# Patient Record
Sex: MALE | Race: BLACK OR AFRICAN AMERICAN | Employment: UNEMPLOYED | URBAN - METROPOLITAN AREA
[De-identification: names, ages, dates, MRNs, and addresses within clinical notes are randomized per-mention and may not be internally consistent; named-entity substitution may affect disease eponyms.]

---

## 2017-05-09 PROBLEM — F10.10 ALCOHOL ABUSE: Status: ACTIVE | Noted: 2017-05-09

## 2017-05-13 PROBLEM — B95.2 UTI (URINARY TRACT INFECTION) DUE TO ENTEROCOCCUS: Status: ACTIVE | Noted: 2017-05-13

## 2017-05-13 PROBLEM — N39.0 UTI (URINARY TRACT INFECTION) DUE TO ENTEROCOCCUS: Status: ACTIVE | Noted: 2017-05-13

## 2017-07-31 PROBLEM — M86.179 OSTEOMYELITIS OF ANKLE OR FOOT, ACUTE (HCC): Status: ACTIVE | Noted: 2017-07-31

## 2018-12-21 ENCOUNTER — APPOINTMENT (OUTPATIENT)
Dept: GENERAL RADIOLOGY | Age: 33
DRG: 720 | End: 2018-12-21
Payer: COMMERCIAL

## 2018-12-21 ENCOUNTER — HOSPITAL ENCOUNTER (INPATIENT)
Age: 33
LOS: 4 days | Discharge: HOME | DRG: 720 | End: 2018-12-25
Attending: EMERGENCY MEDICINE | Admitting: INTERNAL MEDICINE
Payer: COMMERCIAL

## 2018-12-21 DIAGNOSIS — F10.920 ACUTE ALCOHOLIC INTOXICATION WITHOUT COMPLICATION (HCC): ICD-10-CM

## 2018-12-21 DIAGNOSIS — R45.851 SUICIDAL IDEATION: ICD-10-CM

## 2018-12-21 DIAGNOSIS — M86.9 OSTEOMYELITIS OF RIGHT FOOT, UNSPECIFIED TYPE (HCC): Primary | ICD-10-CM

## 2018-12-21 LAB
ACETAMINOPHEN LEVEL: <5 UG/ML (ref 15–30)
ALBUMIN SERPL-MCNC: 4.8 GM/DL (ref 3.4–5)
ALCOHOL SCREEN SERUM: 0.26 %WT/VOL
ALP BLD-CCNC: 92 IU/L (ref 40–129)
ALT SERPL-CCNC: 41 U/L (ref 10–40)
AMPHETAMINES: NEGATIVE
ANION GAP SERPL CALCULATED.3IONS-SCNC: 24 MMOL/L (ref 4–16)
AST SERPL-CCNC: 26 IU/L (ref 15–37)
BACTERIA: NEGATIVE /HPF
BARBITURATE SCREEN URINE: NEGATIVE
BASOPHILS ABSOLUTE: 0.1 K/CU MM
BASOPHILS RELATIVE PERCENT: 0.7 % (ref 0–1)
BENZODIAZEPINE SCREEN, URINE: NEGATIVE
BILIRUB SERPL-MCNC: 0.3 MG/DL (ref 0–1)
BILIRUBIN URINE: NEGATIVE MG/DL
BLOOD, URINE: ABNORMAL
BUN BLDV-MCNC: 8 MG/DL (ref 6–23)
CALCIUM SERPL-MCNC: 9.9 MG/DL (ref 8.3–10.6)
CANNABINOID SCREEN URINE: NEGATIVE
CHLORIDE BLD-SCNC: 97 MMOL/L (ref 99–110)
CLARITY: CLEAR
CO2: 17 MMOL/L (ref 21–32)
COCAINE METABOLITE: ABNORMAL
COLOR: ABNORMAL
CREAT SERPL-MCNC: 0.7 MG/DL (ref 0.9–1.3)
DIFFERENTIAL TYPE: ABNORMAL
EOSINOPHILS ABSOLUTE: 0.1 K/CU MM
EOSINOPHILS RELATIVE PERCENT: 1.1 % (ref 0–3)
GFR AFRICAN AMERICAN: >60 ML/MIN/1.73M2
GFR NON-AFRICAN AMERICAN: >60 ML/MIN/1.73M2
GLUCOSE BLD-MCNC: 141 MG/DL (ref 70–99)
GLUCOSE BLD-MCNC: 202 MG/DL (ref 70–99)
GLUCOSE, URINE: >500 MG/DL
HCT VFR BLD CALC: 40.8 % (ref 42–52)
HEMOGLOBIN: 14 GM/DL (ref 13.5–18)
HYALINE CASTS: 0 /LPF
IMMATURE NEUTROPHIL %: 0.3 % (ref 0–0.43)
KETONES, URINE: ABNORMAL MG/DL
LACTATE: 5.4 MMOL/L (ref 0.4–2)
LACTIC ACID, SEPSIS: 4.1 MMOL/L (ref 0.5–1.9)
LEUKOCYTE ESTERASE, URINE: NEGATIVE
LYMPHOCYTES ABSOLUTE: 3 K/CU MM
LYMPHOCYTES RELATIVE PERCENT: 31.6 % (ref 24–44)
MCH RBC QN AUTO: 31.5 PG (ref 27–31)
MCHC RBC AUTO-ENTMCNC: 34.3 % (ref 32–36)
MCV RBC AUTO: 91.7 FL (ref 78–100)
MONOCYTES ABSOLUTE: 0.8 K/CU MM
MONOCYTES RELATIVE PERCENT: 8.9 % (ref 0–4)
MUCUS: ABNORMAL HPF
NITRITE URINE, QUANTITATIVE: NEGATIVE
NUCLEATED RBC %: 0 %
OPIATES, URINE: NEGATIVE
OXYCODONE: NEGATIVE
PDW BLD-RTO: 12.8 % (ref 11.7–14.9)
PH, URINE: 5 (ref 5–8)
PHENCYCLIDINE, URINE: NEGATIVE
PLATELET # BLD: 313 K/CU MM (ref 140–440)
PMV BLD AUTO: 9.2 FL (ref 7.5–11.1)
POTASSIUM SERPL-SCNC: 3.9 MMOL/L (ref 3.5–5.1)
PROTEIN UA: 30 MG/DL
RBC # BLD: 4.45 M/CU MM (ref 4.6–6.2)
RBC URINE: ABNORMAL /HPF (ref 0–3)
SALICYLATE LEVEL: 0.2 MG/DL (ref 15–30)
SEGMENTED NEUTROPHILS ABSOLUTE COUNT: 5.4 K/CU MM
SEGMENTED NEUTROPHILS RELATIVE PERCENT: 57.4 % (ref 36–66)
SODIUM BLD-SCNC: 138 MMOL/L (ref 135–145)
SPECIFIC GRAVITY UA: 1.02 (ref 1–1.03)
SQUAMOUS EPITHELIAL: <1 /HPF
TOTAL IMMATURE NEUTOROPHIL: 0.03 K/CU MM
TOTAL NUCLEATED RBC: 0 K/CU MM
TOTAL PROTEIN: 8.6 GM/DL (ref 6.4–8.2)
TRICHOMONAS: ABNORMAL /HPF
UROBILINOGEN, URINE: NORMAL MG/DL (ref 0.2–1)
WBC # BLD: 9.4 K/CU MM (ref 4–10.5)
WBC UA: <1 /HPF (ref 0–2)

## 2018-12-21 PROCEDURE — 96365 THER/PROPH/DIAG IV INF INIT: CPT

## 2018-12-21 PROCEDURE — 80307 DRUG TEST PRSMV CHEM ANLYZR: CPT

## 2018-12-21 PROCEDURE — 99285 EMERGENCY DEPT VISIT HI MDM: CPT

## 2018-12-21 PROCEDURE — 96366 THER/PROPH/DIAG IV INF ADDON: CPT

## 2018-12-21 PROCEDURE — 6360000002 HC RX W HCPCS: Performed by: INTERNAL MEDICINE

## 2018-12-21 PROCEDURE — 6360000002 HC RX W HCPCS: Performed by: PHYSICIAN ASSISTANT

## 2018-12-21 PROCEDURE — 6370000000 HC RX 637 (ALT 250 FOR IP): Performed by: INTERNAL MEDICINE

## 2018-12-21 PROCEDURE — G0480 DRUG TEST DEF 1-7 CLASSES: HCPCS

## 2018-12-21 PROCEDURE — 1200000000 HC SEMI PRIVATE

## 2018-12-21 PROCEDURE — 85025 COMPLETE CBC W/AUTO DIFF WBC: CPT

## 2018-12-21 PROCEDURE — 2580000003 HC RX 258: Performed by: INTERNAL MEDICINE

## 2018-12-21 PROCEDURE — 96375 TX/PRO/DX INJ NEW DRUG ADDON: CPT

## 2018-12-21 PROCEDURE — 80053 COMPREHEN METABOLIC PANEL: CPT

## 2018-12-21 PROCEDURE — 87040 BLOOD CULTURE FOR BACTERIA: CPT

## 2018-12-21 PROCEDURE — 82962 GLUCOSE BLOOD TEST: CPT

## 2018-12-21 PROCEDURE — 81001 URINALYSIS AUTO W/SCOPE: CPT

## 2018-12-21 PROCEDURE — 36415 COLL VENOUS BLD VENIPUNCTURE: CPT

## 2018-12-21 PROCEDURE — 83605 ASSAY OF LACTIC ACID: CPT

## 2018-12-21 PROCEDURE — 73630 X-RAY EXAM OF FOOT: CPT

## 2018-12-21 PROCEDURE — 2580000003 HC RX 258: Performed by: PHYSICIAN ASSISTANT

## 2018-12-21 RX ORDER — ATORVASTATIN CALCIUM 20 MG/1
20 TABLET, FILM COATED ORAL NIGHTLY
Status: DISCONTINUED | OUTPATIENT
Start: 2018-12-21 | End: 2018-12-25 | Stop reason: HOSPADM

## 2018-12-21 RX ORDER — SODIUM CHLORIDE 9 MG/ML
INJECTION, SOLUTION INTRAVENOUS CONTINUOUS
Status: DISCONTINUED | OUTPATIENT
Start: 2018-12-21 | End: 2018-12-25 | Stop reason: HOSPADM

## 2018-12-21 RX ORDER — 0.9 % SODIUM CHLORIDE 0.9 %
1000 INTRAVENOUS SOLUTION INTRAVENOUS ONCE
Status: COMPLETED | OUTPATIENT
Start: 2018-12-21 | End: 2018-12-21

## 2018-12-21 RX ORDER — DEXTROSE MONOHYDRATE 25 G/50ML
12.5 INJECTION, SOLUTION INTRAVENOUS PRN
Status: DISCONTINUED | OUTPATIENT
Start: 2018-12-21 | End: 2018-12-25 | Stop reason: HOSPADM

## 2018-12-21 RX ORDER — OXYCODONE AND ACETAMINOPHEN 7.5; 325 MG/1; MG/1
1 TABLET ORAL 3 TIMES DAILY PRN
Status: DISCONTINUED | OUTPATIENT
Start: 2018-12-21 | End: 2018-12-25 | Stop reason: HOSPADM

## 2018-12-21 RX ORDER — AMLODIPINE BESYLATE 10 MG/1
10 TABLET ORAL DAILY
Status: DISCONTINUED | OUTPATIENT
Start: 2018-12-22 | End: 2018-12-25 | Stop reason: HOSPADM

## 2018-12-21 RX ORDER — LORAZEPAM 2 MG/ML
2 INJECTION INTRAMUSCULAR ONCE
Status: DISCONTINUED | OUTPATIENT
Start: 2018-12-21 | End: 2018-12-25 | Stop reason: HOSPADM

## 2018-12-21 RX ORDER — ONDANSETRON 2 MG/ML
4 INJECTION INTRAMUSCULAR; INTRAVENOUS EVERY 6 HOURS PRN
Status: DISCONTINUED | OUTPATIENT
Start: 2018-12-21 | End: 2018-12-25 | Stop reason: HOSPADM

## 2018-12-21 RX ORDER — 0.9 % SODIUM CHLORIDE 0.9 %
1000 INTRAVENOUS SOLUTION INTRAVENOUS ONCE
Status: COMPLETED | OUTPATIENT
Start: 2018-12-21 | End: 2018-12-22

## 2018-12-21 RX ORDER — DIPHENHYDRAMINE HYDROCHLORIDE 50 MG/ML
25 INJECTION INTRAMUSCULAR; INTRAVENOUS EVERY 6 HOURS PRN
Status: DISCONTINUED | OUTPATIENT
Start: 2018-12-21 | End: 2018-12-21 | Stop reason: HOSPADM

## 2018-12-21 RX ORDER — IPRATROPIUM BROMIDE AND ALBUTEROL SULFATE 2.5; .5 MG/3ML; MG/3ML
1 SOLUTION RESPIRATORY (INHALATION) EVERY 4 HOURS PRN
Status: DISCONTINUED | OUTPATIENT
Start: 2018-12-21 | End: 2018-12-25 | Stop reason: HOSPADM

## 2018-12-21 RX ORDER — DEXTROSE MONOHYDRATE 50 MG/ML
100 INJECTION, SOLUTION INTRAVENOUS PRN
Status: DISCONTINUED | OUTPATIENT
Start: 2018-12-21 | End: 2018-12-25 | Stop reason: HOSPADM

## 2018-12-21 RX ORDER — PANTOPRAZOLE SODIUM 40 MG/1
40 TABLET, DELAYED RELEASE ORAL
Status: DISCONTINUED | OUTPATIENT
Start: 2018-12-22 | End: 2018-12-25 | Stop reason: HOSPADM

## 2018-12-21 RX ORDER — MORPHINE SULFATE 4 MG/ML
4 INJECTION, SOLUTION INTRAMUSCULAR; INTRAVENOUS EVERY 4 HOURS PRN
Status: DISCONTINUED | OUTPATIENT
Start: 2018-12-21 | End: 2018-12-25 | Stop reason: HOSPADM

## 2018-12-21 RX ORDER — NICOTINE 21 MG/24HR
1 PATCH, TRANSDERMAL 24 HOURS TRANSDERMAL DAILY
Status: DISCONTINUED | OUTPATIENT
Start: 2018-12-21 | End: 2018-12-25 | Stop reason: HOSPADM

## 2018-12-21 RX ORDER — HALOPERIDOL 5 MG/ML
5 INJECTION INTRAMUSCULAR EVERY 6 HOURS PRN
Status: DISCONTINUED | OUTPATIENT
Start: 2018-12-21 | End: 2018-12-21

## 2018-12-21 RX ORDER — ALBUTEROL SULFATE 90 UG/1
2 AEROSOL, METERED RESPIRATORY (INHALATION) EVERY 6 HOURS PRN
Status: DISCONTINUED | OUTPATIENT
Start: 2018-12-21 | End: 2018-12-25 | Stop reason: HOSPADM

## 2018-12-21 RX ORDER — NICOTINE POLACRILEX 4 MG
15 LOZENGE BUCCAL PRN
Status: DISCONTINUED | OUTPATIENT
Start: 2018-12-21 | End: 2018-12-25 | Stop reason: HOSPADM

## 2018-12-21 RX ADMIN — SODIUM CHLORIDE: 9 INJECTION, SOLUTION INTRAVENOUS at 23:45

## 2018-12-21 RX ADMIN — TAZOBACTAM SODIUM AND PIPERACILLIN SODIUM 3.38 G: 375; 3 INJECTION, SOLUTION INTRAVENOUS at 23:45

## 2018-12-21 RX ADMIN — SODIUM CHLORIDE 1000 ML: 9 INJECTION, SOLUTION INTRAVENOUS at 22:53

## 2018-12-21 RX ADMIN — VANCOMYCIN HYDROCHLORIDE 2000 MG: 1 INJECTION, POWDER, LYOPHILIZED, FOR SOLUTION INTRAVENOUS at 18:27

## 2018-12-21 RX ADMIN — TAZOBACTAM SODIUM AND PIPERACILLIN SODIUM 4.5 G: 500; 4 INJECTION, SOLUTION INTRAVENOUS at 18:12

## 2018-12-21 RX ADMIN — SODIUM CHLORIDE 1000 ML: 9 INJECTION, SOLUTION INTRAVENOUS at 18:12

## 2018-12-21 ASSESSMENT — PAIN DESCRIPTION - LOCATION: LOCATION: FOOT

## 2018-12-21 ASSESSMENT — PAIN SCALES - GENERAL: PAINLEVEL_OUTOF10: 0

## 2018-12-21 ASSESSMENT — PAIN DESCRIPTION - ORIENTATION: ORIENTATION: RIGHT

## 2018-12-21 ASSESSMENT — PAIN SCALES - WONG BAKER: WONGBAKER_NUMERICALRESPONSE: 6

## 2018-12-21 ASSESSMENT — PAIN DESCRIPTION - PAIN TYPE: TYPE: ACUTE PAIN

## 2018-12-21 NOTE — ED NOTES
Pt in room yelling at security, called security white mother F??, that they were going to kill him. Gordon notified, pt moved to room 18. All equipment moved from room. During this time pt is talking on the phone yelling at that person and telling them that they \"better hide his dope\".       Cass Galvan RN  12/21/18 3555

## 2018-12-21 NOTE — ED NOTES
Pt now changed in green gown and belongings checked and put in belongings bag. Security at bedside.       Ashley Delvalle RN  12/21/18 5775

## 2018-12-21 NOTE — ED PROVIDER NOTES
surgery (Bilateral); and Toe amputation (Right, 07/25/2017). Home medications:   Prior to Admission medications    Medication Sig Start Date End Date Taking? Authorizing Provider   oxyCODONE-acetaminophen (PERCOCET) 5-325 MG per tablet Take 2 tablets by mouth every 4 hours as needed for Pain  . Historical Provider, MD   glipiZIDE (GLUCOTROL) 10 MG tablet Take 10 mg by mouth 2 times daily (before meals)    Historical Provider, MD   metFORMIN (GLUCOPHAGE) 1000 MG tablet Take 1,000 mg by mouth 2 times daily (with meals)    Historical Provider, MD   prednisoLONE acetate (PRED MILD) 0.12 % ophthalmic suspension Place 1 drop into the right eye 5 times daily    Historical Provider, MD   brimonidine (ALPHAGAN P) 0.15 % ophthalmic solution Place 1 drop into the right eye 3 times daily    Historical Provider, MD   amLODIPine (NORVASC) 10 MG tablet Take 10 mg by mouth daily 11/25/16   Historical Provider, MD   atorvastatin (LIPITOR) 20 MG tablet Take 20 mg by mouth daily 11/25/16   Historical Provider, MD   COMBIVENT RESPIMAT  MCG/ACT AERS inhaler Inhale 1 puff into the lungs 4 times daily 11/10/16   Historical Provider, MD   lisinopril-hydrochlorothiazide (PRINZIDE;ZESTORETIC) 20-12.5 MG per tablet Take 2 tablets by mouth 2 times daily  11/25/16   Historical Provider, MD   RA P COL-RITE 8.6-50 MG per tablet Take 2 tablets by mouth daily as needed  11/28/16   Historical Provider, MD   albuterol sulfate  (90 BASE) MCG/ACT inhaler Inhale 2 puffs into the lungs every 6 hours as needed for Wheezing    Historical Provider, MD   omeprazole (PRILOSEC) 20 MG capsule Take 20 mg by mouth daily    Historical Provider, MD   cloNIDine (CATAPRES) 0.1 MG tablet Take 0.1 mg by mouth 2 times daily     Historical Provider, MD       Social history:  reports that he has been smoking Cigarettes. He has a 2.50 pack-year smoking history. He has never used smokeless tobacco. He reports that he drinks alcohol.  He reports that he

## 2018-12-21 NOTE — ED NOTES
XR FOOT RIGHT (MIN 3 VIEWS)   Status: Final result   Order Providers     Authorizing Billing   GABRIELA Rowley MD          Signed by     Signed Date/Time  Phone Pager   Ishaanelizabeth Morillo 12/21/2018 16:41 [phone]    [facility]     Read Date Phone Pager   Florin Morillo Dec 21, 2018 [phone]    Radiation Dose Estimates     No radiation information found for this patient   Narrative   EXAMINATION:   3 XRAY VIEWS OF THE RIGHT FOOT       12/21/2018 4:20 pm       COMPARISON:   07/18/2017.       HISTORY:   ORDERING SYSTEM PROVIDED HISTORY: swelling of foot and obvious infection of R   5th toe, h/o DM, concern for osteomyelitis   TECHNOLOGIST PROVIDED HISTORY:   Reason for exam:->swelling of foot and obvious infection of R 5th toe, h/o   DM, concern for osteomyelitis   Ordering Physician Provided Reason for Exam: swelling of foot and obvious   infection of R 5th toe, h/o DM, concern for osteomyelitis   Acuity: Unknown   Type of Exam: Initial   Additional signs and symptoms: best images possible, patient was intoxicated   and uncooperative   Relevant Medical/Surgical History: diabetes mellitus       FINDINGS:   There is hallux valgus deformity of the 1st digit.  There is soft tissue   swelling with bony destruction involving the phalanges of the 5th digit. There is presumably chronic bony irregularity involving is the 2nd digit,   which could be related to a prior amputation.  No acute fracture is seen. There is extensive soft tissue swelling of the mid to forefoot.  Degenerative   changes of the midfoot are noted.  Scattered vascular calcifications.           Impression   1. Marked bony irregularity involving the 5th digit suggestive of   osteomyelitis. 2. Extensive soft tissue swelling of the mid to forefoot. 3. Chronic bony irregularity is seen involving the 2nd digit possibly related   to a prior amputation.    4. Hallux valgus deformity of the 1st digit.              Solis Hopkins

## 2018-12-21 NOTE — ED NOTES
Pt being verbally aggressive. \"I hit hard. My niggas come out here and tear this shit up. Is it a felony if I knock one of ya'll out? \"      Luisito Fine RN  12/21/18 [facility], RN  12/21/18 [address] [place] [address], RN  12/21/18 5697

## 2018-12-21 NOTE — ED NOTES
Pt refusing to allow staff. Pt states, \"I aint make no damn statements about killing myself. Just take me to retirement. Cut my foot off. You ain't doing anything. \"      Mago Hebert RN  12/21/18 7869

## 2018-12-22 LAB
ANION GAP SERPL CALCULATED.3IONS-SCNC: 14 MMOL/L (ref 4–16)
BASOPHILS ABSOLUTE: 0.1 K/CU MM
BASOPHILS RELATIVE PERCENT: 0.9 % (ref 0–1)
BUN BLDV-MCNC: 11 MG/DL (ref 6–23)
CALCIUM SERPL-MCNC: 9.1 MG/DL (ref 8.3–10.6)
CHLORIDE BLD-SCNC: 97 MMOL/L (ref 99–110)
CO2: 25 MMOL/L (ref 21–32)
CREAT SERPL-MCNC: 0.9 MG/DL (ref 0.9–1.3)
DIFFERENTIAL TYPE: ABNORMAL
EOSINOPHILS ABSOLUTE: 0.1 K/CU MM
EOSINOPHILS RELATIVE PERCENT: 1.6 % (ref 0–3)
ERYTHROCYTE SEDIMENTATION RATE: 24 MM/HR (ref 0–15)
GFR AFRICAN AMERICAN: >60 ML/MIN/1.73M2
GFR NON-AFRICAN AMERICAN: >60 ML/MIN/1.73M2
GLUCOSE BLD-MCNC: 169 MG/DL (ref 70–99)
GLUCOSE BLD-MCNC: 174 MG/DL (ref 70–99)
GLUCOSE BLD-MCNC: 198 MG/DL (ref 70–99)
GLUCOSE BLD-MCNC: 203 MG/DL (ref 70–99)
GLUCOSE BLD-MCNC: 223 MG/DL (ref 70–99)
HCT VFR BLD CALC: 37.3 % (ref 42–52)
HEMOGLOBIN: 12.8 GM/DL (ref 13.5–18)
HIGH SENSITIVE C-REACTIVE PROTEIN: 11.8 MG/L
IMMATURE NEUTROPHIL %: 0.2 % (ref 0–0.43)
LACTATE: 2.7 MMOL/L (ref 0.4–2)
LACTIC ACID, SEPSIS: 0.9 MMOL/L (ref 0.5–1.9)
LACTIC ACID, SEPSIS: 1.1 MMOL/L (ref 0.5–1.9)
LYMPHOCYTES ABSOLUTE: 1.7 K/CU MM
LYMPHOCYTES RELATIVE PERCENT: 30.4 % (ref 24–44)
MAGNESIUM: 1.8 MG/DL (ref 1.8–2.4)
MCH RBC QN AUTO: 31.4 PG (ref 27–31)
MCHC RBC AUTO-ENTMCNC: 34.3 % (ref 32–36)
MCV RBC AUTO: 91.6 FL (ref 78–100)
MONOCYTES ABSOLUTE: 0.6 K/CU MM
MONOCYTES RELATIVE PERCENT: 11.7 % (ref 0–4)
NUCLEATED RBC %: 0 %
PDW BLD-RTO: 12.7 % (ref 11.7–14.9)
PLATELET # BLD: 225 K/CU MM (ref 140–440)
PMV BLD AUTO: 9 FL (ref 7.5–11.1)
POTASSIUM SERPL-SCNC: 4.1 MMOL/L (ref 3.5–5.1)
RBC # BLD: 4.07 M/CU MM (ref 4.6–6.2)
SEGMENTED NEUTROPHILS ABSOLUTE COUNT: 3 K/CU MM
SEGMENTED NEUTROPHILS RELATIVE PERCENT: 55.2 % (ref 36–66)
SODIUM BLD-SCNC: 136 MMOL/L (ref 135–145)
TOTAL IMMATURE NEUTOROPHIL: 0.01 K/CU MM
TOTAL NUCLEATED RBC: 0 K/CU MM
VANCOMYCIN TROUGH: 8.6 UG/ML (ref 10–20)
WBC # BLD: 5.5 K/CU MM (ref 4–10.5)

## 2018-12-22 PROCEDURE — 83735 ASSAY OF MAGNESIUM: CPT

## 2018-12-22 PROCEDURE — 6370000000 HC RX 637 (ALT 250 FOR IP): Performed by: INTERNAL MEDICINE

## 2018-12-22 PROCEDURE — 1200000000 HC SEMI PRIVATE

## 2018-12-22 PROCEDURE — 36415 COLL VENOUS BLD VENIPUNCTURE: CPT

## 2018-12-22 PROCEDURE — 6360000002 HC RX W HCPCS: Performed by: INTERNAL MEDICINE

## 2018-12-22 PROCEDURE — 2580000003 HC RX 258: Performed by: INTERNAL MEDICINE

## 2018-12-22 PROCEDURE — 86141 C-REACTIVE PROTEIN HS: CPT

## 2018-12-22 PROCEDURE — 85025 COMPLETE CBC W/AUTO DIFF WBC: CPT

## 2018-12-22 PROCEDURE — 85652 RBC SED RATE AUTOMATED: CPT

## 2018-12-22 PROCEDURE — 80202 ASSAY OF VANCOMYCIN: CPT

## 2018-12-22 PROCEDURE — 82962 GLUCOSE BLOOD TEST: CPT

## 2018-12-22 PROCEDURE — 94640 AIRWAY INHALATION TREATMENT: CPT

## 2018-12-22 PROCEDURE — 83605 ASSAY OF LACTIC ACID: CPT

## 2018-12-22 PROCEDURE — 80048 BASIC METABOLIC PNL TOTAL CA: CPT

## 2018-12-22 RX ORDER — ALLOPURINOL 300 MG/1
300 TABLET ORAL DAILY
COMMUNITY
End: 2019-01-09

## 2018-12-22 RX ADMIN — SODIUM CHLORIDE: 9 INJECTION, SOLUTION INTRAVENOUS at 21:25

## 2018-12-22 RX ADMIN — TAZOBACTAM SODIUM AND PIPERACILLIN SODIUM 3.38 G: 375; 3 INJECTION, SOLUTION INTRAVENOUS at 20:57

## 2018-12-22 RX ADMIN — ATORVASTATIN CALCIUM 20 MG: 20 TABLET, FILM COATED ORAL at 20:57

## 2018-12-22 RX ADMIN — TAZOBACTAM SODIUM AND PIPERACILLIN SODIUM 3.38 G: 375; 3 INJECTION, SOLUTION INTRAVENOUS at 06:22

## 2018-12-22 RX ADMIN — INSULIN LISPRO 1 UNITS: 100 INJECTION, SOLUTION INTRAVENOUS; SUBCUTANEOUS at 21:24

## 2018-12-22 RX ADMIN — INSULIN LISPRO 1 UNITS: 100 INJECTION, SOLUTION INTRAVENOUS; SUBCUTANEOUS at 18:25

## 2018-12-22 RX ADMIN — IPRATROPIUM BROMIDE AND ALBUTEROL SULFATE 1 AMPULE: .5; 3 SOLUTION RESPIRATORY (INHALATION) at 14:21

## 2018-12-22 RX ADMIN — TAZOBACTAM SODIUM AND PIPERACILLIN SODIUM 3.38 G: 375; 3 INJECTION, SOLUTION INTRAVENOUS at 14:47

## 2018-12-22 RX ADMIN — IPRATROPIUM BROMIDE AND ALBUTEROL SULFATE 1 AMPULE: .5; 3 SOLUTION RESPIRATORY (INHALATION) at 21:02

## 2018-12-22 RX ADMIN — INSULIN LISPRO 1 UNITS: 100 INJECTION, SOLUTION INTRAVENOUS; SUBCUTANEOUS at 14:48

## 2018-12-22 RX ADMIN — VANCOMYCIN HYDROCHLORIDE 1500 MG: 5 INJECTION, POWDER, LYOPHILIZED, FOR SOLUTION INTRAVENOUS at 18:25

## 2018-12-22 RX ADMIN — IPRATROPIUM BROMIDE AND ALBUTEROL SULFATE 1 AMPULE: .5; 3 SOLUTION RESPIRATORY (INHALATION) at 07:38

## 2018-12-22 RX ADMIN — AMLODIPINE BESYLATE 10 MG: 10 TABLET ORAL at 09:55

## 2018-12-22 RX ADMIN — VANCOMYCIN HYDROCHLORIDE 1500 MG: 5 INJECTION, POWDER, LYOPHILIZED, FOR SOLUTION INTRAVENOUS at 09:55

## 2018-12-22 RX ADMIN — ALBUTEROL SULFATE 2 PUFF: 90 AEROSOL, METERED RESPIRATORY (INHALATION) at 00:00

## 2018-12-22 RX ADMIN — VANCOMYCIN HYDROCHLORIDE 1500 MG: 5 INJECTION, POWDER, LYOPHILIZED, FOR SOLUTION INTRAVENOUS at 02:30

## 2018-12-22 RX ADMIN — INSULIN LISPRO 1 UNITS: 100 INJECTION, SOLUTION INTRAVENOUS; SUBCUTANEOUS at 09:53

## 2018-12-22 ASSESSMENT — PAIN SCALES - GENERAL
PAINLEVEL_OUTOF10: 0

## 2018-12-22 NOTE — H&P
HISTORY AND PHYSICAL  (Hospitalist, Internal Medicine)  IDENTIFYING INFORMATION   PATIENT:  Casandra Trammell  MRN:  [id number]  ADMIT DATE: 12/21/2018  TIME OF EVALUATION: 12/21/2018 7:04 PM    CHIEF COMPLAINT   Right foot swelling    HISTORY OF PRESENT ILLNESS   Casandra Trammell is a 28 y.o. male with a past medical history of HTN, DMII on oral med who presents with 1 week hx of progressive right foot but more localized to the right 5th toe swelling associated with sharp pain 5/10. He visited Dr Mary Grace Guzmán of podiatry - partner of Dr LANGLEY [facility] and Luis Alberto Enciso who performed a XR and immediately sent him to the ED for admission. Suspect right 5th toe osteo. In the ED, he was found to be intoxicated and had some questionable suicidal ideations.      PAST MEDICAL, SURGICAL, FAMILY, and SOCIAL HISTORY     Past Medical History:   Diagnosis Date    Asthma     Blind left eye     Diabetes mellitus ([address]     GERD (gastroesophageal reflux disease)     Hypertension     Retinal detachment 2012    left     Past Surgical History:   Procedure Laterality Date    CORNEAL TRANSPLANT Bilateral     EYE SURGERY      left eye removed    EYE SURGERY Right     FRACTURE SURGERY      foot left    MANDIBLE SURGERY Right     MANDIBLE SURGERY Bilateral     TOE AMPUTATION Right 07/25/2017     Family History   Problem Relation Age of Onset    Diabetes Mother     Asthma Mother     High Blood Pressure Mother     Stroke Mother     Heart Disease Mother     Diabetes Father     Asthma Father     High Blood Pressure Father      Family Hx of HTN  Family Hx as reviewed above, otherwise non-contributory  Social History     Social History    Marital status: Single     Spouse name: N/A    Number of children: N/A    Years of education: N/A     Social History Main Topics    Smoking status: Current Every Day Smoker     Packs/day: 0.50     Years: 5.00     Types: Cigarettes    Smokeless tobacco: Never Used    Alcohol use Yes      Comment: occasionally

## 2018-12-22 NOTE — ED NOTES
Pt belongings kept in ER security per [facility] floor nurse [address], RN  12/21/18 2017

## 2018-12-22 NOTE — PROGRESS NOTES
Psych consult placed for evaluation of patient. Spoke with nursing supervisor regarding consult. Telepsych is not here on weekend so Ellie Dubois said to consult on call psych. Consult was sent to Dr. Pepe Hardin and Dr. Andrew Fernandez was notified. Updated patient and he stated understanding. Shahana HARDIN in charge today and also aware of events. If no psych is available when patient is ready for discharge Ellie Dubois stated the access center will need to be called to transfer the patient to inpatient psych for evaluation and then discharged from there. Patient is pink slipped at this time.

## 2018-12-23 LAB
ANION GAP SERPL CALCULATED.3IONS-SCNC: 11 MMOL/L (ref 4–16)
BASOPHILS ABSOLUTE: 0 K/CU MM
BASOPHILS RELATIVE PERCENT: 0.6 % (ref 0–1)
BUN BLDV-MCNC: 12 MG/DL (ref 6–23)
CALCIUM SERPL-MCNC: 8.7 MG/DL (ref 8.3–10.6)
CHLORIDE BLD-SCNC: 100 MMOL/L (ref 99–110)
CO2: 25 MMOL/L (ref 21–32)
CREAT SERPL-MCNC: 0.8 MG/DL (ref 0.9–1.3)
DIFFERENTIAL TYPE: ABNORMAL
EOSINOPHILS ABSOLUTE: 0.1 K/CU MM
EOSINOPHILS RELATIVE PERCENT: 2.8 % (ref 0–3)
GFR AFRICAN AMERICAN: >60 ML/MIN/1.73M2
GFR NON-AFRICAN AMERICAN: >60 ML/MIN/1.73M2
GLUCOSE BLD-MCNC: 189 MG/DL (ref 70–99)
GLUCOSE BLD-MCNC: 200 MG/DL (ref 70–99)
GLUCOSE BLD-MCNC: 208 MG/DL (ref 70–99)
GLUCOSE BLD-MCNC: 230 MG/DL (ref 70–99)
GLUCOSE BLD-MCNC: 236 MG/DL (ref 70–99)
HCT VFR BLD CALC: 38.9 % (ref 42–52)
HEMOGLOBIN: 13.3 GM/DL (ref 13.5–18)
IMMATURE NEUTROPHIL %: 0.3 % (ref 0–0.43)
LYMPHOCYTES ABSOLUTE: 1.3 K/CU MM
LYMPHOCYTES RELATIVE PERCENT: 40.4 % (ref 24–44)
MAGNESIUM: 1.9 MG/DL (ref 1.8–2.4)
MCH RBC QN AUTO: 31.4 PG (ref 27–31)
MCHC RBC AUTO-ENTMCNC: 34.2 % (ref 32–36)
MCV RBC AUTO: 92 FL (ref 78–100)
MONOCYTES ABSOLUTE: 0.4 K/CU MM
MONOCYTES RELATIVE PERCENT: 11 % (ref 0–4)
NUCLEATED RBC %: 0 %
PDW BLD-RTO: 12.1 % (ref 11.7–14.9)
PLATELET # BLD: 199 K/CU MM (ref 140–440)
PMV BLD AUTO: 9.1 FL (ref 7.5–11.1)
POTASSIUM SERPL-SCNC: 4.2 MMOL/L (ref 3.5–5.1)
RBC # BLD: 4.23 M/CU MM (ref 4.6–6.2)
SEGMENTED NEUTROPHILS ABSOLUTE COUNT: 1.4 K/CU MM
SEGMENTED NEUTROPHILS RELATIVE PERCENT: 44.9 % (ref 36–66)
SODIUM BLD-SCNC: 136 MMOL/L (ref 135–145)
TOTAL IMMATURE NEUTOROPHIL: 0.01 K/CU MM
TOTAL NUCLEATED RBC: 0 K/CU MM
WBC # BLD: 3.2 K/CU MM (ref 4–10.5)

## 2018-12-23 PROCEDURE — 6370000000 HC RX 637 (ALT 250 FOR IP): Performed by: INTERNAL MEDICINE

## 2018-12-23 PROCEDURE — 83735 ASSAY OF MAGNESIUM: CPT

## 2018-12-23 PROCEDURE — 36415 COLL VENOUS BLD VENIPUNCTURE: CPT

## 2018-12-23 PROCEDURE — 1200000000 HC SEMI PRIVATE

## 2018-12-23 PROCEDURE — 87073 CULTURE BACTERIA ANAEROBIC: CPT

## 2018-12-23 PROCEDURE — 82962 GLUCOSE BLOOD TEST: CPT

## 2018-12-23 PROCEDURE — 87186 SC STD MICRODIL/AGAR DIL: CPT

## 2018-12-23 PROCEDURE — 6360000002 HC RX W HCPCS: Performed by: INTERNAL MEDICINE

## 2018-12-23 PROCEDURE — 2580000003 HC RX 258: Performed by: INTERNAL MEDICINE

## 2018-12-23 PROCEDURE — 94640 AIRWAY INHALATION TREATMENT: CPT

## 2018-12-23 PROCEDURE — 87147 CULTURE TYPE IMMUNOLOGIC: CPT

## 2018-12-23 PROCEDURE — 87077 CULTURE AEROBIC IDENTIFY: CPT

## 2018-12-23 PROCEDURE — 87071 CULTURE AEROBIC QUANT OTHER: CPT

## 2018-12-23 PROCEDURE — 94761 N-INVAS EAR/PLS OXIMETRY MLT: CPT

## 2018-12-23 PROCEDURE — 6370000000 HC RX 637 (ALT 250 FOR IP): Performed by: SURGERY

## 2018-12-23 PROCEDURE — 80048 BASIC METABOLIC PNL TOTAL CA: CPT

## 2018-12-23 PROCEDURE — 85025 COMPLETE CBC W/AUTO DIFF WBC: CPT

## 2018-12-23 RX ORDER — BACITRACIN, NEOMYCIN, POLYMYXIN B 400; 3.5; 5 [USP'U]/G; MG/G; [USP'U]/G
OINTMENT TOPICAL DAILY
Status: DISCONTINUED | OUTPATIENT
Start: 2018-12-23 | End: 2018-12-25 | Stop reason: HOSPADM

## 2018-12-23 RX ADMIN — INSULIN LISPRO 1 UNITS: 100 INJECTION, SOLUTION INTRAVENOUS; SUBCUTANEOUS at 22:21

## 2018-12-23 RX ADMIN — VANCOMYCIN HYDROCHLORIDE 1500 MG: 5 INJECTION, POWDER, LYOPHILIZED, FOR SOLUTION INTRAVENOUS at 03:58

## 2018-12-23 RX ADMIN — BACITRACIN, NEOMYCIN, POLYMYXIN B: 400; 3.5; 5 OINTMENT TOPICAL at 13:10

## 2018-12-23 RX ADMIN — INSULIN LISPRO 1 UNITS: 100 INJECTION, SOLUTION INTRAVENOUS; SUBCUTANEOUS at 12:50

## 2018-12-23 RX ADMIN — ENOXAPARIN SODIUM 40 MG: 40 INJECTION SUBCUTANEOUS at 09:01

## 2018-12-23 RX ADMIN — INSULIN LISPRO 2 UNITS: 100 INJECTION, SOLUTION INTRAVENOUS; SUBCUTANEOUS at 17:30

## 2018-12-23 RX ADMIN — IPRATROPIUM BROMIDE AND ALBUTEROL SULFATE 1 AMPULE: .5; 3 SOLUTION RESPIRATORY (INHALATION) at 08:09

## 2018-12-23 RX ADMIN — VANCOMYCIN HYDROCHLORIDE 1750 MG: 500 INJECTION, POWDER, LYOPHILIZED, FOR SOLUTION INTRAVENOUS at 12:50

## 2018-12-23 RX ADMIN — TAZOBACTAM SODIUM AND PIPERACILLIN SODIUM 3.38 G: 375; 3 INJECTION, SOLUTION INTRAVENOUS at 15:31

## 2018-12-23 RX ADMIN — IPRATROPIUM BROMIDE AND ALBUTEROL SULFATE 1 AMPULE: .5; 3 SOLUTION RESPIRATORY (INHALATION) at 21:41

## 2018-12-23 RX ADMIN — IPRATROPIUM BROMIDE AND ALBUTEROL SULFATE 1 AMPULE: .5; 3 SOLUTION RESPIRATORY (INHALATION) at 15:16

## 2018-12-23 RX ADMIN — VANCOMYCIN HYDROCHLORIDE 1750 MG: 500 INJECTION, POWDER, LYOPHILIZED, FOR SOLUTION INTRAVENOUS at 23:32

## 2018-12-23 RX ADMIN — ATORVASTATIN CALCIUM 20 MG: 20 TABLET, FILM COATED ORAL at 22:19

## 2018-12-23 RX ADMIN — PANTOPRAZOLE SODIUM 40 MG: 40 TABLET, DELAYED RELEASE ORAL at 07:56

## 2018-12-23 RX ADMIN — TAZOBACTAM SODIUM AND PIPERACILLIN SODIUM 3.38 G: 375; 3 INJECTION, SOLUTION INTRAVENOUS at 02:49

## 2018-12-23 RX ADMIN — TAZOBACTAM SODIUM AND PIPERACILLIN SODIUM 3.38 G: 375; 3 INJECTION, SOLUTION INTRAVENOUS at 22:19

## 2018-12-23 RX ADMIN — TAZOBACTAM SODIUM AND PIPERACILLIN SODIUM 3.38 G: 375; 3 INJECTION, SOLUTION INTRAVENOUS at 09:00

## 2018-12-23 RX ADMIN — AMLODIPINE BESYLATE 10 MG: 10 TABLET ORAL at 09:01

## 2018-12-23 RX ADMIN — SODIUM CHLORIDE: 9 INJECTION, SOLUTION INTRAVENOUS at 17:30

## 2018-12-23 ASSESSMENT — PAIN SCALES - GENERAL
PAINLEVEL_OUTOF10: 0

## 2018-12-23 NOTE — PROGRESS NOTES
1 ampule at 12/23/18 0809    prochlorperazine (COMPAZINE) injection 10 mg  10 mg Intravenous Q6H PRN Krystle Liz MD        ondansetron [facility]) injection 4 mg  4 mg Intravenous Q6H PRN Krystle Liz MD        morphine (PF) injection 4 mg  4 mg Intravenous Q4H PRN Krystle Liz MD        hydrALAZINE (APRESOLINE) 10 mg in sodium chloride 0.9 % 50 mL ivpb  10 mg Intravenous Q4H PRN Krystle Liz MD        labetalol (NORMODYNE;TRANDATE) 10 mg in sodium chloride 0.9 % 50 mL IVPB  10 mg Intravenous Q4H PRN Krystle Liz MD        insulin lispro (HUMALOG) injection vial 0-6 Units  0-6 Units Subcutaneous TID WC Krystle Liz MD   1 Units at 12/23/18 1250    insulin lispro (HUMALOG) injection vial 0-3 Units  0-3 Units Subcutaneous Nightly Krystle Liz MD   1 Units at 12/22/18 2124    glucose (GLUTOSE) 40 % oral gel 15 g  15 g Oral PRN Krystle Liz MD        dextrose 50 % solution 12.5 g  12.5 g Intravenous PRN Krystle Liz MD        glucagon (rDNA) injection 1 mg  1 mg Intramuscular PRN Krystle Liz MD        dextrose 5 % solution  100 mL/hr Intravenous PRN Krystle Liz MD        enoxaparin (LOVENOX) injection 40 mg  40 mg Subcutaneous Daily Krystle Liz MD   40 mg at 12/23/18 0901         Allergies  Allergies   Allergen Reactions    Ciprofloxacin Shortness Of Breath    Shellfish-Derived Products Anaphylaxis       REVIEW OF SYSTEMS     Within above limitations. 14 point review of systems reviewed. Pertinent positive or negative as per HPI or otherwise negative per 14 point systems review. Reviewed 12/23/2018 at 1:42 PM    PHYSICAL EXAM       Blood pressure 134/79, pulse 94, temperature 98.4 °F (36.9 °C), temperature source Oral, resp. rate 17, height 6' 3\" (1.905 m), weight (!) 301 lb (136.5 kg), SpO2 98 %. General - AAO x 3  Psych - Appropriate affect/speech. No agitation  Eyes - Eye lids intact. No scleral icterus  ENT - Lips wnl. External ear clear/dry/intact.  No thyromegaly on inspection  Neuro - No gross peripheral or central neuro deficits on inspection  Heart - Sinus. RRR. S1 and S2 present. No added HS/murmurs appreciated. No elevated JVD appreciated  Lung - Adequate air entry b/l, No crackles/wheezes appreciated  GI -  Soft. No guarding/rigidity. No hepatosplenomegaly/ascites. BS+   - No CVA/suprapubic tenderness or palpable bladder distension  Skin - Intact. No rash/petechiae/ecchymosis. Warm extremities  MSK - Joints with normal ROM. No joint swellings      Lines/Drains/Airways/Wounds:  [unfilled]    LABS AND IMAGING   CBC  [unfilled]    Last 3 Hemoglobin  Lab Results   Component Value Date    HGB 13.3 12/23/2018    HGB 12.8 12/22/2018    HGB 14.0 12/21/2018     Last 3 WBC/ANC  Lab Results   Component Value Date    WBC 3.2 12/23/2018    WBC 5.5 12/22/2018    WBC 9.4 12/21/2018     No components found for: GRNLOCTYABS  Last 3 Platelets  No results found for: PLATELET  Chemistry  [unfilled]  [unfilled]  No results found for: LDH  Coagulation Studies  No results found for: PTT, INR  Liver Function Studies  Lab Results   Component Value Date    ALT 41 12/21/2018    AST 26 12/21/2018    ALKPHOS 92 12/21/2018       Recent Imaging        Relevant labs and imaging reviewed    ASSESSMENT AND PLAN     Sepsis  Continue vancomycin and Zosyn  Follow-up of blood cultures Results pending    Osteomyelitis of the right fifth toe  Continue vancomycin and Zosyn  Follow-up with blood cultures  General surgery consult  Patient to be discharged on by mouth antibiotics and have an elective amputation of his right fifth toe.     DM type II  Continue blood sugar management with insulin    HTN  Blood pressure control adequate  Continue current regimen    Alcoholism  No evidence of withdrawal at this point  Continue to monitor for now    Suicidal ideation  Patient to have a psych eval  Maintain one-to-one monitoring    DVT prophylaxis  [facility], Internal

## 2018-12-24 LAB
ALCOHOL SCREEN SERUM: <0.01 %WT/VOL
ANION GAP SERPL CALCULATED.3IONS-SCNC: 12 MMOL/L (ref 4–16)
BASOPHILS ABSOLUTE: 0 K/CU MM
BASOPHILS RELATIVE PERCENT: 1 % (ref 0–1)
BUN BLDV-MCNC: 9 MG/DL (ref 6–23)
CALCIUM SERPL-MCNC: 8.9 MG/DL (ref 8.3–10.6)
CHLORIDE BLD-SCNC: 103 MMOL/L (ref 99–110)
CO2: 25 MMOL/L (ref 21–32)
CREAT SERPL-MCNC: 0.8 MG/DL (ref 0.9–1.3)
DIFFERENTIAL TYPE: ABNORMAL
EOSINOPHILS ABSOLUTE: 0.1 K/CU MM
EOSINOPHILS RELATIVE PERCENT: 2.5 % (ref 0–3)
GFR AFRICAN AMERICAN: >60 ML/MIN/1.73M2
GFR NON-AFRICAN AMERICAN: >60 ML/MIN/1.73M2
GLUCOSE BLD-MCNC: 184 MG/DL (ref 70–99)
GLUCOSE BLD-MCNC: 191 MG/DL (ref 70–99)
GLUCOSE BLD-MCNC: 207 MG/DL (ref 70–99)
GLUCOSE BLD-MCNC: 209 MG/DL (ref 70–99)
GLUCOSE BLD-MCNC: 224 MG/DL (ref 70–99)
GLUCOSE BLD-MCNC: 230 MG/DL (ref 70–99)
HCT VFR BLD CALC: 38.8 % (ref 42–52)
HEMOGLOBIN: 13.1 GM/DL (ref 13.5–18)
IMMATURE NEUTROPHIL %: 0.3 % (ref 0–0.43)
LYMPHOCYTES ABSOLUTE: 1.2 K/CU MM
LYMPHOCYTES RELATIVE PERCENT: 39.5 % (ref 24–44)
MAGNESIUM: 2 MG/DL (ref 1.8–2.4)
MCH RBC QN AUTO: 31.2 PG (ref 27–31)
MCHC RBC AUTO-ENTMCNC: 33.8 % (ref 32–36)
MCV RBC AUTO: 92.4 FL (ref 78–100)
MONOCYTES ABSOLUTE: 0.3 K/CU MM
MONOCYTES RELATIVE PERCENT: 10.5 % (ref 0–4)
NUCLEATED RBC %: 0 %
PDW BLD-RTO: 11.9 % (ref 11.7–14.9)
PLATELET # BLD: 218 K/CU MM (ref 140–440)
PMV BLD AUTO: 9 FL (ref 7.5–11.1)
POTASSIUM SERPL-SCNC: 4.2 MMOL/L (ref 3.5–5.1)
RBC # BLD: 4.2 M/CU MM (ref 4.6–6.2)
SEGMENTED NEUTROPHILS ABSOLUTE COUNT: 1.5 K/CU MM
SEGMENTED NEUTROPHILS RELATIVE PERCENT: 46.2 % (ref 36–66)
SODIUM BLD-SCNC: 140 MMOL/L (ref 135–145)
TOTAL IMMATURE NEUTOROPHIL: 0.01 K/CU MM
TOTAL NUCLEATED RBC: 0 K/CU MM
VANCOMYCIN TROUGH: 9.5 UG/ML (ref 10–20)
WBC # BLD: 3.1 K/CU MM (ref 4–10.5)

## 2018-12-24 PROCEDURE — 2580000003 HC RX 258: Performed by: INTERNAL MEDICINE

## 2018-12-24 PROCEDURE — 6370000000 HC RX 637 (ALT 250 FOR IP): Performed by: INTERNAL MEDICINE

## 2018-12-24 PROCEDURE — 1200000000 HC SEMI PRIVATE

## 2018-12-24 PROCEDURE — 94640 AIRWAY INHALATION TREATMENT: CPT

## 2018-12-24 PROCEDURE — 82962 GLUCOSE BLOOD TEST: CPT

## 2018-12-24 PROCEDURE — 80202 ASSAY OF VANCOMYCIN: CPT

## 2018-12-24 PROCEDURE — 6360000002 HC RX W HCPCS: Performed by: INTERNAL MEDICINE

## 2018-12-24 PROCEDURE — 83735 ASSAY OF MAGNESIUM: CPT

## 2018-12-24 PROCEDURE — 36415 COLL VENOUS BLD VENIPUNCTURE: CPT

## 2018-12-24 PROCEDURE — 80048 BASIC METABOLIC PNL TOTAL CA: CPT

## 2018-12-24 PROCEDURE — 6370000000 HC RX 637 (ALT 250 FOR IP): Performed by: HOSPITALIST

## 2018-12-24 PROCEDURE — G0480 DRUG TEST DEF 1-7 CLASSES: HCPCS

## 2018-12-24 PROCEDURE — 94761 N-INVAS EAR/PLS OXIMETRY MLT: CPT

## 2018-12-24 PROCEDURE — 85025 COMPLETE CBC W/AUTO DIFF WBC: CPT

## 2018-12-24 RX ORDER — SULFAMETHOXAZOLE AND TRIMETHOPRIM 800; 160 MG/1; MG/1
2 TABLET ORAL EVERY 12 HOURS
Status: DISCONTINUED | OUTPATIENT
Start: 2018-12-24 | End: 2018-12-25 | Stop reason: HOSPADM

## 2018-12-24 RX ORDER — SULFAMETHOXAZOLE AND TRIMETHOPRIM 800; 160 MG/1; MG/1
1 TABLET ORAL 2 TIMES DAILY
Qty: 14 TABLET | Refills: 0 | Status: SHIPPED | OUTPATIENT
Start: 2018-12-24 | End: 2019-01-03

## 2018-12-24 RX ORDER — ALLOPURINOL 300 MG/1
300 TABLET ORAL NIGHTLY
Status: DISCONTINUED | OUTPATIENT
Start: 2018-12-24 | End: 2018-12-25 | Stop reason: HOSPADM

## 2018-12-24 RX ORDER — BRIMONIDINE TARTRATE 2 MG/ML
1 SOLUTION/ DROPS OPHTHALMIC 3 TIMES DAILY
Status: DISCONTINUED | OUTPATIENT
Start: 2018-12-24 | End: 2018-12-25 | Stop reason: HOSPADM

## 2018-12-24 RX ORDER — BRIMONIDINE TARTRATE 2 MG/ML
1 SOLUTION/ DROPS OPHTHALMIC 3 TIMES DAILY
Status: DISCONTINUED | OUTPATIENT
Start: 2018-12-24 | End: 2018-12-24

## 2018-12-24 RX ADMIN — SODIUM CHLORIDE: 9 INJECTION, SOLUTION INTRAVENOUS at 17:55

## 2018-12-24 RX ADMIN — PANTOPRAZOLE SODIUM 40 MG: 40 TABLET, DELAYED RELEASE ORAL at 05:42

## 2018-12-24 RX ADMIN — INSULIN LISPRO 1 UNITS: 100 INJECTION, SOLUTION INTRAVENOUS; SUBCUTANEOUS at 23:18

## 2018-12-24 RX ADMIN — INSULIN LISPRO 1 UNITS: 100 INJECTION, SOLUTION INTRAVENOUS; SUBCUTANEOUS at 17:49

## 2018-12-24 RX ADMIN — IPRATROPIUM BROMIDE AND ALBUTEROL SULFATE 1 AMPULE: .5; 3 SOLUTION RESPIRATORY (INHALATION) at 10:07

## 2018-12-24 RX ADMIN — BACITRACIN, NEOMYCIN, POLYMYXIN B: 400; 3.5; 5 OINTMENT TOPICAL at 09:35

## 2018-12-24 RX ADMIN — ALLOPURINOL 300 MG: 300 TABLET ORAL at 23:17

## 2018-12-24 RX ADMIN — BRIMONIDINE TARTRATE 1 DROP: 2 SOLUTION/ DROPS OPHTHALMIC at 23:18

## 2018-12-24 RX ADMIN — PREDNISOLONE ACETATE 1 DROP: 1.2 SUSPENSION/ DROPS OPHTHALMIC at 23:17

## 2018-12-24 RX ADMIN — TAZOBACTAM SODIUM AND PIPERACILLIN SODIUM 3.38 G: 375; 3 INJECTION, SOLUTION INTRAVENOUS at 09:26

## 2018-12-24 RX ADMIN — BRIMONIDINE TARTRATE 1 DROP: 2 SOLUTION/ DROPS OPHTHALMIC at 14:31

## 2018-12-24 RX ADMIN — IPRATROPIUM BROMIDE AND ALBUTEROL SULFATE 1 AMPULE: .5; 3 SOLUTION RESPIRATORY (INHALATION) at 19:19

## 2018-12-24 RX ADMIN — SULFAMETHOXAZOLE AND TRIMETHOPRIM 2 TABLET: 800; 160 TABLET ORAL at 17:50

## 2018-12-24 RX ADMIN — VANCOMYCIN HYDROCHLORIDE 1750 MG: 500 INJECTION, POWDER, LYOPHILIZED, FOR SOLUTION INTRAVENOUS at 06:51

## 2018-12-24 RX ADMIN — INSULIN LISPRO 2 UNITS: 100 INJECTION, SOLUTION INTRAVENOUS; SUBCUTANEOUS at 09:29

## 2018-12-24 RX ADMIN — BRIMONIDINE TARTRATE 1 DROP: 2 SOLUTION/ DROPS OPHTHALMIC at 05:42

## 2018-12-24 RX ADMIN — INSULIN LISPRO 2 UNITS: 100 INJECTION, SOLUTION INTRAVENOUS; SUBCUTANEOUS at 14:30

## 2018-12-24 RX ADMIN — AMLODIPINE BESYLATE 10 MG: 10 TABLET ORAL at 09:26

## 2018-12-24 RX ADMIN — ENOXAPARIN SODIUM 40 MG: 40 INJECTION SUBCUTANEOUS at 09:26

## 2018-12-24 RX ADMIN — SODIUM CHLORIDE: 9 INJECTION, SOLUTION INTRAVENOUS at 05:04

## 2018-12-24 RX ADMIN — TAZOBACTAM SODIUM AND PIPERACILLIN SODIUM 3.38 G: 375; 3 INJECTION, SOLUTION INTRAVENOUS at 05:01

## 2018-12-24 RX ADMIN — ATORVASTATIN CALCIUM 20 MG: 20 TABLET, FILM COATED ORAL at 23:17

## 2018-12-24 ASSESSMENT — PAIN SCALES - WONG BAKER
WONGBAKER_NUMERICALRESPONSE: 6

## 2018-12-24 ASSESSMENT — SLEEP AND FATIGUE QUESTIONNAIRES
DO YOU USE A SLEEP AID: NO
DO YOU HAVE DIFFICULTY SLEEPING: NO

## 2018-12-24 ASSESSMENT — LIFESTYLE VARIABLES: HISTORY_ALCOHOL_USE: NO

## 2018-12-24 NOTE — PROGRESS NOTES
Provisional Diagnosis:       Substance Induced Mood Disorder     Psychosocial and Contextual Factors:      Alcohol use     C-SSRS Summary:      Patient: X        Family:           Agency:             Present Suicidal Behavior:      Verbal: denies                 Attempt: denies     Past Suicidal Behavior:      Verbal: denies     Attempt: denies     Self-Injurious/Self-Mutilation: denies     Trauma Identified: denies      Protective Factors:      Family support     Risk Factors:      Alcohol use     Clinical Summary:      Patient is a 28year old male who presented to ED intoxicated. While intoxicated, patient made homicidal and suicidal threats. Patient was admitted into hospital. Patient is under an [address] [place]. Patient sober during assessment. Patient denies suicidal intent, actions or plan. Patient denies homicidal thoughts or plans. Patient denies a past history of thoughts. Patient denies past history of suicide attempts. Patient denies past history of violence. Patient states he uses marijuana occasionally. Patient states he drinks alcohol \"once or twice a month\". Patient states he consumed too much alcohol the other day and has regrets about past actions. Patient denies a mental health history. Patient denies currently seeing an outpatient mental health provider. Patient was alert,orientated and cooperative during assessment. Homicidal thoughts and/or plans denied. No delusions noted. Hallucinations denied. Legal issues denied. Violence denied. Level of Care Disposition:      Consulted with Dr. Brad Vazquez. Patient to be likely discharged from medical floor today. Call made to [address] and [facility]. Left voicemail requesting follow up counseling appointment for patient. Mental health services to call patient with appointment details.

## 2018-12-24 NOTE — DISCHARGE SUMMARY
community. Patient to be discharged to an inpatient psychiatry unit for further evaluation. Patient was feeling and looking better on the day of discharge. Patient was discharged in a stable condition with following medications and instructions. Exam:   Wt Readings from Last 3 Encounters:   12/21/18 (!) 301 lb (136.5 kg)   08/16/17 (!) 301 lb (136.5 kg)   07/31/17 296 lb 12.8 oz (134.6 kg)       Blood pressure 138/76, pulse 76, temperature 98 °F (36.7 °C), temperature source Oral, resp. rate 16, height 6' 3\" (1.905 m), weight (!) 301 lb (136.5 kg), SpO2 97 %. General - AAO x 3  Psych - Appropriate affect/speech. No agitation  Eyes - Eye lids intact. No scleral icterus  ENT - Oral mucosa pink, dentition intact. External ear clear/dry/intact. No thyromegaly  Lymphatics - No cervical/inguinal lympadenopathy   Neuro - No gross peripheral or central neuro deficits with intact CN 2-12 exam  Heart - Sinus. RRR. S1 and S2 present. No added HS/murmurs appreciated. No elevated JVD appreciated. Lung - Adequate air entry b/l, No crackles/wheezes appreciated  GI - Soft, non-tender. No guarding/rigidity. No hepatosplenomegaly/ascities. BS+   - No CVA/suprapubic tenderness or palpable bladder distension  Skin - Intact. No rash/petechiae/ecchymosis. Warm extremities  MSK - Joints with normal ROM.  No joint swellings      Significant Diagnostic Studies:   CBC:   Recent Labs      12/22/18   0146  12/23/18   0457  12/24/18   0637   WBC  5.5  3.2*  3.1*   HGB  12.8*  13.3*  13.1*   PLT  225  199  218     WBC   Date/Time Value Ref Range Status   12/24/2018 06:37 AM 3.1 (L) 4.0 - 10.5 K/CU MM Final   12/23/2018 04:57 AM 3.2 (L) 4.0 - 10.5 K/CU MM Final   12/22/2018 01:46 AM 5.5 4.0 - 10.5 K/CU MM Final     Hemoglobin   Date/Time Value Ref Range Status   12/24/2018 06:37 AM 13.1 (L) 13.5 - 18.0 GM/DL Final   12/23/2018 04:57 AM 13.3 (L) 13.5 - 18.0 GM/DL Final   12/22/2018 01:46 AM 12.8 (L) 13.5 - 18.0 GM/DL Final Platelets   Date/Time Value Ref Range Status   12/24/2018 06:37  140 - 440 K/CU MM Final   12/23/2018 04:57  140 - 440 K/CU MM Final   12/22/2018 01:46  140 - 440 K/CU MM Final    CMP:  Recent Labs      12/21/18   1730  12/22/18   0146  12/23/18   0457  12/24/18   0637   NA  138  136  136  140   K  3.9  4.1  4.2  4.2   CL  97*  97*  100  103   CO2  17*  25  25  25   BUN  8  11  12  9   CREATININE  0.7*  0.9  0.8*  0.8*   CALCIUM  9.9  9.1  8.7  8.9   PROT  8.6*   --    --    --    LABALBU  4.8   --    --    --    BILITOT  0.3   --    --    --    ALKPHOS  92   --    --    --    AST  26   --    --    --    ALT  41*   --    --    --      Troponin: No results for input(s): TROPONINI in the last 72 hours. BNP: No results for input(s): BNP in the last 72 hours. Lipids: No results for input(s): CHOL, HDL in the last 72 hours. Invalid input(s): LDLCALCU  ABGs:No results for input(s): PH, LBR1OQX, PO2ART, BE, WFT7NJT, CO2CT, O2SAT, LABCARB in the last 72 hours. Invalid input(s): METHGBART    Glucose:   Recent Labs      12/23/18   1708  12/23/18   2058  12/24/18   0813   POCGLU  208*  236*  224*     Magnesium:   Recent Labs      12/22/18   0146  12/23/18   0457  12/24/18   0637   MG  1.8  1.9  2.0     Phosphorus:No results for input(s): PHOS in the last 72 hours. INR: No results for input(s): INR in the last 72 hours.       Patient Instructions:   [facility] Medication Instructions [id number]    Printed on:12/24/18 1200   Medication Information                      albuterol sulfate  (90 BASE) MCG/ACT inhaler  Inhale 2 puffs into the lungs every 6 hours as needed for Wheezing             allopurinol (ZYLOPRIM) 300 MG tablet  Take 300 mg by mouth daily             amLODIPine (NORVASC) 10 MG tablet  Take 10 mg by mouth daily             atorvastatin (LIPITOR) 20 MG tablet  Take 20 mg by mouth daily             brimonidine (ALPHAGAN P) 0.15 % ophthalmic solution  Place 1 drop into the right eye 3 times daily             cloNIDine (CATAPRES) 0.1 MG tablet  Take 0.1 mg by mouth daily              COMBIVENT RESPIMAT  MCG/ACT AERS inhaler  Inhale 1 puff into the lungs 4 times daily             glipiZIDE (GLUCOTROL) 10 MG tablet  Take 10 mg by mouth 2 times daily (before meals)             lisinopril-hydrochlorothiazide (PRINZIDE;ZESTORETIC) 20-12.5 MG per tablet  Take 1 tablet by mouth 2 times daily              metFORMIN (GLUCOPHAGE) 1000 MG tablet  Take 1,000 mg by mouth 2 times daily (with meals)             omeprazole (PRILOSEC) 20 MG capsule  Take 40 mg by mouth daily              oxyCODONE-acetaminophen (PERCOCET) 5-325 MG per tablet  Take 2 tablets by mouth every 4 hours as needed for Pain  . prednisoLONE acetate (PRED MILD) 0.12 % ophthalmic suspension  Place 1 drop into the right eye 5 times daily             RA P COL-RITE 8.6-50 MG per tablet  Take 2 tablets by mouth daily as needed              SITagliptin (JANUVIA) 25 MG tablet  Take 15 mg by mouth daily                  Code Status: Full Code     Consults:   PHARMACY TO DOSE VANCOMYCIN  IP CONSULT TO HOSPITALIST  IP CONSULT TO PHARMACY  IP CONSULT TO GENERAL SURGERY  IP CONSULT TO CASE MANAGEMENT  IP CONSULT TO PSYCHIATRY    Diet: regular diet    Activity: activity as tolerated   Work:    Discharged Condition: good    Prognosis: Good    Disposition: Inpatient psychiatric facility      Follow-up with   1. PCP within   5-7    Days         Discharge Physician Signed:   Hospitalist, Internal medicine  12/24/2018 at 12:00 PM    The patient was seen and examined on day of discharge and this discharge summary is in conjunction with any daily progress note from day of discharge.   Time spent on discharge in the examination, evaluation, counseling and review of medications and discharge plan: 30 minutes    Please forward this discharge summary to patient's PCP

## 2018-12-25 VITALS
OXYGEN SATURATION: 96 % | WEIGHT: 301 LBS | DIASTOLIC BLOOD PRESSURE: 80 MMHG | BODY MASS INDEX: 37.42 KG/M2 | HEIGHT: 75 IN | RESPIRATION RATE: 18 BRPM | HEART RATE: 89 BPM | TEMPERATURE: 98.1 F | SYSTOLIC BLOOD PRESSURE: 125 MMHG

## 2018-12-25 LAB
ANION GAP SERPL CALCULATED.3IONS-SCNC: 12 MMOL/L (ref 4–16)
BASOPHILS ABSOLUTE: 0 K/CU MM
BASOPHILS RELATIVE PERCENT: 0.7 % (ref 0–1)
BUN BLDV-MCNC: 9 MG/DL (ref 6–23)
CALCIUM SERPL-MCNC: 8.7 MG/DL (ref 8.3–10.6)
CHLORIDE BLD-SCNC: 105 MMOL/L (ref 99–110)
CO2: 24 MMOL/L (ref 21–32)
CREAT SERPL-MCNC: 0.8 MG/DL (ref 0.9–1.3)
DIFFERENTIAL TYPE: ABNORMAL
EOSINOPHILS ABSOLUTE: 0.1 K/CU MM
EOSINOPHILS RELATIVE PERCENT: 2.1 % (ref 0–3)
GFR AFRICAN AMERICAN: >60 ML/MIN/1.73M2
GFR NON-AFRICAN AMERICAN: >60 ML/MIN/1.73M2
GLUCOSE BLD-MCNC: 176 MG/DL (ref 70–99)
GLUCOSE BLD-MCNC: 181 MG/DL (ref 70–99)
GLUCOSE BLD-MCNC: 227 MG/DL (ref 70–99)
HCT VFR BLD CALC: 39.1 % (ref 42–52)
HEMOGLOBIN: 13.2 GM/DL (ref 13.5–18)
IMMATURE NEUTROPHIL %: 0.5 % (ref 0–0.43)
LYMPHOCYTES ABSOLUTE: 1.4 K/CU MM
LYMPHOCYTES RELATIVE PERCENT: 33.8 % (ref 24–44)
MAGNESIUM: 1.8 MG/DL (ref 1.8–2.4)
MCH RBC QN AUTO: 31.1 PG (ref 27–31)
MCHC RBC AUTO-ENTMCNC: 33.8 % (ref 32–36)
MCV RBC AUTO: 92.2 FL (ref 78–100)
MONOCYTES ABSOLUTE: 0.5 K/CU MM
MONOCYTES RELATIVE PERCENT: 10.6 % (ref 0–4)
NUCLEATED RBC %: 0 %
PDW BLD-RTO: 11.9 % (ref 11.7–14.9)
PLATELET # BLD: 228 K/CU MM (ref 140–440)
PMV BLD AUTO: 8.9 FL (ref 7.5–11.1)
POTASSIUM SERPL-SCNC: 4.1 MMOL/L (ref 3.5–5.1)
RBC # BLD: 4.24 M/CU MM (ref 4.6–6.2)
SEGMENTED NEUTROPHILS ABSOLUTE COUNT: 2.2 K/CU MM
SEGMENTED NEUTROPHILS RELATIVE PERCENT: 52.3 % (ref 36–66)
SODIUM BLD-SCNC: 141 MMOL/L (ref 135–145)
TOTAL IMMATURE NEUTOROPHIL: 0.02 K/CU MM
TOTAL NUCLEATED RBC: 0 K/CU MM
WBC # BLD: 4.3 K/CU MM (ref 4–10.5)

## 2018-12-25 PROCEDURE — 94761 N-INVAS EAR/PLS OXIMETRY MLT: CPT

## 2018-12-25 PROCEDURE — 6370000000 HC RX 637 (ALT 250 FOR IP): Performed by: INTERNAL MEDICINE

## 2018-12-25 PROCEDURE — 83735 ASSAY OF MAGNESIUM: CPT

## 2018-12-25 PROCEDURE — 6370000000 HC RX 637 (ALT 250 FOR IP): Performed by: HOSPITALIST

## 2018-12-25 PROCEDURE — 94640 AIRWAY INHALATION TREATMENT: CPT

## 2018-12-25 PROCEDURE — 82962 GLUCOSE BLOOD TEST: CPT

## 2018-12-25 PROCEDURE — 2580000003 HC RX 258: Performed by: INTERNAL MEDICINE

## 2018-12-25 PROCEDURE — 36415 COLL VENOUS BLD VENIPUNCTURE: CPT

## 2018-12-25 PROCEDURE — 85025 COMPLETE CBC W/AUTO DIFF WBC: CPT

## 2018-12-25 PROCEDURE — 80048 BASIC METABOLIC PNL TOTAL CA: CPT

## 2018-12-25 RX ADMIN — BACITRACIN, NEOMYCIN, POLYMYXIN B: 400; 3.5; 5 OINTMENT TOPICAL at 09:40

## 2018-12-25 RX ADMIN — BRIMONIDINE TARTRATE 1 DROP: 2 SOLUTION/ DROPS OPHTHALMIC at 09:39

## 2018-12-25 RX ADMIN — AMLODIPINE BESYLATE 10 MG: 10 TABLET ORAL at 09:37

## 2018-12-25 RX ADMIN — SULFAMETHOXAZOLE AND TRIMETHOPRIM 2 TABLET: 800; 160 TABLET ORAL at 04:31

## 2018-12-25 RX ADMIN — INSULIN LISPRO 1 UNITS: 100 INJECTION, SOLUTION INTRAVENOUS; SUBCUTANEOUS at 09:38

## 2018-12-25 RX ADMIN — PREDNISOLONE ACETATE 1 DROP: 1.2 SUSPENSION/ DROPS OPHTHALMIC at 06:27

## 2018-12-25 RX ADMIN — PANTOPRAZOLE SODIUM 40 MG: 40 TABLET, DELAYED RELEASE ORAL at 06:27

## 2018-12-25 RX ADMIN — SODIUM CHLORIDE: 9 INJECTION, SOLUTION INTRAVENOUS at 03:49

## 2018-12-25 RX ADMIN — IPRATROPIUM BROMIDE AND ALBUTEROL SULFATE 1 AMPULE: .5; 3 SOLUTION RESPIRATORY (INHALATION) at 06:48

## 2018-12-25 NOTE — DISCHARGE SUMMARY
admission:  [facility] 81 TO HOSPITALIST  IP CONSULT TO PHARMACY  IP CONSULT TO GENERAL SURGERY  IP CONSULT TO CASE MANAGEMENT  IP CONSULT TO PSYCHIATRY    Discharge Instruction:   Follow up appointments:        Follow up with Stefano James MD   Specialty: [facility]  [address] [address]   [phone]           Schedule an appointment with Ana Galo MD as soon as possible for a visit   Specialty: General Surgery  [address] [place] [address]. [address] [place] [address] Road   [phone]    [id number] Follow up with Ana Galo MD   Monday Jan 7, 2019   Specialty: General Surgery  [address] [place] [address]. [place] [id number] Follow up with Ana Galo MD   Tuesday Jan 8, 2019   Specialty: General Surgery   For wound re-check         Disposition: Discharged to:   [x]Home, [][facility], []SNF, []Acute Rehab, []Hospice   Condition on discharge: Stable    Discharge Medications:      Ciara Manual   Home Medication Instructions SAS:[id number]    Printed on:12/25/18 1851   Medication Information                      albuterol sulfate  (90 BASE) MCG/ACT inhaler  Inhale 2 puffs into the lungs every 6 hours as needed for Wheezing             allopurinol (ZYLOPRIM) 300 MG tablet  Take 300 mg by mouth daily             amLODIPine (NORVASC) 10 MG tablet  Take 10 mg by mouth daily             atorvastatin (LIPITOR) 20 MG tablet  Take 20 mg by mouth daily             brimonidine (ALPHAGAN P) 0.15 % ophthalmic solution  Place 1 drop into the right eye 3 times daily             cloNIDine (CATAPRES) 0.1 MG tablet  Take 0.1 mg by mouth daily              COMBIVENT RESPIMAT  MCG/ACT AERS inhaler  Inhale 1 puff into the lungs 4 times daily             glipiZIDE (GLUCOTROL) 10 MG tablet  Take 10 mg by mouth 2 times daily (before meals)             Latanoprostene Bunod (VYZULTA) 0.024 % SOLN  Apply 1 drop to eye nightly             lisinopril-hydrochlorothiazide (PRINZIDE;ZESTORETIC) 20-12.5 MG per tablet  Take 1 tablet by mouth 2 times daily              metFORMIN (GLUCOPHAGE) 1000 MG tablet  Take 1,000 mg by mouth 2 times daily (with meals)             omeprazole (PRILOSEC) 20 MG capsule  Take 40 mg by mouth daily              oxyCODONE-acetaminophen (PERCOCET) 5-325 MG per tablet  Take 2 tablets by mouth every 4 hours as needed for Pain  . prednisoLONE acetate (PRED MILD) 0.12 % ophthalmic suspension  Place 1 drop into the right eye 2 times daily              RA P COL-RITE 8.6-50 MG per tablet  Take 2 tablets by mouth daily as needed              SITagliptin (JANUVIA) 25 MG tablet  Take 15 mg by mouth daily             sulfamethoxazole-trimethoprim (BACTRIM DS;SEPTRA DS) 800-160 MG per tablet  Take 1 tablet by mouth 2 times daily for 10 days                 Objective Findings at Discharge:   /80   Pulse 89   Temp 98.1 °F (36.7 °C) (Oral)   Resp 18   Ht 6' 3\" (1.905 m)   Wt (!) 301 lb (136.5 kg)   SpO2 96%   BMI 37.62 kg/m²            PHYSICAL EXAM   GEN Awake male, cooperative, no apparent distress. RESP Clear to auscultation, no wheezes, rales or rhonchi. Symmetric chest movement . CARDIO/VASC S1/S2 auscultated. Regular rate. No peripheral edema. GI Abdomen is soft without significant tenderness, Bowel sounds are normoactive. Rectal exam deferred. MSK/SKIN Right foot covered with dressing  NEURO normal speech, no lateralizing weakness. PSYCH Awake, alert, oriented x 4. Affect appropriate.     BMP/CBC  Recent Labs      12/23/18   0457  12/24/18   0637  12/25/18   0549   NA  136  140  141   K  4.2  4.2  4.1   CL  100  103  105   CO2  25  25  24   BUN  12  9  9   CREATININE  0.8*  0.8*  0.8*   WBC  3.2*  3.1*  4.3   HCT  38.9*  38.8*  39.1*   PLT  199  218  228       IMAGING:  Xr Foot Right (min 3 Views)    Result Date: 12/21/2018  EXAMINATION: 3 XRAY VIEWS OF THE RIGHT FOOT

## 2018-12-25 NOTE — PROGRESS NOTES
Cross cover    Evaluated patient    Patient AAOx3. Wishes to get better. Wants to live . Not suicidal   Appears compliant with medical care  He is able to tell me that he wants to go home for xmas to see his kids and interval surgery with Dr Destiney Jacques  He said that he was drunk and under the influenced when he was Pink slipped  Because he is alert and is not at risk for self harm or suicide at current, I will discontinue suicide precaution. Await to  finalize discharge planning in the morning.        [address] [place] [address], Internal Medicine  12/25/2018 at 1:05 AM

## 2018-12-26 LAB
CULTURE: NORMAL
CULTURE: NORMAL
Lab: NORMAL
Lab: NORMAL
REPORT STATUS: NORMAL
REPORT STATUS: NORMAL
SPECIMEN: NORMAL
SPECIMEN: NORMAL

## 2018-12-27 LAB
CULTURE: NORMAL
Lab: NORMAL
ORGANISM: NORMAL
ORGANISM: NORMAL
REPORT STATUS: NORMAL
SPECIMEN: NORMAL

## 2019-01-10 PROBLEM — M86.171 ACUTE OSTEOMYELITIS OF TOE, RIGHT (HCC): Status: ACTIVE | Noted: 2017-07-31

## 2019-06-20 ENCOUNTER — HOSPITAL ENCOUNTER (EMERGENCY)
Age: 34
Discharge: HOME | End: 2019-06-20
Attending: EMERGENCY MEDICINE
Payer: COMMERCIAL

## 2019-06-20 VITALS
RESPIRATION RATE: 16 BRPM | HEART RATE: 102 BPM | WEIGHT: 305 LBS | TEMPERATURE: 98.9 F | OXYGEN SATURATION: 98 % | DIASTOLIC BLOOD PRESSURE: 83 MMHG | BODY MASS INDEX: 37.92 KG/M2 | HEIGHT: 75 IN | SYSTOLIC BLOOD PRESSURE: 123 MMHG

## 2019-06-20 DIAGNOSIS — E13.621 DIABETIC ULCER OF TOE OF LEFT FOOT ASSOCIATED WITH DIABETES MELLITUS OF OTHER TYPE, UNSPECIFIED ULCER STAGE (HCC): ICD-10-CM

## 2019-06-20 DIAGNOSIS — Z00.8 MEDICAL CLEARANCE FOR INCARCERATION: Primary | ICD-10-CM

## 2019-06-20 DIAGNOSIS — F19.10 POLYSUBSTANCE ABUSE (HCC): ICD-10-CM

## 2019-06-20 DIAGNOSIS — L97.529 DIABETIC ULCER OF TOE OF LEFT FOOT ASSOCIATED WITH DIABETES MELLITUS OF OTHER TYPE, UNSPECIFIED ULCER STAGE (HCC): ICD-10-CM

## 2019-06-20 PROCEDURE — 99282 EMERGENCY DEPT VISIT SF MDM: CPT

## 2019-06-20 PROCEDURE — 6370000000 HC RX 637 (ALT 250 FOR IP): Performed by: EMERGENCY MEDICINE

## 2019-06-20 RX ORDER — ACETAMINOPHEN 500 MG
1000 TABLET ORAL ONCE
Status: COMPLETED | OUTPATIENT
Start: 2019-06-20 | End: 2019-06-20

## 2019-06-20 RX ADMIN — ACETAMINOPHEN 1000 MG: 500 TABLET ORAL at 01:43

## 2019-06-20 ASSESSMENT — PAIN SCALES - GENERAL
PAINLEVEL_OUTOF10: 7
PAINLEVEL_OUTOF10: 7

## 2019-06-20 NOTE — ED TRIAGE NOTES
Patient arrived to ED via PD for medical clearance. PD stated that patient needed cleared because of cocaine usage tonight. Patient also would like would on foot to be assessed.

## 2019-07-26 ENCOUNTER — APPOINTMENT (OUTPATIENT)
Dept: GENERAL RADIOLOGY | Age: 34
DRG: 951 | End: 2019-07-26
Payer: COMMERCIAL

## 2019-07-26 ENCOUNTER — HOSPITAL ENCOUNTER (INPATIENT)
Age: 34
LOS: 5 days | Discharge: HOME HEALTH | DRG: 951 | End: 2019-07-31
Attending: EMERGENCY MEDICINE | Admitting: HOSPITALIST
Payer: COMMERCIAL

## 2019-07-26 ENCOUNTER — APPOINTMENT (OUTPATIENT)
Dept: MRI IMAGING | Age: 34
DRG: 951 | End: 2019-07-26
Payer: COMMERCIAL

## 2019-07-26 DIAGNOSIS — S90.421A BLISTER OF GREAT TOE OF RIGHT FOOT, INITIAL ENCOUNTER: ICD-10-CM

## 2019-07-26 DIAGNOSIS — L08.9 DIABETIC FOOT INFECTION (HCC): ICD-10-CM

## 2019-07-26 DIAGNOSIS — L03.115 CELLULITIS OF FOOT, RIGHT: Primary | ICD-10-CM

## 2019-07-26 DIAGNOSIS — R52 PAIN: ICD-10-CM

## 2019-07-26 DIAGNOSIS — L03.031 CELLULITIS OF RIGHT TOE: ICD-10-CM

## 2019-07-26 DIAGNOSIS — E11.628 DIABETIC FOOT INFECTION (HCC): ICD-10-CM

## 2019-07-26 LAB
ALBUMIN SERPL-MCNC: 4.1 GM/DL (ref 3.4–5)
ALP BLD-CCNC: 101 IU/L (ref 40–129)
ALT SERPL-CCNC: 20 U/L (ref 10–40)
ANION GAP SERPL CALCULATED.3IONS-SCNC: 16 MMOL/L (ref 4–16)
AST SERPL-CCNC: 12 IU/L (ref 15–37)
BASOPHILS ABSOLUTE: 0 K/CU MM
BASOPHILS RELATIVE PERCENT: 0.4 % (ref 0–1)
BILIRUB SERPL-MCNC: 0.5 MG/DL (ref 0–1)
BUN BLDV-MCNC: 13 MG/DL (ref 6–23)
CALCIUM SERPL-MCNC: 9.3 MG/DL (ref 8.3–10.6)
CHLORIDE BLD-SCNC: 97 MMOL/L (ref 99–110)
CO2: 19 MMOL/L (ref 21–32)
CREAT SERPL-MCNC: 1 MG/DL (ref 0.9–1.3)
DIFFERENTIAL TYPE: ABNORMAL
EOSINOPHILS ABSOLUTE: 0.1 K/CU MM
EOSINOPHILS RELATIVE PERCENT: 0.6 % (ref 0–3)
ERYTHROCYTE SEDIMENTATION RATE: 59 MM/HR (ref 0–15)
ESTIMATED AVERAGE GLUCOSE: 214 MG/DL
GFR AFRICAN AMERICAN: >60 ML/MIN/1.73M2
GFR NON-AFRICAN AMERICAN: >60 ML/MIN/1.73M2
GLUCOSE BLD-MCNC: 258 MG/DL (ref 70–99)
GLUCOSE BLD-MCNC: 275 MG/DL (ref 70–99)
GLUCOSE BLD-MCNC: 381 MG/DL (ref 70–99)
HBA1C MFR BLD: 9.1 % (ref 4.2–6.3)
HCT VFR BLD CALC: 37.4 % (ref 42–52)
HEMOGLOBIN: 12.9 GM/DL (ref 13.5–18)
IMMATURE NEUTROPHIL %: 0.5 % (ref 0–0.43)
LYMPHOCYTES ABSOLUTE: 1 K/CU MM
LYMPHOCYTES RELATIVE PERCENT: 12.1 % (ref 24–44)
MCH RBC QN AUTO: 31.3 PG (ref 27–31)
MCHC RBC AUTO-ENTMCNC: 34.5 % (ref 32–36)
MCV RBC AUTO: 90.8 FL (ref 78–100)
MONOCYTES ABSOLUTE: 1.2 K/CU MM
MONOCYTES RELATIVE PERCENT: 14 % (ref 0–4)
NUCLEATED RBC %: 0 %
PDW BLD-RTO: 12.1 % (ref 11.7–14.9)
PLATELET # BLD: 204 K/CU MM (ref 140–440)
PMV BLD AUTO: 9.4 FL (ref 7.5–11.1)
POTASSIUM SERPL-SCNC: 4.2 MMOL/L (ref 3.5–5.1)
RBC # BLD: 4.12 M/CU MM (ref 4.6–6.2)
SEGMENTED NEUTROPHILS ABSOLUTE COUNT: 6.1 K/CU MM
SEGMENTED NEUTROPHILS RELATIVE PERCENT: 72.4 % (ref 36–66)
SODIUM BLD-SCNC: 132 MMOL/L (ref 135–145)
TOTAL IMMATURE NEUTOROPHIL: 0.04 K/CU MM
TOTAL NUCLEATED RBC: 0 K/CU MM
TOTAL PROTEIN: 7.4 GM/DL (ref 6.4–8.2)
WBC # BLD: 8.4 K/CU MM (ref 4–10.5)

## 2019-07-26 PROCEDURE — 6360000002 HC RX W HCPCS: Performed by: PHYSICIAN ASSISTANT

## 2019-07-26 PROCEDURE — 90715 TDAP VACCINE 7 YRS/> IM: CPT | Performed by: PHYSICIAN ASSISTANT

## 2019-07-26 PROCEDURE — 85025 COMPLETE CBC W/AUTO DIFF WBC: CPT

## 2019-07-26 PROCEDURE — 73620 X-RAY EXAM OF FOOT: CPT

## 2019-07-26 PROCEDURE — 6360000004 HC RX CONTRAST MEDICATION: Performed by: HOSPITALIST

## 2019-07-26 PROCEDURE — 80053 COMPREHEN METABOLIC PANEL: CPT

## 2019-07-26 PROCEDURE — 73720 MRI LWR EXTREMITY W/O&W/DYE: CPT

## 2019-07-26 PROCEDURE — 6370000000 HC RX 637 (ALT 250 FOR IP): Performed by: HOSPITALIST

## 2019-07-26 PROCEDURE — 83036 HEMOGLOBIN GLYCOSYLATED A1C: CPT

## 2019-07-26 PROCEDURE — 82962 GLUCOSE BLOOD TEST: CPT

## 2019-07-26 PROCEDURE — 2580000003 HC RX 258: Performed by: HOSPITALIST

## 2019-07-26 PROCEDURE — 96368 THER/DIAG CONCURRENT INF: CPT

## 2019-07-26 PROCEDURE — 96366 THER/PROPH/DIAG IV INF ADDON: CPT

## 2019-07-26 PROCEDURE — 94761 N-INVAS EAR/PLS OXIMETRY MLT: CPT

## 2019-07-26 PROCEDURE — 85652 RBC SED RATE AUTOMATED: CPT

## 2019-07-26 PROCEDURE — 90471 IMMUNIZATION ADMIN: CPT | Performed by: PHYSICIAN ASSISTANT

## 2019-07-26 PROCEDURE — 1200000000 HC SEMI PRIVATE

## 2019-07-26 PROCEDURE — 96365 THER/PROPH/DIAG IV INF INIT: CPT

## 2019-07-26 PROCEDURE — 99285 EMERGENCY DEPT VISIT HI MDM: CPT

## 2019-07-26 PROCEDURE — 2580000003 HC RX 258: Performed by: PHYSICIAN ASSISTANT

## 2019-07-26 PROCEDURE — A9577 INJ MULTIHANCE: HCPCS | Performed by: HOSPITALIST

## 2019-07-26 PROCEDURE — 6360000002 HC RX W HCPCS: Performed by: HOSPITALIST

## 2019-07-26 RX ORDER — BRIMONIDINE TARTRATE 2 MG/ML
1 SOLUTION/ DROPS OPHTHALMIC 2 TIMES DAILY
COMMUNITY

## 2019-07-26 RX ORDER — BRIMONIDINE TARTRATE 2 MG/ML
1 SOLUTION/ DROPS OPHTHALMIC 2 TIMES DAILY
Status: DISCONTINUED | OUTPATIENT
Start: 2019-07-26 | End: 2019-07-31 | Stop reason: HOSPADM

## 2019-07-26 RX ORDER — IPRATROPIUM BROMIDE AND ALBUTEROL SULFATE 2.5; .5 MG/3ML; MG/3ML
1 SOLUTION RESPIRATORY (INHALATION) EVERY 4 HOURS PRN
Status: DISCONTINUED | OUTPATIENT
Start: 2019-07-26 | End: 2019-07-31 | Stop reason: HOSPADM

## 2019-07-26 RX ORDER — SODIUM CHLORIDE 0.9 % (FLUSH) 0.9 %
10 SYRINGE (ML) INJECTION PRN
Status: DISCONTINUED | OUTPATIENT
Start: 2019-07-26 | End: 2019-07-31 | Stop reason: HOSPADM

## 2019-07-26 RX ORDER — PANTOPRAZOLE SODIUM 40 MG/1
40 TABLET, DELAYED RELEASE ORAL
Status: DISCONTINUED | OUTPATIENT
Start: 2019-07-27 | End: 2019-07-31 | Stop reason: HOSPADM

## 2019-07-26 RX ORDER — ONDANSETRON 2 MG/ML
4 INJECTION INTRAMUSCULAR; INTRAVENOUS EVERY 6 HOURS PRN
Status: DISCONTINUED | OUTPATIENT
Start: 2019-07-26 | End: 2019-07-31 | Stop reason: HOSPADM

## 2019-07-26 RX ORDER — DEXTROSE MONOHYDRATE 50 MG/ML
100 INJECTION, SOLUTION INTRAVENOUS PRN
Status: DISCONTINUED | OUTPATIENT
Start: 2019-07-26 | End: 2019-07-31 | Stop reason: HOSPADM

## 2019-07-26 RX ORDER — AMLODIPINE BESYLATE 10 MG/1
10 TABLET ORAL DAILY
Status: DISCONTINUED | OUTPATIENT
Start: 2019-07-26 | End: 2019-07-30

## 2019-07-26 RX ORDER — ALLOPURINOL 300 MG/1
300 TABLET ORAL DAILY
Status: DISCONTINUED | OUTPATIENT
Start: 2019-07-26 | End: 2019-07-31 | Stop reason: HOSPADM

## 2019-07-26 RX ORDER — VANCOMYCIN HYDROCHLORIDE 1 G/200ML
1000 INJECTION, SOLUTION INTRAVENOUS ONCE
Status: COMPLETED | OUTPATIENT
Start: 2019-07-26 | End: 2019-07-26

## 2019-07-26 RX ORDER — PREDNISOLONE ACETATE 10 MG/ML
1 SUSPENSION/ DROPS OPHTHALMIC 2 TIMES DAILY
COMMUNITY

## 2019-07-26 RX ORDER — DORZOLAMIDE HCL 20 MG/ML
1 SOLUTION/ DROPS OPHTHALMIC 2 TIMES DAILY
Status: DISCONTINUED | OUTPATIENT
Start: 2019-07-26 | End: 2019-07-31 | Stop reason: HOSPADM

## 2019-07-26 RX ORDER — EZETIMIBE 10 MG/1
10 TABLET ORAL DAILY
Status: ON HOLD | COMMUNITY
End: 2021-03-24 | Stop reason: HOSPADM

## 2019-07-26 RX ORDER — OXYCODONE AND ACETAMINOPHEN 7.5; 325 MG/1; MG/1
1 TABLET ORAL EVERY 6 HOURS PRN
Status: DISCONTINUED | OUTPATIENT
Start: 2019-07-26 | End: 2019-07-31 | Stop reason: HOSPADM

## 2019-07-26 RX ORDER — TIMOLOL MALEATE 5 MG/ML
1 SOLUTION/ DROPS OPHTHALMIC 2 TIMES DAILY
Status: DISCONTINUED | OUTPATIENT
Start: 2019-07-26 | End: 2019-07-31 | Stop reason: HOSPADM

## 2019-07-26 RX ORDER — INSULIN GLARGINE 100 [IU]/ML
10 INJECTION, SOLUTION SUBCUTANEOUS NIGHTLY
Status: DISCONTINUED | OUTPATIENT
Start: 2019-07-26 | End: 2019-07-27

## 2019-07-26 RX ORDER — DEXTROSE MONOHYDRATE 25 G/50ML
12.5 INJECTION, SOLUTION INTRAVENOUS PRN
Status: DISCONTINUED | OUTPATIENT
Start: 2019-07-26 | End: 2019-07-31 | Stop reason: HOSPADM

## 2019-07-26 RX ORDER — ATORVASTATIN CALCIUM 10 MG/1
10 TABLET, FILM COATED ORAL DAILY
Status: DISCONTINUED | OUTPATIENT
Start: 2019-07-26 | End: 2019-07-31 | Stop reason: HOSPADM

## 2019-07-26 RX ORDER — ATROPINE SULFATE 10 MG/ML
1 SOLUTION/ DROPS OPHTHALMIC 3 TIMES DAILY
Status: ON HOLD | COMMUNITY
End: 2019-07-31 | Stop reason: HOSPADM

## 2019-07-26 RX ORDER — CALCIUM CARBONATE 200(500)MG
500 TABLET,CHEWABLE ORAL 3 TIMES DAILY PRN
Status: DISCONTINUED | OUTPATIENT
Start: 2019-07-26 | End: 2019-07-31 | Stop reason: HOSPADM

## 2019-07-26 RX ORDER — LISINOPRIL AND HYDROCHLOROTHIAZIDE 20; 12.5 MG/1; MG/1
1 TABLET ORAL DAILY
Status: DISCONTINUED | OUTPATIENT
Start: 2019-07-26 | End: 2019-07-31 | Stop reason: HOSPADM

## 2019-07-26 RX ORDER — ATROPINE SULFATE 10 MG/ML
1 SOLUTION/ DROPS OPHTHALMIC 3 TIMES DAILY
Status: DISCONTINUED | OUTPATIENT
Start: 2019-07-26 | End: 2019-07-30

## 2019-07-26 RX ORDER — PREDNISOLONE ACETATE 10 MG/ML
1 SUSPENSION/ DROPS OPHTHALMIC 2 TIMES DAILY
Status: DISCONTINUED | OUTPATIENT
Start: 2019-07-26 | End: 2019-07-31 | Stop reason: HOSPADM

## 2019-07-26 RX ORDER — SODIUM CHLORIDE 0.9 % (FLUSH) 0.9 %
10 SYRINGE (ML) INJECTION EVERY 12 HOURS SCHEDULED
Status: DISCONTINUED | OUTPATIENT
Start: 2019-07-26 | End: 2019-07-31 | Stop reason: HOSPADM

## 2019-07-26 RX ORDER — NICOTINE POLACRILEX 4 MG
15 LOZENGE BUCCAL PRN
Status: DISCONTINUED | OUTPATIENT
Start: 2019-07-26 | End: 2019-07-31 | Stop reason: HOSPADM

## 2019-07-26 RX ADMIN — ENOXAPARIN SODIUM 40 MG: 40 INJECTION SUBCUTANEOUS at 15:21

## 2019-07-26 RX ADMIN — INSULIN LISPRO 3 UNITS: 100 INJECTION, SOLUTION INTRAVENOUS; SUBCUTANEOUS at 18:16

## 2019-07-26 RX ADMIN — TETANUS TOXOID, REDUCED DIPHTHERIA TOXOID AND ACELLULAR PERTUSSIS VACCINE, ADSORBED 0.5 ML: 5; 2.5; 8; 8; 2.5 SUSPENSION INTRAMUSCULAR at 10:42

## 2019-07-26 RX ADMIN — INSULIN GLARGINE 10 UNITS: 100 INJECTION, SOLUTION SUBCUTANEOUS at 21:24

## 2019-07-26 RX ADMIN — VANCOMYCIN HYDROCHLORIDE 1500 MG: 5 INJECTION, POWDER, LYOPHILIZED, FOR SOLUTION INTRAVENOUS at 18:40

## 2019-07-26 RX ADMIN — ATROPINE SULFATE 1 DROP: 10 SOLUTION OPHTHALMIC at 17:03

## 2019-07-26 RX ADMIN — INSULIN LISPRO 2 UNITS: 100 INJECTION, SOLUTION INTRAVENOUS; SUBCUTANEOUS at 21:23

## 2019-07-26 RX ADMIN — LISINOPRIL AND HYDROCHLOROTHIAZIDE 1 TABLET: 12.5; 2 TABLET ORAL at 15:20

## 2019-07-26 RX ADMIN — OXYCODONE HYDROCHLORIDE AND ACETAMINOPHEN 1 TABLET: 7.5; 325 TABLET ORAL at 21:22

## 2019-07-26 RX ADMIN — VANCOMYCIN HYDROCHLORIDE 1000 MG: 1 INJECTION, SOLUTION INTRAVENOUS at 11:25

## 2019-07-26 RX ADMIN — GADOBENATE DIMEGLUMINE 20 ML: 529 INJECTION, SOLUTION INTRAVENOUS at 16:16

## 2019-07-26 RX ADMIN — SODIUM CHLORIDE, PRESERVATIVE FREE 10 ML: 5 INJECTION INTRAVENOUS at 21:29

## 2019-07-26 RX ADMIN — AMLODIPINE BESYLATE 10 MG: 10 TABLET ORAL at 15:20

## 2019-07-26 RX ADMIN — TIMOLOL MALEATE 1 DROP: 5 SOLUTION OPHTHALMIC at 21:25

## 2019-07-26 RX ADMIN — ALLOPURINOL 300 MG: 300 TABLET ORAL at 15:20

## 2019-07-26 RX ADMIN — PREDNISOLONE ACETATE 1 DROP: 10 SUSPENSION/ DROPS OPHTHALMIC at 17:07

## 2019-07-26 RX ADMIN — PIPERACILLIN AND TAZOBACTAM 3.38 G: 3; .375 INJECTION, POWDER, FOR SOLUTION INTRAVENOUS at 15:20

## 2019-07-26 RX ADMIN — OXYCODONE HYDROCHLORIDE AND ACETAMINOPHEN 1 TABLET: 7.5; 325 TABLET ORAL at 15:21

## 2019-07-26 RX ADMIN — PIPERACILLIN AND TAZOBACTAM 3.38 G: 3; .375 INJECTION, POWDER, FOR SOLUTION INTRAVENOUS at 21:22

## 2019-07-26 RX ADMIN — BRIMONIDINE TARTRATE 1 DROP: 2 SOLUTION/ DROPS OPHTHALMIC at 17:08

## 2019-07-26 RX ADMIN — PIPERACILLIN SODIUM,TAZOBACTAM SODIUM 3.38 G: 3; .375 INJECTION, POWDER, FOR SOLUTION INTRAVENOUS at 10:42

## 2019-07-26 RX ADMIN — ATORVASTATIN CALCIUM 10 MG: 10 TABLET, FILM COATED ORAL at 21:22

## 2019-07-26 ASSESSMENT — PAIN SCALES - GENERAL
PAINLEVEL_OUTOF10: 4
PAINLEVEL_OUTOF10: 6
PAINLEVEL_OUTOF10: 4
PAINLEVEL_OUTOF10: 7
PAINLEVEL_OUTOF10: 8

## 2019-07-26 NOTE — ED TRIAGE NOTES
Swelling, redness and blisters to the R foot. Pt reports being a diabetic. States this has happened before which resulted in a toe amputation. Onset of pain was several days ago.

## 2019-07-26 NOTE — PROGRESS NOTES
[address] [facility]  consulted by Dr. Daniel Tobar for monitoring and adjustment. Indication for treatment: Diabetic foot infection  Goal trough: 10-15 mcg/mL     Pertinent Laboratory Values:   Temp Readings from Last 3 Encounters:   07/26/19 98.6 °F (37 °C) (Oral)   06/20/19 98.9 °F (37.2 °C) (Oral)   12/25/18 98.1 °F (36.7 °C) (Oral)     Recent Labs     07/26/19  0957   WBC 8.4     Recent Labs     07/26/19  0957   BUN 13   CREATININE 1.0     Estimated Creatinine Clearance: 156 mL/min (based on SCr of 1 mg/dL). No intake or output data in the 24 hours ending 07/26/19 1348    Pertinent Cultures:  Date    Source    Results  N/A           Vancomycin level:   TROUGH:  No results for input(s): VANCOTROUGH in the last 72 hours. RANDOM:  No results for input(s): VANCORANDOM in the last 72 hours. Assessment:  · WBC and temperature: WNL  · SCr, BUN, and urine output: 1.0  · Day(s) of therapy: 1   · Vancomycin level: To be collected     Plan:  · Dosing comments: Will start Mr. Raya Mendes on vancomycin 1500mg q8h  · Trough to be collected 07/27 @ 1800  · He received vancomycin 1000mg x 1 in the ED   · He received a similar regimen with similar renal function in December 2018, giving a trough of 9.5   · Pharmacy will continue to monitor patient and adjust therapy as indicated    Thank you for the consult.   Wilmer Street PharmD, [facility]  7/26/2019 1:54 PM

## 2019-07-26 NOTE — ED PROVIDER NOTES
MM    MPV 9.4 7.5 - 11.1 FL    Differential Type AUTOMATED DIFFERENTIAL     Segs Relative 72.4 (H) 36 - 66 %    Lymphocytes % 12.1 (L) 24 - 44 %    Monocytes % 14.0 (H) 0 - 4 %    Eosinophils % 0.6 0 - 3 %    Basophils % 0.4 0 - 1 %    Segs Absolute 6.1 K/CU MM    Lymphocytes # 1.0 K/CU MM    Monocytes # 1.2 K/CU MM    Eosinophils # 0.1 K/CU MM    Basophils # 0.0 K/CU MM    Nucleated RBC % 0.0 %    Total Nucleated RBC 0.0 K/CU MM    Total Immature Neutrophil 0.04 K/CU MM    Immature Neutrophil % 0.5 (H) 0 - 0.43 %   CMP   Result Value Ref Range    Sodium 132 (L) 135 - 145 MMOL/L    Potassium 4.2 3.5 - 5.1 MMOL/L    Chloride 97 (L) 99 - 110 mMol/L    CO2 19 (L) 21 - 32 MMOL/L    BUN 13 6 - 23 MG/DL    CREATININE 1.0 0.9 - 1.3 MG/DL    Glucose 275 (H) 70 - 99 MG/DL    Calcium 9.3 8.3 - 10.6 MG/DL    Alb 4.1 3.4 - 5.0 GM/DL    Total Protein 7.4 6.4 - 8.2 GM/DL    Total Bilirubin 0.5 0.0 - 1.0 MG/DL    ALT 20 10 - 40 U/L    AST 12 (L) 15 - 37 IU/L    Alkaline Phosphatase 101 40 - 129 IU/L    GFR Non-African American >60 >60 mL/min/1.73m2    GFR African American >60 >60 mL/min/1.73m2    Anion Gap 16 4 - 16   Sedimentation Rate   Result Value Ref Range    Sed Rate 59 (H) 0 - 15 MM/HR       RADIOLOGY      XR FOOT RIGHT (2 VIEWS) (Final result)   Result time 07/26/19 09:43:37   Final result by Elgin Schwartz MD (07/26/19 09:43:37)                Impression:    1. Soft tissue swelling involving the dorsum of the foot and 1st toe without  acute osseous abnormality.  This likely represents a cellulitis. Narrative:    EXAMINATION:  TWO XRAY VIEWS OF THE RIGHT FOOT    7/26/2019 9:26 am    COMPARISON:  12/21/2018.     HISTORY:  ORDERING SYSTEM PROVIDED HISTORY: great toe redness, diabetic  TECHNOLOGIST PROVIDED HISTORY:  Reason for exam:->great toe redness, diabetic  Reason for Exam: great toe redness, diabetic  Acuity: Unknown  Type of Exam: Unknown    FINDINGS:  Bone mineralization is within normal limits.  There has

## 2019-07-26 NOTE — H&P
(PRINZIDE;ZESTORETIC) 20-12.5 MG per tablet Take 1 tablet by mouth daily    Yes Historical Provider, MD   oxyCODONE-acetaminophen (PERCOCET) 7.5-325 MG per tablet Take 1 tablet by mouth every 8 hours as needed for Pain.     Yes Historical Provider, MD   glipiZIDE (GLUCOTROL) 10 MG tablet Take 10 mg by mouth 2 times daily (before meals)   Yes Historical Provider, MD   metFORMIN (GLUCOPHAGE) 1000 MG tablet Take 1,000 mg by mouth 2 times daily (with meals)   Yes Historical Provider, MD   amLODIPine (NORVASC) 10 MG tablet Take 10 mg by mouth daily 11/25/16  Yes Historical Provider, MD   omeprazole (PRILOSEC) 20 MG capsule Take 40 mg by mouth daily    Yes Historical Provider, MD   cloNIDine (CATAPRES) 0.2 MG tablet Take 0.2 mg by mouth daily    Yes Historical Provider, MD   albuterol sulfate  (90 BASE) MCG/ACT inhaler Inhale 2 puffs into the lungs every 6 hours as needed for Wheezing    Historical Provider, MD      Infusions:   PRN Meds:        Electronically signed by Trixie Arias MD on 7/26/2019 at 11:28 AM

## 2019-07-27 LAB
ANION GAP SERPL CALCULATED.3IONS-SCNC: 16 MMOL/L (ref 4–16)
APTT: 26.6 SECONDS (ref 21.2–33)
BASOPHILS ABSOLUTE: 0 K/CU MM
BASOPHILS RELATIVE PERCENT: 0.5 % (ref 0–1)
BUN BLDV-MCNC: 18 MG/DL (ref 6–23)
CALCIUM SERPL-MCNC: 8.7 MG/DL (ref 8.3–10.6)
CHLORIDE BLD-SCNC: 95 MMOL/L (ref 99–110)
CO2: 21 MMOL/L (ref 21–32)
CREAT SERPL-MCNC: 0.9 MG/DL (ref 0.9–1.3)
DIFFERENTIAL TYPE: ABNORMAL
DOSE AMOUNT: ABNORMAL
DOSE TIME: ABNORMAL
EOSINOPHILS ABSOLUTE: 0.1 K/CU MM
EOSINOPHILS RELATIVE PERCENT: 2.2 % (ref 0–3)
GFR AFRICAN AMERICAN: >60 ML/MIN/1.73M2
GFR NON-AFRICAN AMERICAN: >60 ML/MIN/1.73M2
GLUCOSE BLD-MCNC: 274 MG/DL (ref 70–99)
GLUCOSE BLD-MCNC: 297 MG/DL (ref 70–99)
GLUCOSE BLD-MCNC: 324 MG/DL (ref 70–99)
GLUCOSE BLD-MCNC: 338 MG/DL (ref 70–99)
GLUCOSE BLD-MCNC: 348 MG/DL (ref 70–99)
HCT VFR BLD CALC: 35.2 % (ref 42–52)
HEMOGLOBIN: 11.9 GM/DL (ref 13.5–18)
IMMATURE NEUTROPHIL %: 0.3 % (ref 0–0.43)
INR BLD: 0.97 INDEX
LYMPHOCYTES ABSOLUTE: 1.1 K/CU MM
LYMPHOCYTES RELATIVE PERCENT: 18.8 % (ref 24–44)
MCH RBC QN AUTO: 31.3 PG (ref 27–31)
MCHC RBC AUTO-ENTMCNC: 33.8 % (ref 32–36)
MCV RBC AUTO: 92.6 FL (ref 78–100)
MONOCYTES ABSOLUTE: 0.7 K/CU MM
MONOCYTES RELATIVE PERCENT: 11.3 % (ref 0–4)
NUCLEATED RBC %: 0 %
PDW BLD-RTO: 12.1 % (ref 11.7–14.9)
PLATELET # BLD: 188 K/CU MM (ref 140–440)
PMV BLD AUTO: 9.7 FL (ref 7.5–11.1)
POTASSIUM SERPL-SCNC: 4.1 MMOL/L (ref 3.5–5.1)
PROTHROMBIN TIME: 11.3 SECONDS (ref 9.12–12.5)
RBC # BLD: 3.8 M/CU MM (ref 4.6–6.2)
SEGMENTED NEUTROPHILS ABSOLUTE COUNT: 4 K/CU MM
SEGMENTED NEUTROPHILS RELATIVE PERCENT: 66.9 % (ref 36–66)
SODIUM BLD-SCNC: 132 MMOL/L (ref 135–145)
TOTAL IMMATURE NEUTOROPHIL: 0.02 K/CU MM
TOTAL NUCLEATED RBC: 0 K/CU MM
VANCOMYCIN TROUGH: 8.1 UG/ML (ref 10–20)
WBC # BLD: 6 K/CU MM (ref 4–10.5)

## 2019-07-27 PROCEDURE — 0SBM0ZZ EXCISION OF RIGHT METATARSAL-PHALANGEAL JOINT, OPEN APPROACH: ICD-10-PCS | Performed by: SURGERY

## 2019-07-27 PROCEDURE — 6370000000 HC RX 637 (ALT 250 FOR IP): Performed by: HOSPITALIST

## 2019-07-27 PROCEDURE — 6360000002 HC RX W HCPCS: Performed by: HOSPITALIST

## 2019-07-27 PROCEDURE — 87077 CULTURE AEROBIC IDENTIFY: CPT

## 2019-07-27 PROCEDURE — 1200000000 HC SEMI PRIVATE

## 2019-07-27 PROCEDURE — 36415 COLL VENOUS BLD VENIPUNCTURE: CPT

## 2019-07-27 PROCEDURE — 80202 ASSAY OF VANCOMYCIN: CPT

## 2019-07-27 PROCEDURE — 82962 GLUCOSE BLOOD TEST: CPT

## 2019-07-27 PROCEDURE — 87147 CULTURE TYPE IMMUNOLOGIC: CPT

## 2019-07-27 PROCEDURE — 94640 AIRWAY INHALATION TREATMENT: CPT

## 2019-07-27 PROCEDURE — 87073 CULTURE BACTERIA ANAEROBIC: CPT

## 2019-07-27 PROCEDURE — 87186 SC STD MICRODIL/AGAR DIL: CPT

## 2019-07-27 PROCEDURE — 85610 PROTHROMBIN TIME: CPT

## 2019-07-27 PROCEDURE — 2580000003 HC RX 258: Performed by: HOSPITALIST

## 2019-07-27 PROCEDURE — 94761 N-INVAS EAR/PLS OXIMETRY MLT: CPT

## 2019-07-27 PROCEDURE — 87071 CULTURE AEROBIC QUANT OTHER: CPT

## 2019-07-27 PROCEDURE — 85025 COMPLETE CBC W/AUTO DIFF WBC: CPT

## 2019-07-27 PROCEDURE — 80048 BASIC METABOLIC PNL TOTAL CA: CPT

## 2019-07-27 PROCEDURE — 85730 THROMBOPLASTIN TIME PARTIAL: CPT

## 2019-07-27 RX ORDER — INSULIN GLARGINE 100 [IU]/ML
20 INJECTION, SOLUTION SUBCUTANEOUS NIGHTLY
Status: DISCONTINUED | OUTPATIENT
Start: 2019-07-28 | End: 2019-07-28

## 2019-07-27 RX ADMIN — VANCOMYCIN HYDROCHLORIDE 1500 MG: 5 INJECTION, POWDER, LYOPHILIZED, FOR SOLUTION INTRAVENOUS at 02:12

## 2019-07-27 RX ADMIN — PREDNISOLONE ACETATE 1 DROP: 10 SUSPENSION/ DROPS OPHTHALMIC at 08:59

## 2019-07-27 RX ADMIN — OXYCODONE HYDROCHLORIDE AND ACETAMINOPHEN 1 TABLET: 7.5; 325 TABLET ORAL at 11:01

## 2019-07-27 RX ADMIN — PREDNISOLONE ACETATE 1 DROP: 10 SUSPENSION/ DROPS OPHTHALMIC at 16:42

## 2019-07-27 RX ADMIN — INSULIN LISPRO 2 UNITS: 100 INJECTION, SOLUTION INTRAVENOUS; SUBCUTANEOUS at 21:17

## 2019-07-27 RX ADMIN — VANCOMYCIN HYDROCHLORIDE 1500 MG: 5 INJECTION, POWDER, LYOPHILIZED, FOR SOLUTION INTRAVENOUS at 21:23

## 2019-07-27 RX ADMIN — ALLOPURINOL 300 MG: 300 TABLET ORAL at 08:59

## 2019-07-27 RX ADMIN — ENOXAPARIN SODIUM 40 MG: 40 INJECTION SUBCUTANEOUS at 08:59

## 2019-07-27 RX ADMIN — INSULIN LISPRO 3 UNITS: 100 INJECTION, SOLUTION INTRAVENOUS; SUBCUTANEOUS at 09:02

## 2019-07-27 RX ADMIN — IPRATROPIUM BROMIDE AND ALBUTEROL SULFATE 1 AMPULE: .5; 3 SOLUTION RESPIRATORY (INHALATION) at 10:02

## 2019-07-27 RX ADMIN — BRIMONIDINE TARTRATE 1 DROP: 2 SOLUTION/ DROPS OPHTHALMIC at 16:42

## 2019-07-27 RX ADMIN — PIPERACILLIN AND TAZOBACTAM 3.38 G: 3; .375 INJECTION, POWDER, FOR SOLUTION INTRAVENOUS at 16:42

## 2019-07-27 RX ADMIN — OXYCODONE HYDROCHLORIDE AND ACETAMINOPHEN 1 TABLET: 7.5; 325 TABLET ORAL at 19:41

## 2019-07-27 RX ADMIN — AMLODIPINE BESYLATE 10 MG: 10 TABLET ORAL at 08:59

## 2019-07-27 RX ADMIN — PIPERACILLIN AND TAZOBACTAM 3.38 G: 3; .375 INJECTION, POWDER, FOR SOLUTION INTRAVENOUS at 23:38

## 2019-07-27 RX ADMIN — SODIUM CHLORIDE, PRESERVATIVE FREE 10 ML: 5 INJECTION INTRAVENOUS at 08:59

## 2019-07-27 RX ADMIN — OXYCODONE HYDROCHLORIDE AND ACETAMINOPHEN 1 TABLET: 7.5; 325 TABLET ORAL at 04:34

## 2019-07-27 RX ADMIN — PIPERACILLIN AND TAZOBACTAM 3.38 G: 3; .375 INJECTION, POWDER, FOR SOLUTION INTRAVENOUS at 06:17

## 2019-07-27 RX ADMIN — INSULIN GLARGINE 10 UNITS: 100 INJECTION, SOLUTION SUBCUTANEOUS at 21:17

## 2019-07-27 RX ADMIN — INSULIN LISPRO 3 UNITS: 100 INJECTION, SOLUTION INTRAVENOUS; SUBCUTANEOUS at 13:55

## 2019-07-27 RX ADMIN — BRIMONIDINE TARTRATE 1 DROP: 2 SOLUTION/ DROPS OPHTHALMIC at 08:59

## 2019-07-27 RX ADMIN — LISINOPRIL AND HYDROCHLOROTHIAZIDE 1 TABLET: 12.5; 2 TABLET ORAL at 08:59

## 2019-07-27 RX ADMIN — VANCOMYCIN HYDROCHLORIDE 1500 MG: 5 INJECTION, POWDER, LYOPHILIZED, FOR SOLUTION INTRAVENOUS at 13:07

## 2019-07-27 RX ADMIN — ATORVASTATIN CALCIUM 10 MG: 10 TABLET, FILM COATED ORAL at 21:14

## 2019-07-27 RX ADMIN — INSULIN LISPRO 4 UNITS: 100 INJECTION, SOLUTION INTRAVENOUS; SUBCUTANEOUS at 18:32

## 2019-07-27 ASSESSMENT — PAIN SCALES - GENERAL
PAINLEVEL_OUTOF10: 4
PAINLEVEL_OUTOF10: 7
PAINLEVEL_OUTOF10: 0
PAINLEVEL_OUTOF10: 7
PAINLEVEL_OUTOF10: 9

## 2019-07-27 NOTE — OP NOTE
[address] [place] [address], [address] [place] [address]                                OPERATIVE REPORT    PATIENT NAME: Andrés Kirkland                       :        1985  MED REC NO:   [id number]                          ROOM:       8692  ACCOUNT NO:   [de-identified]                           ADMIT DATE: 2019  PROVIDER:     Andrea Montoya MD    DATE OF PROCEDURE:  2019    PREOPERATIVE DIAGNOSES:  1. Right great toe cellulitis with diabetic ulceration to the fat  layer. 2.  Right great toe abscess. POSTOPERATIVE DIAGNOSES:  1. Right great toe cellulitis with diabetic ulceration to the fat  layer. 2.  Right great toe abscess. PROCEDURE PERFORMED:  Excisional debridement of right great toe,  necrotic skin and subcutaneous tissue. SURGEON:  Andrea Montoya MD    ANESTHESIA:  None required. COMPLICATIONS:  None. DRAINS:  None. ESTIMATED BLOOD LOSS:  Negligible. INTRAOPERATIVE FINDINGS:  As above. Cultures were obtained, aerobic and  anaerobic. DESCRIPTION OF PROCEDURE:  The patient, while at the bedside, I then  sterilely prepped the right great toe and then performed sharp  excisional debridement with scissors and forceps to the right great toe  in two separate areas. Due to the patient's neuropathy, he did not have  any pain at all during the excisional debridement. I was also able to  drain out more purulent fluid within the subcutaneous tissues. I was  able to probe to the first metatarsal head and also into the first  metatarsal joint space during the excisional debridement. Cultures were  obtained. I then packed three different separate sinus tracts and  subcutaneous tissues that I drained and debrided with iodoform gauze. A  dry sterile dressing along with the Kerlix roll was then applied. The  patient tolerated the bedside procedure well.         Samuel Solo MD    D: 2019 12:11:15       T:

## 2019-07-28 LAB
CREAT SERPL-MCNC: 0.8 MG/DL (ref 0.9–1.3)
GFR AFRICAN AMERICAN: >60 ML/MIN/1.73M2
GFR NON-AFRICAN AMERICAN: >60 ML/MIN/1.73M2
GLUCOSE BLD-MCNC: 253 MG/DL (ref 70–99)
GLUCOSE BLD-MCNC: 326 MG/DL (ref 70–99)
GLUCOSE BLD-MCNC: 342 MG/DL (ref 70–99)
GLUCOSE BLD-MCNC: 381 MG/DL (ref 70–99)

## 2019-07-28 PROCEDURE — 82962 GLUCOSE BLOOD TEST: CPT

## 2019-07-28 PROCEDURE — 6370000000 HC RX 637 (ALT 250 FOR IP): Performed by: HOSPITALIST

## 2019-07-28 PROCEDURE — 1200000000 HC SEMI PRIVATE

## 2019-07-28 PROCEDURE — 6360000002 HC RX W HCPCS: Performed by: HOSPITALIST

## 2019-07-28 PROCEDURE — 6370000000 HC RX 637 (ALT 250 FOR IP): Performed by: INTERNAL MEDICINE

## 2019-07-28 PROCEDURE — 82565 ASSAY OF CREATININE: CPT

## 2019-07-28 PROCEDURE — 2580000003 HC RX 258: Performed by: HOSPITALIST

## 2019-07-28 PROCEDURE — 36415 COLL VENOUS BLD VENIPUNCTURE: CPT

## 2019-07-28 PROCEDURE — 94761 N-INVAS EAR/PLS OXIMETRY MLT: CPT

## 2019-07-28 RX ORDER — INSULIN GLARGINE 100 [IU]/ML
25 INJECTION, SOLUTION SUBCUTANEOUS NIGHTLY
Status: DISCONTINUED | OUTPATIENT
Start: 2019-07-28 | End: 2019-07-29

## 2019-07-28 RX ADMIN — BRIMONIDINE TARTRATE 1 DROP: 2 SOLUTION/ DROPS OPHTHALMIC at 09:31

## 2019-07-28 RX ADMIN — PIPERACILLIN AND TAZOBACTAM 3.38 G: 3; .375 INJECTION, POWDER, FOR SOLUTION INTRAVENOUS at 09:32

## 2019-07-28 RX ADMIN — TIMOLOL MALEATE 1 DROP: 5 SOLUTION OPHTHALMIC at 20:57

## 2019-07-28 RX ADMIN — PREDNISOLONE ACETATE 1 DROP: 10 SUSPENSION/ DROPS OPHTHALMIC at 16:02

## 2019-07-28 RX ADMIN — AMLODIPINE BESYLATE 10 MG: 10 TABLET ORAL at 09:30

## 2019-07-28 RX ADMIN — OXYCODONE HYDROCHLORIDE AND ACETAMINOPHEN 1 TABLET: 7.5; 325 TABLET ORAL at 15:46

## 2019-07-28 RX ADMIN — LISINOPRIL AND HYDROCHLOROTHIAZIDE 1 TABLET: 12.5; 2 TABLET ORAL at 09:30

## 2019-07-28 RX ADMIN — BRIMONIDINE TARTRATE 1 DROP: 2 SOLUTION/ DROPS OPHTHALMIC at 16:02

## 2019-07-28 RX ADMIN — PREDNISOLONE ACETATE 1 DROP: 10 SUSPENSION/ DROPS OPHTHALMIC at 09:31

## 2019-07-28 RX ADMIN — ATORVASTATIN CALCIUM 10 MG: 10 TABLET, FILM COATED ORAL at 20:57

## 2019-07-28 RX ADMIN — INSULIN LISPRO 8 UNITS: 100 INJECTION, SOLUTION INTRAVENOUS; SUBCUTANEOUS at 18:26

## 2019-07-28 RX ADMIN — INSULIN GLARGINE 25 UNITS: 100 INJECTION, SOLUTION SUBCUTANEOUS at 20:58

## 2019-07-28 RX ADMIN — OXYCODONE HYDROCHLORIDE AND ACETAMINOPHEN 1 TABLET: 7.5; 325 TABLET ORAL at 08:36

## 2019-07-28 RX ADMIN — INSULIN LISPRO 10 UNITS: 100 INJECTION, SOLUTION INTRAVENOUS; SUBCUTANEOUS at 13:51

## 2019-07-28 RX ADMIN — INSULIN LISPRO 10 UNITS: 100 INJECTION, SOLUTION INTRAVENOUS; SUBCUTANEOUS at 18:28

## 2019-07-28 RX ADMIN — INSULIN LISPRO 10 UNITS: 100 INJECTION, SOLUTION INTRAVENOUS; SUBCUTANEOUS at 13:53

## 2019-07-28 RX ADMIN — INSULIN LISPRO 3 UNITS: 100 INJECTION, SOLUTION INTRAVENOUS; SUBCUTANEOUS at 09:32

## 2019-07-28 RX ADMIN — VANCOMYCIN HYDROCHLORIDE 1750 MG: 5 INJECTION, POWDER, LYOPHILIZED, FOR SOLUTION INTRAVENOUS at 13:54

## 2019-07-28 RX ADMIN — TIMOLOL MALEATE 1 DROP: 5 SOLUTION OPHTHALMIC at 09:31

## 2019-07-28 RX ADMIN — PIPERACILLIN AND TAZOBACTAM 3.38 G: 3; .375 INJECTION, POWDER, FOR SOLUTION INTRAVENOUS at 16:02

## 2019-07-28 RX ADMIN — VANCOMYCIN HYDROCHLORIDE 1750 MG: 5 INJECTION, POWDER, LYOPHILIZED, FOR SOLUTION INTRAVENOUS at 23:13

## 2019-07-28 RX ADMIN — PIPERACILLIN AND TAZOBACTAM 3.38 G: 3; .375 INJECTION, POWDER, FOR SOLUTION INTRAVENOUS at 21:01

## 2019-07-28 RX ADMIN — ENOXAPARIN SODIUM 40 MG: 40 INJECTION SUBCUTANEOUS at 09:30

## 2019-07-28 RX ADMIN — SODIUM CHLORIDE, PRESERVATIVE FREE 10 ML: 5 INJECTION INTRAVENOUS at 09:30

## 2019-07-28 RX ADMIN — ALLOPURINOL 300 MG: 300 TABLET ORAL at 09:30

## 2019-07-28 RX ADMIN — INSULIN LISPRO 4 UNITS: 100 INJECTION, SOLUTION INTRAVENOUS; SUBCUTANEOUS at 20:57

## 2019-07-28 RX ADMIN — VANCOMYCIN HYDROCHLORIDE 1750 MG: 5 INJECTION, POWDER, LYOPHILIZED, FOR SOLUTION INTRAVENOUS at 05:24

## 2019-07-28 ASSESSMENT — PAIN DESCRIPTION - DESCRIPTORS
DESCRIPTORS: ACHING;SORE;STABBING;SHOOTING
DESCRIPTORS: ACHING;SHARP;SHOOTING

## 2019-07-28 ASSESSMENT — PAIN DESCRIPTION - FREQUENCY
FREQUENCY: CONTINUOUS
FREQUENCY: INTERMITTENT

## 2019-07-28 ASSESSMENT — PAIN DESCRIPTION - PAIN TYPE
TYPE: CHRONIC PAIN
TYPE: CHRONIC PAIN

## 2019-07-28 ASSESSMENT — PAIN SCALES - GENERAL
PAINLEVEL_OUTOF10: 7
PAINLEVEL_OUTOF10: 8
PAINLEVEL_OUTOF10: 4

## 2019-07-28 ASSESSMENT — PAIN DESCRIPTION - LOCATION
LOCATION: FOOT;TOE (COMMENT WHICH ONE);OTHER (COMMENT)
LOCATION: FOOT;TOE (COMMENT WHICH ONE)

## 2019-07-28 ASSESSMENT — PAIN DESCRIPTION - ORIENTATION: ORIENTATION: RIGHT

## 2019-07-28 NOTE — PLAN OF CARE
Problem: Falls - Risk of:  Goal: Will remain free from falls  Description  Will remain free from falls  7/27/2019 2308 by Darya Doan RN  Outcome: Ongoing  7/27/2019 0922 by Maynor Null LPN  Outcome: Ongoing  Goal: Absence of physical injury  Description  Absence of physical injury  7/27/2019 2308 by Darya Doan RN  Outcome: Ongoing  7/27/2019 0922 by Maynor Null LPN  Outcome: Ongoing

## 2019-07-28 NOTE — PROGRESS NOTES
Hospitalist Progress Note      Name:  Gabi Guadalupe /Age/Sex: 1985  (28 y.o. male)   MRN & CSN:  [id number] & [id number] Admission Date/Time: 2019  9:10 AM   Location:  [facility] PCP: hSelby Basurto MD         Hospital Day: 3    Assessment and Plan:   Gabi Guadalupe is a 28 y.o.  male  who presents with Diabetic foot infection ([address]    > Right foot cellulitis / Diabetic foot infection, stepped on nail.   - h/o right 5th toe OM s/p amputation  - No sepsis  - admit, vancomycin and Zosyn  - MRI foot with no OM, but abscess  - surgery consulted. I&D done,   - wound culture positive for MRSA  - NWB right foot, consult PT/OT     >DM type II  - Hold home meds, cover with Lantus, SSI  - increase lantus, consulted endo     >HTN  -Continue home regimen     >H/o Alcohol dependence  - denies any heavy drinking currently, state he drinks occ     >H/o Cocaine positive UDS  - denies any use currently    Diet DIET CARB CONTROL;   DVT Prophylaxis ? Lovenox   Code Status Full Code   Disposition  pending clinical improvement     History of Present Illness:     Pt S&E. Improving toe pain and redness, no chest pain, no dyspnea, no abd pain, no N/V.     10-14 point ROS reviewed negative, unless as noted above    Objective:     No intake or output data in the 24 hours ending 19 1149   Vitals:   Vitals:    19 1100   BP: 132/73   Pulse: 92   Resp: 18   Temp: 98.3 °F (36.8 °C)   SpO2: 98%     Physical Exam:    GEN Awake male, cooperative, no apparent distress. RESP Clear to auscultation, no wheezes, rales or rhonchi. Symmetric chest movement . CARDIO/VASC S1/S2 auscultated. Regular rate. No peripheral edema. GI Abdomen is soft without significant tenderness, Bowel sounds are normoactive. MSK/SKIN Right foot covered with dressing  NEURO normal speech, no lateralizing weakness. PSYCH Awake, alert, oriented x 4. Affect appropriate.       Medications:   Medications:    vancomycin  1,750 mg Intravenous

## 2019-07-29 LAB
CREAT SERPL-MCNC: 0.9 MG/DL (ref 0.9–1.3)
CULTURE: ABNORMAL
DOSE AMOUNT: NORMAL
DOSE TIME: NORMAL
GFR AFRICAN AMERICAN: >60 ML/MIN/1.73M2
GFR NON-AFRICAN AMERICAN: >60 ML/MIN/1.73M2
GLUCOSE BLD-MCNC: 288 MG/DL (ref 70–99)
GLUCOSE BLD-MCNC: 291 MG/DL (ref 70–99)
GLUCOSE BLD-MCNC: 348 MG/DL (ref 70–99)
GLUCOSE BLD-MCNC: 370 MG/DL (ref 70–99)
GLUCOSE BLD-MCNC: 470 MG/DL (ref 70–99)
Lab: ABNORMAL
SPECIMEN: ABNORMAL
VANCOMYCIN TROUGH: 10.7 UG/ML (ref 10–20)

## 2019-07-29 PROCEDURE — 97535 SELF CARE MNGMENT TRAINING: CPT

## 2019-07-29 PROCEDURE — 6360000002 HC RX W HCPCS: Performed by: HOSPITALIST

## 2019-07-29 PROCEDURE — 6370000000 HC RX 637 (ALT 250 FOR IP): Performed by: INTERNAL MEDICINE

## 2019-07-29 PROCEDURE — 36415 COLL VENOUS BLD VENIPUNCTURE: CPT

## 2019-07-29 PROCEDURE — 82962 GLUCOSE BLOOD TEST: CPT

## 2019-07-29 PROCEDURE — 94640 AIRWAY INHALATION TREATMENT: CPT

## 2019-07-29 PROCEDURE — 82565 ASSAY OF CREATININE: CPT

## 2019-07-29 PROCEDURE — 97116 GAIT TRAINING THERAPY: CPT

## 2019-07-29 PROCEDURE — 2580000003 HC RX 258: Performed by: HOSPITALIST

## 2019-07-29 PROCEDURE — 6370000000 HC RX 637 (ALT 250 FOR IP): Performed by: HOSPITALIST

## 2019-07-29 PROCEDURE — 97165 OT EVAL LOW COMPLEX 30 MIN: CPT

## 2019-07-29 PROCEDURE — 97162 PT EVAL MOD COMPLEX 30 MIN: CPT

## 2019-07-29 PROCEDURE — 1200000000 HC SEMI PRIVATE

## 2019-07-29 PROCEDURE — 80202 ASSAY OF VANCOMYCIN: CPT

## 2019-07-29 RX ORDER — INSULIN GLARGINE 100 [IU]/ML
35 INJECTION, SOLUTION SUBCUTANEOUS NIGHTLY
Status: DISCONTINUED | OUTPATIENT
Start: 2019-07-29 | End: 2019-07-30

## 2019-07-29 RX ADMIN — ALLOPURINOL 300 MG: 300 TABLET ORAL at 08:58

## 2019-07-29 RX ADMIN — IPRATROPIUM BROMIDE AND ALBUTEROL SULFATE 1 AMPULE: .5; 3 SOLUTION RESPIRATORY (INHALATION) at 08:30

## 2019-07-29 RX ADMIN — BRIMONIDINE TARTRATE 1 DROP: 2 SOLUTION/ DROPS OPHTHALMIC at 15:32

## 2019-07-29 RX ADMIN — SODIUM CHLORIDE, PRESERVATIVE FREE 10 ML: 5 INJECTION INTRAVENOUS at 21:02

## 2019-07-29 RX ADMIN — PREDNISOLONE ACETATE 1 DROP: 10 SUSPENSION/ DROPS OPHTHALMIC at 15:32

## 2019-07-29 RX ADMIN — ENOXAPARIN SODIUM 40 MG: 40 INJECTION SUBCUTANEOUS at 09:00

## 2019-07-29 RX ADMIN — INSULIN LISPRO 20 UNITS: 100 INJECTION, SOLUTION INTRAVENOUS; SUBCUTANEOUS at 18:23

## 2019-07-29 RX ADMIN — DORZOLAMIDE HYDROCHLORIDE 1 DROP: 20 SOLUTION/ DROPS OPHTHALMIC at 21:02

## 2019-07-29 RX ADMIN — CALCIUM CARBONATE 500 MG: 500 TABLET, CHEWABLE ORAL at 15:32

## 2019-07-29 RX ADMIN — INSULIN LISPRO 6 UNITS: 100 INJECTION, SOLUTION INTRAVENOUS; SUBCUTANEOUS at 09:05

## 2019-07-29 RX ADMIN — OXYCODONE HYDROCHLORIDE AND ACETAMINOPHEN 1 TABLET: 7.5; 325 TABLET ORAL at 21:41

## 2019-07-29 RX ADMIN — PIPERACILLIN AND TAZOBACTAM 3.38 G: 3; .375 INJECTION, POWDER, FOR SOLUTION INTRAVENOUS at 22:47

## 2019-07-29 RX ADMIN — INSULIN LISPRO 10 UNITS: 100 INJECTION, SOLUTION INTRAVENOUS; SUBCUTANEOUS at 13:24

## 2019-07-29 RX ADMIN — OXYCODONE HYDROCHLORIDE AND ACETAMINOPHEN 1 TABLET: 7.5; 325 TABLET ORAL at 15:32

## 2019-07-29 RX ADMIN — INSULIN LISPRO 20 UNITS: 100 INJECTION, SOLUTION INTRAVENOUS; SUBCUTANEOUS at 14:55

## 2019-07-29 RX ADMIN — AMLODIPINE BESYLATE 10 MG: 10 TABLET ORAL at 08:58

## 2019-07-29 RX ADMIN — INSULIN GLARGINE 35 UNITS: 100 INJECTION, SOLUTION SUBCUTANEOUS at 21:01

## 2019-07-29 RX ADMIN — DORZOLAMIDE HYDROCHLORIDE 1 DROP: 20 SOLUTION/ DROPS OPHTHALMIC at 09:03

## 2019-07-29 RX ADMIN — INSULIN LISPRO 8 UNITS: 100 INJECTION, SOLUTION INTRAVENOUS; SUBCUTANEOUS at 21:01

## 2019-07-29 RX ADMIN — PIPERACILLIN AND TAZOBACTAM 3.38 G: 3; .375 INJECTION, POWDER, FOR SOLUTION INTRAVENOUS at 06:25

## 2019-07-29 RX ADMIN — PREDNISOLONE ACETATE 1 DROP: 10 SUSPENSION/ DROPS OPHTHALMIC at 09:02

## 2019-07-29 RX ADMIN — OXYCODONE HYDROCHLORIDE AND ACETAMINOPHEN 1 TABLET: 7.5; 325 TABLET ORAL at 01:43

## 2019-07-29 RX ADMIN — LISINOPRIL AND HYDROCHLOROTHIAZIDE 1 TABLET: 12.5; 2 TABLET ORAL at 08:58

## 2019-07-29 RX ADMIN — VANCOMYCIN 2000 MG: 1 INJECTION, SOLUTION INTRAVENOUS at 16:06

## 2019-07-29 RX ADMIN — INSULIN LISPRO 6 UNITS: 100 INJECTION, SOLUTION INTRAVENOUS; SUBCUTANEOUS at 18:21

## 2019-07-29 RX ADMIN — BRIMONIDINE TARTRATE 1 DROP: 2 SOLUTION/ DROPS OPHTHALMIC at 09:00

## 2019-07-29 RX ADMIN — PIPERACILLIN AND TAZOBACTAM 3.38 G: 3; .375 INJECTION, POWDER, FOR SOLUTION INTRAVENOUS at 14:55

## 2019-07-29 RX ADMIN — VANCOMYCIN HYDROCHLORIDE 1750 MG: 5 INJECTION, POWDER, LYOPHILIZED, FOR SOLUTION INTRAVENOUS at 07:48

## 2019-07-29 RX ADMIN — INSULIN HUMAN 10 UNITS: 100 INJECTION, SUSPENSION SUBCUTANEOUS at 09:07

## 2019-07-29 RX ADMIN — INSULIN LISPRO 20 UNITS: 100 INJECTION, SOLUTION INTRAVENOUS; SUBCUTANEOUS at 09:07

## 2019-07-29 RX ADMIN — ATORVASTATIN CALCIUM 10 MG: 10 TABLET, FILM COATED ORAL at 21:01

## 2019-07-29 RX ADMIN — OXYCODONE HYDROCHLORIDE AND ACETAMINOPHEN 1 TABLET: 7.5; 325 TABLET ORAL at 09:25

## 2019-07-29 RX ADMIN — INSULIN LISPRO 6 UNITS: 100 INJECTION, SOLUTION INTRAVENOUS; SUBCUTANEOUS at 01:48

## 2019-07-29 ASSESSMENT — PAIN SCALES - GENERAL
PAINLEVEL_OUTOF10: 8
PAINLEVEL_OUTOF10: 7

## 2019-07-29 ASSESSMENT — PAIN DESCRIPTION - ORIENTATION: ORIENTATION: RIGHT

## 2019-07-29 ASSESSMENT — PAIN DESCRIPTION - LOCATION: LOCATION: FOOT;TOE (COMMENT WHICH ONE)

## 2019-07-29 ASSESSMENT — PAIN DESCRIPTION - PAIN TYPE: TYPE: ACUTE PAIN

## 2019-07-29 ASSESSMENT — PAIN DESCRIPTION - DESCRIPTORS: DESCRIPTORS: ACHING;SORE

## 2019-07-29 NOTE — PROGRESS NOTES
Hospitalist Progress Note      Name:  Ave Concepcion /Age/Sex: 1985  (28 y.o. male)   MRN & CSN:  [id number] & [id number] Admission Date/Time: 2019  9:10 AM   Location:  [address] [place] [address] PCP: Nate Arrington MD         Hospital Day: 4    Assessment and Plan:   Ave Concepcion is a 28 y.o.  male  who presents with Diabetic foot infection ([address]    > Right foot cellulitis / Diabetic foot infection, stepped on nail.   - h/o right 5th toe OM s/p amputation  - No sepsis  - admit, vancomycin and Zosyn  - MRI foot with no OM, but abscess  - surgery consulted. I&D done,   - wound culture positive for MRSA, sensitivity pending  - NWB right foot, consult PT/OT     >DM type II  - Hold home meds, cover with Lantus, SSI  - increase lantus, consulted endo     >HTN  -Continue home regimen     >H/o Alcohol dependence  - denies any heavy drinking currently, state he drinks occ     >H/o Cocaine positive UDS  - denies any use currently    Diet DIET CARB CONTROL;   DVT Prophylaxis ? Lovenox   Code Status Full Code   Disposition  pending clinical improvement     History of Present Illness:     Pt S&E. Improving redness and edema and pain, no chest pain, no dyspnea, d/w pt dx and plan of care    10-14 point ROS reviewed negative, unless as noted above    Objective: Intake/Output Summary (Last 24 hours) at 2019 0910  Last data filed at 2019 1546  Gross per 24 hour   Intake --   Output 1200 ml   Net -1200 ml      Vitals:   Vitals:    19 0432   BP: (!) 156/102   Pulse: 89   Resp: 14   Temp: 97.8 °F (36.6 °C)   SpO2: 95%     Physical Exam:    GEN Awake male, cooperative, no apparent distress. RESP Clear to auscultation, no wheezes, rales or rhonchi. Symmetric chest movement . CARDIO/VASC S1/S2 auscultated. Regular rate. No peripheral edema. GI Abdomen is soft without significant tenderness, Bowel sounds are normoactive.    MSK/SKIN Right foot covered with dressing  NEURO normal speech, no lateralizing weakness. PSYCH Awake, alert, oriented x 4. Affect appropriate.       Medications:   Medications:    insulin glargine  35 Units Subcutaneous Nightly    insulin lispro  20 Units Subcutaneous TID WC    insulin lispro  0-12 Units Subcutaneous 2 times per day    vancomycin  1,750 mg Intravenous Q8H    insulin lispro  0-12 Units Subcutaneous TID WC    sodium chloride flush  10 mL Intravenous 2 times per day    enoxaparin  40 mg Subcutaneous Daily    piperacillin-tazobactam  3.375 g Intravenous Q8H    allopurinol  300 mg Oral Daily    amLODIPine  10 mg Oral Daily    atorvastatin  10 mg Oral Daily    atropine  1 drop Right Eye TID    brimonidine  1 drop Right Eye BID    lisinopril-hydrochlorothiazide  1 tablet Oral Daily    pantoprazole  40 mg Oral QAM AC    prednisoLONE acetate  1 drop Right Eye BID    timolol  1 drop Right Eye BID    Or    dorzolamide  1 drop Right Eye BID      Infusions:    dextrose       PRN Meds:     sodium chloride flush 10 mL PRN   magnesium hydroxide 30 mL Daily PRN   ondansetron 4 mg Q6H PRN   glucose 15 g PRN   dextrose 12.5 g PRN   glucagon (rDNA) 1 mg PRN   dextrose 100 mL/hr PRN   ipratropium-albuterol 1 ampule Q4H PRN   oxyCODONE-acetaminophen 1 tablet Q6H PRN   calcium carbonate 500 mg TID PRN         Electronically signed by Dharmesh Sheehan MD on 7/29/2019 at 9:10 AM

## 2019-07-29 NOTE — PROGRESS NOTES
[address] [facility]  consulted by Dr. Jo Doss for monitoring and adjustment. Indication for treatment: Diabetic foot infection, MRSA  Goal trough: 15 mcg/mL     Pertinent Laboratory Values:   Temp Readings from Last 3 Encounters:   07/29/19 98.6 °F (37 °C) (Oral)   06/20/19 98.9 °F (37.2 °C) (Oral)   12/25/18 98.1 °F (36.7 °C) (Oral)     Recent Labs     07/27/19  0531   WBC 6.0     Recent Labs     07/27/19  0531 07/28/19  0619 07/29/19  0644   BUN 18  --   --    CREATININE 0.9 0.8* 0.9     Estimated Creatinine Clearance: 173 mL/min (based on SCr of 0.9 mg/dL). Intake/Output Summary (Last 24 hours) at 7/29/2019 1254  Last data filed at 7/28/2019 1546  Gross per 24 hour   Intake --   Output 1200 ml   Net -1200 ml       Pertinent Cultures:  Date    Source    Results  7/27   Wound    MRSA           Vancomycin level:   TROUGH:    Recent Labs     07/27/19 2002 07/29/19  0644   VANCOTROUGH 8.1* 10.7     RANDOM:  No results for input(s): VANCORANDOM in the last 72 hours. Assessment:  · WBC and temperature: WNL  · SCr, BUN, and urine output: WNL  · Day(s) of therapy: 4   · Vancomycin level:  7/27 - 8.1                                       7/29 - 10.7 (subtherapeutic)     Plan:  · Mr. Artis Gamez is receiving vancomycin 1750mg q8h IVPB with a sub-therapeutic trough  · Dose increased to vancomycin 2gm q8h IVPB (SCR and output remain normal, will watch SCR trend very closely due to high dose of vancomycin)  · Repeat trough tomorrow 7/30 @07:30   · If therapeutic trough not achieved on current dosing, would recommend changing to alternative agent (sensitivities are resulted in Epic to aid in selection   · Pharmacy will continue to monitor patient and adjust therapy as indicated    Thank you for the consult. Aly Odom  7/29/2019 12:54 PM

## 2019-07-29 NOTE — PROGRESS NOTES
Sensation Status: WFL        LUE AROM (degrees)  LUE AROM : WNL  RUE AROM (degrees)  RUE AROM : WNL  LUE Strength  Gross LUE Strength: WNL  L Hand General: 5/5  RUE Strength  Gross RUE Strength: WNL  R Hand General: 5/5           Self Care Training:   Cues were given for safety, sequence, UE/LE placement, visual cues, and balance.     Activities performed today included LE dressing and toileting, LE bathing          Plan   Plan  Times per week: eval and discharge      AM-PAC Score        AM-[facility] Inpatient Daily Activity Raw Score: 24 (07/29/19 1131)  AM-PAC Inpatient ADL T-Scale Score : 57.54 (07/29/19 1131)  ADL Inpatient CMS 0-100% Score: 0 (07/29/19 1131)  ADL Inpatient CMS G-Code Modifier : [address] (07/29/19 1131)    Goals  Short term goals  Time Frame for Short term goals: eval and discharge  Patient Goals   Patient goals : go home       Time In: 0953  Time Out: 1006  Total Treatment Minutes: 8 minutes  Total Treatment Time: 13 minutes    Wilson Smoker OTR/L [id number]  11:31 AM,7/29/2019

## 2019-07-30 LAB
CREAT SERPL-MCNC: 0.8 MG/DL (ref 0.9–1.3)
DOSE AMOUNT: NORMAL
DOSE TIME: NORMAL
GFR AFRICAN AMERICAN: >60 ML/MIN/1.73M2
GFR NON-AFRICAN AMERICAN: >60 ML/MIN/1.73M2
GLUCOSE BLD-MCNC: 236 MG/DL (ref 70–99)
GLUCOSE BLD-MCNC: 259 MG/DL (ref 70–99)
GLUCOSE BLD-MCNC: 298 MG/DL (ref 70–99)
GLUCOSE BLD-MCNC: 369 MG/DL (ref 70–99)
GLUCOSE BLD-MCNC: 435 MG/DL (ref 70–99)
GLUCOSE BLD-MCNC: 455 MG/DL (ref 70–99)
GLUCOSE BLD-MCNC: 516 MG/DL (ref 70–99)
VANCOMYCIN TROUGH: 15.8 UG/ML (ref 10–20)

## 2019-07-30 PROCEDURE — 80202 ASSAY OF VANCOMYCIN: CPT

## 2019-07-30 PROCEDURE — 6360000002 HC RX W HCPCS: Performed by: HOSPITALIST

## 2019-07-30 PROCEDURE — 6370000000 HC RX 637 (ALT 250 FOR IP): Performed by: INTERNAL MEDICINE

## 2019-07-30 PROCEDURE — 2580000003 HC RX 258: Performed by: HOSPITALIST

## 2019-07-30 PROCEDURE — 94761 N-INVAS EAR/PLS OXIMETRY MLT: CPT

## 2019-07-30 PROCEDURE — 82962 GLUCOSE BLOOD TEST: CPT

## 2019-07-30 PROCEDURE — 1200000000 HC SEMI PRIVATE

## 2019-07-30 PROCEDURE — 82565 ASSAY OF CREATININE: CPT

## 2019-07-30 PROCEDURE — 6370000000 HC RX 637 (ALT 250 FOR IP): Performed by: HOSPITALIST

## 2019-07-30 PROCEDURE — 36415 COLL VENOUS BLD VENIPUNCTURE: CPT

## 2019-07-30 RX ORDER — INSULIN GLARGINE 100 [IU]/ML
45 INJECTION, SOLUTION SUBCUTANEOUS NIGHTLY
Status: DISCONTINUED | OUTPATIENT
Start: 2019-07-31 | End: 2019-07-31

## 2019-07-30 RX ORDER — INSULIN GLARGINE 100 [IU]/ML
10 INJECTION, SOLUTION SUBCUTANEOUS ONCE
Status: COMPLETED | OUTPATIENT
Start: 2019-07-30 | End: 2019-07-30

## 2019-07-30 RX ADMIN — INSULIN GLARGINE 10 UNITS: 100 INJECTION, SOLUTION SUBCUTANEOUS at 22:19

## 2019-07-30 RX ADMIN — INSULIN LISPRO 12 UNITS: 100 INJECTION, SOLUTION INTRAVENOUS; SUBCUTANEOUS at 09:32

## 2019-07-30 RX ADMIN — BRIMONIDINE TARTRATE 1 DROP: 2 SOLUTION/ DROPS OPHTHALMIC at 09:36

## 2019-07-30 RX ADMIN — INSULIN LISPRO 6 UNITS: 100 INJECTION, SOLUTION INTRAVENOUS; SUBCUTANEOUS at 21:37

## 2019-07-30 RX ADMIN — INSULIN GLARGINE 35 UNITS: 100 INJECTION, SOLUTION SUBCUTANEOUS at 21:37

## 2019-07-30 RX ADMIN — VANCOMYCIN 2000 MG: 1 INJECTION, SOLUTION INTRAVENOUS at 23:50

## 2019-07-30 RX ADMIN — ENOXAPARIN SODIUM 40 MG: 40 INJECTION SUBCUTANEOUS at 08:43

## 2019-07-30 RX ADMIN — VANCOMYCIN 2000 MG: 1 INJECTION, SOLUTION INTRAVENOUS at 01:02

## 2019-07-30 RX ADMIN — PIPERACILLIN AND TAZOBACTAM 3.38 G: 3; .375 INJECTION, POWDER, FOR SOLUTION INTRAVENOUS at 06:35

## 2019-07-30 RX ADMIN — PREDNISOLONE ACETATE 1 DROP: 10 SUSPENSION/ DROPS OPHTHALMIC at 09:36

## 2019-07-30 RX ADMIN — DORZOLAMIDE HYDROCHLORIDE 1 DROP: 20 SOLUTION/ DROPS OPHTHALMIC at 21:35

## 2019-07-30 RX ADMIN — INSULIN HUMAN 20 UNITS: 100 INJECTION, SUSPENSION SUBCUTANEOUS at 09:32

## 2019-07-30 RX ADMIN — TIMOLOL MALEATE 1 DROP: 5 SOLUTION OPHTHALMIC at 09:35

## 2019-07-30 RX ADMIN — INSULIN LISPRO 4 UNITS: 100 INJECTION, SOLUTION INTRAVENOUS; SUBCUTANEOUS at 13:04

## 2019-07-30 RX ADMIN — PREDNISOLONE ACETATE 1 DROP: 10 SUSPENSION/ DROPS OPHTHALMIC at 15:47

## 2019-07-30 RX ADMIN — INSULIN LISPRO 6 UNITS: 100 INJECTION, SOLUTION INTRAVENOUS; SUBCUTANEOUS at 18:06

## 2019-07-30 RX ADMIN — INSULIN LISPRO 25 UNITS: 100 INJECTION, SOLUTION INTRAVENOUS; SUBCUTANEOUS at 18:06

## 2019-07-30 RX ADMIN — INSULIN LISPRO 25 UNITS: 100 INJECTION, SOLUTION INTRAVENOUS; SUBCUTANEOUS at 09:32

## 2019-07-30 RX ADMIN — OXYCODONE HYDROCHLORIDE AND ACETAMINOPHEN 1 TABLET: 7.5; 325 TABLET ORAL at 18:54

## 2019-07-30 RX ADMIN — LINAGLIPTIN 5 MG: 5 TABLET, FILM COATED ORAL at 09:34

## 2019-07-30 RX ADMIN — INSULIN LISPRO 25 UNITS: 100 INJECTION, SOLUTION INTRAVENOUS; SUBCUTANEOUS at 13:06

## 2019-07-30 RX ADMIN — ALLOPURINOL 300 MG: 300 TABLET ORAL at 08:43

## 2019-07-30 RX ADMIN — OXYCODONE HYDROCHLORIDE AND ACETAMINOPHEN 1 TABLET: 7.5; 325 TABLET ORAL at 06:38

## 2019-07-30 RX ADMIN — PANTOPRAZOLE SODIUM 40 MG: 40 TABLET, DELAYED RELEASE ORAL at 06:34

## 2019-07-30 RX ADMIN — ATORVASTATIN CALCIUM 10 MG: 10 TABLET, FILM COATED ORAL at 21:35

## 2019-07-30 RX ADMIN — METFORMIN HYDROCHLORIDE 500 MG: 500 TABLET ORAL at 09:34

## 2019-07-30 RX ADMIN — INSULIN LISPRO 12 UNITS: 100 INJECTION, SOLUTION INTRAVENOUS; SUBCUTANEOUS at 03:47

## 2019-07-30 RX ADMIN — OXYCODONE HYDROCHLORIDE AND ACETAMINOPHEN 1 TABLET: 7.5; 325 TABLET ORAL at 13:03

## 2019-07-30 RX ADMIN — VANCOMYCIN 2000 MG: 1 INJECTION, SOLUTION INTRAVENOUS at 15:43

## 2019-07-30 RX ADMIN — SODIUM CHLORIDE, PRESERVATIVE FREE 10 ML: 5 INJECTION INTRAVENOUS at 21:39

## 2019-07-30 RX ADMIN — SODIUM CHLORIDE, PRESERVATIVE FREE 10 ML: 5 INJECTION INTRAVENOUS at 08:48

## 2019-07-30 RX ADMIN — INSULIN LISPRO 12 UNITS: 100 INJECTION, SOLUTION INTRAVENOUS; SUBCUTANEOUS at 02:30

## 2019-07-30 RX ADMIN — METFORMIN HYDROCHLORIDE 500 MG: 500 TABLET ORAL at 17:08

## 2019-07-30 RX ADMIN — VANCOMYCIN 2000 MG: 1 INJECTION, SOLUTION INTRAVENOUS at 08:48

## 2019-07-30 RX ADMIN — BRIMONIDINE TARTRATE 1 DROP: 2 SOLUTION/ DROPS OPHTHALMIC at 15:44

## 2019-07-30 ASSESSMENT — PAIN DESCRIPTION - PROGRESSION
CLINICAL_PROGRESSION: GRADUALLY WORSENING

## 2019-07-30 ASSESSMENT — PAIN DESCRIPTION - ORIENTATION
ORIENTATION: RIGHT

## 2019-07-30 ASSESSMENT — PAIN DESCRIPTION - PAIN TYPE
TYPE: CHRONIC PAIN;ACUTE PAIN
TYPE: ACUTE PAIN;CHRONIC PAIN
TYPE: CHRONIC PAIN
TYPE: ACUTE PAIN

## 2019-07-30 ASSESSMENT — PAIN DESCRIPTION - DESCRIPTORS
DESCRIPTORS: ACHING;SORE

## 2019-07-30 ASSESSMENT — PAIN DESCRIPTION - LOCATION
LOCATION: TOE (COMMENT WHICH ONE)
LOCATION: TOE (COMMENT WHICH ONE);SHOULDER
LOCATION: TOE (COMMENT WHICH ONE);SHOULDER
LOCATION: SHOULDER

## 2019-07-30 ASSESSMENT — PAIN DESCRIPTION - ONSET
ONSET: ON-GOING

## 2019-07-30 ASSESSMENT — PAIN DESCRIPTION - FREQUENCY
FREQUENCY: CONTINUOUS

## 2019-07-30 ASSESSMENT — PAIN SCALES - GENERAL
PAINLEVEL_OUTOF10: 7
PAINLEVEL_OUTOF10: 4
PAINLEVEL_OUTOF10: 5
PAINLEVEL_OUTOF10: 8
PAINLEVEL_OUTOF10: 7
PAINLEVEL_OUTOF10: 7

## 2019-07-30 NOTE — PROGRESS NOTES
weakness. PSYCH Awake, alert, oriented x 4. Affect appropriate.     Medications:   Medications:    insulin lispro  25 Units Subcutaneous TID WC    metFORMIN  500 mg Oral BID WC    linagliptin  5 mg Oral Daily    insulin glargine  35 Units Subcutaneous Nightly    insulin lispro  0-12 Units Subcutaneous 2 times per day    vancomycin  2,000 mg Intravenous Q8H    insulin lispro  0-12 Units Subcutaneous TID WC    sodium chloride flush  10 mL Intravenous 2 times per day    enoxaparin  40 mg Subcutaneous Daily    allopurinol  300 mg Oral Daily    atorvastatin  10 mg Oral Daily    brimonidine  1 drop Right Eye BID    lisinopril-hydrochlorothiazide  1 tablet Oral Daily    pantoprazole  40 mg Oral QAM AC    prednisoLONE acetate  1 drop Right Eye BID    timolol  1 drop Right Eye BID    Or    dorzolamide  1 drop Right Eye BID      Infusions:    dextrose       PRN Meds:     sodium chloride flush 10 mL PRN   magnesium hydroxide 30 mL Daily PRN   ondansetron 4 mg Q6H PRN   glucose 15 g PRN   dextrose 12.5 g PRN   glucagon (rDNA) 1 mg PRN   dextrose 100 mL/hr PRN   ipratropium-albuterol 1 ampule Q4H PRN   oxyCODONE-acetaminophen 1 tablet Q6H PRN   calcium carbonate 500 mg TID PRN         Electronically signed by Loni Early MD on 7/30/2019 at 11:05 AM

## 2019-07-31 VITALS
HEART RATE: 83 BPM | DIASTOLIC BLOOD PRESSURE: 100 MMHG | WEIGHT: 303 LBS | BODY MASS INDEX: 37.67 KG/M2 | SYSTOLIC BLOOD PRESSURE: 159 MMHG | HEIGHT: 75 IN | TEMPERATURE: 98.4 F | RESPIRATION RATE: 18 BRPM | OXYGEN SATURATION: 99 %

## 2019-07-31 LAB
CREAT SERPL-MCNC: 0.8 MG/DL (ref 0.9–1.3)
GFR AFRICAN AMERICAN: >60 ML/MIN/1.73M2
GFR NON-AFRICAN AMERICAN: >60 ML/MIN/1.73M2
GLUCOSE BLD-MCNC: 179 MG/DL (ref 70–99)
GLUCOSE BLD-MCNC: 288 MG/DL (ref 70–99)
GLUCOSE BLD-MCNC: 290 MG/DL (ref 70–99)
GLUCOSE BLD-MCNC: 348 MG/DL (ref 70–99)

## 2019-07-31 PROCEDURE — 36415 COLL VENOUS BLD VENIPUNCTURE: CPT

## 2019-07-31 PROCEDURE — 6360000002 HC RX W HCPCS: Performed by: HOSPITALIST

## 2019-07-31 PROCEDURE — 82565 ASSAY OF CREATININE: CPT

## 2019-07-31 PROCEDURE — 82962 GLUCOSE BLOOD TEST: CPT

## 2019-07-31 PROCEDURE — 2580000003 HC RX 258: Performed by: HOSPITALIST

## 2019-07-31 PROCEDURE — 6370000000 HC RX 637 (ALT 250 FOR IP): Performed by: INTERNAL MEDICINE

## 2019-07-31 PROCEDURE — 6370000000 HC RX 637 (ALT 250 FOR IP): Performed by: HOSPITALIST

## 2019-07-31 RX ORDER — CLINDAMYCIN HYDROCHLORIDE 150 MG/1
300 CAPSULE ORAL EVERY 8 HOURS SCHEDULED
Status: DISCONTINUED | OUTPATIENT
Start: 2019-07-31 | End: 2019-07-31 | Stop reason: HOSPADM

## 2019-07-31 RX ORDER — CLINDAMYCIN HYDROCHLORIDE 300 MG/1
300 CAPSULE ORAL EVERY 8 HOURS SCHEDULED
Qty: 30 CAPSULE | Refills: 0 | Status: SHIPPED | OUTPATIENT
Start: 2019-07-31 | End: 2019-08-10

## 2019-07-31 RX ORDER — OXYCODONE HYDROCHLORIDE AND ACETAMINOPHEN 5; 325 MG/1; MG/1
1 TABLET ORAL EVERY 8 HOURS PRN
Qty: 9 TABLET | Refills: 0 | Status: SHIPPED | OUTPATIENT
Start: 2019-07-31 | End: 2019-08-03

## 2019-07-31 RX ORDER — INSULIN GLARGINE 100 [IU]/ML
50 INJECTION, SOLUTION SUBCUTANEOUS NIGHTLY
Status: DISCONTINUED | OUTPATIENT
Start: 2019-07-31 | End: 2019-07-31 | Stop reason: HOSPADM

## 2019-07-31 RX ADMIN — INSULIN LISPRO 25 UNITS: 100 INJECTION, SOLUTION INTRAVENOUS; SUBCUTANEOUS at 12:45

## 2019-07-31 RX ADMIN — PANTOPRAZOLE SODIUM 40 MG: 40 TABLET, DELAYED RELEASE ORAL at 05:26

## 2019-07-31 RX ADMIN — LINAGLIPTIN 5 MG: 5 TABLET, FILM COATED ORAL at 08:57

## 2019-07-31 RX ADMIN — SODIUM CHLORIDE, PRESERVATIVE FREE 10 ML: 5 INJECTION INTRAVENOUS at 09:14

## 2019-07-31 RX ADMIN — ALLOPURINOL 300 MG: 300 TABLET ORAL at 08:57

## 2019-07-31 RX ADMIN — BRIMONIDINE TARTRATE 1 DROP: 2 SOLUTION/ DROPS OPHTHALMIC at 16:15

## 2019-07-31 RX ADMIN — PREDNISOLONE ACETATE 1 DROP: 10 SUSPENSION/ DROPS OPHTHALMIC at 08:54

## 2019-07-31 RX ADMIN — CLINDAMYCIN HYDROCHLORIDE 300 MG: 150 CAPSULE ORAL at 14:40

## 2019-07-31 RX ADMIN — PREDNISOLONE ACETATE 1 DROP: 10 SUSPENSION/ DROPS OPHTHALMIC at 16:17

## 2019-07-31 RX ADMIN — OXYCODONE HYDROCHLORIDE AND ACETAMINOPHEN 1 TABLET: 7.5; 325 TABLET ORAL at 08:57

## 2019-07-31 RX ADMIN — ENOXAPARIN SODIUM 40 MG: 40 INJECTION SUBCUTANEOUS at 09:14

## 2019-07-31 RX ADMIN — VANCOMYCIN 2000 MG: 1 INJECTION, SOLUTION INTRAVENOUS at 09:13

## 2019-07-31 RX ADMIN — INSULIN LISPRO 9 UNITS: 100 INJECTION, SOLUTION INTRAVENOUS; SUBCUTANEOUS at 08:51

## 2019-07-31 RX ADMIN — OXYCODONE HYDROCHLORIDE AND ACETAMINOPHEN 1 TABLET: 7.5; 325 TABLET ORAL at 02:14

## 2019-07-31 RX ADMIN — BRIMONIDINE TARTRATE 1 DROP: 2 SOLUTION/ DROPS OPHTHALMIC at 08:48

## 2019-07-31 RX ADMIN — METFORMIN HYDROCHLORIDE 1000 MG: 500 TABLET ORAL at 08:57

## 2019-07-31 RX ADMIN — INSULIN LISPRO 9 UNITS: 100 INJECTION, SOLUTION INTRAVENOUS; SUBCUTANEOUS at 12:44

## 2019-07-31 RX ADMIN — METFORMIN HYDROCHLORIDE 1000 MG: 500 TABLET ORAL at 16:14

## 2019-07-31 RX ADMIN — INSULIN LISPRO 3 UNITS: 100 INJECTION, SOLUTION INTRAVENOUS; SUBCUTANEOUS at 16:32

## 2019-07-31 RX ADMIN — LISINOPRIL AND HYDROCHLOROTHIAZIDE 1 TABLET: 12.5; 2 TABLET ORAL at 08:57

## 2019-07-31 RX ADMIN — INSULIN HUMAN 30 UNITS: 100 INJECTION, SUSPENSION SUBCUTANEOUS at 09:14

## 2019-07-31 RX ADMIN — INSULIN LISPRO 25 UNITS: 100 INJECTION, SOLUTION INTRAVENOUS; SUBCUTANEOUS at 08:54

## 2019-07-31 RX ADMIN — OXYCODONE HYDROCHLORIDE AND ACETAMINOPHEN 1 TABLET: 7.5; 325 TABLET ORAL at 16:14

## 2019-07-31 RX ADMIN — INSULIN LISPRO 12 UNITS: 100 INJECTION, SOLUTION INTRAVENOUS; SUBCUTANEOUS at 02:11

## 2019-07-31 ASSESSMENT — PAIN SCALES - GENERAL
PAINLEVEL_OUTOF10: 7
PAINLEVEL_OUTOF10: 5
PAINLEVEL_OUTOF10: 7
PAINLEVEL_OUTOF10: 8

## 2019-07-31 ASSESSMENT — PAIN DESCRIPTION - PROGRESSION: CLINICAL_PROGRESSION: NOT CHANGED

## 2019-07-31 ASSESSMENT — PAIN DESCRIPTION - LOCATION
LOCATION: TOE (COMMENT WHICH ONE)
LOCATION: TOE (COMMENT WHICH ONE);SHOULDER

## 2019-07-31 ASSESSMENT — PAIN DESCRIPTION - PAIN TYPE
TYPE: CHRONIC PAIN;ACUTE PAIN
TYPE: ACUTE PAIN

## 2019-07-31 ASSESSMENT — PAIN DESCRIPTION - ONSET: ONSET: ON-GOING

## 2019-07-31 ASSESSMENT — PAIN DESCRIPTION - DESCRIPTORS
DESCRIPTORS: ACHING
DESCRIPTORS: ACHING

## 2019-07-31 ASSESSMENT — PAIN DESCRIPTION - ORIENTATION
ORIENTATION: RIGHT
ORIENTATION: RIGHT

## 2019-07-31 ASSESSMENT — PAIN DESCRIPTION - FREQUENCY: FREQUENCY: CONTINUOUS

## 2019-07-31 NOTE — PROGRESS NOTES
Progress Note( Dr. Anthony Carpio)  7/30/2019  Subjective:   Admit Date: 7/26/2019  PCP: Cirilo Reynaga MD    Admitted For : Right foot and big toe cellulitis    Consulted For: Control of blood glucose    Interval History: High blood sugar levels    Denies any chest pains,   Denies SOB . Denies nausea or vomiting. No new bowel or bladder symptoms. Intake/Output Summary (Last 24 hours) at 7/30/2019 2153  Last data filed at 7/30/2019 1808  Gross per 24 hour   Intake 3150 ml   Output 2300 ml   Net 850 ml       DATA    CBC:   No results for input(s): WBC, HGB, PLT in the last 72 hours. CMP:  Recent Labs     07/28/19  0619 07/29/19  0644 07/30/19  0911   CREATININE 0.8* 0.9 0.8*     Lipids:   Lab Results   Component Value Date    CHOL 142 04/16/2011    HDL 41 04/16/2011    TRIG 268 04/16/2011     Glucose:  Recent Labs     07/30/19  0858 07/30/19  1302 07/30/19  1805   POCGLU 435* 236* 298*     [id number]:  Lab Results   Component Value Date    LABA1C 9.1 07/26/2019     High Sensitivity TSH:   Lab Results   Component Value Date    TSHHS 2.685 04/16/2011     Free T3: No results found for: FT3  Free T4:No results found for: T4FREE    Xr Foot Right (2 Views)    Result Date: 7/26/2019  EXAMINATION: TWO XRAY VIEWS OF THE RIGHT FOOT 7/26/2019 9:26 am COMPARISON: 12/21/2018. 1. Soft tissue swelling involving the dorsum of the foot and 1st toe without acute osseous abnormality. This likely represents a cellulitis. Mri Foot Right W Wo Contrast    Result Date: 7/26/2019  EXAMINATION: MRI OF THE RIGHT FOOT WITHOUT AND WITH CONTRAST, 7/26/2019 3:59 pm   1. No osteomyelitis or other acute osseous abnormality. 2. 3.5 x 2.8 x 1.4 cm cutaneous and subcutaneous fluid collection in the soft tissues dorsal and medial to the 1st proximal phalanx compatible with an abscess and adjacent cellulitis.        Scheduled Medicines   Medications:    insulin lispro  25 Units Subcutaneous TID WC    linagliptin  5 mg Oral Daily    Current Medicines   3. On meal/ Correction bolus Humalog/ Basal Lantus Insulin regime /add oral hypoglycemic   4. monitor Blood glucose frequently   5. Modified  the dose of Insulin/ other medicines as needed   6. Will follow     .      Chemo Garcia MD

## 2019-07-31 NOTE — PROGRESS NOTES
Per Kathy Sheehan at Dr. Arnulfo Kilpatrick office they want patients to make their own f/u appointments. Called [facility] to schedule f/u appointment but Dorcas Reese states patient transferred all this records and is no longer a patient of theirs. Spoke with patient per patient he was suppose to start seeing a new doctor yesterday but was here. Educated and encouraged patient to make f/u appointments with PCP and with Dr. Nicole Randall, patient voices understanding.

## 2019-07-31 NOTE — PROGRESS NOTES
regime /add oral hypoglycemic   4. monitor Blood glucose frequently   5. Modified  the dose of Insulin/ other medicines as needed   6. Will follow     .      Keturah Razo MD

## 2019-07-31 NOTE — DISCHARGE SUMMARY
Discharge Summary    Name:  Mina Dakin /Age/Sex: 1985  (28 y.o. male)   MRN & CSN:  [id number] & [id number] Admission Date/Time: 2019  9:10 AM   Attending:  Tim Hackett MD Discharging Physician: Massimo Asif MD       Hospital Course:   Mina Dakin is a 28 y.o.  male  who presents with Diabetic foot infection ([address]    > Right foot cellulitis / Diabetic foot infection, stepped on nail.   - h/o right 5th toe OM s/p amputation  - No sepsis  - admit, vancomycin and Zosyn  - MRI foot with no OM, but abscess  - surgery consulted. I&D done,   - wound culture positive for MRSA,  - NWB right foot, consult PT/OT  - sx and clinically improved, and was discharged home in a stable condition on po clindamycin per wound culture sensitivity     >DM type II  - Hold home meds, cover with Lantus, SSI  - increase lantus, consulted endo  - medications adjusted.      >HTN  -Continue home regimen     >H/o Alcohol dependence  - denies any heavy drinking currently, state he drinks occ     >H/o Cocaine positive UDS  - denies any use currently    The patient expressed appropriate understanding of and agreement with the discharge recommendations, medications, and plan. Consults this admission:  IP CONSULT TO HOSPITALIST  IP CONSULT TO GENERAL SURGERY  IP CONSULT TO ENDOCRINOLOGY  IP CONSULT TO GENERAL SURGERY  IP CONSULT TO DIABETES EDUCATOR  IP CONSULT TO HOME CARE NEEDS    Discharge Instruction:   Follow up appointments:    See AVS    Disposition: Discharged to:   ?Home, ? [place] [address]  Condition on discharge: Stable    Discharge Medications:      Steffany Combs   Home Medication Instructions HSB:[id number]    Printed on:19 1642   Medication Information                      albuterol sulfate  (90 BASE) MCG/ACT inhaler  Inhale 2 puffs into the lungs every 6 hours as needed for Wheezing             albuterol-ipratropium (COMBIVENT RESPIMAT)  MCG/ACT AERS inhaler  Inhale 1 puff into the lungs every 6 hours             allopurinol (ZYLOPRIM) 300 MG tablet  Take 300 mg by mouth daily              amLODIPine (NORVASC) 10 MG tablet  Take 10 mg by mouth daily             atorvastatin (LIPITOR) 10 MG tablet  Take 10 mg by mouth daily              brimonidine (ALPHAGAN) 0.2 % ophthalmic solution  Place 1 drop into the right eye 2 times daily             clindamycin (CLEOCIN) 300 MG capsule  Take 1 capsule by mouth every 8 hours for 10 days             dorzolamide-timolol 22.3-6.8 MG/ML SOLN  Place 1 drop into the right eye 2 times daily             Ertugliflozin L-PyroglutamicAc (STEGLATRO) 15 MG TABS  Take 1 tablet by mouth daily             ezetimibe (ZETIA) 10 MG tablet  Take 10 mg by mouth daily             glipiZIDE (GLUCOTROL) 10 MG tablet  Take 10 mg by mouth 2 times daily (before meals)             linagliptin (TRADJENTA) 5 MG tablet  Take 1 tablet by mouth daily             lisinopril-hydrochlorothiazide (PRINZIDE;ZESTORETIC) 20-12.5 MG per tablet  Take 1 tablet by mouth daily              metFORMIN (GLUCOPHAGE) 1000 MG tablet  Take 1,000 mg by mouth 2 times daily (with meals)             omeprazole (PRILOSEC) 20 MG capsule  Take 40 mg by mouth daily              oxyCODONE-acetaminophen (ENDOCET) 5-325 MG per tablet  Take 1 tablet by mouth every 8 hours as needed for Pain for up to 3 days. Intended supply: 3 days. Take lowest dose possible to manage pain             prednisoLONE acetate (PRED FORTE) 1 % ophthalmic suspension  Place 1 drop into the right eye 2 times daily                 Objective Findings at Discharge:   BP (!) 159/100   Pulse 83   Temp 98.4 °F (36.9 °C) (Oral)   Resp 18   Ht 6' 3\" (1.905 m)   Wt (!) 303 lb (137.4 kg)   SpO2 99%   BMI 37.87 kg/m²            PHYSICAL EXAM  GEN    Awake male, cooperative, no apparent distress. RESP  Clear to auscultation, no wheezes, rales or rhonchi. Symmetric chest movement . CARDIO/VASC           S1/S2 auscultated. Regular rate.    GI        Abdomen Initial encounter. FINDINGS: LISFRANC JOINT:  Lisfranc ligament is visualized and is normal.  The alignment of the tarsal-metatarsal joint is anatomic. BONE MARROW: Status post partial 2nd digit and complete 5th digit amputation. No fracture or dislocation. Mild midfoot degenerative changes. No significant abnormal bone marrow edema in the forefoot. No suspicious marrow space-occupying lesion. GREATER AND LESSER MTP JOINTS: Hallux valgus with moderate 1st MTP degenerative changes. No joint effusion. The 2nd through 4th MTP joints are unremarkable. SOFT TISSUES: 3.5 x 2.8 x 1.4 cm cutaneous and subcutaneous fluid collection dorsal and medial to the 1st proximal phalanx. Adjacent subcutaneous edema extending along the dorsum of the mid and forefoot. No other drainable fluid collection. No deep soft tissue ulceration. No abnormal soft tissue mass. Moderate fatty atrophy and edema of the forefoot musculature. TENDONS: The visualized tendons are intact without tenosynovitis. 1. No osteomyelitis or other acute osseous abnormality. 2. 3.5 x 2.8 x 1.4 cm cutaneous and subcutaneous fluid collection in the soft tissues dorsal and medial to the 1st proximal phalanx compatible with an abscess and adjacent cellulitis.        Discharge Time of 36 minutes    Electronically signed by Clyde Benavidez MD on 7/31/2019 at 4:42 PM

## 2019-08-26 ENCOUNTER — HOSPITAL ENCOUNTER (OUTPATIENT)
Dept: GENERAL RADIOLOGY | Age: 34
Discharge: HOME | End: 2019-08-26
Payer: COMMERCIAL

## 2019-08-26 ENCOUNTER — HOSPITAL ENCOUNTER (OUTPATIENT)
Age: 34
Discharge: HOME | End: 2019-08-26
Payer: COMMERCIAL

## 2019-08-26 DIAGNOSIS — M25.511 RIGHT SHOULDER PAIN, UNSPECIFIED CHRONICITY: ICD-10-CM

## 2019-08-26 DIAGNOSIS — M25.512 LEFT SHOULDER PAIN, UNSPECIFIED CHRONICITY: ICD-10-CM

## 2019-08-26 PROCEDURE — 73030 X-RAY EXAM OF SHOULDER: CPT

## 2020-03-02 ENCOUNTER — APPOINTMENT (OUTPATIENT)
Dept: GENERAL RADIOLOGY | Age: 35
End: 2020-03-02
Payer: COMMERCIAL

## 2020-03-02 ENCOUNTER — HOSPITAL ENCOUNTER (EMERGENCY)
Age: 35
Discharge: ANOTHER HOSPITAL | End: 2020-03-03
Attending: EMERGENCY MEDICINE
Payer: COMMERCIAL

## 2020-03-02 LAB
ALBUMIN SERPL-MCNC: 3.8 GM/DL (ref 3.4–5)
ALP BLD-CCNC: 122 IU/L (ref 40–129)
ALT SERPL-CCNC: 5 U/L (ref 10–40)
ANION GAP SERPL CALCULATED.3IONS-SCNC: 19 MMOL/L (ref 4–16)
AST SERPL-CCNC: 5 IU/L (ref 15–37)
BASOPHILS ABSOLUTE: 0 K/CU MM
BASOPHILS RELATIVE PERCENT: 0.4 % (ref 0–1)
BILIRUB SERPL-MCNC: 0.2 MG/DL (ref 0–1)
BUN BLDV-MCNC: 6 MG/DL (ref 6–23)
CALCIUM SERPL-MCNC: 8.3 MG/DL (ref 8.3–10.6)
CHLORIDE BLD-SCNC: 94 MMOL/L (ref 99–110)
CO2: 17 MMOL/L (ref 21–32)
CREAT SERPL-MCNC: 0.8 MG/DL (ref 0.9–1.3)
DIFFERENTIAL TYPE: ABNORMAL
EOSINOPHILS ABSOLUTE: 0.1 K/CU MM
EOSINOPHILS RELATIVE PERCENT: 1.8 % (ref 0–3)
GFR AFRICAN AMERICAN: >60 ML/MIN/1.73M2
GFR NON-AFRICAN AMERICAN: >60 ML/MIN/1.73M2
GLUCOSE BLD-MCNC: 415 MG/DL (ref 70–99)
HCT VFR BLD CALC: 36.1 % (ref 42–52)
HEMOGLOBIN: 12.5 GM/DL (ref 13.5–18)
IMMATURE NEUTROPHIL %: 0.4 % (ref 0–0.43)
LYMPHOCYTES ABSOLUTE: 1.5 K/CU MM
LYMPHOCYTES RELATIVE PERCENT: 30.4 % (ref 24–44)
MCH RBC QN AUTO: 31.7 PG (ref 27–31)
MCHC RBC AUTO-ENTMCNC: 34.6 % (ref 32–36)
MCV RBC AUTO: 91.6 FL (ref 78–100)
MONOCYTES ABSOLUTE: 0.3 K/CU MM
MONOCYTES RELATIVE PERCENT: 5.1 % (ref 0–4)
NUCLEATED RBC %: 0 %
PDW BLD-RTO: 11.3 % (ref 11.7–14.9)
PLATELET # BLD: 177 K/CU MM (ref 140–440)
PMV BLD AUTO: 9.7 FL (ref 7.5–11.1)
POTASSIUM SERPL-SCNC: 3.9 MMOL/L (ref 3.5–5.1)
RBC # BLD: 3.94 M/CU MM (ref 4.6–6.2)
SEGMENTED NEUTROPHILS ABSOLUTE COUNT: 3 K/CU MM
SEGMENTED NEUTROPHILS RELATIVE PERCENT: 61.9 % (ref 36–66)
SODIUM BLD-SCNC: 130 MMOL/L (ref 135–145)
TOTAL IMMATURE NEUTOROPHIL: 0.02 K/CU MM
TOTAL NUCLEATED RBC: 0 K/CU MM
TOTAL PROTEIN: 6.7 GM/DL (ref 6.4–8.2)
WBC # BLD: 4.9 K/CU MM (ref 4–10.5)

## 2020-03-02 PROCEDURE — 87040 BLOOD CULTURE FOR BACTERIA: CPT

## 2020-03-02 PROCEDURE — 80053 COMPREHEN METABOLIC PANEL: CPT

## 2020-03-02 PROCEDURE — 73130 X-RAY EXAM OF HAND: CPT

## 2020-03-02 PROCEDURE — 85025 COMPLETE CBC W/AUTO DIFF WBC: CPT

## 2020-03-02 PROCEDURE — 99284 EMERGENCY DEPT VISIT MOD MDM: CPT

## 2020-03-02 PROCEDURE — 82010 KETONE BODYS QUAN: CPT

## 2020-03-02 PROCEDURE — 83605 ASSAY OF LACTIC ACID: CPT

## 2020-03-02 PROCEDURE — 82805 BLOOD GASES W/O2 SATURATION: CPT

## 2020-03-02 RX ORDER — MORPHINE SULFATE 4 MG/ML
4 INJECTION, SOLUTION INTRAMUSCULAR; INTRAVENOUS ONCE
Status: COMPLETED | OUTPATIENT
Start: 2020-03-02 | End: 2020-03-03

## 2020-03-02 RX ORDER — ONDANSETRON 2 MG/ML
4 INJECTION INTRAMUSCULAR; INTRAVENOUS EVERY 30 MIN PRN
Status: DISCONTINUED | OUTPATIENT
Start: 2020-03-02 | End: 2020-03-03 | Stop reason: HOSPADM

## 2020-03-02 RX ORDER — 0.9 % SODIUM CHLORIDE 0.9 %
1000 INTRAVENOUS SOLUTION INTRAVENOUS ONCE
Status: COMPLETED | OUTPATIENT
Start: 2020-03-02 | End: 2020-03-03

## 2020-03-02 ASSESSMENT — PAIN SCALES - GENERAL: PAINLEVEL_OUTOF10: 8

## 2020-03-02 ASSESSMENT — PAIN DESCRIPTION - LOCATION: LOCATION: HAND

## 2020-03-02 ASSESSMENT — PAIN DESCRIPTION - ORIENTATION: ORIENTATION: LEFT

## 2020-03-02 ASSESSMENT — PAIN DESCRIPTION - DESCRIPTORS: DESCRIPTORS: CONSTANT

## 2020-03-03 VITALS
DIASTOLIC BLOOD PRESSURE: 91 MMHG | SYSTOLIC BLOOD PRESSURE: 154 MMHG | OXYGEN SATURATION: 99 % | TEMPERATURE: 98.1 F | HEART RATE: 109 BPM | RESPIRATION RATE: 16 BRPM

## 2020-03-03 LAB
BASE EXCESS: ABNORMAL (ref 0–3.3)
BETA-HYDROXYBUTYRATE: >20.8 MG/DL (ref 0–3)
COMMENT: ABNORMAL
HCO3 VENOUS: 20.3 MMOL/L (ref 19–25)
LACTATE: 1 MMOL/L (ref 0.4–2)
O2 SAT, VEN: 92.4 % (ref 50–70)
PCO2, VEN: 32 MMHG (ref 38–52)
PH VENOUS: 7.41 (ref 7.32–7.42)
PO2, VEN: 82 MMHG (ref 28–48)

## 2020-03-03 PROCEDURE — 2580000003 HC RX 258: Performed by: PHYSICIAN ASSISTANT

## 2020-03-03 PROCEDURE — 96374 THER/PROPH/DIAG INJ IV PUSH: CPT

## 2020-03-03 PROCEDURE — 2580000003 HC RX 258: Performed by: EMERGENCY MEDICINE

## 2020-03-03 PROCEDURE — 6360000002 HC RX W HCPCS: Performed by: PHYSICIAN ASSISTANT

## 2020-03-03 PROCEDURE — 6360000002 HC RX W HCPCS: Performed by: EMERGENCY MEDICINE

## 2020-03-03 PROCEDURE — 96375 TX/PRO/DX INJ NEW DRUG ADDON: CPT

## 2020-03-03 PROCEDURE — 87040 BLOOD CULTURE FOR BACTERIA: CPT

## 2020-03-03 RX ADMIN — MORPHINE SULFATE 4 MG: 4 INJECTION, SOLUTION INTRAMUSCULAR; INTRAVENOUS at 00:12

## 2020-03-03 RX ADMIN — SODIUM CHLORIDE 1000 ML: 9 INJECTION, SOLUTION INTRAVENOUS at 00:11

## 2020-03-03 RX ADMIN — SODIUM CHLORIDE 3 G: 900 INJECTION INTRAVENOUS at 00:51

## 2020-03-03 RX ADMIN — ONDANSETRON 4 MG: 2 INJECTION INTRAMUSCULAR; INTRAVENOUS at 00:12

## 2020-03-03 ASSESSMENT — PAIN SCALES - GENERAL: PAINLEVEL_OUTOF10: 10
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
## 2020-03-08 LAB
CULTURE: NORMAL
CULTURE: NORMAL
Lab: NORMAL
Lab: NORMAL
SPECIMEN: NORMAL
SPECIMEN: NORMAL

## 2020-03-14 ENCOUNTER — APPOINTMENT (OUTPATIENT)
Dept: GENERAL RADIOLOGY | Age: 35
End: 2020-03-14
Payer: COMMERCIAL

## 2020-03-14 ENCOUNTER — HOSPITAL ENCOUNTER (EMERGENCY)
Age: 35
Discharge: HOME | End: 2020-03-15
Attending: EMERGENCY MEDICINE
Payer: COMMERCIAL

## 2020-03-14 LAB
ALBUMIN SERPL-MCNC: 4.2 GM/DL (ref 3.4–5)
ALP BLD-CCNC: 145 IU/L (ref 40–128)
ALT SERPL-CCNC: 31 U/L (ref 10–40)
ANION GAP SERPL CALCULATED.3IONS-SCNC: 13 MMOL/L (ref 4–16)
AST SERPL-CCNC: 19 IU/L (ref 15–37)
BASOPHILS ABSOLUTE: 0.1 K/CU MM
BASOPHILS RELATIVE PERCENT: 0.9 % (ref 0–1)
BILIRUB SERPL-MCNC: 0.2 MG/DL (ref 0–1)
BUN BLDV-MCNC: 14 MG/DL (ref 6–23)
CALCIUM SERPL-MCNC: 8.8 MG/DL (ref 8.3–10.6)
CHLORIDE BLD-SCNC: 100 MMOL/L (ref 99–110)
CO2: 22 MMOL/L (ref 21–32)
CREAT SERPL-MCNC: 0.9 MG/DL (ref 0.9–1.3)
DIFFERENTIAL TYPE: ABNORMAL
EOSINOPHILS ABSOLUTE: 0.1 K/CU MM
EOSINOPHILS RELATIVE PERCENT: 1.6 % (ref 0–3)
GFR AFRICAN AMERICAN: >60 ML/MIN/1.73M2
GFR NON-AFRICAN AMERICAN: >60 ML/MIN/1.73M2
GLUCOSE BLD-MCNC: 306 MG/DL (ref 70–99)
HCT VFR BLD CALC: 36.2 % (ref 42–52)
HEMOGLOBIN: 12.8 GM/DL (ref 13.5–18)
IMMATURE NEUTROPHIL %: 0.2 % (ref 0–0.43)
LACTATE: 1 MMOL/L (ref 0.4–2)
LYMPHOCYTES ABSOLUTE: 1.7 K/CU MM
LYMPHOCYTES RELATIVE PERCENT: 30.9 % (ref 24–44)
MCH RBC QN AUTO: 31.6 PG (ref 27–31)
MCHC RBC AUTO-ENTMCNC: 35.4 % (ref 32–36)
MCV RBC AUTO: 89.4 FL (ref 78–100)
MONOCYTES ABSOLUTE: 0.6 K/CU MM
MONOCYTES RELATIVE PERCENT: 9.9 % (ref 0–4)
NUCLEATED RBC %: 0 %
PDW BLD-RTO: 12.1 % (ref 11.7–14.9)
PLATELET # BLD: 206 K/CU MM (ref 140–440)
PMV BLD AUTO: 9.5 FL (ref 7.5–11.1)
POTASSIUM SERPL-SCNC: 3.7 MMOL/L (ref 3.5–5.1)
RBC # BLD: 4.05 M/CU MM (ref 4.6–6.2)
SEGMENTED NEUTROPHILS ABSOLUTE COUNT: 3.1 K/CU MM
SEGMENTED NEUTROPHILS RELATIVE PERCENT: 56.5 % (ref 36–66)
SODIUM BLD-SCNC: 135 MMOL/L (ref 135–145)
TOTAL IMMATURE NEUTOROPHIL: 0.01 K/CU MM
TOTAL NUCLEATED RBC: 0 K/CU MM
TOTAL PROTEIN: 7 GM/DL (ref 6.4–8.2)
WBC # BLD: 5.5 K/CU MM (ref 4–10.5)

## 2020-03-14 PROCEDURE — 80053 COMPREHEN METABOLIC PANEL: CPT

## 2020-03-14 PROCEDURE — 85025 COMPLETE CBC W/AUTO DIFF WBC: CPT

## 2020-03-14 PROCEDURE — 96365 THER/PROPH/DIAG IV INF INIT: CPT

## 2020-03-14 PROCEDURE — 6370000000 HC RX 637 (ALT 250 FOR IP): Performed by: EMERGENCY MEDICINE

## 2020-03-14 PROCEDURE — 87077 CULTURE AEROBIC IDENTIFY: CPT

## 2020-03-14 PROCEDURE — 6360000002 HC RX W HCPCS: Performed by: PHYSICIAN ASSISTANT

## 2020-03-14 PROCEDURE — 83605 ASSAY OF LACTIC ACID: CPT

## 2020-03-14 PROCEDURE — 99283 EMERGENCY DEPT VISIT LOW MDM: CPT

## 2020-03-14 PROCEDURE — 73130 X-RAY EXAM OF HAND: CPT

## 2020-03-14 PROCEDURE — 87073 CULTURE BACTERIA ANAEROBIC: CPT

## 2020-03-14 PROCEDURE — 87186 SC STD MICRODIL/AGAR DIL: CPT

## 2020-03-14 PROCEDURE — 2580000003 HC RX 258: Performed by: PHYSICIAN ASSISTANT

## 2020-03-14 PROCEDURE — 87071 CULTURE AEROBIC QUANT OTHER: CPT

## 2020-03-14 PROCEDURE — 96375 TX/PRO/DX INJ NEW DRUG ADDON: CPT

## 2020-03-14 RX ORDER — 0.9 % SODIUM CHLORIDE 0.9 %
1000 INTRAVENOUS SOLUTION INTRAVENOUS ONCE
Status: COMPLETED | OUTPATIENT
Start: 2020-03-14 | End: 2020-03-15

## 2020-03-14 RX ORDER — OXYCODONE HYDROCHLORIDE AND ACETAMINOPHEN 5; 325 MG/1; MG/1
2 TABLET ORAL ONCE
Status: COMPLETED | OUTPATIENT
Start: 2020-03-14 | End: 2020-03-14

## 2020-03-14 RX ORDER — VANCOMYCIN HYDROCHLORIDE 1 G/200ML
1000 INJECTION, SOLUTION INTRAVENOUS ONCE
Status: COMPLETED | OUTPATIENT
Start: 2020-03-14 | End: 2020-03-15

## 2020-03-14 RX ORDER — MORPHINE SULFATE 4 MG/ML
4 INJECTION, SOLUTION INTRAMUSCULAR; INTRAVENOUS ONCE
Status: COMPLETED | OUTPATIENT
Start: 2020-03-14 | End: 2020-03-14

## 2020-03-14 RX ADMIN — OXYCODONE HYDROCHLORIDE AND ACETAMINOPHEN 2 TABLET: 5; 325 TABLET ORAL at 23:59

## 2020-03-14 RX ADMIN — SODIUM CHLORIDE 1000 ML: 9 INJECTION, SOLUTION INTRAVENOUS at 23:13

## 2020-03-14 RX ADMIN — VANCOMYCIN HYDROCHLORIDE 1000 MG: 1 INJECTION, SOLUTION INTRAVENOUS at 23:12

## 2020-03-14 RX ADMIN — MORPHINE SULFATE 4 MG: 4 INJECTION, SOLUTION INTRAMUSCULAR; INTRAVENOUS at 23:13

## 2020-03-14 ASSESSMENT — PAIN SCALES - GENERAL
PAINLEVEL_OUTOF10: 10
PAINLEVEL_OUTOF10: 7
PAINLEVEL_OUTOF10: 10

## 2020-03-14 ASSESSMENT — PAIN DESCRIPTION - LOCATION: LOCATION: HAND

## 2020-03-14 ASSESSMENT — PAIN DESCRIPTION - ORIENTATION: ORIENTATION: LEFT

## 2020-03-14 ASSESSMENT — PAIN DESCRIPTION - PAIN TYPE: TYPE: ACUTE PAIN

## 2020-03-15 VITALS
BODY MASS INDEX: 37.3 KG/M2 | HEART RATE: 88 BPM | OXYGEN SATURATION: 98 % | RESPIRATION RATE: 16 BRPM | TEMPERATURE: 98.3 F | SYSTOLIC BLOOD PRESSURE: 155 MMHG | HEIGHT: 75 IN | DIASTOLIC BLOOD PRESSURE: 96 MMHG | WEIGHT: 300 LBS

## 2020-03-15 PROCEDURE — 96366 THER/PROPH/DIAG IV INF ADDON: CPT

## 2020-03-15 RX ORDER — SULFAMETHOXAZOLE AND TRIMETHOPRIM 800; 160 MG/1; MG/1
1 TABLET ORAL 2 TIMES DAILY
Qty: 20 TABLET | Refills: 0 | Status: SHIPPED | OUTPATIENT
Start: 2020-03-15 | End: 2020-03-25

## 2020-03-15 ASSESSMENT — PAIN DESCRIPTION - ORIENTATION: ORIENTATION: LEFT

## 2020-03-15 ASSESSMENT — PAIN DESCRIPTION - LOCATION: LOCATION: HAND

## 2020-03-15 ASSESSMENT — PAIN DESCRIPTION - PAIN TYPE: TYPE: ACUTE PAIN

## 2020-03-15 ASSESSMENT — PAIN SCALES - GENERAL: PAINLEVEL_OUTOF10: 4
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
## 2020-03-16 ENCOUNTER — HOSPITAL ENCOUNTER (EMERGENCY)
Age: 35
Discharge: ANOTHER HOSPITAL | End: 2020-03-17
Attending: EMERGENCY MEDICINE
Payer: COMMERCIAL

## 2020-03-16 PROCEDURE — 99284 EMERGENCY DEPT VISIT MOD MDM: CPT

## 2020-03-16 ASSESSMENT — PAIN DESCRIPTION - PAIN TYPE: TYPE: ACUTE PAIN

## 2020-03-16 ASSESSMENT — PAIN DESCRIPTION - LOCATION: LOCATION: HAND

## 2020-03-16 ASSESSMENT — PAIN SCALES - GENERAL: PAINLEVEL_OUTOF10: 10

## 2020-03-16 ASSESSMENT — PAIN DESCRIPTION - ORIENTATION: ORIENTATION: LEFT

## 2020-03-17 VITALS
RESPIRATION RATE: 15 BRPM | OXYGEN SATURATION: 97 % | HEART RATE: 98 BPM | TEMPERATURE: 98.4 F | BODY MASS INDEX: 37.3 KG/M2 | WEIGHT: 300 LBS | HEIGHT: 75 IN | SYSTOLIC BLOOD PRESSURE: 143 MMHG | DIASTOLIC BLOOD PRESSURE: 96 MMHG

## 2020-03-17 LAB
ALBUMIN SERPL-MCNC: 4.4 GM/DL (ref 3.4–5)
ALP BLD-CCNC: 137 IU/L (ref 40–128)
ALT SERPL-CCNC: 27 U/L (ref 10–40)
ANION GAP SERPL CALCULATED.3IONS-SCNC: 15 MMOL/L (ref 4–16)
AST SERPL-CCNC: 17 IU/L (ref 15–37)
BASOPHILS ABSOLUTE: 0 K/CU MM
BASOPHILS RELATIVE PERCENT: 0.7 % (ref 0–1)
BILIRUB SERPL-MCNC: 0.2 MG/DL (ref 0–1)
BUN BLDV-MCNC: 13 MG/DL (ref 6–23)
CALCIUM SERPL-MCNC: 9.6 MG/DL (ref 8.3–10.6)
CHLORIDE BLD-SCNC: 101 MMOL/L (ref 99–110)
CO2: 19 MMOL/L (ref 21–32)
CREAT SERPL-MCNC: 0.7 MG/DL (ref 0.9–1.3)
CULTURE: ABNORMAL
DIFFERENTIAL TYPE: ABNORMAL
EOSINOPHILS ABSOLUTE: 0.1 K/CU MM
EOSINOPHILS RELATIVE PERCENT: 1.3 % (ref 0–3)
GFR AFRICAN AMERICAN: >60 ML/MIN/1.73M2
GFR NON-AFRICAN AMERICAN: >60 ML/MIN/1.73M2
GLUCOSE BLD-MCNC: 329 MG/DL (ref 70–99)
HCT VFR BLD CALC: 37.4 % (ref 42–52)
HEMOGLOBIN: 13.1 GM/DL (ref 13.5–18)
IMMATURE NEUTROPHIL %: 0.3 % (ref 0–0.43)
LYMPHOCYTES ABSOLUTE: 1.1 K/CU MM
LYMPHOCYTES RELATIVE PERCENT: 18.1 % (ref 24–44)
Lab: ABNORMAL
MCH RBC QN AUTO: 31.1 PG (ref 27–31)
MCHC RBC AUTO-ENTMCNC: 35 % (ref 32–36)
MCV RBC AUTO: 88.8 FL (ref 78–100)
MONOCYTES ABSOLUTE: 0.5 K/CU MM
MONOCYTES RELATIVE PERCENT: 9 % (ref 0–4)
NUCLEATED RBC %: 0 %
PDW BLD-RTO: 12.2 % (ref 11.7–14.9)
PLATELET # BLD: 232 K/CU MM (ref 140–440)
PMV BLD AUTO: 9.6 FL (ref 7.5–11.1)
POTASSIUM SERPL-SCNC: 3.6 MMOL/L (ref 3.5–5.1)
RBC # BLD: 4.21 M/CU MM (ref 4.6–6.2)
SEGMENTED NEUTROPHILS ABSOLUTE COUNT: 4.2 K/CU MM
SEGMENTED NEUTROPHILS RELATIVE PERCENT: 70.6 % (ref 36–66)
SODIUM BLD-SCNC: 135 MMOL/L (ref 135–145)
SPECIMEN: ABNORMAL
TOTAL IMMATURE NEUTOROPHIL: 0.02 K/CU MM
TOTAL NUCLEATED RBC: 0 K/CU MM
TOTAL PROTEIN: 7.7 GM/DL (ref 6.4–8.2)
WBC # BLD: 6 K/CU MM (ref 4–10.5)

## 2020-03-17 PROCEDURE — 85025 COMPLETE CBC W/AUTO DIFF WBC: CPT

## 2020-03-17 PROCEDURE — 96367 TX/PROPH/DG ADDL SEQ IV INF: CPT

## 2020-03-17 PROCEDURE — 2580000003 HC RX 258: Performed by: EMERGENCY MEDICINE

## 2020-03-17 PROCEDURE — 6360000002 HC RX W HCPCS: Performed by: EMERGENCY MEDICINE

## 2020-03-17 PROCEDURE — 6370000000 HC RX 637 (ALT 250 FOR IP): Performed by: EMERGENCY MEDICINE

## 2020-03-17 PROCEDURE — 80053 COMPREHEN METABOLIC PANEL: CPT

## 2020-03-17 PROCEDURE — 96376 TX/PRO/DX INJ SAME DRUG ADON: CPT

## 2020-03-17 PROCEDURE — 96365 THER/PROPH/DIAG IV INF INIT: CPT

## 2020-03-17 PROCEDURE — 96366 THER/PROPH/DIAG IV INF ADDON: CPT

## 2020-03-17 PROCEDURE — 96375 TX/PRO/DX INJ NEW DRUG ADDON: CPT

## 2020-03-17 RX ORDER — MORPHINE SULFATE 4 MG/ML
4 INJECTION, SOLUTION INTRAMUSCULAR; INTRAVENOUS EVERY 30 MIN PRN
Status: DISCONTINUED | OUTPATIENT
Start: 2020-03-17 | End: 2020-03-17 | Stop reason: HOSPADM

## 2020-03-17 RX ORDER — HYDROCODONE BITARTRATE AND ACETAMINOPHEN 5; 325 MG/1; MG/1
1 TABLET ORAL ONCE
Status: COMPLETED | OUTPATIENT
Start: 2020-03-17 | End: 2020-03-17

## 2020-03-17 RX ORDER — MORPHINE SULFATE 4 MG/ML
4 INJECTION, SOLUTION INTRAMUSCULAR; INTRAVENOUS ONCE
Status: COMPLETED | OUTPATIENT
Start: 2020-03-17 | End: 2020-03-17

## 2020-03-17 RX ADMIN — VANCOMYCIN HYDROCHLORIDE 2000 MG: 1 INJECTION, POWDER, LYOPHILIZED, FOR SOLUTION INTRAVENOUS at 00:32

## 2020-03-17 RX ADMIN — MORPHINE SULFATE 4 MG: 4 INJECTION, SOLUTION INTRAMUSCULAR; INTRAVENOUS at 01:05

## 2020-03-17 RX ADMIN — HYDROCODONE BITARTRATE AND ACETAMINOPHEN 1 TABLET: 5; 325 TABLET ORAL at 00:34

## 2020-03-17 RX ADMIN — MORPHINE SULFATE 4 MG: 4 INJECTION, SOLUTION INTRAMUSCULAR; INTRAVENOUS at 06:18

## 2020-03-17 RX ADMIN — CEFTRIAXONE SODIUM 1 G: 1 INJECTION, POWDER, FOR SOLUTION INTRAMUSCULAR; INTRAVENOUS at 00:11

## 2020-03-17 ASSESSMENT — PAIN DESCRIPTION - ORIENTATION: ORIENTATION: LEFT

## 2020-03-17 ASSESSMENT — PAIN DESCRIPTION - LOCATION: LOCATION: HAND

## 2020-03-17 ASSESSMENT — PAIN SCALES - GENERAL
PAINLEVEL_OUTOF10: 7
PAINLEVEL_OUTOF10: 10
PAINLEVEL_OUTOF10: 4
PAINLEVEL_OUTOF10: 10

## 2020-03-17 NOTE — ED PROVIDER NOTES
Medical: Not on file     Non-medical: Not on file   Tobacco Use    Smoking status: Current Every Day Smoker     Packs/day: 0.50     Years: 5.00     Pack years: 2.50     Types: Cigarettes    Smokeless tobacco: Never Used   Substance and Sexual Activity    Alcohol use: Yes     Comment: occasionally    Drug use: Yes     Types: Marijuana    Sexual activity: Not on file   Lifestyle    Physical activity     Days per week: Not on file     Minutes per session: Not on file    Stress: Not on file   Relationships    Social connections     Talks on phone: Not on file     Gets together: Not on file     Attends Latter-day service: Not on file     Active member of club or organization: Not on file     Attends meetings of clubs or organizations: Not on file     Relationship status: Not on file    Intimate partner violence     Fear of current or ex partner: Not on file     Emotionally abused: Not on file     Physically abused: Not on file     Forced sexual activity: Not on file   Other Topics Concern    Not on file   Social History Narrative    Not on file     Current Facility-Administered Medications   Medication Dose Route Frequency Provider Last Rate Last Dose    [START ON 3/17/2020] vancomycin (VANCOCIN) 2,000 mg in dextrose 5 % 500 mL IVPB  2,000 mg Intravenous Once Belinda Barraza MD        cefTRIAXone (ROCEPHIN) 1 g IVPB in 50 mL D5W minibag  1 g Intravenous Once Belinda Barraza MD         Current Outpatient Medications   Medication Sig Dispense Refill    sulfamethoxazole-trimethoprim (BACTRIM DS) 800-160 MG per tablet Take 1 tablet by mouth 2 times daily for 10 days 20 tablet 0    brimonidine (ALPHAGAN) 0.2 % ophthalmic solution Place 1 drop into the right eye 2 times daily      dorzolamide-timolol 22.3-6.8 MG/ML SOLN Place 1 drop into the right eye 2 times daily      Ertugliflozin L-PyroglutamicAc (STEGLATRO) 15 MG TABS Take 1 tablet by mouth daily      ezetimibe (ZETIA) 10 MG tablet Take 10 mg by mouth daily     

## 2020-03-17 NOTE — ED NOTES
Report given to Chasity Avila RN @ [facility] will be in room 8210O.       Kris Weinstein RN  03/17/20 8002

## 2020-04-22 ENCOUNTER — APPOINTMENT (OUTPATIENT)
Dept: GENERAL RADIOLOGY | Age: 35
End: 2020-04-22
Payer: COMMERCIAL

## 2020-04-22 ENCOUNTER — HOSPITAL ENCOUNTER (EMERGENCY)
Age: 35
Discharge: HOME | End: 2020-04-23
Attending: EMERGENCY MEDICINE
Payer: COMMERCIAL

## 2020-04-22 VITALS
SYSTOLIC BLOOD PRESSURE: 137 MMHG | OXYGEN SATURATION: 98 % | TEMPERATURE: 97.8 F | HEART RATE: 87 BPM | RESPIRATION RATE: 22 BRPM | BODY MASS INDEX: 37.55 KG/M2 | WEIGHT: 302 LBS | DIASTOLIC BLOOD PRESSURE: 87 MMHG | HEIGHT: 75 IN

## 2020-04-22 LAB
ALBUMIN SERPL-MCNC: 4.1 GM/DL (ref 3.4–5)
ALP BLD-CCNC: 80 IU/L (ref 40–129)
ALT SERPL-CCNC: 53 U/L (ref 10–40)
AMPHETAMINES: NEGATIVE
ANION GAP SERPL CALCULATED.3IONS-SCNC: 16 MMOL/L (ref 4–16)
AST SERPL-CCNC: 30 IU/L (ref 15–37)
BACTERIA: NEGATIVE /HPF
BARBITURATE SCREEN URINE: NEGATIVE
BASOPHILS ABSOLUTE: 0 K/CU MM
BASOPHILS RELATIVE PERCENT: 0.8 % (ref 0–1)
BENZODIAZEPINE SCREEN, URINE: NEGATIVE
BILIRUB SERPL-MCNC: 0.2 MG/DL (ref 0–1)
BILIRUBIN URINE: NEGATIVE MG/DL
BLOOD, URINE: NEGATIVE
BUN BLDV-MCNC: 9 MG/DL (ref 6–23)
CALCIUM SERPL-MCNC: 8.9 MG/DL (ref 8.3–10.6)
CANNABINOID SCREEN URINE: NEGATIVE
CHLORIDE BLD-SCNC: 102 MMOL/L (ref 99–110)
CLARITY: CLEAR
CO2: 20 MMOL/L (ref 21–32)
COCAINE METABOLITE: ABNORMAL
COLOR: COLORLESS
CREAT SERPL-MCNC: 0.8 MG/DL (ref 0.9–1.3)
D DIMER: <200 NG/ML(DDU)
DIFFERENTIAL TYPE: ABNORMAL
EOSINOPHILS ABSOLUTE: 0.1 K/CU MM
EOSINOPHILS RELATIVE PERCENT: 3.2 % (ref 0–3)
GFR AFRICAN AMERICAN: >60 ML/MIN/1.73M2
GFR NON-AFRICAN AMERICAN: >60 ML/MIN/1.73M2
GLUCOSE BLD-MCNC: 222 MG/DL (ref 70–99)
GLUCOSE, URINE: >500 MG/DL
HCT VFR BLD CALC: 37 % (ref 42–52)
HEMOGLOBIN: 12.7 GM/DL (ref 13.5–18)
IMMATURE NEUTROPHIL %: 0.3 % (ref 0–0.43)
KETONES, URINE: NEGATIVE MG/DL
LEUKOCYTE ESTERASE, URINE: NEGATIVE
LYMPHOCYTES ABSOLUTE: 1.5 K/CU MM
LYMPHOCYTES RELATIVE PERCENT: 39.9 % (ref 24–44)
MCH RBC QN AUTO: 31.1 PG (ref 27–31)
MCHC RBC AUTO-ENTMCNC: 34.3 % (ref 32–36)
MCV RBC AUTO: 90.5 FL (ref 78–100)
MONOCYTES ABSOLUTE: 0.3 K/CU MM
MONOCYTES RELATIVE PERCENT: 7.7 % (ref 0–4)
NITRITE URINE, QUANTITATIVE: NEGATIVE
NUCLEATED RBC %: 0 %
OPIATES, URINE: NEGATIVE
OXYCODONE: NEGATIVE
PDW BLD-RTO: 12.8 % (ref 11.7–14.9)
PH, URINE: 5 (ref 5–8)
PHENCYCLIDINE, URINE: NEGATIVE
PLATELET # BLD: 201 K/CU MM (ref 140–440)
PMV BLD AUTO: 9.1 FL (ref 7.5–11.1)
POTASSIUM SERPL-SCNC: 3.8 MMOL/L (ref 3.5–5.1)
PROTEIN UA: NEGATIVE MG/DL
RBC # BLD: 4.09 M/CU MM (ref 4.6–6.2)
RBC URINE: <1 /HPF (ref 0–3)
SEGMENTED NEUTROPHILS ABSOLUTE COUNT: 1.8 K/CU MM
SEGMENTED NEUTROPHILS RELATIVE PERCENT: 48.1 % (ref 36–66)
SODIUM BLD-SCNC: 138 MMOL/L (ref 135–145)
SPECIFIC GRAVITY UA: 1.03 (ref 1–1.03)
SQUAMOUS EPITHELIAL: <1 /HPF
TOTAL IMMATURE NEUTOROPHIL: 0.01 K/CU MM
TOTAL NUCLEATED RBC: 0 K/CU MM
TOTAL PROTEIN: 6.8 GM/DL (ref 6.4–8.2)
TRICHOMONAS: ABNORMAL /HPF
TROPONIN T: <0.01 NG/ML
UROBILINOGEN, URINE: NORMAL MG/DL (ref 0.2–1)
WBC # BLD: 3.8 K/CU MM (ref 4–10.5)
WBC UA: ABNORMAL /HPF (ref 0–2)

## 2020-04-22 PROCEDURE — 80053 COMPREHEN METABOLIC PANEL: CPT

## 2020-04-22 PROCEDURE — 85025 COMPLETE CBC W/AUTO DIFF WBC: CPT

## 2020-04-22 PROCEDURE — 81001 URINALYSIS AUTO W/SCOPE: CPT

## 2020-04-22 PROCEDURE — 71045 X-RAY EXAM CHEST 1 VIEW: CPT

## 2020-04-22 PROCEDURE — 85379 FIBRIN DEGRADATION QUANT: CPT

## 2020-04-22 PROCEDURE — 84484 ASSAY OF TROPONIN QUANT: CPT

## 2020-04-22 PROCEDURE — 99285 EMERGENCY DEPT VISIT HI MDM: CPT

## 2020-04-22 PROCEDURE — 80307 DRUG TEST PRSMV CHEM ANLYZR: CPT

## 2020-04-22 PROCEDURE — 93005 ELECTROCARDIOGRAM TRACING: CPT | Performed by: PHYSICIAN ASSISTANT

## 2020-04-22 ASSESSMENT — ENCOUNTER SYMPTOMS
SINUS PRESSURE: 0
DIARRHEA: 0
HEARTBURN: 1
EYE PAIN: 0
STRIDOR: 0
BACK PAIN: 0
CHOKING: 0
CONSTIPATION: 0
EYE ITCHING: 0
WHEEZING: 0
APNEA: 0
RESPIRATORY NEGATIVE: 1
VOICE CHANGE: 0
RHINORRHEA: 0
SHORTNESS OF BREATH: 0
EYE DISCHARGE: 0
TROUBLE SWALLOWING: 0
FACIAL SWELLING: 0
ABDOMINAL PAIN: 0
NAUSEA: 0
PHOTOPHOBIA: 0
SINUS PAIN: 0
VOMITING: 0
RECTAL PAIN: 0
BLOOD IN STOOL: 0
COUGH: 0
ORTHOPNEA: 0
EYE REDNESS: 0
CHEST TIGHTNESS: 0
EYES NEGATIVE: 1

## 2020-04-22 ASSESSMENT — PAIN SCALES - GENERAL: PAINLEVEL_OUTOF10: 6

## 2020-04-23 LAB
EKG ATRIAL RATE: 87 BPM
EKG DIAGNOSIS: NORMAL
EKG P AXIS: 41 DEGREES
EKG P-R INTERVAL: 174 MS
EKG Q-T INTERVAL: 372 MS
EKG QRS DURATION: 86 MS
EKG QTC CALCULATION (BAZETT): 447 MS
EKG R AXIS: 3 DEGREES
EKG T AXIS: 25 DEGREES
EKG VENTRICULAR RATE: 87 BPM
TROPONIN T: <0.01 NG/ML

## 2020-04-23 PROCEDURE — 93010 ELECTROCARDIOGRAM REPORT: CPT | Performed by: INTERNAL MEDICINE

## 2020-04-23 RX ORDER — CYCLOBENZAPRINE HCL 10 MG
10 TABLET ORAL NIGHTLY PRN
Qty: 30 TABLET | Refills: 0 | Status: SHIPPED | OUTPATIENT
Start: 2020-04-23 | End: 2020-05-03

## 2020-04-23 RX ORDER — ACETAMINOPHEN 325 MG/1
650 TABLET ORAL EVERY 6 HOURS PRN
Qty: 20 TABLET | Refills: 0 | Status: SHIPPED | OUTPATIENT
Start: 2020-04-23

## 2020-04-23 RX ORDER — NAPROXEN 500 MG/1
500 TABLET ORAL 2 TIMES DAILY WITH MEALS
Qty: 180 TABLET | Refills: 1 | Status: ON HOLD | OUTPATIENT
Start: 2020-04-23 | End: 2021-03-24 | Stop reason: HOSPADM

## 2020-04-23 NOTE — ED PROVIDER NOTES
ALLERGIES     Ciprofloxacin and Shellfish-derived products    FAMILY HISTORY       Family History   Problem Relation Age of Onset    Diabetes Mother     Asthma Mother     High Blood Pressure Mother     Stroke Mother     Heart Disease Mother     Diabetes Father     Asthma Father     High Blood Pressure Father           SOCIAL HISTORY       Social History     Socioeconomic History    Marital status: Single     Spouse name: None    Number of children: None    Years of education: None    Highest education level: None   Occupational History    None   Social Needs    Financial resource strain: None    Food insecurity     Worry: None     Inability: None    Transportation needs     Medical: None     Non-medical: None   Tobacco Use    Smoking status: Current Every Day Smoker     Packs/day: 0.50     Years: 5.00     Pack years: 2.50     Types: Cigarettes    Smokeless tobacco: Never Used   Substance and Sexual Activity    Alcohol use: Yes     Comment: occasionally    Drug use: Yes     Types: Marijuana    Sexual activity: None   Lifestyle    Physical activity     Days per week: None     Minutes per session: None    Stress: None   Relationships    Social connections     Talks on phone: None     Gets together: None     Attends Yazidism service: None     Active member of club or organization: None     Attends meetings of clubs or organizations: None     Relationship status: None    Intimate partner violence     Fear of current or ex partner: None     Emotionally abused: None     Physically abused: None     Forced sexual activity: None   Other Topics Concern    None   Social History Narrative    None       SCREENINGS    Kala Coma Scale  Eye Opening: Spontaneous  Best Verbal Response: Oriented  Best Motor Response: Obeys commands  Kala Coma Scale Score: 15          PHYSICAL EXAM    (up to 7 for level 4, 8 or more for level 5)     ED Triage Vitals [04/22/20 2133]   BP Temp Temp src Pulse Resp 98%     Weight: (!) 302 lb (137 kg)      Height: 6' 3\" (1.905 m)              MDM  Number of Diagnoses or Management Options  Diagnosis management comments: 26-year-old male presents emergency department for chest pain that started approximately 2 hours prior to arrival.  Patient reports the pain is improving. Patient denies drug use but tested positive for cocaine. Chest x-ray, EKG, troponins x2 and other lab work are unremarkable. Patient is PERC negative. Patient's heart score is 1. Will discharge patient to home with muscle relaxers, Tylenol, naproxen. Return precautions given. Patient is follow-up primary care in the next 2 to 3 days. Amount and/or Complexity of Data Reviewed  Clinical lab tests: ordered and reviewed  Tests in the radiology section of CPT®: ordered and reviewed  Tests in the medicine section of CPT®: ordered and reviewed    Risk of Complications, Morbidity, and/or Mortality  Presenting problems: moderate  Diagnostic procedures: moderate  Management options: moderate    Critical Care  Total time providing critical care: < 30 minutes    Patient Progress  Patient progress: improved        REASSESSMENT          CRITICAL CARE TIME     Total critical care time provided today was 0 minutes. This excludes seperately billable procedures and family discussion time. Critical care time provided for obtaining history, conducting a physical exam, performing and monitoring interventions, ordering, collecting and interpreting tests, and establishing medical decision-making. There was a potential for life/limb threatening pathology requiring close evaluation and intervention with concern for patient decompensation. CONSULTS:  None    PROCEDURES:  None performed unless otherwise noted below     Procedures        FINAL IMPRESSION      1. Atypical chest pain    2.  Nondependent cocaine abuse ([facility] [address]          DISPOSITION/PLAN   DISPOSITION        PATIENT REFERRED TO:  No follow-up provider

## 2020-12-12 ENCOUNTER — APPOINTMENT (OUTPATIENT)
Dept: GENERAL RADIOLOGY | Age: 35
End: 2020-12-12
Payer: COMMERCIAL

## 2020-12-12 ENCOUNTER — HOSPITAL ENCOUNTER (EMERGENCY)
Age: 35
Discharge: HOME | End: 2020-12-12
Attending: EMERGENCY MEDICINE
Payer: COMMERCIAL

## 2020-12-12 VITALS
TEMPERATURE: 98 F | OXYGEN SATURATION: 100 % | SYSTOLIC BLOOD PRESSURE: 136 MMHG | DIASTOLIC BLOOD PRESSURE: 75 MMHG | HEART RATE: 89 BPM | RESPIRATION RATE: 18 BRPM

## 2020-12-12 LAB
ANION GAP SERPL CALCULATED.3IONS-SCNC: 16 MMOL/L (ref 4–16)
BASOPHILS ABSOLUTE: 0 K/CU MM
BASOPHILS RELATIVE PERCENT: 0.6 % (ref 0–1)
BUN BLDV-MCNC: 10 MG/DL (ref 6–23)
CALCIUM SERPL-MCNC: 9.3 MG/DL (ref 8.3–10.6)
CHLORIDE BLD-SCNC: 91 MMOL/L (ref 99–110)
CO2: 20 MMOL/L (ref 21–32)
CREAT SERPL-MCNC: 0.7 MG/DL (ref 0.9–1.3)
DIFFERENTIAL TYPE: ABNORMAL
EOSINOPHILS ABSOLUTE: 0 K/CU MM
EOSINOPHILS RELATIVE PERCENT: 0.6 % (ref 0–3)
GFR AFRICAN AMERICAN: >60 ML/MIN/1.73M2
GFR NON-AFRICAN AMERICAN: >60 ML/MIN/1.73M2
GLUCOSE BLD-MCNC: 325 MG/DL (ref 70–99)
HCT VFR BLD CALC: 38.2 % (ref 42–52)
HEMOGLOBIN: 14 GM/DL (ref 13.5–18)
IMMATURE NEUTROPHIL %: 0.2 % (ref 0–0.43)
LYMPHOCYTES ABSOLUTE: 0.7 K/CU MM
LYMPHOCYTES RELATIVE PERCENT: 13.9 % (ref 24–44)
MCH RBC QN AUTO: 32 PG (ref 27–31)
MCHC RBC AUTO-ENTMCNC: 36.6 % (ref 32–36)
MCV RBC AUTO: 87.4 FL (ref 78–100)
MONOCYTES ABSOLUTE: 0.3 K/CU MM
MONOCYTES RELATIVE PERCENT: 6.7 % (ref 0–4)
NUCLEATED RBC %: 0 %
PDW BLD-RTO: 11.4 % (ref 11.7–14.9)
PLATELET # BLD: 200 K/CU MM (ref 140–440)
PMV BLD AUTO: 9.5 FL (ref 7.5–11.1)
POTASSIUM SERPL-SCNC: 3.7 MMOL/L (ref 3.5–5.1)
RBC # BLD: 4.37 M/CU MM (ref 4.6–6.2)
SEGMENTED NEUTROPHILS ABSOLUTE COUNT: 3.9 K/CU MM
SEGMENTED NEUTROPHILS RELATIVE PERCENT: 78 % (ref 36–66)
SODIUM BLD-SCNC: 127 MMOL/L (ref 135–145)
TOTAL IMMATURE NEUTOROPHIL: 0.01 K/CU MM
TOTAL NUCLEATED RBC: 0 K/CU MM
WBC # BLD: 5 K/CU MM (ref 4–10.5)

## 2020-12-12 PROCEDURE — 73560 X-RAY EXAM OF KNEE 1 OR 2: CPT

## 2020-12-12 PROCEDURE — 6370000000 HC RX 637 (ALT 250 FOR IP): Performed by: EMERGENCY MEDICINE

## 2020-12-12 PROCEDURE — 80048 BASIC METABOLIC PNL TOTAL CA: CPT

## 2020-12-12 PROCEDURE — 94761 N-INVAS EAR/PLS OXIMETRY MLT: CPT

## 2020-12-12 PROCEDURE — 85025 COMPLETE CBC W/AUTO DIFF WBC: CPT

## 2020-12-12 PROCEDURE — 99285 EMERGENCY DEPT VISIT HI MDM: CPT

## 2020-12-12 RX ORDER — OXYCODONE HYDROCHLORIDE AND ACETAMINOPHEN 5; 325 MG/1; MG/1
1 TABLET ORAL ONCE
Status: COMPLETED | OUTPATIENT
Start: 2020-12-12 | End: 2020-12-12

## 2020-12-12 RX ORDER — HYDROCODONE BITARTRATE AND ACETAMINOPHEN 5; 325 MG/1; MG/1
1 TABLET ORAL ONCE
Status: COMPLETED | OUTPATIENT
Start: 2020-12-12 | End: 2020-12-12

## 2020-12-12 RX ORDER — NAPROXEN 250 MG/1
500 TABLET ORAL ONCE
Status: COMPLETED | OUTPATIENT
Start: 2020-12-12 | End: 2020-12-12

## 2020-12-12 RX ORDER — NAPROXEN 500 MG/1
500 TABLET ORAL 2 TIMES DAILY PRN
Qty: 14 TABLET | Refills: 0 | Status: ON HOLD | OUTPATIENT
Start: 2020-12-12 | End: 2021-03-24 | Stop reason: HOSPADM

## 2020-12-12 RX ADMIN — NAPROXEN 500 MG: 250 TABLET ORAL at 13:35

## 2020-12-12 RX ADMIN — HYDROCODONE BITARTRATE AND ACETAMINOPHEN 1 TABLET: 5; 325 TABLET ORAL at 10:14

## 2020-12-12 RX ADMIN — OXYCODONE HYDROCHLORIDE AND ACETAMINOPHEN 1 TABLET: 5; 325 TABLET ORAL at 13:35

## 2020-12-12 ASSESSMENT — PAIN SCALES - GENERAL
PAINLEVEL_OUTOF10: 8
PAINLEVEL_OUTOF10: 10
PAINLEVEL_OUTOF10: 10

## 2020-12-12 ASSESSMENT — PAIN DESCRIPTION - LOCATION: LOCATION: KNEE

## 2020-12-12 ASSESSMENT — PAIN DESCRIPTION - ORIENTATION: ORIENTATION: LEFT

## 2020-12-12 NOTE — ED PROVIDER NOTES
foot left    MANDIBLE SURGERY Right     MANDIBLE SURGERY Bilateral     TOE AMPUTATION Right 07/25/2017       FAMILY HISTORY  Family History   Problem Relation Age of Onset    Diabetes Mother     Asthma Mother     High Blood Pressure Mother     Stroke Mother     Heart Disease Mother     Diabetes Father     Asthma Father     High Blood Pressure Father        SOCIAL HISTORY  Social History     Socioeconomic History    Marital status: Single     Spouse name: None    Number of children: None    Years of education: None    Highest education level: None   Occupational History    None   Social Needs    Financial resource strain: None    Food insecurity     Worry: None     Inability: None    Transportation needs     Medical: None     Non-medical: None   Tobacco Use    Smoking status: Current Every Day Smoker     Packs/day: 0.50     Years: 5.00     Pack years: 2.50     Types: Cigarettes    Smokeless tobacco: Never Used   Substance and Sexual Activity    Alcohol use: Yes     Comment: occasionally    Drug use: Yes     Types: Marijuana    Sexual activity: None   Lifestyle    Physical activity     Days per week: None     Minutes per session: None    Stress: None   Relationships    Social connections     Talks on phone: None     Gets together: None     Attends Cheondoism service: None     Active member of club or organization: None     Attends meetings of clubs or organizations: None     Relationship status: None    Intimate partner violence     Fear of current or ex partner: None     Emotionally abused: None     Physically abused: None     Forced sexual activity: None   Other Topics Concern    None   Social History Narrative    None         **Past medical, family and social histories, and nursing notes reviewed and verified by me**      PHYSICAL EXAM  VITAL SIGNS:   ED Triage Vitals [12/12/20 0934]   Enc Vitals Group      /75      Pulse 88      Resp 18      Temp 98 °F (36.7 °C)      Temp src SpO2 99 %      Weight       Height       Head Circumference       Peak Flow       Pain Score       Pain Loc       Pain Edu? Excl. in [address]? Vitals during ED course were reviewed and are as charted. Constitutional: Minimal distress, Non-toxic appearance  Eyes: Conjunctiva normal, No discharge  HENT: Normocephalic, Atraumatic, oropharynx moist  Neck: Supple, no stridor, no grossly visible or palpable masses  Cardiovascular: Regular rate and rhythm, No murmurs, No rubs, No gallops  Pulmonary/Chest: Normal breath sounds, No respiratory distress or accessory muscle use, No wheezing, crackles or rhonchi. Abdomen: Soft, nondistended and nonrigid, No tenderness or peritoneal signs, No masses  Extremities: Left knee has some tenderness to palpation and swelling just inferior to the patella location of the prepatellar bursa, otherwise the left knee is stable to varus and valgus stress. Normal anterior and posterior drawer test.  Negative Sandra's test.  No significant edema or warmth or redness to suggest septic arthritis. The patella appears normally aligned. Elsewhere there are no gross deformities, no edema, no tenderness.   He has 2+ symmetrical distal pulses  Neurologic: Normal motor function, Normal sensory function, No focal deficits  Skin: Warm, Dry, No erythema, No rash, No cyanosis, No mottling  Lymphatic: No lymphadenopathy in the following location(s): Inguinal  Psychiatric: Alert and oriented x3, Affect normal        RADIOLOGY/PROCEDURES/LABS/MEDICATIONS ADMINISTERED:    I have reviewed and interpreted all of the currently available lab results from this visit (if applicable):  Results for orders placed or performed during the hospital encounter of 12/12/20   CBC Auto Differential   Result Value Ref Range    WBC 5.0 4.0 - 10.5 K/CU MM    RBC 4.37 (L) 4.6 - 6.2 M/CU MM    Hemoglobin 14.0 13.5 - 18.0 GM/DL    Hematocrit 38.2 (L) 42 - 52 %    MCV 87.4 78 - 100 FL    MCH 32.0 (H) 27 - 31 PG    MCHC 36.6 (H) 32.0 - 36.0 %    RDW 11.4 (L) 11.7 - 14.9 %    Platelets 672 046 - 172 K/CU MM    MPV 9.5 7.5 - 11.1 FL    Differential Type AUTOMATED DIFFERENTIAL     Segs Relative 78.0 (H) 36 - 66 %    Lymphocytes % 13.9 (L) 24 - 44 %    Monocytes % 6.7 (H) 0 - 4 %    Eosinophils % 0.6 0 - 3 %    Basophils % 0.6 0 - 1 %    Segs Absolute 3.9 K/CU MM    Lymphocytes Absolute 0.7 K/CU MM    Monocytes Absolute 0.3 K/CU MM    Eosinophils Absolute 0.0 K/CU MM    Basophils Absolute 0.0 K/CU MM    Nucleated RBC % 0.0 %    Total Nucleated RBC 0.0 K/CU MM    Total Immature Neutrophil 0.01 K/CU MM    Immature Neutrophil % 0.2 0 - 0.43 %   Basic Metabolic Panel w/ Reflex to MG   Result Value Ref Range    Sodium 127 (L) 135 - 145 MMOL/L    Potassium 3.7 3.5 - 5.1 MMOL/L    Chloride 91 (L) 99 - 110 mMol/L    CO2 20 (L) 21 - 32 MMOL/L    Anion Gap 16 4 - 16    BUN 10 6 - 23 MG/DL    CREATININE 0.7 (L) 0.9 - 1.3 MG/DL    Glucose 325 (H) 70 - 99 MG/DL    Calcium 9.3 8.3 - 10.6 MG/DL    GFR Non-African American >60 >60 mL/min/1.73m2    GFR African American >60 >60 mL/min/1.73m2          ABNORMAL LABS:  Labs Reviewed   CBC WITH AUTO DIFFERENTIAL - Abnormal; Notable for the following components:       Result Value    RBC 4.37 (*)     Hematocrit 38.2 (*)     MCH 32.0 (*)     MCHC 36.6 (*)     RDW 11.4 (*)     Segs Relative 78.0 (*)     Lymphocytes % 13.9 (*)     Monocytes % 6.7 (*)     All other components within normal limits   BASIC METABOLIC PANEL W/ REFLEX TO MG FOR LOW K - Abnormal; Notable for the following components:    Sodium 127 (*)     Chloride 91 (*)     CO2 20 (*)     CREATININE 0.7 (*)     Glucose 325 (*)     All other components within normal limits         IMAGING STUDIES ORDERED:  XR KNEE LEFT (1-2 VIEWS)    I have personally viewed the imaging studies. The radiologist interpretation is:   XR KNEE LEFT (1-2 VIEWS)   Final Result   No acute abnormality.                MEDICATIONS ADMINISTERED:  Medications HYDROcodone-acetaminophen (NORCO) 5-325 MG per tablet 1 tablet (1 tablet Oral Given 12/12/20 1014)   oxyCODONE-acetaminophen (PERCOCET) 5-325 MG per tablet 1 tablet (1 tablet Oral Given 12/12/20 1335)   naproxen (NAPROSYN) tablet 500 mg (500 mg Oral Given 12/12/20 1335)         COURSE & MEDICAL DECISION MAKING  Last vitals: /75   Pulse 89   Temp 98 °F (36.7 °C)   Resp 18   SpO2 8%     59-year-old male with left knee pain. I suspect prepatellar bursitis. The patient's knee is stable and without evidence for infection. No palpable effusions in the knee joint itself and no warmth to touch or swelling around the knee joint. Additionally, there is no evidence to suggest a posterior dislocation with vascular injury. The patient is neurovascularly intact with palpable distal pulses and normal motor and sensory function. Additional workup and treatment in the ED as documented above. Patient reassured and will be discharged home. I have explained to the patient in appropriate terminology our work-up in the ED and their diagnosis. I have also given anticipatory guidance and expectant management of their condition as an outpatient as per my custom. The patient was given clear discharge and follow-up instructions including return to the ER immediately for worsening concerns. The patient has been advised to follow-up with their primary care physician and/or referred physician in the next two to three days or sooner if worsening and to return to the ER immediately as above with any concerns. I provided the patient counseling with regard to my customary list of strict return precautions as well as return precautions specific to the cause for today's emergency department visit. The patient will return under these provided conditions, but should also return for new concerns or further worsening. Pt and/or family understand and agree with plan. Clinical Impression:  1.  Acute pain of left knee Disposition referral (if applicable):  No follow-up provider specified. Disposition medications (if applicable):  Discharge Medication List as of 12/12/2020  1:28 PM      START taking these medications    Details   !! naproxen (NAPROSYN) 500 MG tablet Take 1 tablet by mouth 2 times daily as needed for Pain, Disp-14 tablet, R-0Print       !! - Potential duplicate medications found. Please discuss with provider. ED Provider Disposition Time  DISPOSITION            Electronically signed by: Amadou Agrawal M.D., 12/13/2020 4:55 PM      This dictation was created with voice recognition software. While attempts have been made to review the dictation as it is transcribed, on occasion the spoken word can be misinterpreted by the technology leading to omissions or inappropriate words, phrases or sentences.         Jacobo Landis MD  12/13/20 2185

## 2021-03-20 ENCOUNTER — APPOINTMENT (OUTPATIENT)
Dept: GENERAL RADIOLOGY | Age: 36
End: 2021-03-20
Payer: COMMERCIAL

## 2021-03-20 ENCOUNTER — HOSPITAL ENCOUNTER (OUTPATIENT)
Age: 36
Setting detail: OBSERVATION
Discharge: HOME | End: 2021-03-24
Attending: EMERGENCY MEDICINE | Admitting: HOSPITALIST
Payer: COMMERCIAL

## 2021-03-20 DIAGNOSIS — J06.9 VIRAL UPPER RESPIRATORY TRACT INFECTION: Primary | ICD-10-CM

## 2021-03-20 DIAGNOSIS — J45.21 MILD INTERMITTENT ASTHMA WITH ACUTE EXACERBATION: ICD-10-CM

## 2021-03-20 DIAGNOSIS — R00.0 TACHYCARDIA: ICD-10-CM

## 2021-03-20 PROBLEM — R06.02 SHORTNESS OF BREATH: Status: ACTIVE | Noted: 2021-03-20

## 2021-03-20 PROBLEM — J45.901 ASTHMA EXACERBATION: Status: ACTIVE | Noted: 2021-03-20

## 2021-03-20 LAB
ALBUMIN SERPL-MCNC: 4.4 GM/DL (ref 3.4–5)
ALP BLD-CCNC: 92 IU/L (ref 40–129)
ALT SERPL-CCNC: 43 U/L (ref 10–40)
AMPHETAMINES: NEGATIVE
ANION GAP SERPL CALCULATED.3IONS-SCNC: 16 MMOL/L (ref 4–16)
AST SERPL-CCNC: 47 IU/L (ref 15–37)
BARBITURATE SCREEN URINE: NEGATIVE
BASOPHILS ABSOLUTE: 0 K/CU MM
BASOPHILS RELATIVE PERCENT: 0.6 % (ref 0–1)
BENZODIAZEPINE SCREEN, URINE: NEGATIVE
BILIRUB SERPL-MCNC: 0.7 MG/DL (ref 0–1)
BUN BLDV-MCNC: 9 MG/DL (ref 6–23)
CALCIUM SERPL-MCNC: 9.3 MG/DL (ref 8.3–10.6)
CANNABINOID SCREEN URINE: NEGATIVE
CHLORIDE BLD-SCNC: 95 MMOL/L (ref 99–110)
CO2: 22 MMOL/L (ref 21–32)
COCAINE METABOLITE: ABNORMAL
CREAT SERPL-MCNC: 0.7 MG/DL (ref 0.9–1.3)
D DIMER: <200 NG/ML(DDU)
DIFFERENTIAL TYPE: ABNORMAL
EOSINOPHILS ABSOLUTE: 0.1 K/CU MM
EOSINOPHILS RELATIVE PERCENT: 1.2 % (ref 0–3)
GFR AFRICAN AMERICAN: >60 ML/MIN/1.73M2
GFR NON-AFRICAN AMERICAN: >60 ML/MIN/1.73M2
GLUCOSE BLD-MCNC: 245 MG/DL (ref 70–99)
GLUCOSE BLD-MCNC: 275 MG/DL (ref 70–99)
GLUCOSE BLD-MCNC: 422 MG/DL (ref 70–99)
HCT VFR BLD CALC: 40.5 % (ref 42–52)
HEMOGLOBIN: 14.3 GM/DL (ref 13.5–18)
IMMATURE NEUTROPHIL %: 0.1 % (ref 0–0.43)
LACTIC ACID, SEPSIS: 1.1 MMOL/L (ref 0.5–1.9)
LACTIC ACID, SEPSIS: 1.8 MMOL/L (ref 0.5–1.9)
LYMPHOCYTES ABSOLUTE: 1.1 K/CU MM
LYMPHOCYTES RELATIVE PERCENT: 15.8 % (ref 24–44)
MCH RBC QN AUTO: 32.4 PG (ref 27–31)
MCHC RBC AUTO-ENTMCNC: 35.3 % (ref 32–36)
MCV RBC AUTO: 91.6 FL (ref 78–100)
MONOCYTES ABSOLUTE: 0.5 K/CU MM
MONOCYTES RELATIVE PERCENT: 7.7 % (ref 0–4)
NUCLEATED RBC %: 0 %
OPIATES, URINE: NEGATIVE
OXYCODONE: NEGATIVE
PDW BLD-RTO: 12.2 % (ref 11.7–14.9)
PHENCYCLIDINE, URINE: NEGATIVE
PLATELET # BLD: 278 K/CU MM (ref 140–440)
PMV BLD AUTO: 8.8 FL (ref 7.5–11.1)
POTASSIUM SERPL-SCNC: 4.3 MMOL/L (ref 3.5–5.1)
RAPID INFLUENZA  B AGN: NEGATIVE
RAPID INFLUENZA A AGN: NEGATIVE
RBC # BLD: 4.42 M/CU MM (ref 4.6–6.2)
SARS-COV-2, NAAT: NOT DETECTED
SEGMENTED NEUTROPHILS ABSOLUTE COUNT: 5.1 K/CU MM
SEGMENTED NEUTROPHILS RELATIVE PERCENT: 74.6 % (ref 36–66)
SODIUM BLD-SCNC: 133 MMOL/L (ref 135–145)
TOTAL IMMATURE NEUTOROPHIL: 0.01 K/CU MM
TOTAL NUCLEATED RBC: 0 K/CU MM
TOTAL PROTEIN: 7.2 GM/DL (ref 6.4–8.2)
WBC # BLD: 6.9 K/CU MM (ref 4–10.5)

## 2021-03-20 PROCEDURE — G0378 HOSPITAL OBSERVATION PER HR: HCPCS

## 2021-03-20 PROCEDURE — 96372 THER/PROPH/DIAG INJ SC/IM: CPT

## 2021-03-20 PROCEDURE — 6360000002 HC RX W HCPCS: Performed by: HOSPITALIST

## 2021-03-20 PROCEDURE — 6370000000 HC RX 637 (ALT 250 FOR IP): Performed by: HOSPITALIST

## 2021-03-20 PROCEDURE — 6370000000 HC RX 637 (ALT 250 FOR IP): Performed by: NURSE PRACTITIONER

## 2021-03-20 PROCEDURE — 87635 SARS-COV-2 COVID-19 AMP PRB: CPT

## 2021-03-20 PROCEDURE — 71045 X-RAY EXAM CHEST 1 VIEW: CPT

## 2021-03-20 PROCEDURE — 96360 HYDRATION IV INFUSION INIT: CPT

## 2021-03-20 PROCEDURE — 99285 EMERGENCY DEPT VISIT HI MDM: CPT

## 2021-03-20 PROCEDURE — 96374 THER/PROPH/DIAG INJ IV PUSH: CPT

## 2021-03-20 PROCEDURE — 85025 COMPLETE CBC W/AUTO DIFF WBC: CPT

## 2021-03-20 PROCEDURE — 96361 HYDRATE IV INFUSION ADD-ON: CPT

## 2021-03-20 PROCEDURE — 2580000003 HC RX 258: Performed by: NURSE PRACTITIONER

## 2021-03-20 PROCEDURE — 85379 FIBRIN DEGRADATION QUANT: CPT

## 2021-03-20 PROCEDURE — 94640 AIRWAY INHALATION TREATMENT: CPT

## 2021-03-20 PROCEDURE — 36415 COLL VENOUS BLD VENIPUNCTURE: CPT

## 2021-03-20 PROCEDURE — 83605 ASSAY OF LACTIC ACID: CPT

## 2021-03-20 PROCEDURE — 94761 N-INVAS EAR/PLS OXIMETRY MLT: CPT

## 2021-03-20 PROCEDURE — 93005 ELECTROCARDIOGRAM TRACING: CPT | Performed by: NURSE PRACTITIONER

## 2021-03-20 PROCEDURE — 83036 HEMOGLOBIN GLYCOSYLATED A1C: CPT

## 2021-03-20 PROCEDURE — 96375 TX/PRO/DX INJ NEW DRUG ADDON: CPT

## 2021-03-20 PROCEDURE — 82962 GLUCOSE BLOOD TEST: CPT

## 2021-03-20 PROCEDURE — 87430 STREP A AG IA: CPT

## 2021-03-20 PROCEDURE — 87040 BLOOD CULTURE FOR BACTERIA: CPT

## 2021-03-20 PROCEDURE — 87081 CULTURE SCREEN ONLY: CPT

## 2021-03-20 PROCEDURE — 87804 INFLUENZA ASSAY W/OPTIC: CPT

## 2021-03-20 PROCEDURE — 80053 COMPREHEN METABOLIC PANEL: CPT

## 2021-03-20 PROCEDURE — 2580000003 HC RX 258: Performed by: HOSPITALIST

## 2021-03-20 PROCEDURE — 80307 DRUG TEST PRSMV CHEM ANLYZR: CPT

## 2021-03-20 RX ORDER — POLYETHYLENE GLYCOL 3350 17 G/17G
17 POWDER, FOR SOLUTION ORAL DAILY PRN
Status: DISCONTINUED | OUTPATIENT
Start: 2021-03-20 | End: 2021-03-24 | Stop reason: HOSPADM

## 2021-03-20 RX ORDER — LISINOPRIL AND HYDROCHLOROTHIAZIDE 12.5; 1 MG/1; MG/1
1 TABLET ORAL DAILY
Status: DISCONTINUED | OUTPATIENT
Start: 2021-03-20 | End: 2021-03-24 | Stop reason: HOSPADM

## 2021-03-20 RX ORDER — ALBUTEROL SULFATE 90 UG/1
2 AEROSOL, METERED RESPIRATORY (INHALATION) ONCE
Status: COMPLETED | OUTPATIENT
Start: 2021-03-20 | End: 2021-03-20

## 2021-03-20 RX ORDER — PREDNISONE 20 MG/1
40 TABLET ORAL ONCE
Status: COMPLETED | OUTPATIENT
Start: 2021-03-20 | End: 2021-03-20

## 2021-03-20 RX ORDER — DEXTROSE MONOHYDRATE 50 MG/ML
100 INJECTION, SOLUTION INTRAVENOUS PRN
Status: DISCONTINUED | OUTPATIENT
Start: 2021-03-20 | End: 2021-03-24 | Stop reason: HOSPADM

## 2021-03-20 RX ORDER — DEXTROSE MONOHYDRATE 25 G/50ML
12.5 INJECTION, SOLUTION INTRAVENOUS PRN
Status: DISCONTINUED | OUTPATIENT
Start: 2021-03-20 | End: 2021-03-24 | Stop reason: HOSPADM

## 2021-03-20 RX ORDER — AMLODIPINE BESYLATE 10 MG/1
10 TABLET ORAL DAILY
Status: DISCONTINUED | OUTPATIENT
Start: 2021-03-20 | End: 2021-03-24 | Stop reason: HOSPADM

## 2021-03-20 RX ORDER — ACETAMINOPHEN 650 MG/1
650 SUPPOSITORY RECTAL EVERY 6 HOURS PRN
Status: DISCONTINUED | OUTPATIENT
Start: 2021-03-20 | End: 2021-03-24 | Stop reason: HOSPADM

## 2021-03-20 RX ORDER — ONDANSETRON 2 MG/ML
4 INJECTION INTRAMUSCULAR; INTRAVENOUS EVERY 6 HOURS PRN
Status: DISCONTINUED | OUTPATIENT
Start: 2021-03-20 | End: 2021-03-24 | Stop reason: HOSPADM

## 2021-03-20 RX ORDER — ACETAMINOPHEN 325 MG/1
650 TABLET ORAL EVERY 6 HOURS PRN
Status: DISCONTINUED | OUTPATIENT
Start: 2021-03-20 | End: 2021-03-24 | Stop reason: HOSPADM

## 2021-03-20 RX ORDER — IBUPROFEN 600 MG/1
600 TABLET ORAL ONCE
Status: COMPLETED | OUTPATIENT
Start: 2021-03-20 | End: 2021-03-20

## 2021-03-20 RX ORDER — METHYLPREDNISOLONE SODIUM SUCCINATE 40 MG/ML
40 INJECTION, POWDER, LYOPHILIZED, FOR SOLUTION INTRAMUSCULAR; INTRAVENOUS EVERY 12 HOURS
Status: DISCONTINUED | OUTPATIENT
Start: 2021-03-20 | End: 2021-03-21

## 2021-03-20 RX ORDER — SODIUM CHLORIDE 0.9 % (FLUSH) 0.9 %
10 SYRINGE (ML) INJECTION EVERY 12 HOURS SCHEDULED
Status: DISCONTINUED | OUTPATIENT
Start: 2021-03-20 | End: 2021-03-24 | Stop reason: HOSPADM

## 2021-03-20 RX ORDER — ALBUTEROL SULFATE 90 UG/1
2 AEROSOL, METERED RESPIRATORY (INHALATION) EVERY 6 HOURS PRN
Status: DISCONTINUED | OUTPATIENT
Start: 2021-03-20 | End: 2021-03-24 | Stop reason: HOSPADM

## 2021-03-20 RX ORDER — PANTOPRAZOLE SODIUM 20 MG/1
40 TABLET, DELAYED RELEASE ORAL DAILY
Status: DISCONTINUED | OUTPATIENT
Start: 2021-03-20 | End: 2021-03-24 | Stop reason: HOSPADM

## 2021-03-20 RX ORDER — NICOTINE POLACRILEX 4 MG
15 LOZENGE BUCCAL PRN
Status: DISCONTINUED | OUTPATIENT
Start: 2021-03-20 | End: 2021-03-24 | Stop reason: HOSPADM

## 2021-03-20 RX ORDER — 0.9 % SODIUM CHLORIDE 0.9 %
500 INTRAVENOUS SOLUTION INTRAVENOUS ONCE
Status: COMPLETED | OUTPATIENT
Start: 2021-03-20 | End: 2021-03-20

## 2021-03-20 RX ORDER — ATORVASTATIN CALCIUM 10 MG/1
10 TABLET, FILM COATED ORAL DAILY
Status: DISCONTINUED | OUTPATIENT
Start: 2021-03-20 | End: 2021-03-24 | Stop reason: HOSPADM

## 2021-03-20 RX ORDER — SODIUM CHLORIDE 9 MG/ML
INJECTION, SOLUTION INTRAVENOUS CONTINUOUS
Status: DISPENSED | OUTPATIENT
Start: 2021-03-20 | End: 2021-03-21

## 2021-03-20 RX ORDER — IPRATROPIUM BROMIDE AND ALBUTEROL SULFATE 2.5; .5 MG/3ML; MG/3ML
1 SOLUTION RESPIRATORY (INHALATION)
Status: DISCONTINUED | OUTPATIENT
Start: 2021-03-20 | End: 2021-03-20

## 2021-03-20 RX ORDER — 0.9 % SODIUM CHLORIDE 0.9 %
1000 INTRAVENOUS SOLUTION INTRAVENOUS ONCE
Status: COMPLETED | OUTPATIENT
Start: 2021-03-20 | End: 2021-03-20

## 2021-03-20 RX ORDER — HYDROCODONE BITARTRATE AND ACETAMINOPHEN 5; 325 MG/1; MG/1
1 TABLET ORAL EVERY 6 HOURS PRN
Status: DISCONTINUED | OUTPATIENT
Start: 2021-03-20 | End: 2021-03-21

## 2021-03-20 RX ORDER — PROMETHAZINE HYDROCHLORIDE 12.5 MG/1
12.5 TABLET ORAL EVERY 6 HOURS PRN
Status: DISCONTINUED | OUTPATIENT
Start: 2021-03-20 | End: 2021-03-24 | Stop reason: HOSPADM

## 2021-03-20 RX ORDER — ACETAMINOPHEN 325 MG/1
650 TABLET ORAL ONCE
Status: COMPLETED | OUTPATIENT
Start: 2021-03-20 | End: 2021-03-20

## 2021-03-20 RX ORDER — SODIUM CHLORIDE 0.9 % (FLUSH) 0.9 %
10 SYRINGE (ML) INJECTION PRN
Status: DISCONTINUED | OUTPATIENT
Start: 2021-03-20 | End: 2021-03-24 | Stop reason: HOSPADM

## 2021-03-20 RX ORDER — AMLODIPINE BESYLATE 5 MG/1
10 TABLET ORAL ONCE
Status: COMPLETED | OUTPATIENT
Start: 2021-03-20 | End: 2021-03-20

## 2021-03-20 RX ORDER — OMEPRAZOLE 20 MG/1
40 CAPSULE, DELAYED RELEASE ORAL DAILY
Status: DISCONTINUED | OUTPATIENT
Start: 2021-03-20 | End: 2021-03-20 | Stop reason: CLARIF

## 2021-03-20 RX ORDER — ALBUTEROL SULFATE 90 UG/1
2 AEROSOL, METERED RESPIRATORY (INHALATION) EVERY 6 HOURS
Status: DISCONTINUED | OUTPATIENT
Start: 2021-03-20 | End: 2021-03-22

## 2021-03-20 RX ADMIN — ONDANSETRON 4 MG: 2 INJECTION INTRAMUSCULAR; INTRAVENOUS at 21:25

## 2021-03-20 RX ADMIN — BENZOCAINE AND MENTHOL 1 LOZENGE: 15; 3.6 LOZENGE ORAL at 18:09

## 2021-03-20 RX ADMIN — BENZOCAINE AND MENTHOL 1 LOZENGE: 15; 3.6 LOZENGE ORAL at 15:51

## 2021-03-20 RX ADMIN — SODIUM CHLORIDE: 9 INJECTION, SOLUTION INTRAVENOUS at 18:04

## 2021-03-20 RX ADMIN — BENZOCAINE AND MENTHOL 1 LOZENGE: 15; 3.6 LOZENGE ORAL at 20:27

## 2021-03-20 RX ADMIN — SODIUM CHLORIDE, PRESERVATIVE FREE 10 ML: 5 INJECTION INTRAVENOUS at 20:28

## 2021-03-20 RX ADMIN — METHYLPREDNISOLONE SODIUM SUCCINATE 40 MG: 40 INJECTION, POWDER, FOR SOLUTION INTRAMUSCULAR; INTRAVENOUS at 18:04

## 2021-03-20 RX ADMIN — AMLODIPINE BESYLATE 10 MG: 5 TABLET ORAL at 11:58

## 2021-03-20 RX ADMIN — SODIUM CHLORIDE 1000 ML: 9 INJECTION, SOLUTION INTRAVENOUS at 10:25

## 2021-03-20 RX ADMIN — ALBUTEROL SULFATE 2 PUFF: 90 AEROSOL, METERED RESPIRATORY (INHALATION) at 19:57

## 2021-03-20 RX ADMIN — Medication 2 PUFF: at 19:57

## 2021-03-20 RX ADMIN — HYDROCODONE BITARTRATE AND ACETAMINOPHEN 1 TABLET: 5; 325 TABLET ORAL at 14:32

## 2021-03-20 RX ADMIN — ACETAMINOPHEN 650 MG: 325 TABLET ORAL at 10:25

## 2021-03-20 RX ADMIN — ALBUTEROL SULFATE 2 PUFF: 90 AEROSOL, METERED RESPIRATORY (INHALATION) at 11:00

## 2021-03-20 RX ADMIN — SODIUM CHLORIDE 500 ML: 9 INJECTION, SOLUTION INTRAVENOUS at 14:34

## 2021-03-20 RX ADMIN — PREDNISONE 40 MG: 20 TABLET ORAL at 14:32

## 2021-03-20 RX ADMIN — ENOXAPARIN SODIUM 40 MG: 40 INJECTION SUBCUTANEOUS at 20:27

## 2021-03-20 RX ADMIN — SODIUM CHLORIDE, PRESERVATIVE FREE 10 ML: 5 INJECTION INTRAVENOUS at 20:34

## 2021-03-20 RX ADMIN — HYDROCODONE BITARTRATE AND ACETAMINOPHEN 1 TABLET: 5; 325 TABLET ORAL at 20:27

## 2021-03-20 RX ADMIN — INSULIN LISPRO 6 UNITS: 100 INJECTION, SOLUTION INTRAVENOUS; SUBCUTANEOUS at 18:04

## 2021-03-20 RX ADMIN — Medication 2 PUFF: at 11:01

## 2021-03-20 RX ADMIN — IBUPROFEN 600 MG: 600 TABLET, FILM COATED ORAL at 10:25

## 2021-03-20 RX ADMIN — LISINOPRIL AND HYDROCHLOROTHIAZIDE 1 TABLET: 12.5; 1 TABLET ORAL at 11:59

## 2021-03-20 ASSESSMENT — PAIN SCALES - GENERAL
PAINLEVEL_OUTOF10: 9
PAINLEVEL_OUTOF10: 8
PAINLEVEL_OUTOF10: 9
PAINLEVEL_OUTOF10: 9

## 2021-03-20 ASSESSMENT — PAIN DESCRIPTION - DESCRIPTORS
DESCRIPTORS: PATIENT UNABLE TO DESCRIBE
DESCRIPTORS: BURNING;CONSTANT;SORE

## 2021-03-20 ASSESSMENT — PAIN DESCRIPTION - ONSET
ONSET: GRADUAL
ONSET: ON-GOING

## 2021-03-20 ASSESSMENT — PAIN DESCRIPTION - FREQUENCY
FREQUENCY: CONTINUOUS

## 2021-03-20 ASSESSMENT — PAIN DESCRIPTION - PAIN TYPE
TYPE: ACUTE PAIN

## 2021-03-20 ASSESSMENT — PAIN DESCRIPTION - PROGRESSION
CLINICAL_PROGRESSION: GRADUALLY WORSENING
CLINICAL_PROGRESSION: NOT CHANGED

## 2021-03-20 ASSESSMENT — PAIN DESCRIPTION - LOCATION: LOCATION: THROAT

## 2021-03-20 ASSESSMENT — PAIN - FUNCTIONAL ASSESSMENT: PAIN_FUNCTIONAL_ASSESSMENT: ACTIVITIES ARE NOT PREVENTED

## 2021-03-20 NOTE — ED NOTES
Report called to [facility] LASHAWN. Abdias Lemos.  LASHAWN Villa  03/20/21 8682

## 2021-03-20 NOTE — ED PROVIDER NOTES
I independently examined and evaluated Poonam Bond. In brief, 69-year-old male with history of asthma presents with shortness of breath. He does have audible wheezing upon presentation. States he is run out of his home inhalers. He is currently homeless. Denies any exacerbating relieving factors. Unremarkable chest pain. .    Focused exam revealed speaking full sentences but some conversational dyspnea. Does have wheezing bilaterally. Chest is nontender. Exam otherwise unremarkable. .    ED course: He was tachycardic in the emergency department tachypneic. Outside high blood pressure. Active saturations were in the high 90s. Did obtain labs and chest x-ray. Labs were nonacute. D-dimer is nonelevated. Chest x-ray nonacute. He was treated with steroids and breathing treatments in the emergency department. We did ambulate him and he continued to be very tachycardic. Given his abnormal vitals, he will be admitted for further evaluation and management of his asthma exacerbation. He is agreeable plan of care. All diagnostic, treatment, and disposition decisions were made by myself in conjunction with the advanced practice provider. For all further details of the patient's emergency department visit, please see the advanced practice provider's documentation. Comment: Please note this report has been produced using speech recognition software and may contain errors related to that system including errors in grammar, punctuation, and spelling, as well as words and phrases that may be inappropriate. If there are any questions or concerns please feel free to contact the dictating provider for clarification. EKG is interpreted by me. EKG shows sinus rhythm 114 bpm, axis is nondeviated, normal questing segmentations or depressions, T waves are unremarkable, , QRS ration of 70, QTc 4-63. Final impression, sinus tachycardia.     Sanjeev Luong  3/20/2021  10:49 AM        Arielle La

## 2021-03-20 NOTE — ED NOTES
Devaughn Lopez 2nd blood culture set from patient's right median basilic vein.       Daina Adames  03/20/21 1121

## 2021-03-20 NOTE — ED NOTES
Bed: ED-18  Expected date:   Expected time:   Means of arrival:   Comments:  38 male  Sore throat     Anabelal Kerns RN  03/20/21 6018

## 2021-03-20 NOTE — ED PROVIDER NOTES
M[place]      PCP: Samantha Hahn MD    [address] [place] [address]    Chief Complaint   Patient presents with    Shortness of Breath    Cough    Pharyngitis       Of note, this patient was also evaluated by the attending physician, Dr. Justino Swain. HPI    Gabe Villela is a 38 y.o. male with history of diabetes and asthma who presents with complaints of cough with sputum production, S of breath, sore throat, mild headache, and fatigue. The patient states his symptoms have been present for the past couple of days. He states last night his symptoms worsened. He is complaining of pain in his throat he rates as severe, aching and sharp, and constant. Swallowing exacerbates his symptoms, nothing has alleviated his symptoms. He states he has had a cough with sputum production. He does have a history of asthma and states this morning he feels wheezy. He denies any fever, chest pain, nausea, vomiting, diarrhea, or other complaints. REVIEW OF SYSTEMS    Constitutional:  Denies fever, chills, weight loss.  Fatigue  HENT:  Sore throat, see HPI   Cardiovascular:  Denies chest pain, palpitations   Respiratory: Cough and SOB, See HPI  GI:  Denies abdominal pain, nausea, vomiting, or diarrhea  :  Denies any urinary symptoms  Musculoskeletal:  Denies back pain  Skin:  Denies rash  Neurologic:  Denies headache, focal weakness or sensory changes   Endocrine:  Denies polyuria or polydypsia   Lymphatic:  Denies swollen glands     All other review of systems are negative  See HPI and nursing notes for additional information     PAST MEDICAL AND SURGICAL HISTORY    Past Medical History:   Diagnosis Date    Asthma     Blind left eye     Diabetes mellitus ([facility] [address]     GERD (gastroesophageal reflux disease)     Hypertension     Retinal detachment 2012    left     Past Surgical History:   Procedure Laterality Date    CORNEAL TRANSPLANT Bilateral     EYE SURGERY      left eye removed    EYE SURGERY Right     FRACTURE SURGERY      foot left    MANDIBLE SURGERY Right     MANDIBLE SURGERY Bilateral     TOE AMPUTATION Right 07/25/2017       CURRENT MEDICATIONS        ALLERGIES    Allergies   Allergen Reactions    Ciprofloxacin Shortness Of Breath    Shellfish-Derived Products Anaphylaxis       SOCIAL AND FAMILY HISTORY    Social History     Socioeconomic History    Marital status: Single     Spouse name: None    Number of children: None    Years of education: None    Highest education level: None   Occupational History    None   Social Needs    Financial resource strain: None    Food insecurity     Worry: None     Inability: None    Transportation needs     Medical: None     Non-medical: None   Tobacco Use    Smoking status: Current Every Day Smoker     Packs/day: 0.50     Years: 5.00     Pack years: 2.50     Types: Cigarettes    Smokeless tobacco: Never Used   Substance and Sexual Activity    Alcohol use: Yes     Comment: occasionally    Drug use: Yes     Types: Marijuana    Sexual activity: None   Lifestyle    Physical activity     Days per week: None     Minutes per session: None    Stress: None   Relationships    Social connections     Talks on phone: None     Gets together: None     Attends Uatsdin service: None     Active member of club or organization: None     Attends meetings of clubs or organizations: None     Relationship status: None    Intimate partner violence     Fear of current or ex partner: None     Emotionally abused: None     Physically abused: None     Forced sexual activity: None   Other Topics Concern    None   Social History Narrative    None     Family History   Problem Relation Age of Onset    Diabetes Mother     Asthma Mother     High Blood Pressure Mother     Stroke Mother     Heart Disease Mother     Diabetes Father     Asthma Father     High Blood Pressure Father          PHYSICAL EXAM    VITAL SIGNS: BP (!) 160/93   Pulse 106   Temp 97.5 °F (36.4 °C) (Oral) Resp 20   Ht 6' 3\" (1.905 m)   Wt 300 lb (136.1 kg)   SpO2 97%   BMI 37.50 kg/m²    Constitutional:  Well developed, Well nourished. No distress  HENT:  Normocephalic, Atraumatic, PERRL. EOMI. Sclera clear. Conjunctiva normal, No discharge. Posterior pharynx and tonsils erythematous and swollen. No exudate. Uvula rises and falls midline. Cervical adenopathy. Neck/Lymphatics: supple, no JVD, no swollen nodes  Cardiovascular:   RRR, no murmurs/rubs/gallops. Respiratory:  Nonlabored breathing. Normal breath sounds, expiratory wheezing present throughout posterior fields  Abdomen: Bowel sounds normal, Soft, No tenderness, no masses. Musculoskeletal:      Distal cap refill and pulses intact bilateral upper and lower extremities  Integument:  Warm, Dry, intact  Neurologic: Alert & oriented , No focal deficits noted. Cranial nerves II through XII grossly intact. Normal gross motor coordination & motor strength bilateral upper and lower extremities  Sensation intact.   Psychiatric:  Affect normal, Mood normal.       Labs:  Results for orders placed or performed during the hospital encounter of 03/20/21   Strep Screen Group A  - Throat    Specimen: Throat   Result Value Ref Range    Specimen THROAT     Special Requests NONE     Strep A Direct Screen NEGATIVE    Rapid influenza A/B antigens    Specimen: Nasopharyngeal   Result Value Ref Range    Rapid Influenza A Ag NEGATIVE NEGATIVE    Rapid Influenza B Ag NEGATIVE NEGATIVE   COVID-19, Rapid    Specimen: Nasopharyngeal   Result Value Ref Range    SARS-CoV-2, NAAT NOT DETECTED    CBC Auto Differential   Result Value Ref Range    WBC 6.9 4.0 - 10.5 K/CU MM    RBC 4.42 (L) 4.6 - 6.2 M/CU MM    Hemoglobin 14.3 13.5 - 18.0 GM/DL    Hematocrit 40.5 (L) 42 - 52 %    MCV 91.6 78 - 100 FL    MCH 32.4 (H) 27 - 31 PG    MCHC 35.3 32.0 - 36.0 %    RDW 12.2 11.7 - 14.9 %    Platelets 548 892 - 830 K/CU MM    MPV 8.8 7.5 - 11.1 FL    Differential Type AUTOMATED DIFFERENTIAL     Segs Relative 74.6 (H) 36 - 66 %    Lymphocytes % 15.8 (L) 24 - 44 %    Monocytes % 7.7 (H) 0 - 4 %    Eosinophils % 1.2 0 - 3 %    Basophils % 0.6 0 - 1 %    Segs Absolute 5.1 K/CU MM    Lymphocytes Absolute 1.1 K/CU MM    Monocytes Absolute 0.5 K/CU MM    Eosinophils Absolute 0.1 K/CU MM    Basophils Absolute 0.0 K/CU MM    Nucleated RBC % 0.0 %    Total Nucleated RBC 0.0 K/CU MM    Total Immature Neutrophil 0.01 K/CU MM    Immature Neutrophil % 0.1 0 - 0.43 %   CMP   Result Value Ref Range    Sodium 133 (L) 135 - 145 MMOL/L    Potassium 4.3 3.5 - 5.1 MMOL/L    Chloride 95 (L) 99 - 110 mMol/L    CO2 22 21 - 32 MMOL/L    BUN 9 6 - 23 MG/DL    CREATININE 0.7 (L) 0.9 - 1.3 MG/DL    Glucose 245 (H) 70 - 99 MG/DL    Calcium 9.3 8.3 - 10.6 MG/DL    Albumin 4.4 3.4 - 5.0 GM/DL    Total Protein 7.2 6.4 - 8.2 GM/DL    Total Bilirubin 0.7 0.0 - 1.0 MG/DL    ALT 43 (H) 10 - 40 U/L    AST 47 (H) 15 - 37 IU/L    Alkaline Phosphatase 92 40 - 129 IU/L    GFR Non-African American >60 >60 mL/min/1.73m2    GFR African American >60 >60 mL/min/1.73m2    Anion Gap 16 4 - 16   Lactate, Sepsis   Result Value Ref Range    Lactic Acid, Sepsis 1.8 0.5 - 1.9 mMOL/L   Lactate, Sepsis   Result Value Ref Range    Lactic Acid, Sepsis 1.1 0.5 - 1.9 mMOL/L   D-dimer, Quantitative   Result Value Ref Range    D-Dimer, Quant <200 <230 NG/mL(DDU)   POCT Glucose   Result Value Ref Range    POC Glucose 275 (H) 70 - 99 MG/DL   EKG 12 Lead   Result Value Ref Range    Ventricular Rate 114 BPM    Atrial Rate 114 BPM    P-R Interval 176 ms    QRS Duration 78 ms    Q-T Interval 336 ms    QTc Calculation (Bazett) 463 ms    P Axis 55 degrees    R Axis -12 degrees    T Axis 25 degrees    Diagnosis       Sinus tachycardia  Possible Left atrial enlargement  Borderline ECG  When compared with ECG of 22-APR-2020 21:25,  No significant change was found             EKG    Sinus tachycardia 114 bpm.  OR interval 176, QRS duration 78, QTc

## 2021-03-20 NOTE — ED NOTES
This tech ambulated the patient at [id number]. The patient did not experience any symptoms such as chest pain, shortness of breath, or dizziness. The patient's SoO2 stayed at 96-97% consistently and his pulse was 120-128 during ambulation. The patient was placed back in bed, fully put back on the monitor, given his call light, and left in a position of comfort and safety.      [facility]  03/20/21 2689

## 2021-03-20 NOTE — H&P
History and Physical      Name:  Nik COMER/Age/Sex: 1985  (38 y.o. male)   MRN & CSN:  [id number] & [id number] Admission Date/Time: 3/20/2021  9:57 AM   Location:  [facility] PCP: Roxie Roa MD       Admitting Physicians : Dr. Natalya Hall is a 38 y.o.  male  who presents with Shortness of Breath, Cough, and Pharyngitis    Assessment and Plan:   Asthma exacerbation  SARS CoV-2,NAAT I negative. Negative strep test and influenza A & B  -Observe overnight  -Steroid IV and DuoNeb's   -Oxygen PRN      DMII without chronic complications  Last 9.1 (2019).   -Hold oral medication while inpatient  -SSI  -Check A1c    HTN, uncontrolled  -Continue Prinzide and Norvasc  -Monitor BP    HLD  -Continue Lipitor    Cocaine abuse, hx  Last use 3 days ago.    -UDS pending    Alcohol use disorder   -States use occasionally  -Not hx of withdrawal    Tobacco abuse  -Educated about cessation    Obesity class II with BMI of 37.5  -Educated about lifestyle changes        DVT-PPX:Lovenox  Diet: Carb. controlled          Medications:   Medications:    lisinopril-hydroCHLOROthiazide  1 tablet Oral Daily    sodium chloride  500 mL Intravenous Once      Infusions:   PRN Meds: HYDROcodone 5 mg - acetaminophen, 1 tablet, Q6H PRN        Current Facility-Administered Medications:     lisinopril-hydroCHLOROthiazide (PRINZIDE;ZESTORETIC) 10-12.5 MG per tablet 1 tablet, 1 tablet, Oral, Daily, Petra Grissom APRN - CNP, 1 tablet at 21 1159    HYDROcodone-acetaminophen (NORCO) 5-325 MG per tablet 1 tablet, 1 tablet, Oral, Q6H PRN, Petra Grissom APRN - CNP, 1 tablet at 21 1432    0.9 % sodium chloride bolus, 500 mL, Intravenous, Once, KAYLI Alas - CNP, Last Rate: 250 mL/hr at 21 1434, 500 mL at 21 1434    Current Outpatient Medications:     naproxen (NAPROSYN) 500 MG tablet, Take 1 tablet by mouth 2 times daily as needed for Pain, Disp: 14 tablet, Rfl: 0    acetaminophen (AMINOFEN) 325 MG tablet, Take 2 tablets by mouth every 6 hours as needed for Pain, Disp: 20 tablet, Rfl: 0    naproxen (NAPROSYN) 500 MG tablet, Take 1 tablet by mouth 2 times daily (with meals), Disp: 180 tablet, Rfl: 1    brimonidine (ALPHAGAN) 0.2 % ophthalmic solution, Place 1 drop into the right eye 2 times daily, Disp: , Rfl:     dorzolamide-timolol 22.3-6.8 MG/ML SOLN, Place 1 drop into the right eye 2 times daily, Disp: , Rfl:     Ertugliflozin L-PyroglutamicAc (STEGLATRO) 15 MG TABS, Take 1 tablet by mouth daily, Disp: , Rfl:     ezetimibe (ZETIA) 10 MG tablet, Take 10 mg by mouth daily, Disp: , Rfl:     albuterol-ipratropium (COMBIVENT RESPIMAT)  MCG/ACT AERS inhaler, Inhale 1 puff into the lungs every 6 hours, Disp: , Rfl:     prednisoLONE acetate (PRED FORTE) 1 % ophthalmic suspension, Place 1 drop into the right eye 2 times daily, Disp: , Rfl:     allopurinol (ZYLOPRIM) 300 MG tablet, Take 300 mg by mouth daily , Disp: , Rfl:     atorvastatin (LIPITOR) 10 MG tablet, Take 10 mg by mouth daily , Disp: , Rfl:     lisinopril-hydrochlorothiazide (PRINZIDE;ZESTORETIC) 20-12.5 MG per tablet, Take 1 tablet by mouth daily , Disp: , Rfl:     glipiZIDE (GLUCOTROL) 10 MG tablet, Take 10 mg by mouth 2 times daily (before meals), Disp: , Rfl:     metFORMIN (GLUCOPHAGE) 1000 MG tablet, Take 1,000 mg by mouth 2 times daily (with meals), Disp: , Rfl:     amLODIPine (NORVASC) 10 MG tablet, Take 10 mg by mouth daily, Disp: , Rfl:     albuterol sulfate  (90 BASE) MCG/ACT inhaler, Inhale 2 puffs into the lungs every 6 hours as needed for Wheezing, Disp: , Rfl:     omeprazole (PRILOSEC) 20 MG capsule, Take 40 mg by mouth daily , Disp: , Rfl:     History of present illness     Chief Complaint: Shortness of Breath, Cough, and Pharyngitis      Janit Mukesh is a 38 y.o.  male  who presents with worsening shortness of breath associated with wheezing, sore throat, mild headaches, fatigue, and productive cough that has been going on for the past couple days. Patient has a history of asthma and has used his breathing treatment without any improvement. Denies fever, chills, chest pain, leg swelling, history of DVT/PE, or lightheaded. Other medical history tobacco abuse, obesity, HTN, HLD, DMII, and cocaine abuse. Review of Systems   Ten point ROS reviewed and negative, unless as noted below. GENERAL:  Denies fever, chills, night sweats, or changes in weight. EYES:  Denies recent visual changes. ENT:  Denies ear pain, hearing loss or tinnitus  RESP: + cough, dyspnea, and wheezing. CV:  Denies any chest pain with exertion or at rest, palpitations, syncope, or edema. GI:  Denies any dysphagia, nausea, vomiting, abdominal pain, heartburn, changes in bowel habit, melena or rectal bleeding  MUSCULOSKELETAL:  Denies any joint swelling, joint pain, or loss of range of motion. NEURO:  Denies any headaches, tremors, dizziness, vertigo, memory loss, confusion, weakness, numbness or tingling. PSYCH:  Denies any sleeping problems, history of abuse, marital discord. HEME/LYMPHATIC/IMMUNO:  Denies , bruising, bleeding abnormalities   ENDO:  Denies any heat or cold intolerance, panemiaolyuria or polydipsia. Objective:   No intake or output data in the 24 hours ending 03/20/21 1441   Vitals:   Vitals:    03/20/21 1437   BP: (!) 159/93   Pulse: 111   Resp: 19   Temp:    SpO2: 97%     Physical Exam:   Gen:  awake, alert, cooperative, no apparent distress  EYES:Lids and lashes normal, pupils equal, round ,extra ocular muscles intact, sclera clear, conjunctiva normal  ENT:  Normocephalic, oral pharynx with moist mucus membranes  NECK:  Supple, symmetrical, trachea midline, no adenopathy,  LUNGS:  Diminished bilaterally with  wheezing noted.   CARDIOVASCULAR:  regular rate and rhythm, normal S1 and S2,no murmur noted, peripheral pulses 2+, no pitting edema  ABDOMEN: Normal BS, Non tender, non distended, no HSM noted. MUSCULOSKELETAL:  ROM of all extremities grossly wnl  NEUROLOGIC: AOx 3,  Cranial nerves II-XII are grossly intact. Motor is 5 out of 5 bilaterally. Sensory is intact, no lateralizing findings. SKIN:  no bruising or bleeding, normal skin color, turgor, no redness, warmth, or swelling. Tattoos on bilateral arms      Past Medical History:      Past Medical History:   Diagnosis Date    Asthma     Blind left eye     Diabetes mellitus ([address]     GERD (gastroesophageal reflux disease)     Hypertension     Retinal detachment 2012    left     PSHX:  has a past surgical history that includes Corneal transplant (Bilateral); Mandible surgery (Right); eye surgery; Eye surgery (Right); fracture surgery; Mandible surgery (Bilateral); and Toe amputation (Right, 07/25/2017). Allergies: Allergies   Allergen Reactions    Ciprofloxacin Shortness Of Breath    Shellfish-Derived Products Anaphylaxis       FAM HX: family history includes Asthma in his father and mother; Diabetes in his father and mother; Heart Disease in his mother; High Blood Pressure in his father and mother; Stroke in his mother. Family history reviewed and is essentially negative unless as stated above.      Soc HX:   Social History     Socioeconomic History    Marital status: Single     Spouse name: None    Number of children: None    Years of education: None    Highest education level: None   Occupational History    None   Social Needs    Financial resource strain: None    Food insecurity     Worry: None     Inability: None    Transportation needs     Medical: None     Non-medical: None   Tobacco Use    Smoking status: Current Every Day Smoker     Packs/day: 0.50     Years: 5.00     Pack years: 2.50     Types: Cigarettes    Smokeless tobacco: Never Used   Substance and Sexual Activity    Alcohol use: Yes     Comment: occasionally    Drug use: Yes     Types: Marijuana    Sexual activity: None   Lifestyle    Physical activity Days per week: None     Minutes per session: None    Stress: None   Relationships    Social connections     Talks on phone: None     Gets together: None     Attends [employer] service: None     Active member of club or organization: None     Attends meetings of clubs or organizations: None     Relationship status: None    Intimate partner violence     Fear of current or ex partner: None     Emotionally abused: None     Physically abused: None     Forced sexual activity: None   Other Topics Concern    None   Social History Narrative    None       Electronically signed by KAYLI Hawley CNP on 3/20/2021 at 2:41 PM

## 2021-03-21 LAB
ALBUMIN SERPL-MCNC: 4.7 GM/DL (ref 3.4–5)
ALP BLD-CCNC: 98 IU/L (ref 40–128)
ALT SERPL-CCNC: 43 U/L (ref 10–40)
ANION GAP SERPL CALCULATED.3IONS-SCNC: 22 MMOL/L (ref 4–16)
AST SERPL-CCNC: 31 IU/L (ref 15–37)
BASOPHILS ABSOLUTE: 0 K/CU MM
BASOPHILS RELATIVE PERCENT: 0.1 % (ref 0–1)
BILIRUB SERPL-MCNC: 0.6 MG/DL (ref 0–1)
BUN BLDV-MCNC: 13 MG/DL (ref 6–23)
CALCIUM SERPL-MCNC: 9.4 MG/DL (ref 8.3–10.6)
CHLORIDE BLD-SCNC: 95 MMOL/L (ref 99–110)
CO2: 15 MMOL/L (ref 21–32)
CREAT SERPL-MCNC: 0.9 MG/DL (ref 0.9–1.3)
DIFFERENTIAL TYPE: ABNORMAL
EOSINOPHILS ABSOLUTE: 0 K/CU MM
EOSINOPHILS RELATIVE PERCENT: 0 % (ref 0–3)
ESTIMATED AVERAGE GLUCOSE: 220 MG/DL
GFR AFRICAN AMERICAN: >60 ML/MIN/1.73M2
GFR NON-AFRICAN AMERICAN: >60 ML/MIN/1.73M2
GLUCOSE BLD-MCNC: 291 MG/DL (ref 70–99)
GLUCOSE BLD-MCNC: 337 MG/DL (ref 70–99)
GLUCOSE BLD-MCNC: 359 MG/DL (ref 70–99)
GLUCOSE BLD-MCNC: 386 MG/DL (ref 70–99)
GLUCOSE BLD-MCNC: 396 MG/DL (ref 70–99)
HBA1C MFR BLD: 9.3 % (ref 4.2–6.3)
HCT VFR BLD CALC: 40 % (ref 42–52)
HEMOGLOBIN: 14.1 GM/DL (ref 13.5–18)
IMMATURE NEUTROPHIL %: 0.4 % (ref 0–0.43)
LYMPHOCYTES ABSOLUTE: 0.5 K/CU MM
LYMPHOCYTES RELATIVE PERCENT: 5.1 % (ref 24–44)
MCH RBC QN AUTO: 32.5 PG (ref 27–31)
MCHC RBC AUTO-ENTMCNC: 35.3 % (ref 32–36)
MCV RBC AUTO: 92.2 FL (ref 78–100)
MONOCYTES ABSOLUTE: 0.5 K/CU MM
MONOCYTES RELATIVE PERCENT: 4.6 % (ref 0–4)
PDW BLD-RTO: 12.2 % (ref 11.7–14.9)
PLATELET # BLD: 290 K/CU MM (ref 140–440)
PMV BLD AUTO: 9 FL (ref 7.5–11.1)
POTASSIUM SERPL-SCNC: 4.8 MMOL/L (ref 3.5–5.1)
RBC # BLD: 4.34 M/CU MM (ref 4.6–6.2)
SEGMENTED NEUTROPHILS ABSOLUTE COUNT: 9.3 K/CU MM
SEGMENTED NEUTROPHILS RELATIVE PERCENT: 89.8 % (ref 36–66)
SODIUM BLD-SCNC: 132 MMOL/L (ref 135–145)
TOTAL IMMATURE NEUTOROPHIL: 0.04 K/CU MM
TOTAL PROTEIN: 7.8 GM/DL (ref 6.4–8.2)
WBC # BLD: 10.3 K/CU MM (ref 4–10.5)

## 2021-03-21 PROCEDURE — 2580000003 HC RX 258: Performed by: INTERNAL MEDICINE

## 2021-03-21 PROCEDURE — 6370000000 HC RX 637 (ALT 250 FOR IP): Performed by: NURSE PRACTITIONER

## 2021-03-21 PROCEDURE — 85025 COMPLETE CBC W/AUTO DIFF WBC: CPT

## 2021-03-21 PROCEDURE — 6370000000 HC RX 637 (ALT 250 FOR IP): Performed by: HOSPITALIST

## 2021-03-21 PROCEDURE — 96376 TX/PRO/DX INJ SAME DRUG ADON: CPT

## 2021-03-21 PROCEDURE — G0378 HOSPITAL OBSERVATION PER HR: HCPCS

## 2021-03-21 PROCEDURE — 96372 THER/PROPH/DIAG INJ SC/IM: CPT

## 2021-03-21 PROCEDURE — 6370000000 HC RX 637 (ALT 250 FOR IP): Performed by: INTERNAL MEDICINE

## 2021-03-21 PROCEDURE — 2580000003 HC RX 258: Performed by: HOSPITALIST

## 2021-03-21 PROCEDURE — 6360000002 HC RX W HCPCS: Performed by: HOSPITALIST

## 2021-03-21 PROCEDURE — 36415 COLL VENOUS BLD VENIPUNCTURE: CPT

## 2021-03-21 PROCEDURE — 80053 COMPREHEN METABOLIC PANEL: CPT

## 2021-03-21 PROCEDURE — 94640 AIRWAY INHALATION TREATMENT: CPT

## 2021-03-21 PROCEDURE — 94761 N-INVAS EAR/PLS OXIMETRY MLT: CPT

## 2021-03-21 PROCEDURE — 82962 GLUCOSE BLOOD TEST: CPT

## 2021-03-21 RX ORDER — AZITHROMYCIN 250 MG/1
500 TABLET, FILM COATED ORAL DAILY
Status: COMPLETED | OUTPATIENT
Start: 2021-03-21 | End: 2021-03-23

## 2021-03-21 RX ORDER — INSULIN GLARGINE 100 [IU]/ML
30 INJECTION, SOLUTION SUBCUTANEOUS NIGHTLY
Status: DISCONTINUED | OUTPATIENT
Start: 2021-03-21 | End: 2021-03-22

## 2021-03-21 RX ORDER — SODIUM CHLORIDE 9 MG/ML
INJECTION, SOLUTION INTRAVENOUS CONTINUOUS
Status: DISPENSED | OUTPATIENT
Start: 2021-03-21 | End: 2021-03-22

## 2021-03-21 RX ORDER — INSULIN GLARGINE 100 [IU]/ML
0.25 INJECTION, SOLUTION SUBCUTANEOUS NIGHTLY
Status: DISCONTINUED | OUTPATIENT
Start: 2021-03-21 | End: 2021-03-21

## 2021-03-21 RX ORDER — BRIMONIDINE TARTRATE 2 MG/ML
1 SOLUTION/ DROPS OPHTHALMIC 2 TIMES DAILY
Status: DISCONTINUED | OUTPATIENT
Start: 2021-03-21 | End: 2021-03-24 | Stop reason: HOSPADM

## 2021-03-21 RX ORDER — PREDNISONE 20 MG/1
40 TABLET ORAL DAILY
Status: DISCONTINUED | OUTPATIENT
Start: 2021-03-22 | End: 2021-03-24 | Stop reason: HOSPADM

## 2021-03-21 RX ORDER — DORZOLAMIDE HYDROCHLORIDE AND TIMOLOL MALEATE 20; 5 MG/ML; MG/ML
1 SOLUTION/ DROPS OPHTHALMIC 2 TIMES DAILY
Status: DISCONTINUED | OUTPATIENT
Start: 2021-03-21 | End: 2021-03-24 | Stop reason: HOSPADM

## 2021-03-21 RX ORDER — IBUPROFEN 400 MG/1
600 TABLET ORAL EVERY 6 HOURS PRN
Status: DISCONTINUED | OUTPATIENT
Start: 2021-03-21 | End: 2021-03-24 | Stop reason: HOSPADM

## 2021-03-21 RX ORDER — PREDNISOLONE ACETATE 10 MG/ML
1 SUSPENSION/ DROPS OPHTHALMIC 2 TIMES DAILY
Status: DISCONTINUED | OUTPATIENT
Start: 2021-03-21 | End: 2021-03-24 | Stop reason: HOSPADM

## 2021-03-21 RX ADMIN — Medication 2 PUFF: at 03:50

## 2021-03-21 RX ADMIN — ALBUTEROL SULFATE 2 PUFF: 90 AEROSOL, METERED RESPIRATORY (INHALATION) at 03:51

## 2021-03-21 RX ADMIN — PREDNISOLONE ACETATE 1 DROP: 10 SUSPENSION/ DROPS OPHTHALMIC at 20:54

## 2021-03-21 RX ADMIN — SODIUM CHLORIDE: 9 INJECTION, SOLUTION INTRAVENOUS at 20:17

## 2021-03-21 RX ADMIN — Medication 2 PUFF: at 15:26

## 2021-03-21 RX ADMIN — DORZOLAMIDE HYDROCHLORIDE AND TIMOLOL MALEATE 1 DROP: 20; 5 SOLUTION/ DROPS OPHTHALMIC at 12:13

## 2021-03-21 RX ADMIN — PREDNISOLONE ACETATE 1 DROP: 10 SUSPENSION/ DROPS OPHTHALMIC at 12:14

## 2021-03-21 RX ADMIN — BENZOCAINE AND MENTHOL 1 LOZENGE: 15; 3.6 LOZENGE ORAL at 12:10

## 2021-03-21 RX ADMIN — Medication 2 PUFF: at 07:49

## 2021-03-21 RX ADMIN — INSULIN LISPRO 10 UNITS: 100 INJECTION, SOLUTION INTRAVENOUS; SUBCUTANEOUS at 10:33

## 2021-03-21 RX ADMIN — Medication 2 PUFF: at 20:59

## 2021-03-21 RX ADMIN — PANTOPRAZOLE SODIUM 40 MG: 20 TABLET, DELAYED RELEASE ORAL at 08:23

## 2021-03-21 RX ADMIN — METHYLPREDNISOLONE SODIUM SUCCINATE 40 MG: 40 INJECTION, POWDER, FOR SOLUTION INTRAMUSCULAR; INTRAVENOUS at 03:55

## 2021-03-21 RX ADMIN — HYDROCODONE BITARTRATE AND ACETAMINOPHEN 1 TABLET: 5; 325 TABLET ORAL at 05:20

## 2021-03-21 RX ADMIN — AZITHROMYCIN MONOHYDRATE 500 MG: 250 TABLET ORAL at 12:10

## 2021-03-21 RX ADMIN — SODIUM CHLORIDE, PRESERVATIVE FREE 10 ML: 5 INJECTION INTRAVENOUS at 08:24

## 2021-03-21 RX ADMIN — INSULIN LISPRO 10 UNITS: 100 INJECTION, SOLUTION INTRAVENOUS; SUBCUTANEOUS at 13:50

## 2021-03-21 RX ADMIN — ALBUTEROL SULFATE 2 PUFF: 90 AEROSOL, METERED RESPIRATORY (INHALATION) at 07:49

## 2021-03-21 RX ADMIN — IBUPROFEN 600 MG: 400 TABLET, FILM COATED ORAL at 20:56

## 2021-03-21 RX ADMIN — DORZOLAMIDE HYDROCHLORIDE AND TIMOLOL MALEATE 1 DROP: 20; 5 SOLUTION/ DROPS OPHTHALMIC at 20:57

## 2021-03-21 RX ADMIN — IBUPROFEN 600 MG: 400 TABLET, FILM COATED ORAL at 12:10

## 2021-03-21 RX ADMIN — LISINOPRIL AND HYDROCHLOROTHIAZIDE 1 TABLET: 12.5; 1 TABLET ORAL at 08:30

## 2021-03-21 RX ADMIN — AMLODIPINE BESYLATE 10 MG: 10 TABLET ORAL at 08:23

## 2021-03-21 RX ADMIN — ENOXAPARIN SODIUM 40 MG: 40 INJECTION SUBCUTANEOUS at 08:24

## 2021-03-21 RX ADMIN — ALBUTEROL SULFATE 2 PUFF: 90 AEROSOL, METERED RESPIRATORY (INHALATION) at 21:00

## 2021-03-21 RX ADMIN — ONDANSETRON 4 MG: 2 INJECTION INTRAMUSCULAR; INTRAVENOUS at 13:56

## 2021-03-21 RX ADMIN — METFORMIN HYDROCHLORIDE 850 MG: 850 TABLET ORAL at 17:44

## 2021-03-21 RX ADMIN — ATORVASTATIN CALCIUM 10 MG: 10 TABLET, FILM COATED ORAL at 08:23

## 2021-03-21 RX ADMIN — ALBUTEROL SULFATE 2 PUFF: 90 AEROSOL, METERED RESPIRATORY (INHALATION) at 15:26

## 2021-03-21 RX ADMIN — INSULIN GLARGINE 30 UNITS: 100 INJECTION, SOLUTION SUBCUTANEOUS at 20:58

## 2021-03-21 RX ADMIN — BENZOCAINE AND MENTHOL 1 LOZENGE: 15; 3.6 LOZENGE ORAL at 05:21

## 2021-03-21 RX ADMIN — SODIUM CHLORIDE: 9 INJECTION, SOLUTION INTRAVENOUS at 10:29

## 2021-03-21 ASSESSMENT — PAIN SCALES - GENERAL
PAINLEVEL_OUTOF10: 8
PAINLEVEL_OUTOF10: 6
PAINLEVEL_OUTOF10: 8
PAINLEVEL_OUTOF10: 0
PAINLEVEL_OUTOF10: 4

## 2021-03-21 ASSESSMENT — PAIN DESCRIPTION - PROGRESSION
CLINICAL_PROGRESSION: GRADUALLY WORSENING
CLINICAL_PROGRESSION: NOT CHANGED

## 2021-03-21 ASSESSMENT — PAIN DESCRIPTION - PAIN TYPE
TYPE: ACUTE PAIN
TYPE: ACUTE PAIN

## 2021-03-21 ASSESSMENT — PAIN DESCRIPTION - ORIENTATION
ORIENTATION: MID
ORIENTATION: MID

## 2021-03-21 ASSESSMENT — PAIN DESCRIPTION - LOCATION
LOCATION: THROAT
LOCATION: THROAT

## 2021-03-21 ASSESSMENT — PAIN - FUNCTIONAL ASSESSMENT
PAIN_FUNCTIONAL_ASSESSMENT: ACTIVITIES ARE NOT PREVENTED
PAIN_FUNCTIONAL_ASSESSMENT: ACTIVITIES ARE NOT PREVENTED

## 2021-03-21 ASSESSMENT — PAIN DESCRIPTION - DESCRIPTORS: DESCRIPTORS: BURNING

## 2021-03-21 ASSESSMENT — PAIN DESCRIPTION - FREQUENCY: FREQUENCY: CONTINUOUS

## 2021-03-21 ASSESSMENT — PAIN DESCRIPTION - ONSET: ONSET: ON-GOING

## 2021-03-21 NOTE — FLOWSHEET NOTE
Consult placed for Dr. Checo Cramer as he is patient's family doctor. It was not in the computer correctly. Per Dr. Brando Gates, may contact him tomorrow.      Asher Hobbs RN 4:22 PM

## 2021-03-21 NOTE — FLOWSHEET NOTE
Dr. Cobb Else performing AM rounds. Aware patient drinking large amounts of water and is filling urinal almost full every time he voids. She states IVF are necessary at this time due to AM labs. Patient's BID eye drops re-ordered at this time as patient is not going home today.      Cathy Stokes RN 10:13 AM

## 2021-03-21 NOTE — FLOWSHEET NOTE
Dr. Vidya Fuentes notified of patient BS running 275-400s on average and inquired whether she would like to start his home oral diabetic medications. Awaiting response. Cherrie Sanchez RN 2:07 PM     Dr. Vidya Fuentes states she will place orders.      Cherrie Sanchez RN 2:11 PM

## 2021-03-21 NOTE — PROGRESS NOTES
Hospitalist Progress Note      Name:  Nik Yoder /Age/Sex: 1985  (38 y.o. male)   MRN & CSN:  [id number] & [id number] Admission Date/Time: 3/20/2021  9:57 AM   Location:  [address] [place] [address] PCP: Roxie Roa MD         Hospital Day: 2    Assessment and Plan:   Nik Yoder is a 38 y.o.  male  who presents with shortness of breath, wheezing ,cough and sorethroat     · Asthma exacerbation  Not requiring O2  Afebrile  No leucocytosis  SARS CoV-2,NAAT  negative. Negative strep test and influenza A & B  Steroids, bronchodilators  Azithromycin empirically    · Diabetes mellitus 2, poor control  A1c 9.3  Sugars likely exacerbated by steroids  On Metformin and glipizide, likely needs insulin  Start on Lantus, sliding scale, consult endocrine  Hydrate    · High anion gap metabolic acidosis, possibly developing DKA  Hydrate, sliding scale, Lantus, monitor  Increase hydration    · Pseudohyponatremia, likely due to uncontrolled blood glucose, corrected within normal limits     Chronic medical condition, medication resumed unless contraindicated  Essential hypertension  Hyperlipidemia  Polysubstance abuse (cocaine, alcohol, nicotine), encourage cessation  Obesity class II with BMI of 37.5, encourage weight loss and lifestyle changes       Diet DIET CARB CONTROL;   DVT Prophylaxis [] Lovenox, []  Heparin, [] SCDs, []No VTE prophylaxis, patient ambulating   GI Prophylaxis [] PPI, [] H2 Blocker, [] No GI prophylaxis, patient is receiving diet/Tube Feeds   Code Status Full Code   Disposition Patient requires continued admission due to    MDM [] Low, [] Moderate,[]  High  Patient's risk as above due to      History of Present Illness:     Pt S&E. Patient notes does not feel good, has a sore throat, no nausea no vomiting, has been peeing frequently  10-14 point ROS reviewed negative, unless as noted above    Objective:        Intake/Output Summary (Last 24 hours) at 3/21/2021 5636  Last data filed at 3/21/2021 1434  Gross per 24 hour   Intake 10 ml   Output 7100 ml   Net -7090 ml      Vitals:   Vitals:    03/21/21 0818   BP: 139/84   Pulse: 105   Resp: 16   Temp: 98.7 °F (37.1 °C)   SpO2: 95%     Physical Exam:    GEN Awake male, sitting upright in bed in no apparent distress. Appears given age. EYES Pupils are equally round. No scleral erythema, discharge, or conjunctivitis. HENT Mucous membranes are moist.   NECK No apparent thyromegaly or masses. RESP Clear to auscultation, no wheezes, rales or rhonchi. Symmetric chest movement while on room air. CARDIO/VASC S1/S2 auscultated. Regular rate without appreciable murmurs, rubs, or gallops. Peripheral pulses equal bilaterally and palpable. No peripheral edema. GI Abdomen is soft without significant tenderness, masses, or guarding. Bowel sounds are normoactive. Rectal exam deferred.  Arellano catheter is not present. HEME/LYMPH No petechiae or ecchymoses. MSK No gross joint deformities. Spontaneous movement of all extremities  SKIN Normal coloration, warm, dry. NEURO Cranial nerves appear grossly intact, normal speech, no lateralizing weakness. PSYCH Awake, alert, oriented x 4. Affect appropriate.     Medications:   Medications:    azithromycin  500 mg Oral Daily    brimonidine  1 drop Right Eye BID    dorzolamide-timolol  1 drop Right Eye BID    prednisoLONE acetate  1 drop Right Eye BID    [START ON 3/22/2021] predniSONE  40 mg Oral Daily    insulin lispro  15 Units Subcutaneous TID     insulin lispro  0-12 Units Subcutaneous TID     insulin lispro  0-12 Units Subcutaneous 2 times per day    insulin glargine  30 Units Subcutaneous Nightly    metFORMIN  850 mg Oral BID WC    lisinopril-hydroCHLOROthiazide  1 tablet Oral Daily    amLODIPine  10 mg Oral Daily    atorvastatin  10 mg Oral Daily    sodium chloride flush  10 mL Intravenous 2 times per day    enoxaparin  40 mg Subcutaneous Daily    pantoprazole  40 mg Oral Daily    albuterol sulfate HFA  2 puff Inhalation Q6H    And    ipratropium  2 puff Inhalation Q6H      Infusions:    sodium chloride 100 mL/hr at 03/21/21 1029    dextrose       PRN Meds: ibuprofen, 600 mg, Q6H PRN  albuterol sulfate HFA, 2 puff, Q6H PRN  sodium chloride flush, 10 mL, PRN  promethazine, 12.5 mg, Q6H PRN    Or  ondansetron, 4 mg, Q6H PRN  polyethylene glycol, 17 g, Daily PRN  acetaminophen, 650 mg, Q6H PRN    Or  acetaminophen, 650 mg, Q6H PRN  glucose, 15 g, PRN  dextrose, 12.5 g, PRN  glucagon (rDNA), 1 mg, PRN  dextrose, 100 mL/hr, PRN  benzocaine-menthol, 1 lozenge, Q2H PRN        Data    Recent Labs     03/20/21  1024 03/21/21  0621   WBC 6.9 10.3   HGB 14.3 14.1   HCT 40.5* 40.0*    290      Recent Labs     03/20/21  1024 03/21/21  0621   * 132*   K 4.3 4.8   CL 95* 95*   CO2 22 15*   BUN 9 13   CREATININE 0.7* 0.9     Recent Labs     03/20/21  1024 03/21/21  0621   AST 47* 31   ALT 43* 43*   BILITOT 0.7 0.6   ALKPHOS 92 98     No results for input(s): INR in the last 72 hours. No results for input(s): CKTOTAL, CKMB, CKMBINDEX, TROPONINI in the last 72 hours.         Electronically signed by Annalisa Verdugo MD on 3/21/2021 at 2:55 PM

## 2021-03-21 NOTE — CONSULTS
Endocrinology   Consult Note      Dear Doctor  Corinna Otto    Thank You for the Consult     Pt. Was Admitted for : Dose of breath exacerbation of asthma    Reason for Consult: Better control of blood glucose      History Obtained From:  Patient/ EMR       HISTORY OF PRESENT ILLNESS:                The patient is a 38 y.o. male with significant past medical history of asthma, legally blind due to retinal detachment, diabetes mellitus, GERD, hypertension right toe amputation comes in complaining of shortness of breath cough and wheezing with exacerbation of asthma. Been running blood glucose higher. I was  consulted for better control of blood glucose. ROS:   Pt's ROS done in detail. Abnormal ROS are noted in Medical and Surgical History Section below:      Other Medical History:        Diagnosis Date    Asthma     Blind left eye     Diabetes mellitus ([address]     GERD (gastroesophageal reflux disease)     Hypertension     Retinal detachment 2012    left     Surgical History:        Procedure Laterality Date    CORNEAL TRANSPLANT Bilateral     EYE SURGERY      left eye removed    EYE SURGERY Right     FRACTURE SURGERY      foot left    MANDIBLE SURGERY Right     MANDIBLE SURGERY Bilateral     TOE AMPUTATION Right 07/25/2017       Allergies:  Ciprofloxacin and Shellfish-derived products    Family History:       Problem Relation Age of Onset    Diabetes Mother     Asthma Mother     High Blood Pressure Mother     Stroke Mother     Heart Disease Mother     Diabetes Father     Asthma Father     High Blood Pressure Father      REVIEW OF SYSTEMS:  Review of System Done as noted above     PHYSICAL EXAM:      Vitals:    /84   Pulse 105   Temp 98.7 °F (37.1 °C) (Oral)   Resp 16   Ht 6' 3\" (1.905 m)   Wt 300 lb (136.1 kg)   SpO2 95%   BMI 37.50 kg/m²     CONSTITUTIONAL:  awake, alert, cooperative, appears stated age   EYES:  vision intact Fundoscopic Exam not performed legal bleed blind  ENT:Normal  NECK:  Supple, No JVD. Thyroid Exam:Normal   LUNGS:  Has Vesicular Breath Sounds, some wheezing and some rales bilaterally  CARDIOVASCULAR:  Normal apical impulse, regular rate and rhythm, normal S1 and S2, no S3 or S4, and has no  murmur   ABDOMEN:  No scars, normal bowel sounds, soft, non-distended, non-tender, no masses palpated, no hepatolienomegaly  Musculoskeletal: Normal  Extremities: Normal, peripheral pulses normal, , has no edema right toe amputation NEUROLOGIC:  Awake, alert, oriented to name, place and time. Cranial nerves II-XII are grossly intact. Motor is  intact. Sensory possible neuropathy. ,  and gait is normal.    DATA:    CBC:   Recent Labs     03/20/21  1024 03/21/21  0621   WBC 6.9 10.3   HGB 14.3 14.1    290    CMP:  Recent Labs     03/20/21  1024 03/21/21  0621   * 132*   K 4.3 4.8   CL 95* 95*   CO2 22 15*   BUN 9 13   CREATININE 0.7* 0.9   CALCIUM 9.3 9.4   PROT 7.2 7.8   LABALBU 4.4 4.7   BILITOT 0.7 0.6   ALKPHOS 92 98   AST 47* 31   ALT 43* 43*     Lipids:   Lab Results   Component Value Date    CHOL 142 04/16/2011    HDL 41 04/16/2011    TRIG 268 04/16/2011     Glucose:   Recent Labs     03/20/21  2018 03/21/21  1032 03/21/21  1215   POCGLU 422* 396* 359*     Hemoglobin A1C:   Lab Results   Component Value Date    LABA1C 9.3 03/20/2021     Free T4: No results found for: T4FREE  Free T3: No results found for: FT3  TSH High Sensitivity:   Lab Results   Component Value Date    [facility] 2.685 04/16/2011       Xr Chest Portable    Result Date: 3/20/2021  EXAMINATION: ONE XRAY VIEW OF THE CHEST 3/20/2021 10:31 am COMPARISON: 04/22/2020 HISTORY: ORDERING SYSTEM PROVIDED HISTORY: Chest pain TECHNOLOGIST PROVIDED HISTORY: Reason for exam:->Chest pain FINDINGS: No focal lung consolidation. No pleural effusion or pneumothorax identified. The cardiomediastinal silhouette is normal in size for technique. No acute cardiopulmonary abnormality identified. Scheduled Medicines   Medications:    azithromycin  500 mg Oral Daily    brimonidine  1 drop Right Eye BID    dorzolamide-timolol  1 drop Right Eye BID    prednisoLONE acetate  1 drop Right Eye BID    [START ON 3/22/2021] predniSONE  40 mg Oral Daily    insulin lispro  15 Units Subcutaneous TID     insulin lispro  0-12 Units Subcutaneous TID     insulin lispro  0-12 Units Subcutaneous 2 times per day    insulin glargine  30 Units Subcutaneous Nightly    metFORMIN  850 mg Oral BID     lisinopril-hydroCHLOROthiazide  1 tablet Oral Daily    amLODIPine  10 mg Oral Daily    atorvastatin  10 mg Oral Daily    sodium chloride flush  10 mL Intravenous 2 times per day    enoxaparin  40 mg Subcutaneous Daily    pantoprazole  40 mg Oral Daily    albuterol sulfate HFA  2 puff Inhalation Q6H    And    ipratropium  2 puff Inhalation Q6H      Infusions:    sodium chloride 100 mL/hr at 03/21/21 1029    dextrose           IMPRESSION    Patient Active Problem List   Diagnosis    Sprain of left ankle    Closed nondisplaced fracture of fifth metatarsal bone of left foot    Alcohol abuse    UTI (urinary tract infection) due to Enterococcus    Acute osteomyelitis of toe, right (HCC)    Osteomyelitis (HCC)    Diabetic foot infection [facility]    Shortness of breath    Asthma exacerbation         RECOMMENDATIONS:      1. Reviewed POC blood glucose . Labs and X ray results   2. Reviewed Home and Current Medicines   3. Will Start On meal/ Correction bolus Humalog/ Lantus Insulin regime / OHGD   4. Monitor Blood glucose frequently   5. Modify  the dose of Insulin/ OHGD  as needed        Will follow with you  Again thank you for sharing pt's care with me.      Truly yours,       Codi Cat MD

## 2021-03-22 LAB
ALBUMIN SERPL-MCNC: 3.6 GM/DL (ref 3.4–5)
ALP BLD-CCNC: 85 IU/L (ref 40–129)
ALT SERPL-CCNC: 29 U/L (ref 10–40)
ANION GAP SERPL CALCULATED.3IONS-SCNC: 13 MMOL/L (ref 4–16)
AST SERPL-CCNC: 20 IU/L (ref 15–37)
BASOPHILS ABSOLUTE: 0 K/CU MM
BASOPHILS RELATIVE PERCENT: 0.3 % (ref 0–1)
BILIRUB SERPL-MCNC: 0.4 MG/DL (ref 0–1)
BUN BLDV-MCNC: 18 MG/DL (ref 6–23)
CALCIUM SERPL-MCNC: 8.5 MG/DL (ref 8.3–10.6)
CHLORIDE BLD-SCNC: 103 MMOL/L (ref 99–110)
CO2: 19 MMOL/L (ref 21–32)
CREAT SERPL-MCNC: 0.8 MG/DL (ref 0.9–1.3)
CULTURE: NORMAL
DIFFERENTIAL TYPE: ABNORMAL
EOSINOPHILS ABSOLUTE: 0 K/CU MM
EOSINOPHILS RELATIVE PERCENT: 0.1 % (ref 0–3)
GFR AFRICAN AMERICAN: >60 ML/MIN/1.73M2
GFR NON-AFRICAN AMERICAN: >60 ML/MIN/1.73M2
GLUCOSE BLD-MCNC: 208 MG/DL (ref 70–99)
GLUCOSE BLD-MCNC: 214 MG/DL (ref 70–99)
GLUCOSE BLD-MCNC: 268 MG/DL (ref 70–99)
GLUCOSE BLD-MCNC: 339 MG/DL (ref 70–99)
GLUCOSE BLD-MCNC: 360 MG/DL (ref 70–99)
GLUCOSE BLD-MCNC: 390 MG/DL (ref 70–99)
HCT VFR BLD CALC: 37.6 % (ref 42–52)
HEMOGLOBIN: 12.8 GM/DL (ref 13.5–18)
IMMATURE NEUTROPHIL %: 0.4 % (ref 0–0.43)
LYMPHOCYTES ABSOLUTE: 1.4 K/CU MM
LYMPHOCYTES RELATIVE PERCENT: 20.1 % (ref 24–44)
Lab: NORMAL
MCH RBC QN AUTO: 31.6 PG (ref 27–31)
MCHC RBC AUTO-ENTMCNC: 34 % (ref 32–36)
MCV RBC AUTO: 92.8 FL (ref 78–100)
MONOCYTES ABSOLUTE: 0.7 K/CU MM
MONOCYTES RELATIVE PERCENT: 9.5 % (ref 0–4)
NUCLEATED RBC %: 0 %
PDW BLD-RTO: 12.1 % (ref 11.7–14.9)
PLATELET # BLD: 228 K/CU MM (ref 140–440)
PMV BLD AUTO: 8.9 FL (ref 7.5–11.1)
POTASSIUM SERPL-SCNC: 4.1 MMOL/L (ref 3.5–5.1)
RBC # BLD: 4.05 M/CU MM (ref 4.6–6.2)
SEGMENTED NEUTROPHILS ABSOLUTE COUNT: 4.7 K/CU MM
SEGMENTED NEUTROPHILS RELATIVE PERCENT: 69.6 % (ref 36–66)
SODIUM BLD-SCNC: 135 MMOL/L (ref 135–145)
SPECIMEN: NORMAL
STREP A DIRECT SCREEN: NEGATIVE
TOTAL IMMATURE NEUTOROPHIL: 0.03 K/CU MM
TOTAL NUCLEATED RBC: 0 K/CU MM
TOTAL PROTEIN: 6.3 GM/DL (ref 6.4–8.2)
WBC # BLD: 6.8 K/CU MM (ref 4–10.5)

## 2021-03-22 PROCEDURE — 6370000000 HC RX 637 (ALT 250 FOR IP): Performed by: HOSPITALIST

## 2021-03-22 PROCEDURE — 82962 GLUCOSE BLOOD TEST: CPT

## 2021-03-22 PROCEDURE — 94640 AIRWAY INHALATION TREATMENT: CPT

## 2021-03-22 PROCEDURE — 6370000000 HC RX 637 (ALT 250 FOR IP): Performed by: NURSE PRACTITIONER

## 2021-03-22 PROCEDURE — 2580000003 HC RX 258: Performed by: INTERNAL MEDICINE

## 2021-03-22 PROCEDURE — 85025 COMPLETE CBC W/AUTO DIFF WBC: CPT

## 2021-03-22 PROCEDURE — 94761 N-INVAS EAR/PLS OXIMETRY MLT: CPT

## 2021-03-22 PROCEDURE — 93010 ELECTROCARDIOGRAM REPORT: CPT | Performed by: INTERNAL MEDICINE

## 2021-03-22 PROCEDURE — 2580000003 HC RX 258: Performed by: HOSPITALIST

## 2021-03-22 PROCEDURE — 6370000000 HC RX 637 (ALT 250 FOR IP): Performed by: INTERNAL MEDICINE

## 2021-03-22 PROCEDURE — 6360000002 HC RX W HCPCS: Performed by: HOSPITALIST

## 2021-03-22 PROCEDURE — G0378 HOSPITAL OBSERVATION PER HR: HCPCS

## 2021-03-22 PROCEDURE — 36415 COLL VENOUS BLD VENIPUNCTURE: CPT

## 2021-03-22 PROCEDURE — 96376 TX/PRO/DX INJ SAME DRUG ADON: CPT

## 2021-03-22 PROCEDURE — 6370000000 HC RX 637 (ALT 250 FOR IP): Performed by: FAMILY MEDICINE

## 2021-03-22 PROCEDURE — 80053 COMPREHEN METABOLIC PANEL: CPT

## 2021-03-22 RX ORDER — INSULIN GLARGINE 100 [IU]/ML
40 INJECTION, SOLUTION SUBCUTANEOUS NIGHTLY
Status: DISCONTINUED | OUTPATIENT
Start: 2021-03-22 | End: 2021-03-23

## 2021-03-22 RX ORDER — ALBUTEROL SULFATE 90 UG/1
2 AEROSOL, METERED RESPIRATORY (INHALATION) EVERY 6 HOURS PRN
Status: DISCONTINUED | OUTPATIENT
Start: 2021-03-22 | End: 2021-03-24 | Stop reason: HOSPADM

## 2021-03-22 RX ORDER — IPRATROPIUM BROMIDE AND ALBUTEROL SULFATE 2.5; .5 MG/3ML; MG/3ML
1 SOLUTION RESPIRATORY (INHALATION) EVERY 4 HOURS
Status: DISCONTINUED | OUTPATIENT
Start: 2021-03-22 | End: 2021-03-24 | Stop reason: HOSPADM

## 2021-03-22 RX ADMIN — AZITHROMYCIN MONOHYDRATE 500 MG: 250 TABLET ORAL at 09:16

## 2021-03-22 RX ADMIN — ATORVASTATIN CALCIUM 10 MG: 10 TABLET, FILM COATED ORAL at 09:17

## 2021-03-22 RX ADMIN — AMLODIPINE BESYLATE 10 MG: 10 TABLET ORAL at 09:16

## 2021-03-22 RX ADMIN — ALBUTEROL SULFATE 2 PUFF: 90 AEROSOL, METERED RESPIRATORY (INHALATION) at 07:39

## 2021-03-22 RX ADMIN — IPRATROPIUM BROMIDE AND ALBUTEROL SULFATE 1 AMPULE: .5; 3 SOLUTION RESPIRATORY (INHALATION) at 17:11

## 2021-03-22 RX ADMIN — IPRATROPIUM BROMIDE AND ALBUTEROL SULFATE 1 AMPULE: .5; 3 SOLUTION RESPIRATORY (INHALATION) at 14:08

## 2021-03-22 RX ADMIN — DORZOLAMIDE HYDROCHLORIDE AND TIMOLOL MALEATE 1 DROP: 20; 5 SOLUTION/ DROPS OPHTHALMIC at 09:17

## 2021-03-22 RX ADMIN — Medication 2 PUFF: at 02:25

## 2021-03-22 RX ADMIN — BRIMONIDINE TARTRATE 1 DROP: 2 SOLUTION OPHTHALMIC at 09:17

## 2021-03-22 RX ADMIN — METFORMIN HYDROCHLORIDE 850 MG: 850 TABLET ORAL at 18:22

## 2021-03-22 RX ADMIN — BENZOCAINE AND MENTHOL 1 LOZENGE: 15; 3.6 LOZENGE ORAL at 09:20

## 2021-03-22 RX ADMIN — DORZOLAMIDE HYDROCHLORIDE AND TIMOLOL MALEATE 1 DROP: 20; 5 SOLUTION/ DROPS OPHTHALMIC at 20:22

## 2021-03-22 RX ADMIN — SODIUM CHLORIDE: 9 INJECTION, SOLUTION INTRAVENOUS at 03:42

## 2021-03-22 RX ADMIN — Medication 2 PUFF: at 07:39

## 2021-03-22 RX ADMIN — PREDNISOLONE ACETATE 1 DROP: 10 SUSPENSION/ DROPS OPHTHALMIC at 20:22

## 2021-03-22 RX ADMIN — PREDNISOLONE ACETATE 1 DROP: 10 SUSPENSION/ DROPS OPHTHALMIC at 09:17

## 2021-03-22 RX ADMIN — PREDNISONE 40 MG: 20 TABLET ORAL at 09:16

## 2021-03-22 RX ADMIN — SODIUM CHLORIDE, PRESERVATIVE FREE 10 ML: 5 INJECTION INTRAVENOUS at 20:22

## 2021-03-22 RX ADMIN — LISINOPRIL AND HYDROCHLOROTHIAZIDE 1 TABLET: 12.5; 1 TABLET ORAL at 09:17

## 2021-03-22 RX ADMIN — PANTOPRAZOLE SODIUM 40 MG: 20 TABLET, DELAYED RELEASE ORAL at 09:16

## 2021-03-22 RX ADMIN — INSULIN GLARGINE 40 UNITS: 100 INJECTION, SOLUTION SUBCUTANEOUS at 20:21

## 2021-03-22 RX ADMIN — METFORMIN HYDROCHLORIDE 850 MG: 850 TABLET ORAL at 09:16

## 2021-03-22 RX ADMIN — ONDANSETRON 4 MG: 2 INJECTION INTRAMUSCULAR; INTRAVENOUS at 20:22

## 2021-03-22 RX ADMIN — ALBUTEROL SULFATE 2 PUFF: 90 AEROSOL, METERED RESPIRATORY (INHALATION) at 02:25

## 2021-03-22 ASSESSMENT — PAIN SCALES - GENERAL
PAINLEVEL_OUTOF10: 8
PAINLEVEL_OUTOF10: 0
PAINLEVEL_OUTOF10: 0

## 2021-03-22 ASSESSMENT — PAIN - FUNCTIONAL ASSESSMENT: PAIN_FUNCTIONAL_ASSESSMENT: ACTIVITIES ARE NOT PREVENTED

## 2021-03-22 ASSESSMENT — PAIN DESCRIPTION - FREQUENCY: FREQUENCY: CONTINUOUS

## 2021-03-22 ASSESSMENT — PAIN DESCRIPTION - PROGRESSION: CLINICAL_PROGRESSION: NOT CHANGED

## 2021-03-22 ASSESSMENT — PAIN DESCRIPTION - LOCATION: LOCATION: THROAT

## 2021-03-22 NOTE — CARE COORDINATION
Reviewed chart and spoke with pt about discharge needs/plans. Pt is homeless and is being housed at [place] [address]. PTA he was independent with ADL's and using [facility] for medical transportation. Pt is wanting HC at discharge. Pt has a PCP and insurance that covers medications. Message placed for Dr Frances Corbin asking about [facility], [facility]. orders, if he agrees will set up [facility], [facility]. for pt prior to discharge. Cm will continue to follow.

## 2021-03-22 NOTE — PROGRESS NOTES
Attending Progress Note      PCP: Pascual Arango MD      Patient: Jamal Flynn   Gender: male  : 1985   Age: 38 y.o. MRN: [id number]  Room  :  [address] [place] [address]      Date of Admission: 3/20/2021    Chief Complaint   Patient presents with    Shortness of Breath    Cough    Pharyngitis           Subjective:tight chest         Medications:  Reviewed  Infusion Medications    dextrose       Scheduled Medications    insulin glargine  40 Units Subcutaneous Nightly    insulin lispro  20 Units Subcutaneous TID WC    ipratropium-albuterol  1 ampule Inhalation Q4H    azithromycin  500 mg Oral Daily    brimonidine  1 drop Right Eye BID    dorzolamide-timolol  1 drop Right Eye BID    prednisoLONE acetate  1 drop Right Eye BID    predniSONE  40 mg Oral Daily    insulin lispro  0-12 Units Subcutaneous TID WC    insulin lispro  0-12 Units Subcutaneous 2 times per day    metFORMIN  850 mg Oral BID WC    lisinopril-hydroCHLOROthiazide  1 tablet Oral Daily    amLODIPine  10 mg Oral Daily    atorvastatin  10 mg Oral Daily    sodium chloride flush  10 mL Intravenous 2 times per day    enoxaparin  40 mg Subcutaneous Daily    pantoprazole  40 mg Oral Daily     PRN Meds: albuterol sulfate HFA **AND** ipratropium, ibuprofen, albuterol sulfate HFA, sodium chloride flush, promethazine **OR** ondansetron, polyethylene glycol, acetaminophen **OR** acetaminophen, glucose, dextrose, glucagon (rDNA), dextrose, benzocaine-menthol      Intake/Output Summary (Last 24 hours) at 3/22/2021 1649  Last data filed at 3/22/2021 0659  Gross per 24 hour   Intake 5056 ml   Output 3800 ml   Net 1256 ml       Exam:  /79   Pulse 94   Temp 98.1 °F (36.7 °C) (Oral)   Resp 16   Ht 6' 3\" (1.905 m)   Wt 300 lb (136.1 kg)   SpO2 97%   BMI 37.50 kg/m²   General appearance: No distress,   Respiratory: tight chest  , no Rales , + wheezing, or rhonchi,  Cardiovascular: RRR, with normal S1/S2 .   Abdomen : Soft, non-tender, non-distended  , normal bowel sounds. Legs : No edema bilaterally. No DVT signs ,    Neurologic:  Alert and oriented ,        Labs:   Recent Labs     03/20/21  1024 03/21/21  0621 03/22/21  0753   WBC 6.9 10.3 6.8   HGB 14.3 14.1 12.8*   HCT 40.5* 40.0* 37.6*    290 228     Recent Labs     03/20/21  1024 03/21/21  0621 03/22/21  0753   * 132* 135   K 4.3 4.8 4.1   CL 95* 95* 103   CO2 22 15* 19*   BUN 9 13 18   CREATININE 0.7* 0.9 0.8*   CALCIUM 9.3 9.4 8.5     Recent Labs     03/20/21  1024 03/21/21  0621 03/22/21  0753   AST 47* 31 20   ALT 43* 43* 29   BILITOT 0.7 0.6 0.4   ALKPHOS 92 98 85     No results for input(s): INR in the last 72 hours. No results for input(s): Yessy Paiz in the last 72 hours. Assessment/Plan:  Active Hospital Problems    Diagnosis Date Noted    Shortness of breath [R06.02] 03/20/2021    Asthma exacerbation [J45.901] 03/20/2021       Plan:  Tele : no event - no fever - on room air   Cont resp therapy   Glucose control   DVT Prophylaxis   Diet: DIET CARB CONTROL;  Code Status: Full Code        [unfilled]    Treatment progress and plan was d/w pt/family .     Leandro Guerin MD

## 2021-03-23 LAB
GLUCOSE BLD-MCNC: 170 MG/DL (ref 70–99)
GLUCOSE BLD-MCNC: 249 MG/DL (ref 70–99)
GLUCOSE BLD-MCNC: 261 MG/DL (ref 70–99)
GLUCOSE BLD-MCNC: 261 MG/DL (ref 70–99)
GLUCOSE BLD-MCNC: 289 MG/DL (ref 70–99)

## 2021-03-23 PROCEDURE — 6370000000 HC RX 637 (ALT 250 FOR IP): Performed by: HOSPITALIST

## 2021-03-23 PROCEDURE — 82962 GLUCOSE BLOOD TEST: CPT

## 2021-03-23 PROCEDURE — 6370000000 HC RX 637 (ALT 250 FOR IP): Performed by: INTERNAL MEDICINE

## 2021-03-23 PROCEDURE — 94640 AIRWAY INHALATION TREATMENT: CPT

## 2021-03-23 PROCEDURE — G0378 HOSPITAL OBSERVATION PER HR: HCPCS

## 2021-03-23 PROCEDURE — 6370000000 HC RX 637 (ALT 250 FOR IP): Performed by: NURSE PRACTITIONER

## 2021-03-23 PROCEDURE — 2580000003 HC RX 258: Performed by: HOSPITALIST

## 2021-03-23 PROCEDURE — 6370000000 HC RX 637 (ALT 250 FOR IP): Performed by: FAMILY MEDICINE

## 2021-03-23 PROCEDURE — 94761 N-INVAS EAR/PLS OXIMETRY MLT: CPT

## 2021-03-23 RX ORDER — INSULIN GLARGINE 100 [IU]/ML
50 INJECTION, SOLUTION SUBCUTANEOUS NIGHTLY
Status: DISCONTINUED | OUTPATIENT
Start: 2021-03-23 | End: 2021-03-24 | Stop reason: HOSPADM

## 2021-03-23 RX ORDER — ZOLPIDEM TARTRATE 5 MG/1
10 TABLET ORAL NIGHTLY PRN
Status: DISCONTINUED | OUTPATIENT
Start: 2021-03-23 | End: 2021-03-24 | Stop reason: HOSPADM

## 2021-03-23 RX ORDER — ALOGLIPTIN 12.5 MG/1
25 TABLET, FILM COATED ORAL DAILY
Status: DISCONTINUED | OUTPATIENT
Start: 2021-03-23 | End: 2021-03-24 | Stop reason: HOSPADM

## 2021-03-23 RX ADMIN — IPRATROPIUM BROMIDE AND ALBUTEROL SULFATE 1 AMPULE: .5; 3 SOLUTION RESPIRATORY (INHALATION) at 12:36

## 2021-03-23 RX ADMIN — PANTOPRAZOLE SODIUM 40 MG: 20 TABLET, DELAYED RELEASE ORAL at 08:44

## 2021-03-23 RX ADMIN — ALOGLIPTIN 25 MG: 12.5 TABLET, FILM COATED ORAL at 08:43

## 2021-03-23 RX ADMIN — IPRATROPIUM BROMIDE AND ALBUTEROL SULFATE 1 AMPULE: .5; 3 SOLUTION RESPIRATORY (INHALATION) at 16:28

## 2021-03-23 RX ADMIN — METFORMIN HYDROCHLORIDE 1000 MG: 850 TABLET ORAL at 08:43

## 2021-03-23 RX ADMIN — AMLODIPINE BESYLATE 10 MG: 10 TABLET ORAL at 08:44

## 2021-03-23 RX ADMIN — ATORVASTATIN CALCIUM 10 MG: 10 TABLET, FILM COATED ORAL at 08:44

## 2021-03-23 RX ADMIN — BRIMONIDINE TARTRATE 1 DROP: 2 SOLUTION OPHTHALMIC at 21:17

## 2021-03-23 RX ADMIN — LISINOPRIL AND HYDROCHLOROTHIAZIDE 1 TABLET: 12.5; 1 TABLET ORAL at 08:43

## 2021-03-23 RX ADMIN — METFORMIN HYDROCHLORIDE 1000 MG: 850 TABLET ORAL at 18:14

## 2021-03-23 RX ADMIN — SODIUM CHLORIDE, PRESERVATIVE FREE 10 ML: 5 INJECTION INTRAVENOUS at 21:17

## 2021-03-23 RX ADMIN — PREDNISOLONE ACETATE 1 DROP: 10 SUSPENSION/ DROPS OPHTHALMIC at 21:17

## 2021-03-23 RX ADMIN — PREDNISOLONE ACETATE 1 DROP: 10 SUSPENSION/ DROPS OPHTHALMIC at 08:44

## 2021-03-23 RX ADMIN — DORZOLAMIDE HYDROCHLORIDE AND TIMOLOL MALEATE 1 DROP: 20; 5 SOLUTION/ DROPS OPHTHALMIC at 08:45

## 2021-03-23 RX ADMIN — IPRATROPIUM BROMIDE AND ALBUTEROL SULFATE 1 AMPULE: .5; 3 SOLUTION RESPIRATORY (INHALATION) at 23:41

## 2021-03-23 RX ADMIN — IPRATROPIUM BROMIDE AND ALBUTEROL SULFATE 1 AMPULE: .5; 3 SOLUTION RESPIRATORY (INHALATION) at 19:48

## 2021-03-23 RX ADMIN — SODIUM CHLORIDE, PRESERVATIVE FREE 10 ML: 5 INJECTION INTRAVENOUS at 08:45

## 2021-03-23 RX ADMIN — BENZOCAINE AND MENTHOL 1 LOZENGE: 15; 3.6 LOZENGE ORAL at 08:43

## 2021-03-23 RX ADMIN — IPRATROPIUM BROMIDE AND ALBUTEROL SULFATE 1 AMPULE: .5; 3 SOLUTION RESPIRATORY (INHALATION) at 00:42

## 2021-03-23 RX ADMIN — ZOLPIDEM TARTRATE 10 MG: 5 TABLET ORAL at 22:26

## 2021-03-23 RX ADMIN — DORZOLAMIDE HYDROCHLORIDE AND TIMOLOL MALEATE 1 DROP: 20; 5 SOLUTION/ DROPS OPHTHALMIC at 21:17

## 2021-03-23 RX ADMIN — AZITHROMYCIN MONOHYDRATE 500 MG: 250 TABLET ORAL at 08:43

## 2021-03-23 RX ADMIN — INSULIN GLARGINE 50 UNITS: 100 INJECTION, SOLUTION SUBCUTANEOUS at 21:16

## 2021-03-23 RX ADMIN — BRIMONIDINE TARTRATE 1 DROP: 2 SOLUTION OPHTHALMIC at 08:45

## 2021-03-23 RX ADMIN — IPRATROPIUM BROMIDE AND ALBUTEROL SULFATE 1 AMPULE: .5; 3 SOLUTION RESPIRATORY (INHALATION) at 08:55

## 2021-03-23 RX ADMIN — PREDNISONE 40 MG: 20 TABLET ORAL at 08:43

## 2021-03-23 RX ADMIN — IPRATROPIUM BROMIDE AND ALBUTEROL SULFATE 1 AMPULE: .5; 3 SOLUTION RESPIRATORY (INHALATION) at 04:15

## 2021-03-23 ASSESSMENT — PAIN SCALES - GENERAL
PAINLEVEL_OUTOF10: 0
PAINLEVEL_OUTOF10: 0

## 2021-03-23 NOTE — PROGRESS NOTES
Progress Note( Dr. Marin Hearn)    Subjective:   Admit Date: 3/20/2021  PCP: Jeimy Pace MD    Admitted For : Shortness of breath and exacerbation of asthma    Consulted For: Better control of blood glucose    Interval History: Somewhat better glucose although better    Denies any chest pains,   Still SOB . Denies nausea or vomiting. No new bowel or bladder symptoms. Intake/Output Summary (Last 24 hours) at 3/23/2021 0726  Last data filed at 3/23/2021 0426  Gross per 24 hour   Intake 50 ml   Output 600 ml   Net -550 ml       DATA    CBC:   Recent Labs     03/20/21  1024 03/21/21  0621 03/22/21  0753   WBC 6.9 10.3 6.8   HGB 14.3 14.1 12.8*    290 228    CMP:  Recent Labs     03/20/21  1024 03/21/21  0621 03/22/21  0753   * 132* 135   K 4.3 4.8 4.1   CL 95* 95* 103   CO2 22 15* 19*   BUN 9 13 18   CREATININE 0.7* 0.9 0.8*   CALCIUM 9.3 9.4 8.5   PROT 7.2 7.8 6.3*   LABALBU 4.4 4.7 3.6   BILITOT 0.7 0.6 0.4   ALKPHOS 92 98 85   AST 47* 31 20   ALT 43* 43* 29     Lipids:   Lab Results   Component Value Date    CHOL 142 04/16/2011    HDL 41 04/16/2011    TRIG 268 04/16/2011     Glucose:  Recent Labs     03/22/21  1804 03/22/21 2020 03/23/21  0157   POCGLU 390* 339* 261*     DefdlilvpwU5D:  Lab Results   Component Value Date    LABA1C 9.3 03/20/2021     High Sensitivity TSH:   Lab Results   Component Value Date    TSHHS 2.685 04/16/2011     Free T3: No results found for: FT3  Free T4:No results found for: T4FREE    Xr Chest Portable    Result Date: 3/20/2021  EXAMINATION: ONE XRAY VIEW OF THE CHEST 3/20/2021 10:31 am COMPARISON: 04/22/2020 HISTORY: ORDERING SYSTEM PROVIDED HISTORY: Chest pain TECHNOLOGIST PROVIDED HISTORY: Reason for exam:->Chest pain FINDINGS: No focal lung consolidation. No pleural effusion or pneumothorax identified. The cardiomediastinal silhouette is normal in size for technique. No acute cardiopulmonary abnormality identified.        Scheduled Medicines   Medications:  insulin glargine  40 Units Subcutaneous Nightly    insulin lispro  20 Units Subcutaneous TID     ipratropium-albuterol  1 ampule Inhalation Q4H    azithromycin  500 mg Oral Daily    brimonidine  1 drop Right Eye BID    dorzolamide-timolol  1 drop Right Eye BID    prednisoLONE acetate  1 drop Right Eye BID    predniSONE  40 mg Oral Daily    insulin lispro  0-12 Units Subcutaneous TID     insulin lispro  0-12 Units Subcutaneous 2 times per day    metFORMIN  850 mg Oral BID WC    lisinopril-hydroCHLOROthiazide  1 tablet Oral Daily    amLODIPine  10 mg Oral Daily    atorvastatin  10 mg Oral Daily    sodium chloride flush  10 mL Intravenous 2 times per day    enoxaparin  40 mg Subcutaneous Daily    pantoprazole  40 mg Oral Daily      Infusions:    dextrose           Objective:   Vitals: /71   Pulse 89   Temp 97.6 °F (36.4 °C) (Oral)   Resp 18   Ht 6' 3\" (1.905 m)   Wt (!) 309 lb 11.2 oz (140.5 kg)   SpO2 97%   BMI 38.71 kg/m²   General appearance: alert and cooperative with exam  Neck: no JVD or bruit  Thyroid : Normal lobes   Lungs: Has Vesicular Breath sounds   Heart:  regular rate and rhythm  Abdomen: soft, non-tender; bowel sounds normal; no masses,  no organomegaly  Musculoskeletal: Normal  Extremities: extremities normal, , no edema  Neurologic:  Awake, alert, oriented to name, place and time. Cranial nerves II-XII are grossly intact. Motor is  intact. Sensory is intact. ,  and gait is normal.    Assessment:     Patient Active Problem List:     Sprain of left ankle     Closed nondisplaced fracture of fifth metatarsal bone of left foot     Alcohol abuse     UTI (urinary tract infection) due to Enterococcus     Acute osteomyelitis of toe, right (HCC)     Osteomyelitis (HCC)     Diabetic foot infection (HCC)     Shortness of breath     Asthma exacerbation      Plan:     1. Reviewed POC blood glucose . Labs and X ray results   2. Reviewed Current Medicines    3.  On meal/ Correction bolus Humalog/ Basal Lantus Insulin regime /   4. Monitor Blood glucose frequently   5. Modified  the dose of Insulin/ other medicines as needed   6. Will follow     .      Krysta White MD

## 2021-03-23 NOTE — PROGRESS NOTES
Progress Note( Dr. Myranda Light)  3/23/2021  Subjective:   Admit Date: 3/20/2021  PCP: Nathan Menendez MD    Admitted For : Shortness of breath    Consulted For: Better control of blood glucose    Interval History: Somewhat better    Denies any chest pains,   Denies SOB . Denies nausea or vomiting. No new bowel or bladder symptoms. Intake/Output Summary (Last 24 hours) at 3/23/2021 0725  Last data filed at 3/23/2021 0426  Gross per 24 hour   Intake 50 ml   Output 600 ml   Net -550 ml       DATA    CBC:   Recent Labs     03/20/21  1024 03/21/21  0621 03/22/21  0753   WBC 6.9 10.3 6.8   HGB 14.3 14.1 12.8*    290 228    CMP:  Recent Labs     03/20/21  1024 03/21/21  0621 03/22/21  0753   * 132* 135   K 4.3 4.8 4.1   CL 95* 95* 103   CO2 22 15* 19*   BUN 9 13 18   CREATININE 0.7* 0.9 0.8*   CALCIUM 9.3 9.4 8.5   PROT 7.2 7.8 6.3*   LABALBU 4.4 4.7 3.6   BILITOT 0.7 0.6 0.4   ALKPHOS 92 98 85   AST 47* 31 20   ALT 43* 43* 29     Lipids:   Lab Results   Component Value Date    CHOL 142 04/16/2011    HDL 41 04/16/2011    TRIG 268 04/16/2011     Glucose:  Recent Labs     03/22/21  1804 03/22/21 2020 03/23/21  0157   POCGLU 390* 339* 261*     HqcusrpyimL0F:  Lab Results   Component Value Date    LABA1C 9.3 03/20/2021     High Sensitivity TSH:   Lab Results   Component Value Date    TSHHS 2.685 04/16/2011     Free T3: No results found for: FT3  Free T4:No results found for: T4FREE    Xr Chest Portable    Result Date: 3/20/2021  EXAMINATION: ONE XRAY VIEW OF THE CHEST 3/20/2021 10:31 am COMPARISON: 04/22/2020 HISTORY: ORDERING SYSTEM PROVIDED HISTORY: Chest pain TECHNOLOGIST PROVIDED HISTORY: Reason for exam:->Chest pain FINDINGS: No focal lung consolidation. No pleural effusion or pneumothorax identified. The cardiomediastinal silhouette is normal in size for technique. No acute cardiopulmonary abnormality identified.        Scheduled Medicines   Medications:    insulin glargine  40 Units

## 2021-03-24 VITALS
SYSTOLIC BLOOD PRESSURE: 131 MMHG | OXYGEN SATURATION: 96 % | TEMPERATURE: 98.3 F | HEIGHT: 75 IN | BODY MASS INDEX: 38.51 KG/M2 | DIASTOLIC BLOOD PRESSURE: 75 MMHG | RESPIRATION RATE: 18 BRPM | HEART RATE: 83 BPM | WEIGHT: 309.7 LBS

## 2021-03-24 LAB
GLUCOSE BLD-MCNC: 161 MG/DL (ref 70–99)
GLUCOSE BLD-MCNC: 229 MG/DL (ref 70–99)
GLUCOSE BLD-MCNC: 230 MG/DL (ref 70–99)

## 2021-03-24 PROCEDURE — G0378 HOSPITAL OBSERVATION PER HR: HCPCS

## 2021-03-24 PROCEDURE — 94640 AIRWAY INHALATION TREATMENT: CPT

## 2021-03-24 PROCEDURE — 94761 N-INVAS EAR/PLS OXIMETRY MLT: CPT

## 2021-03-24 PROCEDURE — 6370000000 HC RX 637 (ALT 250 FOR IP): Performed by: HOSPITALIST

## 2021-03-24 PROCEDURE — 6370000000 HC RX 637 (ALT 250 FOR IP): Performed by: INTERNAL MEDICINE

## 2021-03-24 PROCEDURE — 6370000000 HC RX 637 (ALT 250 FOR IP): Performed by: FAMILY MEDICINE

## 2021-03-24 PROCEDURE — 2580000003 HC RX 258: Performed by: HOSPITALIST

## 2021-03-24 PROCEDURE — 96372 THER/PROPH/DIAG INJ SC/IM: CPT

## 2021-03-24 PROCEDURE — 6360000002 HC RX W HCPCS: Performed by: HOSPITALIST

## 2021-03-24 PROCEDURE — 82962 GLUCOSE BLOOD TEST: CPT

## 2021-03-24 RX ORDER — METHYLPREDNISOLONE 4 MG/1
TABLET ORAL
Qty: 1 KIT | Refills: 0 | Status: SHIPPED | OUTPATIENT
Start: 2021-03-24 | End: 2021-03-30

## 2021-03-24 RX ORDER — ALOGLIPTIN 12.5 MG/1
12.5 TABLET, FILM COATED ORAL
Qty: 30 TABLET | Refills: 3 | Status: SHIPPED | OUTPATIENT
Start: 2021-03-24 | End: 2022-07-21

## 2021-03-24 RX ORDER — ALBUTEROL SULFATE 90 UG/1
2 AEROSOL, METERED RESPIRATORY (INHALATION) EVERY 6 HOURS PRN
Qty: 1 INHALER | Refills: 5 | Status: SHIPPED | OUTPATIENT
Start: 2021-03-24

## 2021-03-24 RX ADMIN — IPRATROPIUM BROMIDE AND ALBUTEROL SULFATE 1 AMPULE: .5; 3 SOLUTION RESPIRATORY (INHALATION) at 11:44

## 2021-03-24 RX ADMIN — LISINOPRIL AND HYDROCHLOROTHIAZIDE 1 TABLET: 12.5; 1 TABLET ORAL at 11:06

## 2021-03-24 RX ADMIN — IPRATROPIUM BROMIDE AND ALBUTEROL SULFATE 1 AMPULE: .5; 3 SOLUTION RESPIRATORY (INHALATION) at 08:37

## 2021-03-24 RX ADMIN — PREDNISOLONE ACETATE 1 DROP: 10 SUSPENSION/ DROPS OPHTHALMIC at 11:04

## 2021-03-24 RX ADMIN — PANTOPRAZOLE SODIUM 40 MG: 20 TABLET, DELAYED RELEASE ORAL at 11:08

## 2021-03-24 RX ADMIN — BRIMONIDINE TARTRATE 1 DROP: 2 SOLUTION OPHTHALMIC at 11:05

## 2021-03-24 RX ADMIN — ALOGLIPTIN 25 MG: 12.5 TABLET, FILM COATED ORAL at 11:08

## 2021-03-24 RX ADMIN — METFORMIN HYDROCHLORIDE 850 MG: 850 TABLET ORAL at 11:08

## 2021-03-24 RX ADMIN — SODIUM CHLORIDE, PRESERVATIVE FREE 10 ML: 5 INJECTION INTRAVENOUS at 11:05

## 2021-03-24 RX ADMIN — IPRATROPIUM BROMIDE AND ALBUTEROL SULFATE 1 AMPULE: .5; 3 SOLUTION RESPIRATORY (INHALATION) at 03:56

## 2021-03-24 RX ADMIN — PREDNISONE 40 MG: 20 TABLET ORAL at 11:07

## 2021-03-24 RX ADMIN — ENOXAPARIN SODIUM 40 MG: 40 INJECTION SUBCUTANEOUS at 11:06

## 2021-03-24 RX ADMIN — DORZOLAMIDE HYDROCHLORIDE AND TIMOLOL MALEATE 1 DROP: 20; 5 SOLUTION/ DROPS OPHTHALMIC at 11:05

## 2021-03-24 RX ADMIN — ATORVASTATIN CALCIUM 10 MG: 10 TABLET, FILM COATED ORAL at 11:06

## 2021-03-24 RX ADMIN — AMLODIPINE BESYLATE 10 MG: 10 TABLET ORAL at 11:06

## 2021-03-24 ASSESSMENT — PAIN DESCRIPTION - PROGRESSION: CLINICAL_PROGRESSION: NOT CHANGED

## 2021-03-24 ASSESSMENT — PAIN SCALES - GENERAL: PAINLEVEL_OUTOF10: 0

## 2021-03-24 NOTE — PROGRESS NOTES
Attending Progress Note      PCP: Celsa Jack MD      Patient: Gabe Villela   Gender: male  : 1985   Age: 38 y.o. MRN: [id number]  Room  :  [address] [place] [address]      Date of Admission: 3/20/2021    Chief Complaint   Patient presents with    Shortness of Breath    Cough    Pharyngitis           Subjective:tight chest         Medications:  Reviewed  Infusion Medications    dextrose       Scheduled Medications    insulin glargine  50 Units Subcutaneous Nightly    insulin lispro  0-18 Units Subcutaneous 2 times per day    insulin lispro  0-18 Units Subcutaneous TID WC    metFORMIN  1,000 mg Oral BID WC    alogliptin  25 mg Oral Daily    insulin lispro  20 Units Subcutaneous TID WC    ipratropium-albuterol  1 ampule Inhalation Q4H    brimonidine  1 drop Right Eye BID    dorzolamide-timolol  1 drop Right Eye BID    prednisoLONE acetate  1 drop Right Eye BID    predniSONE  40 mg Oral Daily    lisinopril-hydroCHLOROthiazide  1 tablet Oral Daily    amLODIPine  10 mg Oral Daily    atorvastatin  10 mg Oral Daily    sodium chloride flush  10 mL Intravenous 2 times per day    enoxaparin  40 mg Subcutaneous Daily    pantoprazole  40 mg Oral Daily     PRN Meds: zolpidem, albuterol sulfate HFA **AND** ipratropium, ibuprofen, albuterol sulfate HFA, sodium chloride flush, promethazine **OR** ondansetron, polyethylene glycol, acetaminophen **OR** acetaminophen, glucose, dextrose, glucagon (rDNA), dextrose, benzocaine-menthol      Intake/Output Summary (Last 24 hours) at 3/24/2021 0021  Last data filed at 3/23/2021 0426  Gross per 24 hour   Intake 50 ml   Output 600 ml   Net -550 ml       Exam:  /80   Pulse 103   Temp 98.6 °F (37 °C) (Oral)   Resp 18   Ht 6' 3\" (1.905 m)   Wt (!) 309 lb 11.2 oz (140.5 kg)   SpO2 97%   BMI 38.71 kg/m²   General appearance: No distress,   Respiratory: tight chest  , no Rales , + wheezing, or rhonchi,  Cardiovascular: RRR, with normal S1/S2 .   Abdomen : Soft, non-tender, non-distended  , normal bowel sounds. Legs : No edema bilaterally. No DVT signs ,    Neurologic:  Alert and oriented ,        Labs:   Recent Labs     03/21/21  0621 03/22/21  0753   WBC 10.3 6.8   HGB 14.1 12.8*   HCT 40.0* 37.6*    228     Recent Labs     03/21/21  0621 03/22/21  0753   * 135   K 4.8 4.1   CL 95* 103   CO2 15* 19*   BUN 13 18   CREATININE 0.9 0.8*   CALCIUM 9.4 8.5     Recent Labs     03/21/21  0621 03/22/21  0753   AST 31 20   ALT 43* 29   BILITOT 0.6 0.4   ALKPHOS 98 85     No results for input(s): INR in the last 72 hours. No results for input(s): Francies Gathers in the last 72 hours. Assessment/Plan:  Active Hospital Problems    Diagnosis Date Noted    Shortness of breath [R06.02] 03/20/2021    Asthma exacerbation [J45.901] 03/20/2021       Plan:  hopefully home tomorrow   CSM  Tele : no event - no fever - on room air   Cont resp therapy   Glucose control   DVT Prophylaxis   Diet: DIET CARB CONTROL;  Code Status: Full Code        [unfilled]    Treatment progress and plan was d/w pt/family .     Westly Moritz, MD

## 2021-03-24 NOTE — DISCHARGE INSTR - COC
Continuity of Care Form    Patient Name: Yaneth Ni   :  1985  MRN:  [id number]    Admit date:  3/20/2021  Discharge date:  ***    Code Status Order: Full Code   Advance Directives:   [facility] Documentation     Date/Time Healthcare Directive Type of Healthcare Directive Copy in [facility] [address] Agent's Name Healthcare Agent's Phone Number    [phone]  No, patient does not have an advance directive for healthcare treatment -- -- -- -- --          Admitting Physician:  Allyn Amezcua MD  PCP: Martínez Muniz MD    Discharging Nurse: [facility] Unit/Room#: 6983/3076-I  Discharging Unit Phone Number: ***    Emergency Contact:   Extended Emergency Contact Information  Primary Emergency Contact: Tiana Tadeo  Address: [address] Phone: [phone]  Mobile Phone: [phone]  Relation: Parent    Past Surgical History:  Past Surgical History:   Procedure Laterality Date    CORNEAL TRANSPLANT Bilateral     EYE SURGERY      left eye removed    EYE SURGERY Right     FRACTURE SURGERY      foot left    MANDIBLE SURGERY Right     MANDIBLE SURGERY Bilateral     TOE AMPUTATION Right 2017       Immunization History:   Immunization History   Administered Date(s) Administered    Influenza Virus Vaccine 10/19/2016    Tdap (Boostrix, Adacel) 2019       Active Problems:  Patient Active Problem List   Diagnosis Code    Sprain of left ankle S93.402A    Closed nondisplaced fracture of fifth metatarsal bone of left foot S92.355A    Alcohol abuse F10.10    UTI (urinary tract infection) due to Enterococcus N39.0, B95.2    Acute osteomyelitis of toe, right ([address] M86.171    Osteomyelitis ([address] M86.9    Diabetic foot infection ([facility] [address] E11.628, L08.9    Shortness of breath R06.02    Asthma exacerbation J45.901       Isolation/Infection:   Isolation          Contact        Patient Infection Status     Infection Onset Added Last Indicated Last Indicated By Review Planned Expiration Resolved Resolved By    None active    Resolved    COVID-19 Rule Out 03/20/21 03/20/21 03/20/21 COVID-19, Rapid (Ordered)   03/20/21 Rule-Out Test Resulted    MRSA  07/24/17 03/14/20 Culture, Wound   03/22/21 Glenn Yepez LPN    7/88/0946 wound; 7/27/19 wound; 12/23/18 wound; 7/18/17 wound          Nurse Assessment:  Last Vital Signs: /75   Pulse 83   Temp 98.3 °F (36.8 °C) (Oral)   Resp 18   Ht 6' 3\" (1.905 m)   Wt (!) 309 lb 11.2 oz (140.5 kg)   SpO2 96%   BMI 38.71 kg/m²     Last documented pain score (0-10 scale): Pain Level: 0  Last Weight:   Wt Readings from Last 1 Encounters:   03/23/21 (!) 309 lb 11.2 oz (140.5 kg)     Mental Status:  {IP PT MENTAL STATUS:20030}    IV Access:  { MINNA IV ACCESS:725482807}    Nursing Mobility/ADLs:  Walking   {CHP DME TVMS:647571825}  Transfer  {CHP DME PYNV:224789217}  Bathing  {CHP DME XYRB:007007290}  Dressing  {CHP DME BNFP:519819233}  Toileting  {CHP DME LIES:802857007}  Feeding  {P DME FAAS:323902608}  Med Admin  {P DME FQAO:843276986}  Med Delivery   { MINNA MED Delivery:071217057}    Wound Care Documentation and Therapy:  Wound 05/12/17 Abrasion(s) Back Right; Lower (Active)   Number of days: 1412       Wound 12/23/18 Foot very small hole- tip of cotton tip goes in  (Active)   Number of days: 821       Wound  Toe (Comment  which one) Anterior;Right (Active)   Number of days:         Elimination:  Continence:   · Bowel: {YES / LN:24614}  · Bladder: {YES / CU:45256}  Urinary Catheter: {Urinary Catheter:007375550}   Colostomy/Ileostomy/Ileal Conduit: {YES / XK:61677}       Date of Last BM: ***  No intake or output data in the 24 hours ending 03/24/21 1206  No intake/output data recorded.     Safety Concerns:     [address] MINNA Safety Concerns:251508280}    Impairments/Disabilities:      Evi8 Jeimy BUITRAGO Impairments/Disabilities:757115687}    Nutrition Therapy:  Current Nutrition Therapy:   [address] MINNA Diet List:970327855}    Routes of Feeding: {CHP DME Other Feedings:541032677}  Liquids: {Slp liquid thickness:94177}  Daily Fluid Restriction: {CHP DME Yes amt example:502611460}  Last Modified Barium Swallow with Video (Video Swallowing Test): {Done Not Done BZVL:160542103}    Treatments at the Time of Hospital Discharge:   Respiratory Treatments: ***  Oxygen Therapy:  {Therapy; copd oxygen:08380}  Ventilator:    {[facility] Vent HYBS:323853417}    Rehab Therapies: {THERAPEUTIC INTERVENTION:7853222177}  Weight Bearing Status/Restrictions: { CC Weight Bearin}  Other Medical Equipment (for information only, NOT a DME order):  {EQUIPMENT:878333855}  Other Treatments: ***    Patient's personal belongings (please select all that are sent with patient):  {[facility] DME Belongings:541023613}    RN SIGNATURE:  {Esignature:248704248}    CASE MANAGEMENT/SOCIAL WORK SECTION    Inpatient Status Date: ***    Readmission Risk Assessment Score:  Readmission Risk              Risk of Unplanned Readmission:        0           Discharging to Facility/ Agency   · Name:   · Address:  · Phone:  · Fax:    Dialysis Facility (if applicable)   · Name:  · Address:  · Dialysis Schedule:  · Phone:  · Fax:    / signature: {Esignature:780331996}    PHYSICIAN SECTION    Prognosis: {Prognosis:4809683291}    Condition at Discharge: [address] Patient Condition:380640001}    Rehab Potential (if transferring to Rehab): {Prognosis:7248700536}    Recommended Labs or Other Treatments After Discharge: ***    Physician Certification: I certify the above information and transfer of Elyse Solis  is necessary for the continuing treatment of the diagnosis listed and that he requires {Admit to Appropriate Level of Care:59513} for {GREATER/LESS:362650256} 30 days.      Update Admission H&P: {CHP DME Changes in [facility]:817744619}    PHYSICIAN SIGNATURE:  {Esignature:588212269}

## 2021-03-24 NOTE — DISCHARGE INSTR - DIET

## 2021-03-24 NOTE — PLAN OF CARE
Problem: Falls - Risk of:  Goal: Will remain free from falls  Description: Will remain free from falls  3/21/2021 0808 by Gilda Salazar RN  Outcome: Ongoing  3/20/2021 2303 by Janes Haskins RN  Outcome: Ongoing  Goal: Absence of physical injury  Description: Absence of physical injury  3/21/2021 0808 by Gilda Salazar RN  Outcome: Ongoing  3/20/2021 2303 by Janes Haskins RN  Outcome: Ongoing     Problem: Pain:  Description: Pain management should include both nonpharmacologic and pharmacologic interventions.   Goal: Pain level will decrease  Description: Pain level will decrease  3/21/2021 6019 by Gilda Salazar RN  Outcome: Ongoing  3/20/2021 2303 by Janes Haskins RN  Outcome: Ongoing  Goal: Control of acute pain  Description: Control of acute pain  3/21/2021 0808 by Gilda Salazar RN  Outcome: Ongoing  3/20/2021 2303 by Janes Haskins RN  Outcome: Ongoing  Goal: Control of chronic pain  Description: Control of chronic pain  3/21/2021 0808 by Gilda Salazar RN  Outcome: Ongoing  3/20/2021 2303 by Janes Haskins RN  Outcome: Ongoing  Goal: Patient's pain/discomfort is manageable  Description: Patient's pain/discomfort is manageable  Outcome: Ongoing     Problem: Infection:  Goal: Will remain free from infection  Description: Will remain free from infection  Outcome: Ongoing     Problem: Safety:  Goal: Free from accidental physical injury  Description: Free from accidental physical injury  Outcome: Ongoing  Goal: Free from intentional harm  Description: Free from intentional harm  Outcome: Ongoing     Problem: Daily Care:  Goal: Daily care needs are met  Description: Daily care needs are met  Outcome: Ongoing     Problem: Skin Integrity:  Goal: Skin integrity will stabilize  Description: Skin integrity will stabilize  Outcome: Ongoing     Problem: Discharge Planning:  Goal: Patients continuum of care needs are met  Description: Patients continuum of care needs are met  Outcome: Ongoing
Problem: Falls - Risk of:  Goal: Will remain free from falls  Description: Will remain free from falls  Outcome: Completed  Goal: Absence of physical injury  Description: Absence of physical injury  Outcome: Completed     Problem: Pain:  Goal: Pain level will decrease  Description: Pain level will decrease  Outcome: Completed  Goal: Control of acute pain  Description: Control of acute pain  Outcome: Completed  Goal: Control of chronic pain  Description: Control of chronic pain  Outcome: Completed  Goal: Patient's pain/discomfort is manageable  Description: Patient's pain/discomfort is manageable  Outcome: Completed     Problem: Infection:  Goal: Will remain free from infection  Description: Will remain free from infection  Outcome: Completed     Problem: Safety:  Goal: Free from accidental physical injury  Description: Free from accidental physical injury  Outcome: Completed  Goal: Free from intentional harm  Description: Free from intentional harm  Outcome: Completed     Problem: Daily Care:  Goal: Daily care needs are met  Description: Daily care needs are met  Outcome: Completed     Problem: Skin Integrity:  Goal: Skin integrity will stabilize  Description: Skin integrity will stabilize  Outcome: Completed     Problem: Discharge Planning:  Goal: Patients continuum of care needs are met  Description: Patients continuum of care needs are met  Outcome: Completed
met  Description: Daily care needs are met  3/23/2021 1021 by Nilsa Trejo RN  Outcome: Ongoing  3/23/2021 0254 by Ruma Adler RN  Outcome: Ongoing     Problem: Skin Integrity:  Goal: Skin integrity will stabilize  Description: Skin integrity will stabilize  3/23/2021 1021 by Nilsa Trejo RN  Outcome: Ongoing  3/23/2021 0254 by Ruma Adler RN  Outcome: Ongoing     Problem: Discharge Planning:  Goal: Patients continuum of care needs are met  Description: Patients continuum of care needs are met  3/23/2021 1021 by Nilsa Trejo RN  Outcome: Ongoing  3/23/2021 0254 by Ruma Adler RN  Outcome: Ongoing

## 2021-03-24 NOTE — DISCHARGE SUMMARY
Patient: Darcy Clark MD      Gender: male  : 1985   Age: 38 y.o. MRN: [id number]    Admitting Physician: Abhilash Talbot MD  Discharge Physician: Yojana Manjarrez MD     Code Status: Full Code     Admit Date: 3/20/2021   Discharge Date: 21      Disposition:  Home       Condition at Discharge:  stable . Follow-up appointments:  f/u one week with PCP , and with consultants as recommended . Outpatient to do list: f/u     Pt`s preferred phone number :  [facility]  Extended Emergency Contact Information  Primary Emergency Contact: Tiana Tadeo  Address: [address] Phone: [phone]  Mobile Phone: [phone]  Relation: Parent      Discharge Diagnoses: Active Hospital Problems    Diagnosis    Shortness of breath [R06.02]    Asthma exacerbation [J45.901]       Chief Complaint: Shortness of Breath, Cough, and Pharyngitis        Yoselyn Calvillo is a 38 y.o.  male  who presents with worsening shortness of breath associated with wheezing, sore throat, mild headaches, fatigue, and productive cough that has been going on for the past couple days. Patient has a history of asthma and has used his breathing treatment without any improvement. Denies fever, chills, chest pain, leg swelling, history of DVT/PE, or lightheaded. Other medical history tobacco abuse, obesity, HTN, HLD, DMII, and cocaine abuse. Hospital Course:   Asthma exacerbation  SARS CoV-2,NAAT I negative. Negative strep test and influenza A & B  -Observe overnight  -Steroid IV and DuoNeb's   -Oxygen PRN        DMII without chronic complications  Last 9.1 (2019).   -Hold oral medication while inpatient  -SSI  -Check A1c     HTN, uncontrolled  -Continue Prinzide and Norvasc  -Monitor BP     HLD  -Continue Lipitor     Cocaine abuse, hx  Last use 3 days ago.    -UDS pending     Alcohol use disorder   -States use occasionally  -Not hx of withdrawal     Tobacco abuse  -Educated about cessation     Obesity class II with BMI of 37.5  -Educated about lifestyle changes    Consults. IP CONSULT TO HOSPITALIST  IP CONSULT TO ENDOCRINOLOGY  IP CONSULT TO FAMILY MEDICINE  IP CONSULT TO HOME CARE NEEDS        Discharge Medications:   Current Discharge Medication List      START taking these medications    Details   methylPREDNISolone (MEDROL DOSEPACK) 4 MG tablet Take by mouth.   Qty: 1 kit, Refills: 0      alogliptin (NESINA) 12.5 MG TABS tablet Take 1 tablet by mouth Daily with supper  Qty: 30 tablet, Refills: 3           Current Discharge Medication List      CONTINUE these medications which have CHANGED    Details   albuterol sulfate  (90 Base) MCG/ACT inhaler Inhale 2 puffs into the lungs every 6 hours as needed for Wheezing  Qty: 1 Inhaler, Refills: 5      albuterol-ipratropium (COMBIVENT RESPIMAT)  MCG/ACT AERS inhaler Inhale 1 puff into the lungs every 6 hours  Qty: 1 Inhaler, Refills: 5           Current Discharge Medication List      CONTINUE these medications which have NOT CHANGED    Details   acetaminophen (AMINOFEN) 325 MG tablet Take 2 tablets by mouth every 6 hours as needed for Pain  Qty: 20 tablet, Refills: 0      brimonidine (ALPHAGAN) 0.2 % ophthalmic solution Place 1 drop into the right eye 2 times daily      dorzolamide-timolol 22.3-6.8 MG/ML SOLN Place 1 drop into the right eye 2 times daily      Ertugliflozin L-PyroglutamicAc (STEGLATRO) 15 MG TABS Take 1 tablet by mouth daily      prednisoLONE acetate (PRED FORTE) 1 % ophthalmic suspension Place 1 drop into the right eye 2 times daily      allopurinol (ZYLOPRIM) 300 MG tablet Take 300 mg by mouth daily       atorvastatin (LIPITOR) 10 MG tablet Take 10 mg by mouth daily       lisinopril-hydrochlorothiazide (PRINZIDE;ZESTORETIC) 20-12.5 MG per tablet Take 1 tablet by mouth daily       glipiZIDE (GLUCOTROL) 10 MG tablet Take 10 mg by mouth 2 times daily (before meals)      metFORMIN (GLUCOPHAGE) 1000 MG tablet Take 1,000 mg by mouth 2 times daily (with meals)      amLODIPine (NORVASC) 10 MG tablet Take 10 mg by mouth daily      omeprazole (PRILOSEC) 20 MG capsule Take 40 mg by mouth daily            Current Discharge Medication List      STOP taking these medications       naproxen (NAPROSYN) 500 MG tablet Comments:   Reason for Stopping:         naproxen (NAPROSYN) 500 MG tablet Comments:   Reason for Stopping:         ezetimibe (ZETIA) 10 MG tablet Comments:   Reason for Stopping:               Discharge ROS:  A complete review of systems was asked and negative . Discharge Exam:  Estimated body mass index is 38.71 kg/m² as calculated from the following:    Height as of this encounter: 6' 3\" (1.905 m). Weight as of this encounter: 309 lb 11.2 oz (140.5 kg). /79   Pulse 81   Temp 98.4 °F (36.9 °C) (Oral)   Resp 18   Ht 6' 3\" (1.905 m)   Wt (!) 309 lb 11.2 oz (140.5 kg)   SpO2 95%   BMI 38.71 kg/m²   General appearance:  NAD  Heart[de-identified] Normal s1/s2, RRR, no murmurs, gallops, or rubs. No leg edema  Lungs:  Clear to auscultation, bilaterally without Rales/Wheezes/Rhonchi. Abdomen: Soft, non-tender, non-distended, bowel sounds present  Musculoskeletal:   no cyanosis, no edema  Neurologic:  Cranial nerves: II-XII intact, grossly non-focal.  Psychiatric:  A & O x3      Labs:  For convenience and continuity at follow-up the following most recent labs are provided:    Lab Results   Component Value Date    WBC 6.8 03/22/2021    HGB 12.8 03/22/2021    HCT 37.6 03/22/2021    MCV 92.8 03/22/2021     03/22/2021     03/22/2021    K 4.1 03/22/2021     03/22/2021    CO2 19 03/22/2021    BUN 18 03/22/2021    CREATININE 0.8 03/22/2021    CALCIUM 8.5 03/22/2021    PHOS 2.6 05/09/2017    BNP <2 12/06/2010    ALKPHOS 85 03/22/2021    ALT 29 03/22/2021    AST 20 03/22/2021    BILITOT 0.4 03/22/2021    BILIDIR 0.3 05/12/2017    LABALBU 3.6 03/22/2021    TRIG 268 04/16/2011     Lab Results   Component Value Date    INR 0.97 07/27/2019       Results for orders placed or performed during the hospital encounter of 03/20/21   Strep Screen Group A  - Throat    Specimen: Throat   Result Value Ref Range    Specimen THROAT     Special Requests NONE     Strep A Direct Screen NEGATIVE     Culture       Final Report Normal oral demetria, No Beta Strep isolated   Rapid influenza A/B antigens    Specimen: Nasopharyngeal   Result Value Ref Range    Rapid Influenza A Ag NEGATIVE NEGATIVE    Rapid Influenza B Ag NEGATIVE NEGATIVE   COVID-19, Rapid    Specimen: Nasopharyngeal   Result Value Ref Range    SARS-CoV-2, NAAT NOT DETECTED    Culture, Blood 2    Specimen: Blood gases   Result Value Ref Range    Specimen BLOOD     Special Requests NONE     Culture NO GROWTH AT 4 DAYS    CBC Auto Differential   Result Value Ref Range    WBC 6.9 4.0 - 10.5 K/CU MM    RBC 4.42 (L) 4.6 - 6.2 M/CU MM    Hemoglobin 14.3 13.5 - 18.0 GM/DL    Hematocrit 40.5 (L) 42 - 52 %    MCV 91.6 78 - 100 FL    MCH 32.4 (H) 27 - 31 PG    MCHC 35.3 32.0 - 36.0 %    RDW 12.2 11.7 - 14.9 %    Platelets 699 934 - 511 K/CU MM    MPV 8.8 7.5 - 11.1 FL    Differential Type AUTOMATED DIFFERENTIAL     Segs Relative 74.6 (H) 36 - 66 %    Lymphocytes % 15.8 (L) 24 - 44 %    Monocytes % 7.7 (H) 0 - 4 %    Eosinophils % 1.2 0 - 3 %    Basophils % 0.6 0 - 1 %    Segs Absolute 5.1 K/CU MM    Lymphocytes Absolute 1.1 K/CU MM    Monocytes Absolute 0.5 K/CU MM    Eosinophils Absolute 0.1 K/CU MM    Basophils Absolute 0.0 K/CU MM    Nucleated RBC % 0.0 %    Total Nucleated RBC 0.0 K/CU MM    Total Immature Neutrophil 0.01 K/CU MM    Immature Neutrophil % 0.1 0 - 0.43 %   CMP   Result Value Ref Range    Sodium 133 (L) 135 - 145 MMOL/L    Potassium 4.3 3.5 - 5.1 MMOL/L    Chloride 95 (L) 99 - 110 mMol/L    CO2 22 21 - 32 MMOL/L    BUN 9 6 - 23 MG/DL    CREATININE 0.7 (L) 0.9 - 1.3 MG/DL    Glucose 245 (H) 70 - 99 MG/DL Calcium 9.3 8.3 - 10.6 MG/DL    Albumin 4.4 3.4 - 5.0 GM/DL    Total Protein 7.2 6.4 - 8.2 GM/DL    Total Bilirubin 0.7 0.0 - 1.0 MG/DL    ALT 43 (H) 10 - 40 U/L    AST 47 (H) 15 - 37 IU/L    Alkaline Phosphatase 92 40 - 129 IU/L    GFR Non-African American >60 >60 mL/min/1.73m2    GFR African American >60 >60 mL/min/1.73m2    Anion Gap 16 4 - 16   Lactate, Sepsis   Result Value Ref Range    Lactic Acid, Sepsis 1.8 0.5 - 1.9 mMOL/L   Lactate, Sepsis   Result Value Ref Range    Lactic Acid, Sepsis 1.1 0.5 - 1.9 mMOL/L   D-dimer, Quantitative   Result Value Ref Range    D-Dimer, Quant <200 <230 NG/mL(DDU)   Drug screen multi urine   Result Value Ref Range    Cannabinoid Scrn, Ur NEGATIVE NEGATIVE    Amphetamines NEGATIVE NEGATIVE    Cocaine Metabolite UNCONFIRMED POSITIVE (A) NEGATIVE    Benzodiazepine Screen, Urine NEGATIVE NEGATIVE    Barbiturate Screen, Ur NEGATIVE NEGATIVE    Opiates, Urine NEGATIVE NEGATIVE    Phencyclidine, Urine NEGATIVE NEGATIVE    Oxycodone NEGATIVE NEGATIVE   Hemoglobin A1c   Result Value Ref Range    Hemoglobin A1C 9.3 (H) 4.2 - 6.3 %    eAG 220 mg/dL   Comprehensive Metabolic Panel w/ Reflex to MG   Result Value Ref Range    Sodium 132 (L) 135 - 145 MMOL/L    Potassium 4.8 3.5 - 5.1 MMOL/L    Chloride 95 (L) 99 - 110 mMol/L    CO2 15 (L) 21 - 32 MMOL/L    BUN 13 6 - 23 MG/DL    CREATININE 0.9 0.9 - 1.3 MG/DL    Glucose 291 (H) 70 - 99 MG/DL    Calcium 9.4 8.3 - 10.6 MG/DL    Albumin 4.7 3.4 - 5.0 GM/DL    Total Protein 7.8 6.4 - 8.2 GM/DL    Total Bilirubin 0.6 0.0 - 1.0 MG/DL    ALT 43 (H) 10 - 40 U/L    AST 31 15 - 37 IU/L    Alkaline Phosphatase 98 40 - 128 IU/L    GFR Non-African American >60 >60 mL/min/1.73m2    GFR African American >60 >60 mL/min/1.73m2    Anion Gap 22 (H) 4 - 16   CBC auto differential   Result Value Ref Range    WBC 10.3 4.0 - 10.5 K/CU MM    RBC 4.34 (L) 4.6 - 6.2 M/CU MM    Hemoglobin 14.1 13.5 - 18.0 GM/DL    Hematocrit 40.0 (L) 42 - 52 %    MCV 92.2 78 - 100 FL    MCH 32.5 (H) 27 - 31 PG    MCHC 35.3 32.0 - 36.0 %    RDW 12.2 11.7 - 14.9 %    Platelets 128 088 - 050 K/CU MM    MPV 9.0 7.5 - 11.1 FL    Immature Neutrophil % 0.4 0 - 0.43 %    Segs Relative 89.8 (H) 36 - 66 %    Eosinophils % 0.0 0 - 3 %    Basophils % 0.1 0 - 1 %    Lymphocytes % 5.1 (L) 24 - 44 %    Monocytes % 4.6 (H) 0 - 4 %    Total Immature Neutrophil 0.04 K/CU MM    Segs Absolute 9.3 K/CU MM    Eosinophils Absolute 0.0 K/CU MM    Basophils Absolute 0.0 K/CU MM    Lymphocytes Absolute 0.5 K/CU MM    Monocytes Absolute 0.5 K/CU MM    Differential Type AUTOMATED DIFFERENTIAL    CBC auto differential   Result Value Ref Range    WBC 6.8 4.0 - 10.5 K/CU MM    RBC 4.05 (L) 4.6 - 6.2 M/CU MM    Hemoglobin 12.8 (L) 13.5 - 18.0 GM/DL    Hematocrit 37.6 (L) 42 - 52 %    MCV 92.8 78 - 100 FL    MCH 31.6 (H) 27 - 31 PG    MCHC 34.0 32.0 - 36.0 %    RDW 12.1 11.7 - 14.9 %    Platelets 106 734 - 717 K/CU MM    MPV 8.9 7.5 - 11.1 FL    Differential Type AUTOMATED DIFFERENTIAL     Segs Relative 69.6 (H) 36 - 66 %    Lymphocytes % 20.1 (L) 24 - 44 %    Monocytes % 9.5 (H) 0 - 4 %    Eosinophils % 0.1 0 - 3 %    Basophils % 0.3 0 - 1 %    Segs Absolute 4.7 K/CU MM    Lymphocytes Absolute 1.4 K/CU MM    Monocytes Absolute 0.7 K/CU MM    Eosinophils Absolute 0.0 K/CU MM    Basophils Absolute 0.0 K/CU MM    Nucleated RBC % 0.0 %    Total Nucleated RBC 0.0 K/CU MM    Total Immature Neutrophil 0.03 K/CU MM    Immature Neutrophil % 0.4 0 - 0.43 %   Comprehensive Metabolic Panel w/ Reflex to MG   Result Value Ref Range    Sodium 135 135 - 145 MMOL/L    Potassium 4.1 3.5 - 5.1 MMOL/L    Chloride 103 99 - 110 mMol/L    CO2 19 (L) 21 - 32 MMOL/L    BUN 18 6 - 23 MG/DL    CREATININE 0.8 (L) 0.9 - 1.3 MG/DL    Glucose 208 (H) 70 - 99 MG/DL    Calcium 8.5 8.3 - 10.6 MG/DL    Albumin 3.6 3.4 - 5.0 GM/DL    Total Protein 6.3 (L) 6.4 - 8.2 GM/DL    Total Bilirubin 0.4 0.0 - 1.0 MG/DL    ALT 29 10 - 40 U/L    AST 20 15 - 37 IU/L Alkaline Phosphatase 85 40 - 129 IU/L    GFR Non-African American >60 >60 mL/min/1.73m2    GFR African American >60 >60 mL/min/1.73m2    Anion Gap 13 4 - 16   POCT Glucose   Result Value Ref Range    POC Glucose 275 (H) 70 - 99 MG/DL   POCT Glucose   Result Value Ref Range    POC Glucose 422 (H) 70 - 99 MG/DL   POCT Glucose   Result Value Ref Range    POC Glucose 396 (H) 70 - 99 MG/DL   POCT Glucose   Result Value Ref Range    POC Glucose 359 (H) 70 - 99 MG/DL   POCT Glucose   Result Value Ref Range    POC Glucose 386 (H) 70 - 99 MG/DL   POCT Glucose   Result Value Ref Range    POC Glucose 337 (H) 70 - 99 MG/DL   POCT Glucose   Result Value Ref Range    POC Glucose 268 (H) 70 - 99 MG/DL   POCT Glucose   Result Value Ref Range    POC Glucose 214 (H) 70 - 99 MG/DL   POCT Glucose   Result Value Ref Range    POC Glucose 360 (H) 70 - 99 MG/DL   POCT Glucose   Result Value Ref Range    POC Glucose 390 (H) 70 - 99 MG/DL   POCT Glucose   Result Value Ref Range    POC Glucose 339 (H) 70 - 99 MG/DL   POCT Glucose   Result Value Ref Range    POC Glucose 261 (H) 70 - 99 MG/DL   POCT Glucose   Result Value Ref Range    POC Glucose 170 (H) 70 - 99 MG/DL   POCT Glucose   Result Value Ref Range    POC Glucose 289 (H) 70 - 99 MG/DL   POCT Glucose   Result Value Ref Range    POC Glucose 261 (H) 70 - 99 MG/DL   POCT Glucose   Result Value Ref Range    POC Glucose 249 (H) 70 - 99 MG/DL   POCT Glucose   Result Value Ref Range    POC Glucose 230 (H) 70 - 99 MG/DL   POCT Glucose   Result Value Ref Range    POC Glucose 161 (H) 70 - 99 MG/DL   EKG 12 Lead   Result Value Ref Range    Ventricular Rate 114 BPM    Atrial Rate 114 BPM    P-R Interval 176 ms    QRS Duration 78 ms    Q-T Interval 336 ms    QTc Calculation (Bazett) 463 ms    P Axis 55 degrees    R Axis -12 degrees    T Axis 25 degrees    Diagnosis       Sinus tachycardia  Possible Left atrial enlargement  Borderline ECG  When compared with ECG of 22-APR-2020 21:25,  No significant change was found  Confirmed by Cuca Danielson [id number]) on 3/22/2021 6:04:16 PM           Chart review shows recent radiographs:  Xr Chest Portable    Result Date: 3/20/2021  EXAMINATION: ONE XRAY VIEW OF THE CHEST 3/20/2021 10:31 am COMPARISON: 04/22/2020 HISTORY: ORDERING SYSTEM PROVIDED HISTORY: Chest pain TECHNOLOGIST PROVIDED HISTORY: Reason for exam:->Chest pain FINDINGS: No focal lung consolidation. No pleural effusion or pneumothorax identified. The cardiomediastinal silhouette is normal in size for technique. No acute cardiopulmonary abnormality identified. EKG     Rhythm: normal sinus       Immunization History   Administered Date(s) Administered    Influenza Virus Vaccine 10/19/2016    Tdap (Boostrix, Adacel) 07/26/2019         The patient was seen and examined on day of discharge and this discharge summary is in conjunction with any daily progress note from day of discharge. Time Spent on discharge is   >35  min  in the examination, evaluation, counseling and review of medications and discharge plan.             SignedSita Joyner MD   3/24/2021

## 2021-03-24 NOTE — CARE COORDINATION
Pt discharged home with [facility]. vm left/faxed info to intake at [facility] [id number] with pt and he is agreeable to home with [facility]. He has a ride coming to get him. No other needs identified at this time.

## 2021-03-25 LAB
CULTURE: NORMAL
EKG ATRIAL RATE: 114 BPM
EKG DIAGNOSIS: NORMAL
EKG P AXIS: 55 DEGREES
EKG P-R INTERVAL: 176 MS
EKG Q-T INTERVAL: 336 MS
EKG QRS DURATION: 78 MS
EKG QTC CALCULATION (BAZETT): 463 MS
EKG R AXIS: -12 DEGREES
EKG T AXIS: 25 DEGREES
EKG VENTRICULAR RATE: 114 BPM
Lab: NORMAL
SPECIMEN: NORMAL

## 2021-04-29 ENCOUNTER — HOSPITAL ENCOUNTER (OUTPATIENT)
Dept: GENERAL RADIOLOGY | Age: 36
Discharge: HOME | End: 2021-04-29
Payer: COMMERCIAL

## 2021-04-29 ENCOUNTER — HOSPITAL ENCOUNTER (OUTPATIENT)
Age: 36
Discharge: HOME | End: 2021-04-29
Payer: COMMERCIAL

## 2021-04-29 DIAGNOSIS — M79.671 RIGHT FOOT PAIN: ICD-10-CM

## 2021-04-29 LAB
ALBUMIN SERPL-MCNC: 4.5 GM/DL (ref 3.4–5)
ALP BLD-CCNC: 151 IU/L (ref 40–129)
ALT SERPL-CCNC: 39 U/L (ref 10–40)
ANION GAP SERPL CALCULATED.3IONS-SCNC: 15 MMOL/L (ref 4–16)
AST SERPL-CCNC: 33 IU/L (ref 15–37)
BILIRUB SERPL-MCNC: 0.5 MG/DL (ref 0–1)
BUN BLDV-MCNC: 12 MG/DL (ref 6–23)
CALCIUM SERPL-MCNC: 9.5 MG/DL (ref 8.3–10.6)
CHLORIDE BLD-SCNC: 90 MMOL/L (ref 99–110)
CO2: 23 MMOL/L (ref 21–32)
CREAT SERPL-MCNC: 0.9 MG/DL (ref 0.9–1.3)
GFR AFRICAN AMERICAN: >60 ML/MIN/1.73M2
GFR NON-AFRICAN AMERICAN: >60 ML/MIN/1.73M2
GLUCOSE BLD-MCNC: 387 MG/DL (ref 70–99)
POTASSIUM SERPL-SCNC: 4 MMOL/L (ref 3.5–5.1)
SODIUM BLD-SCNC: 128 MMOL/L (ref 135–145)
TOTAL PROTEIN: 7.3 GM/DL (ref 6.4–8.2)
URIC ACID: 8.4 MG/DL (ref 3.5–7.2)

## 2021-04-29 PROCEDURE — 84550 ASSAY OF BLOOD/URIC ACID: CPT

## 2021-04-29 PROCEDURE — 80053 COMPREHEN METABOLIC PANEL: CPT

## 2021-04-29 PROCEDURE — 73630 X-RAY EXAM OF FOOT: CPT

## 2021-04-29 PROCEDURE — 36415 COLL VENOUS BLD VENIPUNCTURE: CPT

## 2021-08-01 ENCOUNTER — HOSPITAL ENCOUNTER (EMERGENCY)
Age: 36
Discharge: HOME | End: 2021-08-01
Payer: COMMERCIAL

## 2021-08-01 ENCOUNTER — APPOINTMENT (OUTPATIENT)
Dept: GENERAL RADIOLOGY | Age: 36
End: 2021-08-01
Payer: COMMERCIAL

## 2021-08-01 VITALS
RESPIRATION RATE: 23 BRPM | BODY MASS INDEX: 37.3 KG/M2 | OXYGEN SATURATION: 99 % | SYSTOLIC BLOOD PRESSURE: 146 MMHG | HEIGHT: 75 IN | TEMPERATURE: 97.8 F | WEIGHT: 300 LBS | HEART RATE: 90 BPM | DIASTOLIC BLOOD PRESSURE: 84 MMHG

## 2021-08-01 DIAGNOSIS — R10.9 LEFT FLANK PAIN: Primary | ICD-10-CM

## 2021-08-01 DIAGNOSIS — E87.1 HYPONATREMIA: ICD-10-CM

## 2021-08-01 DIAGNOSIS — R10.13 DYSPEPSIA: ICD-10-CM

## 2021-08-01 LAB
ALBUMIN SERPL-MCNC: 4.3 GM/DL (ref 3.4–5)
ALP BLD-CCNC: 110 IU/L (ref 40–129)
ALT SERPL-CCNC: 49 U/L (ref 10–40)
AMPHETAMINES: NEGATIVE
ANION GAP SERPL CALCULATED.3IONS-SCNC: 14 MMOL/L (ref 4–16)
AST SERPL-CCNC: 44 IU/L (ref 15–37)
BACTERIA: NEGATIVE /HPF
BARBITURATE SCREEN URINE: NEGATIVE
BASOPHILS ABSOLUTE: 0 K/CU MM
BASOPHILS RELATIVE PERCENT: 0.9 % (ref 0–1)
BENZODIAZEPINE SCREEN, URINE: NEGATIVE
BILIRUB SERPL-MCNC: 0.3 MG/DL (ref 0–1)
BILIRUBIN URINE: NEGATIVE MG/DL
BLOOD, URINE: NEGATIVE
BUN BLDV-MCNC: 8 MG/DL (ref 6–23)
CALCIUM SERPL-MCNC: 8.4 MG/DL (ref 8.3–10.6)
CANNABINOID SCREEN URINE: NEGATIVE
CHLORIDE BLD-SCNC: 92 MMOL/L (ref 99–110)
CLARITY: CLEAR
CO2: 19 MMOL/L (ref 21–32)
COCAINE METABOLITE: NEGATIVE
COLOR: COLORLESS
CREAT SERPL-MCNC: 0.7 MG/DL (ref 0.9–1.3)
DIFFERENTIAL TYPE: ABNORMAL
EKG ATRIAL RATE: 93 BPM
EKG DIAGNOSIS: NORMAL
EKG P AXIS: 37 DEGREES
EKG P-R INTERVAL: 170 MS
EKG Q-T INTERVAL: 354 MS
EKG QRS DURATION: 84 MS
EKG QTC CALCULATION (BAZETT): 440 MS
EKG R AXIS: 15 DEGREES
EKG T AXIS: -2 DEGREES
EKG VENTRICULAR RATE: 93 BPM
EOSINOPHILS ABSOLUTE: 0.1 K/CU MM
EOSINOPHILS RELATIVE PERCENT: 1.8 % (ref 0–3)
GFR AFRICAN AMERICAN: >60 ML/MIN/1.73M2
GFR NON-AFRICAN AMERICAN: >60 ML/MIN/1.73M2
GLUCOSE BLD-MCNC: 337 MG/DL (ref 70–99)
GLUCOSE, URINE: >500 MG/DL
HCT VFR BLD CALC: 34.5 % (ref 42–52)
HEMOGLOBIN: 12.7 GM/DL (ref 13.5–18)
IMMATURE NEUTROPHIL %: 0.3 % (ref 0–0.43)
KETONES, URINE: ABNORMAL MG/DL
LEUKOCYTE ESTERASE, URINE: NEGATIVE
LIPASE: 26 IU/L (ref 13–60)
LYMPHOCYTES ABSOLUTE: 1 K/CU MM
LYMPHOCYTES RELATIVE PERCENT: 30 % (ref 24–44)
MCH RBC QN AUTO: 32.6 PG (ref 27–31)
MCHC RBC AUTO-ENTMCNC: 36.8 % (ref 32–36)
MCV RBC AUTO: 88.7 FL (ref 78–100)
MONOCYTES ABSOLUTE: 0.2 K/CU MM
MONOCYTES RELATIVE PERCENT: 6.4 % (ref 0–4)
NITRITE URINE, QUANTITATIVE: NEGATIVE
NUCLEATED RBC %: 0 %
OPIATES, URINE: NEGATIVE
OXYCODONE: NEGATIVE
PDW BLD-RTO: 11.8 % (ref 11.7–14.9)
PH, URINE: 5 (ref 5–8)
PHENCYCLIDINE, URINE: NEGATIVE
PLATELET # BLD: 186 K/CU MM (ref 140–440)
PMV BLD AUTO: 9.3 FL (ref 7.5–11.1)
POTASSIUM SERPL-SCNC: 3.7 MMOL/L (ref 3.5–5.1)
PROTEIN UA: NEGATIVE MG/DL
RBC # BLD: 3.89 M/CU MM (ref 4.6–6.2)
RBC URINE: 1 /HPF (ref 0–3)
SEGMENTED NEUTROPHILS ABSOLUTE COUNT: 2 K/CU MM
SEGMENTED NEUTROPHILS RELATIVE PERCENT: 60.6 % (ref 36–66)
SODIUM BLD-SCNC: 125 MMOL/L (ref 135–145)
SPECIFIC GRAVITY UA: 1.03 (ref 1–1.03)
SQUAMOUS EPITHELIAL: <1 /HPF
TOTAL IMMATURE NEUTOROPHIL: 0.01 K/CU MM
TOTAL NUCLEATED RBC: 0 K/CU MM
TOTAL PROTEIN: 6.4 GM/DL (ref 6.4–8.2)
TRICHOMONAS: ABNORMAL /HPF
TROPONIN T: <0.01 NG/ML
UROBILINOGEN, URINE: NEGATIVE MG/DL (ref 0.2–1)
WBC # BLD: 3.3 K/CU MM (ref 4–10.5)
WBC UA: <1 /HPF (ref 0–2)

## 2021-08-01 PROCEDURE — 83690 ASSAY OF LIPASE: CPT

## 2021-08-01 PROCEDURE — 93010 ELECTROCARDIOGRAM REPORT: CPT | Performed by: INTERNAL MEDICINE

## 2021-08-01 PROCEDURE — 80053 COMPREHEN METABOLIC PANEL: CPT

## 2021-08-01 PROCEDURE — 81001 URINALYSIS AUTO W/SCOPE: CPT

## 2021-08-01 PROCEDURE — 99285 EMERGENCY DEPT VISIT HI MDM: CPT

## 2021-08-01 PROCEDURE — 85025 COMPLETE CBC W/AUTO DIFF WBC: CPT

## 2021-08-01 PROCEDURE — 93005 ELECTROCARDIOGRAM TRACING: CPT | Performed by: PHYSICIAN ASSISTANT

## 2021-08-01 PROCEDURE — 6370000000 HC RX 637 (ALT 250 FOR IP): Performed by: PHYSICIAN ASSISTANT

## 2021-08-01 PROCEDURE — 71045 X-RAY EXAM CHEST 1 VIEW: CPT

## 2021-08-01 PROCEDURE — 84484 ASSAY OF TROPONIN QUANT: CPT

## 2021-08-01 PROCEDURE — 80307 DRUG TEST PRSMV CHEM ANLYZR: CPT

## 2021-08-01 RX ORDER — SUCRALFATE 1 G/1
1 TABLET ORAL 4 TIMES DAILY
Qty: 120 TABLET | Refills: 0 | Status: SHIPPED | OUTPATIENT
Start: 2021-08-01

## 2021-08-01 RX ORDER — LIDOCAINE HYDROCHLORIDE 20 MG/ML
15 SOLUTION OROPHARYNGEAL ONCE
Status: COMPLETED | OUTPATIENT
Start: 2021-08-01 | End: 2021-08-01

## 2021-08-01 RX ORDER — MAGNESIUM HYDROXIDE/ALUMINUM HYDROXICE/SIMETHICONE 120; 1200; 1200 MG/30ML; MG/30ML; MG/30ML
30 SUSPENSION ORAL ONCE
Status: COMPLETED | OUTPATIENT
Start: 2021-08-01 | End: 2021-08-01

## 2021-08-01 RX ORDER — PANTOPRAZOLE SODIUM 40 MG/1
40 TABLET, DELAYED RELEASE ORAL ONCE
Status: COMPLETED | OUTPATIENT
Start: 2021-08-01 | End: 2021-08-01

## 2021-08-01 RX ORDER — GLIPIZIDE 10 MG/1
TABLET ORAL
Qty: 60 TABLET | Refills: 0 | Status: ON HOLD | OUTPATIENT
Start: 2021-08-01 | End: 2022-07-27 | Stop reason: HOSPADM

## 2021-08-01 RX ADMIN — PANTOPRAZOLE SODIUM 40 MG: 40 TABLET, DELAYED RELEASE ORAL at 16:03

## 2021-08-01 RX ADMIN — LIDOCAINE HYDROCHLORIDE 15 ML: 20 SOLUTION ORAL; TOPICAL at 16:03

## 2021-08-01 RX ADMIN — ALUMINUM HYDROXIDE, MAGNESIUM HYDROXIDE, AND SIMETHICONE 30 ML: 200; 200; 20 SUSPENSION ORAL at 16:03

## 2021-08-01 ASSESSMENT — PAIN SCALES - GENERAL: PAINLEVEL_OUTOF10: 7

## 2021-08-01 ASSESSMENT — PAIN DESCRIPTION - DESCRIPTORS: DESCRIPTORS: ACHING

## 2021-08-01 ASSESSMENT — PAIN DESCRIPTION - LOCATION: LOCATION: CHEST

## 2021-08-01 ASSESSMENT — PAIN DESCRIPTION - PAIN TYPE: TYPE: ACUTE PAIN

## 2021-08-01 NOTE — ED PROVIDER NOTES
Patient Identification  Abdifatah Reaves is a 38 y.o. male    Chief Complaint  Chest Pain      HPI  (History provided by patient)  This is a 38 y.o. male who was brought in by self for chief complaint of left flank and upper abdominal pain. Onset was 3 weeks ago. Initially intermittent, constant for the last 3 days. Describes pain is in the left ribs, left upper abdomen, left flank. Worse with eating. Notes early satiety. Has had a few episodes of vomiting. Denies anterior chest pain. No shortness of breath. No exertional or pleuritic changes to pain. No fevers. States he ran out of some of his diabetic medication recently, has refills for some but not for his glipizide. REVIEW OF SYSTEMS    Constitutional:  Denies fever, chills  HENT:  Denies sore throat or ear pain   Eyes: Denies vision changes, eye pain  Cardiovascular:  Denies syncope  Respiratory:  Denies cough, SOB  GI:  + abdominal pain, nausea, vomiting  :  Denies dysuria, discharge  Musculoskeletal:  Denies back pain, joint pain  Skin:  Denies rash, pruritis  Neurologic:  Denies headache, focal weakness, or sensory changes     See HPI and nursing notes for additional information     I have reviewed the following nursing documentation:  Allergies: Allergies   Allergen Reactions    Ciprofloxacin Shortness Of Breath    Shellfish-Derived Products Anaphylaxis       Past medical history:  has a past medical history of Asthma, Blind left eye, Diabetes mellitus ([facility] [address] GERD (gastroesophageal reflux disease), Hypertension, and Retinal detachment (2012). Past surgical history:  has a past surgical history that includes Corneal transplant (Bilateral); Mandible surgery (Right); eye surgery; Eye surgery (Right); fracture surgery; Mandible surgery (Bilateral); and Toe amputation (Right, 07/25/2017). Home medications:   Prior to Admission medications    Medication Sig Start Date End Date Taking?  Authorizing Provider   glipiZIDE (GLUCOTROL) 10 MG tablet Take 2 tablets PO in the morning with food, 1 tablet PO at night with food.  8/1/21  Yes Reddy Devries PA-C   sucralfate (CARAFATE) 1 GM tablet Take 1 tablet by mouth 4 times daily 8/1/21  Yes Reddy Devries PA-C   albuterol sulfate  (90 Base) MCG/ACT inhaler Inhale 2 puffs into the lungs every 6 hours as needed for Wheezing 3/24/21   Mariposa Chisholm MD   albuterol-ipratropium (COMBIVENT RESPIMAT)  MCG/ACT AERS inhaler Inhale 1 puff into the lungs every 6 hours 3/24/21   Mariposa Chisholm MD   alogliptin (NESINA) 12.5 MG TABS tablet Take 1 tablet by mouth Daily with supper 3/24/21 4/23/21  Mariposa Chisholm MD   acetaminophen (AMINOFEN) 325 MG tablet Take 2 tablets by mouth every 6 hours as needed for Pain 4/23/20   Lupe Regulus, DO   brimonidine (ALPHAGAN) 0.2 % ophthalmic solution Place 1 drop into the right eye 2 times daily    Historical Provider, MD   dorzolamide-timolol 22.3-6.8 MG/ML SOLN Place 1 drop into the right eye 2 times daily    Historical Provider, MD   Ertugliflozin L-PyroglutamicAc (STEGLATRO) 15 MG TABS Take 1 tablet by mouth daily    Historical Provider, MD   prednisoLONE acetate (PRED FORTE) 1 % ophthalmic suspension Place 1 drop into the right eye 2 times daily    Historical Provider, MD   allopurinol (ZYLOPRIM) 300 MG tablet Take 300 mg by mouth daily     Historical Provider, MD   atorvastatin (LIPITOR) 10 MG tablet Take 10 mg by mouth daily     Historical Provider, MD   lisinopril-hydrochlorothiazide (PRINZIDE;ZESTORETIC) 20-12.5 MG per tablet Take 1 tablet by mouth daily     Historical Provider, MD   metFORMIN (GLUCOPHAGE) 1000 MG tablet Take 1,000 mg by mouth 2 times daily (with meals)    Historical Provider, MD   amLODIPine (NORVASC) 10 MG tablet Take 10 mg by mouth daily 11/25/16   Historical Provider, MD   omeprazole (PRILOSEC) 20 MG capsule Take 40 mg by mouth daily     Historical Provider, MD       Social history:  reports that he has been smoking cigarettes. He has a 2.50 pack-year smoking history. He has never used smokeless tobacco. He reports current alcohol use. He reports previous drug use. Drug: Marijuana. Family history:    Family History   Problem Relation Age of Onset    Diabetes Mother     Asthma Mother     High Blood Pressure Mother     Stroke Mother     Heart Disease Mother     Diabetes Father     Asthma Father     High Blood Pressure Father          Exam  BP (!) 146/84   Pulse 90   Temp 97.8 °F (36.6 °C) (Oral)   Resp 23   Ht 6' 3\" (1.905 m)   Wt 300 lb (136.1 kg)   SpO2 99%   BMI 37.50 kg/m²   Nursing note and vitals reviewed. Constitutional: Well developed, well nourished. No acute distress. HENT:      Head: Normocephalic and atraumatic. Ears: External ears normal.      Nose: Nose normal.     Mouth: Membrane mucosa moist and pink. No posterior oropharynx erythema or tonsillar edema  Eyes: Right eye has large cataract noted, appears blind. Left eye has been surgically removed. Neck: Supple. Trachea midline. Cardiovascular: RRR, no murmurs, rubs, or gallops, radial pulses 2+ bilaterally. Pulmonary/Chest: Effort normal. No respiratory distress. CTAB. No stridor. No wheezes. No rales. Abdominal: Soft. LUQ and left flank pain noted. No distension. No guarding, rebound tenderness, or evidence of ascites. : No CVA tenderness. Musculoskeletal: Moves all extremities. No gross deformity. Neurological: Alert and oriented to person, place, and time. Normal muscle tone. Skin: Warm and dry. No rash. Psychiatric: Normal mood and affect. Behavior is normal.      EKG   Please see Dr. Casa Gibbons' note for EKG read. Radiographs (if obtained):  [] The following radiograph was interpreted by myself in the absence of a radiologist:   [x] Radiologist's Report Reviewed:  XR CHEST PORTABLE   Final Result   1. No evident displaced left rib fractures.   If this remains of clinical   concern, consider obtaining a dedicated left rib series or CT. 2. Minimal bibasilar airspace opacities likely due to atelectasis or   scarring, although contusion could be considered in the setting of trauma.                 Labs  Results for orders placed or performed during the hospital encounter of 08/01/21   CBC Auto Differential   Result Value Ref Range    WBC 3.3 (L) 4.0 - 10.5 K/CU MM    RBC 3.89 (L) 4.6 - 6.2 M/CU MM    Hemoglobin 12.7 (L) 13.5 - 18.0 GM/DL    Hematocrit 34.5 (L) 42 - 52 %    MCV 88.7 78 - 100 FL    MCH 32.6 (H) 27 - 31 PG    MCHC 36.8 (H) 32.0 - 36.0 %    RDW 11.8 11.7 - 14.9 %    Platelets 784 147 - 380 K/CU MM    MPV 9.3 7.5 - 11.1 FL    Differential Type AUTOMATED DIFFERENTIAL     Segs Relative 60.6 36 - 66 %    Lymphocytes % 30.0 24 - 44 %    Monocytes % 6.4 (H) 0 - 4 %    Eosinophils % 1.8 0 - 3 %    Basophils % 0.9 0 - 1 %    Segs Absolute 2.0 K/CU MM    Lymphocytes Absolute 1.0 K/CU MM    Monocytes Absolute 0.2 K/CU MM    Eosinophils Absolute 0.1 K/CU MM    Basophils Absolute 0.0 K/CU MM    Nucleated RBC % 0.0 %    Total Nucleated RBC 0.0 K/CU MM    Total Immature Neutrophil 0.01 K/CU MM    Immature Neutrophil % 0.3 0 - 0.43 %   Comprehensive Metabolic Panel   Result Value Ref Range    Sodium 125 (L) 135 - 145 MMOL/L    Potassium 3.7 3.5 - 5.1 MMOL/L    Chloride 92 (L) 99 - 110 mMol/L    CO2 19 (L) 21 - 32 MMOL/L    BUN 8 6 - 23 MG/DL    CREATININE 0.7 (L) 0.9 - 1.3 MG/DL    Glucose 337 (H) 70 - 99 MG/DL    Calcium 8.4 8.3 - 10.6 MG/DL    Albumin 4.3 3.4 - 5.0 GM/DL    Total Protein 6.4 6.4 - 8.2 GM/DL    Total Bilirubin 0.3 0.0 - 1.0 MG/DL    ALT 49 (H) 10 - 40 U/L    AST 44 (H) 15 - 37 IU/L    Alkaline Phosphatase 110 40 - 129 IU/L    GFR Non-African American >60 >60 mL/min/1.73m2    GFR African American >60 >60 mL/min/1.73m2    Anion Gap 14 4 - 16   Troponin   Result Value Ref Range    Troponin T <0.010 <0.01 NG/ML   Urine Drug Screen   Result Value Ref Range    Cannabinoid Scrn, Ur NEGATIVE NEGATIVE Amphetamines NEGATIVE NEGATIVE    Cocaine Metabolite NEGATIVE NEGATIVE    Benzodiazepine Screen, Urine NEGATIVE NEGATIVE    Barbiturate Screen, Ur NEGATIVE NEGATIVE    Opiates, Urine NEGATIVE NEGATIVE    Phencyclidine, Urine NEGATIVE NEGATIVE    Oxycodone NEGATIVE NEGATIVE   Lipase   Result Value Ref Range    Lipase 26 13 - 60 IU/L   Urinalysis   Result Value Ref Range    Color, UA COLORLESS (A) YELLOW    Clarity, UA CLEAR CLEAR    Glucose, Urine >500 (A) NEGATIVE MG/DL    Bilirubin Urine NEGATIVE NEGATIVE MG/DL    Ketones, Urine MODERATE (A) NEGATIVE MG/DL    Specific Gravity, UA 1.026 1.001 - 1.035    Blood, Urine NEGATIVE NEGATIVE    pH, Urine 5.0 5.0 - 8.0    Protein, UA NEGATIVE NEGATIVE MG/DL    Urobilinogen, Urine NEGATIVE 0.2 - 1.0 MG/DL    Nitrite Urine, Quantitative NEGATIVE NEGATIVE    Leukocyte Esterase, Urine NEGATIVE NEGATIVE    RBC, UA 1 0 - 3 /HPF    WBC, UA <1 0 - 2 /HPF    Bacteria, UA NEGATIVE NEGATIVE /HPF    Squam Epithel, UA <1 /HPF    Trichomonas, UA NONE SEEN NONE SEEN /HPF   EKG 12 Lead   Result Value Ref Range    Ventricular Rate 93 BPM    Atrial Rate 93 BPM    P-R Interval 170 ms    QRS Duration 84 ms    Q-T Interval 354 ms    QTc Calculation (Bazett) 440 ms    P Axis 37 degrees    R Axis 15 degrees    T Axis -2 degrees    Diagnosis       Normal sinus rhythm  Normal ECG  When compared with ECG of 20-MAR-2021 10:31,  No significant change was found  Confirmed by [facility] MD, Jovany Chun ([id number]) on 8/1/2021 4:25:19 PM           MDM  Patient presents for left upper quadrant, left rib, left flank pain. Is worse with eating. His vital signs here show hypertension but otherwise unremarkable. He was given a GI cocktail and pain is significantly improved. EKG is negative. Chest x-ray shows minimal bibasilar airspace opacities that are likely atelectasis or scarring. He has not been coughing and denies shortness of breath, doubt pneumonia.   Laboratory work-up shows leukopenia of unclear significance, mild anemia, hyperglycemia with pseudohyponatremia, sodium corrects to 130. Given the patient is feeling better and this appears to be a GI problem, we will discharge with Carafate, on omeprazole at home but states intermittently compliant, also refilled his Actos, stressed importance of compliance with his diabetic medication, follow-up with PCP in 2 to 3 days for recheck. I estimate there is LOW risk for PULMONARY EMBOLISM, ACUTE CORONARY SYNDROME, CARDIAC TAMPONADE, PNEUMOTHORAX, PNEUMONIA, PERICARDITIS, OR THORACIC AORTIC DISSECTION, thus I consider the discharge disposition reasonable. Sayra Thomason and I have discussed the diagnosis and risks, and we agree with discharging home to follow-up with their primary doctor. We also discussed returning to the Emergency Department immediately if new or worsening symptoms occur. We have discussed the symptoms which are most concerning (e.g., bloody sputum, fever, worsening pain or worsening shortness of breath, vomiting, sweating) that necessitate immediate return. I estimate there is LOW risk for ACUTE APPENDICITIS, BOWEL OBSTRUCTION, CHOLECYSTITIS, DIVERTICULITIS, INCARCERATED HERNIA, PANCREATITIS, MESENTERIC ISCHEMIA, ABDOMINAL AORTIC ANEURYSM/DISSECTION, PERFORATED BOWEL, and PERFORATED ULCER, thus I consider the discharge disposition reasonable. Sayra Thomason and I have discussed the diagnosis and risks, and we agree with discharging home with PCP follow-up. We also discussed returning to the Emergency Department immediately if new or worsening symptoms occur. We have discussed the symptoms which are most concerning (e.g., bloody stool, fever, changing or worsening pain, intractable vomiting, chest pain) that necessitate immediate return. I have independently evaluated this patient. Final Impression  1. Left flank pain    2. Dyspepsia    3.  Hyponatremia        Blood pressure (!) 146/84, pulse 90, temperature 97.8 °F (36.6 °C), temperature source Oral, resp. rate 23, height 6' 3\" (1.905 m), weight 300 lb (136.1 kg), SpO2 99 %. Disposition:  Discharge to home in stable condition. Patient was given scripts for the following medications. I counseled patient how to take these medications. New Prescriptions    SUCRALFATE (CARAFATE) 1 GM TABLET    Take 1 tablet by mouth 4 times daily       This chart was generated using the [employer] dictation system. I created this record but it may contain dictation errors given the limitations of this technology.        Jazmín Joyner PA-C  08/01/21 [id number]

## 2022-04-28 ENCOUNTER — APPOINTMENT (OUTPATIENT)
Dept: ULTRASOUND IMAGING | Age: 37
DRG: 710 | End: 2022-04-28
Payer: COMMERCIAL

## 2022-04-28 ENCOUNTER — ANESTHESIA EVENT (OUTPATIENT)
Dept: OPERATING ROOM | Age: 37
DRG: 710 | End: 2022-04-28
Payer: COMMERCIAL

## 2022-04-28 ENCOUNTER — ANESTHESIA (OUTPATIENT)
Dept: OPERATING ROOM | Age: 37
DRG: 710 | End: 2022-04-28
Payer: COMMERCIAL

## 2022-04-28 ENCOUNTER — APPOINTMENT (OUTPATIENT)
Dept: CT IMAGING | Age: 37
DRG: 710 | End: 2022-04-28
Payer: COMMERCIAL

## 2022-04-28 ENCOUNTER — HOSPITAL ENCOUNTER (INPATIENT)
Age: 37
LOS: 1 days | Discharge: HOME | DRG: 710 | End: 2022-04-29
Attending: FAMILY MEDICINE | Admitting: FAMILY MEDICINE
Payer: COMMERCIAL

## 2022-04-28 VITALS
OXYGEN SATURATION: 100 % | RESPIRATION RATE: 40 BRPM | TEMPERATURE: 96.5 F | SYSTOLIC BLOOD PRESSURE: 131 MMHG | DIASTOLIC BLOOD PRESSURE: 80 MMHG

## 2022-04-28 DIAGNOSIS — K35.20 ACUTE APPENDICITIS WITH GENERALIZED PERITONITIS, WITHOUT GANGRENE OR ABSCESS, UNSPECIFIED WHETHER PERFORATION PRESENT: ICD-10-CM

## 2022-04-28 DIAGNOSIS — K35.890 OTHER ACUTE APPENDICITIS: Primary | ICD-10-CM

## 2022-04-28 PROBLEM — K37 APPENDICITIS: Status: ACTIVE | Noted: 2022-04-28

## 2022-04-28 LAB
ALBUMIN SERPL-MCNC: 4.6 GM/DL (ref 3.4–5)
ALP BLD-CCNC: 101 IU/L (ref 40–128)
ALT SERPL-CCNC: 30 U/L (ref 10–40)
ANION GAP SERPL CALCULATED.3IONS-SCNC: 18 MMOL/L (ref 4–16)
AST SERPL-CCNC: 27 IU/L (ref 15–37)
BACTERIA: NEGATIVE /HPF
BASOPHILS ABSOLUTE: 0 K/CU MM
BASOPHILS RELATIVE PERCENT: 0.9 % (ref 0–1)
BILIRUB SERPL-MCNC: 0.4 MG/DL (ref 0–1)
BILIRUBIN URINE: NEGATIVE MG/DL
BLOOD, URINE: ABNORMAL
BUN BLDV-MCNC: 11 MG/DL (ref 6–23)
CALCIUM SERPL-MCNC: 9 MG/DL (ref 8.3–10.6)
CHLORIDE BLD-SCNC: 94 MMOL/L (ref 99–110)
CLARITY: CLEAR
CO2: 20 MMOL/L (ref 21–32)
COLOR: YELLOW
CREAT SERPL-MCNC: 0.8 MG/DL (ref 0.9–1.3)
DIFFERENTIAL TYPE: ABNORMAL
EKG ATRIAL RATE: 99 BPM
EKG DIAGNOSIS: NORMAL
EKG P AXIS: 57 DEGREES
EKG P-R INTERVAL: 176 MS
EKG Q-T INTERVAL: 368 MS
EKG QRS DURATION: 78 MS
EKG QTC CALCULATION (BAZETT): 472 MS
EKG R AXIS: 8 DEGREES
EKG T AXIS: 44 DEGREES
EKG VENTRICULAR RATE: 99 BPM
EOSINOPHILS ABSOLUTE: 0.1 K/CU MM
EOSINOPHILS RELATIVE PERCENT: 1.1 % (ref 0–3)
GFR AFRICAN AMERICAN: >60 ML/MIN/1.73M2
GFR NON-AFRICAN AMERICAN: >60 ML/MIN/1.73M2
GLUCOSE BLD-MCNC: 211 MG/DL (ref 70–99)
GLUCOSE BLD-MCNC: 223 MG/DL (ref 70–99)
GLUCOSE BLD-MCNC: 306 MG/DL (ref 70–99)
GLUCOSE, URINE: >1000 MG/DL
HCT VFR BLD CALC: 39.4 % (ref 42–52)
HEMOGLOBIN: 13.9 GM/DL (ref 13.5–18)
IMMATURE NEUTROPHIL %: 0.2 % (ref 0–0.43)
KETONES, URINE: 40 MG/DL
LACTATE: 1.1 MMOL/L (ref 0.4–2)
LEUKOCYTE ESTERASE, URINE: NEGATIVE
LIPASE: 19 IU/L (ref 13–60)
LYMPHOCYTES ABSOLUTE: 1.1 K/CU MM
LYMPHOCYTES RELATIVE PERCENT: 23.7 % (ref 24–44)
MCH RBC QN AUTO: 31.4 PG (ref 27–31)
MCHC RBC AUTO-ENTMCNC: 35.3 % (ref 32–36)
MCV RBC AUTO: 88.9 FL (ref 78–100)
MONOCYTES ABSOLUTE: 0.3 K/CU MM
MONOCYTES RELATIVE PERCENT: 7.2 % (ref 0–4)
NITRITE URINE, QUANTITATIVE: NEGATIVE
NUCLEATED RBC %: 0 %
PDW BLD-RTO: 11.7 % (ref 11.7–14.9)
PH, URINE: 5.5 (ref 5–8)
PLATELET # BLD: 250 K/CU MM (ref 140–440)
PMV BLD AUTO: 9.2 FL (ref 7.5–11.1)
POTASSIUM SERPL-SCNC: 4.6 MMOL/L (ref 3.5–5.1)
PROTEIN UA: 30 MG/DL
RBC # BLD: 4.43 M/CU MM (ref 4.6–6.2)
RBC URINE: <1 /HPF (ref 0–3)
SEGMENTED NEUTROPHILS ABSOLUTE COUNT: 3.1 K/CU MM
SEGMENTED NEUTROPHILS RELATIVE PERCENT: 66.9 % (ref 36–66)
SODIUM BLD-SCNC: 132 MMOL/L (ref 135–145)
SPECIFIC GRAVITY UA: 1.01 (ref 1–1.03)
SQUAMOUS EPITHELIAL: <1 /HPF
TOTAL IMMATURE NEUTOROPHIL: 0.01 K/CU MM
TOTAL NUCLEATED RBC: 0 K/CU MM
TOTAL PROTEIN: 7.4 GM/DL (ref 6.4–8.2)
TROPONIN T: <0.01 NG/ML
UROBILINOGEN, URINE: 0.2 MG/DL (ref 0.2–1)
WBC # BLD: 4.6 K/CU MM (ref 4–10.5)
WBC UA: 1 /HPF (ref 0–2)

## 2022-04-28 PROCEDURE — 2580000003 HC RX 258: Performed by: PHYSICIAN ASSISTANT

## 2022-04-28 PROCEDURE — 6370000000 HC RX 637 (ALT 250 FOR IP): Performed by: FAMILY MEDICINE

## 2022-04-28 PROCEDURE — 85025 COMPLETE CBC W/AUTO DIFF WBC: CPT

## 2022-04-28 PROCEDURE — 0DTJ4ZZ RESECTION OF APPENDIX, PERCUTANEOUS ENDOSCOPIC APPROACH: ICD-10-PCS | Performed by: SURGERY

## 2022-04-28 PROCEDURE — 6370000000 HC RX 637 (ALT 250 FOR IP): Performed by: ANESTHESIOLOGY

## 2022-04-28 PROCEDURE — 2709999900 HC NON-CHARGEABLE SUPPLY: Performed by: SURGERY

## 2022-04-28 PROCEDURE — 7100000000 HC PACU RECOVERY - FIRST 15 MIN: Performed by: SURGERY

## 2022-04-28 PROCEDURE — 81001 URINALYSIS AUTO W/SCOPE: CPT

## 2022-04-28 PROCEDURE — 96374 THER/PROPH/DIAG INJ IV PUSH: CPT

## 2022-04-28 PROCEDURE — 6360000002 HC RX W HCPCS: Performed by: PHYSICIAN ASSISTANT

## 2022-04-28 PROCEDURE — 80053 COMPREHEN METABOLIC PANEL: CPT

## 2022-04-28 PROCEDURE — 94761 N-INVAS EAR/PLS OXIMETRY MLT: CPT

## 2022-04-28 PROCEDURE — 93005 ELECTROCARDIOGRAM TRACING: CPT | Performed by: PHYSICIAN ASSISTANT

## 2022-04-28 PROCEDURE — 3700000001 HC ADD 15 MINUTES (ANESTHESIA): Performed by: SURGERY

## 2022-04-28 PROCEDURE — 44970 LAPAROSCOPY APPENDECTOMY: CPT | Performed by: SURGERY

## 2022-04-28 PROCEDURE — 7100000001 HC PACU RECOVERY - ADDTL 15 MIN: Performed by: SURGERY

## 2022-04-28 PROCEDURE — 3600000004 HC SURGERY LEVEL 4 BASE: Performed by: SURGERY

## 2022-04-28 PROCEDURE — 83605 ASSAY OF LACTIC ACID: CPT

## 2022-04-28 PROCEDURE — 3600000014 HC SURGERY LEVEL 4 ADDTL 15MIN: Performed by: SURGERY

## 2022-04-28 PROCEDURE — 99255 IP/OBS CONSLTJ NEW/EST HI 80: CPT | Performed by: SURGERY

## 2022-04-28 PROCEDURE — 6360000002 HC RX W HCPCS

## 2022-04-28 PROCEDURE — 1200000000 HC SEMI PRIVATE

## 2022-04-28 PROCEDURE — 3700000000 HC ANESTHESIA ATTENDED CARE: Performed by: SURGERY

## 2022-04-28 PROCEDURE — 99285 EMERGENCY DEPT VISIT HI MDM: CPT

## 2022-04-28 PROCEDURE — 2720000010 HC SURG SUPPLY STERILE: Performed by: SURGERY

## 2022-04-28 PROCEDURE — 96376 TX/PRO/DX INJ SAME DRUG ADON: CPT

## 2022-04-28 PROCEDURE — 88304 TISSUE EXAM BY PATHOLOGIST: CPT | Performed by: PATHOLOGY

## 2022-04-28 PROCEDURE — 93010 ELECTROCARDIOGRAM REPORT: CPT | Performed by: INTERNAL MEDICINE

## 2022-04-28 PROCEDURE — 82962 GLUCOSE BLOOD TEST: CPT

## 2022-04-28 PROCEDURE — 84484 ASSAY OF TROPONIN QUANT: CPT

## 2022-04-28 PROCEDURE — 2500000003 HC RX 250 WO HCPCS

## 2022-04-28 PROCEDURE — 83690 ASSAY OF LIPASE: CPT

## 2022-04-28 PROCEDURE — 2500000003 HC RX 250 WO HCPCS: Performed by: SURGERY

## 2022-04-28 PROCEDURE — 6370000000 HC RX 637 (ALT 250 FOR IP): Performed by: PHYSICIAN ASSISTANT

## 2022-04-28 PROCEDURE — 74176 CT ABD & PELVIS W/O CONTRAST: CPT

## 2022-04-28 PROCEDURE — 94640 AIRWAY INHALATION TREATMENT: CPT

## 2022-04-28 PROCEDURE — 2700000000 HC OXYGEN THERAPY PER DAY

## 2022-04-28 PROCEDURE — 96375 TX/PRO/DX INJ NEW DRUG ADDON: CPT

## 2022-04-28 RX ORDER — BUPIVACAINE HYDROCHLORIDE 5 MG/ML
INJECTION, SOLUTION EPIDURAL; INTRACAUDAL
Status: COMPLETED | OUTPATIENT
Start: 2022-04-28 | End: 2022-04-28

## 2022-04-28 RX ORDER — ONDANSETRON 4 MG/1
4 TABLET, ORALLY DISINTEGRATING ORAL EVERY 8 HOURS PRN
Status: DISCONTINUED | OUTPATIENT
Start: 2022-04-28 | End: 2022-04-29 | Stop reason: HOSPADM

## 2022-04-28 RX ORDER — LIDOCAINE HYDROCHLORIDE 20 MG/ML
INJECTION, SOLUTION INTRAVENOUS PRN
Status: DISCONTINUED | OUTPATIENT
Start: 2022-04-28 | End: 2022-04-28 | Stop reason: SDUPTHER

## 2022-04-28 RX ORDER — HYDRALAZINE HYDROCHLORIDE 20 MG/ML
10 INJECTION INTRAMUSCULAR; INTRAVENOUS
Status: DISCONTINUED | OUTPATIENT
Start: 2022-04-28 | End: 2022-04-28 | Stop reason: HOSPADM

## 2022-04-28 RX ORDER — HYDROCODONE BITARTRATE AND ACETAMINOPHEN 5; 325 MG/1; MG/1
1 TABLET ORAL EVERY 6 HOURS PRN
Status: DISCONTINUED | OUTPATIENT
Start: 2022-04-28 | End: 2022-04-29 | Stop reason: HOSPADM

## 2022-04-28 RX ORDER — OXYCODONE HYDROCHLORIDE 5 MG/1
5 TABLET ORAL
Status: DISCONTINUED | OUTPATIENT
Start: 2022-04-28 | End: 2022-04-28 | Stop reason: HOSPADM

## 2022-04-28 RX ORDER — DIPHENHYDRAMINE HYDROCHLORIDE 50 MG/ML
12.5 INJECTION INTRAMUSCULAR; INTRAVENOUS
Status: DISCONTINUED | OUTPATIENT
Start: 2022-04-28 | End: 2022-04-28 | Stop reason: HOSPADM

## 2022-04-28 RX ORDER — KETOROLAC TROMETHAMINE 30 MG/ML
15 INJECTION, SOLUTION INTRAMUSCULAR; INTRAVENOUS ONCE
Status: DISCONTINUED | OUTPATIENT
Start: 2022-04-28 | End: 2022-04-28

## 2022-04-28 RX ORDER — ONDANSETRON 2 MG/ML
INJECTION INTRAMUSCULAR; INTRAVENOUS PRN
Status: DISCONTINUED | OUTPATIENT
Start: 2022-04-28 | End: 2022-04-28 | Stop reason: SDUPTHER

## 2022-04-28 RX ORDER — PROPOFOL 10 MG/ML
INJECTION, EMULSION INTRAVENOUS PRN
Status: DISCONTINUED | OUTPATIENT
Start: 2022-04-28 | End: 2022-04-28 | Stop reason: SDUPTHER

## 2022-04-28 RX ORDER — SODIUM CHLORIDE 9 MG/ML
INJECTION, SOLUTION INTRAVENOUS CONTINUOUS
Status: DISCONTINUED | OUTPATIENT
Start: 2022-04-28 | End: 2022-04-29 | Stop reason: HOSPADM

## 2022-04-28 RX ORDER — MORPHINE SULFATE 2 MG/ML
2 INJECTION, SOLUTION INTRAMUSCULAR; INTRAVENOUS EVERY 4 HOURS PRN
Status: DISCONTINUED | OUTPATIENT
Start: 2022-04-28 | End: 2022-04-29 | Stop reason: HOSPADM

## 2022-04-28 RX ORDER — SODIUM CHLORIDE 0.9 % (FLUSH) 0.9 %
5-40 SYRINGE (ML) INJECTION EVERY 12 HOURS SCHEDULED
Status: DISCONTINUED | OUTPATIENT
Start: 2022-04-28 | End: 2022-04-29 | Stop reason: HOSPADM

## 2022-04-28 RX ORDER — ROCURONIUM BROMIDE 10 MG/ML
INJECTION, SOLUTION INTRAVENOUS PRN
Status: DISCONTINUED | OUTPATIENT
Start: 2022-04-28 | End: 2022-04-28 | Stop reason: SDUPTHER

## 2022-04-28 RX ORDER — KETOROLAC TROMETHAMINE 30 MG/ML
15 INJECTION, SOLUTION INTRAMUSCULAR; INTRAVENOUS ONCE
Status: COMPLETED | OUTPATIENT
Start: 2022-04-28 | End: 2022-04-28

## 2022-04-28 RX ORDER — MIDAZOLAM HYDROCHLORIDE 2 MG/2ML
2 INJECTION, SOLUTION INTRAMUSCULAR; INTRAVENOUS
Status: DISCONTINUED | OUTPATIENT
Start: 2022-04-28 | End: 2022-04-28 | Stop reason: HOSPADM

## 2022-04-28 RX ORDER — ONDANSETRON 2 MG/ML
4 INJECTION INTRAMUSCULAR; INTRAVENOUS EVERY 6 HOURS PRN
Status: DISCONTINUED | OUTPATIENT
Start: 2022-04-28 | End: 2022-04-29 | Stop reason: HOSPADM

## 2022-04-28 RX ORDER — SUCCINYLCHOLINE/SOD CL,ISO/PF 100 MG/5ML
SYRINGE (ML) INTRAVENOUS PRN
Status: DISCONTINUED | OUTPATIENT
Start: 2022-04-28 | End: 2022-04-28 | Stop reason: SDUPTHER

## 2022-04-28 RX ORDER — MIDAZOLAM HYDROCHLORIDE 1 MG/ML
INJECTION INTRAMUSCULAR; INTRAVENOUS PRN
Status: DISCONTINUED | OUTPATIENT
Start: 2022-04-28 | End: 2022-04-28 | Stop reason: SDUPTHER

## 2022-04-28 RX ORDER — FENTANYL CITRATE 50 UG/ML
INJECTION, SOLUTION INTRAMUSCULAR; INTRAVENOUS PRN
Status: DISCONTINUED | OUTPATIENT
Start: 2022-04-28 | End: 2022-04-28 | Stop reason: SDUPTHER

## 2022-04-28 RX ORDER — SODIUM CHLORIDE 9 MG/ML
INJECTION, SOLUTION INTRAVENOUS PRN
Status: DISCONTINUED | OUTPATIENT
Start: 2022-04-28 | End: 2022-04-29 | Stop reason: HOSPADM

## 2022-04-28 RX ORDER — MEPERIDINE HYDROCHLORIDE 25 MG/ML
12.5 INJECTION INTRAMUSCULAR; INTRAVENOUS; SUBCUTANEOUS EVERY 5 MIN PRN
Status: DISCONTINUED | OUTPATIENT
Start: 2022-04-28 | End: 2022-04-28 | Stop reason: HOSPADM

## 2022-04-28 RX ORDER — ONDANSETRON 2 MG/ML
4 INJECTION INTRAMUSCULAR; INTRAVENOUS EVERY 30 MIN PRN
Status: DISCONTINUED | OUTPATIENT
Start: 2022-04-28 | End: 2022-04-28

## 2022-04-28 RX ORDER — IPRATROPIUM BROMIDE AND ALBUTEROL SULFATE 2.5; .5 MG/3ML; MG/3ML
1 SOLUTION RESPIRATORY (INHALATION) EVERY 4 HOURS PRN
Status: DISCONTINUED | OUTPATIENT
Start: 2022-04-28 | End: 2022-04-29 | Stop reason: HOSPADM

## 2022-04-28 RX ORDER — 0.9 % SODIUM CHLORIDE 0.9 %
1000 INTRAVENOUS SOLUTION INTRAVENOUS ONCE
Status: COMPLETED | OUTPATIENT
Start: 2022-04-28 | End: 2022-04-28

## 2022-04-28 RX ORDER — LABETALOL HYDROCHLORIDE 5 MG/ML
10 INJECTION, SOLUTION INTRAVENOUS
Status: DISCONTINUED | OUTPATIENT
Start: 2022-04-28 | End: 2022-04-28 | Stop reason: HOSPADM

## 2022-04-28 RX ORDER — SODIUM CHLORIDE 0.9 % (FLUSH) 0.9 %
5-40 SYRINGE (ML) INJECTION PRN
Status: DISCONTINUED | OUTPATIENT
Start: 2022-04-28 | End: 2022-04-29 | Stop reason: HOSPADM

## 2022-04-28 RX ORDER — METOCLOPRAMIDE HYDROCHLORIDE 5 MG/ML
10 INJECTION INTRAMUSCULAR; INTRAVENOUS
Status: DISCONTINUED | OUTPATIENT
Start: 2022-04-28 | End: 2022-04-28 | Stop reason: HOSPADM

## 2022-04-28 RX ORDER — CEFAZOLIN SODIUM 2 G/50ML
SOLUTION INTRAVENOUS PRN
Status: DISCONTINUED | OUTPATIENT
Start: 2022-04-28 | End: 2022-04-28 | Stop reason: SDUPTHER

## 2022-04-28 RX ORDER — ZOLPIDEM TARTRATE 5 MG/1
10 TABLET ORAL NIGHTLY PRN
Status: DISCONTINUED | OUTPATIENT
Start: 2022-04-28 | End: 2022-04-29 | Stop reason: HOSPADM

## 2022-04-28 RX ORDER — FENTANYL CITRATE 50 UG/ML
50 INJECTION, SOLUTION INTRAMUSCULAR; INTRAVENOUS EVERY 5 MIN PRN
Status: DISCONTINUED | OUTPATIENT
Start: 2022-04-28 | End: 2022-04-28 | Stop reason: HOSPADM

## 2022-04-28 RX ORDER — MORPHINE SULFATE 4 MG/ML
4 INJECTION, SOLUTION INTRAMUSCULAR; INTRAVENOUS EVERY 30 MIN PRN
Status: DISCONTINUED | OUTPATIENT
Start: 2022-04-28 | End: 2022-04-28

## 2022-04-28 RX ORDER — IPRATROPIUM BROMIDE AND ALBUTEROL SULFATE 2.5; .5 MG/3ML; MG/3ML
1 SOLUTION RESPIRATORY (INHALATION)
Status: COMPLETED | OUTPATIENT
Start: 2022-04-28 | End: 2022-04-28

## 2022-04-28 RX ADMIN — HYDROMORPHONE HYDROCHLORIDE 1 MG: 1 INJECTION, SOLUTION INTRAMUSCULAR; INTRAVENOUS; SUBCUTANEOUS at 05:35

## 2022-04-28 RX ADMIN — SODIUM CHLORIDE 1000 ML: 9 INJECTION, SOLUTION INTRAVENOUS at 01:45

## 2022-04-28 RX ADMIN — HYDROCODONE BITARTRATE AND ACETAMINOPHEN 1 TABLET: 5; 325 TABLET ORAL at 13:27

## 2022-04-28 RX ADMIN — PROPOFOL 180 MG: 10 INJECTION, EMULSION INTRAVENOUS at 09:12

## 2022-04-28 RX ADMIN — FENTANYL CITRATE 50 MCG: 50 INJECTION, SOLUTION INTRAMUSCULAR; INTRAVENOUS at 09:13

## 2022-04-28 RX ADMIN — ONDANSETRON 4 MG: 2 INJECTION INTRAMUSCULAR; INTRAVENOUS at 09:54

## 2022-04-28 RX ADMIN — PROPOFOL 60 MG: 10 INJECTION, EMULSION INTRAVENOUS at 09:43

## 2022-04-28 RX ADMIN — MORPHINE SULFATE 2 MG: 2 INJECTION, SOLUTION INTRAMUSCULAR; INTRAVENOUS at 15:36

## 2022-04-28 RX ADMIN — ONDANSETRON 4 MG: 2 INJECTION INTRAMUSCULAR; INTRAVENOUS at 01:44

## 2022-04-28 RX ADMIN — LIDOCAINE HYDROCHLORIDE 100 MG: 20 INJECTION, SOLUTION INTRAVENOUS at 09:12

## 2022-04-28 RX ADMIN — IPRATROPIUM BROMIDE AND ALBUTEROL SULFATE 1 AMPULE: .5; 2.5 SOLUTION RESPIRATORY (INHALATION) at 15:28

## 2022-04-28 RX ADMIN — IPRATROPIUM BROMIDE AND ALBUTEROL SULFATE 1 AMPULE: .5; 2.5 SOLUTION RESPIRATORY (INHALATION) at 21:50

## 2022-04-28 RX ADMIN — HYDROMORPHONE HYDROCHLORIDE 0.5 MG: 1 INJECTION, SOLUTION INTRAMUSCULAR; INTRAVENOUS; SUBCUTANEOUS at 09:48

## 2022-04-28 RX ADMIN — SODIUM CHLORIDE: 9 INJECTION, SOLUTION INTRAVENOUS at 19:51

## 2022-04-28 RX ADMIN — FENTANYL CITRATE 50 MCG: 50 INJECTION, SOLUTION INTRAMUSCULAR; INTRAVENOUS at 09:09

## 2022-04-28 RX ADMIN — SUGAMMADEX 200 MG: 100 INJECTION, SOLUTION INTRAVENOUS at 09:44

## 2022-04-28 RX ADMIN — ZOLPIDEM TARTRATE 10 MG: 5 TABLET ORAL at 20:50

## 2022-04-28 RX ADMIN — HYDROMORPHONE HYDROCHLORIDE 0.5 MG: 1 INJECTION, SOLUTION INTRAMUSCULAR; INTRAVENOUS; SUBCUTANEOUS at 02:16

## 2022-04-28 RX ADMIN — SODIUM CHLORIDE: 9 INJECTION, SOLUTION INTRAVENOUS at 06:35

## 2022-04-28 RX ADMIN — SODIUM CHLORIDE: 9 INJECTION, SOLUTION INTRAVENOUS at 10:56

## 2022-04-28 RX ADMIN — MORPHINE SULFATE 4 MG: 4 INJECTION, SOLUTION INTRAMUSCULAR; INTRAVENOUS at 01:45

## 2022-04-28 RX ADMIN — FENTANYL CITRATE 50 MCG: 50 INJECTION, SOLUTION INTRAMUSCULAR; INTRAVENOUS at 09:54

## 2022-04-28 RX ADMIN — FENTANYL CITRATE 50 MCG: 50 INJECTION, SOLUTION INTRAMUSCULAR; INTRAVENOUS at 09:53

## 2022-04-28 RX ADMIN — ROCURONIUM BROMIDE 50 MG: 50 INJECTION, SOLUTION INTRAVENOUS at 09:12

## 2022-04-28 RX ADMIN — KETOROLAC TROMETHAMINE 15 MG: 30 INJECTION, SOLUTION INTRAMUSCULAR at 02:16

## 2022-04-28 RX ADMIN — INSULIN HUMAN 4 UNITS: 100 INJECTION, SOLUTION PARENTERAL at 08:52

## 2022-04-28 RX ADMIN — HYDROMORPHONE HYDROCHLORIDE 1 MG: 1 INJECTION, SOLUTION INTRAMUSCULAR; INTRAVENOUS; SUBCUTANEOUS at 06:33

## 2022-04-28 RX ADMIN — Medication 100 MG: at 09:12

## 2022-04-28 RX ADMIN — IPRATROPIUM BROMIDE AND ALBUTEROL SULFATE 1 AMPULE: .5; 3 SOLUTION RESPIRATORY (INHALATION) at 08:42

## 2022-04-28 RX ADMIN — MIDAZOLAM 2 MG: 1 INJECTION INTRAMUSCULAR; INTRAVENOUS at 09:01

## 2022-04-28 RX ADMIN — MORPHINE SULFATE 2 MG: 2 INJECTION, SOLUTION INTRAMUSCULAR; INTRAVENOUS at 19:51

## 2022-04-28 RX ADMIN — HYDROMORPHONE HYDROCHLORIDE 1 MG: 1 INJECTION, SOLUTION INTRAMUSCULAR; INTRAVENOUS; SUBCUTANEOUS at 03:23

## 2022-04-28 RX ADMIN — CEFAZOLIN SODIUM 3000 MG: 2 SOLUTION INTRAVENOUS at 09:14

## 2022-04-28 RX ADMIN — PROPOFOL 20 MG: 10 INJECTION, EMULSION INTRAVENOUS at 09:13

## 2022-04-28 ASSESSMENT — PULMONARY FUNCTION TESTS
PIF_VALUE: 14
PIF_VALUE: 3
PIF_VALUE: 20
PIF_VALUE: 25
PIF_VALUE: 30
PIF_VALUE: 16
PIF_VALUE: 17
PIF_VALUE: 20
PIF_VALUE: 27
PIF_VALUE: 0
PIF_VALUE: 21
PIF_VALUE: 30
PIF_VALUE: 8
PIF_VALUE: 19
PIF_VALUE: 0
PIF_VALUE: 2
PIF_VALUE: 20
PIF_VALUE: 11
PIF_VALUE: 20
PIF_VALUE: 12
PIF_VALUE: 7
PIF_VALUE: 16
PIF_VALUE: 26
PIF_VALUE: 27
PIF_VALUE: 25
PIF_VALUE: 25
PIF_VALUE: 19
PIF_VALUE: 10
PIF_VALUE: 17
PIF_VALUE: 1
PIF_VALUE: 20
PIF_VALUE: 18
PIF_VALUE: 0
PIF_VALUE: 20
PIF_VALUE: 1
PIF_VALUE: 20
PIF_VALUE: 20
PIF_VALUE: 26
PIF_VALUE: 20
PIF_VALUE: 19
PIF_VALUE: 29
PIF_VALUE: 23
PIF_VALUE: 20
PIF_VALUE: 20
PIF_VALUE: 3
PIF_VALUE: 20
PIF_VALUE: 17
PIF_VALUE: 20
PIF_VALUE: 10
PIF_VALUE: 16
PIF_VALUE: 0
PIF_VALUE: 2
PIF_VALUE: 12
PIF_VALUE: 18
PIF_VALUE: 20
PIF_VALUE: 25
PIF_VALUE: 11
PIF_VALUE: 29
PIF_VALUE: 0
PIF_VALUE: 9
PIF_VALUE: 15
PIF_VALUE: 0
PIF_VALUE: 26
PIF_VALUE: 20
PIF_VALUE: 25

## 2022-04-28 ASSESSMENT — PAIN SCALES - GENERAL
PAINLEVEL_OUTOF10: 10
PAINLEVEL_OUTOF10: 8
PAINLEVEL_OUTOF10: 10
PAINLEVEL_OUTOF10: 3

## 2022-04-28 ASSESSMENT — ENCOUNTER SYMPTOMS
CONSTIPATION: 0
STRIDOR: 0
APNEA: 0
EYE REDNESS: 0
ANAL BLEEDING: 0
EYE ITCHING: 0
CHOKING: 0
ABDOMINAL PAIN: 1
BACK PAIN: 0
PHOTOPHOBIA: 0
COLOR CHANGE: 0
RECTAL PAIN: 0
SORE THROAT: 0

## 2022-04-28 ASSESSMENT — PAIN - FUNCTIONAL ASSESSMENT
PAIN_FUNCTIONAL_ASSESSMENT: 0-10
PAIN_FUNCTIONAL_ASSESSMENT: ACTIVITIES ARE NOT PREVENTED

## 2022-04-28 ASSESSMENT — PAIN DESCRIPTION - ONSET: ONSET: SUDDEN

## 2022-04-28 ASSESSMENT — PAIN DESCRIPTION - DESCRIPTORS
DESCRIPTORS: ACHING
DESCRIPTORS: ACHING
DESCRIPTORS: PATIENT UNABLE TO DESCRIBE;OTHER (COMMENT)
DESCRIPTORS: ACHING

## 2022-04-28 ASSESSMENT — PAIN DESCRIPTION - ORIENTATION
ORIENTATION: MID
ORIENTATION: MID;RIGHT

## 2022-04-28 ASSESSMENT — PAIN DESCRIPTION - LOCATION
LOCATION: ABDOMEN

## 2022-04-28 ASSESSMENT — LIFESTYLE VARIABLES: SMOKING_STATUS: 1

## 2022-04-28 ASSESSMENT — PAIN DESCRIPTION - PAIN TYPE: TYPE: ACUTE PAIN

## 2022-04-28 ASSESSMENT — PAIN DESCRIPTION - FREQUENCY: FREQUENCY: CONTINUOUS

## 2022-04-28 NOTE — H&P
HISTORY AND PHYSICAL    2022     Patient Information:    Patient: Loida Caban     Gender: male  : 1985   Age: 39 y.o. MRN: [id number]  Room : [facility]-A      Pt`s preferred phone number :  [facility]  Extended Emergency Contact Information  Primary Emergency Contact: Tiana Tadeo  Address: [address] Phone: [phone]  Mobile Phone: [phone]  Relation: Parent        PCP:  Moraima Alicea MD (Tel: [phone] )    Chief complaint:    Chief Complaint   Patient presents with    Abdominal Pain     x couple hours      Lab Results   Component Value Date    LABA1C 9.3 (H) 2021       No results found for: LVEF, LVEFMODE    Body mass index is 37.97 kg/m². History of Present Illness:  Loida Caban is a 39 y.o. male with   has a past medical history of Asthma, Blind left eye, Diabetes mellitus ([address] GERD (gastroesophageal reflux disease), Hypertension, and Retinal detachment. who presented to ER with abdominal pain     In ER lab data revealed   And  XR . And   CT revealed   History obtained from patient . REVIEW OF SYSTEMS:   Constitutional: Negative for fever,chills . ENT: Negative for rhinorrhea,  sore throat. Respiratory: Negative for cough, shortness of breath,wheezing  Cardiovascular: Negative for chest pain, palpitations   Gastrointestinal: Negative for Abdominal pain, nausea, vomiting, diarrhea  Genitourinary: Negative for polyuria, dysuria   Hematologic/Lymphatic: Negative for bleeding tendency, easy bruising  Musculoskeletal: Negative for myalgias and arthralgias  Neurologic: Negative for confusion,dysarthria. Skin: Negative for itching,rash  Psychiatric: Negative for depression,anxiety, agitation. Endocrine: Negative for polydipsia,polyuria,heat /cold intolerance.     Past Medical History:   has a past medical history of Asthma, Blind left eye, Diabetes mellitus ([facility] [address] GERD (gastroesophageal reflux disease), Hypertension, and Retinal detachment. Past Surgical History:   has a past surgical history that includes Corneal transplant (Bilateral); Mandible surgery (Right); eye surgery; Eye surgery (Right); fracture surgery; Mandible surgery (Bilateral); and Toe amputation (Right, 07/25/2017). Medications:  No current facility-administered medications on file prior to encounter. Current Outpatient Medications on File Prior to Encounter   Medication Sig Dispense Refill    glipiZIDE (GLUCOTROL) 10 MG tablet Take 2 tablets PO in the morning with food, 1 tablet PO at night with food.  60 tablet 0    sucralfate (CARAFATE) 1 GM tablet Take 1 tablet by mouth 4 times daily 120 tablet 0    albuterol sulfate  (90 Base) MCG/ACT inhaler Inhale 2 puffs into the lungs every 6 hours as needed for Wheezing 1 Inhaler 5    albuterol-ipratropium (COMBIVENT RESPIMAT)  MCG/ACT AERS inhaler Inhale 1 puff into the lungs every 6 hours 1 Inhaler 5    alogliptin (NESINA) 12.5 MG TABS tablet Take 1 tablet by mouth Daily with supper 30 tablet 3    acetaminophen (AMINOFEN) 325 MG tablet Take 2 tablets by mouth every 6 hours as needed for Pain 20 tablet 0    brimonidine (ALPHAGAN) 0.2 % ophthalmic solution Place 1 drop into the right eye 2 times daily      dorzolamide-timolol 22.3-6.8 MG/ML SOLN Place 1 drop into the right eye 2 times daily      Ertugliflozin L-PyroglutamicAc (STEGLATRO) 15 MG TABS Take 1 tablet by mouth daily      prednisoLONE acetate (PRED FORTE) 1 % ophthalmic suspension Place 1 drop into the right eye 2 times daily      allopurinol (ZYLOPRIM) 300 MG tablet Take 300 mg by mouth daily       atorvastatin (LIPITOR) 10 MG tablet Take 10 mg by mouth daily       lisinopril-hydrochlorothiazide (PRINZIDE;ZESTORETIC) 20-12.5 MG per tablet Take 1 tablet by mouth daily       metFORMIN (GLUCOPHAGE) 1000 MG tablet Take 1,000 mg by mouth 2 times daily (with meals)      amLODIPine (NORVASC) 10 MG tablet Take 10 mg by mouth daily      omeprazole (PRILOSEC) 20 MG capsule Take 40 mg by mouth daily          Allergies: Allergies   Allergen Reactions    Ciprofloxacin Shortness Of Breath    Shellfish-Derived Products Anaphylaxis        Social History:   reports that he has been smoking cigarettes. He has a 2.50 pack-year smoking history. He has never used smokeless tobacco. He reports current alcohol use. He reports previous drug use. Drug: Marijuana Brisa Grissom). Family History:  family history includes Asthma in his father and mother; Diabetes in his father and mother; Heart Disease in his mother; High Blood Pressure in his father and mother; Stroke in his mother. ,     Immunization History   Administered Date(s) Administered    Influenza Virus Vaccine 10/19/2016    Tdap (Boostrix, Adacel) 07/26/2019       Physical Exam:  /79   Pulse 95   Temp 97.9 °F (36.6 °C) (Oral)   Resp 20   Ht 6' 3\" (1.905 m)   Wt (!) 303 lb 12.8 oz (137.8 kg)   SpO2 95%   BMI 37.97 kg/m²     General appearance:  no distress . Well nourished  Eyes: Sclera clear, pupils equal  Cardiovascular: Regular rhythm, normal S1, S2. No edema in lower extremities  Respiratory: Clear to auscultation bilaterally, no wheeze, good inspiratory effort  Gastrointestinal: Abdomen soft, non-tender, not distended, normal bowel sounds  Musculoskeletal: No cyanosis in digits, neck supple  Neurology: Cranial nerves grossly intact. Alert and oriented . No speech or motor deficits  Psychiatry: Appropriate affect.  Not agitated  Skin: Warm, dry, normal turgor, no rash    Labs:  CBC:   Lab Results   Component Value Date    WBC 4.6 04/28/2022    RBC 4.43 04/28/2022    HGB 13.9 04/28/2022    HCT 39.4 04/28/2022    MCV 88.9 04/28/2022    MCH 31.4 04/28/2022    MCHC 35.3 04/28/2022    RDW 11.7 04/28/2022     04/28/2022    MPV 9.2 04/28/2022     BMP:    Lab Results   Component Value Date     04/28/2022    K 4.6 04/28/2022    CL 94 04/28/2022    CO2 20 04/28/2022    BUN 11 04/28/2022    CREATININE 0.8 04/28/2022    CALCIUM 9.0 04/28/2022    GFRAA >60 04/28/2022    LABGLOM >60 04/28/2022    GLUCOSE 306 04/28/2022       Chest Xray:   EKG:    I visualized CXR images and EKG strips      Patient Active Problem List   Diagnosis Code    Sprain of left ankle S93.402A    Closed nondisplaced fracture of fifth metatarsal bone of left foot S92.355A    Alcohol abuse F10.10    UTI (urinary tract infection) due to Enterococcus N39.0, B95.2    Acute osteomyelitis of toe, right (HCC) M86.171    Osteomyelitis (HCC) M86.9    Diabetic foot infection (HCC) E11.628, L08.9    Shortness of breath R06.02    Asthma exacerbation J45. [address] [facility] Problems    Diagnosis     Appendicitis [K37]      Priority: Medium             Assessment/Plan:   Tele   IVF  surg consult   Home meds , reviewed and resumed as appropriate   Symptoms releif/Pain control  DVT proph           Abbey Trujillo MD    4/28/2022 6:31 PM

## 2022-04-28 NOTE — ED PROVIDER NOTES
Triage Chief Complaint:   Abdominal Pain (x couple hours)    [place]:  Today in the ED I had the pleasure of caring for Coni Swenson who is a 39 y.o. male that presents to the emergency department. Complaining abdominal pain. Generalized. 10/10. Stabbing in nature. Nonradiating. Ongoing times several hours. Patient states he felt perfectly fine. Ate dinner. Went to bed. Woke up with this pain. And came here immediately he endorses nausea but no vomiting. No testicular pain no urinary symptoms. Last time urinating was just prior to arrival.  Last time defecating was earlier this morning. No issues there. No associated chest pain or shortness of breath.   No fever chills nausea vomit diarrhea no history of abdominal surgery    ROS:  REVIEW OF SYSTEMS    At least 10 systems reviewed      All other review of systems are negative  See HPI and nursing notes for additional information       Past Medical History:   Diagnosis Date    Asthma     Blind left eye     Diabetes mellitus ([facility] [address]     GERD (gastroesophageal reflux disease)     Hypertension     Retinal detachment 2012    left     Past Surgical History:   Procedure Laterality Date    CORNEAL TRANSPLANT Bilateral     EYE SURGERY      left eye removed    EYE SURGERY Right     FRACTURE SURGERY      foot left    MANDIBLE SURGERY Right     MANDIBLE SURGERY Bilateral     TOE AMPUTATION Right 07/25/2017     Family History   Problem Relation Age of Onset    Diabetes Mother     Asthma Mother     High Blood Pressure Mother     Stroke Mother     Heart Disease Mother     Diabetes Father     Asthma Father     High Blood Pressure Father      Social History     Socioeconomic History    Marital status: Single     Spouse name: Not on file    Number of children: Not on file    Years of education: Not on file    Highest education level: Not on file   Occupational History    Not on file   Tobacco Use    Smoking status: Current Every Day Smoker Packs/day: 0.50     Years: 5.00     Pack years: 2.50     Types: Cigarettes    Smokeless tobacco: Never Used   Vaping Use    Vaping Use: Never used   Substance and Sexual Activity    Alcohol use: Yes     Comment: occasionally    Drug use: Not Currently     Types: Marijuana Oanh Germain)    Sexual activity: Not on file   Other Topics Concern    Not on file   Social History Narrative    Not on file     Social Determinants of Health     Financial Resource Strain:     Difficulty of Paying Living Expenses: Not on file   Food Insecurity:     Worried About Running Out of Food in the Last Year: Not on file    Sang of Food in the Last Year: Not on file   Transportation Needs:     Lack of Transportation (Medical): Not on file    Lack of Transportation (Non-Medical):  Not on file   Physical Activity:     Days of Exercise per Week: Not on file    Minutes of Exercise per Session: Not on file   Stress:     Feeling of Stress : Not on file   Social Connections:     Frequency of Communication with Friends and Family: Not on file    Frequency of Social Gatherings with Friends and Family: Not on file    Attends Islam Services: Not on file    Active Member of [address] [place] [address] or Organizations: Not on file    Attends Club or Organization Meetings: Not on file    Marital Status: Not on file   Intimate Partner Violence:     Fear of Current or Ex-Partner: Not on file    Emotionally Abused: Not on file    Physically Abused: Not on file    Sexually Abused: Not on file   Housing Stability:     Unable to Pay for Housing in the Last Year: Not on file    Number of [place] in the Last Year: Not on file    Unstable Housing in the Last Year: Not on file     Current Facility-Administered Medications   Medication Dose Route Frequency Provider Last Rate Last Admin    ondansetron (ZOFRAN) injection 4 mg  4 mg IntraVENous Q30 Min PRN Skylar Kearns PA-C   4 mg at 04/28/22 0144    morphine sulfate (PF) injection 4 mg  4 mg IntraVENous Q30 Min PRN Benny Dach, PA-C   4 mg at 04/28/22 0145    HYDROmorphone (DILAUDID) injection 0.5 mg  0.5 mg IntraVENous Q30 Min PRN Benny Dach, PA-C   0.5 mg at 04/28/22 0216    HYDROmorphone (DILAUDID) injection 1 mg  1 mg IntraVENous Q30 Min PRN Benny Dach, PA-C   1 mg at 04/28/22 1411    sodium chloride flush 0.9 % injection 5-40 mL  5-40 mL IntraVENous 2 times per day Benny Dach, PA-C        sodium chloride flush 0.9 % injection 5-40 mL  5-40 mL IntraVENous PRN Benny Dach, PA-C        0.9 % sodium chloride infusion   IntraVENous PRN Benny Dach, PA-C        ondansetron (ZOFRAN-ODT) disintegrating tablet 4 mg  4 mg Oral Q8H PRN Benny Dach, PA-C        Or    ondansetron [facility]) injection 4 mg  4 mg IntraVENous Q6H PRN Benny Dach, PA-C        HYDROmorphone (DILAUDID) injection 1 mg  1 mg IntraVENous Q1H PRN Benny Dach, PA-C        0.9 % sodium chloride infusion   IntraVENous Continuous Benny Dach, PA-C         Current Outpatient Medications   Medication Sig Dispense Refill    glipiZIDE (GLUCOTROL) 10 MG tablet Take 2 tablets PO in the morning with food, 1 tablet PO at night with food.  60 tablet 0    sucralfate (CARAFATE) 1 GM tablet Take 1 tablet by mouth 4 times daily 120 tablet 0    albuterol sulfate  (90 Base) MCG/ACT inhaler Inhale 2 puffs into the lungs every 6 hours as needed for Wheezing 1 Inhaler 5    albuterol-ipratropium (COMBIVENT RESPIMAT)  MCG/ACT AERS inhaler Inhale 1 puff into the lungs every 6 hours 1 Inhaler 5    alogliptin (NESINA) 12.5 MG TABS tablet Take 1 tablet by mouth Daily with supper 30 tablet 3    acetaminophen (AMINOFEN) 325 MG tablet Take 2 tablets by mouth every 6 hours as needed for Pain 20 tablet 0    brimonidine (ALPHAGAN) 0.2 % ophthalmic solution Place 1 drop into the right eye 2 times daily      dorzolamide-timolol 22.3-6.8 MG/ML SOLN Place 1 drop into the right eye 2 times daily      Ertugliflozin L-PyroglutamicAc (STEGLATRO) 15 MG TABS Take 1 tablet by mouth daily      prednisoLONE acetate (PRED FORTE) 1 % ophthalmic suspension Place 1 drop into the right eye 2 times daily      allopurinol (ZYLOPRIM) 300 MG tablet Take 300 mg by mouth daily       atorvastatin (LIPITOR) 10 MG tablet Take 10 mg by mouth daily       lisinopril-hydrochlorothiazide (PRINZIDE;ZESTORETIC) 20-12.5 MG per tablet Take 1 tablet by mouth daily       metFORMIN (GLUCOPHAGE) 1000 MG tablet Take 1,000 mg by mouth 2 times daily (with meals)      amLODIPine (NORVASC) 10 MG tablet Take 10 mg by mouth daily      omeprazole (PRILOSEC) 20 MG capsule Take 40 mg by mouth daily        Allergies   Allergen Reactions    Ciprofloxacin Shortness Of Breath    Shellfish-Derived Products Anaphylaxis       Nursing Notes Reviewed    Physical Exam:  ED Triage Vitals [04/28/22 0127]   Enc Vitals Group      BP (!) 153/105      Pulse 107      Resp 24      Temp 97.5 °F (36.4 °C)      Temp Source Oral      SpO2 98 %      Weight       Height 6' 3\" (1.905 m)      Head Circumference       Peak Flow       Pain Score       Pain Loc       Pain Edu? Excl. in [address]? General :Patient is awake alert oriented person place and time no acute distress nontoxic appearing  HEENT: Pupils are equally round and reactive to light extraocular motors are intact conjunctivae clear sclerae white there is no injection no icterus. Nose without any rhinorrhea or epistaxis. Oral mucosa is moist no exudate buccal mucosa shows no ulcerations. Uvula is midline    Neck: Neck is supple full range of motion trachea midline thyroid nonpalpable  Cardiac: Heart regular rate rhythm no murmurs rubs clicks or gallops  Lungs: Lungs are clear to auscultation there is no wheezing rhonchi or rales. There is no use of accessory muscles no nasal flaring identified. Chest wall:  There is no tenderness to palpation over the chest wall or over ribs  Abdomen: Abdomen is soft nontender nondistended. There is no firm or pulsatile masses no rebound rigidity or guarding negative Hall's negative McBurney, no peritoneal signs  Suprapubic:  there is no tenderness to palpation over the external bladder   Musculoskeletal: 5 out of 5 strength in all 4 extremities full flexion extension abduction and adduction supination pronation of all extremities and all digits. No obvious muscle atrophy is noted. No focal muscle deficits are appreciated  Dermatology: Skin is warm and dry there is no obvious abscesses lacerations or lesions noted  Psych: Mentation is grossly normal cognition is grossly normal. Affect is appropriate  Neuro: Motor intact sensory intact cranial nerves II through XII are intact level of consciousness is normal cerebellar function is normal reflexes are grossly normal. No evidence of incontinence or loss of bowel or bladder no saddle anesthesia noted Lymphatic: There is no submandibular or cervical adenopathy appreciated.         I have reviewed and interpreted all of the currently available lab results from this visit (if applicable):  Results for orders placed or performed during the hospital encounter of 04/28/22   CBC with Auto Differential   Result Value Ref Range    WBC 4.6 4.0 - 10.5 K/CU MM    RBC 4.43 (L) 4.6 - 6.2 M/CU MM    Hemoglobin 13.9 13.5 - 18.0 GM/DL    Hematocrit 39.4 (L) 42 - 52 %    MCV 88.9 78 - 100 FL    MCH 31.4 (H) 27 - 31 PG    MCHC 35.3 32.0 - 36.0 %    RDW 11.7 11.7 - 14.9 %    Platelets 615 740 - 971 K/CU MM    MPV 9.2 7.5 - 11.1 FL    Differential Type AUTOMATED DIFFERENTIAL     Segs Relative 66.9 (H) 36 - 66 %    Lymphocytes % 23.7 (L) 24 - 44 %    Monocytes % 7.2 (H) 0 - 4 %    Eosinophils % 1.1 0 - 3 %    Basophils % 0.9 0 - 1 %    Segs Absolute 3.1 K/CU MM    Lymphocytes Absolute 1.1 K/CU MM    Monocytes Absolute 0.3 K/CU MM    Eosinophils Absolute 0.1 K/CU MM    Basophils Absolute 0.0 K/CU MM    Nucleated RBC % 0.0 % Total Nucleated RBC 0.0 K/CU MM    Total Immature Neutrophil 0.01 K/CU MM    Immature Neutrophil % 0.2 0 - 0.43 %   Comprehensive Metabolic Panel   Result Value Ref Range    Sodium 132 (L) 135 - 145 MMOL/L    Potassium 4.6 3.5 - 5.1 MMOL/L    Chloride 94 (L) 99 - 110 mMol/L    CO2 20 (L) 21 - 32 MMOL/L    BUN 11 6 - 23 MG/DL    CREATININE 0.8 (L) 0.9 - 1.3 MG/DL    Glucose 306 (H) 70 - 99 MG/DL    Calcium 9.0 8.3 - 10.6 MG/DL    Albumin 4.6 3.4 - 5.0 GM/DL    Total Protein 7.4 6.4 - 8.2 GM/DL    Total Bilirubin 0.4 0.0 - 1.0 MG/DL    ALT 30 10 - 40 U/L    AST 27 15 - 37 IU/L    Alkaline Phosphatase 101 40 - 128 IU/L    GFR Non-African American >60 >60 mL/min/1.73m2    GFR African American >60 >60 mL/min/1.73m2    Anion Gap 18 (H) 4 - 16   Urinalysis   Result Value Ref Range    Color, UA YELLOW YELLOW    Clarity, UA CLEAR CLEAR    Glucose, Urine >1,000 (A) NEGATIVE MG/DL    Bilirubin Urine NEGATIVE NEGATIVE MG/DL    Ketones, Urine 40 (A) NEGATIVE MG/DL    Specific Gravity, UA 1.010 1.001 - 1.035    Blood, Urine TRACE (A) NEGATIVE    pH, Urine 5.5 5.0 - 8.0    Protein, UA 30 (A) NEGATIVE MG/DL    Urobilinogen, Urine 0.2 0.2 - 1.0 MG/DL    Nitrite Urine, Quantitative NEGATIVE NEGATIVE    Leukocyte Esterase, Urine NEGATIVE NEGATIVE    RBC, UA <1 0 - 3 /HPF    WBC, UA 1 0 - 2 /HPF    Bacteria, UA NEGATIVE NEGATIVE /HPF    Squam Epithel, UA <1 /HPF   Lipase   Result Value Ref Range    Lipase 19 13 - 60 IU/L   Troponin   Result Value Ref Range    Troponin T <0.010 <0.01 NG/ML   Lactic Acid   Result Value Ref Range    Lactate 1.1 0.4 - 2.0 mMOL/L   EKG 12 Lead   Result Value Ref Range    Ventricular Rate 99 BPM    Atrial Rate 99 BPM    P-R Interval 176 ms    QRS Duration 78 ms    Q-T Interval 368 ms    QTc Calculation (Bazett) 472 ms    P Axis 57 degrees    R Axis 8 degrees    T Axis 44 degrees    Diagnosis       Normal sinus rhythm  Normal ECG  When compared with ECG of 01-AUG-2021 14:23,  T wave inversion no longer evident in Inferior leads        Radiographs (if obtained):  [] The following radiograph was interpreted by myself in the absence of a radiologist:   [] Radiologist's Report Reviewed:  CT ABDOMEN PELVIS WO CONTRAST Additional Contrast? None   Final Result   Multiple appendicoliths are present. The distal portion of the appendix is   mildly distended with fluid and some questionable wall thickening. No   significant fat stranding around the appendix at this time. Features are   concerning for a very early appendicitis. There is no bowel obstruction, abscess, or pneumoperitoneum. No ureteral   calculi or hydronephrosis. Diffuse fatty metamorphosis of the liver is noted. Critical results were called by Dr. Ellis Hernandez MD to [facility]   on 4/28/2022 at 03:43. EKG (if obtained):   Please See Note of attending physician for EKG interpretation. Chart review shows recent radiograph(s):  No results found. MDM:     Interventions given this visit:   Orders Placed This Encounter   Medications    ondansetron (ZOFRAN) injection 4 mg    morphine sulfate (PF) injection 4 mg    0.9 % sodium chloride bolus    HYDROmorphone (DILAUDID) injection 0.5 mg    ketorolac (TORADOL) injection 15 mg    HYDROmorphone (DILAUDID) injection 1 mg    DISCONTD: ketorolac (TORADOL) injection 15 mg    sodium chloride flush 0.9 % injection 5-40 mL    sodium chloride flush 0.9 % injection 5-40 mL    0.9 % sodium chloride infusion    OR Linked Order Group     ondansetron (ZOFRAN-ODT) disintegrating tablet 4 mg     ondansetron (ZOFRAN) injection 4 mg    HYDROmorphone (DILAUDID) injection 1 mg    0.9 % sodium chloride infusion   Abdominal pain x1 day. Very uncomfortable on examination benign abdominal exam no signs of peritonitis noted. Vital signs stable here in the emergency department. Given patient's anaphylactic reaction to shellfish. Iodine/contrast did CT scan was not ordered.   Have low suspicion of mesenteric ischemia, peritonitis. Labs are unremarkable including negative lactic acid. CT scan abdomen does reveal multiple appendicoliths. Consistent with early appendicitis. I did speak to on-call general surgeon Dr. Javed Miller he did advise admission to the hospital.  For surgical evaluation this morning in several hours. Patient remains comfortable stable here in the emergency department    On-call physician Was consulted regarding patient. After thorough discussion regarding patient's history, physical exam.  laboratory values, radiographic evidence (if applicable  theymyself as well as my attending physician agreed Given the patient's presenting concerns, medical history and clinical findings, the patient will be admitted at this time to undergo further evaluation and disposition. . During patient's entire stay in the ED patient remained stable and comfortable. Analgesia is well-controlled. Patient will be admitted for all information regarding ongoing management and care of patient please see note of Admitting physician. All EKG interpretations are performed by Attending physician           I independently managed patient today in the ED.    BP (!) 153/105   Pulse 107   Temp 97.5 °F (36.4 °C) (Oral)   Resp 24   Ht 6' 3\" (1.905 m)   SpO2 98%   BMI 37.50 kg/m²       Clinical Impression:  1. Acute appendicitis with generalized peritonitis, without gangrene or abscess, unspecified whether perforation present        Disposition referral (if applicable):  No follow-up provider specified. Disposition medications (if applicable):  New Prescriptions    No medications on file         Comment: Please note this report has been produced using speech recognition software and may contain errors related to that system including errors in grammar, punctuation, and spelling, as well as words and phrases that may be inappropriate.  If there are any questions or concerns please feel free to contact the dictating provider for clarification.       Yao Schwartz, [id number] [place], [place]  04/28/22 3294

## 2022-04-28 NOTE — ED NOTES
Report called to [facility] RN at this time, all questions answered.       Summer Murrell RN  04/28/22 0793

## 2022-04-28 NOTE — CONSULTS
Department of GeneralSurgery   Surgical Service Dr Alondra Saul   Consult Note    Date of Consult: 4/28/22        Reason for Consult:  Acute appendicitis  Requesting Physician:  Dr [facility]:  RLQ abd pain     History Obtained From:  patient    HISTORY OF PRESENT ILLNESS:                The patient is a 39 y.o. male who presents with RLQ abd pain x 1 day. Pain is described as shooting. Pain level is 10/10. Pt woke up with the pain. He ate at a dinner before going to bed. Denies fever chills or dysuria. CT of the abd was done in ED which showed early appendicitis.     Past Medical History:    Past Medical History:   Diagnosis Date    Asthma     Blind left eye     Diabetes mellitus ([address]     GERD (gastroesophageal reflux disease)     Hypertension     Retinal detachment 2012    left       Past Surgical History:    Past Surgical History:   Procedure Laterality Date    CORNEAL TRANSPLANT Bilateral     EYE SURGERY      left eye removed    EYE SURGERY Right     FRACTURE SURGERY      foot left    MANDIBLE SURGERY Right     MANDIBLE SURGERY Bilateral     TOE AMPUTATION Right 07/25/2017       Current Medications:   Current Facility-Administered Medications   Medication Dose Route Frequency Provider Last Rate Last Admin    sodium chloride flush 0.9 % injection 5-40 mL  5-40 mL IntraVENous 2 times per day Ned Maxon, PA-C        sodium chloride flush 0.9 % injection 5-40 mL  5-40 mL IntraVENous PRN Nde Maxon, PA-C        0.9 % sodium chloride infusion   IntraVENous PRN Ned Maxon, PA-C        ondansetron (ZOFRAN-ODT) disintegrating tablet 4 mg  4 mg Oral Q8H PRN Ned Maxon, PA-C        Or    ondansetron [facility]) injection 4 mg  4 mg IntraVENous Q6H PRN Ned Maxon, PA-C        HYDROmorphone (DILAUDID) injection 1 mg  1 mg IntraVENous Q1H PRN Ned Maxon, PA-C   1 mg at 04/28/22 4805    0.9 % sodium chloride infusion   IntraVENous Continuous [facility] [address] [place] [address] 100 mL/hr at 04/28/22 0635 New Bag at 04/28/22 0635    ceFAZolin (ANCEF) 3,000 mg in dextrose 5 % 100 mL IVPB  3,000 mg IntraVENous Once Baudilio Carvajal MD        meperidine (DEMEROL) injection 12.5 mg  12.5 mg IntraVENous Q5 Min PRN Beth Solum, DO        HYDROmorphone (DILAUDID) injection 0.5 mg  0.5 mg IntraVENous Q5 Min PRN Beth Solum, DO        fentaNYL (SUBLIMAZE) injection 50 mcg  50 mcg IntraVENous Q5 Min PRN Beth Solum, DO        oxyCODONE (ROXICODONE) immediate release tablet 5 mg  5 mg Oral Once PRN Beth Solum, DO        prochlorperazine (COMPAZINE) 5 mg in sodium chloride (PF) 10 mL injection  5 mg IntraVENous Once PRN Beth Solum, DO        metoclopramide (REGLAN) injection 10 mg  10 mg IntraVENous Once PRN Beth Solum, DO        midazolam PF (VERSED) injection 2 mg  2 mg IntraVENous Once PRN Beth Solum, DO        diphenhydrAMINE (BENADRYL) injection 12.5 mg  12.5 mg IntraVENous Once PRN Beth Solum, DO        labetalol (NORMODYNE;TRANDATE) injection 10 mg  10 mg IntraVENous Q15 Min PRN Beth Solum, DO        Or    hydrALAZINE (APRESOLINE) injection 10 mg  10 mg IntraVENous Q15 Min PRN Beth Solum, DO           Allergies:  Ciprofloxacin and Shellfish-derived products    Social History:   Social History     Socioeconomic History    Marital status: Single     Spouse name: None    Number of children: None    Years of education: None    Highest education level: None   Occupational History    None   Tobacco Use    Smoking status: Current Every Day Smoker     Packs/day: 0.50     Years: 5.00     Pack years: 2.50     Types: Cigarettes    Smokeless tobacco: Never Used   Vaping Use    Vaping Use: Never used   Substance and Sexual Activity    Alcohol use: Yes     Comment: occasionally    Drug use: Not Currently     Types: Marijuana [place])    Sexual activity: None   Other Topics Concern    None   Social History Narrative    None     Social Determinants of Health     Financial Resource Strain:     Difficulty of Paying Living Expenses: Not on file   Food Insecurity:     Worried About Running Out of Food in the Last Year: Not on file    Sang of Food in the Last Year: Not on file   Transportation Needs:     Lack of Transportation (Medical): Not on file    Lack of Transportation (Non-Medical): Not on file   Physical Activity:     Days of Exercise per Week: Not on file    Minutes of Exercise per Session: Not on file   Stress:     Feeling of Stress : Not on file   Social Connections:     Frequency of Communication with Friends and Family: Not on file    Frequency of Social Gatherings with Friends and Family: Not on file    Attends Adventism Services: Not on file    Active Member of Clubs or Organizations: Not on file    Attends Club or Organization Meetings: Not on file    Marital Status: Not on file   Intimate Partner Violence:     Fear of Current or Ex-Partner: Not on file    Emotionally Abused: Not on file    Physically Abused: Not on file    Sexually Abused: Not on file   Housing Stability:     Unable to Pay for Housing in the Last Year: Not on file    Number of [place] in the Last Year: Not on file    Unstable Housing in the Last Year: Not on file       Family History:   Family History   Problem Relation Age of Onset    Diabetes Mother     Asthma Mother     High Blood Pressure Mother     Stroke Mother     Heart Disease Mother     Diabetes Father     Asthma Father     High Blood Pressure Father        REVIEW OFSYSTEMS:    Review of Systems   Constitutional: Negative for chills and fever. HENT: Negative for ear pain, mouth sores, sore throat and tinnitus. Eyes: Negative for photophobia, redness and itching. Respiratory: Negative for apnea, choking and stridor. Cardiovascular: Negative for chest pain and palpitations. Gastrointestinal: Positive for abdominal pain.  Negative for anal bleeding, constipation and rectal pain. Endocrine: Negative for polydipsia. Genitourinary: Negative for enuresis, flank pain and hematuria. Musculoskeletal: Negative for back pain, joint swelling and myalgias. Skin: Negative for color change and pallor. Allergic/Immunologic: Negative for environmental allergies. Neurological: Negative for syncope and speech difficulty. Psychiatric/Behavioral: Negative for confusion and hallucinations. PHYSICAL EXAM:  Vitals:    04/28/22 0127 04/28/22 0629 04/28/22 0822 04/28/22 0844   BP: (!) 153/105 (!) 144/83 (!) 145/84    Pulse: 107 100 107    Resp: 24 20 23 15   Temp: 97.5 °F (36.4 °C) 98 °F (36.7 °C) 96.9 °F (36.1 °C)    TempSrc: Oral Oral Temporal    SpO2: 98% 98% 97% 100%   Weight:  (!) 303 lb 12.8 oz (137.8 kg)     Height: 6' 3\" (1.905 m)          Physical Exam  Constitutional:       Appearance: He is well-developed. HENT:      Head: Normocephalic. Eyes:      Pupils: Pupils are equal, round, and reactive to light. Cardiovascular:      Rate and Rhythm: Normal rate. Pulmonary:      Effort: Pulmonary effort is normal.   Abdominal:      General: There is no distension. Palpations: Abdomen is soft. There is no mass. Tenderness: There is abdominal tenderness. There is no guarding or rebound. Musculoskeletal:         General: Normal range of motion. Cervical back: Normal range of motion and neck supple. Skin:     General: Skin is warm. Neurological:      Mental Status: He is alert and oriented to person, place, and time.            DATA:    CBC:   Lab Results   Component Value Date    WBC 4.6 04/28/2022    RBC 4.43 04/28/2022    HGB 13.9 04/28/2022    HCT 39.4 04/28/2022    MCV 88.9 04/28/2022    MCH 31.4 04/28/2022    MCHC 35.3 04/28/2022    RDW 11.7 04/28/2022     04/28/2022    MPV 9.2 04/28/2022     CMP:    Lab Results   Component Value Date     04/28/2022    K 4.6 04/28/2022    CL 94 04/28/2022    CO2 20 04/28/2022    BUN 11 04/28/2022    CREATININE 0.8 04/28/2022    GFRAA >60 04/28/2022    LABGLOM >60 04/28/2022    GLUCOSE 306 04/28/2022    PROT 7.4 04/28/2022    PROT 6.8 04/16/2011    LABALBU 4.6 04/28/2022    CALCIUM 9.0 04/28/2022    BILITOT 0.4 04/28/2022    ALKPHOS 101 04/28/2022    AST 27 04/28/2022    ALT 30 04/28/2022       IMPRESSION:        Patient Active Problem List:     Sprain of left ankle     Closed nondisplaced fracture of fifth metatarsal bone of left foot     Alcohol abuse     UTI (urinary tract infection) due to Enterococcus     Acute osteomyelitis of toe, right (HCC)     Osteomyelitis (HCC)     Diabetic foot infection (HCC)     Shortness of breath     Asthma exacerbation     Appendicitis      PLAN:    Will proceed with lap appendectomy this am.         Henrique Garcia MD

## 2022-04-28 NOTE — ANESTHESIA POSTPROCEDURE EVALUATION
Department of Anesthesiology  Postprocedure Note    Patient: Andrea Espinal  MRN: [id number]  YOB: 1985  Date of evaluation: 4/28/2022  Time:  11:45 AM     Procedure Summary     Date: 04/28/22 Room / Location: [place] [id number] / [facility]    Anesthesia Start: 0901 Anesthesia Stop: 1013    Procedure: APPENDECTOMY LAPAROSCOPIC (N/A Abdomen) Diagnosis: (ACUTE APPENDICITIS)    Surgeons: Baudilio Carvaajl MD Responsible Provider: Beth Flynn DO    Anesthesia Type: general ASA Status: 3 - Emergent          Anesthesia Type: general    Nadja Phase I: Nadja Score: 10    Nadja Phase II:      Last vitals: Reviewed and per EMR flowsheets.        Anesthesia Post Evaluation    Patient location during evaluation: PACU  Patient participation: complete - patient participated  Level of consciousness: sleepy but conscious  Airway patency: patent  Nausea & Vomiting: no nausea  Complications: no  Cardiovascular status: hemodynamically stable  Respiratory status: acceptable  Hydration status: euvolemic

## 2022-04-28 NOTE — ED PROVIDER NOTES
12 lead EKG per my interpretation:  Normal Sinus Rhythm 99  Axis is   Normal  QTc is  472  There is no specific T wave changes appreciated. There is no specific ST wave changes appreciated.     Prior EKG to compare with was not available         Denis Villela DO  04/28/22 6353

## 2022-04-28 NOTE — PLAN OF CARE
Full consult note to follow. Patient with fecaliths in the appendix and early appendicitis. Will proceed with laparoscopic appendectomy.      Ivette Weston PA-C

## 2022-04-28 NOTE — ANESTHESIA PRE PROCEDURE
Department of Anesthesiology  Preprocedure Note       Name:  Priyanka Suggs   Age:  39 y.o.  :  1985                                          MRN:  [id number]         Date:  2022      Surgeon: Keli Torres):  Carole Aaron MD    Procedure: Procedure(s):  APPENDECTOMY LAPAROSCOPIC    Medications prior to admission:   Prior to Admission medications    Medication Sig Start Date End Date Taking? Authorizing Provider   glipiZIDE (GLUCOTROL) 10 MG tablet Take 2 tablets PO in the morning with food, 1 tablet PO at night with food.  21   Gillian Devries PA-C   sucralfate (CARAFATE) 1 GM tablet Take 1 tablet by mouth 4 times daily 21   Gillian Devries PA-C   albuterol sulfate  (90 Base) MCG/ACT inhaler Inhale 2 puffs into the lungs every 6 hours as needed for Wheezing 3/24/21   Geri Almonte MD   albuterol-ipratropium (COMBIVENT RESPIMAT)  MCG/ACT AERS inhaler Inhale 1 puff into the lungs every 6 hours 3/24/21   Andrea Alvarado MD   alogliptin (NESINA) 12.5 MG TABS tablet Take 1 tablet by mouth Daily with supper 3/24/21 4/23/21  Andrea Alvarado MD   acetaminophen (AMINOFEN) 325 MG tablet Take 2 tablets by mouth every 6 hours as needed for Pain 20   Tommy Tafoya DO   brimonidine (ALPHAGAN) 0.2 % ophthalmic solution Place 1 drop into the right eye 2 times daily    Historical Provider, MD   dorzolamide-timolol 22.3-6.8 MG/ML SOLN Place 1 drop into the right eye 2 times daily    Historical Provider, MD   Ertugliflozin L-PyroglutamicAc (STEGLATRO) 15 MG TABS Take 1 tablet by mouth daily    Historical Provider, MD   prednisoLONE acetate (PRED FORTE) 1 % ophthalmic suspension Place 1 drop into the right eye 2 times daily    Historical Provider, MD   allopurinol (ZYLOPRIM) 300 MG tablet Take 300 mg by mouth daily     Historical Provider, MD   atorvastatin (LIPITOR) 10 MG tablet Take 10 mg by mouth daily     Historical Provider, MD   lisinopril-hydrochlorothiazide (PRINZIDE;ZESTORETIC) 20-12.5 MG per tablet Take 1 tablet by mouth daily     Historical Provider, MD   metFORMIN (GLUCOPHAGE) 1000 MG tablet Take 1,000 mg by mouth 2 times daily (with meals)    Historical Provider, MD   amLODIPine (NORVASC) 10 MG tablet Take 10 mg by mouth daily 11/25/16   Historical Provider, MD   omeprazole (PRILOSEC) 20 MG capsule Take 40 mg by mouth daily     Historical Provider, MD       Current medications:    Current Facility-Administered Medications   Medication Dose Route Frequency Provider Last Rate Last Admin    sodium chloride flush 0.9 % injection 5-40 mL  5-40 mL IntraVENous 2 times per day [place] Fallsop[place]ia Adhikari PA-C        sodium chloride flush 0.9 % injection 5-40 mL  5-40 mL IntraVENous PRN Dashawn Adhikari, PA-C        0.9 % sodium chloride infusion   IntraVENous PRN Dashawn Adhikari, PA-C        ondansetron (ZOFRAN-ODT) disintegrating tablet 4 mg  4 mg Oral Q8H PRN Dashawn Adhikari PA-C        Or    ondansetron [facility]) injection 4 mg  4 mg IntraVENous Q6H PRN [place] Zeynep, PA-C        HYDROmorphone (DILAUDID) injection 1 mg  1 mg IntraVENous Q1H PRN Dashawn Adhikari, PA-C   1 mg at 04/28/22 0901    0.9 % sodium chloride infusion   IntraVENous Continuous [place] Zeynep, PA-C 100 mL/hr at 04/28/22 1274 New Bag at 04/28/22 0635    ceFAZolin (ANCEF) 3,000 mg in dextrose 5 % 100 mL IVPB  3,000 mg IntraVENous Once Judith Chavez MD        meperidine (DEMEROL) injection 12.5 mg  12.5 mg IntraVENous Q5 Min PRN [place], DO        HYDROmorphone (DILAUDID) injection 0.5 mg  0.5 mg IntraVENous Q5 Min PRN [place], DO        fentaNYL (SUBLIMAZE) injection 50 mcg  50 mcg IntraVENous Q5 Min PRN [place], DO        oxyCODONE (ROXICODONE) immediate release tablet 5 mg  5 mg Oral Once PRN [place], DO        prochlorperazine (COMPAZINE) 5 mg in sodium chloride (PF) 10 mL injection  5 mg IntraVENous Once PRN [place], DO        metoclopramide (REGLAN) injection 10 mg  10 mg IntraVENous Once PRN Jason Cloud, DO        midazolam PF (VERSED) injection 2 mg  2 mg IntraVENous Once PRN Jason Cloud, DO        diphenhydrAMINE (BENADRYL) injection 12.5 mg  12.5 mg IntraVENous Once PRN Jason Cloud, DO        labetalol (NORMODYNE;TRANDATE) injection 10 mg  10 mg IntraVENous Q15 Min PRN Jason Cloud, DO        Or    hydrALAZINE (APRESOLINE) injection 10 mg  10 mg IntraVENous Q15 Min PRN Jason Cloud, DO        ipratropium-albuterol (DUONEB) nebulizer solution 1 ampule  1 ampule Inhalation Once PRN Jason Cloud, DO        insulin regular (HUMULIN R;NOVOLIN R) injection 4 Units  4 Units IntraVENous Once Jason Cloud, DO           Allergies: Allergies   Allergen Reactions    Ciprofloxacin Shortness Of Breath    Shellfish-Derived Products Anaphylaxis       Problem List:    Patient Active Problem List   Diagnosis Code    Sprain of left ankle S93.402A    Closed nondisplaced fracture of fifth metatarsal bone of left foot S92.355A    Alcohol abuse F10.10    UTI (urinary tract infection) due to Enterococcus N39.0, B95.2    Acute osteomyelitis of toe, right (HCC) M86.171    Osteomyelitis (HCC) M86.9    Diabetic foot infection ([facility]) E11.628, L08.9    Shortness of breath R06.02    Asthma exacerbation J45. 0    Appendicitis K37       Past Medical History:        Diagnosis Date    Asthma     Blind left eye     Diabetes mellitus ([facility] [address]     GERD (gastroesophageal reflux disease)     Hypertension     Retinal detachment 2012    left       Past Surgical History:        Procedure Laterality Date    CORNEAL TRANSPLANT Bilateral     EYE SURGERY      left eye removed    EYE SURGERY Right     FRACTURE SURGERY      foot left    MANDIBLE SURGERY Right     MANDIBLE SURGERY Bilateral     TOE AMPUTATION Right 07/25/2017       Social History:    Social History     Tobacco Use    Smoking status: Current Every Day Smoker     Packs/day: 0.50     Years: 5.00     Pack years: 2.50     Types: Cigarettes    Smokeless tobacco: Never Used   Substance Use Topics    Alcohol use: Yes     Comment: occasionally                                Ready to quit: Not Answered  Counseling given: Not Answered      Vital Signs (Current):   Vitals:    04/28/22 0127 04/28/22 0629 04/28/22 0822   BP: (!) 153/105 (!) 144/83 (!) 145/84   Pulse: 107 100 107   Resp: 24 20 23   Temp: 97.5 °F (36.4 °C) 98 °F (36.7 °C) 96.9 °F (36.1 °C)   TempSrc: Oral Oral Temporal   SpO2: 98% 98% 97%   Weight:  (!) 303 lb 12.8 oz (137.8 kg)    Height: 6' 3\" (1.905 m)                                                BP Readings from Last 3 Encounters:   04/28/22 (!) 145/84   08/01/21 (!) 146/84   03/24/21 131/75       NPO Status: Time of last liquid consumption: 2300                        Time of last solid consumption: 1200                        Date of last liquid consumption: 04/27/22                        Date of last solid food consumption: 04/27/22    BMI:   Wt Readings from Last 3 Encounters:   04/28/22 (!) 303 lb 12.8 oz (137.8 kg)   08/01/21 300 lb (136.1 kg)   03/23/21 (!) 309 lb 11.2 oz (140.5 kg)     Body mass index is 37.97 kg/m².     CBC:   Lab Results   Component Value Date    WBC 4.6 04/28/2022    RBC 4.43 04/28/2022    HGB 13.9 04/28/2022    HCT 39.4 04/28/2022    MCV 88.9 04/28/2022    RDW 11.7 04/28/2022     04/28/2022       CMP:   Lab Results   Component Value Date     04/28/2022    K 4.6 04/28/2022    CL 94 04/28/2022    CO2 20 04/28/2022    BUN 11 04/28/2022    CREATININE 0.8 04/28/2022    GFRAA >60 04/28/2022    LABGLOM >60 04/28/2022    GLUCOSE 306 04/28/2022    PROT 7.4 04/28/2022    PROT 6.8 04/16/2011    CALCIUM 9.0 04/28/2022    BILITOT 0.4 04/28/2022    ALKPHOS 101 04/28/2022    AST 27 04/28/2022    ALT 30 04/28/2022       POC Tests:   Recent Labs     04/28/22  0824   POCGLU 223*       Coags:   Lab Results Component Value Date    PROTIME 11.3 07/27/2019    INR 0.97 07/27/2019    APTT 26.6 07/27/2019       HCG (If Applicable): No results found for: PREGTESTUR, PREGSERUM, HCG, HCGQUANT     ABGs:   Lab Results   Component Value Date    PO2ART 168 05/09/2017    CGJ2LYD 29.0 05/09/2017    ILN7YYU 23.7 05/09/2017        Type & Screen (If Applicable):  No results found for: LABABO, LABRH    Drug/Infectious Status (If Applicable):  No results found for: HIV, HEPCAB    COVID-19 Screening (If Applicable):   Lab Results   Component Value Date    COVID19 NOT DETECTED 03/20/2021           Anesthesia Evaluation  Patient summary reviewed and Nursing notes reviewed no history of anesthetic complications:   Airway: Mallampati: II  TM distance: >3 FB   Neck ROM: full  Mouth opening: > = 3 FB Dental:    (+) poor dentition      Pulmonary:   (+) asthma: current smoker                           Cardiovascular:  Exercise tolerance: good (>4 METS),   (+) hypertension:,          Beta Blocker:  Not on Beta Blocker         Neuro/Psych:   (+) psychiatric history (etoh abuse):             ROS comment: Left eye blindness GI/Hepatic/Renal:   (+) GERD:, morbid obesity          Endo/Other:    (+) DiabetesType II DM, poorly controlled, , .                 Abdominal:             Vascular: Other Findings:             Anesthesia Plan      general     ASA 3 - emergent       Induction: intravenous. MIPS: Postoperative opioids intended and Prophylactic antiemetics administered. Anesthetic plan and risks discussed with patient. Plan discussed with CRNA.                   Lauryn Arriaga DO   4/28/2022

## 2022-04-28 NOTE — PROGRESS NOTES
1009 Arrived to PACU, monitors placed and alarms on. Received report from [facility]. Patient drowsy from anesthesia but arouses easily, then falls back asleep. 1020 Blood sugar checked 211. Dr. Umu Barrios made aware. 1045 Patient turned and repositioned. Patient more wakeful, no complaints voiced. 1100 Report called to [facility]. Transported to room with all belongings.

## 2022-04-29 VITALS
RESPIRATION RATE: 18 BRPM | SYSTOLIC BLOOD PRESSURE: 142 MMHG | DIASTOLIC BLOOD PRESSURE: 90 MMHG | HEIGHT: 75 IN | OXYGEN SATURATION: 96 % | WEIGHT: 312.1 LBS | BODY MASS INDEX: 38.81 KG/M2 | HEART RATE: 104 BPM | TEMPERATURE: 98.5 F

## 2022-04-29 PROCEDURE — APPNB30 APP NON BILLABLE TIME 0-30 MINS: Performed by: PHYSICIAN ASSISTANT

## 2022-04-29 PROCEDURE — 6370000000 HC RX 637 (ALT 250 FOR IP): Performed by: FAMILY MEDICINE

## 2022-04-29 PROCEDURE — 94640 AIRWAY INHALATION TREATMENT: CPT

## 2022-04-29 PROCEDURE — 94761 N-INVAS EAR/PLS OXIMETRY MLT: CPT

## 2022-04-29 PROCEDURE — 6360000002 HC RX W HCPCS: Performed by: PHYSICIAN ASSISTANT

## 2022-04-29 PROCEDURE — 2580000003 HC RX 258: Performed by: PHYSICIAN ASSISTANT

## 2022-04-29 PROCEDURE — 99024 POSTOP FOLLOW-UP VISIT: CPT | Performed by: PHYSICIAN ASSISTANT

## 2022-04-29 RX ORDER — OXYCODONE HYDROCHLORIDE AND ACETAMINOPHEN 5; 325 MG/1; MG/1
1 TABLET ORAL EVERY 6 HOURS PRN
Qty: 12 TABLET | Refills: 0 | Status: SHIPPED | OUTPATIENT
Start: 2022-04-29 | End: 2022-05-02

## 2022-04-29 RX ORDER — ONDANSETRON 4 MG/1
4 TABLET, FILM COATED ORAL 3 TIMES DAILY PRN
Qty: 15 TABLET | Refills: 2 | Status: SHIPPED | OUTPATIENT
Start: 2022-04-29 | End: 2022-05-04

## 2022-04-29 RX ADMIN — MORPHINE SULFATE 2 MG: 2 INJECTION, SOLUTION INTRAMUSCULAR; INTRAVENOUS at 09:20

## 2022-04-29 RX ADMIN — SODIUM CHLORIDE, PRESERVATIVE FREE 10 ML: 5 INJECTION INTRAVENOUS at 09:20

## 2022-04-29 RX ADMIN — IPRATROPIUM BROMIDE AND ALBUTEROL SULFATE 1 AMPULE: .5; 2.5 SOLUTION RESPIRATORY (INHALATION) at 03:51

## 2022-04-29 RX ADMIN — MORPHINE SULFATE 2 MG: 2 INJECTION, SOLUTION INTRAMUSCULAR; INTRAVENOUS at 03:15

## 2022-04-29 RX ADMIN — IPRATROPIUM BROMIDE AND ALBUTEROL SULFATE 1 AMPULE: .5; 2.5 SOLUTION RESPIRATORY (INHALATION) at 11:38

## 2022-04-29 ASSESSMENT — PAIN DESCRIPTION - LOCATION
LOCATION: ABDOMEN
LOCATION: ABDOMEN

## 2022-04-29 ASSESSMENT — ENCOUNTER SYMPTOMS
COLOR CHANGE: 0
ABDOMINAL PAIN: 1
APNEA: 0
SORE THROAT: 0
STRIDOR: 0
ANAL BLEEDING: 0
EYE REDNESS: 0
EYE ITCHING: 0
CONSTIPATION: 0
RECTAL PAIN: 0
BACK PAIN: 0
PHOTOPHOBIA: 0
CHOKING: 0

## 2022-04-29 ASSESSMENT — PAIN DESCRIPTION - DESCRIPTORS
DESCRIPTORS: DISCOMFORT
DESCRIPTORS: ACHING

## 2022-04-29 ASSESSMENT — PAIN DESCRIPTION - ONSET: ONSET: ON-GOING

## 2022-04-29 ASSESSMENT — PAIN SCALES - GENERAL
PAINLEVEL_OUTOF10: 9
PAINLEVEL_OUTOF10: 10

## 2022-04-29 ASSESSMENT — PAIN DESCRIPTION - ORIENTATION: ORIENTATION: RIGHT

## 2022-04-29 ASSESSMENT — PAIN DESCRIPTION - PAIN TYPE: TYPE: SURGICAL PAIN

## 2022-04-29 ASSESSMENT — PAIN DESCRIPTION - FREQUENCY: FREQUENCY: CONTINUOUS

## 2022-04-29 NOTE — CARE COORDINATION
Chart Reviewed. Pt has a PCP and insurance to help with Rx's. Pt is independent of ADL's. No needs identified at this time. CM availible if needed. Please contact CM if the needs should arise.

## 2022-04-29 NOTE — DISCHARGE SUMMARY
Patient: Mariane Mohs, MD      Gender: male  : 1985   Age: 39 y.o. MRN: [id number]    Admitting Physician: Shirlyn Cabot, MD  Discharge Physician: Shirlyn Cabot, MD     Code Status: Full Code     Admit Date: 2022   Discharge Date: 22      Disposition:  Home       Condition at Discharge:  stable . Follow-up appointments:  f/u one week with PCP , and with consultants as recommended . Outpatient to do list: f/u     Pt`s preferred phone number :[phone]  [facility]  Extended Emergency Contact Information  Primary Emergency Contact: Tiana Tadeo  Address: [address] Phone: [phone]  Mobile Phone: [phone]  Relation: Parent      Discharge Diagnoses: Active Hospital Problems    Diagnosis     Appendicitis [K37]      Priority: Medium                 Hospital Course:         Consults. IP CONSULT TO GENERAL SURGERY  IP CONSULT TO PRIMARY CARE PROVIDER        Discharge Medications:   Current Discharge Medication List        START taking these medications    Details   oxyCODONE-acetaminophen (PERCOCET) 5-325 MG per tablet Take 1 tablet by mouth every 6 hours as needed for Pain for up to 3 days. Intended supply: 3 days. Take lowest dose possible to manage pain  Qty: 12 tablet, Refills: 0    Comments: Reduce doses taken as pain becomes manageable  Associated Diagnoses: Other acute appendicitis      ondansetron (ZOFRAN) 4 MG tablet Take 1 tablet by mouth 3 times daily as needed for Nausea or Vomiting  Qty: 15 tablet, Refills: 2           Current Discharge Medication List        Current Discharge Medication List        CONTINUE these medications which have NOT CHANGED    Details   glipiZIDE (GLUCOTROL) 10 MG tablet Take 2 tablets PO in the morning with food, 1 tablet PO at night with food.   Qty: 60 tablet, Refills: 0      sucralfate (CARAFATE) 1 GM tablet Take 1 tablet by mouth 4 times daily  Qty: 120 tablet, Refills: 0      albuterol sulfate  (90 Base) MCG/ACT inhaler Inhale 2 puffs into the lungs every 6 hours as needed for Wheezing  Qty: 1 Inhaler, Refills: 5      albuterol-ipratropium (COMBIVENT RESPIMAT)  MCG/ACT AERS inhaler Inhale 1 puff into the lungs every 6 hours  Qty: 1 Inhaler, Refills: 5      alogliptin (NESINA) 12.5 MG TABS tablet Take 1 tablet by mouth Daily with supper  Qty: 30 tablet, Refills: 3      acetaminophen (AMINOFEN) 325 MG tablet Take 2 tablets by mouth every 6 hours as needed for Pain  Qty: 20 tablet, Refills: 0      brimonidine (ALPHAGAN) 0.2 % ophthalmic solution Place 1 drop into the right eye 2 times daily      dorzolamide-timolol 22.3-6.8 MG/ML SOLN Place 1 drop into the right eye 2 times daily      Ertugliflozin L-PyroglutamicAc (STEGLATRO) 15 MG TABS Take 1 tablet by mouth daily      prednisoLONE acetate (PRED FORTE) 1 % ophthalmic suspension Place 1 drop into the right eye 2 times daily      allopurinol (ZYLOPRIM) 300 MG tablet Take 300 mg by mouth daily       atorvastatin (LIPITOR) 10 MG tablet Take 10 mg by mouth daily       lisinopril-hydrochlorothiazide (PRINZIDE;ZESTORETIC) 20-12.5 MG per tablet Take 1 tablet by mouth daily       metFORMIN (GLUCOPHAGE) 1000 MG tablet Take 1,000 mg by mouth 2 times daily (with meals)      amLODIPine (NORVASC) 10 MG tablet Take 10 mg by mouth daily      omeprazole (PRILOSEC) 20 MG capsule Take 40 mg by mouth daily            Current Discharge Medication List          Discharge ROS:  A complete review of systems was asked and negative except for      Discharge Exam:  Estimated body mass index is 39.01 kg/m² as calculated from the following:    Height as of this encounter: 6' 3\" (1.905 m). Weight as of this encounter: 312 lb 1.6 oz (141.6 kg).     BP (!) 142/90   Pulse 104   Temp 98.5 °F (36.9 °C) (Oral)   Resp 18   Ht 6' 3\" (1.905 m)   Wt (!) 312 lb 1.6 oz (141.6 kg)   SpO2 96%   BMI 39.01 kg/m²   General appearance: NAD  Heart[de-identified] Normal s1/s2, RRR, no murmurs, gallops, or rubs. No leg edema  Lungs:  Clear to auscultation, bilaterally without Rales/Wheezes/Rhonchi. Abdomen: Soft, non-tender, non-distended, bowel sounds present  Musculoskeletal:   no cyanosis, no edema  Neurologic:  Cranial nerves: II-XII intact, grossly non-focal.  Psychiatric:  A & O x3      Labs:  For convenience and continuity at follow-up the following most recent labs are provided:    Lab Results   Component Value Date    WBC 4.6 04/28/2022    HGB 13.9 04/28/2022    HCT 39.4 04/28/2022    MCV 88.9 04/28/2022     04/28/2022     04/28/2022    K 4.6 04/28/2022    CL 94 04/28/2022    CO2 20 04/28/2022    BUN 11 04/28/2022    CREATININE 0.8 04/28/2022    CALCIUM 9.0 04/28/2022    PHOS 2.6 05/09/2017    BNP <2 12/06/2010    ALKPHOS 101 04/28/2022    ALT 30 04/28/2022    AST 27 04/28/2022    BILITOT 0.4 04/28/2022    BILIDIR 0.3 05/12/2017    LABALBU 4.6 04/28/2022    TRIG 268 04/16/2011     Lab Results   Component Value Date    INR 0.97 07/27/2019       Results for orders placed or performed during the hospital encounter of 04/28/22   CBC with Auto Differential   Result Value Ref Range    WBC 4.6 4.0 - 10.5 K/CU MM    RBC 4.43 (L) 4.6 - 6.2 M/CU MM    Hemoglobin 13.9 13.5 - 18.0 GM/DL    Hematocrit 39.4 (L) 42 - 52 %    MCV 88.9 78 - 100 FL    MCH 31.4 (H) 27 - 31 PG    MCHC 35.3 32.0 - 36.0 %    RDW 11.7 11.7 - 14.9 %    Platelets 960 061 - 261 K/CU MM    MPV 9.2 7.5 - 11.1 FL    Differential Type AUTOMATED DIFFERENTIAL     Segs Relative 66.9 (H) 36 - 66 %    Lymphocytes % 23.7 (L) 24 - 44 %    Monocytes % 7.2 (H) 0 - 4 %    Eosinophils % 1.1 0 - 3 %    Basophils % 0.9 0 - 1 %    Segs Absolute 3.1 K/CU MM    Lymphocytes Absolute 1.1 K/CU MM    Monocytes Absolute 0.3 K/CU MM    Eosinophils Absolute 0.1 K/CU MM    Basophils Absolute 0.0 K/CU MM    Nucleated RBC % 0.0 %    Total Nucleated RBC 0.0 K/CU MM    Total Immature Neutrophil 0.01 K/CU MM Immature Neutrophil % 0.2 0 - 0.43 %   Comprehensive Metabolic Panel   Result Value Ref Range    Sodium 132 (L) 135 - 145 MMOL/L    Potassium 4.6 3.5 - 5.1 MMOL/L    Chloride 94 (L) 99 - 110 mMol/L    CO2 20 (L) 21 - 32 MMOL/L    BUN 11 6 - 23 MG/DL    CREATININE 0.8 (L) 0.9 - 1.3 MG/DL    Glucose 306 (H) 70 - 99 MG/DL    Calcium 9.0 8.3 - 10.6 MG/DL    Albumin 4.6 3.4 - 5.0 GM/DL    Total Protein 7.4 6.4 - 8.2 GM/DL    Total Bilirubin 0.4 0.0 - 1.0 MG/DL    ALT 30 10 - 40 U/L    AST 27 15 - 37 IU/L    Alkaline Phosphatase 101 40 - 128 IU/L    GFR Non-African American >60 >60 mL/min/1.73m2    GFR African American >60 >60 mL/min/1.73m2    Anion Gap 18 (H) 4 - 16   Urinalysis   Result Value Ref Range    Color, UA YELLOW YELLOW    Clarity, UA CLEAR CLEAR    Glucose, Urine >1,000 (A) NEGATIVE MG/DL    Bilirubin Urine NEGATIVE NEGATIVE MG/DL    Ketones, Urine 40 (A) NEGATIVE MG/DL    Specific Gravity, UA 1.010 1.001 - 1.035    Blood, Urine TRACE (A) NEGATIVE    pH, Urine 5.5 5.0 - 8.0    Protein, UA 30 (A) NEGATIVE MG/DL    Urobilinogen, Urine 0.2 0.2 - 1.0 MG/DL    Nitrite Urine, Quantitative NEGATIVE NEGATIVE    Leukocyte Esterase, Urine NEGATIVE NEGATIVE    RBC, UA <1 0 - 3 /HPF    WBC, UA 1 0 - 2 /HPF    Bacteria, UA NEGATIVE NEGATIVE /HPF    Squam Epithel, UA <1 /HPF   Lipase   Result Value Ref Range    Lipase 19 13 - 60 IU/L   Troponin   Result Value Ref Range    Troponin T <0.010 <0.01 NG/ML   Lactic Acid   Result Value Ref Range    Lactate 1.1 0.4 - 2.0 mMOL/L   POCT Glucose   Result Value Ref Range    POC Glucose 223 (H) 70 - 99 MG/DL   POCT Glucose   Result Value Ref Range    POC Glucose 211 (H) 70 - 99 MG/DL   EKG 12 Lead   Result Value Ref Range    Ventricular Rate 99 BPM    Atrial Rate 99 BPM    P-R Interval 176 ms    QRS Duration 78 ms    Q-T Interval 368 ms    QTc Calculation (Bazett) 472 ms    P Axis 57 degrees    R Axis 8 degrees    T Axis 44 degrees    Diagnosis       Normal sinus rhythm  Normal ECG  When compared with ECG of 01-AUG-2021 14:23,  T wave inversion no longer evident in Inferior leads  Confirmed by TRAVIS Monreal ([id number]) on 4/28/2022 7:20:15 PM           Chart review shows recent radiographs:  CT ABDOMEN PELVIS WO CONTRAST Additional Contrast? None    Result Date: 4/28/2022  EXAMINATION: CT OF THE ABDOMEN AND PELVIS WITHOUT CONTRAST 4/28/2022 2:24 am TECHNIQUE: CT of the abdomen and pelvis was performed without the administration of intravenous contrast. Multiplanar reformatted images are provided for review. Dose modulation, iterative reconstruction, and/or weight based adjustment of the mA/kV was utilized to reduce the radiation dose to as low as reasonably achievable. COMPARISON: None. HISTORY: ORDERING SYSTEM PROVIDED HISTORY: pain TECHNOLOGIST PROVIDED HISTORY: Reason for exam:->pain Additional Contrast?->None Decision Support Exception - unselect if not a suspected or confirmed emergency medical condition->Emergency Medical Condition (MA) Reason for Exam: Abdominal Pain FINDINGS: Lower Chest: The lung bases are clear. Organs: Lack of IV contrast does reduce evaluation of the organs and vasculature. There is diffuse fatty metamorphosis of the liver. Small areas of fatty sparing along the gallbladder fossa. The biliary system is not dilated. No mineralized stones within the gallbladder. Spleen is not enlarged. No pancreatic calcification, ductal dilatation, or focal fluid. The adrenal glands are unremarkable. No renal calculi, hydronephrosis, or hydroureter. GI/Bowel: The stomach, small bowel, and colon are not dilated. Multiple appendicoliths are present. Fluid distends the distal portion of the appendix with only a minimal amount remaining gas in the lumen. Slight thickening of the appendiceal wall but without significant fat stranding or fluid. No wall thickening or fat stranding at the terminal ileum. The ascending colon is collapsed without a true wall thickening.   There is no sign of colonic diverticulitis. There is no ascites or pneumoperitoneum. Pelvis: Urinary bladder is well distended without wall thickening. The prostate is not enlarged. There is no free fluid in the pelvis. Peritoneum/Retroperitoneum: No abdominal aortic aneurysm or retroperitoneal hematoma. There are small periaortic and pericaval lymph nodes. Bones/Soft Tissues: No fluid collection at the abdominal wall. No acute fracture or destruction at the bones. Multiple appendicoliths are present. The distal portion of the appendix is mildly distended with fluid and some questionable wall thickening. No significant fat stranding around the appendix at this time. Features are concerning for a very early appendicitis. There is no bowel obstruction, abscess, or pneumoperitoneum. No ureteral calculi or hydronephrosis. Diffuse fatty metamorphosis of the liver is noted. Critical results were called by Dr. Luisito Gay MD to [facility] on 4/28/2022 at 03:43. EKG     Rhythm: normal sinus       Immunization History   Administered Date(s) Administered    Influenza Virus Vaccine 10/19/2016    Tdap (Boostrix, Adacel) 07/26/2019         The patient was seen and examined on day of discharge and this discharge summary is in conjunction with any daily progress note from day of discharge. Time Spent on discharge is   >35  min  in the examination, evaluation, counseling and review of medications and discharge plan.             Prudencio Adkins MD   4/29/2022

## 2022-04-29 NOTE — PROGRESS NOTES
GENERAL SURGERY PROGRESS NOTE    Timothy Cross is a 39 y.o. male with 1 Day Post-Op lap appy . Subjective:    Pain: 8/10  BM: +ve   Diet: ADULT DIET; Regular  Activity: Tolerating    Pt with c/o significant abd pain today - Would like dilaudid and not morphine. Was sleeping peacefully when I walked into the room. Vitals stable    Review of Systems   Constitutional: Negative for chills and fever. HENT: Negative for ear pain, mouth sores, sore throat and tinnitus. Eyes: Negative for photophobia, redness and itching. Respiratory: Negative for apnea, choking and stridor. Cardiovascular: Negative for chest pain and palpitations. Gastrointestinal: Positive for abdominal pain. Negative for anal bleeding, constipation and rectal pain. Endocrine: Negative for polydipsia. Genitourinary: Negative for enuresis, flank pain and hematuria. Musculoskeletal: Negative for back pain, joint swelling and myalgias. Skin: Negative for color change and pallor. Allergic/Immunologic: Negative for environmental allergies. Neurological: Negative for syncope and speech difficulty. Psychiatric/Behavioral: Negative for confusion and hallucinations. Objective:    Vitals: VITALS:  BP (!) 142/90   Pulse 104   Temp 98.5 °F (36.9 °C) (Oral)   Resp 18   Ht 6' 3\" (1.905 m)   Wt (!) 312 lb 1.6 oz (141.6 kg)   SpO2 96%   BMI 39.01 kg/m²   TEMPERATURE:  Current - Temp: 98.5 °F (36.9 °C);  Max - Temp  Av °F (36.7 °C)  Min: 97.2 °F (36.2 °C)  Max: 98.7 °F (37.1 °C)    I/O:  0701 -  0700  In: 1050 [I.V.:1050]  Out: 2805 [Urine:2800]    Labs/Imaging Results:   Lab Results   Component Value Date    WBC 4.6 2022    HGB 13.9 2022    HCT 39.4 (L) 2022    MCV 88.9 2022     2022     Lab Results   Component Value Date     (L) 2022    K 4.6 2022    CL 94 (L) 2022    CO2 20 (L) 2022    BUN 11 2022    CREATININE 0.8 (L) 2022 GLUCOSE 306 (H) 04/28/2022    CALCIUM 9.0 04/28/2022    PROT 7.4 04/28/2022    LABALBU 4.6 04/28/2022    BILITOT 0.4 04/28/2022    ALKPHOS 101 04/28/2022    AST 27 04/28/2022    ALT 30 04/28/2022    LABGLOM >60 04/28/2022    GFRAA >60 04/28/2022       Scheduled Meds:   sodium chloride flush, 5-40 mL, IntraVENous, 2 times per day    ceFAZolin, 3,000 mg, IntraVENous, Once    Physical Exam:  Physical Exam  Constitutional:       Appearance: He is well-developed. HENT:      Head: Normocephalic. Eyes:      Pupils: Pupils are equal, round, and reactive to light. Cardiovascular:      Rate and Rhythm: Normal rate. Pulmonary:      Effort: Pulmonary effort is normal.   Abdominal:      General: There is no distension. Palpations: Abdomen is soft. There is no mass. Tenderness: There is abdominal tenderness. There is no guarding or rebound. Comments: Lap incisions well approximated with glue intact. Musculoskeletal:         General: Normal range of motion. Cervical back: Normal range of motion and neck supple. Skin:     General: Skin is warm. Neurological:      Mental Status: He is alert and oriented to person, place, and time. Assessment and Plan:    Patient Active Problem List:     Sprain of left ankle     Closed nondisplaced fracture of fifth metatarsal bone of left foot     Alcohol abuse     UTI (urinary tract infection) due to Enterococcus     Acute osteomyelitis of toe, right (HCC)     Osteomyelitis (HCC)     Diabetic foot infection (HCC)     Shortness of breath     Asthma exacerbation     Appendicitis      Diet: As tolerated  Wound care: Ok to shower  Activity: As tolerated. Med changes: None   Labs/Imaging: Reviewed  Disposition: Ok to d/c from surgical standpoint with f/u in our office in 2 weeks.        Rajesh Newman PA-C

## 2022-04-29 NOTE — OP NOTE
draped in a sterile fashion. Using 5 mm optical trocar, the left lower quadrant of the abdomen was entered without incidence. Additional 5 mm cannulas then placed in the suprapubic area and 12 mm in the infraumbilicus. A careful evaluation of the entire abdomen was carried out. The patient was placed in Trendelenburg and left lateral decubitus position. The small intestines were retracted in the cephalad and left lateral direction away from the pelvis and right lower quadrant. The patient was found have an enlarged and inflamed appendix. There was no evidence of perforation. The mesoappendix was carefully dissected and divided using ligasure device. The appendix was divided at its base using an endo-MARLEN stapler. The specimen was inserted in an Endobag and retrieved from the abdomen. There was no evidence of bleeding, leakage, or complication after division of the appendix. Irrigation was also performed and irrigate suctioned from the abdomen as well. The umbilical port site was closed using Polysorb sutures in a figure of eight fashion at the level of the fascia. The trocar site skin wounds were closed using interrupted vicryl. Instrument, sponge, and needle counts were correct at the conclusion of the case.          Joao Lujan MD

## 2022-04-29 NOTE — PLAN OF CARE
Problem: Discharge Planning  Goal: Discharge to home or other facility with appropriate resources  Outcome: Completed     Problem: Pain  Goal: Verbalizes/displays adequate comfort level or baseline comfort level  Outcome: Completed     Problem: Chronic Conditions and Co-morbidities  Goal: Patient's chronic conditions and co-morbidity symptoms are monitored and maintained or improved  Outcome: Completed     Problem: Safety - Adult  Goal: Free from fall injury  Outcome: Completed

## 2022-04-29 NOTE — PROGRESS NOTES
Outpatient Pharmacy Progress Note for Meds-to-Beds    Total number of Prescriptions Filled: 2  The following medications were dispensed to the patient during the discharge process:   Ondansetron 4mg   Oxycodone-acetaminophen 5-325mg    Additional Documentation:   Patient picked-up the medication(s) in the OP Pharmacy      Thank you for letting us serve your patients.   [facility]    [address], [address] [facility]    Phone: [phone]    Fax: [phone]

## 2022-05-02 NOTE — PROGRESS NOTES
Physician Progress Note      Corinne Ayala  CSN #:                  [id number]  :                       1985  ADMIT DATE:       2022 1:32 AM  DISCH DATE:        2022 3:04 PM  RESPONDING  PROVIDER #:        Ed Stone MD          QUERY TEXT:    Internal Medicine,    Pt admitted with appendicitis and noted to have SIRS and hypotension. If   possible, please document in the progress notes and discharge summary if you   are evaluating and /or treating any of the following: The medical record reflects the following:  Risk Factors: appendicitis  Clinical Indicators: SIRS+, RR 12-33, HR , temp 89.1-98, BP   83//115  Treatment: labs, imaging, Surgical consult, appendectomy, IV atb    Thank you,  Kaitlin Segovia RN [facility]  [phone]  Options provided:  -- Sepsis, present on admission  -- Sepsis, present on admission, now resolved  -- Sepsis, not present on admission  -- Sepsis was ruled out  -- Other - I will add my own diagnosis  -- Disagree - Not applicable / Not valid  -- Disagree - Clinically unable to determine / Unknown  -- Refer to Clinical Documentation Reviewer    PROVIDER RESPONSE TEXT:    This patient has sepsis which was present on admission.     Query created by: Sara Brandt on 2022 10:43 AM      Electronically signed by:  Ed Stone MD 2022 10:31 AM

## 2022-07-02 ENCOUNTER — HOSPITAL ENCOUNTER (INPATIENT)
Age: 37
LOS: 5 days | Discharge: HOME | DRG: 380 | End: 2022-07-07
Attending: INTERNAL MEDICINE | Admitting: INTERNAL MEDICINE
Payer: COMMERCIAL

## 2022-07-02 ENCOUNTER — APPOINTMENT (OUTPATIENT)
Dept: GENERAL RADIOLOGY | Age: 37
DRG: 380 | End: 2022-07-02
Payer: COMMERCIAL

## 2022-07-02 DIAGNOSIS — E11.621 DIABETIC ULCER OF RIGHT FOOT DUE TO TYPE 2 DIABETES MELLITUS (HCC): ICD-10-CM

## 2022-07-02 DIAGNOSIS — E87.20 LACTIC ACIDOSIS: ICD-10-CM

## 2022-07-02 DIAGNOSIS — E11.621 DIABETIC ULCER OF TOE OF RIGHT FOOT ASSOCIATED WITH TYPE 2 DIABETES MELLITUS, UNSPECIFIED ULCER STAGE (HCC): Primary | ICD-10-CM

## 2022-07-02 DIAGNOSIS — L97.519 DIABETIC ULCER OF RIGHT FOOT DUE TO TYPE 2 DIABETES MELLITUS (HCC): ICD-10-CM

## 2022-07-02 DIAGNOSIS — L97.519 DIABETIC ULCER OF TOE OF RIGHT FOOT ASSOCIATED WITH TYPE 2 DIABETES MELLITUS, UNSPECIFIED ULCER STAGE (HCC): Primary | ICD-10-CM

## 2022-07-02 DIAGNOSIS — E87.6 HYPOKALEMIA: ICD-10-CM

## 2022-07-02 LAB
ALBUMIN SERPL-MCNC: 4.1 GM/DL (ref 3.4–5)
ALP BLD-CCNC: 87 IU/L (ref 40–129)
ALT SERPL-CCNC: 47 U/L (ref 10–40)
ANION GAP SERPL CALCULATED.3IONS-SCNC: 17 MMOL/L (ref 4–16)
AST SERPL-CCNC: 47 IU/L (ref 15–37)
BASOPHILS ABSOLUTE: 0 K/CU MM
BASOPHILS RELATIVE PERCENT: 0.4 % (ref 0–1)
BILIRUB SERPL-MCNC: 0.2 MG/DL (ref 0–1)
BUN BLDV-MCNC: 6 MG/DL (ref 6–23)
CALCIUM SERPL-MCNC: 9 MG/DL (ref 8.3–10.6)
CHLORIDE BLD-SCNC: 96 MMOL/L (ref 99–110)
CO2: 21 MMOL/L (ref 21–32)
CREAT SERPL-MCNC: 0.7 MG/DL (ref 0.9–1.3)
DIFFERENTIAL TYPE: ABNORMAL
EOSINOPHILS ABSOLUTE: 0.1 K/CU MM
EOSINOPHILS RELATIVE PERCENT: 1.3 % (ref 0–3)
ERYTHROCYTE SEDIMENTATION RATE: 15 MM/HR (ref 0–15)
ESTIMATED AVERAGE GLUCOSE: 229 MG/DL
GFR AFRICAN AMERICAN: >60 ML/MIN/1.73M2
GFR NON-AFRICAN AMERICAN: >60 ML/MIN/1.73M2
GLUCOSE BLD-MCNC: 214 MG/DL (ref 70–99)
GLUCOSE BLD-MCNC: 220 MG/DL (ref 70–99)
GLUCOSE BLD-MCNC: 256 MG/DL (ref 70–99)
GLUCOSE BLD-MCNC: 271 MG/DL (ref 70–99)
GLUCOSE BLD-MCNC: 283 MG/DL (ref 70–99)
HBA1C MFR BLD: 9.6 % (ref 4.2–6.3)
HCT VFR BLD CALC: 35.6 % (ref 42–52)
HEMOGLOBIN: 12.7 GM/DL (ref 13.5–18)
HIGH SENSITIVE C-REACTIVE PROTEIN: 3.1 MG/L
IMMATURE NEUTROPHIL %: 0.7 % (ref 0–0.43)
LACTATE: 3.1 MMOL/L (ref 0.4–2)
LACTIC ACID, SEPSIS: 1.5 MMOL/L (ref 0.5–1.9)
LACTIC ACID, SEPSIS: 1.7 MMOL/L (ref 0.5–1.9)
LACTIC ACID, SEPSIS: 2.2 MMOL/L (ref 0.5–1.9)
LYMPHOCYTES ABSOLUTE: 1.7 K/CU MM
LYMPHOCYTES RELATIVE PERCENT: 37.1 % (ref 24–44)
MAGNESIUM: 1.7 MG/DL (ref 1.8–2.4)
MCH RBC QN AUTO: 31.9 PG (ref 27–31)
MCHC RBC AUTO-ENTMCNC: 35.7 % (ref 32–36)
MCV RBC AUTO: 89.4 FL (ref 78–100)
MONOCYTES ABSOLUTE: 0.3 K/CU MM
MONOCYTES RELATIVE PERCENT: 5.9 % (ref 0–4)
NUCLEATED RBC %: 0 %
PDW BLD-RTO: 11.6 % (ref 11.7–14.9)
PLATELET # BLD: 298 K/CU MM (ref 140–440)
PMV BLD AUTO: 8.8 FL (ref 7.5–11.1)
POTASSIUM SERPL-SCNC: 3.3 MMOL/L (ref 3.5–5.1)
RBC # BLD: 3.98 M/CU MM (ref 4.6–6.2)
SEGMENTED NEUTROPHILS ABSOLUTE COUNT: 2.5 K/CU MM
SEGMENTED NEUTROPHILS RELATIVE PERCENT: 54.6 % (ref 36–66)
SODIUM BLD-SCNC: 134 MMOL/L (ref 135–145)
TOTAL IMMATURE NEUTOROPHIL: 0.03 K/CU MM
TOTAL NUCLEATED RBC: 0 K/CU MM
TOTAL PROTEIN: 7.2 GM/DL (ref 6.4–8.2)
WBC # BLD: 4.6 K/CU MM (ref 4–10.5)

## 2022-07-02 PROCEDURE — 94664 DEMO&/EVAL PT USE INHALER: CPT

## 2022-07-02 PROCEDURE — 36415 COLL VENOUS BLD VENIPUNCTURE: CPT

## 2022-07-02 PROCEDURE — 6370000000 HC RX 637 (ALT 250 FOR IP): Performed by: INTERNAL MEDICINE

## 2022-07-02 PROCEDURE — 94761 N-INVAS EAR/PLS OXIMETRY MLT: CPT

## 2022-07-02 PROCEDURE — 85025 COMPLETE CBC W/AUTO DIFF WBC: CPT

## 2022-07-02 PROCEDURE — 87186 SC STD MICRODIL/AGAR DIL: CPT

## 2022-07-02 PROCEDURE — 94640 AIRWAY INHALATION TREATMENT: CPT

## 2022-07-02 PROCEDURE — 6370000000 HC RX 637 (ALT 250 FOR IP): Performed by: HOSPITALIST

## 2022-07-02 PROCEDURE — 87075 CULTR BACTERIA EXCEPT BLOOD: CPT

## 2022-07-02 PROCEDURE — 96368 THER/DIAG CONCURRENT INF: CPT

## 2022-07-02 PROCEDURE — 85652 RBC SED RATE AUTOMATED: CPT

## 2022-07-02 PROCEDURE — 87070 CULTURE OTHR SPECIMN AEROBIC: CPT

## 2022-07-02 PROCEDURE — 73630 X-RAY EXAM OF FOOT: CPT

## 2022-07-02 PROCEDURE — 2060000000 HC ICU INTERMEDIATE R&B

## 2022-07-02 PROCEDURE — 82962 GLUCOSE BLOOD TEST: CPT

## 2022-07-02 PROCEDURE — 6360000002 HC RX W HCPCS: Performed by: INTERNAL MEDICINE

## 2022-07-02 PROCEDURE — 2580000003 HC RX 258: Performed by: PHYSICIAN ASSISTANT

## 2022-07-02 PROCEDURE — 83036 HEMOGLOBIN GLYCOSYLATED A1C: CPT

## 2022-07-02 PROCEDURE — 83605 ASSAY OF LACTIC ACID: CPT

## 2022-07-02 PROCEDURE — 6370000000 HC RX 637 (ALT 250 FOR IP): Performed by: NURSE PRACTITIONER

## 2022-07-02 PROCEDURE — 86141 C-REACTIVE PROTEIN HS: CPT

## 2022-07-02 PROCEDURE — 6360000002 HC RX W HCPCS: Performed by: PHYSICIAN ASSISTANT

## 2022-07-02 PROCEDURE — 6370000000 HC RX 637 (ALT 250 FOR IP): Performed by: PHYSICIAN ASSISTANT

## 2022-07-02 PROCEDURE — 96365 THER/PROPH/DIAG IV INF INIT: CPT

## 2022-07-02 PROCEDURE — 80053 COMPREHEN METABOLIC PANEL: CPT

## 2022-07-02 PROCEDURE — 83735 ASSAY OF MAGNESIUM: CPT

## 2022-07-02 PROCEDURE — 2580000003 HC RX 258: Performed by: INTERNAL MEDICINE

## 2022-07-02 PROCEDURE — 96375 TX/PRO/DX INJ NEW DRUG ADDON: CPT

## 2022-07-02 PROCEDURE — 87077 CULTURE AEROBIC IDENTIFY: CPT

## 2022-07-02 PROCEDURE — 99285 EMERGENCY DEPT VISIT HI MDM: CPT

## 2022-07-02 PROCEDURE — 87040 BLOOD CULTURE FOR BACTERIA: CPT

## 2022-07-02 RX ORDER — ONDANSETRON 2 MG/ML
4 INJECTION INTRAMUSCULAR; INTRAVENOUS EVERY 6 HOURS PRN
Status: DISCONTINUED | OUTPATIENT
Start: 2022-07-02 | End: 2022-07-07 | Stop reason: HOSPADM

## 2022-07-02 RX ORDER — SODIUM CHLORIDE 9 MG/ML
INJECTION, SOLUTION INTRAVENOUS PRN
Status: DISCONTINUED | OUTPATIENT
Start: 2022-07-02 | End: 2022-07-07 | Stop reason: HOSPADM

## 2022-07-02 RX ORDER — INSULIN LISPRO 100 [IU]/ML
0-6 INJECTION, SOLUTION INTRAVENOUS; SUBCUTANEOUS NIGHTLY
Status: DISCONTINUED | OUTPATIENT
Start: 2022-07-02 | End: 2022-07-07 | Stop reason: HOSPADM

## 2022-07-02 RX ORDER — TIMOLOL MALEATE 5 MG/ML
1 SOLUTION/ DROPS OPHTHALMIC 2 TIMES DAILY
Status: DISCONTINUED | OUTPATIENT
Start: 2022-07-02 | End: 2022-07-07 | Stop reason: HOSPADM

## 2022-07-02 RX ORDER — POTASSIUM CHLORIDE 750 MG/1
40 TABLET, FILM COATED, EXTENDED RELEASE ORAL ONCE
Status: COMPLETED | OUTPATIENT
Start: 2022-07-02 | End: 2022-07-02

## 2022-07-02 RX ORDER — DEXTROSE MONOHYDRATE 50 MG/ML
100 INJECTION, SOLUTION INTRAVENOUS PRN
Status: DISCONTINUED | OUTPATIENT
Start: 2022-07-02 | End: 2022-07-07 | Stop reason: HOSPADM

## 2022-07-02 RX ORDER — PANTOPRAZOLE SODIUM 40 MG/1
40 TABLET, DELAYED RELEASE ORAL
Status: DISCONTINUED | OUTPATIENT
Start: 2022-07-02 | End: 2022-07-07 | Stop reason: HOSPADM

## 2022-07-02 RX ORDER — ONDANSETRON 4 MG/1
4 TABLET, ORALLY DISINTEGRATING ORAL EVERY 8 HOURS PRN
Status: DISCONTINUED | OUTPATIENT
Start: 2022-07-02 | End: 2022-07-07 | Stop reason: HOSPADM

## 2022-07-02 RX ORDER — LISINOPRIL AND HYDROCHLOROTHIAZIDE 20; 12.5 MG/1; MG/1
1 TABLET ORAL DAILY
Status: DISCONTINUED | OUTPATIENT
Start: 2022-07-02 | End: 2022-07-07 | Stop reason: HOSPADM

## 2022-07-02 RX ORDER — INSULIN LISPRO 100 [IU]/ML
0-12 INJECTION, SOLUTION INTRAVENOUS; SUBCUTANEOUS
Status: DISCONTINUED | OUTPATIENT
Start: 2022-07-02 | End: 2022-07-07 | Stop reason: HOSPADM

## 2022-07-02 RX ORDER — ENOXAPARIN SODIUM 100 MG/ML
30 INJECTION SUBCUTANEOUS 2 TIMES DAILY
Status: DISCONTINUED | OUTPATIENT
Start: 2022-07-02 | End: 2022-07-07 | Stop reason: HOSPADM

## 2022-07-02 RX ORDER — MORPHINE SULFATE 4 MG/ML
4 INJECTION, SOLUTION INTRAMUSCULAR; INTRAVENOUS EVERY 4 HOURS PRN
Status: DISCONTINUED | OUTPATIENT
Start: 2022-07-02 | End: 2022-07-07 | Stop reason: HOSPADM

## 2022-07-02 RX ORDER — LANOLIN ALCOHOL/MO/W.PET/CERES
9 CREAM (GRAM) TOPICAL NIGHTLY PRN
Status: DISCONTINUED | OUTPATIENT
Start: 2022-07-03 | End: 2022-07-07 | Stop reason: HOSPADM

## 2022-07-02 RX ORDER — SODIUM CHLORIDE 0.9 % (FLUSH) 0.9 %
10 SYRINGE (ML) INJECTION PRN
Status: DISCONTINUED | OUTPATIENT
Start: 2022-07-02 | End: 2022-07-07 | Stop reason: HOSPADM

## 2022-07-02 RX ORDER — SODIUM CHLORIDE 0.9 % (FLUSH) 0.9 %
10 SYRINGE (ML) INJECTION EVERY 12 HOURS SCHEDULED
Status: DISCONTINUED | OUTPATIENT
Start: 2022-07-02 | End: 2022-07-07 | Stop reason: HOSPADM

## 2022-07-02 RX ORDER — ACETAMINOPHEN 325 MG/1
650 TABLET ORAL EVERY 6 HOURS PRN
Status: DISCONTINUED | OUTPATIENT
Start: 2022-07-02 | End: 2022-07-07 | Stop reason: HOSPADM

## 2022-07-02 RX ORDER — INSULIN LISPRO 100 [IU]/ML
0-6 INJECTION, SOLUTION INTRAVENOUS; SUBCUTANEOUS EVERY 4 HOURS
Status: DISCONTINUED | OUTPATIENT
Start: 2022-07-02 | End: 2022-07-02

## 2022-07-02 RX ORDER — GLIPIZIDE 5 MG/1
5 TABLET ORAL
Status: DISCONTINUED | OUTPATIENT
Start: 2022-07-02 | End: 2022-07-06

## 2022-07-02 RX ORDER — 0.9 % SODIUM CHLORIDE 0.9 %
1000 INTRAVENOUS SOLUTION INTRAVENOUS ONCE
Status: COMPLETED | OUTPATIENT
Start: 2022-07-02 | End: 2022-07-02

## 2022-07-02 RX ORDER — DORZOLAMIDE HCL 20 MG/ML
1 SOLUTION/ DROPS OPHTHALMIC 2 TIMES DAILY
Status: DISCONTINUED | OUTPATIENT
Start: 2022-07-02 | End: 2022-07-07 | Stop reason: HOSPADM

## 2022-07-02 RX ORDER — ALBUTEROL SULFATE 90 UG/1
2 AEROSOL, METERED RESPIRATORY (INHALATION) EVERY 6 HOURS PRN
Status: DISCONTINUED | OUTPATIENT
Start: 2022-07-02 | End: 2022-07-04

## 2022-07-02 RX ORDER — MORPHINE SULFATE 4 MG/ML
4 INJECTION, SOLUTION INTRAMUSCULAR; INTRAVENOUS EVERY 30 MIN PRN
Status: DISCONTINUED | OUTPATIENT
Start: 2022-07-02 | End: 2022-07-02

## 2022-07-02 RX ORDER — BRIMONIDINE TARTRATE 2 MG/ML
1 SOLUTION/ DROPS OPHTHALMIC 2 TIMES DAILY
Status: DISCONTINUED | OUTPATIENT
Start: 2022-07-02 | End: 2022-07-07 | Stop reason: HOSPADM

## 2022-07-02 RX ORDER — INSULIN GLARGINE 100 [IU]/ML
10 INJECTION, SOLUTION SUBCUTANEOUS 2 TIMES DAILY
Status: DISCONTINUED | OUTPATIENT
Start: 2022-07-02 | End: 2022-07-03

## 2022-07-02 RX ORDER — ACETAMINOPHEN 650 MG/1
650 SUPPOSITORY RECTAL EVERY 6 HOURS PRN
Status: DISCONTINUED | OUTPATIENT
Start: 2022-07-02 | End: 2022-07-07 | Stop reason: HOSPADM

## 2022-07-02 RX ORDER — ATORVASTATIN CALCIUM 10 MG/1
10 TABLET, FILM COATED ORAL DAILY
Status: DISCONTINUED | OUTPATIENT
Start: 2022-07-02 | End: 2022-07-07 | Stop reason: HOSPADM

## 2022-07-02 RX ORDER — ALLOPURINOL 100 MG/1
300 TABLET ORAL DAILY
Status: DISCONTINUED | OUTPATIENT
Start: 2022-07-02 | End: 2022-07-07 | Stop reason: HOSPADM

## 2022-07-02 RX ORDER — ALBUTEROL SULFATE 90 UG/1
1 AEROSOL, METERED RESPIRATORY (INHALATION) EVERY 6 HOURS SCHEDULED
Status: DISCONTINUED | OUTPATIENT
Start: 2022-07-02 | End: 2022-07-04

## 2022-07-02 RX ORDER — PREDNISOLONE ACETATE 10 MG/ML
1 SUSPENSION/ DROPS OPHTHALMIC 2 TIMES DAILY
Status: DISCONTINUED | OUTPATIENT
Start: 2022-07-02 | End: 2022-07-07 | Stop reason: HOSPADM

## 2022-07-02 RX ORDER — AMLODIPINE BESYLATE 10 MG/1
10 TABLET ORAL DAILY
Status: DISCONTINUED | OUTPATIENT
Start: 2022-07-02 | End: 2022-07-07 | Stop reason: HOSPADM

## 2022-07-02 RX ORDER — SUCRALFATE 1 G/1
1 TABLET ORAL 4 TIMES DAILY
Status: DISCONTINUED | OUTPATIENT
Start: 2022-07-02 | End: 2022-07-07 | Stop reason: HOSPADM

## 2022-07-02 RX ADMIN — ALBUTEROL SULFATE 1 PUFF: 90 AEROSOL, METERED RESPIRATORY (INHALATION) at 07:51

## 2022-07-02 RX ADMIN — SUCRALFATE 1 G: 1 TABLET ORAL at 23:06

## 2022-07-02 RX ADMIN — MORPHINE SULFATE 4 MG: 4 INJECTION INTRAVENOUS at 06:34

## 2022-07-02 RX ADMIN — DORZOLAMIDE HYDROCHLORIDE 1 DROP: 20 SOLUTION/ DROPS OPHTHALMIC at 20:18

## 2022-07-02 RX ADMIN — INSULIN GLARGINE 10 UNITS: 100 INJECTION, SOLUTION SUBCUTANEOUS at 10:34

## 2022-07-02 RX ADMIN — CEFEPIME 2000 MG: 2 INJECTION, POWDER, FOR SOLUTION INTRAVENOUS at 15:18

## 2022-07-02 RX ADMIN — POTASSIUM CHLORIDE 40 MEQ: 750 TABLET, FILM COATED, EXTENDED RELEASE ORAL at 02:53

## 2022-07-02 RX ADMIN — INSULIN LISPRO 3 UNITS: 100 INJECTION, SOLUTION INTRAVENOUS; SUBCUTANEOUS at 21:53

## 2022-07-02 RX ADMIN — SUCRALFATE 1 G: 1 TABLET ORAL at 11:55

## 2022-07-02 RX ADMIN — SUCRALFATE 1 G: 1 TABLET ORAL at 10:25

## 2022-07-02 RX ADMIN — PREDNISOLONE ACETATE 1 DROP: 10 SUSPENSION/ DROPS OPHTHALMIC at 17:45

## 2022-07-02 RX ADMIN — INSULIN GLARGINE 10 UNITS: 100 INJECTION, SOLUTION SUBCUTANEOUS at 21:54

## 2022-07-02 RX ADMIN — VANCOMYCIN HYDROCHLORIDE 1000 MG: 1 INJECTION, POWDER, LYOPHILIZED, FOR SOLUTION INTRAVENOUS at 01:45

## 2022-07-02 RX ADMIN — METFORMIN HYDROCHLORIDE 1000 MG: 500 TABLET ORAL at 10:25

## 2022-07-02 RX ADMIN — ALBUTEROL SULFATE 1 PUFF: 90 AEROSOL, METERED RESPIRATORY (INHALATION) at 16:11

## 2022-07-02 RX ADMIN — Medication 9 MG: at 23:23

## 2022-07-02 RX ADMIN — ALLOPURINOL 300 MG: 100 TABLET ORAL at 10:25

## 2022-07-02 RX ADMIN — VANCOMYCIN HYDROCHLORIDE 1000 MG: 1 INJECTION, POWDER, LYOPHILIZED, FOR SOLUTION INTRAVENOUS at 06:27

## 2022-07-02 RX ADMIN — Medication 1 PUFF: at 11:46

## 2022-07-02 RX ADMIN — Medication 1 PUFF: at 16:13

## 2022-07-02 RX ADMIN — ALBUTEROL SULFATE 2 PUFF: 90 AEROSOL, METERED RESPIRATORY (INHALATION) at 20:24

## 2022-07-02 RX ADMIN — AMLODIPINE BESYLATE 10 MG: 10 TABLET ORAL at 10:26

## 2022-07-02 RX ADMIN — TIMOLOL MALEATE 1 DROP: 5 SOLUTION OPHTHALMIC at 20:18

## 2022-07-02 RX ADMIN — INSULIN LISPRO 4 UNITS: 100 INJECTION, SOLUTION INTRAVENOUS; SUBCUTANEOUS at 17:50

## 2022-07-02 RX ADMIN — SUCRALFATE 1 G: 1 TABLET ORAL at 17:36

## 2022-07-02 RX ADMIN — Medication 1 PUFF: at 07:53

## 2022-07-02 RX ADMIN — METFORMIN HYDROCHLORIDE 1000 MG: 500 TABLET ORAL at 17:36

## 2022-07-02 RX ADMIN — ATORVASTATIN CALCIUM 10 MG: 10 TABLET, FILM COATED ORAL at 10:26

## 2022-07-02 RX ADMIN — MORPHINE SULFATE 4 MG: 4 INJECTION INTRAVENOUS at 17:38

## 2022-07-02 RX ADMIN — SODIUM CHLORIDE, PRESERVATIVE FREE 10 ML: 5 INJECTION INTRAVENOUS at 10:31

## 2022-07-02 RX ADMIN — MORPHINE SULFATE 4 MG: 4 INJECTION INTRAVENOUS at 21:46

## 2022-07-02 RX ADMIN — SODIUM CHLORIDE 1000 ML: 9 INJECTION, SOLUTION INTRAVENOUS at 04:06

## 2022-07-02 RX ADMIN — BRIMONIDINE TARTRATE 1 DROP: 2 SOLUTION OPHTHALMIC at 17:45

## 2022-07-02 RX ADMIN — MORPHINE SULFATE 4 MG: 4 INJECTION INTRAVENOUS at 11:55

## 2022-07-02 RX ADMIN — SODIUM CHLORIDE, PRESERVATIVE FREE 10 ML: 5 INJECTION INTRAVENOUS at 20:44

## 2022-07-02 RX ADMIN — CEFEPIME 2000 MG: 2 INJECTION, POWDER, FOR SOLUTION INTRAVENOUS at 22:01

## 2022-07-02 RX ADMIN — PREDNISOLONE ACETATE 1 DROP: 10 SUSPENSION/ DROPS OPHTHALMIC at 10:32

## 2022-07-02 RX ADMIN — VANCOMYCIN HYDROCHLORIDE 1250 MG: 1.25 INJECTION, POWDER, LYOPHILIZED, FOR SOLUTION INTRAVENOUS at 20:44

## 2022-07-02 RX ADMIN — ENOXAPARIN SODIUM 30 MG: 100 INJECTION SUBCUTANEOUS at 10:26

## 2022-07-02 RX ADMIN — LISINOPRIL AND HYDROCHLOROTHIAZIDE 1 TABLET: 12.5; 2 TABLET ORAL at 10:26

## 2022-07-02 RX ADMIN — ALBUTEROL SULFATE 1 PUFF: 90 AEROSOL, METERED RESPIRATORY (INHALATION) at 11:44

## 2022-07-02 RX ADMIN — INSULIN LISPRO 4 UNITS: 100 INJECTION, SOLUTION INTRAVENOUS; SUBCUTANEOUS at 10:35

## 2022-07-02 RX ADMIN — ENOXAPARIN SODIUM 30 MG: 100 INJECTION SUBCUTANEOUS at 20:19

## 2022-07-02 RX ADMIN — MORPHINE SULFATE 4 MG: 4 INJECTION, SOLUTION INTRAMUSCULAR; INTRAVENOUS at 01:42

## 2022-07-02 ASSESSMENT — PAIN SCALES - GENERAL
PAINLEVEL_OUTOF10: 8
PAINLEVEL_OUTOF10: 8
PAINLEVEL_OUTOF10: 7
PAINLEVEL_OUTOF10: 8

## 2022-07-02 ASSESSMENT — PAIN DESCRIPTION - LOCATION
LOCATION: FOOT

## 2022-07-02 ASSESSMENT — PAIN DESCRIPTION - ONSET: ONSET: ON-GOING

## 2022-07-02 ASSESSMENT — PAIN DESCRIPTION - ORIENTATION
ORIENTATION: RIGHT
ORIENTATION: RIGHT

## 2022-07-02 ASSESSMENT — PAIN DESCRIPTION - DESCRIPTORS
DESCRIPTORS: ACHING
DESCRIPTORS: ACHING

## 2022-07-02 ASSESSMENT — PAIN DESCRIPTION - FREQUENCY: FREQUENCY: CONTINUOUS

## 2022-07-02 ASSESSMENT — PAIN - FUNCTIONAL ASSESSMENT
PAIN_FUNCTIONAL_ASSESSMENT: PREVENTS OR INTERFERES SOME ACTIVE ACTIVITIES AND ADLS
PAIN_FUNCTIONAL_ASSESSMENT: 0-10

## 2022-07-02 ASSESSMENT — PAIN DESCRIPTION - PAIN TYPE: TYPE: ACUTE PAIN

## 2022-07-02 NOTE — H&P
History and Physical      Name:  Candida Vazquez /Age/Sex: 1985  (39 y.o. male)   MRN & CSN:  [id number] & [id number] Encounter Date/Time: 2022 4:14 AM EDT   Location:  ED16/ED-16 PCP: Zi Larry MD       Hospital Day: 1    Assessment and Plan:   Candida Vazquez is a 39 y.o. male who presents with     R foot ulcer in the 2nd toe     - vancomycin and cefepime given by ED, will continue  - Consider CT scan of right lower extremity if pain/swelling worsens  - follow-up blood cultures  - wound care consult  - wound Cx  - podiatry consult    Asthma hx  -no active wheezing  -cont pulse ox    DMII without chronic complications  -Hold oral medication while inpatient  -SSI  -Check A1c    HTN, uncontrolled  -Continue Prinzide and Norvasc  -Monitor BP    HLD  -Continue Lipitor    Cocaine abuse, hx  -UDS pending    Alcohol use disorder   -States use occasionally  -Not hx of withdrawal    Tobacco abuse  -Educated about cessation    Obesity class II with BMI of 37.5  -Educated about lifestyle changes          D/w ED provider  Code status Full  DVT PPx Lovenox       History of Present Illness:     Candida Vazquez is a 39 y.o. male p/w right foot ulcer has been present for several weeks, however over the last few days he has had a blister, and now has burst, states that skin tag is and that left. Patient states that he has some pain there, denies any associated fevers, dizziness, and has no active drainage. States that he follows with podiatry in [facility], and is agreeable to admission now. Review of Systems: Need 10 Elements       Pt otherwise has no complaints of CP, SOB, dizziness, N/V/C/D, abdominal pain, dysuria, joint pains, cough or fevers.        Objective:   No intake or output data in the 24 hours ending 22 0414   Vitals:   Vitals:    22 0110 22 0113 22 0153   BP:  135/87    Pulse:  (!) 105 97   Resp:  18    Temp:  98 °F (36.7 °C)    TempSrc:  Oral    SpO2:  97%    Weight: 300 lb (136.1 kg)     Height: 6' 3\" (1.905 m)         Medications Prior to Admission     Prior to Admission medications    Medication Sig Start Date End Date Taking? Authorizing Provider   glipiZIDE (GLUCOTROL) 10 MG tablet Take 2 tablets PO in the morning with food, 1 tablet PO at night with food.  8/1/21   Jessica Devries PA-C   sucralfate (CARAFATE) 1 GM tablet Take 1 tablet by mouth 4 times daily 8/1/21   Jessica Devries PA-C   albuterol sulfate  (90 Base) MCG/ACT inhaler Inhale 2 puffs into the lungs every 6 hours as needed for Wheezing 3/24/21   RADHAJOSÉ MIGUEL STRANGE MD   albuterol-ipratropium (COMBIVENT RESPIMAT)  MCG/ACT AERS inhaler Inhale 1 puff into the lungs every 6 hours 3/24/21   RADHAJOSÉ MIGUEL STRANGE MD   alogliptin (NESINA) 12.5 MG TABS tablet Take 1 tablet by mouth Daily with supper 3/24/21 4/23/21  RADHAJOSÉ MIGUEL STRANGE MD   acetaminophen (AMINOFEN) 325 MG tablet Take 2 tablets by mouth every 6 hours as needed for Pain 4/23/20   Valora Shorten, DO   brimonidine (ALPHAGAN) 0.2 % ophthalmic solution Place 1 drop into the right eye 2 times daily    Historical Provider, MD   dorzolamide-timolol 22.3-6.8 MG/ML SOLN Place 1 drop into the right eye 2 times daily    Historical Provider, MD   Ertugliflozin L-PyroglutamicAc (STEGLATRO) 15 MG TABS Take 1 tablet by mouth daily    Historical Provider, MD   prednisoLONE acetate (PRED FORTE) 1 % ophthalmic suspension Place 1 drop into the right eye 2 times daily    Historical Provider, MD   allopurinol (ZYLOPRIM) 300 MG tablet Take 300 mg by mouth daily     Historical Provider, MD   atorvastatin (LIPITOR) 10 MG tablet Take 10 mg by mouth daily     Historical Provider, MD   lisinopril-hydrochlorothiazide (PRINZIDE;ZESTORETIC) 20-12.5 MG per tablet Take 1 tablet by mouth daily     Historical Provider, MD   metFORMIN (GLUCOPHAGE) 1000 MG tablet Take 1,000 mg by mouth 2 times daily (with meals)    Historical Provider, MD   amLODIPine (NORVASC) 10 MG tablet Take 10 mg by mouth daily 11/25/16   Historical Provider, MD   omeprazole (PRILOSEC) 20 MG capsule Take 40 mg by mouth daily     Historical Provider, MD       Physical Exam: Need 8 Elements   General: NAD   Eyes: EOMI  ENT: neck supple  Cardiovascular: Regular rate. NO edema  Respiratory: Symmetrical expansion,   Gastrointestinal: Soft, non tender  Genitourinary: no suprapubic tenderness  Skin: warm, dry  Neuro: Alert. Oriented  Psych: Mood appropriate. Past Medical History:   PMHx   Past Medical History:   Diagnosis Date    Asthma     Blind left eye     Diabetes mellitus ([address]     GERD (gastroesophageal reflux disease)     Hypertension     Retinal detachment 2012    left     PSHX:  has a past surgical history that includes Corneal transplant (Bilateral); Mandible surgery (Right); eye surgery; Eye surgery (Right); fracture surgery; Mandible surgery (Bilateral); Toe amputation (Right, 07/25/2017); and laparoscopic appendectomy (N/A, 4/28/2022). Allergies: Allergies   Allergen Reactions    Ciprofloxacin Shortness Of Breath    Shellfish-Derived Products Anaphylaxis     Fam HX:  family history includes Asthma in his father and mother; Diabetes in his father and mother; Heart Disease in his mother; High Blood Pressure in his father and mother; Stroke in his mother.   Soc HX:   Social History     Socioeconomic History    Marital status: Single     Spouse name: None    Number of children: None    Years of education: None    Highest education level: None   Occupational History    None   Tobacco Use    Smoking status: Current Every Day Smoker     Packs/day: 0.50     Years: 5.00     Pack years: 2.50     Types: Cigarettes    Smokeless tobacco: Never Used   Vaping Use    Vaping Use: Never used   Substance and Sexual Activity    Alcohol use: Yes     Comment: occasionally    Drug use: Not Currently     Types: Marijuana Lucianne Bars)    Sexual activity: None   Other Topics Concern    None   Social History Narrative    None     Social Determinants of Health     Financial Resource Strain:     Difficulty of Paying Living Expenses: Not on file   Food Insecurity:     Worried About Running Out of Food in the Last Year: Not on file    Sang of Food in the Last Year: Not on file   Transportation Needs:     Lack of Transportation (Medical): Not on file    Lack of Transportation (Non-Medical):  Not on file   Physical Activity:     Days of Exercise per Week: Not on file    Minutes of Exercise per Session: Not on file   Stress:     Feeling of Stress : Not on file   Social Connections:     Frequency of Communication with Friends and Family: Not on file    Frequency of Social Gatherings with Friends and Family: Not on file    Attends Denominational Services: Not on file    Active Member of [address] [place] [address] [employer] or Organizations: Not on file    Attends Club or Organization Meetings: Not on file    Marital Status: Not on file   Intimate Partner Violence:     Fear of Current or Ex-Partner: Not on file    Emotionally Abused: Not on file    Physically Abused: Not on file    Sexually Abused: Not on file   Housing Stability:     Unable to Pay for Housing in the Last Year: Not on file    Number of Places Lived in the Last Year: Not on file    Unstable Housing in the Last Year: Not on file       Medications:   Medications:    cefepime  2,000 mg IntraVENous Once    sodium chloride  1,000 mL IntraVENous Once      Infusions:   PRN Meds: morphine, 4 mg, Q30 Min PRN        Labs      CBC:   Recent Labs     07/02/22 0131   WBC 4.6   HGB 12.7*        BMP:    Recent Labs     07/02/22 0131   *   K 3.3*   CL 96*   CO2 21   BUN 6   CREATININE 0.7*   GLUCOSE 283*     Hepatic:   Recent Labs     07/02/22 0131   AST 47*   ALT 47*   BILITOT 0.2   ALKPHOS 87     Lipids:   Lab Results   Component Value Date/Time    CHOL 142 04/16/2011 01:19 PM    HDL 41 04/16/2011 01:19 PM    TRIG 268 04/16/2011 01:19 PM     Hemoglobin A1C:   Lab Results Component Value Date/Time    LABA1C 9.3 03/20/2021 05:18 PM     TSH: No results found for: TSH  Troponin:   Lab Results   Component Value Date/Time    TROPONINT <0.010 04/28/2022 03:26 AM    TROPONINT <0.010 08/01/2021 02:37 PM    TROPONINT <0.010 04/22/2020 11:29 PM     Lactic Acid: No results for input(s): LACTA in the last 72 hours. BNP: No results for input(s): PROBNP in the last 72 hours. UA:  Lab Results   Component Value Date/Time    NITRU NEGATIVE 04/28/2022 03:35 AM    COLORU YELLOW 04/28/2022 03:35 AM    WBCUA 1 04/28/2022 03:35 AM    RBCUA <1 04/28/2022 03:35 AM    MUCUS RARE 12/21/2018 06:12 PM    TRICHOMONAS NONE SEEN 08/01/2021 03:00 PM    BACTERIA NEGATIVE 04/28/2022 03:35 AM    CLARITYU CLEAR 04/28/2022 03:35 AM    SPECGRAV 1.010 04/28/2022 03:35 AM    LEUKOCYTESUR NEGATIVE 04/28/2022 03:35 AM    UROBILINOGEN 0.2 04/28/2022 03:35 AM    BILIRUBINUR NEGATIVE 04/28/2022 03:35 AM    BLOODU TRACE 04/28/2022 03:35 AM    KETUA 40 04/28/2022 03:35 AM     Urine Cultures: No results found for: [place]  Blood Cultures: No results found for: BC  No results found for: BLOODCULT2  Organism:   Lab Results   Component Value Date/Time    ORG MRSA 12/23/2018 01:00 PM    ORG BSGA 12/23/2018 01:00 PM       Imaging/Diagnostics Last 24 Hours   XR FOOT RIGHT (MIN 3 VIEWS)    Result Date: 7/2/2022  No radiographic evidence of osteomyelitis. Prior 2nd and 5th toe amputations. Question of irregularity of the soft tissues at the distal aspect of the 2nd toe.            Electronically signed by Peggy Desai MD on 7/2/2022 at 4:14 AM

## 2022-07-02 NOTE — ED PROVIDER NOTES
Socioeconomic History    Marital status: Single     Spouse name: None    Number of children: None    Years of education: None    Highest education level: None   Occupational History    None   Tobacco Use    Smoking status: Current Every Day Smoker     Packs/day: 0.50     Years: 5.00     Pack years: 2.50     Types: Cigarettes    Smokeless tobacco: Never Used   Vaping Use    Vaping Use: Never used   Substance and Sexual Activity    Alcohol use: Yes     Comment: occasionally    Drug use: Not Currently     Types: Marijuana Maurita Handsome)    Sexual activity: None   Other Topics Concern    None   Social History Narrative    None     Social Determinants of Health     Financial Resource Strain:     Difficulty of Paying Living Expenses: Not on file   Food Insecurity:     Worried About Running Out of Food in the Last Year: Not on file    Sang of Food in the Last Year: Not on file   Transportation Needs:     Lack of Transportation (Medical): Not on file    Lack of Transportation (Non-Medical):  Not on file   Physical Activity:     Days of Exercise per Week: Not on file    Minutes of Exercise per Session: Not on file   Stress:     Feeling of Stress : Not on file   Social Connections:     Frequency of Communication with Friends and Family: Not on file    Frequency of Social Gatherings with Friends and Family: Not on file    Attends Druze Services: Not on file    Active Member of [address] [place] [address] or Organizations: Not on file    Attends Club or Organization Meetings: Not on file    Marital Status: Not on file   Intimate Partner Violence:     Fear of Current or Ex-Partner: Not on file    Emotionally Abused: Not on file    Physically Abused: Not on file    Sexually Abused: Not on file   Housing Stability:     Unable to Pay for Housing in the Last Year: Not on file    Number of [place] in the Last Year: Not on file    Unstable Housing in the Last Year: Not on file       Medications/Allergies Previous Medications    ACETAMINOPHEN (AMINOFEN) 325 MG TABLET    Take 2 tablets by mouth every 6 hours as needed for Pain    ALBUTEROL SULFATE  (90 BASE) MCG/ACT INHALER    Inhale 2 puffs into the lungs every 6 hours as needed for Wheezing    ALBUTEROL-IPRATROPIUM (COMBIVENT RESPIMAT)  MCG/ACT AERS INHALER    Inhale 1 puff into the lungs every 6 hours    ALLOPURINOL (ZYLOPRIM) 300 MG TABLET    Take 300 mg by mouth daily     ALOGLIPTIN (NESINA) 12.5 MG TABS TABLET    Take 1 tablet by mouth Daily with supper    AMLODIPINE (NORVASC) 10 MG TABLET    Take 10 mg by mouth daily    ATORVASTATIN (LIPITOR) 10 MG TABLET    Take 10 mg by mouth daily     BRIMONIDINE (ALPHAGAN) 0.2 % OPHTHALMIC SOLUTION    Place 1 drop into the right eye 2 times daily    DORZOLAMIDE-TIMOLOL 22.3-6.8 MG/ML SOLN    Place 1 drop into the right eye 2 times daily    ERTUGLIFLOZIN L-PYROGLUTAMICAC (STEGLATRO) 15 MG TABS    Take 1 tablet by mouth daily    GLIPIZIDE (GLUCOTROL) 10 MG TABLET    Take 2 tablets PO in the morning with food, 1 tablet PO at night with food.     LISINOPRIL-HYDROCHLOROTHIAZIDE (PRINZIDE;ZESTORETIC) 20-12.5 MG PER TABLET    Take 1 tablet by mouth daily     METFORMIN (GLUCOPHAGE) 1000 MG TABLET    Take 1,000 mg by mouth 2 times daily (with meals)    OMEPRAZOLE (PRILOSEC) 20 MG CAPSULE    Take 40 mg by mouth daily     PREDNISOLONE ACETATE (PRED FORTE) 1 % OPHTHALMIC SUSPENSION    Place 1 drop into the right eye 2 times daily    SUCRALFATE (CARAFATE) 1 GM TABLET    Take 1 tablet by mouth 4 times daily     Allergies   Allergen Reactions    Ciprofloxacin Shortness Of Breath    Shellfish-Derived Products Anaphylaxis        Physical Exam       ED Triage Vitals   BP Temp Temp Source Heart Rate Resp SpO2 Height Weight   07/02/22 0113 07/02/22 0113 07/02/22 0113 07/02/22 0113 07/02/22 0113 07/02/22 0113 07/02/22 0110 07/02/22 0110   135/87 98 °F (36.7 °C) Oral (!) 105 18 97 % 6' 3\" (1.905 m) 300 lb (136.1 kg)     GENERAL APPEARANCE:  Well-developed, well-nourished, no acute distress. HEAD:  NC/AT.  ENT:  Ears, nose, mouth normal.     NECK:  Supple. CARDIO:  RRR. LUNGS:   CTAB. Respirations unlabored. EXTREMITIES:  No acute deformities. Prior amputation of right 5th toe and partial of the 2nd toe with partially sloughed off callused skin overlying an ulcer. DP intact. SKIN:  Warm and dry. NEUROLOGICAL:  Alert and oriented. PSYCHIATRIC:  Normal mood.      Diagnostics     Labs:  Results for orders placed or performed during the hospital encounter of 07/02/22   CBC with Auto Differential   Result Value Ref Range    WBC 4.6 4.0 - 10.5 K/CU MM    RBC 3.98 (L) 4.6 - 6.2 M/CU MM    Hemoglobin 12.7 (L) 13.5 - 18.0 GM/DL    Hematocrit 35.6 (L) 42 - 52 %    MCV 89.4 78 - 100 FL    MCH 31.9 (H) 27 - 31 PG    MCHC 35.7 32.0 - 36.0 %    RDW 11.6 (L) 11.7 - 14.9 %    Platelets 618 412 - 132 K/CU MM    MPV 8.8 7.5 - 11.1 FL    Differential Type AUTOMATED DIFFERENTIAL     Segs Relative 54.6 36 - 66 %    Lymphocytes % 37.1 24 - 44 %    Monocytes % 5.9 (H) 0 - 4 %    Eosinophils % 1.3 0 - 3 %    Basophils % 0.4 0 - 1 %    Segs Absolute 2.5 K/CU MM    Lymphocytes Absolute 1.7 K/CU MM    Monocytes Absolute 0.3 K/CU MM    Eosinophils Absolute 0.1 K/CU MM    Basophils Absolute 0.0 K/CU MM    Nucleated RBC % 0.0 %    Total Nucleated RBC 0.0 K/CU MM    Total Immature Neutrophil 0.03 K/CU MM    Immature Neutrophil % 0.7 (H) 0 - 0.43 %   Comprehensive Metabolic Panel w/ Reflex to MG   Result Value Ref Range    Sodium 134 (L) 135 - 145 MMOL/L    Potassium 3.3 (L) 3.5 - 5.1 MMOL/L    Chloride 96 (L) 99 - 110 mMol/L    CO2 21 21 - 32 MMOL/L    BUN 6 6 - 23 MG/DL    CREATININE 0.7 (L) 0.9 - 1.3 MG/DL    Glucose 283 (H) 70 - 99 MG/DL    Calcium 9.0 8.3 - 10.6 MG/DL    Albumin 4.1 3.4 - 5.0 GM/DL    Total Protein 7.2 6.4 - 8.2 GM/DL    Total Bilirubin 0.2 0.0 - 1.0 MG/DL    ALT 47 (H) 10 - 40 U/L    AST 47 (H) 15 - 37 IU/L    Alkaline Phosphatase 87 40 - 129 IU/L    GFR Non-African American >60 >60 mL/min/1.73m2    GFR African American >60 >60 mL/min/1.73m2    Anion Gap 17 (H) 4 - 16   Lactic Acid   Result Value Ref Range    Lactate 3.1 (HH) 0.4 - 2.0 mMOL/L       Radiographs:  XR FOOT RIGHT (MIN 3 VIEWS)    Result Date: 7/2/2022  No radiographic evidence of osteomyelitis. Prior 2nd and 5th toe amputations. Question of irregularity of the soft tissues at the distal aspect of the 2nd toe. ED Course and MDM   -  Patient seen and evaluated in the emergency department. -  Triage and nursing notes reviewed and incorporated. -  Old chart records reviewed and incorporated. -  Supervising physician was Dr. Rosette Araya. Patient was seen independently. -  Work-up included:  see above  -  ED medications:  Vancomycin, Cefepime, Kdur, morphine, NS  -  Results discussed with patient. No evidence of osteomyelitis on XR.  + lactic acidosis. Slight hypokalemia. I spoke with Dr. Sam Ayala, hospitalist, who will see and admit. In light of current events, I did utilize appropriate PPE (including N95 and surgical face mask, safety glasses, and gloves, as recommended by the health facility/national standard best practice, during my bedside interactions with the patient. Final Impression      1. Diabetic ulcer of toe of right foot associated with type 2 diabetes mellitus, unspecified ulcer stage ([address]    2. Hypokalemia    3.  Lactic acidosis          DISPOSITION Admitted 07/02/2022 04:45:24 AM      Evelyne Almonte PA-C  [address] [place] [address]  07/02/22 1698

## 2022-07-02 NOTE — PROGRESS NOTES
[address] [facility]  consulted by Dr. Claudia Benavidez for monitoring and adjustment. Indication for treatment: Right foot wound  Goal trough: [x] 10-15 mcg/mL or [] 15-20 mcg/ml  AUC/SHELLEY: [x] <500 or [] 400-600    Pertinent Laboratory Values:   Temp Readings from Last 3 Encounters:   07/02/22 98 °F (36.7 °C) (Oral)   04/29/22 98.5 °F (36.9 °C) (Oral)   04/28/22 96.5 °F (35.8 °C)     Recent Labs     07/02/22  0131   WBC 4.6   LACTATE 3.1*     Recent Labs     07/02/22  0131   BUN 6   CREATININE 0.7*     Estimated Creatinine Clearance: 215 mL/min (A) (based on SCr of 0.7 mg/dL (L)). No intake or output data in the 24 hours ending 07/02/22 0504    Pertinent Cultures:  Date    Source    Results  07/02   Blood    Collected  07/02   Wound    In process    Assessment:  SCr = 0.7, BUN = 6, and no I/O data  Day(s) of therapy: 1 of 7  Vancomycin concentration:   07/04 - To be collected    Plan:  Vancomycin 2,000 mg total initial dose; Follow with Vancomycin 1,250 mg IV Q 12 Hours  Predicted AUC: 458; Predicted Trough: 15  Pharmacy will continue to monitor patient and adjust therapy as indicated    Eamon 3 07/04/2022 @ 6 AM    Thank you for the consult.   Cedric Farr [facility]  7/2/2022 5:04 AM

## 2022-07-02 NOTE — ED NOTES
ABCs intact; SCHMIDT x 4. Pt brought in by [facility] via wheelchair. Pt reports wound on R foot. Pt is a diabetic. EMS BGL enroute was 254 mg/dL. Pt reports aching pain in the area of the wound that he rates an 8 out of 10. Pt denies any other symptoms at this time.     Cy Corley, NRP       Renetta Chahal [id number]

## 2022-07-02 NOTE — PROGRESS NOTES
[facility] HOSPITALIST PROGRESS NOTE      PCP: Rita Kasper MD    Date of Admission: 7/2/2022    Subjective: No foot pain    Brief Hospital summary patient is a 79-year-old male with history of asthma, diabetes mellitus, hypertension, hyperlipidemia, cocaine and alcohol dependence who presented to the ER with right foot ulcer  Vancomycin and cefepime started, wound care consulted    Vitals signs:  Afebrile, heart rate 80s to 90s range, blood pressure 162/105, on room air    Medications: Allopurinol, amlodipine, Lipitor, cefepime, Lovenox, glipizide, sliding scale insulin, vancomycin    Antibiotics: Vancomycin and cefepime    Fluid status: Not documented    Labs:   Sodium 134, potassium 3.3, chloride 96, bicarb 21, creatinine 0.7  LFTs normal  Hemoglobin A1c 9.6, WBC 4.6, hemoglobin 12.7, platelets 540    Imaging:   X-ray right foot showed prior amputation of second and fifth digit, 6 no radiographic evidence of osteomyelitis    Assessment/Plan:     Diabetic foot ulcer  History of diabetes mellitus type 2  Asthma  Essential hypertension  History of substance dependence    Admitted with right foot ulcer, vancomycin and cefepime started in the ER, x-ray does not show any osteomyelitis  Check ESR and CRP, continue broad-spectrum antibiotics  Consider MRI foot if ESR elevated  Oral hypoglycemics held, hemoglobin A1c 9.6, continue sliding scale insulin  Glucose 283, add Lantus  Continue amlodipine  Continue Lipitor  As needed hydralazine for high blood pressure      DVT prophlaxis:   Lovenox      Physical Exam Performed:       BP (!) 167/111   Pulse 86   Temp 97.8 °F (36.6 °C) (Oral)   Resp 19   Ht 6' 3\" (1.905 m)   Wt 298 lb 8.1 oz (135.4 kg)   SpO2 99%   BMI 37.31 kg/m²     Physical Exam  Constitutional:       General: He is not in acute distress. Appearance: Normal appearance. HENT:      Head: Normocephalic and atraumatic.       Right Ear: External ear normal.      Left Ear: External ear normal.   Eyes:      Extraocular Movements: Extraocular movements intact. Pupils: Pupils are equal, round, and reactive to light. Cardiovascular:      Rate and Rhythm: Normal rate and regular rhythm. Heart sounds: No murmur heard. Pulmonary:      Effort: Pulmonary effort is normal. No respiratory distress. Breath sounds: Normal breath sounds. No wheezing. Abdominal:      General: Bowel sounds are normal. There is no distension. Palpations: Abdomen is soft. Tenderness: There is no abdominal tenderness. Musculoskeletal:         General: No swelling. Cervical back: Normal range of motion. Comments: Right foot second digit ulcer, minimal redness   Skin:     General: Skin is warm. Neurological:      General: No focal deficit present. Mental Status: He is alert and oriented to person, place, and time. Cranial Nerves: No cranial nerve deficit. Psychiatric:         Mood and Affect: Mood normal.         Labs:   Recent Labs     07/02/22 0131   WBC 4.6   HGB 12.7*   HCT 35.6*        Recent Labs     07/02/22 0131   *   K 3.3*   CL 96*   CO2 21   BUN 6   CREATININE 0.7*   CALCIUM 9.0     Recent Labs     07/02/22 0131   AST 47*   ALT 47*   BILITOT 0.2   ALKPHOS 87     No results for input(s): INR in the last 72 hours. No results for input(s): Sima Emblem in the last 72 hours. Urinalysis:      Lab Results   Component Value Date/Time    NITRU NEGATIVE 04/28/2022 03:35 AM    WBCUA 1 04/28/2022 03:35 AM    BACTERIA NEGATIVE 04/28/2022 03:35 AM    RBCUA <1 04/28/2022 03:35 AM    BLOODU TRACE 04/28/2022 03:35 AM    SPECGRAV 1.010 04/28/2022 03:35 AM       Radiology:  XR FOOT RIGHT (MIN 3 VIEWS)   Preliminary Result   No radiographic evidence of osteomyelitis. Prior 2nd and 5th toe amputations. Question of irregularity of the soft tissues at the distal aspect of the 2nd   toe.                  Sg Patel MD  7/2/2022 7:25 AM

## 2022-07-02 NOTE — ED NOTES
Verbally verified compatibility with [facility] to run Vanc and Cefepime at the same time.  [facility] states medications compatible        Daina Moctezuma RN  07/02/22 [id number]

## 2022-07-02 NOTE — PROGRESS NOTES
Arrived to unit from er per er stretcher. Alert and orient times 4. DM ulcer noted to rt foot second toe. [place] to room, call light, staff, meals, meds, lights, tv, phone, and urinal and no questions at this time.

## 2022-07-03 LAB
ANION GAP SERPL CALCULATED.3IONS-SCNC: 9 MMOL/L (ref 4–16)
BASOPHILS ABSOLUTE: 0 K/CU MM
BASOPHILS RELATIVE PERCENT: 0.5 % (ref 0–1)
BUN BLDV-MCNC: 10 MG/DL (ref 6–23)
CALCIUM SERPL-MCNC: 9 MG/DL (ref 8.3–10.6)
CHLORIDE BLD-SCNC: 99 MMOL/L (ref 99–110)
CO2: 28 MMOL/L (ref 21–32)
CREAT SERPL-MCNC: 0.8 MG/DL (ref 0.9–1.3)
DIFFERENTIAL TYPE: ABNORMAL
EOSINOPHILS ABSOLUTE: 0.1 K/CU MM
EOSINOPHILS RELATIVE PERCENT: 2.3 % (ref 0–3)
GFR AFRICAN AMERICAN: >60 ML/MIN/1.73M2
GFR NON-AFRICAN AMERICAN: >60 ML/MIN/1.73M2
GLUCOSE BLD-MCNC: 190 MG/DL (ref 70–99)
GLUCOSE BLD-MCNC: 214 MG/DL (ref 70–99)
GLUCOSE BLD-MCNC: 224 MG/DL (ref 70–99)
GLUCOSE BLD-MCNC: 238 MG/DL (ref 70–99)
GLUCOSE BLD-MCNC: 266 MG/DL (ref 70–99)
HCT VFR BLD CALC: 35.7 % (ref 42–52)
HEMOGLOBIN: 12.4 GM/DL (ref 13.5–18)
IMMATURE NEUTROPHIL %: 0.5 % (ref 0–0.43)
LYMPHOCYTES ABSOLUTE: 1.4 K/CU MM
LYMPHOCYTES RELATIVE PERCENT: 34.7 % (ref 24–44)
MCH RBC QN AUTO: 31.9 PG (ref 27–31)
MCHC RBC AUTO-ENTMCNC: 34.7 % (ref 32–36)
MCV RBC AUTO: 91.8 FL (ref 78–100)
MONOCYTES ABSOLUTE: 0.3 K/CU MM
MONOCYTES RELATIVE PERCENT: 7.1 % (ref 0–4)
NUCLEATED RBC %: 0 %
PDW BLD-RTO: 11.8 % (ref 11.7–14.9)
PLATELET # BLD: 273 K/CU MM (ref 140–440)
PMV BLD AUTO: 8.8 FL (ref 7.5–11.1)
POTASSIUM SERPL-SCNC: 4.1 MMOL/L (ref 3.5–5.1)
RBC # BLD: 3.89 M/CU MM (ref 4.6–6.2)
SEGMENTED NEUTROPHILS ABSOLUTE COUNT: 2.2 K/CU MM
SEGMENTED NEUTROPHILS RELATIVE PERCENT: 54.9 % (ref 36–66)
SODIUM BLD-SCNC: 136 MMOL/L (ref 135–145)
TOTAL IMMATURE NEUTOROPHIL: 0.02 K/CU MM
TOTAL NUCLEATED RBC: 0 K/CU MM
WBC # BLD: 4 K/CU MM (ref 4–10.5)

## 2022-07-03 PROCEDURE — 94761 N-INVAS EAR/PLS OXIMETRY MLT: CPT

## 2022-07-03 PROCEDURE — 6360000002 HC RX W HCPCS: Performed by: INTERNAL MEDICINE

## 2022-07-03 PROCEDURE — 6370000000 HC RX 637 (ALT 250 FOR IP): Performed by: INTERNAL MEDICINE

## 2022-07-03 PROCEDURE — 85025 COMPLETE CBC W/AUTO DIFF WBC: CPT

## 2022-07-03 PROCEDURE — 6370000000 HC RX 637 (ALT 250 FOR IP): Performed by: HOSPITALIST

## 2022-07-03 PROCEDURE — 36415 COLL VENOUS BLD VENIPUNCTURE: CPT

## 2022-07-03 PROCEDURE — 82962 GLUCOSE BLOOD TEST: CPT

## 2022-07-03 PROCEDURE — 94640 AIRWAY INHALATION TREATMENT: CPT

## 2022-07-03 PROCEDURE — 80048 BASIC METABOLIC PNL TOTAL CA: CPT

## 2022-07-03 PROCEDURE — 2580000003 HC RX 258: Performed by: INTERNAL MEDICINE

## 2022-07-03 PROCEDURE — 2060000000 HC ICU INTERMEDIATE R&B

## 2022-07-03 PROCEDURE — 6370000000 HC RX 637 (ALT 250 FOR IP): Performed by: NURSE PRACTITIONER

## 2022-07-03 RX ORDER — INSULIN GLARGINE 100 [IU]/ML
17 INJECTION, SOLUTION SUBCUTANEOUS 2 TIMES DAILY
Status: DISCONTINUED | OUTPATIENT
Start: 2022-07-03 | End: 2022-07-04

## 2022-07-03 RX ORDER — INSULIN GLARGINE 100 [IU]/ML
13 INJECTION, SOLUTION SUBCUTANEOUS 2 TIMES DAILY
Status: DISCONTINUED | OUTPATIENT
Start: 2022-07-03 | End: 2022-07-03

## 2022-07-03 RX ADMIN — ALLOPURINOL 300 MG: 100 TABLET ORAL at 08:32

## 2022-07-03 RX ADMIN — SODIUM CHLORIDE, PRESERVATIVE FREE 10 ML: 5 INJECTION INTRAVENOUS at 11:19

## 2022-07-03 RX ADMIN — METFORMIN HYDROCHLORIDE 1000 MG: 500 TABLET ORAL at 18:16

## 2022-07-03 RX ADMIN — PREDNISOLONE ACETATE 1 DROP: 10 SUSPENSION/ DROPS OPHTHALMIC at 11:18

## 2022-07-03 RX ADMIN — ALBUTEROL SULFATE 1 PUFF: 90 AEROSOL, METERED RESPIRATORY (INHALATION) at 19:43

## 2022-07-03 RX ADMIN — INSULIN GLARGINE 13 UNITS: 100 INJECTION, SOLUTION SUBCUTANEOUS at 08:52

## 2022-07-03 RX ADMIN — INSULIN LISPRO 4 UNITS: 100 INJECTION, SOLUTION INTRAVENOUS; SUBCUTANEOUS at 08:47

## 2022-07-03 RX ADMIN — MORPHINE SULFATE 4 MG: 4 INJECTION INTRAVENOUS at 11:09

## 2022-07-03 RX ADMIN — CEFEPIME 2000 MG: 2 INJECTION, POWDER, FOR SOLUTION INTRAVENOUS at 06:07

## 2022-07-03 RX ADMIN — INSULIN LISPRO 4 UNITS: 100 INJECTION, SOLUTION INTRAVENOUS; SUBCUTANEOUS at 14:02

## 2022-07-03 RX ADMIN — ALBUTEROL SULFATE 1 PUFF: 90 AEROSOL, METERED RESPIRATORY (INHALATION) at 15:08

## 2022-07-03 RX ADMIN — Medication 1 PUFF: at 15:08

## 2022-07-03 RX ADMIN — TIMOLOL MALEATE 1 DROP: 5 SOLUTION OPHTHALMIC at 19:48

## 2022-07-03 RX ADMIN — VANCOMYCIN HYDROCHLORIDE 1250 MG: 1.25 INJECTION, POWDER, LYOPHILIZED, FOR SOLUTION INTRAVENOUS at 08:39

## 2022-07-03 RX ADMIN — INSULIN LISPRO 2 UNITS: 100 INJECTION, SOLUTION INTRAVENOUS; SUBCUTANEOUS at 18:21

## 2022-07-03 RX ADMIN — METFORMIN HYDROCHLORIDE 1000 MG: 500 TABLET ORAL at 08:32

## 2022-07-03 RX ADMIN — Medication 9 MG: at 21:07

## 2022-07-03 RX ADMIN — CEFEPIME 2000 MG: 2 INJECTION, POWDER, FOR SOLUTION INTRAVENOUS at 14:10

## 2022-07-03 RX ADMIN — MORPHINE SULFATE 4 MG: 4 INJECTION INTRAVENOUS at 19:46

## 2022-07-03 RX ADMIN — SUCRALFATE 1 G: 1 TABLET ORAL at 11:09

## 2022-07-03 RX ADMIN — DORZOLAMIDE HYDROCHLORIDE 1 DROP: 20 SOLUTION/ DROPS OPHTHALMIC at 19:48

## 2022-07-03 RX ADMIN — AMLODIPINE BESYLATE 10 MG: 10 TABLET ORAL at 08:33

## 2022-07-03 RX ADMIN — CEFEPIME 2000 MG: 2 INJECTION, POWDER, FOR SOLUTION INTRAVENOUS at 21:07

## 2022-07-03 RX ADMIN — Medication 1 PUFF: at 07:55

## 2022-07-03 RX ADMIN — INSULIN GLARGINE 17 UNITS: 100 INJECTION, SOLUTION SUBCUTANEOUS at 21:16

## 2022-07-03 RX ADMIN — PANTOPRAZOLE SODIUM 40 MG: 40 TABLET, DELAYED RELEASE ORAL at 06:22

## 2022-07-03 RX ADMIN — ALBUTEROL SULFATE 1 PUFF: 90 AEROSOL, METERED RESPIRATORY (INHALATION) at 07:55

## 2022-07-03 RX ADMIN — ATORVASTATIN CALCIUM 10 MG: 10 TABLET, FILM COATED ORAL at 08:33

## 2022-07-03 RX ADMIN — SUCRALFATE 1 G: 1 TABLET ORAL at 23:38

## 2022-07-03 RX ADMIN — SUCRALFATE 1 G: 1 TABLET ORAL at 18:16

## 2022-07-03 RX ADMIN — GLIPIZIDE 5 MG: 5 TABLET ORAL at 06:23

## 2022-07-03 RX ADMIN — PREDNISOLONE ACETATE 1 DROP: 10 SUSPENSION/ DROPS OPHTHALMIC at 18:17

## 2022-07-03 RX ADMIN — VANCOMYCIN HYDROCHLORIDE 1250 MG: 1.25 INJECTION, POWDER, LYOPHILIZED, FOR SOLUTION INTRAVENOUS at 19:47

## 2022-07-03 RX ADMIN — LISINOPRIL AND HYDROCHLOROTHIAZIDE 1 TABLET: 12.5; 2 TABLET ORAL at 08:33

## 2022-07-03 RX ADMIN — INSULIN LISPRO 3 UNITS: 100 INJECTION, SOLUTION INTRAVENOUS; SUBCUTANEOUS at 21:16

## 2022-07-03 RX ADMIN — BRIMONIDINE TARTRATE 1 DROP: 2 SOLUTION OPHTHALMIC at 18:17

## 2022-07-03 RX ADMIN — ENOXAPARIN SODIUM 30 MG: 100 INJECTION SUBCUTANEOUS at 19:46

## 2022-07-03 RX ADMIN — MORPHINE SULFATE 4 MG: 4 INJECTION INTRAVENOUS at 06:21

## 2022-07-03 RX ADMIN — Medication 1 PUFF: at 19:44

## 2022-07-03 RX ADMIN — SUCRALFATE 1 G: 1 TABLET ORAL at 06:07

## 2022-07-03 RX ADMIN — MORPHINE SULFATE 4 MG: 4 INJECTION INTRAVENOUS at 15:12

## 2022-07-03 RX ADMIN — MORPHINE SULFATE 4 MG: 4 INJECTION INTRAVENOUS at 23:38

## 2022-07-03 ASSESSMENT — PAIN SCALES - GENERAL
PAINLEVEL_OUTOF10: 6
PAINLEVEL_OUTOF10: 7
PAINLEVEL_OUTOF10: 8
PAINLEVEL_OUTOF10: 8

## 2022-07-03 ASSESSMENT — PAIN DESCRIPTION - DESCRIPTORS: DESCRIPTORS: ACHING

## 2022-07-03 ASSESSMENT — PAIN DESCRIPTION - LOCATION
LOCATION: TOE (COMMENT WHICH ONE)
LOCATION: FOOT

## 2022-07-03 ASSESSMENT — PAIN DESCRIPTION - ORIENTATION: ORIENTATION: RIGHT

## 2022-07-03 NOTE — PROGRESS NOTES
Hospitalist Progress Note      Name:  Kelton Benton /Age/Sex: 1985  (39 y.o. male)   MRN & CSN:  [id number] & [id number] Admission Date/Time: 2022  1:16 AM   Location:  -A PCP: Kayleigh Riley MD         Hospital Day: 2    Assessment and Plan:   Kelton Benton is a 39 y.o.  male  who presents with Diabetic ulcer of right foot due to type 2 diabetes mellitus ([facility] [address]    R foot ulcer in the 2nd toe     -Continue vancomycin and cefepime   - follow-up blood cultures NGTD  - wound care consulted  - wound Cx pending NGTD  - podiatry consult pending recommendations  -Elevated ESR, MRI of the foot has been ordered.      DMII poorly controlled. -Cont Gli[izide and Metformin.   -Cont SSI  -Increase Lantus to 17 units daily as not well controlled. -A1C noted to be 9.6  -Diet adjusted to carb control. Asthma hx  -no active wheezing  -cont pulse ox     HTN, uncontrolled-Continue Prinzide and Norvas  HLD-Continue Lipitor  Cocaine abuse, hx  Alcohol use disorder   Tobacco abuse  Obesity class II         Diet ADULT DIET; Regular   DVT Prophylaxis [x] Lovenox, []  Heparin, [] SCDs, [] Ambulation   GI Prophylaxis [] PPI,  [] H2 Blocker,  [] Carafate,  [] Diet/Tube Feeds   Code Status Full Code   Disposition Patient requires continued admission due to Toe infection   MDM [] Low, [x] Moderate,[]  High  Patient's risk as above due to multiple conditions     History of Present Illness:     Chief Complaint:     The pt seen in his room this AM. Laying in bed. No acute distress the pt states that he has pain in the toe and has good pedal pulse. He denies any nausea vomiting fever or chills. Ten point ROS reviewed negative, unless as noted above    Objective:        Intake/Output Summary (Last 24 hours) at 7/3/2022 1317  Last data filed at 7/3/2022 1226  Gross per 24 hour   Intake 500 ml   Output 5075 ml   Net -4575 ml      Vitals:   Vitals:    22 1226   BP:    Pulse: 79   Resp: 21   Temp:    SpO2: Physical Exam:   General: NAD   Eyes: EOMI  ENT: neck supple  Cardiovascular: Regular rate. NO edema  Respiratory: Symmetrical expansion,   Gastrointestinal: Soft, non tender  Genitourinary: no suprapubic tenderness  Skin: warm, dry  Neuro: Alert. Oriented  Psych: Mood appropriate.      Medications:   Medications:    insulin glargine  13 Units SubCUTAneous BID    cefepime  2,000 mg IntraVENous Once    lisinopril-hydroCHLOROthiazide  1 tablet Oral Daily    glipiZIDE  5 mg Oral QAM AC    brimonidine  1 drop Right Eye BID    atorvastatin  10 mg Oral Daily    amLODIPine  10 mg Oral Daily    allopurinol  300 mg Oral Daily    metFORMIN  1,000 mg Oral BID WC    pantoprazole  40 mg Oral QAM AC    prednisoLONE acetate  1 drop Right Eye BID    sucralfate  1 g Oral 4x Daily    sodium chloride flush  10 mL IntraVENous 2 times per day    enoxaparin  30 mg SubCUTAneous BID    cefepime  2,000 mg IntraVENous q8h    vancomycin  1,250 mg IntraVENous Q12H    albuterol sulfate HFA  1 puff Inhalation 4 times per day    And    ipratropium  1 puff Inhalation 4 times per day    dorzolamide  1 drop Right Eye BID    And    timolol  1 drop Right Eye BID    Vancomycin 25 mg/mL Ophthalmic     ++Patient Own Medication++  REFRIGERATE  1 drop Right Eye 4x Daily    insulin lispro  0-12 Units SubCUTAneous TID WC    insulin lispro  0-6 Units SubCUTAneous Nightly      Infusions:    sodium chloride      dextrose       PRN Meds: albuterol sulfate HFA, 2 puff, Q6H PRN  sodium chloride flush, 10 mL, PRN  sodium chloride, , PRN  ondansetron, 4 mg, Q8H PRN   Or  ondansetron, 4 mg, Q6H PRN  bisacodyl, 5 mg, Daily PRN  acetaminophen, 650 mg, Q6H PRN   Or  acetaminophen, 650 mg, Q6H PRN  glucose, 4 tablet, PRN  dextrose bolus, 125 mL, PRN   Or  dextrose bolus, 250 mL, PRN  glucagon (rDNA), 1 mg, PRN  dextrose, 100 mL/hr, PRN  morphine, 4 mg, Q4H PRN  melatonin, 9 mg, Nightly PRN          Patient is still admitted because Infection of the toe. The anticipated discharge is in greater than 24 hours.      Electronically signed by Antonio Figueroa MD on 7/3/2022 at 1:17 PM

## 2022-07-03 NOTE — PROGRESS NOTES
[facility]  consulted by Dr. Clovis Tovar for monitoring and adjustment. Indication for treatment: Right foot wound  Goal trough: [x] 10-15 mcg/mL or [] 15-20 mcg/ml  AUC/SHELLEY: [x] <500 or [] 400-600    Pertinent Laboratory Values:   Temp Readings from Last 3 Encounters:   07/03/22 97.9 °F (36.6 °C) (Oral)   04/29/22 98.5 °F (36.9 °C) (Oral)   04/28/22 96.5 °F (35.8 °C)     Recent Labs     07/02/22  0131 07/03/22  0429   WBC 4.6 4.0   LACTATE 3.1*  --      Recent Labs     07/02/22 0131 07/03/22 0429   BUN 6 10   CREATININE 0.7* 0.8*     Estimated Creatinine Clearance: 195 mL/min (A) (based on SCr of 0.8 mg/dL (L)). Intake/Output Summary (Last 24 hours) at 7/3/2022 1315  Last data filed at 7/3/2022 1226  Gross per 24 hour   Intake 500 ml   Output 5075 ml   Net -4575 ml       Pertinent Cultures:  Date    Source    Results  07/02   Blood    Collected  07/02   Wound    In process    Assessment:  · SCr remains close to baseline, UOP good  · Day(s) of therapy: 2 of 7  · Vancomycin concentration:   · 07/04 - To be collected    Plan:  · Renal trends remain close to baseline. · Continue vancomycin 1,250 mg IV Q 12 Hours  · Predicted AUC: 462; Predicted Trough: 15.2  · Check the vanco level tomorrow am.  · Pharmacy will continue to monitor patient and adjust therapy as indicated    VANCOMYCIN CONCENTRATION SCHEDULED FOR 07/04/2022 @ 6 AM    Thank you for the consult. RICKEY Lawson [facility]  7/3/2022 1:15 PM

## 2022-07-04 LAB
ANION GAP SERPL CALCULATED.3IONS-SCNC: 11 MMOL/L (ref 4–16)
BASOPHILS ABSOLUTE: 0 K/CU MM
BASOPHILS RELATIVE PERCENT: 0.4 % (ref 0–1)
BUN BLDV-MCNC: 10 MG/DL (ref 6–23)
CALCIUM SERPL-MCNC: 9 MG/DL (ref 8.3–10.6)
CHLORIDE BLD-SCNC: 100 MMOL/L (ref 99–110)
CO2: 26 MMOL/L (ref 21–32)
CREAT SERPL-MCNC: 0.9 MG/DL (ref 0.9–1.3)
DIFFERENTIAL TYPE: ABNORMAL
DOSE AMOUNT: NORMAL
DOSE TIME: NORMAL
EOSINOPHILS ABSOLUTE: 0.1 K/CU MM
EOSINOPHILS RELATIVE PERCENT: 2.1 % (ref 0–3)
GFR AFRICAN AMERICAN: >60 ML/MIN/1.73M2
GFR NON-AFRICAN AMERICAN: >60 ML/MIN/1.73M2
GLUCOSE BLD-MCNC: 158 MG/DL (ref 70–99)
GLUCOSE BLD-MCNC: 201 MG/DL (ref 70–99)
GLUCOSE BLD-MCNC: 210 MG/DL (ref 70–99)
GLUCOSE BLD-MCNC: 216 MG/DL (ref 70–99)
GLUCOSE BLD-MCNC: 226 MG/DL (ref 70–99)
HCT VFR BLD CALC: 35.6 % (ref 42–52)
HEMOGLOBIN: 12.4 GM/DL (ref 13.5–18)
IMMATURE NEUTROPHIL %: 0.6 % (ref 0–0.43)
LYMPHOCYTES ABSOLUTE: 1.7 K/CU MM
LYMPHOCYTES RELATIVE PERCENT: 35.8 % (ref 24–44)
MCH RBC QN AUTO: 31.4 PG (ref 27–31)
MCHC RBC AUTO-ENTMCNC: 34.8 % (ref 32–36)
MCV RBC AUTO: 90.1 FL (ref 78–100)
MONOCYTES ABSOLUTE: 0.4 K/CU MM
MONOCYTES RELATIVE PERCENT: 7.5 % (ref 0–4)
NUCLEATED RBC %: 0 %
PDW BLD-RTO: 11.7 % (ref 11.7–14.9)
PLATELET # BLD: 284 K/CU MM (ref 140–440)
PMV BLD AUTO: 8.8 FL (ref 7.5–11.1)
POTASSIUM SERPL-SCNC: 4.2 MMOL/L (ref 3.5–5.1)
RBC # BLD: 3.95 M/CU MM (ref 4.6–6.2)
SEGMENTED NEUTROPHILS ABSOLUTE COUNT: 2.6 K/CU MM
SEGMENTED NEUTROPHILS RELATIVE PERCENT: 53.6 % (ref 36–66)
SODIUM BLD-SCNC: 137 MMOL/L (ref 135–145)
TOTAL IMMATURE NEUTOROPHIL: 0.03 K/CU MM
TOTAL NUCLEATED RBC: 0 K/CU MM
VANCOMYCIN RANDOM: 8 UG/ML
WBC # BLD: 4.8 K/CU MM (ref 4–10.5)

## 2022-07-04 PROCEDURE — 94640 AIRWAY INHALATION TREATMENT: CPT

## 2022-07-04 PROCEDURE — 6370000000 HC RX 637 (ALT 250 FOR IP): Performed by: NURSE PRACTITIONER

## 2022-07-04 PROCEDURE — 82962 GLUCOSE BLOOD TEST: CPT

## 2022-07-04 PROCEDURE — 6370000000 HC RX 637 (ALT 250 FOR IP): Performed by: HOSPITALIST

## 2022-07-04 PROCEDURE — 6360000002 HC RX W HCPCS: Performed by: INTERNAL MEDICINE

## 2022-07-04 PROCEDURE — 2060000000 HC ICU INTERMEDIATE R&B

## 2022-07-04 PROCEDURE — 94761 N-INVAS EAR/PLS OXIMETRY MLT: CPT

## 2022-07-04 PROCEDURE — 2580000003 HC RX 258: Performed by: INTERNAL MEDICINE

## 2022-07-04 PROCEDURE — 6370000000 HC RX 637 (ALT 250 FOR IP): Performed by: INTERNAL MEDICINE

## 2022-07-04 PROCEDURE — 36415 COLL VENOUS BLD VENIPUNCTURE: CPT

## 2022-07-04 PROCEDURE — 80048 BASIC METABOLIC PNL TOTAL CA: CPT

## 2022-07-04 PROCEDURE — 80202 ASSAY OF VANCOMYCIN: CPT

## 2022-07-04 PROCEDURE — 85025 COMPLETE CBC W/AUTO DIFF WBC: CPT

## 2022-07-04 RX ORDER — ALBUTEROL SULFATE 90 UG/1
1 AEROSOL, METERED RESPIRATORY (INHALATION) 2 TIMES DAILY
Status: DISCONTINUED | OUTPATIENT
Start: 2022-07-04 | End: 2022-07-07 | Stop reason: HOSPADM

## 2022-07-04 RX ORDER — INSULIN GLARGINE 100 [IU]/ML
22 INJECTION, SOLUTION SUBCUTANEOUS 2 TIMES DAILY
Status: DISCONTINUED | OUTPATIENT
Start: 2022-07-04 | End: 2022-07-07 | Stop reason: HOSPADM

## 2022-07-04 RX ORDER — ALBUTEROL SULFATE 90 UG/1
2 AEROSOL, METERED RESPIRATORY (INHALATION) EVERY 4 HOURS PRN
Status: DISCONTINUED | OUTPATIENT
Start: 2022-07-04 | End: 2022-07-07 | Stop reason: HOSPADM

## 2022-07-04 RX ADMIN — Medication 1 PUFF: at 12:32

## 2022-07-04 RX ADMIN — ENOXAPARIN SODIUM 30 MG: 100 INJECTION SUBCUTANEOUS at 08:46

## 2022-07-04 RX ADMIN — Medication 1 PUFF: at 08:30

## 2022-07-04 RX ADMIN — ATORVASTATIN CALCIUM 10 MG: 10 TABLET, FILM COATED ORAL at 08:44

## 2022-07-04 RX ADMIN — INSULIN LISPRO 2 UNITS: 100 INJECTION, SOLUTION INTRAVENOUS; SUBCUTANEOUS at 17:21

## 2022-07-04 RX ADMIN — ALLOPURINOL 300 MG: 100 TABLET ORAL at 08:44

## 2022-07-04 RX ADMIN — BRIMONIDINE TARTRATE 1 DROP: 2 SOLUTION OPHTHALMIC at 09:24

## 2022-07-04 RX ADMIN — SODIUM CHLORIDE, PRESERVATIVE FREE 10 ML: 5 INJECTION INTRAVENOUS at 20:16

## 2022-07-04 RX ADMIN — MORPHINE SULFATE 4 MG: 4 INJECTION INTRAVENOUS at 21:14

## 2022-07-04 RX ADMIN — SUCRALFATE 1 G: 1 TABLET ORAL at 23:19

## 2022-07-04 RX ADMIN — METFORMIN HYDROCHLORIDE 1000 MG: 500 TABLET ORAL at 17:18

## 2022-07-04 RX ADMIN — MORPHINE SULFATE 4 MG: 4 INJECTION INTRAVENOUS at 12:51

## 2022-07-04 RX ADMIN — ALBUTEROL SULFATE 1 PUFF: 90 AEROSOL, METERED RESPIRATORY (INHALATION) at 08:30

## 2022-07-04 RX ADMIN — Medication 1 PUFF: at 19:34

## 2022-07-04 RX ADMIN — BRIMONIDINE TARTRATE 1 DROP: 2 SOLUTION OPHTHALMIC at 17:45

## 2022-07-04 RX ADMIN — PANTOPRAZOLE SODIUM 40 MG: 40 TABLET, DELAYED RELEASE ORAL at 09:07

## 2022-07-04 RX ADMIN — VANCOMYCIN HYDROCHLORIDE 1250 MG: 1.25 INJECTION, POWDER, LYOPHILIZED, FOR SOLUTION INTRAVENOUS at 20:00

## 2022-07-04 RX ADMIN — LISINOPRIL AND HYDROCHLOROTHIAZIDE 1 TABLET: 12.5; 2 TABLET ORAL at 08:45

## 2022-07-04 RX ADMIN — CEFEPIME 2000 MG: 2 INJECTION, POWDER, FOR SOLUTION INTRAVENOUS at 04:45

## 2022-07-04 RX ADMIN — TIMOLOL MALEATE 1 DROP: 5 SOLUTION OPHTHALMIC at 09:12

## 2022-07-04 RX ADMIN — CEFEPIME 2000 MG: 2 INJECTION, POWDER, FOR SOLUTION INTRAVENOUS at 12:14

## 2022-07-04 RX ADMIN — METFORMIN HYDROCHLORIDE 1000 MG: 500 TABLET ORAL at 08:44

## 2022-07-04 RX ADMIN — PREDNISOLONE ACETATE 1 DROP: 10 SUSPENSION/ DROPS OPHTHALMIC at 17:45

## 2022-07-04 RX ADMIN — SUCRALFATE 1 G: 1 TABLET ORAL at 17:18

## 2022-07-04 RX ADMIN — INSULIN GLARGINE 17 UNITS: 100 INJECTION, SOLUTION SUBCUTANEOUS at 09:06

## 2022-07-04 RX ADMIN — MORPHINE SULFATE 4 MG: 4 INJECTION INTRAVENOUS at 17:18

## 2022-07-04 RX ADMIN — SODIUM CHLORIDE, PRESERVATIVE FREE 10 ML: 5 INJECTION INTRAVENOUS at 09:11

## 2022-07-04 RX ADMIN — ENOXAPARIN SODIUM 30 MG: 100 INJECTION SUBCUTANEOUS at 20:16

## 2022-07-04 RX ADMIN — DORZOLAMIDE HYDROCHLORIDE 1 DROP: 20 SOLUTION/ DROPS OPHTHALMIC at 20:19

## 2022-07-04 RX ADMIN — SUCRALFATE 1 G: 1 TABLET ORAL at 12:14

## 2022-07-04 RX ADMIN — AMLODIPINE BESYLATE 10 MG: 10 TABLET ORAL at 08:44

## 2022-07-04 RX ADMIN — PREDNISOLONE ACETATE 1 DROP: 10 SUSPENSION/ DROPS OPHTHALMIC at 09:04

## 2022-07-04 RX ADMIN — VANCOMYCIN HYDROCHLORIDE 1250 MG: 1.25 INJECTION, POWDER, LYOPHILIZED, FOR SOLUTION INTRAVENOUS at 08:52

## 2022-07-04 RX ADMIN — DORZOLAMIDE HYDROCHLORIDE 1 DROP: 20 SOLUTION/ DROPS OPHTHALMIC at 09:12

## 2022-07-04 RX ADMIN — ALBUTEROL SULFATE 1 PUFF: 90 AEROSOL, METERED RESPIRATORY (INHALATION) at 12:32

## 2022-07-04 RX ADMIN — Medication 9 MG: at 21:12

## 2022-07-04 RX ADMIN — CEFEPIME 2000 MG: 2 INJECTION, POWDER, FOR SOLUTION INTRAVENOUS at 21:25

## 2022-07-04 RX ADMIN — INSULIN LISPRO 4 UNITS: 100 INJECTION, SOLUTION INTRAVENOUS; SUBCUTANEOUS at 12:11

## 2022-07-04 RX ADMIN — PANTOPRAZOLE SODIUM 40 MG: 40 TABLET, DELAYED RELEASE ORAL at 09:04

## 2022-07-04 RX ADMIN — GLIPIZIDE 5 MG: 5 TABLET ORAL at 09:04

## 2022-07-04 RX ADMIN — ALBUTEROL SULFATE 1 PUFF: 90 AEROSOL, METERED RESPIRATORY (INHALATION) at 19:34

## 2022-07-04 RX ADMIN — TIMOLOL MALEATE 1 DROP: 5 SOLUTION OPHTHALMIC at 20:16

## 2022-07-04 RX ADMIN — MORPHINE SULFATE 4 MG: 4 INJECTION INTRAVENOUS at 08:43

## 2022-07-04 RX ADMIN — INSULIN LISPRO 2 UNITS: 100 INJECTION, SOLUTION INTRAVENOUS; SUBCUTANEOUS at 21:13

## 2022-07-04 RX ADMIN — INSULIN GLARGINE 22 UNITS: 100 INJECTION, SOLUTION SUBCUTANEOUS at 21:13

## 2022-07-04 RX ADMIN — INSULIN LISPRO 4 UNITS: 100 INJECTION, SOLUTION INTRAVENOUS; SUBCUTANEOUS at 09:06

## 2022-07-04 ASSESSMENT — PAIN SCALES - GENERAL
PAINLEVEL_OUTOF10: 8
PAINLEVEL_OUTOF10: 8
PAINLEVEL_OUTOF10: 9
PAINLEVEL_OUTOF10: 8
PAINLEVEL_OUTOF10: 8
PAINLEVEL_OUTOF10: 3

## 2022-07-04 ASSESSMENT — PAIN DESCRIPTION - PAIN TYPE: TYPE: ACUTE PAIN

## 2022-07-04 ASSESSMENT — PAIN DESCRIPTION - ORIENTATION
ORIENTATION: RIGHT
ORIENTATION: LEFT

## 2022-07-04 ASSESSMENT — PAIN DESCRIPTION - LOCATION
LOCATION: FOOT
LOCATION: TOE (COMMENT WHICH ONE)
LOCATION: FOOT
LOCATION: TOE (COMMENT WHICH ONE)
LOCATION: FOOT

## 2022-07-04 ASSESSMENT — PAIN DESCRIPTION - DESCRIPTORS
DESCRIPTORS: ACHING
DESCRIPTORS: ACHING;SHARP;SHOOTING

## 2022-07-04 ASSESSMENT — PAIN - FUNCTIONAL ASSESSMENT: PAIN_FUNCTIONAL_ASSESSMENT: ACTIVITIES ARE NOT PREVENTED

## 2022-07-04 ASSESSMENT — PAIN DESCRIPTION - FREQUENCY: FREQUENCY: CONTINUOUS

## 2022-07-04 ASSESSMENT — PAIN DESCRIPTION - ONSET: ONSET: ON-GOING

## 2022-07-04 NOTE — RT PROTOCOL NOTE
RT Inhaler-Nebulizer Bronchodilator Protocol Note    There is a bronchodilator order in the chart from a provider indicating to follow the RT Bronchodilator Protocol and there is an Initiate RT Inhaler-Nebulizer Bronchodilator Protocol order as well (see protocol at bottom of note). CXR Findings:  No results found. The findings from the last RT Protocol Assessment were as follows:   History Pulmonary Disease: Chronic pulmonary disease (asthma)  Respiratory Pattern: Dyspnea on exertion or RR 21-25 bpm  Breath Sounds: Clear breath sounds  Cough: Strong, productive  Indication for Bronchodilator Therapy: On home bronchodilators (albuterol PRN only per patient)  Bronchodilator Assessment Score: 5    Aerosolized bronchodilator medication orders have been revised according to the RT Inhaler-Nebulizer Bronchodilator Protocol below. Respiratory Therapist to perform RT Therapy Protocol Assessment initially then follow the protocol. Repeat RT Therapy Protocol Assessment PRN for score 0-3 or on second treatment, BID, and PRN for scores above 3. No Indications - adjust the frequency to every 6 hours PRN wheezing or bronchospasm, if no treatments needed after 48 hours then discontinue using Per Protocol order mode. If indication present, adjust the RT bronchodilator orders based on the Bronchodilator Assessment Score as indicated below. Use Inhaler orders unless patient has one or more of the following: on home nebulizer, not able to hold breath for 10 seconds, is not alert and oriented, cannot activate and use MDI correctly, or respiratory rate 25 breaths per minute or more, then use the equivalent nebulizer order(s) with same Frequency and PRN reasons based on the score. If a patient is on this medication at home then do not decrease Frequency below that used at home.     0-3 - enter or revise RT bronchodilator order(s) to equivalent RT Bronchodilator order with Frequency of every 4 hours PRN for wheezing or increased work of breathing using Per Protocol order mode. 4-6 - enter or revise RT Bronchodilator order(s) to two equivalent RT bronchodilator orders with one order with BID Frequency and one order with Frequency of every 4 hours PRN wheezing or increased work of breathing using Per Protocol order mode. 7-10 - enter or revise RT Bronchodilator order(s) to two equivalent RT bronchodilator orders with one order with TID Frequency and one order with Frequency of every 4 hours PRN wheezing or increased work of breathing using Per Protocol order mode. 11-13 - enter or revise RT Bronchodilator order(s) to one equivalent RT bronchodilator order with QID Frequency and an Albuterol order with Frequency of every 4 hours PRN wheezing or increased work of breathing using Per Protocol order mode. Greater than 13 - enter or revise RT Bronchodilator order(s) to one equivalent RT bronchodilator order with every 4 hours Frequency and an Albuterol order with Frequency of every 2 hours PRN wheezing or increased work of breathing using Per Protocol order mode. RT to enter RT Home Evaluation for COPD & MDI Assessment order using Per Protocol order mode.     Electronically signed by Devaughn Servin RCP on 7/4/2022 at 4:41 PM

## 2022-07-04 NOTE — FLOWSHEET NOTE
Called Supervisor Roxi Britton to help contact /Podiatry. She is going to look into it and call me back.

## 2022-07-04 NOTE — FLOWSHEET NOTE
Attempted to contact Dr. Mya Avila- Called given [phone] number and I get a recording that Dr. Mya Avila is covering. When I push 2 to reach him it sends me back to the office recording. Perfect serve sent to Dr. Johanna Mott to notify.

## 2022-07-04 NOTE — PROGRESS NOTES
[address] [facility]  consulted by Dr. Janet Macedo for monitoring and adjustment. Indication for treatment: Right foot wound  Goal trough: [x] 10-15 mcg/mL or [] 15-20 mcg/ml  AUC/SHELLEY: [x] <500 or [] 400-600    Pertinent Laboratory Values:   Temp Readings from Last 3 Encounters:   07/04/22 98 °F (36.7 °C) (Oral)   04/29/22 98.5 °F (36.9 °C) (Oral)   04/28/22 96.5 °F (35.8 °C)     Recent Labs     07/02/22  0131 07/03/22 0429 07/04/22 0222   WBC 4.6 4.0 4.8   LACTATE 3.1*  --   --      Recent Labs     07/02/22 0131 07/03/22 0429 07/04/22 0222   BUN 6 10 10   CREATININE 0.7* 0.8* 0.9     Estimated Creatinine Clearance: 174 mL/min (based on SCr of 0.9 mg/dL).     Intake/Output Summary (Last 24 hours) at 7/4/2022 1424  Last data filed at 7/4/2022 0615  Gross per 24 hour   Intake --   Output 1800 ml   Net -1800 ml       Pertinent Cultures:  Date    Source    Results  07/02   Blood    NGTD  07/02   Wound    MSSA, sensitivity pending    Assessment:  · Renal trends:  · Scr 0.9, slight increase in trends  · Day(s) of therapy: 3 of 7  · Vancomycin concentration:   · 07/04: 8, collected 6.5h post-dose, AUC <500 (1250 q12h)    Plan:  · Continue vancomycin 1250 mg IVPB q12h  · Predicted AUC <500 (350), trough: 12  · No need to repeat level with short course (7 days) and stable renal trends  · Continue to follow renal trends  · Pharmacy will continue to monitor patient and adjust therapy as indicated    Thank you for the consult,  Norbert Smiley [facility], PharmD  7/4/2022 2:24 PM

## 2022-07-04 NOTE — FLOWSHEET NOTE
Mri screening done-PT has Metal in his eye placed last week with Corneal surgery. Perfect serve sent to Ezra Smart, MRI called.

## 2022-07-04 NOTE — PROGRESS NOTES
Hospitalist Progress Note      Name:  Mary Aguilar /Age/Sex: 1985  (39 y.o. male)   MRN & CSN:  [id number] & [id number] Admission Date/Time: 2022  1:16 AM   Location:  -A PCP: Lesli Villareal MD         Hospital Day: 3    Assessment and Plan:   Mary Aguilar is a 39 y.o.  male  who presents with Diabetic ulcer of right foot due to type 2 diabetes mellitus ([facility] [address]    R foot ulcer in the 2nd toe     -Continue vancomycin and cefepime   -Wound culture showed heavy growth with staph aureus, sensitivities are pending at this time  - follow-up blood cultures NGTD  - wound care consulted  - wound Cx pending NGTD  - podiatry consult pending recommendations. Staff tried to reach out to podiatry today but they are not taking calls today due to holiday and they are available tomorrow. Notified staff to reach out to podiatry tomorrow as well to make sure that they are aware of the consult.   -Elevated ESR, MRI of the foot was ordered but unable to obtain due to recent surgery in the eye and a corneal transplant last week with metal clips. .  Bone scan has been ordered to rule out osteomyelitis. .   -Wound care has been consulted as well.     DMII poorly controlled. -Cont Gli[izide and Metformin.   -Cont SSI  -Increased Lantus to 17 units daily as was not well controlled. Blood glucose improved slightly will increase Lantus to 22 units. Continue to titrate  -A1C noted to be 9.6  -Diet adjusted to carb control. Asthma hx  -no active wheezing  -cont pulse ox     HTN, uncontrolled-Continue Prinzide and Norvas  HLD-Continue Lipitor  Cocaine abuse, hx  Alcohol use disorder   Tobacco abuse  Obesity class II         Diet ADULT DIET;  Regular; 4 carb choices (60 gm/meal)   DVT Prophylaxis [x] Lovenox, []  Heparin, [] SCDs, [] Ambulation   GI Prophylaxis [] PPI,  [] H2 Blocker,  [] Carafate,  [] Diet/Tube Feeds   Code Status Full Code   Disposition Patient requires continued admission due to Toe infection MDM [] Low, [x] Moderate,[]  High  Patient's risk as above due to multiple conditions     History of Present Illness:     Chief Complaint:     The pt seen in his room this AM. Laying in bed. No acute distress the pt states that he has pain in the toe and has good pedal pulse. He denies any nausea vomiting fever or chills. Ten point ROS reviewed negative, unless as noted above    Objective: Intake/Output Summary (Last 24 hours) at 7/4/2022 1154  Last data filed at 7/4/2022 0615  Gross per 24 hour   Intake --   Output 3075 ml   Net -3075 ml      Vitals:   Vitals:    07/04/22 1131   BP: (!) 142/94   Pulse: 87   Resp: 19   Temp: 98 °F (36.7 °C)   SpO2: 97%     Physical Exam:   General: NAD   Eyes: EOMI  ENT: neck supple  Cardiovascular: Regular rate. NO edema  Respiratory: Symmetrical expansion,   Gastrointestinal: Soft, non tender  Genitourinary: no suprapubic tenderness  Skin: warm, dry, right second toe amputated with overlying necrotic skin over the wound of second toe. Neuro: Alert. Oriented  Psych: Mood appropriate.      Medications:   Medications:    insulin glargine  17 Units SubCUTAneous BID    cefepime  2,000 mg IntraVENous Once    lisinopril-hydroCHLOROthiazide  1 tablet Oral Daily    glipiZIDE  5 mg Oral QAM AC    brimonidine  1 drop Right Eye BID    atorvastatin  10 mg Oral Daily    amLODIPine  10 mg Oral Daily    allopurinol  300 mg Oral Daily    metFORMIN  1,000 mg Oral BID WC    pantoprazole  40 mg Oral QAM AC    prednisoLONE acetate  1 drop Right Eye BID    sucralfate  1 g Oral 4x Daily    sodium chloride flush  10 mL IntraVENous 2 times per day    enoxaparin  30 mg SubCUTAneous BID    cefepime  2,000 mg IntraVENous q8h    vancomycin  1,250 mg IntraVENous Q12H    albuterol sulfate HFA  1 puff Inhalation 4 times per day    And    ipratropium  1 puff Inhalation 4 times per day    dorzolamide  1 drop Right Eye BID    And    timolol  1 drop Right Eye BID    Vancomycin 25 mg/mL Ophthalmic     ++Patient Own Medication++  REFRIGERATE  1 drop Right Eye 4x Daily    insulin lispro  0-12 Units SubCUTAneous TID WC    insulin lispro  0-6 Units SubCUTAneous Nightly      Infusions:    sodium chloride      dextrose       PRN Meds: albuterol sulfate HFA, 2 puff, Q6H PRN  sodium chloride flush, 10 mL, PRN  sodium chloride, , PRN  ondansetron, 4 mg, Q8H PRN   Or  ondansetron, 4 mg, Q6H PRN  bisacodyl, 5 mg, Daily PRN  acetaminophen, 650 mg, Q6H PRN   Or  acetaminophen, 650 mg, Q6H PRN  glucose, 4 tablet, PRN  dextrose bolus, 125 mL, PRN   Or  dextrose bolus, 250 mL, PRN  glucagon (rDNA), 1 mg, PRN  dextrose, 100 mL/hr, PRN  morphine, 4 mg, Q4H PRN  melatonin, 9 mg, Nightly PRN          Patient is still admitted because Infection of the toe. The anticipated discharge is in greater than 24 hours.      Electronically signed by Linus Rudd MD on 7/4/2022 at 11:54 AM

## 2022-07-05 ENCOUNTER — APPOINTMENT (OUTPATIENT)
Dept: NUCLEAR MEDICINE | Age: 37
DRG: 380 | End: 2022-07-05
Payer: COMMERCIAL

## 2022-07-05 LAB
ANION GAP SERPL CALCULATED.3IONS-SCNC: 9 MMOL/L (ref 4–16)
BASOPHILS ABSOLUTE: 0 K/CU MM
BASOPHILS RELATIVE PERCENT: 0.4 % (ref 0–1)
BUN BLDV-MCNC: 12 MG/DL (ref 6–23)
CALCIUM SERPL-MCNC: 9.6 MG/DL (ref 8.3–10.6)
CHLORIDE BLD-SCNC: 99 MMOL/L (ref 99–110)
CO2: 27 MMOL/L (ref 21–32)
CREAT SERPL-MCNC: 0.7 MG/DL (ref 0.9–1.3)
DIFFERENTIAL TYPE: ABNORMAL
EOSINOPHILS ABSOLUTE: 0.1 K/CU MM
EOSINOPHILS RELATIVE PERCENT: 1.6 % (ref 0–3)
GFR AFRICAN AMERICAN: >60 ML/MIN/1.73M2
GFR NON-AFRICAN AMERICAN: >60 ML/MIN/1.73M2
GLUCOSE BLD-MCNC: 161 MG/DL (ref 70–99)
GLUCOSE BLD-MCNC: 179 MG/DL (ref 70–99)
GLUCOSE BLD-MCNC: 179 MG/DL (ref 70–99)
GLUCOSE BLD-MCNC: 181 MG/DL (ref 70–99)
GLUCOSE BLD-MCNC: 214 MG/DL (ref 70–99)
HCT VFR BLD CALC: 36.9 % (ref 42–52)
HEMOGLOBIN: 12.8 GM/DL (ref 13.5–18)
IMMATURE NEUTROPHIL %: 0.6 % (ref 0–0.43)
LYMPHOCYTES ABSOLUTE: 1.7 K/CU MM
LYMPHOCYTES RELATIVE PERCENT: 34.1 % (ref 24–44)
MCH RBC QN AUTO: 30.8 PG (ref 27–31)
MCHC RBC AUTO-ENTMCNC: 34.7 % (ref 32–36)
MCV RBC AUTO: 88.9 FL (ref 78–100)
MONOCYTES ABSOLUTE: 0.3 K/CU MM
MONOCYTES RELATIVE PERCENT: 6.9 % (ref 0–4)
NUCLEATED RBC %: 0 %
PDW BLD-RTO: 11.8 % (ref 11.7–14.9)
PLATELET # BLD: 277 K/CU MM (ref 140–440)
PMV BLD AUTO: 8.6 FL (ref 7.5–11.1)
POTASSIUM SERPL-SCNC: 3.9 MMOL/L (ref 3.5–5.1)
RBC # BLD: 4.15 M/CU MM (ref 4.6–6.2)
SEGMENTED NEUTROPHILS ABSOLUTE COUNT: 2.8 K/CU MM
SEGMENTED NEUTROPHILS RELATIVE PERCENT: 56.4 % (ref 36–66)
SODIUM BLD-SCNC: 135 MMOL/L (ref 135–145)
TOTAL IMMATURE NEUTOROPHIL: 0.03 K/CU MM
TOTAL NUCLEATED RBC: 0 K/CU MM
WBC # BLD: 5 K/CU MM (ref 4–10.5)

## 2022-07-05 PROCEDURE — 85025 COMPLETE CBC W/AUTO DIFF WBC: CPT

## 2022-07-05 PROCEDURE — 94640 AIRWAY INHALATION TREATMENT: CPT

## 2022-07-05 PROCEDURE — 2060000000 HC ICU INTERMEDIATE R&B

## 2022-07-05 PROCEDURE — 6370000000 HC RX 637 (ALT 250 FOR IP): Performed by: INTERNAL MEDICINE

## 2022-07-05 PROCEDURE — 6360000002 HC RX W HCPCS: Performed by: INTERNAL MEDICINE

## 2022-07-05 PROCEDURE — 2580000003 HC RX 258: Performed by: INTERNAL MEDICINE

## 2022-07-05 PROCEDURE — 78315 BONE IMAGING 3 PHASE: CPT

## 2022-07-05 PROCEDURE — 3430000000 HC RX DIAGNOSTIC RADIOPHARMACEUTICAL: Performed by: INTERNAL MEDICINE

## 2022-07-05 PROCEDURE — 94761 N-INVAS EAR/PLS OXIMETRY MLT: CPT

## 2022-07-05 PROCEDURE — 36415 COLL VENOUS BLD VENIPUNCTURE: CPT

## 2022-07-05 PROCEDURE — 94664 DEMO&/EVAL PT USE INHALER: CPT

## 2022-07-05 PROCEDURE — A9503 TC99M MEDRONATE: HCPCS | Performed by: INTERNAL MEDICINE

## 2022-07-05 PROCEDURE — 6370000000 HC RX 637 (ALT 250 FOR IP): Performed by: NURSE PRACTITIONER

## 2022-07-05 PROCEDURE — 82962 GLUCOSE BLOOD TEST: CPT

## 2022-07-05 PROCEDURE — 80048 BASIC METABOLIC PNL TOTAL CA: CPT

## 2022-07-05 PROCEDURE — 6370000000 HC RX 637 (ALT 250 FOR IP): Performed by: HOSPITALIST

## 2022-07-05 RX ORDER — TC 99M MEDRONATE 20 MG/10ML
25 INJECTION, POWDER, LYOPHILIZED, FOR SOLUTION INTRAVENOUS
Status: COMPLETED | OUTPATIENT
Start: 2022-07-05 | End: 2022-07-05

## 2022-07-05 RX ADMIN — ALBUTEROL SULFATE 1 PUFF: 90 AEROSOL, METERED RESPIRATORY (INHALATION) at 20:28

## 2022-07-05 RX ADMIN — CEFEPIME 2000 MG: 2 INJECTION, POWDER, FOR SOLUTION INTRAVENOUS at 21:48

## 2022-07-05 RX ADMIN — DORZOLAMIDE HYDROCHLORIDE 1 DROP: 20 SOLUTION/ DROPS OPHTHALMIC at 21:53

## 2022-07-05 RX ADMIN — CEFEPIME 2000 MG: 2 INJECTION, POWDER, FOR SOLUTION INTRAVENOUS at 05:21

## 2022-07-05 RX ADMIN — INSULIN LISPRO 2 UNITS: 100 INJECTION, SOLUTION INTRAVENOUS; SUBCUTANEOUS at 09:53

## 2022-07-05 RX ADMIN — Medication 1 PUFF: at 20:29

## 2022-07-05 RX ADMIN — ALBUTEROL SULFATE 1 PUFF: 90 AEROSOL, METERED RESPIRATORY (INHALATION) at 07:40

## 2022-07-05 RX ADMIN — ENOXAPARIN SODIUM 30 MG: 100 INJECTION SUBCUTANEOUS at 17:22

## 2022-07-05 RX ADMIN — PREDNISOLONE ACETATE 1 DROP: 10 SUSPENSION/ DROPS OPHTHALMIC at 09:25

## 2022-07-05 RX ADMIN — BRIMONIDINE TARTRATE 1 DROP: 2 SOLUTION OPHTHALMIC at 17:15

## 2022-07-05 RX ADMIN — SODIUM CHLORIDE, PRESERVATIVE FREE 10 ML: 5 INJECTION INTRAVENOUS at 12:21

## 2022-07-05 RX ADMIN — MORPHINE SULFATE 4 MG: 4 INJECTION INTRAVENOUS at 05:23

## 2022-07-05 RX ADMIN — METFORMIN HYDROCHLORIDE 1000 MG: 500 TABLET ORAL at 09:22

## 2022-07-05 RX ADMIN — TIMOLOL MALEATE 1 DROP: 5 SOLUTION OPHTHALMIC at 21:53

## 2022-07-05 RX ADMIN — INSULIN LISPRO 2 UNITS: 100 INJECTION, SOLUTION INTRAVENOUS; SUBCUTANEOUS at 12:24

## 2022-07-05 RX ADMIN — MORPHINE SULFATE 4 MG: 4 INJECTION INTRAVENOUS at 13:05

## 2022-07-05 RX ADMIN — Medication 9 MG: at 21:42

## 2022-07-05 RX ADMIN — DORZOLAMIDE HYDROCHLORIDE 1 DROP: 20 SOLUTION/ DROPS OPHTHALMIC at 09:26

## 2022-07-05 RX ADMIN — BRIMONIDINE TARTRATE 1 DROP: 2 SOLUTION OPHTHALMIC at 09:26

## 2022-07-05 RX ADMIN — MORPHINE SULFATE 4 MG: 4 INJECTION INTRAVENOUS at 21:43

## 2022-07-05 RX ADMIN — INSULIN GLARGINE 22 UNITS: 100 INJECTION, SOLUTION SUBCUTANEOUS at 21:41

## 2022-07-05 RX ADMIN — TIMOLOL MALEATE 1 DROP: 5 SOLUTION OPHTHALMIC at 09:26

## 2022-07-05 RX ADMIN — SUCRALFATE 1 G: 1 TABLET ORAL at 13:03

## 2022-07-05 RX ADMIN — PANTOPRAZOLE SODIUM 40 MG: 40 TABLET, DELAYED RELEASE ORAL at 06:05

## 2022-07-05 RX ADMIN — SUCRALFATE 1 G: 1 TABLET ORAL at 17:29

## 2022-07-05 RX ADMIN — VANCOMYCIN HYDROCHLORIDE 1250 MG: 1.25 INJECTION, POWDER, LYOPHILIZED, FOR SOLUTION INTRAVENOUS at 20:30

## 2022-07-05 RX ADMIN — SODIUM CHLORIDE, PRESERVATIVE FREE 10 ML: 5 INJECTION INTRAVENOUS at 21:54

## 2022-07-05 RX ADMIN — MORPHINE SULFATE 4 MG: 4 INJECTION INTRAVENOUS at 09:23

## 2022-07-05 RX ADMIN — MORPHINE SULFATE 4 MG: 4 INJECTION INTRAVENOUS at 17:29

## 2022-07-05 RX ADMIN — PREDNISOLONE ACETATE 1 DROP: 10 SUSPENSION/ DROPS OPHTHALMIC at 17:19

## 2022-07-05 RX ADMIN — AMLODIPINE BESYLATE 10 MG: 10 TABLET ORAL at 09:39

## 2022-07-05 RX ADMIN — SUCRALFATE 1 G: 1 TABLET ORAL at 06:04

## 2022-07-05 RX ADMIN — CEFEPIME 2000 MG: 2 INJECTION, POWDER, FOR SOLUTION INTRAVENOUS at 15:10

## 2022-07-05 RX ADMIN — GLIPIZIDE 5 MG: 5 TABLET ORAL at 06:05

## 2022-07-05 RX ADMIN — LISINOPRIL AND HYDROCHLOROTHIAZIDE 1 TABLET: 12.5; 2 TABLET ORAL at 09:47

## 2022-07-05 RX ADMIN — ALLOPURINOL 300 MG: 100 TABLET ORAL at 09:22

## 2022-07-05 RX ADMIN — Medication 1 PUFF: at 07:41

## 2022-07-05 RX ADMIN — METFORMIN HYDROCHLORIDE 1000 MG: 500 TABLET ORAL at 17:28

## 2022-07-05 RX ADMIN — TC 99M MEDRONATE 25 MILLICURIE: 20 INJECTION, POWDER, LYOPHILIZED, FOR SOLUTION INTRAVENOUS at 13:23

## 2022-07-05 RX ADMIN — INSULIN GLARGINE 22 UNITS: 100 INJECTION, SOLUTION SUBCUTANEOUS at 09:50

## 2022-07-05 RX ADMIN — INSULIN LISPRO 2 UNITS: 100 INJECTION, SOLUTION INTRAVENOUS; SUBCUTANEOUS at 17:16

## 2022-07-05 RX ADMIN — VANCOMYCIN HYDROCHLORIDE 1250 MG: 1.25 INJECTION, POWDER, LYOPHILIZED, FOR SOLUTION INTRAVENOUS at 09:37

## 2022-07-05 RX ADMIN — ATORVASTATIN CALCIUM 10 MG: 10 TABLET, FILM COATED ORAL at 09:23

## 2022-07-05 ASSESSMENT — PAIN SCALES - GENERAL
PAINLEVEL_OUTOF10: 9
PAINLEVEL_OUTOF10: 5
PAINLEVEL_OUTOF10: 9
PAINLEVEL_OUTOF10: 9
PAINLEVEL_OUTOF10: 7
PAINLEVEL_OUTOF10: 8
PAINLEVEL_OUTOF10: 8
PAINLEVEL_OUTOF10: 4
PAINLEVEL_OUTOF10: 7
PAINLEVEL_OUTOF10: 7
PAINLEVEL_OUTOF10: 3

## 2022-07-05 ASSESSMENT — PAIN DESCRIPTION - DESCRIPTORS
DESCRIPTORS: ACHING

## 2022-07-05 ASSESSMENT — PAIN DESCRIPTION - LOCATION
LOCATION: FOOT
LOCATION: TOE (COMMENT WHICH ONE)
LOCATION: FOOT

## 2022-07-05 ASSESSMENT — PAIN DESCRIPTION - ORIENTATION
ORIENTATION: RIGHT
ORIENTATION: RIGHT

## 2022-07-05 NOTE — PROGRESS NOTES
Follow-up call made to Dr Phuc Painting office regarding podiatry consult. Message left on machine making Dr Sarah Gutierrez aware of consult.

## 2022-07-05 NOTE — PROGRESS NOTES
Hospitalist Progress Note      Name:  Mary Aguilar /Age/Sex: 1985  (39 y.o. male)   MRN & CSN:  [id number] & [id number] Admission Date/Time: 2022  1:16 AM   Location:  -A PCP: Lesli Villareal MD         Hospital Day: 4      Assessment and Plan:     Mary Aguilar is a 39 y.o.  male  who presents with Diabetic ulcer of right foot due to type 2 diabetes mellitus ([address]     #. R foot ulcer in the 2nd toe     X-ray without any evidence of osteomyelitis. Patient cannot have MRI due to recent surgery in the eye and has a corneal transplant last week with metal clips  Bone scan pending  Elevated ESR. Blood culture from 2022 negative to date. Wound culture positive for st staph aureus:   podiatry consult pending recommendations. Discussed with bedside nurse today. 2 reach out to podiatry for consultation. #. DMII poorly controlled. -Cont Gli[izide and Metformin.   -Cont SSI  Continue Lantus  -A1C noted to be 9.6  -Diet adjusted to carb control. #. Asthma hx  -no active wheezing  -cont pulse ox     #. HTN, uncontrolled-Continue Prinzide and Norvas  #. HLD-Continue Lipitor  #. Cocaine abuse, hx  #. Alcohol use disorder   #. Tobacco abuse  #. Obesity class II        DVT prophylaxis: Lovenox  CODE STATUS: Total support        Subjective. :     Patient was seen and examined today. No acute events overnight. Patient remains afebrile with stable vital signs. Podiatry consultation pending. Bone scan pending. Objective:   Vitals:   Vitals:    22 0800   BP: (!) 144/96   Pulse: 77   Resp:    Temp: 98 °F (36.7 °C)   SpO2: 97%         Physical Exam:   GEN: Awake, alert, in no apparent distress awake male, sitting upright in bed in no apparent distress. Appears given age. HEENT: Atraumatic, normocephalic, PERRLA, EOMI  NECK: Supple, no apparent thyromegaly or masses.   RESP: Clear lungs to auscultation  CARDIO/VASC: Regular rate and rhythm, normal S1, S2, no murmurs  GI: Abdomen is soft, nontender, no significant organomegaly noted, bowel sounds present  MSK: No gross joint deformities.   Skin: Right foot ulcer involving the second toe  Neuro: AOx3, cranial nerves grossly intact, no focal motor or sensory deficits   PSYCH: Affect appropriate    Medications:   Medications:    insulin glargine  22 Units SubCUTAneous BID    albuterol sulfate HFA  1 puff Inhalation BID    And    ipratropium  1 puff Inhalation BID    cefepime  2,000 mg IntraVENous Once    lisinopril-hydroCHLOROthiazide  1 tablet Oral Daily    glipiZIDE  5 mg Oral QAM AC    brimonidine  1 drop Right Eye BID    atorvastatin  10 mg Oral Daily    amLODIPine  10 mg Oral Daily    allopurinol  300 mg Oral Daily    metFORMIN  1,000 mg Oral BID WC    pantoprazole  40 mg Oral QAM AC    prednisoLONE acetate  1 drop Right Eye BID    sucralfate  1 g Oral 4x Daily    sodium chloride flush  10 mL IntraVENous 2 times per day    enoxaparin  30 mg SubCUTAneous BID    cefepime  2,000 mg IntraVENous q8h    vancomycin  1,250 mg IntraVENous Q12H    dorzolamide  1 drop Right Eye BID    And    timolol  1 drop Right Eye BID    Vancomycin 25 mg/mL Ophthalmic     ++Patient Own Medication++  REFRIGERATE  1 drop Right Eye 4x Daily    insulin lispro  0-12 Units SubCUTAneous TID WC    insulin lispro  0-6 Units SubCUTAneous Nightly      Infusions:    sodium chloride      dextrose       PRN Meds: albuterol sulfate HFA, 2 puff, Q4H PRN  sodium chloride flush, 10 mL, PRN  sodium chloride, , PRN  ondansetron, 4 mg, Q8H PRN   Or  ondansetron, 4 mg, Q6H PRN  bisacodyl, 5 mg, Daily PRN  acetaminophen, 650 mg, Q6H PRN   Or  acetaminophen, 650 mg, Q6H PRN  glucose, 4 tablet, PRN  dextrose bolus, 125 mL, PRN   Or  dextrose bolus, 250 mL, PRN  glucagon (rDNA), 1 mg, PRN  dextrose, 100 mL/hr, PRN  morphine, 4 mg, Q4H PRN  melatonin, 9 mg, Nightly PRN          Electronically signed by Jah Story MD on 7/5/2022 at 10:35 AM

## 2022-07-05 NOTE — PROGRESS NOTES
[facility]  consulted by Dr. Kimberly Arellano for monitoring and adjustment. Indication for treatment: SSTI (R foot wound)  Goal trough: [x] 10-15 mcg/mL or [] 15-20 mcg/ml  AUC/SHELLEY: [x] <500 or [] 400-600    Pertinent Laboratory Values:   Temp Readings from Last 3 Encounters:   07/05/22 98 °F (36.7 °C)   04/29/22 98.5 °F (36.9 °C) (Oral)   04/28/22 96.5 °F (35.8 °C)     Recent Labs     07/03/22  0429 07/04/22 0222 07/05/22  0355   WBC 4.0 4.8 5.0     Recent Labs     07/03/22 0429 07/04/22 0222 07/05/22  0355   BUN 10 10 12   CREATININE 0.8* 0.9 0.7*     Estimated Creatinine Clearance: 223 mL/min (A) (based on SCr of 0.7 mg/dL (L)).     Intake/Output Summary (Last 24 hours) at 7/5/2022 1219  Last data filed at 7/5/2022 0500  Gross per 24 hour   Intake 440 ml   Output 2200 ml   Net -1760 ml       Pertinent Cultures:  Date    Source    Results  07/02   Blood    NGTD  07/02   Wound    MSSA    Assessment:  · Renal trends:  · Scr stable in range 0.7-0.9  · Day(s) of therapy: 4 of 7  · Vancomycin concentration:   · 07/04: 8, collected 6.5h post-dose, AUC <500 (1250 q12h), therapeutic    Plan:  · Continue vancomycin 1250 mg IVPB q12h with a therapeutic level and AUC  · Predicted AUC <500 (350), trough: 12  · No need to repeat level with short course (7 days)  · Continue to follow renal trends until therapy completed  · Pharmacy will continue to monitor patient and adjust therapy as indicated    Thank you for the consult,  Jana Tang [facility], PharmD  7/5/2022 12:19 PM

## 2022-07-06 LAB
ANION GAP SERPL CALCULATED.3IONS-SCNC: 11 MMOL/L (ref 4–16)
BASOPHILS ABSOLUTE: 0 K/CU MM
BASOPHILS RELATIVE PERCENT: 0.6 % (ref 0–1)
BUN BLDV-MCNC: 15 MG/DL (ref 6–23)
CALCIUM SERPL-MCNC: 9.5 MG/DL (ref 8.3–10.6)
CHLORIDE BLD-SCNC: 99 MMOL/L (ref 99–110)
CO2: 25 MMOL/L (ref 21–32)
CREAT SERPL-MCNC: 0.7 MG/DL (ref 0.9–1.3)
DIFFERENTIAL TYPE: ABNORMAL
EOSINOPHILS ABSOLUTE: 0.1 K/CU MM
EOSINOPHILS RELATIVE PERCENT: 2 % (ref 0–3)
GFR AFRICAN AMERICAN: >60 ML/MIN/1.73M2
GFR NON-AFRICAN AMERICAN: >60 ML/MIN/1.73M2
GLUCOSE BLD-MCNC: 161 MG/DL (ref 70–99)
GLUCOSE BLD-MCNC: 195 MG/DL (ref 70–99)
GLUCOSE BLD-MCNC: 224 MG/DL (ref 70–99)
GLUCOSE BLD-MCNC: 291 MG/DL (ref 70–99)
GLUCOSE BLD-MCNC: 336 MG/DL (ref 70–99)
HCT VFR BLD CALC: 37.6 % (ref 42–52)
HEMOGLOBIN: 13 GM/DL (ref 13.5–18)
IMMATURE NEUTROPHIL %: 0.6 % (ref 0–0.43)
LYMPHOCYTES ABSOLUTE: 1.5 K/CU MM
LYMPHOCYTES RELATIVE PERCENT: 29.7 % (ref 24–44)
MCH RBC QN AUTO: 31.6 PG (ref 27–31)
MCHC RBC AUTO-ENTMCNC: 34.6 % (ref 32–36)
MCV RBC AUTO: 91.3 FL (ref 78–100)
MONOCYTES ABSOLUTE: 0.4 K/CU MM
MONOCYTES RELATIVE PERCENT: 8 % (ref 0–4)
NUCLEATED RBC %: 0 %
PDW BLD-RTO: 11.8 % (ref 11.7–14.9)
PLATELET # BLD: 276 K/CU MM (ref 140–440)
PMV BLD AUTO: 8.9 FL (ref 7.5–11.1)
POTASSIUM SERPL-SCNC: 4.2 MMOL/L (ref 3.5–5.1)
RBC # BLD: 4.12 M/CU MM (ref 4.6–6.2)
SEGMENTED NEUTROPHILS ABSOLUTE COUNT: 2.9 K/CU MM
SEGMENTED NEUTROPHILS RELATIVE PERCENT: 59.1 % (ref 36–66)
SODIUM BLD-SCNC: 135 MMOL/L (ref 135–145)
TOTAL IMMATURE NEUTOROPHIL: 0.03 K/CU MM
TOTAL NUCLEATED RBC: 0 K/CU MM
WBC # BLD: 5 K/CU MM (ref 4–10.5)

## 2022-07-06 PROCEDURE — 94640 AIRWAY INHALATION TREATMENT: CPT

## 2022-07-06 PROCEDURE — 94761 N-INVAS EAR/PLS OXIMETRY MLT: CPT

## 2022-07-06 PROCEDURE — 85025 COMPLETE CBC W/AUTO DIFF WBC: CPT

## 2022-07-06 PROCEDURE — 36415 COLL VENOUS BLD VENIPUNCTURE: CPT

## 2022-07-06 PROCEDURE — 82962 GLUCOSE BLOOD TEST: CPT

## 2022-07-06 PROCEDURE — 6360000002 HC RX W HCPCS: Performed by: INTERNAL MEDICINE

## 2022-07-06 PROCEDURE — 2060000000 HC ICU INTERMEDIATE R&B

## 2022-07-06 PROCEDURE — 80048 BASIC METABOLIC PNL TOTAL CA: CPT

## 2022-07-06 PROCEDURE — 6370000000 HC RX 637 (ALT 250 FOR IP): Performed by: INTERNAL MEDICINE

## 2022-07-06 PROCEDURE — 2580000003 HC RX 258: Performed by: INTERNAL MEDICINE

## 2022-07-06 PROCEDURE — 6370000000 HC RX 637 (ALT 250 FOR IP): Performed by: HOSPITALIST

## 2022-07-06 PROCEDURE — 6370000000 HC RX 637 (ALT 250 FOR IP): Performed by: NURSE PRACTITIONER

## 2022-07-06 RX ADMIN — INSULIN LISPRO 2 UNITS: 100 INJECTION, SOLUTION INTRAVENOUS; SUBCUTANEOUS at 12:50

## 2022-07-06 RX ADMIN — SUCRALFATE 1 G: 1 TABLET ORAL at 06:27

## 2022-07-06 RX ADMIN — VANCOMYCIN HYDROCHLORIDE 1250 MG: 1.25 INJECTION, POWDER, LYOPHILIZED, FOR SOLUTION INTRAVENOUS at 08:18

## 2022-07-06 RX ADMIN — METFORMIN HYDROCHLORIDE 1000 MG: 500 TABLET ORAL at 08:18

## 2022-07-06 RX ADMIN — TIMOLOL MALEATE 1 DROP: 5 SOLUTION OPHTHALMIC at 21:11

## 2022-07-06 RX ADMIN — BRIMONIDINE TARTRATE 1 DROP: 2 SOLUTION OPHTHALMIC at 18:13

## 2022-07-06 RX ADMIN — DORZOLAMIDE HYDROCHLORIDE 1 DROP: 20 SOLUTION/ DROPS OPHTHALMIC at 21:11

## 2022-07-06 RX ADMIN — Medication 1 PUFF: at 20:05

## 2022-07-06 RX ADMIN — PANTOPRAZOLE SODIUM 40 MG: 40 TABLET, DELAYED RELEASE ORAL at 08:18

## 2022-07-06 RX ADMIN — SODIUM CHLORIDE, PRESERVATIVE FREE 10 ML: 5 INJECTION INTRAVENOUS at 10:53

## 2022-07-06 RX ADMIN — BRIMONIDINE TARTRATE 1 DROP: 2 SOLUTION OPHTHALMIC at 10:54

## 2022-07-06 RX ADMIN — INSULIN GLARGINE 22 UNITS: 100 INJECTION, SOLUTION SUBCUTANEOUS at 10:40

## 2022-07-06 RX ADMIN — ENOXAPARIN SODIUM 30 MG: 100 INJECTION SUBCUTANEOUS at 21:12

## 2022-07-06 RX ADMIN — AMLODIPINE BESYLATE 10 MG: 10 TABLET ORAL at 10:40

## 2022-07-06 RX ADMIN — Medication 1 PUFF: at 08:06

## 2022-07-06 RX ADMIN — MORPHINE SULFATE 4 MG: 4 INJECTION INTRAVENOUS at 06:35

## 2022-07-06 RX ADMIN — DORZOLAMIDE HYDROCHLORIDE 1 DROP: 20 SOLUTION/ DROPS OPHTHALMIC at 10:54

## 2022-07-06 RX ADMIN — ALBUTEROL SULFATE 1 PUFF: 90 AEROSOL, METERED RESPIRATORY (INHALATION) at 08:06

## 2022-07-06 RX ADMIN — MORPHINE SULFATE 4 MG: 4 INJECTION INTRAVENOUS at 22:33

## 2022-07-06 RX ADMIN — INSULIN LISPRO 4 UNITS: 100 INJECTION, SOLUTION INTRAVENOUS; SUBCUTANEOUS at 21:19

## 2022-07-06 RX ADMIN — CEFEPIME 2000 MG: 2 INJECTION, POWDER, FOR SOLUTION INTRAVENOUS at 15:04

## 2022-07-06 RX ADMIN — ATORVASTATIN CALCIUM 10 MG: 10 TABLET, FILM COATED ORAL at 10:40

## 2022-07-06 RX ADMIN — ALLOPURINOL 300 MG: 100 TABLET ORAL at 10:40

## 2022-07-06 RX ADMIN — MORPHINE SULFATE 4 MG: 4 INJECTION INTRAVENOUS at 18:26

## 2022-07-06 RX ADMIN — LISINOPRIL AND HYDROCHLOROTHIAZIDE 1 TABLET: 12.5; 2 TABLET ORAL at 10:40

## 2022-07-06 RX ADMIN — SODIUM CHLORIDE, PRESERVATIVE FREE 10 ML: 5 INJECTION INTRAVENOUS at 21:20

## 2022-07-06 RX ADMIN — SUCRALFATE 1 G: 1 TABLET ORAL at 12:51

## 2022-07-06 RX ADMIN — CEFEPIME 2000 MG: 2 INJECTION, POWDER, FOR SOLUTION INTRAVENOUS at 06:27

## 2022-07-06 RX ADMIN — PREDNISOLONE ACETATE 1 DROP: 10 SUSPENSION/ DROPS OPHTHALMIC at 10:54

## 2022-07-06 RX ADMIN — GLIPIZIDE 5 MG: 5 TABLET ORAL at 08:18

## 2022-07-06 RX ADMIN — SUCRALFATE 1 G: 1 TABLET ORAL at 18:27

## 2022-07-06 RX ADMIN — INSULIN LISPRO 6 UNITS: 100 INJECTION, SOLUTION INTRAVENOUS; SUBCUTANEOUS at 18:14

## 2022-07-06 RX ADMIN — Medication 9 MG: at 21:20

## 2022-07-06 RX ADMIN — CEFEPIME 2000 MG: 2 INJECTION, POWDER, FOR SOLUTION INTRAVENOUS at 21:21

## 2022-07-06 RX ADMIN — INSULIN GLARGINE 22 UNITS: 100 INJECTION, SOLUTION SUBCUTANEOUS at 21:19

## 2022-07-06 RX ADMIN — TIMOLOL MALEATE 1 DROP: 5 SOLUTION OPHTHALMIC at 10:54

## 2022-07-06 RX ADMIN — SUCRALFATE 1 G: 1 TABLET ORAL at 22:33

## 2022-07-06 RX ADMIN — ALBUTEROL SULFATE 1 PUFF: 90 AEROSOL, METERED RESPIRATORY (INHALATION) at 20:04

## 2022-07-06 RX ADMIN — SUCRALFATE 1 G: 1 TABLET ORAL at 00:56

## 2022-07-06 RX ADMIN — PREDNISOLONE ACETATE 1 DROP: 10 SUSPENSION/ DROPS OPHTHALMIC at 18:13

## 2022-07-06 ASSESSMENT — PAIN SCALES - GENERAL
PAINLEVEL_OUTOF10: 8
PAINLEVEL_OUTOF10: 8
PAINLEVEL_OUTOF10: 5
PAINLEVEL_OUTOF10: 8
PAINLEVEL_OUTOF10: 8
PAINLEVEL_OUTOF10: 0
PAINLEVEL_OUTOF10: 8
PAINLEVEL_OUTOF10: 7

## 2022-07-06 ASSESSMENT — PAIN DESCRIPTION - LOCATION
LOCATION: FOOT

## 2022-07-06 ASSESSMENT — PAIN DESCRIPTION - ORIENTATION
ORIENTATION: RIGHT

## 2022-07-06 ASSESSMENT — PAIN - FUNCTIONAL ASSESSMENT: PAIN_FUNCTIONAL_ASSESSMENT: ACTIVITIES ARE NOT PREVENTED

## 2022-07-06 ASSESSMENT — PAIN DESCRIPTION - DESCRIPTORS
DESCRIPTORS: ACHING
DESCRIPTORS: DISCOMFORT

## 2022-07-06 NOTE — PROGRESS NOTES
Hospitalist Progress Note      Name:  Makeda Henriquez /Age/Sex: 1985  (39 y.o. male)   MRN & CSN:  [id number] & [id number] Admission Date/Time: 2022  1:16 AM   Location:  -A PCP: Minda Castillo MD         Hospital Day: 5    Assessment and Plan:   Makeda Henriquez is a 39 y.o.  male  who presents with Diabetic ulcer of right foot due to type 2 diabetes mellitus ([facility]    Right foot ulcer in the 2nd toe     X-ray without any evidence of osteomyelitis. Patient cannot have MRI due to recent surgery in the eye and has a corneal transplant last week with metal clips  Bone scan -suspicious for osteomyelitis  Blood culture from 2022 negative to date. Wound culture positive for st staph aureus:   Initially consulted podiatry  Surgery consulted     Type 2 diabetes poorly controlled. Continue Lantus  Continue sliding scale  Hypoglycemia protocol     Asthma hx  no active wheezing  cont pulse ox     HTN, uncontrolled-Continue Prinzide and Norvas  HLD-Continue Lipitor  Cocaine abuse, hx  Alcohol use disorder   Tobacco abuse  Obesity class II       Diet ADULT DIET; Regular; 4 carb choices (60 gm/meal)   DVT Prophylaxis [x] Lovenox, []  Heparin, [] SCDs, [] Warfarin  [] NOAC     GI Prophylaxis [x] PPI,  [] H2 Blocker,  [] Carafate,  [] Diet/Tube Feeds   Code Status Full Code   MDM [] Low, [] Moderate,[x]  High     History of Present Illness:     Chief Complaint: Diabetic ulcer of right foot due to type 2 diabetes mellitus ([facility]    Seen and examined today. Complaining of pain on the right foot. No fever or chills. Ten point ROS reviewed negative, unless as noted above    Objective:        Intake/Output Summary (Last 24 hours) at 2022 1301  Last data filed at 2022 1053  Gross per 24 hour   Intake 410 ml   Output 2300 ml   Net -1890 ml      Vitals:   Vitals:    22 0810   BP:    Pulse: 79   Resp: 12   Temp:    SpO2:      Physical Exam:   GEN Awake male, sitting upright in bed in no apparent distress. Appears given age. EYES Pupils are equally round. No scleral erythema, discharge, or conjunctivitis. HENT Mucous membranes are moist. Oral pharynx without exudates, no evidence of thrush. NECK Supple, no apparent thyromegaly or masses. RESP Clear to auscultation, no wheezes, rales or rhonchi. Symmetric chest movement while on room air. CARDIO/VASC S1/S2 auscultated. Regular rate without appreciable murmurs, rubs, or gallops. No JVD or carotid bruits. Peripheral pulses equal bilaterally and palpable. No peripheral edema. GI Abdomen is soft without significant tenderness, masses, or guarding. Bowel sounds are normoactive. Rectal exam deferred. MSK right second toe with ulceration  SKIN Normal coloration, warm, dry. NEURO Cranial nerves appear grossly intact, normal speech, no lateralizing weakness. PSYCH Awake, alert, oriented x 4. Affect appropriate.     Medications:   Medications:    insulin glargine  22 Units SubCUTAneous BID    albuterol sulfate HFA  1 puff Inhalation BID    And    ipratropium  1 puff Inhalation BID    cefepime  2,000 mg IntraVENous Once    lisinopril-hydroCHLOROthiazide  1 tablet Oral Daily    glipiZIDE  5 mg Oral QAM AC    brimonidine  1 drop Right Eye BID    atorvastatin  10 mg Oral Daily    amLODIPine  10 mg Oral Daily    allopurinol  300 mg Oral Daily    metFORMIN  1,000 mg Oral BID WC    pantoprazole  40 mg Oral QAM AC    prednisoLONE acetate  1 drop Right Eye BID    sucralfate  1 g Oral 4x Daily    sodium chloride flush  10 mL IntraVENous 2 times per day    enoxaparin  30 mg SubCUTAneous BID    cefepime  2,000 mg IntraVENous q8h    vancomycin  1,250 mg IntraVENous Q12H    dorzolamide  1 drop Right Eye BID    And    timolol  1 drop Right Eye BID    Vancomycin 25 mg/mL Ophthalmic     ++Patient Own Medication++  REFRIGERATE  1 drop Right Eye 4x Daily    insulin lispro  0-12 Units SubCUTAneous TID WC    insulin lispro  0-6 Units SubCUTAneous Nightly      Infusions:    sodium chloride      dextrose       PRN Meds: albuterol sulfate HFA, 2 puff, Q4H PRN  sodium chloride flush, 10 mL, PRN  sodium chloride, , PRN  ondansetron, 4 mg, Q8H PRN   Or  ondansetron, 4 mg, Q6H PRN  bisacodyl, 5 mg, Daily PRN  acetaminophen, 650 mg, Q6H PRN   Or  acetaminophen, 650 mg, Q6H PRN  glucose, 4 tablet, PRN  dextrose bolus, 125 mL, PRN   Or  dextrose bolus, 250 mL, PRN  glucagon (rDNA), 1 mg, PRN  dextrose, 100 mL/hr, PRN  morphine, 4 mg, Q4H PRN  melatonin, 9 mg, Nightly PRN        Recent Labs     07/04/22 0222 07/05/22 0355 07/06/22 0419   WBC 4.8 5.0 5.0   HGB 12.4* 12.8* 13.0*   HCT 35.6* 36.9* 37.6*    277 276      Recent Labs     07/04/22 0222 07/05/22 0355 07/06/22  0419    135 135   K 4.2 3.9 4.2    99 99   CO2 26 27 25   BUN 10 12 15   CREATININE 0.9 0.7* 0.7*       Imaging reviewed      Electronically signed by Nilda Marquez MD on 7/6/2022 at 1:01 PM

## 2022-07-06 NOTE — CONSULTS
[facility]  Consultation Note    Vesta Murphy M.D.      Reason for Consult: Infected right toe      Patient's Name/Date of Birth: Ruby Benson / 1985 (1 y.o.)    Date: July 6, 2022     HPI:  79-year-old male with a history of obesity severe diabetes and treated for previous diabetic foot problems was admitted on 7/2/2022 with an infected second right great toe. The patient was admitted to the hospital and placed on antibiotics. Wound cultures are growing Staph aureus. The patient was admitted to the hospital on Friday today being Wednesday he is received no care to the right foot. The patient is very frustrated initially it appears and attempts were were made to call his podiatrist but no response was received. Despite this the patient continued to receive no wound care to the right foot wound care was recommended but the patient claims that he was never seen. They did call my partner Dr. Davide Bell yesterday who informed them that this is a patient of mine and I was contacted today to see the patient I saw him immediately after being consulted. The patient claims he has received no wound care to the right foot since his admission last Friday.     Past Medical History:   Diagnosis Date    Asthma     Blind left eye     Diabetes mellitus ([address]     GERD (gastroesophageal reflux disease)     Hypertension     Retinal detachment 2012    left       Past Surgical History:   Procedure Laterality Date    CORNEAL TRANSPLANT Bilateral     EYE SURGERY      left eye removed    EYE SURGERY Right     FRACTURE SURGERY      foot left    LAPAROSCOPIC APPENDECTOMY N/A 4/28/2022    APPENDECTOMY LAPAROSCOPIC performed by Indiana Toney MD at [address] [place] [address] Right     MANDIBLE SURGERY Bilateral     TOE AMPUTATION Right 07/25/2017       Allergies   Allergen Reactions    Ciprofloxacin Shortness Of Breath    Shellfish-Derived Products Anaphylaxis       Family History   Problem Relation Age of Onset    Diabetes Mother     Asthma Mother     High Blood Pressure Mother     Stroke Mother     Heart Disease Mother     Diabetes Father     Asthma Father     High Blood Pressure Father        Social History     Socioeconomic History    Marital status: Single     Spouse name: Not on file    Number of children: Not on file    Years of education: Not on file    Highest education level: Not on file   Occupational History    Not on file   Tobacco Use    Smoking status: Current Every Day Smoker     Packs/day: 0.50     Years: 5.00     Pack years: 2.50     Types: Cigarettes    Smokeless tobacco: Never Used   Vaping Use    Vaping Use: Never used   Substance and Sexual Activity    Alcohol use: Yes     Comment: occasionally    Drug use: Not Currently     Types: Marijuana Maurita Handsome)    Sexual activity: Not on file   Other Topics Concern    Not on file   Social History Narrative    Not on file     Social Determinants of Health     Financial Resource Strain:     Difficulty of Paying Living Expenses: Not on file   Food Insecurity:     Worried About [address] in the Last Year: Not on file    Sang of Food in the Last Year: Not on file   Transportation Needs:     Lack of Transportation (Medical): Not on file    Lack of Transportation (Non-Medical):  Not on file   Physical Activity:     Days of Exercise per Week: Not on file    Minutes of Exercise per Session: Not on file   Stress:     Feeling of Stress : Not on file   Social Connections:     Frequency of Communication with Friends and Family: Not on file    Frequency of Social Gatherings with Friends and Family: Not on file    Attends Restorationism Services: Not on file    Active Member of Clubs or Organizations: Not on file    Attends Club or Organization Meetings: Not on file    Marital Status: Not on file   Intimate Partner Violence:     Fear of Current or Ex-Partner: Not on file    Emotionally Abused: Not on file   Alesia Physically Abused: Not on file    Sexually Abused: Not on file   Housing Stability:     Unable to Pay for Housing in the Last Year: Not on file    Number of Places Lived in the Last Year: Not on file    Unstable Housing in the Last Year: Not on file       ROS: Non-contributory    Physical Exam:  Vitals:    07/06/22 1200   BP: (!) 141/95   Pulse: 81   Resp: 14   Temp: 98 °F (36.7 °C)   SpO2: 99%   HEENT examination the patient recently had eye surgery at [facility] he has severe visual deficits due to his diabetic retinopathy. Chest: Breath sounds were clear and equal with no rales, wheezes, or rhonchi. Respiratory effort was normal with no retractions or use of accessory muscles. Cardiovascular: Heart sounds were normal with a regular rate and rhythm. There were no murmurs or gallops. Abdomen: Bowel sounds were normal.  The abdomen was soft and non distended. There was no tenderness, guarding, rebound, or rigidity. There was no masses, hepatosplenomegaly, or hernias. Examination of the right foot reveals a foul-smelling necrotic wound with dead skin and tissue over the tip of the right second toe. I had previously remove the distal phalanx. He also has overgrown nails callus and a hallux deformity of the great toe. There were no ulcerations or wounds of the left foot. He does have adequate arterial inflow with palpable pulses. At the bedside I debrided the skin and subcutaneous tissue and unhealthy callus and also trim the nail of the great toes. There is no palpable bone within the wound.       Labs    CBC:   Lab Results   Component Value Date/Time    WBC 5.0 07/06/2022 04:19 AM    RBC 4.12 07/06/2022 04:19 AM    HGB 13.0 07/06/2022 04:19 AM    HCT 37.6 07/06/2022 04:19 AM    MCV 91.3 07/06/2022 04:19 AM    MCH 31.6 07/06/2022 04:19 AM    MCHC 34.6 07/06/2022 04:19 AM    RDW 11.8 07/06/2022 04:19 AM     07/06/2022 04:19 AM    MPV 8.9 07/06/2022 04:19 AM

## 2022-07-07 VITALS
BODY MASS INDEX: 39.17 KG/M2 | HEIGHT: 75 IN | RESPIRATION RATE: 19 BRPM | SYSTOLIC BLOOD PRESSURE: 135 MMHG | HEART RATE: 93 BPM | DIASTOLIC BLOOD PRESSURE: 107 MMHG | WEIGHT: 315 LBS | TEMPERATURE: 98.7 F | OXYGEN SATURATION: 99 %

## 2022-07-07 LAB
ANION GAP SERPL CALCULATED.3IONS-SCNC: 11 MMOL/L (ref 4–16)
BASOPHILS ABSOLUTE: 0 K/CU MM
BASOPHILS RELATIVE PERCENT: 0.5 % (ref 0–1)
BUN BLDV-MCNC: 15 MG/DL (ref 6–23)
CALCIUM SERPL-MCNC: 9 MG/DL (ref 8.3–10.6)
CHLORIDE BLD-SCNC: 100 MMOL/L (ref 99–110)
CO2: 25 MMOL/L (ref 21–32)
CREAT SERPL-MCNC: 0.7 MG/DL (ref 0.9–1.3)
CULTURE: ABNORMAL
CULTURE: ABNORMAL
CULTURE: NORMAL
CULTURE: NORMAL
DIFFERENTIAL TYPE: ABNORMAL
EOSINOPHILS ABSOLUTE: 0.1 K/CU MM
EOSINOPHILS RELATIVE PERCENT: 1.7 % (ref 0–3)
GFR AFRICAN AMERICAN: >60 ML/MIN/1.73M2
GFR NON-AFRICAN AMERICAN: >60 ML/MIN/1.73M2
GLUCOSE BLD-MCNC: 269 MG/DL (ref 70–99)
GLUCOSE BLD-MCNC: 271 MG/DL (ref 70–99)
HCT VFR BLD CALC: 37.2 % (ref 42–52)
HEMOGLOBIN: 12.8 GM/DL (ref 13.5–18)
IMMATURE NEUTROPHIL %: 0.5 % (ref 0–0.43)
LYMPHOCYTES ABSOLUTE: 1.5 K/CU MM
LYMPHOCYTES RELATIVE PERCENT: 36.7 % (ref 24–44)
Lab: ABNORMAL
Lab: NORMAL
Lab: NORMAL
MCH RBC QN AUTO: 31.4 PG (ref 27–31)
MCHC RBC AUTO-ENTMCNC: 34.4 % (ref 32–36)
MCV RBC AUTO: 91.2 FL (ref 78–100)
MONOCYTES ABSOLUTE: 0.4 K/CU MM
MONOCYTES RELATIVE PERCENT: 10.5 % (ref 0–4)
NUCLEATED RBC %: 0 %
PDW BLD-RTO: 11.8 % (ref 11.7–14.9)
PLATELET # BLD: 251 K/CU MM (ref 140–440)
PMV BLD AUTO: 8.9 FL (ref 7.5–11.1)
POTASSIUM SERPL-SCNC: 4 MMOL/L (ref 3.5–5.1)
RBC # BLD: 4.08 M/CU MM (ref 4.6–6.2)
SEGMENTED NEUTROPHILS ABSOLUTE COUNT: 2 K/CU MM
SEGMENTED NEUTROPHILS RELATIVE PERCENT: 50.1 % (ref 36–66)
SODIUM BLD-SCNC: 136 MMOL/L (ref 135–145)
SPECIMEN: ABNORMAL
SPECIMEN: NORMAL
SPECIMEN: NORMAL
TOTAL IMMATURE NEUTOROPHIL: 0.02 K/CU MM
TOTAL NUCLEATED RBC: 0 K/CU MM
WBC # BLD: 4 K/CU MM (ref 4–10.5)

## 2022-07-07 PROCEDURE — 6360000002 HC RX W HCPCS: Performed by: INTERNAL MEDICINE

## 2022-07-07 PROCEDURE — 2580000003 HC RX 258: Performed by: INTERNAL MEDICINE

## 2022-07-07 PROCEDURE — 85025 COMPLETE CBC W/AUTO DIFF WBC: CPT

## 2022-07-07 PROCEDURE — 80048 BASIC METABOLIC PNL TOTAL CA: CPT

## 2022-07-07 PROCEDURE — 6370000000 HC RX 637 (ALT 250 FOR IP): Performed by: INTERNAL MEDICINE

## 2022-07-07 PROCEDURE — 82962 GLUCOSE BLOOD TEST: CPT

## 2022-07-07 PROCEDURE — 6370000000 HC RX 637 (ALT 250 FOR IP): Performed by: SURGERY

## 2022-07-07 PROCEDURE — 6370000000 HC RX 637 (ALT 250 FOR IP): Performed by: HOSPITALIST

## 2022-07-07 PROCEDURE — 36415 COLL VENOUS BLD VENIPUNCTURE: CPT

## 2022-07-07 RX ORDER — HYDROCODONE BITARTRATE AND ACETAMINOPHEN 5; 325 MG/1; MG/1
1 TABLET ORAL EVERY 4 HOURS PRN
Qty: 18 TABLET | Refills: 0 | Status: SHIPPED | OUTPATIENT
Start: 2022-07-07 | End: 2022-07-10

## 2022-07-07 RX ORDER — CEPHALEXIN 500 MG/1
500 CAPSULE ORAL 2 TIMES DAILY
Qty: 14 CAPSULE | Refills: 0 | Status: SHIPPED | OUTPATIENT
Start: 2022-07-07 | End: 2022-07-14

## 2022-07-07 RX ADMIN — AMLODIPINE BESYLATE 10 MG: 10 TABLET ORAL at 09:11

## 2022-07-07 RX ADMIN — MORPHINE SULFATE 4 MG: 4 INJECTION INTRAVENOUS at 05:25

## 2022-07-07 RX ADMIN — SUCRALFATE 1 G: 1 TABLET ORAL at 05:20

## 2022-07-07 RX ADMIN — INSULIN GLARGINE 22 UNITS: 100 INJECTION, SOLUTION SUBCUTANEOUS at 08:56

## 2022-07-07 RX ADMIN — BRIMONIDINE TARTRATE 1 DROP: 2 SOLUTION OPHTHALMIC at 08:52

## 2022-07-07 RX ADMIN — COLLAGENASE SANTYL: 250 OINTMENT TOPICAL at 08:53

## 2022-07-07 RX ADMIN — PREDNISOLONE ACETATE 1 DROP: 10 SUSPENSION/ DROPS OPHTHALMIC at 08:51

## 2022-07-07 RX ADMIN — ALLOPURINOL 300 MG: 100 TABLET ORAL at 08:50

## 2022-07-07 RX ADMIN — LISINOPRIL AND HYDROCHLOROTHIAZIDE 1 TABLET: 12.5; 2 TABLET ORAL at 08:50

## 2022-07-07 RX ADMIN — PANTOPRAZOLE SODIUM 40 MG: 40 TABLET, DELAYED RELEASE ORAL at 05:20

## 2022-07-07 RX ADMIN — INSULIN LISPRO 6 UNITS: 100 INJECTION, SOLUTION INTRAVENOUS; SUBCUTANEOUS at 08:55

## 2022-07-07 RX ADMIN — ATORVASTATIN CALCIUM 10 MG: 10 TABLET, FILM COATED ORAL at 08:50

## 2022-07-07 RX ADMIN — TIMOLOL MALEATE 1 DROP: 5 SOLUTION OPHTHALMIC at 08:52

## 2022-07-07 RX ADMIN — SODIUM CHLORIDE, PRESERVATIVE FREE 10 ML: 5 INJECTION INTRAVENOUS at 08:54

## 2022-07-07 RX ADMIN — DORZOLAMIDE HYDROCHLORIDE 1 DROP: 20 SOLUTION/ DROPS OPHTHALMIC at 08:52

## 2022-07-07 RX ADMIN — CEFEPIME 2000 MG: 2 INJECTION, POWDER, FOR SOLUTION INTRAVENOUS at 05:28

## 2022-07-07 RX ADMIN — MORPHINE SULFATE 4 MG: 4 INJECTION INTRAVENOUS at 10:38

## 2022-07-07 ASSESSMENT — PAIN SCALES - GENERAL
PAINLEVEL_OUTOF10: 9
PAINLEVEL_OUTOF10: 8

## 2022-07-07 ASSESSMENT — PAIN DESCRIPTION - LOCATION
LOCATION: FOOT;TOE (COMMENT WHICH ONE)
LOCATION: TOE (COMMENT WHICH ONE)

## 2022-07-07 ASSESSMENT — PAIN DESCRIPTION - ORIENTATION
ORIENTATION: RIGHT
ORIENTATION: RIGHT

## 2022-07-07 ASSESSMENT — PAIN DESCRIPTION - DESCRIPTORS
DESCRIPTORS: SHARP
DESCRIPTORS: SHARP;ACHING;THROBBING

## 2022-07-07 ASSESSMENT — PAIN - FUNCTIONAL ASSESSMENT: PAIN_FUNCTIONAL_ASSESSMENT: ACTIVITIES ARE NOT PREVENTED

## 2022-07-07 NOTE — DISCHARGE SUMMARY
Discharge Summary    Name:  Letitia Coleman /Age/Sex: 1985  (39 y.o. male)   MRN & CSN:  [id number] & [id number] Admission Date/Time: 2022  1:16 AM   Attending:  Bertha Brush MD Discharging Physician: Bertha Brush MD     Hospital Course:   Letitia Coleman is a 39 y.o.  male  who presents with Diabetic ulcer of right foot due to type 2 diabetes mellitus [facility])     He was admitted to the hospital on 2022 due to right foot ulcer he was managed as follows. Right foot ulcer in the 2nd toe     · X-ray without any evidence of osteomyelitis. · Patient cannot have MRI due to recent surgery in the eye and has a corneal transplant last week with metal clips  · Bone scan -suspicious for osteomyelitis  · Blood culture from 2022 negative to date. · Wound culture positive for st staph aureus:   · Initially consulted podiatry  · Surgery consulted -status post debridement. Advised oral antibiotic.     Type 2 diabetes poorly controlled. · Continue Lantus on discharge. · Continue sliding scale  · Hypoglycemia protocol     Asthma -not in exacerbation     HTN, uncontrolled-Continue Prinzide and Norvas  HLD-Continue Lipitor  Cocaine abuse, hx  Alcohol use disorder   Tobacco abuse  Obesity class II    The patient expressed appropriate understanding of and agreement with the discharge recommendations, medications, and plan.      Consults this admission:  IP CONSULT TO HOSPITALIST  IP CONSULT TO PHARMACY  IP CONSULT TO PODIATRY  IP CONSULT TO GENERAL SURGERY    Discharge Instruction:   Follow up appointments: Surgery  Primary care physician:  within 2 weeks    Diet:  diabetic diet   Activity: activity as tolerated  Disposition: Discharged to:   [x]Home, []C, []SNF, []Acute Rehab, []Hospice   Condition on discharge: Stable    Discharge Medications:        Medication List      START taking these medications    cephALEXin 500 MG capsule  Commonly known as: KEFLEX  Take 1 capsule by mouth 2 times daily for 7 days     collagenase 250 UNIT/GM ointment  Apply topically daily. Start taking on: July 8, 2022     HYDROcodone-acetaminophen 5-325 MG per tablet  Commonly known as: Norco  Take 1 tablet by mouth every 4 hours as needed for Pain for up to 3 days. Intended supply: 3 days. Take lowest dose possible to manage pain        CONTINUE taking these medications    acetaminophen 325 MG tablet  Commonly known as: Aminofen  Take 2 tablets by mouth every 6 hours as needed for Pain     albuterol sulfate  (90 Base) MCG/ACT inhaler  Commonly known as: PROVENTIL;VENTOLIN;PROAIR  Inhale 2 puffs into the lungs every 6 hours as needed for Wheezing     albuterol-ipratropium  MCG/ACT Aers inhaler  Commonly known as: Combivent Respimat  Inhale 1 puff into the lungs every 6 hours     allopurinol 300 MG tablet  Commonly known as: ZYLOPRIM     alogliptin 12.5 MG Tabs tablet  Commonly known as: NESINA  Take 1 tablet by mouth Daily with supper     amLODIPine 10 MG tablet  Commonly known as: NORVASC     atorvastatin 10 MG tablet  Commonly known as: LIPITOR     brimonidine 0.2 % ophthalmic solution  Commonly known as: ALPHAGAN     dorzolamide-timolol 22.3-6.8 MG/ML Soln     glipiZIDE 10 MG tablet  Commonly known as: GLUCOTROL  Take 2 tablets PO in the morning with food, 1 tablet PO at night with food.      lisinopril-hydroCHLOROthiazide 20-12.5 MG per tablet  Commonly known as: PRINZIDE;ZESTORETIC     metFORMIN 1000 MG tablet  Commonly known as: GLUCOPHAGE     omeprazole 20 MG delayed release capsule  Commonly known as: PRILOSEC     prednisoLONE acetate 1 % ophthalmic suspension  Commonly known as: PRED FORTE     Steglatro 15 MG Tabs  Generic drug: Ertugliflozin L-PyroglutamicAc     sucralfate 1 GM tablet  Commonly known as: Carafate  Take 1 tablet by mouth 4 times daily           Where to Get Your Medications      These medications were sent to [facility] [address] [facility]    Phone: [phone]   · cephALEXin 500 MG capsule  · collagenase 250 UNIT/GM ointment     You can get these medications from any pharmacy    Bring a paper prescription for each of these medications  · HYDROcodone-acetaminophen 5-325 MG per tablet         Objective Findings at Discharge:   /85   Pulse 83   Temp 97.6 °F (36.4 °C) (Oral)   Resp 19   Ht 6' 3\" (1.905 m)   Wt (!) 322 lb 5 oz (146.2 kg)   SpO2 99%   BMI 40.29 kg/m²            GEN    Awake male, sitting upright in bed in no apparent distress. Appears given age. EYES   Pupils are equally round. No scleral erythema, discharge, or conjunctivitis. HENT  Mucous membranes are moist. Oral pharynx without exudates, no evidence of thrush. NECK  Supple, no apparent thyromegaly or masses. RESP  Clear to auscultation, no wheezes, rales or rhonchi. Symmetric chest movement while on room air. CARDIO/VASC           S1/S2 auscultated. Regular rate without appreciable murmurs, rubs, or gallops. No JVD or carotid bruits. Peripheral pulses equal bilaterally and palpable. No peripheral edema. GI        Abdomen is soft without significant tenderness, masses, or guarding. Bowel sounds are normoactive. Rectal exam deferred. MSK    right second toe with ulceration  SKIN    Normal coloration, warm, dry. NEURO           Cranial nerves appear grossly intact, normal speech, no lateralizing weakness. PSYCH            Awake, alert, oriented x 4. Affect appropriate. PSYCH Awake, alert, oriented x 4. Affect appropriate.     BMP/CBC  Recent Labs     07/05/22  0355 07/06/22  0419 07/07/22  0530    135 136   K 3.9 4.2 4.0   CL 99 99 100   CO2 27 25 25   BUN 12 15 15   CREATININE 0.7* 0.7* 0.7*   WBC 5.0 5.0 4.0   HCT 36.9* 37.6* 37.2*    276 251       IMAGING:  XR FOOT RIGHT (MIN 3 VIEWS)    Result Date: 7/3/2022  EXAMINATION: THREE XRAY VIEWS OF THE RIGHT FOOT 7/2/2022 1:39 am COMPARISON: 04/29/2021 HISTORY: ORDERING SYSTEM PROVIDED HISTORY: ulcer 2nd toe/sloughing tissue, diabetic TECHNOLOGIST PROVIDED HISTORY: Reason for exam:->ulcer 2nd toe/sloughing tissue, diabetic Reason for Exam: ulcer 2nd toe/sloughing tissue, diabetic FINDINGS: The patient has undergone prior 5th toe amputation. There has been a prior 2nd toe amputation. Hallux valgus deformity is noted. Midfoot alignment is within normal limits. Small vessel vascular calcifications are present. No gross bony destruction is appreciated. Dorsal spurring is noted at the midfoot. There is question of irregularity at the soft tissues of the distal aspect of the 2nd toe. Evaluation is limited due to the overlap with the 1st toe. No radiographic evidence of osteomyelitis. Prior 2nd and 5th toe amputations. Question of irregularity of the soft tissues at the distal aspect of the 2nd toe. NM BONE SCAN 3 PHASE    Result Date: 7/5/2022  EXAMINATION: THREE PHASE BONE SCAN 7/5/2022 1:17 pm TECHNIQUE: The patient was injected intravenously with 25.1 mCi of 99 mTc MDP. Initial blood flow and pool images of the feet were acquired. After 3 hours, delayed bone images were acquired. COMPARISON: Right foot radiograph dated 07/02/2022 HISTORY: ORDERING SYSTEM PROVIDED HISTORY: Right foot to rule out osteomyelitis. TECHNOLOGIST PROVIDED HISTORY: Reason for exam:->Right foot to rule out osteomyelitis. Reason for Exam: Diabetic ulcer of right foot. FINDINGS: In the flow phase, moderate asymmetric activity is seen at the right foot, greatest distally. In the blood pool phase, moderate asymmetric activity is seen along a distal median right toe. Activity is also seen to a lesser degree at the right midfoot. In the delayed phase, moderate focal activity is demonstrated at a distal median right toe.   Due to poor bone to soft tissue uptake, as well as known altered anatomy on radiograph, it is uncertain whether this reflects the distal 1st, 2nd, or 3rd digit. Moderate activity is demonstrated at the right midfoot and activity is seen at the left midfoot, typically due to arthropathy. Activity at the ankles, likely reflecting the same. Three-phase activity is seen at a distal right toe near the midline of the right foot, suspicious for osteomyelitis in the absence of recent surgery or trauma to the site. Due to poor osseous uptake, soft tissue activity, and altered patient anatomy as evidenced by recent radiograph, it is uncertain which distal toe this represents. MR could be performed for further evaluation as warranted. Activity at bilateral mid feet and at the ankles, likely due to arthropathy.      Discharge Time of 40 minutes    Electronically signed by Benny Chris MD on 7/7/2022 at 11:43 AM

## 2022-07-07 NOTE — PROGRESS NOTES
Patient educated, discharge instructions given, medications delivered to bedside, and taken down by wheelchair.

## 2022-07-07 NOTE — PROGRESS NOTES
Outpatient Pharmacy Progress Note for Meds-to-Beds    Total number of Prescriptions Filled: 2  The following medications were dispensed to the patient during the discharge process:  Keflex  Norco 5/325 mg tablet        Additional Documentation:  Medication(s) were delivered to the patient's room prior to discharge (Keflex)  Caridad Aponte was given to nurse Sienna Simmons)      Thank you for letting us serve your patients.   [address] [facility]    [address] [facility]    Phone: [phone]    Fax: [phone]

## 2022-07-07 NOTE — PLAN OF CARE
Rec'd PT referral with req to \"Assess for right shoe or open toe boot for right toe wound. \"  Reviewed chart:  Wound at right toe, has been debrided and nails trimmed, indep mob per nursing flowsheets, with discharge order this morning. Called to RN without answer. Feel most efficient plan is to have nursing distribute surgical shoe to allow patient to ambulate without placing toes on floor. They are very open construction and are readily available in nursing supply rooms or via the supply chain. Given independent mobility, impending discharge from hospital, and equipment request separate from therapy training service, acute care PT service will be triage held for the morning.       Vineet Rizo [id number]  9:47 AM 7/7/2022

## 2022-07-07 NOTE — PROGRESS NOTES
Came to evaluate patient today and his foot and wound were exposed to air with no dressing. The patient claims that the wound was never addressed after I left yesterday. I explicitly went over with the nurse at the bedside the need to wash the foot with soap and water apply collagenase ointment and a dressing which was not done. I brought his nurse into the wound and reiterated the orders which we reviewed which were on the electronic health record. The patient will be discharged home today  I explicitly explained to the patient the need to wash the foot and apply collagenase ointment on a daily basis. He has oral antibiotics  He will see me back in the office in 1 week. Above care issues have been reported directly to administration.

## 2022-07-21 ENCOUNTER — APPOINTMENT (OUTPATIENT)
Dept: ULTRASOUND IMAGING | Age: 37
DRG: 314 | End: 2022-07-21
Payer: COMMERCIAL

## 2022-07-21 ENCOUNTER — APPOINTMENT (OUTPATIENT)
Dept: GENERAL RADIOLOGY | Age: 37
DRG: 314 | End: 2022-07-21
Payer: COMMERCIAL

## 2022-07-21 ENCOUNTER — HOSPITAL ENCOUNTER (INPATIENT)
Age: 37
LOS: 6 days | Discharge: HOME HEALTH | DRG: 314 | End: 2022-07-27
Attending: EMERGENCY MEDICINE | Admitting: STUDENT IN AN ORGANIZED HEALTH CARE EDUCATION/TRAINING PROGRAM
Payer: COMMERCIAL

## 2022-07-21 DIAGNOSIS — M86.9 OSTEOMYELITIS OF SECOND TOE OF RIGHT FOOT (HCC): ICD-10-CM

## 2022-07-21 DIAGNOSIS — L03.115 CELLULITIS OF RIGHT LOWER EXTREMITY: ICD-10-CM

## 2022-07-21 DIAGNOSIS — M86.9 DIABETIC FOOT ULCER WITH OSTEOMYELITIS (HCC): ICD-10-CM

## 2022-07-21 DIAGNOSIS — S90.414A INFECTED ABRASION OF TOE OF RIGHT FOOT, INITIAL ENCOUNTER: ICD-10-CM

## 2022-07-21 DIAGNOSIS — L97.509 DIABETIC FOOT ULCER WITH OSTEOMYELITIS (HCC): ICD-10-CM

## 2022-07-21 DIAGNOSIS — M86.171 ACUTE OSTEOMYELITIS OF TOE, RIGHT (HCC): ICD-10-CM

## 2022-07-21 DIAGNOSIS — S91.109A OPEN WOUND OF TOE, INITIAL ENCOUNTER: Primary | ICD-10-CM

## 2022-07-21 DIAGNOSIS — R79.89 ELEVATED LACTIC ACID LEVEL: ICD-10-CM

## 2022-07-21 DIAGNOSIS — E11.621 DIABETIC FOOT ULCER WITH OSTEOMYELITIS (HCC): ICD-10-CM

## 2022-07-21 DIAGNOSIS — R73.9 HYPERGLYCEMIA: ICD-10-CM

## 2022-07-21 DIAGNOSIS — E11.69 DIABETIC FOOT ULCER WITH OSTEOMYELITIS (HCC): ICD-10-CM

## 2022-07-21 DIAGNOSIS — L08.9 INFECTED ABRASION OF TOE OF RIGHT FOOT, INITIAL ENCOUNTER: ICD-10-CM

## 2022-07-21 PROBLEM — L03.90 CELLULITIS: Status: ACTIVE | Noted: 2022-07-21

## 2022-07-21 LAB
ALBUMIN SERPL-MCNC: 4 GM/DL (ref 3.4–5)
ALP BLD-CCNC: 96 IU/L (ref 40–129)
ALT SERPL-CCNC: 22 U/L (ref 10–40)
ANION GAP SERPL CALCULATED.3IONS-SCNC: 18 MMOL/L (ref 4–16)
AST SERPL-CCNC: 26 IU/L (ref 15–37)
BACTERIA: NEGATIVE /HPF
BASOPHILS ABSOLUTE: 0 K/CU MM
BASOPHILS RELATIVE PERCENT: 0.5 % (ref 0–1)
BILIRUB SERPL-MCNC: 0.3 MG/DL (ref 0–1)
BILIRUBIN URINE: NEGATIVE MG/DL
BLOOD, URINE: NEGATIVE
BUN BLDV-MCNC: 7 MG/DL (ref 6–23)
CALCIUM SERPL-MCNC: 9.2 MG/DL (ref 8.3–10.6)
CHLORIDE BLD-SCNC: 93 MMOL/L (ref 99–110)
CLARITY: CLEAR
CO2: 20 MMOL/L (ref 21–32)
COLOR: YELLOW
CREAT SERPL-MCNC: 0.6 MG/DL (ref 0.9–1.3)
DIFFERENTIAL TYPE: ABNORMAL
EOSINOPHILS ABSOLUTE: 0.2 K/CU MM
EOSINOPHILS RELATIVE PERCENT: 2.3 % (ref 0–3)
ESTIMATED AVERAGE GLUCOSE: 237 MG/DL
GFR AFRICAN AMERICAN: >60 ML/MIN/1.73M2
GFR NON-AFRICAN AMERICAN: >60 ML/MIN/1.73M2
GLUCOSE BLD-MCNC: 217 MG/DL (ref 70–99)
GLUCOSE BLD-MCNC: 234 MG/DL (ref 70–99)
GLUCOSE BLD-MCNC: 368 MG/DL (ref 70–99)
GLUCOSE, URINE: >1000 MG/DL
HBA1C MFR BLD: 9.9 % (ref 4.2–6.3)
HCT VFR BLD CALC: 34.8 % (ref 42–52)
HEMOGLOBIN: 12.2 GM/DL (ref 13.5–18)
IMMATURE NEUTROPHIL %: 0.3 % (ref 0–0.43)
KETONES, URINE: 15 MG/DL
LACTATE: 3.1 MMOL/L (ref 0.4–2)
LEUKOCYTE ESTERASE, URINE: NEGATIVE
LYMPHOCYTES ABSOLUTE: 2.1 K/CU MM
LYMPHOCYTES RELATIVE PERCENT: 31 % (ref 24–44)
MCH RBC QN AUTO: 31.2 PG (ref 27–31)
MCHC RBC AUTO-ENTMCNC: 35.1 % (ref 32–36)
MCV RBC AUTO: 89 FL (ref 78–100)
MONOCYTES ABSOLUTE: 0.5 K/CU MM
MONOCYTES RELATIVE PERCENT: 8.2 % (ref 0–4)
NITRITE URINE, QUANTITATIVE: NEGATIVE
NUCLEATED RBC %: 0 %
PDW BLD-RTO: 11.8 % (ref 11.7–14.9)
PH, URINE: 5.5 (ref 5–8)
PLATELET # BLD: 249 K/CU MM (ref 140–440)
PMV BLD AUTO: 8.9 FL (ref 7.5–11.1)
POTASSIUM SERPL-SCNC: 3.6 MMOL/L (ref 3.5–5.1)
PROTEIN UA: NEGATIVE MG/DL
RBC # BLD: 3.91 M/CU MM (ref 4.6–6.2)
RBC URINE: ABNORMAL /HPF (ref 0–3)
SEGMENTED NEUTROPHILS ABSOLUTE COUNT: 3.8 K/CU MM
SEGMENTED NEUTROPHILS RELATIVE PERCENT: 57.7 % (ref 36–66)
SODIUM BLD-SCNC: 131 MMOL/L (ref 135–145)
SPECIFIC GRAVITY UA: 1.02 (ref 1–1.03)
SQUAMOUS EPITHELIAL: 1 /HPF
TOTAL IMMATURE NEUTOROPHIL: 0.02 K/CU MM
TOTAL NUCLEATED RBC: 0 K/CU MM
TOTAL PROTEIN: 7.6 GM/DL (ref 6.4–8.2)
TRICHOMONAS: ABNORMAL /HPF
UROBILINOGEN, URINE: 0.2 MG/DL (ref 0.2–1)
WBC # BLD: 6.6 K/CU MM (ref 4–10.5)
WBC UA: <1 /HPF (ref 0–2)

## 2022-07-21 PROCEDURE — 94761 N-INVAS EAR/PLS OXIMETRY MLT: CPT

## 2022-07-21 PROCEDURE — 87081 CULTURE SCREEN ONLY: CPT

## 2022-07-21 PROCEDURE — 1200000000 HC SEMI PRIVATE

## 2022-07-21 PROCEDURE — 6370000000 HC RX 637 (ALT 250 FOR IP): Performed by: NURSE PRACTITIONER

## 2022-07-21 PROCEDURE — 2580000003 HC RX 258: Performed by: EMERGENCY MEDICINE

## 2022-07-21 PROCEDURE — 96361 HYDRATE IV INFUSION ADD-ON: CPT

## 2022-07-21 PROCEDURE — 6360000002 HC RX W HCPCS: Performed by: EMERGENCY MEDICINE

## 2022-07-21 PROCEDURE — 2580000003 HC RX 258: Performed by: STUDENT IN AN ORGANIZED HEALTH CARE EDUCATION/TRAINING PROGRAM

## 2022-07-21 PROCEDURE — 6360000002 HC RX W HCPCS: Performed by: STUDENT IN AN ORGANIZED HEALTH CARE EDUCATION/TRAINING PROGRAM

## 2022-07-21 PROCEDURE — 73630 X-RAY EXAM OF FOOT: CPT

## 2022-07-21 PROCEDURE — 87040 BLOOD CULTURE FOR BACTERIA: CPT

## 2022-07-21 PROCEDURE — 96374 THER/PROPH/DIAG INJ IV PUSH: CPT

## 2022-07-21 PROCEDURE — 83036 HEMOGLOBIN GLYCOSYLATED A1C: CPT

## 2022-07-21 PROCEDURE — 81001 URINALYSIS AUTO W/SCOPE: CPT

## 2022-07-21 PROCEDURE — 93971 EXTREMITY STUDY: CPT

## 2022-07-21 PROCEDURE — 2580000003 HC RX 258: Performed by: NURSE PRACTITIONER

## 2022-07-21 PROCEDURE — 96376 TX/PRO/DX INJ SAME DRUG ADON: CPT

## 2022-07-21 PROCEDURE — 6360000002 HC RX W HCPCS: Performed by: NURSE PRACTITIONER

## 2022-07-21 PROCEDURE — 94640 AIRWAY INHALATION TREATMENT: CPT

## 2022-07-21 PROCEDURE — 83605 ASSAY OF LACTIC ACID: CPT

## 2022-07-21 PROCEDURE — 80053 COMPREHEN METABOLIC PANEL: CPT

## 2022-07-21 PROCEDURE — 94664 DEMO&/EVAL PT USE INHALER: CPT

## 2022-07-21 PROCEDURE — 82962 GLUCOSE BLOOD TEST: CPT

## 2022-07-21 PROCEDURE — 85025 COMPLETE CBC W/AUTO DIFF WBC: CPT

## 2022-07-21 PROCEDURE — 96375 TX/PRO/DX INJ NEW DRUG ADDON: CPT

## 2022-07-21 PROCEDURE — 99285 EMERGENCY DEPT VISIT HI MDM: CPT

## 2022-07-21 RX ORDER — MORPHINE SULFATE 4 MG/ML
4 INJECTION, SOLUTION INTRAMUSCULAR; INTRAVENOUS ONCE
Status: COMPLETED | OUTPATIENT
Start: 2022-07-21 | End: 2022-07-21

## 2022-07-21 RX ORDER — DORZOLAMIDE HCL 20 MG/ML
1 SOLUTION/ DROPS OPHTHALMIC 2 TIMES DAILY
Status: DISCONTINUED | OUTPATIENT
Start: 2022-07-21 | End: 2022-07-27 | Stop reason: HOSPADM

## 2022-07-21 RX ORDER — LISINOPRIL AND HYDROCHLOROTHIAZIDE 20; 12.5 MG/1; MG/1
1 TABLET ORAL DAILY
Status: DISCONTINUED | OUTPATIENT
Start: 2022-07-21 | End: 2022-07-27 | Stop reason: HOSPADM

## 2022-07-21 RX ORDER — INSULIN LISPRO 100 [IU]/ML
0-4 INJECTION, SOLUTION INTRAVENOUS; SUBCUTANEOUS NIGHTLY
Status: DISCONTINUED | OUTPATIENT
Start: 2022-07-21 | End: 2022-07-23

## 2022-07-21 RX ORDER — BRIMONIDINE TARTRATE 2 MG/ML
1 SOLUTION/ DROPS OPHTHALMIC 2 TIMES DAILY
Status: DISCONTINUED | OUTPATIENT
Start: 2022-07-21 | End: 2022-07-27 | Stop reason: HOSPADM

## 2022-07-21 RX ORDER — ACETAMINOPHEN 325 MG/1
650 TABLET ORAL EVERY 6 HOURS PRN
Status: DISCONTINUED | OUTPATIENT
Start: 2022-07-21 | End: 2022-07-21 | Stop reason: SDUPTHER

## 2022-07-21 RX ORDER — POLYMYXIN B SULFATE AND TRIMETHOPRIM 1; 10000 MG/ML; [USP'U]/ML
1 SOLUTION OPHTHALMIC 4 TIMES DAILY
COMMUNITY
Start: 2022-06-24

## 2022-07-21 RX ORDER — TIZANIDINE 4 MG/1
4 TABLET ORAL NIGHTLY PRN
COMMUNITY
Start: 2022-06-29

## 2022-07-21 RX ORDER — CLONIDINE HYDROCHLORIDE 0.2 MG/1
0.2 TABLET ORAL DAILY
Status: DISCONTINUED | OUTPATIENT
Start: 2022-07-21 | End: 2022-07-27 | Stop reason: HOSPADM

## 2022-07-21 RX ORDER — ONDANSETRON 4 MG/1
4 TABLET, ORALLY DISINTEGRATING ORAL EVERY 8 HOURS PRN
Status: DISCONTINUED | OUTPATIENT
Start: 2022-07-21 | End: 2022-07-27 | Stop reason: HOSPADM

## 2022-07-21 RX ORDER — POLYETHYLENE GLYCOL 3350 17 G/17G
17 POWDER, FOR SOLUTION ORAL DAILY PRN
Status: DISCONTINUED | OUTPATIENT
Start: 2022-07-21 | End: 2022-07-27 | Stop reason: HOSPADM

## 2022-07-21 RX ORDER — ACETAMINOPHEN 650 MG/1
650 SUPPOSITORY RECTAL EVERY 6 HOURS PRN
Status: DISCONTINUED | OUTPATIENT
Start: 2022-07-21 | End: 2022-07-27 | Stop reason: HOSPADM

## 2022-07-21 RX ORDER — ALLOPURINOL 300 MG/1
300 TABLET ORAL DAILY
Status: DISCONTINUED | OUTPATIENT
Start: 2022-07-21 | End: 2022-07-27 | Stop reason: HOSPADM

## 2022-07-21 RX ORDER — MOXIFLOXACIN 5 MG/ML
1 SOLUTION/ DROPS OPHTHALMIC 4 TIMES DAILY
COMMUNITY
Start: 2022-06-27

## 2022-07-21 RX ORDER — SUCRALFATE 1 G/1
1 TABLET ORAL 4 TIMES DAILY
Status: DISCONTINUED | OUTPATIENT
Start: 2022-07-21 | End: 2022-07-27 | Stop reason: HOSPADM

## 2022-07-21 RX ORDER — HYDROCODONE BITARTRATE AND ACETAMINOPHEN 5; 325 MG/1; MG/1
1 TABLET ORAL DAILY PRN
Status: ON HOLD | COMMUNITY
Start: 2022-07-14 | End: 2022-07-27 | Stop reason: HOSPADM

## 2022-07-21 RX ORDER — POLYETHYLENE GLYCOL 3350 17 G
2 POWDER IN PACKET (EA) ORAL
Status: DISCONTINUED | OUTPATIENT
Start: 2022-07-21 | End: 2022-07-27 | Stop reason: HOSPADM

## 2022-07-21 RX ORDER — SODIUM CHLORIDE 0.9 % (FLUSH) 0.9 %
5-40 SYRINGE (ML) INJECTION PRN
Status: DISCONTINUED | OUTPATIENT
Start: 2022-07-21 | End: 2022-07-27 | Stop reason: HOSPADM

## 2022-07-21 RX ORDER — 0.9 % SODIUM CHLORIDE 0.9 %
1000 INTRAVENOUS SOLUTION INTRAVENOUS ONCE
Status: COMPLETED | OUTPATIENT
Start: 2022-07-21 | End: 2022-07-21

## 2022-07-21 RX ORDER — ONDANSETRON 2 MG/ML
4 INJECTION INTRAMUSCULAR; INTRAVENOUS ONCE
Status: COMPLETED | OUTPATIENT
Start: 2022-07-21 | End: 2022-07-21

## 2022-07-21 RX ORDER — SODIUM CHLORIDE 9 MG/ML
INJECTION, SOLUTION INTRAVENOUS CONTINUOUS
Status: DISCONTINUED | OUTPATIENT
Start: 2022-07-21 | End: 2022-07-23

## 2022-07-21 RX ORDER — ALBUTEROL SULFATE 90 UG/1
2 AEROSOL, METERED RESPIRATORY (INHALATION) EVERY 6 HOURS PRN
Status: DISCONTINUED | OUTPATIENT
Start: 2022-07-21 | End: 2022-07-27 | Stop reason: HOSPADM

## 2022-07-21 RX ORDER — ATORVASTATIN CALCIUM 10 MG/1
10 TABLET, FILM COATED ORAL DAILY
Status: DISCONTINUED | OUTPATIENT
Start: 2022-07-21 | End: 2022-07-27 | Stop reason: HOSPADM

## 2022-07-21 RX ORDER — EZETIMIBE 10 MG/1
10 TABLET ORAL NIGHTLY
Status: DISCONTINUED | OUTPATIENT
Start: 2022-07-21 | End: 2022-07-27 | Stop reason: HOSPADM

## 2022-07-21 RX ORDER — CHOLECALCIFEROL (VITAMIN D3) 125 MCG
5 CAPSULE ORAL NIGHTLY
Status: DISCONTINUED | OUTPATIENT
Start: 2022-07-21 | End: 2022-07-27 | Stop reason: HOSPADM

## 2022-07-21 RX ORDER — SODIUM CHLORIDE 0.9 % (FLUSH) 0.9 %
5-40 SYRINGE (ML) INJECTION EVERY 12 HOURS SCHEDULED
Status: DISCONTINUED | OUTPATIENT
Start: 2022-07-21 | End: 2022-07-27 | Stop reason: HOSPADM

## 2022-07-21 RX ORDER — ONDANSETRON 2 MG/ML
4 INJECTION INTRAMUSCULAR; INTRAVENOUS EVERY 6 HOURS PRN
Status: DISCONTINUED | OUTPATIENT
Start: 2022-07-21 | End: 2022-07-27 | Stop reason: HOSPADM

## 2022-07-21 RX ORDER — IPRATROPIUM BROMIDE AND ALBUTEROL SULFATE 2.5; .5 MG/3ML; MG/3ML
1 SOLUTION RESPIRATORY (INHALATION) EVERY 6 HOURS
Status: DISCONTINUED | OUTPATIENT
Start: 2022-07-21 | End: 2022-07-21

## 2022-07-21 RX ORDER — SODIUM CHLORIDE 9 MG/ML
INJECTION, SOLUTION INTRAVENOUS PRN
Status: DISCONTINUED | OUTPATIENT
Start: 2022-07-21 | End: 2022-07-27 | Stop reason: HOSPADM

## 2022-07-21 RX ORDER — PREGABALIN 100 MG/1
100 CAPSULE ORAL 2 TIMES DAILY
Status: DISCONTINUED | OUTPATIENT
Start: 2022-07-21 | End: 2022-07-27 | Stop reason: HOSPADM

## 2022-07-21 RX ORDER — CLONIDINE HYDROCHLORIDE 0.2 MG/1
0.2 TABLET ORAL DAILY
COMMUNITY

## 2022-07-21 RX ORDER — IPRATROPIUM BROMIDE AND ALBUTEROL SULFATE 2.5; .5 MG/3ML; MG/3ML
1 SOLUTION RESPIRATORY (INHALATION) EVERY 4 HOURS PRN
Status: DISCONTINUED | OUTPATIENT
Start: 2022-07-21 | End: 2022-07-27 | Stop reason: HOSPADM

## 2022-07-21 RX ORDER — ENOXAPARIN SODIUM 100 MG/ML
30 INJECTION SUBCUTANEOUS 2 TIMES DAILY
Status: DISCONTINUED | OUTPATIENT
Start: 2022-07-21 | End: 2022-07-27 | Stop reason: HOSPADM

## 2022-07-21 RX ORDER — AMLODIPINE BESYLATE 10 MG/1
10 TABLET ORAL DAILY
Status: DISCONTINUED | OUTPATIENT
Start: 2022-07-21 | End: 2022-07-27 | Stop reason: HOSPADM

## 2022-07-21 RX ORDER — ACETAMINOPHEN 325 MG/1
650 TABLET ORAL EVERY 6 HOURS PRN
Status: DISCONTINUED | OUTPATIENT
Start: 2022-07-21 | End: 2022-07-27 | Stop reason: HOSPADM

## 2022-07-21 RX ORDER — HYDROCODONE BITARTRATE AND ACETAMINOPHEN 5; 325 MG/1; MG/1
1 TABLET ORAL EVERY 4 HOURS PRN
Status: DISCONTINUED | OUTPATIENT
Start: 2022-07-21 | End: 2022-07-22

## 2022-07-21 RX ORDER — ALBUTEROL SULFATE 90 UG/1
2 AEROSOL, METERED RESPIRATORY (INHALATION)
Status: DISCONTINUED | OUTPATIENT
Start: 2022-07-21 | End: 2022-07-27 | Stop reason: HOSPADM

## 2022-07-21 RX ORDER — INSULIN GLARGINE 100 [IU]/ML
30 INJECTION, SOLUTION SUBCUTANEOUS NIGHTLY
Status: DISCONTINUED | OUTPATIENT
Start: 2022-07-21 | End: 2022-07-22

## 2022-07-21 RX ORDER — PANTOPRAZOLE SODIUM 40 MG/1
40 TABLET, DELAYED RELEASE ORAL
Status: DISCONTINUED | OUTPATIENT
Start: 2022-07-22 | End: 2022-07-27 | Stop reason: HOSPADM

## 2022-07-21 RX ORDER — DEXTROSE MONOHYDRATE 100 MG/ML
INJECTION, SOLUTION INTRAVENOUS CONTINUOUS PRN
Status: DISCONTINUED | OUTPATIENT
Start: 2022-07-21 | End: 2022-07-27 | Stop reason: HOSPADM

## 2022-07-21 RX ORDER — TIMOLOL MALEATE 5 MG/ML
1 SOLUTION/ DROPS OPHTHALMIC 2 TIMES DAILY
Status: DISCONTINUED | OUTPATIENT
Start: 2022-07-21 | End: 2022-07-27 | Stop reason: HOSPADM

## 2022-07-21 RX ORDER — MOXIFLOXACIN 5 MG/ML
1 SOLUTION/ DROPS OPHTHALMIC 4 TIMES DAILY
Status: DISCONTINUED | OUTPATIENT
Start: 2022-07-21 | End: 2022-07-27 | Stop reason: HOSPADM

## 2022-07-21 RX ORDER — PREDNISOLONE ACETATE 10 MG/ML
1 SUSPENSION/ DROPS OPHTHALMIC 2 TIMES DAILY
Status: DISCONTINUED | OUTPATIENT
Start: 2022-07-21 | End: 2022-07-27 | Stop reason: HOSPADM

## 2022-07-21 RX ORDER — INSULIN LISPRO 100 [IU]/ML
0-4 INJECTION, SOLUTION INTRAVENOUS; SUBCUTANEOUS
Status: DISCONTINUED | OUTPATIENT
Start: 2022-07-21 | End: 2022-07-23

## 2022-07-21 RX ORDER — NICOTINE 21 MG/24HR
1 PATCH, TRANSDERMAL 24 HOURS TRANSDERMAL DAILY
Status: DISCONTINUED | OUTPATIENT
Start: 2022-07-21 | End: 2022-07-27 | Stop reason: HOSPADM

## 2022-07-21 RX ADMIN — HYDROMORPHONE HYDROCHLORIDE 0.5 MG: 1 INJECTION, SOLUTION INTRAMUSCULAR; INTRAVENOUS; SUBCUTANEOUS at 23:12

## 2022-07-21 RX ADMIN — INSULIN LISPRO 4 UNITS: 100 INJECTION, SOLUTION INTRAVENOUS; SUBCUTANEOUS at 21:15

## 2022-07-21 RX ADMIN — DORZOLAMIDE HYDROCHLORIDE 1 DROP: 20 SOLUTION/ DROPS OPHTHALMIC at 21:10

## 2022-07-21 RX ADMIN — TIMOLOL MALEATE 1 DROP: 5 SOLUTION OPHTHALMIC at 21:10

## 2022-07-21 RX ADMIN — SODIUM CHLORIDE 1000 ML: 9 INJECTION, SOLUTION INTRAVENOUS at 09:00

## 2022-07-21 RX ADMIN — PREDNISOLONE ACETATE 1 DROP: 10 SUSPENSION/ DROPS OPHTHALMIC at 17:38

## 2022-07-21 RX ADMIN — CLONIDINE HYDROCHLORIDE 0.2 MG: 0.2 TABLET ORAL at 17:01

## 2022-07-21 RX ADMIN — LISINOPRIL AND HYDROCHLOROTHIAZIDE 1 TABLET: 12.5; 2 TABLET ORAL at 17:02

## 2022-07-21 RX ADMIN — SODIUM CHLORIDE: 9 INJECTION, SOLUTION INTRAVENOUS at 23:06

## 2022-07-21 RX ADMIN — ONDANSETRON 4 MG: 2 INJECTION INTRAMUSCULAR; INTRAVENOUS at 08:25

## 2022-07-21 RX ADMIN — HYDROCODONE BITARTRATE AND ACETAMINOPHEN 1 TABLET: 5; 325 TABLET ORAL at 21:07

## 2022-07-21 RX ADMIN — BRIMONIDINE TARTRATE 1 DROP: 2 SOLUTION/ DROPS OPHTHALMIC at 17:37

## 2022-07-21 RX ADMIN — AMLODIPINE BESYLATE 10 MG: 10 TABLET ORAL at 17:02

## 2022-07-21 RX ADMIN — MOXIFLOXACIN OPHTHALMIC SOLUTION 1 DROP: 5 SOLUTION/ DROPS OPHTHALMIC at 17:37

## 2022-07-21 RX ADMIN — SODIUM CHLORIDE, PRESERVATIVE FREE 10 ML: 5 INJECTION INTRAVENOUS at 21:22

## 2022-07-21 RX ADMIN — INSULIN LISPRO 1 UNITS: 100 INJECTION, SOLUTION INTRAVENOUS; SUBCUTANEOUS at 17:38

## 2022-07-21 RX ADMIN — ENOXAPARIN SODIUM 30 MG: 100 INJECTION SUBCUTANEOUS at 21:06

## 2022-07-21 RX ADMIN — MOXIFLOXACIN OPHTHALMIC SOLUTION 1 DROP: 5 SOLUTION/ DROPS OPHTHALMIC at 21:09

## 2022-07-21 RX ADMIN — Medication 2 PUFF: at 17:35

## 2022-07-21 RX ADMIN — CEFEPIME 2000 MG: 2 INJECTION, POWDER, FOR SOLUTION INTRAVENOUS at 16:47

## 2022-07-21 RX ADMIN — INSULIN GLARGINE 30 UNITS: 100 INJECTION, SOLUTION SUBCUTANEOUS at 21:16

## 2022-07-21 RX ADMIN — VANCOMYCIN HYDROCHLORIDE 2000 MG: 1 INJECTION, POWDER, LYOPHILIZED, FOR SOLUTION INTRAVENOUS at 14:29

## 2022-07-21 RX ADMIN — HYDROCODONE BITARTRATE AND ACETAMINOPHEN 1 TABLET: 5; 325 TABLET ORAL at 17:01

## 2022-07-21 RX ADMIN — ALLOPURINOL 300 MG: 300 TABLET ORAL at 17:02

## 2022-07-21 RX ADMIN — ATORVASTATIN CALCIUM 10 MG: 10 TABLET, FILM COATED ORAL at 17:02

## 2022-07-21 RX ADMIN — ALBUTEROL SULFATE 2 PUFF: 90 AEROSOL, METERED RESPIRATORY (INHALATION) at 20:34

## 2022-07-21 RX ADMIN — ALBUTEROL SULFATE 2 PUFF: 90 AEROSOL, METERED RESPIRATORY (INHALATION) at 17:34

## 2022-07-21 RX ADMIN — Medication 2 PUFF: at 20:35

## 2022-07-21 RX ADMIN — SUCRALFATE 1 G: 1 TABLET ORAL at 21:07

## 2022-07-21 RX ADMIN — EZETIMIBE 10 MG: 10 TABLET ORAL at 21:07

## 2022-07-21 RX ADMIN — MORPHINE SULFATE 4 MG: 4 INJECTION, SOLUTION INTRAMUSCULAR; INTRAVENOUS at 13:11

## 2022-07-21 RX ADMIN — PREGABALIN 100 MG: 100 CAPSULE ORAL at 21:07

## 2022-07-21 RX ADMIN — SUCRALFATE 1 G: 1 TABLET ORAL at 17:01

## 2022-07-21 RX ADMIN — MORPHINE SULFATE 4 MG: 4 INJECTION, SOLUTION INTRAMUSCULAR; INTRAVENOUS at 08:25

## 2022-07-21 RX ADMIN — Medication 5 MG: at 21:07

## 2022-07-21 ASSESSMENT — ENCOUNTER SYMPTOMS
VOMITING: 0
COLOR CHANGE: 1
CONSTIPATION: 0
ROS SKIN COMMENTS: RLE
SHORTNESS OF BREATH: 0
NAUSEA: 0
COUGH: 0
EYES NEGATIVE: 1
SORE THROAT: 0
ABDOMINAL PAIN: 0

## 2022-07-21 ASSESSMENT — PAIN DESCRIPTION - DESCRIPTORS
DESCRIPTORS: ACHING
DESCRIPTORS: ACHING

## 2022-07-21 ASSESSMENT — PAIN DESCRIPTION - FREQUENCY
FREQUENCY: CONTINUOUS
FREQUENCY: CONTINUOUS

## 2022-07-21 ASSESSMENT — PAIN SCALES - GENERAL
PAINLEVEL_OUTOF10: 10
PAINLEVEL_OUTOF10: 5
PAINLEVEL_OUTOF10: 8
PAINLEVEL_OUTOF10: 9
PAINLEVEL_OUTOF10: 9
PAINLEVEL_OUTOF10: 3

## 2022-07-21 ASSESSMENT — PAIN DESCRIPTION - LOCATION
LOCATION: FOOT;LEG
LOCATION: TOE (COMMENT WHICH ONE)
LOCATION: TOE (COMMENT WHICH ONE)

## 2022-07-21 ASSESSMENT — PAIN DESCRIPTION - ONSET
ONSET: ON-GOING
ONSET: ON-GOING

## 2022-07-21 ASSESSMENT — PAIN DESCRIPTION - PAIN TYPE
TYPE: ACUTE PAIN
TYPE: ACUTE PAIN

## 2022-07-21 ASSESSMENT — PAIN - FUNCTIONAL ASSESSMENT
PAIN_FUNCTIONAL_ASSESSMENT: 0-10
PAIN_FUNCTIONAL_ASSESSMENT: ACTIVITIES ARE NOT PREVENTED
PAIN_FUNCTIONAL_ASSESSMENT: ACTIVITIES ARE NOT PREVENTED

## 2022-07-21 ASSESSMENT — PAIN DESCRIPTION - ORIENTATION
ORIENTATION: RIGHT
ORIENTATION: RIGHT

## 2022-07-21 NOTE — ACP (ADVANCE CARE PLANNING)
Patient does not have any ACP documents/Medical Power of . LSW notes hospital will follow [place]'s Next of Kin hierarchy in the following descending order for priority:    Guardian  Spouse  [de-identified] of adult Children  Parents  [de-identified] of adult Siblings  Nearest Relative not described above    Per [place]'s Next of Kin hierarchy:  Patients' parent will be Adelaida Smith.

## 2022-07-21 NOTE — ED PROVIDER NOTES
EMERGENCY DEPARTMENT ENCOUNTER      CHIEF COMPLAINT:   Toe infection  Right leg pain and swelling    HPI: Belén Engle is a 39 y.o. male, with a history of diabetes and an infected right second toe, who presents to the Emergency Department complaining of worsening infection in his right second toe with increased drainage, swelling, redness and warmth and is extending up the leg. He was admitted to the hospital earlier this month with a toe infection. He is being followed by Dr. George Willis (general surgery) who is debrided the infected soft tissues. The patient has completed a course of oral antibiotics as an outpatient. He states the toe was doing better until yesterday when it started getting swollen and draining. The pain is described as achy and throbbing. The pain and swelling are extending up his foot and into his ankle and right lower leg. Symptoms are constant. There are no exacerbating or relieving factors. The patient denies a history of DVT or PE, but is concerned that he could have a DVT in the right leg due to the pain. The patient denies fevers, chills, chest pain, shortness of breath, abdominal pain, numbness, tingling, weakness, or any other complaints. REVIEW OF SYSTEMS:  CONSTITUTIONAL:  Denies fever, chills, weight loss or weakness  EYES:  Denies photophobia or discharge  ENT:  Denies sore throat or ear pain  CARDIOVASCULAR:  Denies chest pain, palpitations or swelling  RESPIRATORY:  Denies cough or shortness of breath  GI: Denies abdominal pain, nausea, vomiting, or diarrhea  MUSCULOSKELETAL: See HPI  SKIN: See HPI  NEUROLOGIC:  Denies headache, focal weakness or sensory changes  All systems negative except as marked. \"Remaining review of systems reviewed and negative. I have reviewed the nursing triage documentation and agree unless otherwise noted below. \"    PAST MEDICAL HISTORY:   Past Medical History:   Diagnosis Date    Asthma     Blind left eye     Diabetes mellitus ([address]     GERD 0636]   Enc Vitals Group      BP (!) 140/92      Heart Rate (!) 102      Resp 18      Temp 98.6 °F (37 °C)      Temp Source Oral      SpO2 100 %      Weight (!) 320 lb (145.2 kg)      Height 6' 3\" (1.905 m)      Head Circumference       Peak Flow       Pain Score       Pain Loc       Pain Edu? Excl. in [address]? Constitutional:  Non-toxic appearance  HENT: Normocephalic, Atraumatic  Eyes: PERRL, conjunctiva normal   Neck: Normal range of motion, No tenderness, Supple, No stridor, No lymphadenopathy  Cardiovascular:  Normal heart rate, Normal rhythm  Pulmonary/Chest:  Normal breath sounds, No respiratory distress, No wheezing  Abdomen: Bowel sounds normal, Soft, No tenderness, No masses, No pulsatile masses  Extremities: There is soft tissue swelling of the right second toe with an open wound draining purulent material, there is dried drainage all over the foot, the dressing appears soiled, there is soft tissue swelling, erythema and warmth extending up the foot and into the right lower leg, there is tenderness to palpation of the right foot, ankle and lower leg, The pedal pulses are palpable, Capillary refill is brisk, there is normal motor function, there is decreased sensory function secondary to diabetic neuropathy the foot is warm, and well perfused, there is no cyanosis  Skin:  Warm, Dry      EKG:    None    Radiology / Procedures:  VL DUP LOWER EXTREMITY VENOUS RIGHT (Final result)  Result time 07/21/22 09:13:57  Final result by Jenni Moseley DO (07/21/22 09:13:57)                Impression:    No sonographic/Doppler evidence of DVT in the right lower extremity. Right   anterior tibial artery appears patent. .             Narrative:    EXAMINATION:   DUPLEX VENOUS ULTRASOUND OF THE RIGHT LOWER EXTREMITY     7/21/2022 8:26 am     TECHNIQUE:   Duplex ultrasound using B-mode/gray scaled imaging and Doppler spectral   analysis and color flow was obtained of the deep venous structures of the   right extremity. COMPARISON:   None. HISTORY:   ORDERING SYSTEM PROVIDED HISTORY: Right calf pain and lower leg swelling   TECHNOLOGIST PROVIDED HISTORY:   Reason for exam:->Right calf pain and lower leg swelling   Reason for Exam: rt leg swelling     FINDINGS:   The visualized veins of the right lower extremity are patent and free of   echogenic thrombus. The veins demonstrate good compressibility with normal   color flow study and spectral analysis. XR FOOT RIGHT (MIN 3 VIEWS) (Final result)  Result time 07/21/22 09:10:37  Final result by Ming Riley MD (07/21/22 09:10:37)                Impression:    Stable postsurgical changes. No acute osseous abnormality or radiographic   evidence of osteomyelitis. Narrative:    EXAMINATION:   THREE XRAY VIEWS OF THE RIGHT FOOT     7/21/2022 7:23 am     COMPARISON:   July 2, 2022     HISTORY:   ORDERING SYSTEM PROVIDED HISTORY: Right 2nd toe infection   TECHNOLOGIST PROVIDED HISTORY:   Right Foot   Reason for exam:->Right 2nd toe infection   Reason for Exam: Right 2nd toe infection     FINDINGS:   Moderate hallux valgus is noted with 1st metatarsal head bunion. There has been prior amputation of the 5th toe, and partial amputation of the   2nd toe. Amputation margins appear maintained and stable. No acute fracture or dislocation. No evidence of erosion or acute   osteolysis. Joint alignment is maintained. There are mild degenerative   changes involving the midfoot. Mild 1st MTP joint degenerative changes. ED COURSE & MEDICAL DECISION MAKING:  Pertinent Labs & Imaging studies reviewed. (See chart for details)  On exam, the patient is afebrile and nontoxic appearing. He is mildly tachycardic on arrival but this resolves. He is otherwise hemodynamically stable and neurologically intact.  Extremity exam shows an infected diabetic foot wound of the right second toe with cellulitis extending up into the leg.  Labs are obtained and are significant for hyperglycemia with a glucose of 234, mild hyponatremia, slightly decreased bicarb of 20 and a mildly elevated anion gap of 18. Lactic acid is 3.1. I suspect that he has a mild lactic acidosis rather than DKA. There is no elevated white count. Blood cultures are pending. Right foot x-rays show stable postsurgical changes. There is no acute osseous abnormality or radiographic evidence of osteomyelitis. Right lower extremity ultrasound is negative for DVT. Wound cultures taken earlier this month grew out staph aureus that is pansensitive. The patient was treated with IV vancomycin, Zofran, morphine and IV normal saline. I suspect that the patient has an infected open wound of his right second toe with cellulitis extending up the right lower extremity. He has an elevated lactic acid level, but does not meet criteria for Sirs or sepsis. I have a low suspicion for DVT, acute fracture, dislocation, compartment syndrome, necrotizing fasciitis, or sepsis y. I recommended admission to the hospital and the patient was agreeable. I discussed the case with the surgeon on-call for Dr. Tomasa Chahal (Dr. Herbert Coto) who will see the patient in consult. I spoke with the hospitalist who will admit the patient for further treatment and care. The patient is currently in stable condition awaiting admission. Is this patient to be included in the SEP-1 Core Measure due to severe sepsis or septic shock? No   Exclusion criteria - the patient is NOT to be included for SEP-1 Core Measure due to:  2+ SIRS criteria are not met      Clinical Impression:  1. Open wound of toe, initial encounter    2. Cellulitis of right lower extremity    3. Elevated lactic acid level    4.  Hyperglycemia            Comment: Please note this report has been produced using speech recognition software and may contain errors related to that system including errors in grammar, punctuation, and spelling, as well as words and phrases that may be inappropriate. If there are any questions or concerns please feel free to contact the dictating provider for clarification.         Didi Lechuga MD  07/22/22 4517

## 2022-07-21 NOTE — H&P
V2.0  History and Physical      Name:  Ellis Weston /Age/Sex: 1985  (39 y.o. male)   MRN & CSN:  [id number] & [id number] Encounter Date/Time: 2022 1:59 PM EDT   Location:  ED15/ED-15 PCP: Flori La MD       Hospital Day: 1    Assessment and Plan:   Ellis Weston is a 39 y.o. male with a pmh as noted below presents with Cellulitis RLE     Cellulitis of Right lower extremity   Open wound of right foot  , subsequent encounter                      Admit to inpatient             X-ray 3 view right negative for bruising or osteom myelitis. Venous Duplex Negative   Xray negative for Osteomylitis   Start vancomycin, and zosyn  Patient follows the wound clinic with Sukhi Curry, ED provider discussed case with Dr. Sita Galicia, covering for Dr Buffy Herrera and he is aware of admission and will follow up with patient   Consult wound care for chronic right lower extremity wound infection  Consider CT scan if pain/swelling worsens. Patient cannot have MRI due to recent surgery  , Has corneal transplant 3 weeks ago with metal clips. Bone scan 3 weeks ago on previous admission suspicious for osteomyelitis. Blood culture on prior admission negative, wound culture positive for staph aureus. Status post prior surgical debridement              Blood cultures x2, wound culture, MRSA swab nares, add on procalcitonin and              Patient afebrile, no evidence of leukocytosis. Trend labs     Insulin dependent type 2 diabetes with hyperglycemia   -Continue  home  long-acting glargine plus sliding scale with hypoglycemia protocol   -Hold home oral antihyperglycemics, check hemoglobin A1c    Hyponatremia   Received 1 L IVF in ED.   Repeat BMP in AM   Corrected  due to     Asthma -not in exacerbation     HTN, uncontrolled-Continue Prinzide and Norvas  HLD-Continue Lipitor  Cocaine abuse, hx  Alcohol use disorder   Tobacco abuse  Obesity class II  Chronic Conditions: continue home medication as ordered  Tobacco dependence: Nicotine patch and lozenges ordered patient was counseled on tobacco cessation. All testing  and results reviewed with patient . All questions answered. Patient and family voiced understanding    This patient was seen and examined in conjunction with Dr. Ant Camacho was agreeable with the plan and management as dictated above. Disposition:   Expected Disposition: Home  Estimated D/C: 3 days    Diet No diet orders on file   GI Prophylaxis  [x] PPI,  [] H2 Blocker,  [] Carafate,  [] Diet/Tube Feeds   DVT Prophylaxis [x] Lovenox, []  Heparin, [] SCDs, [] Ambulation,  [] NOAC   Code Status Prior   Surrogate Decision Maker/ POA          History from:     patient, electronic medical record,     History of Present Illness:     Chief Complaint: Cellulitis  Mj Sow is a 39 y.o. male with pmh of insulin-dependent type 2 diabetes and prior diagnosis cellulitis who presents to the ED with complaints of worsening redness in his right lower extremity. Patient was noticed to have the dressing of the right lower extremity upon presentation./Per reports. Patient reports that he has been following up with Dr. Yris Taveras as an outpatient. Denies any recent fevers or chills. States he has been taking care of his medicine as prescribed but records from Dr. Radha Guardado office show there has been a question of whether the wound has been covered as prescribed. Patient denies chest pain, shortness of breath, nausea vomiting diarrhea. Reports onset of swelling in the right leg and redness last evening. Denies any pain in his calf or history of blood clots. Additional review of symptoms as noted below     Review of Systems: Need 10 Elements   Review of Systems   Constitutional: Negative. Negative for fever and unexpected weight change. HENT:  Negative for congestion and sore throat. Eyes: Negative. Respiratory:  Negative for cough and shortness of breath.     Cardiovascular:  Negative for chest pain and leg swelling. Gastrointestinal:  Negative for abdominal pain, constipation, nausea and vomiting. Endocrine: Negative. Genitourinary:  Negative for dysuria and hematuria. Musculoskeletal:  Negative for neck pain. Skin:  Positive for color change and wound. RLE   Neurological:  Negative for dizziness and light-headedness. All other systems reviewed and are negative. Objective:   No intake or output data in the 24 hours ending 07/21/22 1359   Vitals:   Vitals:    07/21/22 0700 07/21/22 0730 07/21/22 0800 07/21/22 0830   BP: 116/82 122/77 128/87 (!) 145/107   Pulse:    92   Resp:       Temp:       TempSrc:       SpO2: 96% 98% 99% 96%   Weight:       Height:           Medications Prior to Admission     Prior to Admission medications    Medication Sig Start Date End Date Taking? Authorizing Provider   cloNIDine (CATAPRES) 0.2 MG tablet Take 0.2 mg by mouth in the morning. Yes Historical Provider, MD   trimethoprim-polymyxin b (POLYTRIM) 28048-6.1 UNIT/ML-% ophthalmic solution Apply 1 drop to eye 4 times daily 6/24/22  Yes Historical Provider, MD   UNABLE TO FIND Place 1 drop into the right eye 4 times daily 07/21/22 Patient on Vancomycin Preserved Eye soln 25 mg/ml   Yes Historical Provider, MD   moxifloxacin (VIGAMOX) 0.5 % ophthalmic solution Place 1 drop into the right eye 4 times daily 6/27/22   Historical Provider, MD   HYDROcodone-acetaminophen (NORCO) 5-325 MG per tablet Take 1 tablet by mouth daily as needed. 7/14/22   Historical Provider, MD   tiZANidine (ZANAFLEX) 4 MG tablet Take 4 mg by mouth nightly as needed 6/29/22   Historical Provider, MD   TRULICITY 1.5 CM/7.4CB SOPN inject 0.5 milliliters ( 1 AND 1/2 milligrams ) subcutaneously ev. ..  (REFER TO PRESCRIPTION NOTES).  5/16/22   Historical Provider, MD   ezetimibe (ZETIA) 10 MG tablet take 1 tablet by mouth once daily 6/29/22   Historical Provider, MD   LANTUS SOLOSTAR 100 UNIT/ML injection pen inject subcutaneously 30 units at bedtime 6/29/22   Historical Provider, MD CHOU PEN NEEDLES 31G X 5 MM MISC use 1 PEN NEEDLE to inject MEDICATION subcutaneously two to three times a day 6/29/22   Historical Provider, MD   melatonin 5 MG TABS tablet take 1 tablet by mouth every evening 6/29/22   Historical Provider, MD   pregabalin (LYRICA) 100 MG capsule take 1 capsule by mouth twice a day 6/29/22   Historical Provider, MD   glipiZIDE (GLUCOTROL) 10 MG tablet Take 2 tablets PO in the morning with food, 1 tablet PO at night with food.  8/1/21   Davina Devries PA-C   sucralfate (CARAFATE) 1 GM tablet Take 1 tablet by mouth 4 times daily 8/1/21   Davina Devries PA-C   albuterol sulfate  (90 Base) MCG/ACT inhaler Inhale 2 puffs into the lungs every 6 hours as needed for Wheezing 3/24/21   Geri Almonte MD   albuterol-ipratropium (COMBIVENT RESPIMAT)  MCG/ACT AERS inhaler Inhale 1 puff into the lungs every 6 hours 3/24/21   Anton Sr MD   acetaminophen (AMINOFEN) 325 MG tablet Take 2 tablets by mouth every 6 hours as needed for Pain 4/23/20   Yordy Lazo DO   brimonidine (ALPHAGAN) 0.2 % ophthalmic solution Place 1 drop into the right eye 2 times daily    Historical Provider, MD   dorzolamide-timolol 22.3-6.8 MG/ML SOLN Place 1 drop into the right eye 2 times daily    Historical Provider, MD   prednisoLONE acetate (PRED FORTE) 1 % ophthalmic suspension Place 1 drop into the right eye 2 times daily    Historical Provider, MD   allopurinol (ZYLOPRIM) 300 MG tablet Take 300 mg by mouth daily     Historical Provider, MD   atorvastatin (LIPITOR) 10 MG tablet Take 10 mg by mouth daily     Historical Provider, MD   lisinopril-hydrochlorothiazide (PRINZIDE;ZESTORETIC) 20-12.5 MG per tablet Take 1 tablet by mouth daily     Historical Provider, MD   metFORMIN (GLUCOPHAGE) 1000 MG tablet Take 1,000 mg by mouth 2 times daily (with meals)    Historical Provider, MD   amLODIPine (NORVASC) 10 MG tablet Take 10 mg by mouth daily 11/25/16   Historical Provider, MD   omeprazole (PRILOSEC) 40 MG delayed release capsule Take 40 mg by mouth daily     Historical Provider, MD       Physical Exam: Need 8 Elements   Physical Exam  Vitals and nursing note reviewed. Constitutional:       General: He is not in acute distress. Appearance: Normal appearance. HENT:      Head: Normocephalic. Nose: Nose normal.      Mouth/Throat:      Pharynx: Oropharynx is clear. Eyes:      Pupils: Pupils are equal, round, and reactive to light. Cardiovascular:      Rate and Rhythm: Normal rate and regular rhythm. Pulses:           Dorsalis pedis pulses are 1+ on the right side and 2+ on the left side. Pulmonary:      Effort: Pulmonary effort is normal. No respiratory distress. Breath sounds: No wheezing or rhonchi. Abdominal:      General: Abdomen is flat. Bowel sounds are normal. There is no distension. Musculoskeletal:         General: Normal range of motion. Cervical back: Normal range of motion. Feet:      Right foot:      Skin integrity: Callus present. Comments: Prior amputation of right 5th toe and partial of the 2nd toe with partially sloughed off callused skin overlying an ulcer. DP intact. Redness noted without warmth to mid shin of right leg . Skin:     General: Skin is warm and dry. Capillary Refill: Capillary refill takes less than 2 seconds. Neurological:      General: No focal deficit present. Mental Status: He is alert and oriented to person, place, and time.    Psychiatric:         Mood and Affect: Mood normal.          Past Medical History:   PMHx   Past Medical History:   Diagnosis Date    Asthma     Blind left eye     Diabetes mellitus ([address]     GERD (gastroesophageal reflux disease)     Hypertension     Retinal detachment 2012    left     PSHX:  has a past surgical history that includes Corneal transplant (Bilateral); Mandible surgery (Right); eye surgery; results for input(s): PROBNP in the last 72 hours. UA:  Lab Results   Component Value Date/Time    NITRU NEGATIVE 04/28/2022 03:35 AM    COLORU YELLOW 04/28/2022 03:35 AM    WBCUA 1 04/28/2022 03:35 AM    RBCUA <1 04/28/2022 03:35 AM    MUCUS RARE 12/21/2018 06:12 PM    TRICHOMONAS NONE SEEN 08/01/2021 03:00 PM    BACTERIA NEGATIVE 04/28/2022 03:35 AM    CLARITYU CLEAR 04/28/2022 03:35 AM    SPECGRAV 1.010 04/28/2022 03:35 AM    LEUKOCYTESUR NEGATIVE 04/28/2022 03:35 AM    UROBILINOGEN 0.2 04/28/2022 03:35 AM    BILIRUBINUR NEGATIVE 04/28/2022 03:35 AM    BLOODU TRACE 04/28/2022 03:35 AM    KETUA 40 04/28/2022 03:35 AM     Urine Cultures: No results found for: Yesenia Pitcairn  Blood Cultures: No results found for: BC  No results found for: BLOODCULT2  Organism:   Lab Results   Component Value Date/Time    ORG MRSA 12/23/2018 01:00 PM    ORG BSGA 12/23/2018 01:00 PM       Imaging/Diagnostics Last 24 Hours   XR FOOT RIGHT (MIN 3 VIEWS)    Result Date: 7/21/2022  EXAMINATION: THREE XRAY VIEWS OF THE RIGHT FOOT 7/21/2022 7:23 am COMPARISON: July 2, 2022 HISTORY: ORDERING SYSTEM PROVIDED HISTORY: Right 2nd toe infection TECHNOLOGIST PROVIDED HISTORY: Right Foot Reason for exam:->Right 2nd toe infection Reason for Exam: Right 2nd toe infection FINDINGS: Moderate hallux valgus is noted with 1st metatarsal head bunion. There has been prior amputation of the 5th toe, and partial amputation of the 2nd toe. Amputation margins appear maintained and stable. No acute fracture or dislocation. No evidence of erosion or acute osteolysis. Joint alignment is maintained. There are mild degenerative changes involving the midfoot. Mild 1st MTP joint degenerative changes. Stable postsurgical changes. No acute osseous abnormality or radiographic evidence of osteomyelitis.      VL DUP LOWER EXTREMITY VENOUS RIGHT    Result Date: 7/21/2022  EXAMINATION: DUPLEX VENOUS ULTRASOUND OF THE RIGHT LOWER EXTREMITY 7/21/2022 8:26 am TECHNIQUE: Duplex ultrasound using B-mode/gray scaled imaging and Doppler spectral analysis and color flow was obtained of the deep venous structures of the right extremity. COMPARISON: None. HISTORY: ORDERING SYSTEM PROVIDED HISTORY: Right calf pain and lower leg swelling TECHNOLOGIST PROVIDED HISTORY: Reason for exam:->Right calf pain and lower leg swelling Reason for Exam: rt leg swelling FINDINGS: The visualized veins of the right lower extremity are patent and free of echogenic thrombus. The veins demonstrate good compressibility with normal color flow study and spectral analysis. No sonographic/Doppler evidence of DVT in the right lower extremity. Right anterior tibial artery appears patent. .             Electronically signed by KAYLI Huerta CNP on 7/21/2022 at 1:59 PM          This dictation was created with voice recognition software. While attempts have been made to review the dictation as it is transcribed, on occasion the spoken word can be misinterpreted by the technology leading to omissions or inappropriate words, phrases or sentences.      Electronically signed by KAYLI Huerta CNP on 7/21/2022 at 1:59 PM

## 2022-07-21 NOTE — PROGRESS NOTES
[facility]  consulted by Dr. Christine Bowen, APRN-CNP for monitoring and adjustment. Indication for treatment: Skin and soft tissue infection  Goal trough: [x] 10-15 mcg/mL or [] 15-20 mcg/ml  AUC/SHELLEY: [x] <500 or [] 400-600    Pertinent Laboratory Values:   Temp Readings from Last 3 Encounters:   07/21/22 97.7 °F (36.5 °C) (Oral)   07/13/22 97.8 °F (36.6 °C)   07/07/22 98.7 °F (37.1 °C) (Oral)     Recent Labs     07/21/22  0750   WBC 6.6   LACTATE 3.1*     Recent Labs     07/21/22  0750   BUN 7   CREATININE 0.6*     Estimated Creatinine Clearance: 259 mL/min (A) (based on SCr of 0.6 mg/dL (L)). No intake or output data in the 24 hours ending 07/21/22 1513    Pertinent Cultures:  Date    Source    Results    Vancomycin level:   TROUGH:  No results for input(s): VANCOTROUGH in the last 72 hours. RANDOM:  No results for input(s): VANCORANDOM in the last 72 hours. Assessment:  SCr, BUN, and urine output: ser creat- at baseline, good  Day(s) of therapy: 1  Vancomycin concentration: pending    Plan:  36 yom, 145 kg [obese]  Note- pt- received 1x Vancomycin 2000mg dose about 1 hr ago; Then continue Vancomycin as 2500 mg ivpb q12 hr  Pharmacy will continue to monitor patient and adjust therapy as indicated    Eamon 3 7/22 @ 6am    Thank you for the consult.   Alban Seip, [address] [place] [address]  7/21/2022 3:13 PM

## 2022-07-21 NOTE — ED NOTES
Hand off nurse report to [facility] for room [address] [facility], [place], RN  07/21/22 0365

## 2022-07-21 NOTE — ED NOTES
[address] [facility] paged Dr Thanh Manley covering Dr Kev Gonzales  07/21/22 1227    1231 Dr Thanh Manley returned call      Handy Fry  07/21/22 1236

## 2022-07-21 NOTE — ED NOTES
[phone] paged hospitalist     Sharon Lomax  07/21/22 Dre with [facility] group returned call      Sharon Lomax  07/21/22 3498

## 2022-07-21 NOTE — ED NOTES
Medication History  [facility]    Patient Name: Belén Engle 1985     Medication history has been completed by: Sebastian Pabon CPhT    Source(s) of information: patient and insurance claims     Primary Care Physician: Anton Sr MD     Pharmacy: [facility]    Allergies as of 07/21/2022 - Fully Reviewed 07/21/2022   Allergen Reaction Noted    Ciprofloxacin Shortness Of Breath 02/28/2017    Glucosamine Anaphylaxis 03/24/2012    Shellfish-derived products Anaphylaxis 03/24/2012        Prior to Admission medications    Medication Sig Start Date End Date Taking? Authorizing Provider   cloNIDine (CATAPRES) 0.2 MG tablet Take 0.2 mg by mouth in the morning. Yes Historical Provider, MD   trimethoprim-polymyxin b (POLYTRIM) 36747-0.1 UNIT/ML-% ophthalmic solution Apply 1 drop to eye 4 times daily 6/24/22  Yes Historical Provider, MD   UNABLE TO FIND Place 1 drop into the right eye 4 times daily 07/21/22 Patient on Vancomycin Preserved Eye soln 25 mg/ml   Yes Historical Provider, MD   moxifloxacin (VIGAMOX) 0.5 % ophthalmic solution Place 1 drop into the right eye 4 times daily 6/27/22   Historical Provider, MD   HYDROcodone-acetaminophen (NORCO) 5-325 MG per tablet Take 1 tablet by mouth daily as needed. 7/14/22   Historical Provider, MD   tiZANidine (ZANAFLEX) 4 MG tablet Take 4 mg by mouth nightly as needed 6/29/22   Historical Provider, MD   TRULICITY 1.5 QJ/7.0AQ SOPN inject 0.5 milliliters ( 1 AND 1/2 milligrams ) subcutaneously ev. ..  (REFER TO PRESCRIPTION NOTES).  5/16/22   Historical Provider, MD   ezetimibe (ZETIA) 10 MG tablet take 1 tablet by mouth once daily 6/29/22   Historical Provider, MD   LANTUS SOLOSTAR 100 UNIT/ML injection pen inject subcutaneously 30 units at bedtime 6/29/22   Historical Provider, MD BRADSHAWTE PEN NEEDLES 31G X 5 MM MISC use 1 PEN NEEDLE to inject MEDICATION subcutaneously two to three times a day 6/29/22   Historical Provider, MD   melatonin 5 MG TABS tablet take 1 tablet by mouth every evening 6/29/22   Historical Provider, MD   pregabalin (LYRICA) 100 MG capsule take 1 capsule by mouth twice a day 6/29/22   Historical Provider, MD   glipiZIDE (GLUCOTROL) 10 MG tablet Take 2 tablets PO in the morning with food, 1 tablet PO at night with food. 8/1/21   Blaine Devries PA-C   sucralfate (CARAFATE) 1 GM tablet Take 1 tablet by mouth 4 times daily 8/1/21   Blaine Devries PA-C   albuterol sulfate  (90 Base) MCG/ACT inhaler Inhale 2 puffs into the lungs every 6 hours as needed for Wheezing 3/24/21   Geri Almonte MD   albuterol-ipratropium (COMBIVENT RESPIMAT)  MCG/ACT AERS inhaler Inhale 1 puff into the lungs every 6 hours 3/24/21   Arabella Morel MD   acetaminophen (AMINOFEN) 325 MG tablet Take 2 tablets by mouth every 6 hours as needed for Pain 4/23/20   Mauri Arroyo DO   brimonidine (ALPHAGAN) 0.2 % ophthalmic solution Place 1 drop into the right eye 2 times daily    Historical Provider, MD   dorzolamide-timolol 22.3-6.8 MG/ML SOLN Place 1 drop into the right eye 2 times daily    Historical Provider, MD   prednisoLONE acetate (PRED FORTE) 1 % ophthalmic suspension Place 1 drop into the right eye 2 times daily    Historical Provider, MD   allopurinol (ZYLOPRIM) 300 MG tablet Take 300 mg by mouth daily     Historical Provider, MD   atorvastatin (LIPITOR) 10 MG tablet Take 10 mg by mouth daily     Historical Provider, MD   lisinopril-hydrochlorothiazide (PRINZIDE;ZESTORETIC) 20-12.5 MG per tablet Take 1 tablet by mouth daily     Historical Provider, MD   metFORMIN (GLUCOPHAGE) 1000 MG tablet Take 1,000 mg by mouth 2 times daily (with meals)    Historical Provider, MD   amLODIPine (NORVASC) 10 MG tablet Take 10 mg by mouth daily 11/25/16   Historical Provider, MD   omeprazole (PRILOSEC) 40 MG delayed release capsule Take 40 mg by mouth daily     Historical Provider, MD     Medications added or changed (ex.  new medication, dosage change, interval change, formulation change):  Clonidine (added)  Norco (added)  Tizanidine (added)  Moxifloxacin eye soln (added)  Vancomycin preserved eye soln (added)  Trimethoprim-polymyxin b eye soln (added)    Medications removed from list (include reason, ex. noncompliance, medication cost, therapy complete etc.):   Alogliptin no longer taking  Steglatro no longer taking    Comments:  Medication list reviewed with patient and insurance claims verified. Patient states he has taken no medications today, Reports he has taken first two doses of his eye drops.     To my knowledge the above medication history is accurate as of 7/21/2022 1:31 PM.   Patricia Martinez CPhT   7/21/2022 1:31 PM

## 2022-07-21 NOTE — PROGRESS NOTES
[address] [place] [address]    Brittnee Castano is a 39 y.o. male started on vancomycin IV. Pharmacy consulted by ED provider Ashley Kenney MD. to order a dose of vancomycin in the emergency department. Other antimicrobials:    Ht Readings from Last 1 Encounters:   07/21/22 6' 3\" (1.905 m)     Wt Readings from Last 3 Encounters:   07/21/22 (!) 320 lb (145.2 kg)   07/13/22 (!) 322 lb (146.1 kg)   07/03/22 (!) 322 lb 5 oz (146.2 kg)        Pertinent Laboratory Values:   Temp Readings from Last 3 Encounters:   07/21/22 98.6 °F (37 °C) (Oral)   07/13/22 97.8 °F (36.6 °C)   07/07/22 98.7 °F (37.1 °C) (Oral)     Recent Labs     07/21/22  0750   WBC 6.6   LACTATE 3.1*     Recent Labs     07/21/22  0750   BUN 7   CREATININE 0.6*     Estimated Creatinine Clearance: 259 mL/min (A) (based on SCr of 0.6 mg/dL (L)). No intake or output data in the 24 hours ending 07/21/22 1132    Assessment/Plan:  Pharmacy will order vancomycin 1x dose- Vancomycin 2000 mg ivpb  Please note, pharmacy will order a one-time dose of vancomycin for the Emergency Department. The consult will need to be re-ordered if vancomycin is to continue upon admission. Thank you for the consult.   Partha Torres, [facility]  7/21/2022 11:32 AM

## 2022-07-21 NOTE — ED TRIAGE NOTES
Pt presents to the ED today via [facility] with c/o right foot wound and leg swelling. Patient has history of diabetes and has had previous toe amputations.

## 2022-07-22 LAB
ALBUMIN SERPL-MCNC: 3.7 GM/DL (ref 3.4–5)
ALP BLD-CCNC: 98 IU/L (ref 40–129)
ALT SERPL-CCNC: 19 U/L (ref 10–40)
ANION GAP SERPL CALCULATED.3IONS-SCNC: 10 MMOL/L (ref 4–16)
AST SERPL-CCNC: 19 IU/L (ref 15–37)
BASOPHILS ABSOLUTE: 0 K/CU MM
BASOPHILS RELATIVE PERCENT: 0.7 % (ref 0–1)
BILIRUB SERPL-MCNC: 0.3 MG/DL (ref 0–1)
BUN BLDV-MCNC: 14 MG/DL (ref 6–23)
CALCIUM SERPL-MCNC: 8.7 MG/DL (ref 8.3–10.6)
CHLORIDE BLD-SCNC: 98 MMOL/L (ref 99–110)
CO2: 26 MMOL/L (ref 21–32)
CREAT SERPL-MCNC: 0.7 MG/DL (ref 0.9–1.3)
DIFFERENTIAL TYPE: ABNORMAL
DOSE AMOUNT: NORMAL
DOSE TIME: NORMAL
EOSINOPHILS ABSOLUTE: 0.1 K/CU MM
EOSINOPHILS RELATIVE PERCENT: 2.5 % (ref 0–3)
ERYTHROCYTE SEDIMENTATION RATE: 27 MM/HR (ref 0–15)
GFR AFRICAN AMERICAN: >60 ML/MIN/1.73M2
GFR NON-AFRICAN AMERICAN: >60 ML/MIN/1.73M2
GLUCOSE BLD-MCNC: 291 MG/DL (ref 70–99)
GLUCOSE BLD-MCNC: 311 MG/DL (ref 70–99)
GLUCOSE BLD-MCNC: 341 MG/DL (ref 70–99)
GLUCOSE BLD-MCNC: 349 MG/DL (ref 70–99)
GLUCOSE BLD-MCNC: 355 MG/DL (ref 70–99)
GLUCOSE BLD-MCNC: 394 MG/DL (ref 70–99)
HCT VFR BLD CALC: 33.8 % (ref 42–52)
HEMOGLOBIN: 11.7 GM/DL (ref 13.5–18)
HIGH SENSITIVE C-REACTIVE PROTEIN: 44.3 MG/L
IMMATURE NEUTROPHIL %: 0.5 % (ref 0–0.43)
LACTATE: 1.1 MMOL/L (ref 0.4–2)
LYMPHOCYTES ABSOLUTE: 0.9 K/CU MM
LYMPHOCYTES RELATIVE PERCENT: 21.5 % (ref 24–44)
MCH RBC QN AUTO: 31 PG (ref 27–31)
MCHC RBC AUTO-ENTMCNC: 34.6 % (ref 32–36)
MCV RBC AUTO: 89.4 FL (ref 78–100)
MONOCYTES ABSOLUTE: 0.4 K/CU MM
MONOCYTES RELATIVE PERCENT: 9 % (ref 0–4)
NUCLEATED RBC %: 0 %
PDW BLD-RTO: 11.7 % (ref 11.7–14.9)
PLATELET # BLD: 213 K/CU MM (ref 140–440)
PMV BLD AUTO: 9 FL (ref 7.5–11.1)
POTASSIUM SERPL-SCNC: 3.9 MMOL/L (ref 3.5–5.1)
PROCALCITONIN: 0.07
RBC # BLD: 3.78 M/CU MM (ref 4.6–6.2)
SEGMENTED NEUTROPHILS ABSOLUTE COUNT: 2.8 K/CU MM
SEGMENTED NEUTROPHILS RELATIVE PERCENT: 65.8 % (ref 36–66)
SODIUM BLD-SCNC: 134 MMOL/L (ref 135–145)
TOTAL IMMATURE NEUTOROPHIL: 0.02 K/CU MM
TOTAL NUCLEATED RBC: 0 K/CU MM
TOTAL PROTEIN: 6.4 GM/DL (ref 6.4–8.2)
VANCOMYCIN RANDOM: 5 UG/ML
WBC # BLD: 4.3 K/CU MM (ref 4–10.5)

## 2022-07-22 PROCEDURE — 80053 COMPREHEN METABOLIC PANEL: CPT

## 2022-07-22 PROCEDURE — 82962 GLUCOSE BLOOD TEST: CPT

## 2022-07-22 PROCEDURE — 99211 OFF/OP EST MAY X REQ PHY/QHP: CPT

## 2022-07-22 PROCEDURE — 0QBQ0ZZ EXCISION OF RIGHT TOE PHALANX, OPEN APPROACH: ICD-10-PCS | Performed by: SURGERY

## 2022-07-22 PROCEDURE — 6360000002 HC RX W HCPCS: Performed by: NURSE PRACTITIONER

## 2022-07-22 PROCEDURE — 94640 AIRWAY INHALATION TREATMENT: CPT

## 2022-07-22 PROCEDURE — 1200000000 HC SEMI PRIVATE

## 2022-07-22 PROCEDURE — 83605 ASSAY OF LACTIC ACID: CPT

## 2022-07-22 PROCEDURE — 6370000000 HC RX 637 (ALT 250 FOR IP): Performed by: INTERNAL MEDICINE

## 2022-07-22 PROCEDURE — 85652 RBC SED RATE AUTOMATED: CPT

## 2022-07-22 PROCEDURE — 86141 C-REACTIVE PROTEIN HS: CPT

## 2022-07-22 PROCEDURE — 36415 COLL VENOUS BLD VENIPUNCTURE: CPT

## 2022-07-22 PROCEDURE — 6370000000 HC RX 637 (ALT 250 FOR IP): Performed by: NURSE PRACTITIONER

## 2022-07-22 PROCEDURE — 6360000002 HC RX W HCPCS: Performed by: INTERNAL MEDICINE

## 2022-07-22 PROCEDURE — 6370000000 HC RX 637 (ALT 250 FOR IP): Performed by: STUDENT IN AN ORGANIZED HEALTH CARE EDUCATION/TRAINING PROGRAM

## 2022-07-22 PROCEDURE — 2580000003 HC RX 258: Performed by: INTERNAL MEDICINE

## 2022-07-22 PROCEDURE — 94761 N-INVAS EAR/PLS OXIMETRY MLT: CPT

## 2022-07-22 PROCEDURE — 84145 PROCALCITONIN (PCT): CPT

## 2022-07-22 PROCEDURE — 2580000003 HC RX 258: Performed by: NURSE PRACTITIONER

## 2022-07-22 PROCEDURE — 80202 ASSAY OF VANCOMYCIN: CPT

## 2022-07-22 PROCEDURE — 85025 COMPLETE CBC W/AUTO DIFF WBC: CPT

## 2022-07-22 RX ORDER — INSULIN LISPRO 100 [IU]/ML
5 INJECTION, SOLUTION INTRAVENOUS; SUBCUTANEOUS
Status: DISCONTINUED | OUTPATIENT
Start: 2022-07-22 | End: 2022-07-22

## 2022-07-22 RX ORDER — HYDROCODONE BITARTRATE AND ACETAMINOPHEN 7.5; 325 MG/1; MG/1
1 TABLET ORAL EVERY 6 HOURS PRN
Status: DISCONTINUED | OUTPATIENT
Start: 2022-07-22 | End: 2022-07-27 | Stop reason: HOSPADM

## 2022-07-22 RX ORDER — INSULIN GLARGINE 100 [IU]/ML
32 INJECTION, SOLUTION SUBCUTANEOUS NIGHTLY
Status: DISCONTINUED | OUTPATIENT
Start: 2022-07-22 | End: 2022-07-23

## 2022-07-22 RX ORDER — INSULIN LISPRO 100 [IU]/ML
3 INJECTION, SOLUTION INTRAVENOUS; SUBCUTANEOUS
Status: DISCONTINUED | OUTPATIENT
Start: 2022-07-22 | End: 2022-07-22

## 2022-07-22 RX ORDER — INSULIN LISPRO 100 [IU]/ML
10 INJECTION, SOLUTION INTRAVENOUS; SUBCUTANEOUS
Status: DISCONTINUED | OUTPATIENT
Start: 2022-07-23 | End: 2022-07-23

## 2022-07-22 RX ADMIN — INSULIN GLARGINE 32 UNITS: 100 INJECTION, SOLUTION SUBCUTANEOUS at 21:02

## 2022-07-22 RX ADMIN — MOXIFLOXACIN OPHTHALMIC SOLUTION 1 DROP: 5 SOLUTION/ DROPS OPHTHALMIC at 17:35

## 2022-07-22 RX ADMIN — HYDROCODONE BITARTRATE AND ACETAMINOPHEN 1 TABLET: 7.5; 325 TABLET ORAL at 12:20

## 2022-07-22 RX ADMIN — HYDROMORPHONE HYDROCHLORIDE 0.5 MG: 1 INJECTION, SOLUTION INTRAMUSCULAR; INTRAVENOUS; SUBCUTANEOUS at 17:58

## 2022-07-22 RX ADMIN — INSULIN LISPRO 4 UNITS: 100 INJECTION, SOLUTION INTRAVENOUS; SUBCUTANEOUS at 03:39

## 2022-07-22 RX ADMIN — PANTOPRAZOLE SODIUM 40 MG: 40 TABLET, DELAYED RELEASE ORAL at 06:28

## 2022-07-22 RX ADMIN — INSULIN LISPRO 5 UNITS: 100 INJECTION, SOLUTION INTRAVENOUS; SUBCUTANEOUS at 12:14

## 2022-07-22 RX ADMIN — ALBUTEROL SULFATE 2 PUFF: 90 AEROSOL, METERED RESPIRATORY (INHALATION) at 08:05

## 2022-07-22 RX ADMIN — INSULIN LISPRO 3 UNITS: 100 INJECTION, SOLUTION INTRAVENOUS; SUBCUTANEOUS at 17:13

## 2022-07-22 RX ADMIN — INSULIN LISPRO 2 UNITS: 100 INJECTION, SOLUTION INTRAVENOUS; SUBCUTANEOUS at 12:14

## 2022-07-22 RX ADMIN — PREGABALIN 100 MG: 100 CAPSULE ORAL at 08:13

## 2022-07-22 RX ADMIN — SUCRALFATE 1 G: 1 TABLET ORAL at 08:13

## 2022-07-22 RX ADMIN — SUCRALFATE 1 G: 1 TABLET ORAL at 21:06

## 2022-07-22 RX ADMIN — SUCRALFATE 1 G: 1 TABLET ORAL at 16:43

## 2022-07-22 RX ADMIN — MOXIFLOXACIN OPHTHALMIC SOLUTION 1 DROP: 5 SOLUTION/ DROPS OPHTHALMIC at 14:24

## 2022-07-22 RX ADMIN — AMLODIPINE BESYLATE 10 MG: 10 TABLET ORAL at 08:13

## 2022-07-22 RX ADMIN — Medication 5 MG: at 21:06

## 2022-07-22 RX ADMIN — INSULIN LISPRO 5 UNITS: 100 INJECTION, SOLUTION INTRAVENOUS; SUBCUTANEOUS at 08:26

## 2022-07-22 RX ADMIN — MOXIFLOXACIN OPHTHALMIC SOLUTION 1 DROP: 5 SOLUTION/ DROPS OPHTHALMIC at 23:25

## 2022-07-22 RX ADMIN — ALBUTEROL SULFATE 2 PUFF: 90 AEROSOL, METERED RESPIRATORY (INHALATION) at 14:30

## 2022-07-22 RX ADMIN — BRIMONIDINE TARTRATE 1 DROP: 2 SOLUTION/ DROPS OPHTHALMIC at 21:06

## 2022-07-22 RX ADMIN — INSULIN HUMAN 6 UNITS: 100 INJECTION, SOLUTION PARENTERAL at 23:28

## 2022-07-22 RX ADMIN — VANCOMYCIN HYDROCHLORIDE 2500 MG: 1 INJECTION, POWDER, LYOPHILIZED, FOR SOLUTION INTRAVENOUS at 04:32

## 2022-07-22 RX ADMIN — INSULIN LISPRO 4 UNITS: 100 INJECTION, SOLUTION INTRAVENOUS; SUBCUTANEOUS at 20:58

## 2022-07-22 RX ADMIN — TIMOLOL MALEATE 1 DROP: 5 SOLUTION OPHTHALMIC at 21:06

## 2022-07-22 RX ADMIN — LISINOPRIL AND HYDROCHLOROTHIAZIDE 1 TABLET: 12.5; 2 TABLET ORAL at 08:10

## 2022-07-22 RX ADMIN — SUCRALFATE 1 G: 1 TABLET ORAL at 12:20

## 2022-07-22 RX ADMIN — HYDROMORPHONE HYDROCHLORIDE 0.5 MG: 1 INJECTION, SOLUTION INTRAMUSCULAR; INTRAVENOUS; SUBCUTANEOUS at 10:05

## 2022-07-22 RX ADMIN — CEFEPIME HYDROCHLORIDE 2000 MG: 2 INJECTION, POWDER, FOR SOLUTION INTRAVENOUS at 08:24

## 2022-07-22 RX ADMIN — MOXIFLOXACIN OPHTHALMIC SOLUTION 1 DROP: 5 SOLUTION/ DROPS OPHTHALMIC at 10:09

## 2022-07-22 RX ADMIN — HYDROMORPHONE HYDROCHLORIDE 0.5 MG: 1 INJECTION, SOLUTION INTRAMUSCULAR; INTRAVENOUS; SUBCUTANEOUS at 23:23

## 2022-07-22 RX ADMIN — HYDROCODONE BITARTRATE AND ACETAMINOPHEN 1 TABLET: 5; 325 TABLET ORAL at 06:28

## 2022-07-22 RX ADMIN — CEFEPIME HYDROCHLORIDE 2000 MG: 2 INJECTION, POWDER, FOR SOLUTION INTRAVENOUS at 16:49

## 2022-07-22 RX ADMIN — PREGABALIN 100 MG: 100 CAPSULE ORAL at 21:04

## 2022-07-22 RX ADMIN — PREDNISOLONE ACETATE 1 DROP: 10 SUSPENSION/ DROPS OPHTHALMIC at 16:53

## 2022-07-22 RX ADMIN — BRIMONIDINE TARTRATE 1 DROP: 2 SOLUTION/ DROPS OPHTHALMIC at 10:07

## 2022-07-22 RX ADMIN — HYDROCODONE BITARTRATE AND ACETAMINOPHEN 1 TABLET: 7.5; 325 TABLET ORAL at 21:05

## 2022-07-22 RX ADMIN — ALLOPURINOL 300 MG: 300 TABLET ORAL at 08:12

## 2022-07-22 RX ADMIN — INSULIN LISPRO 3 UNITS: 100 INJECTION, SOLUTION INTRAVENOUS; SUBCUTANEOUS at 08:26

## 2022-07-22 RX ADMIN — TIMOLOL MALEATE 1 DROP: 5 SOLUTION OPHTHALMIC at 10:09

## 2022-07-22 RX ADMIN — CLONIDINE HYDROCHLORIDE 0.2 MG: 0.2 TABLET ORAL at 08:12

## 2022-07-22 RX ADMIN — Medication 2 PUFF: at 14:30

## 2022-07-22 RX ADMIN — HYDROCODONE BITARTRATE AND ACETAMINOPHEN 1 TABLET: 5; 325 TABLET ORAL at 02:05

## 2022-07-22 RX ADMIN — ATORVASTATIN CALCIUM 10 MG: 10 TABLET, FILM COATED ORAL at 08:13

## 2022-07-22 RX ADMIN — CEFEPIME HYDROCHLORIDE 2000 MG: 2 INJECTION, POWDER, FOR SOLUTION INTRAVENOUS at 00:27

## 2022-07-22 RX ADMIN — HYDROMORPHONE HYDROCHLORIDE 0.5 MG: 1 INJECTION, SOLUTION INTRAMUSCULAR; INTRAVENOUS; SUBCUTANEOUS at 13:49

## 2022-07-22 RX ADMIN — DORZOLAMIDE HYDROCHLORIDE 1 DROP: 20 SOLUTION/ DROPS OPHTHALMIC at 21:06

## 2022-07-22 RX ADMIN — VANCOMYCIN HYDROCHLORIDE 2000 MG: 1 INJECTION, POWDER, LYOPHILIZED, FOR SOLUTION INTRAVENOUS at 16:50

## 2022-07-22 RX ADMIN — SODIUM CHLORIDE 25 ML: 9 INJECTION, SOLUTION INTRAVENOUS at 08:16

## 2022-07-22 RX ADMIN — SODIUM CHLORIDE, PRESERVATIVE FREE 10 ML: 5 INJECTION INTRAVENOUS at 10:09

## 2022-07-22 RX ADMIN — Medication 2 PUFF: at 08:05

## 2022-07-22 RX ADMIN — SODIUM CHLORIDE, PRESERVATIVE FREE 10 ML: 5 INJECTION INTRAVENOUS at 21:14

## 2022-07-22 RX ADMIN — PREDNISOLONE ACETATE 1 DROP: 10 SUSPENSION/ DROPS OPHTHALMIC at 10:09

## 2022-07-22 RX ADMIN — DORZOLAMIDE HYDROCHLORIDE 1 DROP: 20 SOLUTION/ DROPS OPHTHALMIC at 10:09

## 2022-07-22 RX ADMIN — INSULIN LISPRO 5 UNITS: 100 INJECTION, SOLUTION INTRAVENOUS; SUBCUTANEOUS at 17:12

## 2022-07-22 RX ADMIN — EZETIMIBE 10 MG: 10 TABLET ORAL at 21:06

## 2022-07-22 RX ADMIN — BRIMONIDINE TARTRATE 1 DROP: 2 SOLUTION/ DROPS OPHTHALMIC at 16:53

## 2022-07-22 ASSESSMENT — PAIN SCALES - GENERAL
PAINLEVEL_OUTOF10: 7
PAINLEVEL_OUTOF10: 0
PAINLEVEL_OUTOF10: 8
PAINLEVEL_OUTOF10: 9
PAINLEVEL_OUTOF10: 7
PAINLEVEL_OUTOF10: 9
PAINLEVEL_OUTOF10: 8
PAINLEVEL_OUTOF10: 8
PAINLEVEL_OUTOF10: 6
PAINLEVEL_OUTOF10: 6
PAINLEVEL_OUTOF10: 9
PAINLEVEL_OUTOF10: 4
PAINLEVEL_OUTOF10: 7

## 2022-07-22 ASSESSMENT — PAIN DESCRIPTION - FREQUENCY
FREQUENCY: CONTINUOUS
FREQUENCY: CONTINUOUS

## 2022-07-22 ASSESSMENT — PAIN DESCRIPTION - LOCATION
LOCATION: LEG
LOCATION: LEG;FOOT
LOCATION: FOOT;LEG
LOCATION: TOE (COMMENT WHICH ONE)
LOCATION: LEG
LOCATION: TOE (COMMENT WHICH ONE)
LOCATION: LEG

## 2022-07-22 ASSESSMENT — PAIN DESCRIPTION - DESCRIPTORS
DESCRIPTORS: ACHING
DESCRIPTORS: ACHING
DESCRIPTORS: BURNING
DESCRIPTORS: THROBBING
DESCRIPTORS: THROBBING;SHOOTING

## 2022-07-22 ASSESSMENT — PAIN DESCRIPTION - PAIN TYPE
TYPE: ACUTE PAIN
TYPE: ACUTE PAIN

## 2022-07-22 ASSESSMENT — PAIN DESCRIPTION - ORIENTATION
ORIENTATION: RIGHT

## 2022-07-22 ASSESSMENT — PAIN DESCRIPTION - ONSET
ONSET: ON-GOING
ONSET: ON-GOING

## 2022-07-22 NOTE — CONSULTS
[address] [address] Continence Nurse  Consult Note       García Barnhart  AGE: 39 y.o. GENDER: male  : 1985  TODAY'S DATE:  2022    Subjective:     Reason for Evaluation and Assessment: wound care evalDelio Barnhart is a 39 y.o. male referred by:   [x] Physician  [] Nursing  [] Other:     Wound Identification:  Wound Type: diabetic and pressure  Contributing Factors: diabetes and chronic pressure        PAST MEDICAL HISTORY        Diagnosis Date    Asthma     Blind left eye     Diabetes mellitus ([address]     GERD (gastroesophageal reflux disease)     Hypertension     Retinal detachment 2012    left       PAST SURGICAL HISTORY    Past Surgical History:   Procedure Laterality Date    CORNEAL TRANSPLANT Bilateral     EYE SURGERY      left eye removed    EYE SURGERY Right     FRACTURE SURGERY      foot left    LAPAROSCOPIC APPENDECTOMY N/A 2022    APPENDECTOMY LAPAROSCOPIC performed by Isac Umanzor MD at [address] [place] [address] Right     MANDIBLE SURGERY Bilateral     TOE AMPUTATION Right 2017       FAMILY HISTORY    Family History   Problem Relation Age of Onset    Diabetes Mother     Asthma Mother     High Blood Pressure Mother     Stroke Mother     Heart Disease Mother     Diabetes Father     Asthma Father     High Blood Pressure Father        SOCIAL HISTORY    Social History     Tobacco Use    Smoking status: Every Day     Packs/day: 0.50     Years: 5.00     Pack years: 2.50     Types: Cigarettes    Smokeless tobacco: Never   Vaping Use    Vaping Use: Never used   Substance Use Topics    Alcohol use: Yes     Comment: occasionally    Drug use: Not Currently     Types: Marijuana Shaila Mustard)       ALLERGIES    Allergies   Allergen Reactions    Ciprofloxacin Shortness Of Breath    Glucosamine Anaphylaxis    Shellfish-Derived Products Anaphylaxis       MEDICATIONS    No current facility-administered medications on file prior to encounter.      Current Outpatient Medications on File Prior to Encounter   Medication Sig Dispense Refill    cloNIDine (CATAPRES) 0.2 MG tablet Take 0.2 mg by mouth in the morning. trimethoprim-polymyxin b (POLYTRIM) 12379-7.1 UNIT/ML-% ophthalmic solution Apply 1 drop to eye 4 times daily      UNABLE TO FIND Place 1 drop into the right eye 4 times daily 07/21/22 Patient on Vancomycin Preserved Eye soln 25 mg/ml      moxifloxacin (VIGAMOX) 0.5 % ophthalmic solution Place 1 drop into the right eye 4 times daily      HYDROcodone-acetaminophen (NORCO) 5-325 MG per tablet Take 1 tablet by mouth daily as needed. tiZANidine (ZANAFLEX) 4 MG tablet Take 4 mg by mouth nightly as needed      TRULICITY 1.5 SW/8.2BH SOPN inject 0.5 milliliters ( 1 AND 1/2 milligrams ) subcutaneously ev. ..  (REFER TO PRESCRIPTION NOTES). ezetimibe (ZETIA) 10 MG tablet take 1 tablet by mouth once daily      LANTUS SOLOSTAR 100 UNIT/ML injection pen inject subcutaneously 30 units at bedtime      TECHLITE PEN NEEDLES 31G X 5 MM MISC use 1 PEN NEEDLE to inject MEDICATION subcutaneously two to three times a day      melatonin 5 MG TABS tablet take 1 tablet by mouth every evening      pregabalin (LYRICA) 100 MG capsule take 1 capsule by mouth twice a day      glipiZIDE (GLUCOTROL) 10 MG tablet Take 2 tablets PO in the morning with food, 1 tablet PO at night with food.  60 tablet 0    sucralfate (CARAFATE) 1 GM tablet Take 1 tablet by mouth 4 times daily 120 tablet 0    albuterol sulfate  (90 Base) MCG/ACT inhaler Inhale 2 puffs into the lungs every 6 hours as needed for Wheezing 1 Inhaler 5    albuterol-ipratropium (COMBIVENT RESPIMAT)  MCG/ACT AERS inhaler Inhale 1 puff into the lungs every 6 hours 1 Inhaler 5    acetaminophen (AMINOFEN) 325 MG tablet Take 2 tablets by mouth every 6 hours as needed for Pain 20 tablet 0    brimonidine (ALPHAGAN) 0.2 % ophthalmic solution Place 1 drop into the right eye 2 times daily      dorzolamide-timolol 22.3-6.8 MG/ML SOLN Place 1 drop into the right eye 2 times daily      prednisoLONE acetate (PRED FORTE) 1 % ophthalmic suspension Place 1 drop into the right eye 2 times daily      allopurinol (ZYLOPRIM) 300 MG tablet Take 300 mg by mouth daily       atorvastatin (LIPITOR) 10 MG tablet Take 10 mg by mouth daily       lisinopril-hydrochlorothiazide (PRINZIDE;ZESTORETIC) 20-12.5 MG per tablet Take 1 tablet by mouth daily       metFORMIN (GLUCOPHAGE) 1000 MG tablet Take 1,000 mg by mouth 2 times daily (with meals)      amLODIPine (NORVASC) 10 MG tablet Take 10 mg by mouth daily      omeprazole (PRILOSEC) 40 MG delayed release capsule Take 40 mg by mouth daily            Objective:      BP (!) 148/97   Pulse 92   Temp 98.1 °F (36.7 °C) (Oral)   Resp 16   Ht 6' 3\" (1.905 m)   Wt (!) 320 lb (145.2 kg)   SpO2 94%   BMI 40.00 kg/m²   Christopher Risk Score: Christopher Scale Score: 19    LABS    CBC:   Lab Results   Component Value Date/Time    WBC 4.3 07/22/2022 12:02 AM    RBC 3.78 07/22/2022 12:02 AM    HGB 11.7 07/22/2022 12:02 AM    HCT 33.8 07/22/2022 12:02 AM    MCV 89.4 07/22/2022 12:02 AM    MCH 31.0 07/22/2022 12:02 AM    MCHC 34.6 07/22/2022 12:02 AM    RDW 11.7 07/22/2022 12:02 AM     07/22/2022 12:02 AM    MPV 9.0 07/22/2022 12:02 AM     CMP:    Lab Results   Component Value Date/Time     07/22/2022 12:02 AM    K 3.9 07/22/2022 12:02 AM    CL 98 07/22/2022 12:02 AM    CO2 26 07/22/2022 12:02 AM    BUN 14 07/22/2022 12:02 AM    CREATININE 0.7 07/22/2022 12:02 AM    GFRAA >60 07/22/2022 12:02 AM    LABGLOM >60 07/22/2022 12:02 AM    GLUCOSE 341 07/22/2022 12:02 AM    PROT 6.4 07/22/2022 12:02 AM    PROT 6.8 04/16/2011 01:19 PM    LABALBU 3.7 07/22/2022 12:02 AM    CALCIUM 8.7 07/22/2022 12:02 AM    BILITOT 0.3 07/22/2022 12:02 AM    ALKPHOS 98 07/22/2022 12:02 AM    AST 19 07/22/2022 12:02 AM    ALT 19 07/22/2022 12:02 AM     Albumin:    Lab Results   Component Value Date/Time    LABALBU 3.7 07/22/2022 12:02 AM     PT/INR:    Lab Results Component Value Date/Time    PROTIME 11.3 07/27/2019 05:31 AM    INR 0.97 07/27/2019 05:31 AM     HgBA1c:    Lab Results   Component Value Date/Time    LABA1C 9.9 07/21/2022 07:50 AM         Assessment:     Patient Active Problem List   Diagnosis    Sprain of left ankle    Closed nondisplaced fracture of fifth metatarsal bone of left foot    Alcohol abuse    UTI (urinary tract infection) due to Enterococcus    Acute osteomyelitis of toe, right (HCC)    Osteomyelitis (HCC)    Diabetic foot infection (HCC)    Shortness of breath    Asthma exacerbation    Appendicitis    Diabetic ulcer of right foot due to type 2 diabetes mellitus ([facility] [address]    Cellulitis       Measurements:  Wound 05/12/17 Abrasion(s) Back Right; Lower (Active)   Number of days: 1897       Wound 12/23/18 Foot very small hole- tip of cotton tip goes in  (Active)   Number of days: 1268       Wound 07/22/22 Toe (Comment  which one) Anterior;Right 2nd toe (Active)   Wound Image   07/22/22 0932   Wound Etiology Diabetic 07/22/22 0932   Dressing Status New dressing applied 07/22/22 0932   Wound Cleansed Cleansed with saline 07/22/22 0932   Dressing/Treatment ABD;Roll gauze 07/22/22 0932   Wound Length (cm) 3.4 cm 07/22/22 0932   Wound Width (cm) 1.1 cm 07/22/22 0932   Wound Depth (cm) 1.7 cm 07/22/22 0932   Wound Surface Area (cm^2) 3.74 cm^2 07/22/22 0932   Wound Volume (cm^3) 6. 358 cm^3 07/22/22 0932   Distance Tunneling (cm) 0 cm 07/22/22 0932   Tunneling Position ___ O'Clock 0 07/22/22 0932   Undermining Starts ___ O'Clock 0 07/22/22 0932   Undermining Ends___ O'Clock 0 07/22/22 0932   Undermining Maxium Distance (cm) 0 07/22/22 0932   Wound Assessment Pink/red 07/22/22 0932   Drainage Amount Moderate 07/22/22 0932   Drainage Description Serosanguinous; Yellow 07/22/22 0932   Odor Malodorous/putrid 07/22/22 0932   Sandy-wound Assessment Maceration 07/22/22 0932   Margins Defined edges 07/22/22 0932   Wound Thickness Description not for Pressure Injury Full thickness 07/22/22 0932   Number of days: 0       Response to treatment:  Well tolerated by patient. Pain Assessment:  Severity:  none  Quality of pain:   Wound Pain Timing/Severity:   Premedicated: no    Plan:     Plan of Care: Wound 07/22/22 Toe (Comment  which one) Anterior;Right 2nd toe-Dressing/Treatment: ABD, Roll gauze (betadine moist gauze)    Patient in bed agreeable to wound care eval for rt 2nd toe. Wound is a chronic diabetic wound treated by Dr Renetta Arthur. Dressing removed and cleansed with NS, measured and pictured, wound with maceration and odor. Applied betadine moist dressing. Dr Renetta Arthur is consulted to see patient. Pt is generally not at risk for skin breakdown BRIANNA mccoy. Specialty Bed Required : no  [] Low Air Loss   [] Pressure Redistribution  [] Fluid Immersion  [] Bariatric  [] Total Pressure Relief  [] Other:     Discharge Plan:  Placement for patient upon discharge: tbd  Hospice Care: no  Patient appropriate for Outpatient [address]: [facility]    Patient/Caregiver Teaching:  Level of patient/caregiver understanding able to: pt voiced understanding. Electronically signed by Kirk Sharp.  LASHAWN Marti, on 7/22/2022 at 11:24 AM

## 2022-07-22 NOTE — PROGRESS NOTES
Physician Progress Note      PATIENTDezhane KAY #:                  [id number]  :                       1985  ADMIT DATE:       2022 6:33 AM  DISCH DATE:  RESPONDING  PROVIDER #:        Farzaneh Rick          QUERY TEXT:    Pt admitted with left leg cellulitis. Pt noted to have DM Type 2 with   hyperglycemia. If possible, please document in progress notes and discharge   summary the relationship, if any, between cellulitis and DM. The medical record reflects the following:  Risk Factors: Prior amputation of right 5th toe and partial of the 2nd toe   with partially sloughed off callused skin overlying an ulcer, recurrent   cellulitis  Clinical Indicators: \"pmh of insulin-dependent type 2 diabetes and prior   diagnosis cellulitis who presents to the ED with complaints of worsening   redness in his right lower extremity. Patient was noticed to have the   dressing of the right lower extremity upon presentation./Follows with Dr Britta Zimmerman for wound ,Redness noted without warmth to mid shin of right leg   . Blood culture on prior admission negative, wound culture positive for staph   aureus. Glucose 234  Treatment: Vanocmycin, Glucose monitoring, Labs ,  Received 1 L IVF in ED,   Blood cultures x2, wound culture, MRSA swab nares,  procalcitonin    Thank you Caroline Morris RN, CDS ([phone])  Options provided:  -- Left lower leg cellulitis associated with Diabetes  -- Left lower leg cellulitis cellulitis unrelated to Diabetes  -- Other - I will add my own diagnosis  -- Disagree - Not applicable / Not valid  -- Disagree - Clinically unable to determine / Unknown  -- Refer to Clinical Documentation Reviewer    PROVIDER RESPONSE TEXT:    Provider is clinically unable to determine a response to this query.     Query created by: Leatha Luna on 2022 8:26 AM      Electronically signed by:  Farzaneh Rick 2022 11:48 AM

## 2022-07-22 NOTE — CARE COORDINATION
Discussed pt in IDR, pt admitted from home, ind with ADLs, has pcp/ active insurance. No CM needs id'd at this time however CM to cont to follow as pt progresses for any wound care/ post discharge needs.

## 2022-07-22 NOTE — CONSULTS
History and Physical    Patient Name: Ruby Benson                              YOB: 1985  Exam Date: 7/21/2022    PCP:  Nisha Veronica MD           Referring Physician:  ER    Chief Complaint:  swollen leg    History of Present Illness: The patient is a 39 y.o. male known to [facility]. Patient has a second toe infection that was being treated with local wound care and PO antibiotic. He states 24 hours prior to admission he noted redness and swelling of his right leg. No increased pain or drainage from his toe. He's unsure if there was an odor. He presented to the ER. He does state he has been out of some of his trulicity for a couple weeks and his blood sugers have been elevated. Past Medical History:   Diagnosis Date    Asthma     Blind left eye     Diabetes mellitus ([address]     GERD (gastroesophageal reflux disease)     Hypertension     Retinal detachment 2012    left      Past Surgical History:   Procedure Laterality Date    CORNEAL TRANSPLANT Bilateral     EYE SURGERY      left eye removed    EYE SURGERY Right     FRACTURE SURGERY      foot left    LAPAROSCOPIC APPENDECTOMY N/A 4/28/2022    APPENDECTOMY LAPAROSCOPIC performed by Indiana Toney MD at [address] [place] [address] Right     MANDIBLE SURGERY Bilateral     TOE AMPUTATION Right 07/25/2017      Prior to Admission medications    Medication Sig Start Date End Date Taking? Authorizing Provider   cloNIDine (CATAPRES) 0.2 MG tablet Take 0.2 mg by mouth in the morning.    Yes Historical Provider, MD   trimethoprim-polymyxin b (POLYTRIM) 87974-5.1 UNIT/ML-% ophthalmic solution Apply 1 drop to eye 4 times daily 6/24/22  Yes Historical Provider, MD   UNABLE TO FIND Place 1 drop into the right eye 4 times daily 07/21/22 Patient on Vancomycin Preserved Eye soln 25 mg/ml   Yes Historical Provider, MD   moxifloxacin (VIGAMOX) 0.5 % ophthalmic solution Place 1 drop into the right eye 4 times daily 6/27/22   Historical Provider, MD HYDROcodone-acetaminophen (NORCO) 5-325 MG per tablet Take 1 tablet by mouth daily as needed. 7/14/22   Historical Provider, MD   tiZANidine (ZANAFLEX) 4 MG tablet Take 4 mg by mouth nightly as needed 6/29/22   Historical Provider, MD   TRULICITY 1.5 ZN/5.2OR SOPN inject 0.5 milliliters ( 1 AND 1/2 milligrams ) subcutaneously ev. ..  (REFER TO PRESCRIPTION NOTES). 5/16/22   Historical Provider, MD   ezetimibe (ZETIA) 10 MG tablet take 1 tablet by mouth once daily 6/29/22   Historical Provider, MD   LANTUS SOLOSTAR 100 UNIT/ML injection pen inject subcutaneously 30 units at bedtime 6/29/22   Historical Provider, MD   TECHLIDAMON PEN NEEDLES 31G X 5 MM MISC use 1 PEN NEEDLE to inject MEDICATION subcutaneously two to three times a day 6/29/22   Historical Provider, MD   melatonin 5 MG TABS tablet take 1 tablet by mouth every evening 6/29/22   Historical Provider, MD   pregabalin (LYRICA) 100 MG capsule take 1 capsule by mouth twice a day 6/29/22   Historical Provider, MD   glipiZIDE (GLUCOTROL) 10 MG tablet Take 2 tablets PO in the morning with food, 1 tablet PO at night with food.  8/1/21   Edgar Devries PA-C   sucralfate (CARAFATE) 1 GM tablet Take 1 tablet by mouth 4 times daily 8/1/21   Edgar Devries PA-C   albuterol sulfate  (90 Base) MCG/ACT inhaler Inhale 2 puffs into the lungs every 6 hours as needed for Wheezing 3/24/21   Geri Almonte MD   albuterol-ipratropium (COMBIVENT RESPIMAT)  MCG/ACT AERS inhaler Inhale 1 puff into the lungs every 6 hours 3/24/21   Hal Quan MD   acetaminophen (AMINOFEN) 325 MG tablet Take 2 tablets by mouth every 6 hours as needed for Pain 4/23/20   Wanda Henry DO   brimonidine (ALPHAGAN) 0.2 % ophthalmic solution Place 1 drop into the right eye 2 times daily    Historical Provider, MD   dorzolamide-timolol 22.3-6.8 MG/ML SOLN Place 1 drop into the right eye 2 times daily    Historical Provider, MD   prednisoLONE acetate (PRED FORTE) 1 % ophthalmic suspension Place 1 drop into the right eye 2 times daily    Historical Provider, MD   allopurinol (ZYLOPRIM) 300 MG tablet Take 300 mg by mouth daily     Historical Provider, MD   atorvastatin (LIPITOR) 10 MG tablet Take 10 mg by mouth daily     Historical Provider, MD   lisinopril-hydrochlorothiazide (PRINZIDE;ZESTORETIC) 20-12.5 MG per tablet Take 1 tablet by mouth daily     Historical Provider, MD   metFORMIN (GLUCOPHAGE) 1000 MG tablet Take 1,000 mg by mouth 2 times daily (with meals)    Historical Provider, MD   amLODIPine (NORVASC) 10 MG tablet Take 10 mg by mouth daily 11/25/16   Historical Provider, MD   omeprazole (PRILOSEC) 40 MG delayed release capsule Take 40 mg by mouth daily     Historical Provider, MD     Allergies   Allergen Reactions    Ciprofloxacin Shortness Of Breath    Glucosamine Anaphylaxis    Shellfish-Derived Products Anaphylaxis        Social History     Tobacco Use    Smoking status: Every Day     Packs/day: 0.50     Years: 5.00     Pack years: 2.50     Types: Cigarettes    Smokeless tobacco: Never   Substance Use Topics    Alcohol use: Yes     Comment: occasionally      Family History   Problem Relation Age of Onset    Diabetes Mother     Asthma Mother     High Blood Pressure Mother     Stroke Mother     Heart Disease Mother     Diabetes Father     Asthma Father     High Blood Pressure Father         REVIEW OF SYSTEMS: (POSITIVES WILL BE UNDERLINED)  CONSTITUTIONAL:    Weight change, fatigue, fever, chills  EYES:  Diplopia, change in vision  EARS:  hearing loss, tinnitus, vertigo  NOSE:   epistaxis  MOUTH/THROAT:     hoarseness, sore throat  RESPIRATORY:  SOB, cough, sputum, hemoptysis  CARDIOVASCULAR : chest pain, palpitations, dyspnea exertion, orthopnea, paroxysmal nocturnal dyspnea, pedal edema. GASTROINTESTINAL:  nausea, vomiting, constipation, diarrhea  GENITOURINARY:   dysuria, hematuria, .   HEMATOLOGIC/LYMPHATIC:   Anemia, bleeding tendencies  MUSCULOSKELETAL:    myalgias,  joint pain  NEUROLOGICAL:   Loss of Consciousness, paresthesias, anesthesias, focal weakness  SKIN :   History of dermatitis, rashes  PSYCHIATRIC:  depression, anxiety, past psychosis  ENDOCRINE:  History of diabetes, thyroid disease (reviewed above)  ALL/IMM : allergies, rashes    Physical Exam:  BP (!) 148/97   Pulse 92   Temp 98.1 °F (36.7 °C) (Oral)   Resp 16   Ht 6' 3\" (1.905 m)   Wt (!) 320 lb (145.2 kg)   SpO2 94%   BMI 40.00 kg/m²   Pt is awake, alert, oriented to person, place and time, appropriate with exam  HEENT:  Has non-icteric sclerae, no JVD  CTA bilaterally, with good excursion  RRR, no murmurs appreciated  Right LE: edema 2+, non-tender  Right foot/second toe: open wound at the tip with a tunneling wound to bone, no purulent drainage, some granulation tissue, some thick callous around toe base, no sensation to wound exploration, no odor, no erythema of foot or toe    Imaging: Foot x-ray:  7/21/2022  No acute fracture or dislocation. No evidence of erosion or acute   osteolysis. Joint alignment is maintained. There are mild degenerative   changes involving the midfoot. Mild 1st MTP joint degenerative changes. Impression   Stable postsurgical changes. No acute osseous abnormality or radiographic   evidence of osteomyelitis. -I have personally reviewed the patient's imaging results/reports. I discussed the pertinent findings with the patient.     Labs:  CBC:    Lab Results   Component Value Date/Time    WBC 4.3 07/22/2022 12:02 AM    HGB 11.7 07/22/2022 12:02 AM    HCT 33.8 07/22/2022 12:02 AM     07/22/2022 12:02 AM     BMP:    Lab Results   Component Value Date/Time     07/22/2022 12:02 AM    K 3.9 07/22/2022 12:02 AM    CL 98 07/22/2022 12:02 AM    CO2 26 07/22/2022 12:02 AM    BUN 14 07/22/2022 12:02 AM    CREATININE 0.7 07/22/2022 12:02 AM    CALCIUM 8.7 07/22/2022 12:02 AM     LFT:    Lab Results   Component Value Date/Time    LABALBU 3.7 07/22/2022 12:02 AM    BILITOT 0.3 07/22/2022 12:02 AM    AST 19 07/22/2022 12:02 AM    ALT 19 07/22/2022 12:02 AM    ALKPHOS 98 07/22/2022 12:02 AM       -I have personally reviewed the patient's lab results and discussed pertinent findings with the patient. Assessment and Plan:  The patient presents with signs and symptoms consistent with open wound and cellulitis right foot/leg. I sharply excised callous from right foot and no abscess entered. I probed the open wound and it does track to bone. I irrigated the track with betadine and placed 1/2 inch iodoform packing into the wound. I cover with betadine soaked gauze and then kerlix. Continue IV antibiotics. Patient will likely need 2nd toe amputation in the future. Continue local wound care currently.

## 2022-07-22 NOTE — PROGRESS NOTES
V2.0  [facility] [place]      Name:  Carlie Sinclair /Age/Sex: 1985  (39 y.o. male)   MRN & CSN:  [id number] & [id number] Encounter Date/Time: 2022 7:33 AM EDT    Location:  [address] [place] [address] PCP: Hal Quan MD       Hospital Day: 2    Assessment and Plan:   Carlie Sinclair is a 39 y.o. male with a pmh as noted below presents with Cellulitis RLE      #Cellulitis of Right lower extremity   #Open wound of right foot , subsequent encounter      #Lactic acidosis- resolved      -X-ray 3 view right negative for bruising or osteom myelitis. -Venous Duplex Negative  -Xray negative for Osteomylitis   -Patient follows the wound clinic with [facility], ED provider discussed case with Dr. Maribel Faustin, covering for Dr Pura Siddiqui and he is aware of admission and will follow up with patient  -ESR and CRP elevated   -Bone scan 3 weeks ago on previous admission suspicious for osteomyelitis. -Blood culture on prior admission negative, wound culture positive for staph aureus. Status post prior surgical debridement             Plan:  Continue trending ESR and CRP  Wound care consult  Follow-up on blood cultures  Consider CT scan if pain/swelling worsens.   Patient cannot have MRI due to recent surgery (had corneal transplant 3 weeks ago which has metal clips and it)  Norco 7.5 every 6 hours for pain as well as Dilaudid 0.5 mg IV every 4 hours for breakthrough     #Uncontrolled insulin-dependent type 2 diabetes with hyperglycemia  #Glucosuria  -HbA1c: 9.9   -POC glucose levels all above 200    Plan:  Increase lantus to 32U nightly and start on lispro 5U TID with meals +sliding scale  Hold home oral antihyperglycemics     #Hyponatremia likely in the setting of hyperglycemia and hypovolemia-resolved  -Received 1 L IVF in ED  -Corrected  due to  --> 134 in AM (corrected is 138)     Chronic medical problems:  #Asthma, not in exacerbation  Plan: Continue home inhalers      #HTN, uncontrolled-Continue Prinzide and Norjaylon  #HLD-Continue Lipitor  #History of cocaine use disorder  #Alcohol use disorder  #Tobacco use disorder- Nicotine patch and lozenges ordered patient was counseled on tobacco cessation. #Obesity class II  -BMI is 40      Diet ADULT DIET; Regular; 4 carb choices (60 gm/meal)   DVT Prophylaxis [x] Lovenox, []  Heparin, [] SCDs, [] Ambulation,  [] Eliquis, [] Xarelto  [] Coumadin   Code Status Full Code   Disposition From: home  Expected Disposition: Home  Estimated Date of Discharge: 7/25/2022  Patient requires continued admission due to lower extremity cellulitis, uncontrolled diabetes   Surrogate Decision Maker/ POA Not currently active, Shakira Al (mother)     Subjective:     Chief Complaint: Wound Check (Right foot wound) and Leg Swelling (Right leg swelling since last night)       This morning, patient states that he is having a lot of lower extremity pain. He states that it started a few days ago where he had increased pain and swelling of his right lower extremity. He states that he has been dealing with this for few weeks now and the wound was getting better however a few days ago took a turn for the worse. He denies any fevers or chills or any other symptoms      Review of Systems:    General: No fever, no chills  Heart: No chest pain, no palpitations  Lungs: No shortness of breath, no cough  Abdomen: No abdominal pain, no nausea, no vomiting, no constipation, no diarrhea  : No frequency, no dysuria, no urgency, no decreased urination  MSK: No lower extremity swelling  Neuro: No confusion, no weakness  Skin: No rashes    Objective:      Intake/Output Summary (Last 24 hours) at 7/22/2022 0733  Last data filed at 7/22/2022 2642  Gross per 24 hour   Intake 720 ml   Output 4700 ml   Net -3980 ml        Vitals:   Vitals:    07/22/22 0658   BP:    Pulse:    Resp: 20   Temp:    SpO2:        Physical Exam:     General: In mild distress AAO x3-4  Eyes: EOMI  HENT: Atraumatic  CVS: Normal S1 and 4 mg, Q6H PRN  polyethylene glycol, 17 g, Daily PRN  acetaminophen, 650 mg, Q6H PRN   Or  acetaminophen, 650 mg, Q6H PRN  nicotine polacrilex, 2 mg, Q1H PRN  ipratropium-albuterol, 1 ampule, Q4H PRN        Labs      Recent Results (from the past 24 hour(s))   CBC with Auto Differential    Collection Time: 07/21/22  7:50 AM   Result Value Ref Range    WBC 6.6 4.0 - 10.5 K/CU MM    RBC 3.91 (L) 4.6 - 6.2 M/CU MM    Hemoglobin 12.2 (L) 13.5 - 18.0 GM/DL    Hematocrit 34.8 (L) 42 - 52 %    MCV 89.0 78 - 100 FL    MCH 31.2 (H) 27 - 31 PG    MCHC 35.1 32.0 - 36.0 %    RDW 11.8 11.7 - 14.9 %    Platelets 825 169 - 879 K/CU MM    MPV 8.9 7.5 - 11.1 FL    Differential Type AUTOMATED DIFFERENTIAL     Segs Relative 57.7 36 - 66 %    Lymphocytes % 31.0 24 - 44 %    Monocytes % 8.2 (H) 0 - 4 %    Eosinophils % 2.3 0 - 3 %    Basophils % 0.5 0 - 1 %    Segs Absolute 3.8 K/CU MM    Lymphocytes Absolute 2.1 K/CU MM    Monocytes Absolute 0.5 K/CU MM    Eosinophils Absolute 0.2 K/CU MM    Basophils Absolute 0.0 K/CU MM    Nucleated RBC % 0.0 %    Total Nucleated RBC 0.0 K/CU MM    Total Immature Neutrophil 0.02 K/CU MM    Immature Neutrophil % 0.3 0 - 0.43 %   Comprehensive Metabolic Panel    Collection Time: 07/21/22  7:50 AM   Result Value Ref Range    Sodium 131 (L) 135 - 145 MMOL/L    Potassium 3.6 3.5 - 5.1 MMOL/L    Chloride 93 (L) 99 - 110 mMol/L    CO2 20 (L) 21 - 32 MMOL/L    BUN 7 6 - 23 MG/DL    Creatinine 0.6 (L) 0.9 - 1.3 MG/DL    Glucose 234 (H) 70 - 99 MG/DL    Calcium 9.2 8.3 - 10.6 MG/DL    Albumin 4.0 3.4 - 5.0 GM/DL    Total Protein 7.6 6.4 - 8.2 GM/DL    Total Bilirubin 0.3 0.0 - 1.0 MG/DL    ALT 22 10 - 40 U/L    AST 26 15 - 37 IU/L    Alkaline Phosphatase 96 40 - 129 IU/L    GFR Non-African American >60 >60 mL/min/1.73m2    GFR African American >60 >60 mL/min/1.73m2    Anion Gap 18 (H) 4 - 16   Lactic Acid    Collection Time: 07/21/22  7:50 AM   Result Value Ref Range    Lactate 3.1 (HH) 0.4 - 2.0 mMOL/L Hemoglobin A1c    Collection Time: 07/21/22  7:50 AM   Result Value Ref Range    Hemoglobin A1C 9.9 (H) 4.2 - 6.3 %    eAG 237 mg/dL   Urinalysis with Reflex to Culture    Collection Time: 07/21/22  2:20 PM    Specimen: Urine   Result Value Ref Range    Color, UA YELLOW YELLOW    Clarity, UA CLEAR CLEAR    Glucose, Urine >1,000 (A) NEGATIVE MG/DL    Bilirubin Urine NEGATIVE NEGATIVE MG/DL    Ketones, Urine 15 (A) NEGATIVE MG/DL    Specific Gravity, UA 1.020 1.001 - 1.035    Blood, Urine NEGATIVE NEGATIVE    pH, Urine 5.5 5.0 - 8.0    Protein, UA NEGATIVE NEGATIVE MG/DL    Urobilinogen, Urine 0.2 0.2 - 1.0 MG/DL    Nitrite Urine, Quantitative NEGATIVE NEGATIVE    Leukocyte Esterase, Urine NEGATIVE NEGATIVE    RBC, UA NONE SEEN 0 - 3 /HPF    WBC, UA <1 0 - 2 /HPF    Bacteria, UA NEGATIVE NEGATIVE /HPF    Squam Epithel, UA 1 /HPF    Trichomonas, UA NONE SEEN NONE SEEN /HPF   POCT Glucose    Collection Time: 07/21/22  4:55 PM   Result Value Ref Range    POC Glucose 217 (H) 70 - 99 MG/DL   POCT Glucose    Collection Time: 07/21/22  8:04 PM   Result Value Ref Range    POC Glucose 368 (H) 70 - 99 MG/DL   Comprehensive Metabolic Panel w/ Reflex to MG    Collection Time: 07/22/22 12:02 AM   Result Value Ref Range    Sodium 134 (L) 135 - 145 MMOL/L    Potassium 3.9 3.5 - 5.1 MMOL/L    Chloride 98 (L) 99 - 110 mMol/L    CO2 26 21 - 32 MMOL/L    BUN 14 6 - 23 MG/DL    Creatinine 0.7 (L) 0.9 - 1.3 MG/DL    Glucose 341 (H) 70 - 99 MG/DL    Calcium 8.7 8.3 - 10.6 MG/DL    Albumin 3.7 3.4 - 5.0 GM/DL    Total Protein 6.4 6.4 - 8.2 GM/DL    Total Bilirubin 0.3 0.0 - 1.0 MG/DL    ALT 19 10 - 40 U/L    AST 19 15 - 37 IU/L    Alkaline Phosphatase 98 40 - 129 IU/L    GFR Non-African American >60 >60 mL/min/1.73m2    GFR African American >60 >60 mL/min/1.73m2    Anion Gap 10 4 - 16   CBC with Auto Differential    Collection Time: 07/22/22 12:02 AM   Result Value Ref Range    WBC 4.3 4.0 - 10.5 K/CU MM    RBC 3.78 (L) 4.6 - 6.2 M/CU MM Hemoglobin 11.7 (L) 13.5 - 18.0 GM/DL    Hematocrit 33.8 (L) 42 - 52 %    MCV 89.4 78 - 100 FL    MCH 31.0 27 - 31 PG    MCHC 34.6 32.0 - 36.0 %    RDW 11.7 11.7 - 14.9 %    Platelets 451 756 - 424 K/CU MM    MPV 9.0 7.5 - 11.1 FL    Differential Type AUTOMATED DIFFERENTIAL     Segs Relative 65.8 36 - 66 %    Lymphocytes % 21.5 (L) 24 - 44 %    Monocytes % 9.0 (H) 0 - 4 %    Eosinophils % 2.5 0 - 3 %    Basophils % 0.7 0 - 1 %    Segs Absolute 2.8 K/CU MM    Lymphocytes Absolute 0.9 K/CU MM    Monocytes Absolute 0.4 K/CU MM    Eosinophils Absolute 0.1 K/CU MM    Basophils Absolute 0.0 K/CU MM    Nucleated RBC % 0.0 %    Total Nucleated RBC 0.0 K/CU MM    Total Immature Neutrophil 0.02 K/CU MM    Immature Neutrophil % 0.5 (H) 0 - 0.43 %   Vancomycin Level, Random    Collection Time: 07/22/22 12:02 AM   Result Value Ref Range    Vancomycin Rm 5.0 UG/ML    DOSE AMOUNT DOSE AMT. GIVEN - 2500mg     DOSE TIME DOSE TIME GIVEN - iv q12hr    Sedimentation Rate    Collection Time: 07/22/22 12:02 AM   Result Value Ref Range    Sed Rate 27 (H) 0 - 15 MM/HR   C-Reactive Protein    Collection Time: 07/22/22 12:02 AM   Result Value Ref Range    CRP, High Sensitivity 44.3 mg/L   Lactic Acid    Collection Time: 07/22/22 12:02 AM   Result Value Ref Range    Lactate 1.1 0.4 - 2.0 mMOL/L   Procalcitonin    Collection Time: 07/22/22 12:02 AM   Result Value Ref Range    Procalcitonin 0.067         Imaging/Diagnostics Last 24 Hours   XR FOOT RIGHT (MIN 3 VIEWS)    Result Date: 7/21/2022  EXAMINATION: THREE XRAY VIEWS OF THE RIGHT FOOT 7/21/2022 7:23 am COMPARISON: July 2, 2022 HISTORY: ORDERING SYSTEM PROVIDED HISTORY: Right 2nd toe infection TECHNOLOGIST PROVIDED HISTORY: Right Foot Reason for exam:->Right 2nd toe infection Reason for Exam: Right 2nd toe infection FINDINGS: Moderate hallux valgus is noted with 1st metatarsal head bunion. There has been prior amputation of the 5th toe, and partial amputation of the 2nd toe.   Amputation margins appear maintained and stable. No acute fracture or dislocation. No evidence of erosion or acute osteolysis. Joint alignment is maintained. There are mild degenerative changes involving the midfoot. Mild 1st MTP joint degenerative changes. Stable postsurgical changes. No acute osseous abnormality or radiographic evidence of osteomyelitis. VL DUP LOWER EXTREMITY VENOUS RIGHT    Result Date: 7/21/2022  EXAMINATION: DUPLEX VENOUS ULTRASOUND OF THE RIGHT LOWER EXTREMITY 7/21/2022 8:26 am TECHNIQUE: Duplex ultrasound using B-mode/gray scaled imaging and Doppler spectral analysis and color flow was obtained of the deep venous structures of the right extremity. COMPARISON: None. HISTORY: ORDERING SYSTEM PROVIDED HISTORY: Right calf pain and lower leg swelling TECHNOLOGIST PROVIDED HISTORY: Reason for exam:->Right calf pain and lower leg swelling Reason for Exam: rt leg swelling FINDINGS: The visualized veins of the right lower extremity are patent and free of echogenic thrombus. The veins demonstrate good compressibility with normal color flow study and spectral analysis. No sonographic/Doppler evidence of DVT in the right lower extremity. Right anterior tibial artery appears patent. .       Electronically signed by Mahesh Moctezuma MD on 7/22/2022 at 7:33 AM

## 2022-07-22 NOTE — PROGRESS NOTES
[facility]  consulted by Dr. Uriel Love, APRN-CNP for monitoring and adjustment. Indication for treatment: Skin and soft tissue infection  Goal trough: [x] 10-15 mcg/mL or [] 15-20 mcg/ml  AUC/SHELLEY: [x] <500 or [] 400-600    Pertinent Laboratory Values:   Temp Readings from Last 3 Encounters:   07/22/22 98.1 °F (36.7 °C) (Oral)   07/13/22 97.8 °F (36.6 °C)   07/07/22 98.7 °F (37.1 °C) (Oral)     Recent Labs     07/21/22  0750 07/22/22  0002   WBC 6.6 4.3   LACTATE 3.1* 1.1       Recent Labs     07/21/22  0750 07/22/22  0002   BUN 7 14   CREATININE 0.6* 0.7*       Estimated Creatinine Clearance: 222 mL/min (A) (based on SCr of 0.7 mg/dL (L)). Intake/Output Summary (Last 24 hours) at 7/22/2022 1111  Last data filed at 7/22/2022 1011  Gross per 24 hour   Intake 720 ml   Output 6850 ml   Net -6130 ml       Pertinent Cultures:  Date    Source    Results    Vancomycin level:   TROUGH:  No results for input(s): VANCOTROUGH in the last 72 hours. RANDOM:    Recent Labs     07/22/22  0002   VANCORANDOM 5.0       Assessment:  SCr, BUN, and urine output: ser creat- at baseline, good  I/O - as documented. Day(s) of therapy: 2  Vancomycin concentration: 7/22 = 5.0 =9 hrs after 1x dose of 2000 mg; auc= 459, v Tr=4.0    Plan:  36 yom, 145 kg [obese]  Change Vancomycin dosing to: 2000 mg ivpb q8hr -for a new predicted Vanco Tr =9.2 & AUC= 551. Pharmacy will continue to monitor patient and adjust therapy as indicated    [address] 7/24 @ 6am    Thank you for the consult.   Joseph Tinajero [facility]  7/22/2022 11:11 AM

## 2022-07-23 LAB
ANION GAP SERPL CALCULATED.3IONS-SCNC: 13 MMOL/L (ref 4–16)
BASOPHILS ABSOLUTE: 0 K/CU MM
BASOPHILS RELATIVE PERCENT: 0.2 % (ref 0–1)
BUN BLDV-MCNC: 13 MG/DL (ref 6–23)
CALCIUM SERPL-MCNC: 8.9 MG/DL (ref 8.3–10.6)
CHLORIDE BLD-SCNC: 99 MMOL/L (ref 99–110)
CO2: 25 MMOL/L (ref 21–32)
CREAT SERPL-MCNC: 0.7 MG/DL (ref 0.9–1.3)
DIFFERENTIAL TYPE: ABNORMAL
EOSINOPHILS ABSOLUTE: 0.1 K/CU MM
EOSINOPHILS RELATIVE PERCENT: 2.6 % (ref 0–3)
GFR AFRICAN AMERICAN: >60 ML/MIN/1.73M2
GFR NON-AFRICAN AMERICAN: >60 ML/MIN/1.73M2
GLUCOSE BLD-MCNC: 270 MG/DL (ref 70–99)
GLUCOSE BLD-MCNC: 277 MG/DL (ref 70–99)
GLUCOSE BLD-MCNC: 328 MG/DL (ref 70–99)
GLUCOSE BLD-MCNC: 342 MG/DL (ref 70–99)
GLUCOSE BLD-MCNC: 351 MG/DL (ref 70–99)
GLUCOSE BLD-MCNC: 435 MG/DL (ref 70–99)
HCT VFR BLD CALC: 32.5 % (ref 42–52)
HEMOGLOBIN: 11.4 GM/DL (ref 13.5–18)
IMMATURE NEUTROPHIL %: 0.2 % (ref 0–0.43)
LYMPHOCYTES ABSOLUTE: 1.1 K/CU MM
LYMPHOCYTES RELATIVE PERCENT: 26.2 % (ref 24–44)
MCH RBC QN AUTO: 31.4 PG (ref 27–31)
MCHC RBC AUTO-ENTMCNC: 35.1 % (ref 32–36)
MCV RBC AUTO: 89.5 FL (ref 78–100)
MONOCYTES ABSOLUTE: 0.4 K/CU MM
MONOCYTES RELATIVE PERCENT: 8.8 % (ref 0–4)
NUCLEATED RBC %: 0 %
PDW BLD-RTO: 11.5 % (ref 11.7–14.9)
PLATELET # BLD: 194 K/CU MM (ref 140–440)
PMV BLD AUTO: 8.9 FL (ref 7.5–11.1)
POTASSIUM SERPL-SCNC: 3.9 MMOL/L (ref 3.5–5.1)
RBC # BLD: 3.63 M/CU MM (ref 4.6–6.2)
SEGMENTED NEUTROPHILS ABSOLUTE COUNT: 2.7 K/CU MM
SEGMENTED NEUTROPHILS RELATIVE PERCENT: 62 % (ref 36–66)
SODIUM BLD-SCNC: 137 MMOL/L (ref 135–145)
TOTAL IMMATURE NEUTOROPHIL: 0.01 K/CU MM
TOTAL NUCLEATED RBC: 0 K/CU MM
WBC # BLD: 4.3 K/CU MM (ref 4–10.5)

## 2022-07-23 PROCEDURE — 6360000002 HC RX W HCPCS: Performed by: INTERNAL MEDICINE

## 2022-07-23 PROCEDURE — 94640 AIRWAY INHALATION TREATMENT: CPT

## 2022-07-23 PROCEDURE — 2580000003 HC RX 258: Performed by: NURSE PRACTITIONER

## 2022-07-23 PROCEDURE — 76937 US GUIDE VASCULAR ACCESS: CPT

## 2022-07-23 PROCEDURE — 6370000000 HC RX 637 (ALT 250 FOR IP): Performed by: NURSE PRACTITIONER

## 2022-07-23 PROCEDURE — 2580000003 HC RX 258: Performed by: INTERNAL MEDICINE

## 2022-07-23 PROCEDURE — 6360000002 HC RX W HCPCS: Performed by: NURSE PRACTITIONER

## 2022-07-23 PROCEDURE — 94761 N-INVAS EAR/PLS OXIMETRY MLT: CPT

## 2022-07-23 PROCEDURE — 6370000000 HC RX 637 (ALT 250 FOR IP): Performed by: INTERNAL MEDICINE

## 2022-07-23 PROCEDURE — 6370000000 HC RX 637 (ALT 250 FOR IP): Performed by: STUDENT IN AN ORGANIZED HEALTH CARE EDUCATION/TRAINING PROGRAM

## 2022-07-23 PROCEDURE — 80048 BASIC METABOLIC PNL TOTAL CA: CPT

## 2022-07-23 PROCEDURE — 1200000000 HC SEMI PRIVATE

## 2022-07-23 PROCEDURE — 36415 COLL VENOUS BLD VENIPUNCTURE: CPT

## 2022-07-23 PROCEDURE — 85025 COMPLETE CBC W/AUTO DIFF WBC: CPT

## 2022-07-23 RX ORDER — INSULIN GLARGINE 100 [IU]/ML
35 INJECTION, SOLUTION SUBCUTANEOUS NIGHTLY
Status: DISCONTINUED | OUTPATIENT
Start: 2022-07-23 | End: 2022-07-24

## 2022-07-23 RX ORDER — INSULIN LISPRO 100 [IU]/ML
12 INJECTION, SOLUTION INTRAVENOUS; SUBCUTANEOUS
Status: DISCONTINUED | OUTPATIENT
Start: 2022-07-23 | End: 2022-07-24

## 2022-07-23 RX ORDER — INSULIN LISPRO 100 [IU]/ML
0-4 INJECTION, SOLUTION INTRAVENOUS; SUBCUTANEOUS NIGHTLY
Status: DISCONTINUED | OUTPATIENT
Start: 2022-07-23 | End: 2022-07-24

## 2022-07-23 RX ORDER — INSULIN LISPRO 100 [IU]/ML
0-8 INJECTION, SOLUTION INTRAVENOUS; SUBCUTANEOUS
Status: DISCONTINUED | OUTPATIENT
Start: 2022-07-23 | End: 2022-07-24

## 2022-07-23 RX ADMIN — INSULIN LISPRO 10 UNITS: 100 INJECTION, SOLUTION INTRAVENOUS; SUBCUTANEOUS at 08:34

## 2022-07-23 RX ADMIN — INSULIN LISPRO 6 UNITS: 100 INJECTION, SOLUTION INTRAVENOUS; SUBCUTANEOUS at 17:23

## 2022-07-23 RX ADMIN — TIMOLOL MALEATE 1 DROP: 5 SOLUTION OPHTHALMIC at 21:25

## 2022-07-23 RX ADMIN — CEFEPIME HYDROCHLORIDE 2000 MG: 2 INJECTION, POWDER, FOR SOLUTION INTRAVENOUS at 08:27

## 2022-07-23 RX ADMIN — INSULIN LISPRO 3 UNITS: 100 INJECTION, SOLUTION INTRAVENOUS; SUBCUTANEOUS at 12:42

## 2022-07-23 RX ADMIN — SODIUM CHLORIDE, PRESERVATIVE FREE 10 ML: 5 INJECTION INTRAVENOUS at 21:23

## 2022-07-23 RX ADMIN — INSULIN LISPRO 4 UNITS: 100 INJECTION, SOLUTION INTRAVENOUS; SUBCUTANEOUS at 08:34

## 2022-07-23 RX ADMIN — HYDROCODONE BITARTRATE AND ACETAMINOPHEN 1 TABLET: 7.5; 325 TABLET ORAL at 22:37

## 2022-07-23 RX ADMIN — CLONIDINE HYDROCHLORIDE 0.2 MG: 0.2 TABLET ORAL at 08:17

## 2022-07-23 RX ADMIN — EZETIMIBE 10 MG: 10 TABLET ORAL at 21:18

## 2022-07-23 RX ADMIN — HYDROMORPHONE HYDROCHLORIDE 0.5 MG: 1 INJECTION, SOLUTION INTRAMUSCULAR; INTRAVENOUS; SUBCUTANEOUS at 04:43

## 2022-07-23 RX ADMIN — SUCRALFATE 1 G: 1 TABLET ORAL at 21:18

## 2022-07-23 RX ADMIN — PANTOPRAZOLE SODIUM 40 MG: 40 TABLET, DELAYED RELEASE ORAL at 06:36

## 2022-07-23 RX ADMIN — SUCRALFATE 1 G: 1 TABLET ORAL at 12:42

## 2022-07-23 RX ADMIN — CEFEPIME HYDROCHLORIDE 2000 MG: 2 INJECTION, POWDER, FOR SOLUTION INTRAVENOUS at 16:48

## 2022-07-23 RX ADMIN — ALBUTEROL SULFATE 2 PUFF: 90 AEROSOL, METERED RESPIRATORY (INHALATION) at 19:16

## 2022-07-23 RX ADMIN — Medication 5 MG: at 21:19

## 2022-07-23 RX ADMIN — INSULIN LISPRO 12 UNITS: 100 INJECTION, SOLUTION INTRAVENOUS; SUBCUTANEOUS at 17:23

## 2022-07-23 RX ADMIN — INSULIN LISPRO 4 UNITS: 100 INJECTION, SOLUTION INTRAVENOUS; SUBCUTANEOUS at 21:19

## 2022-07-23 RX ADMIN — VANCOMYCIN HYDROCHLORIDE 2000 MG: 1 INJECTION, POWDER, LYOPHILIZED, FOR SOLUTION INTRAVENOUS at 00:10

## 2022-07-23 RX ADMIN — LISINOPRIL AND HYDROCHLOROTHIAZIDE 1 TABLET: 12.5; 2 TABLET ORAL at 08:17

## 2022-07-23 RX ADMIN — SODIUM CHLORIDE, PRESERVATIVE FREE 10 ML: 5 INJECTION INTRAVENOUS at 08:35

## 2022-07-23 RX ADMIN — MOXIFLOXACIN OPHTHALMIC SOLUTION 1 DROP: 5 SOLUTION/ DROPS OPHTHALMIC at 16:39

## 2022-07-23 RX ADMIN — MOXIFLOXACIN OPHTHALMIC SOLUTION 1 DROP: 5 SOLUTION/ DROPS OPHTHALMIC at 13:35

## 2022-07-23 RX ADMIN — TIMOLOL MALEATE 1 DROP: 5 SOLUTION OPHTHALMIC at 08:18

## 2022-07-23 RX ADMIN — INSULIN GLARGINE 35 UNITS: 100 INJECTION, SOLUTION SUBCUTANEOUS at 21:19

## 2022-07-23 RX ADMIN — CEFEPIME HYDROCHLORIDE 2000 MG: 2 INJECTION, POWDER, FOR SOLUTION INTRAVENOUS at 01:50

## 2022-07-23 RX ADMIN — SODIUM CHLORIDE: 9 INJECTION, SOLUTION INTRAVENOUS at 16:47

## 2022-07-23 RX ADMIN — VANCOMYCIN HYDROCHLORIDE 2000 MG: 1 INJECTION, POWDER, LYOPHILIZED, FOR SOLUTION INTRAVENOUS at 08:33

## 2022-07-23 RX ADMIN — BRIMONIDINE TARTRATE 1 DROP: 2 SOLUTION/ DROPS OPHTHALMIC at 16:39

## 2022-07-23 RX ADMIN — PREDNISOLONE ACETATE 1 DROP: 10 SUSPENSION/ DROPS OPHTHALMIC at 16:39

## 2022-07-23 RX ADMIN — VANCOMYCIN HYDROCHLORIDE 2000 MG: 1 INJECTION, POWDER, LYOPHILIZED, FOR SOLUTION INTRAVENOUS at 16:50

## 2022-07-23 RX ADMIN — MOXIFLOXACIN OPHTHALMIC SOLUTION 1 DROP: 5 SOLUTION/ DROPS OPHTHALMIC at 21:20

## 2022-07-23 RX ADMIN — MOXIFLOXACIN OPHTHALMIC SOLUTION 1 DROP: 5 SOLUTION/ DROPS OPHTHALMIC at 08:18

## 2022-07-23 RX ADMIN — ALBUTEROL SULFATE 2 PUFF: 90 AEROSOL, METERED RESPIRATORY (INHALATION) at 07:30

## 2022-07-23 RX ADMIN — AMLODIPINE BESYLATE 10 MG: 10 TABLET ORAL at 08:17

## 2022-07-23 RX ADMIN — HYDROMORPHONE HYDROCHLORIDE 0.5 MG: 1 INJECTION, SOLUTION INTRAMUSCULAR; INTRAVENOUS; SUBCUTANEOUS at 16:42

## 2022-07-23 RX ADMIN — BRIMONIDINE TARTRATE 1 DROP: 2 SOLUTION/ DROPS OPHTHALMIC at 08:18

## 2022-07-23 RX ADMIN — ATORVASTATIN CALCIUM 10 MG: 10 TABLET, FILM COATED ORAL at 08:17

## 2022-07-23 RX ADMIN — ALLOPURINOL 300 MG: 300 TABLET ORAL at 08:17

## 2022-07-23 RX ADMIN — Medication 2 PUFF: at 07:29

## 2022-07-23 RX ADMIN — Medication 2 PUFF: at 15:09

## 2022-07-23 RX ADMIN — SUCRALFATE 1 G: 1 TABLET ORAL at 08:17

## 2022-07-23 RX ADMIN — PREGABALIN 100 MG: 100 CAPSULE ORAL at 08:17

## 2022-07-23 RX ADMIN — PREGABALIN 100 MG: 100 CAPSULE ORAL at 21:18

## 2022-07-23 RX ADMIN — ALBUTEROL SULFATE 2 PUFF: 90 AEROSOL, METERED RESPIRATORY (INHALATION) at 15:09

## 2022-07-23 RX ADMIN — PREDNISOLONE ACETATE 1 DROP: 10 SUSPENSION/ DROPS OPHTHALMIC at 08:18

## 2022-07-23 RX ADMIN — HYDROCODONE BITARTRATE AND ACETAMINOPHEN 1 TABLET: 7.5; 325 TABLET ORAL at 06:35

## 2022-07-23 RX ADMIN — Medication 2 PUFF: at 19:17

## 2022-07-23 RX ADMIN — SUCRALFATE 1 G: 1 TABLET ORAL at 16:38

## 2022-07-23 RX ADMIN — HYDROMORPHONE HYDROCHLORIDE 0.5 MG: 1 INJECTION, SOLUTION INTRAMUSCULAR; INTRAVENOUS; SUBCUTANEOUS at 08:38

## 2022-07-23 RX ADMIN — DORZOLAMIDE HYDROCHLORIDE 1 DROP: 20 SOLUTION/ DROPS OPHTHALMIC at 21:30

## 2022-07-23 RX ADMIN — HYDROMORPHONE HYDROCHLORIDE 0.5 MG: 1 INJECTION, SOLUTION INTRAMUSCULAR; INTRAVENOUS; SUBCUTANEOUS at 21:17

## 2022-07-23 RX ADMIN — DORZOLAMIDE HYDROCHLORIDE 1 DROP: 20 SOLUTION/ DROPS OPHTHALMIC at 08:18

## 2022-07-23 RX ADMIN — HYDROMORPHONE HYDROCHLORIDE 0.5 MG: 1 INJECTION, SOLUTION INTRAMUSCULAR; INTRAVENOUS; SUBCUTANEOUS at 12:41

## 2022-07-23 RX ADMIN — INSULIN LISPRO 10 UNITS: 100 INJECTION, SOLUTION INTRAVENOUS; SUBCUTANEOUS at 12:43

## 2022-07-23 ASSESSMENT — PAIN DESCRIPTION - ONSET
ONSET: ON-GOING

## 2022-07-23 ASSESSMENT — PAIN SCALES - GENERAL
PAINLEVEL_OUTOF10: 9
PAINLEVEL_OUTOF10: 10
PAINLEVEL_OUTOF10: 8
PAINLEVEL_OUTOF10: 4
PAINLEVEL_OUTOF10: 4
PAINLEVEL_OUTOF10: 9
PAINLEVEL_OUTOF10: 6
PAINLEVEL_OUTOF10: 8
PAINLEVEL_OUTOF10: 8
PAINLEVEL_OUTOF10: 6
PAINLEVEL_OUTOF10: 6
PAINLEVEL_OUTOF10: 9
PAINLEVEL_OUTOF10: 9

## 2022-07-23 ASSESSMENT — PAIN DESCRIPTION - PAIN TYPE
TYPE: ACUTE PAIN

## 2022-07-23 ASSESSMENT — PAIN DESCRIPTION - ORIENTATION
ORIENTATION: RIGHT

## 2022-07-23 ASSESSMENT — PAIN SCALES - WONG BAKER
WONGBAKER_NUMERICALRESPONSE: 0

## 2022-07-23 ASSESSMENT — PAIN - FUNCTIONAL ASSESSMENT
PAIN_FUNCTIONAL_ASSESSMENT: ACTIVITIES ARE NOT PREVENTED

## 2022-07-23 ASSESSMENT — PAIN DESCRIPTION - LOCATION
LOCATION: FOOT

## 2022-07-23 ASSESSMENT — PAIN DESCRIPTION - DESCRIPTORS
DESCRIPTORS: SPASM;STABBING;SHOOTING
DESCRIPTORS: ACHING
DESCRIPTORS: THROBBING;STABBING;SORE

## 2022-07-23 ASSESSMENT — PAIN DESCRIPTION - FREQUENCY
FREQUENCY: CONTINUOUS

## 2022-07-23 NOTE — CONSULTS
Consult completed. Nexiva 20g 1.75 inch catheter inserted via ultrasound in patient's RFA. Brisk blood return noted and catheter flushes with ease. Patient tolerated well. Purnima Soto, primary RN notified. Consult IV/PICC team if patient's needs change.

## 2022-07-23 NOTE — PROGRESS NOTES
V2.0  [facility] Hospitalist Progress Note      Name:  Kelton Benton /Age/Sex: 1985  (39 y.o. male)   MRN & CSN:  [id number] & [id number] Encounter Date/Time: 2022 7:33 AM EDT    Location:  [address] [place] [address] PCP: Kayleigh Riley MD       Hospital Day: 3    Assessment and Plan:   Kelton Benton is a 39 y.o. male with a pmh as noted below presents with Cellulitis RLE      #Cellulitis of Right lower extremity   #Open wound of right foot , subsequent encounter      #Lactic acidosis- resolved      -X-ray 3 view right negative for bruising or osteom myelitis. -Venous Duplex Negative  -Xray negative for Osteomylitis   -Patient follows the wound clinic with Avel Potter, ED provider discussed case with Dr. Willy Barnhart, covering for Dr Jazmín Quevedo and he is aware of admission and will follow up with patient  -ESR and CRP elevated   -Bone scan 3 weeks ago on previous admission suspicious for osteomyelitis. -Blood culture on prior admission negative, wound culture positive for staph aureus. Status post prior surgical debridement             Plan:  Continue trending ESR and CRP  Wound care consult  Follow-up on blood cultures  Consider CT scan if pain/swelling worsens.   Patient cannot have MRI due to recent surgery (had corneal transplant 3 weeks ago which has metal clips and it)  Norco 7.5 every 6 hours for pain as well as Dilaudid 0.5 mg IV every 4 hours for breakthrough  Continue vancomycin and cefepime     #Uncontrolled insulin-dependent type 2 diabetes with hyperglycemia  #Glucosuria  -HbA1c: 9.9   -POC glucose levels all above 200    Plan:  Lantus increased to 35 units nightly with lispro 12 units 3 times daily with meals plus sliding scale (switched from low-dose to medium dose)  Hold home oral antihyperglycemics     #Hyponatremia likely in the setting of hyperglycemia and hypovolemia-resolved  -Received 1 L IVF in ED  -Corrected  due to  --> 134 in AM (corrected is 138)     Chronic medical problems:  #Asthma, not in exacerbation  Plan: Continue home inhalers      #HTN, uncontrolled-Continue Prinzide and Norvas  #HLD-Continue Lipitor  #History of cocaine use disorder  #Alcohol use disorder  #Tobacco use disorder- Nicotine patch and lozenges ordered patient was counseled on tobacco cessation. #Obesity class II  -BMI is 40      Diet ADULT DIET; Regular; 4 carb choices (60 gm/meal)   DVT Prophylaxis [x] Lovenox, []  Heparin, [] SCDs, [] Ambulation,  [] Eliquis, [] Xarelto  [] Coumadin   Code Status Full Code   Disposition From: home  Expected Disposition: Home  Estimated Date of Discharge: 7/25/2022  Patient requires continued admission due to lower extremity cellulitis, uncontrolled diabetes   Surrogate Decision Maker/ POA Not currently active, Raymond Combs (mother)     Subjective:     Chief Complaint: Wound Check (Right foot wound) and Leg Swelling (Right leg swelling since last night)       Today, patient states that his pain has been under better control since we started the pain medications yesterday. He is concerned about his blood sugars being so high and does not know why. Review of Systems:    General: No fever, no chills  Heart: No chest pain, no palpitations  Lungs: No shortness of breath, no cough  Abdomen: No abdominal pain, no nausea, no vomiting, no constipation, no diarrhea  : No frequency, no dysuria, no urgency, no decreased urination  MSK: No lower extremity swelling  Neuro: No confusion, no weakness  Skin: No rashes    Objective:      Intake/Output Summary (Last 24 hours) at 7/23/2022 0851  Last data filed at 7/23/2022 0647  Gross per 24 hour   Intake 1320 ml   Output 4450 ml   Net -3130 ml        Vitals:   Vitals:    07/23/22 0838   BP:    Pulse:    Resp: 18   Temp:    SpO2:        Physical Exam:     General: In mild distress AAO x3-4  Eyes: EOMI  HENT: Atraumatic  CVS: Normal S1 and S2, no murmurs, RRR  Respiratory: Normal breath sounds, clear to auscultation bilaterally, no respiratory distress  GI: Normal bowel sounds, soft, nondistended, nontender  MSK: Normal ROM  Skin: Intact, dry, warm, no rashes, RLE foot is wrapped in gauze, there is erythema in the general area and mild swelling with tenderness present  Neuro: CN II to XII grossly intact  Psych: Normal mood    Medications:   Medications:    insulin glargine  35 Units SubCUTAneous Nightly    vancomycin  2,000 mg IntraVENous Q8H    insulin lispro  10 Units SubCUTAneous TID WC    melatonin  5 mg Oral Nightly    moxifloxacin  1 drop Right Eye 4x Daily    pantoprazole  40 mg Oral QAM AC    prednisoLONE acetate  1 drop Right Eye BID    pregabalin  100 mg Oral BID    sucralfate  1 g Oral 4x Daily    lisinopril-hydroCHLOROthiazide  1 tablet Oral Daily    ezetimibe  10 mg Oral Nightly    cloNIDine  0.2 mg Oral Daily    amLODIPine  10 mg Oral Daily    atorvastatin  10 mg Oral Daily    brimonidine  1 drop Right Eye BID    allopurinol  300 mg Oral Daily    sodium chloride flush  5-40 mL IntraVENous 2 times per day    enoxaparin  30 mg SubCUTAneous BID    nicotine  1 patch TransDERmal Daily    insulin lispro  0-4 Units SubCUTAneous TID WC    insulin lispro  0-4 Units SubCUTAneous Nightly    timolol  1 drop Right Eye BID    And    dorzolamide  1 drop Right Eye BID    cefepime  2,000 mg IntraVENous Q8H    NONFORMULARY 1 drop (Patient Supplied)  1 drop Ophthalmic Daily    ipratropium  2 puff Inhalation Q6H WA    albuterol sulfate HFA  2 puff Inhalation Q6H WA      Infusions:    dextrose      sodium chloride 25 mL (07/22/22 0816)    sodium chloride 125 mL/hr at 07/21/22 2306     PRN Meds: HYDROcodone-acetaminophen, 1 tablet, Q6H PRN  HYDROmorphone, 0.5 mg, Q4H PRN  albuterol sulfate HFA, 2 puff, Q6H PRN  glucose, 4 tablet, PRN  dextrose bolus, 125 mL, PRN   Or  dextrose bolus, 250 mL, PRN  glucagon (rDNA), 1 mg, PRN  dextrose, , Continuous PRN  sodium chloride flush, 5-40 mL, PRN  sodium chloride, , PRN  ondansetron, 4 mg, Q8H PRN Or  ondansetron, 4 mg, Q6H PRN  polyethylene glycol, 17 g, Daily PRN  acetaminophen, 650 mg, Q6H PRN   Or  acetaminophen, 650 mg, Q6H PRN  nicotine polacrilex, 2 mg, Q1H PRN  ipratropium-albuterol, 1 ampule, Q4H PRN      Labs      Recent Results (from the past 24 hour(s))   POCT Glucose    Collection Time: 07/22/22 11:59 AM   Result Value Ref Range    POC Glucose 291 (H) 70 - 99 MG/DL   POCT Glucose    Collection Time: 07/22/22  4:58 PM   Result Value Ref Range    POC Glucose 349 (H) 70 - 99 MG/DL   POCT Glucose    Collection Time: 07/22/22  7:54 PM   Result Value Ref Range    POC Glucose 355 (H) 70 - 99 MG/DL   CBC with Auto Differential    Collection Time: 07/23/22 12:18 AM   Result Value Ref Range    WBC 4.3 4.0 - 10.5 K/CU MM    RBC 3.63 (L) 4.6 - 6.2 M/CU MM    Hemoglobin 11.4 (L) 13.5 - 18.0 GM/DL    Hematocrit 32.5 (L) 42 - 52 %    MCV 89.5 78 - 100 FL    MCH 31.4 (H) 27 - 31 PG    MCHC 35.1 32.0 - 36.0 %    RDW 11.5 (L) 11.7 - 14.9 %    Platelets 772 047 - 063 K/CU MM    MPV 8.9 7.5 - 11.1 FL    Differential Type AUTOMATED DIFFERENTIAL     Segs Relative 62.0 36 - 66 %    Lymphocytes % 26.2 24 - 44 %    Monocytes % 8.8 (H) 0 - 4 %    Eosinophils % 2.6 0 - 3 %    Basophils % 0.2 0 - 1 %    Segs Absolute 2.7 K/CU MM    Lymphocytes Absolute 1.1 K/CU MM    Monocytes Absolute 0.4 K/CU MM    Eosinophils Absolute 0.1 K/CU MM    Basophils Absolute 0.0 K/CU MM    Nucleated RBC % 0.0 %    Total Nucleated RBC 0.0 K/CU MM    Total Immature Neutrophil 0.01 K/CU MM    Immature Neutrophil % 0.2 0 - 0.43 %   Basic Metabolic Panel    Collection Time: 07/23/22 12:18 AM   Result Value Ref Range    Sodium 137 135 - 145 MMOL/L    Potassium 3.9 3.5 - 5.1 MMOL/L    Chloride 99 99 - 110 mMol/L    CO2 25 21 - 32 MMOL/L    Anion Gap 13 4 - 16    BUN 13 6 - 23 MG/DL    Creatinine 0.7 (L) 0.9 - 1.3 MG/DL    Glucose 270 (H) 70 - 99 MG/DL    Calcium 8.9 8.3 - 10.6 MG/DL    GFR Non-African American >60 >60 mL/min/1.73m2    GFR African American >60 >60 mL/min/1.73m2   POCT Glucose    Collection Time: 07/23/22 12:38 AM   Result Value Ref Range    POC Glucose 277 (H) 70 - 99 MG/DL   POCT Glucose    Collection Time: 07/23/22  7:44 AM   Result Value Ref Range    POC Glucose 351 (H) 70 - 99 MG/DL        Imaging/Diagnostics Last 24 Hours   XR FOOT RIGHT (MIN 3 VIEWS)    Result Date: 7/21/2022  EXAMINATION: THREE XRAY VIEWS OF THE RIGHT FOOT 7/21/2022 7:23 am COMPARISON: July 2, 2022 HISTORY: ORDERING SYSTEM PROVIDED HISTORY: Right 2nd toe infection TECHNOLOGIST PROVIDED HISTORY: Right Foot Reason for exam:->Right 2nd toe infection Reason for Exam: Right 2nd toe infection FINDINGS: Moderate hallux valgus is noted with 1st metatarsal head bunion. There has been prior amputation of the 5th toe, and partial amputation of the 2nd toe. Amputation margins appear maintained and stable. No acute fracture or dislocation. No evidence of erosion or acute osteolysis. Joint alignment is maintained. There are mild degenerative changes involving the midfoot. Mild 1st MTP joint degenerative changes. Stable postsurgical changes. No acute osseous abnormality or radiographic evidence of osteomyelitis. VL DUP LOWER EXTREMITY VENOUS RIGHT    Result Date: 7/21/2022  EXAMINATION: DUPLEX VENOUS ULTRASOUND OF THE RIGHT LOWER EXTREMITY 7/21/2022 8:26 am TECHNIQUE: Duplex ultrasound using B-mode/gray scaled imaging and Doppler spectral analysis and color flow was obtained of the deep venous structures of the right extremity. COMPARISON: None. HISTORY: ORDERING SYSTEM PROVIDED HISTORY: Right calf pain and lower leg swelling TECHNOLOGIST PROVIDED HISTORY: Reason for exam:->Right calf pain and lower leg swelling Reason for Exam: rt leg swelling FINDINGS: The visualized veins of the right lower extremity are patent and free of echogenic thrombus. The veins demonstrate good compressibility with normal color flow study and spectral analysis.      No sonographic/Doppler evidence of DVT in the right lower extremity. Right anterior tibial artery appears patent. .       Electronically signed by Martin Boyer MD on 7/23/2022 at 8:51 AM

## 2022-07-23 NOTE — PROGRESS NOTES
Patient awake and alert. Feeling better overall. PE:  Vitals:    07/23/22 0815 07/23/22 0838 07/23/22 0908 07/23/22 1015   BP: (!) 141/86      Pulse: 89      Resp: 18 18 18 18   Temp: 98.2 °F (36.8 °C)      TempSrc: Oral      SpO2: 98%      Weight:       Height:         Right foot: packing removed and changed.  No purulent discharge, no odor, +granulation tissue, No redness to foot, edema slightly decreased foot and calf    Labs reviewed    A/P:  -continue dressing changes, will do BID packing changes to 2nd toe  -continue iv antibiotics:  cefepime, vancomycin  -plan discussed with patient

## 2022-07-23 NOTE — PROGRESS NOTES
[address]  consulted by Dr. Aziza Monae, APRN-CNP for monitoring and adjustment. Indication for treatment: Skin and soft tissue infection  Goal trough: [x] 10-15 mcg/mL or [] 15-20 mcg/ml  AUC/SHELLEY: [x] <500 or [] 400-600    Pertinent Laboratory Values:   Temp Readings from Last 3 Encounters:   07/23/22 98.2 °F (36.8 °C) (Oral)   07/13/22 97.8 °F (36.6 °C)   07/07/22 98.7 °F (37.1 °C) (Oral)     Recent Labs     07/21/22  0750 07/22/22  0002 07/23/22  0018   WBC 6.6 4.3 4.3   LACTATE 3.1* 1.1  --        Recent Labs     07/21/22  0750 07/22/22  0002 07/23/22  0018   BUN 7 14 13   CREATININE 0.6* 0.7* 0.7*       Estimated Creatinine Clearance: 226 mL/min (A) (based on SCr of 0.7 mg/dL (L)). Intake/Output Summary (Last 24 hours) at 7/23/2022 1145  Last data filed at 7/23/2022 0647  Gross per 24 hour   Intake 1320 ml   Output 4000 ml   Net -2680 ml         Pertinent Cultures:  Date    Source    Results    Vancomycin level:   TROUGH:  No results for input(s): VANCOTROUGH in the last 72 hours. RANDOM:    Recent Labs     07/22/22  0002   VANCORANDOM 5.0         Assessment:  SCr, BUN, and urine output: ser creat- at baseline, good/same. I/O - as documented. Day(s) of therapy: 3  Vancomycin concentration: 7/22 = 5.0 =9 hrs after 1x dose of 2000 mg; auc= 459, v Tr=4.0    Plan:  36 yom, 145 kg [obese]  CONTINUE SAME DOSE AND FREQUENCY =  Vancomycin as: 2000 mg ivpb q8hr   -for a predicted Vanco Tr =9.2 & AUC= 551. Pharmacy will continue to monitor patient and adjust therapy as indicated    Eamon 3 7/24 @ 6am    Thank you for the consult.   Rachel Wagner [facility]  7/23/2022 11:45 AM

## 2022-07-24 LAB
ANION GAP SERPL CALCULATED.3IONS-SCNC: 14 MMOL/L (ref 4–16)
BASOPHILS ABSOLUTE: 0 K/CU MM
BASOPHILS RELATIVE PERCENT: 0.7 % (ref 0–1)
BUN BLDV-MCNC: 12 MG/DL (ref 6–23)
CALCIUM SERPL-MCNC: 9.3 MG/DL (ref 8.3–10.6)
CHLORIDE BLD-SCNC: 96 MMOL/L (ref 99–110)
CO2: 26 MMOL/L (ref 21–32)
CREAT SERPL-MCNC: 0.7 MG/DL (ref 0.9–1.3)
DIFFERENTIAL TYPE: ABNORMAL
DOSE AMOUNT: NORMAL
DOSE TIME: NORMAL
EOSINOPHILS ABSOLUTE: 0.1 K/CU MM
EOSINOPHILS RELATIVE PERCENT: 2.8 % (ref 0–3)
GFR AFRICAN AMERICAN: >60 ML/MIN/1.73M2
GFR NON-AFRICAN AMERICAN: >60 ML/MIN/1.73M2
GLUCOSE BLD-MCNC: 235 MG/DL (ref 70–99)
GLUCOSE BLD-MCNC: 246 MG/DL (ref 70–99)
GLUCOSE BLD-MCNC: 266 MG/DL (ref 70–99)
GLUCOSE BLD-MCNC: 305 MG/DL (ref 70–99)
GLUCOSE BLD-MCNC: 321 MG/DL (ref 70–99)
HCT VFR BLD CALC: 35.6 % (ref 42–52)
HEMOGLOBIN: 12.2 GM/DL (ref 13.5–18)
HIGH SENSITIVE C-REACTIVE PROTEIN: 64.6 MG/L
IMMATURE NEUTROPHIL %: 0.5 % (ref 0–0.43)
LYMPHOCYTES ABSOLUTE: 1.5 K/CU MM
LYMPHOCYTES RELATIVE PERCENT: 34.1 % (ref 24–44)
MCH RBC QN AUTO: 31.2 PG (ref 27–31)
MCHC RBC AUTO-ENTMCNC: 34.3 % (ref 32–36)
MCV RBC AUTO: 91 FL (ref 78–100)
MONOCYTES ABSOLUTE: 0.3 K/CU MM
MONOCYTES RELATIVE PERCENT: 7.8 % (ref 0–4)
NUCLEATED RBC %: 0 %
PDW BLD-RTO: 11.6 % (ref 11.7–14.9)
PLATELET # BLD: 232 K/CU MM (ref 140–440)
PMV BLD AUTO: 8.9 FL (ref 7.5–11.1)
POTASSIUM SERPL-SCNC: 4.1 MMOL/L (ref 3.5–5.1)
RBC # BLD: 3.91 M/CU MM (ref 4.6–6.2)
SEGMENTED NEUTROPHILS ABSOLUTE COUNT: 2.4 K/CU MM
SEGMENTED NEUTROPHILS RELATIVE PERCENT: 54.1 % (ref 36–66)
SODIUM BLD-SCNC: 136 MMOL/L (ref 135–145)
TOTAL IMMATURE NEUTOROPHIL: 0.02 K/CU MM
TOTAL NUCLEATED RBC: 0 K/CU MM
VANCOMYCIN RANDOM: 8.5 UG/ML
WBC # BLD: 4.3 K/CU MM (ref 4–10.5)

## 2022-07-24 PROCEDURE — 1200000000 HC SEMI PRIVATE

## 2022-07-24 PROCEDURE — 80202 ASSAY OF VANCOMYCIN: CPT

## 2022-07-24 PROCEDURE — 82962 GLUCOSE BLOOD TEST: CPT

## 2022-07-24 PROCEDURE — 6370000000 HC RX 637 (ALT 250 FOR IP): Performed by: INTERNAL MEDICINE

## 2022-07-24 PROCEDURE — 86141 C-REACTIVE PROTEIN HS: CPT

## 2022-07-24 PROCEDURE — 94640 AIRWAY INHALATION TREATMENT: CPT

## 2022-07-24 PROCEDURE — 6370000000 HC RX 637 (ALT 250 FOR IP): Performed by: NURSE PRACTITIONER

## 2022-07-24 PROCEDURE — 80048 BASIC METABOLIC PNL TOTAL CA: CPT

## 2022-07-24 PROCEDURE — 2580000003 HC RX 258: Performed by: NURSE PRACTITIONER

## 2022-07-24 PROCEDURE — 36415 COLL VENOUS BLD VENIPUNCTURE: CPT

## 2022-07-24 PROCEDURE — 85025 COMPLETE CBC W/AUTO DIFF WBC: CPT

## 2022-07-24 PROCEDURE — 94761 N-INVAS EAR/PLS OXIMETRY MLT: CPT

## 2022-07-24 PROCEDURE — 6360000002 HC RX W HCPCS: Performed by: INTERNAL MEDICINE

## 2022-07-24 PROCEDURE — 6360000002 HC RX W HCPCS: Performed by: NURSE PRACTITIONER

## 2022-07-24 PROCEDURE — 2580000003 HC RX 258: Performed by: INTERNAL MEDICINE

## 2022-07-24 RX ORDER — INSULIN LISPRO 100 [IU]/ML
20 INJECTION, SOLUTION INTRAVENOUS; SUBCUTANEOUS
Status: DISCONTINUED | OUTPATIENT
Start: 2022-07-24 | End: 2022-07-27 | Stop reason: HOSPADM

## 2022-07-24 RX ORDER — INSULIN LISPRO 100 [IU]/ML
10 INJECTION, SOLUTION INTRAVENOUS; SUBCUTANEOUS ONCE
Status: COMPLETED | OUTPATIENT
Start: 2022-07-24 | End: 2022-07-24

## 2022-07-24 RX ORDER — INSULIN LISPRO 100 [IU]/ML
0-8 INJECTION, SOLUTION INTRAVENOUS; SUBCUTANEOUS
Status: DISCONTINUED | OUTPATIENT
Start: 2022-07-24 | End: 2022-07-25

## 2022-07-24 RX ORDER — INSULIN LISPRO 100 [IU]/ML
0-16 INJECTION, SOLUTION INTRAVENOUS; SUBCUTANEOUS
Status: DISCONTINUED | OUTPATIENT
Start: 2022-07-24 | End: 2022-07-27 | Stop reason: HOSPADM

## 2022-07-24 RX ORDER — INSULIN GLARGINE 100 [IU]/ML
50 INJECTION, SOLUTION SUBCUTANEOUS NIGHTLY
Status: DISCONTINUED | OUTPATIENT
Start: 2022-07-24 | End: 2022-07-27 | Stop reason: HOSPADM

## 2022-07-24 RX ORDER — INSULIN LISPRO 100 [IU]/ML
0-8 INJECTION, SOLUTION INTRAVENOUS; SUBCUTANEOUS
Status: DISCONTINUED | OUTPATIENT
Start: 2022-07-24 | End: 2022-07-24

## 2022-07-24 RX ORDER — INSULIN LISPRO 100 [IU]/ML
16 INJECTION, SOLUTION INTRAVENOUS; SUBCUTANEOUS
Status: DISCONTINUED | OUTPATIENT
Start: 2022-07-24 | End: 2022-07-24

## 2022-07-24 RX ORDER — INSULIN GLARGINE 100 [IU]/ML
37 INJECTION, SOLUTION SUBCUTANEOUS NIGHTLY
Status: DISCONTINUED | OUTPATIENT
Start: 2022-07-24 | End: 2022-07-24

## 2022-07-24 RX ADMIN — HYDROMORPHONE HYDROCHLORIDE 0.5 MG: 1 INJECTION, SOLUTION INTRAMUSCULAR; INTRAVENOUS; SUBCUTANEOUS at 10:45

## 2022-07-24 RX ADMIN — INSULIN LISPRO 10 UNITS: 100 INJECTION, SOLUTION INTRAVENOUS; SUBCUTANEOUS at 13:42

## 2022-07-24 RX ADMIN — INSULIN LISPRO 2 UNITS: 100 INJECTION, SOLUTION INTRAVENOUS; SUBCUTANEOUS at 08:06

## 2022-07-24 RX ADMIN — MOXIFLOXACIN OPHTHALMIC SOLUTION 1 DROP: 5 SOLUTION/ DROPS OPHTHALMIC at 13:51

## 2022-07-24 RX ADMIN — VANCOMYCIN HYDROCHLORIDE 2000 MG: 1 INJECTION, POWDER, LYOPHILIZED, FOR SOLUTION INTRAVENOUS at 15:39

## 2022-07-24 RX ADMIN — TIMOLOL MALEATE 1 DROP: 5 SOLUTION OPHTHALMIC at 20:24

## 2022-07-24 RX ADMIN — INSULIN LISPRO 12 UNITS: 100 INJECTION, SOLUTION INTRAVENOUS; SUBCUTANEOUS at 13:43

## 2022-07-24 RX ADMIN — SUCRALFATE 1 G: 1 TABLET ORAL at 16:53

## 2022-07-24 RX ADMIN — ALBUTEROL SULFATE 2 PUFF: 90 AEROSOL, METERED RESPIRATORY (INHALATION) at 15:00

## 2022-07-24 RX ADMIN — CEFEPIME HYDROCHLORIDE 2000 MG: 2 INJECTION, POWDER, FOR SOLUTION INTRAVENOUS at 07:58

## 2022-07-24 RX ADMIN — SODIUM CHLORIDE, PRESERVATIVE FREE 10 ML: 5 INJECTION INTRAVENOUS at 02:05

## 2022-07-24 RX ADMIN — PREDNISOLONE ACETATE 1 DROP: 10 SUSPENSION/ DROPS OPHTHALMIC at 16:00

## 2022-07-24 RX ADMIN — HYDROCODONE BITARTRATE AND ACETAMINOPHEN 1 TABLET: 7.5; 325 TABLET ORAL at 23:55

## 2022-07-24 RX ADMIN — BRIMONIDINE TARTRATE 1 DROP: 2 SOLUTION/ DROPS OPHTHALMIC at 16:00

## 2022-07-24 RX ADMIN — PANTOPRAZOLE SODIUM 40 MG: 40 TABLET, DELAYED RELEASE ORAL at 06:36

## 2022-07-24 RX ADMIN — BRIMONIDINE TARTRATE 1 DROP: 2 SOLUTION/ DROPS OPHTHALMIC at 08:02

## 2022-07-24 RX ADMIN — PREGABALIN 100 MG: 100 CAPSULE ORAL at 20:04

## 2022-07-24 RX ADMIN — ALLOPURINOL 300 MG: 300 TABLET ORAL at 07:59

## 2022-07-24 RX ADMIN — SUCRALFATE 1 G: 1 TABLET ORAL at 07:59

## 2022-07-24 RX ADMIN — SUCRALFATE 1 G: 1 TABLET ORAL at 20:04

## 2022-07-24 RX ADMIN — ALBUTEROL SULFATE 2 PUFF: 90 AEROSOL, METERED RESPIRATORY (INHALATION) at 07:16

## 2022-07-24 RX ADMIN — MOXIFLOXACIN OPHTHALMIC SOLUTION 1 DROP: 5 SOLUTION/ DROPS OPHTHALMIC at 08:02

## 2022-07-24 RX ADMIN — HYDROMORPHONE HYDROCHLORIDE 0.5 MG: 1 INJECTION, SOLUTION INTRAMUSCULAR; INTRAVENOUS; SUBCUTANEOUS at 20:00

## 2022-07-24 RX ADMIN — AMLODIPINE BESYLATE 10 MG: 10 TABLET ORAL at 08:00

## 2022-07-24 RX ADMIN — INSULIN LISPRO 20 UNITS: 100 INJECTION, SOLUTION INTRAVENOUS; SUBCUTANEOUS at 18:33

## 2022-07-24 RX ADMIN — SODIUM CHLORIDE: 9 INJECTION, SOLUTION INTRAVENOUS at 07:58

## 2022-07-24 RX ADMIN — DORZOLAMIDE HYDROCHLORIDE 1 DROP: 20 SOLUTION/ DROPS OPHTHALMIC at 08:02

## 2022-07-24 RX ADMIN — MOXIFLOXACIN OPHTHALMIC SOLUTION 1 DROP: 5 SOLUTION/ DROPS OPHTHALMIC at 20:24

## 2022-07-24 RX ADMIN — CLONIDINE HYDROCHLORIDE 0.2 MG: 0.2 TABLET ORAL at 07:59

## 2022-07-24 RX ADMIN — VANCOMYCIN HYDROCHLORIDE 2000 MG: 1 INJECTION, POWDER, LYOPHILIZED, FOR SOLUTION INTRAVENOUS at 00:06

## 2022-07-24 RX ADMIN — Medication 5 MG: at 20:03

## 2022-07-24 RX ADMIN — METFORMIN HYDROCHLORIDE 1000 MG: 500 TABLET ORAL at 13:41

## 2022-07-24 RX ADMIN — EZETIMIBE 10 MG: 10 TABLET ORAL at 20:04

## 2022-07-24 RX ADMIN — HYDROCODONE BITARTRATE AND ACETAMINOPHEN 1 TABLET: 7.5; 325 TABLET ORAL at 16:52

## 2022-07-24 RX ADMIN — HYDROMORPHONE HYDROCHLORIDE 0.5 MG: 1 INJECTION, SOLUTION INTRAMUSCULAR; INTRAVENOUS; SUBCUTANEOUS at 15:22

## 2022-07-24 RX ADMIN — HYDROCODONE BITARTRATE AND ACETAMINOPHEN 1 TABLET: 7.5; 325 TABLET ORAL at 08:00

## 2022-07-24 RX ADMIN — CEFEPIME HYDROCHLORIDE 2000 MG: 2 INJECTION, POWDER, FOR SOLUTION INTRAVENOUS at 00:09

## 2022-07-24 RX ADMIN — SODIUM CHLORIDE, PRESERVATIVE FREE 10 ML: 5 INJECTION INTRAVENOUS at 20:01

## 2022-07-24 RX ADMIN — ATORVASTATIN CALCIUM 10 MG: 10 TABLET, FILM COATED ORAL at 07:59

## 2022-07-24 RX ADMIN — INSULIN GLARGINE 50 UNITS: 100 INJECTION, SOLUTION SUBCUTANEOUS at 20:33

## 2022-07-24 RX ADMIN — INSULIN LISPRO 12 UNITS: 100 INJECTION, SOLUTION INTRAVENOUS; SUBCUTANEOUS at 08:06

## 2022-07-24 RX ADMIN — Medication 2 PUFF: at 07:17

## 2022-07-24 RX ADMIN — SODIUM CHLORIDE, PRESERVATIVE FREE 10 ML: 5 INJECTION INTRAVENOUS at 07:56

## 2022-07-24 RX ADMIN — SODIUM CHLORIDE, PRESERVATIVE FREE 10 ML: 5 INJECTION INTRAVENOUS at 06:36

## 2022-07-24 RX ADMIN — TIMOLOL MALEATE 1 DROP: 5 SOLUTION OPHTHALMIC at 08:03

## 2022-07-24 RX ADMIN — MOXIFLOXACIN OPHTHALMIC SOLUTION 1 DROP: 5 SOLUTION/ DROPS OPHTHALMIC at 17:00

## 2022-07-24 RX ADMIN — HYDROMORPHONE HYDROCHLORIDE 0.5 MG: 1 INJECTION, SOLUTION INTRAMUSCULAR; INTRAVENOUS; SUBCUTANEOUS at 06:36

## 2022-07-24 RX ADMIN — LISINOPRIL AND HYDROCHLOROTHIAZIDE 1 TABLET: 12.5; 2 TABLET ORAL at 07:59

## 2022-07-24 RX ADMIN — SUCRALFATE 1 G: 1 TABLET ORAL at 13:41

## 2022-07-24 RX ADMIN — Medication 2 PUFF: at 15:00

## 2022-07-24 RX ADMIN — INSULIN LISPRO 12 UNITS: 100 INJECTION, SOLUTION INTRAVENOUS; SUBCUTANEOUS at 18:33

## 2022-07-24 RX ADMIN — PREDNISOLONE ACETATE 1 DROP: 10 SUSPENSION/ DROPS OPHTHALMIC at 08:03

## 2022-07-24 RX ADMIN — CEFEPIME HYDROCHLORIDE 2000 MG: 2 INJECTION, POWDER, FOR SOLUTION INTRAVENOUS at 20:24

## 2022-07-24 RX ADMIN — DORZOLAMIDE HYDROCHLORIDE 1 DROP: 20 SOLUTION/ DROPS OPHTHALMIC at 20:24

## 2022-07-24 RX ADMIN — INSULIN LISPRO 4 UNITS: 100 INJECTION, SOLUTION INTRAVENOUS; SUBCUTANEOUS at 20:33

## 2022-07-24 RX ADMIN — HYDROMORPHONE HYDROCHLORIDE 0.5 MG: 1 INJECTION, SOLUTION INTRAMUSCULAR; INTRAVENOUS; SUBCUTANEOUS at 02:05

## 2022-07-24 RX ADMIN — PREGABALIN 100 MG: 100 CAPSULE ORAL at 07:59

## 2022-07-24 ASSESSMENT — PAIN DESCRIPTION - ORIENTATION
ORIENTATION: LEFT
ORIENTATION: RIGHT

## 2022-07-24 ASSESSMENT — PAIN SCALES - GENERAL
PAINLEVEL_OUTOF10: 9
PAINLEVEL_OUTOF10: 7
PAINLEVEL_OUTOF10: 6
PAINLEVEL_OUTOF10: 9
PAINLEVEL_OUTOF10: 7
PAINLEVEL_OUTOF10: 8
PAINLEVEL_OUTOF10: 9
PAINLEVEL_OUTOF10: 9
PAINLEVEL_OUTOF10: 6
PAINLEVEL_OUTOF10: 9
PAINLEVEL_OUTOF10: 8
PAINLEVEL_OUTOF10: 9

## 2022-07-24 ASSESSMENT — PAIN DESCRIPTION - ONSET
ONSET: ON-GOING
ONSET: ON-GOING

## 2022-07-24 ASSESSMENT — PAIN DESCRIPTION - DESCRIPTORS
DESCRIPTORS: ACHING
DESCRIPTORS: DISCOMFORT;SHARP;STABBING
DESCRIPTORS: ACHING
DESCRIPTORS: THROBBING;SHOOTING;POUNDING
DESCRIPTORS: ACHING

## 2022-07-24 ASSESSMENT — PAIN - FUNCTIONAL ASSESSMENT
PAIN_FUNCTIONAL_ASSESSMENT: PREVENTS OR INTERFERES SOME ACTIVE ACTIVITIES AND ADLS
PAIN_FUNCTIONAL_ASSESSMENT: ACTIVITIES ARE NOT PREVENTED
PAIN_FUNCTIONAL_ASSESSMENT: PREVENTS OR INTERFERES SOME ACTIVE ACTIVITIES AND ADLS
PAIN_FUNCTIONAL_ASSESSMENT: ACTIVITIES ARE NOT PREVENTED

## 2022-07-24 ASSESSMENT — PAIN DESCRIPTION - LOCATION
LOCATION: FOOT
LOCATION: TOE (COMMENT WHICH ONE)
LOCATION: TOE (COMMENT WHICH ONE)

## 2022-07-24 ASSESSMENT — PAIN DESCRIPTION - PAIN TYPE
TYPE: ACUTE PAIN
TYPE: ACUTE PAIN

## 2022-07-24 ASSESSMENT — PAIN DESCRIPTION - FREQUENCY
FREQUENCY: CONTINUOUS
FREQUENCY: CONTINUOUS

## 2022-07-24 ASSESSMENT — PAIN SCALES - WONG BAKER
WONGBAKER_NUMERICALRESPONSE: 0

## 2022-07-24 NOTE — PROGRESS NOTES
V2.0  [facility] Hospitalist Progress Note      Name:  Raeford Hodgkin /Age/Sex: 1985  (39 y.o. male)   MRN & CSN:  [id number] & [id number] Encounter Date/Time: 2022 7:33 AM EDT    Location:  [address] [place] [address] PCP: Cristine Bennett MD       Hospital Day: 4    Assessment and Plan:   Raeford Hodgkin is a 39 y.o. male with a pmh as noted below presents with Cellulitis RLE      #Cellulitis of Right lower extremity   #Open wound of right foot , subsequent encounter      #Lactic acidosis- resolved      -X-ray 3 view right negative for bruising or osteom myelitis. -Venous Duplex Negative  -Xray negative for Osteomylitis   -Patient follows the wound clinic with Wendy Ivey, ED provider discussed case with Dr. Pilar Young, covering for Dr Kori Noriega and he is aware of admission and will follow up with patient  -ESR and CRP elevated   -Bone scan 3 weeks ago on previous admission suspicious for osteomyelitis. -Blood culture on prior admission negative, wound culture positive for staph aureus. Status post prior surgical debridement  -CRP trending up             Plan:  Continue trending ESR and CRP  Wound care consult  Follow-up on blood cultures  Consider CT scan if pain/swelling worsens.   Patient cannot have MRI due to recent surgery (had corneal transplant 3 weeks ago which has metal clips and it)  Norco 7.5 every 6 hours for pain as well as Dilaudid 0.5 mg IV every 4 hours for breakthrough  Continue vancomycin and cefepime     #Uncontrolled insulin-dependent type 2 diabetes with hyperglycemia  #Glucosuria  -HbA1c: 9.9   -POC glucose levels all above 200    Plan:  Lantus increased to 37 units nightly with lispro 16 units 3 times daily with meals plus sliding scale (medium dose)  Hold home oral antihyperglycemics  Endocrinology consulted to help with hyperglycemia     #Hyponatremia likely in the setting of hyperglycemia and hypovolemia-resolved  -Received 1 L IVF in ED  -Corrected  due to  --> 134 in AM (corrected is 138)     Chronic medical problems:  #Asthma, not in exacerbation  Plan: Continue home inhalers      #HTN, uncontrolled-Continue Prinzide and Norvas  #HLD-Continue Lipitor  #History of cocaine use disorder  #Alcohol use disorder  #Tobacco use disorder- Nicotine patch and lozenges ordered patient was counseled on tobacco cessation. #Obesity class II  -BMI is 40      Diet ADULT DIET; Regular; 3 carb choices (45 gm/meal)   DVT Prophylaxis [x] Lovenox, []  Heparin, [] SCDs, [] Ambulation,  [] Eliquis, [] Xarelto  [] Coumadin   Code Status Full Code   Disposition From: home  Expected Disposition: Home  Estimated Date of Discharge: 7/26/2022  Patient requires continued admission due to lower extremity cellulitis, uncontrolled diabetes   Surrogate Decision Maker/ POA Not currently active, Elizabeth Low (mother)     Subjective:     Chief Complaint: Wound Check (Right foot wound) and Leg Swelling (Right leg swelling since last night)       Today, patient states that his pain is well controlled. Has no complaints today. Review of Systems:    General: No fever, no chills  Heart: No chest pain, no palpitations  Lungs: No shortness of breath, no cough  Abdomen: No abdominal pain, no nausea, no vomiting, no constipation, no diarrhea  : No frequency, no dysuria, no urgency, no decreased urination  MSK: No lower extremity swelling  Neuro: No confusion, no weakness  Skin: No rashes    Objective:      Intake/Output Summary (Last 24 hours) at 7/24/2022 0840  Last data filed at 7/24/2022 5440  Gross per 24 hour   Intake 4120 ml   Output 3100 ml   Net 1020 ml        Vitals:   Vitals:    07/24/22 0759   BP:    Pulse:    Resp: 16   Temp:    SpO2:        Physical Exam:     General: In mild distress AAO x3-4  Eyes: EOMI  HENT: Atraumatic  CVS: Normal S1 and S2, no murmurs, RRR  Respiratory: Normal breath sounds, clear to auscultation bilaterally, no respiratory distress  GI: Normal bowel sounds, soft, nondistended, nontender  MSK: Normal ROM  Skin: Intact, dry, warm, no rashes, RLE foot is wrapped in gauze, there is erythema in the general area and mild swelling with tenderness present  Neuro: CN II to XII grossly intact  Psych: Normal mood    Medications:   Medications:    insulin glargine  37 Units SubCUTAneous Nightly    insulin lispro  16 Units SubCUTAneous TID WC    insulin lispro  0-8 Units SubCUTAneous TID WC    insulin lispro  0-4 Units SubCUTAneous Nightly    vancomycin  2,000 mg IntraVENous Q8H    melatonin  5 mg Oral Nightly    moxifloxacin  1 drop Right Eye 4x Daily    pantoprazole  40 mg Oral QAM AC    prednisoLONE acetate  1 drop Right Eye BID    pregabalin  100 mg Oral BID    sucralfate  1 g Oral 4x Daily    lisinopril-hydroCHLOROthiazide  1 tablet Oral Daily    ezetimibe  10 mg Oral Nightly    cloNIDine  0.2 mg Oral Daily    amLODIPine  10 mg Oral Daily    atorvastatin  10 mg Oral Daily    brimonidine  1 drop Right Eye BID    allopurinol  300 mg Oral Daily    sodium chloride flush  5-40 mL IntraVENous 2 times per day    enoxaparin  30 mg SubCUTAneous BID    nicotine  1 patch TransDERmal Daily    timolol  1 drop Right Eye BID    And    dorzolamide  1 drop Right Eye BID    cefepime  2,000 mg IntraVENous Q8H    NONFORMULARY 1 drop (Patient Supplied)  1 drop Ophthalmic Daily    ipratropium  2 puff Inhalation Q6H WA    albuterol sulfate HFA  2 puff Inhalation Q6H WA      Infusions:    dextrose      sodium chloride 20 mL/hr at 07/24/22 0758     PRN Meds: HYDROcodone-acetaminophen, 1 tablet, Q6H PRN  HYDROmorphone, 0.5 mg, Q4H PRN  albuterol sulfate HFA, 2 puff, Q6H PRN  glucose, 4 tablet, PRN  dextrose bolus, 125 mL, PRN   Or  dextrose bolus, 250 mL, PRN  glucagon (rDNA), 1 mg, PRN  dextrose, , Continuous PRN  sodium chloride flush, 5-40 mL, PRN  sodium chloride, , PRN  ondansetron, 4 mg, Q8H PRN   Or  ondansetron, 4 mg, Q6H PRN  polyethylene glycol, 17 g, Daily PRN  acetaminophen, 650 mg, Q6H PRN   Or  acetaminophen, 650 mg, Q6H PRN  nicotine polacrilex, 2 mg, Q1H PRN  ipratropium-albuterol, 1 ampule, Q4H PRN      Labs      Recent Results (from the past 24 hour(s))   POCT Glucose    Collection Time: 07/23/22 12:36 PM   Result Value Ref Range    POC Glucose 342 (H) 70 - 99 MG/DL   POCT Glucose    Collection Time: 07/23/22  5:22 PM   Result Value Ref Range    POC Glucose 328 (H) 70 - 99 MG/DL   POCT Glucose    Collection Time: 07/23/22  7:44 PM   Result Value Ref Range    POC Glucose 435 (H) 70 - 99 MG/DL   POCT Glucose    Collection Time: 07/24/22  7:49 AM   Result Value Ref Range    POC Glucose 246 (H) 70 - 99 MG/DL        Imaging/Diagnostics Last 24 Hours   XR FOOT RIGHT (MIN 3 VIEWS)    Result Date: 7/21/2022  EXAMINATION: THREE XRAY VIEWS OF THE RIGHT FOOT 7/21/2022 7:23 am COMPARISON: July 2, 2022 HISTORY: ORDERING SYSTEM PROVIDED HISTORY: Right 2nd toe infection TECHNOLOGIST PROVIDED HISTORY: Right Foot Reason for exam:->Right 2nd toe infection Reason for Exam: Right 2nd toe infection FINDINGS: Moderate hallux valgus is noted with 1st metatarsal head bunion. There has been prior amputation of the 5th toe, and partial amputation of the 2nd toe. Amputation margins appear maintained and stable. No acute fracture or dislocation. No evidence of erosion or acute osteolysis. Joint alignment is maintained. There are mild degenerative changes involving the midfoot. Mild 1st MTP joint degenerative changes. Stable postsurgical changes. No acute osseous abnormality or radiographic evidence of osteomyelitis. VL DUP LOWER EXTREMITY VENOUS RIGHT    Result Date: 7/21/2022  EXAMINATION: DUPLEX VENOUS ULTRASOUND OF THE RIGHT LOWER EXTREMITY 7/21/2022 8:26 am TECHNIQUE: Duplex ultrasound using B-mode/gray scaled imaging and Doppler spectral analysis and color flow was obtained of the deep venous structures of the right extremity. COMPARISON: None.  HISTORY: ORDERING SYSTEM PROVIDED HISTORY: Right calf pain and lower leg swelling TECHNOLOGIST PROVIDED HISTORY: Reason for exam:->Right calf pain and lower leg swelling Reason for Exam: rt leg swelling FINDINGS: The visualized veins of the right lower extremity are patent and free of echogenic thrombus. The veins demonstrate good compressibility with normal color flow study and spectral analysis. No sonographic/Doppler evidence of DVT in the right lower extremity. Right anterior tibial artery appears patent. .       Electronically signed by Jasiel Carroll MD on 7/24/2022 at 8:40 AM

## 2022-07-24 NOTE — H&P
Endocrinology   Consult Note  7/21/2022  6:33 AM     Primary Care provider: Marlene Hoyt MD     Referring physician:  Grady Ho MD     Dear Doctor Ashley Arreaga    Thank You for the Consult     Pt. Was Admitted for : Right foot cellulitis    Reason for Consult: Better control of blood glucose      History Obtained From:  Patient/ EMR       HISTORY OF PRESENT ILLNESS:                The patient is a 39 y.o. 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 I neuropathy was  consulted for better control of blood glucose. ROS:   Pt's ROS done in detail. Abnormal ROS are noted in Medical and Surgical History Section below:      Other Medical History:        Diagnosis Date    Asthma     Blind left eye     Diabetes mellitus ([address]     GERD (gastroesophageal reflux disease)     Hypertension     Retinal detachment 2012    left     Surgical History:        Procedure Laterality Date    CORNEAL TRANSPLANT Bilateral     EYE SURGERY      left eye removed    EYE SURGERY Right     FRACTURE SURGERY      foot left    LAPAROSCOPIC APPENDECTOMY N/A 4/28/2022    APPENDECTOMY LAPAROSCOPIC performed by Kiana Warren MD at [address] [place] [address] Right     MANDIBLE SURGERY Bilateral     TOE AMPUTATION Right 07/25/2017       Allergies:  Ciprofloxacin, Glucosamine, and Shellfish-derived products    Family History:       Problem Relation Age of Onset    Diabetes Mother     Asthma Mother     High Blood Pressure Mother     Stroke Mother     Heart Disease Mother     Diabetes Father     Asthma Father     High Blood Pressure Father      REVIEW OF SYSTEMS:  Review of System Done as noted above     PHYSICAL EXAM:      Vitals:    BP (!) 139/96   Pulse 83   Temp 97.9 °F (36.6 °C) (Oral)   Resp 18   Ht 6' 3\" (1.905 m)   Wt (!) 330 lb 9.6 oz (150 kg)   SpO2 97%   BMI 41.32 kg/m² CONSTITUTIONAL:  awake, alert, cooperative, appears stated age   EYES:  vision intact Fundoscopic Exam not performed left eye retinal detachment in almost blind  ENT:Normal  NECK:  Supple, No JVD. Thyroid Exam:Normal   LUNGS:  Has Vesicular Breath Sounds,   CARDIOVASCULAR:  Normal apical impulse, regular rate and rhythm, normal S1 and S2, no S3 or S4, and has no  murmur   ABDOMEN:  No scars, normal bowel sounds, soft, non-distended, non-tender, no masses palpated, no hepatolienomegaly  Musculoskeletal: Normal  Extremities: Normal, peripheral pulses normal, , has no edema   NEUROLOGIC:  Awake, alert, oriented to name, place and time. Cranial nerves II-XII are grossly intact. Motor is  intact. Sensory neuropathy t ,  and gait is normal.    DATA:    CBC:   Recent Labs     07/22/22  0002 07/23/22  0018 07/24/22  0837   WBC 4.3 4.3 4.3   HGB 11.7* 11.4* 12.2*    194 232    CMP:  Recent Labs     07/22/22  0002 07/23/22  0018 07/24/22  0837   * 137 136   K 3.9 3.9 4.1   CL 98* 99 96*   CO2 26 25 26   BUN 14 13 12   CREATININE 0.7* 0.7* 0.7*   CALCIUM 8.7 8.9 9.3   PROT 6.4  --   --    LABALBU 3.7  --   --    BILITOT 0.3  --   --    ALKPHOS 98  --   --    AST 19  --   --    ALT 19  --   --      Lipids:   Lab Results   Component Value Date/Time    CHOL 142 04/16/2011 01:19 PM    HDL 41 04/16/2011 01:19 PM    TRIG 268 04/16/2011 01:19 PM     Glucose:   Recent Labs     07/23/22  1944 07/24/22  0749 07/24/22  1156   POCGLU 435* 246* 321*     Hemoglobin A1C:   Lab Results   Component Value Date/Time    LABA1C 9.9 07/21/2022 07:50 AM     Free T4: No results found for: T4FREE  Free T3: No results found for: FT3  TSH High Sensitivity:   Lab Results   Component Value Date/Time    TSHHS 2.685 04/16/2011 01:19 PM       VL DUP LOWER EXTREMITY VENOUS RIGHT   Final Result   No sonographic/Doppler evidence of DVT in the right lower extremity. Right   anterior tibial artery appears patent. .         XR FOOT RIGHT (MIN 3 VIEWS)   Final Result   Stable postsurgical changes. No acute osseous abnormality or radiographic   evidence of osteomyelitis.                 Scheduled Medicines   Medications:    metFORMIN  1,000 mg Oral BID WC    insulin lispro  0-16 Units SubCUTAneous TID WC    insulin lispro  0-8 Units SubCUTAneous 2 times per day    insulin glargine  50 Units SubCUTAneous Nightly    insulin lispro  20 Units SubCUTAneous TID WC    insulin lispro  10 Units SubCUTAneous Once    vancomycin  2,000 mg IntraVENous Q8H    melatonin  5 mg Oral Nightly    moxifloxacin  1 drop Right Eye 4x Daily    pantoprazole  40 mg Oral QAM AC    prednisoLONE acetate  1 drop Right Eye BID    pregabalin  100 mg Oral BID    sucralfate  1 g Oral 4x Daily    lisinopril-hydroCHLOROthiazide  1 tablet Oral Daily    ezetimibe  10 mg Oral Nightly    cloNIDine  0.2 mg Oral Daily    amLODIPine  10 mg Oral Daily    atorvastatin  10 mg Oral Daily    brimonidine  1 drop Right Eye BID    allopurinol  300 mg Oral Daily    sodium chloride flush  5-40 mL IntraVENous 2 times per day    enoxaparin  30 mg SubCUTAneous BID    nicotine  1 patch TransDERmal Daily    timolol  1 drop Right Eye BID    And    dorzolamide  1 drop Right Eye BID    cefepime  2,000 mg IntraVENous Q8H    NONFORMULARY 1 drop (Patient Supplied)  1 drop Ophthalmic Daily    ipratropium  2 puff Inhalation Q6H WA    albuterol sulfate HFA  2 puff Inhalation Q6H WA      Infusions:    dextrose      sodium chloride 20 mL/hr at 07/24/22 0758         IMPRESSION    Patient Active Problem List   Diagnosis    Sprain of left ankle    Closed nondisplaced fracture of fifth metatarsal bone of left foot    Alcohol abuse    UTI (urinary tract infection) due to Enterococcus    Acute osteomyelitis of toe, right (HCC)    Osteomyelitis (HCC)    Diabetic foot infection (HCC)    Shortness of breath    Asthma exacerbation    Appendicitis    Diabetic ulcer of right foot due to type 2 diabetes mellitus (HCC)    Cellulitis

## 2022-07-24 NOTE — PROGRESS NOTES
[address] [facility]  consulted by Dr. Barbara Yang, APRN-CNP for monitoring and adjustment. Indication for treatment: Skin and soft tissue infection  Goal trough: [x] 10-15 mcg/mL or [] 15-20 mcg/ml  AUC/SHELLEY: [x] <500 or [] 400-600    Pertinent Laboratory Values:   Temp Readings from Last 3 Encounters:   07/24/22 97.9 °F (36.6 °C) (Oral)   07/13/22 97.8 °F (36.6 °C)   07/07/22 98.7 °F (37.1 °C) (Oral)     Recent Labs     07/22/22  0002 07/23/22  0018 07/24/22  0837   WBC 4.3 4.3 4.3   LACTATE 1.1  --   --        Recent Labs     07/22/22  0002 07/23/22  0018 07/24/22  0837   BUN 14 13 12   CREATININE 0.7* 0.7* 0.7*       Estimated Creatinine Clearance: 226 mL/min (A) (based on SCr of 0.7 mg/dL (L)). Intake/Output Summary (Last 24 hours) at 7/24/2022 1336  Last data filed at 7/24/2022 6609  Gross per 24 hour   Intake 4120 ml   Output 3100 ml   Net 1020 ml         Pertinent Cultures:  Date    Source    Results    Vancomycin level:   TROUGH:  No results for input(s): VANCOTROUGH in the last 72 hours. RANDOM:    Recent Labs     07/22/22  0002 07/24/22  0837   VANCORANDOM 5.0 8.5         Assessment:  SCr, BUN, and urine output: ser creat- at baseline, good/same. I/O - as documented. Day(s) of therapy: 4  Vancomycin concentration:                            7/22 = 5.0 :9 hrs after 1x dose of 2000 mg; auc= 459, v Tr=4.0                           7/24 = 8.5 : 6 hrs after dose of 2000mg. Plan:  36 yom, 145 kg [obese]  CONTINUE SAME DOSE AND FREQUENCY =  Vancomycin as: 2000 mg ivpb q8hr   -for a predicted Vanco Tr =9.2 & AUC= 550. Pharmacy will continue to monitor patient and adjust therapy as indicated    Eamon 3 7/26 @ 6am    Thank you for the consult.   Enoc Lyle [facility]  7/24/2022 1:36 PM

## 2022-07-24 NOTE — PLAN OF CARE
Problem: Discharge Planning  Goal: Discharge to home or other facility with appropriate resources  7/23/2022 2012 by Aziza Vera RN  Outcome: Progressing  Flowsheets (Taken 7/23/2022 1004 by Pramod Judd RN)  Discharge to home or other facility with appropriate resources:   Identify barriers to discharge with patient and caregiver   Identify discharge learning needs (meds, wound care, etc)   Refer to discharge planning if patient needs post-hospital services based on physician order or complex needs related to functional status, cognitive ability or social support system   Arrange for needed discharge resources and transportation as appropriate   Arrange for interpreters to assist at discharge as needed  7/23/2022 0958 by Pramod Judd RN  Outcome: Progressing     Problem: Pain  Goal: Verbalizes/displays adequate comfort level or baseline comfort level  7/23/2022 2012 by Aziza Vera RN  Outcome: Progressing  7/23/2022 0958 by Pramod Judd RN  Outcome: Progressing     Problem: Chronic Conditions and Co-morbidities  Goal: Patient's chronic conditions and co-morbidity symptoms are monitored and maintained or improved  7/23/2022 2012 by Aziza Vera RN  Outcome: Progressing  7/23/2022 0958 by Pramod Judd RN  Outcome: Progressing     Problem: Skin/Tissue Integrity  Goal: Absence of new skin breakdown  Description: 1. Monitor for areas of redness and/or skin breakdown  2. Assess vascular access sites hourly  3. Every 4-6 hours minimum:  Change oxygen saturation probe site  4. Every 4-6 hours:  If on nasal continuous positive airway pressure, respiratory therapy assess nares and determine need for appliance change or resting period.   7/23/2022 2012 by Aziza Vera RN  Outcome: Progressing  7/23/2022 0958 by Pramod Judd RN  Outcome: Progressing     Problem: Safety - Adult  Goal: Free from fall injury  7/23/2022 2012 by Aziza Vera RN  Outcome: Progressing  7/23/2022 0958 by Pramod Judd RN  Outcome: Progressing     Problem: ABCDS Injury Assessment  Goal: Absence of physical injury  7/23/2022 2012 by Thelma Corley RN  Outcome: Progressing  7/23/2022 0958 by Court Wilson RN  Outcome: Progressing

## 2022-07-25 ENCOUNTER — ANESTHESIA (OUTPATIENT)
Dept: OPERATING ROOM | Age: 37
DRG: 314 | End: 2022-07-25
Payer: COMMERCIAL

## 2022-07-25 ENCOUNTER — ANESTHESIA EVENT (OUTPATIENT)
Dept: OPERATING ROOM | Age: 37
DRG: 314 | End: 2022-07-25
Payer: COMMERCIAL

## 2022-07-25 LAB
ANION GAP SERPL CALCULATED.3IONS-SCNC: 11 MMOL/L (ref 4–16)
APTT: 26.8 SECONDS (ref 25.1–37.1)
BASOPHILS ABSOLUTE: 0 K/CU MM
BASOPHILS RELATIVE PERCENT: 0.6 % (ref 0–1)
BUN BLDV-MCNC: 12 MG/DL (ref 6–23)
CALCIUM SERPL-MCNC: 9.5 MG/DL (ref 8.3–10.6)
CHLORIDE BLD-SCNC: 95 MMOL/L (ref 99–110)
CO2: 28 MMOL/L (ref 21–32)
CREAT SERPL-MCNC: 0.6 MG/DL (ref 0.9–1.3)
DIFFERENTIAL TYPE: ABNORMAL
EOSINOPHILS ABSOLUTE: 0.1 K/CU MM
EOSINOPHILS RELATIVE PERCENT: 2.7 % (ref 0–3)
GFR AFRICAN AMERICAN: >60 ML/MIN/1.73M2
GFR NON-AFRICAN AMERICAN: >60 ML/MIN/1.73M2
GLUCOSE BLD-MCNC: 218 MG/DL (ref 70–99)
GLUCOSE BLD-MCNC: 221 MG/DL (ref 70–99)
GLUCOSE BLD-MCNC: 265 MG/DL (ref 70–99)
GLUCOSE BLD-MCNC: 265 MG/DL (ref 70–99)
GLUCOSE BLD-MCNC: 358 MG/DL (ref 70–99)
HCT VFR BLD CALC: 38.4 % (ref 42–52)
HEMOGLOBIN: 12.9 GM/DL (ref 13.5–18)
HIGH SENSITIVE C-REACTIVE PROTEIN: 58.4 MG/L
IMMATURE NEUTROPHIL %: 0.4 % (ref 0–0.43)
INR BLD: 0.94 INDEX
LYMPHOCYTES ABSOLUTE: 1.3 K/CU MM
LYMPHOCYTES RELATIVE PERCENT: 23.9 % (ref 24–44)
MAGNESIUM: 1.8 MG/DL (ref 1.8–2.4)
MCH RBC QN AUTO: 30.3 PG (ref 27–31)
MCHC RBC AUTO-ENTMCNC: 33.6 % (ref 32–36)
MCV RBC AUTO: 90.1 FL (ref 78–100)
MONOCYTES ABSOLUTE: 0.5 K/CU MM
MONOCYTES RELATIVE PERCENT: 9.5 % (ref 0–4)
NUCLEATED RBC %: 0 %
PDW BLD-RTO: 11.7 % (ref 11.7–14.9)
PHOSPHORUS: 3.8 MG/DL (ref 2.5–4.9)
PLATELET # BLD: 271 K/CU MM (ref 140–440)
PMV BLD AUTO: 8.9 FL (ref 7.5–11.1)
POTASSIUM SERPL-SCNC: 4.1 MMOL/L (ref 3.5–5.1)
PROTHROMBIN TIME: 12.1 SECONDS (ref 11.7–14.5)
RBC # BLD: 4.26 M/CU MM (ref 4.6–6.2)
SEGMENTED NEUTROPHILS ABSOLUTE COUNT: 3.3 K/CU MM
SEGMENTED NEUTROPHILS RELATIVE PERCENT: 62.9 % (ref 36–66)
SODIUM BLD-SCNC: 134 MMOL/L (ref 135–145)
TOTAL IMMATURE NEUTOROPHIL: 0.02 K/CU MM
TOTAL NUCLEATED RBC: 0 K/CU MM
WBC # BLD: 5.3 K/CU MM (ref 4–10.5)

## 2022-07-25 PROCEDURE — 6360000002 HC RX W HCPCS: Performed by: NURSE PRACTITIONER

## 2022-07-25 PROCEDURE — 87186 SC STD MICRODIL/AGAR DIL: CPT

## 2022-07-25 PROCEDURE — 2580000003 HC RX 258

## 2022-07-25 PROCEDURE — 6370000000 HC RX 637 (ALT 250 FOR IP): Performed by: INTERNAL MEDICINE

## 2022-07-25 PROCEDURE — 36415 COLL VENOUS BLD VENIPUNCTURE: CPT

## 2022-07-25 PROCEDURE — 82962 GLUCOSE BLOOD TEST: CPT

## 2022-07-25 PROCEDURE — 2580000003 HC RX 258: Performed by: SURGERY

## 2022-07-25 PROCEDURE — 36410 VNPNXR 3YR/> PHY/QHP DX/THER: CPT

## 2022-07-25 PROCEDURE — 2709999900 HC NON-CHARGEABLE SUPPLY: Performed by: SURGERY

## 2022-07-25 PROCEDURE — 6360000002 HC RX W HCPCS: Performed by: INTERNAL MEDICINE

## 2022-07-25 PROCEDURE — 3700000000 HC ANESTHESIA ATTENDED CARE: Performed by: SURGERY

## 2022-07-25 PROCEDURE — 6360000002 HC RX W HCPCS

## 2022-07-25 PROCEDURE — 6370000000 HC RX 637 (ALT 250 FOR IP): Performed by: NURSE PRACTITIONER

## 2022-07-25 PROCEDURE — 2580000003 HC RX 258: Performed by: INTERNAL MEDICINE

## 2022-07-25 PROCEDURE — 2500000003 HC RX 250 WO HCPCS: Performed by: SURGERY

## 2022-07-25 PROCEDURE — 85610 PROTHROMBIN TIME: CPT

## 2022-07-25 PROCEDURE — 85730 THROMBOPLASTIN TIME PARTIAL: CPT

## 2022-07-25 PROCEDURE — 6370000000 HC RX 637 (ALT 250 FOR IP): Performed by: SURGERY

## 2022-07-25 PROCEDURE — 94761 N-INVAS EAR/PLS OXIMETRY MLT: CPT

## 2022-07-25 PROCEDURE — 3700000001 HC ADD 15 MINUTES (ANESTHESIA): Performed by: SURGERY

## 2022-07-25 PROCEDURE — 76937 US GUIDE VASCULAR ACCESS: CPT

## 2022-07-25 PROCEDURE — 2580000003 HC RX 258: Performed by: NURSE PRACTITIONER

## 2022-07-25 PROCEDURE — 84100 ASSAY OF PHOSPHORUS: CPT

## 2022-07-25 PROCEDURE — 83735 ASSAY OF MAGNESIUM: CPT

## 2022-07-25 PROCEDURE — 2500000003 HC RX 250 WO HCPCS

## 2022-07-25 PROCEDURE — 94640 AIRWAY INHALATION TREATMENT: CPT

## 2022-07-25 PROCEDURE — 1200000000 HC SEMI PRIVATE

## 2022-07-25 PROCEDURE — 80048 BASIC METABOLIC PNL TOTAL CA: CPT

## 2022-07-25 PROCEDURE — 0Y6R0Z1 DETACHMENT AT RIGHT 2ND TOE, HIGH, OPEN APPROACH: ICD-10-PCS | Performed by: SURGERY

## 2022-07-25 PROCEDURE — 3600000012 HC SURGERY LEVEL 2 ADDTL 15MIN: Performed by: SURGERY

## 2022-07-25 PROCEDURE — 85025 COMPLETE CBC W/AUTO DIFF WBC: CPT

## 2022-07-25 PROCEDURE — 87205 SMEAR GRAM STAIN: CPT

## 2022-07-25 PROCEDURE — 87077 CULTURE AEROBIC IDENTIFY: CPT

## 2022-07-25 PROCEDURE — 86141 C-REACTIVE PROTEIN HS: CPT

## 2022-07-25 PROCEDURE — 7100000000 HC PACU RECOVERY - FIRST 15 MIN: Performed by: SURGERY

## 2022-07-25 PROCEDURE — 87075 CULTR BACTERIA EXCEPT BLOOD: CPT

## 2022-07-25 PROCEDURE — 3600000002 HC SURGERY LEVEL 2 BASE: Performed by: SURGERY

## 2022-07-25 PROCEDURE — 87070 CULTURE OTHR SPECIMN AEROBIC: CPT

## 2022-07-25 PROCEDURE — 7100000001 HC PACU RECOVERY - ADDTL 15 MIN: Performed by: SURGERY

## 2022-07-25 PROCEDURE — 2709999900 HC NON-CHARGEABLE SUPPLY

## 2022-07-25 RX ORDER — HALOPERIDOL 5 MG/ML
1 INJECTION INTRAMUSCULAR
Status: DISCONTINUED | OUTPATIENT
Start: 2022-07-25 | End: 2022-07-25 | Stop reason: HOSPADM

## 2022-07-25 RX ORDER — OXYCODONE HYDROCHLORIDE 5 MG/1
5 TABLET ORAL
Status: DISCONTINUED | OUTPATIENT
Start: 2022-07-25 | End: 2022-07-25 | Stop reason: HOSPADM

## 2022-07-25 RX ORDER — LABETALOL HYDROCHLORIDE 5 MG/ML
10 INJECTION, SOLUTION INTRAVENOUS
Status: DISCONTINUED | OUTPATIENT
Start: 2022-07-25 | End: 2022-07-25 | Stop reason: HOSPADM

## 2022-07-25 RX ORDER — BUPIVACAINE HYDROCHLORIDE 2.5 MG/ML
INJECTION, SOLUTION EPIDURAL; INFILTRATION; INTRACAUDAL
Status: COMPLETED | OUTPATIENT
Start: 2022-07-25 | End: 2022-07-25

## 2022-07-25 RX ORDER — LIDOCAINE HYDROCHLORIDE 10 MG/ML
5 INJECTION, SOLUTION EPIDURAL; INFILTRATION; INTRACAUDAL; PERINEURAL ONCE
Status: COMPLETED | OUTPATIENT
Start: 2022-07-25 | End: 2022-07-25

## 2022-07-25 RX ORDER — LIDOCAINE HYDROCHLORIDE 20 MG/ML
INJECTION, SOLUTION EPIDURAL; INFILTRATION; INTRACAUDAL; PERINEURAL PRN
Status: DISCONTINUED | OUTPATIENT
Start: 2022-07-25 | End: 2022-07-25 | Stop reason: SDUPTHER

## 2022-07-25 RX ORDER — PROCHLORPERAZINE EDISYLATE 5 MG/ML
5 INJECTION INTRAMUSCULAR; INTRAVENOUS
Status: DISCONTINUED | OUTPATIENT
Start: 2022-07-25 | End: 2022-07-25 | Stop reason: HOSPADM

## 2022-07-25 RX ORDER — KETAMINE HCL 50MG/ML(1)
SYRINGE (ML) INTRAVENOUS PRN
Status: DISCONTINUED | OUTPATIENT
Start: 2022-07-25 | End: 2022-07-25 | Stop reason: SDUPTHER

## 2022-07-25 RX ORDER — INSULIN LISPRO 100 [IU]/ML
8 INJECTION, SOLUTION INTRAVENOUS; SUBCUTANEOUS ONCE
Status: DISCONTINUED | OUTPATIENT
Start: 2022-07-25 | End: 2022-07-27 | Stop reason: HOSPADM

## 2022-07-25 RX ORDER — FENTANYL CITRATE 50 UG/ML
50 INJECTION, SOLUTION INTRAMUSCULAR; INTRAVENOUS EVERY 5 MIN PRN
Status: DISCONTINUED | OUTPATIENT
Start: 2022-07-25 | End: 2022-07-25 | Stop reason: HOSPADM

## 2022-07-25 RX ORDER — INSULIN LISPRO 100 [IU]/ML
0-16 INJECTION, SOLUTION INTRAVENOUS; SUBCUTANEOUS
Status: DISCONTINUED | OUTPATIENT
Start: 2022-07-25 | End: 2022-07-27 | Stop reason: HOSPADM

## 2022-07-25 RX ORDER — SODIUM CHLORIDE 0.9 % (FLUSH) 0.9 %
5-40 SYRINGE (ML) INJECTION EVERY 12 HOURS SCHEDULED
Status: DISCONTINUED | OUTPATIENT
Start: 2022-07-25 | End: 2022-07-27 | Stop reason: HOSPADM

## 2022-07-25 RX ORDER — MEPERIDINE HYDROCHLORIDE 25 MG/ML
6.25 INJECTION INTRAMUSCULAR; INTRAVENOUS; SUBCUTANEOUS EVERY 5 MIN PRN
Status: DISCONTINUED | OUTPATIENT
Start: 2022-07-25 | End: 2022-07-25 | Stop reason: HOSPADM

## 2022-07-25 RX ORDER — DIPHENHYDRAMINE HYDROCHLORIDE 50 MG/ML
12.5 INJECTION INTRAMUSCULAR; INTRAVENOUS
Status: DISCONTINUED | OUTPATIENT
Start: 2022-07-25 | End: 2022-07-25 | Stop reason: HOSPADM

## 2022-07-25 RX ORDER — SODIUM CHLORIDE 0.9 % (FLUSH) 0.9 %
5-40 SYRINGE (ML) INJECTION PRN
Status: DISCONTINUED | OUTPATIENT
Start: 2022-07-25 | End: 2022-07-27 | Stop reason: HOSPADM

## 2022-07-25 RX ORDER — SODIUM CHLORIDE 9 MG/ML
INJECTION, SOLUTION INTRAVENOUS PRN
Status: DISCONTINUED | OUTPATIENT
Start: 2022-07-25 | End: 2022-07-27 | Stop reason: HOSPADM

## 2022-07-25 RX ORDER — IPRATROPIUM BROMIDE AND ALBUTEROL SULFATE 2.5; .5 MG/3ML; MG/3ML
1 SOLUTION RESPIRATORY (INHALATION)
Status: DISCONTINUED | OUTPATIENT
Start: 2022-07-25 | End: 2022-07-25 | Stop reason: HOSPADM

## 2022-07-25 RX ORDER — MIDAZOLAM HYDROCHLORIDE 2 MG/2ML
2 INJECTION, SOLUTION INTRAMUSCULAR; INTRAVENOUS
Status: DISCONTINUED | OUTPATIENT
Start: 2022-07-25 | End: 2022-07-25 | Stop reason: HOSPADM

## 2022-07-25 RX ORDER — ENOXAPARIN SODIUM 100 MG/ML
30 INJECTION SUBCUTANEOUS 2 TIMES DAILY
Status: CANCELLED | OUTPATIENT
Start: 2022-07-26

## 2022-07-25 RX ORDER — GLIPIZIDE 10 MG/1
10 TABLET ORAL
Status: DISCONTINUED | OUTPATIENT
Start: 2022-07-25 | End: 2022-07-26

## 2022-07-25 RX ORDER — HYDRALAZINE HYDROCHLORIDE 20 MG/ML
10 INJECTION INTRAMUSCULAR; INTRAVENOUS
Status: DISCONTINUED | OUTPATIENT
Start: 2022-07-25 | End: 2022-07-25 | Stop reason: HOSPADM

## 2022-07-25 RX ORDER — PROPOFOL 10 MG/ML
INJECTION, EMULSION INTRAVENOUS PRN
Status: DISCONTINUED | OUTPATIENT
Start: 2022-07-25 | End: 2022-07-25 | Stop reason: SDUPTHER

## 2022-07-25 RX ORDER — MORPHINE SULFATE 4 MG/ML
4 INJECTION, SOLUTION INTRAMUSCULAR; INTRAVENOUS
Status: CANCELLED | OUTPATIENT
Start: 2022-07-25

## 2022-07-25 RX ADMIN — BRIMONIDINE TARTRATE 1 DROP: 2 SOLUTION/ DROPS OPHTHALMIC at 08:50

## 2022-07-25 RX ADMIN — PROPOFOL 50 MG: 10 INJECTION, EMULSION INTRAVENOUS at 15:20

## 2022-07-25 RX ADMIN — PREDNISOLONE ACETATE 1 DROP: 10 SUSPENSION/ DROPS OPHTHALMIC at 08:52

## 2022-07-25 RX ADMIN — EZETIMIBE 10 MG: 10 TABLET ORAL at 20:26

## 2022-07-25 RX ADMIN — HYDROCODONE BITARTRATE AND ACETAMINOPHEN 1 TABLET: 7.5; 325 TABLET ORAL at 20:26

## 2022-07-25 RX ADMIN — SUCRALFATE 1 G: 1 TABLET ORAL at 17:57

## 2022-07-25 RX ADMIN — BRIMONIDINE TARTRATE 1 DROP: 2 SOLUTION/ DROPS OPHTHALMIC at 17:36

## 2022-07-25 RX ADMIN — CEFEPIME HYDROCHLORIDE 2000 MG: 2 INJECTION, POWDER, FOR SOLUTION INTRAVENOUS at 20:36

## 2022-07-25 RX ADMIN — PREDNISOLONE ACETATE 1 DROP: 10 SUSPENSION/ DROPS OPHTHALMIC at 17:41

## 2022-07-25 RX ADMIN — VANCOMYCIN HYDROCHLORIDE 2000 MG: 1 INJECTION, POWDER, LYOPHILIZED, FOR SOLUTION INTRAVENOUS at 08:34

## 2022-07-25 RX ADMIN — PREGABALIN 100 MG: 100 CAPSULE ORAL at 08:54

## 2022-07-25 RX ADMIN — ALBUTEROL SULFATE 2 PUFF: 90 AEROSOL, METERED RESPIRATORY (INHALATION) at 14:53

## 2022-07-25 RX ADMIN — HYDROMORPHONE HYDROCHLORIDE 0.5 MG: 1 INJECTION, SOLUTION INTRAMUSCULAR; INTRAVENOUS; SUBCUTANEOUS at 22:23

## 2022-07-25 RX ADMIN — SUCRALFATE 1 G: 1 TABLET ORAL at 08:54

## 2022-07-25 RX ADMIN — INSULIN LISPRO 8 UNITS: 100 INJECTION, SOLUTION INTRAVENOUS; SUBCUTANEOUS at 17:58

## 2022-07-25 RX ADMIN — ATORVASTATIN CALCIUM 10 MG: 10 TABLET, FILM COATED ORAL at 08:54

## 2022-07-25 RX ADMIN — HYDROMORPHONE HYDROCHLORIDE 0.5 MG: 1 INJECTION, SOLUTION INTRAMUSCULAR; INTRAVENOUS; SUBCUTANEOUS at 17:57

## 2022-07-25 RX ADMIN — ALBUTEROL SULFATE 2 PUFF: 90 AEROSOL, METERED RESPIRATORY (INHALATION) at 07:31

## 2022-07-25 RX ADMIN — PANTOPRAZOLE SODIUM 40 MG: 40 TABLET, DELAYED RELEASE ORAL at 06:03

## 2022-07-25 RX ADMIN — LISINOPRIL AND HYDROCHLOROTHIAZIDE 1 TABLET: 12.5; 2 TABLET ORAL at 08:54

## 2022-07-25 RX ADMIN — Medication 2 PUFF: at 20:17

## 2022-07-25 RX ADMIN — SODIUM CHLORIDE, PRESERVATIVE FREE 10 ML: 5 INJECTION INTRAVENOUS at 22:17

## 2022-07-25 RX ADMIN — ALBUTEROL SULFATE 2 PUFF: 90 AEROSOL, METERED RESPIRATORY (INHALATION) at 20:16

## 2022-07-25 RX ADMIN — AMLODIPINE BESYLATE 10 MG: 10 TABLET ORAL at 08:54

## 2022-07-25 RX ADMIN — HYDROCODONE BITARTRATE AND ACETAMINOPHEN 1 TABLET: 7.5; 325 TABLET ORAL at 06:03

## 2022-07-25 RX ADMIN — Medication 2 PUFF: at 07:31

## 2022-07-25 RX ADMIN — HYDROMORPHONE HYDROCHLORIDE 0.5 MG: 1 INJECTION, SOLUTION INTRAMUSCULAR; INTRAVENOUS; SUBCUTANEOUS at 08:54

## 2022-07-25 RX ADMIN — VANCOMYCIN HYDROCHLORIDE 2000 MG: 1 INJECTION, POWDER, LYOPHILIZED, FOR SOLUTION INTRAVENOUS at 17:41

## 2022-07-25 RX ADMIN — MOXIFLOXACIN OPHTHALMIC SOLUTION 1 DROP: 5 SOLUTION/ DROPS OPHTHALMIC at 08:52

## 2022-07-25 RX ADMIN — HYDROMORPHONE HYDROCHLORIDE 0.5 MG: 1 INJECTION, SOLUTION INTRAMUSCULAR; INTRAVENOUS; SUBCUTANEOUS at 01:51

## 2022-07-25 RX ADMIN — INSULIN GLARGINE 50 UNITS: 100 INJECTION, SOLUTION SUBCUTANEOUS at 20:36

## 2022-07-25 RX ADMIN — SODIUM CHLORIDE, PRESERVATIVE FREE 10 ML: 5 INJECTION INTRAVENOUS at 08:53

## 2022-07-25 RX ADMIN — Medication 50 MG: at 15:26

## 2022-07-25 RX ADMIN — LIDOCAINE HYDROCHLORIDE 100 MG: 20 INJECTION, SOLUTION EPIDURAL; INFILTRATION; INTRACAUDAL; PERINEURAL at 15:17

## 2022-07-25 RX ADMIN — Medication 2 PUFF: at 14:53

## 2022-07-25 RX ADMIN — HYDROMORPHONE HYDROCHLORIDE 0.5 MG: 1 INJECTION, SOLUTION INTRAMUSCULAR; INTRAVENOUS; SUBCUTANEOUS at 13:01

## 2022-07-25 RX ADMIN — ALLOPURINOL 300 MG: 300 TABLET ORAL at 08:54

## 2022-07-25 RX ADMIN — SUCRALFATE 1 G: 1 TABLET ORAL at 20:26

## 2022-07-25 RX ADMIN — INSULIN LISPRO 16 UNITS: 100 INJECTION, SOLUTION INTRAVENOUS; SUBCUTANEOUS at 20:37

## 2022-07-25 RX ADMIN — CLONIDINE HYDROCHLORIDE 0.2 MG: 0.2 TABLET ORAL at 08:54

## 2022-07-25 RX ADMIN — Medication 5 MG: at 20:26

## 2022-07-25 RX ADMIN — DORZOLAMIDE HYDROCHLORIDE 1 DROP: 20 SOLUTION/ DROPS OPHTHALMIC at 08:53

## 2022-07-25 RX ADMIN — PREGABALIN 100 MG: 100 CAPSULE ORAL at 20:25

## 2022-07-25 RX ADMIN — INSULIN LISPRO 4 UNITS: 100 INJECTION, SOLUTION INTRAVENOUS; SUBCUTANEOUS at 12:03

## 2022-07-25 RX ADMIN — MOXIFLOXACIN OPHTHALMIC SOLUTION 1 DROP: 5 SOLUTION/ DROPS OPHTHALMIC at 12:07

## 2022-07-25 RX ADMIN — INSULIN LISPRO 4 UNITS: 100 INJECTION, SOLUTION INTRAVENOUS; SUBCUTANEOUS at 08:55

## 2022-07-25 RX ADMIN — VANCOMYCIN HYDROCHLORIDE 2000 MG: 1 INJECTION, POWDER, LYOPHILIZED, FOR SOLUTION INTRAVENOUS at 01:55

## 2022-07-25 RX ADMIN — LIDOCAINE HYDROCHLORIDE 5 ML: 10 INJECTION, SOLUTION EPIDURAL; INFILTRATION; INTRACAUDAL; PERINEURAL at 08:57

## 2022-07-25 RX ADMIN — TIMOLOL MALEATE 1 DROP: 5 SOLUTION OPHTHALMIC at 08:53

## 2022-07-25 RX ADMIN — DEXMEDETOMIDINE 150 MCG: 100 INJECTION, SOLUTION, CONCENTRATE INTRAVENOUS at 15:20

## 2022-07-25 RX ADMIN — MOXIFLOXACIN OPHTHALMIC SOLUTION 1 DROP: 5 SOLUTION/ DROPS OPHTHALMIC at 17:40

## 2022-07-25 RX ADMIN — MOXIFLOXACIN OPHTHALMIC SOLUTION 1 DROP: 5 SOLUTION/ DROPS OPHTHALMIC at 20:32

## 2022-07-25 RX ADMIN — CEFEPIME HYDROCHLORIDE 2000 MG: 2 INJECTION, POWDER, FOR SOLUTION INTRAVENOUS at 12:56

## 2022-07-25 RX ADMIN — PROPOFOL 200 MG: 10 INJECTION, EMULSION INTRAVENOUS at 15:17

## 2022-07-25 RX ADMIN — SODIUM CHLORIDE, PRESERVATIVE FREE 10 ML: 5 INJECTION INTRAVENOUS at 20:33

## 2022-07-25 RX ADMIN — INSULIN LISPRO 2 UNITS: 100 INJECTION, SOLUTION INTRAVENOUS; SUBCUTANEOUS at 01:56

## 2022-07-25 ASSESSMENT — PAIN - FUNCTIONAL ASSESSMENT: PAIN_FUNCTIONAL_ASSESSMENT: PREVENTS OR INTERFERES SOME ACTIVE ACTIVITIES AND ADLS

## 2022-07-25 ASSESSMENT — PAIN SCALES - GENERAL
PAINLEVEL_OUTOF10: 8
PAINLEVEL_OUTOF10: 9
PAINLEVEL_OUTOF10: 0
PAINLEVEL_OUTOF10: 9
PAINLEVEL_OUTOF10: 8
PAINLEVEL_OUTOF10: 0
PAINLEVEL_OUTOF10: 8
PAINLEVEL_OUTOF10: 0

## 2022-07-25 ASSESSMENT — PAIN DESCRIPTION - ORIENTATION
ORIENTATION: RIGHT

## 2022-07-25 ASSESSMENT — PAIN DESCRIPTION - DESCRIPTORS: DESCRIPTORS: ACHING

## 2022-07-25 ASSESSMENT — PAIN DESCRIPTION - LOCATION
LOCATION: TOE (COMMENT WHICH ONE)
LOCATION: FOOT

## 2022-07-25 ASSESSMENT — LIFESTYLE VARIABLES: SMOKING_STATUS: 1

## 2022-07-25 NOTE — BRIEF OP NOTE
Brief Postoperative Note      Patient: Candida Vazquez  YOB: 1985  MRN: [id number]    Date of Procedure: 7/25/2022    Pre-Op osteomyelitis right 2nd proximal phalanx    Post-Op Diagnosis: Same       Procedure(s):  TOE AMPUTATION, RAY AMPUTATION OF RIGHT SECOND TOE    Surgeon(s):  Sabrina Fatima MD    Assistant:  * No surgical staff found *    Anesthesia: General    Estimated Blood Loss (mL): Minimal    Complications: None    Specimens:   ID Type Source Tests Collected by Time Destination   1 : RIGHT FOOT SECOND TOE BONE SPUR Tissue Tissue CULTURE, SURGICAL Sabrina Fatima MD 7/25/2022 1529        Implants:  * No implants in log *      Drains: * No LDAs found *    Findings: see note    Electronically signed by Sabrina Fatima MD on 7/25/2022 at 3:50 PM

## 2022-07-25 NOTE — PROGRESS NOTES
V2.0  [facility] Hospitalist Progress Note      Name:  Melissa Capone /Age/Sex: 1985  (39 y.o. male)   MRN & CSN:  [id number] & [id number] Encounter Date/Time: 2022 7:33 AM EDT    Location:  [facility] PCP: Vera Stewart MD       Hospital Day: 5    Assessment and Plan:   Melissa Capone is a 39 y.o. male with a pmh as noted below presents with Cellulitis RLE      #Cellulitis of Right lower extremity   #Open wound of right foot , subsequent encounter      #Lactic acidosis- resolved      -X-ray 3 view right negative for bruising or osteom myelitis. -Venous Duplex Negative  -Xray negative for Osteomylitis   -Patient follows the wound clinic with Kenna Dance, ED provider discussed case with Dr. Itz Hatch, covering for Dr Maureen Evans and he is aware of admission and will follow up with patient  -ESR and CRP elevated   -Bone scan 3 weeks ago on previous admission suspicious for osteomyelitis. -Blood culture on prior admission negative, wound culture positive for staph aureus. Status post prior surgical debridement  -CRP trending up             Plan:  Continue trending ESR and CRP  Wound care consult  Follow-up on blood cultures  Consider CT scan if pain/swelling worsens.   Patient cannot have MRI due to recent surgery (had corneal transplant 3 weeks ago which has metal clips and it)  Norco 7.5 every 6 hours for pain as well as Dilaudid 0.5 mg IV every 4 hours for breakthrough  Continue vancomycin and cefepime  Osteomyelitis suspected, patient to undergo ray amputation of the right second toe today with general surgery     #Uncontrolled insulin-dependent type 2 diabetes with hyperglycemia  #Glucosuria  -HbA1c: 9.9   -POC glucose levels all above 200    Plan:  Endocrinology consulted to help with hyperglycemia     #Hyponatremia likely in the setting of hyperglycemia and hypovolemia-resolved  -Received 1 L IVF in ED  -Corrected  due to  --> 134 in AM (corrected is 138)     Chronic medical problems:  #Asthma, not in exacerbation  Plan: Continue home inhalers      #HTN, uncontrolled-Continue Prinzide and Norvas  #HLD-Continue Lipitor  #History of cocaine use disorder  #Alcohol use disorder  #Tobacco use disorder- Nicotine patch and lozenges ordered patient was counseled on tobacco cessation. #Obesity class II  -BMI is 40      Diet Diet NPO Exceptions are: Ice Chips, Sips of Water with Meds   DVT Prophylaxis [x] Lovenox, []  Heparin, [] SCDs, [] Ambulation,  [] Eliquis, [] Xarelto  [] Coumadin   Code Status Full Code   Disposition From: home  Expected Disposition: Home  Estimated Date of Discharge: 7/26/2022  Patient requires continued admission due to lower extremity cellulitis, uncontrolled diabetes   Surrogate Decision Maker/ POA Not currently active, Ivonne Abreu (mother)     Subjective:     Chief Complaint: Wound Check (Right foot wound) and Leg Swelling (Right leg swelling since last night)       Today, patient will be having a ray amputation of this toe this morning. He has no complaints. Pain is under well control. Review of Systems:    General: No fever, no chills  Heart: No chest pain, no palpitations  Lungs: No shortness of breath, no cough  Abdomen: No abdominal pain, no nausea, no vomiting, no constipation, no diarrhea  : No frequency, no dysuria, no urgency, no decreased urination  MSK: No lower extremity swelling  Neuro: No confusion, no weakness  Skin: No rashes    Objective:      Intake/Output Summary (Last 24 hours) at 7/25/2022 0850  Last data filed at 7/25/2022 0848  Gross per 24 hour   Intake 435 ml   Output 1875 ml   Net -1440 ml        Vitals:   Vitals:    07/25/22 0827   BP: (!) 144/92   Pulse: 88   Resp: 16   Temp: 98.2 °F (36.8 °C)   SpO2: 96%       Physical Exam:     General: In mild distress AAO x3-4  Eyes: EOMI  HENT: Atraumatic  CVS: Normal S1 and S2, no murmurs, RRR  Respiratory: Normal breath sounds, clear to auscultation bilaterally, no respiratory distress  GI: Normal bowel sounds, soft, nondistended, nontender  MSK: Normal ROM  Skin: Intact, dry, warm, no rashes, RLE foot is wrapped in gauzepresent  Neuro: CN II to XII grossly intact  Psych: Normal mood    Medications:   Medications:    lidocaine PF  5 mL IntraDERmal Once    sodium chloride flush  5-40 mL IntraVENous 2 times per day    insulin lispro  0-16 Units SubCUTAneous 2 times per day    glipiZIDE  10 mg Oral BID AC    metFORMIN  1,000 mg Oral BID WC    insulin lispro  0-16 Units SubCUTAneous TID WC    insulin glargine  50 Units SubCUTAneous Nightly    insulin lispro  20 Units SubCUTAneous TID WC    vancomycin  2,000 mg IntraVENous Q8H    melatonin  5 mg Oral Nightly    moxifloxacin  1 drop Right Eye 4x Daily    pantoprazole  40 mg Oral QAM AC    prednisoLONE acetate  1 drop Right Eye BID    pregabalin  100 mg Oral BID    sucralfate  1 g Oral 4x Daily    lisinopril-hydroCHLOROthiazide  1 tablet Oral Daily    ezetimibe  10 mg Oral Nightly    cloNIDine  0.2 mg Oral Daily    amLODIPine  10 mg Oral Daily    atorvastatin  10 mg Oral Daily    brimonidine  1 drop Right Eye BID    allopurinol  300 mg Oral Daily    sodium chloride flush  5-40 mL IntraVENous 2 times per day    [Held by provider] enoxaparin  30 mg SubCUTAneous BID    nicotine  1 patch TransDERmal Daily    timolol  1 drop Right Eye BID    And    dorzolamide  1 drop Right Eye BID    cefepime  2,000 mg IntraVENous Q8H    NONFORMULARY 1 drop (Patient Supplied)  1 drop Ophthalmic Daily    ipratropium  2 puff Inhalation Q6H WA    albuterol sulfate HFA  2 puff Inhalation Q6H WA      Infusions:    sodium chloride      dextrose      sodium chloride 20 mL/hr at 07/24/22 0758     PRN Meds: sodium chloride flush, 5-40 mL, PRN  sodium chloride, , PRN  HYDROcodone-acetaminophen, 1 tablet, Q6H PRN  HYDROmorphone, 0.5 mg, Q4H PRN  albuterol sulfate HFA, 2 puff, Q6H PRN  glucose, 4 tablet, PRN  dextrose bolus, 125 mL, PRN   Or  dextrose bolus, 250 mL, PRN  glucagon (rDNA), 1 7/21/2022  EXAMINATION: DUPLEX VENOUS ULTRASOUND OF THE RIGHT LOWER EXTREMITY 7/21/2022 8:26 am TECHNIQUE: Duplex ultrasound using B-mode/gray scaled imaging and Doppler spectral analysis and color flow was obtained of the deep venous structures of the right extremity. COMPARISON: None. HISTORY: ORDERING SYSTEM PROVIDED HISTORY: Right calf pain and lower leg swelling TECHNOLOGIST PROVIDED HISTORY: Reason for exam:->Right calf pain and lower leg swelling Reason for Exam: rt leg swelling FINDINGS: The visualized veins of the right lower extremity are patent and free of echogenic thrombus. The veins demonstrate good compressibility with normal color flow study and spectral analysis. No sonographic/Doppler evidence of DVT in the right lower extremity. Right anterior tibial artery appears patent. .       Electronically signed by Lupe Dominguez MD on 7/25/2022 at 8:50 AM

## 2022-07-25 NOTE — CARE COORDINATION
Reviewed chart for continued discharge planning. Pt going to OR today for ray amputation- Sent PS to Dr. Davidson Keyes to inquire about possible need for IV Abx upon discharge. PICC to be placed today. Per Dr. Davidson Keyes pt will not need IV Abx upon discharge- per nursing during IDR pt able to perform dressing changes- no CM needs id'd at this time, CM to cont to follow.

## 2022-07-25 NOTE — PROGRESS NOTES
[address] [facility]  consulted by Dr. Kathleen Sandoval, APRN-CNP for monitoring and adjustment. Indication for treatment: Skin and soft tissue infection  Goal trough: [x] 10-15 mcg/mL or [] 15-20 mcg/ml  AUC/SHELLEY: [] <500 or [x] 400-600    Pertinent Laboratory Values:   Temp Readings from Last 3 Encounters:   07/25/22 98.1 °F (36.7 °C) (Temporal)   07/13/22 97.8 °F (36.6 °C)   07/07/22 98.7 °F (37.1 °C) (Oral)     Recent Labs     07/23/22  0018 07/24/22  0837 07/25/22  1330   WBC 4.3 4.3 5.3       Recent Labs     07/23/22  0018 07/24/22  0837 07/25/22  1330   BUN 13 12 12   CREATININE 0.7* 0.7* 0.6*       Estimated Creatinine Clearance: 264 mL/min (A) (based on SCr of 0.6 mg/dL (L)). Intake/Output Summary (Last 24 hours) at 7/25/2022 1634  Last data filed at 7/25/2022 1610  Gross per 24 hour   Intake 4889.84 ml   Output 3205 ml   Net 1684.84 ml         Pertinent Cultures:  Date    Source    Results    Vancomycin level:   TROUGH:  No results for input(s): VANCOTROUGH in the last 72 hours. RANDOM:    Recent Labs     07/24/22  0837   VANCORANDOM 8.5         Assessment:  SCr, BUN, and urine output: SCR remains at baseline, UOP good  Day(s) of therapy: 4 (last dose on 7/28)  Vancomycin concentration:                            7/22 = 5.0 :9 hrs after 1x dose of 2000 mg; auc= 459, v Tr=4.0                           7/24 = 8.5 : 6 hrs after dose of 2000mg. Plan:  Renal trends remain at baseline  Continue vancomycin 2000mg ivpb q8h with a predicted AUC of 473 at steady state  Check the vanco level tomorrow to monitor for accumulation 2/2 [facility]  Pharmacy will continue to monitor patient and adjust therapy as indicated    Eamon 3 7/26 @ 06:00    Thank you for the consult. Amanda Arellano, [facility]  7/25/2022 4:34 PM

## 2022-07-25 NOTE — PROGRESS NOTES
1606- pt. Arrived to pacu. Attached to monitor. Received report from KATHY JAMIL and LASHAWN Ghosh. Pt. Is drowsy from anesthesia but will respond to verbal stimuli. Pt. Is wearing a simple mask at 8L. SR on the monitor. SCDs are in place and turned on. Pt. Has fluids going into Right midline without any issues. Pt. Denies pain or n/v. Will continue to monitor.

## 2022-07-25 NOTE — ANESTHESIA POSTPROCEDURE EVALUATION
Department of Anesthesiology  Postprocedure Note    Patient: Alessio Steele  MRN: [id number]  YOB: 1985  Date of evaluation: 7/25/2022      Procedure Summary     Date: 07/25/22 Room / Location: [address]    Anesthesia Start: 9210 Anesthesia Stop: 9579    Procedure: TOE AMPUTATION, RAY AMPUTATION OF RIGHT SECOND TOE (Right) Diagnosis:       Infected abrasion of toe of right foot, initial encounter      (Infected abrasion of toe of right foot, initial encounter [S90.414A, L08.9])    Surgeons: Guy Carmona MD Responsible Provider: Viktor Medrano DO    Anesthesia Type: General ASA Status: 3 - Emergent          Anesthesia Type: General    Nadja Phase I: Nadja Score: 7    Nadja Phase II:        Anesthesia Post Evaluation    Patient location during evaluation: PACU  Patient participation: complete - patient participated  Level of consciousness: sleepy but conscious  Airway patency: patent  Nausea & Vomiting: no nausea  Complications: no  Cardiovascular status: hemodynamically stable  Respiratory status: acceptable  Hydration status: euvolemic

## 2022-07-25 NOTE — PROGRESS NOTES
[phone] [id number]- report received from [facility]. Pt resting with eyes closed VSS. 1650- pt denies pain or nausea, does not want ice chips at this time. Right foot dressing dry and intact, elevated on pillow.   VSS  1712- report called to eDnis Craig RN pt to room

## 2022-07-25 NOTE — ANESTHESIA PRE PROCEDURE
Department of Anesthesiology  Preprocedure Note       Name:  Candida Vazquez   Age:  39 y.o.  :  1985                                          MRN:  [id number]         Date:  2022      Surgeon: Andrey Casas):  Sabrina Fatima MD    Procedure: Procedure(s):  TOE AMPUTATION    Medications prior to admission:   Prior to Admission medications    Medication Sig Start Date End Date Taking? Authorizing Provider   cloNIDine (CATAPRES) 0.2 MG tablet Take 0.2 mg by mouth in the morning. Yes Historical Provider, MD   trimethoprim-polymyxin b (POLYTRIM) 13101-2.1 UNIT/ML-% ophthalmic solution Apply 1 drop to eye 4 times daily 22  Yes Historical Provider, MD   UNABLE TO FIND Place 1 drop into the right eye 4 times daily 22 Patient on Vancomycin Preserved Eye soln 25 mg/ml   Yes Historical Provider, MD   moxifloxacin (VIGAMOX) 0.5 % ophthalmic solution Place 1 drop into the right eye 4 times daily 22   Historical Provider, MD   HYDROcodone-acetaminophen (NORCO) 5-325 MG per tablet Take 1 tablet by mouth daily as needed. 22   Historical Provider, MD   tiZANidine (ZANAFLEX) 4 MG tablet Take 4 mg by mouth nightly as needed 22   Historical Provider, MD CABRALITY 1.5 SO/3.9IF SOPN inject 0.5 milliliters ( 1 AND 1/2 milligrams ) subcutaneously ev. ..  (REFER TO PRESCRIPTION NOTES).  22   Historical Provider, MD   ezetimibe (ZETIA) 10 MG tablet take 1 tablet by mouth once daily 22   Historical Provider, MD   LANGERI SOLOSTAR 100 UNIT/ML injection pen inject subcutaneously 30 units at bedtime 22   Historical Provider, MD   TECHLITE PEN NEEDLES 31G X 5 MM MISC use 1 PEN NEEDLE to inject MEDICATION subcutaneously two to three times a day 22   Historical Provider, MD   melatonin 5 MG TABS tablet take 1 tablet by mouth every evening 22   Historical Provider, MD   pregabalin (LYRICA) 100 MG capsule take 1 capsule by mouth twice a day 22   Historical Provider, MD   glipiZIDE (GLUCOTROL) 10 MG tablet Take 2 tablets PO in the morning with food, 1 tablet PO at night with food.  8/1/21   Joan Devries PA-C   sucralfate (CARAFATE) 1 GM tablet Take 1 tablet by mouth 4 times daily 8/1/21   Joan Devries PA-C   albuterol sulfate  (90 Base) MCG/ACT inhaler Inhale 2 puffs into the lungs every 6 hours as needed for Wheezing 3/24/21   Ana Laughlin MD   albuterol-ipratropium (COMBIVENT RESPIMAT)  MCG/ACT AERS inhaler Inhale 1 puff into the lungs every 6 hours 3/24/21   Ana Lauglhin MD   acetaminophen (AMINOFEN) 325 MG tablet Take 2 tablets by mouth every 6 hours as needed for Pain 4/23/20   Randal Montoya DO   brimonidine (ALPHAGAN) 0.2 % ophthalmic solution Place 1 drop into the right eye 2 times daily    Historical Provider, MD   dorzolamide-timolol 22.3-6.8 MG/ML SOLN Place 1 drop into the right eye 2 times daily    Historical Provider, MD   prednisoLONE acetate (PRED FORTE) 1 % ophthalmic suspension Place 1 drop into the right eye 2 times daily    Historical Provider, MD   allopurinol (ZYLOPRIM) 300 MG tablet Take 300 mg by mouth daily     Historical Provider, MD   atorvastatin (LIPITOR) 10 MG tablet Take 10 mg by mouth daily     Historical Provider, MD   lisinopril-hydrochlorothiazide (PRINZIDE;ZESTORETIC) 20-12.5 MG per tablet Take 1 tablet by mouth daily     Historical Provider, MD   metFORMIN (GLUCOPHAGE) 1000 MG tablet Take 1,000 mg by mouth 2 times daily (with meals)    Historical Provider, MD   amLODIPine (NORVASC) 10 MG tablet Take 10 mg by mouth daily 11/25/16   Historical Provider, MD   omeprazole (PRILOSEC) 40 MG delayed release capsule Take 40 mg by mouth daily     Historical Provider, MD       Current medications:    Current Facility-Administered Medications   Medication Dose Route Frequency Provider Last Rate Last Admin    lidocaine PF 1 % injection 5 mL  5 mL IntraDERmal Once Thierry Jackson MD        sodium chloride flush 0.9 % injection 5-40 mL  5-40 mL IntraVENous 2 times per day Jake Santiago MD   10 mL at 07/25/22 0853    sodium chloride flush 0.9 % injection 5-40 mL  5-40 mL IntraVENous PRN Jake Santiago MD        0.9 % sodium chloride infusion   IntraVENous PRN Jake Santiago MD        insulin lispro (HUMALOG) injection vial 0-16 Units  0-16 Units SubCUTAneous 2 times per day VEENA Weeks MD        glipiZIDE (GLUCOTROL) tablet 10 mg  10 mg Oral BID  Dalia Lanza MD        metFORMIN (GLUCOPHAGE) tablet 1,000 mg  1,000 mg Oral BID  Dalia Lanza MD   1,000 mg at 07/24/22 1341    insulin lispro (HUMALOG) injection vial 0-16 Units  0-16 Units SubCUTAneous TID  Dalia Lanza MD   4 Units at 07/25/22 0855    insulin glargine (LANTUS) injection vial 50 Units  50 Units SubCUTAneous Nightly Dalia Lanza MD   50 Units at 07/24/22 2033    insulin lispro (HUMALOG) injection vial 20 Units  20 Units SubCUTAneous TID  Dalia Lanza MD   20 Units at 07/24/22 1833    HYDROcodone-acetaminophen ([facility]) 7.5-325 MG per tablet 1 tablet  1 tablet Oral Q6H PRN Stephanie Pavon MD   1 tablet at 07/25/22 0603    HYDROmorphone (DILAUDID) injection 0.5 mg  0.5 mg IntraVENous Q4H PRN Stephanie Pavon MD   0.5 mg at 07/25/22 0854    vancomycin (VANCOCIN) 2,000 mg in dextrose 5 % 500 mL IVPB  2,000 mg IntraVENous Q8H Stephanie Pavon  mL/hr at 07/25/22 0834 2,000 mg at 07/25/22 0834    melatonin tablet 5 mg  5 mg Oral Nightly KAYLI Daniels CNP   5 mg at 07/24/22 2003    moxifloxacin (VIGAMOX) 0.5 % ophthalmic solution 1 drop  1 drop Right Eye 4x Daily [place] KAYLI Mcnamara CNP   1 drop at 07/25/22 0852    pantoprazole (PROTONIX) tablet 40 mg  40 mg Oral QAM AC KAYLI Daniels CNP   40 mg at 07/25/22 0603    prednisoLONE acetate (PRED FORTE) 1 % ophthalmic suspension 1 drop  1 drop Right Eye BID KAYLI Lux CNP   1 drop at 07/25/22 0852    pregabalin (Ernesto Oneill) capsule 100 mg  100 mg Oral BID Jesu Basset, APRN - CNP   100 mg at 07/25/22 0854    sucralfate (CARAFATE) tablet 1 g  1 g Oral 4x Daily Jesu Basset, APRN - CNP   1 g at 07/25/22 0854    lisinopril-hydroCHLOROthiazide (PRINZIDE;ZESTORETIC) 20-12.5 MG per tablet 1 tablet  1 tablet Oral Daily Jesu Basset, APRN - CNP   1 tablet at 07/25/22 0854    ezetimibe (ZETIA) tablet 10 mg  10 mg Oral Nightly Jesu Basset, APRN - CNP   10 mg at 07/24/22 2004    cloNIDine (CATAPRES) tablet 0.2 mg  0.2 mg Oral Daily Jesu Basset, APRN - CNP   0.2 mg at 07/25/22 0854    amLODIPine (NORVASC) tablet 10 mg  10 mg Oral Daily Jesu Basset, APRN - CNP   10 mg at 07/25/22 0854    atorvastatin (LIPITOR) tablet 10 mg  10 mg Oral Daily Jesu Basset, APRN - CNP   10 mg at 07/25/22 0854    brimonidine (ALPHAGAN) 0.2 % ophthalmic solution 1 drop  1 drop Right Eye BID Jesu Basset, APRN - CNP   1 drop at 07/25/22 0850    allopurinol (ZYLOPRIM) tablet 300 mg  300 mg Oral Daily Joanna I Jay, APRN - CNP   300 mg at 07/25/22 0854    albuterol sulfate HFA (PROVENTIL;VENTOLIN;PROAIR) 108 (90 Base) MCG/ACT inhaler 2 puff  2 puff Inhalation Q6H PRN Jesu Basset, APRN - CNP   2 puff at 07/21/22 1734    glucose chewable tablet 16 g  4 tablet Oral PRN Joanna I Jay, APRN - CNP        dextrose bolus 10% 125 mL  125 mL IntraVENous PRN Joanna I Jay, APRN - CNP        Or    dextrose bolus 10% 250 mL  250 mL IntraVENous PRN Joanna I Jay, APRN - CNP        glucagon (rDNA) injection 1 mg  1 mg SubCUTAneous PRN Joanna I Jay, APRN - CNP        dextrose 10 % infusion   IntraVENous Continuous PRN Joanna I Jay, KAYLI - CNP        sodium chloride flush 0.9 % injection 5-40 mL  5-40 mL IntraVENous 2 times per day KAYLI Gamble - CNP   10 mL at 07/25/22 0853    sodium chloride flush 0.9 % injection 5-40 mL  5-40 mL IntraVENous PRN KAYLI Sunshine CNP   10 mL at 07/24/22 0636    0.9 % sodium chloride infusion   IntraVENous PRN KAYLI Sunshine CNP 20 mL/hr at 07/24/22 0758 New Bag at 07/24/22 0758    [Held by provider] enoxaparin Sodium (LOVENOX) injection 30 mg  30 mg SubCUTAneous BID KAYLI Sunshine CNP   30 mg at 07/21/22 2106    ondansetron (ZOFRAN-ODT) disintegrating tablet 4 mg  4 mg Oral Q8H PRN KAYLI Daniels CNP        Or    ondansetron (ZOFRAN) injection 4 mg  4 mg IntraVENous Q6H PRN KAYLI Daniels CNP        polyethylene glycol (GLYCOLAX) packet 17 g  17 g Oral Daily PRN KAYLI Daniels CNP        acetaminophen (TYLENOL) tablet 650 mg  650 mg Oral Q6H PRN KAYLI Daniels CNP        Or    acetaminophen (TYLENOL) suppository 650 mg  650 mg Rectal Q6H PRN KAYLI Daniels CNP        nicotine (NICODERM CQ) 14 MG/24HR 1 patch  1 patch TransDERmal Daily KAYLI Daniels CNP   1 patch at 07/25/22 0854    nicotine polacrilex (COMMIT) lozenge 2 mg  2 mg Oral Q1H PRN KAYLI Sunshine CNP        timolol (TIMOPTIC) 0.5 % ophthalmic solution 1 drop  1 drop Right Eye BID KAYLI Sunshine CNP   1 drop at 07/25/22 0853    And    dorzolamide (TRUSOPT) 2 % ophthalmic solution 1 drop  1 drop Right Eye BID KAYLI Sunshine CNP   1 drop at 07/25/22 0853    cefepime (MAXIPIME) 2000 mg IVPB minibag  2,000 mg IntraVENous Q8H KAYLI Daniels CNP   Stopped at 07/25/22 0024    NONFORMULARY 1 drop (Patient Supplied)  1 drop Ophthalmic Daily KAYLI Daniels CNP   1 drop at 07/25/22 0849    ipratropium-albuterol (DUONEB) nebulizer solution 1 ampule  1 ampule Inhalation Q4H [place] KAYLI Daniels CNP        ipratropium (ATROVENT HFA) 17 MCG/ACT inhaler 2 puff  2 puff Inhalation Q6H KAYLI Dias CNP   2 puff at 07/25/22 0731    albuterol sulfate HFA (PROVENTIL;VENTOLIN;PROAIR) 108 (90 Base) MCG/ACT inhaler 2 puff  2 puff Inhalation Q6H KAYLI Talley CNP   2 puff at 07/25/22 0731       Allergies: Allergies   Allergen Reactions    Ciprofloxacin Shortness Of Breath    Glucosamine Anaphylaxis    Shellfish-Derived Products Anaphylaxis       Problem List:    Patient Active Problem List   Diagnosis Code    Sprain of left ankle S93.402A    Closed nondisplaced fracture of fifth metatarsal bone of left foot S92.355A    Alcohol abuse F10.10    UTI (urinary tract infection) due to Enterococcus N39.0, B95.2    Acute osteomyelitis of toe, right ([facility]) M86.171    Osteomyelitis ([facility]) M86.9    Diabetic foot infection ([facility]) E11.628, L08.9    Shortness of breath R06.02    Asthma exacerbation J45. 0    Appendicitis K37    Diabetic ulcer of right foot due to type 2 diabetes mellitus ([facility] [address] E11.621, L97.519    Cellulitis L03.90       Past Medical History:        Diagnosis Date    Asthma     Blind left eye     Diabetes mellitus ([facility] [address]     GERD (gastroesophageal reflux disease)     Hypertension     Retinal detachment 2012    left       Past Surgical History:        Procedure Laterality Date    CORNEAL TRANSPLANT Bilateral     EYE SURGERY      left eye removed    EYE SURGERY Right     FRACTURE SURGERY      foot left    LAPAROSCOPIC APPENDECTOMY N/A 4/28/2022    APPENDECTOMY LAPAROSCOPIC performed by Herbert Bajwa MD at [address] [place] [address] Right     MANDIBLE SURGERY Bilateral     TOE AMPUTATION Right 07/25/2017       Social History:    Social History     Tobacco Use    Smoking status: Every Day     Packs/day: 0.50     Years: 5.00     Pack years: 2.50     Types: Cigarettes    Smokeless tobacco: Never   Substance Use Topics    Alcohol use: Yes     Comment: occasionally                                Ready to quit: Not Answered  Counseling given: Not Answered      Vital Signs (Current):   Vitals:    07/25/22 0423 07/25/22 0603 07/25/22 0731 07/25/22 0827   BP: (!) 147/95   (!) 144/92   Pulse: 88   88   Resp: 20 16 18 16   Temp: 97.9 °F (36.6 °C)   98.2 °F (36.8 °C)   TempSrc: Oral      SpO2: 97%   96%   Weight:       Height:                                                  BP Readings from Last 3 Encounters:   07/25/22 (!) 144/92   07/07/22 (!) 135/107   04/29/22 (!) 142/90       NPO Status:                                                                                 BMI:   Wt Readings from Last 3 Encounters:   07/23/22 (!) 330 lb 9.6 oz (150 kg)   07/13/22 (!) 322 lb (146.1 kg)   07/03/22 (!) 322 lb 5 oz (146.2 kg)     Body mass index is 41.32 kg/m².     CBC:   Lab Results   Component Value Date/Time    WBC 4.3 07/24/2022 08:37 AM    RBC 3.91 07/24/2022 08:37 AM    HGB 12.2 07/24/2022 08:37 AM    HCT 35.6 07/24/2022 08:37 AM    MCV 91.0 07/24/2022 08:37 AM    RDW 11.6 07/24/2022 08:37 AM     07/24/2022 08:37 AM       CMP:   Lab Results   Component Value Date/Time     07/24/2022 08:37 AM    K 4.1 07/24/2022 08:37 AM    CL 96 07/24/2022 08:37 AM    CO2 26 07/24/2022 08:37 AM    BUN 12 07/24/2022 08:37 AM    CREATININE 0.7 07/24/2022 08:37 AM    GFRAA >60 07/24/2022 08:37 AM    LABGLOM >60 07/24/2022 08:37 AM    GLUCOSE 235 07/24/2022 08:37 AM    PROT 6.4 07/22/2022 12:02 AM    PROT 6.8 04/16/2011 01:19 PM    CALCIUM 9.3 07/24/2022 08:37 AM    BILITOT 0.3 07/22/2022 12:02 AM    ALKPHOS 98 07/22/2022 12:02 AM    AST 19 07/22/2022 12:02 AM    ALT 19 07/22/2022 12:02 AM       POC Tests:   Recent Labs     07/25/22  0720   POCGLU 221*       Coags:   Lab Results   Component Value Date/Time    PROTIME 11.3 07/27/2019 05:31 AM    INR 0.97 07/27/2019 05:31 AM    APTT 26.6 07/27/2019 05:31 AM       HCG (If Applicable): No results found for: PREGTESTUR, PREGSERUM, HCG, HCGQUANT     ABGs:   Lab Results   Component Value Date/Time    PO2ART 168 05/09/2017 08:00 AM    SCP4IHN 29.0 05/09/2017 08:00 AM    QTA8SMI 23.7 05/09/2017 08:00 AM        Type & Screen (If Applicable):  No results found for: LABABO, LABRH    Drug/Infectious Status (If Applicable):  No results found for: HIV, HEPCAB    COVID-19 Screening (If Applicable):   Lab Results   Component Value Date/Time    COVID19 NOT DETECTED 03/20/2021 10:26 AM           Anesthesia Evaluation  Patient summary reviewed and Nursing notes reviewed no history of anesthetic complications:   Airway: Mallampati: II  TM distance: >3 FB   Neck ROM: full  Comment: Full beard  Mouth opening: > = 3 FB   Dental:    (+) poor dentition      Pulmonary:   (+) asthma: current smoker                           Cardiovascular:  Exercise tolerance: poor (<4 METS),   (+) hypertension:, hyperlipidemia         Beta Blocker:  Not on Beta Blocker         Neuro/Psych:   (+) psychiatric history (etoh abuse):            GI/Hepatic/Renal:   (+) GERD:, morbid obesity          Endo/Other:    (+) DiabetesType II DM, poorly controlled, , .                 Abdominal:             Vascular: negative vascular ROS. Other Findings:           Anesthesia Plan      general     ASA 3 - emergent       Induction: intravenous. MIPS: Postoperative opioids intended and Prophylactic antiemetics administered. Anesthetic plan and risks discussed with patient. Plan discussed with CRNA. Pre Anesthesia Evaluation complete. Anesthesia plan, risks, benefits, alternatives, and personal involved discussed with patient. Patients and/or legal guardian verbalized an understanding  and agreed to proceed.   Mone Carranza DO  7/25/2022

## 2022-07-25 NOTE — PROGRESS NOTES
Progress Note    Subjective:      Velma Messer   39-year-old male well known to me. Admitted with recurrent right foot infection secondary to presumed osteomyelitis of the second toe near the metatarsal head. This patient had been hospitalized have been following him as an outpatient last time I saw the patient in the office his wound was healing nicely without signs of obvious osteomyelitis. On this admission deep debridement shows extension of the wound down to the proximal phalanx near the metatarsal head suggesting osteomyelitis causing recurrent infection. The patient is awake and alert he claims his foot is feeling better and he has been on broad-spectrum antibiotic therapy. The patient has a long history of poorly controlled diabetes which has resulted in the need for previous toe amputations and severe diabetic eye problems undergoing recent surgery. Objective:     BP (!) 147/95   Pulse 88   Temp 97.9 °F (36.6 °C) (Oral)   Resp 18   Ht 6' 3\" (1.905 m)   Wt (!) 330 lb 9.6 oz (150 kg)   SpO2 97%   BMI 41.32 kg/m²     In: 435 [P.O.:435]  Out: 1400 [Urine:1400]         General: Awake alert no distress  Abdomen: Obese soft nontender  Lungs: Clear  Other: Right second toe has had a distal amputation but there is a 1 cm opening on the bottom of the toe extending down to the bone. There is no significant pus or drainage or significant cellulitis there is mild edema of the soft tissue. Labs:   CBC:   Lab Results   Component Value Date/Time    WBC 4.3 07/24/2022 08:37 AM    RBC 3.91 07/24/2022 08:37 AM    HGB 12.2 07/24/2022 08:37 AM    HCT 35.6 07/24/2022 08:37 AM    MCV 91.0 07/24/2022 08:37 AM    MCH 31.2 07/24/2022 08:37 AM    MCHC 34.3 07/24/2022 08:37 AM    RDW 11.6 07/24/2022 08:37 AM     07/24/2022 08:37 AM    MPV 8.9 07/24/2022 08:37 AM        Assessment:     39-year-old male with long-standing diabetes poorly controlled with osteomyelitis of the second toe.      Plan:   The patient be taken to surgery today for a ray amputation of the right second toe. I discussed with the patient in great detail the nature of his problems or risks and benefits of surgery. The patient has very limited IV access so a preoperative PICC line has been ordered.

## 2022-07-25 NOTE — CONSULTS
Midline meets therapeutic needs at this time. Arrow Endurance Extended Dwell Midline inserted in RUE Basilic vessel x 1 attempts using sterile ultrasound guided technique. Brisk blood return, flushes without resistance.     Patient tolerated well

## 2022-07-26 PROBLEM — L97.509 DIABETIC FOOT ULCER WITH OSTEOMYELITIS (HCC): Status: ACTIVE | Noted: 2022-07-02

## 2022-07-26 PROBLEM — E11.69 DIABETIC FOOT ULCER WITH OSTEOMYELITIS (HCC): Status: ACTIVE | Noted: 2022-07-02

## 2022-07-26 PROBLEM — M86.9 DIABETIC FOOT ULCER WITH OSTEOMYELITIS (HCC): Status: ACTIVE | Noted: 2022-07-02

## 2022-07-26 PROBLEM — S91.109A OPEN WOUND OF TOE: Status: ACTIVE | Noted: 2022-07-26

## 2022-07-26 LAB
CULTURE: NORMAL
CULTURE: NORMAL
DOSE AMOUNT: NORMAL
DOSE TIME: NORMAL
GLUCOSE BLD-MCNC: 121 MG/DL (ref 70–99)
GLUCOSE BLD-MCNC: 207 MG/DL (ref 70–99)
GLUCOSE BLD-MCNC: 214 MG/DL (ref 70–99)
GLUCOSE BLD-MCNC: 262 MG/DL (ref 70–99)
GLUCOSE BLD-MCNC: 286 MG/DL (ref 70–99)
HIGH SENSITIVE C-REACTIVE PROTEIN: 53.5 MG/L
Lab: NORMAL
Lab: NORMAL
SPECIMEN: NORMAL
SPECIMEN: NORMAL
VANCOMYCIN RANDOM: 26.9 UG/ML

## 2022-07-26 PROCEDURE — 6370000000 HC RX 637 (ALT 250 FOR IP): Performed by: INTERNAL MEDICINE

## 2022-07-26 PROCEDURE — 6360000002 HC RX W HCPCS: Performed by: NURSE PRACTITIONER

## 2022-07-26 PROCEDURE — 94761 N-INVAS EAR/PLS OXIMETRY MLT: CPT

## 2022-07-26 PROCEDURE — 94640 AIRWAY INHALATION TREATMENT: CPT

## 2022-07-26 PROCEDURE — 1200000000 HC SEMI PRIVATE

## 2022-07-26 PROCEDURE — 6370000000 HC RX 637 (ALT 250 FOR IP): Performed by: SURGERY

## 2022-07-26 PROCEDURE — 36415 COLL VENOUS BLD VENIPUNCTURE: CPT

## 2022-07-26 PROCEDURE — 86141 C-REACTIVE PROTEIN HS: CPT

## 2022-07-26 PROCEDURE — 6360000002 HC RX W HCPCS: Performed by: INTERNAL MEDICINE

## 2022-07-26 PROCEDURE — 80202 ASSAY OF VANCOMYCIN: CPT

## 2022-07-26 PROCEDURE — 82962 GLUCOSE BLOOD TEST: CPT

## 2022-07-26 PROCEDURE — 6370000000 HC RX 637 (ALT 250 FOR IP): Performed by: NURSE PRACTITIONER

## 2022-07-26 PROCEDURE — 2580000003 HC RX 258: Performed by: INTERNAL MEDICINE

## 2022-07-26 PROCEDURE — 99255 IP/OBS CONSLTJ NEW/EST HI 80: CPT | Performed by: INTERNAL MEDICINE

## 2022-07-26 PROCEDURE — 2580000003 HC RX 258: Performed by: NURSE PRACTITIONER

## 2022-07-26 PROCEDURE — 2580000003 HC RX 258: Performed by: SURGERY

## 2022-07-26 PROCEDURE — APPSS60 APP SPLIT SHARED TIME 46-60 MINUTES: Performed by: NURSE PRACTITIONER

## 2022-07-26 RX ADMIN — INSULIN LISPRO 8 UNITS: 100 INJECTION, SOLUTION INTRAVENOUS; SUBCUTANEOUS at 01:46

## 2022-07-26 RX ADMIN — VANCOMYCIN HYDROCHLORIDE 2000 MG: 1 INJECTION, POWDER, LYOPHILIZED, FOR SOLUTION INTRAVENOUS at 17:31

## 2022-07-26 RX ADMIN — HUMAN INSULIN 20 UNITS: 100 INJECTION, SUSPENSION SUBCUTANEOUS at 09:57

## 2022-07-26 RX ADMIN — Medication 5 MG: at 21:05

## 2022-07-26 RX ADMIN — METFORMIN HYDROCHLORIDE 1000 MG: 500 TABLET ORAL at 17:28

## 2022-07-26 RX ADMIN — HYDROMORPHONE HYDROCHLORIDE 0.5 MG: 1 INJECTION, SOLUTION INTRAMUSCULAR; INTRAVENOUS; SUBCUTANEOUS at 02:24

## 2022-07-26 RX ADMIN — METFORMIN HYDROCHLORIDE 1000 MG: 500 TABLET ORAL at 09:55

## 2022-07-26 RX ADMIN — SODIUM CHLORIDE: 9 INJECTION, SOLUTION INTRAVENOUS at 04:07

## 2022-07-26 RX ADMIN — BRIMONIDINE TARTRATE 1 DROP: 2 SOLUTION/ DROPS OPHTHALMIC at 10:06

## 2022-07-26 RX ADMIN — INSULIN GLARGINE 50 UNITS: 100 INJECTION, SOLUTION SUBCUTANEOUS at 21:10

## 2022-07-26 RX ADMIN — HYDROCODONE BITARTRATE AND ACETAMINOPHEN 1 TABLET: 7.5; 325 TABLET ORAL at 04:06

## 2022-07-26 RX ADMIN — LISINOPRIL AND HYDROCHLOROTHIAZIDE 1 TABLET: 12.5; 2 TABLET ORAL at 09:56

## 2022-07-26 RX ADMIN — ALBUTEROL SULFATE 2 PUFF: 90 AEROSOL, METERED RESPIRATORY (INHALATION) at 15:35

## 2022-07-26 RX ADMIN — TIMOLOL MALEATE 1 DROP: 5 SOLUTION OPHTHALMIC at 10:06

## 2022-07-26 RX ADMIN — BRIMONIDINE TARTRATE 1 DROP: 2 SOLUTION/ DROPS OPHTHALMIC at 17:28

## 2022-07-26 RX ADMIN — SODIUM CHLORIDE, PRESERVATIVE FREE 10 ML: 5 INJECTION INTRAVENOUS at 22:18

## 2022-07-26 RX ADMIN — PREGABALIN 100 MG: 100 CAPSULE ORAL at 09:56

## 2022-07-26 RX ADMIN — VANCOMYCIN HYDROCHLORIDE 2000 MG: 1 INJECTION, POWDER, LYOPHILIZED, FOR SOLUTION INTRAVENOUS at 01:46

## 2022-07-26 RX ADMIN — ALBUTEROL SULFATE 2 PUFF: 90 AEROSOL, METERED RESPIRATORY (INHALATION) at 07:40

## 2022-07-26 RX ADMIN — INSULIN LISPRO 20 UNITS: 100 INJECTION, SOLUTION INTRAVENOUS; SUBCUTANEOUS at 12:43

## 2022-07-26 RX ADMIN — HYDROMORPHONE HYDROCHLORIDE 0.5 MG: 1 INJECTION, SOLUTION INTRAMUSCULAR; INTRAVENOUS; SUBCUTANEOUS at 14:41

## 2022-07-26 RX ADMIN — HYDROMORPHONE HYDROCHLORIDE 0.5 MG: 1 INJECTION, SOLUTION INTRAMUSCULAR; INTRAVENOUS; SUBCUTANEOUS at 22:55

## 2022-07-26 RX ADMIN — CEFEPIME HYDROCHLORIDE 2000 MG: 2 INJECTION, POWDER, FOR SOLUTION INTRAVENOUS at 21:10

## 2022-07-26 RX ADMIN — INSULIN LISPRO 4 UNITS: 100 INJECTION, SOLUTION INTRAVENOUS; SUBCUTANEOUS at 12:45

## 2022-07-26 RX ADMIN — EZETIMIBE 10 MG: 10 TABLET ORAL at 21:05

## 2022-07-26 RX ADMIN — SUCRALFATE 1 G: 1 TABLET ORAL at 11:51

## 2022-07-26 RX ADMIN — DORZOLAMIDE HYDROCHLORIDE 1 DROP: 20 SOLUTION/ DROPS OPHTHALMIC at 10:06

## 2022-07-26 RX ADMIN — INSULIN LISPRO 8 UNITS: 100 INJECTION, SOLUTION INTRAVENOUS; SUBCUTANEOUS at 21:10

## 2022-07-26 RX ADMIN — SUCRALFATE 1 G: 1 TABLET ORAL at 09:55

## 2022-07-26 RX ADMIN — VANCOMYCIN HYDROCHLORIDE 2000 MG: 1 INJECTION, POWDER, LYOPHILIZED, FOR SOLUTION INTRAVENOUS at 10:17

## 2022-07-26 RX ADMIN — PANTOPRAZOLE SODIUM 40 MG: 40 TABLET, DELAYED RELEASE ORAL at 10:15

## 2022-07-26 RX ADMIN — SUCRALFATE 1 G: 1 TABLET ORAL at 17:28

## 2022-07-26 RX ADMIN — PREDNISOLONE ACETATE 1 DROP: 10 SUSPENSION/ DROPS OPHTHALMIC at 17:28

## 2022-07-26 RX ADMIN — MOXIFLOXACIN OPHTHALMIC SOLUTION 1 DROP: 5 SOLUTION/ DROPS OPHTHALMIC at 10:06

## 2022-07-26 RX ADMIN — AMLODIPINE BESYLATE 10 MG: 10 TABLET ORAL at 09:55

## 2022-07-26 RX ADMIN — HYDROMORPHONE HYDROCHLORIDE 0.5 MG: 1 INJECTION, SOLUTION INTRAMUSCULAR; INTRAVENOUS; SUBCUTANEOUS at 06:44

## 2022-07-26 RX ADMIN — CLONIDINE HYDROCHLORIDE 0.2 MG: 0.2 TABLET ORAL at 09:55

## 2022-07-26 RX ADMIN — TIMOLOL MALEATE 1 DROP: 5 SOLUTION OPHTHALMIC at 22:19

## 2022-07-26 RX ADMIN — GLIPIZIDE 15 MG: 10 TABLET ORAL at 10:15

## 2022-07-26 RX ADMIN — MOXIFLOXACIN OPHTHALMIC SOLUTION 1 DROP: 5 SOLUTION/ DROPS OPHTHALMIC at 22:18

## 2022-07-26 RX ADMIN — INSULIN LISPRO 20 UNITS: 100 INJECTION, SOLUTION INTRAVENOUS; SUBCUTANEOUS at 10:11

## 2022-07-26 RX ADMIN — PREGABALIN 100 MG: 100 CAPSULE ORAL at 21:05

## 2022-07-26 RX ADMIN — Medication 2 PUFF: at 20:18

## 2022-07-26 RX ADMIN — INSULIN LISPRO 20 UNITS: 100 INJECTION, SOLUTION INTRAVENOUS; SUBCUTANEOUS at 18:50

## 2022-07-26 RX ADMIN — MOXIFLOXACIN OPHTHALMIC SOLUTION 1 DROP: 5 SOLUTION/ DROPS OPHTHALMIC at 12:51

## 2022-07-26 RX ADMIN — ALBUTEROL SULFATE 2 PUFF: 90 AEROSOL, METERED RESPIRATORY (INHALATION) at 20:17

## 2022-07-26 RX ADMIN — SODIUM CHLORIDE, PRESERVATIVE FREE 10 ML: 5 INJECTION INTRAVENOUS at 10:07

## 2022-07-26 RX ADMIN — SUCRALFATE 1 G: 1 TABLET ORAL at 21:05

## 2022-07-26 RX ADMIN — CEFEPIME HYDROCHLORIDE 2000 MG: 2 INJECTION, POWDER, FOR SOLUTION INTRAVENOUS at 04:09

## 2022-07-26 RX ADMIN — ATORVASTATIN CALCIUM 10 MG: 10 TABLET, FILM COATED ORAL at 09:56

## 2022-07-26 RX ADMIN — Medication 2 PUFF: at 15:35

## 2022-07-26 RX ADMIN — DORZOLAMIDE HYDROCHLORIDE 1 DROP: 20 SOLUTION/ DROPS OPHTHALMIC at 22:19

## 2022-07-26 RX ADMIN — ALLOPURINOL 300 MG: 300 TABLET ORAL at 10:07

## 2022-07-26 RX ADMIN — SODIUM CHLORIDE, PRESERVATIVE FREE 10 ML: 5 INJECTION INTRAVENOUS at 10:08

## 2022-07-26 RX ADMIN — MOXIFLOXACIN OPHTHALMIC SOLUTION 1 DROP: 5 SOLUTION/ DROPS OPHTHALMIC at 17:28

## 2022-07-26 RX ADMIN — GLIPIZIDE 15 MG: 10 TABLET ORAL at 14:41

## 2022-07-26 RX ADMIN — Medication 2 PUFF: at 07:39

## 2022-07-26 RX ADMIN — HYDROCODONE BITARTRATE AND ACETAMINOPHEN 1 TABLET: 7.5; 325 TABLET ORAL at 20:21

## 2022-07-26 RX ADMIN — INSULIN LISPRO 4 UNITS: 100 INJECTION, SOLUTION INTRAVENOUS; SUBCUTANEOUS at 10:10

## 2022-07-26 RX ADMIN — PREDNISOLONE ACETATE 1 DROP: 10 SUSPENSION/ DROPS OPHTHALMIC at 10:06

## 2022-07-26 RX ADMIN — HYDROCODONE BITARTRATE AND ACETAMINOPHEN 1 TABLET: 7.5; 325 TABLET ORAL at 11:51

## 2022-07-26 RX ADMIN — CEFEPIME HYDROCHLORIDE 2000 MG: 2 INJECTION, POWDER, FOR SOLUTION INTRAVENOUS at 12:43

## 2022-07-26 RX ADMIN — HYDROMORPHONE HYDROCHLORIDE 0.5 MG: 1 INJECTION, SOLUTION INTRAMUSCULAR; INTRAVENOUS; SUBCUTANEOUS at 18:56

## 2022-07-26 RX ADMIN — ALBUTEROL SULFATE 2 PUFF: 90 AEROSOL, METERED RESPIRATORY (INHALATION) at 04:11

## 2022-07-26 ASSESSMENT — ENCOUNTER SYMPTOMS
EYE PAIN: 0
ABDOMINAL PAIN: 0
SORE THROAT: 0
EYE REDNESS: 0
COUGH: 0
NAUSEA: 0
BACK PAIN: 0
EYE ITCHING: 0
VOMITING: 0
CHEST TIGHTNESS: 0
SHORTNESS OF BREATH: 0
CONSTIPATION: 0
DIARRHEA: 0
SINUS PRESSURE: 0
WHEEZING: 0
SINUS PAIN: 0

## 2022-07-26 ASSESSMENT — PAIN DESCRIPTION - LOCATION
LOCATION: FOOT

## 2022-07-26 ASSESSMENT — PAIN SCALES - GENERAL
PAINLEVEL_OUTOF10: 8
PAINLEVEL_OUTOF10: 9
PAINLEVEL_OUTOF10: 1
PAINLEVEL_OUTOF10: 9
PAINLEVEL_OUTOF10: 7

## 2022-07-26 ASSESSMENT — PAIN DESCRIPTION - DESCRIPTORS: DESCRIPTORS: ACHING;THROBBING

## 2022-07-26 ASSESSMENT — PAIN DESCRIPTION - ORIENTATION: ORIENTATION: RIGHT

## 2022-07-26 NOTE — PROGRESS NOTES
In-Patient Progress Note    Patient:  Christo Ballesteros 39 y.o. male MRN: [id number]     Date of Service: 7/26/2022    Hospital Day: 6      Chief complaint: had concerns including Wound Check (Right foot wound) and Leg Swelling (Right leg swelling since last night). Subjective   Seen and examined in the AM.  Left foot bandaged when seen. Patient complained of 10 out of 10 pain over the left foot. States that pain medications help. Denied any nausea, vomiting, chest pain, abdominal pain, constipation, diarrhea, headache. See ROS. Assessment and Plan   Christo Ballesteros, a 39 y.o. male, with a history of IDDM, [place], cocaine use, tobacco use, alcohol use, obesity, Recent Rt. Corneal transplant, missing Rt. eye was admitted on 7/21/2022 with complaints of had concerns including Wound Check (Right foot wound) and Leg Swelling (Right leg swelling since last night). Assessment  Osteomyelitis right proximal phalanx and head of metatarsal s/p amputation of second toe (7/25/2022)  Cultures pending  On Cefepime and vanc  GS and ID on board   Wound care on board  On pain meds    Right lower extremity cellulitis  See #1      Open wound of right foot  See #1 and 2    Lactic acidosis-resolved    Uncontrolled IDDM  HbA1c 9.9%. Endocrine on board  Currently on Lantus 15 units nightly, 20 units 3 times daily and modified high-dose sliding scale. Hyponatremia 2/2 likely translocation and hypokalemia-resolved    History of asthma  On home inhalers     History of uncontrolled hypertension, HLD  History of cocaine use disorder  History of tobacco use disorder  History of alcohol use disorder  Obesity class III  Recent Rt. Corneal Transplant  Missing Lt.  Eye  H/O Gout      Plan  F/U GS, endocrine and ID recs  F/U Cultures  F/U PT recs  Deescalate Abx once final cultures are back - plan on bactrim for 14 days per ID      # Peptic ulcer prophylaxis: On pantoprazole and carafate  # DVT Prophylaxis: Lovenox on hold      Current living situation: Home  Expected Disposition: Home  Estimated discharge date: 24 - 48 hours. Review of System     Review of Systems   Constitutional:  Negative for appetite change, chills, fatigue, fever and unexpected weight change. HENT:  Negative for sinus pressure, sinus pain and sore throat. Eyes:  Negative for pain, redness and itching. Respiratory:  Negative for cough, chest tightness, shortness of breath and wheezing. Cardiovascular:  Negative for chest pain, palpitations and leg swelling. Gastrointestinal:  Negative for abdominal pain, constipation, diarrhea, nausea and vomiting. Endocrine: Negative for polydipsia, polyphagia and polyuria. Genitourinary:  Negative for decreased urine volume, difficulty urinating, dysuria, frequency, hematuria and urgency. Musculoskeletal:  Negative for arthralgias, back pain and myalgias. Rt. Foot pain   Skin:  Negative for pallor and rash. Neurological:  Negative for dizziness, tremors, seizures, syncope, weakness, light-headedness, numbness and headaches. Psychiatric/Behavioral:  Negative for agitation and confusion. I have reviewed all pertinent PMHx, PSHx, FamHx, SocialHx, medications, and allergies and updated history as appropriate.     Physical Exam   VITAL SIGNS:  BP (!) 141/82   Pulse 90   Temp 98.1 °F (36.7 °C)   Resp 16   Ht 6' 3\" (1.905 m)   Wt (!) 329 lb (149.2 kg)   SpO2 98%   BMI 41.12 kg/m²   Tmax over 24 hours:  Temp (24hrs), Av.1 °F (36.7 °C), Min:98.1 °F (36.7 °C), Max:98.2 °F (36.8 °C)      Patient Vitals for the past 6 hrs:   BP Temp Pulse Resp SpO2   22 1511 -- -- -- 16 --   22 1431 (!) 141/82 98.1 °F (36.7 °C) 90 16 98 %   22 1221 -- -- -- 19 --         Intake/Output Summary (Last 24 hours) at 2022 1814  Last data filed at 2022 1552  Gross per 24 hour   Intake --   Output 1150 ml   Net -1150 ml     Wt Readings from Last 2 Encounters:   22 (!) 329 lb Once    metFORMIN  1,000 mg Oral BID WC    insulin lispro  0-16 Units SubCUTAneous TID     insulin glargine  50 Units SubCUTAneous Nightly    insulin lispro  20 Units SubCUTAneous TID WC    vancomycin  2,000 mg IntraVENous Q8H    melatonin  5 mg Oral Nightly    moxifloxacin  1 drop Right Eye 4x Daily    pantoprazole  40 mg Oral QAM AC    prednisoLONE acetate  1 drop Right Eye BID    pregabalin  100 mg Oral BID    sucralfate  1 g Oral 4x Daily    lisinopril-hydroCHLOROthiazide  1 tablet Oral Daily    ezetimibe  10 mg Oral Nightly    cloNIDine  0.2 mg Oral Daily    amLODIPine  10 mg Oral Daily    atorvastatin  10 mg Oral Daily    brimonidine  1 drop Right Eye BID    allopurinol  300 mg Oral Daily    sodium chloride flush  5-40 mL IntraVENous 2 times per day    [Held by provider] enoxaparin  30 mg SubCUTAneous BID    nicotine  1 patch TransDERmal Daily    timolol  1 drop Right Eye BID    And    dorzolamide  1 drop Right Eye BID    cefepime  2,000 mg IntraVENous Q8H    NONFORMULARY 1 drop (Patient Supplied)  1 drop Ophthalmic Daily    ipratropium  2 puff Inhalation Q6H WA    albuterol sulfate HFA  2 puff Inhalation Q6H WA         Labs and Imaging Studies   Laboratory findings:  No results found. Recent Results (from the past 24 hour(s))   POCT Glucose    Collection Time: 07/25/22  7:52 PM   Result Value Ref Range    POC Glucose 358 (H) 70 - 99 MG/DL   POCT Glucose    Collection Time: 07/26/22  1:42 AM   Result Value Ref Range    POC Glucose 286 (H) 70 - 99 MG/DL   C-Reactive Protein    Collection Time: 07/26/22  4:51 AM   Result Value Ref Range    CRP, High Sensitivity 53.5 mg/L   Vancomycin Level, Random    Collection Time: 07/26/22  4:51 AM   Result Value Ref Range    Vancomycin Rm 26.9 UG/ML    DOSE AMOUNT DOSE AMT.  GIVEN - 2000mg     DOSE TIME DOSE TIME GIVEN - iv q8hr    POCT Glucose    Collection Time: 07/26/22  7:51 AM   Result Value Ref Range    POC Glucose 207 (H) 70 - 99 MG/DL   POCT Glucose    Collection Time: 07/26/22 11:55 AM   Result Value Ref Range    POC Glucose 214 (H) 70 - 99 MG/DL   POCT Glucose    Collection Time: 07/26/22  4:52 PM   Result Value Ref Range    POC Glucose 121 (H) 70 - 99 MG/DL           Electronically signed by Leandro Cardenas MD on 7/26/2022 at 6:14 PM

## 2022-07-26 NOTE — PROGRESS NOTES
Progress Note( Dr. Lizzie Modi)  7/26/2022  Subjective:   Admit Date: 7/21/2022  PCP: Natasha De Souza MD    Admitted For :Right foot cellulitis       Consulted For:  Better control of blood glucose    Interval History: Still have pain in the right foot is not seen by surgery    OPERATION PERFORMED:  Amputation of proximal phalanx and head of  metatarsal, second right toe   7/25/2022    Denies any chest pains,   Denies SOB . Denies nausea or vomiting. No new bowel or bladder symptoms. Intake/Output Summary (Last 24 hours) at 7/26/2022 0712  Last data filed at 7/26/2022 0433  Gross per 24 hour   Intake 4554.84 ml   Output 2805 ml   Net 1749.84 ml         DATA    CBC:   Recent Labs     07/24/22  0837 07/25/22  1330   WBC 4.3 5.3   HGB 12.2* 12.9*    271      CMP:  Recent Labs     07/24/22  0837 07/25/22  1330    134*   K 4.1 4.1   CL 96* 95*   CO2 26 28   BUN 12 12   CREATININE 0.7* 0.6*   CALCIUM 9.3 9.5       Lipids:   Lab Results   Component Value Date/Time    CHOL 142 04/16/2011 01:19 PM    HDL 41 04/16/2011 01:19 PM    TRIG 268 04/16/2011 01:19 PM     Glucose:  Recent Labs     07/25/22  1754 07/25/22  1952 07/26/22  0142   POCGLU 265* 358* 286*       [id number]:  Lab Results   Component Value Date/Time    LABA1C 9.9 07/21/2022 07:50 AM     High Sensitivity TSH:   Lab Results   Component Value Date/Time    [facility] 2.685 04/16/2011 01:19 PM     Free T3: No results found for: FT3  Free T4:No results found for: T4FREE    VL DUP LOWER EXTREMITY VENOUS RIGHT   Final Result   No sonographic/Doppler evidence of DVT in the right lower extremity. Right   anterior tibial artery appears patent. .         XR FOOT RIGHT (MIN 3 VIEWS)   Final Result   Stable postsurgical changes. No acute osseous abnormality or radiographic   evidence of osteomyelitis.               Scheduled Medicines   Medications:    sodium chloride flush  5-40 mL IntraVENous 2 times per day    insulin lispro  0-16 Units SubCUTAneous 2 times per day    glipiZIDE  10 mg Oral BID AC    insulin lispro  8 Units SubCUTAneous Once    metFORMIN  1,000 mg Oral BID WC    insulin lispro  0-16 Units SubCUTAneous TID WC    insulin glargine  50 Units SubCUTAneous Nightly    insulin lispro  20 Units SubCUTAneous TID WC    vancomycin  2,000 mg IntraVENous Q8H    melatonin  5 mg Oral Nightly    moxifloxacin  1 drop Right Eye 4x Daily    pantoprazole  40 mg Oral QAM AC    prednisoLONE acetate  1 drop Right Eye BID    pregabalin  100 mg Oral BID    sucralfate  1 g Oral 4x Daily    lisinopril-hydroCHLOROthiazide  1 tablet Oral Daily    ezetimibe  10 mg Oral Nightly    cloNIDine  0.2 mg Oral Daily    amLODIPine  10 mg Oral Daily    atorvastatin  10 mg Oral Daily    brimonidine  1 drop Right Eye BID    allopurinol  300 mg Oral Daily    sodium chloride flush  5-40 mL IntraVENous 2 times per day    [Held by provider] enoxaparin  30 mg SubCUTAneous BID    nicotine  1 patch TransDERmal Daily    timolol  1 drop Right Eye BID    And    dorzolamide  1 drop Right Eye BID    cefepime  2,000 mg IntraVENous Q8H    NONFORMULARY 1 drop (Patient Supplied)  1 drop Ophthalmic Daily    ipratropium  2 puff Inhalation Q6H WA    albuterol sulfate HFA  2 puff Inhalation Q6H WA      Infusions:    sodium chloride 50 mL/hr at 07/26/22 0407    dextrose      sodium chloride 20 mL/hr at 07/25/22 1508         Objective:   Vitals: BP (!) 103/7   Pulse 80   Temp 98.2 °F (36.8 °C) (Oral)   Resp 18   Ht 6' 3\" (1.905 m)   Wt (!) 329 lb (149.2 kg)   SpO2 96%   BMI 41.12 kg/m²   General appearance: alert and cooperative with exam  Neck: no JVD or bruit  Thyroid : Normal lobes   Lungs: Has Vesicular Breath sounds   Heart:  regular rate and rhythm  Abdomen: soft, non-tender; bowel sounds normal; no masses,  no organomegaly  Musculoskeletal: Normal  Extremities: extremities normal, , no edema right foot cellulitis  Neurologic:  Awake, alert, oriented to name, place and time.   Cranial nerves II-XII are grossly intact. Motor is  intact. Sensory neuropathy t ,  and gait is normal    Assessment:     Patient Active Problem List:     Sprain of left ankle     Closed nondisplaced fracture of fifth metatarsal bone of left foot     Alcohol abuse     UTI (urinary tract infection) due to Enterococcus     Acute osteomyelitis of toe, right (HCC)     Osteomyelitis of second toe of right foot (HCC)     Diabetic foot infection (HCC)     Shortness of breath     Asthma exacerbation     Appendicitis     Diabetic ulcer of right foot due to type 2 diabetes mellitus ([facility] [address]     Cellulitis      Plan:     Reviewed POC blood glucose . Labs and X ray results   Reviewed Current Medicines   On meal/ Correction bolus Humalog/ Basal Lantus Insulin regime   Monitor Blood glucose frequently   Modified  the dose of Insulin/ other medicines as needed   Will follow     .      Rasta Rodriguez MD, MD

## 2022-07-26 NOTE — PROGRESS NOTES
Progress Note( Dr. Susan Hsieh)  7/25/2022  Subjective:   Admit Date: 7/21/2022  PCP: Nisha Veronica MD    Admitted For :Right foot cellulitis       Consulted For:  Better control of blood glucose    Interval History: Still have pain in the right foot is not seen by surgery    Denies any chest pains,   Denies SOB . Denies nausea or vomiting. No new bowel or bladder symptoms. Intake/Output Summary (Last 24 hours) at 7/25/2022 2236  Last data filed at 7/25/2022 1647  Gross per 24 hour   Intake 4869.84 ml   Output 3205 ml   Net 1664.84 ml       DATA    CBC:   Recent Labs     07/23/22  0018 07/24/22  0837 07/25/22  1330   WBC 4.3 4.3 5.3   HGB 11.4* 12.2* 12.9*    232 271    CMP:  Recent Labs     07/23/22  0018 07/24/22  0837 07/25/22  1330    136 134*   K 3.9 4.1 4.1   CL 99 96* 95*   CO2 25 26 28   BUN 13 12 12   CREATININE 0.7* 0.7* 0.6*   CALCIUM 8.9 9.3 9.5     Lipids:   Lab Results   Component Value Date/Time    CHOL 142 04/16/2011 01:19 PM    HDL 41 04/16/2011 01:19 PM    TRIG 268 04/16/2011 01:19 PM     Glucose:  Recent Labs     07/25/22  1631 07/25/22  1754 07/25/22 1952   POCGLU 265* 265* 358*     [id number]:  Lab Results   Component Value Date/Time    LABA1C 9.9 07/21/2022 07:50 AM     High Sensitivity TSH:   Lab Results   Component Value Date/Time    [facility] 2.685 04/16/2011 01:19 PM     Free T3: No results found for: FT3  Free T4:No results found for: T4FREE    VL DUP LOWER EXTREMITY VENOUS RIGHT   Final Result   No sonographic/Doppler evidence of DVT in the right lower extremity. Right   anterior tibial artery appears patent. .         XR FOOT RIGHT (MIN 3 VIEWS)   Final Result   Stable postsurgical changes. No acute osseous abnormality or radiographic   evidence of osteomyelitis.               Scheduled Medicines   Medications:    sodium chloride flush  5-40 mL IntraVENous 2 times per day    insulin lispro  0-16 Units SubCUTAneous 2 times per day    glipiZIDE  10 mg Oral BID AC insulin lispro  8 Units SubCUTAneous Once    metFORMIN  1,000 mg Oral BID WC    insulin lispro  0-16 Units SubCUTAneous TID     insulin glargine  50 Units SubCUTAneous Nightly    insulin lispro  20 Units SubCUTAneous TID     vancomycin  2,000 mg IntraVENous Q8H    melatonin  5 mg Oral Nightly    moxifloxacin  1 drop Right Eye 4x Daily    pantoprazole  40 mg Oral QAM AC    prednisoLONE acetate  1 drop Right Eye BID    pregabalin  100 mg Oral BID    sucralfate  1 g Oral 4x Daily    lisinopril-hydroCHLOROthiazide  1 tablet Oral Daily    ezetimibe  10 mg Oral Nightly    cloNIDine  0.2 mg Oral Daily    amLODIPine  10 mg Oral Daily    atorvastatin  10 mg Oral Daily    brimonidine  1 drop Right Eye BID    allopurinol  300 mg Oral Daily    sodium chloride flush  5-40 mL IntraVENous 2 times per day    [Held by provider] enoxaparin  30 mg SubCUTAneous BID    nicotine  1 patch TransDERmal Daily    timolol  1 drop Right Eye BID    And    dorzolamide  1 drop Right Eye BID    cefepime  2,000 mg IntraVENous Q8H    NONFORMULARY 1 drop (Patient Supplied)  1 drop Ophthalmic Daily    ipratropium  2 puff Inhalation Q6H WA    albuterol sulfate HFA  2 puff Inhalation Q6H WA      Infusions:    sodium chloride      dextrose      sodium chloride 20 mL/hr at 07/25/22 1508         Objective:   Vitals: /74   Pulse 80   Temp 98.1 °F (36.7 °C) (Oral)   Resp 18   Ht 6' 3\" (1.905 m)   Wt (!) 330 lb 9.6 oz (150 kg)   SpO2 100%   BMI 41.32 kg/m²   General appearance: alert and cooperative with exam  Neck: no JVD or bruit  Thyroid : Normal lobes   Lungs: Has Vesicular Breath sounds   Heart:  regular rate and rhythm  Abdomen: soft, non-tender; bowel sounds normal; no masses,  no organomegaly  Musculoskeletal: Normal  Extremities: extremities normal, , no edema right foot cellulitis  Neurologic:  Awake, alert, oriented to name, place and time. Cranial nerves II-XII are grossly intact. Motor is  intact.   Sensory neuropathy t ,  and gait is normal    Assessment:     Patient Active Problem List:     Sprain of left ankle     Closed nondisplaced fracture of fifth metatarsal bone of left foot     Alcohol abuse     UTI (urinary tract infection) due to Enterococcus     Acute osteomyelitis of toe, right (HCC)     Osteomyelitis of second toe of right foot (HCC)     Diabetic foot infection (HCC)     Shortness of breath     Asthma exacerbation     Appendicitis     Diabetic ulcer of right foot due to type 2 diabetes mellitus ([facility] [address]     Cellulitis      Plan:     Reviewed POC blood glucose . Labs and X ray results   Reviewed Current Medicines   On meal/ Correction bolus Humalog/ Basal Lantus Insulin regime   Monitor Blood glucose frequently   Modified  the dose of Insulin/ other medicines as needed   Will follow     .      Kaley Manuel MD, MD

## 2022-07-26 NOTE — PLAN OF CARE
Problem: Discharge Planning  Goal: Discharge to home or other facility with appropriate resources  Outcome: Progressing Towards Goal     Problem: Pain  Goal: Verbalizes/displays adequate comfort level or baseline comfort level  Outcome: Progressing Towards Goal  Flowsheets (Taken 7/26/2022 2215)  Verbalizes/displays adequate comfort level or baseline comfort level:   Encourage patient to monitor pain and request assistance   Assess pain using appropriate pain scale   Administer analgesics based on type and severity of pain and evaluate response     Problem: Chronic Conditions and Co-morbidities  Goal: Patient's chronic conditions and co-morbidity symptoms are monitored and maintained or improved  Outcome: Progressing Towards Goal     Problem: Skin/Tissue Integrity  Goal: Absence of new skin breakdown  Description: 1. Monitor for areas of redness and/or skin breakdown  2. Assess vascular access sites hourly  3. Every 4-6 hours minimum:  Change oxygen saturation probe site  4. Every 4-6 hours:  If on nasal continuous positive airway pressure, respiratory therapy assess nares and determine need for appliance change or resting period.   Outcome: Progressing Towards Goal     Problem: Safety - Adult  Goal: Free from fall injury  Outcome: Progressing Towards Goal     Problem: ABCDS Injury Assessment  Goal: Absence of physical injury  Outcome: Progressing Towards Goal

## 2022-07-26 NOTE — PROGRESS NOTES
[id number] [facility]  consulted by Dr. Christine Bowen, APRN-CNP for monitoring and adjustment. Indication for treatment: Skin and soft tissue infection  Goal trough: [x] 10-15 mcg/mL or [] 15-20 mcg/ml  AUC/SHELLEY: [] <500 or [x] 400-600    Pertinent Laboratory Values:   Temp Readings from Last 3 Encounters:   07/26/22 98.1 °F (36.7 °C) (Oral)   07/13/22 97.8 °F (36.6 °C)   07/07/22 98.7 °F (37.1 °C) (Oral)     Recent Labs     07/24/22  0837 07/25/22  1330   WBC 4.3 5.3       Recent Labs     07/24/22  0837 07/25/22  1330   BUN 12 12   CREATININE 0.7* 0.6*       Estimated Creatinine Clearance: 263 mL/min (A) (based on SCr of 0.6 mg/dL (L)). Intake/Output Summary (Last 24 hours) at 7/26/2022 1134  Last data filed at 7/26/2022 0433  Gross per 24 hour   Intake 4554.84 ml   Output 2330 ml   Net 2224.84 ml         Pertinent Cultures:  Date    Source    Results    Vancomycin level:   TROUGH:  No results for input(s): VANCOTROUGH in the last 72 hours. RANDOM:    Recent Labs     07/24/22  0837 07/26/22  0451   VANCORANDOM 8.5 26.9         Assessment:  SCr, BUN, and urine output: SCR remains at baseline, UOP good  Day(s) of therapy: 4 (last dose on 7/28)  Vancomycin concentration:                            7/22 = 5.0 :9 hrs after 1x dose of 2000 mg; auc= 459, v Tr=4.0                           7/24 = 8.5 : 6 hrs after dose of 2000mg.  7/26 - 26.9, 3 hour level on 2000mg ivpb q8h,     Plan:  Renal trends have been at baseline. Due to the large vancomycin dose being given, will follow renal trends closely  Continue vancomycin 2000mg ivpb q8h with a predicted AUC of 481 at steady state  Check the vanco trough level in 2 days to monitor for accumulation 2/2 BMI and large vancomycin dose. Pharmacy will continue to monitor patient and adjust therapy as indicated    [address] 7/28 @ 01:00    Thank you for the consult. Duarte Velasquez [facility]  7/26/2022 11:34 AM

## 2022-07-26 NOTE — OP NOTE
[address] [place] [address], [address] [place] [address]                                OPERATIVE REPORT    PATIENT NAME: Susie Ayon                       :        1985  MED REC NO:   [id number]                          ROOM:       4109  ACCOUNT NO:   [de-identified]                           ADMIT DATE: 2022  PROVIDER:     Annette Solorio MD    DATE OF PROCEDURE:  2022    PREOPERATIVE DIAGNOSIS:  Osteomyelitis of the right second proximal  phalanx. POSTOPERATIVE DIAGNOSIS:  Osteomyelitis of the right second proximal  phalanx. OPERATION PERFORMED:  Amputation of proximal phalanx and head of  metatarsal, second right toe. SURGEON:  Annette Solorio MD.    ANESTHESIA:  General anesthesia plus 25 mL of 0.25% Marcaine used  locally. BLOOD LOSS:  5 mL. COMPLICATIONS:  None. DETAILS OF PROCEDURE:  The patient was brought to the operating room. Preoperative antibiotics were administered. General anesthetic was  administered. The foot was prepped and draped in normal sterile  fashion. A 15-blade was used to make an elliptical skin incision around  the open ulcer, extended posteriorly onto the toe. I excised the  unhealthy skin and subcutaneous tissue. There was exposed bone and  using a bone rongeur, I removed the proximal phalanx completely and the  cartilaginous head of the second metatarsal.  Any nonviable tissue was  also removed. Once the wound was debrided and the infected bone removed, which was  sent for culture, I then anesthetized the wound with Marcaine and closed  the skin with interrupted monofilament suture. I did leave a small  opening in the inferior aspect of the incision and packed this with  Iodoform gauze for postoperative wound care. Sterile dressing applied. No complications. Returned to recovery in good condition.         Jose Alberto Schuler MD    D: 2022 16:00:08       T: 2022 16:02:33     [email or web address]  Job#: [id number]     Doc#: [id number]    CC:

## 2022-07-26 NOTE — PROGRESS NOTES
Postop day #1 from second right toe amputation. I changed the packing today and the incision is healing well. All infected bone has been removed and non-healthy tissue debrided  Patient can be discharged home on oral antibiotics tomorrow. He is to be nonweightbearing on the right foot he can use heel pressure  I will consult physical therapy  Home health care will be needed to do dressing changes and packing once a day. When patient is discharged she should return to see me in approximately 1 week.

## 2022-07-27 VITALS
OXYGEN SATURATION: 99 % | HEIGHT: 75 IN | SYSTOLIC BLOOD PRESSURE: 125 MMHG | TEMPERATURE: 98.2 F | WEIGHT: 315 LBS | RESPIRATION RATE: 19 BRPM | HEART RATE: 78 BPM | DIASTOLIC BLOOD PRESSURE: 85 MMHG | BODY MASS INDEX: 39.17 KG/M2

## 2022-07-27 LAB
CREAT SERPL-MCNC: 0.6 MG/DL (ref 0.9–1.3)
GFR AFRICAN AMERICAN: >60 ML/MIN/1.73M2
GFR NON-AFRICAN AMERICAN: >60 ML/MIN/1.73M2
GLUCOSE BLD-MCNC: 184 MG/DL (ref 70–99)
GLUCOSE BLD-MCNC: 215 MG/DL (ref 70–99)
GLUCOSE BLD-MCNC: 221 MG/DL (ref 70–99)

## 2022-07-27 PROCEDURE — 6370000000 HC RX 637 (ALT 250 FOR IP): Performed by: NURSE PRACTITIONER

## 2022-07-27 PROCEDURE — 6360000002 HC RX W HCPCS: Performed by: NURSE PRACTITIONER

## 2022-07-27 PROCEDURE — 36415 COLL VENOUS BLD VENIPUNCTURE: CPT

## 2022-07-27 PROCEDURE — 6360000002 HC RX W HCPCS: Performed by: INTERNAL MEDICINE

## 2022-07-27 PROCEDURE — 6370000000 HC RX 637 (ALT 250 FOR IP): Performed by: SURGERY

## 2022-07-27 PROCEDURE — 2580000003 HC RX 258: Performed by: INTERNAL MEDICINE

## 2022-07-27 PROCEDURE — 6370000000 HC RX 637 (ALT 250 FOR IP): Performed by: INTERNAL MEDICINE

## 2022-07-27 PROCEDURE — 99232 SBSQ HOSP IP/OBS MODERATE 35: CPT | Performed by: NURSE PRACTITIONER

## 2022-07-27 PROCEDURE — 82565 ASSAY OF CREATININE: CPT

## 2022-07-27 PROCEDURE — 2580000003 HC RX 258: Performed by: NURSE PRACTITIONER

## 2022-07-27 PROCEDURE — 94640 AIRWAY INHALATION TREATMENT: CPT

## 2022-07-27 PROCEDURE — 82962 GLUCOSE BLOOD TEST: CPT

## 2022-07-27 RX ORDER — INSULIN HUMAN 100 [IU]/ML
20 INJECTION, SUSPENSION SUBCUTANEOUS EVERY MORNING
Qty: 3 PEN | Refills: 0 | Status: SHIPPED | OUTPATIENT
Start: 2022-07-27

## 2022-07-27 RX ORDER — SULFAMETHOXAZOLE AND TRIMETHOPRIM 800; 160 MG/1; MG/1
1 TABLET ORAL EVERY 12 HOURS SCHEDULED
Status: DISCONTINUED | OUTPATIENT
Start: 2022-07-27 | End: 2022-07-27 | Stop reason: HOSPADM

## 2022-07-27 RX ORDER — GLIPIZIDE 5 MG/1
15 TABLET ORAL
Qty: 180 TABLET | Refills: 0 | Status: SHIPPED | OUTPATIENT
Start: 2022-07-27

## 2022-07-27 RX ORDER — SULFAMETHOXAZOLE AND TRIMETHOPRIM 800; 160 MG/1; MG/1
1 TABLET ORAL EVERY 12 HOURS SCHEDULED
Qty: 24 TABLET | Refills: 0 | Status: SHIPPED | OUTPATIENT
Start: 2022-07-27 | End: 2022-08-08

## 2022-07-27 RX ORDER — INSULIN GLARGINE 100 [IU]/ML
50 INJECTION, SOLUTION SUBCUTANEOUS NIGHTLY
Qty: 2 PEN | Refills: 0 | Status: SHIPPED | OUTPATIENT
Start: 2022-07-27

## 2022-07-27 RX ORDER — DULAGLUTIDE 1.5 MG/.5ML
INJECTION, SOLUTION SUBCUTANEOUS
Qty: 3 PEN | Refills: 0 | Status: SHIPPED | OUTPATIENT
Start: 2022-07-27

## 2022-07-27 RX ORDER — HYDROCODONE BITARTRATE AND ACETAMINOPHEN 7.5; 325 MG/1; MG/1
1 TABLET ORAL EVERY 6 HOURS PRN
Qty: 20 TABLET | Refills: 0 | Status: SHIPPED | OUTPATIENT
Start: 2022-07-27 | End: 2022-08-01

## 2022-07-27 RX ADMIN — SUCRALFATE 1 G: 1 TABLET ORAL at 08:21

## 2022-07-27 RX ADMIN — Medication 2 PUFF: at 15:42

## 2022-07-27 RX ADMIN — PANTOPRAZOLE SODIUM 40 MG: 40 TABLET, DELAYED RELEASE ORAL at 06:32

## 2022-07-27 RX ADMIN — PREDNISOLONE ACETATE 1 DROP: 10 SUSPENSION/ DROPS OPHTHALMIC at 08:23

## 2022-07-27 RX ADMIN — HYDROMORPHONE HYDROCHLORIDE 0.5 MG: 1 INJECTION, SOLUTION INTRAMUSCULAR; INTRAVENOUS; SUBCUTANEOUS at 04:49

## 2022-07-27 RX ADMIN — HYDROCODONE BITARTRATE AND ACETAMINOPHEN 1 TABLET: 7.5; 325 TABLET ORAL at 02:32

## 2022-07-27 RX ADMIN — HUMAN INSULIN 20 UNITS: 100 INJECTION, SUSPENSION SUBCUTANEOUS at 08:30

## 2022-07-27 RX ADMIN — INSULIN LISPRO 4 UNITS: 100 INJECTION, SOLUTION INTRAVENOUS; SUBCUTANEOUS at 08:31

## 2022-07-27 RX ADMIN — CLONIDINE HYDROCHLORIDE 0.2 MG: 0.2 TABLET ORAL at 08:23

## 2022-07-27 RX ADMIN — HYDROCODONE BITARTRATE AND ACETAMINOPHEN 1 TABLET: 7.5; 325 TABLET ORAL at 08:21

## 2022-07-27 RX ADMIN — ALLOPURINOL 300 MG: 300 TABLET ORAL at 08:21

## 2022-07-27 RX ADMIN — TIMOLOL MALEATE 1 DROP: 5 SOLUTION OPHTHALMIC at 08:23

## 2022-07-27 RX ADMIN — PREGABALIN 100 MG: 100 CAPSULE ORAL at 08:21

## 2022-07-27 RX ADMIN — AMLODIPINE BESYLATE 10 MG: 10 TABLET ORAL at 08:21

## 2022-07-27 RX ADMIN — ALBUTEROL SULFATE 2 PUFF: 90 AEROSOL, METERED RESPIRATORY (INHALATION) at 05:06

## 2022-07-27 RX ADMIN — LISINOPRIL AND HYDROCHLOROTHIAZIDE 1 TABLET: 12.5; 2 TABLET ORAL at 08:21

## 2022-07-27 RX ADMIN — DORZOLAMIDE HYDROCHLORIDE 1 DROP: 20 SOLUTION/ DROPS OPHTHALMIC at 08:22

## 2022-07-27 RX ADMIN — HYDROMORPHONE HYDROCHLORIDE 0.5 MG: 1 INJECTION, SOLUTION INTRAMUSCULAR; INTRAVENOUS; SUBCUTANEOUS at 14:51

## 2022-07-27 RX ADMIN — MOXIFLOXACIN OPHTHALMIC SOLUTION 1 DROP: 5 SOLUTION/ DROPS OPHTHALMIC at 08:22

## 2022-07-27 RX ADMIN — HYDROMORPHONE HYDROCHLORIDE 0.5 MG: 1 INJECTION, SOLUTION INTRAMUSCULAR; INTRAVENOUS; SUBCUTANEOUS at 09:27

## 2022-07-27 RX ADMIN — ALBUTEROL SULFATE 2 PUFF: 90 AEROSOL, METERED RESPIRATORY (INHALATION) at 08:15

## 2022-07-27 RX ADMIN — CEFEPIME HYDROCHLORIDE 2000 MG: 2 INJECTION, POWDER, FOR SOLUTION INTRAVENOUS at 04:52

## 2022-07-27 RX ADMIN — BRIMONIDINE TARTRATE 1 DROP: 2 SOLUTION/ DROPS OPHTHALMIC at 08:23

## 2022-07-27 RX ADMIN — METFORMIN HYDROCHLORIDE 1000 MG: 500 TABLET ORAL at 08:21

## 2022-07-27 RX ADMIN — Medication 2 PUFF: at 08:16

## 2022-07-27 RX ADMIN — GLIPIZIDE 15 MG: 10 TABLET ORAL at 08:58

## 2022-07-27 RX ADMIN — ATORVASTATIN CALCIUM 10 MG: 10 TABLET, FILM COATED ORAL at 08:21

## 2022-07-27 RX ADMIN — ALBUTEROL SULFATE 2 PUFF: 90 AEROSOL, METERED RESPIRATORY (INHALATION) at 15:43

## 2022-07-27 RX ADMIN — INSULIN LISPRO 20 UNITS: 100 INJECTION, SOLUTION INTRAVENOUS; SUBCUTANEOUS at 08:36

## 2022-07-27 RX ADMIN — VANCOMYCIN HYDROCHLORIDE 2000 MG: 1 INJECTION, POWDER, LYOPHILIZED, FOR SOLUTION INTRAVENOUS at 02:33

## 2022-07-27 RX ADMIN — VANCOMYCIN HYDROCHLORIDE 2000 MG: 1 INJECTION, POWDER, LYOPHILIZED, FOR SOLUTION INTRAVENOUS at 09:29

## 2022-07-27 ASSESSMENT — PAIN DESCRIPTION - LOCATION
LOCATION: FOOT

## 2022-07-27 ASSESSMENT — PAIN SCALES - GENERAL
PAINLEVEL_OUTOF10: 8
PAINLEVEL_OUTOF10: 10
PAINLEVEL_OUTOF10: 9
PAINLEVEL_OUTOF10: 9
PAINLEVEL_OUTOF10: 7
PAINLEVEL_OUTOF10: 8
PAINLEVEL_OUTOF10: 9
PAINLEVEL_OUTOF10: 7
PAINLEVEL_OUTOF10: 6

## 2022-07-27 ASSESSMENT — PAIN SCALES - WONG BAKER
WONGBAKER_NUMERICALRESPONSE: 0

## 2022-07-27 ASSESSMENT — PAIN DESCRIPTION - ORIENTATION: ORIENTATION: RIGHT

## 2022-07-27 NOTE — PLAN OF CARE
Problem: Discharge Planning  Goal: Discharge to home or other facility with appropriate resources  7/27/2022 1322 by Tiffany Bajwa RN  Outcome: Resolved/Not Met  7/27/2022 1254 by Tiffany Bajwa RN  Outcome: Resolved/Met  7/27/2022 1124 by Tiffany Bajwa RN  Outcome: Progressing Towards Goal  7/26/2022 2344 by Daphney Vargas RN  Outcome: Progressing Towards Goal     Problem: Pain  Goal: Verbalizes/displays adequate comfort level or baseline comfort level  7/27/2022 1322 by Tiffany Bajwa RN  Outcome: Resolved/Not Met  7/27/2022 1254 by Tiffany Bajwa RN  Outcome: Resolved/Met  7/27/2022 1124 by Tiffany Bajwa RN  Outcome: Progressing Towards Goal  7/26/2022 2344 by Daphney Vargas RN  Outcome: Progressing Towards Goal     Problem: Chronic Conditions and Co-morbidities  Goal: Patient's chronic conditions and co-morbidity symptoms are monitored and maintained or improved  7/27/2022 1322 by Tiffany Bajwa RN  Outcome: Resolved/Not Met  7/27/2022 1254 by Tiffany Bajwa RN  Outcome: Resolved/Met  7/27/2022 1124 by Tiffany Bajwa RN  Outcome: Progressing Towards Goal  7/26/2022 2344 by Daphney Vargas RN  Outcome: Progressing Towards Goal     Problem: Skin/Tissue Integrity  Goal: Absence of new skin breakdown  Description: 1. Monitor for areas of redness and/or skin breakdown  2. Assess vascular access sites hourly  3. Every 4-6 hours minimum:  Change oxygen saturation probe site  4. Every 4-6 hours:  If on nasal continuous positive airway pressure, respiratory therapy assess nares and determine need for appliance change or resting period.   7/27/2022 1322 by Tiffany Bajwa RN  Outcome: Resolved/Not Met  7/27/2022 1254 by Tiffany Bajwa RN  Outcome: Resolved/Met  7/27/2022 1124 by Tiffany Bajwa RN  Outcome: Progressing Towards Goal  7/26/2022 2344 by Daphney Vargas RN  Outcome: Progressing Towards Goal     Problem: Safety - Adult  Goal: Free from fall injury  7/27/2022 1322 by Tiana Hernadez Ramona RN  Outcome: Resolved/Not Met  7/27/2022 1254 by Tiffany Bajwa RN  Outcome: Resolved/Met  7/27/2022 1124 by Tiffany Bajwa RN  Outcome: Progressing Towards Goal  7/26/2022 2344 by Daphney Vargas RN  Outcome: Progressing Towards Goal     Problem: ABCDS Injury Assessment  Goal: Absence of physical injury  7/27/2022 1322 by Tiffany Bajwa RN  Outcome: Resolved/Not Met  7/27/2022 1254 by Tiffany Bajwa RN  Outcome: Resolved/Met  7/27/2022 1124 by Tiffany Bajwa RN  Outcome: Progressing Towards Goal  7/26/2022 2344 by Daphney Vargas RN  Outcome: Progressing Towards Goal

## 2022-07-27 NOTE — DISCHARGE SUMMARY
Discharge Summary    Name:  Mary Aguilar /Age/Sex: 1985  (39 y.o. male)   MRN & CSN:  [id number] & [id number] Admission Date/Time: 2022  6:33 AM   Attending:  Lavell Rosales MD Discharging Physician: Lavell Rosales MD       Admission Diagnosis:   Cellulitis of right lower extremity  Open wound of right foot  Insulin-dependent diabetes mellitus hyperglycemia  Hyponatremia  Uncontrolled hypertension  History of asthma  History of cocaine use disorder, alcohol use disorder, tobacco use disorder  Obesity class III    Discharge Diagnosis:  Osteomyelitis right proximal phalanx and head of metatarsal s/p amputation of second toe (2022)  Right lower extremity cellulitis  Open wound of right foot  Lactic acidosis-resolved  Uncontrolled IDDM   Hyponatremia 2/2 likely translocation and hypokalemia-resolved  History of asthma  History of uncontrolled hypertension, HLD  History of cocaine use disorder  History of tobacco use disorder  History of alcohol use disorder  Obesity class III  Recent Rt. Corneal Transplant  Missing Lt. Eye  H/O Gout    Discharge Exam  /85   Pulse 78   Temp 98.2 °F (36.8 °C) (Oral)   Resp 19   Ht 6' 3\" (1.905 m)   Wt (!) 338 lb 6.5 oz (153.5 kg)   SpO2 99%   BMI 42.30 kg/m²   Physical Exam  Vitals and nursing note reviewed. Constitutional:       General: He is not in acute distress. Appearance: He is not ill-appearing, toxic-appearing or diaphoretic. HENT:      Head: Normocephalic and atraumatic. Right Ear: External ear normal.      Left Ear: External ear normal.      Nose: Nose normal.      Mouth/Throat:      Mouth: Mucous membranes are moist.      Pharynx: Oropharynx is clear. Eyes:      General: No scleral icterus. Right eye: No discharge. Left eye: No discharge. Conjunctiva/sclera: Conjunctivae normal.      Pupils: Pupils are equal, round, and reactive to light. Comments: Rt. Artificial cornea  Lt.  Eye absent TO HOSPITALIST  PHARMACY TO DOSE VANCOMYCIN  IP CONSULT TO IV TEAM  IP CONSULT TO ENDOCRINOLOGY  IP CONSULT TO IV TEAM  IP CONSULT TO INFECTIOUS DISEASES  IP CONSULT TO HOME CARE NEEDS      Discharge Instruction:   Handoff to PCP:     Follow up appointments: With GS, PCP and endocrinology  Primary care physician: Wound care by [facility] and at [address] [place] [address] office    Diet:  diabetic diet   Activity: Non-weight bearing  Disposition: Discharged to:   [x]Home, []HHC, []SNF, []Acute Rehab, []Hospice   Condition on discharge: Stable    Discharge Medications:        Medication List        START taking these medications      HumuLIN N KwikPen 100 UNIT/ML injection pen  Generic drug: insulin NPH  Inject 20 Units into the skin every morning     HYDROcodone-acetaminophen 7.5-325 MG per tablet  Commonly known as: NORCO  Take 1 tablet by mouth every 6 hours as needed for Pain for up to 5 days. Replaces: HYDROcodone-acetaminophen 5-325 MG per tablet     sulfamethoxazole-trimethoprim 800-160 MG per tablet  Commonly known as: BACTRIM DS;SEPTRA DS  Take 1 tablet by mouth in the morning and 1 tablet before bedtime. Do all this for 24 doses. CHANGE how you take these medications      glipiZIDE 5 MG tablet  Commonly known as: GLUCOTROL  Take 3 tablets by mouth in the morning and 3 tablets in the evening. Take before meals. What changed:   medication strength  how much to take  how to take this  when to take this  additional instructions     Lantus SoloStar 100 UNIT/ML injection pen  Generic drug: insulin glargine  Inject 50 Units into the skin nightly  What changed: See the new instructions.             CONTINUE taking these medications      acetaminophen 325 MG tablet  Commonly known as: Aminofen  Take 2 tablets by mouth every 6 hours as needed for Pain     albuterol sulfate  (90 Base) MCG/ACT inhaler  Commonly known as: PROVENTIL;VENTOLIN;PROAIR  Inhale 2 puffs into the lungs every 6 hours as needed for Wheezing albuterol-ipratropium  MCG/ACT Aers inhaler  Commonly known as: Combivent Respimat  Inhale 1 puff into the lungs every 6 hours     allopurinol 300 MG tablet  Commonly known as: ZYLOPRIM     amLODIPine 10 MG tablet  Commonly known as: NORVASC     atorvastatin 10 MG tablet  Commonly known as: LIPITOR     brimonidine 0.2 % ophthalmic solution  Commonly known as: ALPHAGAN     cloNIDine 0.2 MG tablet  Commonly known as: CATAPRES     dorzolamide-timolol 22.3-6.8 MG/ML Soln     ezetimibe 10 MG tablet  Commonly known as: ZETIA     lisinopril-hydroCHLOROthiazide 20-12.5 MG per tablet  Commonly known as: PRINZIDE;ZESTORETIC     melatonin 5 MG Tabs tablet     metFORMIN 1000 MG tablet  Commonly known as: GLUCOPHAGE     moxifloxacin 0.5 % ophthalmic solution  Commonly known as: VIGAMOX     omeprazole 40 MG delayed release capsule  Commonly known as: PRILOSEC     prednisoLONE acetate 1 % ophthalmic suspension  Commonly known as: PRED FORTE     pregabalin 100 MG capsule  Commonly known as: LYRICA     sucralfate 1 GM tablet  Commonly known as: Carafate  Take 1 tablet by mouth 4 times daily     TechLite Pen Needles 31G X 5 MM Misc  Generic drug: Insulin Pen Needle     tiZANidine 4 MG tablet  Commonly known as: ZANAFLEX     trimethoprim-polymyxin b 35508-6.1 UNIT/ML-% ophthalmic solution  Commonly known as: POLYTRIM     Trulicity 1.5 JJ/7.0PT Sopn  Generic drug: Dulaglutide  inject 0.5 milliliters ( 1 AND 1/2 milligrams ) subcutaneously ev. ..  (REFER TO PRESCRIPTION NOTES).      UNABLE TO FIND            STOP taking these medications      HYDROcodone-acetaminophen 5-325 MG per tablet  Commonly known as: Carlos White  Replaced by: HYDROcodone-acetaminophen 7.5-325 MG per tablet               Where to Get Your Medications        These medications were sent to [address] [place] [address], [address] [facility]       Phone: [phone]   glipiZIDE 5 MG tablet  HumuLIN N KwikPen 100 UNIT/ML injection pen  Lantus SoloStar 100 UNIT/ML injection pen  sulfamethoxazole-trimethoprim 800-160 MG per tablet  Trulicity 1.5 ME/4.6BR Sopn       You can get these medications from any pharmacy    Bring a paper prescription for each of these medications  HYDROcodone-acetaminophen 7.5-325 MG per tablet         Objective Findings at Discharge:       BMP/CBC  Recent Labs     07/25/22  1330 07/27/22  0427   *  --    K 4.1  --    CL 95*  --    CO2 28  --    BUN 12  --    CREATININE 0.6* 0.6*   WBC 5.3  --    HCT 38.4*  --      --        IMAGING:    Doppler Rt.US (7/21/2022)   No sonographic/Doppler evidence of DVT in the right lower extremity. Right   anterior tibial artery appears patent. .       X-ray Rt. Foot (7/21/2022)   Stable postsurgical changes. No acute osseous abnormality or radiographic   evidence of osteomyelitis. NM Bone scan (7/5/2022)   Three-phase activity is seen at a distal right toe near the midline of the   right foot, suspicious for osteomyelitis in the absence of recent surgery or   trauma to the site. Due to poor osseous uptake, soft tissue activity, and   altered patient anatomy as evidenced by recent radiograph, it is uncertain   which distal toe this represents. MR could be performed for further   evaluation as warranted. Activity at bilateral mid feet and at the ankles, likely due to arthropathy. Additional Information: Patient seen and examined day of discharge.  For more information regarding patient's care please contact Romero Holt [facility] records [phone]    Discharge Time of 35 minutes    Electronically signed by Jaiden Lentz MD on 7/27/2022 at 3:59 PM

## 2022-07-27 NOTE — DISCHARGE INSTRUCTIONS
Internal Medicine Discharge Instruction    Discharge to:  Home  Diet: Diabetic  Activity: As tolerated       Be compliant with medications  Please take antibiotics for 12 more days   Please follow up with the above mentioned consultants/PCP        Electronically signed by Ewa Gil MD on 7/27/2022 at 12:20 PM      Transportation  [facility]. .... [phone] Faustina Angela)  call 1-2 days ahead and will cost 4.00 each way  [employer]. ... [phone]   call 1-2 days ahead and will cost 4.00 each way  [facility]. [phone]  [facility]. [phone]  [employer]. [phone]  [employer]. Gordo Arzate [phone]

## 2022-07-27 NOTE — PROGRESS NOTES
Infectious Disease Progress Note  2022   Patient Name: Sadie Camargo : 1985   Impression  Right DFO Second Proximal Phalanx:  Afebrile, no leukocytosis  -MRSA screen pending  -BC 0/2-NGTD  CRP: 64, 58, 53  -S/p per : Amputation of proximal phalanx and head of metatarsal, second right toe, DX:  OM of the right second proximal phalanx. Cultures: Prelim: NGTD  DMII:  Dr. Vee Perez onboard  -HbAIC: 9.9  Mgt at home with Trulicity, metformin and Lantus  Morbid Obesity:  BMI:  41.12  Legally Blind:  Missing left eye, right eye recent synthetic corneal implantation  HTN  Alcohol Abuse  Tobacco Abuse  GERD  Multi-morbidity: per PMHx  Plan:  DC IV cefepime and vancomycin  Start po Bactrim 800-160 mg bid for 14 full course (end date 22)  Follow up with general surgery  OK from ID standpoint to DC when ready    Ongoing Antimicrobial Therapy  Bactrim -? Completed Antimicrobial Therapy  Cefepime   Vancomycin   History:? Interval history noted. Chief complaint: Right second toe DFO. Denies n/v/d/f or untoward effects of antibiotics  Physical Exam:  Vital Signs: /85   Pulse 78   Temp 98.2 °F (36.8 °C) (Oral)   Resp 17   Ht 6' 3\" (1.905 m)   Wt (!) 338 lb 6.5 oz (153.5 kg)   SpO2 99%   BMI 42.30 kg/m²     Gen: A&O x 4  Wounds: C/D/I right foot dressing   Chest: no distress and CTA. Good air movement. Room air. Heart: RRR and no MRG. Abd: soft, non-distended, no tenderness, no hepatomegaly. Normoactive bowel sounds. Ext: no clubbing, cyanosis, or edema, see wounds above. Neuro: Mental status intact. CN 2-12 intact and no focal sensory or motor deficits     Radiologic / Imaging / TESTING  22 XR Foot Right:  Impression   Stable postsurgical changes. No acute osseous abnormality or radiographic   evidence of osteomyelitis.       22 VL Dup Lower Extremity Venous Right:  Impression   No sonographic/Doppler evidence of DVT in the right lower extremity. Right   anterior tibial artery appears patent. .        Labs:    Recent Results (from the past 24 hour(s))   POCT Glucose    Collection Time: 07/26/22  4:52 PM   Result Value Ref Range    POC Glucose 121 (H) 70 - 99 MG/DL   POCT Glucose    Collection Time: 07/26/22  7:37 PM   Result Value Ref Range    POC Glucose 262 (H) 70 - 99 MG/DL   POCT Glucose    Collection Time: 07/27/22  2:30 AM   Result Value Ref Range    POC Glucose 184 (H) 70 - 99 MG/DL   Creatinine    Collection Time: 07/27/22  4:27 AM   Result Value Ref Range    Creatinine 0.6 (L) 0.9 - 1.3 MG/DL    GFR Non-African American >60 >60 mL/min/1.73m2    GFR African American >60 >60 mL/min/1.73m2   POCT Glucose    Collection Time: 07/27/22  7:31 AM   Result Value Ref Range    POC Glucose 215 (H) 70 - 99 MG/DL   POCT Glucose    Collection Time: 07/27/22 11:40 AM   Result Value Ref Range    POC Glucose 221 (H) 70 - 99 MG/DL     CULTURE results: Invalid input(s): BLOOD CULTURE,  URINE CULTURE, SURGICAL CULTURE    Diagnosis:  Patient Active Problem List   Diagnosis    Sprain of left ankle    Closed nondisplaced fracture of fifth metatarsal bone of left foot    Alcohol abuse    UTI (urinary tract infection) due to Enterococcus    Acute osteomyelitis of toe, right (HCC)    Osteomyelitis of second toe of right foot (HCC)    Diabetic foot infection (HCC)    Shortness of breath    Asthma exacerbation    Appendicitis    Diabetic foot ulcer with osteomyelitis (HCC)    Cellulitis    Open wound of toe       Active Problems  Principal Problem:    Cellulitis  Active Problems:    Diabetic foot ulcer with osteomyelitis (HCC)    Open wound of toe    Osteomyelitis of second toe of right foot ([facility] [address]  Resolved Problems:    * No resolved hospital problems. *    Electronically signed by: Electronically signed by Emili Ortiz.  KAYLI Titus CNP on 7/27/2022 at 12:19 PM

## 2022-07-27 NOTE — PLAN OF CARE
Problem: Skin/Tissue Integrity  Goal: Absence of new skin breakdown  Description: 1. Monitor for areas of redness and/or skin breakdown  2. Assess vascular access sites hourly  3. Every 4-6 hours minimum:  Change oxygen saturation probe site  4. Every 4-6 hours:  If on nasal continuous positive airway pressure, respiratory therapy assess nares and determine need for appliance change or resting period.   7/27/2022 1322 by Emma Merritt RN  Outcome: Resolved/Met  7/27/2022 1322 by Emma Merritt RN  Outcome: Resolved/Not Met  7/27/2022 1254 by Emma Merritt RN  Outcome: Resolved/Met  7/27/2022 1124 by Emma Merritt RN  Outcome: Progressing Towards Goal  7/26/2022 2344 by Sivan Rowland RN  Outcome: Progressing Towards Goal     Problem: Safety - Adult  Goal: Free from fall injury  7/27/2022 1322 by Emma Merritt RN  Outcome: Resolved/Met  7/27/2022 1322 by Emma Merritt RN  Outcome: Resolved/Not Met  7/27/2022 1254 by Emma Merritt RN  Outcome: Resolved/Met  7/27/2022 1124 by Emma Merritt RN  Outcome: Progressing Towards Goal  7/26/2022 2344 by Sivan Rowland RN  Outcome: Progressing Towards Goal     Problem: ABCDS Injury Assessment  Goal: Absence of physical injury  7/27/2022 1322 by Emma Merritt RN  Outcome: Resolved/Not Met  7/27/2022 1254 by Emma Merritt RN  Outcome: Resolved/Met  7/27/2022 1124 by Emma Merritt RN  Outcome: Progressing Towards Goal  7/26/2022 2344 by Sivan Rowland RN  Outcome: Progressing Towards Goal

## 2022-07-27 NOTE — PLAN OF CARE
Problem: Discharge Planning  Goal: Discharge to home or other facility with appropriate resources  7/27/2022 1342 by Arvid Gitelman, RN  Outcome: Completed     Problem: Pain  Goal: Verbalizes/displays adequate comfort level or baseline comfort level  7/27/2022 1342 by Arvid Gitelman, RN  Outcome: Completed     Problem: Chronic Conditions and Co-morbidities  Goal: Patient's chronic conditions and co-morbidity symptoms are monitored and maintained or improved  7/27/2022 1342 by Arvid Gitelman, RN  Outcome: Completed     Problem: Skin/Tissue Integrity  Goal: Absence of new skin breakdown  Description: 1. Monitor for areas of redness and/or skin breakdown  2. Assess vascular access sites hourly  3. Every 4-6 hours minimum:  Change oxygen saturation probe site  4. Every 4-6 hours:  If on nasal continuous positive airway pressure, respiratory therapy assess nares and determine need for appliance change or resting period. 7/27/2022 1342 by Arvid Gitelman, RN  Outcome: Completed     Problem: Safety - Adult  Goal: Free from fall injury  7/27/2022 1342 by Arvid Gitelman, RN  Outcome: Completed     Problem: ABCDS Injury Assessment  Goal: Absence of physical injury  7/27/2022 1342 by Arvid Gitelman, RN  Outcome: Completed       Care plans completed at this time, patient being discharged today.     NORMAN Esquivel, RN

## 2022-07-27 NOTE — PROGRESS NOTES
Outpatient Pharmacy Progress Note for Meds-to-Beds    Total number of Prescriptions Filled: 2  The following medications were dispensed to the patient during the discharge process: Bactrim DS  Hydrocodone-APAP    Additional Documentation:  Medication(s) were delivered to the patient's room prior to discharge  Prescriptions for inslulin, glipizide and Trulicity sent to the pharmacy, but patient stated that he has plenty of this at home and does not need. Thank you for letting us serve your patients.   [facility]    [address], [address] [facility]    Phone: [phone]    Fax: [phone]

## 2022-07-27 NOTE — PLAN OF CARE
Problem: Discharge Planning  Goal: Discharge to home or other facility with appropriate resources  7/26/2022 2344 by Adilson Gil RN  Outcome: Progressing Towards Goal  7/26/2022 1627 by Steven Huitron RN  Outcome: Progressing Towards Goal     Problem: Pain  Goal: Verbalizes/displays adequate comfort level or baseline comfort level  7/26/2022 2344 by Adilson Gil RN  Outcome: Progressing Towards Goal  7/26/2022 1627 by Steven Huitron RN  Outcome: Progressing Towards Goal  Flowsheets (Taken 7/26/2022 0910)  Verbalizes/displays adequate comfort level or baseline comfort level:   Encourage patient to monitor pain and request assistance   Assess pain using appropriate pain scale   Administer analgesics based on type and severity of pain and evaluate response     Problem: Chronic Conditions and Co-morbidities  Goal: Patient's chronic conditions and co-morbidity symptoms are monitored and maintained or improved  7/26/2022 2344 by Adilson Gil RN  Outcome: Progressing Towards Goal  7/26/2022 1627 by Steven Huitron RN  Outcome: Progressing Towards Goal     Problem: Skin/Tissue Integrity  Goal: Absence of new skin breakdown  Description: 1. Monitor for areas of redness and/or skin breakdown  2. Assess vascular access sites hourly  3. Every 4-6 hours minimum:  Change oxygen saturation probe site  4. Every 4-6 hours:  If on nasal continuous positive airway pressure, respiratory therapy assess nares and determine need for appliance change or resting period.   7/26/2022 2344 by Adilson Gil RN  Outcome: Progressing Towards Goal  7/26/2022 1627 by Steven Huitron RN  Outcome: Progressing Towards Goal     Problem: Safety - Adult  Goal: Free from fall injury  7/26/2022 2344 by Adilson Gil RN  Outcome: Progressing Towards Goal  7/26/2022 1627 by Steven Huitron RN  Outcome: Progressing Towards Goal     Problem: ABCDS Injury Assessment  Goal: Absence of physical injury  7/26/2022 2344 by Umang Rojas RN  Outcome: Progressing Towards Goal  7/26/2022 1627 by Delfina Olvera RN  Outcome: Progressing Towards Goal

## 2022-07-27 NOTE — PLAN OF CARE
injury  7/27/2022 1254 by Nas Albert RN  Outcome: Resolved/Met  7/27/2022 1124 by Nas Albert RN  Outcome: Progressing Towards Goal  7/26/2022 2344 by Chance Connor RN  Outcome: Progressing Towards Goal

## 2022-07-27 NOTE — PROGRESS NOTES
[address] [facility]  consulted by Dr. Barbara Yang, APRN-CNP for monitoring and adjustment. Indication for treatment: Skin and soft tissue infection  Goal trough: [x] 10-15 mcg/mL or [] 15-20 mcg/ml  AUC/SHELLEY: [] <500 or [x] 400-600    Pertinent Laboratory Values:   Temp Readings from Last 3 Encounters:   07/27/22 98.2 °F (36.8 °C) (Oral)   07/13/22 97.8 °F (36.6 °C)   07/07/22 98.7 °F (37.1 °C) (Oral)     Recent Labs     07/25/22  1330   WBC 5.3     Recent Labs     07/25/22  1330 07/27/22  0427   BUN 12  --    CREATININE 0.6* 0.6*     Estimated Creatinine Clearance: 267 mL/min (A) (based on SCr of 0.6 mg/dL (L)). Intake/Output Summary (Last 24 hours) at 7/27/2022 1155  Last data filed at 7/27/2022 1117  Gross per 24 hour   Intake 5783.36 ml   Output 1100 ml   Net 4683.36 ml       Pertinent Cultures:  Date    Source    Results    Vancomycin level:   TROUGH:  No results for input(s): VANCOTROUGH in the last 72 hours. RANDOM:    Recent Labs     07/26/22  0451   VANCORANDOM 26.9       Assessment:  SCr, BUN, and urine output: SCR remains at baseline, UOP good  Day(s) of therapy: 5 (last dose on 7/28)  Vancomycin concentration:                            7/22 = 5.0 :9 hrs after 1x dose of 2000 mg; auc= 459, v Tr=4.0                           7/24 = 8.5 : 6 hrs after dose of 2000mg.  7/26 - 26.9, 3 hour level on 2000mg ivpb q8h,     Plan:  Renal trends have been at baseline. Due to the large vancomycin dose being given, will follow renal trends closely  Continue vancomycin 2000mg ivpb q8h with a predicted AUC of 481 at steady state  Check the vanco trough level in early AM to monitor for accumulation 2/2 BMI and large vancomycin dose. Pharmacy will continue to monitor patient and adjust therapy as indicated    Eamon 3 7/28 @ 01:00    Thank you for the consult.   Kira Hutchison [facility]  7/27/2022 11:55 AM

## 2022-07-27 NOTE — PLAN OF CARE
Problem: Discharge Planning  Goal: Discharge to home or other facility with appropriate resources  7/27/2022 1124 by Syd Cui RN  Outcome: Progressing Towards Goal  7/26/2022 2344 by Loraine Pandey RN  Outcome: Progressing Towards Goal     Problem: Pain  Goal: Verbalizes/displays adequate comfort level or baseline comfort level  7/27/2022 1124 by Syd Cui RN  Outcome: Progressing Towards Goal  7/26/2022 2344 by Loraine Pandey RN  Outcome: Progressing Towards Goal     Problem: Chronic Conditions and Co-morbidities  Goal: Patient's chronic conditions and co-morbidity symptoms are monitored and maintained or improved  7/27/2022 1124 by Syd Cui RN  Outcome: Progressing Towards Goal  7/26/2022 2344 by Loraine Pandey RN  Outcome: Progressing Towards Goal     Problem: Skin/Tissue Integrity  Goal: Absence of new skin breakdown  Description: 1. Monitor for areas of redness and/or skin breakdown  2. Assess vascular access sites hourly  3. Every 4-6 hours minimum:  Change oxygen saturation probe site  4. Every 4-6 hours:  If on nasal continuous positive airway pressure, respiratory therapy assess nares and determine need for appliance change or resting period.   7/27/2022 1124 by Syd Cui RN  Outcome: Progressing Towards Goal  7/26/2022 2344 by Loraine Pandey RN  Outcome: Progressing Towards Goal     Problem: Safety - Adult  Goal: Free from fall injury  7/27/2022 1124 by Syd Cui RN  Outcome: Progressing Towards Goal  7/26/2022 2344 by Loraine Pandey RN  Outcome: Progressing Towards Goal     Problem: ABCDS Injury Assessment  Goal: Absence of physical injury  7/27/2022 1124 by Syd Cui RN  Outcome: Progressing Towards Goal  7/26/2022 2344 by Loraine Pandey RN  Outcome: Progressing Towards Goal

## 2022-07-27 NOTE — CARE COORDINATION
Reviewed chart and discussed in IDR. Plan is home with [facility], [facility]. for daily packing. Spoke with pt and he has used [facility] in the past.  Also Address on facesheet is his mother his correct address is [address] [place] [address] E, [address]. [address] [facility], [id number]. His cell is [phone]    1300 called/faxed info to [phone] [facility]. They can not do daily dressing changes. Asked about teaching family /pt.      1400 I spoke with pt and he has no one to help with dressing changes. In past only has to be done 2x/week. PS to Dr Dagoberto Osorio about possible [facility], [facility]. issues with daily wound packing. 1430 Dr Lai Schmitz answer \"Every other day if necessary. He can be seen in my office as needed even daily for wound care by my nurses. He does not need the added expense of wound clinic. If home care can see him twice a week and he can come to my office during the week that would be great\"  Spoke with pt and he is concerned about transportation to Dr Andrew Vega. Placed local transportation options in his AVS.  Also spoke with [facility] they will be able to take pt. Faxed orders and called Eliazar Lopez with d/c.

## 2022-07-28 NOTE — PROGRESS NOTES
Progress Note( Dr. Angelique Pal)  7/27/2022  Subjective:   Admit Date: 7/21/2022  PCP: Madalyn Solorio MD    Admitted For :Right foot cellulitis       Consulted For:  Better control of blood glucose    Interval History: Still have pain in the right foot is not seen by surgery    OPERATION PERFORMED:  Amputation of proximal phalanx and head of  metatarsal, second right toe   7/25/2022    Denies any chest pains,   Denies SOB . Denies nausea or vomiting. No new bowel or bladder symptoms. Intake/Output Summary (Last 24 hours) at 7/27/2022 2336  Last data filed at 7/27/2022 1130  Gross per 24 hour   Intake 5783.36 ml   Output 600 ml   Net 5183.36 ml         DATA    CBC:   Recent Labs     07/25/22  1330   WBC 5.3   HGB 12.9*         CMP:  Recent Labs     07/25/22  1330 07/27/22  0427   *  --    K 4.1  --    CL 95*  --    CO2 28  --    BUN 12  --    CREATININE 0.6* 0.6*   CALCIUM 9.5  --        Lipids:   Lab Results   Component Value Date/Time    CHOL 142 04/16/2011 01:19 PM    HDL 41 04/16/2011 01:19 PM    TRIG 268 04/16/2011 01:19 PM     Glucose:  Recent Labs     07/27/22  0230 07/27/22  0731 07/27/22  1140   POCGLU 184* 215* 221*       [id number]:  Lab Results   Component Value Date/Time    LABA1C 9.9 07/21/2022 07:50 AM     High Sensitivity TSH:   Lab Results   Component Value Date/Time    [employer] 2.685 04/16/2011 01:19 PM     Free T3: No results found for: FT3  Free T4:No results found for: T4FREE    VL DUP LOWER EXTREMITY VENOUS RIGHT   Final Result   No sonographic/Doppler evidence of DVT in the right lower extremity. Right   anterior tibial artery appears patent. .         XR FOOT RIGHT (MIN 3 VIEWS)   Final Result   Stable postsurgical changes. No acute osseous abnormality or radiographic   evidence of osteomyelitis.               Scheduled Medicines   Medications:   REM     Infusions:   REM        Objective:   Vitals: /85   Pulse 78   Temp 98.2 °F (36.8 °C) (Oral)   Resp 19   Ht 6' 3\" (1.905 m)   Wt (!) 338 lb 6.5 oz (153.5 kg)   SpO2 99%   BMI 42.30 kg/m²   General appearance: alert and cooperative with exam  Neck: no JVD or bruit  Thyroid : Normal lobes   Lungs: Has Vesicular Breath sounds   Heart:  regular rate and rhythm  Abdomen: soft, non-tender; bowel sounds normal; no masses,  no organomegaly  Musculoskeletal: Normal  Extremities: extremities normal, , no edema right foot cellulitis  Neurologic:  Awake, alert, oriented to name, place and time. Cranial nerves II-XII are grossly intact. Motor is  intact. Sensory neuropathy t ,  and gait is normal    Assessment:     Patient Active Problem List:     Sprain of left ankle     Closed nondisplaced fracture of fifth metatarsal bone of left foot     Alcohol abuse     UTI (urinary tract infection) due to Enterococcus     Acute osteomyelitis of toe, right (HCC)     Osteomyelitis of second toe of right foot (HCC)     Diabetic foot infection (HCC)     Shortness of breath     Asthma exacerbation     Appendicitis     Diabetic ulcer of right foot due to type 2 diabetes mellitus ([facility]     Cellulitis      Plan:     Reviewed POC blood glucose . Labs and X ray results   Reviewed Current Medicines   On meal/ Correction bolus Humalog/ Basal Lantus Insulin regime   Monitor Blood glucose frequently   Modified  the dose of Insulin/ other medicines as needed   Will follow     .      Miranda Perdue MD, MD

## 2022-07-29 NOTE — PROGRESS NOTES
Physician Progress Note      Jian Durán  CSN #:                  [id number]  :                       1985  ADMIT DATE:       2022 6:33 AM  DISCH DATE:        2022 4:23 PM  RESPONDING  PROVIDER #:        Alane Lesches MD          QUERY TEXT:    Patient admitted with Cellulitis . Per consult note on 2022 there is   documentation of debridement. To accurately reflect the procedure performed   please document if debridement was excisional or nonexcisional and the deepest   depth of tissue removed as down to and including: The medical record reflects the following:  Risk Factors: Cellulitis, Open wound  Clinical Indicators: \"Patient admitted with Cellulitis ,On  Consult notes   states that patient presents with signs and symptoms consistent with open   wound and cellulitis right foot/leg. I sharply excised callous from right foot   and no abscess entered. I probed the open wound and it does track to bone. I   irrigated the track with betadine and placed 1/2 inch iodoform packing into   the wound. I cover with betadine soaked gauze and then kerlix.  Continue IV   antibiotics\"  Treatment: I&D, Surgery consult, antibiotics    Thank you Adam Zhou RN, CDS ([phone])  Options provided:  -- Excisional debridement of skin  -- Excisional debridement of subcutaneous tissue  -- Other - I will add my own diagnosis  -- Disagree - Not applicable / Not valid  -- Disagree - Clinically unable to determine / Unknown  -- Refer to Clinical Documentation Reviewer    PROVIDER RESPONSE TEXT:    Excisional debridement down to the bone with amputation of proximal phalanx   and metatarsal of Right second toe    Query created by: Litzy Lindsay on 2022 8:49 AM      Electronically signed by:  Alane Lesches MD 2022 1:54 PM

## 2022-07-30 LAB
CULTURE: ABNORMAL
CULTURE: ABNORMAL
Lab: ABNORMAL
SPECIMEN: ABNORMAL

## 2022-09-15 ENCOUNTER — HOSPITAL ENCOUNTER (EMERGENCY)
Age: 37
Discharge: HOME | DRG: 420 | End: 2022-09-15
Payer: COMMERCIAL

## 2022-09-15 VITALS
BODY MASS INDEX: 39.17 KG/M2 | OXYGEN SATURATION: 96 % | HEART RATE: 103 BPM | TEMPERATURE: 97.9 F | HEIGHT: 75 IN | DIASTOLIC BLOOD PRESSURE: 88 MMHG | RESPIRATION RATE: 16 BRPM | SYSTOLIC BLOOD PRESSURE: 141 MMHG | WEIGHT: 315 LBS

## 2022-09-15 DIAGNOSIS — L03.115 CELLULITIS OF FOOT, RIGHT: Primary | ICD-10-CM

## 2022-09-15 DIAGNOSIS — R46.89 AGGRESSIVE BEHAVIOR: ICD-10-CM

## 2022-09-15 PROCEDURE — 6370000000 HC RX 637 (ALT 250 FOR IP): Performed by: PHYSICIAN ASSISTANT

## 2022-09-15 PROCEDURE — 99283 EMERGENCY DEPT VISIT LOW MDM: CPT

## 2022-09-15 RX ORDER — CEPHALEXIN 500 MG/1
500 CAPSULE ORAL 4 TIMES DAILY
Qty: 28 CAPSULE | Refills: 0 | Status: ON HOLD | OUTPATIENT
Start: 2022-09-15 | End: 2022-09-21 | Stop reason: HOSPADM

## 2022-09-15 RX ORDER — HYDROCODONE BITARTRATE AND ACETAMINOPHEN 5; 325 MG/1; MG/1
1 TABLET ORAL EVERY 4 HOURS PRN
Qty: 10 TABLET | Refills: 0 | Status: ON HOLD | OUTPATIENT
Start: 2022-09-15 | End: 2022-09-21

## 2022-09-15 RX ORDER — CEPHALEXIN 250 MG/1
500 CAPSULE ORAL ONCE
Status: COMPLETED | OUTPATIENT
Start: 2022-09-15 | End: 2022-09-15

## 2022-09-15 RX ORDER — SULFAMETHOXAZOLE AND TRIMETHOPRIM 800; 160 MG/1; MG/1
1 TABLET ORAL 2 TIMES DAILY
Qty: 14 TABLET | Refills: 0 | Status: SHIPPED | OUTPATIENT
Start: 2022-09-15 | End: 2022-09-22

## 2022-09-15 RX ORDER — SULFAMETHOXAZOLE AND TRIMETHOPRIM 800; 160 MG/1; MG/1
1 TABLET ORAL ONCE
Status: COMPLETED | OUTPATIENT
Start: 2022-09-15 | End: 2022-09-15

## 2022-09-15 RX ORDER — OXYCODONE HYDROCHLORIDE AND ACETAMINOPHEN 5; 325 MG/1; MG/1
1 TABLET ORAL ONCE
Status: COMPLETED | OUTPATIENT
Start: 2022-09-15 | End: 2022-09-15

## 2022-09-15 RX ADMIN — CEPHALEXIN 500 MG: 250 CAPSULE ORAL at 22:05

## 2022-09-15 RX ADMIN — OXYCODONE AND ACETAMINOPHEN 1 TABLET: 5; 325 TABLET ORAL at 22:04

## 2022-09-15 RX ADMIN — SULFAMETHOXAZOLE AND TRIMETHOPRIM 1 TABLET: 800; 160 TABLET ORAL at 22:05

## 2022-09-15 ASSESSMENT — PAIN DESCRIPTION - LOCATION: LOCATION: LEG

## 2022-09-15 ASSESSMENT — PAIN SCALES - GENERAL: PAINLEVEL_OUTOF10: 10

## 2022-09-16 NOTE — ED NOTES
Name and  and allergies verified,states he has taken ordered medication without problem in past.        Oz Ayala RN  09/15/22 [id number]
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
## 2022-09-17 ENCOUNTER — APPOINTMENT (OUTPATIENT)
Dept: GENERAL RADIOLOGY | Age: 37
DRG: 420 | End: 2022-09-17
Payer: COMMERCIAL

## 2022-09-17 ENCOUNTER — HOSPITAL ENCOUNTER (INPATIENT)
Age: 37
LOS: 4 days | Discharge: HOME HEALTH | DRG: 420 | End: 2022-09-21
Attending: STUDENT IN AN ORGANIZED HEALTH CARE EDUCATION/TRAINING PROGRAM | Admitting: STUDENT IN AN ORGANIZED HEALTH CARE EDUCATION/TRAINING PROGRAM
Payer: COMMERCIAL

## 2022-09-17 DIAGNOSIS — L08.9 DIABETIC FOOT INFECTION (HCC): Primary | ICD-10-CM

## 2022-09-17 DIAGNOSIS — E11.628 DIABETIC FOOT INFECTION (HCC): Primary | ICD-10-CM

## 2022-09-17 DIAGNOSIS — L03.115 CELLULITIS OF FOOT, RIGHT: ICD-10-CM

## 2022-09-17 DIAGNOSIS — Z78.9 FAILURE OF OUTPATIENT TREATMENT: ICD-10-CM

## 2022-09-17 LAB
ALBUMIN SERPL-MCNC: 4.3 GM/DL (ref 3.4–5)
ALP BLD-CCNC: 112 IU/L (ref 40–129)
ALT SERPL-CCNC: 40 U/L (ref 10–40)
ANION GAP SERPL CALCULATED.3IONS-SCNC: 12 MMOL/L (ref 4–16)
AST SERPL-CCNC: 46 IU/L (ref 15–37)
BASOPHILS ABSOLUTE: 0 K/CU MM
BASOPHILS RELATIVE PERCENT: 0.9 % (ref 0–1)
BILIRUB SERPL-MCNC: 0.4 MG/DL (ref 0–1)
BUN BLDV-MCNC: 13 MG/DL (ref 6–23)
C-REACTIVE PROTEIN, HIGH SENSITIVITY: 12 MG/L
CALCIUM SERPL-MCNC: 8.5 MG/DL (ref 8.3–10.6)
CHLORIDE BLD-SCNC: 101 MMOL/L (ref 99–110)
CO2: 23 MMOL/L (ref 21–32)
CREAT SERPL-MCNC: 0.8 MG/DL (ref 0.9–1.3)
DIFFERENTIAL TYPE: ABNORMAL
EOSINOPHILS ABSOLUTE: 0 K/CU MM
EOSINOPHILS RELATIVE PERCENT: 0.9 % (ref 0–3)
ERYTHROCYTE SEDIMENTATION RATE: 31 MM/HR (ref 0–15)
GFR AFRICAN AMERICAN: >60 ML/MIN/1.73M2
GFR NON-AFRICAN AMERICAN: >60 ML/MIN/1.73M2
GLUCOSE BLD-MCNC: 186 MG/DL (ref 70–99)
GLUCOSE BLD-MCNC: 189 MG/DL (ref 70–99)
HCT VFR BLD CALC: 37.9 % (ref 42–52)
HEMOGLOBIN: 13.3 GM/DL (ref 13.5–18)
IMMATURE NEUTROPHIL %: 0.2 % (ref 0–0.43)
LACTATE: 1.9 MMOL/L (ref 0.4–2)
LYMPHOCYTES ABSOLUTE: 1.3 K/CU MM
LYMPHOCYTES RELATIVE PERCENT: 30.6 % (ref 24–44)
MCH RBC QN AUTO: 31.3 PG (ref 27–31)
MCHC RBC AUTO-ENTMCNC: 35.1 % (ref 32–36)
MCV RBC AUTO: 89.2 FL (ref 78–100)
MONOCYTES ABSOLUTE: 0.4 K/CU MM
MONOCYTES RELATIVE PERCENT: 10 % (ref 0–4)
NUCLEATED RBC %: 0 %
PDW BLD-RTO: 13.2 % (ref 11.7–14.9)
PLATELET # BLD: 226 K/CU MM (ref 140–440)
PMV BLD AUTO: 8.9 FL (ref 7.5–11.1)
POTASSIUM SERPL-SCNC: 4.1 MMOL/L (ref 3.5–5.1)
PROCALCITONIN: 0.04
RBC # BLD: 4.25 M/CU MM (ref 4.6–6.2)
SEGMENTED NEUTROPHILS ABSOLUTE COUNT: 2.5 K/CU MM
SEGMENTED NEUTROPHILS RELATIVE PERCENT: 57.4 % (ref 36–66)
SODIUM BLD-SCNC: 136 MMOL/L (ref 135–145)
TOTAL IMMATURE NEUTOROPHIL: 0.01 K/CU MM
TOTAL NUCLEATED RBC: 0 K/CU MM
TOTAL PROTEIN: 7.2 GM/DL (ref 6.4–8.2)
WBC # BLD: 4.3 K/CU MM (ref 4–10.5)

## 2022-09-17 PROCEDURE — 73630 X-RAY EXAM OF FOOT: CPT

## 2022-09-17 PROCEDURE — 87040 BLOOD CULTURE FOR BACTERIA: CPT

## 2022-09-17 PROCEDURE — 84145 PROCALCITONIN (PCT): CPT

## 2022-09-17 PROCEDURE — 86140 C-REACTIVE PROTEIN: CPT

## 2022-09-17 PROCEDURE — 87070 CULTURE OTHR SPECIMN AEROBIC: CPT

## 2022-09-17 PROCEDURE — 87077 CULTURE AEROBIC IDENTIFY: CPT

## 2022-09-17 PROCEDURE — 85025 COMPLETE CBC W/AUTO DIFF WBC: CPT

## 2022-09-17 PROCEDURE — 99285 EMERGENCY DEPT VISIT HI MDM: CPT

## 2022-09-17 PROCEDURE — 85652 RBC SED RATE AUTOMATED: CPT

## 2022-09-17 PROCEDURE — 87186 SC STD MICRODIL/AGAR DIL: CPT

## 2022-09-17 PROCEDURE — 2580000003 HC RX 258: Performed by: PHYSICIAN ASSISTANT

## 2022-09-17 PROCEDURE — 80053 COMPREHEN METABOLIC PANEL: CPT

## 2022-09-17 PROCEDURE — 6370000000 HC RX 637 (ALT 250 FOR IP): Performed by: PHYSICIAN ASSISTANT

## 2022-09-17 PROCEDURE — 83605 ASSAY OF LACTIC ACID: CPT

## 2022-09-17 PROCEDURE — 1200000000 HC SEMI PRIVATE

## 2022-09-17 PROCEDURE — 87075 CULTR BACTERIA EXCEPT BLOOD: CPT

## 2022-09-17 PROCEDURE — 96365 THER/PROPH/DIAG IV INF INIT: CPT

## 2022-09-17 PROCEDURE — 6360000002 HC RX W HCPCS: Performed by: PHYSICIAN ASSISTANT

## 2022-09-17 PROCEDURE — 82962 GLUCOSE BLOOD TEST: CPT

## 2022-09-17 RX ORDER — POLYETHYLENE GLYCOL 3350 17 G/17G
17 POWDER, FOR SOLUTION ORAL DAILY PRN
Status: DISCONTINUED | OUTPATIENT
Start: 2022-09-17 | End: 2022-09-21 | Stop reason: HOSPADM

## 2022-09-17 RX ORDER — SODIUM CHLORIDE 0.9 % (FLUSH) 0.9 %
5-40 SYRINGE (ML) INJECTION EVERY 12 HOURS SCHEDULED
Status: DISCONTINUED | OUTPATIENT
Start: 2022-09-17 | End: 2022-09-21 | Stop reason: HOSPADM

## 2022-09-17 RX ORDER — OXYCODONE HYDROCHLORIDE 5 MG/1
5 TABLET ORAL ONCE
Status: COMPLETED | OUTPATIENT
Start: 2022-09-17 | End: 2022-09-17

## 2022-09-17 RX ORDER — ATORVASTATIN CALCIUM 10 MG/1
10 TABLET, FILM COATED ORAL DAILY
Status: DISCONTINUED | OUTPATIENT
Start: 2022-09-18 | End: 2022-09-21 | Stop reason: HOSPADM

## 2022-09-17 RX ORDER — CHOLECALCIFEROL (VITAMIN D3) 125 MCG
5 CAPSULE ORAL NIGHTLY
Status: DISCONTINUED | OUTPATIENT
Start: 2022-09-17 | End: 2022-09-21 | Stop reason: HOSPADM

## 2022-09-17 RX ORDER — AMLODIPINE BESYLATE 10 MG/1
10 TABLET ORAL DAILY
Status: DISCONTINUED | OUTPATIENT
Start: 2022-09-18 | End: 2022-09-21 | Stop reason: HOSPADM

## 2022-09-17 RX ORDER — ACETAMINOPHEN 650 MG/1
650 SUPPOSITORY RECTAL EVERY 6 HOURS PRN
Status: DISCONTINUED | OUTPATIENT
Start: 2022-09-17 | End: 2022-09-21 | Stop reason: HOSPADM

## 2022-09-17 RX ORDER — INSULIN GLARGINE 100 [IU]/ML
50 INJECTION, SOLUTION SUBCUTANEOUS NIGHTLY
Status: DISCONTINUED | OUTPATIENT
Start: 2022-09-17 | End: 2022-09-19

## 2022-09-17 RX ORDER — ACETAMINOPHEN 325 MG/1
650 TABLET ORAL EVERY 6 HOURS PRN
Status: DISCONTINUED | OUTPATIENT
Start: 2022-09-17 | End: 2022-09-21 | Stop reason: HOSPADM

## 2022-09-17 RX ORDER — SODIUM CHLORIDE 0.9 % (FLUSH) 0.9 %
5-40 SYRINGE (ML) INJECTION PRN
Status: DISCONTINUED | OUTPATIENT
Start: 2022-09-17 | End: 2022-09-21 | Stop reason: HOSPADM

## 2022-09-17 RX ORDER — EZETIMIBE 10 MG/1
10 TABLET ORAL NIGHTLY
Status: DISCONTINUED | OUTPATIENT
Start: 2022-09-17 | End: 2022-09-21 | Stop reason: HOSPADM

## 2022-09-17 RX ORDER — PROMETHAZINE HYDROCHLORIDE 12.5 MG/1
12.5 TABLET ORAL EVERY 6 HOURS PRN
Status: DISCONTINUED | OUTPATIENT
Start: 2022-09-17 | End: 2022-09-21 | Stop reason: HOSPADM

## 2022-09-17 RX ORDER — PREGABALIN 100 MG/1
100 CAPSULE ORAL 2 TIMES DAILY
Status: DISCONTINUED | OUTPATIENT
Start: 2022-09-17 | End: 2022-09-21 | Stop reason: HOSPADM

## 2022-09-17 RX ORDER — SODIUM CHLORIDE 9 MG/ML
INJECTION, SOLUTION INTRAVENOUS PRN
Status: DISCONTINUED | OUTPATIENT
Start: 2022-09-17 | End: 2022-09-21 | Stop reason: HOSPADM

## 2022-09-17 RX ORDER — INSULIN LISPRO 100 [IU]/ML
0-4 INJECTION, SOLUTION INTRAVENOUS; SUBCUTANEOUS
Status: DISCONTINUED | OUTPATIENT
Start: 2022-09-18 | End: 2022-09-18

## 2022-09-17 RX ORDER — PANTOPRAZOLE SODIUM 40 MG/1
40 TABLET, DELAYED RELEASE ORAL
Status: DISCONTINUED | OUTPATIENT
Start: 2022-09-18 | End: 2022-09-21 | Stop reason: HOSPADM

## 2022-09-17 RX ORDER — PREDNISOLONE ACETATE 10 MG/ML
1 SUSPENSION/ DROPS OPHTHALMIC 2 TIMES DAILY
Status: DISCONTINUED | OUTPATIENT
Start: 2022-09-18 | End: 2022-09-21 | Stop reason: HOSPADM

## 2022-09-17 RX ORDER — ALLOPURINOL 300 MG/1
300 TABLET ORAL DAILY
Status: DISCONTINUED | OUTPATIENT
Start: 2022-09-18 | End: 2022-09-21 | Stop reason: HOSPADM

## 2022-09-17 RX ORDER — BUDESONIDE AND FORMOTEROL FUMARATE DIHYDRATE 160; 4.5 UG/1; UG/1
2 AEROSOL RESPIRATORY (INHALATION) 2 TIMES DAILY
Status: DISCONTINUED | OUTPATIENT
Start: 2022-09-17 | End: 2022-09-21 | Stop reason: HOSPADM

## 2022-09-17 RX ORDER — POLYMYXIN B SULFATE AND TRIMETHOPRIM 1; 10000 MG/ML; [USP'U]/ML
1 SOLUTION OPHTHALMIC 4 TIMES DAILY
Status: DISCONTINUED | OUTPATIENT
Start: 2022-09-17 | End: 2022-09-21 | Stop reason: HOSPADM

## 2022-09-17 RX ORDER — 0.9 % SODIUM CHLORIDE 0.9 %
1000 INTRAVENOUS SOLUTION INTRAVENOUS ONCE
Status: COMPLETED | OUTPATIENT
Start: 2022-09-17 | End: 2022-09-17

## 2022-09-17 RX ORDER — DEXTROSE MONOHYDRATE 100 MG/ML
INJECTION, SOLUTION INTRAVENOUS CONTINUOUS PRN
Status: DISCONTINUED | OUTPATIENT
Start: 2022-09-17 | End: 2022-09-21 | Stop reason: HOSPADM

## 2022-09-17 RX ORDER — SUCRALFATE 1 G/1
1 TABLET ORAL 4 TIMES DAILY
Status: DISCONTINUED | OUTPATIENT
Start: 2022-09-17 | End: 2022-09-21 | Stop reason: HOSPADM

## 2022-09-17 RX ORDER — INSULIN LISPRO 100 [IU]/ML
0-4 INJECTION, SOLUTION INTRAVENOUS; SUBCUTANEOUS NIGHTLY
Status: DISCONTINUED | OUTPATIENT
Start: 2022-09-17 | End: 2022-09-18

## 2022-09-17 RX ORDER — INSULIN LISPRO 100 [IU]/ML
0.08 INJECTION, SOLUTION INTRAVENOUS; SUBCUTANEOUS
Status: DISCONTINUED | OUTPATIENT
Start: 2022-09-18 | End: 2022-09-19

## 2022-09-17 RX ORDER — NICOTINE 21 MG/24HR
1 PATCH, TRANSDERMAL 24 HOURS TRANSDERMAL DAILY
Status: DISCONTINUED | OUTPATIENT
Start: 2022-09-18 | End: 2022-09-21 | Stop reason: HOSPADM

## 2022-09-17 RX ORDER — CLONIDINE HYDROCHLORIDE 0.2 MG/1
0.2 TABLET ORAL DAILY
Status: DISCONTINUED | OUTPATIENT
Start: 2022-09-18 | End: 2022-09-21 | Stop reason: HOSPADM

## 2022-09-17 RX ORDER — ONDANSETRON 2 MG/ML
4 INJECTION INTRAMUSCULAR; INTRAVENOUS EVERY 6 HOURS PRN
Status: DISCONTINUED | OUTPATIENT
Start: 2022-09-17 | End: 2022-09-21 | Stop reason: HOSPADM

## 2022-09-17 RX ORDER — SODIUM CHLORIDE 9 MG/ML
INJECTION, SOLUTION INTRAVENOUS PRN
Status: DISCONTINUED | OUTPATIENT
Start: 2022-09-17 | End: 2022-09-17 | Stop reason: SDUPTHER

## 2022-09-17 RX ORDER — BUDESONIDE AND FORMOTEROL FUMARATE DIHYDRATE 160; 4.5 UG/1; UG/1
2 AEROSOL RESPIRATORY (INHALATION) 2 TIMES DAILY
COMMUNITY
Start: 2022-09-02

## 2022-09-17 RX ORDER — LISINOPRIL AND HYDROCHLOROTHIAZIDE 20; 12.5 MG/1; MG/1
1 TABLET ORAL DAILY
Status: DISCONTINUED | OUTPATIENT
Start: 2022-09-18 | End: 2022-09-21 | Stop reason: HOSPADM

## 2022-09-17 RX ORDER — MOXIFLOXACIN 5 MG/ML
1 SOLUTION/ DROPS OPHTHALMIC 4 TIMES DAILY
Status: DISCONTINUED | OUTPATIENT
Start: 2022-09-17 | End: 2022-09-21 | Stop reason: HOSPADM

## 2022-09-17 RX ORDER — POLYETHYLENE GLYCOL 3350 17 G
2 POWDER IN PACKET (EA) ORAL
Status: DISCONTINUED | OUTPATIENT
Start: 2022-09-17 | End: 2022-09-21 | Stop reason: HOSPADM

## 2022-09-17 RX ORDER — ENOXAPARIN SODIUM 100 MG/ML
30 INJECTION SUBCUTANEOUS 2 TIMES DAILY
Status: DISCONTINUED | OUTPATIENT
Start: 2022-09-17 | End: 2022-09-21 | Stop reason: HOSPADM

## 2022-09-17 RX ADMIN — VANCOMYCIN HYDROCHLORIDE 2500 MG: 500 INJECTION, POWDER, LYOPHILIZED, FOR SOLUTION INTRAVENOUS at 21:54

## 2022-09-17 RX ADMIN — OXYCODONE HYDROCHLORIDE 5 MG: 5 TABLET ORAL at 21:13

## 2022-09-17 RX ADMIN — SODIUM CHLORIDE 1000 ML: 9 INJECTION, SOLUTION INTRAVENOUS at 21:17

## 2022-09-17 RX ADMIN — CEFEPIME HYDROCHLORIDE 2000 MG: 2 INJECTION, POWDER, FOR SOLUTION INTRAVENOUS at 21:17

## 2022-09-17 ASSESSMENT — ENCOUNTER SYMPTOMS
SHORTNESS OF BREATH: 0
COUGH: 0
NAUSEA: 0
VOMITING: 0
EYES NEGATIVE: 1
SORE THROAT: 0
ABDOMINAL PAIN: 0
CONSTIPATION: 0

## 2022-09-17 ASSESSMENT — PAIN DESCRIPTION - ORIENTATION: ORIENTATION: RIGHT

## 2022-09-17 ASSESSMENT — PAIN SCALES - GENERAL
PAINLEVEL_OUTOF10: 7
PAINLEVEL_OUTOF10: 10

## 2022-09-17 ASSESSMENT — PAIN DESCRIPTION - PAIN TYPE: TYPE: ACUTE PAIN

## 2022-09-17 ASSESSMENT — PAIN DESCRIPTION - FREQUENCY: FREQUENCY: CONTINUOUS

## 2022-09-17 ASSESSMENT — PAIN - FUNCTIONAL ASSESSMENT: PAIN_FUNCTIONAL_ASSESSMENT: 0-10

## 2022-09-17 ASSESSMENT — PAIN DESCRIPTION - LOCATION: LOCATION: FOOT;LEG

## 2022-09-17 NOTE — ED PROVIDER NOTES
EMERGENCY DEPARTMENT ENCOUNTER      PCP: Gurdeep Charles MD    [facility]    Chief Complaint   Patient presents with    Wound Infection     Right foot wound, swelling to right lower extremity       This patient was not evaluated by the attending physician. I have independently evaluated this patient. HPI    Velma Messer is a 39 y.o. male who presents with worsening redness and swelling to right foot. Onset 4 days ago. Patient states he injured his foot on a friend's child's scooter. Patient states he was evaluated 2 days ago and started on antibiotics. Patient states he has been taking the antibiotics however foot infection seems to be worsening. Patient has history of toe amputation this past July with general surgeon Dr. Davidson Keyes. Patient has history of diabetes. Patient denies fever, vomiting or diarrhea. Patient denies chest pain or shortness of breath. Pain worsens with direct palpation and ambulation. No known alleviating factors.     REVIEW OF SYSTEMS    Constitutional: denies fever  Respiratory:  No cough or shortness of breath   Cardiovascular:  No chest pain  GI: No vomiting  Musculoskeletal:  See HPI   Skin:  See HPI      All other review of systems are negative  See HPI and nursing notes for additional information     PAST MEDICAL HISTORY/SURGICAL HISTORY     Past Medical History:   Diagnosis Date    Asthma     Blind left eye     Diabetes mellitus ([address]     GERD (gastroesophageal reflux disease)     Hypertension     Retinal detachment 2012    left     Past Surgical History:   Procedure Laterality Date    CORNEAL TRANSPLANT Bilateral     EYE SURGERY      left eye removed    EYE SURGERY Right     FRACTURE SURGERY      foot left    LAPAROSCOPIC APPENDECTOMY N/A 4/28/2022    APPENDECTOMY LAPAROSCOPIC performed by Michelle Roberto MD at [address] [place] [address] Right     MANDIBLE SURGERY Bilateral     TOE AMPUTATION Right 07/25/2017    TOE AMPUTATION Right 7/25/2022    TOE AMPUTATION, RAY AMPUTATION OF RIGHT SECOND TOE performed by Aris Herrera MD at [address]    Current Outpatient Rx   Medication Sig Dispense Refill    cephALEXin (KEFLEX) 500 MG capsule Take 1 capsule by mouth 4 times daily for 7 days 28 capsule 0    sulfamethoxazole-trimethoprim (BACTRIM DS) 800-160 MG per tablet Take 1 tablet by mouth 2 times daily for 7 days 14 tablet 0    HYDROcodone-acetaminophen (NORCO) 5-325 MG per tablet Take 1 tablet by mouth every 4 hours as needed for Pain for up to 3 days. 10 tablet 0    glipiZIDE (GLUCOTROL) 5 MG tablet Take 3 tablets by mouth in the morning and 3 tablets in the evening. Take before meals. 201 tablet 0    TRULICITY 1.5 WW/1.3QK SOPN inject 0.5 milliliters ( 1 AND 1/2 milligrams ) subcutaneously ev. ..  (REFER TO PRESCRIPTION NOTES). 3 pen 0    LANTUS SOLOSTAR 100 UNIT/ML injection pen Inject 50 Units into the skin nightly 2 pen 0    insulin NPH (HUMULIN N KWIKPEN) 100 UNIT/ML injection pen Inject 20 Units into the skin every morning 3 pen 0    moxifloxacin (VIGAMOX) 0.5 % ophthalmic solution Place 1 drop into the right eye 4 times daily      tiZANidine (ZANAFLEX) 4 MG tablet Take 4 mg by mouth nightly as needed      cloNIDine (CATAPRES) 0.2 MG tablet Take 0.2 mg by mouth in the morning.       trimethoprim-polymyxin b (POLYTRIM) 69443-5.1 UNIT/ML-% ophthalmic solution Apply 1 drop to eye 4 times daily      UNABLE TO FIND Place 1 drop into the right eye 4 times daily 07/21/22 Patient on Vancomycin Preserved Eye soln 25 mg/ml      ezetimibe (ZETIA) 10 MG tablet take 1 tablet by mouth once daily      TECHLITE PEN NEEDLES 31G X 5 MM MISC use 1 PEN NEEDLE to inject MEDICATION subcutaneously two to three times a day      melatonin 5 MG TABS tablet take 1 tablet by mouth every evening      pregabalin (LYRICA) 100 MG capsule take 1 capsule by mouth twice a day      sucralfate (CARAFATE) 1 GM tablet Take 1 tablet by mouth 4 times daily 120 tablet 0    albuterol sulfate  (90 Base) MCG/ACT inhaler Inhale 2 puffs into the lungs every 6 hours as needed for Wheezing 1 Inhaler 5    albuterol-ipratropium (COMBIVENT RESPIMAT)  MCG/ACT AERS inhaler Inhale 1 puff into the lungs every 6 hours 1 Inhaler 5    acetaminophen (AMINOFEN) 325 MG tablet Take 2 tablets by mouth every 6 hours as needed for Pain 20 tablet 0    brimonidine (ALPHAGAN) 0.2 % ophthalmic solution Place 1 drop into the right eye 2 times daily      dorzolamide-timolol 22.3-6.8 MG/ML SOLN Place 1 drop into the right eye 2 times daily      prednisoLONE acetate (PRED FORTE) 1 % ophthalmic suspension Place 1 drop into the right eye 2 times daily      allopurinol (ZYLOPRIM) 300 MG tablet Take 300 mg by mouth daily       atorvastatin (LIPITOR) 10 MG tablet Take 10 mg by mouth daily       lisinopril-hydrochlorothiazide (PRINZIDE;ZESTORETIC) 20-12.5 MG per tablet Take 1 tablet by mouth daily       metFORMIN (GLUCOPHAGE) 1000 MG tablet Take 1,000 mg by mouth 2 times daily (with meals)      amLODIPine (NORVASC) 10 MG tablet Take 10 mg by mouth daily      omeprazole (PRILOSEC) 40 MG delayed release capsule Take 40 mg by mouth daily          ALLERGIES    Allergies   Allergen Reactions    Ciprofloxacin Shortness Of Breath    Glucosamine Anaphylaxis    Shellfish-Derived Products Anaphylaxis       FAMILY HISTORY/SOCIAL HISTORY    Family History   Problem Relation Age of Onset    Diabetes Mother     Asthma Mother     High Blood Pressure Mother     Stroke Mother     Heart Disease Mother     Diabetes Father     Asthma Father     High Blood Pressure Father      Social History     Socioeconomic History    Marital status: Single   Tobacco Use    Smoking status: Every Day     Packs/day: 0.50     Years: 5.00     Pack years: 2.50     Types: Cigarettes    Smokeless tobacco: Never   Vaping Use    Vaping Use: Never used   Substance and Sexual Activity    Alcohol use: Yes     Comment: occasionally    Drug use: Not Currently 52 %    MCV 89.2 78 - 100 FL    MCH 31.3 (H) 27 - 31 PG    MCHC 35.1 32.0 - 36.0 %    RDW 13.2 11.7 - 14.9 %    Platelets 149 704 - 487 K/CU MM    MPV 8.9 7.5 - 11.1 FL    Differential Type AUTOMATED DIFFERENTIAL     Segs Relative 57.4 36 - 66 %    Lymphocytes % 30.6 24 - 44 %    Monocytes % 10.0 (H) 0 - 4 %    Eosinophils % 0.9 0 - 3 %    Basophils % 0.9 0 - 1 %    Segs Absolute 2.5 K/CU MM    Lymphocytes Absolute 1.3 K/CU MM    Monocytes Absolute 0.4 K/CU MM    Eosinophils Absolute 0.0 K/CU MM    Basophils Absolute 0.0 K/CU MM    Nucleated RBC % 0.0 %    Total Nucleated RBC 0.0 K/CU MM    Total Immature Neutrophil 0.01 K/CU MM    Immature Neutrophil % 0.2 0 - 0.43 %   Comprehensive Metabolic Panel   Result Value Ref Range    Sodium 136 135 - 145 MMOL/L    Potassium 4.1 3.5 - 5.1 MMOL/L    Chloride 101 99 - 110 mMol/L    CO2 23 21 - 32 MMOL/L    BUN 13 6 - 23 MG/DL    Creatinine 0.8 (L) 0.9 - 1.3 MG/DL    Glucose 186 (H) 70 - 99 MG/DL    Calcium 8.5 8.3 - 10.6 MG/DL    Albumin 4.3 3.4 - 5.0 GM/DL    Total Protein 7.2 6.4 - 8.2 GM/DL    Total Bilirubin 0.4 0.0 - 1.0 MG/DL    ALT 40 10 - 40 U/L    AST 46 (H) 15 - 37 IU/L    Alkaline Phosphatase 112 40 - 129 IU/L    GFR Non-African American >60 >60 mL/min/1.73m2    GFR African American >60 >60 mL/min/1.73m2    Anion Gap 12 4 - 16   Lactic Acid   Result Value Ref Range    Lactate 1.9 0.4 - 2.0 mMOL/L         ED COURSE & MEDICAL DECISION MAKING    Patient presents as above. Patient has failed outpatient treatment. Patient has significant infection to right foot. Wound cultures, blood cultures and lactic acid ordered. IV vancomycin and cefepime as ordered. CBC shows hemoglobin 13.3 and hematocrit of 37.9. CMP grossly within her limits. Lactic acid is 1.9.   Right foot x-ray shows findings consistent with osteomyelitis to radial proximal phalanx in the absence of any intervention however patient did have surgery a few days after the x-ray on July 21, no definite radiographic findings of osteomyelitis to great toe. Consult to general surgeon Dr. Raji Edmonds covering for Dr. Davidson Keyes who agrees with current plan and will follow along with admission. Consult hospitalist who accepts admission. Clinical  IMPRESSION    Diabetic foot infection of right foot  Failed outpatient treatment    Patient admitted      Comment: Please note this report has been produced using speech recognition software and may contain errors related to that system including errors in grammar, punctuation, and spelling, as well as words and phrases that may be inappropriate. If there are any questions or concerns please feel free to contact the dictating provider for clarification.       Sonia Wing PA-C  09/17/22 [id number]

## 2022-09-18 LAB
ALBUMIN SERPL-MCNC: 3.7 GM/DL (ref 3.4–5)
ALCOHOL SCREEN SERUM: <0.01 %WT/VOL
ALP BLD-CCNC: 128 IU/L (ref 40–129)
ALT SERPL-CCNC: 32 U/L (ref 10–40)
ANION GAP SERPL CALCULATED.3IONS-SCNC: 10 MMOL/L (ref 4–16)
AST SERPL-CCNC: 34 IU/L (ref 15–37)
BASOPHILS ABSOLUTE: 0 K/CU MM
BASOPHILS RELATIVE PERCENT: 1 % (ref 0–1)
BILIRUB SERPL-MCNC: 0.3 MG/DL (ref 0–1)
BUN BLDV-MCNC: 12 MG/DL (ref 6–23)
CALCIUM SERPL-MCNC: 8.1 MG/DL (ref 8.3–10.6)
CHLORIDE BLD-SCNC: 102 MMOL/L (ref 99–110)
CO2: 22 MMOL/L (ref 21–32)
CREAT SERPL-MCNC: 0.7 MG/DL (ref 0.9–1.3)
DIFFERENTIAL TYPE: ABNORMAL
DOSE AMOUNT: NORMAL
DOSE TIME: NORMAL
EOSINOPHILS ABSOLUTE: 0 K/CU MM
EOSINOPHILS RELATIVE PERCENT: 1 % (ref 0–3)
GFR AFRICAN AMERICAN: >60 ML/MIN/1.73M2
GFR NON-AFRICAN AMERICAN: >60 ML/MIN/1.73M2
GLUCOSE BLD-MCNC: 203 MG/DL (ref 70–99)
GLUCOSE BLD-MCNC: 229 MG/DL (ref 70–99)
GLUCOSE BLD-MCNC: 243 MG/DL (ref 70–99)
GLUCOSE BLD-MCNC: 243 MG/DL (ref 70–99)
GLUCOSE BLD-MCNC: 304 MG/DL (ref 70–99)
GLUCOSE BLD-MCNC: 362 MG/DL (ref 70–99)
HCT VFR BLD CALC: 34.6 % (ref 42–52)
HEMOGLOBIN: 11.9 GM/DL (ref 13.5–18)
IMMATURE NEUTROPHIL %: 0 % (ref 0–0.43)
LACTATE: 1 MMOL/L (ref 0.4–2)
LYMPHOCYTES ABSOLUTE: 1.2 K/CU MM
LYMPHOCYTES RELATIVE PERCENT: 38.5 % (ref 24–44)
MCH RBC QN AUTO: 30.8 PG (ref 27–31)
MCHC RBC AUTO-ENTMCNC: 34.4 % (ref 32–36)
MCV RBC AUTO: 89.6 FL (ref 78–100)
MONOCYTES ABSOLUTE: 0.3 K/CU MM
MONOCYTES RELATIVE PERCENT: 9.4 % (ref 0–4)
NUCLEATED RBC %: 0 %
PDW BLD-RTO: 13.2 % (ref 11.7–14.9)
PLATELET # BLD: 173 K/CU MM (ref 140–440)
PMV BLD AUTO: 8.8 FL (ref 7.5–11.1)
POTASSIUM SERPL-SCNC: 3.7 MMOL/L (ref 3.5–5.1)
RBC # BLD: 3.86 M/CU MM (ref 4.6–6.2)
SEGMENTED NEUTROPHILS ABSOLUTE COUNT: 1.6 K/CU MM
SEGMENTED NEUTROPHILS RELATIVE PERCENT: 50.1 % (ref 36–66)
SODIUM BLD-SCNC: 134 MMOL/L (ref 135–145)
TOTAL IMMATURE NEUTOROPHIL: 0 K/CU MM
TOTAL NUCLEATED RBC: 0 K/CU MM
TOTAL PROTEIN: 6.1 GM/DL (ref 6.4–8.2)
VANCOMYCIN RANDOM: 24.4 UG/ML
WBC # BLD: 3.1 K/CU MM (ref 4–10.5)

## 2022-09-18 PROCEDURE — 36415 COLL VENOUS BLD VENIPUNCTURE: CPT

## 2022-09-18 PROCEDURE — 83605 ASSAY OF LACTIC ACID: CPT

## 2022-09-18 PROCEDURE — 85025 COMPLETE CBC W/AUTO DIFF WBC: CPT

## 2022-09-18 PROCEDURE — 6360000002 HC RX W HCPCS: Performed by: NURSE PRACTITIONER

## 2022-09-18 PROCEDURE — G0480 DRUG TEST DEF 1-7 CLASSES: HCPCS

## 2022-09-18 PROCEDURE — 2580000003 HC RX 258: Performed by: NURSE PRACTITIONER

## 2022-09-18 PROCEDURE — 6370000000 HC RX 637 (ALT 250 FOR IP)

## 2022-09-18 PROCEDURE — 94640 AIRWAY INHALATION TREATMENT: CPT

## 2022-09-18 PROCEDURE — 82962 GLUCOSE BLOOD TEST: CPT

## 2022-09-18 PROCEDURE — 1200000000 HC SEMI PRIVATE

## 2022-09-18 PROCEDURE — 6370000000 HC RX 637 (ALT 250 FOR IP): Performed by: SURGERY

## 2022-09-18 PROCEDURE — 6370000000 HC RX 637 (ALT 250 FOR IP): Performed by: STUDENT IN AN ORGANIZED HEALTH CARE EDUCATION/TRAINING PROGRAM

## 2022-09-18 PROCEDURE — 80202 ASSAY OF VANCOMYCIN: CPT

## 2022-09-18 PROCEDURE — 6370000000 HC RX 637 (ALT 250 FOR IP): Performed by: NURSE PRACTITIONER

## 2022-09-18 PROCEDURE — 80053 COMPREHEN METABOLIC PANEL: CPT

## 2022-09-18 PROCEDURE — 83036 HEMOGLOBIN GLYCOSYLATED A1C: CPT

## 2022-09-18 RX ORDER — HYDROCODONE BITARTRATE AND ACETAMINOPHEN 5; 325 MG/1; MG/1
1 TABLET ORAL EVERY 6 HOURS PRN
Status: DISCONTINUED | OUTPATIENT
Start: 2022-09-18 | End: 2022-09-18

## 2022-09-18 RX ORDER — OXYCODONE HYDROCHLORIDE 5 MG/1
5 TABLET ORAL EVERY 4 HOURS PRN
Status: DISCONTINUED | OUTPATIENT
Start: 2022-09-18 | End: 2022-09-21 | Stop reason: HOSPADM

## 2022-09-18 RX ORDER — INSULIN LISPRO 100 [IU]/ML
0-4 INJECTION, SOLUTION INTRAVENOUS; SUBCUTANEOUS NIGHTLY
Status: DISCONTINUED | OUTPATIENT
Start: 2022-09-18 | End: 2022-09-19

## 2022-09-18 RX ORDER — HYDROXYZINE PAMOATE 25 MG/1
25 CAPSULE ORAL ONCE
Status: COMPLETED | OUTPATIENT
Start: 2022-09-18 | End: 2022-09-18

## 2022-09-18 RX ORDER — OXYCODONE HYDROCHLORIDE 10 MG/1
10 TABLET ORAL EVERY 4 HOURS PRN
Status: DISCONTINUED | OUTPATIENT
Start: 2022-09-18 | End: 2022-09-21 | Stop reason: HOSPADM

## 2022-09-18 RX ORDER — INSULIN LISPRO 100 [IU]/ML
0-8 INJECTION, SOLUTION INTRAVENOUS; SUBCUTANEOUS
Status: DISCONTINUED | OUTPATIENT
Start: 2022-09-18 | End: 2022-09-20

## 2022-09-18 RX ORDER — ALBUTEROL SULFATE 90 UG/1
2 AEROSOL, METERED RESPIRATORY (INHALATION) 4 TIMES DAILY
Status: DISCONTINUED | OUTPATIENT
Start: 2022-09-18 | End: 2022-09-21

## 2022-09-18 RX ADMIN — HYDROCODONE BITARTRATE AND ACETAMINOPHEN 1 TABLET: 5; 325 TABLET ORAL at 10:22

## 2022-09-18 RX ADMIN — INSULIN LISPRO 11 UNITS: 100 INJECTION, SOLUTION INTRAVENOUS; SUBCUTANEOUS at 17:26

## 2022-09-18 RX ADMIN — Medication 2 PUFF: at 11:45

## 2022-09-18 RX ADMIN — SUCRALFATE 1 G: 1 TABLET ORAL at 00:16

## 2022-09-18 RX ADMIN — BUDESONIDE AND FORMOTEROL FUMARATE DIHYDRATE 2 PUFF: 160; 4.5 AEROSOL RESPIRATORY (INHALATION) at 09:08

## 2022-09-18 RX ADMIN — POLYMYXIN B SULFATE AND TRIMETHOPRIM 1 DROP: 10000; 1 SOLUTION OPHTHALMIC at 17:20

## 2022-09-18 RX ADMIN — SODIUM CHLORIDE: 9 INJECTION, SOLUTION INTRAVENOUS at 06:33

## 2022-09-18 RX ADMIN — MOXIFLOXACIN HYDROCHLORIDE 1 DROP: 5 SOLUTION/ DROPS OPHTHALMIC at 17:20

## 2022-09-18 RX ADMIN — PREDNISOLONE ACETATE 1 DROP: 10 SUSPENSION/ DROPS OPHTHALMIC at 09:10

## 2022-09-18 RX ADMIN — MOXIFLOXACIN HYDROCHLORIDE 1 DROP: 5 SOLUTION/ DROPS OPHTHALMIC at 12:31

## 2022-09-18 RX ADMIN — ALBUTEROL SULFATE 2 PUFF: 90 AEROSOL, METERED RESPIRATORY (INHALATION) at 15:12

## 2022-09-18 RX ADMIN — ACETAMINOPHEN 650 MG: 325 TABLET ORAL at 00:17

## 2022-09-18 RX ADMIN — SODIUM CHLORIDE: 9 INJECTION, SOLUTION INTRAVENOUS at 06:36

## 2022-09-18 RX ADMIN — SUCRALFATE 1 G: 1 TABLET ORAL at 17:16

## 2022-09-18 RX ADMIN — PANTOPRAZOLE SODIUM 40 MG: 40 TABLET, DELAYED RELEASE ORAL at 06:29

## 2022-09-18 RX ADMIN — SODIUM CHLORIDE, PRESERVATIVE FREE 10 ML: 5 INJECTION INTRAVENOUS at 01:16

## 2022-09-18 RX ADMIN — LISINOPRIL AND HYDROCHLOROTHIAZIDE 1 TABLET: 12.5; 2 TABLET ORAL at 09:10

## 2022-09-18 RX ADMIN — SODIUM CHLORIDE, PRESERVATIVE FREE 10 ML: 5 INJECTION INTRAVENOUS at 21:19

## 2022-09-18 RX ADMIN — HYDROCODONE BITARTRATE AND ACETAMINOPHEN 1 TABLET: 5; 325 TABLET ORAL at 01:20

## 2022-09-18 RX ADMIN — CEFEPIME HYDROCHLORIDE 2000 MG: 2 INJECTION, POWDER, FOR SOLUTION INTRAVENOUS at 06:38

## 2022-09-18 RX ADMIN — ENOXAPARIN SODIUM 30 MG: 100 INJECTION SUBCUTANEOUS at 21:20

## 2022-09-18 RX ADMIN — Medication 5 MG: at 00:16

## 2022-09-18 RX ADMIN — HYDROXYZINE PAMOATE 25 MG: 25 CAPSULE ORAL at 21:20

## 2022-09-18 RX ADMIN — INSULIN LISPRO 4 UNITS: 100 INJECTION, SOLUTION INTRAVENOUS; SUBCUTANEOUS at 21:21

## 2022-09-18 RX ADMIN — Medication 2 PUFF: at 15:13

## 2022-09-18 RX ADMIN — MOXIFLOXACIN HYDROCHLORIDE 1 DROP: 5 SOLUTION/ DROPS OPHTHALMIC at 00:16

## 2022-09-18 RX ADMIN — SODIUM CHLORIDE, PRESERVATIVE FREE 10 ML: 5 INJECTION INTRAVENOUS at 21:35

## 2022-09-18 RX ADMIN — SUCRALFATE 1 G: 1 TABLET ORAL at 12:30

## 2022-09-18 RX ADMIN — POLYMYXIN B SULFATE AND TRIMETHOPRIM 1 DROP: 10000; 1 SOLUTION OPHTHALMIC at 12:31

## 2022-09-18 RX ADMIN — SUCRALFATE 1 G: 1 TABLET ORAL at 09:10

## 2022-09-18 RX ADMIN — ALLOPURINOL 300 MG: 300 TABLET ORAL at 09:09

## 2022-09-18 RX ADMIN — PREDNISOLONE ACETATE 1 DROP: 10 SUSPENSION/ DROPS OPHTHALMIC at 17:20

## 2022-09-18 RX ADMIN — OXYCODONE HYDROCHLORIDE 10 MG: 10 TABLET ORAL at 21:18

## 2022-09-18 RX ADMIN — OXYCODONE HYDROCHLORIDE 5 MG: 5 TABLET ORAL at 12:34

## 2022-09-18 RX ADMIN — VANCOMYCIN HYDROCHLORIDE 2000 MG: 1 INJECTION, POWDER, LYOPHILIZED, FOR SOLUTION INTRAVENOUS at 10:22

## 2022-09-18 RX ADMIN — MOXIFLOXACIN HYDROCHLORIDE 1 DROP: 5 SOLUTION/ DROPS OPHTHALMIC at 09:13

## 2022-09-18 RX ADMIN — ALBUTEROL SULFATE 2 PUFF: 90 AEROSOL, METERED RESPIRATORY (INHALATION) at 09:08

## 2022-09-18 RX ADMIN — INSULIN LISPRO 2 UNITS: 100 INJECTION, SOLUTION INTRAVENOUS; SUBCUTANEOUS at 09:22

## 2022-09-18 RX ADMIN — ATORVASTATIN CALCIUM 10 MG: 10 TABLET, FILM COATED ORAL at 09:10

## 2022-09-18 RX ADMIN — CLONIDINE HYDROCHLORIDE 0.2 MG: 0.2 TABLET ORAL at 09:10

## 2022-09-18 RX ADMIN — PREGABALIN 100 MG: 100 CAPSULE ORAL at 21:20

## 2022-09-18 RX ADMIN — INSULIN LISPRO 2 UNITS: 100 INJECTION, SOLUTION INTRAVENOUS; SUBCUTANEOUS at 17:25

## 2022-09-18 RX ADMIN — ALBUTEROL SULFATE 2 PUFF: 90 AEROSOL, METERED RESPIRATORY (INHALATION) at 11:44

## 2022-09-18 RX ADMIN — INSULIN GLARGINE 50 UNITS: 100 INJECTION, SOLUTION SUBCUTANEOUS at 21:20

## 2022-09-18 RX ADMIN — VANCOMYCIN HYDROCHLORIDE 1250 MG: 1.25 INJECTION, POWDER, LYOPHILIZED, FOR SOLUTION INTRAVENOUS at 21:34

## 2022-09-18 RX ADMIN — INSULIN GLARGINE 50 UNITS: 100 INJECTION, SOLUTION SUBCUTANEOUS at 00:19

## 2022-09-18 RX ADMIN — AMLODIPINE BESYLATE 10 MG: 10 TABLET ORAL at 09:10

## 2022-09-18 RX ADMIN — EZETIMIBE 10 MG: 10 TABLET ORAL at 00:16

## 2022-09-18 RX ADMIN — ENOXAPARIN SODIUM 30 MG: 100 INJECTION SUBCUTANEOUS at 00:16

## 2022-09-18 RX ADMIN — POLYMYXIN B SULFATE AND TRIMETHOPRIM 1 DROP: 10000; 1 SOLUTION OPHTHALMIC at 09:13

## 2022-09-18 RX ADMIN — Medication 2 PUFF: at 09:08

## 2022-09-18 RX ADMIN — POLYMYXIN B SULFATE AND TRIMETHOPRIM 1 DROP: 10000; 1 SOLUTION OPHTHALMIC at 00:16

## 2022-09-18 RX ADMIN — INSULIN LISPRO 11 UNITS: 100 INJECTION, SOLUTION INTRAVENOUS; SUBCUTANEOUS at 12:35

## 2022-09-18 RX ADMIN — OXYCODONE HYDROCHLORIDE 10 MG: 10 TABLET ORAL at 17:16

## 2022-09-18 RX ADMIN — Medication 5 MG: at 21:19

## 2022-09-18 RX ADMIN — PREGABALIN 100 MG: 100 CAPSULE ORAL at 00:16

## 2022-09-18 RX ADMIN — PREGABALIN 100 MG: 100 CAPSULE ORAL at 09:09

## 2022-09-18 RX ADMIN — INSULIN LISPRO 11 UNITS: 100 INJECTION, SOLUTION INTRAVENOUS; SUBCUTANEOUS at 09:22

## 2022-09-18 RX ADMIN — SUCRALFATE 1 G: 1 TABLET ORAL at 21:20

## 2022-09-18 RX ADMIN — CEFEPIME HYDROCHLORIDE 2000 MG: 2 INJECTION, POWDER, FOR SOLUTION INTRAVENOUS at 17:20

## 2022-09-18 RX ADMIN — EZETIMIBE 10 MG: 10 TABLET ORAL at 21:19

## 2022-09-18 RX ADMIN — INSULIN LISPRO 2 UNITS: 100 INJECTION, SOLUTION INTRAVENOUS; SUBCUTANEOUS at 12:35

## 2022-09-18 ASSESSMENT — PAIN DESCRIPTION - DESCRIPTORS
DESCRIPTORS: ACHING;SHARP
DESCRIPTORS: THROBBING;ACHING
DESCRIPTORS: ACHING;SHARP
DESCRIPTORS: ACHING;THROBBING
DESCRIPTORS: ACHING;DISCOMFORT
DESCRIPTORS: ACHING;SHARP
DESCRIPTORS: ACHING;DISCOMFORT
DESCRIPTORS: DISCOMFORT;ACHING

## 2022-09-18 ASSESSMENT — PAIN - FUNCTIONAL ASSESSMENT
PAIN_FUNCTIONAL_ASSESSMENT: PREVENTS OR INTERFERES SOME ACTIVE ACTIVITIES AND ADLS

## 2022-09-18 ASSESSMENT — PAIN DESCRIPTION - LOCATION
LOCATION: FOOT
LOCATION: FOOT;LEG
LOCATION: FOOT

## 2022-09-18 ASSESSMENT — PAIN DESCRIPTION - ORIENTATION
ORIENTATION: RIGHT

## 2022-09-18 ASSESSMENT — PAIN DESCRIPTION - ONSET
ONSET: ON-GOING

## 2022-09-18 ASSESSMENT — PAIN SCALES - GENERAL
PAINLEVEL_OUTOF10: 10
PAINLEVEL_OUTOF10: 9
PAINLEVEL_OUTOF10: 9
PAINLEVEL_OUTOF10: 10
PAINLEVEL_OUTOF10: 8
PAINLEVEL_OUTOF10: 9
PAINLEVEL_OUTOF10: 9
PAINLEVEL_OUTOF10: 7

## 2022-09-18 ASSESSMENT — PAIN DESCRIPTION - FREQUENCY
FREQUENCY: CONTINUOUS

## 2022-09-18 ASSESSMENT — PAIN DESCRIPTION - PAIN TYPE: TYPE: CHRONIC PAIN;ACUTE PAIN

## 2022-09-18 NOTE — H&P
V2.0  History and Physical      Name:  Alessio Steele /Age/Sex: 1985  (39 y.o. male)   MRN & CSN:  [id number] & [id number] Encounter Date/Time: 2022 9:45 PM EDT   Location:  ED36/ED-36 PCP: Andrew Walton MD       Hospital Day: 1    Assessment and Plan:   Alessio Steele is a 39 y.o. male with a pmh as noted below presents with    Cellulitis of Right lower extremity   Open wound of right foot  History of osteomyelitis of right proximal phalanx and head of metatarsal status post amputation of second toe 2022                Admit to inpatient             X-ray ankle right 3 view order to evaluate for osteomyelitis  Start vancomycin, and cefepime  ED provider discussed case with Dr. Izzy Ward who is covering with Dr. Brittney Booker . No evidence of new osteomyelitis on x-ray per Dr. Izzy Ward but concern for cellulitis. Agree with treatment with vancomycin and cefepime . consult wound care for chronic right lower extremity wound infection  Consider CT scan if pain/swelling worsens              Blood cultures x2, wound culture, MRSA swab nares, add on procalcitonin and lactate             Patient afebrile, no evidence of leukocytosis. Trend labs    Check ESR and CRP    Insulin dependent type 2 diabetes with with hyperglycemia   Continue home long-acting glargine. Plus sliding scale with hypoglycemia protocol. Hold home oral antihyperglycemic's. Check hemoglobin A1c    History of uncontrolled hypertension  History of cocaine use disorder, alcohol use disorder, tobacco use disorder   Monitor for signs and symptoms of withdrawal   CIWA scale   Urine drug screen and check blood alcohol level      Chronic Conditions: continue home medication as ordered  Tobacco dependence: Smokes PPD/day, Nicotine Patch and Lozenges ordered  Patient was counseled on tobacco cessation. All testing  and results reviewed with patient . All questions answered.  Patient and family voiced understanding    This patient was seen and examined in conjunction with Dr. Taye Ayala. He/She was agreeable with the plan and management as dictated above. Disposition:   Expected Disposition: Home  Estimated D/C: 3 days     Diet No diet orders on file   GI Prophylaxis  [] PPI,  [] H2 Blocker,  [] Carafate,  [x] Diet/Tube Feeds   DVT Prophylaxis [x] Lovenox, []  Heparin, [] SCDs, [] Ambulation,  [] NOAC   Code Status Prior   Surrogate Decision Maker/ POA          History from:     patient, electronic medical record,     History of Present Illness:     Chief Complaint: Cellulitis  Belén Engle is a 39 y.o. male with pmh of osteomyelitis status post right second toe amputation in July who he presents to the ED with worsening pain in his right toe over the last week. Denies fevers or chills. Denies chest pain or shortness of breath. Denies nausea vomiting or diarrhea. Denies numbness or tingling to the lower extremity. Reports this increased purulence to the right lower extremity which prompted him to come to the emergency department for evaluation. Denies pain in calf. Denies pain with deep inspiration. Denies history of blood clots or DVT or PE. Review of Systems: Need 10 Elements   Review of Systems   Constitutional: Negative. Negative for fever and unexpected weight change. HENT:  Negative for congestion and sore throat. Eyes: Negative. Respiratory:  Negative for cough and shortness of breath. Cardiovascular:  Negative for chest pain and leg swelling. Gastrointestinal:  Negative for abdominal pain, constipation, nausea and vomiting. Endocrine: Negative. Genitourinary:  Negative for dysuria and hematuria. Musculoskeletal:  Negative for neck pain. Skin:  Positive for wound. Neurological:  Negative for dizziness and light-headedness. All other systems reviewed and are negative. Objective:      Intake/Output Summary (Last 24 hours) at 9/17/2022 2205  Last data filed at 9/17/2022 2147  Gross per 24 hour   Intake Historical Provider, MD   trimethoprim-polymyxin b (POLYTRIM) 73058-2.1 UNIT/ML-% ophthalmic solution Apply 1 drop to eye 4 times daily 6/24/22   Historical Provider, MD   UNABLE TO [address] 1 drop into the right eye 4 times daily 07/21/22 Patient on Vancomycin Preserved Eye soln 25 mg/ml    Historical Provider, MD   ezetimibe (ZETIA) 10 MG tablet take 1 tablet by mouth once daily 6/29/22   Historical Provider, MD CHOU PEN NEEDLES 31G X 5 MM MISC use 1 PEN NEEDLE to inject MEDICATION subcutaneously two to three times a day 6/29/22   Historical Provider, MD   melatonin 5 MG TABS tablet take 1 tablet by mouth every evening 6/29/22   Historical Provider, MD   pregabalin (LYRICA) 100 MG capsule take 1 capsule by mouth twice a day 6/29/22   Historical Provider, MD   sucralfate (CARAFATE) 1 GM tablet Take 1 tablet by mouth 4 times daily 8/1/21   Edgar Devries PA-C   albuterol sulfate  (90 Base) MCG/ACT inhaler Inhale 2 puffs into the lungs every 6 hours as needed for Wheezing 3/24/21   Hal Quan MD   albuterol-ipratropium (COMBIVENT RESPIMAT)  MCG/ACT AERS inhaler Inhale 1 puff into the lungs every 6 hours 3/24/21   Hal Quan MD   acetaminophen (AMINOFEN) 325 MG tablet Take 2 tablets by mouth every 6 hours as needed for Pain 4/23/20   Wanda Henry,    brimonidine (ALPHAGAN) 0.2 % ophthalmic solution Place 1 drop into the right eye 2 times daily    Historical Provider, MD   dorzolamide-timolol 22.3-6.8 MG/ML SOLN Place 1 drop into the right eye 2 times daily    Historical Provider, MD   prednisoLONE acetate (PRED FORTE) 1 % ophthalmic suspension Place 1 drop into the right eye 2 times daily    Historical Provider, MD   allopurinol (ZYLOPRIM) 300 MG tablet Take 300 mg by mouth daily     Historical Provider, MD   atorvastatin (LIPITOR) 10 MG tablet Take 10 mg by mouth daily     Historical Provider, MD   lisinopril-hydrochlorothiazide (PRINZIDE;ZESTORETIC) 20-12.5 MG per tablet Take 1 tablet by mouth daily     Historical Provider, MD   metFORMIN (GLUCOPHAGE) 1000 MG tablet Take 1,000 mg by mouth 2 times daily (with meals)    Historical Provider, MD   amLODIPine (NORVASC) 10 MG tablet Take 10 mg by mouth daily 11/25/16   Historical Provider, MD   omeprazole (PRILOSEC) 40 MG delayed release capsule Take 40 mg by mouth daily     Historical Provider, MD       Physical Exam: Need 8 Elements   Physical Exam  Vitals and nursing note reviewed. Constitutional:       General: He is not in acute distress. Appearance: Normal appearance. HENT:      Head: Normocephalic. Nose: Nose normal.      Mouth/Throat:      Pharynx: Oropharynx is clear. Eyes:      Pupils: Pupils are equal, round, and reactive to light. Cardiovascular:      Rate and Rhythm: Normal rate and regular rhythm. Pulses:           Posterior tibial pulses are 1+ on the right side and 1+ on the left side. Pulmonary:      Effort: Pulmonary effort is normal. No respiratory distress. Breath sounds: No wheezing or rhonchi. Abdominal:      General: Abdomen is flat. Bowel sounds are normal. There is no distension. Musculoskeletal:         General: Normal range of motion. Cervical back: Normal range of motion. Feet:      Right foot:      Skin integrity: Skin breakdown, erythema and warmth present. Skin:     General: Skin is warm and dry. Capillary Refill: Capillary refill takes less than 2 seconds. Neurological:      General: No focal deficit present. Mental Status: He is alert and oriented to person, place, and time.    Psychiatric:         Mood and Affect: Mood normal.          Past Medical History:   PMHx   Past Medical History:   Diagnosis Date    Asthma     Blind left eye     Diabetes mellitus ([address]     GERD (gastroesophageal reflux disease)     Hypertension     Retinal detachment 2012    left     PSHX:  has a past surgical history that includes Corneal transplant (Bilateral); Mandible surgery (Right); eye surgery; Eye surgery (Right); fracture surgery; Mandible surgery (Bilateral); Toe amputation (Right, 07/25/2017); laparoscopic appendectomy (N/A, 4/28/2022); and Toe amputation (Right, 7/25/2022). Allergies: Allergies   Allergen Reactions    Ciprofloxacin Shortness Of Breath    Glucosamine Anaphylaxis    Shellfish-Derived Products Anaphylaxis     Fam HX:  family history includes Asthma in his father and mother; Diabetes in his father and mother; Heart Disease in his mother; High Blood Pressure in his father and mother; Stroke in his mother. Soc HX:   Social History     Socioeconomic History    Marital status: Single   Tobacco Use    Smoking status: Every Day     Packs/day: 0.50     Years: 5.00     Pack years: 2.50     Types: Cigarettes    Smokeless tobacco: Never   Vaping Use    Vaping Use: Never used   Substance and Sexual Activity    Alcohol use: Yes     Comment: occasionally    Drug use: Not Currently     Types: Marijuana Aiden Morin       Medications:   Medications:    sodium chloride  1,000 mL IntraVENous Once    vancomycin  2,500 mg IntraVENous Once      Infusions:   PRN Meds:      Labs      CBC:   Recent Labs     09/17/22 1913   WBC 4.3   HGB 13.3*        BMP:    Recent Labs     09/17/22 1913      K 4.1      CO2 23   BUN 13   CREATININE 0.8*   GLUCOSE 186*     Hepatic:   Recent Labs     09/17/22 1913   AST 46*   ALT 40   BILITOT 0.4   ALKPHOS 112     Lipids:   Lab Results   Component Value Date/Time    CHOL 142 04/16/2011 01:19 PM    HDL 41 04/16/2011 01:19 PM    TRIG 268 04/16/2011 01:19 PM     Hemoglobin A1C:   Lab Results   Component Value Date/Time    LABA1C 9.9 07/21/2022 07:50 AM     TSH: No results found for: TSH  Troponin:   Lab Results   Component Value Date/Time    TROPONINT <0.010 04/28/2022 03:26 AM    TROPONINT <0.010 08/01/2021 02:37 PM    TROPONINT <0.010 04/22/2020 11:29 PM     Lactic Acid: No results for input(s): LACTA in the last 72 hours.   BNP: No results for input(s): PROBNP in the last 72 hours. UA:  Lab Results   Component Value Date/Time    NITRU NEGATIVE 07/21/2022 02:20 PM    COLORU YELLOW 07/21/2022 02:20 PM    WBCUA <1 07/21/2022 02:20 PM    RBCUA NONE SEEN 07/21/2022 02:20 PM    MUCUS RARE 12/21/2018 06:12 PM    TRICHOMONAS NONE SEEN 07/21/2022 02:20 PM    BACTERIA NEGATIVE 07/21/2022 02:20 PM    CLARITYU CLEAR 07/21/2022 02:20 PM    SPECGRAV 1.020 07/21/2022 02:20 PM    LEUKOCYTESUR NEGATIVE 07/21/2022 02:20 PM    UROBILINOGEN 0.2 07/21/2022 02:20 PM    BILIRUBINUR NEGATIVE 07/21/2022 02:20 PM    BLOODU NEGATIVE 07/21/2022 02:20 PM    Kyleigh Grady 15 07/21/2022 02:20 PM     Urine Cultures: No results found for: Mayda Bustos  Blood Cultures: No results found for: BC  No results found for: BLOODCULT2  Organism:   Lab Results   Component Value Date/Time    ORG MRSA 12/23/2018 01:00 PM    ORG BSGA 12/23/2018 01:00 PM       Imaging/Diagnostics Last 24 Hours   XR FOOT RIGHT (MIN 3 VIEWS)    Result Date: 9/17/2022  EXAMINATION: THREE XRAY VIEWS OF THE RIGHT FOOT 9/17/2022 7:55 pm COMPARISON: Right foot radiograph July 21, 2022 HISTORY: ORDERING SYSTEM PROVIDED HISTORY: Diabetic foot infection great toe TECHNOLOGIST PROVIDED HISTORY: Reason for exam:->Diabetic foot infection great toe Reason for Exam: Wound Infection Additional signs and symptoms: Diabetic foot infection great toe FINDINGS: Three views of the right foot demonstrate prior amputation of the 5th toe at the level of the MTP joint. Interval destructive changes of the previously seen residual proximal phalanx of the 2nd toe most consistent with osteomyelitis in the absence of any interval intervention to the 2nd toe since exam dated July 21, 2022. No definitive radiographic findings of osteomyelitis at the great toe. Similar hallux valgus deformity and osseous bunion of the great toe. Mild-to-moderate hindfoot and midfoot osteoarthritis. Diffuse soft tissue edema. No subcutaneous gas identified. 1. Findings most consistent with osteomyelitis involving the residual proximal phalanx of the right 2nd toe in the absence of any intervention to the 2nd toe since the comparison radiograph dated July 21, 2022. 2. No definitive radiographic findings of osteomyelitis at the great toe. 3. Diffuse soft tissue edema. RECOMMENDATION: Dedicated MRI of the right forefoot with and without contrast could be obtained for further evaluation as clinically indicated. Electronically signed by KAYLI Mccord CNP on 9/17/2022 at 10:05 PM          This dictation was created with voice recognition software. While attempts have been made to review the dictation as it is transcribed, on occasion the spoken word can be misinterpreted by the technology leading to omissions or inappropriate words, phrases or sentences.      Electronically signed by KAYLI Mccord CNP on 9/17/2022 at 10:05 PM

## 2022-09-18 NOTE — CONSULTS
History and Physical    Patient Name: Yoan Flores                              YOB: 1985  Exam Date: 9/17/2022    PCP:  Arabella Morel MD           Referring Physician:  ER    Chief Complaint:  Injured right great toe    History of Present Illness: The patient is a 39 y.o. male known to my partner Dr. Keya Moreira. Patient states he ran into a scooter and injured his right great toe on Tuesday. He was seen in the ER and started on PO antibiotics. He states it was hard for him to stay off his foot or elevated it. His foot continued to swell and he returned to the ER yesterday and was admitted for IV antibiotics. Past Medical History:   Diagnosis Date    Asthma     Blind left eye     Diabetes mellitus ([address]     GERD (gastroesophageal reflux disease)     Hypertension     Retinal detachment 2012    left      Past Surgical History:   Procedure Laterality Date    CORNEAL TRANSPLANT Bilateral     EYE SURGERY      left eye removed    EYE SURGERY Right     FRACTURE SURGERY      foot left    LAPAROSCOPIC APPENDECTOMY N/A 4/28/2022    APPENDECTOMY LAPAROSCOPIC performed by Magdalena Pan MD at [address] [place] [address] Right     MANDIBLE SURGERY Bilateral     TOE AMPUTATION Right 07/25/2017    TOE AMPUTATION Right 7/25/2022    TOE AMPUTATION, RAY AMPUTATION OF RIGHT SECOND TOE performed by Myranda Reno MD at [facility]      Prior to Admission medications    Medication Sig Start Date End Date Taking?  Authorizing Provider   SYMBICORT 160-4.5 MCG/ACT AERO Inhale 2 puffs into the lungs in the morning and at bedtime 9/2/22  Yes Historical Provider, MD   cephALEXin (KEFLEX) 500 MG capsule Take 1 capsule by mouth 4 times daily for 7 days 9/15/22 9/22/22  Ganeshri Simpson 1984 Burak Trujillo PA-C   sulfamethoxazole-trimethoprim (BACTRIM DS) 800-160 MG per tablet Take 1 tablet by mouth 2 times daily for 7 days 9/15/22 9/22/22  Radha Simpson 1984 Burak Trujillo PA-C   HYDROcodone-acetaminophen (NORCO) 5-325 MG per tablet Take 1 tablet by the lungs every 6 hours as needed for Wheezing 3/24/21   Robina Gonzalez MD   albuterol-ipratropium (COMBIVENT RESPIMAT)  MCG/ACT AERS inhaler Inhale 1 puff into the lungs every 6 hours 3/24/21   Robina Gonzalez MD   acetaminophen (AMINOFEN) 325 MG tablet Take 2 tablets by mouth every 6 hours as needed for Pain 4/23/20   Jyotsna Carrera DO   brimonidine (ALPHAGAN) 0.2 % ophthalmic solution Place 1 drop into the right eye 2 times daily    Historical Provider, MD   dorzolamide-timolol 22.3-6.8 MG/ML SOLN Place 1 drop into the right eye 2 times daily    Historical Provider, MD   prednisoLONE acetate (PRED FORTE) 1 % ophthalmic suspension Place 1 drop into the right eye 2 times daily    Historical Provider, MD   allopurinol (ZYLOPRIM) 300 MG tablet Take 300 mg by mouth daily     Historical Provider, MD   atorvastatin (LIPITOR) 10 MG tablet Take 10 mg by mouth daily     Historical Provider, MD   lisinopril-hydrochlorothiazide (PRINZIDE;ZESTORETIC) 20-12.5 MG per tablet Take 1 tablet by mouth daily     Historical Provider, MD   metFORMIN (GLUCOPHAGE) 1000 MG tablet Take 1,000 mg by mouth 2 times daily (with meals)    Historical Provider, MD   amLODIPine (NORVASC) 10 MG tablet Take 10 mg by mouth daily 11/25/16   Historical Provider, MD   omeprazole (PRILOSEC) 40 MG delayed release capsule Take 40 mg by mouth daily     Historical Provider, MD     Allergies   Allergen Reactions    Ciprofloxacin Shortness Of Breath    Glucosamine Anaphylaxis    Shellfish-Derived Products Anaphylaxis        Social History     Tobacco Use    Smoking status: Every Day     Packs/day: 0.50     Years: 5.00     Pack years: 2.50     Types: Cigarettes    Smokeless tobacco: Never   Substance Use Topics    Alcohol use: Yes     Comment: occasionally      Family History   Problem Relation Age of Onset    Diabetes Mother     Asthma Mother     High Blood Pressure Mother     Stroke Mother     Heart Disease Mother     Diabetes Father     Asthma Father     High Blood Pressure Father         REVIEW OF SYSTEMS: (POSITIVES WILL BE UNDERLINED)  CONSTITUTIONAL:    Weight change, fatigue, fever, chills  EYES:  Diplopia, change in vision  EARS:  hearing loss, tinnitus, vertigo  NOSE:   epistaxis  MOUTH/THROAT:     hoarseness, sore throat  RESPIRATORY:  SOB, cough, sputum, hemoptysis  CARDIOVASCULAR : chest pain, palpitations, dyspnea exertion, orthopnea, paroxysmal nocturnal dyspnea, pedal edema. GASTROINTESTINAL:  nausea, vomiting, constipation, diarrhea  GENITOURINARY:   dysuria, hematuria, . HEMATOLOGIC/LYMPHATIC:   Anemia, bleeding tendencies  MUSCULOSKELETAL:    myalgias,  joint pain  NEUROLOGICAL:   Loss of Consciousness, paresthesias, anesthesias, focal weakness  SKIN :   History of dermatitis, rashes  PSYCHIATRIC:  depression, anxiety, past psychosis  ENDOCRINE:  History of diabetes, thyroid disease (reviewed above)  ALL/IMM : allergies, rashes    Physical Exam:  BP (!) 146/91   Pulse 90   Temp 97.7 °F (36.5 °C) (Oral)   Resp 17   Ht 6' 3\" (1.905 m)   Wt 300 lb (136.1 kg)   SpO2 98%   BMI 37.50 kg/m²   Pt is awake, alert, oriented to person, place and time, appropriate with exam  Right foot: moderate old blood on toenails and on previous operative site, skin edges well approximated, abrasion of lateral great toe extending through dermis, no fluctuance, edema of foot, no redness or excessive warmth    Imaging:  Right foot x-ray: 9/17/2022  Impression   1. Findings most consistent with osteomyelitis involving the residual   proximal phalanx of the right 2nd toe in the absence of any intervention to   the 2nd toe since the comparison radiograph dated July 21, 2022.   2. No definitive radiographic findings of osteomyelitis at the great toe.    3. Diffuse soft tissue edema.     -I have personally reviewed the patient's imaging results/reports including reviewing the x-ray films.   -The patient did have an amputation of the second toe 7/24/2022, the findings on x-ray are consistent with post-op changes, no osteomyelitis    Labs:  CBC:    Lab Results   Component Value Date/Time    WBC 3.1 09/18/2022 01:16 AM    HGB 11.9 09/18/2022 01:16 AM    HCT 34.6 09/18/2022 01:16 AM     09/18/2022 01:16 AM     BMP:    Lab Results   Component Value Date/Time     09/18/2022 01:16 AM    K 3.7 09/18/2022 01:16 AM     09/18/2022 01:16 AM    CO2 22 09/18/2022 01:16 AM    BUN 12 09/18/2022 01:16 AM    CREATININE 0.7 09/18/2022 01:16 AM    CALCIUM 8.1 09/18/2022 01:16 AM     PT/INR:    Lab Results   Component Value Date/Time    PROTIME 12.1 07/25/2022 02:30 PM    INR 0.94 07/25/2022 02:30 PM     PTT:    Lab Results   Component Value Date/Time    APTT 26.8 07/25/2022 02:30 PM     LFT:    Lab Results   Component Value Date/Time    LABALBU 3.7 09/18/2022 01:16 AM    BILITOT 0.3 09/18/2022 01:16 AM    AST 34 09/18/2022 01:16 AM    ALT 32 09/18/2022 01:16 AM    ALKPHOS 128 09/18/2022 01:16 AM       -I have personally reviewed the patient's lab results and discussed pertinent findings with the patient. Assessment and Plan:  The patient presents with signs and symptoms consistent with injured right great toe. The patient did have an amputation of the right second toe 7/24/2022, the findings on x-ray are consistent with post-op changes, no osteomyelitis of the second toe, no MRI needed. Local wound care applied, I washed his foot with peroxide to try and remove most of the old blood. Continue to elevate and IV antibiotics. Patient states since the injury has been walking on it, hasn't been able to elevate. We discussed local wound care and IV antibiotics and elevation.

## 2022-09-18 NOTE — PROGRESS NOTES
[facility]  consulted by ALANA Persaud for monitoring and adjustment. Indication for treatment: Skin and soft tissue infection  Goal trough: [x] 10-15 mcg/mL or [] 15-20 mcg/ml  AUC/SHELLEY: [x] <500 or [] 400-600    Pertinent Laboratory Values:   Temp Readings from Last 3 Encounters:   09/18/22 97.7 °F (36.5 °C) (Oral)   09/15/22 97.9 °F (36.6 °C) (Oral)   08/08/22 97.7 °F (36.5 °C)     Recent Labs     09/17/22 1913 09/18/22  0116 09/18/22  0533   WBC 4.3 3.1*  --    LACTATE 1.9  --  1.0       Recent Labs     09/17/22 1913 09/18/22  0116   BUN 13 12   CREATININE 0.8* 0.7*       Estimated Creatinine Clearance: 215 mL/min (A) (based on SCr of 0.7 mg/dL (L)). Intake/Output Summary (Last 24 hours) at 9/18/2022 1032  Last data filed at 9/18/2022 0836  Gross per 24 hour   Intake 44.38 ml   Output 1300 ml   Net -1255.62 ml         Pertinent Cultures:  Date    Source    Results  9/17   Wound    In process  9/17   Blood x 2   Ordered    Vancomycin level:   TROUGH:  No results for input(s): VANCOTROUGH in the last 72 hours. RANDOM:    Recent Labs     09/18/22  0116   VANCORANDOM 24.4       Assessment:  SCr, BUN, and urine output: stable renal function, no UOP data  Day(s) of therapy: 1 of 7  Vancomycin concentration:   9/18 - 24.4, 3 hours post-dose (AUC>600)    Plan:  Patient received vancomycin 2500 mg x 1 in the ED, then started on Vancomycin 2000 mg IVPB q12h based on recent dosing requirements  Random level supra-therapeutic @ 24.4, 3 hours post-dose  Reduced dose to Vancomycin 1250mg IVPB every 12 hours  Predicted  and Trough 14.8  Repeat level in 2 days  Pharmacy will continue to monitor patient and adjust therapy as indicated    VANCOMYCIN CONCENTRATION SCHEDULED FOR 9/20 @0600    Thank you for the consult.   Tesfaye Jain, [address] [place] [address]  9/18/2022 10:32 AM

## 2022-09-18 NOTE — PROGRESS NOTES
[address]  consulted by ALANA Li for monitoring and adjustment. Indication for treatment: Skin and soft tissue infection  Goal trough: [x] 10-15 mcg/mL or [] 15-20 mcg/ml  AUC/SHELLEY: [] <500 or [x] 400-600    Pertinent Laboratory Values:   Temp Readings from Last 3 Encounters:   09/17/22 98 °F (36.7 °C) (Oral)   09/15/22 97.9 °F (36.6 °C) (Oral)   08/08/22 97.7 °F (36.5 °C)     Recent Labs     09/17/22 1913   WBC 4.3   LACTATE 1.9     Recent Labs     09/17/22 1913   BUN 13   CREATININE 0.8*     Estimated Creatinine Clearance: 188 mL/min (A) (based on SCr of 0.8 mg/dL (L)). Intake/Output Summary (Last 24 hours) at 9/18/2022 0007  Last data filed at 9/17/2022 2147  Gross per 24 hour   Intake 44.38 ml   Output --   Net 44.38 ml       Pertinent Cultures:  Date    Source    Results  9/17   Wound    In process  9/17   Blood x 2   Ordered    Vancomycin level:   TROUGH:  No results for input(s): VANCOTROUGH in the last 72 hours. RANDOM:  No results for input(s): VANCORANDOM in the last 72 hours. Assessment:  SCr, BUN, and urine output: stable renal function, no UOP data  Day(s) of therapy: 1 of 7  Vancomycin concentration: Pending    Plan:  Patient received vancomycin 2500 mg x 1 in the ED  Based on patient's recent vancomycin dosing requirements, will start vancomycin 2000 mg IVPB q12h  Pharmacy will continue to monitor patient and adjust therapy as indicated    VANCOMYCIN CONCENTRATION SCHEDULED FOR 9/18 @0600    Thank you for the consult.   Mando Bustamante [facility]  9/18/2022 12:07 AM

## 2022-09-18 NOTE — PROGRESS NOTES
[address] [place] [address]    Alessio Steele is a 39 y.o. male started on vancomycin for skin and soft tissue infection. Pharmacy consulted by ED provider Yong Marie PA-C to order a dose of vancomycin in the emergency department. Other antimicrobials: cefepime    Ht Readings from Last 1 Encounters:   09/17/22 6' 3\" (1.905 m)     Wt Readings from Last 3 Encounters:   09/17/22 300 lb (136.1 kg)   09/15/22 (!) 316 lb (143.3 kg)   08/08/22 (!) 316 lb (143.3 kg)        Pertinent Laboratory Values:   Temp Readings from Last 3 Encounters:   09/17/22 98.6 °F (37 °C) (Oral)   09/15/22 97.9 °F (36.6 °C) (Oral)   08/08/22 97.7 °F (36.5 °C)     Recent Labs     09/17/22  1913   WBC 4.3     Recent Labs     09/17/22  1913   BUN 13   CREATININE 0.8*     Estimated Creatinine Clearance: 188 mL/min (A) (based on SCr of 0.8 mg/dL (L)). No intake or output data in the 24 hours ending 09/17/22 2022    Assessment/Plan:  Pharmacy will order vancomycin 2500 mg (18.4 mg/kg). Please note, pharmacy will order a one-time dose of vancomycin for the Emergency Department. The consult will need to be re-ordered if vancomycin is to continue upon admission. Thank you for the consult.   Frank Shah [facility]  9/17/2022 8:22 PM

## 2022-09-18 NOTE — PROGRESS NOTES
V2.0  [facility] Hospitalist Progress Note      Name:  Sergo Brewer /Age/Sex: 1985  (39 y.o. male)   MRN & CSN:  [id number] & [id number] Encounter Date/Time: 2022 12:00 PM EDT    Location:  [address] [place] [address] PCP: Asher Rico MD       Hospital Day: 2    Assessment and Plan:   Sergo Brewer is a 39 y.o. male with right foot OM      Plan:    RLE cellulitis  Open wound right great toe 2/2 trauma  Hx of OM of right proximal phalanx and head of metatarsal s/p amputation of 2nd toe on 2022  - vanc, cefepime  - Dr. Aris Peña with surgery following    IDDM  HTN  Pain  Tobacco     Ppx:  Dispo:     Subjective:     Chief Complaint: Wound Infection (Right foot wound, swelling to right lower extremity)       Patient complaining of uncontrolled pain. Oxy pain panel ordered. Review of Systems:    Review of Systems    10 point ROS negative except as stated above in \"subjective\" section    Objective: Intake/Output Summary (Last 24 hours) at 2022 1200  Last data filed at 2022 0836  Gross per 24 hour   Intake 44.38 ml   Output 1300 ml   Net -1255.62 ml        Vitals:   Vitals:    22 1022   BP:    Pulse:    Resp: 18   Temp:    SpO2:        Physical Exam:     General: NAD  Eyes: EOMI  ENT: neck supple  Cardiovascular: Regular rate. Respiratory: Clear to auscultation  Gastrointestinal: Soft, non tender  Genitourinary: no suprapubic tenderness  Musculoskeletal: No edema, right foot weeping from medial great toe area  Skin: warm, dry  Neuro: Alert. Psych: Mood appropriate.      Medications:   Medications:    albuterol sulfate HFA  2 puff Inhalation 4x daily    ipratropium  2 puff Inhalation 4x daily    insulin lispro  0-8 Units SubCUTAneous TID WC    insulin lispro  0-4 Units SubCUTAneous Nightly    vancomycin  1,250 mg IntraVENous Q12H    insulin glargine  50 Units SubCUTAneous Nightly    moxifloxacin  1 drop Right Eye 4x Daily    pantoprazole  40 mg Oral QAM AC    prednisoLONE acetate  1 drop Right Eye BID    pregabalin  100 mg Oral BID    sucralfate  1 g Oral 4x Daily    budesonide-formoterol  2 puff Inhalation BID    trimethoprim-polymyxin b  1 drop Ophthalmic 4x Daily    melatonin  5 mg Oral Nightly    lisinopril-hydroCHLOROthiazide  1 tablet Oral Daily    ezetimibe  10 mg Oral Nightly    cloNIDine  0.2 mg Oral Daily    atorvastatin  10 mg Oral Daily    amLODIPine  10 mg Oral Daily    allopurinol  300 mg Oral Daily    sodium chloride flush  5-40 mL IntraVENous 2 times per day    enoxaparin  30 mg SubCUTAneous BID    cefepime  2,000 mg IntraVENous Q12H    insulin lispro  0.08 Units/kg SubCUTAneous TID WC    nicotine  1 patch TransDERmal Daily    sodium chloride flush  5-40 mL IntraVENous 2 times per day      Infusions:    dextrose      sodium chloride 25 mL/hr at 09/18/22 0636     PRN Meds: oxyCODONE, 5 mg, Q4H PRN   Or  oxyCODONE, 10 mg, Q4H PRN  glucose, 4 tablet, PRN  dextrose bolus, 125 mL, PRN   Or  dextrose bolus, 250 mL, PRN  glucagon (rDNA), 1 mg, PRN  dextrose, , Continuous PRN  sodium chloride flush, 5-40 mL, PRN  sodium chloride, , PRN  polyethylene glycol, 17 g, Daily PRN  acetaminophen, 650 mg, Q6H PRN   Or  acetaminophen, 650 mg, Q6H PRN  promethazine, 12.5 mg, Q6H PRN   Or  ondansetron, 4 mg, Q6H PRN  nicotine polacrilex, 2 mg, Q1H PRN  sodium chloride flush, 5-40 mL, PRN        Labs      Recent Results (from the past 24 hour(s))   CBC with Auto Differential    Collection Time: 09/17/22  7:13 PM   Result Value Ref Range    WBC 4.3 4.0 - 10.5 K/CU MM    RBC 4.25 (L) 4.6 - 6.2 M/CU MM    Hemoglobin 13.3 (L) 13.5 - 18.0 GM/DL    Hematocrit 37.9 (L) 42 - 52 %    MCV 89.2 78 - 100 FL    MCH 31.3 (H) 27 - 31 PG    MCHC 35.1 32.0 - 36.0 %    RDW 13.2 11.7 - 14.9 %    Platelets 943 318 - 427 K/CU MM    MPV 8.9 7.5 - 11.1 FL    Differential Type AUTOMATED DIFFERENTIAL     Segs Relative 57.4 36 - 66 %    Lymphocytes % 30.6 24 - 44 %    Monocytes % 10.0 (H) 0 - 4 %    Eosinophils % 0.9 0 - 3 % Basophils % 0.9 0 - 1 %    Segs Absolute 2.5 K/CU MM    Lymphocytes Absolute 1.3 K/CU MM    Monocytes Absolute 0.4 K/CU MM    Eosinophils Absolute 0.0 K/CU MM    Basophils Absolute 0.0 K/CU MM    Nucleated RBC % 0.0 %    Total Nucleated RBC 0.0 K/CU MM    Total Immature Neutrophil 0.01 K/CU MM    Immature Neutrophil % 0.2 0 - 0.43 %   Comprehensive Metabolic Panel    Collection Time: 09/17/22  7:13 PM   Result Value Ref Range    Sodium 136 135 - 145 MMOL/L    Potassium 4.1 3.5 - 5.1 MMOL/L    Chloride 101 99 - 110 mMol/L    CO2 23 21 - 32 MMOL/L    BUN 13 6 - 23 MG/DL    Creatinine 0.8 (L) 0.9 - 1.3 MG/DL    Glucose 186 (H) 70 - 99 MG/DL    Calcium 8.5 8.3 - 10.6 MG/DL    Albumin 4.3 3.4 - 5.0 GM/DL    Total Protein 7.2 6.4 - 8.2 GM/DL    Total Bilirubin 0.4 0.0 - 1.0 MG/DL    ALT 40 10 - 40 U/L    AST 46 (H) 15 - 37 IU/L    Alkaline Phosphatase 112 40 - 129 IU/L    GFR Non-African American >60 >60 mL/min/1.73m2    GFR African American >60 >60 mL/min/1.73m2    Anion Gap 12 4 - 16   Lactic Acid    Collection Time: 09/17/22  7:13 PM   Result Value Ref Range    Lactate 1.9 0.4 - 2.0 mMOL/L   High sensitivity CRP    Collection Time: 09/17/22  7:13 PM   Result Value Ref Range    CRP High Sensitivity 12.0 (H) <5.0 mg/L   Procalcitonin    Collection Time: 09/17/22  7:13 PM   Result Value Ref Range    Procalcitonin 0.045    Sedimentation Rate    Collection Time: 09/17/22  7:13 PM   Result Value Ref Range    Sed Rate 31 (H) 0 - 15 MM/HR   POCT Glucose    Collection Time: 09/17/22 10:31 PM   Result Value Ref Range    POC Glucose 189 (H) 70 - 99 MG/DL   Comprehensive Metabolic Panel w/ Reflex to MG    Collection Time: 09/18/22  1:16 AM   Result Value Ref Range    Sodium 134 (L) 135 - 145 MMOL/L    Potassium 3.7 3.5 - 5.1 MMOL/L    Chloride 102 99 - 110 mMol/L    CO2 22 21 - 32 MMOL/L    BUN 12 6 - 23 MG/DL    Creatinine 0.7 (L) 0.9 - 1.3 MG/DL    Glucose 243 (H) 70 - 99 MG/DL    Calcium 8.1 (L) 8.3 - 10.6 MG/DL    Albumin 3.7 3.4 - 5.0 GM/DL    Total Protein 6.1 (L) 6.4 - 8.2 GM/DL    Total Bilirubin 0.3 0.0 - 1.0 MG/DL    ALT 32 10 - 40 U/L    AST 34 15 - 37 IU/L    Alkaline Phosphatase 128 40 - 129 IU/L    GFR Non-African American >60 >60 mL/min/1.73m2    GFR African American >60 >60 mL/min/1.73m2    Anion Gap 10 4 - 16   CBC with Auto Differential    Collection Time: 09/18/22  1:16 AM   Result Value Ref Range    WBC 3.1 (L) 4.0 - 10.5 K/CU MM    RBC 3.86 (L) 4.6 - 6.2 M/CU MM    Hemoglobin 11.9 (L) 13.5 - 18.0 GM/DL    Hematocrit 34.6 (L) 42 - 52 %    MCV 89.6 78 - 100 FL    MCH 30.8 27 - 31 PG    MCHC 34.4 32.0 - 36.0 %    RDW 13.2 11.7 - 14.9 %    Platelets 842 349 - 153 K/CU MM    MPV 8.8 7.5 - 11.1 FL    Differential Type AUTOMATED DIFFERENTIAL     Segs Relative 50.1 36 - 66 %    Lymphocytes % 38.5 24 - 44 %    Monocytes % 9.4 (H) 0 - 4 %    Eosinophils % 1.0 0 - 3 %    Basophils % 1.0 0 - 1 %    Segs Absolute 1.6 K/CU MM    Lymphocytes Absolute 1.2 K/CU MM    Monocytes Absolute 0.3 K/CU MM    Eosinophils Absolute 0.0 K/CU MM    Basophils Absolute 0.0 K/CU MM    Nucleated RBC % 0.0 %    Total Nucleated RBC 0.0 K/CU MM    Total Immature Neutrophil 0.00 K/CU MM    Immature Neutrophil % 0.0 0 - 0.43 %   Blood alcohol level    Collection Time: 09/18/22  1:16 AM   Result Value Ref Range    Alcohol Scrn <0.01 <0.01 %WT/VOL   Vancomycin Level, Random    Collection Time: 09/18/22  1:16 AM   Result Value Ref Range    Vancomycin Rm 24.4 UG/ML    DOSE AMOUNT DOSE AMT.  GIVEN - 2500 mg     DOSE TIME DOSE TIME GIVEN - 2157    Lactic Acid    Collection Time: 09/18/22  5:33 AM   Result Value Ref Range    Lactate 1.0 0.4 - 2.0 mMOL/L   POCT Glucose    Collection Time: 09/18/22  8:20 AM   Result Value Ref Range    POC Glucose 203 (H) 70 - 99 MG/DL   POCT Glucose    Collection Time: 09/18/22 11:48 AM   Result Value Ref Range    POC Glucose 243 (H) 70 - 99 MG/DL        Imaging/Diagnostics Last 24 Hours   XR FOOT RIGHT (MIN 3 VIEWS)    Result Date: 9/17/2022  EXAMINATION: THREE XRAY VIEWS OF THE RIGHT FOOT 9/17/2022 7:55 pm COMPARISON: Right foot radiograph July 21, 2022 HISTORY: ORDERING SYSTEM PROVIDED HISTORY: Diabetic foot infection great toe TECHNOLOGIST PROVIDED HISTORY: Reason for exam:->Diabetic foot infection great toe Reason for Exam: Wound Infection Additional signs and symptoms: Diabetic foot infection great toe FINDINGS: Three views of the right foot demonstrate prior amputation of the 5th toe at the level of the MTP joint. Interval destructive changes of the previously seen residual proximal phalanx of the 2nd toe most consistent with osteomyelitis in the absence of any interval intervention to the 2nd toe since exam dated July 21, 2022. No definitive radiographic findings of osteomyelitis at the great toe. Similar hallux valgus deformity and osseous bunion of the great toe. Mild-to-moderate hindfoot and midfoot osteoarthritis. Diffuse soft tissue edema. No subcutaneous gas identified. 1. Findings most consistent with osteomyelitis involving the residual proximal phalanx of the right 2nd toe in the absence of any intervention to the 2nd toe since the comparison radiograph dated July 21, 2022. 2. No definitive radiographic findings of osteomyelitis at the great toe. 3. Diffuse soft tissue edema. RECOMMENDATION: Dedicated MRI of the right forefoot with and without contrast could be obtained for further evaluation as clinically indicated.        Electronically signed by Farhad Galicia MD on 9/18/2022 at 12:00 PM

## 2022-09-19 LAB
ESTIMATED AVERAGE GLUCOSE: 197 MG/DL
GLUCOSE BLD-MCNC: 226 MG/DL (ref 70–99)
GLUCOSE BLD-MCNC: 242 MG/DL (ref 70–99)
GLUCOSE BLD-MCNC: 246 MG/DL (ref 70–99)
GLUCOSE BLD-MCNC: 267 MG/DL (ref 70–99)
HBA1C MFR BLD: 8.5 % (ref 4.2–6.3)

## 2022-09-19 PROCEDURE — 94640 AIRWAY INHALATION TREATMENT: CPT

## 2022-09-19 PROCEDURE — 6360000002 HC RX W HCPCS: Performed by: NURSE PRACTITIONER

## 2022-09-19 PROCEDURE — 2580000003 HC RX 258: Performed by: NURSE PRACTITIONER

## 2022-09-19 PROCEDURE — 6370000000 HC RX 637 (ALT 250 FOR IP): Performed by: NURSE PRACTITIONER

## 2022-09-19 PROCEDURE — 99211 OFF/OP EST MAY X REQ PHY/QHP: CPT

## 2022-09-19 PROCEDURE — 6370000000 HC RX 637 (ALT 250 FOR IP): Performed by: SURGERY

## 2022-09-19 PROCEDURE — 6370000000 HC RX 637 (ALT 250 FOR IP): Performed by: INTERNAL MEDICINE

## 2022-09-19 PROCEDURE — 1200000000 HC SEMI PRIVATE

## 2022-09-19 PROCEDURE — 82962 GLUCOSE BLOOD TEST: CPT

## 2022-09-19 PROCEDURE — 94761 N-INVAS EAR/PLS OXIMETRY MLT: CPT

## 2022-09-19 RX ORDER — HYDROXYZINE PAMOATE 25 MG/1
25 CAPSULE ORAL 3 TIMES DAILY PRN
Status: DISCONTINUED | OUTPATIENT
Start: 2022-09-19 | End: 2022-09-21 | Stop reason: HOSPADM

## 2022-09-19 RX ORDER — BACITRACIN, NEOMYCIN, POLYMYXIN B 400; 3.5; 5 [USP'U]/G; MG/G; [USP'U]/G
OINTMENT TOPICAL DAILY
Status: DISCONTINUED | OUTPATIENT
Start: 2022-09-19 | End: 2022-09-21 | Stop reason: HOSPADM

## 2022-09-19 RX ORDER — INSULIN LISPRO 100 [IU]/ML
15 INJECTION, SOLUTION INTRAVENOUS; SUBCUTANEOUS
Status: DISCONTINUED | OUTPATIENT
Start: 2022-09-19 | End: 2022-09-21 | Stop reason: HOSPADM

## 2022-09-19 RX ORDER — INSULIN LISPRO 100 [IU]/ML
0-8 INJECTION, SOLUTION INTRAVENOUS; SUBCUTANEOUS
Status: DISCONTINUED | OUTPATIENT
Start: 2022-09-19 | End: 2022-09-20

## 2022-09-19 RX ORDER — INSULIN GLARGINE 100 [IU]/ML
50 INJECTION, SOLUTION SUBCUTANEOUS NIGHTLY
Status: DISCONTINUED | OUTPATIENT
Start: 2022-09-19 | End: 2022-09-20

## 2022-09-19 RX ADMIN — PREGABALIN 100 MG: 100 CAPSULE ORAL at 08:17

## 2022-09-19 RX ADMIN — OXYCODONE HYDROCHLORIDE 10 MG: 10 TABLET ORAL at 22:25

## 2022-09-19 RX ADMIN — INSULIN LISPRO 15 UNITS: 100 INJECTION, SOLUTION INTRAVENOUS; SUBCUTANEOUS at 08:28

## 2022-09-19 RX ADMIN — INSULIN LISPRO 2 UNITS: 100 INJECTION, SOLUTION INTRAVENOUS; SUBCUTANEOUS at 21:59

## 2022-09-19 RX ADMIN — SODIUM CHLORIDE, PRESERVATIVE FREE 10 ML: 5 INJECTION INTRAVENOUS at 17:27

## 2022-09-19 RX ADMIN — INSULIN GLARGINE 50 UNITS: 100 INJECTION, SOLUTION SUBCUTANEOUS at 21:59

## 2022-09-19 RX ADMIN — Medication 5 MG: at 21:51

## 2022-09-19 RX ADMIN — BUDESONIDE AND FORMOTEROL FUMARATE DIHYDRATE 2 PUFF: 160; 4.5 AEROSOL RESPIRATORY (INHALATION) at 07:56

## 2022-09-19 RX ADMIN — SUCRALFATE 1 G: 1 TABLET ORAL at 17:24

## 2022-09-19 RX ADMIN — Medication 2 PUFF: at 07:56

## 2022-09-19 RX ADMIN — CLONIDINE HYDROCHLORIDE 0.2 MG: 0.2 TABLET ORAL at 08:17

## 2022-09-19 RX ADMIN — CEFEPIME HYDROCHLORIDE 2000 MG: 2 INJECTION, POWDER, FOR SOLUTION INTRAVENOUS at 17:30

## 2022-09-19 RX ADMIN — MOXIFLOXACIN HYDROCHLORIDE 1 DROP: 5 SOLUTION/ DROPS OPHTHALMIC at 12:31

## 2022-09-19 RX ADMIN — BUDESONIDE AND FORMOTEROL FUMARATE DIHYDRATE 2 PUFF: 160; 4.5 AEROSOL RESPIRATORY (INHALATION) at 22:09

## 2022-09-19 RX ADMIN — ALBUTEROL SULFATE 2 PUFF: 90 AEROSOL, METERED RESPIRATORY (INHALATION) at 22:09

## 2022-09-19 RX ADMIN — OXYCODONE HYDROCHLORIDE 10 MG: 10 TABLET ORAL at 10:26

## 2022-09-19 RX ADMIN — INSULIN LISPRO 15 UNITS: 100 INJECTION, SOLUTION INTRAVENOUS; SUBCUTANEOUS at 17:23

## 2022-09-19 RX ADMIN — Medication 2 PUFF: at 22:09

## 2022-09-19 RX ADMIN — PREDNISOLONE ACETATE 1 DROP: 10 SUSPENSION/ DROPS OPHTHALMIC at 17:26

## 2022-09-19 RX ADMIN — HYDROXYZINE PAMOATE 25 MG: 25 CAPSULE ORAL at 08:40

## 2022-09-19 RX ADMIN — OXYCODONE HYDROCHLORIDE 10 MG: 10 TABLET ORAL at 14:24

## 2022-09-19 RX ADMIN — SODIUM CHLORIDE 25 ML: 9 INJECTION, SOLUTION INTRAVENOUS at 09:27

## 2022-09-19 RX ADMIN — SODIUM CHLORIDE 25 ML: 9 INJECTION, SOLUTION INTRAVENOUS at 17:29

## 2022-09-19 RX ADMIN — VANCOMYCIN HYDROCHLORIDE 1250 MG: 1.25 INJECTION, POWDER, LYOPHILIZED, FOR SOLUTION INTRAVENOUS at 09:28

## 2022-09-19 RX ADMIN — ENOXAPARIN SODIUM 30 MG: 100 INJECTION SUBCUTANEOUS at 08:18

## 2022-09-19 RX ADMIN — SUCRALFATE 1 G: 1 TABLET ORAL at 08:17

## 2022-09-19 RX ADMIN — INSULIN LISPRO 2 UNITS: 100 INJECTION, SOLUTION INTRAVENOUS; SUBCUTANEOUS at 08:29

## 2022-09-19 RX ADMIN — ALLOPURINOL 300 MG: 300 TABLET ORAL at 08:17

## 2022-09-19 RX ADMIN — OXYCODONE HYDROCHLORIDE 10 MG: 10 TABLET ORAL at 02:34

## 2022-09-19 RX ADMIN — SODIUM CHLORIDE, PRESERVATIVE FREE 10 ML: 5 INJECTION INTRAVENOUS at 08:20

## 2022-09-19 RX ADMIN — INSULIN LISPRO 15 UNITS: 100 INJECTION, SOLUTION INTRAVENOUS; SUBCUTANEOUS at 12:27

## 2022-09-19 RX ADMIN — CEFEPIME HYDROCHLORIDE 2000 MG: 2 INJECTION, POWDER, FOR SOLUTION INTRAVENOUS at 06:27

## 2022-09-19 RX ADMIN — METFORMIN HYDROCHLORIDE 500 MG: 500 TABLET ORAL at 08:17

## 2022-09-19 RX ADMIN — ALBUTEROL SULFATE 2 PUFF: 90 AEROSOL, METERED RESPIRATORY (INHALATION) at 07:56

## 2022-09-19 RX ADMIN — AMLODIPINE BESYLATE 10 MG: 10 TABLET ORAL at 08:17

## 2022-09-19 RX ADMIN — METFORMIN HYDROCHLORIDE 500 MG: 500 TABLET ORAL at 17:24

## 2022-09-19 RX ADMIN — ATORVASTATIN CALCIUM 10 MG: 10 TABLET, FILM COATED ORAL at 08:17

## 2022-09-19 RX ADMIN — OXYCODONE HYDROCHLORIDE 10 MG: 10 TABLET ORAL at 18:22

## 2022-09-19 RX ADMIN — MOXIFLOXACIN HYDROCHLORIDE 1 DROP: 5 SOLUTION/ DROPS OPHTHALMIC at 17:21

## 2022-09-19 RX ADMIN — Medication 2 PUFF: at 11:34

## 2022-09-19 RX ADMIN — SUCRALFATE 1 G: 1 TABLET ORAL at 21:51

## 2022-09-19 RX ADMIN — INSULIN LISPRO 2 UNITS: 100 INJECTION, SOLUTION INTRAVENOUS; SUBCUTANEOUS at 12:27

## 2022-09-19 RX ADMIN — LISINOPRIL AND HYDROCHLOROTHIAZIDE 1 TABLET: 12.5; 2 TABLET ORAL at 08:17

## 2022-09-19 RX ADMIN — BACITRACIN, NEOMYCIN, POLYMYXIN B: 400; 3.5; 5 OINTMENT TOPICAL at 13:41

## 2022-09-19 RX ADMIN — EZETIMIBE 10 MG: 10 TABLET ORAL at 21:51

## 2022-09-19 RX ADMIN — PANTOPRAZOLE SODIUM 40 MG: 40 TABLET, DELAYED RELEASE ORAL at 06:27

## 2022-09-19 RX ADMIN — SUCRALFATE 1 G: 1 TABLET ORAL at 12:31

## 2022-09-19 RX ADMIN — MOXIFLOXACIN HYDROCHLORIDE 1 DROP: 5 SOLUTION/ DROPS OPHTHALMIC at 08:18

## 2022-09-19 RX ADMIN — PREDNISOLONE ACETATE 1 DROP: 10 SUSPENSION/ DROPS OPHTHALMIC at 08:05

## 2022-09-19 RX ADMIN — POLYMYXIN B SULFATE AND TRIMETHOPRIM 1 DROP: 10000; 1 SOLUTION OPHTHALMIC at 17:20

## 2022-09-19 RX ADMIN — SODIUM CHLORIDE, PRESERVATIVE FREE 10 ML: 5 INJECTION INTRAVENOUS at 21:51

## 2022-09-19 RX ADMIN — POLYMYXIN B SULFATE AND TRIMETHOPRIM 1 DROP: 10000; 1 SOLUTION OPHTHALMIC at 12:25

## 2022-09-19 RX ADMIN — VANCOMYCIN HYDROCHLORIDE 1250 MG: 1.25 INJECTION, POWDER, LYOPHILIZED, FOR SOLUTION INTRAVENOUS at 21:58

## 2022-09-19 RX ADMIN — POLYMYXIN B SULFATE AND TRIMETHOPRIM 1 DROP: 10000; 1 SOLUTION OPHTHALMIC at 08:15

## 2022-09-19 RX ADMIN — ALBUTEROL SULFATE 2 PUFF: 90 AEROSOL, METERED RESPIRATORY (INHALATION) at 11:34

## 2022-09-19 RX ADMIN — PREGABALIN 100 MG: 100 CAPSULE ORAL at 21:52

## 2022-09-19 RX ADMIN — INSULIN LISPRO 4 UNITS: 100 INJECTION, SOLUTION INTRAVENOUS; SUBCUTANEOUS at 17:24

## 2022-09-19 RX ADMIN — OXYCODONE HYDROCHLORIDE 10 MG: 10 TABLET ORAL at 06:27

## 2022-09-19 ASSESSMENT — PAIN DESCRIPTION - LOCATION
LOCATION: FOOT

## 2022-09-19 ASSESSMENT — PAIN DESCRIPTION - DESCRIPTORS
DESCRIPTORS: ACHING;SHARP
DESCRIPTORS: THROBBING
DESCRIPTORS: ACHING;SHARP
DESCRIPTORS: THROBBING

## 2022-09-19 ASSESSMENT — PAIN SCALES - GENERAL
PAINLEVEL_OUTOF10: 5
PAINLEVEL_OUTOF10: 8
PAINLEVEL_OUTOF10: 10
PAINLEVEL_OUTOF10: 7
PAINLEVEL_OUTOF10: 9
PAINLEVEL_OUTOF10: 8
PAINLEVEL_OUTOF10: 8
PAINLEVEL_OUTOF10: 5
PAINLEVEL_OUTOF10: 7
PAINLEVEL_OUTOF10: 8

## 2022-09-19 ASSESSMENT — PAIN DESCRIPTION - ORIENTATION
ORIENTATION: RIGHT

## 2022-09-19 ASSESSMENT — PAIN DESCRIPTION - ONSET
ONSET: ON-GOING

## 2022-09-19 ASSESSMENT — PAIN DESCRIPTION - FREQUENCY
FREQUENCY: CONTINUOUS

## 2022-09-19 ASSESSMENT — PAIN DESCRIPTION - PAIN TYPE
TYPE: ACUTE PAIN
TYPE: ACUTE PAIN;CHRONIC PAIN
TYPE: ACUTE PAIN
TYPE: ACUTE PAIN

## 2022-09-19 ASSESSMENT — PAIN - FUNCTIONAL ASSESSMENT
PAIN_FUNCTIONAL_ASSESSMENT: PREVENTS OR INTERFERES SOME ACTIVE ACTIVITIES AND ADLS
PAIN_FUNCTIONAL_ASSESSMENT: PREVENTS OR INTERFERES SOME ACTIVE ACTIVITIES AND ADLS

## 2022-09-19 NOTE — PROGRESS NOTES
Farhana Loots messaged by RN regarding patient's BG of 362. RN gave 4 units of humalog and 50 units of lantus. BG recheck was 304.

## 2022-09-19 NOTE — PROGRESS NOTES
Physician Progress Note      Avelino Melgar  CSN #:                  [id number]  :                       1985  ADMIT DATE:       2022 7:41 PM  [address] [place] DATE:  Renée Syedophelia  PROVIDER #:        Gabino Poe MD          QUERY TEXT:    Pt admitted with right foot cellulitis. Pt noted to have DM II . If possible,   please document in progress notes and discharge summary the relationship, if   any, between cellulitis and DM. The medical record reflects the following:  Risk Factors: DM, recent amputation of second toe right foot d/t OM  Clinical Indicators: open wound right great toe, Blood glucose range from   189-362, failed outpaitent treatment  Treatment: IV vancomycin, cefepime, Xray, surgical consult    Thank you,  Nick Ayalajocelin [phone]  Options provided:  -- right foot cellulitis associated with Diabetes  -- right foot cellulitis unrelated to Diabetes  -- Other - I will add my own diagnosis  -- Disagree - Not applicable / Not valid  -- Disagree - Clinically unable to determine / Unknown  -- Refer to Clinical Documentation Reviewer    PROVIDER RESPONSE TEXT:    right foot cellulitis associated with Diabetes.     Query created by: Dilma Anders on 2022 11:10 AM      Electronically signed by:  Gabino Poe MD 2022 3:05 PM

## 2022-09-19 NOTE — PROGRESS NOTES
V2.0  [facility] [place] Hospitalist Progress Note      Name:  Carlie Sinclair /Age/Sex: 1985  (39 y.o. male)   MRN & CSN:  [id number] & [id number] Encounter Date/Time: 2022 12:00 PM EDT    Location:  [address] [place] [address] PCP: Hal Quan MD       Hospital Day: 3    Assessment and Plan:   Carlie Sinclair is a 39 y.o. male with right foot OM      Plan:    RLE cellulitis  Open wound right great toe 2/2 trauma  Hx of OM of right proximal phalanx and head of metatarsal s/p amputation of 2nd toe on 2022  - vanc, cefepime  - no active OM, no MRI necessary per surgery  - Dr. Karen Glover with surgery following    IDDM  HTN  Pain  Tobacco     Ppx:  Dispo:     Subjective:     Chief Complaint: Wound Infection (Right foot wound, swelling to right lower extremity)       Patient complaining of uncontrolled pain. Oxy pain panel ordered. Review of Systems:    Review of Systems    10 point ROS negative except as stated above in \"subjective\" section    Objective: Intake/Output Summary (Last 24 hours) at 2022 1006  Last data filed at 2022 0942  Gross per 24 hour   Intake 10 ml   Output 4950 ml   Net -4940 ml          Vitals:   Vitals:    22 0755   BP:    Pulse:    Resp: 18   Temp:    SpO2:        Physical Exam:     General: NAD  Eyes: EOMI  ENT: neck supple  Cardiovascular: Regular rate. Respiratory: Clear to auscultation  Gastrointestinal: Soft, non tender  Genitourinary: no suprapubic tenderness  Musculoskeletal: No edema, right foot weeping from medial great toe area  Skin: warm, dry  Neuro: Alert. Psych: Mood appropriate.      Medications:   Medications:    insulin lispro  0-8 Units SubCUTAneous 2 times per day    insulin glargine  50 Units SubCUTAneous Nightly    insulin lispro  15 Units SubCUTAneous TID WC    metFORMIN  500 mg Oral BID WC    albuterol sulfate HFA  2 puff Inhalation 4x daily    ipratropium  2 puff Inhalation 4x daily    insulin lispro  0-8 Units SubCUTAneous TID WC    vancomycin  1,250 mg IntraVENous Q12H    moxifloxacin  1 drop Right Eye 4x Daily    pantoprazole  40 mg Oral QAM AC    prednisoLONE acetate  1 drop Right Eye BID    pregabalin  100 mg Oral BID    sucralfate  1 g Oral 4x Daily    budesonide-formoterol  2 puff Inhalation BID    trimethoprim-polymyxin b  1 drop Ophthalmic 4x Daily    melatonin  5 mg Oral Nightly    lisinopril-hydroCHLOROthiazide  1 tablet Oral Daily    ezetimibe  10 mg Oral Nightly    cloNIDine  0.2 mg Oral Daily    atorvastatin  10 mg Oral Daily    amLODIPine  10 mg Oral Daily    allopurinol  300 mg Oral Daily    sodium chloride flush  5-40 mL IntraVENous 2 times per day    enoxaparin  30 mg SubCUTAneous BID    cefepime  2,000 mg IntraVENous Q12H    nicotine  1 patch TransDERmal Daily    sodium chloride flush  5-40 mL IntraVENous 2 times per day      Infusions:    dextrose      sodium chloride 25 mL (09/19/22 0927)     PRN Meds: hydrOXYzine pamoate, 25 mg, TID PRN  oxyCODONE, 5 mg, Q4H PRN   Or  oxyCODONE, 10 mg, Q4H PRN  glucose, 4 tablet, PRN  dextrose bolus, 125 mL, PRN   Or  dextrose bolus, 250 mL, PRN  glucagon (rDNA), 1 mg, PRN  dextrose, , Continuous PRN  sodium chloride flush, 5-40 mL, PRN  sodium chloride, , PRN  polyethylene glycol, 17 g, Daily PRN  acetaminophen, 650 mg, Q6H PRN   Or  acetaminophen, 650 mg, Q6H PRN  promethazine, 12.5 mg, Q6H PRN   Or  ondansetron, 4 mg, Q6H PRN  nicotine polacrilex, 2 mg, Q1H PRN  sodium chloride flush, 5-40 mL, PRN      Labs      Recent Results (from the past 24 hour(s))   POCT Glucose    Collection Time: 09/18/22 11:48 AM   Result Value Ref Range    POC Glucose 243 (H) 70 - 99 MG/DL   POCT Glucose    Collection Time: 09/18/22  5:24 PM   Result Value Ref Range    POC Glucose 229 (H) 70 - 99 MG/DL   POCT Glucose    Collection Time: 09/18/22  8:01 PM   Result Value Ref Range    POC Glucose 362 (H) 70 - 99 MG/DL   POCT Glucose    Collection Time: 09/18/22 10:30 PM   Result Value Ref Range    POC Glucose 304 (H) 70 - 99 MG/DL

## 2022-09-19 NOTE — PROGRESS NOTES
[facility]  consulted by KAYLI Brannon-CNP for monitoring and adjustment. Indication for treatment: Skin and soft tissue infection  Goal trough: [x] 10-15 mcg/mL or [] 15-20 mcg/ml  AUC/SHELLEY: [x] <500 or [] 400-600    Pertinent Laboratory Values:   Temp Readings from Last 3 Encounters:   09/19/22 97.9 °F (36.6 °C) (Oral)   09/15/22 97.9 °F (36.6 °C) (Oral)   08/08/22 97.7 °F (36.5 °C)     Recent Labs     09/17/22 1913 09/18/22  0116 09/18/22  0533   WBC 4.3 3.1*  --    LACTATE 1.9  --  1.0       Recent Labs     09/17/22 1913 09/18/22  0116   BUN 13 12   CREATININE 0.8* 0.7*       Estimated Creatinine Clearance: 215 mL/min (A) (based on SCr of 0.7 mg/dL (L)). Intake/Output Summary (Last 24 hours) at 9/19/2022 1253  Last data filed at 9/19/2022 1017  Gross per 24 hour   Intake 610 ml   Output 4950 ml   Net -4340 ml         Pertinent Cultures:  Date    Source    Results  9/17   Wound    In process  9/17   Blood x 2   Ordered    Vancomycin level:   TROUGH:  No results for input(s): VANCOTROUGH in the last 72 hours. RANDOM:    Recent Labs     09/18/22  0116   VANCORANDOM 24.4         Assessment:  SCr, BUN, and urine output: stable renal function. I/o/ UOP - data as reported. Day(s) of therapy: 3 of 7  Vancomycin concentration:   9/18 - 24.4, 3 hours post-dose (AUC>600)    Plan:    CONTINUE SAME DOSE AND FREQUENCY=      Vancomycin 1250mg IVPB every 12 hours  Predicted  and Trough 14.6  Repeat level tomorrow am  Pharmacy will continue to monitor patient and adjust therapy as indicated    VANCOMYCIN CONCENTRATION SCHEDULED FOR 9/20 @0600    Thank you for the consult.   RICKEY Martinez [facility]  9/19/2022 12:53 PM

## 2022-09-19 NOTE — CARE COORDINATION
Reviewed chart- discussed pt in IDR. Pt admitted from home, pt ind with ADLs, has pcp/ active insurance. Pt recently had [facility] but no longer active. Pt denied needs/ CM to follow if additional needs arise.

## 2022-09-19 NOTE — CONSULTS
Endocrinology   Consult Note  9/17/2022  7:41 PM     Primary Care provider: Janene Gil MD     Referring physician:  Marcell Deluna MD     Dear Doctor Fay// Albert Kaplan     Thank You for the Consult     Pt. Was Admitted for : Right foot infection/right foot cellulitis    Reason for Consult: Better control of blood glucose      History Obtained From:  Patient/ EMR       HISTORY OF PRESENT ILLNESS:                The patient is a 39 y.o. 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 Been running high blood glucose levels. I was  consulted for better control of blood glucose. ROS:   Pt's ROS done in detail. Abnormal ROS are noted in Medical and Surgical History Section below:      Other Medical History:        Diagnosis Date    Asthma     Blind left eye     Diabetes mellitus ([address]     GERD (gastroesophageal reflux disease)     Hypertension     Retinal detachment 2012    left     Surgical History:        Procedure Laterality Date    CORNEAL TRANSPLANT Bilateral     EYE SURGERY      left eye removed    EYE SURGERY Right     FRACTURE SURGERY      foot left    LAPAROSCOPIC APPENDECTOMY N/A 4/28/2022    APPENDECTOMY LAPAROSCOPIC performed by Isac Umanzor MD at [address] [place] [address] Right     MANDIBLE SURGERY Bilateral     TOE AMPUTATION Right 07/25/2017    TOE AMPUTATION Right 7/25/2022    TOE AMPUTATION, RAY AMPUTATION OF RIGHT SECOND TOE performed by Brook Guthrie MD at [facility] OR       Allergies:  Ciprofloxacin, Glucosamine, and Shellfish-derived products    Family History:       Problem Relation Age of Onset    Diabetes Mother     Asthma Mother     High Blood Pressure Mother     Stroke Mother     Heart Disease Mother     Diabetes Father     Asthma Father     High Blood Pressure Father      REVIEW OF SYSTEMS:  Review of System Done as noted above     PHYSICAL EXAM:      Vitals:    BP (!) 145/87   Pulse 81   Temp 97.9 °F (36.6 °C) (Oral)   Resp 18   Ht 6' 3\" (1.905 m)   Wt 300 lb (136.1 kg)   SpO2 95%   BMI 37.50 kg/m²     CONSTITUTIONAL:  awake, alert, cooperative, appears stated age   EYES:  vision intact Fundoscopic Exam not performed   ENT:Normal  NECK:  Supple, No JVD. Thyroid Exam:Normal   LUNGS:  Has Vesicular Breath Sounds,   CARDIOVASCULAR:  Normal apical impulse, regular rate and rhythm, normal S1 and S2, no S3 or S4, and has no  murmur   ABDOMEN:  No scars, normal bowel sounds, soft, non-distended, non-tender, no masses palpated, no hepatolienomegaly  Musculoskeletal: Normal  Extremities: Normal, peripheral pulses normal, , has no edema right foot infection has partial amputation of right big toe  NEUROLOGIC:  Awake, alert, oriented to name, place and time. Cranial nerves II-XII are grossly intact. Motor is  intact.   Sensory neuropathy,  and gait is abormal.    DATA:    CBC:   Recent Labs     09/17/22 1913 09/18/22  0116   WBC 4.3 3.1*   HGB 13.3* 11.9*    173    CMP:  Recent Labs     09/17/22 1913 09/18/22  0116    134*   K 4.1 3.7    102   CO2 23 22   BUN 13 12   CREATININE 0.8* 0.7*   CALCIUM 8.5 8.1*   PROT 7.2 6.1*   LABALBU 4.3 3.7   BILITOT 0.4 0.3   ALKPHOS 112 128   AST 46* 34   ALT 40 32     Lipids:   Lab Results   Component Value Date/Time    CHOL 142 04/16/2011 01:19 PM    HDL 41 04/16/2011 01:19 PM    TRIG 268 04/16/2011 01:19 PM     Glucose:   Recent Labs     09/18/22  1724 09/18/22 2001 09/18/22  2230   POCGLU 229* 362* 304*     Hemoglobin A1C:   Lab Results   Component Value Date/Time    LABA1C 9.9 07/21/2022 07:50 AM     Free T4: No results found for: T4FREE  Free T3: No results found for: FT3  TSH High Sensitivity:   Lab Results   Component Value Date/Time    TSH 2.685 04/16/2011 01:19 PM       XR FOOT RIGHT (MIN 3 VIEWS)   Final Result   1. Findings most consistent with osteomyelitis involving the residual   proximal phalanx of the right 2nd toe in the absence of any intervention to   the 2nd toe since the comparison radiograph dated July 21, 2022.   2. No definitive radiographic findings of osteomyelitis at the great toe. 3. Diffuse soft tissue edema. RECOMMENDATION:   Dedicated MRI of the right forefoot with and without contrast could be   obtained for further evaluation as clinically indicated.                 Scheduled Medicines   Medications:    insulin lispro  0-8 Units SubCUTAneous 2 times per day    insulin glargine  50 Units SubCUTAneous Nightly    insulin lispro  15 Units SubCUTAneous TID WC    metFORMIN  500 mg Oral BID WC    albuterol sulfate HFA  2 puff Inhalation 4x daily    ipratropium  2 puff Inhalation 4x daily    insulin lispro  0-8 Units SubCUTAneous TID WC    vancomycin  1,250 mg IntraVENous Q12H    moxifloxacin  1 drop Right Eye 4x Daily    pantoprazole  40 mg Oral QAM AC    prednisoLONE acetate  1 drop Right Eye BID    pregabalin  100 mg Oral BID    sucralfate  1 g Oral 4x Daily    budesonide-formoterol  2 puff Inhalation BID    trimethoprim-polymyxin b  1 drop Ophthalmic 4x Daily    melatonin  5 mg Oral Nightly    lisinopril-hydroCHLOROthiazide  1 tablet Oral Daily    ezetimibe  10 mg Oral Nightly    cloNIDine  0.2 mg Oral Daily    atorvastatin  10 mg Oral Daily    amLODIPine  10 mg Oral Daily    allopurinol  300 mg Oral Daily    sodium chloride flush  5-40 mL IntraVENous 2 times per day    enoxaparin  30 mg SubCUTAneous BID    cefepime  2,000 mg IntraVENous Q12H    nicotine  1 patch TransDERmal Daily    sodium chloride flush  5-40 mL IntraVENous 2 times per day      Infusions:    dextrose      sodium chloride 25 mL/hr at 09/18/22 0636         IMPRESSION    Patient Active Problem List   Diagnosis    Sprain of left ankle    Closed nondisplaced fracture of fifth metatarsal bone of left foot    Alcohol abuse    UTI (urinary tract infection) due to Enterococcus    Acute osteomyelitis of toe, right (HCC)    Osteomyelitis of second toe of right foot (HCC)    Diabetic foot infection (HCC)    Shortness of breath    Asthma exacerbation    Appendicitis    Diabetic foot ulcer with osteomyelitis ([facility]    Cellulitis    Open wound of toe         RECOMMENDATIONS:      Reviewed POC blood glucose . Labs and X ray results   Reviewed Home and Current Medicines   Will Start On meal/ Correction bolus Humalog/ Lantus Insulin regime   Monitor Blood glucose frequently   Modify  the dose of Insulin as needed        Will follow with you  Again thank you for sharing pt's care with me.      Truly yours,       Clary Matos MD

## 2022-09-19 NOTE — CONSULTS
[address] [address] Continence Nurse  Consult Note       Velma Messer  AGE: 39 y.o.    GENDER: male  : 1985  TODAY'S DATE:  2022    Subjective:     Reason for  Evaluation and Assessment: wound care reynold Messer is a 39 y.o. male referred by:   [x] Physician  [] Nursing  [] Other:     Wound Identification:  Wound Type: diabetic  Contributing Factors: diabetes and chronic pressure        PAST MEDICAL HISTORY        Diagnosis Date    Asthma     Blind left eye     Diabetes mellitus ([address]     GERD (gastroesophageal reflux disease)     Hypertension     Retinal detachment 2012    left       PAST SURGICAL HISTORY    Past Surgical History:   Procedure Laterality Date    CORNEAL TRANSPLANT Bilateral     EYE SURGERY      left eye removed    EYE SURGERY Right     FRACTURE SURGERY      foot left    LAPAROSCOPIC APPENDECTOMY N/A 2022    APPENDECTOMY LAPAROSCOPIC performed by Michelle Roberto MD at [address] [place] [address] Right     MANDIBLE SURGERY Bilateral     TOE AMPUTATION Right 2017    TOE AMPUTATION Right 2022    TOE AMPUTATION, RAY AMPUTATION OF RIGHT SECOND TOE performed by Bozena Haile MD at [address] [place] [address]    Family History   Problem Relation Age of Onset    Diabetes Mother     Asthma Mother     High Blood Pressure Mother     Stroke Mother     Heart Disease Mother     Diabetes Father     Asthma Father     High Blood Pressure Father        SOCIAL HISTORY    Social History     Tobacco Use    Smoking status: Every Day     Packs/day: 0.50     Years: 5.00     Pack years: 2.50     Types: Cigarettes    Smokeless tobacco: Never   Vaping Use    Vaping Use: Never used   Substance Use Topics    Alcohol use: Yes     Comment: occasionally    Drug use: Not Currently     Types: Marijuana Cristine Ambbrianda)       ALLERGIES    Allergies   Allergen Reactions    Ciprofloxacin Shortness Of Breath    Glucosamine Anaphylaxis    Shellfish-Derived Products Anaphylaxis       MEDICATIONS    No current facility-administered medications on file prior to encounter. Current Outpatient Medications on File Prior to Encounter   Medication Sig Dispense Refill    SYMBICORT 160-4.5 MCG/ACT AERO Inhale 2 puffs into the lungs in the morning and at bedtime      cephALEXin (KEFLEX) 500 MG capsule Take 1 capsule by mouth 4 times daily for 7 days 28 capsule 0    sulfamethoxazole-trimethoprim (BACTRIM DS) 800-160 MG per tablet Take 1 tablet by mouth 2 times daily for 7 days 14 tablet 0    glipiZIDE (GLUCOTROL) 5 MG tablet Take 3 tablets by mouth in the morning and 3 tablets in the evening. Take before meals. 710 tablet 0    TRULICITY 1.5 UW/4.9MJ SOPN inject 0.5 milliliters ( 1 AND 1/2 milligrams ) subcutaneously ev. ..  (REFER TO PRESCRIPTION NOTES). 3 pen 0    LANTUS SOLOSTAR 100 UNIT/ML injection pen Inject 50 Units into the skin nightly 2 pen 0    insulin NPH (HUMULIN N KWIKPEN) 100 UNIT/ML injection pen Inject 20 Units into the skin every morning 3 pen 0    moxifloxacin (VIGAMOX) 0.5 % ophthalmic solution Place 1 drop into the right eye 4 times daily      tiZANidine (ZANAFLEX) 4 MG tablet Take 4 mg by mouth nightly as needed      cloNIDine (CATAPRES) 0.2 MG tablet Take 0.2 mg by mouth in the morning.       trimethoprim-polymyxin b (POLYTRIM) 90266-3.1 UNIT/ML-% ophthalmic solution Apply 1 drop to eye 4 times daily      UNABLE TO FIND Place 1 drop into the right eye 4 times daily 07/21/22 Patient on Vancomycin Preserved Eye soln 25 mg/ml      ezetimibe (ZETIA) 10 MG tablet take 1 tablet by mouth once daily      TECHLITE PEN NEEDLES 31G X 5 MM MISC use 1 PEN NEEDLE to inject MEDICATION subcutaneously two to three times a day      melatonin 5 MG TABS tablet take 1 tablet by mouth every evening      pregabalin (LYRICA) 100 MG capsule take 1 capsule by mouth twice a day      sucralfate (CARAFATE) 1 GM tablet Take 1 tablet by mouth 4 times daily 120 tablet 0    albuterol sulfate  (90 Base) MCG/ACT inhaler Inhale 2 puffs into the lungs every 6 hours as needed for Wheezing 1 Inhaler 5    albuterol-ipratropium (COMBIVENT RESPIMAT)  MCG/ACT AERS inhaler Inhale 1 puff into the lungs every 6 hours 1 Inhaler 5    acetaminophen (AMINOFEN) 325 MG tablet Take 2 tablets by mouth every 6 hours as needed for Pain 20 tablet 0    brimonidine (ALPHAGAN) 0.2 % ophthalmic solution Place 1 drop into the right eye 2 times daily      dorzolamide-timolol 22.3-6.8 MG/ML SOLN Place 1 drop into the right eye 2 times daily      prednisoLONE acetate (PRED FORTE) 1 % ophthalmic suspension Place 1 drop into the right eye 2 times daily      allopurinol (ZYLOPRIM) 300 MG tablet Take 300 mg by mouth daily       atorvastatin (LIPITOR) 10 MG tablet Take 10 mg by mouth daily       lisinopril-hydrochlorothiazide (PRINZIDE;ZESTORETIC) 20-12.5 MG per tablet Take 1 tablet by mouth daily       metFORMIN (GLUCOPHAGE) 1000 MG tablet Take 1,000 mg by mouth 2 times daily (with meals)      amLODIPine (NORVASC) 10 MG tablet Take 10 mg by mouth daily      omeprazole (PRILOSEC) 40 MG delayed release capsule Take 40 mg by mouth daily            Objective:      /83   Pulse 82   Temp 98.2 °F (36.8 °C) (Oral)   Resp 18   Ht 6' 3\" (1.905 m)   Wt 300 lb (136.1 kg)   SpO2 95%   BMI 37.50 kg/m²   Christopher Risk Score: Christopher Scale Score: 21    LABS    CBC:   Lab Results   Component Value Date/Time    WBC 3.1 09/18/2022 01:16 AM    RBC 3.86 09/18/2022 01:16 AM    HGB 11.9 09/18/2022 01:16 AM    HCT 34.6 09/18/2022 01:16 AM    MCV 89.6 09/18/2022 01:16 AM    MCH 30.8 09/18/2022 01:16 AM    MCHC 34.4 09/18/2022 01:16 AM    RDW 13.2 09/18/2022 01:16 AM     09/18/2022 01:16 AM    MPV 8.8 09/18/2022 01:16 AM     CMP:    Lab Results   Component Value Date/Time     09/18/2022 01:16 AM    K 3.7 09/18/2022 01:16 AM     09/18/2022 01:16 AM    CO2 22 09/18/2022 01:16 AM    BUN 12 09/18/2022 01:16 AM    CREATININE 0.7 09/18/2022 01:16 AM    GFRAA >60 09/18/2022 01:16 AM    LABGLOM >60 09/18/2022 01:16 AM    GLUCOSE 243 09/18/2022 01:16 AM    PROT 6.1 09/18/2022 01:16 AM    PROT 6.8 04/16/2011 01:19 PM    LABALBU 3.7 09/18/2022 01:16 AM    CALCIUM 8.1 09/18/2022 01:16 AM    BILITOT 0.3 09/18/2022 01:16 AM    ALKPHOS 128 09/18/2022 01:16 AM    AST 34 09/18/2022 01:16 AM    ALT 32 09/18/2022 01:16 AM     Albumin:    Lab Results   Component Value Date/Time    LABALBU 3.7 09/18/2022 01:16 AM     PT/INR:    Lab Results   Component Value Date/Time    PROTIME 12.1 07/25/2022 02:30 PM    INR 0.94 07/25/2022 02:30 PM     HgBA1c:    Lab Results   Component Value Date/Time    LABA1C 9.9 07/21/2022 07:50 AM         Assessment:     Patient Active Problem List   Diagnosis    Sprain of left ankle    Closed nondisplaced fracture of fifth metatarsal bone of left foot    Alcohol abuse    UTI (urinary tract infection) due to Enterococcus    Acute osteomyelitis of toe, right (HCC)    Osteomyelitis of second toe of right foot ([address]    Diabetic foot infection (HCC)    Shortness of breath    Asthma exacerbation    Appendicitis    Diabetic foot ulcer with osteomyelitis ([address]    Cellulitis    Open wound of toe       Measurements:  Wound 09/17/22 Toe (Comment  which one) Anterior;Right R great toe (Active)   Wound Image   09/19/22 1057   Wound Etiology Diabetic 09/19/22 1057   Dressing Status New dressing applied 09/19/22 1057   Wound Cleansed Cleansed with saline 09/19/22 1057   Dressing/Treatment Moist to dry; Hydrofiber;ABD;Roll gauze 09/19/22 1057   Wound Length (cm) 4 cm 09/19/22 1057   Wound Width (cm) 3 cm 09/19/22 1057   Wound Depth (cm) 0.1 cm 09/19/22 1057   Wound Surface Area (cm^2) 12 cm^2 09/19/22 1057   Wound Volume (cm^3) 1.2 cm^3 09/19/22 1057   Distance Tunneling (cm) 0 cm 09/19/22 1057   Tunneling Position ___ O'Clock 0 09/19/22 1057   Undermining Starts ___ O'Clock 0 09/19/22 1057   Undermining Ends___ O'Clock 0 09/19/22 1057   Undermining Maxium Distance (cm) 0 09/19/22 1057   Wound Assessment Pink/red 09/19/22 1057   Drainage Amount Moderate 09/19/22 1057   Drainage Description Serosanguinous 09/19/22 1057   Odor None 09/19/22 1057   Sandy-wound Assessment Maceration 09/19/22 1057   Margins Defined edges 09/19/22 1057   Wound Thickness Description not for Pressure Injury Full thickness 09/19/22 1057   Number of days: 1       Response to treatment:  Well tolerated by patient. Pain Assessment:  Severity:    none  Quality of pain:   Wound Pain Timing/Severity:   Premedicated: no    Plan:     Plan of Care: Wound 09/17/22 Toe (Comment  which one) Anterior;Right R great toe-Dressing/Treatment: Moist to dry, Hydrofiber, ABD, Roll gauze    Patient in bed agreeable to wound care eval. Pt has chronic diabetic wound to rt great toe. Dressing removed and cleansed with NS. Measured and pictured. Pink/red tissue. Applied moist to dry dressing and opticell. Pt is generally not at risk for skin breakdown BRIANNA mccoy. Pt's nurse to perfect serve Dr Aris Peña to clarify what orders for dressings. Wound consult had been d/c'd. Specialty Bed Required : no  [] Low Air Loss   [] Pressure Redistribution  [] Fluid Immersion  [] Bariatric  [] Total Pressure Relief  [] Other:     Discharge Plan:  Placement for patient upon discharge: tbd  Hospice Care: no  Patient appropriate for Outpatient [address]: follows with Dr Aris Peña. Patient/Caregiver Teaching:  Level of patient/caregiver understanding able to: pt voiced understanding. Electronically signed by Sheela Marti RN, on 9/19/2022 at 1:35 PM

## 2022-09-20 LAB
CREAT SERPL-MCNC: 0.6 MG/DL (ref 0.9–1.3)
DOSE AMOUNT: NORMAL
DOSE TIME: NORMAL
GFR AFRICAN AMERICAN: >60 ML/MIN/1.73M2
GFR NON-AFRICAN AMERICAN: >60 ML/MIN/1.73M2
GLUCOSE BLD-MCNC: 177 MG/DL (ref 70–99)
GLUCOSE BLD-MCNC: 181 MG/DL (ref 70–99)
GLUCOSE BLD-MCNC: 209 MG/DL (ref 70–99)
GLUCOSE BLD-MCNC: 253 MG/DL (ref 70–99)
GLUCOSE BLD-MCNC: 265 MG/DL (ref 70–99)
VANCOMYCIN RANDOM: 7 UG/ML

## 2022-09-20 PROCEDURE — 6370000000 HC RX 637 (ALT 250 FOR IP): Performed by: INTERNAL MEDICINE

## 2022-09-20 PROCEDURE — 6370000000 HC RX 637 (ALT 250 FOR IP): Performed by: SURGERY

## 2022-09-20 PROCEDURE — 1200000000 HC SEMI PRIVATE

## 2022-09-20 PROCEDURE — 82565 ASSAY OF CREATININE: CPT

## 2022-09-20 PROCEDURE — 6370000000 HC RX 637 (ALT 250 FOR IP): Performed by: NURSE PRACTITIONER

## 2022-09-20 PROCEDURE — 82962 GLUCOSE BLOOD TEST: CPT

## 2022-09-20 PROCEDURE — 6360000002 HC RX W HCPCS: Performed by: NURSE PRACTITIONER

## 2022-09-20 PROCEDURE — 2580000003 HC RX 258: Performed by: NURSE PRACTITIONER

## 2022-09-20 PROCEDURE — 36415 COLL VENOUS BLD VENIPUNCTURE: CPT

## 2022-09-20 PROCEDURE — 94640 AIRWAY INHALATION TREATMENT: CPT

## 2022-09-20 PROCEDURE — 80202 ASSAY OF VANCOMYCIN: CPT

## 2022-09-20 PROCEDURE — 94761 N-INVAS EAR/PLS OXIMETRY MLT: CPT

## 2022-09-20 PROCEDURE — 83036 HEMOGLOBIN GLYCOSYLATED A1C: CPT

## 2022-09-20 RX ORDER — INSULIN GLARGINE 100 [IU]/ML
55 INJECTION, SOLUTION SUBCUTANEOUS NIGHTLY
Status: DISCONTINUED | OUTPATIENT
Start: 2022-09-20 | End: 2022-09-21 | Stop reason: HOSPADM

## 2022-09-20 RX ORDER — INSULIN LISPRO 100 [IU]/ML
0-16 INJECTION, SOLUTION INTRAVENOUS; SUBCUTANEOUS
Status: DISCONTINUED | OUTPATIENT
Start: 2022-09-20 | End: 2022-09-21 | Stop reason: HOSPADM

## 2022-09-20 RX ADMIN — SUCRALFATE 1 G: 1 TABLET ORAL at 07:46

## 2022-09-20 RX ADMIN — BUDESONIDE AND FORMOTEROL FUMARATE DIHYDRATE 2 PUFF: 160; 4.5 AEROSOL RESPIRATORY (INHALATION) at 21:04

## 2022-09-20 RX ADMIN — MOXIFLOXACIN HYDROCHLORIDE 1 DROP: 5 SOLUTION/ DROPS OPHTHALMIC at 17:00

## 2022-09-20 RX ADMIN — MOXIFLOXACIN HYDROCHLORIDE 1 DROP: 5 SOLUTION/ DROPS OPHTHALMIC at 08:32

## 2022-09-20 RX ADMIN — POLYMYXIN B SULFATE AND TRIMETHOPRIM 1 DROP: 10000; 1 SOLUTION OPHTHALMIC at 08:00

## 2022-09-20 RX ADMIN — ALBUTEROL SULFATE 2 PUFF: 90 AEROSOL, METERED RESPIRATORY (INHALATION) at 08:34

## 2022-09-20 RX ADMIN — INSULIN LISPRO 4 UNITS: 100 INJECTION, SOLUTION INTRAVENOUS; SUBCUTANEOUS at 02:36

## 2022-09-20 RX ADMIN — VANCOMYCIN HYDROCHLORIDE 1250 MG: 1.25 INJECTION, POWDER, LYOPHILIZED, FOR SOLUTION INTRAVENOUS at 10:19

## 2022-09-20 RX ADMIN — ATORVASTATIN CALCIUM 10 MG: 10 TABLET, FILM COATED ORAL at 07:46

## 2022-09-20 RX ADMIN — SODIUM CHLORIDE, PRESERVATIVE FREE 10 ML: 5 INJECTION INTRAVENOUS at 21:41

## 2022-09-20 RX ADMIN — INSULIN GLARGINE 55 UNITS: 100 INJECTION, SOLUTION SUBCUTANEOUS at 21:45

## 2022-09-20 RX ADMIN — SUCRALFATE 1 G: 1 TABLET ORAL at 21:40

## 2022-09-20 RX ADMIN — OXYCODONE HYDROCHLORIDE 10 MG: 10 TABLET ORAL at 14:20

## 2022-09-20 RX ADMIN — MOXIFLOXACIN HYDROCHLORIDE 1 DROP: 5 SOLUTION/ DROPS OPHTHALMIC at 13:15

## 2022-09-20 RX ADMIN — ALBUTEROL SULFATE 2 PUFF: 90 AEROSOL, METERED RESPIRATORY (INHALATION) at 12:36

## 2022-09-20 RX ADMIN — Medication 2 PUFF: at 12:36

## 2022-09-20 RX ADMIN — SODIUM CHLORIDE, PRESERVATIVE FREE 10 ML: 5 INJECTION INTRAVENOUS at 07:47

## 2022-09-20 RX ADMIN — OXYCODONE HYDROCHLORIDE 10 MG: 10 TABLET ORAL at 02:35

## 2022-09-20 RX ADMIN — SUCRALFATE 1 G: 1 TABLET ORAL at 12:41

## 2022-09-20 RX ADMIN — OXYCODONE HYDROCHLORIDE 10 MG: 10 TABLET ORAL at 10:20

## 2022-09-20 RX ADMIN — OXYCODONE HYDROCHLORIDE 5 MG: 5 TABLET ORAL at 18:21

## 2022-09-20 RX ADMIN — CEFEPIME HYDROCHLORIDE 2000 MG: 2 INJECTION, POWDER, FOR SOLUTION INTRAVENOUS at 06:35

## 2022-09-20 RX ADMIN — CEFEPIME HYDROCHLORIDE 2000 MG: 2 INJECTION, POWDER, FOR SOLUTION INTRAVENOUS at 17:45

## 2022-09-20 RX ADMIN — INSULIN LISPRO 15 UNITS: 100 INJECTION, SOLUTION INTRAVENOUS; SUBCUTANEOUS at 08:34

## 2022-09-20 RX ADMIN — INSULIN LISPRO 8 UNITS: 100 INJECTION, SOLUTION INTRAVENOUS; SUBCUTANEOUS at 21:45

## 2022-09-20 RX ADMIN — HYDROXYZINE PAMOATE 25 MG: 25 CAPSULE ORAL at 07:46

## 2022-09-20 RX ADMIN — PREGABALIN 100 MG: 100 CAPSULE ORAL at 21:40

## 2022-09-20 RX ADMIN — OXYCODONE HYDROCHLORIDE 10 MG: 10 TABLET ORAL at 06:32

## 2022-09-20 RX ADMIN — INSULIN LISPRO 15 UNITS: 100 INJECTION, SOLUTION INTRAVENOUS; SUBCUTANEOUS at 17:39

## 2022-09-20 RX ADMIN — METFORMIN HYDROCHLORIDE 1000 MG: 500 TABLET ORAL at 17:00

## 2022-09-20 RX ADMIN — SODIUM CHLORIDE, PRESERVATIVE FREE 10 ML: 5 INJECTION INTRAVENOUS at 10:15

## 2022-09-20 RX ADMIN — SODIUM CHLORIDE 25 ML: 9 INJECTION, SOLUTION INTRAVENOUS at 10:17

## 2022-09-20 RX ADMIN — POLYMYXIN B SULFATE AND TRIMETHOPRIM 1 DROP: 10000; 1 SOLUTION OPHTHALMIC at 21:44

## 2022-09-20 RX ADMIN — Medication 2 PUFF: at 21:02

## 2022-09-20 RX ADMIN — PREGABALIN 100 MG: 100 CAPSULE ORAL at 07:46

## 2022-09-20 RX ADMIN — BUDESONIDE AND FORMOTEROL FUMARATE DIHYDRATE 2 PUFF: 160; 4.5 AEROSOL RESPIRATORY (INHALATION) at 08:34

## 2022-09-20 RX ADMIN — VANCOMYCIN HYDROCHLORIDE 1250 MG: 1.25 INJECTION, POWDER, LYOPHILIZED, FOR SOLUTION INTRAVENOUS at 18:10

## 2022-09-20 RX ADMIN — CLONIDINE HYDROCHLORIDE 0.2 MG: 0.2 TABLET ORAL at 07:46

## 2022-09-20 RX ADMIN — LISINOPRIL AND HYDROCHLOROTHIAZIDE 1 TABLET: 12.5; 2 TABLET ORAL at 07:46

## 2022-09-20 RX ADMIN — METFORMIN HYDROCHLORIDE 1000 MG: 500 TABLET ORAL at 07:47

## 2022-09-20 RX ADMIN — SODIUM CHLORIDE 25 ML: 9 INJECTION, SOLUTION INTRAVENOUS at 17:44

## 2022-09-20 RX ADMIN — OXYCODONE HYDROCHLORIDE 10 MG: 10 TABLET ORAL at 22:37

## 2022-09-20 RX ADMIN — POLYMYXIN B SULFATE AND TRIMETHOPRIM 1 DROP: 10000; 1 SOLUTION OPHTHALMIC at 17:10

## 2022-09-20 RX ADMIN — SUCRALFATE 1 G: 1 TABLET ORAL at 17:00

## 2022-09-20 RX ADMIN — Medication 2 PUFF: at 08:34

## 2022-09-20 RX ADMIN — Medication 5 MG: at 21:40

## 2022-09-20 RX ADMIN — INSULIN LISPRO 4 UNITS: 100 INJECTION, SOLUTION INTRAVENOUS; SUBCUTANEOUS at 17:39

## 2022-09-20 RX ADMIN — POLYMYXIN B SULFATE AND TRIMETHOPRIM 1 DROP: 10000; 1 SOLUTION OPHTHALMIC at 12:41

## 2022-09-20 RX ADMIN — MOXIFLOXACIN HYDROCHLORIDE 1 DROP: 5 SOLUTION/ DROPS OPHTHALMIC at 21:43

## 2022-09-20 RX ADMIN — PREDNISOLONE ACETATE 1 DROP: 10 SUSPENSION/ DROPS OPHTHALMIC at 17:05

## 2022-09-20 RX ADMIN — PANTOPRAZOLE SODIUM 40 MG: 40 TABLET, DELAYED RELEASE ORAL at 06:32

## 2022-09-20 RX ADMIN — ALBUTEROL SULFATE 2 PUFF: 90 AEROSOL, METERED RESPIRATORY (INHALATION) at 21:00

## 2022-09-20 RX ADMIN — BACITRACIN, NEOMYCIN, POLYMYXIN B: 400; 3.5; 5 OINTMENT TOPICAL at 10:20

## 2022-09-20 RX ADMIN — SODIUM CHLORIDE, PRESERVATIVE FREE 10 ML: 5 INJECTION INTRAVENOUS at 17:44

## 2022-09-20 RX ADMIN — EZETIMIBE 10 MG: 10 TABLET ORAL at 21:40

## 2022-09-20 RX ADMIN — ALLOPURINOL 300 MG: 300 TABLET ORAL at 07:46

## 2022-09-20 RX ADMIN — Medication 2 PUFF: at 15:57

## 2022-09-20 RX ADMIN — ALBUTEROL SULFATE 2 PUFF: 90 AEROSOL, METERED RESPIRATORY (INHALATION) at 15:57

## 2022-09-20 RX ADMIN — INSULIN LISPRO 15 UNITS: 100 INJECTION, SOLUTION INTRAVENOUS; SUBCUTANEOUS at 12:38

## 2022-09-20 RX ADMIN — AMLODIPINE BESYLATE 10 MG: 10 TABLET ORAL at 07:46

## 2022-09-20 RX ADMIN — PREDNISOLONE ACETATE 1 DROP: 10 SUSPENSION/ DROPS OPHTHALMIC at 08:15

## 2022-09-20 ASSESSMENT — ENCOUNTER SYMPTOMS
SORE THROAT: 0
EYE DISCHARGE: 0
BACK PAIN: 0
DIARRHEA: 0
SHORTNESS OF BREATH: 0
WHEEZING: 0
NAUSEA: 0
ABDOMINAL DISTENTION: 0
CONSTIPATION: 0
COUGH: 0

## 2022-09-20 ASSESSMENT — PAIN SCALES - GENERAL
PAINLEVEL_OUTOF10: 9
PAINLEVEL_OUTOF10: 8
PAINLEVEL_OUTOF10: 8
PAINLEVEL_OUTOF10: 10
PAINLEVEL_OUTOF10: 7
PAINLEVEL_OUTOF10: 9
PAINLEVEL_OUTOF10: 6
PAINLEVEL_OUTOF10: 8
PAINLEVEL_OUTOF10: 6
PAINLEVEL_OUTOF10: 8
PAINLEVEL_OUTOF10: 5

## 2022-09-20 ASSESSMENT — PAIN DESCRIPTION - LOCATION
LOCATION: FOOT

## 2022-09-20 ASSESSMENT — PAIN DESCRIPTION - DESCRIPTORS
DESCRIPTORS: THROBBING
DESCRIPTORS: ACHING;SHARP
DESCRIPTORS: THROBBING

## 2022-09-20 ASSESSMENT — PAIN DESCRIPTION - PAIN TYPE
TYPE: ACUTE PAIN

## 2022-09-20 ASSESSMENT — PAIN DESCRIPTION - FREQUENCY
FREQUENCY: CONTINUOUS
FREQUENCY: CONTINUOUS

## 2022-09-20 ASSESSMENT — PAIN DESCRIPTION - ORIENTATION
ORIENTATION: RIGHT

## 2022-09-20 ASSESSMENT — PAIN - FUNCTIONAL ASSESSMENT: PAIN_FUNCTIONAL_ASSESSMENT: ACTIVITIES ARE NOT PREVENTED

## 2022-09-20 ASSESSMENT — PAIN DESCRIPTION - ONSET: ONSET: ON-GOING

## 2022-09-20 NOTE — PROGRESS NOTES
Progress Note( Dr. Shelley Alexis)  9/20/2022  Subjective:   Admit Date: 9/17/2022  PCP: Bria Porter MD    Admitted For :Right foot infection/right foot cellulitis    Consulted For: Better control of blood glucose    Interval History: Feels somewhat better antibiotic voice is foot infection    Denies any chest pains,   Denies SOB . Denies nausea or vomiting. No new bowel or bladder symptoms. Intake/Output Summary (Last 24 hours) at 9/20/2022 0855  Last data filed at 9/20/2022 0746  Gross per 24 hour   Intake 620 ml   Output 3750 ml   Net -3130 ml       DATA    CBC:   Recent Labs     09/17/22 1913 09/18/22  0116   WBC 4.3 3.1*   HGB 13.3* 11.9*    173    CMP:  Recent Labs     09/17/22 1913 09/18/22  0116 09/20/22  0543    134*  --    K 4.1 3.7  --     102  --    CO2 23 22  --    BUN 13 12  --    CREATININE 0.8* 0.7* 0.6*   CALCIUM 8.5 8.1*  --    PROT 7.2 6.1*  --    LABALBU 4.3 3.7  --    BILITOT 0.4 0.3  --    ALKPHOS 112 128  --    AST 46* 34  --    ALT 40 32  --      Lipids:   Lab Results   Component Value Date/Time    CHOL 142 04/16/2011 01:19 PM    HDL 41 04/16/2011 01:19 PM    TRIG 268 04/16/2011 01:19 PM     Glucose:  Recent Labs     09/19/22 2008 09/20/22  0233 09/20/22  0751   POCGLU 246* 265* 181*     [id number]:  Lab Results   Component Value Date/Time    LABA1C 8.5 09/18/2022 01:16 AM     High Sensitivity TSH:   Lab Results   Component Value Date/Time    [facility] 2.685 04/16/2011 01:19 PM     Free T3: No results found for: FT3  Free T4:No results found for: T4FREE    XR FOOT RIGHT (MIN 3 VIEWS)   Final Result   1. Findings most consistent with osteomyelitis involving the residual   proximal phalanx of the right 2nd toe in the absence of any intervention to   the 2nd toe since the comparison radiograph dated July 21, 2022.   2. No definitive radiographic findings of osteomyelitis at the great toe. 3. Diffuse soft tissue edema.       RECOMMENDATION:   Dedicated MRI of the right forefoot with and without contrast could be   obtained for further evaluation as clinically indicated.               Scheduled Medicines   Medications:    insulin lispro  0-16 Units SubCUTAneous TID WC    insulin lispro  0-16 Units SubCUTAneous 2 times per day    metFORMIN  1,000 mg Oral BID WC    insulin glargine  55 Units SubCUTAneous Nightly    insulin lispro  15 Units SubCUTAneous TID WC    neomycin-bacitracin-polymyxin   Topical Daily    albuterol sulfate HFA  2 puff Inhalation 4x daily    ipratropium  2 puff Inhalation 4x daily    vancomycin  1,250 mg IntraVENous Q12H    moxifloxacin  1 drop Right Eye 4x Daily    pantoprazole  40 mg Oral QAM AC    prednisoLONE acetate  1 drop Right Eye BID    pregabalin  100 mg Oral BID    sucralfate  1 g Oral 4x Daily    budesonide-formoterol  2 puff Inhalation BID    trimethoprim-polymyxin b  1 drop Ophthalmic 4x Daily    melatonin  5 mg Oral Nightly    lisinopril-hydroCHLOROthiazide  1 tablet Oral Daily    ezetimibe  10 mg Oral Nightly    cloNIDine  0.2 mg Oral Daily    atorvastatin  10 mg Oral Daily    amLODIPine  10 mg Oral Daily    allopurinol  300 mg Oral Daily    sodium chloride flush  5-40 mL IntraVENous 2 times per day    enoxaparin  30 mg SubCUTAneous BID    cefepime  2,000 mg IntraVENous Q12H    nicotine  1 patch TransDERmal Daily    sodium chloride flush  5-40 mL IntraVENous 2 times per day      Infusions:    dextrose      sodium chloride 25 mL (09/19/22 1729)         Objective:   Vitals: BP (!) 155/92   Pulse 83   Temp 98.4 °F (36.9 °C) (Oral)   Resp 18   Ht 6' 3\" (1.905 m)   Wt 300 lb (136.1 kg)   SpO2 97%   BMI 37.50 kg/m²   General appearance: alert and cooperative with exam  Neck: no JVD or bruit  Thyroid : Normal lobes   Lungs: Has Vesicular Breath sounds   Heart:  regular rate and rhythm  Abdomen: soft, non-tender; bowel sounds normal; no masses,  no organomegaly  Musculoskeletal: Normal  Extremities: extremities normal, , no edema//right foot infection has partial amputation of right big toe  Neurologic:  Awake, alert, oriented to name, place and time. Cranial nerves II-XII are grossly intact. Motor is  intact. Sensory neuropathy and gait is abnormal.    Assessment:     Patient Active Problem List:     Sprain of left ankle     Closed nondisplaced fracture of fifth metatarsal bone of left foot     Alcohol abuse     UTI (urinary tract infection) due to Enterococcus     Acute osteomyelitis of toe, right (HCC)     Osteomyelitis of second toe of right foot (HCC)     Diabetic foot infection (HCC)     Shortness of breath     Asthma exacerbation     Appendicitis     Diabetic foot ulcer with osteomyelitis ([facility] [address]     Cellulitis     Open wound of toe      Plan:     Reviewed POC blood glucose . Labs and X ray results   Reviewed Current Medicines   On meal/ Correction bolus Humalog/ Basal Lantus Insulin regime   Monitor Blood glucose frequently   Modified  the dose of Insulin/ other medicines as needed   Will follow     .      Jairo Tijerina MD, MD

## 2022-09-20 NOTE — PROGRESS NOTES
[address] [facility]  consulted by ALANA Galaviz for monitoring and adjustment. Indication for treatment: Skin and soft tissue infection  Goal trough: [x] 10-15 mcg/mL or [] 15-20 mcg/ml  AUC/SHELLEY: [x] <500 or [] 400-600    Pertinent Laboratory Values:   Temp Readings from Last 3 Encounters:   09/20/22 98.3 °F (36.8 °C) (Oral)   09/15/22 97.9 °F (36.6 °C) (Oral)   08/08/22 97.7 °F (36.5 °C)     Recent Labs     09/17/22 1913 09/18/22 0116 09/18/22  0533   WBC 4.3 3.1*  --    LACTATE 1.9  --  1.0       Recent Labs     09/17/22 1913 09/18/22 0116 09/20/22  0543   BUN 13 12  --    CREATININE 0.8* 0.7* 0.6*       Estimated Creatinine Clearance: 251 mL/min (A) (based on SCr of 0.6 mg/dL (L)). Intake/Output Summary (Last 24 hours) at 9/20/2022 1542  Last data filed at 9/20/2022 1228  Gross per 24 hour   Intake 630 ml   Output 3850 ml   Net -3220 ml         Pertinent Cultures:  Date    Source    Results  9/17   Wound    In process  9/17   Blood x 2   Ordered    Vancomycin level:   TROUGH:  No results for input(s): VANCOTROUGH in the last 72 hours. RANDOM:    Recent Labs     09/18/22 0116 09/20/22  0543   VANCORANDOM 24.4 7.0         Assessment:  SCr, BUN, and urine output: SCR remains stable, slight downward trend to baseline of 0.6, UOP good  I/o/ UOP - data as reported. Day(s) of therapy: 3 of 7 (last dose 9/24 @18:00)  Vancomycin concentration:   9/18 - 24.4, 3 hours post-dose (AUC>600)  9/20 - 7.0, 8 hour level on 1250mg q12h, predicted trough 7.3     Plan:    Renal trends stable, SCR with a slight downward trend to baseline. Vanco result this am predicts a level below target at steady state on the current regimen of 1250mg q12h. Increase to vancomycin 1250mg ivpb q8h for a predicted trough of 13.1 at steady state.   Recheck the vanco trough level in 2 days  Pharmacy will continue to monitor patient and adjust therapy as indicated    VANCOMYCIN CONCENTRATION SCHEDULED FOR 9/22 @01:00    Thank you for the consult. Gil Pederson [facility]  9/20/2022 3:42 PM

## 2022-09-20 NOTE — PROGRESS NOTES
V2.0  [facility] [place]      Name:  Brittnee Castano /Age/Sex: 1985  (39 y.o. male)   MRN & CSN:  [id number] & [id number] Encounter Date/Time: 2022 12:00 PM EDT    Location:  [address] [place] [address]-A PCP: Ana Laughlin MD       Hospital Day: 4    Assessment and Plan:   Brittnee Castano is a 39 y.o. male with right foot OM      Plan:    RLE cellulitis  Open wound right great toe 2/2 trauma  Hx of OM of right proximal phalanx and head of metatarsal s/p amputation of 2nd toe on 2022  - vanc, cefepime  - no active OM, no MRI necessary per surgery  - Dr. Erica Galeana with surgery following  - cleared for d/c from surgery standpoint with outpatient follow-up  -We will transition to p.o. antibiotics on discharge    IDDM  -Uncontrolled, endocrinology on board. Appreciate recommendation  -We will get HbA1c to determine glycemic control    HTN  - well controlled on home meds    Pain  - on PRN PO opioids, pain well controlled    Tobacco   - nicotine lozenges    Dispo: Patient cleared from surgery standpoint for discharge. However patient states that he feels weak and unsteady on his feet. We will get PT/OT evaluation for dispo recommendation. Subjective:     Chief Complaint: Wound Infection (Right foot wound, swelling to right lower extremity)       Patient complaining of controlled pain. Well controlled with pain regimen. Patient states that he feels unsteady when he walks. Review of Systems:    Review of Systems   Constitutional:  Negative for activity change, appetite change, fatigue and fever. HENT:  Negative for congestion and sore throat. Eyes:  Negative for discharge and visual disturbance. Respiratory:  Negative for cough, shortness of breath and wheezing. Cardiovascular:  Negative for chest pain, palpitations and leg swelling. Gastrointestinal:  Negative for abdominal distention, constipation, diarrhea and nausea. Genitourinary:  Negative for difficulty urinating and hematuria. Imaging/Diagnostics Last 24 Hours   XR FOOT RIGHT (MIN 3 VIEWS)    Result Date: 9/17/2022  EXAMINATION: THREE XRAY VIEWS OF THE RIGHT FOOT 9/17/2022 7:55 pm COMPARISON: Right foot radiograph July 21, 2022 HISTORY: ORDERING SYSTEM PROVIDED HISTORY: Diabetic foot infection great toe TECHNOLOGIST PROVIDED HISTORY: Reason for exam:->Diabetic foot infection great toe Reason for Exam: Wound Infection Additional signs and symptoms: Diabetic foot infection great toe FINDINGS: Three views of the right foot demonstrate prior amputation of the 5th toe at the level of the MTP joint. Interval destructive changes of the previously seen residual proximal phalanx of the 2nd toe most consistent with osteomyelitis in the absence of any interval intervention to the 2nd toe since exam dated July 21, 2022. No definitive radiographic findings of osteomyelitis at the great toe. Similar hallux valgus deformity and osseous bunion of the great toe. Mild-to-moderate hindfoot and midfoot osteoarthritis. Diffuse soft tissue edema. No subcutaneous gas identified. 1. Findings most consistent with osteomyelitis involving the residual proximal phalanx of the right 2nd toe in the absence of any intervention to the 2nd toe since the comparison radiograph dated July 21, 2022. 2. No definitive radiographic findings of osteomyelitis at the great toe. 3. Diffuse soft tissue edema. RECOMMENDATION: Dedicated MRI of the right forefoot with and without contrast could be obtained for further evaluation as clinically indicated.        Electronically signed by Javier Scott MD on 9/20/2022 at 2:25 PM

## 2022-09-20 NOTE — PROGRESS NOTES
SPOKE WITH DR. Loni Leggett AND PER DR. Loni Leggett PATIENT CAN GO HOME TODAY ON PO ANTIBIOTICS AND F/U WITH HIM IN OFFICE IN 1 WEEK. DR. GAR AWARE.

## 2022-09-21 VITALS
HEART RATE: 81 BPM | BODY MASS INDEX: 37.3 KG/M2 | OXYGEN SATURATION: 94 % | WEIGHT: 300 LBS | SYSTOLIC BLOOD PRESSURE: 147 MMHG | TEMPERATURE: 98.2 F | HEIGHT: 75 IN | RESPIRATION RATE: 18 BRPM | DIASTOLIC BLOOD PRESSURE: 92 MMHG

## 2022-09-21 LAB
GLUCOSE BLD-MCNC: 186 MG/DL (ref 70–99)
GLUCOSE BLD-MCNC: 200 MG/DL (ref 70–99)
GLUCOSE BLD-MCNC: 228 MG/DL (ref 70–99)
GLUCOSE BLD-MCNC: 248 MG/DL (ref 70–99)

## 2022-09-21 PROCEDURE — 6370000000 HC RX 637 (ALT 250 FOR IP): Performed by: SURGERY

## 2022-09-21 PROCEDURE — 94640 AIRWAY INHALATION TREATMENT: CPT

## 2022-09-21 PROCEDURE — 97165 OT EVAL LOW COMPLEX 30 MIN: CPT

## 2022-09-21 PROCEDURE — 6370000000 HC RX 637 (ALT 250 FOR IP): Performed by: NURSE PRACTITIONER

## 2022-09-21 PROCEDURE — 80202 ASSAY OF VANCOMYCIN: CPT

## 2022-09-21 PROCEDURE — 6360000002 HC RX W HCPCS: Performed by: NURSE PRACTITIONER

## 2022-09-21 PROCEDURE — 97161 PT EVAL LOW COMPLEX 20 MIN: CPT

## 2022-09-21 PROCEDURE — 6370000000 HC RX 637 (ALT 250 FOR IP): Performed by: INTERNAL MEDICINE

## 2022-09-21 PROCEDURE — 2580000003 HC RX 258: Performed by: NURSE PRACTITIONER

## 2022-09-21 PROCEDURE — 82962 GLUCOSE BLOOD TEST: CPT

## 2022-09-21 PROCEDURE — 94761 N-INVAS EAR/PLS OXIMETRY MLT: CPT

## 2022-09-21 PROCEDURE — 97116 GAIT TRAINING THERAPY: CPT

## 2022-09-21 RX ORDER — NICOTINE 21 MG/24HR
1 PATCH, TRANSDERMAL 24 HOURS TRANSDERMAL DAILY
Qty: 30 PATCH | Refills: 3 | Status: SHIPPED | OUTPATIENT
Start: 2022-09-22

## 2022-09-21 RX ORDER — CLINDAMYCIN HYDROCHLORIDE 300 MG/1
300 CAPSULE ORAL 3 TIMES DAILY
Qty: 21 CAPSULE | Refills: 0 | Status: SHIPPED | OUTPATIENT
Start: 2022-09-21 | End: 2022-09-28

## 2022-09-21 RX ORDER — HYDROCODONE BITARTRATE AND ACETAMINOPHEN 5; 325 MG/1; MG/1
1 TABLET ORAL EVERY 4 HOURS PRN
Qty: 10 TABLET | Refills: 0 | Status: SHIPPED | OUTPATIENT
Start: 2022-09-21 | End: 2022-09-24

## 2022-09-21 RX ORDER — ALBUTEROL SULFATE 90 UG/1
2 AEROSOL, METERED RESPIRATORY (INHALATION) 4 TIMES DAILY
Status: DISCONTINUED | OUTPATIENT
Start: 2022-09-21 | End: 2022-09-21 | Stop reason: HOSPADM

## 2022-09-21 RX ORDER — POLYETHYLENE GLYCOL 3350 17 G/17G
17 POWDER, FOR SOLUTION ORAL DAILY PRN
Qty: 527 G | Refills: 1 | Status: SHIPPED | OUTPATIENT
Start: 2022-09-21 | End: 2022-10-21

## 2022-09-21 RX ADMIN — INSULIN LISPRO 15 UNITS: 100 INJECTION, SOLUTION INTRAVENOUS; SUBCUTANEOUS at 12:58

## 2022-09-21 RX ADMIN — INSULIN LISPRO 4 UNITS: 100 INJECTION, SOLUTION INTRAVENOUS; SUBCUTANEOUS at 12:58

## 2022-09-21 RX ADMIN — OXYCODONE HYDROCHLORIDE 10 MG: 10 TABLET ORAL at 06:28

## 2022-09-21 RX ADMIN — CLONIDINE HYDROCHLORIDE 0.2 MG: 0.2 TABLET ORAL at 09:21

## 2022-09-21 RX ADMIN — MOXIFLOXACIN HYDROCHLORIDE 1 DROP: 5 SOLUTION/ DROPS OPHTHALMIC at 13:04

## 2022-09-21 RX ADMIN — VANCOMYCIN HYDROCHLORIDE 1250 MG: 1.25 INJECTION, POWDER, LYOPHILIZED, FOR SOLUTION INTRAVENOUS at 09:36

## 2022-09-21 RX ADMIN — INSULIN LISPRO 4 UNITS: 100 INJECTION, SOLUTION INTRAVENOUS; SUBCUTANEOUS at 17:52

## 2022-09-21 RX ADMIN — ALLOPURINOL 300 MG: 300 TABLET ORAL at 09:21

## 2022-09-21 RX ADMIN — BACITRACIN, NEOMYCIN, POLYMYXIN B: 400; 3.5; 5 OINTMENT TOPICAL at 09:26

## 2022-09-21 RX ADMIN — MOXIFLOXACIN HYDROCHLORIDE 1 DROP: 5 SOLUTION/ DROPS OPHTHALMIC at 09:26

## 2022-09-21 RX ADMIN — SUCRALFATE 1 G: 1 TABLET ORAL at 13:03

## 2022-09-21 RX ADMIN — METFORMIN HYDROCHLORIDE 1000 MG: 500 TABLET ORAL at 17:51

## 2022-09-21 RX ADMIN — PREGABALIN 100 MG: 100 CAPSULE ORAL at 09:21

## 2022-09-21 RX ADMIN — ENOXAPARIN SODIUM 30 MG: 100 INJECTION SUBCUTANEOUS at 09:22

## 2022-09-21 RX ADMIN — ATORVASTATIN CALCIUM 10 MG: 10 TABLET, FILM COATED ORAL at 09:21

## 2022-09-21 RX ADMIN — Medication 2 PUFF: at 15:46

## 2022-09-21 RX ADMIN — PREDNISOLONE ACETATE 1 DROP: 10 SUSPENSION/ DROPS OPHTHALMIC at 09:26

## 2022-09-21 RX ADMIN — INSULIN LISPRO 15 UNITS: 100 INJECTION, SOLUTION INTRAVENOUS; SUBCUTANEOUS at 17:52

## 2022-09-21 RX ADMIN — POLYMYXIN B SULFATE AND TRIMETHOPRIM 1 DROP: 10000; 1 SOLUTION OPHTHALMIC at 13:03

## 2022-09-21 RX ADMIN — POLYMYXIN B SULFATE AND TRIMETHOPRIM 1 DROP: 10000; 1 SOLUTION OPHTHALMIC at 09:26

## 2022-09-21 RX ADMIN — VANCOMYCIN HYDROCHLORIDE 1250 MG: 1.25 INJECTION, POWDER, LYOPHILIZED, FOR SOLUTION INTRAVENOUS at 02:33

## 2022-09-21 RX ADMIN — Medication 2 PUFF: at 07:52

## 2022-09-21 RX ADMIN — PANTOPRAZOLE SODIUM 40 MG: 40 TABLET, DELAYED RELEASE ORAL at 06:28

## 2022-09-21 RX ADMIN — OXYCODONE HYDROCHLORIDE 10 MG: 10 TABLET ORAL at 10:49

## 2022-09-21 RX ADMIN — SODIUM CHLORIDE, PRESERVATIVE FREE 10 ML: 5 INJECTION INTRAVENOUS at 09:27

## 2022-09-21 RX ADMIN — BUDESONIDE AND FORMOTEROL FUMARATE DIHYDRATE 2 PUFF: 160; 4.5 AEROSOL RESPIRATORY (INHALATION) at 07:52

## 2022-09-21 RX ADMIN — ALBUTEROL SULFATE 2 PUFF: 90 AEROSOL, METERED RESPIRATORY (INHALATION) at 15:46

## 2022-09-21 RX ADMIN — SODIUM CHLORIDE, PRESERVATIVE FREE 10 ML: 5 INJECTION INTRAVENOUS at 09:22

## 2022-09-21 RX ADMIN — AMLODIPINE BESYLATE 10 MG: 10 TABLET ORAL at 09:21

## 2022-09-21 RX ADMIN — INSULIN LISPRO 4 UNITS: 100 INJECTION, SOLUTION INTRAVENOUS; SUBCUTANEOUS at 02:40

## 2022-09-21 RX ADMIN — Medication 2 PUFF: at 11:50

## 2022-09-21 RX ADMIN — SUCRALFATE 1 G: 1 TABLET ORAL at 17:51

## 2022-09-21 RX ADMIN — OXYCODONE HYDROCHLORIDE 10 MG: 10 TABLET ORAL at 15:03

## 2022-09-21 RX ADMIN — HYDROXYZINE PAMOATE 25 MG: 25 CAPSULE ORAL at 09:21

## 2022-09-21 RX ADMIN — PREDNISOLONE ACETATE 1 DROP: 10 SUSPENSION/ DROPS OPHTHALMIC at 15:50

## 2022-09-21 RX ADMIN — INSULIN LISPRO 15 UNITS: 100 INJECTION, SOLUTION INTRAVENOUS; SUBCUTANEOUS at 09:37

## 2022-09-21 RX ADMIN — LISINOPRIL AND HYDROCHLOROTHIAZIDE 1 TABLET: 12.5; 2 TABLET ORAL at 09:21

## 2022-09-21 RX ADMIN — METFORMIN HYDROCHLORIDE 1000 MG: 500 TABLET ORAL at 09:20

## 2022-09-21 RX ADMIN — ALBUTEROL SULFATE 2 PUFF: 90 AEROSOL, METERED RESPIRATORY (INHALATION) at 07:52

## 2022-09-21 RX ADMIN — SUCRALFATE 1 G: 1 TABLET ORAL at 09:21

## 2022-09-21 RX ADMIN — ALBUTEROL SULFATE 2 PUFF: 90 AEROSOL, METERED RESPIRATORY (INHALATION) at 11:50

## 2022-09-21 RX ADMIN — CEFEPIME HYDROCHLORIDE 2000 MG: 2 INJECTION, POWDER, FOR SOLUTION INTRAVENOUS at 06:31

## 2022-09-21 RX ADMIN — OXYCODONE HYDROCHLORIDE 10 MG: 10 TABLET ORAL at 02:34

## 2022-09-21 ASSESSMENT — PAIN - FUNCTIONAL ASSESSMENT: PAIN_FUNCTIONAL_ASSESSMENT: ACTIVITIES ARE NOT PREVENTED

## 2022-09-21 ASSESSMENT — PAIN SCALES - GENERAL
PAINLEVEL_OUTOF10: 9

## 2022-09-21 ASSESSMENT — PAIN DESCRIPTION - LOCATION
LOCATION: FOOT

## 2022-09-21 ASSESSMENT — PAIN DESCRIPTION - PAIN TYPE: TYPE: ACUTE PAIN

## 2022-09-21 ASSESSMENT — PAIN DESCRIPTION - ORIENTATION
ORIENTATION: RIGHT

## 2022-09-21 ASSESSMENT — PAIN DESCRIPTION - ONSET: ONSET: ON-GOING

## 2022-09-21 ASSESSMENT — PAIN DESCRIPTION - FREQUENCY: FREQUENCY: CONTINUOUS

## 2022-09-21 ASSESSMENT — PAIN DESCRIPTION - DESCRIPTORS
DESCRIPTORS: ACHING

## 2022-09-21 NOTE — PROGRESS NOTES
Occupational Therapy    [facility] ACUTE CARE OCCUPATIONAL THERAPY EVALUATION    Sergo Osman, 1985, 4115/4115-A, 9/21/2022    Discharge Recommendation: Home with initial assist PRN      History:  [place]:  The primary encounter diagnosis was Diabetic foot infection ([address] Diagnoses of Failure of outpatient treatment and Cellulitis of foot, right were also pertinent to this visit. Subjective:  Patient states: \"I'm hoping they let me out of here soon! \"   Pain: Pt reported 8/10 pain in Rt foot  Communication with other providers: PT LASHAWN Simpson CM  Restrictions: General Precautions, WBAT Rt LE with post-op shoe    Home Setup/Prior level of function:  Social/Functional History  Lives With: Significant other  Type of Home: Apartment  Home Layout: One level  Home Access: Stairs to enter with rails  Entrance Stairs - Number of Steps: 12 to downstairs apartment  Entrance Stairs - Rails: Right  Bathroom Shower/Tub: Tub/Shower unit  Bathroom Toilet: Standard  Bathroom Accessibility: Accessible  Home Equipment: Waynetta Bears, rolling  ADL Assistance: [address]: Independent  Homemaking Responsibilities: Yes  Ambulation Assistance: Independent (uses no AD)  Transfer Assistance: Independent  Active : No ([employer] transport)  Occupation: Unemployed    Examination:  Observation: Supine in bed upon arrival. Pleasant and agreeable to evaluation.   Vision: WFL  Hearing: WFL  Vitals: Stable vitals throughout session    Body Systems and functions:  ROM: WNL all joints in BL UEs   Strength: 5/5 MMT all major muscle groups BL UEs   Sensation: WFL in BL UEs (See PT evaluation for LE assessment)  Tone: Normal  Coordination: WNL   Perception: WNL    Activities of Daily Living (ADLs):  Feeding: Independent   Grooming: Independent (able to complete in standing at sink)  UB bathing: Independent   LB bathing: Independent   UB dressing: Independent   LB dressing: Independent (donned Rt post-op shoe Low  Prognosis: Good  Plan: Eval and discharge; no further acute needs    Time:   Time in: 1348  Time out: 1402  Timed treatment minutes: 0  Total time: 14    Electronically signed by:    VANIA Forbes/L, [address] [place] [id number]

## 2022-09-21 NOTE — DISCHARGE SUMMARY
V2.0  Discharge Summary    Name:  Belén Engle /Age/Sex: 1985 (39 y.o. male)   Admit Date: 2022  Discharge Date: 22    MRN & CSN:  [id number] & [id number] Encounter Date and Time 22 4:09 PM EDT    Attending:  Roxana Ramirez MD Discharging Provider: Roxana Ramirez MD       Hospital Course:     Brief HPI: Belén Engle is a 39 y.o. male who presented with right foot OM    Brief Problem Based Course:   RLE cellulitis  Open wound right great toe 2/2 trauma  Hx of OM of right proximal phalanx and head of metatarsal s/p amputation of 2nd toe on 2022  - vanc, cefepime  - no active OM, no MRI necessary per surgery  - Dr. George Willis with surgery following  - cleared for d/c from surgery standpoint with outpatient follow-up  -will transition to p.o. Clindamycin on discharge     IDDM  -Uncontrolled, endocrinology on board. Appreciate recommendation  -controlled with optimized control  -educated on dietary regimen for DM     HTN  - well controlled on home meds     Pain  - on PRN PO opioids, pain well controlled     Tobacco   - nicotine lozenges      The patient expressed appropriate understanding of, and agreement with the discharge recommendations, medications, and plan.      Consults this admission:  PHARMACY TO DOSE VANCOMYCIN  IP CONSULT TO GENERAL SURGERY  IP CONSULT TO HOSPITALIST  PHARMACY TO DOSE VANCOMYCIN  IP CONSULT TO SOCIAL WORK  IP CONSULT TO ENDOCRINOLOGY    Discharge Diagnosis:   Cellulitis      Discharge Instruction:   Follow up appointments: Endocrinology   Primary care physician: Anton Sr MD within 2 weeks  Diet: diabetic diet   Activity: activity as tolerated  Disposition: Discharged to:   [x]Home, []C, []SNF, []Acute Rehab, []Hospice   Condition on discharge: Stable  Labs and Tests to be Followed up as an outpatient by PCP or Specialist:     Discharge Medications:        Medication List        START taking these medications      clindamycin 300 MG capsule  Commonly known as: CLEOCIN  Take 1 capsule by mouth 3 times daily for 7 days     nicotine 14 MG/24HR  Commonly known as: NICODERM CQ  Place 1 patch onto the skin daily  Start taking on: September 22, 2022     polyethylene glycol 17 g packet  Commonly known as: GLYCOLAX  Take 17 g by mouth daily as needed for Constipation            CONTINUE taking these medications      acetaminophen 325 MG tablet  Commonly known as: Aminofen  Take 2 tablets by mouth every 6 hours as needed for Pain     albuterol sulfate  (90 Base) MCG/ACT inhaler  Commonly known as: PROVENTIL;VENTOLIN;PROAIR  Inhale 2 puffs into the lungs every 6 hours as needed for Wheezing     albuterol-ipratropium  MCG/ACT Aers inhaler  Commonly known as: Combivent Respimat  Inhale 1 puff into the lungs every 6 hours     allopurinol 300 MG tablet  Commonly known as: ZYLOPRIM     amLODIPine 10 MG tablet  Commonly known as: NORVASC     atorvastatin 10 MG tablet  Commonly known as: LIPITOR     brimonidine 0.2 % ophthalmic solution  Commonly known as: ALPHAGAN     cloNIDine 0.2 MG tablet  Commonly known as: CATAPRES     dorzolamide-timolol 22.3-6.8 MG/ML Soln     ezetimibe 10 MG tablet  Commonly known as: ZETIA     glipiZIDE 5 MG tablet  Commonly known as: GLUCOTROL  Take 3 tablets by mouth in the morning and 3 tablets in the evening. Take before meals. HumuLIN N KwikPen 100 UNIT/ML injection pen  Generic drug: insulin NPH  Inject 20 Units into the skin every morning     HYDROcodone-acetaminophen 5-325 MG per tablet  Commonly known as: Norco  Take 1 tablet by mouth every 4 hours as needed for Pain for up to 3 days.      Lantus SoloStar 100 UNIT/ML injection pen  Generic drug: insulin glargine  Inject 50 Units into the skin nightly     lisinopril-hydroCHLOROthiazide 20-12.5 MG per tablet  Commonly known as: PRINZIDE;ZESTORETIC     melatonin 5 MG Tabs tablet     metFORMIN 1000 MG tablet  Commonly known as: GLUCOPHAGE     moxifloxacin 0.5 % ophthalmic solution  Commonly known as: VIGAMOX     prednisoLONE acetate 1 % ophthalmic suspension  Commonly known as: PRED FORTE     pregabalin 100 MG capsule  Commonly known as: LYRICA     sucralfate 1 GM tablet  Commonly known as: Carafate  Take 1 tablet by mouth 4 times daily     sulfamethoxazole-trimethoprim 800-160 MG per tablet  Commonly known as: Bactrim DS  Take 1 tablet by mouth 2 times daily for 7 days     Symbicort 160-4.5 MCG/ACT Aero  Generic drug: budesonide-formoterol     TechLite Pen Needles 31G X 5 MM Misc  Generic drug: Insulin Pen Needle     tiZANidine 4 MG tablet  Commonly known as: ZANAFLEX     trimethoprim-polymyxin b 80608-0.1 UNIT/ML-% ophthalmic solution  Commonly known as: POLYTRIM     Trulicity 1.5 HO/7.8OZ Sopn  Generic drug: Dulaglutide  inject 0.5 milliliters ( 1 AND 1/2 milligrams ) subcutaneously ev. ..  (REFER TO PRESCRIPTION NOTES). UNABLE TO FIND            STOP taking these medications      cephALEXin 500 MG capsule  Commonly known as: Keflex     omeprazole 40 MG delayed release capsule  Commonly known as: PRILOSEC               Where to Get Your Medications        These medications were sent to [address] [place] [address] 02, [address] [facility]      Phone: [phone]   clindamycin 300 MG capsule  nicotine 14 MG/24HR  polyethylene glycol 17 g packet       You can get these medications from any pharmacy    Bring a paper prescription for each of these medications  HYDROcodone-acetaminophen 5-325 MG per tablet        Objective Findings at Discharge:   BP (!) 147/92   Pulse 81   Temp 98.2 °F (36.8 °C)   Resp 24   Ht 6' 3\" (1.905 m)   Wt 300 lb (136.1 kg)   SpO2 94%   BMI 37.50 kg/m²       Physical Exam:   Physical Exam  Vitals and nursing note reviewed. Constitutional:       General: He is not in acute distress. Appearance: Normal appearance. He is not ill-appearing.    HENT:      Head: Normocephalic and atraumatic. Mouth/Throat:      Mouth: Mucous membranes are moist.   Eyes:      Extraocular Movements: Extraocular movements intact. Pupils: Pupils are equal, round, and reactive to light. Cardiovascular:      Rate and Rhythm: Regular rhythm. Tachycardia present. Pulses: Normal pulses. Heart sounds: Normal heart sounds. Pulmonary:      Effort: Pulmonary effort is normal.      Breath sounds: Normal breath sounds. Abdominal:      Palpations: Abdomen is soft. Musculoskeletal:         General: Normal range of motion. Skin:     General: Skin is warm. Capillary Refill: Capillary refill takes less than 2 seconds. Neurological:      General: No focal deficit present. Mental Status: He is alert and oriented to person, place, and time. Psychiatric:         Mood and Affect: Mood normal.         Behavior: Behavior normal.         Thought Content: Thought content normal.         Judgment: Judgment normal.            Labs and Imaging   XR FOOT RIGHT (MIN 3 VIEWS)    Result Date: 9/17/2022  EXAMINATION: THREE XRAY VIEWS OF THE RIGHT FOOT 9/17/2022 7:55 pm COMPARISON: Right foot radiograph July 21, 2022 HISTORY: ORDERING SYSTEM PROVIDED HISTORY: Diabetic foot infection great toe TECHNOLOGIST PROVIDED HISTORY: Reason for exam:->Diabetic foot infection great toe Reason for Exam: Wound Infection Additional signs and symptoms: Diabetic foot infection great toe FINDINGS: Three views of the right foot demonstrate prior amputation of the 5th toe at the level of the MTP joint. Interval destructive changes of the previously seen residual proximal phalanx of the 2nd toe most consistent with osteomyelitis in the absence of any interval intervention to the 2nd toe since exam dated July 21, 2022. No definitive radiographic findings of osteomyelitis at the great toe. Similar hallux valgus deformity and osseous bunion of the great toe. Mild-to-moderate hindfoot and midfoot osteoarthritis.   Diffuse soft tissue edema. No subcutaneous gas identified. 1. Findings most consistent with osteomyelitis involving the residual proximal phalanx of the right 2nd toe in the absence of any intervention to the 2nd toe since the comparison radiograph dated July 21, 2022. 2. No definitive radiographic findings of osteomyelitis at the great toe. 3. Diffuse soft tissue edema. RECOMMENDATION: Dedicated MRI of the right forefoot with and without contrast could be obtained for further evaluation as clinically indicated. CBC: No results for input(s): WBC, HGB, PLT in the last 72 hours. BMP:    Recent Labs     09/20/22  0543   CREATININE 0.6*     Hepatic: No results for input(s): AST, ALT, ALB, BILITOT, ALKPHOS in the last 72 hours. Lipids:   Lab Results   Component Value Date/Time    CHOL 142 04/16/2011 01:19 PM    HDL 41 04/16/2011 01:19 PM    TRIG 268 04/16/2011 01:19 PM     Hemoglobin A1C:   Lab Results   Component Value Date/Time    LABA1C 8.5 09/18/2022 01:16 AM     TSH: No results found for: TSH  Troponin:   Lab Results   Component Value Date/Time    TROPONINT <0.010 04/28/2022 03:26 AM    TROPONINT <0.010 08/01/2021 02:37 PM    TROPONINT <0.010 04/22/2020 11:29 PM     Lactic Acid: No results for input(s): LACTA in the last 72 hours. BNP: No results for input(s): PROBNP in the last 72 hours.   UA:  Lab Results   Component Value Date/Time    NITRU NEGATIVE 07/21/2022 02:20 PM    COLORU YELLOW 07/21/2022 02:20 PM    WBCUA <1 07/21/2022 02:20 PM    RBCUA NONE SEEN 07/21/2022 02:20 PM    MUCUS RARE 12/21/2018 06:12 PM    TRICHOMONAS NONE SEEN 07/21/2022 02:20 PM    BACTERIA NEGATIVE 07/21/2022 02:20 PM    CLARITYU CLEAR 07/21/2022 02:20 PM    [address] 1.020 07/21/2022 02:20 PM    LEUKOCYTESUR NEGATIVE 07/21/2022 02:20 PM    UROBILINOGEN 0.2 07/21/2022 02:20 PM    BILIRUBINUR NEGATIVE 07/21/2022 02:20 PM    BLOODU NEGATIVE 07/21/2022 02:20 PM    KETUA 15 07/21/2022 02:20 PM     Urine Cultures: No results found for: LABURIN  Blood Cultures: No results found for: BC  No results found for: BLOODCULT2  Organism:   Lab Results   Component Value Date/Time    ORG MRSA 12/23/2018 01:00 PM    ORG BSGA 12/23/2018 01:00 PM       Time Spent Discharging patient 35 minutes    Electronically signed by Miguel Moe MD on 9/21/2022 at 4:09 PM

## 2022-09-21 NOTE — PROGRESS NOTES
Progress Note( Dr. Lizzie Modi)  9/21/2022  Subjective:   Admit Date: 9/17/2022  PCP: Natasha De Souza MD    Admitted For :Right foot infection/right foot cellulitis    Consulted For: Better control of blood glucose    Interval History: Feels somewhat better antibiotic voice is foot infection    Denies any chest pains,   Denies SOB . Denies nausea or vomiting. No new bowel or bladder symptoms. Intake/Output Summary (Last 24 hours) at 9/21/2022 0852  Last data filed at 9/21/2022 0117  Gross per 24 hour   Intake 1420 ml   Output 2375 ml   Net -955 ml         DATA    CBC:   No results for input(s): WBC, HGB, PLT in the last 72 hours. CMP:  Recent Labs     09/20/22  0543   CREATININE 0.6*       Lipids:   Lab Results   Component Value Date/Time    CHOL 142 04/16/2011 01:19 PM    HDL 41 04/16/2011 01:19 PM    TRIG 268 04/16/2011 01:19 PM     Glucose:  Recent Labs     09/20/22  2138 09/21/22  0236 09/21/22  0806   POCGLU 253* 248* 186*       [id number]:  Lab Results   Component Value Date/Time    LABA1C 8.5 09/18/2022 01:16 AM     High Sensitivity TSH:   Lab Results   Component Value Date/Time    TSHHS 2.685 04/16/2011 01:19 PM     Free T3: No results found for: FT3  Free T4:No results found for: T4FREE    XR FOOT RIGHT (MIN 3 VIEWS)   Final Result   1. Findings most consistent with osteomyelitis involving the residual   proximal phalanx of the right 2nd toe in the absence of any intervention to   the 2nd toe since the comparison radiograph dated July 21, 2022.   2. No definitive radiographic findings of osteomyelitis at the great toe. 3. Diffuse soft tissue edema. RECOMMENDATION:   Dedicated MRI of the right forefoot with and without contrast could be   obtained for further evaluation as clinically indicated.               Scheduled Medicines   Medications:    insulin lispro  0-16 Units SubCUTAneous TID WC    insulin lispro  0-16 Units SubCUTAneous 2 times per day    metFORMIN  1,000 mg Oral BID WC insulin glargine  55 Units SubCUTAneous Nightly    vancomycin  1,250 mg IntraVENous Q8H    insulin lispro  15 Units SubCUTAneous TID WC    neomycin-bacitracin-polymyxin   Topical Daily    albuterol sulfate HFA  2 puff Inhalation 4x daily    ipratropium  2 puff Inhalation 4x daily    moxifloxacin  1 drop Right Eye 4x Daily    pantoprazole  40 mg Oral QAM AC    prednisoLONE acetate  1 drop Right Eye BID    pregabalin  100 mg Oral BID    sucralfate  1 g Oral 4x Daily    budesonide-formoterol  2 puff Inhalation BID    trimethoprim-polymyxin b  1 drop Ophthalmic 4x Daily    melatonin  5 mg Oral Nightly    lisinopril-hydroCHLOROthiazide  1 tablet Oral Daily    ezetimibe  10 mg Oral Nightly    cloNIDine  0.2 mg Oral Daily    atorvastatin  10 mg Oral Daily    amLODIPine  10 mg Oral Daily    allopurinol  300 mg Oral Daily    sodium chloride flush  5-40 mL IntraVENous 2 times per day    enoxaparin  30 mg SubCUTAneous BID    cefepime  2,000 mg IntraVENous Q12H    nicotine  1 patch TransDERmal Daily    sodium chloride flush  5-40 mL IntraVENous 2 times per day      Infusions:    dextrose      sodium chloride 25 mL (09/20/22 2344)         Objective:   Vitals: BP (!) 149/90   Pulse 84   Temp 98 °F (36.7 °C) (Oral)   Resp 18   Ht 6' 3\" (1.905 m)   Wt 300 lb (136.1 kg)   SpO2 94%   BMI 37.50 kg/m²   General appearance: alert and cooperative with exam  Neck: no JVD or bruit  Thyroid : Normal lobes   Lungs: Has Vesicular Breath sounds   Heart:  regular rate and rhythm  Abdomen: soft, non-tender; bowel sounds normal; no masses,  no organomegaly  Musculoskeletal: Normal  Extremities: extremities normal, , no edema//right foot infection has partial amputation of right big toe  Neurologic:  Awake, alert, oriented to name, place and time. Cranial nerves II-XII are grossly intact. Motor is  intact.   Sensory neuropathy and gait is abnormal.    Assessment:     Patient Active Problem List:     Sprain of left ankle     Closed nondisplaced fracture of fifth metatarsal bone of left foot     Alcohol abuse     UTI (urinary tract infection) due to Enterococcus     Acute osteomyelitis of toe, right (HCC)     Osteomyelitis of second toe of right foot (HCC)     Diabetic foot infection (HCC)     Shortness of breath     Asthma exacerbation     Appendicitis     Diabetic foot ulcer with osteomyelitis ([facility] [address]     Cellulitis     Open wound of toe      Plan:     Reviewed POC blood glucose . Labs and X ray results   Reviewed Current Medicines   On meal/ Correction bolus Humalog/ Basal Lantus Insulin regime   Monitor Blood glucose frequently   Modified  the dose of Insulin/ other medicines as needed   Will follow     .      Jasper Riley MD, MD

## 2022-09-21 NOTE — CONSULTS
[facility] THERAPY EVALUATION  Krysta Fregoso, 1985, 4115/[id number], 9/21/2022    History  [place]:  The primary encounter diagnosis was Diabetic foot infection ([address] A diagnosis of Failure of outpatient treatment was also pertinent to this visit. Patient  has a past medical history of Asthma, Blind left eye, Diabetes mellitus ([address] GERD (gastroesophageal reflux disease), Hypertension, and Retinal detachment. Patient  has a past surgical history that includes Corneal transplant (Bilateral); Mandible surgery (Right); eye surgery; Eye surgery (Right); fracture surgery; Mandible surgery (Bilateral); Toe amputation (Right, 07/25/2017); laparoscopic appendectomy (N/A, 4/28/2022); and Toe amputation (Right, 7/25/2022). Subjective:  Patient states:  \"I really don't think I need physical therapy\". Pain:  pt states 7-8/10 pain R great toe.     Communication with other providers:  Handoff to RN, co-eval with Klever LAUREN  note  Restrictions: Low fall risk, R toe wound, R post-op shoe, pocket tele, general.    Home Setup/Prior level of function  Social/Functional History  Lives With: Significant other  Type of Home: Apartment  Home Layout: One level  Home Access: Stairs to enter with rails  Entrance Stairs - Number of Steps: 12 to downstairs apartment  Entrance Stairs - Rails: Right  Bathroom Shower/Tub: Tub/Shower unit  Bathroom Toilet: Standard  Bathroom Accessibility: Accessible  Home Equipment: Carlos Manuel Else, rolling  ADL Assistance: [facility] [address]: Independent  Homemaking Responsibilities: Yes  Ambulation Assistance: Independent (uses no AD)  Transfer Assistance: Independent  Active : No ([employer])  Occupation: Unemployed    Examination of body systems (includes body structures/functions, activity/participation limitations):  Observation:  Pt is resting in semi-fowlers upon arrival  Vision:  [facility]  Hearing:  [facility]  Cardiopulmonary:  On room air, all vitals stable  Cognition: A&O x4, see OT/SLP note for further evaluation. Musculoskeletal  ROM R/L:  WFL BLE except R metatarsals. Strength R/L:  Generally 4+/5, fair in function and endurance. Neuro:  Pt states intact LE sensation, good balance    Gait pattern: Pt demonstrates step-through pattern without need for AD, adequate foot clearance and stephanie, no evidence of instability. Mobility  Supine to sit:  SUP  Transfers: SUP  Sitting balance:  SUP. Standing balance:  SBA. Gait: SBA-SUP    [facility] 6 Clicks Inpatient Mobility:  AM-PAC Inpatient Mobility Raw Score : 22    Treatment:    Bed mobility: PT encourages sup>sit, provides v/c for sequencing. Pt demonstrates good ability to advance LE, requires SUP only for LE/ hips to EOB and SUP for trunk to upright. PT v/c for scooting to EOB, pt requires SUP. Pt without need for assist.     Sitting balance: Seated EOB pt demonstrates fair balance, no UE support required for maintaining upright. Pt demonstrates dynamic balance in seated as PT/OT educate on donning post-op shoe. Pt manages all components with min cue, no LOB, adequate anterior reach. Sit<>Stand: Pt performed STS from EOB to upright no AD with SUP, v/c for proper sequencing. Pt demonstrates efficient time to upright. Return to seated at EOB end of session pt demonstrates fair control, v/c for safe sequencing, SUP. Gait: Pt AMB x170 ft with SUP, step-through pattern without need for AD, adequate foot clearance and stephanie, no evidence of instability. Stairs: Pt ascend/descend x7 steps with x1 UE support and SBA only. Non-reciprocal pattern descent, reciprocal ascent. Education: Discussed role of PT, d/c recommendations, precautions with R foot and use of post-op she    End of session pt left in seated EOB  with lines managed, call light, phone, exit alarm, tray, all needs, RN aware. Assessment:    Pt is a 40 y/o male admitted 9/17 with c/o  cellulitis.   Patient with significant h/o Asthma, Blind left eye, Diabetes mellitus ([address] GERD (gastroesophageal reflux disease), Hypertension, and Retinal detachment, see chart. Per pt report pt has been performing ADLs/IADLs with independence, no need for AD. At this time pt appears to be functioning near baseline. Pt is now presenting with impairments in pain, R LE skin integrity, balance. Pt would benefit from skilled PT services in order to address impairments and promote return to PLOF. PT to recommend d/c to Home with assist PRN no further needs. PT will sign off at this time.     Complexity: Moderate    Recommendations for NURSING mobility: Pt to AMB with post-op shoe, independent in room    Time:   Time in: 13:48  Time out: 14:02  Timed treatment minutes: 8  Total time: 14    Electronically signed by:    Sohan Hyatt PT  5/73/6450, 9:09 PM  PT Lic #: [id number]

## 2022-09-22 LAB
CULTURE: ABNORMAL
CULTURE: NORMAL
CULTURE: NORMAL
Lab: ABNORMAL
Lab: NORMAL
Lab: NORMAL
SPECIMEN: ABNORMAL
SPECIMEN: NORMAL
SPECIMEN: NORMAL

## 2022-10-15 ENCOUNTER — HOSPITAL ENCOUNTER (EMERGENCY)
Age: 37
Discharge: HOME | End: 2022-10-15
Attending: EMERGENCY MEDICINE
Payer: COMMERCIAL

## 2022-10-15 VITALS
TEMPERATURE: 98.5 F | DIASTOLIC BLOOD PRESSURE: 87 MMHG | HEART RATE: 100 BPM | RESPIRATION RATE: 18 BRPM | OXYGEN SATURATION: 97 % | SYSTOLIC BLOOD PRESSURE: 122 MMHG

## 2022-10-15 DIAGNOSIS — Z51.89 VISIT FOR WOUND CHECK: Primary | ICD-10-CM

## 2022-10-15 DIAGNOSIS — L97.519 ULCER OF RIGHT FOOT, UNSPECIFIED ULCER STAGE (HCC): ICD-10-CM

## 2022-10-15 PROCEDURE — 6370000000 HC RX 637 (ALT 250 FOR IP): Performed by: EMERGENCY MEDICINE

## 2022-10-15 PROCEDURE — 94640 AIRWAY INHALATION TREATMENT: CPT

## 2022-10-15 PROCEDURE — 99283 EMERGENCY DEPT VISIT LOW MDM: CPT

## 2022-10-15 RX ORDER — IBUPROFEN 400 MG/1
800 TABLET ORAL ONCE
Status: COMPLETED | OUTPATIENT
Start: 2022-10-15 | End: 2022-10-15

## 2022-10-15 RX ORDER — ALBUTEROL SULFATE 90 UG/1
2 AEROSOL, METERED RESPIRATORY (INHALATION) ONCE
Status: COMPLETED | OUTPATIENT
Start: 2022-10-15 | End: 2022-10-15

## 2022-10-15 RX ADMIN — ALBUTEROL SULFATE 2 PUFF: 90 AEROSOL, METERED RESPIRATORY (INHALATION) at 02:02

## 2022-10-15 RX ADMIN — IBUPROFEN 800 MG: 400 TABLET, FILM COATED ORAL at 01:35

## 2022-10-15 NOTE — ED PROVIDER NOTES
CHIEF COMPLAINT    Chief Complaint   Patient presents with    Toe Pain     HPI  Kayy Asif is a 39 y.o. male with history diabetes and left eye blindness who presents to the ED with police officers complaining of right great toe pain. Patient was hospitalized in September for traumatic wound to right great toe with secondary cellulitic infection and x-ray imaging demonstrating osteomyelitis changes. He was placed on IV antibiotics and transition to oral clindamycin at discharge. He was seen during his hospitalization by Dr. Pratik Jessica of general surgery who saw him in follow-up on October 3 at which time he did some debridement to the patient's wound which appeared to be slowly healing without signs of infection. He does have history of previous second toe amputation of the right foot. Patient was apparently arrested this evening and began to complain of some right great toe pain and was brought here for further evaluation. The patient tells me that his toe has been hurting worse over the last several days. He is concerned that it has some discharge and that it is infected and that he needs hospitalized. His pain is described as a stabbing pain that is severe and exacerbated with movement and palpation. Nothing makes it better. Denies fevers, chills, nausea, vomiting. REVIEW OF SYSTEMS  Constitutional: No fever, chills or recent illness. Eye: No visual changes  HENT: No earache or sore throat. Resp: No SOB or productive cough. Cardio: No chest pain or palpitations. GI: No abdominal pain, nausea, vomiting, constipation or diarrhea. No melena. : No dysuria, urgency or frequency. Endocrine: No heat intolerance, no cold intolerance, no polydipsia   Lymphatics: No adenopathy  Musculoskeletal: Complains of right great toe pain  Neuro: No headaches. Psych: No homicidal or suicidal thoughts  Skin: No rash, No itching. ?  ?   PAST MEDICAL HISTORY  Past Medical History:   Diagnosis Date    Asthma Blind left eye     Diabetes mellitus ([facility] [address]     GERD (gastroesophageal reflux disease)     Hypertension     Retinal detachment 2012    left     FAMILY HISTORY  Family History   Problem Relation Age of Onset    Diabetes Mother     Asthma Mother     High Blood Pressure Mother     Stroke Mother     Heart Disease Mother     Diabetes Father     Asthma Father     High Blood Pressure Father      SOCIAL HISTORY  Social History     Socioeconomic History    Marital status: Single   Tobacco Use    Smoking status: Every Day     Packs/day: 0.50     Years: 5.00     Pack years: 2.50     Types: Cigarettes    Smokeless tobacco: Never   Vaping Use    Vaping Use: Never used   Substance and Sexual Activity    Alcohol use: Yes     Comment: occasionally    Drug use: Not Currently     Types: Marijuana Johana Montalvo)       SURGICAL HISTORY  Past Surgical History:   Procedure Laterality Date    CORNEAL TRANSPLANT Bilateral     EYE SURGERY      left eye removed    EYE SURGERY Right     FRACTURE SURGERY      foot left    LAPAROSCOPIC APPENDECTOMY N/A 4/28/2022    APPENDECTOMY LAPAROSCOPIC performed by Raffaele Nieto MD at [address] [place] [address] Right     MANDIBLE SURGERY Bilateral     TOE AMPUTATION Right 07/25/2017    TOE AMPUTATION Right 7/25/2022    TOE AMPUTATION, RAY AMPUTATION OF RIGHT SECOND TOE performed by Sivan Card MD at [facility]  Previous Medications    ACETAMINOPHEN (AMINOFEN) 325 MG TABLET    Take 2 tablets by mouth every 6 hours as needed for Pain    ALBUTEROL SULFATE  (90 BASE) MCG/ACT INHALER    Inhale 2 puffs into the lungs every 6 hours as needed for Wheezing    ALBUTEROL-IPRATROPIUM (COMBIVENT RESPIMAT)  MCG/ACT AERS INHALER    Inhale 1 puff into the lungs every 6 hours    ALLOPURINOL (ZYLOPRIM) 300 MG TABLET    Take 300 mg by mouth daily     AMLODIPINE (NORVASC) 10 MG TABLET    Take 10 mg by mouth daily    ATORVASTATIN (LIPITOR) 10 MG TABLET    Take 10 mg by mouth daily BRIMONIDINE (ALPHAGAN) 0.2 % OPHTHALMIC SOLUTION    Place 1 drop into the right eye 2 times daily    CLONIDINE (CATAPRES) 0.2 MG TABLET    Take 0.2 mg by mouth in the morning. DORZOLAMIDE-TIMOLOL 22.3-6.8 MG/ML SOLN    Place 1 drop into the right eye 2 times daily    EZETIMIBE (ZETIA) 10 MG TABLET    take 1 tablet by mouth once daily    GLIPIZIDE (GLUCOTROL) 5 MG TABLET    Take 3 tablets by mouth in the morning and 3 tablets in the evening. Take before meals.     INSULIN NPH (HUMULIN N KWIKPEN) 100 UNIT/ML INJECTION PEN    Inject 20 Units into the skin every morning    LANTUS SOLOSTAR 100 UNIT/ML INJECTION PEN    Inject 50 Units into the skin nightly    LISINOPRIL-HYDROCHLOROTHIAZIDE (PRINZIDE;ZESTORETIC) 20-12.5 MG PER TABLET    Take 1 tablet by mouth daily     MELATONIN 5 MG TABS TABLET    take 1 tablet by mouth every evening    METFORMIN (GLUCOPHAGE) 1000 MG TABLET    Take 1,000 mg by mouth 2 times daily (with meals)    MOXIFLOXACIN (VIGAMOX) 0.5 % OPHTHALMIC SOLUTION    Place 1 drop into the right eye 4 times daily    NICOTINE (NICODERM CQ) 14 MG/24HR    Place 1 patch onto the skin daily    POLYETHYLENE GLYCOL (GLYCOLAX) 17 G PACKET    Take 17 g by mouth daily as needed for Constipation    PREDNISOLONE ACETATE (PRED FORTE) 1 % OPHTHALMIC SUSPENSION    Place 1 drop into the right eye 2 times daily    PREGABALIN (LYRICA) 100 MG CAPSULE    take 1 capsule by mouth twice a day    SUCRALFATE (CARAFATE) 1 GM TABLET    Take 1 tablet by mouth 4 times daily    SYMBICORT 160-4.5 MCG/ACT AERO    Inhale 2 puffs into the lungs in the morning and at bedtime    TECHLITE PEN NEEDLES 31G X 5 MM MISC    use 1 PEN NEEDLE to inject MEDICATION subcutaneously two to three times a day    TIZANIDINE (ZANAFLEX) 4 MG TABLET    Take 4 mg by mouth nightly as needed    TRIMETHOPRIM-POLYMYXIN B (POLYTRIM) 27418-7.1 UNIT/ML-% OPHTHALMIC SOLUTION    Apply 1 drop to eye 4 times daily    TRULICITY 1.5 QY/6.0JY SOPN    inject 0.5 milliliters ( 1 AND 1/2 milligrams ) subcutaneously ev. ..  (REFER TO PRESCRIPTION NOTES). UNABLE TO FIND    Place 1 drop into the right eye 4 times daily 07/21/22 Patient on Vancomycin Preserved Eye soln 25 mg/ml     ALLERGIES  Allergies   Allergen Reactions    Ciprofloxacin Shortness Of Breath    Glucosamine Anaphylaxis    Shellfish-Derived Products Anaphylaxis       Nursing notes reviewed by myself for past medical history, family history, social history, surgical history, current medications, and allergies. PHYSICAL EXAM  VITAL SIGNS: Triage VS:    ED Triage Vitals   Enc Vitals Group      BP       Pulse       Resp       Temp       Temp src       SpO2       Weight       Height       Head Circumference       Peak Flow       Pain Score       Pain Loc       Pain Edu? Excl. in [address]? Constitutional: Well developed, Well nourished, nontoxic appearing  HENT: Normocephalic, Atraumatic, Bilateral external ears normal,  Nose normal.   Neck: Normal range of motion, No tenderness, Supple. Lymphatic: No lymphadenopathy noted. Cardiovascular: Normal heart rate, Normal rhythm, No murmurs, gallops or rubs. Thorax & Lungs: Normal breath sounds, No respiratory distress, No wheezing. Skin: Warm, Dry  Extremities:  No cyanosis, Normal perfusion, No clubbing. Musculoskeletal: Ulcerated lesion to right great toe without surrounding erythema or drainage. There is dried blood surrounding the wound which was cleaned and shows a clean wound margins. He has full range of motion to flexion extension of the right great toe. Brisk capillary refill present to right great toe. Previous amputation to second toe right foot noted. Neurologic: Alert & oriented x 3, No focal deficits noted. Psychiatric: Agitated, intermittently cooperative    RADIOLOGY  Labs Reviewed - No data to display  I personally reviewed the images.  The radiologist's interpretation reveals:  Last Imaging results   No orders to display       MEDS GIVEN IN ED:  Medications   albuterol sulfate HFA (PROVENTIL;VENTOLIN;PROAIR) 108 (90 Base) MCG/ACT inhaler 2 puff (has no administration in time range)   ibuprofen (ADVIL;MOTRIN) tablet 800 mg (800 mg Oral Given 10/15/22 0135)     COURSE & MEDICAL DECISION MAKING  This is a 48-year-old male with history of diabetes and recent ulcerated wound to right great toe. He was arrested this evening complaining of worsening toe pain and brought here for further evaluation. His initial vital signs here are without evidence of fever. He is nontoxic-appearing on exam.  On my exam he does have ulceration to the right great toe without surrounding erythema or drainage currently although he does have dried blood surrounding the area. The area was thoroughly cleansed and the wound shows clean margins without active drainage. No signs of current infection to right great toe. The right lower extremity is neurovascularly intact with 2+ dorsalis pedis and posterior tibial pulses with brisk cap refill in right great toe. The wound was bandaged. At this time I discussed with the patient the importance of keeping the wound clean as it seems the wound was covered by an old bandage today and a sock. He was provided with postop shoe previously. At this time he will be discharged with new wound bandage in place as well as postop shoe. Instructed to follow-up with wound clinic as well as his surgeon, Dr. Maudine Holter. Amount and/or Complexity of Data Reviewed  Clinical lab tests: reviewed  Decide to obtain previous medical records or to obtain history from someone other than the patient: yes       -  Patient seen and evaluated in the emergency department. -  Triage and nursing notes reviewed and incorporated. -  Old chart records reviewed and incorporated. -  Work-up included:  See above  -  Results discussed with patient. Appropriate PPE utilized as indicated for entire patient encounter? Time of Disposition: See timeline  ?   New Prescriptions    No medications on file     FINAL IMPRESSION  1. Visit for wound check    2. Ulcer of right foot, unspecified ulcer stage [facility])      Electronically signed by:  Ibis Jasso DO, 10/15/2022         Ibis Jasso DO  10/15/22 5673

## 2022-11-19 ENCOUNTER — APPOINTMENT (OUTPATIENT)
Dept: ULTRASOUND IMAGING | Age: 37
DRG: 720 | End: 2022-11-19
Payer: COMMERCIAL

## 2022-11-19 ENCOUNTER — APPOINTMENT (OUTPATIENT)
Dept: GENERAL RADIOLOGY | Age: 37
DRG: 720 | End: 2022-11-19
Payer: COMMERCIAL

## 2022-11-19 ENCOUNTER — HOSPITAL ENCOUNTER (INPATIENT)
Age: 37
LOS: 4 days | Discharge: HOME HEALTH | DRG: 720 | End: 2022-11-23
Attending: EMERGENCY MEDICINE | Admitting: STUDENT IN AN ORGANIZED HEALTH CARE EDUCATION/TRAINING PROGRAM
Payer: COMMERCIAL

## 2022-11-19 DIAGNOSIS — S91.109A OPEN WOUND OF TOE, INITIAL ENCOUNTER: ICD-10-CM

## 2022-11-19 DIAGNOSIS — L03.115 CELLULITIS OF RIGHT LOWER EXTREMITY: ICD-10-CM

## 2022-11-19 DIAGNOSIS — L08.9 DIABETIC FOOT INFECTION (HCC): Primary | ICD-10-CM

## 2022-11-19 DIAGNOSIS — E11.628 DIABETIC FOOT INFECTION (HCC): Primary | ICD-10-CM

## 2022-11-19 LAB
ALBUMIN SERPL-MCNC: 4.1 GM/DL (ref 3.4–5)
ALP BLD-CCNC: 112 IU/L (ref 40–129)
ALT SERPL-CCNC: 45 U/L (ref 10–40)
ANION GAP SERPL CALCULATED.3IONS-SCNC: 10 MMOL/L (ref 4–16)
AST SERPL-CCNC: 40 IU/L (ref 15–37)
BASOPHILS ABSOLUTE: 0 K/CU MM
BASOPHILS RELATIVE PERCENT: 0.6 % (ref 0–1)
BILIRUB SERPL-MCNC: 0.5 MG/DL (ref 0–1)
BUN BLDV-MCNC: 10 MG/DL (ref 6–23)
CALCIUM SERPL-MCNC: 8.9 MG/DL (ref 8.3–10.6)
CHLORIDE BLD-SCNC: 102 MMOL/L (ref 99–110)
CO2: 25 MMOL/L (ref 21–32)
CREAT SERPL-MCNC: 0.7 MG/DL (ref 0.9–1.3)
DIFFERENTIAL TYPE: ABNORMAL
EOSINOPHILS ABSOLUTE: 0.1 K/CU MM
EOSINOPHILS RELATIVE PERCENT: 1.2 % (ref 0–3)
GFR SERPL CREATININE-BSD FRML MDRD: >60 ML/MIN/1.73M2
GLUCOSE BLD-MCNC: 204 MG/DL (ref 70–99)
HCT VFR BLD CALC: 35.4 % (ref 42–52)
HEMOGLOBIN: 12.2 GM/DL (ref 13.5–18)
IMMATURE NEUTROPHIL %: 0.4 % (ref 0–0.43)
LACTIC ACID, SEPSIS: 2.1 MMOL/L (ref 0.5–1.9)
LYMPHOCYTES ABSOLUTE: 1.5 K/CU MM
LYMPHOCYTES RELATIVE PERCENT: 29.7 % (ref 24–44)
MAGNESIUM: 1.8 MG/DL (ref 1.8–2.4)
MCH RBC QN AUTO: 31.4 PG (ref 27–31)
MCHC RBC AUTO-ENTMCNC: 34.5 % (ref 32–36)
MCV RBC AUTO: 91 FL (ref 78–100)
MONOCYTES ABSOLUTE: 0.4 K/CU MM
MONOCYTES RELATIVE PERCENT: 8.6 % (ref 0–4)
NUCLEATED RBC %: 0 %
PDW BLD-RTO: 13.3 % (ref 11.7–14.9)
PLATELET # BLD: 214 K/CU MM (ref 140–440)
PMV BLD AUTO: 8.7 FL (ref 7.5–11.1)
POTASSIUM SERPL-SCNC: 3.5 MMOL/L (ref 3.5–5.1)
RBC # BLD: 3.89 M/CU MM (ref 4.6–6.2)
SEGMENTED NEUTROPHILS ABSOLUTE COUNT: 2.9 K/CU MM
SEGMENTED NEUTROPHILS RELATIVE PERCENT: 59.5 % (ref 36–66)
SODIUM BLD-SCNC: 137 MMOL/L (ref 135–145)
TOTAL IMMATURE NEUTOROPHIL: 0.02 K/CU MM
TOTAL NUCLEATED RBC: 0 K/CU MM
TOTAL PROTEIN: 7.1 GM/DL (ref 6.4–8.2)
WBC # BLD: 4.9 K/CU MM (ref 4–10.5)

## 2022-11-19 PROCEDURE — 6360000002 HC RX W HCPCS: Performed by: EMERGENCY MEDICINE

## 2022-11-19 PROCEDURE — 2580000003 HC RX 258: Performed by: EMERGENCY MEDICINE

## 2022-11-19 PROCEDURE — 96365 THER/PROPH/DIAG IV INF INIT: CPT

## 2022-11-19 PROCEDURE — 93971 EXTREMITY STUDY: CPT

## 2022-11-19 PROCEDURE — 73630 X-RAY EXAM OF FOOT: CPT

## 2022-11-19 PROCEDURE — 87040 BLOOD CULTURE FOR BACTERIA: CPT

## 2022-11-19 PROCEDURE — 85025 COMPLETE CBC W/AUTO DIFF WBC: CPT

## 2022-11-19 PROCEDURE — 99285 EMERGENCY DEPT VISIT HI MDM: CPT

## 2022-11-19 PROCEDURE — 96368 THER/DIAG CONCURRENT INF: CPT

## 2022-11-19 PROCEDURE — 83735 ASSAY OF MAGNESIUM: CPT

## 2022-11-19 PROCEDURE — 83605 ASSAY OF LACTIC ACID: CPT

## 2022-11-19 PROCEDURE — 80053 COMPREHEN METABOLIC PANEL: CPT

## 2022-11-19 PROCEDURE — 6370000000 HC RX 637 (ALT 250 FOR IP): Performed by: EMERGENCY MEDICINE

## 2022-11-19 PROCEDURE — 1200000000 HC SEMI PRIVATE

## 2022-11-19 RX ORDER — SODIUM CHLORIDE 9 MG/ML
INJECTION, SOLUTION INTRAVENOUS CONTINUOUS
Status: DISCONTINUED | OUTPATIENT
Start: 2022-11-19 | End: 2022-11-21

## 2022-11-19 RX ORDER — OXYCODONE HYDROCHLORIDE AND ACETAMINOPHEN 5; 325 MG/1; MG/1
2 TABLET ORAL ONCE
Status: COMPLETED | OUTPATIENT
Start: 2022-11-19 | End: 2022-11-19

## 2022-11-19 RX ORDER — 0.9 % SODIUM CHLORIDE 0.9 %
1000 INTRAVENOUS SOLUTION INTRAVENOUS ONCE
Status: COMPLETED | OUTPATIENT
Start: 2022-11-19 | End: 2022-11-20

## 2022-11-19 RX ADMIN — SODIUM CHLORIDE 1000 ML: 9 INJECTION, SOLUTION INTRAVENOUS at 22:32

## 2022-11-19 RX ADMIN — VANCOMYCIN HYDROCHLORIDE 2500 MG: 1.25 INJECTION, POWDER, LYOPHILIZED, FOR SOLUTION INTRAVENOUS at 22:35

## 2022-11-19 RX ADMIN — PIPERACILLIN AND TAZOBACTAM 3375 MG: 3; .375 INJECTION, POWDER, LYOPHILIZED, FOR SOLUTION INTRAVENOUS at 22:31

## 2022-11-19 RX ADMIN — OXYCODONE HYDROCHLORIDE AND ACETAMINOPHEN 2 TABLET: 5; 325 TABLET ORAL at 21:53

## 2022-11-20 LAB
ALBUMIN SERPL-MCNC: 3.9 GM/DL (ref 3.4–5)
ALP BLD-CCNC: 115 IU/L (ref 40–128)
ALT SERPL-CCNC: 39 U/L (ref 10–40)
ANION GAP SERPL CALCULATED.3IONS-SCNC: 9 MMOL/L (ref 4–16)
AST SERPL-CCNC: 30 IU/L (ref 15–37)
BASOPHILS ABSOLUTE: 0 K/CU MM
BASOPHILS RELATIVE PERCENT: 0.5 % (ref 0–1)
BILIRUB SERPL-MCNC: 0.5 MG/DL (ref 0–1)
BUN BLDV-MCNC: 8 MG/DL (ref 6–23)
CALCIUM SERPL-MCNC: 8.6 MG/DL (ref 8.3–10.6)
CHLORIDE BLD-SCNC: 103 MMOL/L (ref 99–110)
CO2: 24 MMOL/L (ref 21–32)
CREAT SERPL-MCNC: 0.7 MG/DL (ref 0.9–1.3)
DIFFERENTIAL TYPE: ABNORMAL
EOSINOPHILS ABSOLUTE: 0.1 K/CU MM
EOSINOPHILS RELATIVE PERCENT: 1.2 % (ref 0–3)
GFR SERPL CREATININE-BSD FRML MDRD: >60 ML/MIN/1.73M2
GLUCOSE BLD-MCNC: 185 MG/DL (ref 70–99)
GLUCOSE BLD-MCNC: 190 MG/DL (ref 70–99)
GLUCOSE BLD-MCNC: 212 MG/DL (ref 70–99)
GLUCOSE BLD-MCNC: 239 MG/DL (ref 70–99)
GLUCOSE BLD-MCNC: 258 MG/DL (ref 70–99)
HCT VFR BLD CALC: 34 % (ref 42–52)
HEMOGLOBIN: 11.7 GM/DL (ref 13.5–18)
IMMATURE NEUTROPHIL %: 0.2 % (ref 0–0.43)
LACTATE: 1.5 MMOL/L (ref 0.4–2)
LACTIC ACID, SEPSIS: 2.4 MMOL/L (ref 0.5–1.9)
LYMPHOCYTES ABSOLUTE: 1.5 K/CU MM
LYMPHOCYTES RELATIVE PERCENT: 34.6 % (ref 24–44)
MAGNESIUM: 1.7 MG/DL (ref 1.8–2.4)
MCH RBC QN AUTO: 31.4 PG (ref 27–31)
MCHC RBC AUTO-ENTMCNC: 34.4 % (ref 32–36)
MCV RBC AUTO: 91.2 FL (ref 78–100)
MONOCYTES ABSOLUTE: 0.4 K/CU MM
MONOCYTES RELATIVE PERCENT: 9.1 % (ref 0–4)
NUCLEATED RBC %: 0 %
PDW BLD-RTO: 13.3 % (ref 11.7–14.9)
PLATELET # BLD: 192 K/CU MM (ref 140–440)
PMV BLD AUTO: 8.6 FL (ref 7.5–11.1)
POTASSIUM SERPL-SCNC: 3.5 MMOL/L (ref 3.5–5.1)
RBC # BLD: 3.73 M/CU MM (ref 4.6–6.2)
SEGMENTED NEUTROPHILS ABSOLUTE COUNT: 2.3 K/CU MM
SEGMENTED NEUTROPHILS RELATIVE PERCENT: 54.4 % (ref 36–66)
SODIUM BLD-SCNC: 136 MMOL/L (ref 135–145)
TOTAL IMMATURE NEUTOROPHIL: 0.01 K/CU MM
TOTAL NUCLEATED RBC: 0 K/CU MM
TOTAL PROTEIN: 6.2 GM/DL (ref 6.4–8.2)
WBC # BLD: 4.2 K/CU MM (ref 4–10.5)

## 2022-11-20 PROCEDURE — 6370000000 HC RX 637 (ALT 250 FOR IP): Performed by: STUDENT IN AN ORGANIZED HEALTH CARE EDUCATION/TRAINING PROGRAM

## 2022-11-20 PROCEDURE — 82962 GLUCOSE BLOOD TEST: CPT

## 2022-11-20 PROCEDURE — 6370000000 HC RX 637 (ALT 250 FOR IP): Performed by: SURGERY

## 2022-11-20 PROCEDURE — 2580000003 HC RX 258: Performed by: STUDENT IN AN ORGANIZED HEALTH CARE EDUCATION/TRAINING PROGRAM

## 2022-11-20 PROCEDURE — 94761 N-INVAS EAR/PLS OXIMETRY MLT: CPT

## 2022-11-20 PROCEDURE — 85025 COMPLETE CBC W/AUTO DIFF WBC: CPT

## 2022-11-20 PROCEDURE — 1200000000 HC SEMI PRIVATE

## 2022-11-20 PROCEDURE — 94640 AIRWAY INHALATION TREATMENT: CPT

## 2022-11-20 PROCEDURE — 83735 ASSAY OF MAGNESIUM: CPT

## 2022-11-20 PROCEDURE — 80053 COMPREHEN METABOLIC PANEL: CPT

## 2022-11-20 PROCEDURE — 83605 ASSAY OF LACTIC ACID: CPT

## 2022-11-20 PROCEDURE — 2580000003 HC RX 258: Performed by: EMERGENCY MEDICINE

## 2022-11-20 PROCEDURE — 36415 COLL VENOUS BLD VENIPUNCTURE: CPT

## 2022-11-20 PROCEDURE — 6360000002 HC RX W HCPCS: Performed by: STUDENT IN AN ORGANIZED HEALTH CARE EDUCATION/TRAINING PROGRAM

## 2022-11-20 PROCEDURE — XW033N5 INTRODUCTION OF MEROPENEM-VABORBACTAM ANTI-INFECTIVE INTO PERIPHERAL VEIN, PERCUTANEOUS APPROACH, NEW TECHNOLOGY GROUP 5: ICD-10-PCS | Performed by: STUDENT IN AN ORGANIZED HEALTH CARE EDUCATION/TRAINING PROGRAM

## 2022-11-20 RX ORDER — POLYETHYLENE GLYCOL 3350 17 G/17G
17 POWDER, FOR SOLUTION ORAL DAILY PRN
Status: DISCONTINUED | OUTPATIENT
Start: 2022-11-20 | End: 2022-11-23 | Stop reason: HOSPADM

## 2022-11-20 RX ORDER — LISINOPRIL AND HYDROCHLOROTHIAZIDE 20; 12.5 MG/1; MG/1
1 TABLET ORAL DAILY
Status: DISCONTINUED | OUTPATIENT
Start: 2022-11-20 | End: 2022-11-23 | Stop reason: HOSPADM

## 2022-11-20 RX ORDER — CLONIDINE HYDROCHLORIDE 0.2 MG/1
0.2 TABLET ORAL DAILY
Status: DISCONTINUED | OUTPATIENT
Start: 2022-11-20 | End: 2022-11-23 | Stop reason: HOSPADM

## 2022-11-20 RX ORDER — DEXTROSE MONOHYDRATE 100 MG/ML
INJECTION, SOLUTION INTRAVENOUS CONTINUOUS PRN
Status: DISCONTINUED | OUTPATIENT
Start: 2022-11-20 | End: 2022-11-23 | Stop reason: HOSPADM

## 2022-11-20 RX ORDER — ALBUTEROL SULFATE 90 UG/1
2 AEROSOL, METERED RESPIRATORY (INHALATION) EVERY 4 HOURS PRN
Status: DISCONTINUED | OUTPATIENT
Start: 2022-11-20 | End: 2022-11-23 | Stop reason: HOSPADM

## 2022-11-20 RX ORDER — OXYCODONE HYDROCHLORIDE AND ACETAMINOPHEN 5; 325 MG/1; MG/1
1 TABLET ORAL EVERY 4 HOURS PRN
Status: DISCONTINUED | OUTPATIENT
Start: 2022-11-20 | End: 2022-11-23 | Stop reason: HOSPADM

## 2022-11-20 RX ORDER — TIZANIDINE 4 MG/1
4 TABLET ORAL NIGHTLY PRN
Status: DISCONTINUED | OUTPATIENT
Start: 2022-11-20 | End: 2022-11-23 | Stop reason: HOSPADM

## 2022-11-20 RX ORDER — NICOTINE 21 MG/24HR
1 PATCH, TRANSDERMAL 24 HOURS TRANSDERMAL DAILY
Status: DISCONTINUED | OUTPATIENT
Start: 2022-11-20 | End: 2022-11-23 | Stop reason: HOSPADM

## 2022-11-20 RX ORDER — ALBUTEROL SULFATE 90 UG/1
2 AEROSOL, METERED RESPIRATORY (INHALATION) 4 TIMES DAILY
Status: DISCONTINUED | OUTPATIENT
Start: 2022-11-20 | End: 2022-11-21

## 2022-11-20 RX ORDER — ENOXAPARIN SODIUM 100 MG/ML
30 INJECTION SUBCUTANEOUS 2 TIMES DAILY
Status: DISCONTINUED | OUTPATIENT
Start: 2022-11-20 | End: 2022-11-23 | Stop reason: HOSPADM

## 2022-11-20 RX ORDER — PREDNISOLONE ACETATE 10 MG/ML
1 SUSPENSION/ DROPS OPHTHALMIC 2 TIMES DAILY
Status: DISCONTINUED | OUTPATIENT
Start: 2022-11-20 | End: 2022-11-23 | Stop reason: HOSPADM

## 2022-11-20 RX ORDER — CHOLECALCIFEROL (VITAMIN D3) 125 MCG
5 CAPSULE ORAL NIGHTLY
Status: DISCONTINUED | OUTPATIENT
Start: 2022-11-20 | End: 2022-11-23 | Stop reason: HOSPADM

## 2022-11-20 RX ORDER — PREGABALIN 100 MG/1
100 CAPSULE ORAL 2 TIMES DAILY
Status: DISCONTINUED | OUTPATIENT
Start: 2022-11-20 | End: 2022-11-23 | Stop reason: HOSPADM

## 2022-11-20 RX ORDER — AMLODIPINE BESYLATE 10 MG/1
10 TABLET ORAL DAILY
Status: DISCONTINUED | OUTPATIENT
Start: 2022-11-20 | End: 2022-11-23 | Stop reason: HOSPADM

## 2022-11-20 RX ORDER — INSULIN LISPRO 100 [IU]/ML
0-4 INJECTION, SOLUTION INTRAVENOUS; SUBCUTANEOUS NIGHTLY
Status: DISCONTINUED | OUTPATIENT
Start: 2022-11-20 | End: 2022-11-23

## 2022-11-20 RX ORDER — ACETAMINOPHEN 325 MG/1
650 TABLET ORAL EVERY 6 HOURS PRN
Status: DISCONTINUED | OUTPATIENT
Start: 2022-11-20 | End: 2022-11-23 | Stop reason: HOSPADM

## 2022-11-20 RX ORDER — ACETAMINOPHEN 650 MG/1
650 SUPPOSITORY RECTAL EVERY 6 HOURS PRN
Status: DISCONTINUED | OUTPATIENT
Start: 2022-11-20 | End: 2022-11-23 | Stop reason: HOSPADM

## 2022-11-20 RX ORDER — INSULIN LISPRO 100 [IU]/ML
0-4 INJECTION, SOLUTION INTRAVENOUS; SUBCUTANEOUS
Status: DISCONTINUED | OUTPATIENT
Start: 2022-11-20 | End: 2022-11-23

## 2022-11-20 RX ORDER — BRIMONIDINE TARTRATE 2 MG/ML
1 SOLUTION/ DROPS OPHTHALMIC 2 TIMES DAILY
Status: DISCONTINUED | OUTPATIENT
Start: 2022-11-20 | End: 2022-11-23 | Stop reason: HOSPADM

## 2022-11-20 RX ORDER — DIPHENHYDRAMINE HYDROCHLORIDE 50 MG/ML
25 INJECTION INTRAMUSCULAR; INTRAVENOUS ONCE
Status: COMPLETED | OUTPATIENT
Start: 2022-11-20 | End: 2022-11-20

## 2022-11-20 RX ORDER — BACITRACIN, NEOMYCIN, POLYMYXIN B 400; 3.5; 5 [USP'U]/G; MG/G; [USP'U]/G
OINTMENT TOPICAL 2 TIMES DAILY
Status: DISCONTINUED | OUTPATIENT
Start: 2022-11-20 | End: 2022-11-23 | Stop reason: HOSPADM

## 2022-11-20 RX ORDER — ONDANSETRON 2 MG/ML
4 INJECTION INTRAMUSCULAR; INTRAVENOUS EVERY 6 HOURS PRN
Status: DISCONTINUED | OUTPATIENT
Start: 2022-11-20 | End: 2022-11-23 | Stop reason: HOSPADM

## 2022-11-20 RX ORDER — SODIUM CHLORIDE 9 MG/ML
INJECTION, SOLUTION INTRAVENOUS PRN
Status: DISCONTINUED | OUTPATIENT
Start: 2022-11-20 | End: 2022-11-23 | Stop reason: HOSPADM

## 2022-11-20 RX ORDER — DORZOLAMIDE HYDROCHLORIDE AND TIMOLOL MALEATE 20; 5 MG/ML; MG/ML
1 SOLUTION/ DROPS OPHTHALMIC 2 TIMES DAILY
Status: DISCONTINUED | OUTPATIENT
Start: 2022-11-20 | End: 2022-11-23 | Stop reason: HOSPADM

## 2022-11-20 RX ORDER — ONDANSETRON 4 MG/1
4 TABLET, ORALLY DISINTEGRATING ORAL EVERY 8 HOURS PRN
Status: DISCONTINUED | OUTPATIENT
Start: 2022-11-20 | End: 2022-11-23 | Stop reason: HOSPADM

## 2022-11-20 RX ORDER — ATORVASTATIN CALCIUM 10 MG/1
10 TABLET, FILM COATED ORAL DAILY
Status: DISCONTINUED | OUTPATIENT
Start: 2022-11-20 | End: 2022-11-23 | Stop reason: HOSPADM

## 2022-11-20 RX ORDER — IPRATROPIUM BROMIDE AND ALBUTEROL SULFATE 2.5; .5 MG/3ML; MG/3ML
1 SOLUTION RESPIRATORY (INHALATION)
Status: DISCONTINUED | OUTPATIENT
Start: 2022-11-20 | End: 2022-11-20

## 2022-11-20 RX ORDER — SODIUM CHLORIDE 0.9 % (FLUSH) 0.9 %
5-40 SYRINGE (ML) INJECTION PRN
Status: DISCONTINUED | OUTPATIENT
Start: 2022-11-20 | End: 2022-11-23 | Stop reason: HOSPADM

## 2022-11-20 RX ORDER — SUCRALFATE 1 G/1
1 TABLET ORAL
Status: DISCONTINUED | OUTPATIENT
Start: 2022-11-20 | End: 2022-11-23 | Stop reason: HOSPADM

## 2022-11-20 RX ORDER — INSULIN GLARGINE 100 [IU]/ML
40 INJECTION, SOLUTION SUBCUTANEOUS NIGHTLY
Status: DISCONTINUED | OUTPATIENT
Start: 2022-11-20 | End: 2022-11-21

## 2022-11-20 RX ORDER — POLYMYXIN B SULFATE AND TRIMETHOPRIM 1; 10000 MG/ML; [USP'U]/ML
1 SOLUTION OPHTHALMIC 4 TIMES DAILY
Status: DISCONTINUED | OUTPATIENT
Start: 2022-11-20 | End: 2022-11-23 | Stop reason: HOSPADM

## 2022-11-20 RX ORDER — ALLOPURINOL 300 MG/1
300 TABLET ORAL DAILY
Status: DISCONTINUED | OUTPATIENT
Start: 2022-11-20 | End: 2022-11-23 | Stop reason: HOSPADM

## 2022-11-20 RX ORDER — OXYCODONE AND ACETAMINOPHEN 7.5; 325 MG/1; MG/1
1 TABLET ORAL EVERY 4 HOURS PRN
Status: DISCONTINUED | OUTPATIENT
Start: 2022-11-20 | End: 2022-11-23 | Stop reason: HOSPADM

## 2022-11-20 RX ORDER — EZETIMIBE 10 MG/1
10 TABLET ORAL NIGHTLY
Status: DISCONTINUED | OUTPATIENT
Start: 2022-11-20 | End: 2022-11-23 | Stop reason: HOSPADM

## 2022-11-20 RX ORDER — IPRATROPIUM BROMIDE AND ALBUTEROL SULFATE 2.5; .5 MG/3ML; MG/3ML
1 SOLUTION RESPIRATORY (INHALATION) EVERY 4 HOURS PRN
Status: DISCONTINUED | OUTPATIENT
Start: 2022-11-20 | End: 2022-11-23 | Stop reason: HOSPADM

## 2022-11-20 RX ORDER — BUDESONIDE AND FORMOTEROL FUMARATE DIHYDRATE 160; 4.5 UG/1; UG/1
2 AEROSOL RESPIRATORY (INHALATION) 2 TIMES DAILY
Status: DISCONTINUED | OUTPATIENT
Start: 2022-11-20 | End: 2022-11-23 | Stop reason: HOSPADM

## 2022-11-20 RX ADMIN — AMLODIPINE BESYLATE 10 MG: 10 TABLET ORAL at 09:16

## 2022-11-20 RX ADMIN — INSULIN LISPRO 2 UNITS: 100 INJECTION, SOLUTION INTRAVENOUS; SUBCUTANEOUS at 17:23

## 2022-11-20 RX ADMIN — BUDESONIDE AND FORMOTEROL FUMARATE DIHYDRATE 2 PUFF: 160; 4.5 AEROSOL RESPIRATORY (INHALATION) at 08:51

## 2022-11-20 RX ADMIN — PREGABALIN 100 MG: 100 CAPSULE ORAL at 21:32

## 2022-11-20 RX ADMIN — OXYCODONE AND ACETAMINOPHEN 1 TABLET: 7.5; 325 TABLET ORAL at 15:20

## 2022-11-20 RX ADMIN — POLYMYXIN B SULFATE AND TRIMETHOPRIM 1 DROP: 10000; 1 SOLUTION OPHTHALMIC at 17:25

## 2022-11-20 RX ADMIN — POLYMYXIN B SULFATE AND TRIMETHOPRIM 1 DROP: 10000; 1 SOLUTION OPHTHALMIC at 09:15

## 2022-11-20 RX ADMIN — INSULIN LISPRO 2 UNITS: 100 INJECTION, SOLUTION INTRAVENOUS; SUBCUTANEOUS at 12:32

## 2022-11-20 RX ADMIN — SUCRALFATE 1 G: 1 TABLET ORAL at 06:17

## 2022-11-20 RX ADMIN — SUCRALFATE 1 G: 1 TABLET ORAL at 10:49

## 2022-11-20 RX ADMIN — POLYMYXIN B SULFATE AND TRIMETHOPRIM 1 DROP: 10000; 1 SOLUTION OPHTHALMIC at 14:15

## 2022-11-20 RX ADMIN — Medication 2 PUFF: at 20:04

## 2022-11-20 RX ADMIN — PREDNISOLONE ACETATE 1 DROP: 10 SUSPENSION/ DROPS OPHTHALMIC at 17:25

## 2022-11-20 RX ADMIN — ALBUTEROL SULFATE 2 PUFF: 90 AEROSOL, METERED RESPIRATORY (INHALATION) at 20:02

## 2022-11-20 RX ADMIN — MEROPENEM 1000 MG: 1 INJECTION, POWDER, FOR SOLUTION INTRAVENOUS at 15:28

## 2022-11-20 RX ADMIN — BACITRACIN, NEOMYCIN, POLYMYXIN B: 400; 3.5; 5 OINTMENT TOPICAL at 10:48

## 2022-11-20 RX ADMIN — ATORVASTATIN CALCIUM 10 MG: 10 TABLET, FILM COATED ORAL at 09:17

## 2022-11-20 RX ADMIN — SODIUM CHLORIDE: 9 INJECTION, SOLUTION INTRAVENOUS at 00:22

## 2022-11-20 RX ADMIN — INSULIN GLARGINE 40 UNITS: 100 INJECTION, SOLUTION SUBCUTANEOUS at 21:33

## 2022-11-20 RX ADMIN — PREDNISOLONE ACETATE 1 DROP: 10 SUSPENSION/ DROPS OPHTHALMIC at 09:16

## 2022-11-20 RX ADMIN — LISINOPRIL AND HYDROCHLOROTHIAZIDE 1 TABLET: 12.5; 2 TABLET ORAL at 09:16

## 2022-11-20 RX ADMIN — OXYCODONE AND ACETAMINOPHEN 1 TABLET: 7.5; 325 TABLET ORAL at 06:24

## 2022-11-20 RX ADMIN — OXYCODONE AND ACETAMINOPHEN 1 TABLET: 7.5; 325 TABLET ORAL at 10:48

## 2022-11-20 RX ADMIN — CLONIDINE HYDROCHLORIDE 0.2 MG: 0.2 TABLET ORAL at 09:16

## 2022-11-20 RX ADMIN — Medication 5 MG: at 21:32

## 2022-11-20 RX ADMIN — SODIUM CHLORIDE: 9 INJECTION, SOLUTION INTRAVENOUS at 06:15

## 2022-11-20 RX ADMIN — ALLOPURINOL 300 MG: 300 TABLET ORAL at 09:16

## 2022-11-20 RX ADMIN — EZETIMIBE 10 MG: 10 TABLET ORAL at 21:32

## 2022-11-20 RX ADMIN — BRIMONIDINE TARTRATE 1 DROP: 2 SOLUTION OPHTHALMIC at 09:15

## 2022-11-20 RX ADMIN — DIPHENHYDRAMINE HYDROCHLORIDE 25 MG: 50 INJECTION, SOLUTION INTRAMUSCULAR; INTRAVENOUS at 16:28

## 2022-11-20 RX ADMIN — SUCRALFATE 1 G: 1 TABLET ORAL at 21:32

## 2022-11-20 RX ADMIN — OXYCODONE AND ACETAMINOPHEN 1 TABLET: 5; 325 TABLET ORAL at 21:32

## 2022-11-20 RX ADMIN — BUDESONIDE AND FORMOTEROL FUMARATE DIHYDRATE 2 PUFF: 160; 4.5 AEROSOL RESPIRATORY (INHALATION) at 20:06

## 2022-11-20 RX ADMIN — MEROPENEM 1000 MG: 1 INJECTION, POWDER, FOR SOLUTION INTRAVENOUS at 06:16

## 2022-11-20 RX ADMIN — BRIMONIDINE TARTRATE 1 DROP: 2 SOLUTION OPHTHALMIC at 17:25

## 2022-11-20 RX ADMIN — POLYMYXIN B SULFATE AND TRIMETHOPRIM 1 DROP: 10000; 1 SOLUTION OPHTHALMIC at 21:43

## 2022-11-20 RX ADMIN — SODIUM CHLORIDE: 9 INJECTION, SOLUTION INTRAVENOUS at 06:16

## 2022-11-20 RX ADMIN — DORZOLAMIDE HYDROCHLORIDE AND TIMOLOL MALEATE 1 DROP: 20; 5 SOLUTION/ DROPS OPHTHALMIC at 17:25

## 2022-11-20 RX ADMIN — MEROPENEM 1000 MG: 1 INJECTION, POWDER, FOR SOLUTION INTRAVENOUS at 21:31

## 2022-11-20 RX ADMIN — BACITRACIN, NEOMYCIN, POLYMYXIN B: 400; 3.5; 5 OINTMENT TOPICAL at 21:42

## 2022-11-20 RX ADMIN — SUCRALFATE 1 G: 1 TABLET ORAL at 17:24

## 2022-11-20 RX ADMIN — DORZOLAMIDE HYDROCHLORIDE AND TIMOLOL MALEATE 1 DROP: 20; 5 SOLUTION/ DROPS OPHTHALMIC at 09:16

## 2022-11-20 RX ADMIN — PREGABALIN 100 MG: 100 CAPSULE ORAL at 09:16

## 2022-11-20 ASSESSMENT — PAIN SCALES - GENERAL
PAINLEVEL_OUTOF10: 8
PAINLEVEL_OUTOF10: 5
PAINLEVEL_OUTOF10: 8
PAINLEVEL_OUTOF10: 8
PAINLEVEL_OUTOF10: 9

## 2022-11-20 ASSESSMENT — PAIN DESCRIPTION - LOCATION
LOCATION: LEG;FOOT
LOCATION: FOOT
LOCATION: LEG
LOCATION: FOOT
LOCATION: FOOT

## 2022-11-20 ASSESSMENT — PAIN - FUNCTIONAL ASSESSMENT
PAIN_FUNCTIONAL_ASSESSMENT: PREVENTS OR INTERFERES SOME ACTIVE ACTIVITIES AND ADLS
PAIN_FUNCTIONAL_ASSESSMENT: PREVENTS OR INTERFERES SOME ACTIVE ACTIVITIES AND ADLS
PAIN_FUNCTIONAL_ASSESSMENT: PREVENTS OR INTERFERES WITH MANY ACTIVE NOT PASSIVE ACTIVITIES

## 2022-11-20 ASSESSMENT — PAIN DESCRIPTION - ORIENTATION
ORIENTATION: RIGHT

## 2022-11-20 ASSESSMENT — PAIN DESCRIPTION - DESCRIPTORS
DESCRIPTORS: GNAWING;DISCOMFORT
DESCRIPTORS: DISCOMFORT;STABBING
DESCRIPTORS: DISCOMFORT;STABBING

## 2022-11-20 ASSESSMENT — ENCOUNTER SYMPTOMS
RESPIRATORY NEGATIVE: 1
GASTROINTESTINAL NEGATIVE: 1

## 2022-11-20 NOTE — PROGRESS NOTES
V2.0  [facility] Hospitalist Progress Note      Name:  Sen Carpenter /Age/Sex: 1985  (39 y.o. male)   MRN & CSN:  [id number] & [id number] Encounter Date/Time: 2022 8:15 AM EST    Location:  [address] [place] [address] PCP: Rochelle Loya MD       Hospital Day: 2    Assessment and Plan:       Plan:  Sen Carpenter is a 39 y.o. male with a history of insulin-dependent diabetes, chronic non-healing ulcer of his RLE, h/o OM of right proximal phalanx and head of metatarsal s/p amputation of 2nd toe on 2022, hypertension, and tobacco dependence who presented to [facility] 2022 with progressive worsening of nonhealing ulcer on the right medial aspect of his foot. Admitting for sepsis 2/2 cellulitis     #RLE cellulitis  #Open wound right great toe   --Admit to tele, XR show non-suspicious for OM (will hold on further imaging at this time)  --1/4 sirs, elevated lactate and RLE source S/P 1L bolus, Vanc and Zosyn in the ED  --Based on sensitivities from his last set of wound cultures(2022), started on Merrem  --Will consult surgery, patient follows with Elijah Yuen in OP setting  --Follow up wound Culture, repeat lactate pending  --Recommend ID consult pending cultures  --Pain management per pain scales  --Wound care     #IDDM  --Continue accucheks and low dose sliding scale  --Patient uses 50units of lantus at home, will begin on 40units at this time  --Last A1c 8.5 ()  --Diabetic diet     #HTN  --Restart home regimen w/ holding parameters, monitor BP and adjust as appropriate     #Tobacco use dependence  --NRT ordered      Diet ADULT DIET; Regular; 4 carb choices (60 gm/meal);  Low Fat/Low Chol/High Fiber/2 gm Na   DVT Prophylaxis [] Lovenox, []  Heparin, [] SCDs, [] Ambulation,  [] Eliquis, [] Xarelto  [] Coumadin   Code Status Full Code   Disposition From: home   Expected Disposition: home   Estimated Date of Discharge: 2-3 days   Patient requires continued admission due to sepsis and wound infection    Surrogate Decision Maker/ POA      Subjective:     Chief Complaint: Wound Check (Reports wound to right foot that is causing pain and swelling up to right knee.)       Geneva Valladares is a 39 y.o. male who presents with open wound infection     Patient seen at bedside, recently was admitted oft similar reason in September   Patient is very concerned about diabetic diet and does not want any restrictions  He is on percocet for pain management however states that the pain medication wears off very quickly, I explained that IV pain medication would wear off even more quickly         Review of Systems:    Review of Systems    Review of systems is negative except as mentioned above       Objective:   No intake or output data in the 24 hours ending 11/20/22 0815     Vitals:   Vitals:    11/20/22 0806   BP: (!) 146/111   Pulse: 87   Resp:    Temp: 97.7 °F (36.5 °C)   SpO2: 97%       Physical Exam:   General: NAD,  HENT: NCAT, moist mucous membranes  Cardiovascular: Tachycardic. Respiratory: Clear to auscultation  Gastrointestinal: Soft, non tender  Genitourinary: no suprapubic tenderness  Musculoskeletal: No edema  Skin: Open wound on the left foot from great toe to 2/3rd toe area, no noticeable drainage, erythematous and slightly warm to touch  Neuro: Sleepy, but arouses easily and answers questions, decreased sensation to touch on RLE. Psych: Irritable.     Medications:   Medications:    allopurinol  300 mg Oral Daily    amLODIPine  10 mg Oral Daily    atorvastatin  10 mg Oral Daily    brimonidine  1 drop Right Eye BID    cloNIDine  0.2 mg Oral Daily    dorzolamide-timolol  1 drop Right Eye BID    ezetimibe  10 mg Oral Nightly    lisinopril-hydroCHLOROthiazide  1 tablet Oral Daily    melatonin  5 mg Oral Nightly    prednisoLONE acetate  1 drop Right Eye BID    pregabalin  100 mg Oral BID    sucralfate  1 g Oral 4x Daily AC & HS    budesonide-formoterol  2 puff Inhalation BID    trimethoprim-polymyxin b  1 drop Ophthalmic 4x Daily enoxaparin  30 mg SubCUTAneous BID    insulin glargine  40 Units SubCUTAneous Nightly    insulin lispro  0-4 Units SubCUTAneous TID WC    insulin lispro  0-4 Units SubCUTAneous Nightly    meropenem  1,000 mg IntraVENous Q8H    nicotine  1 patch TransDERmal Daily      Infusions:    sodium chloride 25 mL/hr at 11/20/22 0616    dextrose      sodium chloride 100 mL/hr at 11/20/22 0615     PRN Meds: tiZANidine, 4 mg, Nightly PRN  ondansetron, 4 mg, Q8H PRN   Or  ondansetron, 4 mg, Q6H PRN  polyethylene glycol, 17 g, Daily PRN  acetaminophen, 650 mg, Q6H PRN   Or  acetaminophen, 650 mg, Q6H PRN  sodium chloride flush, 5-40 mL, PRN  sodium chloride, , PRN  oxyCODONE-acetaminophen, 1 tablet, Q4H PRN  oxyCODONE-acetaminophen, 1 tablet, Q4H PRN  glucose, 4 tablet, PRN  dextrose bolus, 125 mL, PRN   Or  dextrose bolus, 250 mL, PRN  glucagon (rDNA), 1 mg, PRN  dextrose, , Continuous PRN        Labs      Recent Results (from the past 24 hour(s))   CBC with Auto Differential    Collection Time: 11/19/22 10:15 PM   Result Value Ref Range    WBC 4.9 4.0 - 10.5 K/CU MM    RBC 3.89 (L) 4.6 - 6.2 M/CU MM    Hemoglobin 12.2 (L) 13.5 - 18.0 GM/DL    Hematocrit 35.4 (L) 42 - 52 %    MCV 91.0 78 - 100 FL    MCH 31.4 (H) 27 - 31 PG    MCHC 34.5 32.0 - 36.0 %    RDW 13.3 11.7 - 14.9 %    Platelets 711 487 - 759 K/CU MM    MPV 8.7 7.5 - 11.1 FL    Differential Type AUTOMATED DIFFERENTIAL     Segs Relative 59.5 36 - 66 %    Lymphocytes % 29.7 24 - 44 %    Monocytes % 8.6 (H) 0 - 4 %    Eosinophils % 1.2 0 - 3 %    Basophils % 0.6 0 - 1 %    Segs Absolute 2.9 K/CU MM    Lymphocytes Absolute 1.5 K/CU MM    Monocytes Absolute 0.4 K/CU MM    Eosinophils Absolute 0.1 K/CU MM    Basophils Absolute 0.0 K/CU MM    Nucleated RBC % 0.0 %    Total Nucleated RBC 0.0 K/CU MM    Total Immature Neutrophil 0.02 K/CU MM    Immature Neutrophil % 0.4 0 - 0.43 %   Comprehensive Metabolic Panel w/ Reflex to MG    Collection Time: 11/19/22 10:15 PM   Result Value Ref Range    Sodium 137 135 - 145 MMOL/L    Potassium 3.5 3.5 - 5.1 MMOL/L    Chloride 102 99 - 110 mMol/L    CO2 25 21 - 32 MMOL/L    BUN 10 6 - 23 MG/DL    Creatinine 0.7 (L) 0.9 - 1.3 MG/DL    Est, Glom Filt Rate >60 >60 mL/min/1.73m2    Glucose 204 (H) 70 - 99 MG/DL    Calcium 8.9 8.3 - 10.6 MG/DL    Albumin 4.1 3.4 - 5.0 GM/DL    Total Protein 7.1 6.4 - 8.2 GM/DL    Total Bilirubin 0.5 0.0 - 1.0 MG/DL    ALT 45 (H) 10 - 40 U/L    AST 40 (H) 15 - 37 IU/L    Alkaline Phosphatase 112 40 - 129 IU/L    Anion Gap 10 4 - 16   Lactate, Sepsis    Collection Time: 11/19/22 10:15 PM   Result Value Ref Range    Lactic Acid, Sepsis 2.1 (HH) 0.5 - 1.9 mMOL/L   Magnesium    Collection Time: 11/19/22 10:15 PM   Result Value Ref Range    Magnesium 1.8 1.8 - 2.4 mg/dl   Lactate, Sepsis    Collection Time: 11/20/22 12:20 AM   Result Value Ref Range    Lactic Acid, Sepsis 2.4 (HH) 0.5 - 1.9 mMOL/L   Lactic Acid    Collection Time: 11/20/22  4:00 AM   Result Value Ref Range    Lactate 1.5 0.4 - 2.0 mMOL/L   Comprehensive Metabolic Panel w/ Reflex to MG    Collection Time: 11/20/22  4:00 AM   Result Value Ref Range    Sodium 136 135 - 145 MMOL/L    Potassium 3.5 3.5 - 5.1 MMOL/L    Chloride 103 99 - 110 mMol/L    CO2 24 21 - 32 MMOL/L    BUN 8 6 - 23 MG/DL    Creatinine 0.7 (L) 0.9 - 1.3 MG/DL    Est, Glom Filt Rate >60 >60 mL/min/1.73m2    Glucose 190 (H) 70 - 99 MG/DL    Calcium 8.6 8.3 - 10.6 MG/DL    Albumin 3.9 3.4 - 5.0 GM/DL    Total Protein 6.2 (L) 6.4 - 8.2 GM/DL    Total Bilirubin 0.5 0.0 - 1.0 MG/DL    ALT 39 10 - 40 U/L    AST 30 15 - 37 IU/L    Alkaline Phosphatase 115 40 - 128 IU/L    Anion Gap 9 4 - 16   CBC with Auto Differential    Collection Time: 11/20/22  4:00 AM   Result Value Ref Range    WBC 4.2 4.0 - 10.5 K/CU MM    RBC 3.73 (L) 4.6 - 6.2 M/CU MM    Hemoglobin 11.7 (L) 13.5 - 18.0 GM/DL    Hematocrit 34.0 (L) 42 - 52 %    MCV 91.2 78 - 100 FL    MCH 31.4 (H) 27 - 31 PG    MCHC 34.4 32.0 - 36.0 %    RDW 13.3 11.7 - 14.9 %    Platelets 309 660 - 273 K/CU MM    MPV 8.6 7.5 - 11.1 FL    Differential Type AUTOMATED DIFFERENTIAL     Segs Relative 54.4 36 - 66 %    Lymphocytes % 34.6 24 - 44 %    Monocytes % 9.1 (H) 0 - 4 %    Eosinophils % 1.2 0 - 3 %    Basophils % 0.5 0 - 1 %    Segs Absolute 2.3 K/CU MM    Lymphocytes Absolute 1.5 K/CU MM    Monocytes Absolute 0.4 K/CU MM    Eosinophils Absolute 0.1 K/CU MM    Basophils Absolute 0.0 K/CU MM    Nucleated RBC % 0.0 %    Total Nucleated RBC 0.0 K/CU MM    Total Immature Neutrophil 0.01 K/CU MM    Immature Neutrophil % 0.2 0 - 0.43 %   Magnesium    Collection Time: 11/20/22  4:00 AM   Result Value Ref Range    Magnesium 1.7 (L) 1.8 - 2.4 mg/dl   POCT Glucose    Collection Time: 11/20/22  8:08 AM   Result Value Ref Range    POC Glucose 185 (H) 70 - 99 MG/DL        Imaging/Diagnostics Last 24 Hours   XR FOOT RIGHT (MIN 3 VIEWS)    Result Date: 11/19/2022  EXAMINATION: THREE XRAY VIEWS OF THE RIGHT FOOT 11/19/2022 7:48 pm COMPARISON: 09/17/2022. HISTORY: ORDERING SYSTEM PROVIDED HISTORY: [facility] TECHNOLOGIST PROVIDED HISTORY: Reason for exam:->attn 1st MTP Reason for Exam: Wound Check Additional signs and symptoms: [facility] FINDINGS: There is hallux valgus metatarsus primus varus. 2nd and 5th toe amputations appear unchanged. Osteophytes of the dorsal midfoot are again noted. There is soft tissue swelling which is most prominent at the forefoot. There is no subcutaneous air. No fracture, dislocation or focal osseous abnormality is identified. Soft tissue swelling with no evidence of osteomyelitis and no subcutaneous air. 1st toe bunion deformity and prior 2nd and 5th toe amputations, as before.      VL DUP LOWER EXTREMITY VENOUS RIGHT    Result Date: 11/19/2022  EXAMINATION: DUPLEX VENOUS ULTRASOUND OF THE RIGHT LOWER EXTREMITY 11/19/2022 6:04 pm TECHNIQUE: Duplex ultrasound using B-mode/gray scaled imaging and Doppler spectral analysis and color flow was obtained of the deep venous structures of the right extremity. COMPARISON: 07/21/2022. HISTORY: ORDERING SYSTEM PROVIDED HISTORY: edema TECHNOLOGIST PROVIDED HISTORY: Reason for exam:->edema FINDINGS: The visualized veins of the right lower extremity are patent and free of echogenic thrombus. The veins demonstrate good compressibility with normal color flow study and spectral analysis. No evidence of DVT in the right lower extremity.        Electronically signed by Rylie Whitman MD on 11/20/2022 at 8:15 AM

## 2022-11-20 NOTE — ED NOTES
ED TO INPATIENT SBAR HANDOFF    Patient Name: Davi Shell   :  1985  39 y.o. MRN:  [id number]  Preferred Name     ED Room #:  ED21/ED-21  Family/Caregiver Present yes   Restraints no   Sitter no   Sepsis Risk Score Sepsis Risk Score: 0.65    Situation  Code Status: Prior No additional code details. Allergies: Ciprofloxacin, Glucosamine, and Shellfish-derived products  Weight: Patient Vitals for the past 96 hrs (Last 3 readings):   Weight   22 1704 300 lb (136.1 kg)     Arrived from: home  Chief Complaint:   Chief Complaint   Patient presents with    Wound Check     Reports wound to right foot that is causing pain and swelling up to right knee. Hospital Problem/Diagnosis:  Active Problems:    Cellulitis  Resolved Problems:    * No resolved hospital problems. *    Imaging:   VL DUP LOWER EXTREMITY VENOUS RIGHT   Final Result   No evidence of DVT in the right lower extremity. XR FOOT RIGHT (MIN 3 VIEWS)   Final Result   Soft tissue swelling with no evidence of osteomyelitis and no subcutaneous   air. 1st toe bunion deformity and prior 2nd and 5th toe amputations, as before. Abnormal labs:   Abnormal Labs Reviewed   CBC WITH AUTO DIFFERENTIAL - Abnormal; Notable for the following components:       Result Value    RBC 3.89 (*)     Hemoglobin 12.2 (*)     Hematocrit 35.4 (*)     MCH 31.4 (*)     Monocytes % 8.6 (*)     All other components within normal limits   COMPREHENSIVE METABOLIC PANEL W/ REFLEX TO MG FOR LOW K - Abnormal; Notable for the following components:    Creatinine 0.7 (*)     Glucose 204 (*)     ALT 45 (*)     AST 40 (*)     All other components within normal limits   LACTATE, SEPSIS - Abnormal; Notable for the following components:    Lactic Acid, Sepsis 2.1 (*)     All other components within normal limits     Critical values: no     Abnormal Assessment Findings: foot wound.      Background  History:   Past Medical History:   Diagnosis Date    Asthma Blind left eye     Diabetes mellitus ([facility] [address]     GERD (gastroesophageal reflux disease)     Hypertension     Retinal detachment 2012    left       Assessment    Vitals/MEWS: MEWS Score: 1  Level of Consciousness: Alert (0)   Vitals:    11/19/22 1704 11/19/22 1706 11/19/22 2315   BP:  (!) 160/88 (!) 152/84   Pulse: (!) 108  94   Resp: 18  18   Temp: 98.5 °F (36.9 °C)  98.4 °F (36.9 °C)   TempSrc: Oral  Oral   SpO2: 98%     Weight: 300 lb (136.1 kg)     Height: 6' 3\" (1.905 m)       FiO2 (%):    O2 Flow Rate: O2 Device: None (Room air)    Cardiac Rhythm:    Pain Assessment:   [x] Verbal [] Reyne Boxer Scale  Pain Scale:    Last documented pain score (0-10 scale)    Last documented pain medication administered: 2200  Mental Status: oriented, alert, coherent, logical, thought processes intact, and able to concentrate and follow conversation  NIH Score:    C-SSRS: Risk of Suicide: No Risk  Bedside swallow:    [place] Coma Scale (GCS): Active LDA's:   Peripheral IV 27/31/67 Right Basilic (Active)   Site Assessment Clean, dry & intact 11/19/22 2221   Line Status Blood return noted;Normal saline locked; Flushed 11/19/22 2221   Phlebitis Assessment No symptoms 11/19/22 2221   Infiltration Assessment 0 11/19/22 2221     PO Status: Regular  Pertinent or High Risk Medications/Drips: no   If Yes, please provide details:    Pending Blood Product Administration: no     You may also review the ED PT Care Timeline found under the Summary Nursing Index tab. Recommendation    Pending orders    Plan for Discharge (if known):    Additional Comments:     If any further questions, please call Sending RN at [facility]    Electronically signed by: Electronically signed by Greg Coello RN on 11/19/2022 at 11:30 PM       Greg Coello, Critical access hospital[facility]  11/19/22 6282

## 2022-11-20 NOTE — ED PROVIDER NOTES
Emergency Department Encounter  Location: [address] [place] [address] 4N    Patient: Forde Schlatter  MRN: [id number]  : 1985  Date of evaluation: 2022  ED Provider: Denis Quan DO    Chief Complaint:    Wound Check (Reports wound to right foot that is causing pain and swelling up to right knee.)    [place]:  Forde Schlatter is a 39 y.o. male that presents to the emergency department concern for right lower extremity pain and swelling. Patient has a history of recurrent infections in the foot. He has recently been following with Dr. Cori Shetty but states he had been doing well for about a month. Several days ago, he noticed that the foot was becoming more swollen and painful. He describes that this lower leg is similarly swollen. He denies measured fever or chills. Has been able to eat and drink without vomiting or diarrhea. Has a noted history of diabetes and hypertension. Has had prior amputation of the second and fifth toe of the same foot. ROS:  At least 10 systems reviewed and are acutely negative unless otherwise noted in the HPI.     Past Medical History:   Diagnosis Date    Asthma     Blind left eye     Diabetes mellitus ([address]     GERD (gastroesophageal reflux disease)     Hypertension     Retinal detachment 2012    left     Past Surgical History:   Procedure Laterality Date    CORNEAL TRANSPLANT Bilateral     EYE SURGERY      left eye removed    EYE SURGERY Right     FRACTURE SURGERY      foot left    LAPAROSCOPIC APPENDECTOMY N/A 2022    APPENDECTOMY LAPAROSCOPIC performed by Silvia Blanco MD at [address] [place] [address] Right     MANDIBLE SURGERY Bilateral     TOE AMPUTATION Right 2017    TOE AMPUTATION Right 2022    TOE AMPUTATION, RAY AMPUTATION OF RIGHT SECOND TOE performed by Servando Ley MD at [address] [place] [address] OR     Family History   Problem Relation Age of Onset    Diabetes Mother     Asthma Mother     High Blood Pressure Mother     Stroke Mother     Heart Disease Mother     Diabetes Father Asthma Father     High Blood Pressure Father      Social History     Socioeconomic History    Marital status: Single     Spouse name: Not on file    Number of children: Not on file    Years of education: Not on file    Highest education level: Not on file   Occupational History    Not on file   Tobacco Use    Smoking status: Every Day     Packs/day: 0.50     Years: 5.00     Pack years: 2.50     Types: Cigarettes    Smokeless tobacco: Never   Vaping Use    Vaping Use: Never used   Substance and Sexual Activity    Alcohol use: Yes     Comment: occasionally    Drug use: Not Currently     Types: Marijuana Lauren Ege)    Sexual activity: Not on file   Other Topics Concern    Not on file   Social History Narrative    Not on file     Social Determinants of Health     Financial Resource Strain: Not on file   Food Insecurity: Not on file   Transportation Needs: Not on file   Physical Activity: Not on file   Stress: Not on file   Social Connections: Not on file   Intimate Partner Violence: Not on file   Housing Stability: Not on file     No current facility-administered medications for this encounter. Current Outpatient Medications   Medication Sig Dispense Refill    neomycin-bacitracin-polymyxin (NEOSPORIN) 400-5-5000 ointment Apply topically 2 times daily.  1 each 0    moxifloxacin (VIGAMOX) 0.5 % ophthalmic solution Place 1 drop into the right eye 4 times daily 3 mL 0    amoxicillin (AMOXIL) 500 MG capsule Take 1 capsule by mouth every 8 hours for 29 doses 29 capsule 0    doxycycline hyclate (VIBRA-TABS) 100 MG tablet Take 1 tablet by mouth every 12 hours for 20 doses 20 tablet 0    pantoprazole (PROTONIX) 40 MG tablet Take 1 tablet by mouth every morning (before breakfast) 30 tablet 3    nicotine (NICODERM CQ) 14 MG/24HR Place 1 patch onto the skin daily 30 patch 3    SYMBICORT 160-4.5 MCG/ACT AERO Inhale 2 puffs into the lungs in the morning and at bedtime      glipiZIDE (GLUCOTROL) 5 MG tablet Take 3 tablets by mouth in the morning and 3 tablets in the evening. Take before meals. 474 tablet 0    TRULICITY 1.5 FM/7.9UI SOPN inject 0.5 milliliters ( 1 AND 1/2 milligrams ) subcutaneously ev. ..  (REFER TO PRESCRIPTION NOTES). 3 pen 0    LANTUS SOLOSTAR 100 UNIT/ML injection pen Inject 50 Units into the skin nightly 2 pen 0    insulin NPH (HUMULIN N KWIKPEN) 100 UNIT/ML injection pen Inject 20 Units into the skin every morning 3 pen 0    tiZANidine (ZANAFLEX) 4 MG tablet Take 4 mg by mouth nightly as needed      cloNIDine (CATAPRES) 0.2 MG tablet Take 0.2 mg by mouth in the morning.       trimethoprim-polymyxin b (POLYTRIM) 86943-2.1 UNIT/ML-% ophthalmic solution Apply 1 drop to eye 4 times daily      UNABLE TO FIND Place 1 drop into the right eye 4 times daily 07/21/22 Patient on Vancomycin Preserved Eye soln 25 mg/ml      ezetimibe (ZETIA) 10 MG tablet take 1 tablet by mouth once daily      TECHLITE PEN NEEDLES 31G X 5 MM MISC use 1 PEN NEEDLE to inject MEDICATION subcutaneously two to three times a day      melatonin 5 MG TABS tablet take 1 tablet by mouth every evening      pregabalin (LYRICA) 100 MG capsule take 1 capsule by mouth twice a day      sucralfate (CARAFATE) 1 GM tablet Take 1 tablet by mouth 4 times daily 120 tablet 0    albuterol sulfate  (90 Base) MCG/ACT inhaler Inhale 2 puffs into the lungs every 6 hours as needed for Wheezing 1 Inhaler 5    albuterol-ipratropium (COMBIVENT RESPIMAT)  MCG/ACT AERS inhaler Inhale 1 puff into the lungs every 6 hours 1 Inhaler 5    acetaminophen (AMINOFEN) 325 MG tablet Take 2 tablets by mouth every 6 hours as needed for Pain 20 tablet 0    brimonidine (ALPHAGAN) 0.2 % ophthalmic solution Place 1 drop into the right eye 2 times daily      dorzolamide-timolol 22.3-6.8 MG/ML SOLN Place 1 drop into the right eye 2 times daily      prednisoLONE acetate (PRED FORTE) 1 % ophthalmic suspension Place 1 drop into the right eye 2 times daily      allopurinol (ZYLOPRIM) 300 MG tablet Take 300 mg by mouth daily       atorvastatin (LIPITOR) 10 MG tablet Take 10 mg by mouth daily       lisinopril-hydrochlorothiazide (PRINZIDE;ZESTORETIC) 20-12.5 MG per tablet Take 1 tablet by mouth daily       metFORMIN (GLUCOPHAGE) 1000 MG tablet Take 1,000 mg by mouth 2 times daily (with meals)      amLODIPine (NORVASC) 10 MG tablet Take 10 mg by mouth daily       Allergies   Allergen Reactions    Ciprofloxacin Shortness Of Breath    Glucosamine Anaphylaxis    Shellfish-Derived Products Anaphylaxis       Nursing Notes Reviewed    Physical Exam:  ED Triage Vitals   Enc Vitals Group      BP 11/19/22 1706 (!) 160/88      Heart Rate 11/19/22 1704 (!) 108      Resp 11/19/22 1704 18      Temp 11/19/22 1704 98.5 °F (36.9 °C)      Temp Source 11/19/22 1704 Oral      SpO2 11/19/22 1704 98 %      Weight 11/19/22 1704 300 lb (136.1 kg)      Height 11/19/22 1704 6' 3\" (1.905 m)      Head Circumference --       Peak Flow --       Pain Score --       Pain Loc --       Pain Edu? --       Excl. in [address]? --      GENERAL APPEARANCE: Awake and alert. Cooperative. No acute distress. HEAD: Normocephalic. Atraumatic. EYES: EOM's grossly intact. Sclera anicteric. ENT: Tolerates saliva. No trismus. NECK: Supple. Trachea midline. CARDIO: Tachycardic but regular. Radial pulse 2+. LUNGS: Respirations unlabored. CTAB. ABDOMEN: Soft. Non-distended. Non-tender. EXTREMITIES: No acute deformities. Patient with symmetric lower extremity pulses. Right foot is notable for prior second and fifth toe amputations. Patient has a large open ulceration over the medial aspect of his right first MTP. The entire foot and lower leg are warm and edematous. No significant erythema appreciated. This does not extend above the knee. SKIN: Warm and dry. NEUROLOGICAL: No gross facial drooping. Moves all 4 extremities spontaneously. PSYCHIATRIC: Normal mood.      Labs:  Results for orders placed or performed during the hospital encounter of 11/19/22   Culture, Blood 1    Specimen: Blood   Result Value Ref Range    Specimen BLOOD     Special Requests NONE     Culture NO GROWTH AT 5 DAYS    Culture, Blood 2    Specimen: Blood   Result Value Ref Range    Specimen BLOOD     Special Requests NONE     Culture NO GROWTH AT 5 DAYS    Culture, Wound Aerobic Only    Specimen: Foot   Result Value Ref Range    Specimen FOOT     Special Requests Description:  >Aerobic Only       Culture       Final Report No growth 60 to 72 hours No anaerobes isolated   Respiratory Panel, Molecular, with COVID-19 (Restricted: peds pts or suitable admitted adults)    Specimen: Nasopharyngeal   Result Value Ref Range    Adenovirus Detection by PCR NOT DETECTED NOT DETECTED    Coronavirus 229E PCR NOT DETECTED NOT DETECTED    Coronavirus HKU1 PCR NOT DETECTED NOT DETECTED    Coronavirus NL63 PCR NOT DETECTED NOT DETECTED    Coronavirus OC43 PCR NOT DETECTED NOT DETECTED    SARS-CoV-2 NOT DETECTED NOT DETECTED    Human Metapneumovirus PCR NOT DETECTED NOT DETECTED    Rhinovirus Enterovirus PCR NOT DETECTED NOT DETECTED    Influenza A by PCR NOT DETECTED NOT DETECTED    Influenza A H1 Pandemic PCR NOT DETECTED NOT DETECTED    Influenza A H1 (2009) PCR NOT DETECTED NOT DETECTED    Influenza A H3 PCR NOT DETECTED NOT DETECTED    Influenza B by PCR NOT DETECTED NOT DETECTED    Parainfluenza 1 PCR NOT DETECTED NOT DETECTED    Parainfluenza 2 PCR NOT DETECTED NOT DETECTED    Parainfluenza 3 PCR NOT DETECTED NOT DETECTED    Parainfluenza 4 PCR NOT DETECTED NOT DETECTED    RSV PCR NOT DETECTED NOT DETECTED    Bordetella parapertussis by PCR NOT DETECTED NOT DETECTED    B Pertussis by PCR NOT DETECTED NOT DETECTED    Chlamydophila Pneumonia PCR NOT DETECTED NOT DETECTED    Mycoplasma pneumo by PCR NOT DETECTED NOT DETECTED   CBC with Auto Differential   Result Value Ref Range    WBC 4.9 4.0 - 10.5 K/CU MM    RBC 3.89 (L) 4.6 - 6.2 M/CU MM    Hemoglobin 12.2 (L) 13.5 - 18.0 GM/DL Hematocrit 35.4 (L) 42 - 52 %    MCV 91.0 78 - 100 FL    MCH 31.4 (H) 27 - 31 PG    MCHC 34.5 32.0 - 36.0 %    RDW 13.3 11.7 - 14.9 %    Platelets 361 556 - 541 K/CU MM    MPV 8.7 7.5 - 11.1 FL    Differential Type AUTOMATED DIFFERENTIAL     Segs Relative 59.5 36 - 66 %    Lymphocytes % 29.7 24 - 44 %    Monocytes % 8.6 (H) 0 - 4 %    Eosinophils % 1.2 0 - 3 %    Basophils % 0.6 0 - 1 %    Segs Absolute 2.9 K/CU MM    Lymphocytes Absolute 1.5 K/CU MM    Monocytes Absolute 0.4 K/CU MM    Eosinophils Absolute 0.1 K/CU MM    Basophils Absolute 0.0 K/CU MM    Nucleated RBC % 0.0 %    Total Nucleated RBC 0.0 K/CU MM    Total Immature Neutrophil 0.02 K/CU MM    Immature Neutrophil % 0.4 0 - 0.43 %   Comprehensive Metabolic Panel w/ Reflex to MG   Result Value Ref Range    Sodium 137 135 - 145 MMOL/L    Potassium 3.5 3.5 - 5.1 MMOL/L    Chloride 102 99 - 110 mMol/L    CO2 25 21 - 32 MMOL/L    BUN 10 6 - 23 MG/DL    Creatinine 0.7 (L) 0.9 - 1.3 MG/DL    Est, Glom Filt Rate >60 >60 mL/min/1.73m2    Glucose 204 (H) 70 - 99 MG/DL    Calcium 8.9 8.3 - 10.6 MG/DL    Albumin 4.1 3.4 - 5.0 GM/DL    Total Protein 7.1 6.4 - 8.2 GM/DL    Total Bilirubin 0.5 0.0 - 1.0 MG/DL    ALT 45 (H) 10 - 40 U/L    AST 40 (H) 15 - 37 IU/L    Alkaline Phosphatase 112 40 - 129 IU/L    Anion Gap 10 4 - 16   Lactate, Sepsis   Result Value Ref Range    Lactic Acid, Sepsis 2.1 (HH) 0.5 - 1.9 mMOL/L   Lactate, Sepsis   Result Value Ref Range    Lactic Acid, Sepsis 2.4 (HH) 0.5 - 1.9 mMOL/L   Magnesium   Result Value Ref Range    Magnesium 1.8 1.8 - 2.4 mg/dl   Lactic Acid   Result Value Ref Range    Lactate 1.5 0.4 - 2.0 mMOL/L   Comprehensive Metabolic Panel w/ Reflex to MG   Result Value Ref Range    Sodium 136 135 - 145 MMOL/L    Potassium 3.5 3.5 - 5.1 MMOL/L    Chloride 103 99 - 110 mMol/L    CO2 24 21 - 32 MMOL/L    BUN 8 6 - 23 MG/DL    Creatinine 0.7 (L) 0.9 - 1.3 MG/DL    Est, Glom Filt Rate >60 >60 mL/min/1.73m2    Glucose 190 (H) 70 - 99 MG/DL Calcium 8.6 8.3 - 10.6 MG/DL    Albumin 3.9 3.4 - 5.0 GM/DL    Total Protein 6.2 (L) 6.4 - 8.2 GM/DL    Total Bilirubin 0.5 0.0 - 1.0 MG/DL    ALT 39 10 - 40 U/L    AST 30 15 - 37 IU/L    Alkaline Phosphatase 115 40 - 128 IU/L    Anion Gap 9 4 - 16   CBC with Auto Differential   Result Value Ref Range    WBC 4.2 4.0 - 10.5 K/CU MM    RBC 3.73 (L) 4.6 - 6.2 M/CU MM    Hemoglobin 11.7 (L) 13.5 - 18.0 GM/DL    Hematocrit 34.0 (L) 42 - 52 %    MCV 91.2 78 - 100 FL    MCH 31.4 (H) 27 - 31 PG    MCHC 34.4 32.0 - 36.0 %    RDW 13.3 11.7 - 14.9 %    Platelets 988 497 - 559 K/CU MM    MPV 8.6 7.5 - 11.1 FL    Differential Type AUTOMATED DIFFERENTIAL     Segs Relative 54.4 36 - 66 %    Lymphocytes % 34.6 24 - 44 %    Monocytes % 9.1 (H) 0 - 4 %    Eosinophils % 1.2 0 - 3 %    Basophils % 0.5 0 - 1 %    Segs Absolute 2.3 K/CU MM    Lymphocytes Absolute 1.5 K/CU MM    Monocytes Absolute 0.4 K/CU MM    Eosinophils Absolute 0.1 K/CU MM    Basophils Absolute 0.0 K/CU MM    Nucleated RBC % 0.0 %    Total Nucleated RBC 0.0 K/CU MM    Total Immature Neutrophil 0.01 K/CU MM    Immature Neutrophil % 0.2 0 - 0.43 %   Magnesium   Result Value Ref Range    Magnesium 1.7 (L) 1.8 - 2.4 mg/dl   Comprehensive Metabolic Panel w/ Reflex to MG   Result Value Ref Range    Sodium 135 135 - 145 MMOL/L    Potassium 3.9 3.5 - 5.1 MMOL/L    Chloride 103 99 - 110 mMol/L    CO2 25 21 - 32 MMOL/L    BUN 8 6 - 23 MG/DL    Creatinine 0.6 (L) 0.9 - 1.3 MG/DL    Est, Glom Filt Rate >60 >60 mL/min/1.73m2    Glucose 209 (H) 70 - 99 MG/DL    Calcium 8.8 8.3 - 10.6 MG/DL    Albumin 3.8 3.4 - 5.0 GM/DL    Total Protein 6.1 (L) 6.4 - 8.2 GM/DL    Total Bilirubin 0.3 0.0 - 1.0 MG/DL    ALT 41 (H) 10 - 40 U/L    AST 38 (H) 15 - 37 IU/L    Alkaline Phosphatase 82 40 - 128 IU/L    Anion Gap 7 4 - 16   CBC with Auto Differential   Result Value Ref Range    WBC 2.9 (L) 4.0 - 10.5 K/CU MM    RBC 3.60 (L) 4.6 - 6.2 M/CU MM    Hemoglobin 11.2 (L) 13.5 - 18.0 GM/DL Hematocrit 32.8 (L) 42 - 52 %    MCV 91.1 78 - 100 FL    MCH 31.1 (H) 27 - 31 PG    MCHC 34.1 32.0 - 36.0 %    RDW 12.9 11.7 - 14.9 %    Platelets 337 940 - 505 K/CU MM    MPV 8.7 7.5 - 11.1 FL    Differential Type AUTOMATED DIFFERENTIAL     Segs Relative 51.6 36 - 66 %    Lymphocytes % 38.8 24 - 44 %    Monocytes % 6.5 (H) 0 - 4 %    Eosinophils % 1.4 0 - 3 %    Basophils % 1.4 (H) 0 - 1 %    Segs Absolute 1.5 K/CU MM    Lymphocytes Absolute 1.1 K/CU MM    Monocytes Absolute 0.2 K/CU MM    Eosinophils Absolute 0.0 K/CU MM    Basophils Absolute 0.0 K/CU MM    Nucleated RBC % 0.0 %    Total Nucleated RBC 0.0 K/CU MM    Total Immature Neutrophil 0.01 K/CU MM    Immature Neutrophil % 0.3 0 - 0.43 %   C-Reactive Protein   Result Value Ref Range    CRP High Sensitivity 3.0 <5.0 mg/L   Comprehensive Metabolic Panel w/ Reflex to MG   Result Value Ref Range    Sodium 133 (L) 135 - 145 MMOL/L    Potassium 4.3 3.5 - 5.1 MMOL/L    Chloride 96 (L) 99 - 110 mMol/L    CO2 24 21 - 32 MMOL/L    BUN 12 6 - 23 MG/DL    Creatinine 0.7 (L) 0.9 - 1.3 MG/DL    Est, Glom Filt Rate >60 >60 mL/min/1.73m2    Glucose 399 (H) 70 - 99 MG/DL    Calcium 9.6 8.3 - 10.6 MG/DL    Albumin 4.3 3.4 - 5.0 GM/DL    Total Protein 6.7 6.4 - 8.2 GM/DL    Total Bilirubin 0.3 0.0 - 1.0 MG/DL    ALT 54 (H) 10 - 40 U/L    AST 31 15 - 37 IU/L    Alkaline Phosphatase 150 (H) 40 - 128 IU/L    Anion Gap 13 4 - 16   CBC with Auto Differential   Result Value Ref Range    WBC 5.9 4.0 - 10.5 K/CU MM    RBC 4.18 (L) 4.6 - 6.2 M/CU MM    Hemoglobin 13.2 (L) 13.5 - 18.0 GM/DL    Hematocrit 37.8 (L) 42 - 52 %    MCV 90.4 78 - 100 FL    MCH 31.6 (H) 27 - 31 PG    MCHC 34.9 32.0 - 36.0 %    RDW 12.4 11.7 - 14.9 %    Platelets 364 346 - 137 K/CU MM    MPV 9.1 7.5 - 11.1 FL    Differential Type AUTOMATED DIFFERENTIAL     Segs Relative 88.3 (H) 36 - 66 %    Lymphocytes % 8.7 (L) 24 - 44 %    Monocytes % 1.9 0 - 4 %    Eosinophils % 0.0 0 - 3 %    Basophils % 0.2 0 - 1 %    Segs Absolute 5.2 K/CU MM    Lymphocytes Absolute 0.5 K/CU MM    Monocytes Absolute 0.1 K/CU MM    Eosinophils Absolute 0.0 K/CU MM    Basophils Absolute 0.0 K/CU MM    Nucleated RBC % 0.0 %    Total Nucleated RBC 0.0 K/CU MM    Total Immature Neutrophil 0.05 K/CU MM    Immature Neutrophil % 0.9 (H) 0 - 0.43 %   Vancomycin Level, Random   Result Value Ref Range    Vancomycin Rm 7.1 UG/ML    DOSE AMOUNT DOSE AMT.  GIVEN - 1500     DOSE TIME DOSE TIME GIVEN - 0000/1200    C-Reactive Protein   Result Value Ref Range    CRP High Sensitivity 4.3 <5.0 mg/L   Procalcitonin   Result Value Ref Range    Procalcitonin 0.031    Procalcitonin   Result Value Ref Range    Procalcitonin 0.039    C-Reactive Protein   Result Value Ref Range    CRP High Sensitivity 3.4 <5.0 mg/L   POCT Glucose   Result Value Ref Range    POC Glucose 185 (H) 70 - 99 MG/DL   POCT Glucose   Result Value Ref Range    POC Glucose 212 (H) 70 - 99 MG/DL   POCT Glucose   Result Value Ref Range    POC Glucose 258 (H) 70 - 99 MG/DL   POCT Glucose   Result Value Ref Range    POC Glucose 239 (H) 70 - 99 MG/DL   POCT Glucose   Result Value Ref Range    POC Glucose 171 (H) 70 - 99 MG/DL   POCT Glucose   Result Value Ref Range    POC Glucose 196 (H) 70 - 99 MG/DL   POCT Glucose   Result Value Ref Range    POC Glucose 284 (H) 70 - 99 MG/DL   POCT Glucose   Result Value Ref Range    POC Glucose 403 (H) 70 - 99 MG/DL   POCT Glucose   Result Value Ref Range    POC Glucose 361 (H) 70 - 99 MG/DL   POCT Glucose   Result Value Ref Range    POC Glucose 387 (H) 70 - 99 MG/DL   POCT Glucose   Result Value Ref Range    POC Glucose 298 (H) 70 - 99 MG/DL   POCT Glucose   Result Value Ref Range    POC Glucose 303 (H) 70 - 99 MG/DL   POCT Glucose   Result Value Ref Range    POC Glucose 368 (H) 70 - 99 MG/DL   POCT Glucose   Result Value Ref Range    POC Glucose 217 (H) 70 - 99 MG/DL   POCT Glucose   Result Value Ref Range    POC Glucose 249 (H) 70 - 99 MG/DL   POCT Glucose   Result Value Ref Range    POC Glucose 309 (H) 70 - 99 MG/DL       Radiographs (if obtained):  [] The following radiograph was interpreted by myself in the absence of a radiologist:  [x] Radiologist's Report reviewed at time of ED visit:  XR FOOT RIGHT (MIN 3 VIEWS)    Result Date: 11/19/2022  EXAMINATION: THREE XRAY VIEWS OF THE RIGHT FOOT 11/19/2022 7:48 pm COMPARISON: 09/17/2022. HISTORY: ORDERING SYSTEM PROVIDED HISTORY: attn 1st MTP TECHNOLOGIST PROVIDED HISTORY: Reason for exam:->attn 1st MTP Reason for Exam: Wound Check Additional signs and symptoms: attn 1st MTP FINDINGS: There is hallux valgus metatarsus primus varus. 2nd and 5th toe amputations appear unchanged. Osteophytes of the dorsal midfoot are again noted. There is soft tissue swelling which is most prominent at the forefoot. There is no subcutaneous air. No fracture, dislocation or focal osseous abnormality is identified. Soft tissue swelling with no evidence of osteomyelitis and no subcutaneous air. 1st toe bunion deformity and prior 2nd and 5th toe amputations, as before. VL DUP LOWER EXTREMITY VENOUS RIGHT    Result Date: 11/19/2022  EXAMINATION: DUPLEX VENOUS ULTRASOUND OF THE RIGHT LOWER EXTREMITY 11/19/2022 6:04 pm TECHNIQUE: Duplex ultrasound using B-mode/gray scaled imaging and Doppler spectral analysis and color flow was obtained of the deep venous structures of the right extremity. COMPARISON: 07/21/2022. HISTORY: ORDERING SYSTEM PROVIDED HISTORY: edema TECHNOLOGIST PROVIDED HISTORY: Reason for exam:->edema FINDINGS: The visualized veins of the right lower extremity are patent and free of echogenic thrombus. The veins demonstrate good compressibility with normal color flow study and spectral analysis. No evidence of DVT in the right lower extremity. ED Course and MDM:  Patient is tachycardic and uncomfortable with apparent source of infection on my assessment. Have initiated antibiotics with IVF.  Patient also given percocet PO for pain.    Labs without significant leukocytosis although lactic acid is mildly elevated on first check. Repeat ordered. Chemistries with stable renal function; glucose elevated at 204. Ultrasound negative for DVT. Suspect edema and pain secondary to infection. Will plan to admit for ongoing care. Patient aware and agreeable to proceed. Final Impression:  1. Diabetic foot infection ([address]    2. Cellulitis of right lower extremity    3. Open wound of toe, initial encounter      DISPOSITION Admitted 11/19/2022 11:58:39 PM      Patient referred to:  [address]  [address] [facility] [address]  [address]  [phone]    Follow up      Albert Caruso MD  [address] [facility]  [phone]    Follow up in 1 week(s)  Please call to make an appointment  Discharge medications:  Discharge Medication List as of 11/23/2022  6:39 PM        START taking these medications    Details   oxyCODONE-acetaminophen (PERCOCET) 5-325 MG per tablet Take 1 tablet by mouth every 4 hours as needed for Pain for up to 3 days. , Disp-18 tablet, R-0Normal      neomycin-bacitracin-polymyxin (NEOSPORIN) 400-5-5000 ointment Apply topically 2 times daily. , Disp-1 each, R-0, Normal      amoxicillin (AMOXIL) 500 MG capsule Take 1 capsule by mouth every 8 hours for 29 doses, Disp-29 capsule, R-0Normal      doxycycline hyclate (VIBRA-TABS) 100 MG tablet Take 1 tablet by mouth every 12 hours for 20 doses, Disp-20 tablet, R-0Normal      pantoprazole (PROTONIX) 40 MG tablet Take 1 tablet by mouth every morning (before breakfast), Disp-30 tablet, R-3Normal           (Please note that portions of this note may have been completed with a voice recognition program. Efforts were made to edit the dictations but occasionally words are mis-transcribed.)    Cami Zaacrias,   11/27/22 5104

## 2022-11-20 NOTE — CONSULTS
[facility]  Consultation Note    Eve Solis M.D.      Reason for Consult: Right foot infection      Patient's Name/Date of Birth: Ivania Hemphill / 1985 (6 y.o.)    Date: November 20, 2022     HPI:  69-year-old male with longstanding severe diabetes resulting in diabetic foot problems toe amputations diabetic retinopathy was admitted with swelling and tenderness of the right foot. This patient has had previous outpatient appointments to see me in the office which he has not. The patient has had 2 superficial wounds on the medial aspect of the right distal phalanx and metatarsal phalangeal joint. These are superficial but have increased in pain and tenderness he was admitted for the recurrent diabetic foot infection. This patient has severe limitations in his site and lives at home by himself and there is a question of proper wound care at home. He claims he has been placing collagenase ointment and keeping the foot clean and wrapped. Presently the patient was sitting up in bed eating breakfast and the foot was opened and exposed with no dressing on the foot.     Past Medical History:   Diagnosis Date    Asthma     Blind left eye     Diabetes mellitus ([address]     GERD (gastroesophageal reflux disease)     Hypertension     Retinal detachment 2012    left       Past Surgical History:   Procedure Laterality Date    CORNEAL TRANSPLANT Bilateral     EYE SURGERY      left eye removed    EYE SURGERY Right     FRACTURE SURGERY      foot left    LAPAROSCOPIC APPENDECTOMY N/A 4/28/2022    APPENDECTOMY LAPAROSCOPIC performed by Maria Luisa Negrete MD at [address] [place] [address] Right     MANDIBLE SURGERY Bilateral     TOE AMPUTATION Right 07/25/2017    TOE AMPUTATION Right 7/25/2022    TOE AMPUTATION, RAY AMPUTATION OF RIGHT SECOND TOE performed by Lidya Madrigal MD at [facility] OR       Allergies   Allergen Reactions    Ciprofloxacin Shortness Of Breath    Glucosamine Anaphylaxis Shellfish-Derived Products Anaphylaxis       Family History   Problem Relation Age of Onset    Diabetes Mother     Asthma Mother     High Blood Pressure Mother     Stroke Mother     Heart Disease Mother     Diabetes Father     Asthma Father     High Blood Pressure Father        Social History     Socioeconomic History    Marital status: Single     Spouse name: Not on file    Number of children: Not on file    Years of education: Not on file    Highest education level: Not on file   Occupational History    Not on file   Tobacco Use    Smoking status: Every Day     Packs/day: 0.50     Years: 5.00     Pack years: 2.50     Types: Cigarettes    Smokeless tobacco: Never   Vaping Use    Vaping Use: Never used   Substance and Sexual Activity    Alcohol use: Yes     Comment: occasionally    Drug use: Not Currently     Types: Marijuana [place])    Sexual activity: Not on file   Other Topics Concern    Not on file   Social History Narrative    Not on file     Social Determinants of Health     Financial Resource Strain: Not on file   Food Insecurity: Not on file   Transportation Needs: Not on file   Physical Activity: Not on file   Stress: Not on file   Social Connections: Not on file   Intimate Partner Violence: Not on file   Housing Stability: Not on file       ROS: Non-contributory    Physical Exam:  Vitals:    11/20/22 0853   BP:    Pulse: 90   Resp: 18   Temp:    SpO2: 98%   Awake and alert no distress  Patient has severe diabetic retinopathy    Chest: Breath sounds were clear and equal with no rales, wheezes, or rhonchi. Respiratory effort was normal with no retractions or use of accessory muscles. Cardiovascular: Heart sounds were normal with a regular rate and rhythm. There were no murmurs or gallops. Abdomen: Obese bowel sounds were normal.  The abdomen was soft and non distended. There was no tenderness, guarding, rebound, or rigidity. There was no masses, hepatosplenomegaly, or hernias.     Examination of the right foot and leg reveals swelling of the right ankle up to the knee. There is just mild cellulitis of the foot. There are 2 open superficial ulcers as described above. X-rays do not show any obvious evidence of osteomyelitis. The foot does have evidence of not being cleaned or properly cared for with evidence of the dried skin and debris. Labs    CBC:   Lab Results   Component Value Date/Time    WBC 4.2 11/20/2022 04:00 AM    RBC 3.73 11/20/2022 04:00 AM    HGB 11.7 11/20/2022 04:00 AM    HCT 34.0 11/20/2022 04:00 AM    MCV 91.2 11/20/2022 04:00 AM    MCH 31.4 11/20/2022 04:00 AM    MCHC 34.4 11/20/2022 04:00 AM    RDW 13.3 11/20/2022 04:00 AM     11/20/2022 04:00 AM    MPV 8.6 11/20/2022 04:00 AM         Assessment/Plan:  Recurrent infection of the right foot secondary to diabetic foot ulcers  Check wound cultures  Antibiotic therapy  Wound care has been written  The patient is a candidate for home health care he will require that on a regular basis for proper care at home  Would consider a right leg ultrasound to exclude DVT if leg swelling does not improve by tomorrow but the swelling is more consistent with a diabetic foot infection. At this time a bone scan may be required in the future if this does not heal looking for any evidence of occult osteomyelitis.     Chandra Simpson MD   11/20/2022 at 9:00 AM

## 2022-11-20 NOTE — PLAN OF CARE
Problem: Discharge Planning  Goal: Discharge to home or other facility with appropriate resources  11/20/2022 1818 by Jenifer Patel RN  Outcome: Progressing  11/20/2022 1624 by Jenifer Patel RN  Outcome: Progressing     Problem: Skin/Tissue Integrity  Goal: Absence of new skin breakdown  Description: 1. Monitor for areas of redness and/or skin breakdown  2. Assess vascular access sites hourly  3. Every 4-6 hours minimum:  Change oxygen saturation probe site  4. Every 4-6 hours:  If on nasal continuous positive airway pressure, respiratory therapy assess nares and determine need for appliance change or resting period.   11/20/2022 1818 by Jenifer Patel RN  Outcome: Progressing  11/20/2022 1624 by Jenifer Patel RN  Outcome: Progressing     Problem: Safety - Adult  Goal: Free from fall injury  11/20/2022 1818 by Jenifer Patel RN  Outcome: Progressing  11/20/2022 1624 by Jenifer Patel RN  Outcome: Progressing     Problem: ABCDS Injury Assessment  Goal: Absence of physical injury  11/20/2022 1818 by Jenifer Patel RN  Outcome: Progressing  11/20/2022 1624 by Jenifer Patel RN  Outcome: Progressing

## 2022-11-20 NOTE — H&P
History and Physical 22        NAME: Ros Gaytan  : 1985  MRN: [id number]      Assessment/Plan:  Ros Gaytan is a 39 y.o. male with a history of insulin-dependent diabetes, chronic non-healing ulcer of his RLE, h/o OM of right proximal phalanx and head of metatarsal s/p amputation of 2nd toe on 2022, hypertension, and tobacco dependence who presented to [facility]22 with progressive worsening of nonhealing ulcer on the right medial aspect of his foot. Admitting for sepsis 2/2 cellulitis    #RLE cellulitis  #Open wound right great toe   --Admit to tele, XR show non-suspicious for OM (will hold on further imaging at this time)  --1/4 sirs, elevated lactate and RLE source S/P 1L bolus, Vanc and Zosyn in the ED  --Based on sensitivities from his last set of wound cultures(2022), started on Merrem  --Will consult surgery, patient follows with Nika Almeida in OP setting  --Follow up wound Culture, repeat lactate pending  --Recommend ID consult pending cultures  --Pain management per pain scales  --Wound care     #IDDM  --Continue accucheks and low dose sliding scale  --Patient uses 50units of lantus at home, will begin on 40units at this time  --Last A1c 8.5 ()  --Diabetic diet     #HTN  --Restart home regimen w/ holding parameters, monitor BP and adjust as appropriate     #Tobacco use dependence  --NRT ordered       DVT Prophylaxis: Lovenox  Code Status/Surrogate Decision Maker: Full Code      Current living situation: Home  Expected Disposition: Home  Estimated discharge date: 2-3 days      Chief Complaint:    Progressively worsening pain w/ his chronic right foot wound    History of Present Illness:    Patient is a 56-year-old with past medical history of insulin-dependent diabetes, chronic non-healing ulcer of his RLE, h/o OM of right proximal phalanx and head of metatarsal s/p amputation of 2nd toe on 2022, hypertension, and tobacco dependence.  He presents to the ED for a progressively worsening chronic nonhealing ulcer on the right medial aspect of his foot, reports that he has been following with Valene Sables but that it is gotten more painful and swollen in the last 2 days. Of note he was admitted to the hospital in September for similar situation. On presentation, he was notably hypertensive and tachycardic to 108, afebrile and saturating appropriately on room air. Labs are notable for an H&H of 12.2/35.4, WBC of 4.9, CHEM panel shows a creatinine of 0.7 and he does have a lactic acid elevated at 2.1. X-ray of the foot shows soft tissue swelling with no evidence of OM and no subcutaneous air. First toe bunion deformity) second and fifth toe amputations as before. Venous Doppler of the lower extremity showed no evidence of DVT. On encounter, he is resting comfortably, asleep. He remains mildly tachycardic. Given tachycardia, elevated lactate and evidence of cellulitis on exam, will admit for IV abx and wound eval.    ROS:    Review of Systems   Constitutional: Negative. HENT: Negative. Respiratory: Negative. Cardiovascular: Negative. Gastrointestinal: Negative. Genitourinary: Negative. Musculoskeletal:  Positive for gait problem. Skin:  Positive for wound. Neurological:  Negative for dizziness and headaches. Psychiatric/Behavioral: Negative.         Past Medical, Surgical, Social, Family History:   Past Medical History:   Diagnosis Date    Asthma     Blind left eye     Diabetes mellitus ([address]     GERD (gastroesophageal reflux disease)     Hypertension     Retinal detachment 2012    left     Past Surgical History:   Procedure Laterality Date    CORNEAL TRANSPLANT Bilateral     EYE SURGERY      left eye removed    EYE SURGERY Right     FRACTURE SURGERY      foot left    LAPAROSCOPIC APPENDECTOMY N/A 4/28/2022    APPENDECTOMY LAPAROSCOPIC performed by Silvia Blanco MD at [address] [place] [address] Right     MANDIBLE SURGERY Bilateral     TOE AMPUTATION Right 07/25/2017    TOE AMPUTATION Right 7/25/2022    TOE AMPUTATION, RAY AMPUTATION OF RIGHT SECOND TOE performed by Rhonda Reyna MD at [address] [place] [address] History     Socioeconomic History    Marital status: Single     Spouse name: Not on file    Number of children: Not on file    Years of education: Not on file    Highest education level: Not on file   Occupational History    Not on file   Tobacco Use    Smoking status: Every Day     Packs/day: 0.50     Years: 5.00     Pack years: 2.50     Types: Cigarettes    Smokeless tobacco: Never   Vaping Use    Vaping Use: Never used   Substance and Sexual Activity    Alcohol use: Yes     Comment: occasionally    Drug use: Not Currently     Types: Marijuana [place])    Sexual activity: Not on file   Other Topics Concern    Not on file   Social History Narrative    Not on file     Social Determinants of Health     Financial Resource Strain: Not on file   Food Insecurity: Not on file   Transportation Needs: Not on file   Physical Activity: Not on file   Stress: Not on file   Social Connections: Not on file   Intimate Partner Violence: Not on file   Housing Stability: Not on file     Family History   Problem Relation Age of Onset    Diabetes Mother     Asthma Mother     High Blood Pressure Mother     Stroke Mother     Heart Disease Mother     Diabetes Father     Asthma Father     High Blood Pressure Father        Home Medications:  Prior to Admission medications    Medication Sig Start Date End Date Taking? Authorizing Provider   nicotine (NICODERM CQ) 14 MG/24HR Place 1 patch onto the skin daily 9/22/22   Lion Schrader MD   SYMBICORT 160-4.5 MCG/ACT AERO Inhale 2 puffs into the lungs in the morning and at bedtime 9/2/22   Historical Provider, MD   glipiZIDE (GLUCOTROL) 5 MG tablet Take 3 tablets by mouth in the morning and 3 tablets in the evening. Take before meals.  7/27/22   Heri Hudson MD   TRULICITY 1.6 YO/2.9GH SOPN inject 0.5 milliliters ( 1 AND 1/2 milligrams ) subcutaneously ev. ..  (REFER TO PRESCRIPTION NOTES). 7/27/22   Heri Carlson MD   LANTUS SOLOSTAR 100 UNIT/ML injection pen Inject 50 Units into the skin nightly 7/27/22   Heri Carlson MD   insulin NPH (HUMULIN [facility]) 100 UNIT/ML injection pen Inject 20 Units into the skin every morning 7/27/22   Heri Carlson MD   moxifloxacin (VIGAMOX) 0.5 % ophthalmic solution Place 1 drop into the right eye 4 times daily 6/27/22   Historical Provider, MD   tiZANidine (ZANAFLEX) 4 MG tablet Take 4 mg by mouth nightly as needed 6/29/22   Historical Provider, MD   cloNIDine (CATAPRES) 0.2 MG tablet Take 0.2 mg by mouth in the morning.     Historical Provider, MD   trimethoprim-polymyxin b (POLYTRIM) 12701-7.1 UNIT/ML-% ophthalmic solution Apply 1 drop to eye 4 times daily 6/24/22   Historical Provider, MD   UNABLE TO [address] 1 drop into the right eye 4 times daily 07/21/22 Patient on Vancomycin Preserved Eye soln 25 mg/ml    Historical Provider, MD   ezetimibe (ZETIA) 10 MG tablet take 1 tablet by mouth once daily 6/29/22   Historical Provider, MD CHOU PEN NEEDLES 31G X 5 MM MISC use 1 PEN NEEDLE to inject MEDICATION subcutaneously two to three times a day 6/29/22   Historical Provider, MD   melatonin 5 MG TABS tablet take 1 tablet by mouth every evening 6/29/22   Historical Provider, MD   pregabalin (LYRICA) 100 MG capsule take 1 capsule by mouth twice a day 6/29/22   Historical Provider, MD   sucralfate (CARAFATE) 1 GM tablet Take 1 tablet by mouth 4 times daily 8/1/21   Ty Devries PA-C   albuterol sulfate  (90 Base) MCG/ACT inhaler Inhale 2 puffs into the lungs every 6 hours as needed for Wheezing 3/24/21   Felicia Cmaacho MD   albuterol-ipratropium (COMBIVENT RESPIMAT)  MCG/ACT AERS inhaler Inhale 1 puff into the lungs every 6 hours 3/24/21   Felicia Camacho MD   acetaminophen (AMINOFEN) 325 MG tablet Take 2 tablets by mouth every 6 hours as needed for Pain 4/23/20 Jesica Sheets, DO   brimonidine (ALPHAGAN) 0.2 % ophthalmic solution Place 1 drop into the right eye 2 times daily    Historical Provider, MD   dorzolamide-timolol 22.3-6.8 MG/ML SOLN Place 1 drop into the right eye 2 times daily    Historical Provider, MD   prednisoLONE acetate (PRED FORTE) 1 % ophthalmic suspension Place 1 drop into the right eye 2 times daily    Historical Provider, MD   allopurinol (ZYLOPRIM) 300 MG tablet Take 300 mg by mouth daily     Historical Provider, MD   atorvastatin (LIPITOR) 10 MG tablet Take 10 mg by mouth daily     Historical Provider, MD   lisinopril-hydrochlorothiazide (PRINZIDE;ZESTORETIC) 20-12.5 MG per tablet Take 1 tablet by mouth daily     Historical Provider, MD   metFORMIN (GLUCOPHAGE) 1000 MG tablet Take 1,000 mg by mouth 2 times daily (with meals)    Historical Provider, MD   amLODIPine (NORVASC) 10 MG tablet Take 10 mg by mouth daily 11/25/16   Historical Provider, MD         Physical Exam:     BP (!) 152/84   Pulse 94   Temp 98.4 °F (36.9 °C) (Oral)   Resp 18   Ht 6' 3\" (1.905 m)   Wt 300 lb (136.1 kg)   SpO2 98%   BMI 37.50 kg/m²       General: NAD, fast asleep, woke to tactile stimuli, irritable and requesting to be allowed to sleep but allowed physical exam  Eyes: Unable to assess as patient wouldn't really open eyes when responding or when asked to  HENT: NCAT, moist mucous membranes  Cardiovascular: Tachycardic. Respiratory: Clear to auscultation  Gastrointestinal: Soft, non tender  Genitourinary: no suprapubic tenderness  Musculoskeletal: No edema  Skin: Open wound on the left foot from great toe to 2/3rd toe area, no noticeable drainage, erythematous and slightly warm to touch  Neuro: Sleepy, but arouses easily and answers questions, decreased sensation to touch on RLE. Psych: Irritable.          Labs, Imaging, and Studies reviewed:    XR FOOT RIGHT (MIN 3 VIEWS)    Result Date: 11/19/2022  EXAMINATION: THREE XRAY VIEWS OF THE RIGHT FOOT 11/19/2022 7:48 pm COMPARISON: 09/17/2022. HISTORY: ORDERING SYSTEM PROVIDED HISTORY: attn 1st MTP TECHNOLOGIST PROVIDED HISTORY: Reason for exam:->attn 1st MTP Reason for Exam: Wound Check Additional signs and symptoms: attn 1st MTP FINDINGS: There is hallux valgus metatarsus primus varus. 2nd and 5th toe amputations appear unchanged. Osteophytes of the dorsal midfoot are again noted. There is soft tissue swelling which is most prominent at the forefoot. There is no subcutaneous air. No fracture, dislocation or focal osseous abnormality is identified. Soft tissue swelling with no evidence of osteomyelitis and no subcutaneous air. 1st toe bunion deformity and prior 2nd and 5th toe amputations, as before. VL DUP LOWER EXTREMITY VENOUS RIGHT    Result Date: 11/19/2022  EXAMINATION: DUPLEX VENOUS ULTRASOUND OF THE RIGHT LOWER EXTREMITY 11/19/2022 6:04 pm TECHNIQUE: Duplex ultrasound using B-mode/gray scaled imaging and Doppler spectral analysis and color flow was obtained of the deep venous structures of the right extremity. COMPARISON: 07/21/2022. HISTORY: ORDERING SYSTEM PROVIDED HISTORY: edema TECHNOLOGIST PROVIDED HISTORY: Reason for exam:->edema FINDINGS: The visualized veins of the right lower extremity are patent and free of echogenic thrombus. The veins demonstrate good compressibility with normal color flow study and spectral analysis. No evidence of DVT in the right lower extremity.        CBC:   Recent Labs     11/19/22 2215   WBC 4.9   HGB 12.2*        BMP:    Recent Labs     11/19/22 2215      K 3.5      CO2 25   BUN 10   CREATININE 0.7*   GLUCOSE 204*     Hepatic:   Recent Labs     11/19/22 2215   AST 40*   ALT 45*   BILITOT 0.5   ALKPHOS 112     Lipids:   Lab Results   Component Value Date/Time    CHOL 142 04/16/2011 01:19 PM    HDL 41 04/16/2011 01:19 PM    TRIG 268 04/16/2011 01:19 PM     Hemoglobin A1C:   Lab Results   Component Value Date/Time    LABA1C 8.5 09/18/2022 01:16 AM TSH: No results found for: TSH  Troponin:   Lab Results   Component Value Date/Time    TROPONINT <0.010 04/28/2022 03:26 AM    TROPONINT <0.010 08/01/2021 02:37 PM    TROPONINT <0.010 04/22/2020 11:29 PM     Lactic Acid: No results for input(s): LACTA in the last 72 hours. BNP: No results for input(s): PROBNP in the last 72 hours.   UA:  Lab Results   Component Value Date/Time    NITRU NEGATIVE 07/21/2022 02:20 PM    COLORU YELLOW 07/21/2022 02:20 PM    WBCUA <1 07/21/2022 02:20 PM    RBCUA NONE SEEN 07/21/2022 02:20 PM    MUCUS RARE 12/21/2018 06:12 PM    TRICHOMONAS NONE SEEN 07/21/2022 02:20 PM    BACTERIA NEGATIVE 07/21/2022 02:20 PM    CLARITYU CLEAR 07/21/2022 02:20 PM    Parag Gaston 1.020 07/21/2022 02:20 PM    LEUKOCYTESUR NEGATIVE 07/21/2022 02:20 PM    UROBILINOGEN 0.2 07/21/2022 02:20 PM    BILIRUBINUR NEGATIVE 07/21/2022 02:20 PM    BLOODU NEGATIVE 07/21/2022 02:20 PM    [address] 07/21/2022 02:20 PM     Urine Cultures: No results found for: Render Bruce  Blood Cultures: No results found for: BC  No results found for: BLOODCULT2  Organism:   Lab Results   Component Value Date/Time    ORG MRSA 12/23/2018 01:00 PM    ORG BSGA 12/23/2018 01:00 PM             Electronically signed by Orlando Vieyra MD on 11/19/2022 at 11:59 PM

## 2022-11-21 ENCOUNTER — APPOINTMENT (OUTPATIENT)
Dept: CT IMAGING | Age: 37
DRG: 720 | End: 2022-11-21
Payer: COMMERCIAL

## 2022-11-21 LAB
ALBUMIN SERPL-MCNC: 3.8 GM/DL (ref 3.4–5)
ALP BLD-CCNC: 82 IU/L (ref 40–128)
ALT SERPL-CCNC: 41 U/L (ref 10–40)
ANION GAP SERPL CALCULATED.3IONS-SCNC: 7 MMOL/L (ref 4–16)
AST SERPL-CCNC: 38 IU/L (ref 15–37)
BASOPHILS ABSOLUTE: 0 K/CU MM
BASOPHILS RELATIVE PERCENT: 1.4 % (ref 0–1)
BILIRUB SERPL-MCNC: 0.3 MG/DL (ref 0–1)
BUN BLDV-MCNC: 8 MG/DL (ref 6–23)
C-REACTIVE PROTEIN, HIGH SENSITIVITY: 3 MG/L
CALCIUM SERPL-MCNC: 8.8 MG/DL (ref 8.3–10.6)
CHLORIDE BLD-SCNC: 103 MMOL/L (ref 99–110)
CO2: 25 MMOL/L (ref 21–32)
CREAT SERPL-MCNC: 0.6 MG/DL (ref 0.9–1.3)
DIFFERENTIAL TYPE: ABNORMAL
EOSINOPHILS ABSOLUTE: 0 K/CU MM
EOSINOPHILS RELATIVE PERCENT: 1.4 % (ref 0–3)
GFR SERPL CREATININE-BSD FRML MDRD: >60 ML/MIN/1.73M2
GLUCOSE BLD-MCNC: 171 MG/DL (ref 70–99)
GLUCOSE BLD-MCNC: 196 MG/DL (ref 70–99)
GLUCOSE BLD-MCNC: 209 MG/DL (ref 70–99)
GLUCOSE BLD-MCNC: 284 MG/DL (ref 70–99)
GLUCOSE BLD-MCNC: 403 MG/DL (ref 70–99)
HCT VFR BLD CALC: 32.8 % (ref 42–52)
HEMOGLOBIN: 11.2 GM/DL (ref 13.5–18)
IMMATURE NEUTROPHIL %: 0.3 % (ref 0–0.43)
LYMPHOCYTES ABSOLUTE: 1.1 K/CU MM
LYMPHOCYTES RELATIVE PERCENT: 38.8 % (ref 24–44)
MCH RBC QN AUTO: 31.1 PG (ref 27–31)
MCHC RBC AUTO-ENTMCNC: 34.1 % (ref 32–36)
MCV RBC AUTO: 91.1 FL (ref 78–100)
MONOCYTES ABSOLUTE: 0.2 K/CU MM
MONOCYTES RELATIVE PERCENT: 6.5 % (ref 0–4)
NUCLEATED RBC %: 0 %
PDW BLD-RTO: 12.9 % (ref 11.7–14.9)
PLATELET # BLD: 179 K/CU MM (ref 140–440)
PMV BLD AUTO: 8.7 FL (ref 7.5–11.1)
POTASSIUM SERPL-SCNC: 3.9 MMOL/L (ref 3.5–5.1)
PROCALCITONIN: 0.04
RBC # BLD: 3.6 M/CU MM (ref 4.6–6.2)
SEGMENTED NEUTROPHILS ABSOLUTE COUNT: 1.5 K/CU MM
SEGMENTED NEUTROPHILS RELATIVE PERCENT: 51.6 % (ref 36–66)
SODIUM BLD-SCNC: 135 MMOL/L (ref 135–145)
TOTAL IMMATURE NEUTOROPHIL: 0.01 K/CU MM
TOTAL NUCLEATED RBC: 0 K/CU MM
TOTAL PROTEIN: 6.1 GM/DL (ref 6.4–8.2)
WBC # BLD: 2.9 K/CU MM (ref 4–10.5)

## 2022-11-21 PROCEDURE — 2580000003 HC RX 258: Performed by: STUDENT IN AN ORGANIZED HEALTH CARE EDUCATION/TRAINING PROGRAM

## 2022-11-21 PROCEDURE — APPSS60 APP SPLIT SHARED TIME 46-60 MINUTES: Performed by: NURSE PRACTITIONER

## 2022-11-21 PROCEDURE — 94664 DEMO&/EVAL PT USE INHALER: CPT

## 2022-11-21 PROCEDURE — 94640 AIRWAY INHALATION TREATMENT: CPT

## 2022-11-21 PROCEDURE — 82962 GLUCOSE BLOOD TEST: CPT

## 2022-11-21 PROCEDURE — 86140 C-REACTIVE PROTEIN: CPT

## 2022-11-21 PROCEDURE — 80053 COMPREHEN METABOLIC PANEL: CPT

## 2022-11-21 PROCEDURE — 75635 CT ANGIO ABDOMINAL ARTERIES: CPT

## 2022-11-21 PROCEDURE — 84145 PROCALCITONIN (PCT): CPT

## 2022-11-21 PROCEDURE — 36415 COLL VENOUS BLD VENIPUNCTURE: CPT

## 2022-11-21 PROCEDURE — 89220 SPUTUM SPECIMEN COLLECTION: CPT

## 2022-11-21 PROCEDURE — 99223 1ST HOSP IP/OBS HIGH 75: CPT | Performed by: INTERNAL MEDICINE

## 2022-11-21 PROCEDURE — 87075 CULTR BACTERIA EXCEPT BLOOD: CPT

## 2022-11-21 PROCEDURE — 6360000002 HC RX W HCPCS: Performed by: STUDENT IN AN ORGANIZED HEALTH CARE EDUCATION/TRAINING PROGRAM

## 2022-11-21 PROCEDURE — 2580000003 HC RX 258: Performed by: EMERGENCY MEDICINE

## 2022-11-21 PROCEDURE — 6360000004 HC RX CONTRAST MEDICATION: Performed by: NURSE PRACTITIONER

## 2022-11-21 PROCEDURE — 6370000000 HC RX 637 (ALT 250 FOR IP): Performed by: STUDENT IN AN ORGANIZED HEALTH CARE EDUCATION/TRAINING PROGRAM

## 2022-11-21 PROCEDURE — 85025 COMPLETE CBC W/AUTO DIFF WBC: CPT

## 2022-11-21 PROCEDURE — 87070 CULTURE OTHR SPECIMN AEROBIC: CPT

## 2022-11-21 PROCEDURE — 1200000000 HC SEMI PRIVATE

## 2022-11-21 PROCEDURE — 6370000000 HC RX 637 (ALT 250 FOR IP): Performed by: NURSE PRACTITIONER

## 2022-11-21 PROCEDURE — 94761 N-INVAS EAR/PLS OXIMETRY MLT: CPT

## 2022-11-21 RX ORDER — DIPHENHYDRAMINE HCL 25 MG
50 TABLET ORAL ONCE
Status: COMPLETED | OUTPATIENT
Start: 2022-11-21 | End: 2022-11-21

## 2022-11-21 RX ORDER — ALBUTEROL SULFATE 90 UG/1
2 AEROSOL, METERED RESPIRATORY (INHALATION) 3 TIMES DAILY
Status: DISCONTINUED | OUTPATIENT
Start: 2022-11-21 | End: 2022-11-23 | Stop reason: HOSPADM

## 2022-11-21 RX ORDER — INSULIN GLARGINE 100 [IU]/ML
30 INJECTION, SOLUTION SUBCUTANEOUS NIGHTLY
Status: DISCONTINUED | OUTPATIENT
Start: 2022-11-22 | End: 2022-11-23

## 2022-11-21 RX ORDER — INSULIN LISPRO 100 [IU]/ML
10 INJECTION, SOLUTION INTRAVENOUS; SUBCUTANEOUS
Status: DISCONTINUED | OUTPATIENT
Start: 2022-11-22 | End: 2022-11-23

## 2022-11-21 RX ORDER — GUAIFENESIN/DEXTROMETHORPHAN 100-10MG/5
5 SYRUP ORAL EVERY 4 HOURS PRN
Status: DISCONTINUED | OUTPATIENT
Start: 2022-11-21 | End: 2022-11-23 | Stop reason: HOSPADM

## 2022-11-21 RX ADMIN — OXYCODONE AND ACETAMINOPHEN 1 TABLET: 7.5; 325 TABLET ORAL at 15:33

## 2022-11-21 RX ADMIN — SUCRALFATE 1 G: 1 TABLET ORAL at 17:29

## 2022-11-21 RX ADMIN — INSULIN GLARGINE 40 UNITS: 100 INJECTION, SOLUTION SUBCUTANEOUS at 21:24

## 2022-11-21 RX ADMIN — PREDNISONE 50 MG: 20 TABLET ORAL at 21:20

## 2022-11-21 RX ADMIN — PREGABALIN 100 MG: 100 CAPSULE ORAL at 21:21

## 2022-11-21 RX ADMIN — SUCRALFATE 1 G: 1 TABLET ORAL at 11:10

## 2022-11-21 RX ADMIN — DIPHENHYDRAMINE HYDROCHLORIDE 50 MG: 25 TABLET ORAL at 15:33

## 2022-11-21 RX ADMIN — OXYCODONE AND ACETAMINOPHEN 1 TABLET: 7.5; 325 TABLET ORAL at 02:09

## 2022-11-21 RX ADMIN — BACITRACIN, NEOMYCIN, POLYMYXIN B: 400; 3.5; 5 OINTMENT TOPICAL at 11:11

## 2022-11-21 RX ADMIN — ALBUTEROL SULFATE 2 PUFF: 90 AEROSOL, METERED RESPIRATORY (INHALATION) at 22:05

## 2022-11-21 RX ADMIN — AMLODIPINE BESYLATE 10 MG: 10 TABLET ORAL at 11:09

## 2022-11-21 RX ADMIN — ALBUTEROL SULFATE 2 PUFF: 90 AEROSOL, METERED RESPIRATORY (INHALATION) at 13:55

## 2022-11-21 RX ADMIN — Medication 2 PUFF: at 22:07

## 2022-11-21 RX ADMIN — SODIUM CHLORIDE: 9 INJECTION, SOLUTION INTRAVENOUS at 05:23

## 2022-11-21 RX ADMIN — MEROPENEM 1000 MG: 1 INJECTION, POWDER, FOR SOLUTION INTRAVENOUS at 05:24

## 2022-11-21 RX ADMIN — ALLOPURINOL 300 MG: 300 TABLET ORAL at 11:08

## 2022-11-21 RX ADMIN — POLYMYXIN B SULFATE AND TRIMETHOPRIM 1 DROP: 10000; 1 SOLUTION OPHTHALMIC at 21:32

## 2022-11-21 RX ADMIN — MEROPENEM 1000 MG: 1 INJECTION, POWDER, FOR SOLUTION INTRAVENOUS at 15:39

## 2022-11-21 RX ADMIN — BRIMONIDINE TARTRATE 1 DROP: 2 SOLUTION OPHTHALMIC at 17:30

## 2022-11-21 RX ADMIN — ATORVASTATIN CALCIUM 10 MG: 10 TABLET, FILM COATED ORAL at 11:09

## 2022-11-21 RX ADMIN — PREDNISOLONE ACETATE 1 DROP: 10 SUSPENSION/ DROPS OPHTHALMIC at 17:30

## 2022-11-21 RX ADMIN — VANCOMYCIN HYDROCHLORIDE 1500 MG: 5 INJECTION, POWDER, LYOPHILIZED, FOR SOLUTION INTRAVENOUS at 11:28

## 2022-11-21 RX ADMIN — SODIUM CHLORIDE 25 ML: 9 INJECTION, SOLUTION INTRAVENOUS at 21:30

## 2022-11-21 RX ADMIN — BUDESONIDE AND FORMOTEROL FUMARATE DIHYDRATE 2 PUFF: 160; 4.5 AEROSOL RESPIRATORY (INHALATION) at 08:03

## 2022-11-21 RX ADMIN — OXYCODONE AND ACETAMINOPHEN 1 TABLET: 7.5; 325 TABLET ORAL at 19:53

## 2022-11-21 RX ADMIN — Medication 5 MG: at 21:20

## 2022-11-21 RX ADMIN — OXYCODONE AND ACETAMINOPHEN 1 TABLET: 7.5; 325 TABLET ORAL at 11:13

## 2022-11-21 RX ADMIN — SODIUM CHLORIDE: 9 INJECTION, SOLUTION INTRAVENOUS at 15:39

## 2022-11-21 RX ADMIN — BUDESONIDE AND FORMOTEROL FUMARATE DIHYDRATE 2 PUFF: 160; 4.5 AEROSOL RESPIRATORY (INHALATION) at 22:09

## 2022-11-21 RX ADMIN — IOPAMIDOL 200 ML: 755 INJECTION, SOLUTION INTRAVENOUS at 22:47

## 2022-11-21 RX ADMIN — PREDNISONE 50 MG: 20 TABLET ORAL at 17:29

## 2022-11-21 RX ADMIN — PREGABALIN 100 MG: 100 CAPSULE ORAL at 11:10

## 2022-11-21 RX ADMIN — MEROPENEM 1000 MG: 1 INJECTION, POWDER, FOR SOLUTION INTRAVENOUS at 21:31

## 2022-11-21 RX ADMIN — Medication 2 PUFF: at 08:02

## 2022-11-21 RX ADMIN — DORZOLAMIDE HYDROCHLORIDE AND TIMOLOL MALEATE 1 DROP: 20; 5 SOLUTION/ DROPS OPHTHALMIC at 17:33

## 2022-11-21 RX ADMIN — SUCRALFATE 1 G: 1 TABLET ORAL at 05:24

## 2022-11-21 RX ADMIN — ALBUTEROL SULFATE 2 PUFF: 90 AEROSOL, METERED RESPIRATORY (INHALATION) at 16:11

## 2022-11-21 RX ADMIN — Medication 2 PUFF: at 16:11

## 2022-11-21 RX ADMIN — EZETIMIBE 10 MG: 10 TABLET ORAL at 21:20

## 2022-11-21 RX ADMIN — INSULIN LISPRO 4 UNITS: 100 INJECTION, SOLUTION INTRAVENOUS; SUBCUTANEOUS at 21:25

## 2022-11-21 RX ADMIN — INSULIN LISPRO 2 UNITS: 100 INJECTION, SOLUTION INTRAVENOUS; SUBCUTANEOUS at 17:27

## 2022-11-21 RX ADMIN — POLYMYXIN B SULFATE AND TRIMETHOPRIM 1 DROP: 10000; 1 SOLUTION OPHTHALMIC at 17:33

## 2022-11-21 RX ADMIN — SUCRALFATE 1 G: 1 TABLET ORAL at 21:20

## 2022-11-21 RX ADMIN — ALBUTEROL SULFATE 2 PUFF: 90 AEROSOL, METERED RESPIRATORY (INHALATION) at 08:02

## 2022-11-21 RX ADMIN — CLONIDINE HYDROCHLORIDE 0.2 MG: 0.2 TABLET ORAL at 11:09

## 2022-11-21 RX ADMIN — OXYCODONE AND ACETAMINOPHEN 1 TABLET: 7.5; 325 TABLET ORAL at 23:52

## 2022-11-21 RX ADMIN — LISINOPRIL AND HYDROCHLOROTHIAZIDE 1 TABLET: 12.5; 2 TABLET ORAL at 11:10

## 2022-11-21 ASSESSMENT — PAIN DESCRIPTION - LOCATION
LOCATION: LEG
LOCATION: FOOT
LOCATION: FOOT;LEG
LOCATION: FOOT
LOCATION: LEG
LOCATION: FOOT

## 2022-11-21 ASSESSMENT — PAIN SCALES - GENERAL
PAINLEVEL_OUTOF10: 6
PAINLEVEL_OUTOF10: 8
PAINLEVEL_OUTOF10: 9
PAINLEVEL_OUTOF10: 7

## 2022-11-21 ASSESSMENT — PAIN DESCRIPTION - DESCRIPTORS
DESCRIPTORS: ACHING
DESCRIPTORS: ACHING
DESCRIPTORS: THROBBING
DESCRIPTORS: ACHING
DESCRIPTORS: ACHING
DESCRIPTORS: SHOOTING;NAGGING

## 2022-11-21 ASSESSMENT — PAIN DESCRIPTION - ORIENTATION
ORIENTATION: RIGHT

## 2022-11-21 ASSESSMENT — PAIN - FUNCTIONAL ASSESSMENT: PAIN_FUNCTIONAL_ASSESSMENT: PREVENTS OR INTERFERES WITH ALL ACTIVE AND SOME PASSIVE ACTIVITIES

## 2022-11-21 NOTE — CARE COORDINATION
Reviewed chart and spoke with pt about discharge needs/plans. Pt live alone, PTA he was independent with wound care and ADL's. He uses [facility] for transportation. He has a PCP and insurance that covers medications. Pt open [facility] [place] to assist with wound Care and possible iv atb. [facility] and [facility]. Called/faxed referral to [facility].

## 2022-11-21 NOTE — RT PROTOCOL NOTE
RT Inhaler-Nebulizer Bronchodilator Protocol Note    There is a bronchodilator order in the chart from a provider indicating to follow the RT Bronchodilator Protocol and there is an Initiate RT Inhaler-Nebulizer Bronchodilator Protocol order as well (see protocol at bottom of note). CXR Findings:  No results found. The findings from the last RT Protocol Assessment were as follows:   History Pulmonary Disease: Chronic pulmonary disease  Respiratory Pattern: Mild dyspnea at rest, irregular pattern, or RR 21-25 bpm  Breath Sounds: Intermittent or unilateral wheezes  Cough: Strong, spontaneous, non-productive  Indication for Bronchodilator Therapy:    Bronchodilator Assessment Score: 10    Aerosolized bronchodilator medication orders have been revised according to the RT Inhaler-Nebulizer Bronchodilator Protocol below. Respiratory Therapist to perform RT Therapy Protocol Assessment initially then follow the protocol. Repeat RT Therapy Protocol Assessment PRN for score 0-3 or on second treatment, BID, and PRN for scores above 3. No Indications - adjust the frequency to every 6 hours PRN wheezing or bronchospasm, if no treatments needed after 48 hours then discontinue using Per Protocol order mode. If indication present, adjust the RT bronchodilator orders based on the Bronchodilator Assessment Score as indicated below. Use Inhaler orders unless patient has one or more of the following: on home nebulizer, not able to hold breath for 10 seconds, is not alert and oriented, cannot activate and use MDI correctly, or respiratory rate 25 breaths per minute or more, then use the equivalent nebulizer order(s) with same Frequency and PRN reasons based on the score. If a patient is on this medication at home then do not decrease Frequency below that used at home.     0-3 - enter or revise RT bronchodilator order(s) to equivalent RT Bronchodilator order with Frequency of every 4 hours PRN for wheezing or increased work of breathing using Per Protocol order mode. 4-6 - enter or revise RT Bronchodilator order(s) to two equivalent RT bronchodilator orders with one order with BID Frequency and one order with Frequency of every 4 hours PRN wheezing or increased work of breathing using Per Protocol order mode. 7-10 - enter or revise RT Bronchodilator order(s) to two equivalent RT bronchodilator orders with one order with TID Frequency and one order with Frequency of every 4 hours PRN wheezing or increased work of breathing using Per Protocol order mode. 11-13 - enter or revise RT Bronchodilator order(s) to one equivalent RT bronchodilator order with QID Frequency and an Albuterol order with Frequency of every 4 hours PRN wheezing or increased work of breathing using Per Protocol order mode. Greater than 13 - enter or revise RT Bronchodilator order(s) to one equivalent RT bronchodilator order with every 4 hours Frequency and an Albuterol order with Frequency of every 2 hours PRN wheezing or increased work of breathing using Per Protocol order mode. RT to enter RT Home Evaluation for COPD & MDI Assessment order using Per Protocol order mode.     Electronically signed by Faustina Boyer RCP on 11/21/2022 at 8:17 AM

## 2022-11-21 NOTE — CONSULTS
Infectious Disease Consult Note  2022   Patient Name: Sen Carpenter : 1985   Impression  Recurrent Right DFI with Ulcer, Possible OM:  Afebrile, no leukocytosis  -BC Pending  -Right foot wound culture: Pending  -XR Right Foot: Soft tissue swelling with no evidence of osteomyelitis and no subcutaneous   air. 1st toe bunion deformity and prior 2nd and 5th toe amputations, as before. RLE VL Doppler: No evidence of DVT in the right lower extremity. Dr. Elijah Yuen onboard for general surgery    IDDM:  Last HbAIC 22-8.5  Morbid Obesity:  BMI: 37.50  Gout  Legally Blind:  HTN:  Alcohol and Tobacco Abuse:  GERD:  Multi-morbidity: per PMHx  Plan:  Continue IV meropenem  Continue IV vancomycin  Trend CRP and Pct, ordered  Await wound cultures from right foot   Ordered bone scan of right foot (was unable to have MRI in  due to recent retinal detachment surgery) and:  Ordered CT right leg W IV contrast (ordered pre-med as patient has contrast allergy) to evaluate for OM and/or abscess    Thank you for allowing me to consult in the care of this patient.  ------------------------  REASON FOR CONSULT: Infective syndrome \"recurrent diabetic foot infection\"  Requested by: Dr. Stacy Crespo is a 39 y.o.  male with a history of asthma, retinal detachment of the left eye, DMII, GERD, HTN, alcohol abuse, allergy to Cipro (SOB), right toe OM s/p amputation with a chronic non-healing ulcer of his RLE who was admitted 2022 for further evaluation and management of progressively non-healing ulcer on the right medial aspect of his foot with increased pain x 2 days. He reports he has been following with Dr. Elijah Yuen. He has missed previous outpatient appointments with Dr. Elijah Yuen. He was hospitalized in 2022 for a similar event. He presented with hypertension and tachycardia.  His left foot XR revealed soft tissue swelling with no evidence of OM and no subcutaneous air. His RLE venous doppler showed no evidence of DVT. BC and wound culture are pending. He was started initially on empiric IV vancomycin and Zosyn and the Zosyn was transitioned to IV meropenem based on last sensitives of wounds cultures from 9/17/22 which grew Myroides spp, MssA, and Beta Strep Group B. Dr. Kala Plasencia is also onboard for general surgery. Past bone scan from 7/4/22 of the right foot: revealed three-phase activity seen at a distal right foe near the midline of the right foot, suspicious for OM in the absence of recent surgery or trauma to the site. Past wound cultures: 9/17/22: Myroides spp, MssA, Beta Strep Group B, Enterococcus Spp.  7/25/22: Enterococcus faecalis. 7/2/22: MssA. ? Infectious diseases service was consulted to evaluate the pt, and recommend further investigative and therapeutic measures. ROS: Other systems reviewed Including eyes, ENT, respiratory, cardiovascular, GI, , dermatologic, neurologic, psych, hem/lymphatic, musculoskeletal and endocrine were negative other than what is mentioned above.      Patient Active Problem List    Diagnosis Date Noted    Open wound of toe 07/26/2022    Cellulitis 07/21/2022    Diabetic foot ulcer with osteomyelitis ([address] 07/02/2022    Appendicitis 04/28/2022    Shortness of breath 03/20/2021    Asthma exacerbation 03/20/2021    Diabetic foot infection ([address] 07/26/2019    Osteomyelitis of second toe of right foot ([address] 12/21/2018    Acute osteomyelitis of toe, right ([address] 07/31/2017    UTI (urinary tract infection) due to Enterococcus 05/13/2017    Alcohol abuse 05/09/2017    Sprain of left ankle 05/16/2016    Closed nondisplaced fracture of fifth metatarsal bone of left foot 05/16/2016     Past Medical History:   Diagnosis Date    Asthma     Blind left eye     Diabetes mellitus ([address]     GERD (gastroesophageal reflux disease)     Hypertension     Retinal detachment 2012    left      Past Surgical History:   Procedure Laterality Date    CORNEAL TRANSPLANT Bilateral     EYE SURGERY      left eye removed    EYE SURGERY Right     FRACTURE SURGERY      foot left    LAPAROSCOPIC APPENDECTOMY N/A 4/28/2022    APPENDECTOMY LAPAROSCOPIC performed by Caroline Potter MD at [address] Right     MANDIBLE SURGERY Bilateral     TOE AMPUTATION Right 07/25/2017    TOE AMPUTATION Right 7/25/2022    TOE AMPUTATION, RAY AMPUTATION OF RIGHT SECOND TOE performed by Ana Griffin MD at [facility] OR      Family History   Problem Relation Age of Onset    Diabetes Mother     Asthma Mother     High Blood Pressure Mother     Stroke Mother     Heart Disease Mother     Diabetes Father     Asthma Father     High Blood Pressure Father       Infectious disease related family history - not contibutory. SOCIAL HISTORY  Social History     Tobacco Use    Smoking status: Every Day     Packs/day: 0.50     Years: 5.00     Pack years: 2.50     Types: Cigarettes    Smokeless tobacco: Never   Substance Use Topics    Alcohol use: Yes     Comment: occasionally      Born:[place]  Lives:[place], [place] with girlfriend  Occupation:Disabled  No recent travel of significance. No recent unusual exposures. NO pets    ? ALLERGIES  Allergies   Allergen Reactions    Ciprofloxacin Shortness Of Breath    Glucosamine Anaphylaxis    Shellfish-Derived Products Anaphylaxis      MEDICATIONS  Reviewed and are per the chart/EMR. ?   Antibiotics:   Present:  Vancomycin 11/19-  Meropenem 11/20-  Past:  Zosyn 11/19?  -------------------------------------------------------------------------------------------------------------------    Vital Signs:  Vitals:    11/21/22 0802   BP:    Pulse: 80   Resp: 16   Temp:    SpO2: 96%         Exam:    VS: noted; wt 300 lb (136.1 kg) Height 6'3\"  Gen: alert and oriented X3, no distress  Skin: no stigmata of endocarditis  Wounds: C/D/I right hallux with ulceration, s/p 2nd toe amputation   HEMT: AT/NC Oropharynx pink, moist, and without lesions or exudates; dentition in good state of repair  Eyes: PERRLA, EOMI, conjunctiva pink, sclera anicteric. Neck: Supple. Trachea midline. No LAD. Chest: no distress and CTA. Good air movement. Room air. Heart: RRR and no MRG. Abd: soft, non-distended, no tenderness, no hepatomegaly. Normoactive bowel sounds. Ext: no clubbing, cyanosis, or edema. See wounds above  Catheter Site: without erythema or tenderness  Neuro: Mental status intact. CN 2-12 intact and no focal sensory or motor deficits    ? Diagnostic Studies: reviewed  11/19/22 XR Foot Right:  Impression   Soft tissue swelling with no evidence of osteomyelitis and no subcutaneous   air. 1st toe bunion deformity and prior 2nd and 5th toe amputations, as before. 11/19/22 VL Dup Lower Extremity Venous Right:  Impression   No evidence of DVT in the right lower extremity. ??  I have examined this patient and available medical records on this date and have made the above observations, conclusions and recommendations. Electronically signed by: Electronically signed by Rebecca Birch.  KAYLI Titus CNP on 11/21/2022 at 10:11 AM

## 2022-11-21 NOTE — PROGRESS NOTES
[facility]  consulted by Dr. Bolivar Olmos for monitoring and adjustment. Indication for treatment: Vancomycin indication: SSTI with risk factors   Goal trough: Trough Goal: 10-15 mcg/mL  AUC/SHELLEY: <500    Risk Factors for MRSA Identified:   Hospitalization within the past 90 days, Received IV antibiotics within the past 90 days, Purulent and/or complicated SSTI    Pertinent Laboratory Values:   Temp Readings from Last 3 Encounters:   11/21/22 98.3 °F (36.8 °C) (Oral)   10/15/22 98.5 °F (36.9 °C) (Oral)   10/03/22 98.7 °F (37.1 °C)     Recent Labs     11/19/22 2215 11/20/22  0400 11/21/22  0159   WBC 4.9 4.2 2.9*   LACTATE  --  1.5  --      Recent Labs     11/19/22 2215 11/20/22  0400 11/21/22  0159   BUN 10 8 8   CREATININE 0.7* 0.7* 0.6*     Estimated Creatinine Clearance: 251 mL/min (A) (based on SCr of 0.6 mg/dL (L)). Intake/Output Summary (Last 24 hours) at 11/21/2022 1053  Last data filed at 11/21/2022 0209  Gross per 24 hour   Intake --   Output 1100 ml   Net -1100 ml       Pertinent Cultures:   Date    Source    Results  11/19   Blood    Sent  11/21   Wound    Ordered    Vancomycin level:   TROUGH:  No results for input(s): VANCOTROUGH in the last 72 hours. RANDOM:  No results for input(s): VANCORANDOM in the last 72 hours. Assessment:  HPI: 50-year-old male with longstanding severe diabetes resulting in diabetic foot problems toe amputations diabetic retinopathy was admitted with swelling and tenderness of the right foot  SCr, BUN, and urine output: Stable  Day(s) of therapy: 1 of 7   Vancomycin concentration: to be collected     Plan:  Vancomycin 2500 mg x 1 on 11/19, start vancomycin 1500 mg q12h   Pharmacy will continue to monitor patient and adjust therapy as indicated    VANCOMYCIN CONCENTRATION SCHEDULED FOR 11/22 @0600    Thank you for the consult.   Viviane Treviño, [facility]  11/21/2022 10:53 AM

## 2022-11-22 ENCOUNTER — APPOINTMENT (OUTPATIENT)
Dept: NUCLEAR MEDICINE | Age: 37
DRG: 720 | End: 2022-11-22
Payer: COMMERCIAL

## 2022-11-22 LAB
ADENOVIRUS DETECTION BY PCR: NOT DETECTED
ALBUMIN SERPL-MCNC: 4.3 GM/DL (ref 3.4–5)
ALP BLD-CCNC: 150 IU/L (ref 40–128)
ALT SERPL-CCNC: 54 U/L (ref 10–40)
ANION GAP SERPL CALCULATED.3IONS-SCNC: 13 MMOL/L (ref 4–16)
AST SERPL-CCNC: 31 IU/L (ref 15–37)
BASOPHILS ABSOLUTE: 0 K/CU MM
BASOPHILS RELATIVE PERCENT: 0.2 % (ref 0–1)
BILIRUB SERPL-MCNC: 0.3 MG/DL (ref 0–1)
BORDETELLA PARAPERTUSSIS BY PCR: NOT DETECTED
BORDETELLA PERTUSSIS PCR: NOT DETECTED
BUN BLDV-MCNC: 12 MG/DL (ref 6–23)
C-REACTIVE PROTEIN, HIGH SENSITIVITY: 4.3 MG/L
CALCIUM SERPL-MCNC: 9.6 MG/DL (ref 8.3–10.6)
CHLAMYDOPHILA PNEUMONIA PCR: NOT DETECTED
CHLORIDE BLD-SCNC: 96 MMOL/L (ref 99–110)
CO2: 24 MMOL/L (ref 21–32)
CORONAVIRUS 229E PCR: NOT DETECTED
CORONAVIRUS HKU1 PCR: NOT DETECTED
CORONAVIRUS NL63 PCR: NOT DETECTED
CORONAVIRUS OC43 PCR: NOT DETECTED
CREAT SERPL-MCNC: 0.7 MG/DL (ref 0.9–1.3)
DIFFERENTIAL TYPE: ABNORMAL
DOSE AMOUNT: NORMAL
DOSE TIME: NORMAL
EOSINOPHILS ABSOLUTE: 0 K/CU MM
EOSINOPHILS RELATIVE PERCENT: 0 % (ref 0–3)
GFR SERPL CREATININE-BSD FRML MDRD: >60 ML/MIN/1.73M2
GLUCOSE BLD-MCNC: 298 MG/DL (ref 70–99)
GLUCOSE BLD-MCNC: 303 MG/DL (ref 70–99)
GLUCOSE BLD-MCNC: 361 MG/DL (ref 70–99)
GLUCOSE BLD-MCNC: 368 MG/DL (ref 70–99)
GLUCOSE BLD-MCNC: 387 MG/DL (ref 70–99)
GLUCOSE BLD-MCNC: 399 MG/DL (ref 70–99)
HCT VFR BLD CALC: 37.8 % (ref 42–52)
HEMOGLOBIN: 13.2 GM/DL (ref 13.5–18)
HUMAN METAPNEUMOVIRUS PCR: NOT DETECTED
IMMATURE NEUTROPHIL %: 0.9 % (ref 0–0.43)
INFLUENZA A BY PCR: NOT DETECTED
INFLUENZA A H1 (2009) PCR: NOT DETECTED
INFLUENZA A H1 PANDEMIC PCR: NOT DETECTED
INFLUENZA A H3 PCR: NOT DETECTED
INFLUENZA B BY PCR: NOT DETECTED
LYMPHOCYTES ABSOLUTE: 0.5 K/CU MM
LYMPHOCYTES RELATIVE PERCENT: 8.7 % (ref 24–44)
MCH RBC QN AUTO: 31.6 PG (ref 27–31)
MCHC RBC AUTO-ENTMCNC: 34.9 % (ref 32–36)
MCV RBC AUTO: 90.4 FL (ref 78–100)
MONOCYTES ABSOLUTE: 0.1 K/CU MM
MONOCYTES RELATIVE PERCENT: 1.9 % (ref 0–4)
MYCOPLASMA PNEUMONIAE PCR: NOT DETECTED
NUCLEATED RBC %: 0 %
PARAINFLUENZA 1 PCR: NOT DETECTED
PARAINFLUENZA 2 PCR: NOT DETECTED
PARAINFLUENZA 3 PCR: NOT DETECTED
PARAINFLUENZA 4 PCR: NOT DETECTED
PDW BLD-RTO: 12.4 % (ref 11.7–14.9)
PLATELET # BLD: 232 K/CU MM (ref 140–440)
PMV BLD AUTO: 9.1 FL (ref 7.5–11.1)
POTASSIUM SERPL-SCNC: 4.3 MMOL/L (ref 3.5–5.1)
PROCALCITONIN: 0.03
RBC # BLD: 4.18 M/CU MM (ref 4.6–6.2)
RHINOVIRUS ENTEROVIRUS PCR: NOT DETECTED
RSV PCR: NOT DETECTED
SARS-COV-2: NOT DETECTED
SEGMENTED NEUTROPHILS ABSOLUTE COUNT: 5.2 K/CU MM
SEGMENTED NEUTROPHILS RELATIVE PERCENT: 88.3 % (ref 36–66)
SODIUM BLD-SCNC: 133 MMOL/L (ref 135–145)
TOTAL IMMATURE NEUTOROPHIL: 0.05 K/CU MM
TOTAL NUCLEATED RBC: 0 K/CU MM
TOTAL PROTEIN: 6.7 GM/DL (ref 6.4–8.2)
VANCOMYCIN RANDOM: 7.1 UG/ML
WBC # BLD: 5.9 K/CU MM (ref 4–10.5)

## 2022-11-22 PROCEDURE — 2580000003 HC RX 258: Performed by: STUDENT IN AN ORGANIZED HEALTH CARE EDUCATION/TRAINING PROGRAM

## 2022-11-22 PROCEDURE — 99232 SBSQ HOSP IP/OBS MODERATE 35: CPT | Performed by: NURSE PRACTITIONER

## 2022-11-22 PROCEDURE — 1200000000 HC SEMI PRIVATE

## 2022-11-22 PROCEDURE — 6360000002 HC RX W HCPCS: Performed by: STUDENT IN AN ORGANIZED HEALTH CARE EDUCATION/TRAINING PROGRAM

## 2022-11-22 PROCEDURE — 0202U NFCT DS 22 TRGT SARS-COV-2: CPT

## 2022-11-22 PROCEDURE — 6370000000 HC RX 637 (ALT 250 FOR IP): Performed by: STUDENT IN AN ORGANIZED HEALTH CARE EDUCATION/TRAINING PROGRAM

## 2022-11-22 PROCEDURE — 82962 GLUCOSE BLOOD TEST: CPT

## 2022-11-22 PROCEDURE — 80053 COMPREHEN METABOLIC PANEL: CPT

## 2022-11-22 PROCEDURE — 94664 DEMO&/EVAL PT USE INHALER: CPT

## 2022-11-22 PROCEDURE — 86140 C-REACTIVE PROTEIN: CPT

## 2022-11-22 PROCEDURE — 6370000000 HC RX 637 (ALT 250 FOR IP): Performed by: NURSE PRACTITIONER

## 2022-11-22 PROCEDURE — A9503 TC99M MEDRONATE: HCPCS | Performed by: NURSE PRACTITIONER

## 2022-11-22 PROCEDURE — 94761 N-INVAS EAR/PLS OXIMETRY MLT: CPT

## 2022-11-22 PROCEDURE — 85025 COMPLETE CBC W/AUTO DIFF WBC: CPT

## 2022-11-22 PROCEDURE — 36415 COLL VENOUS BLD VENIPUNCTURE: CPT

## 2022-11-22 PROCEDURE — 94640 AIRWAY INHALATION TREATMENT: CPT

## 2022-11-22 PROCEDURE — 3430000000 HC RX DIAGNOSTIC RADIOPHARMACEUTICAL: Performed by: NURSE PRACTITIONER

## 2022-11-22 PROCEDURE — 6370000000 HC RX 637 (ALT 250 FOR IP): Performed by: FAMILY MEDICINE

## 2022-11-22 PROCEDURE — 78315 BONE IMAGING 3 PHASE: CPT

## 2022-11-22 PROCEDURE — 84145 PROCALCITONIN (PCT): CPT

## 2022-11-22 PROCEDURE — 80202 ASSAY OF VANCOMYCIN: CPT

## 2022-11-22 RX ORDER — DIPHENHYDRAMINE HCL 25 MG
25 TABLET ORAL ONCE
Status: COMPLETED | OUTPATIENT
Start: 2022-11-22 | End: 2022-11-22

## 2022-11-22 RX ORDER — TC 99M MEDRONATE 20 MG/10ML
19.6 INJECTION, POWDER, LYOPHILIZED, FOR SOLUTION INTRAVENOUS
Status: COMPLETED | OUTPATIENT
Start: 2022-11-22 | End: 2022-11-22

## 2022-11-22 RX ADMIN — SUCRALFATE 1 G: 1 TABLET ORAL at 12:46

## 2022-11-22 RX ADMIN — VANCOMYCIN HYDROCHLORIDE 1500 MG: 5 INJECTION, POWDER, LYOPHILIZED, FOR SOLUTION INTRAVENOUS at 17:34

## 2022-11-22 RX ADMIN — DIPHENHYDRAMINE HYDROCHLORIDE 25 MG: 25 TABLET ORAL at 12:46

## 2022-11-22 RX ADMIN — POLYMYXIN B SULFATE AND TRIMETHOPRIM 1 DROP: 10000; 1 SOLUTION OPHTHALMIC at 16:47

## 2022-11-22 RX ADMIN — SUCRALFATE 1 G: 1 TABLET ORAL at 16:47

## 2022-11-22 RX ADMIN — PREDNISONE 50 MG: 20 TABLET ORAL at 03:17

## 2022-11-22 RX ADMIN — SODIUM CHLORIDE 25 ML: 9 INJECTION, SOLUTION INTRAVENOUS at 00:04

## 2022-11-22 RX ADMIN — BUDESONIDE AND FORMOTEROL FUMARATE DIHYDRATE 2 PUFF: 160; 4.5 AEROSOL RESPIRATORY (INHALATION) at 07:41

## 2022-11-22 RX ADMIN — POLYMYXIN B SULFATE AND TRIMETHOPRIM 1 DROP: 10000; 1 SOLUTION OPHTHALMIC at 20:45

## 2022-11-22 RX ADMIN — SUCRALFATE 1 G: 1 TABLET ORAL at 20:39

## 2022-11-22 RX ADMIN — Medication 2 PUFF: at 20:36

## 2022-11-22 RX ADMIN — POLYMYXIN B SULFATE AND TRIMETHOPRIM 1 DROP: 10000; 1 SOLUTION OPHTHALMIC at 09:28

## 2022-11-22 RX ADMIN — DORZOLAMIDE HYDROCHLORIDE AND TIMOLOL MALEATE 1 DROP: 20; 5 SOLUTION/ DROPS OPHTHALMIC at 16:47

## 2022-11-22 RX ADMIN — INSULIN LISPRO 4 UNITS: 100 INJECTION, SOLUTION INTRAVENOUS; SUBCUTANEOUS at 20:40

## 2022-11-22 RX ADMIN — INSULIN LISPRO 10 UNITS: 100 INJECTION, SOLUTION INTRAVENOUS; SUBCUTANEOUS at 09:18

## 2022-11-22 RX ADMIN — PREGABALIN 100 MG: 100 CAPSULE ORAL at 20:38

## 2022-11-22 RX ADMIN — ALBUTEROL SULFATE 2 PUFF: 90 AEROSOL, METERED RESPIRATORY (INHALATION) at 07:40

## 2022-11-22 RX ADMIN — BRIMONIDINE TARTRATE 1 DROP: 2 SOLUTION OPHTHALMIC at 09:28

## 2022-11-22 RX ADMIN — ALLOPURINOL 300 MG: 300 TABLET ORAL at 09:29

## 2022-11-22 RX ADMIN — BACITRACIN, NEOMYCIN, POLYMYXIN B: 400; 3.5; 5 OINTMENT TOPICAL at 09:32

## 2022-11-22 RX ADMIN — BRIMONIDINE TARTRATE 1 DROP: 2 SOLUTION OPHTHALMIC at 16:47

## 2022-11-22 RX ADMIN — OXYCODONE AND ACETAMINOPHEN 1 TABLET: 7.5; 325 TABLET ORAL at 05:40

## 2022-11-22 RX ADMIN — AMLODIPINE BESYLATE 10 MG: 10 TABLET ORAL at 09:29

## 2022-11-22 RX ADMIN — ATORVASTATIN CALCIUM 10 MG: 10 TABLET, FILM COATED ORAL at 09:29

## 2022-11-22 RX ADMIN — ALBUTEROL SULFATE 2 PUFF: 90 AEROSOL, METERED RESPIRATORY (INHALATION) at 16:45

## 2022-11-22 RX ADMIN — MEROPENEM 1000 MG: 1 INJECTION, POWDER, FOR SOLUTION INTRAVENOUS at 05:44

## 2022-11-22 RX ADMIN — BACITRACIN, NEOMYCIN, POLYMYXIN B: 400; 3.5; 5 OINTMENT TOPICAL at 00:05

## 2022-11-22 RX ADMIN — PREDNISOLONE ACETATE 1 DROP: 10 SUSPENSION/ DROPS OPHTHALMIC at 09:28

## 2022-11-22 RX ADMIN — SODIUM CHLORIDE, PRESERVATIVE FREE 10 ML: 5 INJECTION INTRAVENOUS at 09:30

## 2022-11-22 RX ADMIN — PREGABALIN 100 MG: 100 CAPSULE ORAL at 09:28

## 2022-11-22 RX ADMIN — OXYCODONE AND ACETAMINOPHEN 1 TABLET: 7.5; 325 TABLET ORAL at 17:26

## 2022-11-22 RX ADMIN — SODIUM CHLORIDE 25 ML/HR: 9 INJECTION, SOLUTION INTRAVENOUS at 16:33

## 2022-11-22 RX ADMIN — OXYCODONE AND ACETAMINOPHEN 1 TABLET: 7.5; 325 TABLET ORAL at 21:59

## 2022-11-22 RX ADMIN — CLONIDINE HYDROCHLORIDE 0.2 MG: 0.2 TABLET ORAL at 09:29

## 2022-11-22 RX ADMIN — INSULIN LISPRO 10 UNITS: 100 INJECTION, SOLUTION INTRAVENOUS; SUBCUTANEOUS at 13:00

## 2022-11-22 RX ADMIN — ALBUTEROL SULFATE 2 PUFF: 90 AEROSOL, METERED RESPIRATORY (INHALATION) at 20:35

## 2022-11-22 RX ADMIN — BUDESONIDE AND FORMOTEROL FUMARATE DIHYDRATE 2 PUFF: 160; 4.5 AEROSOL RESPIRATORY (INHALATION) at 20:37

## 2022-11-22 RX ADMIN — TC 99M MEDRONATE 19.6 MILLICURIE: 20 INJECTION, POWDER, LYOPHILIZED, FOR SOLUTION INTRAVENOUS at 11:10

## 2022-11-22 RX ADMIN — OXYCODONE AND ACETAMINOPHEN 1 TABLET: 7.5; 325 TABLET ORAL at 12:55

## 2022-11-22 RX ADMIN — PREDNISOLONE ACETATE 1 DROP: 10 SUSPENSION/ DROPS OPHTHALMIC at 16:47

## 2022-11-22 RX ADMIN — INSULIN LISPRO 3 UNITS: 100 INJECTION, SOLUTION INTRAVENOUS; SUBCUTANEOUS at 16:48

## 2022-11-22 RX ADMIN — VANCOMYCIN HYDROCHLORIDE 1500 MG: 5 INJECTION, POWDER, LYOPHILIZED, FOR SOLUTION INTRAVENOUS at 00:05

## 2022-11-22 RX ADMIN — POLYMYXIN B SULFATE AND TRIMETHOPRIM 1 DROP: 10000; 1 SOLUTION OPHTHALMIC at 12:58

## 2022-11-22 RX ADMIN — Medication 2 PUFF: at 16:46

## 2022-11-22 RX ADMIN — SODIUM CHLORIDE, PRESERVATIVE FREE 10 ML: 5 INJECTION INTRAVENOUS at 17:31

## 2022-11-22 RX ADMIN — INSULIN GLARGINE 30 UNITS: 100 INJECTION, SOLUTION SUBCUTANEOUS at 20:40

## 2022-11-22 RX ADMIN — LISINOPRIL AND HYDROCHLOROTHIAZIDE 1 TABLET: 12.5; 2 TABLET ORAL at 09:28

## 2022-11-22 RX ADMIN — SUCRALFATE 1 G: 1 TABLET ORAL at 05:40

## 2022-11-22 RX ADMIN — Medication 5 MG: at 20:38

## 2022-11-22 RX ADMIN — MEROPENEM 1000 MG: 1 INJECTION, POWDER, FOR SOLUTION INTRAVENOUS at 16:36

## 2022-11-22 RX ADMIN — INSULIN LISPRO 4 UNITS: 100 INJECTION, SOLUTION INTRAVENOUS; SUBCUTANEOUS at 09:19

## 2022-11-22 RX ADMIN — INSULIN LISPRO 2 UNITS: 100 INJECTION, SOLUTION INTRAVENOUS; SUBCUTANEOUS at 13:01

## 2022-11-22 RX ADMIN — EZETIMIBE 10 MG: 10 TABLET ORAL at 20:38

## 2022-11-22 RX ADMIN — DORZOLAMIDE HYDROCHLORIDE AND TIMOLOL MALEATE 1 DROP: 20; 5 SOLUTION/ DROPS OPHTHALMIC at 09:28

## 2022-11-22 RX ADMIN — INSULIN LISPRO 10 UNITS: 100 INJECTION, SOLUTION INTRAVENOUS; SUBCUTANEOUS at 16:48

## 2022-11-22 ASSESSMENT — PAIN DESCRIPTION - DESCRIPTORS
DESCRIPTORS: ACHING

## 2022-11-22 ASSESSMENT — PAIN SCALES - GENERAL
PAINLEVEL_OUTOF10: 7
PAINLEVEL_OUTOF10: 8
PAINLEVEL_OUTOF10: 4
PAINLEVEL_OUTOF10: 9
PAINLEVEL_OUTOF10: 4
PAINLEVEL_OUTOF10: 7

## 2022-11-22 ASSESSMENT — PAIN DESCRIPTION - LOCATION
LOCATION: FOOT

## 2022-11-22 ASSESSMENT — PAIN DESCRIPTION - ORIENTATION
ORIENTATION: RIGHT
ORIENTATION: ANTERIOR;RIGHT

## 2022-11-22 NOTE — PROGRESS NOTES
Discussed with infectious disease  Bone scan to exclude osteomyelitis of the first metatarsal.  IV antibiotics per infectious disease  Will review bone scan and make recommendations.

## 2022-11-22 NOTE — PROGRESS NOTES
Infectious Disease Progress Note  2022   Patient Name: Shanae Dela Cruz : 1985   Impression  Recurrent Right DFI with Ulcer, Possible OM:  Afebrile, no leukocytosis  -BC 0/2-NGTD  -Right foot wound culture: Pending  -XR Right Foot: Soft tissue swelling with no evidence of osteomyelitis and no subcutaneous   air. 1st toe bunion deformity and prior 2nd and 5th toe amputations, as before. RLE VL Doppler: No evidence of DVT in the right lower extremity. Dr. Satnam Almonte onboard for general surgery     IDDM:  Last HbAIC 22-8.5  Morbid Obesity:  BMI: 37.50  Gout  Legally Blind:  HTN:  Alcohol and Tobacco Abuse:  GERD:  Multi-morbidity: per PMHx  Plan:  Continue IV meropenem  Continue IV vancomycin  Trend CRP and Pct, ordered  Await wound cultures from right foot   Ordered bone scan of right foot (was unable to have MRI in  due to recent retinal detachment surgery) and:    Ongoing Antimicrobial Therapy  Vancomycin -  Meropenem -? Completed Antimicrobial Therapy  Zosyn ?? History:? Interval history noted. Chief complaint: right DFU and infection. Denies n/v/d/f or untoward effects of antibiotics. States still having pain right foot but manageable. Physical Exam:  Vital Signs: BP (!) 156/92   Pulse 86   Temp 98 °F (36.7 °C) (Oral)   Resp 16   Ht 6' 3\" (1.905 m)   Wt 300 lb (136.1 kg)   SpO2 97%   BMI 37.50 kg/m²     Gen:A&O x 4, no distress  Wounds: C/D/I right hallux with ulceration, s/p 2nd toe amputation   Chest: no distress and CTA. Good air movement. Room air. Heart: RRR and no MRG. Abd: soft, non-distended, no tenderness, no hepatomegaly. Normoactive bowel sounds. Ext: no clubbing, cyanosis, or edema. See wounds above  Catheter Site: without erythema or tenderness  Neuro: Mental status intact.  CN 2-12 intact and no focal sensory or motor deficits     Radiologic / Imaging / TESTING  22 XR Foot Right:  Impression   Soft tissue swelling with no evidence of osteomyelitis and no subcutaneous   air. 1st toe bunion deformity and prior 2nd and 5th toe amputations, as before. 11/19/22 VL Dup Lower Extremity Venous Right:  Impression   No evidence of DVT in the right lower extremity. 11/21/22 CTA Abdominal Aorta W Bilat Runoff W Contrast:  Impression   1. Edema and inflammation in the subcutaneous fat down the right calf, over   the ankle, and into the dorsum of the foot extending out towards the great   toe and 2nd toe. There is no focal abscess or soft tissue emphysema. 2.  Absence of the right 2nd and 5th toes with only small ossifications and   or part of the distal 2nd phalanx remaining. Correlate for previous surgery   with scarring or inflammation about the 2nd metacarpophalangeal joint. Also   has hallux valgus angulation and subluxation at the great toe with bunion   changes but without focal erosion. 3.  No erosion or destruction along the right mid and hind foot. No cortical   erosion or periosteal reaction along the bilateral tibia, fibula, and femurs. 4.  Fatty metamorphosis of the liver. No acute intra-process identified.          11/22/22 NM Bone Scan 3 Phase:     Labs:    Recent Results (from the past 24 hour(s))   POCT Glucose    Collection Time: 11/21/22  5:23 PM   Result Value Ref Range    POC Glucose 284 (H) 70 - 99 MG/DL   POCT Glucose    Collection Time: 11/21/22  7:39 PM   Result Value Ref Range    POC Glucose 403 (H) 70 - 99 MG/DL   Comprehensive Metabolic Panel w/ Reflex to MG    Collection Time: 11/22/22  6:32 AM   Result Value Ref Range    Sodium 133 (L) 135 - 145 MMOL/L    Potassium 4.3 3.5 - 5.1 MMOL/L    Chloride 96 (L) 99 - 110 mMol/L    CO2 24 21 - 32 MMOL/L    BUN 12 6 - 23 MG/DL    Creatinine 0.7 (L) 0.9 - 1.3 MG/DL    Est, Glom Filt Rate >60 >60 mL/min/1.73m2    Glucose 399 (H) 70 - 99 MG/DL    Calcium 9.6 8.3 - 10.6 MG/DL    Albumin 4.3 3.4 - 5.0 GM/DL    Total Protein 6.7 6.4 - 8.2 GM/DL    Total Bilirubin 0.3 0.0 - 1.0 MG/DL    ALT 54 (H) 10 - 40 U/L    AST 31 15 - 37 IU/L    Alkaline Phosphatase 150 (H) 40 - 128 IU/L    Anion Gap 13 4 - 16   CBC with Auto Differential    Collection Time: 11/22/22  6:32 AM   Result Value Ref Range    WBC 5.9 4.0 - 10.5 K/CU MM    RBC 4.18 (L) 4.6 - 6.2 M/CU MM    Hemoglobin 13.2 (L) 13.5 - 18.0 GM/DL    Hematocrit 37.8 (L) 42 - 52 %    MCV 90.4 78 - 100 FL    MCH 31.6 (H) 27 - 31 PG    MCHC 34.9 32.0 - 36.0 %    RDW 12.4 11.7 - 14.9 %    Platelets 853 620 - 615 K/CU MM    MPV 9.1 7.5 - 11.1 FL    Differential Type AUTOMATED DIFFERENTIAL     Segs Relative 88.3 (H) 36 - 66 %    Lymphocytes % 8.7 (L) 24 - 44 %    Monocytes % 1.9 0 - 4 %    Eosinophils % 0.0 0 - 3 %    Basophils % 0.2 0 - 1 %    Segs Absolute 5.2 K/CU MM    Lymphocytes Absolute 0.5 K/CU MM    Monocytes Absolute 0.1 K/CU MM    Eosinophils Absolute 0.0 K/CU MM    Basophils Absolute 0.0 K/CU MM    Nucleated RBC % 0.0 %    Total Nucleated RBC 0.0 K/CU MM    Total Immature Neutrophil 0.05 K/CU MM    Immature Neutrophil % 0.9 (H) 0 - 0.43 %   Vancomycin Level, Random    Collection Time: 11/22/22  6:32 AM   Result Value Ref Range    Vancomycin Rm 7.1 UG/ML    DOSE AMOUNT DOSE AMT.  GIVEN - 1500     DOSE TIME DOSE TIME GIVEN - 0000/1200    C-Reactive Protein    Collection Time: 11/22/22  6:32 AM   Result Value Ref Range    CRP High Sensitivity 4.3 <5.0 mg/L   Procalcitonin    Collection Time: 11/22/22  6:32 AM   Result Value Ref Range    Procalcitonin 0.031    POCT Glucose    Collection Time: 11/22/22  8:02 AM   Result Value Ref Range    POC Glucose 361 (H) 70 - 99 MG/DL   POCT Glucose    Collection Time: 11/22/22 10:37 AM   Result Value Ref Range    POC Glucose 387 (H) 70 - 99 MG/DL     CULTURE results: Invalid input(s): BLOOD CULTURE,  URINE CULTURE, SURGICAL CULTURE    Diagnosis:  Patient Active Problem List   Diagnosis    Sprain of left ankle    Closed nondisplaced fracture of fifth metatarsal bone of left foot    Alcohol abuse    UTI (urinary tract infection) due to Enterococcus    Acute osteomyelitis of toe, right (HCC)    Osteomyelitis of second toe of right foot (HCC)    Diabetic foot infection (HCC)    Shortness of breath    Asthma exacerbation    Appendicitis    Diabetic foot ulcer with osteomyelitis ([facility] [address]    Cellulitis    Open wound of toe       Active Problems  Active Problems:    Cellulitis  Resolved Problems:    * No resolved hospital problems. *    Electronically signed by: Electronically signed by Josse Elizabeth.  KAYLI Titus CNP on 11/22/2022 at 12:55 PM

## 2022-11-22 NOTE — PROGRESS NOTES
[address] [facility]  consulted by Dr. Allie Munguia for monitoring and adjustment. Indication for treatment: Vancomycin indication: SSTI with risk factors   Goal trough: Trough Goal: 10-15 mcg/mL  AUC/SHELLEY: <500    Risk Factors for MRSA Identified:   Hospitalization within the past 90 days, Received IV antibiotics within the past 90 days, Purulent and/or complicated SSTI    Pertinent Laboratory Values:   Temp Readings from Last 3 Encounters:   11/22/22 98 °F (36.7 °C) (Oral)   10/15/22 98.5 °F (36.9 °C) (Oral)   10/03/22 98.7 °F (37.1 °C)     Recent Labs     11/20/22  0400 11/21/22  0159 11/22/22  0632   WBC 4.2 2.9* 5.9   LACTATE 1.5  --   --        Recent Labs     11/20/22  0400 11/21/22  0159 11/22/22  0632   BUN 8 8 12   CREATININE 0.7* 0.6* 0.7*       Estimated Creatinine Clearance: 215 mL/min (A) (based on SCr of 0.7 mg/dL (L)). Intake/Output Summary (Last 24 hours) at 11/22/2022 1049  Last data filed at 11/22/2022 0929  Gross per 24 hour   Intake 10 ml   Output 600 ml   Net -590 ml         Pertinent Cultures:   Date    Source    Results  11/19   Blood    No growth  11/21   Wound    In process    Vancomycin level:   TROUGH:  No results for input(s): VANCOTROUGH in the last 72 hours. RANDOM:    Recent Labs     11/22/22  0632   VANCORANDOM 7.1       Assessment:  HPI: 69-year-old male with longstanding severe diabetes resulting in diabetic foot problems toe amputations diabetic retinopathy was admitted with swelling and tenderness of the right foot  SCr, BUN, and urine output: SCR remains close to baseline, UOP ok  Day(s) of therapy: 2 of 7   Vancomycin concentration:   11/22 - 7.1, 6½ hour level on 1500mg q12h (after 2 doses), predicted trough 15.8 at steady state    Plan:  Renal trends remain close to baseline  Continue vancomycin 1500mg ivpb q12h with a predicted therapeutic trough at steady state.   Recheck the vanco level in 2 days to monitor for accumulation 2/2 BMI  Pharmacy will continue to monitor patient and adjust therapy as indicated    Eamon 3 11/24 @0600    Thank you for the consult. Jocelyn Bustamante, [facility]  11/22/2022 10:49 AM

## 2022-11-22 NOTE — PROGRESS NOTES
V2.0  [facility] [place]      Name:  Rogerio Barry /Age/Sex: 1985  (39 y.o. male)   MRN & CSN:  [id number] & [id number] Encounter Date/Time: 2022 8:15 AM EST    Location:  32/4236-Z PCP: Denver Colas, MD       Hospital Day: 3    Assessment and Plan:       Plan:  Rogerio Barry is a 39 y.o. 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 Admitting for sepsis 2/2 cellulitis     #RLE cellulitis  #Open wound right great toe with concern for OM  Based on sensitivities from his last set of wound cultures(2022), started on Merrem and Vanc  Gen surg following  Follow up wound Culture  ID following  Wound care     #IDDM  Continue accucheks and low dose sliding scale  Adjusted basal bolus regimen  Last A1c 8.5 ()  Diabetic diet     #HTN  --Restart home regimen w/ holding parameters, monitor BP and adjust as appropriate     #Tobacco use dependence  --NRT ordered      Diet ADULT DIET; Regular; 5 carb choices (75 gm/meal)   DVT Prophylaxis [x] Lovenox, []  Heparin, [] SCDs, [] Ambulation,  [] Eliquis, [] Xarelto  [] Coumadin   Code Status Full Code   Disposition From: home   Expected Disposition: home   Estimated Date of Discharge: 2-3 days   Patient requires continued admission due to sepsis and wound infection    Surrogate Decision Maker/ POA      Subjective:     Chief Complaint: Wound Check (Reports wound to right foot that is causing pain and swelling up to right knee.)       Rogerio Barry is a 39 y.o. male who presents with open wound infection     Patient was seen and evaluated at bedside earlier this am. Aaox3. Hemodynamically stable. Concerned about diet restrictions.       Review of Systems: Review of Systems    Review of systems is negative except as mentioned above       Objective: Intake/Output Summary (Last 24 hours) at 11/21/2022 2142  Last data filed at 11/21/2022 0209  Gross per 24 hour   Intake --   Output 750 ml   Net -750 ml          Vitals:   Vitals:    11/21/22 1953   BP: (!) 144/89   Pulse: 81   Resp: 16   Temp: 97.7 °F (36.5 °C)   SpO2: 97%       Physical Exam:   General: NAD,  HENT: NCAT, moist mucous membranes  Cardiovascular: Tachycardic. Respiratory: Clear to auscultation  Gastrointestinal: Soft, non tender  Genitourinary: no suprapubic tenderness  Musculoskeletal: No edema  Skin: Open wound on the left foot from great toe to 2/3rd toe area, no noticeable drainage, erythematous and slightly warm to touch  Neuro: Sleepy, but arouses easily and answers questions, decreased sensation to touch on RLE. Psych: Irritable.     Medications:   Medications:    albuterol sulfate HFA  2 puff Inhalation TID    ipratropium  2 puff Inhalation TID    vancomycin  1,500 mg IntraVENous Q12H    predniSONE  50 mg Oral Q6H    allopurinol  300 mg Oral Daily    amLODIPine  10 mg Oral Daily    atorvastatin  10 mg Oral Daily    brimonidine  1 drop Right Eye BID    cloNIDine  0.2 mg Oral Daily    dorzolamide-timolol  1 drop Right Eye BID    ezetimibe  10 mg Oral Nightly    lisinopril-hydroCHLOROthiazide  1 tablet Oral Daily    melatonin  5 mg Oral Nightly    prednisoLONE acetate  1 drop Right Eye BID    pregabalin  100 mg Oral BID    sucralfate  1 g Oral 4x Daily AC & HS    budesonide-formoterol  2 puff Inhalation BID    trimethoprim-polymyxin b  1 drop Ophthalmic 4x Daily    enoxaparin  30 mg SubCUTAneous BID    insulin glargine  40 Units SubCUTAneous Nightly    insulin lispro  0-4 Units SubCUTAneous TID WC    insulin lispro  0-4 Units SubCUTAneous Nightly    meropenem  1,000 mg IntraVENous Q8H    nicotine  1 patch TransDERmal Daily    neomycin-bacitracin-polymyxin   Topical BID      Infusions:    sodium chloride 25 mL (11/21/22 2130)    dextrose      sodium chloride 100 mL/hr at 11/21/22 1539     PRN Meds: tiZANidine, 4 mg, Nightly PRN  ondansetron, 4 mg, Q8H PRN   Or  ondansetron, 4 mg, Q6H PRN  polyethylene glycol, 17 g, Daily PRN  acetaminophen, 650 mg, Q6H PRN   Or  acetaminophen, 650 mg, Q6H PRN  sodium chloride flush, 5-40 mL, PRN  sodium chloride, , PRN  oxyCODONE-acetaminophen, 1 tablet, Q4H PRN  oxyCODONE-acetaminophen, 1 tablet, Q4H PRN  glucose, 4 tablet, PRN  dextrose bolus, 125 mL, PRN   Or  dextrose bolus, 250 mL, PRN  glucagon (rDNA), 1 mg, PRN  dextrose, , Continuous PRN  albuterol sulfate HFA, 2 puff, Q4H PRN  ipratropium-albuterol, 1 ampule, Q4H PRN      Labs      Recent Results (from the past 24 hour(s))   Comprehensive Metabolic Panel w/ Reflex to MG    Collection Time: 11/21/22  1:59 AM   Result Value Ref Range    Sodium 135 135 - 145 MMOL/L    Potassium 3.9 3.5 - 5.1 MMOL/L    Chloride 103 99 - 110 mMol/L    CO2 25 21 - 32 MMOL/L    BUN 8 6 - 23 MG/DL    Creatinine 0.6 (L) 0.9 - 1.3 MG/DL    Est, Glom Filt Rate >60 >60 mL/min/1.73m2    Glucose 209 (H) 70 - 99 MG/DL    Calcium 8.8 8.3 - 10.6 MG/DL    Albumin 3.8 3.4 - 5.0 GM/DL    Total Protein 6.1 (L) 6.4 - 8.2 GM/DL    Total Bilirubin 0.3 0.0 - 1.0 MG/DL    ALT 41 (H) 10 - 40 U/L    AST 38 (H) 15 - 37 IU/L    Alkaline Phosphatase 82 40 - 128 IU/L    Anion Gap 7 4 - 16   CBC with Auto Differential    Collection Time: 11/21/22  1:59 AM   Result Value Ref Range    WBC 2.9 (L) 4.0 - 10.5 K/CU MM    RBC 3.60 (L) 4.6 - 6.2 M/CU MM    Hemoglobin 11.2 (L) 13.5 - 18.0 GM/DL    Hematocrit 32.8 (L) 42 - 52 %    MCV 91.1 78 - 100 FL    MCH 31.1 (H) 27 - 31 PG    MCHC 34.1 32.0 - 36.0 %    RDW 12.9 11.7 - 14.9 %    Platelets 294 596 - 537 K/CU MM    MPV 8.7 7.5 - 11.1 FL    Differential Type AUTOMATED DIFFERENTIAL     Segs Relative 51.6 36 - 66 %    Lymphocytes % 38.8 24 - 44 %    Monocytes % 6.5 (H) 0 - 4 %    Eosinophils % 1.4 0 - 3 %    Basophils % 1.4 (H) 0 - 1 %    Segs Absolute 1.5 K/CU MM    Lymphocytes Absolute 1.1 K/CU MM    Monocytes Absolute 0.2 K/CU MM    Eosinophils Absolute 0.0 K/CU MM    Basophils Absolute 0.0 K/CU MM    Nucleated RBC % 0.0 %    Total Nucleated RBC 0.0 K/CU MM    Total Immature Neutrophil 0.01 K/CU MM    Immature Neutrophil % 0.3 0 - 0.43 %   POCT Glucose    Collection Time: 11/21/22  8:32 AM   Result Value Ref Range    POC Glucose 171 (H) 70 - 99 MG/DL   POCT Glucose    Collection Time: 11/21/22 11:44 AM   Result Value Ref Range    POC Glucose 196 (H) 70 - 99 MG/DL   C-Reactive Protein    Collection Time: 11/21/22 12:20 PM   Result Value Ref Range    CRP High Sensitivity 3.0 <5.0 mg/L   POCT Glucose    Collection Time: 11/21/22  5:23 PM   Result Value Ref Range    POC Glucose 284 (H) 70 - 99 MG/DL   POCT Glucose    Collection Time: 11/21/22  7:39 PM   Result Value Ref Range    POC Glucose 403 (H) 70 - 99 MG/DL        Imaging/Diagnostics Last 24 Hours   XR FOOT RIGHT (MIN 3 VIEWS)    Result Date: 11/19/2022  EXAMINATION: THREE XRAY VIEWS OF THE RIGHT FOOT 11/19/2022 7:48 pm COMPARISON: 09/17/2022. HISTORY: ORDERING SYSTEM PROVIDED HISTORY: [facility] 1st MTP TECHNOLOGIST PROVIDED HISTORY: Reason for exam:->attn 1st MTP Reason for Exam: Wound Check Additional signs and symptoms: [facility] 1st MTP FINDINGS: There is hallux valgus metatarsus primus varus. 2nd and 5th toe amputations appear unchanged. Osteophytes of the dorsal midfoot are again noted. There is soft tissue swelling which is most prominent at the forefoot. There is no subcutaneous air. No fracture, dislocation or focal osseous abnormality is identified. Soft tissue swelling with no evidence of osteomyelitis and no subcutaneous air. 1st toe bunion deformity and prior 2nd and 5th toe amputations, as before.      VL DUP LOWER EXTREMITY VENOUS RIGHT    Result Date: 11/19/2022  EXAMINATION: DUPLEX VENOUS ULTRASOUND OF THE RIGHT LOWER EXTREMITY 11/19/2022 6:04 pm TECHNIQUE: Duplex ultrasound using B-mode/gray scaled imaging and Doppler spectral analysis and color flow was obtained of the deep venous structures of the right extremity. COMPARISON: 07/21/2022. HISTORY: ORDERING SYSTEM PROVIDED HISTORY: edema TECHNOLOGIST PROVIDED HISTORY: Reason for exam:->edema FINDINGS: The visualized veins of the right lower extremity are patent and free of echogenic thrombus. The veins demonstrate good compressibility with normal color flow study and spectral analysis. No evidence of DVT in the right lower extremity.        Electronically signed by Julee Wen MD on 11/21/2022 at 9:42 PM

## 2022-11-22 NOTE — PROGRESS NOTES
V2.0  [facility] [place]      Name:  Mil Piña /Age/Sex: 1985  (39 y.o. male)   MRN & CSN:  [id number] & [id number] Encounter Date/Time: 2022 3:29 PM EST    Location:  [address] [place] [address] PCP: Manuel Watters MD       Hospital Day: 4    Assessment and Plan:   Mil Piña is a 39 y.o. male with PMH of IDDM who presents with <principal problem not specified>    #RLE cellulitis  #Open wound right great toe with concern for OM  Based on sensitivities from his last set of wound cultures(2022), started on Merrem and Vanc  -CTA lower extremity right side: Edema and inflammation and subcutaneous fat down to right calf, over ankle and into the dorsum of the foot extending out towards the great toe and second toe, no focal abscess or soft tissue emphysema, absence of right second and fifth toes with only small ossifications and or part of the distal second phalanx remaining. Plan:  Gen surg following, appreciate recs  ID following, appreciate recs  Wound care  Continue meropenem and vancomycin  Trend CRP and procalcitonin  Follow-up on wound cultures  Follow-up on bone scan    #Upper respiratory symptoms  -runny nose since 1 day    Plan:  Respiratory PCR panel     Chronic medical problems:   #IDDM  Continue accucheks and low dose sliding scale  Adjusted basal bolus regimen  Last A1c 8.5 ()  Diabetic diet     #HTN  --Restart home regimen w/ holding parameters, monitor BP and adjust as appropriate     #Tobacco use disorder  --NRT ordered      Diet ADULT DIET;  Regular; 5 carb choices (75 gm/meal)   DVT Prophylaxis [x] Lovenox, []  Heparin, [] SCDs, [] Ambulation,  [] Eliquis, [] Xarelto  [] Coumadin   Code Status Full Code   Disposition From: home  Expected Disposition: home  Estimated Date of Discharge: >24h  Patient requires continued admission due to osteomyelitis rule out   Surrogate Decision Maker/ MARIELA Mims     Subjective:     Chief Complaint: Wound Check (Reports wound to right foot that is causing pain and swelling up to right knee.)     Patient with pain relatively well controlled. He also complains of new upper respiratory symptoms for 1 day. Review of Systems:    General: No fever, no chills  Nose: + runny nose  Heart: No chest pain, no palpitations  Lungs: No shortness of breath, no cough  Abdomen: No abdominal pain, no nausea, no vomiting, no constipation, no diarrhea  : No frequency, no dysuria, no urgency, no decreased urination  MSK: No lower extremity swelling  Neuro: No confusion, no weakness  Skin: No rashes, +right foot wound      Objective:      Intake/Output Summary (Last 24 hours) at 11/22/2022 1529  Last data filed at 11/22/2022 0025  Gross per 24 hour   Intake 10 ml   Output 600 ml   Net -590 ml        Vitals:   Vitals:    11/22/22 1411   BP: (!) 154/90   Pulse: 88   Resp: 16   Temp: 98 °F (36.7 °C)   SpO2: 97%       Physical Exam:     General: NAD, AAO x3-4, obese  Eyes: EOMI  HENT: Atraumatic  CVS: Normal S1 and S2, no murmurs, RRR  Respiratory: Normal breath sounds, clear to auscultation bilaterally, no respiratory distress  GI: Normal bowel sounds, soft, nondistended, nontender  MSK:  no lower extremity edema, left foot covered in Ace bandage  Skin: Intact, dry, warm, no rashes  Neuro: CN II to XII grossly intact  Psych: Normal mood    Medications:   Medications:    albuterol sulfate HFA  2 puff Inhalation TID    ipratropium  2 puff Inhalation TID    vancomycin  1,500 mg IntraVENous Q12H    insulin glargine  30 Units SubCUTAneous Nightly    insulin lispro  10 Units SubCUTAneous TID     allopurinol  300 mg Oral Daily    amLODIPine  10 mg Oral Daily    atorvastatin  10 mg Oral Daily    brimonidine  1 drop Right Eye BID    cloNIDine  0.2 mg Oral Daily    dorzolamide-timolol  1 drop Right Eye BID    ezetimibe  10 mg Oral Nightly    lisinopril-hydroCHLOROthiazide  1 tablet Oral Daily    melatonin  5 mg Oral Nightly    prednisoLONE acetate  1 drop Right Eye BID pregabalin  100 mg Oral BID    sucralfate  1 g Oral 4x Daily AC & HS    budesonide-formoterol  2 puff Inhalation BID    trimethoprim-polymyxin b  1 drop Ophthalmic 4x Daily    enoxaparin  30 mg SubCUTAneous BID    insulin lispro  0-4 Units SubCUTAneous TID WC    insulin lispro  0-4 Units SubCUTAneous Nightly    meropenem  1,000 mg IntraVENous Q8H    nicotine  1 patch TransDERmal Daily    neomycin-bacitracin-polymyxin   Topical BID      Infusions:    sodium chloride 100 mL/hr at 11/22/22 0543    dextrose       PRN Meds: guaiFENesin-dextromethorphan, 5 mL, Q4H PRN  tiZANidine, 4 mg, Nightly PRN  ondansetron, 4 mg, Q8H PRN   Or  ondansetron, 4 mg, Q6H PRN  polyethylene glycol, 17 g, Daily PRN  acetaminophen, 650 mg, Q6H PRN   Or  acetaminophen, 650 mg, Q6H PRN  sodium chloride flush, 5-40 mL, PRN  sodium chloride, , PRN  oxyCODONE-acetaminophen, 1 tablet, Q4H PRN  oxyCODONE-acetaminophen, 1 tablet, Q4H PRN  glucose, 4 tablet, PRN  dextrose bolus, 125 mL, PRN   Or  dextrose bolus, 250 mL, PRN  glucagon (rDNA), 1 mg, PRN  dextrose, , Continuous PRN  albuterol sulfate HFA, 2 puff, Q4H PRN  ipratropium-albuterol, 1 ampule, Q4H PRN        Labs      Recent Results (from the past 24 hour(s))   POCT Glucose    Collection Time: 11/21/22  5:23 PM   Result Value Ref Range    POC Glucose 284 (H) 70 - 99 MG/DL   POCT Glucose    Collection Time: 11/21/22  7:39 PM   Result Value Ref Range    POC Glucose 403 (H) 70 - 99 MG/DL   Comprehensive Metabolic Panel w/ Reflex to MG    Collection Time: 11/22/22  6:32 AM   Result Value Ref Range    Sodium 133 (L) 135 - 145 MMOL/L    Potassium 4.3 3.5 - 5.1 MMOL/L    Chloride 96 (L) 99 - 110 mMol/L    CO2 24 21 - 32 MMOL/L    BUN 12 6 - 23 MG/DL    Creatinine 0.7 (L) 0.9 - 1.3 MG/DL    Est, Glom Filt Rate >60 >60 mL/min/1.73m2    Glucose 399 (H) 70 - 99 MG/DL    Calcium 9.6 8.3 - 10.6 MG/DL    Albumin 4.3 3.4 - 5.0 GM/DL    Total Protein 6.7 6.4 - 8.2 GM/DL    Total Bilirubin 0.3 0.0 - 1.0 MG/DL    ALT 54 (H) 10 - 40 U/L    AST 31 15 - 37 IU/L    Alkaline Phosphatase 150 (H) 40 - 128 IU/L    Anion Gap 13 4 - 16   CBC with Auto Differential    Collection Time: 11/22/22  6:32 AM   Result Value Ref Range    WBC 5.9 4.0 - 10.5 K/CU MM    RBC 4.18 (L) 4.6 - 6.2 M/CU MM    Hemoglobin 13.2 (L) 13.5 - 18.0 GM/DL    Hematocrit 37.8 (L) 42 - 52 %    MCV 90.4 78 - 100 FL    MCH 31.6 (H) 27 - 31 PG    MCHC 34.9 32.0 - 36.0 %    RDW 12.4 11.7 - 14.9 %    Platelets 216 487 - 496 K/CU MM    MPV 9.1 7.5 - 11.1 FL    Differential Type AUTOMATED DIFFERENTIAL     Segs Relative 88.3 (H) 36 - 66 %    Lymphocytes % 8.7 (L) 24 - 44 %    Monocytes % 1.9 0 - 4 %    Eosinophils % 0.0 0 - 3 %    Basophils % 0.2 0 - 1 %    Segs Absolute 5.2 K/CU MM    Lymphocytes Absolute 0.5 K/CU MM    Monocytes Absolute 0.1 K/CU MM    Eosinophils Absolute 0.0 K/CU MM    Basophils Absolute 0.0 K/CU MM    Nucleated RBC % 0.0 %    Total Nucleated RBC 0.0 K/CU MM    Total Immature Neutrophil 0.05 K/CU MM    Immature Neutrophil % 0.9 (H) 0 - 0.43 %   Vancomycin Level, Random    Collection Time: 11/22/22  6:32 AM   Result Value Ref Range    Vancomycin Rm 7.1 UG/ML    DOSE AMOUNT DOSE AMT.  GIVEN - 1500     DOSE TIME DOSE TIME GIVEN - 0000/1200    C-Reactive Protein    Collection Time: 11/22/22  6:32 AM   Result Value Ref Range    CRP High Sensitivity 4.3 <5.0 mg/L   Procalcitonin    Collection Time: 11/22/22  6:32 AM   Result Value Ref Range    Procalcitonin 0.031    POCT Glucose    Collection Time: 11/22/22  8:02 AM   Result Value Ref Range    POC Glucose 361 (H) 70 - 99 MG/DL   POCT Glucose    Collection Time: 11/22/22 10:37 AM   Result Value Ref Range    POC Glucose 387 (H) 70 - 99 MG/DL   POCT Glucose    Collection Time: 11/22/22 12:52 PM   Result Value Ref Range    POC Glucose 298 (H) 70 - 99 MG/DL        Imaging/Diagnostics Last 24 Hours   CTA ABDOMINAL AORTA W BILAT RUNOFF W CONTRAST    Result Date: 11/22/2022  EXAMINATION: CTA OF THE AORTA WITH LOWER EXTREMITY RUNOFF 11/21/2022 10:46 pm TECHNIQUE: CTA of the pelvis and bilateral lower extremities was performed after the administration of intravenous contrast.   Multiplanar reformatted images are provided for review. MIP images are provided for review. Automated exposure control, iterative reconstruction, and/or weight based adjustment of the mA/kV was utilized to reduce the radiation dose to as low as reasonably achievable. COMPARISON: None. HISTORY: ORDERING SYSTEM PROVIDED HISTORY: evaluate for OM and/or abscess TECHNOLOGIST PROVIDED HISTORY: Reason for exam:->evaluate for OM and/or abscess Reason for Exam: evaluate for OM and/or abscess, right foot wound FINDINGS: Nonvascular Lower Chest: Subsegmental atelectatic changes in the lower lungs but no pleural effusion. Organs: Early arterial phase study. Fatty metamorphosis of the liver with mildly heterogenous appearance. Gallbladder is contracted. The spleen is not enlarged. There is no pancreatic calcification, ductal dilatation, or surrounding fluid. Adrenal glands are unremarkable. No hydronephrosis or hydroureter at the kidneys. Kidneys are enhancing equally with and excreting contrast. GI/Bowel: Stomach, small bowel, and colon are not dilated. No focal inflammation or abnormal fluid collection in the abdomen and pelvis. Postsurgical changes the base the cecum suggest prior appendectomy. There is no pneumoperitoneum. Pelvis: Urinary bladder wall is not thickened. Prostate is septal.  No free fluid in the pelvis. Peritoneum/Retroperitoneum: No retroperitoneal hematoma or bulky lymphadenopathy. Bones/Soft Tissues: The abdominal wall is acceptable. There is minimal nonspecific edema in the subcutaneous tissues of the buttocks. Soft tissues of the left leg are unremarkable. The muscles and subcutaneous fat of the right thigh are normal in appearance.  Upper portion of the calf started to have subcutaneous edema within the fat and worsens distally as it approaches the ankle. There is edema coursing over the dorsum of the foot and likely to the level of the great toe. No focal abscess is identified. There is no soft tissue emphysema. Hallux valgus angulation and subluxation of the great toe with bunion changes along the 1st metatarsal.  No focal destruction is seen of the 1st metatarsal and phalanges. The 2nd metatarsal is still present. Small ossifications at the expected 2nd toe without normal 2nd toe. Some inflammation and swelling are present around the expected 2nd metatarsophalangeal joint but could have scarring given the bony deformity. The 3rd and 4th toes and metatarsophalangeal junctions are normal.  5th metatarsal is intact but again with absence of the expected 5th toe and only small ossifications at the margin. Some scarring at the site 5th toe site. Some degenerative changes in the midfoot and hindfoot but without focal destruction. No cortical erosion or periosteal reaction along the tibia and fibula. The femur is unremarkable. Loreatha Press VASCULAR Good enhancement of the abdominal aorta and major branches. No abdominal aortic aneurysm or dissection. There is a mild narrowing the origin of the celiac root which appears to be more along its superior edge and may be due to a median arcuate ligament impression. The branches of the celiac root have normal caliber and enhancement. Single bilateral renal arteries and the superior mesenteric artery are unremarkable. The right iliac system, femoral arteries, popliteal artery, and calf arteries are patent with a normal caliber. Continued enhancement of the posterior tibial artery into the plantar aspect of the foot. Decreased caliber of the dorsalis pedis artery with inflammation and edema of the adjacent tissues but without occlusion. Unremarkable left iliac system, superficial femoral and profunda arteries, and popliteal artery.   Continued enhancement down the left calf arteries and into the foot. Good enhancement of the dorsalis pedis and plantar branches. 1.  Edema and inflammation in the subcutaneous fat down the right calf, over the ankle, and into the dorsum of the foot extending out towards the great toe and 2nd toe. There is no focal abscess or soft tissue emphysema. 2.  Absence of the right 2nd and 5th toes with only small ossifications and or part of the distal 2nd phalanx remaining. Correlate for previous surgery with scarring or inflammation about the 2nd metacarpophalangeal joint. Also has hallux valgus angulation and subluxation at the great toe with bunion changes but without focal erosion. 3.  No erosion or destruction along the right mid and hind foot. No cortical erosion or periosteal reaction along the bilateral tibia, fibula, and femurs. 4.  Fatty metamorphosis of the liver. No acute intra-process identified.        Electronically signed by Dionne Lai MD on 11/22/2022 at 3:29 PM

## 2022-11-22 NOTE — CARE COORDINATION
Reviewed chart and discussed in IDR, cult results pending, awaiting ID recs. Pt has used [facility] in past and open to them again. Cm will continue to follow.

## 2022-11-23 VITALS
SYSTOLIC BLOOD PRESSURE: 128 MMHG | HEIGHT: 75 IN | WEIGHT: 300 LBS | OXYGEN SATURATION: 97 % | DIASTOLIC BLOOD PRESSURE: 81 MMHG | TEMPERATURE: 97.5 F | HEART RATE: 73 BPM | RESPIRATION RATE: 18 BRPM | BODY MASS INDEX: 37.3 KG/M2

## 2022-11-23 LAB
C-REACTIVE PROTEIN, HIGH SENSITIVITY: 3.4 MG/L
GLUCOSE BLD-MCNC: 217 MG/DL (ref 70–99)
GLUCOSE BLD-MCNC: 249 MG/DL (ref 70–99)
GLUCOSE BLD-MCNC: 309 MG/DL (ref 70–99)

## 2022-11-23 PROCEDURE — A4216 STERILE WATER/SALINE, 10 ML: HCPCS | Performed by: NURSE PRACTITIONER

## 2022-11-23 PROCEDURE — 99232 SBSQ HOSP IP/OBS MODERATE 35: CPT | Performed by: NURSE PRACTITIONER

## 2022-11-23 PROCEDURE — 82962 GLUCOSE BLOOD TEST: CPT

## 2022-11-23 PROCEDURE — 36415 COLL VENOUS BLD VENIPUNCTURE: CPT

## 2022-11-23 PROCEDURE — 6370000000 HC RX 637 (ALT 250 FOR IP): Performed by: NURSE PRACTITIONER

## 2022-11-23 PROCEDURE — 6370000000 HC RX 637 (ALT 250 FOR IP): Performed by: STUDENT IN AN ORGANIZED HEALTH CARE EDUCATION/TRAINING PROGRAM

## 2022-11-23 PROCEDURE — C9113 INJ PANTOPRAZOLE SODIUM, VIA: HCPCS | Performed by: NURSE PRACTITIONER

## 2022-11-23 PROCEDURE — 6360000002 HC RX W HCPCS: Performed by: STUDENT IN AN ORGANIZED HEALTH CARE EDUCATION/TRAINING PROGRAM

## 2022-11-23 PROCEDURE — 2580000003 HC RX 258: Performed by: STUDENT IN AN ORGANIZED HEALTH CARE EDUCATION/TRAINING PROGRAM

## 2022-11-23 PROCEDURE — 6370000000 HC RX 637 (ALT 250 FOR IP): Performed by: INTERNAL MEDICINE

## 2022-11-23 PROCEDURE — 94761 N-INVAS EAR/PLS OXIMETRY MLT: CPT

## 2022-11-23 PROCEDURE — 94640 AIRWAY INHALATION TREATMENT: CPT

## 2022-11-23 PROCEDURE — 86140 C-REACTIVE PROTEIN: CPT

## 2022-11-23 PROCEDURE — 6360000002 HC RX W HCPCS: Performed by: NURSE PRACTITIONER

## 2022-11-23 PROCEDURE — 2580000003 HC RX 258: Performed by: NURSE PRACTITIONER

## 2022-11-23 RX ORDER — INSULIN LISPRO 100 [IU]/ML
0-4 INJECTION, SOLUTION INTRAVENOUS; SUBCUTANEOUS NIGHTLY
Status: DISCONTINUED | OUTPATIENT
Start: 2022-11-23 | End: 2022-11-23 | Stop reason: HOSPADM

## 2022-11-23 RX ORDER — AMOXICILLIN 500 MG/1
500 CAPSULE ORAL EVERY 8 HOURS SCHEDULED
Status: DISCONTINUED | OUTPATIENT
Start: 2022-11-23 | End: 2022-11-23 | Stop reason: HOSPADM

## 2022-11-23 RX ORDER — INSULIN GLARGINE 100 [IU]/ML
34 INJECTION, SOLUTION SUBCUTANEOUS NIGHTLY
Status: DISCONTINUED | OUTPATIENT
Start: 2022-11-23 | End: 2022-11-23 | Stop reason: HOSPADM

## 2022-11-23 RX ORDER — AMOXICILLIN 500 MG/1
500 CAPSULE ORAL EVERY 8 HOURS SCHEDULED
Qty: 29 CAPSULE | Refills: 0 | Status: SHIPPED | OUTPATIENT
Start: 2022-11-23 | End: 2022-12-03

## 2022-11-23 RX ORDER — PANTOPRAZOLE SODIUM 40 MG/1
40 TABLET, DELAYED RELEASE ORAL
Qty: 30 TABLET | Refills: 3 | Status: SHIPPED | OUTPATIENT
Start: 2022-11-24

## 2022-11-23 RX ORDER — PANTOPRAZOLE SODIUM 40 MG/10ML
40 INJECTION, POWDER, LYOPHILIZED, FOR SOLUTION INTRAVENOUS DAILY
Status: DISCONTINUED | OUTPATIENT
Start: 2022-11-23 | End: 2022-11-23

## 2022-11-23 RX ORDER — DOXYCYCLINE HYCLATE 100 MG
100 TABLET ORAL EVERY 12 HOURS SCHEDULED
Status: DISCONTINUED | OUTPATIENT
Start: 2022-11-23 | End: 2022-11-23 | Stop reason: HOSPADM

## 2022-11-23 RX ORDER — PANTOPRAZOLE SODIUM 40 MG/1
40 TABLET, DELAYED RELEASE ORAL
Status: DISCONTINUED | OUTPATIENT
Start: 2022-11-24 | End: 2022-11-23 | Stop reason: HOSPADM

## 2022-11-23 RX ORDER — OXYCODONE HYDROCHLORIDE AND ACETAMINOPHEN 5; 325 MG/1; MG/1
1 TABLET ORAL EVERY 4 HOURS PRN
Qty: 18 TABLET | Refills: 0 | Status: SHIPPED | OUTPATIENT
Start: 2022-11-23 | End: 2022-11-26

## 2022-11-23 RX ORDER — INSULIN LISPRO 100 [IU]/ML
0-8 INJECTION, SOLUTION INTRAVENOUS; SUBCUTANEOUS
Status: DISCONTINUED | OUTPATIENT
Start: 2022-11-23 | End: 2022-11-23 | Stop reason: HOSPADM

## 2022-11-23 RX ORDER — INSULIN LISPRO 100 [IU]/ML
11 INJECTION, SOLUTION INTRAVENOUS; SUBCUTANEOUS
Status: DISCONTINUED | OUTPATIENT
Start: 2022-11-23 | End: 2022-11-23 | Stop reason: HOSPADM

## 2022-11-23 RX ORDER — MOXIFLOXACIN 5 MG/ML
1 SOLUTION/ DROPS OPHTHALMIC 4 TIMES DAILY
Qty: 3 ML | Refills: 0 | Status: SHIPPED | OUTPATIENT
Start: 2022-11-23

## 2022-11-23 RX ORDER — BACITRACIN, NEOMYCIN, POLYMYXIN B 400; 3.5; 5 [USP'U]/G; MG/G; [USP'U]/G
OINTMENT TOPICAL
Qty: 1 EACH | Refills: 0 | Status: SHIPPED | OUTPATIENT
Start: 2022-11-23 | End: 2022-12-03

## 2022-11-23 RX ORDER — DOXYCYCLINE HYCLATE 100 MG
100 TABLET ORAL EVERY 12 HOURS SCHEDULED
Qty: 20 TABLET | Refills: 0 | Status: SHIPPED | OUTPATIENT
Start: 2022-11-23 | End: 2022-12-03

## 2022-11-23 RX ADMIN — Medication 2 PUFF: at 08:39

## 2022-11-23 RX ADMIN — AMLODIPINE BESYLATE 10 MG: 10 TABLET ORAL at 08:55

## 2022-11-23 RX ADMIN — DORZOLAMIDE HYDROCHLORIDE AND TIMOLOL MALEATE 1 DROP: 20; 5 SOLUTION/ DROPS OPHTHALMIC at 09:05

## 2022-11-23 RX ADMIN — BACITRACIN, NEOMYCIN, POLYMYXIN B: 400; 3.5; 5 OINTMENT TOPICAL at 12:04

## 2022-11-23 RX ADMIN — INSULIN LISPRO 11 UNITS: 100 INJECTION, SOLUTION INTRAVENOUS; SUBCUTANEOUS at 17:52

## 2022-11-23 RX ADMIN — POLYMYXIN B SULFATE AND TRIMETHOPRIM 1 DROP: 10000; 1 SOLUTION OPHTHALMIC at 12:29

## 2022-11-23 RX ADMIN — OXYCODONE AND ACETAMINOPHEN 1 TABLET: 7.5; 325 TABLET ORAL at 16:13

## 2022-11-23 RX ADMIN — ALBUTEROL SULFATE 2 PUFF: 90 AEROSOL, METERED RESPIRATORY (INHALATION) at 15:18

## 2022-11-23 RX ADMIN — POLYMYXIN B SULFATE AND TRIMETHOPRIM 1 DROP: 10000; 1 SOLUTION OPHTHALMIC at 09:05

## 2022-11-23 RX ADMIN — CLONIDINE HYDROCHLORIDE 0.2 MG: 0.2 TABLET ORAL at 08:55

## 2022-11-23 RX ADMIN — ALBUTEROL SULFATE 2 PUFF: 90 AEROSOL, METERED RESPIRATORY (INHALATION) at 08:39

## 2022-11-23 RX ADMIN — POLYMYXIN B SULFATE AND TRIMETHOPRIM 1 DROP: 10000; 1 SOLUTION OPHTHALMIC at 17:16

## 2022-11-23 RX ADMIN — ENOXAPARIN SODIUM 30 MG: 100 INJECTION SUBCUTANEOUS at 08:55

## 2022-11-23 RX ADMIN — INSULIN LISPRO 2 UNITS: 100 INJECTION, SOLUTION INTRAVENOUS; SUBCUTANEOUS at 12:29

## 2022-11-23 RX ADMIN — LISINOPRIL AND HYDROCHLOROTHIAZIDE 1 TABLET: 12.5; 2 TABLET ORAL at 08:55

## 2022-11-23 RX ADMIN — SUCRALFATE 1 G: 1 TABLET ORAL at 05:41

## 2022-11-23 RX ADMIN — PREDNISOLONE ACETATE 1 DROP: 10 SUSPENSION/ DROPS OPHTHALMIC at 09:05

## 2022-11-23 RX ADMIN — PANTOPRAZOLE SODIUM 40 MG: 40 INJECTION, POWDER, FOR SOLUTION INTRAVENOUS at 09:26

## 2022-11-23 RX ADMIN — AMOXICILLIN 500 MG: 500 CAPSULE ORAL at 16:13

## 2022-11-23 RX ADMIN — PREGABALIN 100 MG: 100 CAPSULE ORAL at 08:55

## 2022-11-23 RX ADMIN — SODIUM CHLORIDE 25 ML: 9 INJECTION, SOLUTION INTRAVENOUS at 02:56

## 2022-11-23 RX ADMIN — PREDNISOLONE ACETATE 1 DROP: 10 SUSPENSION/ DROPS OPHTHALMIC at 17:52

## 2022-11-23 RX ADMIN — BUDESONIDE AND FORMOTEROL FUMARATE DIHYDRATE 2 PUFF: 160; 4.5 AEROSOL RESPIRATORY (INHALATION) at 08:39

## 2022-11-23 RX ADMIN — BRIMONIDINE TARTRATE 1 DROP: 2 SOLUTION OPHTHALMIC at 17:52

## 2022-11-23 RX ADMIN — ALLOPURINOL 300 MG: 300 TABLET ORAL at 08:55

## 2022-11-23 RX ADMIN — INSULIN LISPRO 11 UNITS: 100 INJECTION, SOLUTION INTRAVENOUS; SUBCUTANEOUS at 12:30

## 2022-11-23 RX ADMIN — VANCOMYCIN HYDROCHLORIDE 1500 MG: 5 INJECTION, POWDER, LYOPHILIZED, FOR SOLUTION INTRAVENOUS at 02:58

## 2022-11-23 RX ADMIN — SODIUM CHLORIDE 25 ML: 9 INJECTION, SOLUTION INTRAVENOUS at 00:16

## 2022-11-23 RX ADMIN — DORZOLAMIDE HYDROCHLORIDE AND TIMOLOL MALEATE 1 DROP: 20; 5 SOLUTION/ DROPS OPHTHALMIC at 17:52

## 2022-11-23 RX ADMIN — MEROPENEM 1000 MG: 1 INJECTION, POWDER, FOR SOLUTION INTRAVENOUS at 09:04

## 2022-11-23 RX ADMIN — INSULIN LISPRO 6 UNITS: 100 INJECTION, SOLUTION INTRAVENOUS; SUBCUTANEOUS at 17:52

## 2022-11-23 RX ADMIN — OXYCODONE AND ACETAMINOPHEN 1 TABLET: 7.5; 325 TABLET ORAL at 12:28

## 2022-11-23 RX ADMIN — OXYCODONE AND ACETAMINOPHEN 1 TABLET: 7.5; 325 TABLET ORAL at 08:58

## 2022-11-23 RX ADMIN — MEROPENEM 1000 MG: 1 INJECTION, POWDER, FOR SOLUTION INTRAVENOUS at 00:17

## 2022-11-23 RX ADMIN — BACITRACIN, NEOMYCIN, POLYMYXIN B: 400; 3.5; 5 OINTMENT TOPICAL at 00:22

## 2022-11-23 RX ADMIN — Medication 2 PUFF: at 15:18

## 2022-11-23 RX ADMIN — SUCRALFATE 1 G: 1 TABLET ORAL at 12:28

## 2022-11-23 RX ADMIN — BRIMONIDINE TARTRATE 1 DROP: 2 SOLUTION OPHTHALMIC at 09:05

## 2022-11-23 RX ADMIN — ATORVASTATIN CALCIUM 10 MG: 10 TABLET, FILM COATED ORAL at 08:55

## 2022-11-23 RX ADMIN — SODIUM CHLORIDE, PRESERVATIVE FREE 10 ML: 5 INJECTION INTRAVENOUS at 08:58

## 2022-11-23 RX ADMIN — SUCRALFATE 1 G: 1 TABLET ORAL at 16:13

## 2022-11-23 ASSESSMENT — PAIN DESCRIPTION - ORIENTATION
ORIENTATION: RIGHT

## 2022-11-23 ASSESSMENT — PAIN DESCRIPTION - DESCRIPTORS
DESCRIPTORS: THROBBING

## 2022-11-23 ASSESSMENT — PAIN SCALES - GENERAL
PAINLEVEL_OUTOF10: 9
PAINLEVEL_OUTOF10: 9
PAINLEVEL_OUTOF10: 8

## 2022-11-23 ASSESSMENT — PAIN - FUNCTIONAL ASSESSMENT
PAIN_FUNCTIONAL_ASSESSMENT: ACTIVITIES ARE NOT PREVENTED

## 2022-11-23 ASSESSMENT — PAIN DESCRIPTION - LOCATION
LOCATION: FOOT;LEG
LOCATION: FOOT
LOCATION: FOOT;ANKLE

## 2022-11-23 NOTE — PROGRESS NOTES
Bone scan equivocal  Some abnormal activity but unclear if this is from osteo.   Only open wounds ar over 1st toe and MTP joint  Would not proceed with surgery at this time  Would treat with antibiotics per ID and patient can follow up with me in office  Need Home health and proper wound care and BS management to minimize further infection  Dr. Madalyn Hilliard on fermin til Monday

## 2022-11-23 NOTE — PROGRESS NOTES
Pt discharge instructions explained and copy given to pt. Pt prescriptions to be picked up by pt at preferred pharmacy, pt aware. Pt escorted off unit in  by staff to awaiting private vehicle and left with family member.

## 2022-11-23 NOTE — CARE COORDINATION
Discharging Nurse: Discharging Nurse: Please notify Lety Hart of pt's discharge and fax AVS and HHC order.   Phone: [phone] Fax: [phone]

## 2022-11-23 NOTE — CONSULTS
IV TO PO EVALUATION  -Patients considered for IV to PO conversion should meet all of the   following inclusion criteria and none of the exclusion criteria. MEDICATION(S) CONSIDERED FOR CONVERSION:  -Pantoprazole 40mg ivpb daily    EXCLUSION CRITERIA:  -Criteria that the patient is NOT a candidate for conversion. .. [] Infections requiring IV therapy  [] Recent therapeutic failure on oral formulation  [] Meningitis  [] Osteomyelitis  [] Other  [] Patients with unreliable response to oral medication  [] Nausea and/or vomiting or diarrhea within previous 24 hours  [] Malabsorption disorders  [] Motility disorders of GI tract including ileus or bowel obstruction  [] Patients who cannot use oral route  [] Painful oral ulcerations  [] Unable to swallow  [] NPO  [] Clinical deterioration requiring vasopressor therapy for blood pressure support  [] Active bleeding (Proton Pump Inhibitors/H2 receptor blockers only)  [x] NO EXCLUSION CRITERIA NOTED      INCLUSION CRITERIA:   -Criteria to initiate medication route switch. .. [x] No exclusion criteria met as described above  [] IV therapy for >24 hours, afebrile, improving trend in WBC (antibiotics only)  [x] Tolerating oral diet, may include  Clear liquids >1000 mL/day  Tube feeds without high residuals (<150 mL) at least 40 mL/hr  [x] Tolerating oral medications  [] No seizures for 72 hours (antiepileptic medications only)    Please note, patients may be given suppositories when available for ordered product if no contraindications exist (ie. .. rectal surgery or infection). Oral solutions/suspensions may be considered for patients with a functioning enteral tube not on continuous suction, if available. MEDICATION(S) TO BE CONVERTED:  [] Patient does NOT meet criteria for conversion    -Change pantoprazole 40mg ivp daily to pantoprazole 40mg po daily starting tomorrow    Viviane Hairston.  Radha Caballero, [facility]   11/23/22 1:26 PM

## 2022-11-23 NOTE — DISCHARGE SUMMARY
V2.0  Discharge Summary    Name:  Rogerio Barry /Age/Sex: 1985 (39 y.o. male)   Admit Date: 2022  Discharge Date: 22    MRN & CSN:  [id number] & [id number] Encounter Date and Time 22 3:13 PM EST    Attending:  No att. providers found Discharging Provider: Mauricio Barnard MD       Hospital Course:     Brief HPI: Rogerio Barry is a 39 y.o. male who presented with progressively worsening chronic nonhealing ulcer of the right medial aspect of the foot which had been becoming more painful and swollen in the 2 days prior to arrival.  Labs were unremarkable except for lactic acid of 2.1. X-ray showed soft tissue swelling with no evidence of osteomyelitis or subcutaneous air. Venous Doppler lower extremity showed no DVT in the. Patient received initially vancomycin and meropenem. General surgery saw the patient and did not think that this is osteomyelitis. ID was consulted for home medication p.o. recs and they recommended getting a bone scan and CT scan of the lower extremity. Bone scan did not show any osteomyelitis. Patient was sent home on doxycycline and amoxicillin. Patient to follow-up with primary care provider and general surgery in 1 week. Patient is to also have wound care at home. Brief Problem Based Course:     #RLE cellulitis  #Open wound right great toe with osteomyelitis ruled out  Based on sensitivities from his last set of wound cultures(2022), started on Merrem and Vanc  -CTA lower extremity right side: Edema and inflammation and subcutaneous fat down to right calf, over ankle and into the dorsum of the foot extending out towards the great toe and second toe, no focal abscess or soft tissue emphysema, absence of right second and fifth toes with only small ossifications and or part of the distal second phalanx remaining.   -CRP trending down  -Bone scan (): three-phase positive activity involving the 1st and 3rd digits as well as the location of the ossification at the 2nd toe amputation site, may be due to trauma, recent surgery or active infection  -Wound culture without growth  -Blood cultures negative  -Was followed by infectious disease and was transitioned to doxycycline and amoxicillin with end date of 12/3/2022. -Was seen by general surgery    Plan:  Gen surg sanju rod follow-up with primary care provider and general surgery in 1 week   Wound care  Follow-up on wound cultures    #Upper respiratory symptoms  -runny nose since 1 day  -Negative respiratory PCR     Chronic medical problems:   #IDDM with hyperglycemia  Continue accucheks and low dose sliding scale  Increase lantus to 34U and lispro to 11U with increase in sliding scale to medium dose --> while inpatient  Last A1c 8.5 (09/22)  Diabetic diet  -Continue home medications     #HTN  --Restart home regimen w/ holding parameters, monitor BP and adjust as appropriate     #Tobacco use disorder  --NRT ordered      The patient expressed appropriate understanding of, and agreement with the discharge recommendations, medications, and plan.      Consults this admission:  IP CONSULT TO GENERAL SURGERY  IP CONSULT TO PHARMACY  IP CONSULT TO INFECTIOUS DISEASES  PHARMACY TO DOSE VANCOMYCIN  IP CONSULT TO HOME CARE NEEDS    Discharge Diagnosis:   <principal problem not specified>    Cellulitis of lower extremity  upper respiratory infection  Uncontrolled type 2 diabetes    Discharge Instruction:   Follow up appointments: General surgery  Primary care physician: Rochelle Loya MD within 2 weeks  Diet: diabetic diet   Activity: activity as tolerated  Disposition: Discharged to:   [x]Home, [x]C, []SNF, []Acute Rehab, []Hospice   Condition on discharge: Stable  Labs and Tests to be Followed up as an outpatient by PCP or Specialist: Wound cultures    Discharge Medications:        Medication List        START taking these medications      amoxicillin 500 MG capsule  Commonly known as: AMOXIL  Take 1 capsule by mouth every 8 hours for 29 doses     doxycycline hyclate 100 MG tablet  Commonly known as: VIBRA-TABS  Take 1 tablet by mouth every 12 hours for 20 doses     neomycin-bacitracin-polymyxin 400-5-5000 ointment  Commonly known as: NEOSPORIN  Apply topically 2 times daily. oxyCODONE-acetaminophen 5-325 MG per tablet  Commonly known as: PERCOCET  Take 1 tablet by mouth every 4 hours as needed for Pain for up to 3 days. pantoprazole 40 MG tablet  Commonly known as: PROTONIX  Take 1 tablet by mouth every morning (before breakfast)            CONTINUE taking these medications      acetaminophen 325 MG tablet  Commonly known as: Aminofen  Take 2 tablets by mouth every 6 hours as needed for Pain     albuterol sulfate  (90 Base) MCG/ACT inhaler  Commonly known as: PROVENTIL;VENTOLIN;PROAIR  Inhale 2 puffs into the lungs every 6 hours as needed for Wheezing     albuterol-ipratropium  MCG/ACT Aers inhaler  Commonly known as: Combivent Respimat  Inhale 1 puff into the lungs every 6 hours     allopurinol 300 MG tablet  Commonly known as: ZYLOPRIM     amLODIPine 10 MG tablet  Commonly known as: NORVASC     atorvastatin 10 MG tablet  Commonly known as: LIPITOR     brimonidine 0.2 % ophthalmic solution  Commonly known as: ALPHAGAN     cloNIDine 0.2 MG tablet  Commonly known as: CATAPRES     dorzolamide-timolol 22.3-6.8 MG/ML Soln     ezetimibe 10 MG tablet  Commonly known as: ZETIA     glipiZIDE 5 MG tablet  Commonly known as: GLUCOTROL  Take 3 tablets by mouth in the morning and 3 tablets in the evening. Take before meals.      HumuLIN N KwikPen 100 UNIT/ML injection pen  Generic drug: insulin NPH  Inject 20 Units into the skin every morning     Lantus SoloStar 100 UNIT/ML injection pen  Generic drug: insulin glargine  Inject 50 Units into the skin nightly     lisinopril-hydroCHLOROthiazide 20-12.5 MG per tablet  Commonly known as: PRINZIDE;ZESTORETIC     melatonin 5 MG Tabs tablet     metFORMIN 1000 MG tablet  Commonly known as: GLUCOPHAGE     moxifloxacin 0.5 % ophthalmic solution  Commonly known as: VIGAMOX  Place 1 drop into the right eye 4 times daily     nicotine 14 MG/24HR  Commonly known as: NICODERM CQ  Place 1 patch onto the skin daily     prednisoLONE acetate 1 % ophthalmic suspension  Commonly known as: PRED FORTE     pregabalin 100 MG capsule  Commonly known as: LYRICA     sucralfate 1 GM tablet  Commonly known as: Carafate  Take 1 tablet by mouth 4 times daily     Symbicort 160-4.5 MCG/ACT Aero  Generic drug: budesonide-formoterol     TechLite Pen Needles 31G X 5 MM Misc  Generic drug: Insulin Pen Needle     tiZANidine 4 MG tablet  Commonly known as: ZANAFLEX     trimethoprim-polymyxin b 47407-8.1 UNIT/ML-% ophthalmic solution  Commonly known as: POLYTRIM     Trulicity 1.5 PB/4.7US SC injection  Generic drug: dulaglutide  inject 0.5 milliliters ( 1 AND 1/2 milligrams ) subcutaneously ev. ..  (REFER TO PRESCRIPTION NOTES).      UNABLE TO FIND               Where to Get Your Medications        These medications were sent to [address] [place] [address], [address]      Phone: [phone]   amoxicillin 500 MG capsule  doxycycline hyclate 100 MG tablet  moxifloxacin 0.5 % ophthalmic solution  neomycin-bacitracin-polymyxin 400-5-5000 ointment  oxyCODONE-acetaminophen 5-325 MG per tablet  pantoprazole 40 MG tablet        Objective Findings at Discharge:   /81   Pulse 73   Temp 97.5 °F (36.4 °C)   Resp 18   Ht 6' 3\" (1.905 m)   Wt 300 lb (136.1 kg)   SpO2 97%   BMI 37.50 kg/m²       Physical Exam:   General: NAD, AAO x3-4, obese  Eyes: EOMI  HENT: Atraumatic  Respiratory: no respiratory distress  GI: nontender  MSK:  no lower extremity edema, left foot covered in Ace bandage  Labs and Imaging   XR FOOT RIGHT (MIN 3 VIEWS)    Result Date: 11/19/2022  EXAMINATION: THREE XRAY VIEWS OF THE RIGHT FOOT 11/19/2022 7:48 pm COMPARISON: 09/17/2022. HISTORY: ORDERING SYSTEM PROVIDED HISTORY: attn 1st MTP TECHNOLOGIST PROVIDED HISTORY: Reason for exam:->attn 1st MTP Reason for Exam: Wound Check Additional signs and symptoms: attn 1st MTP FINDINGS: There is hallux valgus metatarsus primus varus. 2nd and 5th toe amputations appear unchanged. Osteophytes of the dorsal midfoot are again noted. There is soft tissue swelling which is most prominent at the forefoot. There is no subcutaneous air. No fracture, dislocation or focal osseous abnormality is identified. Soft tissue swelling with no evidence of osteomyelitis and no subcutaneous air. 1st toe bunion deformity and prior 2nd and 5th toe amputations, as before. NM BONE SCAN 3 PHASE    Result Date: 11/22/2022  EXAMINATION: THREE PHASE BONE SCAN 11/22/2022 12:24 pm TECHNIQUE: The patient was injected intravenously with 19.6 mCi of 99 mTc MDP. Initial blood flow and pool images of the feet were acquired. After 3 hours, delayed bone images were acquired. COMPARISON: Right foot radiographs 11/19/2022 and three-phase bone scan 07/05/2022 HISTORY: ORDERING SYSTEM PROVIDED HISTORY: evaluate for OM right foot TECHNOLOGIST PROVIDED HISTORY: Reason for exam:->evaluate for OM right foot Reason for Exam: eval OM RT great toe FINDINGS: There is asymmetric blood flow and blood pool activity to the right foot, particularly at the mid right forefoot. Delayed images show increased activity in the right forefoot involving the 1st, 2nd, and 3rd digits when comparing to the recent radiographs. Note there is 2nd toe amputation, but also an ossified structure within the 2nd digit soft tissues. Three-phase positive activity involving the 1st and 3rd digits as well as the location of the ossification at the 2nd toe amputation site. This could be due to trauma, recent surgery, or active infection.      CTA ABDOMINAL AORTA W BILAT RUNOFF W CONTRAST    Result Date: 11/22/2022  EXAMINATION: CTA OF THE AORTA WITH LOWER EXTREMITY RUNOFF 11/21/2022 10:46 pm TECHNIQUE: CTA of the pelvis and bilateral lower extremities was performed after the administration of intravenous contrast.   Multiplanar reformatted images are provided for review. MIP images are provided for review. Automated exposure control, iterative reconstruction, and/or weight based adjustment of the mA/kV was utilized to reduce the radiation dose to as low as reasonably achievable. COMPARISON: None. HISTORY: ORDERING SYSTEM PROVIDED HISTORY: evaluate for OM and/or abscess TECHNOLOGIST PROVIDED HISTORY: Reason for exam:->evaluate for OM and/or abscess Reason for Exam: evaluate for OM and/or abscess, right foot wound FINDINGS: Nonvascular Lower Chest: Subsegmental atelectatic changes in the lower lungs but no pleural effusion. Organs: Early arterial phase study. Fatty metamorphosis of the liver with mildly heterogenous appearance. Gallbladder is contracted. The spleen is not enlarged. There is no pancreatic calcification, ductal dilatation, or surrounding fluid. Adrenal glands are unremarkable. No hydronephrosis or hydroureter at the kidneys. Kidneys are enhancing equally with and excreting contrast. GI/Bowel: Stomach, small bowel, and colon are not dilated. No focal inflammation or abnormal fluid collection in the abdomen and pelvis. Postsurgical changes the base the cecum suggest prior appendectomy. There is no pneumoperitoneum. Pelvis: Urinary bladder wall is not thickened. Prostate is septal.  No free fluid in the pelvis. Peritoneum/Retroperitoneum: No retroperitoneal hematoma or bulky lymphadenopathy. Bones/Soft Tissues: The abdominal wall is acceptable. There is minimal nonspecific edema in the subcutaneous tissues of the buttocks. Soft tissues of the left leg are unremarkable. The muscles and subcutaneous fat of the right thigh are normal in appearance.  Upper portion of the calf started to have subcutaneous edema within the fat and worsens distally as it approaches the ankle. There is edema coursing over the dorsum of the foot and likely to the level of the great toe. No focal abscess is identified. There is no soft tissue emphysema. Hallux valgus angulation and subluxation of the great toe with bunion changes along the 1st metatarsal.  No focal destruction is seen of the 1st metatarsal and phalanges. The 2nd metatarsal is still present. Small ossifications at the expected 2nd toe without normal 2nd toe. Some inflammation and swelling are present around the expected 2nd metatarsophalangeal joint but could have scarring given the bony deformity. The 3rd and 4th toes and metatarsophalangeal junctions are normal.  5th metatarsal is intact but again with absence of the expected 5th toe and only small ossifications at the margin. Some scarring at the site 5th toe site. Some degenerative changes in the midfoot and hindfoot but without focal destruction. No cortical erosion or periosteal reaction along the tibia and fibula. The femur is unremarkable. Nelly Quiver VASCULAR Good enhancement of the abdominal aorta and major branches. No abdominal aortic aneurysm or dissection. There is a mild narrowing the origin of the celiac root which appears to be more along its superior edge and may be due to a median arcuate ligament impression. The branches of the celiac root have normal caliber and enhancement. Single bilateral renal arteries and the superior mesenteric artery are unremarkable. The right iliac system, femoral arteries, popliteal artery, and calf arteries are patent with a normal caliber. Continued enhancement of the posterior tibial artery into the plantar aspect of the foot. Decreased caliber of the dorsalis pedis artery with inflammation and edema of the adjacent tissues but without occlusion. Unremarkable left iliac system, superficial femoral and profunda arteries, and popliteal artery.   Continued enhancement down the left calf arteries and into the foot. Good enhancement of the dorsalis pedis and plantar branches. 1.  Edema and inflammation in the subcutaneous fat down the right calf, over the ankle, and into the dorsum of the foot extending out towards the great toe and 2nd toe. There is no focal abscess or soft tissue emphysema. 2.  Absence of the right 2nd and 5th toes with only small ossifications and or part of the distal 2nd phalanx remaining. Correlate for previous surgery with scarring or inflammation about the 2nd metacarpophalangeal joint. Also has hallux valgus angulation and subluxation at the great toe with bunion changes but without focal erosion. 3.  No erosion or destruction along the right mid and hind foot. No cortical erosion or periosteal reaction along the bilateral tibia, fibula, and femurs. 4.  Fatty metamorphosis of the liver. No acute intra-process identified. VL DUP LOWER EXTREMITY VENOUS RIGHT    Result Date: 11/19/2022  EXAMINATION: DUPLEX VENOUS ULTRASOUND OF THE RIGHT LOWER EXTREMITY 11/19/2022 6:04 pm TECHNIQUE: Duplex ultrasound using B-mode/gray scaled imaging and Doppler spectral analysis and color flow was obtained of the deep venous structures of the right extremity. COMPARISON: 07/21/2022. HISTORY: ORDERING SYSTEM PROVIDED HISTORY: edema TECHNOLOGIST PROVIDED HISTORY: Reason for exam:->edema FINDINGS: The visualized veins of the right lower extremity are patent and free of echogenic thrombus. The veins demonstrate good compressibility with normal color flow study and spectral analysis. No evidence of DVT in the right lower extremity.        CBC:   Recent Labs     11/22/22  0632   WBC 5.9   HGB 13.2*        BMP:    Recent Labs     11/22/22  0632   *   K 4.3   CL 96*   CO2 24   BUN 12   CREATININE 0.7*   GLUCOSE 399*     Hepatic:   Recent Labs     11/22/22  0632   AST 31   ALT 54*   BILITOT 0.3   ALKPHOS 150*     Lipids:   Lab Results Component Value Date/Time    CHOL 142 04/16/2011 01:19 PM    HDL 41 04/16/2011 01:19 PM    TRIG 268 04/16/2011 01:19 PM     Hemoglobin A1C:   Lab Results   Component Value Date/Time    LABA1C 8.5 09/18/2022 01:16 AM     TSH: No results found for: TSH  Troponin:   Lab Results   Component Value Date/Time    TROPONINT <0.010 04/28/2022 03:26 AM    TROPONINT <0.010 08/01/2021 02:37 PM    TROPONINT <0.010 04/22/2020 11:29 PM     Lactic Acid: No results for input(s): LACTA in the last 72 hours. BNP: No results for input(s): PROBNP in the last 72 hours.   UA:  Lab Results   Component Value Date/Time    NITRU NEGATIVE 07/21/2022 02:20 PM    COLORU YELLOW 07/21/2022 02:20 PM    WBCUA <1 07/21/2022 02:20 PM    RBCUA NONE SEEN 07/21/2022 02:20 PM    MUCUS RARE 12/21/2018 06:12 PM    TRICHOMONAS NONE SEEN 07/21/2022 02:20 PM    BACTERIA NEGATIVE 07/21/2022 02:20 PM    CLARITYU CLEAR 07/21/2022 02:20 PM    Diantha Cutting 1.020 07/21/2022 02:20 PM    LEUKOCYTESUR NEGATIVE 07/21/2022 02:20 PM    UROBILINOGEN 0.2 07/21/2022 02:20 PM    BILIRUBINUR NEGATIVE 07/21/2022 02:20 PM    BLOODU NEGATIVE 07/21/2022 02:20 PM    Linward Corns 15 07/21/2022 02:20 PM     Urine Cultures: No results found for: Angelo López  Blood Cultures: No results found for: BC  No results found for: BLOODCULT2  Organism:   Lab Results   Component Value Date/Time    ORG MRSA 12/23/2018 01:00 PM    ORG BSGA 12/23/2018 01:00 PM       Time Spent Discharging patient >30 minutes    Electronically signed by Gloria Flores MD on 11/24/2022 at 3:13 PM

## 2022-11-23 NOTE — PROGRESS NOTES
Infectious Disease Progress Note  2022   Patient Name: Mil Piña : 1985   Impression  Recurrent Right DFI with Ulcer:  Afebrile, no leukocytosis  -BC 0/2-NGTD  CRP and Pct remain low  -Right foot wound culture: NGTD  -XR Right Foot: Soft tissue swelling with no evidence of osteomyelitis and no subcutaneous   air. 1st toe bunion deformity and prior 2nd and 5th toe amputations, as before. -RLE VL Doppler: No evidence of DVT in the right lower extremity. -CTA Abdominal aorta W Bilateral runoff W contrast:1.  Edema and inflammation in the subcutaneous fat down the right calf, over   the ankle, and into the dorsum of the foot extending out towards the great   toe and 2nd toe. There is no focal abscess or soft tissue emphysema. 2.  Absence of the right 2nd and 5th toes with only small ossifications and   or part of the distal 2nd phalanx remaining. Correlate for previous surgery   with scarring or inflammation about the 2nd metacarpophalangeal joint. Also   has hallux valgus angulation and subluxation at the great toe with bunion   changes but without focal erosion. 3.  No erosion or destruction along the right mid and hind foot. No cortical   erosion or periosteal reaction along the bilateral tibia, fibula, and femurs. 4.  Fatty metamorphosis of the liver. No acute intra-process identified. -NM Bone Scan 3 phase: Three-phase positive activity involving the 1st and 3rd digits as well as the   location of the ossification at the 2nd toe amputation site. This could be   due to trauma, recent surgery, or active infection.    Dr. Demetra Deal onboard for general surgery     IDDM:  Last HbAIC 22-8.5  Morbid Obesity:  BMI: 37.50  Gout  Legally Blind:  HTN:  Alcohol and Tobacco Abuse:  GERD:  Multi-morbidity: per PMHx  Plan:  DC IV meropenem and vancomycin  Start po doxycycline 100 mg bid x 10 days (end date   Start po amoxicillin 500 mg q8h x 10 days (end date 112/3/22)  Reviewed bone scan of right foot with radiology  DW Dr. Kala Plasencia plan of care, no plan for surgical intervention  Follow up with PCP and general surgery after DC  OK from ID standpoint to DC when ready  ID Service will return on 11/28/22    Ongoing Antimicrobial Therapy  Doxycycline 11/23-  Amoxicillin 11/23-  Completed Antimicrobial Therapy  Zosyn 11/19  Vancomycin 11/19-23  Meropenem 11/20-23??? History:? Interval history noted. Chief complaint: right DFU and infection. Denies n/v/d/f or untoward effects of antibiotics. States is comfortable with minimal right foot pain. Physical Exam:  Vital Signs: BP (!) 134/95   Pulse 89   Temp 98.1 °F (36.7 °C) (Oral)   Resp 18   Ht 6' 3\" (1.905 m)   Wt 300 lb (136.1 kg)   SpO2 99%   BMI 37.50 kg/m²     Gen: remains A&O x 4, no distress  Wounds: C/D/I right hallux with ulceration, s/p 2nd and 5th toe amputation   Chest: no distress and CTA. Good air movement. Room air. Heart: RRR and no MRG. Abd: soft, non-distended, no tenderness, no hepatomegaly. Normoactive bowel sounds. Ext: no clubbing, cyanosis, or edema. See wounds above  Catheter Site: without erythema or tenderness  Neuro: Mental status intact. CN 2-12 intact and no focal sensory or motor deficits     Radiologic / Imaging / TESTING  11/19/22 XR Foot Right:  Impression   Soft tissue swelling with no evidence of osteomyelitis and no subcutaneous   air. 1st toe bunion deformity and prior 2nd and 5th toe amputations, as before. 11/19/22 VL Dup Lower Extremity Venous Right:  Impression   No evidence of DVT in the right lower extremity. 11/21/22 CTA Abdominal Aorta W Bilat Runoff W Contrast:  Impression   1. Edema and inflammation in the subcutaneous fat down the right calf, over   the ankle, and into the dorsum of the foot extending out towards the great   toe and 2nd toe. There is no focal abscess or soft tissue emphysema.        2.  Absence of the right 2nd and 5th toes with only small ossifications and   or part of the distal 2nd phalanx remaining. Correlate for previous surgery   with scarring or inflammation about the 2nd metacarpophalangeal joint. Also   has hallux valgus angulation and subluxation at the great toe with bunion   changes but without focal erosion. 3.  No erosion or destruction along the right mid and hind foot. No cortical   erosion or periosteal reaction along the bilateral tibia, fibula, and femurs. 4.  Fatty metamorphosis of the liver. No acute intra-process identified. 11/22/22 NM Bone Scan 3 Phase:  Impression   Three-phase positive activity involving the 1st and 3rd digits as well as the   location of the ossification at the 2nd toe amputation site. This could be   due to trauma, recent surgery, or active infection.       Labs:    Recent Results (from the past 24 hour(s))   POCT Glucose    Collection Time: 11/22/22 12:52 PM   Result Value Ref Range    POC Glucose 298 (H) 70 - 99 MG/DL   Respiratory Panel, Molecular, with COVID-19 (Restricted: peds pts or suitable admitted adults)    Collection Time: 11/22/22  3:05 PM    Specimen: Nasopharyngeal   Result Value Ref Range    Adenovirus Detection by PCR NOT DETECTED NOT DETECTED    Coronavirus 229E PCR NOT DETECTED NOT DETECTED    Coronavirus HKU1 PCR NOT DETECTED NOT DETECTED    Coronavirus NL63 PCR NOT DETECTED NOT DETECTED    Coronavirus OC43 PCR NOT DETECTED NOT DETECTED    SARS-CoV-2 NOT DETECTED NOT DETECTED    Human Metapneumovirus PCR NOT DETECTED NOT DETECTED    Rhinovirus Enterovirus PCR NOT DETECTED NOT DETECTED    Influenza A by PCR NOT DETECTED NOT DETECTED    Influenza A H1 Pandemic PCR NOT DETECTED NOT DETECTED    Influenza A H1 (2009) PCR NOT DETECTED NOT DETECTED    Influenza A H3 PCR NOT DETECTED NOT DETECTED    Influenza B by PCR NOT DETECTED NOT DETECTED    Parainfluenza 1 PCR NOT DETECTED NOT DETECTED    Parainfluenza 2 PCR NOT DETECTED NOT DETECTED    Parainfluenza 3 PCR NOT DETECTED NOT DETECTED    Parainfluenza 4 PCR NOT DETECTED NOT DETECTED    RSV PCR NOT DETECTED NOT DETECTED    Bordetella parapertussis by PCR NOT DETECTED NOT DETECTED    B Pertussis by PCR NOT DETECTED NOT DETECTED    Chlamydophila Pneumonia PCR NOT DETECTED NOT DETECTED    Mycoplasma pneumo by PCR NOT DETECTED NOT DETECTED   POCT Glucose    Collection Time: 11/22/22  4:26 PM   Result Value Ref Range    POC Glucose 303 (H) 70 - 99 MG/DL   POCT Glucose    Collection Time: 11/22/22  7:50 PM   Result Value Ref Range    POC Glucose 368 (H) 70 - 99 MG/DL   C-Reactive Protein    Collection Time: 11/23/22  1:35 AM   Result Value Ref Range    CRP High Sensitivity 3.4 <5.0 mg/L   POCT Glucose    Collection Time: 11/23/22  7:34 AM   Result Value Ref Range    POC Glucose 217 (H) 70 - 99 MG/DL     CULTURE results: Invalid input(s): BLOOD CULTURE,  URINE CULTURE, SURGICAL CULTURE    Diagnosis:  Patient Active Problem List   Diagnosis    Sprain of left ankle    Closed nondisplaced fracture of fifth metatarsal bone of left foot    Alcohol abuse    UTI (urinary tract infection) due to Enterococcus    Acute osteomyelitis of toe, right (HCC)    Osteomyelitis of second toe of right foot (HCC)    Diabetic foot infection (HCC)    Shortness of breath    Asthma exacerbation    Appendicitis    Diabetic foot ulcer with osteomyelitis (HCC)    Cellulitis    Open wound of toe       Active Problems  Active Problems:    Cellulitis  Resolved Problems:    * No resolved hospital problems. *    Electronically signed by: Electronically signed by KAYLI Griffith CNP on 11/23/2022 at 10:47 AM

## 2022-11-23 NOTE — CARE COORDINATION
Reviewed chart and discussed in IDR, plan TBD surgery vs iv atb. Dr Sebastien Devine note states no surgery at this time, will need atb per ID recs. Still awaiting recs from ID. I did call [place] infusion person Marina HARRIS and left a VM about possible home iv atb.        11/23 Faxed info to Marina HARRIS at [facility] [phone].

## 2022-11-23 NOTE — DISCHARGE INSTRUCTIONS
You were admitted with a wound that was not healing on your foot. You received IV antibiotics while inpatient and now you are discharged on oral antibiotics. Start doxycycline 100 mg BID for 10 days (end date 12/03/2022) and amoxicillin 500 mg every 8 hours for 10 days (end date 12/03/2022). Please take all pills until done. Please follow up with wound care clinic. Follow up with your primary care doctor and general surgery in 1 week. Please continue to take all your diabetes medications as prescribed and watching your diet as your sugar has been very high while you were in the hospital.     If your symptoms come back or worsen, please return to the emergency room.

## 2022-11-24 LAB
CULTURE: NORMAL
CULTURE: NORMAL
Lab: NORMAL
Lab: NORMAL
SPECIMEN: NORMAL
SPECIMEN: NORMAL

## 2022-11-26 LAB
CULTURE: NORMAL
Lab: NORMAL
SPECIMEN: NORMAL

## 2022-12-30 ENCOUNTER — HOSPITAL ENCOUNTER (OUTPATIENT)
Age: 37
Setting detail: SPECIMEN
Discharge: HOME | End: 2022-12-30
Payer: COMMERCIAL

## 2022-12-30 ENCOUNTER — HOSPITAL ENCOUNTER (EMERGENCY)
Age: 37
Discharge: HOME | End: 2022-12-30
Attending: EMERGENCY MEDICINE
Payer: COMMERCIAL

## 2022-12-30 VITALS
DIASTOLIC BLOOD PRESSURE: 76 MMHG | WEIGHT: 300 LBS | TEMPERATURE: 98 F | OXYGEN SATURATION: 98 % | RESPIRATION RATE: 18 BRPM | BODY MASS INDEX: 37.3 KG/M2 | HEIGHT: 75 IN | HEART RATE: 91 BPM | SYSTOLIC BLOOD PRESSURE: 131 MMHG

## 2022-12-30 DIAGNOSIS — L97.511 DIABETIC ULCER OF TOE OF RIGHT FOOT ASSOCIATED WITH TYPE 1 DIABETES MELLITUS, LIMITED TO BREAKDOWN OF SKIN (HCC): Primary | ICD-10-CM

## 2022-12-30 DIAGNOSIS — E10.621 DIABETIC ULCER OF TOE OF RIGHT FOOT ASSOCIATED WITH TYPE 1 DIABETES MELLITUS, LIMITED TO BREAKDOWN OF SKIN (HCC): Primary | ICD-10-CM

## 2022-12-30 PROCEDURE — 87075 CULTR BACTERIA EXCEPT BLOOD: CPT

## 2022-12-30 PROCEDURE — 6370000000 HC RX 637 (ALT 250 FOR IP): Performed by: EMERGENCY MEDICINE

## 2022-12-30 PROCEDURE — 87070 CULTURE OTHR SPECIMN AEROBIC: CPT

## 2022-12-30 PROCEDURE — 99283 EMERGENCY DEPT VISIT LOW MDM: CPT

## 2022-12-30 PROCEDURE — 87077 CULTURE AEROBIC IDENTIFY: CPT

## 2022-12-30 RX ORDER — DOXYCYCLINE HYCLATE 100 MG
100 TABLET ORAL ONCE
Status: COMPLETED | OUTPATIENT
Start: 2022-12-30 | End: 2022-12-30

## 2022-12-30 RX ORDER — DOXYCYCLINE HYCLATE 100 MG
100 TABLET ORAL 2 TIMES DAILY
Qty: 20 TABLET | Refills: 0 | Status: SHIPPED | OUTPATIENT
Start: 2022-12-30 | End: 2023-01-09

## 2022-12-30 RX ORDER — AMOXICILLIN 500 MG/1
500 CAPSULE ORAL 3 TIMES DAILY
Qty: 30 CAPSULE | Refills: 0 | Status: SHIPPED | OUTPATIENT
Start: 2022-12-30 | End: 2023-01-09

## 2022-12-30 RX ORDER — AMOXICILLIN 250 MG/1
500 CAPSULE ORAL ONCE
Status: COMPLETED | OUTPATIENT
Start: 2022-12-30 | End: 2022-12-30

## 2022-12-30 RX ADMIN — DOXYCYCLINE HYCLATE 100 MG: 100 TABLET, COATED ORAL at 03:47

## 2022-12-30 RX ADMIN — AMOXICILLIN 500 MG: 250 CAPSULE ORAL at 03:46

## 2022-12-30 ASSESSMENT — PAIN DESCRIPTION - DESCRIPTORS: DESCRIPTORS: THROBBING

## 2022-12-30 ASSESSMENT — PAIN DESCRIPTION - PAIN TYPE: TYPE: ACUTE PAIN

## 2022-12-30 ASSESSMENT — PAIN DESCRIPTION - LOCATION: LOCATION: FOOT

## 2022-12-30 ASSESSMENT — PAIN DESCRIPTION - ORIENTATION: ORIENTATION: RIGHT

## 2022-12-30 ASSESSMENT — PAIN DESCRIPTION - FREQUENCY: FREQUENCY: CONTINUOUS

## 2022-12-30 ASSESSMENT — PAIN - FUNCTIONAL ASSESSMENT: PAIN_FUNCTIONAL_ASSESSMENT: 0-10

## 2022-12-30 ASSESSMENT — PAIN SCALES - GENERAL: PAINLEVEL_OUTOF10: 8

## 2022-12-30 NOTE — ED TRIAGE NOTES
Pt has had right foot pain since July of this year. Pt has a hx of diabetes which has resulted in a right foot ulcer. Pt admits to not \"taking care\" of it.

## 2022-12-30 NOTE — ED PROVIDER NOTES
Triage Chief Complaint:   Wound Infection (Right foot)    Mcgrath:  Carlo Yap is a 44 y.o. male that presents with pain and ulcer to his right great toe. Patient is currently under police custody after being arrested this morning and was telling police officers that he needed to come to the emergency department for his infection. States that he has been dealing with this since July. He has been hospitalized with IV antibiotics before. Patient has a history of diabetes with prior right second and fifth toe amputations. Denies any known fevers or new motor or sensory deficits. No other acute complaints. ROS:  At least 6 systems reviewed and otherwise acutely negative except as in the [address]     Past Medical History:   Diagnosis Date    Asthma     Blind left eye     Diabetes mellitus ([address]     GERD (gastroesophageal reflux disease)     Hypertension     Retinal detachment 2012    left     Past Surgical History:   Procedure Laterality Date    CORNEAL TRANSPLANT Bilateral     EYE SURGERY      left eye removed    EYE SURGERY Right     FRACTURE SURGERY      foot left    LAPAROSCOPIC APPENDECTOMY N/A 4/28/2022    APPENDECTOMY LAPAROSCOPIC performed by Eugenia Hernandez MD at [facility] Right     MANDIBLE SURGERY Bilateral     TOE AMPUTATION Right 07/25/2017    TOE AMPUTATION Right 7/25/2022    TOE AMPUTATION, RAY AMPUTATION OF RIGHT SECOND TOE performed by Melody Raman MD at [address] History   Problem Relation Age of Onset    Diabetes Mother     Asthma Mother     High Blood Pressure Mother     Stroke Mother     Heart Disease Mother     Diabetes Father     Asthma Father     High Blood Pressure Father      Social History     Socioeconomic History    Marital status: Single     Spouse name: Not on file    Number of children: Not on file    Years of education: Not on file    Highest education level: Not on file   Occupational History    Not on file   Tobacco Use    Smoking status: Every Day Packs/day: 0.50     Years: 5.00     Pack years: 2.50     Types: Cigarettes    Smokeless tobacco: Never   Vaping Use    Vaping Use: Never used   Substance and Sexual Activity    Alcohol use: Yes     Comment: occasionally    Drug use: Not Currently     Types: Marijuana Daril True)    Sexual activity: Not on file   Other Topics Concern    Not on file   Social History Narrative    Not on file     Social Determinants of Health     Financial Resource Strain: Not on file   Food Insecurity: Not on file   Transportation Needs: Not on file   Physical Activity: Not on file   Stress: Not on file   Social Connections: Not on file   Intimate Partner Violence: Not on file   Housing Stability: Not on file     Current Facility-Administered Medications   Medication Dose Route Frequency Provider Last Rate Last Admin    doxycycline hyclate (VIBRA-TABS) tablet 100 mg  100 mg Oral Once Dionte Guajardo MD        amoxicillin (AMOXIL) capsule 500 mg  500 mg Oral Once Dionte Guajardo MD         Current Outpatient Medications   Medication Sig Dispense Refill    doxycycline hyclate (VIBRA-TABS) 100 MG tablet Take 1 tablet by mouth 2 times daily for 10 days 20 tablet 0    amoxicillin (AMOXIL) 500 MG capsule Take 1 capsule by mouth 3 times daily for 10 days 30 capsule 0    moxifloxacin (VIGAMOX) 0.5 % ophthalmic solution Place 1 drop into the right eye 4 times daily 3 mL 0    pantoprazole (PROTONIX) 40 MG tablet Take 1 tablet by mouth every morning (before breakfast) 30 tablet 3    nicotine (NICODERM CQ) 14 MG/24HR Place 1 patch onto the skin daily 30 patch 3    SYMBICORT 160-4.5 MCG/ACT AERO Inhale 2 puffs into the lungs in the morning and at bedtime      glipiZIDE (GLUCOTROL) 5 MG tablet Take 3 tablets by mouth in the morning and 3 tablets in the evening. Take before meals. 184 tablet 0    TRULICITY 1.5 FF/4.7KU SOPN inject 0.5 milliliters ( 1 AND 1/2 milligrams ) subcutaneously ev. ..  (REFER TO PRESCRIPTION NOTES).  3 pen 0    LANTUS SOLOSTAR 100 UNIT/ML injection pen Inject 50 Units into the skin nightly 2 pen 0    insulin NPH (HUMULIN N KWIKPEN) 100 UNIT/ML injection pen Inject 20 Units into the skin every morning 3 pen 0    tiZANidine (ZANAFLEX) 4 MG tablet Take 4 mg by mouth nightly as needed      cloNIDine (CATAPRES) 0.2 MG tablet Take 0.2 mg by mouth in the morning.       trimethoprim-polymyxin b (POLYTRIM) 46360-7.1 UNIT/ML-% ophthalmic solution Apply 1 drop to eye 4 times daily      UNABLE TO FIND Place 1 drop into the right eye 4 times daily 07/21/22 Patient on Vancomycin Preserved Eye soln 25 mg/ml      ezetimibe (ZETIA) 10 MG tablet take 1 tablet by mouth once daily      TECHLITE PEN NEEDLES 31G X 5 MM MISC use 1 PEN NEEDLE to inject MEDICATION subcutaneously two to three times a day      melatonin 5 MG TABS tablet take 1 tablet by mouth every evening      pregabalin (LYRICA) 100 MG capsule take 1 capsule by mouth twice a day      sucralfate (CARAFATE) 1 GM tablet Take 1 tablet by mouth 4 times daily 120 tablet 0    albuterol sulfate  (90 Base) MCG/ACT inhaler Inhale 2 puffs into the lungs every 6 hours as needed for Wheezing 1 Inhaler 5    albuterol-ipratropium (COMBIVENT RESPIMAT)  MCG/ACT AERS inhaler Inhale 1 puff into the lungs every 6 hours 1 Inhaler 5    acetaminophen (AMINOFEN) 325 MG tablet Take 2 tablets by mouth every 6 hours as needed for Pain 20 tablet 0    brimonidine (ALPHAGAN) 0.2 % ophthalmic solution Place 1 drop into the right eye 2 times daily      dorzolamide-timolol 22.3-6.8 MG/ML SOLN Place 1 drop into the right eye 2 times daily      prednisoLONE acetate (PRED FORTE) 1 % ophthalmic suspension Place 1 drop into the right eye 2 times daily      allopurinol (ZYLOPRIM) 300 MG tablet Take 300 mg by mouth daily       atorvastatin (LIPITOR) 10 MG tablet Take 10 mg by mouth daily       lisinopril-hydrochlorothiazide (PRINZIDE;ZESTORETIC) 20-12.5 MG per tablet Take 1 tablet by mouth daily       metFORMIN (GLUCOPHAGE) 1000 MG tablet Take 1,000 mg by mouth 2 times daily (with meals)      amLODIPine (NORVASC) 10 MG tablet Take 10 mg by mouth daily       Allergies   Allergen Reactions    Ciprofloxacin Shortness Of Breath    Glucosamine Anaphylaxis    Shellfish-Derived Products Anaphylaxis       Nursing Notes Reviewed    Physical Exam:  ED Triage Vitals [12/30/22 0327]   Enc Vitals Group      /76      Heart Rate 91      Resp 18      Temp 98 °F (36.7 °C)      Temp Source Oral      SpO2 98 %      Weight 300 lb (136.1 kg)      Height 6' 3\" (1.905 m)      Head Circumference       Peak Flow       Pain Score       Pain Loc       Pain Edu? Excl. in [address]? GENERAL APPEARANCE: Awake and alert. Cooperative. No acute distress. HEAD: Normocephalic. Atraumatic. EYES: EOM's grossly intact. Sclera anicteric. ENT: Mucous membranes are moist. Tolerates saliva. No trismus. NECK: No meningismus. HEART:  Extremities pink  LUNGS: Respirations unlabored. Even chest rise bilaterally  ABDOMEN: Non distended. EXTREMITIES: RLE: 2 cm diameter well-circumscribed superficial ulcer to the medial aspect of the great toe without erythema, crepitus, induration or fluctuance. Right second and fifth toe amputations. Palpable DP pulse. Sensory distribution of sural/saphenous/tibial/peroneal nerves intact  SKIN: Dry  NEUROLOGICAL: No gross facial drooping. Moves all 4 extremities spontaneously. PSYCHIATRIC: Normal mood. I have reviewed and interpreted all of the currently available lab results from this visit (if applicable):  No results found for this visit on 12/30/22. Radiographs (if obtained):  [] The following radiograph was interpreted by myself in the absence of a radiologist:  [] Radiologist's Report Reviewed:    EKG (if obtained): (All EKG's are interpreted by myself in the absence of a cardiologist)    MDM:  Plan of care is discussed thoroughly with the patient and family if present.   If performed, all imaging and lab work also discussed with patient. All relevant prior results and chart reviewed if available. Patient presents as above with painful diabetic ulcer of the right great toe without obvious active signs of infection although the patient has been admitted for IV antibiotics before in the past.  Do not suspect osteomyelitis. No evidence of abscess. Patient is neurovascularly intact. Prior cultures did not grow any antibiotics. We will start amoxicillin and doxycycline given increasing pain and encouraged the patient to follow-up closely with his PCP/surgeon. Patient agreeable with this plan of care. Discharged into police custody. Clinical Impression:  1.  Diabetic ulcer of toe of right foot associated with type 1 diabetes mellitus, limited to breakdown of skin ([facility] [address]      (Please note that portions of this note may have been completed with a voice recognition program. Efforts were made to edit the dictations but occasionally words are mis-transcribed.)    MD Lorraine Dunn MD  12/30/22 [id number]

## 2022-12-30 NOTE — ED NOTES
Discharge packet reviewed with pt, including prescriptions. Pt verbalized understanding and denies questions.       Yair Ozuna RN  12/30/22 0592

## 2023-01-01 LAB
CULTURE: ABNORMAL
Lab: ABNORMAL
SPECIMEN: ABNORMAL

## 2023-02-09 ENCOUNTER — APPOINTMENT (OUTPATIENT)
Dept: GENERAL RADIOLOGY | Age: 38
DRG: 314 | End: 2023-02-09
Payer: COMMERCIAL

## 2023-02-09 ENCOUNTER — HOSPITAL ENCOUNTER (INPATIENT)
Age: 38
LOS: 4 days | Discharge: HOME HEALTH | DRG: 314 | End: 2023-02-13
Attending: EMERGENCY MEDICINE | Admitting: STUDENT IN AN ORGANIZED HEALTH CARE EDUCATION/TRAINING PROGRAM
Payer: COMMERCIAL

## 2023-02-09 DIAGNOSIS — L97.509 DIABETIC FOOT ULCER WITH OSTEOMYELITIS (HCC): ICD-10-CM

## 2023-02-09 DIAGNOSIS — E11.621 DIABETIC FOOT ULCER WITH OSTEOMYELITIS (HCC): ICD-10-CM

## 2023-02-09 DIAGNOSIS — L03.115 CELLULITIS OF RIGHT LOWER EXTREMITY: Primary | ICD-10-CM

## 2023-02-09 DIAGNOSIS — E11.69 DIABETIC FOOT ULCER WITH OSTEOMYELITIS (HCC): ICD-10-CM

## 2023-02-09 DIAGNOSIS — S91.109S OPEN WOUND OF TOE, SEQUELA: ICD-10-CM

## 2023-02-09 DIAGNOSIS — B99.9 INFECTION: ICD-10-CM

## 2023-02-09 DIAGNOSIS — G89.18 ACUTE POST-OPERATIVE PAIN: ICD-10-CM

## 2023-02-09 DIAGNOSIS — L08.9 WOUND INFECTION: ICD-10-CM

## 2023-02-09 DIAGNOSIS — M86.9 DIABETIC FOOT ULCER WITH OSTEOMYELITIS (HCC): ICD-10-CM

## 2023-02-09 DIAGNOSIS — T14.8XXA WOUND INFECTION: ICD-10-CM

## 2023-02-09 LAB
ALBUMIN SERPL-MCNC: 4 GM/DL (ref 3.4–5)
ALP BLD-CCNC: 107 IU/L (ref 40–129)
ALT SERPL-CCNC: 34 U/L (ref 10–40)
ANION GAP SERPL CALCULATED.3IONS-SCNC: 16 MMOL/L (ref 4–16)
AST SERPL-CCNC: 72 IU/L (ref 15–37)
BASOPHILS ABSOLUTE: 0 K/CU MM
BASOPHILS RELATIVE PERCENT: 0.3 % (ref 0–1)
BILIRUB SERPL-MCNC: 0.4 MG/DL (ref 0–1)
BUN SERPL-MCNC: 8 MG/DL (ref 6–23)
CALCIUM SERPL-MCNC: 9.1 MG/DL (ref 8.3–10.6)
CHLORIDE BLD-SCNC: 93 MMOL/L (ref 99–110)
CO2: 21 MMOL/L (ref 21–32)
CREAT SERPL-MCNC: 0.7 MG/DL (ref 0.9–1.3)
DIFFERENTIAL TYPE: ABNORMAL
EOSINOPHILS ABSOLUTE: 0.1 K/CU MM
EOSINOPHILS RELATIVE PERCENT: 0.8 % (ref 0–3)
GFR SERPL CREATININE-BSD FRML MDRD: >60 ML/MIN/1.73M2
GLUCOSE BLD-MCNC: 363 MG/DL (ref 70–99)
GLUCOSE BLD-MCNC: 581 MG/DL (ref 70–99)
GLUCOSE SERPL-MCNC: 337 MG/DL (ref 70–99)
HCT VFR BLD CALC: 36.6 % (ref 42–52)
HEMOGLOBIN: 12.7 GM/DL (ref 13.5–18)
IMMATURE NEUTROPHIL %: 0.3 % (ref 0–0.43)
LACTATE: 1.6 MMOL/L (ref 0.5–1.9)
LYMPHOCYTES ABSOLUTE: 0.8 K/CU MM
LYMPHOCYTES RELATIVE PERCENT: 10.5 % (ref 24–44)
MCH RBC QN AUTO: 31.5 PG (ref 27–31)
MCHC RBC AUTO-ENTMCNC: 34.7 % (ref 32–36)
MCV RBC AUTO: 90.8 FL (ref 78–100)
MONOCYTES ABSOLUTE: 0.7 K/CU MM
MONOCYTES RELATIVE PERCENT: 9.1 % (ref 0–4)
NUCLEATED RBC %: 0 %
PDW BLD-RTO: 11.6 % (ref 11.7–14.9)
PLATELET # BLD: 222 K/CU MM (ref 140–440)
PMV BLD AUTO: 9.1 FL (ref 7.5–11.1)
POTASSIUM SERPL-SCNC: 4.1 MMOL/L (ref 3.5–5.1)
RBC # BLD: 4.03 M/CU MM (ref 4.6–6.2)
SEGMENTED NEUTROPHILS ABSOLUTE COUNT: 5.7 K/CU MM
SEGMENTED NEUTROPHILS RELATIVE PERCENT: 79 % (ref 36–66)
SODIUM BLD-SCNC: 130 MMOL/L (ref 135–145)
TOTAL IMMATURE NEUTOROPHIL: 0.02 K/CU MM
TOTAL NUCLEATED RBC: 0 K/CU MM
TOTAL PROTEIN: 7.6 GM/DL (ref 6.4–8.2)
WBC # BLD: 7.3 K/CU MM (ref 4–10.5)

## 2023-02-09 PROCEDURE — 94640 AIRWAY INHALATION TREATMENT: CPT

## 2023-02-09 PROCEDURE — 6370000000 HC RX 637 (ALT 250 FOR IP): Performed by: FAMILY MEDICINE

## 2023-02-09 PROCEDURE — 1200000000 HC SEMI PRIVATE

## 2023-02-09 PROCEDURE — 2580000003 HC RX 258: Performed by: EMERGENCY MEDICINE

## 2023-02-09 PROCEDURE — 82962 GLUCOSE BLOOD TEST: CPT

## 2023-02-09 PROCEDURE — 6370000000 HC RX 637 (ALT 250 FOR IP): Performed by: EMERGENCY MEDICINE

## 2023-02-09 PROCEDURE — 6370000000 HC RX 637 (ALT 250 FOR IP): Performed by: STUDENT IN AN ORGANIZED HEALTH CARE EDUCATION/TRAINING PROGRAM

## 2023-02-09 PROCEDURE — 73630 X-RAY EXAM OF FOOT: CPT

## 2023-02-09 PROCEDURE — 85652 RBC SED RATE AUTOMATED: CPT

## 2023-02-09 PROCEDURE — 6360000002 HC RX W HCPCS: Performed by: EMERGENCY MEDICINE

## 2023-02-09 PROCEDURE — 96365 THER/PROPH/DIAG IV INF INIT: CPT

## 2023-02-09 PROCEDURE — 86140 C-REACTIVE PROTEIN: CPT

## 2023-02-09 PROCEDURE — 87075 CULTR BACTERIA EXCEPT BLOOD: CPT

## 2023-02-09 PROCEDURE — 85025 COMPLETE CBC W/AUTO DIFF WBC: CPT

## 2023-02-09 PROCEDURE — 6360000002 HC RX W HCPCS: Performed by: STUDENT IN AN ORGANIZED HEALTH CARE EDUCATION/TRAINING PROGRAM

## 2023-02-09 PROCEDURE — 96366 THER/PROPH/DIAG IV INF ADDON: CPT

## 2023-02-09 PROCEDURE — 87147 CULTURE TYPE IMMUNOLOGIC: CPT

## 2023-02-09 PROCEDURE — 96367 TX/PROPH/DG ADDL SEQ IV INF: CPT

## 2023-02-09 PROCEDURE — 80053 COMPREHEN METABOLIC PANEL: CPT

## 2023-02-09 PROCEDURE — 87070 CULTURE OTHR SPECIMN AEROBIC: CPT

## 2023-02-09 PROCEDURE — 99285 EMERGENCY DEPT VISIT HI MDM: CPT

## 2023-02-09 PROCEDURE — 87077 CULTURE AEROBIC IDENTIFY: CPT

## 2023-02-09 PROCEDURE — 83605 ASSAY OF LACTIC ACID: CPT

## 2023-02-09 PROCEDURE — 87186 SC STD MICRODIL/AGAR DIL: CPT

## 2023-02-09 RX ORDER — INSULIN GLARGINE 100 [IU]/ML
20 INJECTION, SOLUTION SUBCUTANEOUS NIGHTLY
Status: DISCONTINUED | OUTPATIENT
Start: 2023-02-09 | End: 2023-02-10

## 2023-02-09 RX ORDER — POLYETHYLENE GLYCOL 3350 17 G/17G
17 POWDER, FOR SOLUTION ORAL DAILY PRN
Status: DISCONTINUED | OUTPATIENT
Start: 2023-02-09 | End: 2023-02-13 | Stop reason: HOSPADM

## 2023-02-09 RX ORDER — ACETAMINOPHEN 650 MG/1
650 SUPPOSITORY RECTAL EVERY 6 HOURS PRN
Status: DISCONTINUED | OUTPATIENT
Start: 2023-02-09 | End: 2023-02-13 | Stop reason: HOSPADM

## 2023-02-09 RX ORDER — SODIUM CHLORIDE 9 MG/ML
INJECTION, SOLUTION INTRAVENOUS PRN
Status: DISCONTINUED | OUTPATIENT
Start: 2023-02-09 | End: 2023-02-13 | Stop reason: HOSPADM

## 2023-02-09 RX ORDER — 0.9 % SODIUM CHLORIDE 0.9 %
1000 INTRAVENOUS SOLUTION INTRAVENOUS ONCE
Status: COMPLETED | OUTPATIENT
Start: 2023-02-09 | End: 2023-02-09

## 2023-02-09 RX ORDER — ONDANSETRON 4 MG/1
4 TABLET, ORALLY DISINTEGRATING ORAL EVERY 8 HOURS PRN
Status: DISCONTINUED | OUTPATIENT
Start: 2023-02-09 | End: 2023-02-13 | Stop reason: HOSPADM

## 2023-02-09 RX ORDER — HYDROCODONE BITARTRATE AND ACETAMINOPHEN 5; 325 MG/1; MG/1
1 TABLET ORAL
Status: COMPLETED | OUTPATIENT
Start: 2023-02-09 | End: 2023-02-09

## 2023-02-09 RX ORDER — BUDESONIDE AND FORMOTEROL FUMARATE DIHYDRATE 160; 4.5 UG/1; UG/1
2 AEROSOL RESPIRATORY (INHALATION) 2 TIMES DAILY
Status: DISCONTINUED | OUTPATIENT
Start: 2023-02-09 | End: 2023-02-12

## 2023-02-09 RX ORDER — DEXTROSE MONOHYDRATE 100 MG/ML
INJECTION, SOLUTION INTRAVENOUS CONTINUOUS PRN
Status: DISCONTINUED | OUTPATIENT
Start: 2023-02-09 | End: 2023-02-13 | Stop reason: HOSPADM

## 2023-02-09 RX ORDER — EZETIMIBE 10 MG/1
10 TABLET ORAL NIGHTLY
Status: DISCONTINUED | OUTPATIENT
Start: 2023-02-09 | End: 2023-02-13 | Stop reason: HOSPADM

## 2023-02-09 RX ORDER — INSULIN LISPRO 100 [IU]/ML
8 INJECTION, SOLUTION INTRAVENOUS; SUBCUTANEOUS ONCE
Status: COMPLETED | OUTPATIENT
Start: 2023-02-09 | End: 2023-02-09

## 2023-02-09 RX ORDER — HYDROCODONE BITARTRATE AND ACETAMINOPHEN 5; 325 MG/1; MG/1
1 TABLET ORAL EVERY 4 HOURS PRN
Status: DISCONTINUED | OUTPATIENT
Start: 2023-02-09 | End: 2023-02-10

## 2023-02-09 RX ORDER — SODIUM CHLORIDE 0.9 % (FLUSH) 0.9 %
5-40 SYRINGE (ML) INJECTION EVERY 12 HOURS SCHEDULED
Status: DISCONTINUED | OUTPATIENT
Start: 2023-02-09 | End: 2023-02-13 | Stop reason: HOSPADM

## 2023-02-09 RX ORDER — ENOXAPARIN SODIUM 100 MG/ML
30 INJECTION SUBCUTANEOUS 2 TIMES DAILY
Status: DISCONTINUED | OUTPATIENT
Start: 2023-02-09 | End: 2023-02-11

## 2023-02-09 RX ORDER — INSULIN LISPRO 100 [IU]/ML
0-8 INJECTION, SOLUTION INTRAVENOUS; SUBCUTANEOUS
Status: DISCONTINUED | OUTPATIENT
Start: 2023-02-09 | End: 2023-02-10

## 2023-02-09 RX ORDER — SODIUM CHLORIDE 0.9 % (FLUSH) 0.9 %
5-40 SYRINGE (ML) INJECTION PRN
Status: DISCONTINUED | OUTPATIENT
Start: 2023-02-09 | End: 2023-02-13 | Stop reason: HOSPADM

## 2023-02-09 RX ORDER — PANTOPRAZOLE SODIUM 40 MG/1
40 TABLET, DELAYED RELEASE ORAL
Status: DISCONTINUED | OUTPATIENT
Start: 2023-02-10 | End: 2023-02-13 | Stop reason: HOSPADM

## 2023-02-09 RX ORDER — INSULIN LISPRO 100 [IU]/ML
5 INJECTION, SOLUTION INTRAVENOUS; SUBCUTANEOUS
Status: DISCONTINUED | OUTPATIENT
Start: 2023-02-09 | End: 2023-02-10

## 2023-02-09 RX ORDER — ACETAMINOPHEN 325 MG/1
650 TABLET ORAL EVERY 6 HOURS PRN
Status: DISCONTINUED | OUTPATIENT
Start: 2023-02-09 | End: 2023-02-13 | Stop reason: HOSPADM

## 2023-02-09 RX ORDER — CHOLECALCIFEROL (VITAMIN D3) 125 MCG
5 CAPSULE ORAL ONCE
Status: COMPLETED | OUTPATIENT
Start: 2023-02-09 | End: 2023-02-09

## 2023-02-09 RX ORDER — ALBUTEROL SULFATE 90 UG/1
2 AEROSOL, METERED RESPIRATORY (INHALATION) EVERY 6 HOURS PRN
Status: DISCONTINUED | OUTPATIENT
Start: 2023-02-09 | End: 2023-02-13 | Stop reason: HOSPADM

## 2023-02-09 RX ORDER — ONDANSETRON 2 MG/ML
4 INJECTION INTRAMUSCULAR; INTRAVENOUS EVERY 6 HOURS PRN
Status: DISCONTINUED | OUTPATIENT
Start: 2023-02-09 | End: 2023-02-13 | Stop reason: HOSPADM

## 2023-02-09 RX ORDER — ATORVASTATIN CALCIUM 10 MG/1
10 TABLET, FILM COATED ORAL DAILY
Status: DISCONTINUED | OUTPATIENT
Start: 2023-02-09 | End: 2023-02-13 | Stop reason: HOSPADM

## 2023-02-09 RX ORDER — ALLOPURINOL 300 MG/1
300 TABLET ORAL DAILY
Status: DISCONTINUED | OUTPATIENT
Start: 2023-02-09 | End: 2023-02-13 | Stop reason: HOSPADM

## 2023-02-09 RX ORDER — INSULIN LISPRO 100 [IU]/ML
0-4 INJECTION, SOLUTION INTRAVENOUS; SUBCUTANEOUS NIGHTLY
Status: DISCONTINUED | OUTPATIENT
Start: 2023-02-09 | End: 2023-02-10

## 2023-02-09 RX ADMIN — Medication 5 MG: at 21:31

## 2023-02-09 RX ADMIN — HYDROCODONE BITARTRATE AND ACETAMINOPHEN 1 TABLET: 5; 325 TABLET ORAL at 14:24

## 2023-02-09 RX ADMIN — SODIUM CHLORIDE 1000 ML: 9 INJECTION, SOLUTION INTRAVENOUS at 15:28

## 2023-02-09 RX ADMIN — CEFEPIME 2000 MG: 2 INJECTION, POWDER, FOR SOLUTION INTRAVENOUS at 14:19

## 2023-02-09 RX ADMIN — ALBUTEROL SULFATE 2 PUFF: 90 AEROSOL, METERED RESPIRATORY (INHALATION) at 21:02

## 2023-02-09 RX ADMIN — VANCOMYCIN HYDROCHLORIDE 2500 MG: 500 INJECTION, POWDER, LYOPHILIZED, FOR SOLUTION INTRAVENOUS at 15:26

## 2023-02-09 RX ADMIN — HYDROCODONE BITARTRATE AND ACETAMINOPHEN 1 TABLET: 5; 325 TABLET ORAL at 18:24

## 2023-02-09 RX ADMIN — EZETIMIBE 10 MG: 10 TABLET ORAL at 21:32

## 2023-02-09 RX ADMIN — INSULIN LISPRO 8 UNITS: 100 INJECTION, SOLUTION INTRAVENOUS; SUBCUTANEOUS at 21:33

## 2023-02-09 RX ADMIN — INSULIN GLARGINE 20 UNITS: 100 INJECTION, SOLUTION SUBCUTANEOUS at 22:32

## 2023-02-09 RX ADMIN — ATORVASTATIN CALCIUM 10 MG: 10 TABLET, FILM COATED ORAL at 21:31

## 2023-02-09 RX ADMIN — BUDESONIDE AND FORMOTEROL FUMARATE DIHYDRATE 2 PUFF: 160; 4.5 AEROSOL RESPIRATORY (INHALATION) at 21:04

## 2023-02-09 RX ADMIN — ALLOPURINOL 300 MG: 300 TABLET ORAL at 21:32

## 2023-02-09 RX ADMIN — ENOXAPARIN SODIUM 30 MG: 100 INJECTION SUBCUTANEOUS at 21:32

## 2023-02-09 ASSESSMENT — PAIN DESCRIPTION - LOCATION
LOCATION: LEG
LOCATION: LEG
LOCATION: FOOT

## 2023-02-09 ASSESSMENT — PAIN DESCRIPTION - PAIN TYPE
TYPE: CHRONIC PAIN;ACUTE PAIN
TYPE: CHRONIC PAIN

## 2023-02-09 ASSESSMENT — PAIN DESCRIPTION - ONSET: ONSET: ON-GOING

## 2023-02-09 ASSESSMENT — PAIN DESCRIPTION - DESCRIPTORS
DESCRIPTORS: ACHING;DISCOMFORT
DESCRIPTORS: ACHING;DISCOMFORT

## 2023-02-09 ASSESSMENT — PAIN DESCRIPTION - ORIENTATION
ORIENTATION: RIGHT

## 2023-02-09 ASSESSMENT — PAIN - FUNCTIONAL ASSESSMENT
PAIN_FUNCTIONAL_ASSESSMENT: 0-10
PAIN_FUNCTIONAL_ASSESSMENT: ACTIVITIES ARE NOT PREVENTED

## 2023-02-09 ASSESSMENT — PAIN SCALES - GENERAL
PAINLEVEL_OUTOF10: 6
PAINLEVEL_OUTOF10: 5
PAINLEVEL_OUTOF10: 8
PAINLEVEL_OUTOF10: 8
PAINLEVEL_OUTOF10: 10

## 2023-02-09 ASSESSMENT — ENCOUNTER SYMPTOMS
NAUSEA: 0
BACK PAIN: 0
SORE THROAT: 0
WHEEZING: 0
COUGH: 0
VOMITING: 0
SHORTNESS OF BREATH: 0

## 2023-02-09 ASSESSMENT — PAIN DESCRIPTION - FREQUENCY: FREQUENCY: CONTINUOUS

## 2023-02-09 NOTE — ED NOTES
Shyanne 65 paged Dr Artie Almonte covering Dr Paris Haider  02/09/23 [phone]    [phone] Dr Artie Almonte returned call      lAice Hunt  02/09/23 [phone]

## 2023-02-09 NOTE — H&P
V2.0  History and Physical      Name:  Carlie Sinclair /Age/Sex: 1985  (44 y.o. male)   MRN & CSN:  [id number] & [id number] Encounter Date/Time: 2023 5:52 PM EST   Location:  ED15/ED-15 PCP: Hal Quan MD       Hospital Day: 1    Assessment and Plan:   Carlie Sinclair is a 44 y.o. male  who presents with Diabetic foot infection [facility])    Hospital Problems             Last Modified POA    * (Principal) Diabetic foot infection ([address] 2023 Yes       Right diabetic foot infection  -No leukocytosis noted. No fever so far. However does have some tachycardia. Started on broad-spectrum antibiotics with Vanco and cefepime. Currently getting IV fluid bolus in the ED. May need additional if continues to be tachycardic.  -Blood cultures have been sent. X-ray notable for concern for osteomyelitis. Will defer ordering imaging for now pending discussion with general surgery after that evaluation on whether to get MRI with contrast or triple phase bone scan. Of note patient has had anaphylactic reaction to shellfish however he had an MRI done 3 years ago. Type 2 diabetes mellitus  -Chronic to patient need  Getting his Lantus at home and his sugars are well controlled. He is on metformin glipizide and Trulicity which we will get a hold over here. Started on Lantus 20 minutes nightly. Start on lispro 5 units made premeals with medium dose sliding scale. Hypoglycemia protocol ordered. Hypertension  -Holding home medications for now. Pending stabilization of tachycardia if his blood pressure is still elevated will give    Tobacco abuse disorder  -Nicotine patch ordered.     Disposition:   Current Living situation: Home  Expected Disposition: Home versus SNF  Estimated D/C: 3 to 4 days    Diet Carb controlled diet   DVT Prophylaxis [x] Lovenox, []  Heparin, [] SCDs, [] Ambulation,  [] Eliquis, [] Xarelto, [] Coumadin   Code Status Full code   Surrogate Decision Maker/ POA Parent Martha Bennett     History from:     patient    History of Present Illness:     Chief Complaint:   Alma Solorzano is a 44 y.o. male with pmh of type 2 diabetes, chronic diabetic foot infection who presents with worsening swelling of the right lower extremity. Patient reports he has a chronic wound on his right foot which has recently gotten worse with discharge and erythema. In addition his leg started to appear more swollen which made him to the ED. Denies any fevers, chills at home. Reports his sugars are relatively well controlled at home with numbers around the 150s to 160s range on his home medications. He reports he had worsening pain of his right foot requiring several over-the-counter medications as well as occasional Norco.  Reports he follows up with surgery for his wound care and was recently admitted to the hospital for IV antibiotics. Review of Systems: Need 10 Elements   Review of Systems   Constitutional:  Negative for chills and fever. HENT:  Negative for sore throat. Eyes:  Negative for visual disturbance. Respiratory:  Negative for cough, shortness of breath and wheezing. Cardiovascular:  Negative for palpitations and leg swelling. Gastrointestinal:  Negative for nausea and vomiting. Endocrine: Negative for polyuria. Genitourinary:  Negative for dysuria. Musculoskeletal:  Negative for back pain. Skin:  Positive for wound. Neurological:  Negative for dizziness and weakness. Psychiatric/Behavioral:  Negative for confusion. Objective:      Intake/Output Summary (Last 24 hours) at 2/9/2023 1752  Last data filed at 2/9/2023 1455  Gross per 24 hour   Intake 50 ml   Output --   Net 50 ml      Vitals:   Vitals:    02/09/23 1445 02/09/23 1528 02/09/23 1740 02/09/23 1741   BP:  (!) 151/82  (!) 153/105   Pulse:  (!) 111  (!) 106   Resp:  18  20   Temp:  99.3 °F (37.4 °C)  98.2 °F (36.8 °C)   TempSrc:  Oral  Oral   SpO2:  98% 96% 97%   Weight: (!) 300 lb 0.7 oz (136.1 kg)          Medications Prior to Admission     Prior to Admission medications    Medication Sig Start Date End Date Taking? Authorizing Provider   moxifloxacin (VIGAMOX) 0.5 % ophthalmic solution Place 1 drop into the right eye 4 times daily 11/23/22   Cecile Cunningham MD   pantoprazole (PROTONIX) 40 MG tablet Take 1 tablet by mouth every morning (before breakfast) 11/24/22   Cecile Cunningham MD   nicotine (NICODERM CQ) 14 MG/24HR Place 1 patch onto the skin daily 9/22/22   Taz Manzano MD   SYMBICORT 160-4.5 MCG/ACT AERO Inhale 2 puffs into the lungs in the morning and at bedtime 9/2/22   Historical Provider, MD   glipiZIDE (GLUCOTROL) 5 MG tablet Take 3 tablets by mouth in the morning and 3 tablets in the evening. Take before meals. 7/27/22   Heri Beltran MD   TRULICITY 1.5 QE/2.2XG SOPN inject 0.5 milliliters ( 1 AND 1/2 milligrams ) subcutaneously ev. ..  (REFER TO PRESCRIPTION NOTES). 7/27/22   Heri Beltran MD   LANTUS SOLOSTAR 100 UNIT/ML injection pen Inject 50 Units into the skin nightly 7/27/22   Heri Beltran MD   insulin NPH (HUMULIN N [facility]) 100 UNIT/ML injection pen Inject 20 Units into the skin every morning 7/27/22   Heri Beltran MD   tiZANidine (ZANAFLEX) 4 MG tablet Take 4 mg by mouth nightly as needed 6/29/22   Historical Provider, MD   cloNIDine (CATAPRES) 0.2 MG tablet Take 0.2 mg by mouth in the morning.     Historical Provider, MD   trimethoprim-polymyxin b (POLYTRIM) 64679-5.1 UNIT/ML-% ophthalmic solution Apply 1 drop to eye 4 times daily 6/24/22   Historical Provider, MD   UNABLE TO FIND Place 1 drop into the right eye 4 times daily 07/21/22 Patient on Vancomycin Preserved Eye soln 25 mg/ml    Historical Provider, MD   ezetimibe (ZETIA) 10 MG tablet take 1 tablet by mouth once daily 6/29/22   Historical Provider, MD   TECHLITE PEN NEEDLES 31G X 5 MM MISC use 1 PEN NEEDLE to inject MEDICATION subcutaneously two to three times a day 6/29/22   Historical Provider, MD   melatonin 5 MG TABS tablet take 1 tablet by mouth every evening 6/29/22   Historical Provider, MD   pregabalin (LYRICA) 100 MG capsule take 1 capsule by mouth twice a day 6/29/22   Historical Provider, MD   sucralfate (CARAFATE) 1 GM tablet Take 1 tablet by mouth 4 times daily 8/1/21   Blaine Devries PA-C   albuterol sulfate  (90 Base) MCG/ACT inhaler Inhale 2 puffs into the lungs every 6 hours as needed for Wheezing 3/24/21   Arabella Morel MD   albuterol-ipratropium (COMBIVENT RESPIMAT)  MCG/ACT AERS inhaler Inhale 1 puff into the lungs every 6 hours 3/24/21   Arabella Morel MD   acetaminophen (AMINOFEN) 325 MG tablet Take 2 tablets by mouth every 6 hours as needed for Pain 4/23/20   Mauri Arroyo DO   brimonidine (ALPHAGAN) 0.2 % ophthalmic solution Place 1 drop into the right eye 2 times daily    Historical Provider, MD   dorzolamide-timolol 22.3-6.8 MG/ML SOLN Place 1 drop into the right eye 2 times daily    Historical Provider, MD   prednisoLONE acetate (PRED FORTE) 1 % ophthalmic suspension Place 1 drop into the right eye 2 times daily    Historical Provider, MD   allopurinol (ZYLOPRIM) 300 MG tablet Take 300 mg by mouth daily     Historical Provider, MD   atorvastatin (LIPITOR) 10 MG tablet Take 10 mg by mouth daily     Historical Provider, MD   lisinopril-hydrochlorothiazide (PRINZIDE;ZESTORETIC) 20-12.5 MG per tablet Take 1 tablet by mouth daily     Historical Provider, MD   metFORMIN (GLUCOPHAGE) 1000 MG tablet Take 1,000 mg by mouth 2 times daily (with meals)    Historical Provider, MD   amLODIPine (NORVASC) 10 MG tablet Take 10 mg by mouth daily 11/25/16   Historical Provider, MD       Physical Exam: Need 8 Elements   Physical Exam  Constitutional:       General: He is not in acute distress. HENT:      Mouth/Throat:      Mouth: Mucous membranes are moist.      Pharynx: No posterior oropharyngeal erythema. Eyes:      General: No scleral icterus.      Extraocular Movements: Extraocular movements intact. Pupils: Pupils are equal, round, and reactive to light. Cardiovascular:      Rate and Rhythm: Regular rhythm. Tachycardia present. Pulses: Normal pulses. Heart sounds: No murmur heard. Pulmonary:      Effort: Pulmonary effort is normal.      Breath sounds: No wheezing or rales. Abdominal:      General: Bowel sounds are normal. There is no distension. Palpations: Abdomen is soft. Tenderness: There is no abdominal tenderness. Musculoskeletal:         General: Normal range of motion. Right lower leg: No edema. Left lower leg: No edema. Skin:     General: Skin is warm. Findings: No rash. Comments: Erythema and discharge from the right first and third digit. See media tab for images. Right foot second toe amputation noted. Neurological:      General: No focal deficit present. Mental Status: He is alert and oriented to person, place, and time. Cranial Nerves: No cranial nerve deficit. Sensory: No sensory deficit. Motor: No weakness. Psychiatric:         Mood and Affect: Mood normal.              Past Medical History:   PMHx   Past Medical History:   Diagnosis Date    Asthma     Blind left eye     Diabetes mellitus ([facility] [address]     GERD (gastroesophageal reflux disease)     Hypertension     Retinal detachment 2012    left     PSHX:  has a past surgical history that includes Corneal transplant (Bilateral); Mandible surgery (Right); eye surgery; Eye surgery (Right); fracture surgery; Mandible surgery (Bilateral); Toe amputation (Right, 07/25/2017); laparoscopic appendectomy (N/A, 4/28/2022); and Toe amputation (Right, 7/25/2022). Allergies:    Allergies   Allergen Reactions    Ciprofloxacin Shortness Of Breath    Glucosamine Anaphylaxis    Shellfish-Derived Products Anaphylaxis     Fam HX:  family history includes Asthma in his father and mother; Diabetes in his father and mother; Heart Disease in his mother; High Blood Pressure in his father and mother; Stroke in his mother.   Soc HX:   Social History     Socioeconomic History    Marital status: Single   Tobacco Use    Smoking status: Every Day     Packs/day: 0.50     Years: 5.00     Pack years: 2.50     Types: Cigarettes    Smokeless tobacco: Never   Vaping Use    Vaping Use: Never used   Substance and Sexual Activity    Alcohol use: Yes     Comment: occasionally    Drug use: Not Currently     Types: Marijuana Zay Blow)       Medications:   Medications:    vancomycin  2,500 mg IntraVENous Once      Infusions:   PRN Meds:      Labs      CBC:   Recent Labs     02/09/23  1200   WBC 7.3   HGB 12.7*        BMP:    Recent Labs     02/09/23  1200   *   K 4.1   CL 93*   CO2 21   BUN 8   CREATININE 0.7*   GLUCOSE 337*     Hepatic:   Recent Labs     02/09/23  1200   AST 72*   ALT 34   BILITOT 0.4   ALKPHOS 107     Lipids:   Lab Results   Component Value Date/Time    CHOL 142 04/16/2011 01:19 PM    HDL 41 04/16/2011 01:19 PM    TRIG 268 04/16/2011 01:19 PM     Hemoglobin A1C:   Lab Results   Component Value Date/Time    LABA1C 8.5 09/18/2022 01:16 AM       Troponin:   Lab Results   Component Value Date/Time    TROPONINT <0.010 04/28/2022 03:26 AM    TROPONINT <0.010 08/01/2021 02:37 PM    TROPONINT <0.010 04/22/2020 11:29 PM       UA:  Lab Results   Component Value Date/Time    NITRU NEGATIVE 07/21/2022 02:20 PM    COLORU YELLOW 07/21/2022 02:20 PM    WBCUA <1 07/21/2022 02:20 PM    RBCUA NONE SEEN 07/21/2022 02:20 PM    MUCUS RARE 12/21/2018 06:12 PM    TRICHOMONAS NONE SEEN 07/21/2022 02:20 PM    BACTERIA NEGATIVE 07/21/2022 02:20 PM    CLARITYU CLEAR 07/21/2022 02:20 PM    SPECGRAV 1.020 07/21/2022 02:20 PM    LEUKOCYTESUR NEGATIVE 07/21/2022 02:20 PM    UROBILINOGEN 0.2 07/21/2022 02:20 PM    BILIRUBINUR NEGATIVE 07/21/2022 02:20 PM    BLOODU NEGATIVE 07/21/2022 02:20 PM    Virgia [address] 07/21/2022 02:20 PM       Organism:   Lab Results   Component Value Date/Time    ORG MRSA 12/23/2018 01:00 PM    ORG BSGA 12/23/2018 01:00 PM       Imaging/Diagnostics Last 24 Hours   XR FOOT RIGHT (MIN 3 VIEWS)    Result Date: 2/9/2023  EXAMINATION: THREE XRAY VIEWS OF THE RIGHT FOOT 2/9/2023 2:13 pm COMPARISON: 11/19/2022 HISTORY: ORDERING SYSTEM PROVIDED HISTORY: right foot wound TECHNOLOGIST PROVIDED HISTORY: Reason for exam:->right foot wound Reason for Exam: right foot wound Additional signs and symptoms: NA Relevant Medical/Surgical History: diabetes FINDINGS: New 2.5 mm juxtacortical calcification adjacent to the distal interphalangeal joint/base of distal phalanx of the 3rd digit consistent with nondisplaced intra-articular avulsion fracture of uncertain acuity. No other acute fracture or dislocation. No bone destruction or malignant-appearing periosteal reaction Persistent but radiographically improved soft tissue defect medial aspect of the great toe. New soft tissue defect suggested distal tip of the 3rd digit/toe consistent with new soft tissue ulcer. Diffuse soft tissue swelling of forefoot noted consistent with contusion, edema, cellulitis. No subcutaneous gas density or radiographic evidence of osteomyelitis. Neuropathic arthropathy changes suggested in the midfoot. Persistent but improved soft tissue defect medial aspect of the great toe compared to 11/19/2022, new soft tissue defect/ulcer distal tip 3rd digit. No radiographic evidence of osteomyelitis. Triple phase bone scan or contrast-enhanced MRI of the foot suggested if clinical concern of radiographically occult osteomyelitis. Nondisplaced intra-articular avulsion fracture base of the distal phalanx 3rd toe/distal interphalangeal joint of the 3rd toe not present on prior study of 11/19/2022. Correlation with clinical findings of acute avulsion fracture suggested. Diffuse soft tissue swelling of the forefoot consistent with contusion, edema, cellulitis.  Neuropathic arthropathy changes of the midfoot, hallux valgus deformity 1st metatarsal-phalangeal joint unchanged compared to 11/19/2022. Indira Ramos        Personally reviewed Lab Studies, Imaging, and discussed case with Patient    Electronically signed by Cory Duke MD on 2/9/2023 at 5:52 PM
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
## 2023-02-09 NOTE — ED NOTES
The following labs were labeled with appropriate pt sticker and tubed to lab:     Aerobic wound cultures sent    [] Blue     [] Lavender   [] on ice  [] Green/yellow  [] Green/black [] on ice  [] Alric Broccoli  [] on ice  [] Yellow  [] Red  [] Type/ Screen  [] ABG  [] VBG    [] COVID-19 swab    [] Rapid  [] PCR  [] Flu swab  [] Peds Viral Panel     [] Urine Sample  [] Fecal Sample  [] Pelvic Cultures  [] Blood Cultures  [] X 2  [] STREP Cultures             Bhumika Vang RN  02/09/23 3913

## 2023-02-10 ENCOUNTER — APPOINTMENT (OUTPATIENT)
Dept: GENERAL RADIOLOGY | Age: 38
DRG: 314 | End: 2023-02-10
Payer: COMMERCIAL

## 2023-02-10 ENCOUNTER — ANESTHESIA EVENT (OUTPATIENT)
Dept: OPERATING ROOM | Age: 38
End: 2023-02-10
Payer: COMMERCIAL

## 2023-02-10 PROBLEM — B34.8 RHINOVIRUS: Status: ACTIVE | Noted: 2023-02-10

## 2023-02-10 LAB
ANION GAP SERPL CALCULATED.3IONS-SCNC: 13 MMOL/L (ref 4–16)
B PARAP IS1001 DNA NPH QL NAA+NON-PROBE: NOT DETECTED
B PERT.PT PRMT NPH QL NAA+NON-PROBE: NOT DETECTED
BASOPHILS ABSOLUTE: 0 K/CU MM
BASOPHILS RELATIVE PERCENT: 0.4 % (ref 0–1)
BUN SERPL-MCNC: 10 MG/DL (ref 6–23)
C PNEUM DNA NPH QL NAA+NON-PROBE: NOT DETECTED
CALCIUM SERPL-MCNC: 9.1 MG/DL (ref 8.3–10.6)
CHLORIDE BLD-SCNC: 100 MMOL/L (ref 99–110)
CO2: 22 MMOL/L (ref 21–32)
CREAT SERPL-MCNC: 0.7 MG/DL (ref 0.9–1.3)
CRP SERPL HS-MCNC: 40.9 MG/L
DIFFERENTIAL TYPE: ABNORMAL
EOSINOPHILS ABSOLUTE: 0.1 K/CU MM
EOSINOPHILS RELATIVE PERCENT: 1 % (ref 0–3)
ERYTHROCYTE SEDIMENTATION RATE: 39 MM/HR (ref 0–15)
FLUAV H1 2009 PAN RNA NPH NAA+NON-PROBE: NOT DETECTED
FLUAV H1 RNA NPH QL NAA+NON-PROBE: NOT DETECTED
FLUAV H3 RNA NPH QL NAA+NON-PROBE: NOT DETECTED
FLUAV RNA NPH QL NAA+NON-PROBE: NOT DETECTED
FLUBV RNA NPH QL NAA+NON-PROBE: NOT DETECTED
GFR SERPL CREATININE-BSD FRML MDRD: >60 ML/MIN/1.73M2
GLUCOSE BLD-MCNC: 266 MG/DL (ref 70–99)
GLUCOSE BLD-MCNC: 280 MG/DL (ref 70–99)
GLUCOSE BLD-MCNC: 284 MG/DL (ref 70–99)
GLUCOSE BLD-MCNC: 300 MG/DL (ref 70–99)
GLUCOSE BLD-MCNC: 381 MG/DL (ref 70–99)
GLUCOSE SERPL-MCNC: 261 MG/DL (ref 70–99)
HADV DNA NPH QL NAA+NON-PROBE: NOT DETECTED
HCOV 229E RNA NPH QL NAA+NON-PROBE: NOT DETECTED
HCOV HKU1 RNA NPH QL NAA+NON-PROBE: NOT DETECTED
HCOV NL63 RNA NPH QL NAA+NON-PROBE: NOT DETECTED
HCOV OC43 RNA NPH QL NAA+NON-PROBE: NOT DETECTED
HCT VFR BLD CALC: 36.4 % (ref 42–52)
HEMOGLOBIN: 12.8 GM/DL (ref 13.5–18)
HMPV RNA NPH QL NAA+NON-PROBE: NOT DETECTED
HPIV1 RNA NPH QL NAA+NON-PROBE: NOT DETECTED
HPIV2 RNA NPH QL NAA+NON-PROBE: NOT DETECTED
HPIV3 RNA NPH QL NAA+NON-PROBE: NOT DETECTED
HPIV4 RNA NPH QL NAA+NON-PROBE: NOT DETECTED
IMMATURE NEUTROPHIL %: 0.4 % (ref 0–0.43)
LYMPHOCYTES ABSOLUTE: 1 K/CU MM
LYMPHOCYTES RELATIVE PERCENT: 12.6 % (ref 24–44)
M PNEUMO DNA NPH QL NAA+NON-PROBE: NOT DETECTED
MCH RBC QN AUTO: 32 PG (ref 27–31)
MCHC RBC AUTO-ENTMCNC: 35.2 % (ref 32–36)
MCV RBC AUTO: 91 FL (ref 78–100)
MONOCYTES ABSOLUTE: 0.5 K/CU MM
MONOCYTES RELATIVE PERCENT: 6.4 % (ref 0–4)
NUCLEATED RBC %: 0 %
PDW BLD-RTO: 11.6 % (ref 11.7–14.9)
PLATELET # BLD: 222 K/CU MM (ref 140–440)
PMV BLD AUTO: 9 FL (ref 7.5–11.1)
POTASSIUM SERPL-SCNC: 4.2 MMOL/L (ref 3.5–5.1)
RBC # BLD: 4 M/CU MM (ref 4.6–6.2)
RSV RNA NPH QL NAA+NON-PROBE: NOT DETECTED
RV+EV RNA NPH QL NAA+NON-PROBE: ABNORMAL
SARS-COV-2 RNA NPH QL NAA+NON-PROBE: NOT DETECTED
SEGMENTED NEUTROPHILS ABSOLUTE COUNT: 6.3 K/CU MM
SEGMENTED NEUTROPHILS RELATIVE PERCENT: 79.2 % (ref 36–66)
SODIUM BLD-SCNC: 135 MMOL/L (ref 135–145)
TOTAL IMMATURE NEUTOROPHIL: 0.03 K/CU MM
TOTAL NUCLEATED RBC: 0 K/CU MM
WBC # BLD: 8 K/CU MM (ref 4–10.5)

## 2023-02-10 PROCEDURE — 6360000002 HC RX W HCPCS: Performed by: INTERNAL MEDICINE

## 2023-02-10 PROCEDURE — 85025 COMPLETE CBC W/AUTO DIFF WBC: CPT

## 2023-02-10 PROCEDURE — 6370000000 HC RX 637 (ALT 250 FOR IP): Performed by: INTERNAL MEDICINE

## 2023-02-10 PROCEDURE — 2580000003 HC RX 258: Performed by: STUDENT IN AN ORGANIZED HEALTH CARE EDUCATION/TRAINING PROGRAM

## 2023-02-10 PROCEDURE — 83036 HEMOGLOBIN GLYCOSYLATED A1C: CPT

## 2023-02-10 PROCEDURE — 6370000000 HC RX 637 (ALT 250 FOR IP): Performed by: STUDENT IN AN ORGANIZED HEALTH CARE EDUCATION/TRAINING PROGRAM

## 2023-02-10 PROCEDURE — 0202U NFCT DS 22 TRGT SARS-COV-2: CPT

## 2023-02-10 PROCEDURE — 71045 X-RAY EXAM CHEST 1 VIEW: CPT

## 2023-02-10 PROCEDURE — 1200000000 HC SEMI PRIVATE

## 2023-02-10 PROCEDURE — 0HBMXZZ EXCISION OF RIGHT FOOT SKIN, EXTERNAL APPROACH: ICD-10-PCS | Performed by: STUDENT IN AN ORGANIZED HEALTH CARE EDUCATION/TRAINING PROGRAM

## 2023-02-10 PROCEDURE — 94664 DEMO&/EVAL PT USE INHALER: CPT

## 2023-02-10 PROCEDURE — 6360000002 HC RX W HCPCS: Performed by: STUDENT IN AN ORGANIZED HEALTH CARE EDUCATION/TRAINING PROGRAM

## 2023-02-10 PROCEDURE — 80048 BASIC METABOLIC PNL TOTAL CA: CPT

## 2023-02-10 PROCEDURE — 94640 AIRWAY INHALATION TREATMENT: CPT

## 2023-02-10 PROCEDURE — 82962 GLUCOSE BLOOD TEST: CPT

## 2023-02-10 PROCEDURE — 36415 COLL VENOUS BLD VENIPUNCTURE: CPT

## 2023-02-10 RX ORDER — PREDNISONE 1 MG/1
5 TABLET ORAL ONCE
Status: COMPLETED | OUTPATIENT
Start: 2023-02-10 | End: 2023-02-10

## 2023-02-10 RX ORDER — AMLODIPINE BESYLATE 10 MG/1
10 TABLET ORAL DAILY
Status: DISCONTINUED | OUTPATIENT
Start: 2023-02-10 | End: 2023-02-13 | Stop reason: HOSPADM

## 2023-02-10 RX ORDER — OXYCODONE HYDROCHLORIDE 5 MG/1
5 TABLET ORAL EVERY 4 HOURS PRN
Status: DISCONTINUED | OUTPATIENT
Start: 2023-02-10 | End: 2023-02-11

## 2023-02-10 RX ORDER — LISINOPRIL AND HYDROCHLOROTHIAZIDE 20; 12.5 MG/1; MG/1
1 TABLET ORAL DAILY
Status: DISCONTINUED | OUTPATIENT
Start: 2023-02-10 | End: 2023-02-13 | Stop reason: HOSPADM

## 2023-02-10 RX ORDER — CLONIDINE HYDROCHLORIDE 0.2 MG/1
0.2 TABLET ORAL DAILY
Status: DISCONTINUED | OUTPATIENT
Start: 2023-02-10 | End: 2023-02-13 | Stop reason: HOSPADM

## 2023-02-10 RX ORDER — INSULIN LISPRO 100 [IU]/ML
10 INJECTION, SOLUTION INTRAVENOUS; SUBCUTANEOUS ONCE
Status: COMPLETED | OUTPATIENT
Start: 2023-02-10 | End: 2023-02-10

## 2023-02-10 RX ORDER — INSULIN LISPRO 100 [IU]/ML
0-8 INJECTION, SOLUTION INTRAVENOUS; SUBCUTANEOUS
Status: DISCONTINUED | OUTPATIENT
Start: 2023-02-10 | End: 2023-02-13

## 2023-02-10 RX ORDER — OXYCODONE HYDROCHLORIDE 10 MG/1
10 TABLET ORAL EVERY 4 HOURS PRN
Status: DISCONTINUED | OUTPATIENT
Start: 2023-02-10 | End: 2023-02-11

## 2023-02-10 RX ORDER — TIZANIDINE 4 MG/1
4 TABLET ORAL NIGHTLY
Status: DISCONTINUED | OUTPATIENT
Start: 2023-02-10 | End: 2023-02-13 | Stop reason: HOSPADM

## 2023-02-10 RX ORDER — ALBUTEROL SULFATE 90 UG/1
2 AEROSOL, METERED RESPIRATORY (INHALATION) 4 TIMES DAILY
Status: DISCONTINUED | OUTPATIENT
Start: 2023-02-10 | End: 2023-02-12

## 2023-02-10 RX ORDER — PREDNISOLONE ACETATE 10 MG/ML
1 SUSPENSION/ DROPS OPHTHALMIC 4 TIMES DAILY
Status: DISCONTINUED | OUTPATIENT
Start: 2023-02-10 | End: 2023-02-13 | Stop reason: HOSPADM

## 2023-02-10 RX ORDER — PREGABALIN 100 MG/1
100 CAPSULE ORAL 2 TIMES DAILY
Status: DISCONTINUED | OUTPATIENT
Start: 2023-02-10 | End: 2023-02-13 | Stop reason: HOSPADM

## 2023-02-10 RX ORDER — CHOLECALCIFEROL (VITAMIN D3) 125 MCG
5 CAPSULE ORAL NIGHTLY
Status: DISCONTINUED | OUTPATIENT
Start: 2023-02-10 | End: 2023-02-13 | Stop reason: HOSPADM

## 2023-02-10 RX ORDER — BRIMONIDINE TARTRATE 2 MG/ML
1 SOLUTION/ DROPS OPHTHALMIC 2 TIMES DAILY
Status: DISCONTINUED | OUTPATIENT
Start: 2023-02-10 | End: 2023-02-13 | Stop reason: HOSPADM

## 2023-02-10 RX ORDER — POLYMYXIN B SULFATE AND TRIMETHOPRIM 1; 10000 MG/ML; [USP'U]/ML
1 SOLUTION OPHTHALMIC 4 TIMES DAILY
Status: DISCONTINUED | OUTPATIENT
Start: 2023-02-10 | End: 2023-02-13 | Stop reason: HOSPADM

## 2023-02-10 RX ORDER — GUAIFENESIN/DEXTROMETHORPHAN 100-10MG/5
5 SYRUP ORAL EVERY 4 HOURS PRN
Status: DISCONTINUED | OUTPATIENT
Start: 2023-02-10 | End: 2023-02-13 | Stop reason: HOSPADM

## 2023-02-10 RX ORDER — DORZOLAMIDE HCL 20 MG/ML
1 SOLUTION/ DROPS OPHTHALMIC 2 TIMES DAILY
Status: DISCONTINUED | OUTPATIENT
Start: 2023-02-10 | End: 2023-02-13 | Stop reason: HOSPADM

## 2023-02-10 RX ORDER — MOXIFLOXACIN 5 MG/ML
1 SOLUTION/ DROPS OPHTHALMIC 4 TIMES DAILY
Status: DISCONTINUED | OUTPATIENT
Start: 2023-02-10 | End: 2023-02-13 | Stop reason: HOSPADM

## 2023-02-10 RX ORDER — INSULIN GLARGINE 100 [IU]/ML
40 INJECTION, SOLUTION SUBCUTANEOUS NIGHTLY
Status: DISCONTINUED | OUTPATIENT
Start: 2023-02-10 | End: 2023-02-12

## 2023-02-10 RX ORDER — TIMOLOL MALEATE 5 MG/ML
1 SOLUTION/ DROPS OPHTHALMIC 2 TIMES DAILY
Status: DISCONTINUED | OUTPATIENT
Start: 2023-02-10 | End: 2023-02-13 | Stop reason: HOSPADM

## 2023-02-10 RX ORDER — INSULIN LISPRO 100 [IU]/ML
15 INJECTION, SOLUTION INTRAVENOUS; SUBCUTANEOUS
Status: DISCONTINUED | OUTPATIENT
Start: 2023-02-11 | End: 2023-02-12

## 2023-02-10 RX ORDER — GUAIFENESIN 600 MG/1
600 TABLET, EXTENDED RELEASE ORAL 2 TIMES DAILY
Status: DISCONTINUED | OUTPATIENT
Start: 2023-02-10 | End: 2023-02-13 | Stop reason: HOSPADM

## 2023-02-10 RX ORDER — ALOGLIPTIN 12.5 MG/1
25 TABLET, FILM COATED ORAL DAILY
Status: DISCONTINUED | OUTPATIENT
Start: 2023-02-10 | End: 2023-02-13 | Stop reason: HOSPADM

## 2023-02-10 RX ORDER — SUCRALFATE 1 G/1
1 TABLET ORAL 4 TIMES DAILY
Status: DISCONTINUED | OUTPATIENT
Start: 2023-02-10 | End: 2023-02-13 | Stop reason: HOSPADM

## 2023-02-10 RX ADMIN — OXYCODONE HYDROCHLORIDE 10 MG: 10 TABLET ORAL at 15:10

## 2023-02-10 RX ADMIN — HYDROMORPHONE HYDROCHLORIDE 0.5 MG: 1 INJECTION, SOLUTION INTRAMUSCULAR; INTRAVENOUS; SUBCUTANEOUS at 22:05

## 2023-02-10 RX ADMIN — ALOGLIPTIN 25 MG: 12.5 TABLET, FILM COATED ORAL at 20:16

## 2023-02-10 RX ADMIN — AMLODIPINE BESYLATE 10 MG: 10 TABLET ORAL at 13:58

## 2023-02-10 RX ADMIN — PREDNISONE 5 MG: 5 TABLET ORAL at 17:42

## 2023-02-10 RX ADMIN — Medication 1 LOZENGE: at 20:13

## 2023-02-10 RX ADMIN — EZETIMIBE 10 MG: 10 TABLET ORAL at 20:11

## 2023-02-10 RX ADMIN — HYDROCODONE BITARTRATE AND ACETAMINOPHEN 1 TABLET: 5; 325 TABLET ORAL at 08:46

## 2023-02-10 RX ADMIN — INSULIN LISPRO 4 UNITS: 100 INJECTION, SOLUTION INTRAVENOUS; SUBCUTANEOUS at 12:50

## 2023-02-10 RX ADMIN — TIZANIDINE 4 MG: 4 TABLET ORAL at 20:11

## 2023-02-10 RX ADMIN — Medication 1 LOZENGE: at 17:41

## 2023-02-10 RX ADMIN — INSULIN LISPRO 5 UNITS: 100 INJECTION, SOLUTION INTRAVENOUS; SUBCUTANEOUS at 17:40

## 2023-02-10 RX ADMIN — POLYMYXIN B SULFATE AND TRIMETHOPRIM 1 DROP: 10000; 1 SOLUTION OPHTHALMIC at 20:14

## 2023-02-10 RX ADMIN — ALBUTEROL SULFATE 2 PUFF: 90 AEROSOL, METERED RESPIRATORY (INHALATION) at 21:06

## 2023-02-10 RX ADMIN — MOXIFLOXACIN HYDROCHLORIDE 1 DROP: 5 SOLUTION/ DROPS OPHTHALMIC at 20:14

## 2023-02-10 RX ADMIN — MOXIFLOXACIN HYDROCHLORIDE 1 DROP: 5 SOLUTION/ DROPS OPHTHALMIC at 17:43

## 2023-02-10 RX ADMIN — INSULIN LISPRO 6 UNITS: 100 INJECTION, SOLUTION INTRAVENOUS; SUBCUTANEOUS at 20:17

## 2023-02-10 RX ADMIN — PREGABALIN 100 MG: 100 CAPSULE ORAL at 13:58

## 2023-02-10 RX ADMIN — DORZOLAMIDE HYDROCHLORIDE 1 DROP: 20 SOLUTION/ DROPS OPHTHALMIC at 20:13

## 2023-02-10 RX ADMIN — INSULIN LISPRO 10 UNITS: 100 INJECTION, SOLUTION INTRAVENOUS; SUBCUTANEOUS at 19:13

## 2023-02-10 RX ADMIN — VANCOMYCIN HYDROCHLORIDE 1250 MG: 1.25 INJECTION, POWDER, LYOPHILIZED, FOR SOLUTION INTRAVENOUS at 06:38

## 2023-02-10 RX ADMIN — ALBUTEROL SULFATE 2 PUFF: 90 AEROSOL, METERED RESPIRATORY (INHALATION) at 15:20

## 2023-02-10 RX ADMIN — IPRATROPIUM BROMIDE 2 PUFF: 17 AEROSOL, METERED RESPIRATORY (INHALATION) at 21:07

## 2023-02-10 RX ADMIN — INSULIN LISPRO 5 UNITS: 100 INJECTION, SOLUTION INTRAVENOUS; SUBCUTANEOUS at 08:47

## 2023-02-10 RX ADMIN — PREDNISOLONE ACETATE 1 DROP: 10 SUSPENSION/ DROPS OPHTHALMIC at 17:44

## 2023-02-10 RX ADMIN — LISINOPRIL AND HYDROCHLOROTHIAZIDE 1 TABLET: 12.5; 2 TABLET ORAL at 13:58

## 2023-02-10 RX ADMIN — SODIUM CHLORIDE, PRESERVATIVE FREE 10 ML: 5 INJECTION INTRAVENOUS at 20:11

## 2023-02-10 RX ADMIN — OXYCODONE HYDROCHLORIDE 10 MG: 10 TABLET ORAL at 19:12

## 2023-02-10 RX ADMIN — SODIUM CHLORIDE, PRESERVATIVE FREE 10 ML: 5 INJECTION INTRAVENOUS at 08:47

## 2023-02-10 RX ADMIN — GUAIFENESIN 600 MG: 600 TABLET, EXTENDED RELEASE ORAL at 17:42

## 2023-02-10 RX ADMIN — BUDESONIDE AND FORMOTEROL FUMARATE DIHYDRATE 2 PUFF: 160; 4.5 AEROSOL RESPIRATORY (INHALATION) at 21:08

## 2023-02-10 RX ADMIN — TIMOLOL MALEATE 1 DROP: 5 SOLUTION OPHTHALMIC at 20:13

## 2023-02-10 RX ADMIN — CEFEPIME HYDROCHLORIDE 2000 MG: 2 INJECTION, POWDER, FOR SOLUTION INTRAMUSCULAR; INTRAVENOUS at 17:51

## 2023-02-10 RX ADMIN — PREGABALIN 100 MG: 100 CAPSULE ORAL at 20:11

## 2023-02-10 RX ADMIN — IPRATROPIUM BROMIDE 2 PUFF: 17 AEROSOL, METERED RESPIRATORY (INHALATION) at 15:20

## 2023-02-10 RX ADMIN — BUDESONIDE AND FORMOTEROL FUMARATE DIHYDRATE 2 PUFF: 160; 4.5 AEROSOL RESPIRATORY (INHALATION) at 09:04

## 2023-02-10 RX ADMIN — Medication 5 MG: at 20:11

## 2023-02-10 RX ADMIN — SUCRALFATE 1 G: 1 TABLET ORAL at 17:41

## 2023-02-10 RX ADMIN — POLYMYXIN B SULFATE AND TRIMETHOPRIM 1 DROP: 10000; 1 SOLUTION OPHTHALMIC at 17:45

## 2023-02-10 RX ADMIN — INSULIN GLARGINE 40 UNITS: 100 INJECTION, SOLUTION SUBCUTANEOUS at 20:17

## 2023-02-10 RX ADMIN — OXYCODONE HYDROCHLORIDE 10 MG: 10 TABLET ORAL at 23:37

## 2023-02-10 RX ADMIN — ALLOPURINOL 300 MG: 300 TABLET ORAL at 08:46

## 2023-02-10 RX ADMIN — HYDROCODONE BITARTRATE AND ACETAMINOPHEN 1 TABLET: 5; 325 TABLET ORAL at 01:57

## 2023-02-10 RX ADMIN — VANCOMYCIN HYDROCHLORIDE 1250 MG: 1.25 INJECTION, POWDER, LYOPHILIZED, FOR SOLUTION INTRAVENOUS at 20:16

## 2023-02-10 RX ADMIN — HYDROMORPHONE HYDROCHLORIDE 0.5 MG: 1 INJECTION, SOLUTION INTRAMUSCULAR; INTRAVENOUS; SUBCUTANEOUS at 13:58

## 2023-02-10 RX ADMIN — BRIMONIDINE TARTRATE 1 DROP: 2 SOLUTION OPHTHALMIC at 17:44

## 2023-02-10 RX ADMIN — INSULIN LISPRO 5 UNITS: 100 INJECTION, SOLUTION INTRAVENOUS; SUBCUTANEOUS at 12:51

## 2023-02-10 RX ADMIN — PREDNISOLONE ACETATE 1 DROP: 10 SUSPENSION/ DROPS OPHTHALMIC at 20:14

## 2023-02-10 RX ADMIN — ENOXAPARIN SODIUM 30 MG: 100 INJECTION SUBCUTANEOUS at 20:12

## 2023-02-10 RX ADMIN — INSULIN LISPRO 4 UNITS: 100 INJECTION, SOLUTION INTRAVENOUS; SUBCUTANEOUS at 17:40

## 2023-02-10 RX ADMIN — CEFEPIME HYDROCHLORIDE 2000 MG: 2 INJECTION, POWDER, FOR SOLUTION INTRAMUSCULAR; INTRAVENOUS at 04:23

## 2023-02-10 RX ADMIN — ALBUTEROL SULFATE 2 PUFF: 90 AEROSOL, METERED RESPIRATORY (INHALATION) at 09:04

## 2023-02-10 RX ADMIN — SUCRALFATE 1 G: 1 TABLET ORAL at 20:11

## 2023-02-10 RX ADMIN — CLONIDINE HYDROCHLORIDE 0.2 MG: 0.2 TABLET ORAL at 13:58

## 2023-02-10 RX ADMIN — HYDROMORPHONE HYDROCHLORIDE 0.5 MG: 1 INJECTION, SOLUTION INTRAMUSCULAR; INTRAVENOUS; SUBCUTANEOUS at 18:00

## 2023-02-10 RX ADMIN — INSULIN LISPRO 4 UNITS: 100 INJECTION, SOLUTION INTRAVENOUS; SUBCUTANEOUS at 08:49

## 2023-02-10 RX ADMIN — PANTOPRAZOLE SODIUM 40 MG: 40 TABLET, DELAYED RELEASE ORAL at 06:35

## 2023-02-10 RX ADMIN — ATORVASTATIN CALCIUM 10 MG: 10 TABLET, FILM COATED ORAL at 08:46

## 2023-02-10 ASSESSMENT — PAIN SCALES - GENERAL
PAINLEVEL_OUTOF10: 7
PAINLEVEL_OUTOF10: 10
PAINLEVEL_OUTOF10: 7
PAINLEVEL_OUTOF10: 8
PAINLEVEL_OUTOF10: 10
PAINLEVEL_OUTOF10: 3
PAINLEVEL_OUTOF10: 10
PAINLEVEL_OUTOF10: 7
PAINLEVEL_OUTOF10: 7
PAINLEVEL_OUTOF10: 8
PAINLEVEL_OUTOF10: 9

## 2023-02-10 ASSESSMENT — PAIN DESCRIPTION - ORIENTATION
ORIENTATION: RIGHT

## 2023-02-10 ASSESSMENT — PAIN - FUNCTIONAL ASSESSMENT
PAIN_FUNCTIONAL_ASSESSMENT: ACTIVITIES ARE NOT PREVENTED

## 2023-02-10 ASSESSMENT — PAIN DESCRIPTION - LOCATION
LOCATION: LEG
LOCATION: LEG;FOOT
LOCATION: LEG
LOCATION: LEG;FOOT
LOCATION: FOOT
LOCATION: LEG
LOCATION: FOOT;LEG
LOCATION: LEG

## 2023-02-10 ASSESSMENT — PAIN SCALES - WONG BAKER
WONGBAKER_NUMERICALRESPONSE: 6
WONGBAKER_NUMERICALRESPONSE: 8
WONGBAKER_NUMERICALRESPONSE: 8
WONGBAKER_NUMERICALRESPONSE: 0
WONGBAKER_NUMERICALRESPONSE: 6
WONGBAKER_NUMERICALRESPONSE: 6
WONGBAKER_NUMERICALRESPONSE: 0
WONGBAKER_NUMERICALRESPONSE: 6

## 2023-02-10 ASSESSMENT — PAIN DESCRIPTION - PAIN TYPE
TYPE: CHRONIC PAIN

## 2023-02-10 ASSESSMENT — PAIN DESCRIPTION - DESCRIPTORS
DESCRIPTORS: ACHING;DISCOMFORT
DESCRIPTORS: ACHING
DESCRIPTORS: ACHING;DISCOMFORT
DESCRIPTORS: ACHING;DISCOMFORT
DESCRIPTORS: ACHING

## 2023-02-10 ASSESSMENT — LIFESTYLE VARIABLES: SMOKING_STATUS: 1

## 2023-02-10 ASSESSMENT — PAIN DESCRIPTION - ONSET: ONSET: ON-GOING

## 2023-02-10 ASSESSMENT — PAIN DESCRIPTION - FREQUENCY: FREQUENCY: CONTINUOUS

## 2023-02-10 NOTE — CONSULTS
Endocrinology   Consult Note  2/9/2023 11:13 AM     Primary Care provider: Madalyn Solorio MD     Referring physician:  Melanie Hwang MD     Dear Doctor Irina Payne    Thank You for the Consult     Pt. Was Admitted for : Right foot infection uncontrolled diabetes mellitus    Reason for Consult: Better control glucose      History Obtained From:  Patient/ EMR       HISTORY OF PRESENT ILLNESS:                The patient is a 44 y.o. male with significant past medical history of asthma, type 2 diabetes mellitus, GERD, hypertension, candidal detachment on the left side, corneal transplant right second toe toe amputation I admitted to hospital for worsening of the right foot infection. He has been blood glucose level very high blood glucose levels so I was  consulted for better control of blood and glucose. ROS:   Pt's ROS done in detail. Abnormal ROS are noted in Medical and Surgical History Section below:      Other Medical History:        Diagnosis Date    Asthma     Blind left eye     Diabetes mellitus ([address]     GERD (gastroesophageal reflux disease)     Hypertension     Retinal detachment 2012    left     Surgical History:        Procedure Laterality Date    CORNEAL TRANSPLANT Bilateral     EYE SURGERY      left eye removed    EYE SURGERY Right     FRACTURE SURGERY      foot left    LAPAROSCOPIC APPENDECTOMY N/A 4/28/2022    APPENDECTOMY LAPAROSCOPIC performed by Sade Dahl MD at [address] [place] [address] Right     MANDIBLE SURGERY Bilateral     TOE AMPUTATION Right 07/25/2017    TOE AMPUTATION Right 7/25/2022    TOE AMPUTATION, RAY AMPUTATION OF RIGHT SECOND TOE performed by Cyndie Deutsch MD at [facility] OR       Allergies:  Ciprofloxacin, Glucosamine, and Shellfish-derived products    Family History:       Problem Relation Age of Onset    Diabetes Mother     Asthma Mother     High Blood Pressure Mother     Stroke Mother     Heart Disease Mother     Diabetes Father     Asthma Father     High Blood Pressure Father      REVIEW OF SYSTEMS:  Review of System Done as noted above     PHYSICAL EXAM:      Vitals:    BP (!) 142/84   Pulse 91   Temp 98.2 °F (36.8 °C) (Oral)   Resp 16   Wt (!) 300 lb 0.7 oz (136.1 kg)   SpO2 97%   BMI 37.50 kg/m²     CONSTITUTIONAL:  awake, alert, cooperative, appears stated age   EYES:  vision intact Fundoscopic Exam not performed   ENT:Normal  NECK:  Supple, No JVD. Thyroid Exam:Normal   LUNGS:  Has Vesicular Breath Sounds,   CARDIOVASCULAR:  Normal apical impulse, regular rate and rhythm, normal S1 and S2, no S3 or S4, and has no  murmur   ABDOMEN:  No scars, normal bowel sounds, soft, non-distended, non-tender, no masses palpated, no hepatolienomegaly  Musculoskeletal: Normal  Extremities: Normal, peripheral pulses normal, , has no edema right foot swollen has cellulitis of toes of the right foot amputation of the right second toe. NEUROLOGIC:  Awake, alert, oriented to name, place and time. Cranial nerves II-XII are grossly intact. Motor weakness sensory neuropathy.  ,  and gait is abnormal.  Unstable    DATA:    CBC:   Recent Labs     02/09/23  1200 02/10/23  1143   WBC 7.3 8.0   HGB 12.7* 12.8*    222    CMP:  Recent Labs     02/09/23  1200 02/10/23  1143   * 135   K 4.1 4.2   CL 93* 100   CO2 21 22   BUN 8 10   CREATININE 0.7* 0.7*   CALCIUM 9.1 9.1   PROT 7.6  --    LABALBU 4.0  --    BILITOT 0.4  --    ALKPHOS 107  --    AST 72*  --    ALT 34  --      Lipids:   Lab Results   Component Value Date/Time    CHOL 142 04/16/2011 01:19 PM    HDL 41 04/16/2011 01:19 PM    TRIG 268 04/16/2011 01:19 PM     Glucose:   Recent Labs     02/10/23  0804 02/10/23  1211 02/10/23  1642   POCGLU 284* 280* 266*     Hemoglobin A1C:   Lab Results   Component Value Date/Time    LABA1C 8.5 09/18/2022 01:16 AM     Free T4: No results found for: T4FREE  Free T3: No results found for: FT3  TSH High Sensitivity:   Lab Results   Component Value Date/Time    [facility] 2.685 04/16/2011 01:19 PM       XR FOOT RIGHT (MIN 3 VIEWS)   Final Result   Persistent but improved soft tissue defect medial aspect of the great toe   compared to 11/19/2022, new soft tissue defect/ulcer distal tip 3rd digit. No radiographic evidence of osteomyelitis. Triple phase bone scan or   contrast-enhanced MRI of the foot suggested if clinical concern of   radiographically occult osteomyelitis. Nondisplaced intra-articular avulsion fracture base of the distal phalanx 3rd   toe/distal interphalangeal joint of the 3rd toe not present on prior study of   11/19/2022. Correlation with clinical findings of acute avulsion fracture   suggested. Diffuse soft tissue swelling of the forefoot consistent with contusion,   edema, cellulitis. Neuropathic arthropathy changes of the midfoot, hallux valgus deformity 1st   metatarsal-phalangeal joint unchanged compared to 11/19/2022. Maverick Childs                 Scheduled Medicines   Medications:    pregabalin  100 mg Oral BID    amLODIPine  10 mg Oral Daily    brimonidine  1 drop Right Eye BID    trimethoprim-polymyxin b  1 drop Ophthalmic 4x Daily    moxifloxacin  1 drop Right Eye 4x Daily    cloNIDine  0.2 mg Oral Daily    lisinopril-hydroCHLOROthiazide  1 tablet Oral Daily    melatonin  5 mg Oral Nightly    sucralfate  1 g Oral 4x Daily    tiZANidine  4 mg Oral Nightly    prednisoLONE acetate  1 drop Right Eye 4x Daily    albuterol sulfate HFA  2 puff Inhalation 4x daily    And    ipratropium  2 puff Inhalation 4x daily    dorzolamide  1 drop Right Eye BID    And    timolol  1 drop Right Eye BID    guaiFENesin  600 mg Oral BID    sodium chloride flush  5-40 mL IntraVENous 2 times per day    enoxaparin  30 mg SubCUTAneous BID    cefepime  2,000 mg IntraVENous Q12H    allopurinol  300 mg Oral Daily    atorvastatin  10 mg Oral Daily    ezetimibe  10 mg Oral Nightly    insulin glargine  20 Units SubCUTAneous Nightly    pantoprazole  40 mg Oral QAM AC    budesonide-formoterol  2 puff Inhalation BID    insulin lispro  5 Units SubCUTAneous TID WC    insulin lispro  0-8 Units SubCUTAneous TID WC    insulin lispro  0-4 Units SubCUTAneous Nightly    vancomycin  1,250 mg IntraVENous Q12H      Infusions:    sodium chloride      dextrose           IMPRESSION    Patient Active Problem List   Diagnosis    Sprain of left ankle    Closed nondisplaced fracture of fifth metatarsal bone of left foot    Alcohol abuse    UTI (urinary tract infection) due to Enterococcus    Acute osteomyelitis of toe, right (HCC)    Osteomyelitis of second toe of right foot (HCC)    Diabetic foot infection (HCC)    Shortness of breath    Asthma exacerbation    Appendicitis    Diabetic foot ulcer with osteomyelitis ([facility] [address]    Cellulitis    Open wound of toe         RECOMMENDATIONS:      Reviewed POC blood glucose . Labs and X ray results   Reviewed Home and Current Medicines   Will Start On meal/ Correction bolus Humalog/ Lantus Insulin regime / OHGD   Monitor Blood glucose frequently   Modify  the dose of Insulin/ OHGD  as needed        Will follow with you  Again thank you for sharing pt's care with me.      Truly yours,       Clary Matos MD

## 2023-02-10 NOTE — CARE COORDINATION
Brittnee Castano requires a cane due to impaired ambulation and for increased stability in order to participate in or complete daily living tasks of: toileting, personal cares, ambulating, grooming, hygiene, dressing upper body, dressing lower body, meal preparation, and taking own medications. The patient is able to safely use the cane and the functional mobility deficit can be sufficiently resolved. Doctor: Please copy above note into a DME order and progress note.

## 2023-02-10 NOTE — CONSULTS
Cognitive Concerns/ Orientation : Waxes and wanes. BILL at times. Cantonese speaking. Speaks minimal English. Able to communicate via  at times. Pt also [place]. Uses hand gestures at times. Sets off bed alarm occasionally.   BEHAVIOR & AGGRESSION TOOL COLOR: Green. - impulsive when has to use restroom.   CIWA SCORE: N/A    ABNL VS/O2: VSS on RA, ex shawanda at times. Neuros Q4H checks- intact. BILL to perform full assessment at times.   MOBILITY: 1PA with GB. Pt usually sets off bed alarm and standing by bed.   PAIN MANAGMENT: Denies/Absence of non- verbal indicators of pain  DIET: Regular, good appetite. Feeds self with set up.   BOWEL/BLADDER: Continent of bowel and bladder. Using bathroom, passing gas.   ABNL LAB/BG: BGL discontinued. Hgb 9.7, potassium replaced orally for 3.1, now 3.4.   DRAIN/DEVICES: PIV SL- continuous fluids D/C'ed  TELEMETRY RHYTHM: NSR, shawanda at times.   SKIN: Mild blanchable redness to coccyx. Pt moves in bed freq.   TESTS/PROCEDURES: MRI scheduled for tomorrow. Please see sticky note and consultation orders for special instructions.   D/C DAY/GOALS/PLACE: Expected discharge in 2-3 days; pending workup  OTHER IMPORTANT INFO: Inconsistent in following commands. Neuro following. Cardiology saw pt today- will go home on tele monitor. MRI planned for tomorrow per Neurology. Pt has programmable shunt and needs to be seen before MRI and After. RN, Neuro Surgery, and MRI will need to coordinate schedule for this to be completed. Responds to Jabber at times. Bed alarm on for safety.     History and Physical    Patient Name: Candida Vazquez                              YOB: 1985  Exam Date: 2/9/2023    PCP:  Zi Larry MD           Referring Physician:  Dr. Lisa Ca, hospitalist    Chief Complaint:  foot wound    History of Present Illness: The patient is a 44 y.o. male known to my partner Dr. Too Reese. The patient has has a diabetes ulcer on his right great toe and was getting treatment for this. He has vision problems and home health care did not come to care for his wound and he noticed he had drainage from his right foot. He presented to the ER 2/9/23. I was consulted to see him as I am covering for Dr. Too Reese,    Past Medical History:   Diagnosis Date    Asthma     Blind left eye     Diabetes mellitus ([address]     GERD (gastroesophageal reflux disease)     Hypertension     Retinal detachment 2012    left      Past Surgical History:   Procedure Laterality Date    CORNEAL TRANSPLANT Bilateral     EYE SURGERY      left eye removed    EYE SURGERY Right     FRACTURE SURGERY      foot left    LAPAROSCOPIC APPENDECTOMY N/A 4/28/2022    APPENDECTOMY LAPAROSCOPIC performed by Christelle Her MD at [address] [place] [address] Right     MANDIBLE SURGERY Bilateral     TOE AMPUTATION Right 07/25/2017    TOE AMPUTATION Right 7/25/2022    TOE AMPUTATION, RAY AMPUTATION OF RIGHT SECOND TOE performed by Sabrina Fatima MD at [facility]      Prior to Admission medications    Medication Sig Start Date End Date Taking?  Authorizing Provider   moxifloxacin (VIGAMOX) 0.5 % ophthalmic solution Place 1 drop into the right eye 4 times daily 11/23/22   Naman Goode MD   pantoprazole (PROTONIX) 40 MG tablet Take 1 tablet by mouth every morning (before breakfast) 11/24/22   Naman Goode MD   nicotine (NICODERM CQ) 14 MG/24HR Place 1 patch onto the skin daily 9/22/22   Vanessa Crespo MD   SYMBICORT 160-4.5 MCG/ACT AERO Inhale 2 puffs into the lungs in the morning and at bedtime 9/2/22 Historical Provider, MD   glipiZIDE (GLUCOTROL) 5 MG tablet Take 3 tablets by mouth in the morning and 3 tablets in the evening. Take before meals. 7/27/22   Heri Brunson MD   TRULICITY 1.5 IH/7.2QG SOPN inject 0.5 milliliters ( 1 AND 1/2 milligrams ) subcutaneously ev. ..  (REFER TO PRESCRIPTION NOTES). 7/27/22   Heri Brunson MD   LANTUS SOLOSTAR 100 UNIT/ML injection pen Inject 50 Units into the skin nightly 7/27/22   Heri Brunson MD   insulin NPH (HUMULIN N [facility]) 100 UNIT/ML injection pen Inject 20 Units into the skin every morning 7/27/22   Heri Brunson MD   tiZANidine (ZANAFLEX) 4 MG tablet Take 4 mg by mouth nightly as needed 6/29/22   Historical Provider, MD   cloNIDine (CATAPRES) 0.2 MG tablet Take 0.2 mg by mouth in the morning.     Historical Provider, MD   trimethoprim-polymyxin b (POLYTRIM) 71483-2.1 UNIT/ML-% ophthalmic solution Apply 1 drop to eye 4 times daily 6/24/22   Historical Provider, MD   UNABLE TO [address] 1 drop into the right eye 4 times daily 07/21/22 Patient on Vancomycin Preserved Eye soln 25 mg/ml    Historical Provider, MD   ezetimibe (ZETIA) 10 MG tablet take 1 tablet by mouth once daily 6/29/22   Historical ProviderMD CHOU PEN NEEDLES 31G X 5 MM MISC use 1 PEN NEEDLE to inject MEDICATION subcutaneously two to three times a day 6/29/22   Historical Provider, MD   melatonin 5 MG TABS tablet take 1 tablet by mouth every evening 6/29/22   Historical Provider, MD   pregabalin (LYRICA) 100 MG capsule take 1 capsule by mouth twice a day 6/29/22   Historical Provider, MD   sucralfate (CARAFATE) 1 GM tablet Take 1 tablet by mouth 4 times daily 8/1/21   Montserrat Devries PA-C   albuterol sulfate  (90 Base) MCG/ACT inhaler Inhale 2 puffs into the lungs every 6 hours as needed for Wheezing 3/24/21   Nisha Veronica MD   albuterol-ipratropium (COMBIVENT RESPIMAT)  MCG/ACT AERS inhaler Inhale 1 puff into the lungs every 6 hours 3/24/21 Anton Sr MD   acetaminophen (AMINOFEN) 325 MG tablet Take 2 tablets by mouth every 6 hours as needed for Pain 4/23/20   Yordy Lazo DO   brimonidine (ALPHAGAN) 0.2 % ophthalmic solution Place 1 drop into the right eye 2 times daily    Historical Provider, MD   dorzolamide-timolol 22.3-6.8 MG/ML SOLN Place 1 drop into the right eye 2 times daily    Historical Provider, MD   prednisoLONE acetate (PRED FORTE) 1 % ophthalmic suspension Place 1 drop into the right eye 2 times daily    Historical Provider, MD   allopurinol (ZYLOPRIM) 300 MG tablet Take 300 mg by mouth daily     Historical Provider, MD   atorvastatin (LIPITOR) 10 MG tablet Take 10 mg by mouth daily     Historical Provider, MD   lisinopril-hydrochlorothiazide (PRINZIDE;ZESTORETIC) 20-12.5 MG per tablet Take 1 tablet by mouth daily     Historical Provider, MD   metFORMIN (GLUCOPHAGE) 1000 MG tablet Take 1,000 mg by mouth 2 times daily (with meals)    Historical Provider, MD   amLODIPine (NORVASC) 10 MG tablet Take 10 mg by mouth daily 11/25/16   Historical Provider, MD     Allergies   Allergen Reactions    Ciprofloxacin Shortness Of Breath    Glucosamine Anaphylaxis    Shellfish-Derived Products Anaphylaxis        Social History     Tobacco Use    Smoking status: Every Day     Packs/day: 0.50     Years: 5.00     Pack years: 2.50     Types: Cigarettes    Smokeless tobacco: Never   Substance Use Topics    Alcohol use: Yes     Comment: occasionally      Family History   Problem Relation Age of Onset    Diabetes Mother     Asthma Mother     High Blood Pressure Mother     Stroke Mother     Heart Disease Mother     Diabetes Father     Asthma Father     High Blood Pressure Father         REVIEW OF SYSTEMS: (POSITIVES WILL BE UNDERLINED)  CONSTITUTIONAL:    Weight change, fatigue, fever, chills  EYES:  Diplopia, change in vision  EARS:  hearing loss, tinnitus, vertigo  NOSE:   epistaxis  MOUTH/THROAT:     hoarseness, sore throat  RESPIRATORY:  SOB, cough, sputum, hemoptysis  CARDIOVASCULAR : chest pain, palpitations, dyspnea exertion, orthopnea, paroxysmal nocturnal dyspnea, pedal edema.  GASTROINTESTINAL:  nausea, vomiting, constipation, diarrhea  GENITOURINARY:   dysuria, hematuria, .  HEMATOLOGIC/LYMPHATIC:   Anemia, bleeding tendencies  MUSCULOSKELETAL:    myalgias,  joint pain  NEUROLOGICAL:   Loss of Consciousness, paresthesias, anesthesias, focal weakness  SKIN :   History of dermatitis, rashes  PSYCHIATRIC:  depression, anxiety, past psychosis  ENDOCRINE:  History of diabetes, thyroid disease (reviewed above)  ALL/IMM : allergies, rashes    Physical Exam:  BP (!) 162/130   Pulse 96   Temp 97.8 °F (36.6 °C) (Oral)   Resp 22   Wt (!) 300 lb 0.7 oz (136.1 kg)   SpO2 98%   BMI 37.50 kg/m²   Pt is awake, alert, oriented to person, place and time, appropriate with exam  HEENT:  Has non-icteric sclerae, no JVD  CTA bilaterally, with good excursion  RRR, no murmurs appreciated  Right foot: patient has a great toe with a superficial ulceration through the skin into the subcutaneous tissue, no fluctuance, no drainage, his second toe has been amputated, his third toe is swollen, the middle/possibly proximal phalanx is protruding through the skin and his nail and skin of the anterior toe is pulled free, some thick blood colored drainage expressed, his 4th toe is intact and his fifth toe has been amputated. (Anatomy of toes confirmed by x-ray as hard to tell which toes are currently present 2-5)    Imaging:  Right foot x-ray:  2/9/2023  Impression   Persistent but improved soft tissue defect medial aspect of the great toe   compared to 11/19/2022, new soft tissue defect/ulcer distal tip 3rd digit.   No radiographic evidence of osteomyelitis.  Triple phase bone scan or   contrast-enhanced MRI of the foot suggested if clinical concern of   radiographically occult osteomyelitis.       Nondisplaced intra-articular avulsion fracture base of the distal phalanx 3rd  toe/distal interphalangeal joint of the 3rd toe not present on prior study of   11/19/2022. Correlation with clinical findings of acute avulsion fracture   suggested. Diffuse soft tissue swelling of the forefoot consistent with contusion,   edema, cellulitis. Neuropathic arthropathy changes of the midfoot, hallux valgus deformity 1st   metatarsal-phalangeal joint unchanged compared to 11/19/2022. .     -I have personally reviewed the patient's imaging results/reports including reviewing the right foot x-ray films. I discussed the pertinent findings with the patient. Labs:  CBC:    Lab Results   Component Value Date/Time    WBC 7.3 02/09/2023 12:00 PM    HGB 12.7 02/09/2023 12:00 PM    HCT 36.6 02/09/2023 12:00 PM     02/09/2023 12:00 PM     BMP:    Lab Results   Component Value Date/Time     02/09/2023 12:00 PM    K 4.1 02/09/2023 12:00 PM    CL 93 02/09/2023 12:00 PM    CO2 21 02/09/2023 12:00 PM    BUN 8 02/09/2023 12:00 PM    CREATININE 0.7 02/09/2023 12:00 PM    CALCIUM 9.1 02/09/2023 12:00 PM     PT/INR:    Lab Results   Component Value Date/Time    PROTIME 12.1 07/25/2022 02:30 PM    INR 0.94 07/25/2022 02:30 PM     PTT:    Lab Results   Component Value Date/Time    APTT 26.8 07/25/2022 02:30 PM     LFT:    Lab Results   Component Value Date/Time    LABALBU 4.0 02/09/2023 12:00 PM    BILITOT 0.4 02/09/2023 12:00 PM    AST 72 02/09/2023 12:00 PM    ALT 34 02/09/2023 12:00 PM    ALKPHOS 107 02/09/2023 12:00 PM       -I have personally reviewed the patient's lab results and discussed pertinent findings with the patient. Assessment and Plan:  The patient presents with signs and symptoms consistent with exposed bone from his third toe on his right foot. He needs an amputation of this toe. We discussed doing this tomorrow.  The patient states he has an MRI scheduled for today-I don't see this scheduled and he doesn't need this for the third toe, this will give information on his great toe and 4th toe. Clinically these are edematous and the great toe has a superficial ulceration but I don't see any deep tissue infection of these toes. I did debride the skin off the 3rd toe that was non viable and placed a betadine soaked bandage on the right foot. This can stay in place until surgery tomorrow, I cancelled the wound care consult as it is not needed today. Statement of medical necessity. Surgical intervention required to alleviate patient's symptoms/disease as described above.

## 2023-02-10 NOTE — PROGRESS NOTES
[address] [facility]  consulted by Dr. Alyssa Tomlinson for monitoring and adjustment. Indication for treatment: Vancomycin indication: SSTI with risk factors   Goal trough: Trough Goal: 10-15 mcg/mL  AUC/SHELLEY: 400-600    Risk Factors for MRSA Identified:   Purulent and/or complicated SSTI    Pertinent Laboratory Values:   Temp Readings from Last 3 Encounters:   02/09/23 97.8 °F (36.6 °C) (Oral)   12/30/22 98 °F (36.7 °C) (Oral)   11/23/22 97.5 °F (36.4 °C)     Recent Labs     02/09/23  1200   WBC 7.3   LACTATE 1.6     Recent Labs     02/09/23  1200   BUN 8   CREATININE 0.7*     Estimated Creatinine Clearance: 213 mL/min (A) (based on SCr of 0.7 mg/dL (L)). Intake/Output Summary (Last 24 hours) at 2/9/2023 1923  Last data filed at 2/9/2023 1455  Gross per 24 hour   Intake 50 ml   Output --   Net 50 ml       Pertinent Cultures:   Date    Source    Results  2/09   Foot    collected    Vancomycin level:   TROUGH:  No results for input(s): VANCOTROUGH in the last 72 hours. RANDOM:  No results for input(s): VANCORANDOM in the last 72 hours. Assessment:  HPI: Diabetic foot infection,   Scr: 0.7, at baseline  BUN: 8 and no urine output to date  Day(s) of therapy: 1    Plan:  Was given 2500mg Vanco 1x in ED, will continue Vancomycin 1250mg q12hr   Anticipated AUC of 459, trough level of 15.1  General surgery is also consulted  Pharmacy will continue to monitor patient and adjust therapy as indicated    VANCOMYCIN CONCENTRATION SCHEDULED FOR 2/11 @0600    Thank you for the consult.   Jose Mills [facility]  2/9/2023 7:23 PM

## 2023-02-10 NOTE — PROGRESS NOTES
Physician Progress Note      Christine Bone  CSN #:                  [id number]  :                       1985  ADMIT DATE:       2023 11:13 AM  DISCH DATE:  RESPONDING  PROVIDER #:        Sal Zhang MD          QUERY TEXT:    Patient admitted with diabetic foot ulcer. Per Consult note dated 2/10/23   documentation of debridement. To accurately reflect the procedure performed   please document if debridement was excisional or nonexcisional and the deepest   depth of tissue removed as down to and including: The medical record reflects the following:  Risk Factors: DM, hx of foot ulcers  Clinical Indicators: Pt has exposed bone from his third toe on his right foot. Per consult note dated 2/10/23 by Dr. Laura Valero, Edmar Lovell did debride the skin off   the 3rd toe that was non viable and placed a betadine soaked bandage on the   right foot. This can stay in place until surgery tomorrow. Yolanda Stands Yolanda Stands \"  Treatment: debridement, pending amputation of 3rd toe on right foot, surgical   consult    Thank you,  Jacki Montana, RN  [phone]  Options provided:  -- Nonexcisional debridement of skin  -- Excisional debridement of skin  -- Other - I will add my own diagnosis  -- Disagree - Not applicable / Not valid  -- Disagree - Clinically unable to determine / Unknown  -- Refer to Clinical Documentation Reviewer    PROVIDER RESPONSE TEXT:    Excisional debridement of skin was performed of third toe of right foot during   procedure on 2/10/23.     Query created by: Domo Irizarry on 2/10/2023 12:26 PM      Electronically signed by:  Sal Zhang MD 2/10/2023 1:17 PM
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
## 2023-02-10 NOTE — PROGRESS NOTES
Pt in bed. Stated Dr. Gold Traylor just changed dressing to right foot and plan to go to OR tomorrow. Per notes-Dr. Gold Traylor cancelled wound consult until we are needed.

## 2023-02-10 NOTE — PROGRESS NOTES
Physician Progress Note      Adilson Nino  [facility] #:                  [id number]  :                       1985  ADMIT DATE:       2023 11:13 AM  DISCH DATE:  RESPONDING  PROVIDER #:        Luis Turner MD          QUERY TEXT:    Pt admitted with diabetic foot ulcer. Noted documentation of Cellulitis of   right lower extremity on 23 by Dr. Alma Delia Mares, ED physician. If possible, please   document in progress notes and discharge summary:    The medical record reflects the following:  Risk Factors: DM, hx of non-pressure ulcers  Clinical Indicators:  Treatment: labs, surgical consult, IV ABX    Thank you,  Kyaw Salinas RN  [phone]  Options provided:  -- Cellulitis of right lower extremity confirmed present on admission  -- Cellulitis of right lower extremity ruled out  -- Other - I will add my own diagnosis  -- Disagree - Not applicable / Not valid  -- Disagree - Clinically unable to determine / Unknown  -- Refer to Clinical Documentation Reviewer    PROVIDER RESPONSE TEXT:    The diagnosis of Cellulitis of right lower extremity was confirmed as present   on admission.     Query created by: Amol Reese on 2/10/2023 12:24 PM      Electronically signed by:  Luis Turner MD 2/10/2023 12:29 PM

## 2023-02-10 NOTE — PROGRESS NOTES
Hospitalist        Belén Engle requires a cane due to impaired ambulation and for increased stability in order to participate in or complete daily living tasks of: toileting, personal cares, ambulating, grooming, hygiene, dressing upper body, dressing lower body, meal preparation, and taking own medications. The patient is able to safely use the cane and the functional mobility deficit can be sufficiently resolved. Doing better    Vitals:    02/10/23 1524   BP:    Pulse: 91   Resp: 21   Temp:    SpO2:      Resp effort nonlabored at rest    Infected diabetic foot ulcer with difficulty ambulating  -to OR in am  -needs a cane.

## 2023-02-10 NOTE — PLAN OF CARE
Problem: Discharge Planning  Goal: Discharge to home or other facility with appropriate resources  Outcome: Progressing  Flowsheets (Taken 2/10/2023 0826)  Discharge to home or other facility with appropriate resources: Identify barriers to discharge with patient and caregiver     Problem: Pain  Goal: Verbalizes/displays adequate comfort level or baseline comfort level  Outcome: Progressing  Flowsheets (Taken 2/10/2023 0815)  Verbalizes/displays adequate comfort level or baseline comfort level: Encourage patient to monitor pain and request assistance     Problem: Chronic Conditions and Co-morbidities  Goal: Patient's chronic conditions and co-morbidity symptoms are monitored and maintained or improved  Outcome: Progressing  Flowsheets (Taken 2/10/2023 0826)  Care Plan - Patient's Chronic Conditions and Co-Morbidity Symptoms are Monitored and Maintained or Improved: Monitor and assess patient's chronic conditions and comorbid symptoms for stability, deterioration, or improvement

## 2023-02-10 NOTE — PROGRESS NOTES
[address]  consulted by Dr. Kelly Ray for monitoring and adjustment. Indication for treatment: Vancomycin indication: SSTI with risk factors   Goal trough: Trough Goal: 10-15 mcg/mL  AUC/SHELLEY: 400-600    Risk Factors for MRSA Identified:   Purulent and/or complicated SSTI    Pertinent Laboratory Values:   Temp Readings from Last 3 Encounters:   02/10/23 98.2 °F (36.8 °C) (Oral)   12/30/22 98 °F (36.7 °C) (Oral)   11/23/22 97.5 °F (36.4 °C)     Recent Labs     02/09/23  1200 02/10/23  1143   WBC 7.3 8.0   LACTATE 1.6  --        Recent Labs     02/09/23  1200 02/10/23  1143   BUN 8 10   CREATININE 0.7* 0.7*       Estimated Creatinine Clearance: 213 mL/min (A) (based on SCr of 0.7 mg/dL (L)). Intake/Output Summary (Last 24 hours) at 2/10/2023 1553  Last data filed at 2/10/2023 1505  Gross per 24 hour   Intake 240 ml   Output 3350 ml   Net -3110 ml         Pertinent Cultures:   Date    Source    Results  2/09   Foot    Beta strep    Vancomycin level:   TROUGH:  No results for input(s): VANCOTROUGH in the last 72 hours. RANDOM:  No results for input(s): VANCORANDOM in the last 72 hours. Assessment:  HPI: Diabetic foot infection, Hx of MRSA in great toe. Debridement of foot in surgery today. SCR remains at baseline, UOP good  Day(s) of therapy: 2 of 7  Vanco level:  2/11 - random @06:00    Plan:  Renal trends remain at baseline. Continue with vancomycin 1250mg ivpb q12h for a predicted trough of 15.1 at steady state. Check the vanco level tomorrow am.  Follow final cultures results for possible de-escalation. Pharmacy will continue to monitor patient and adjust therapy as indicated    Eamon 3 2/11 @0600    Thank you for the consult. Imelda Tinajero, [address] [place] [address]  2/10/2023 3:53 PM

## 2023-02-10 NOTE — ANESTHESIA PRE PROCEDURE
Department of Anesthesiology  Preprocedure Note       Name:  Alee Triana   Age:  44 y.o.  :  1985                                          MRN:  [id number]         Date:  2023      Surgeon: Damir Danielson):  Jose Leggett MD    Procedure: Procedure(s):  THIRD TOE AMPUTATION    Medications prior to admission:   Prior to Admission medications    Medication Sig Start Date End Date Taking? Authorizing Provider   moxifloxacin (VIGAMOX) 0.5 % ophthalmic solution Place 1 drop into the right eye 4 times daily 22   Saroj Mae MD   pantoprazole (PROTONIX) 40 MG tablet Take 1 tablet by mouth every morning (before breakfast) 22   Saroj Mae MD   nicotine (NICODERM CQ) 14 MG/24HR Place 1 patch onto the skin daily  Patient not taking: Reported on 2/10/2023 9/22/22   Nicole Arias MD   SYMBICORT 160-4.5 MCG/ACT AERO Inhale 2 puffs into the lungs in the morning and at bedtime 22   Historical Provider, MD   glipiZIDE (GLUCOTROL) 5 MG tablet Take 3 tablets by mouth in the morning and 3 tablets in the evening. Take before meals. 22   Heri Interiano MD   TRULICITY 1.5 AY/6.6RH SOPN inject 0.5 milliliters ( 1 AND 1/2 milligrams ) subcutaneously ev. ..  (REFER TO PRESCRIPTION NOTES). 22   Heri Interiano MD   LANTUS SOLOSTAR 100 UNIT/ML injection pen Inject 50 Units into the skin nightly 22   Heri Interiano MD   insulin NPH (HUMULIN N [facility]) 100 UNIT/ML injection pen Inject 20 Units into the skin every morning 22   Heri Interiano MD   tiZANidine (ZANAFLEX) 4 MG tablet Take 4 mg by mouth nightly as needed 22   Historical Provider, MD   cloNIDine (CATAPRES) 0.2 MG tablet Take 0.2 mg by mouth in the morning.     Historical Provider, MD   trimethoprim-polymyxin b (POLYTRIM) 26252-0.1 UNIT/ML-% ophthalmic solution Apply 1 drop to eye 4 times daily 22   Historical Provider, MD   UNABLE TO FIND Place 1 drop into the right eye 4 times daily 22 Patient on Vancomycin Preserved Eye soln 25 mg/ml    Historical Provider, MD   ezetimibe (ZETIA) 10 MG tablet take 1 tablet by mouth once daily 6/29/22   Historical Provider, MD CHOU PEN NEEDLES 31G X 5 MM MISC use 1 PEN NEEDLE to inject MEDICATION subcutaneously two to three times a day 6/29/22   Historical Provider, MD   melatonin 5 MG TABS tablet take 1 tablet by mouth every evening 6/29/22   Historical Provider, MD   pregabalin (LYRICA) 100 MG capsule take 1 capsule by mouth twice a day 6/29/22   Historical Provider, MD   sucralfate (CARAFATE) 1 GM tablet Take 1 tablet by mouth 4 times daily 8/1/21   Melvin Devries PA-C   albuterol sulfate  (90 Base) MCG/ACT inhaler Inhale 2 puffs into the lungs every 6 hours as needed for Wheezing 3/24/21   Maninder Edwards MD   albuterol-ipratropium (COMBIVENT RESPIMAT)  MCG/ACT AERS inhaler Inhale 1 puff into the lungs every 6 hours 3/24/21   Maninder Edwards MD   acetaminophen (AMINOFEN) 325 MG tablet Take 2 tablets by mouth every 6 hours as needed for Pain 4/23/20   Tonya Rosa,    brimonidine (ALPHAGAN) 0.2 % ophthalmic solution Place 1 drop into the right eye 2 times daily    Historical Provider, MD   dorzolamide-timolol 22.3-6.8 MG/ML SOLN Place 1 drop into the right eye 2 times daily    Historical Provider, MD   prednisoLONE acetate (PRED FORTE) 1 % ophthalmic suspension Place 1 drop into the right eye 4 times daily    Historical Provider, MD   allopurinol (ZYLOPRIM) 300 MG tablet Take 300 mg by mouth daily     Historical Provider, MD   atorvastatin (LIPITOR) 10 MG tablet Take 10 mg by mouth daily     Historical Provider, MD   lisinopril-hydrochlorothiazide (PRINZIDE;ZESTORETIC) 20-12.5 MG per tablet Take 1 tablet by mouth daily     Historical Provider, MD   metFORMIN (GLUCOPHAGE) 1000 MG tablet Take 1,000 mg by mouth 2 times daily (with meals)    Historical Provider, MD   amLODIPine (NORVASC) 10 MG tablet Take 10 mg by mouth daily 11/25/16   Historical Provider, MD       Current medications:    No current facility-administered medications for this visit. No current outpatient medications on file.      Facility-Administered Medications Ordered in Other Visits   Medication Dose Route Frequency Provider Last Rate Last Admin    ipratropium-albuterol (DUONEB) 0.5-2.5 (3) MG/3ML nebulizer solution             pregabalin (LYRICA) capsule 100 mg  100 mg Oral BID Ernesto Curry MD   100 mg at 02/10/23 2011    amLODIPine (NORVASC) tablet 10 mg  10 mg Oral Daily Ernesto Curry MD   10 mg at 02/10/23 1358    brimonidine (ALPHAGAN) 0.2 % ophthalmic solution 1 drop  1 drop Right Eye BID Ernesto Curry MD   1 drop at 02/11/23 0836    trimethoprim-polymyxin b (POLYTRIM) ophthalmic solution 1 drop  1 drop Ophthalmic 4x Daily Ernesto Curry MD   1 drop at 02/11/23 0836    moxifloxacin (VIGAMOX) 0.5 % ophthalmic solution 1 drop  1 drop Right Eye 4x Daily Ernesto Curry MD   1 drop at 02/11/23 0846    cloNIDine (CATAPRES) tablet 0.2 mg  0.2 mg Oral Daily Ernesto Curry MD   0.2 mg at 02/10/23 1358    lisinopril-hydroCHLOROthiazide (PRINZIDE;ZESTORETIC) 20-12.5 MG per tablet 1 tablet  1 tablet Oral Daily Ernesto Curry MD   1 tablet at 02/10/23 1358    melatonin tablet 5 mg  5 mg Oral Nightly Ernesto Curry MD   5 mg at 02/10/23 2011    sucralfate (CARAFATE) tablet 1 g  1 g Oral 4x Daily Ernesto Curry MD   1 g at 02/10/23 2011    tiZANidine (ZANAFLEX) tablet 4 mg  4 mg Oral Nightly Ernesto Curry MD   4 mg at 02/10/23 2011    oxyCODONE (ROXICODONE) immediate release tablet 5 mg  5 mg Oral Q4H PRN Ernesto Curry MD        Or    oxyCODONE HCl (OXY-IR) immediate release tablet 10 mg  10 mg Oral Q4H PRN Ernesto Curry MD   10 mg at 02/10/23 1602    HYDROmorphone (DILAUDID) injection 0.25 mg  0.25 mg IntraVENous Q3H PRN Ernesto Curry MD        Or    HYDROmorphone (DILAUDID) injection 0.5 mg  0.5 mg IntraVENous Q3H PRN Ernesto Curry MD   0.5 mg at 02/11/23 0819    prednisoLONE acetate (PRED FORTE) 1 % ophthalmic suspension 1 drop  1 drop Right Eye 4x Daily Ernesto Curry MD   1 drop at 02/11/23 0836    albuterol sulfate HFA (PROVENTIL;VENTOLIN;PROAIR) 108 (90 Base) MCG/ACT inhaler 2 puff  2 puff Inhalation 4x daily Ernesto Curry MD   2 puff at 02/10/23 2106    And    ipratropium (ATROVENT HFA) 17 MCG/ACT inhaler 2 puff  2 puff Inhalation 4x daily Ernesto Curry MD   2 puff at 02/11/23 0816    dorzolamide (TRUSOPT) 2 % ophthalmic solution 1 drop  1 drop Right Eye BID Ernesto Curry MD   1 drop at 02/11/23 0837    And    timolol (TIMOPTIC) 0.5 % ophthalmic solution 1 drop  1 drop Right Eye BID Ernesto Curry MD   1 drop at 02/11/23 0837    guaiFENesin (MUCINEX) extended release tablet 600 mg  600 mg Oral BID Ernesto Curry MD   600 mg at 02/10/23 1742    Benzocaine-Menthol (CEPACOL) 1 lozenge  1 lozenge Oral Q2H PRN Ernesto Curry MD   1 lozenge at 02/11/23 0500    guaiFENesin-dextromethorphan (ROBITUSSIN DM) 100-10 MG/5ML syrup 5 mL  5 mL Oral Q4H PRN Ernesto Curry MD   5 mL at 02/11/23 0425    insulin lispro (HUMALOG) injection vial 15 Units  15 Units SubCUTAneous TID  Migue Badillo MD        insulin glargine (LANTUS) injection vial 40 Units  40 Units SubCUTAneous Nightly Migue Badillo MD   40 Units at 02/10/23 2017    insulin lispro (HUMALOG) injection vial 0-8 Units  0-8 Units SubCUTAneous TID TAYO Badillo MD        insulin lispro (HUMALOG) injection vial 0-8 Units  0-8 Units SubCUTAneous 2 times per day Migue Badillo MD   6 Units at 02/10/23 2017    alogliptin (NESINA) tablet 25 mg  25 mg Oral Daily Migue Badillo MD   25 mg at 02/10/23 2016    sodium chloride flush 0.9 % injection 5-40 mL  5-40 mL IntraVENous 2 times per day Sudha Donald MD   10 mL at 02/11/23 0836    sodium chloride flush 0.9 % injection 5-40 mL  5-40 mL IntraVENous PRJOSÉ MIGUEL Valladares MD        0.9 % sodium chloride infusion   IntraVENous PRN Timothy Valladares MD        [Held by provider] enoxaparin Sodium (LOVENOX) injection 30 mg  30 mg SubCUTAneous BID Timothy Valladares MD   30 mg at 02/10/23 2012    ondansetron (ZOFRAN-ODT) disintegrating tablet 4 mg  4 mg Oral Q8H PRN Timothy Valladares MD        Or    [facility] injection 4 mg  4 mg IntraVENous Q6H PRN Timothy Valladares MD        polyethylene glycol [facility]) packet 17 g  17 g Oral Daily PRN Timothy Valladares MD        acetaminophen (TYLENOL) tablet 650 mg  650 mg Oral Q6H PRN Timothy Valladares MD        Or    acetaminophen (TYLENOL) suppository 650 mg  650 mg Rectal Q6H PRN Timothy Valladares MD        cefepime (MAXIPIME) 2,000 mg in sodium chloride 0.9 % 50 mL IVPB (mini-bag)  2,000 mg IntraVENous Q12H Timothy Valladares MD   Stopped at 02/11/23 0503    albuterol sulfate HFA (PROVENTIL;VENTOLIN;PROAIR) 108 (90 Base) MCG/ACT inhaler 2 puff  2 puff Inhalation Q6H PRN Timothy Valladares MD   2 puff at 02/11/23 0454    allopurinol (ZYLOPRIM) tablet 300 mg  300 mg Oral Daily Timothy Valladares MD   300 mg at 02/10/23 0846    atorvastatin (LIPITOR) tablet 10 mg  10 mg Oral Daily Timothy Valladares MD   10 mg at 02/10/23 0846    ezetimibe (ZETIA) tablet 10 mg  10 mg Oral Nightly Timothy Valladares MD   10 mg at 02/10/23 2011    pantoprazole (PROTONIX) tablet 40 mg  40 mg Oral QAM AC Timothy Valladares MD   40 mg at 02/10/23 8939    budesonide-formoterol (SYMBICORT) 160-4.5 MCG/ACT inhaler 2 puff  2 puff Inhalation BID Timothy Valladares MD   2 puff at 02/11/23 0818    glucose chewable tablet 16 g  4 tablet Oral PRN Timothy Valladares MD        dextrose bolus 10% 125 mL  125 mL IntraVENous PRN Timothy Valladares MD        Or    dextrose bolus 10% 250 mL  250 mL IntraVENous PRN Timothy Valladares MD        glucagon (rDNA) injection 1 mg  1 mg SubCUTAneous PRN Timothy Valladares MD        dextrose 10 % infusion IntraVENous Continuous PRN Dahiana Alexander MD        vancomycin (VANCOCIN) 1,250 mg in sodium chloride 0.9 % 250 mL IVPB ([id number])  1,250 mg IntraVENous Q12H Dahiana Alexander .7 mL/hr at 02/11/23 0844 1,250 mg at 02/11/23 0844       Allergies: Allergies   Allergen Reactions    Ciprofloxacin Shortness Of Breath    Glucosamine Anaphylaxis    Shellfish-Derived Products Anaphylaxis       Problem List:    Patient Active Problem List   Diagnosis Code    Sprain of left ankle S93.402A    Closed nondisplaced fracture of fifth metatarsal bone of left foot S92.355A    Alcohol abuse F10.10    UTI (urinary tract infection) due to Enterococcus N39.0, B95.2    Acute osteomyelitis of toe, right ([facility]) M86.171    Osteomyelitis of second toe of right foot ([facility]) M86.9    Diabetic foot infection ([facility]) E11.628, L08.9    Shortness of breath R06.02    Asthma exacerbation J45. 0    Appendicitis K37    Diabetic foot ulcer with osteomyelitis ([facility] [address] E11.621, E11.69, L97.509, M86.9    Cellulitis L03.90    Open wound of toe S91.109A    Rhinovirus B34.8       Past Medical History:        Diagnosis Date    Asthma     Blind left eye     Diabetes mellitus ([facility] [address]     GERD (gastroesophageal reflux disease)     Hypertension     Retinal detachment 2012    left       Past Surgical History:        Procedure Laterality Date    CORNEAL TRANSPLANT Bilateral     EYE SURGERY      left eye removed    EYE SURGERY Right     FRACTURE SURGERY      foot left    LAPAROSCOPIC APPENDECTOMY N/A 4/28/2022    APPENDECTOMY LAPAROSCOPIC performed by Harleen Kay MD at [address] [place] [address] Right     MANDIBLE SURGERY Bilateral     TOE AMPUTATION Right 07/25/2017    TOE AMPUTATION Right 7/25/2022    TOE AMPUTATION, RAY AMPUTATION OF RIGHT SECOND TOE performed by Eliseo Britt MD at [address] [place] [address] History:    Social History     Tobacco Use    Smoking status: Every Day     Packs/day: 0.50     Years: 5.00     Pack years: 2.50     Types: Cigarettes    Smokeless tobacco: Never   Substance Use Topics    Alcohol use: Yes     Comment: occasionally                                Ready to quit: Not Answered  Counseling given: Not Answered      Vital Signs (Current): There were no vitals filed for this visit.                                            BP Readings from Last 3 Encounters:   02/11/23 130/84   12/30/22 131/76   11/23/22 128/81       NPO Status:                                                                                 BMI:   Wt Readings from Last 3 Encounters:   02/11/23 (!) 300 lb 9.6 oz (136.4 kg)   12/30/22 300 lb (136.1 kg)   11/19/22 300 lb (136.1 kg)     There is no height or weight on file to calculate BMI.    CBC:   Lab Results   Component Value Date/Time    WBC 8.0 02/10/2023 11:43 AM    RBC 4.00 02/10/2023 11:43 AM    HGB 12.8 02/10/2023 11:43 AM    HCT 36.4 02/10/2023 11:43 AM    MCV 91.0 02/10/2023 11:43 AM    RDW 11.6 02/10/2023 11:43 AM     02/10/2023 11:43 AM       CMP:   Lab Results   Component Value Date/Time     02/10/2023 11:43 AM    K 4.2 02/10/2023 11:43 AM     02/10/2023 11:43 AM    CO2 22 02/10/2023 11:43 AM    BUN 10 02/10/2023 11:43 AM    CREATININE 0.7 02/10/2023 11:43 AM    GFRAA >60 09/20/2022 05:43 AM    LABGLOM >60 02/10/2023 11:43 AM    GLUCOSE 261 02/10/2023 11:43 AM    PROT 7.6 02/09/2023 12:00 PM    PROT 6.8 04/16/2011 01:19 PM    CALCIUM 9.1 02/10/2023 11:43 AM    BILITOT 0.4 02/09/2023 12:00 PM    ALKPHOS 107 02/09/2023 12:00 PM    AST 72 02/09/2023 12:00 PM    ALT 34 02/09/2023 12:00 PM       POC Tests:   Recent Labs     02/11/23  0834   POCGLU 207*       Coags:   Lab Results   Component Value Date/Time    PROTIME 12.1 07/25/2022 02:30 PM    INR 0.94 07/25/2022 02:30 PM    APTT 26.8 07/25/2022 02:30 PM       HCG (If Applicable): No results found for: PREGTESTUR, PREGSERUM, HCG, HCGQUANT     ABGs:   Lab Results   Component Value Date/Time    PO2ART 168 2017 08:00 AM    SQL3DMZ 29.0 2017 08:00 AM    GID7KFG 23.7 2017 08:00 AM        Type & Screen (If Applicable):  No results found for: LABABO, LABRH    Drug/Infectious Status (If Applicable):  No results found for: HIV, HEPCAB    COVID-19 Screening (If Applicable):   Lab Results   Component Value Date/Time    COVID19 NOT DETECTED 02/10/2023 05:40 PM           Anesthesia Evaluation  Patient summary reviewed and Nursing notes reviewed no history of anesthetic complications:   Airway: Mallampati: II  TM distance: >3 FB   Neck ROM: full  Comment: Full beard  Mouth opening: > = 3 FB   Dental:    (+) poor dentition      Pulmonary:   (+) asthma: current smoker                           Cardiovascular:  Exercise tolerance: poor (<4 METS),   (+) hypertension:, hyperlipidemia         Beta Blocker:  Not on Beta Blocker         Neuro/Psych:   (+) psychiatric history (etoh abuse):            GI/Hepatic/Renal:   (+) GERD:, morbid obesity          Endo/Other:    (+) DiabetesType II DM, poorly controlled, , .                 Abdominal:             Vascular: negative vascular ROS. Other Findings:           Anesthesia Plan      general     ASA 3 - emergent       Induction: intravenous. MIPS: Postoperative opioids intended and Prophylactic antiemetics administered. Anesthetic plan and risks discussed with patient. Plan discussed with CRNA. Pre Anesthesia Evaluation complete. Anesthesia plan, risks, benefits, alternatives, and personal involved discussed with patient. Patients and/or legal guardian verbalized an understanding  and agreed to proceed.   Imelda Henao MD  2023  Department of Anesthesiology  Preprocedure Note       Name:  Promise Carrion   Age:  44 y.o.  :  1985                                          MRN:  [id number]         Date:  2/10/2023      Surgeon: Mendy Todd):  Jacey Kearns MD    Procedure: Procedure(s):  THIRD TOE AMPUTATION    Medications prior to admission:   Prior to Admission medications    Medication Sig Start Date End Date Taking? Authorizing Provider   moxifloxacin (VIGAMOX) 0.5 % ophthalmic solution Place 1 drop into the right eye 4 times daily 11/23/22   Ken Colón MD   pantoprazole (PROTONIX) 40 MG tablet Take 1 tablet by mouth every morning (before breakfast) 11/24/22   Ken Colón MD   nicotine (NICODERM CQ) 14 MG/24HR Place 1 patch onto the skin daily  Patient not taking: Reported on 2/10/2023 9/22/22   Zayra Mclain MD   SYMBICORT 160-4.5 MCG/ACT AERO Inhale 2 puffs into the lungs in the morning and at bedtime 9/2/22   Historical Provider, MD   glipiZIDE (GLUCOTROL) 5 MG tablet Take 3 tablets by mouth in the morning and 3 tablets in the evening. Take before meals. 7/27/22   Heri Montana MD   TRULICITY 1.5 ET/4.2OB SOPN inject 0.5 milliliters ( 1 AND 1/2 milligrams ) subcutaneously ev. ..  (REFER TO PRESCRIPTION NOTES). 7/27/22   Heri Montana MD   LANTUS SOLOSTAR 100 UNIT/ML injection pen Inject 50 Units into the skin nightly 7/27/22   Heri Montana MD   insulin NPH (HUMULIN N [facility]) 100 UNIT/ML injection pen Inject 20 Units into the skin every morning 7/27/22   Heri Montana MD   tiZANidine (ZANAFLEX) 4 MG tablet Take 4 mg by mouth nightly as needed 6/29/22   Historical Provider, MD   cloNIDine (CATAPRES) 0.2 MG tablet Take 0.2 mg by mouth in the morning.     Historical Provider, MD   trimethoprim-polymyxin b (POLYTRIM) 91287-0.1 UNIT/ML-% ophthalmic solution Apply 1 drop to eye 4 times daily 6/24/22   Historical Provider, MD   UNABLE TO FIND Place 1 drop into the right eye 4 times daily 07/21/22 Patient on Vancomycin Preserved Eye soln 25 mg/ml    Historical Provider, MD   ezetimibe (ZETIA) 10 MG tablet take 1 tablet by mouth once daily 6/29/22   Historical Provider, MD   TECHLITE PEN NEEDLES 31G X 5 MM MISC use 1 PEN NEEDLE to inject MEDICATION subcutaneously two to three times a day 6/29/22   Historical Provider, MD   melatonin 5 MG TABS tablet take 1 tablet by mouth every evening 6/29/22   Historical Provider, MD   pregabalin (LYRICA) 100 MG capsule take 1 capsule by mouth twice a day 6/29/22   Historical Provider, MD   sucralfate (CARAFATE) 1 GM tablet Take 1 tablet by mouth 4 times daily 8/1/21   Leah Devries PA-C   albuterol sulfate  (90 Base) MCG/ACT inhaler Inhale 2 puffs into the lungs every 6 hours as needed for Wheezing 3/24/21   Liz Kirby MD   albuterol-ipratropium (COMBIVENT RESPIMAT)  MCG/ACT AERS inhaler Inhale 1 puff into the lungs every 6 hours 3/24/21   Liz Kirby MD   acetaminophen (AMINOFEN) 325 MG tablet Take 2 tablets by mouth every 6 hours as needed for Pain 4/23/20   Sienna Edgar DO   brimonidine (ALPHAGAN) 0.2 % ophthalmic solution Place 1 drop into the right eye 2 times daily    Historical Provider, MD   dorzolamide-timolol 22.3-6.8 MG/ML SOLN Place 1 drop into the right eye 2 times daily    Historical Provider, MD   prednisoLONE acetate (PRED FORTE) 1 % ophthalmic suspension Place 1 drop into the right eye 4 times daily    Historical Provider, MD   allopurinol (ZYLOPRIM) 300 MG tablet Take 300 mg by mouth daily     Historical Provider, MD   atorvastatin (LIPITOR) 10 MG tablet Take 10 mg by mouth daily     Historical Provider, MD   lisinopril-hydrochlorothiazide (PRINZIDE;ZESTORETIC) 20-12.5 MG per tablet Take 1 tablet by mouth daily     Historical Provider, MD   metFORMIN (GLUCOPHAGE) 1000 MG tablet Take 1,000 mg by mouth 2 times daily (with meals)    Historical Provider, MD   amLODIPine (NORVASC) 10 MG tablet Take 10 mg by mouth daily 11/25/16   Historical Provider, MD       Current medications:    Current Facility-Administered Medications   Medication Dose Route Frequency Provider Last Rate Last Admin    pregabalin (LYRICA) capsule 100 mg  100 mg Oral BID Desirae Prasad MD   100 mg at 02/10/23 1358    amLODIPine (NORVASC) tablet 10 mg  10 mg Oral Daily Clementine Lovell MD   10 mg at 02/10/23 1358    brimonidine (ALPHAGAN) 0.2 % ophthalmic solution 1 drop  1 drop Right Eye BID Clementine Lovell MD        trimethoprim-polymyxin b [facility]) ophthalmic solution 1 drop  1 drop Ophthalmic 4x Daily Clementine Lovell MD        moxifloxacin (VIGAMOX) 0.5 % ophthalmic solution 1 drop  1 drop Right Eye 4x Daily Clementine Lovell MD        cloNIDine (CATAPRES) tablet 0.2 mg  0.2 mg Oral Daily Clementine Lovell MD   0.2 mg at 02/10/23 1358    lisinopril-hydroCHLOROthiazide (PRINZIDE;ZESTORETIC) 20-12.5 MG per tablet 1 tablet  1 tablet Oral Daily Clementine Lovell MD   1 tablet at 02/10/23 1358    melatonin tablet 5 mg  5 mg Oral Nightly Clementine Lovell MD        sucralfate (CARAFATE) tablet 1 g  1 g Oral 4x Daily Clementine Lovell MD        tiZANidine (ZANAFLEX) tablet 4 mg  4 mg Oral Nightly Clementine Lovell MD        oxyCODONE (ROXICODONE) immediate release tablet 5 mg  5 mg Oral Q4H PRN Clementine Lovell MD        Or    oxyCODONE HCl (OXY-IR) immediate release tablet 10 mg  10 mg Oral Q4H PRN Clementine Lovell MD   10 mg at 02/10/23 1510    HYDROmorphone (DILAUDID) injection 0.25 mg  0.25 mg IntraVENous Q3H PRN Clementine Lovell MD        Or    HYDROmorphone (DILAUDID) injection 0.5 mg  0.5 mg IntraVENous Q3H PRN Clementine Lovell MD   0.5 mg at 02/10/23 1358    prednisoLONE acetate (PRED FORTE) 1 % ophthalmic suspension 1 drop  1 drop Right Eye 4x Daily Clementine Lovell MD        albuterol sulfate HFA (PROVENTIL;VENTOLIN;PROAIR) 108 (90 Base) MCG/ACT inhaler 2 puff  2 puff Inhalation 4x daily Clementine Lovell MD   2 puff at 02/10/23 1520    And    ipratropium (ATROVENT HFA) 17 MCG/ACT inhaler 2 puff  2 puff Inhalation 4x daily Clementine Lovell MD   2 puff at 02/10/23 1520    dorzolamide (TRUSOPT) 2 % ophthalmic solution 1 drop  1 drop Right Eye BID Clementine Lovell MD        And    timolol (TIMOPTIC) 0.5 % ophthalmic solution 1 drop  1 drop Right Eye BID Yan Mclain MD        sodium chloride flush 0.9 % injection 5-40 mL  5-40 mL IntraVENous 2 times per day Rick Ramirez MD   10 mL at 02/10/23 0847    sodium chloride flush 0.9 % injection 5-40 mL  5-40 mL IntraVENous PRN Rick Ramirez MD        0.9 % sodium chloride infusion   IntraVENous PRN Rick Ramirez MD        enoxaparin Sodium (LOVENOX) injection 30 mg  30 mg SubCUTAneous BID Rick Ramirez MD   30 mg at 02/09/23 2132    ondansetron (ZOFRAN-ODT) disintegrating tablet 4 mg  4 mg Oral Q8H PRN Rick Ramirez MD        Or    ondansetron [facility]) injection 4 mg  4 mg IntraVENous Q6H PRN Rick Ramirez MD        polyethylene glycol [facility]) packet 17 g  17 g Oral Daily PRN Rick Ramirez MD        acetaminophen (TYLENOL) tablet 650 mg  650 mg Oral Q6H PRN Rick Ramirez MD        Or    acetaminophen (TYLENOL) suppository 650 mg  650 mg Rectal Q6H PRN Rick Ramirez MD        cefepime (MAXIPIME) 2,000 mg in sodium chloride 0.9 % 50 mL IVPB (mini-bag)  2,000 mg IntraVENous Q12H Rick Ramirez MD   Stopped at 02/10/23 0452    albuterol sulfate HFA (PROVENTIL;VENTOLIN;PROAIR) 108 (90 Base) MCG/ACT inhaler 2 puff  2 puff Inhalation Q6H PRN Rick Ramirez MD   2 puff at 02/10/23 0904    allopurinol (ZYLOPRIM) tablet 300 mg  300 mg Oral Daily Rick Ramirez MD   300 mg at 02/10/23 0846    atorvastatin (LIPITOR) tablet 10 mg  10 mg Oral Daily Rick Ramirez MD   10 mg at 02/10/23 0846    ezetimibe (ZETIA) tablet 10 mg  10 mg Oral Nightly Rick Ramirez MD   10 mg at 02/09/23 2132    insulin glargine (LANTUS) injection vial 20 Units  20 Units SubCUTAneous Nightly Rick Ramirez MD   20 Units at 02/09/23 2232    pantoprazole (PROTONIX) tablet 40 mg  40 mg Oral QAM AC Rick Ramirez MD   40 mg at 02/10/23 0635    budesonide-formoterol (SYMBICORT) 160-4.5 MCG/ACT inhaler 2 puff  2 puff Inhalation BID Rick Ramirez MD   2 puff at 02/10/23 0904    insulin lispro (HUMALOG) injection vial 5 Units  5 Units SubCUTAneous TID TAYO Valladares MD   5 Units at 02/10/23 1251    insulin lispro (HUMALOG) injection vial 0-8 Units  0-8 Units SubCUTAneous TID TAYO Valladares MD   4 Units at 02/10/23 1250    insulin lispro (HUMALOG) injection vial 0-4 Units  0-4 Units SubCUTAneous Nightly Timothy Valladares MD        glucose chewable tablet 16 g  4 tablet Oral PRN Timothy Valladares MD        dextrose bolus 10% 125 mL  125 mL IntraVENous PRN Timothy Valladares MD        Or    dextrose bolus 10% 250 mL  250 mL IntraVENous PRN Timothy Valladares MD        glucagon (rDNA) injection 1 mg  1 mg SubCUTAneous PRN Timothy Valladares MD        dextrose 10 % infusion   IntraVENous Continuous PRN Timothy Valladares MD        vancomycin (VANCOCIN) 1,250 mg in sodium chloride 0.9 % 250 mL IVPB (Oory8Oeg)  1,250 mg IntraVENous Q12H Timothy Valladares MD   Stopped at 02/10/23 3144       Allergies: Allergies   Allergen Reactions    Ciprofloxacin Shortness Of Breath    Glucosamine Anaphylaxis    Shellfish-Derived Products Anaphylaxis       Problem List:    Patient Active Problem List   Diagnosis Code    Sprain of left ankle S93.402A    Closed nondisplaced fracture of fifth metatarsal bone of left foot S92.355A    Alcohol abuse F10.10    UTI (urinary tract infection) due to Enterococcus N39.0, B95.2    Acute osteomyelitis of toe, right ([facility]) M86.171    Osteomyelitis of second toe of right foot ([facility]) M86.9    Diabetic foot infection ([facility]) E11.628, L08.9    Shortness of breath R06.02    Asthma exacerbation J45. 0    Appendicitis K37    Diabetic foot ulcer with osteomyelitis ([facility] E11.621, E11.69, L97.509, M86.9    Cellulitis L03.90    Open wound of toe S91.109A       Past Medical History:        Diagnosis Date    Asthma     Blind left eye     Diabetes mellitus ([facility] [address]     GERD (gastroesophageal reflux disease)     Hypertension     Retinal detachment 2012 left       Past Surgical History:        Procedure Laterality Date    CORNEAL TRANSPLANT Bilateral     EYE SURGERY      left eye removed    EYE SURGERY Right     FRACTURE SURGERY      foot left    LAPAROSCOPIC APPENDECTOMY N/A 4/28/2022    APPENDECTOMY LAPAROSCOPIC performed by Jose Martinez MD at [facility] Right     MANDIBLE SURGERY Bilateral     TOE AMPUTATION Right 07/25/2017    TOE AMPUTATION Right 7/25/2022    TOE AMPUTATION, RAY AMPUTATION OF RIGHT SECOND TOE performed by Nancy Lara MD at [address] [place] [address] History:    Social History     Tobacco Use    Smoking status: Every Day     Packs/day: 0.50     Years: 5.00     Pack years: 2.50     Types: Cigarettes    Smokeless tobacco: Never   Substance Use Topics    Alcohol use: Yes     Comment: occasionally                                Ready to quit: Not Answered  Counseling given: Not Answered      Vital Signs (Current):   Vitals:    02/10/23 1358 02/10/23 1428 02/10/23 1510 02/10/23 1524   BP: (!) 142/84      Pulse:    91   Resp:  20 22 21   Temp:       TempSrc:       SpO2:       Weight:                                                  BP Readings from Last 3 Encounters:   02/10/23 (!) 142/84   12/30/22 131/76   11/23/22 128/81       NPO Status:                                                                                 BMI:   Wt Readings from Last 3 Encounters:   02/09/23 (!) 300 lb 0.7 oz (136.1 kg)   12/30/22 300 lb (136.1 kg)   11/19/22 300 lb (136.1 kg)     Body mass index is 37.5 kg/m².     CBC:   Lab Results   Component Value Date/Time    WBC 8.0 02/10/2023 11:43 AM    RBC 4.00 02/10/2023 11:43 AM    HGB 12.8 02/10/2023 11:43 AM    HCT 36.4 02/10/2023 11:43 AM    MCV 91.0 02/10/2023 11:43 AM    RDW 11.6 02/10/2023 11:43 AM     02/10/2023 11:43 AM       CMP:   Lab Results   Component Value Date/Time     02/10/2023 11:43 AM    K 4.2 02/10/2023 11:43 AM     02/10/2023 11:43 AM    CO2 22 02/10/2023 11:43 AM    BUN 10 02/10/2023 11:43 AM    CREATININE 0.7 02/10/2023 11:43 AM    GFRAA >60 09/20/2022 05:43 AM    LABGLOM >60 02/10/2023 11:43 AM    GLUCOSE 261 02/10/2023 11:43 AM    PROT 7.6 02/09/2023 12:00 PM    PROT 6.8 04/16/2011 01:19 PM    CALCIUM 9.1 02/10/2023 11:43 AM    BILITOT 0.4 02/09/2023 12:00 PM    ALKPHOS 107 02/09/2023 12:00 PM    AST 72 02/09/2023 12:00 PM    ALT 34 02/09/2023 12:00 PM       POC Tests:   Recent Labs     02/10/23  1211   POCGLU 280*       Coags:   Lab Results   Component Value Date/Time    PROTIME 12.1 07/25/2022 02:30 PM    INR 0.94 07/25/2022 02:30 PM    APTT 26.8 07/25/2022 02:30 PM       HCG (If Applicable): No results found for: PREGTESTUR, PREGSERUM, HCG, HCGQUANT     ABGs:   Lab Results   Component Value Date/Time    PO2ART 168 05/09/2017 08:00 AM    YXQ4PZL 29.0 05/09/2017 08:00 AM    UHF5QPE 23.7 05/09/2017 08:00 AM        Type & Screen (If Applicable):  No results found for: LABABO, LABRH    Drug/Infectious Status (If Applicable):  No results found for: HIV, HEPCAB    COVID-19 Screening (If Applicable):   Lab Results   Component Value Date/Time    COVID19 NOT DETECTED 11/22/2022 03:05 PM           Anesthesia Evaluation  Patient summary reviewed no history of anesthetic complications:   Airway: Mallampati: II  TM distance: >3 FB   Neck ROM: full  Mouth opening: > = 3 FB   Dental: normal exam         Pulmonary:   (+) wheezes asthma: current smoker                           Cardiovascular:  Exercise tolerance: good (>4 METS),   (+) hypertension:, hyperlipidemia         Beta Blocker:  Not on Beta Blocker         Neuro/Psych:   (+) psychiatric history (etoh abuse):            GI/Hepatic/Renal:   (+) GERD:,           Endo/Other:    (+) DiabetesType II DM, poorly controlled, using insulin, . Abdominal:             Vascular:   + PVD, aortic or cerebral, . Other Findings:           Anesthesia Plan      MAC     ASA 3       Induction: intravenous.     MIPS: Postoperative opioids intended. Anesthetic plan and risks discussed with patient. Pre Anesthesia Assessment complete. Chart reviewed on 2/10/2023. This is a chart review only.     Jerry Gandhi,    2/10/2023

## 2023-02-11 ENCOUNTER — ANESTHESIA (OUTPATIENT)
Dept: OPERATING ROOM | Age: 38
End: 2023-02-11
Payer: COMMERCIAL

## 2023-02-11 LAB
ESTIMATED AVERAGE GLUCOSE: 220 MG/DL
GLUCOSE BLD-MCNC: 200 MG/DL (ref 70–99)
GLUCOSE BLD-MCNC: 207 MG/DL (ref 70–99)
GLUCOSE BLD-MCNC: 226 MG/DL (ref 70–99)
GLUCOSE BLD-MCNC: 311 MG/DL (ref 70–99)
GLUCOSE BLD-MCNC: 399 MG/DL (ref 70–99)
HBA1C MFR BLD: 9.3 % (ref 4.2–6.3)

## 2023-02-11 PROCEDURE — 6370000000 HC RX 637 (ALT 250 FOR IP): Performed by: INTERNAL MEDICINE

## 2023-02-11 PROCEDURE — 94761 N-INVAS EAR/PLS OXIMETRY MLT: CPT

## 2023-02-11 PROCEDURE — 87075 CULTR BACTERIA EXCEPT BLOOD: CPT

## 2023-02-11 PROCEDURE — 3600000002 HC SURGERY LEVEL 2 BASE: Performed by: SURGERY

## 2023-02-11 PROCEDURE — 6360000002 HC RX W HCPCS: Performed by: SURGERY

## 2023-02-11 PROCEDURE — 6370000000 HC RX 637 (ALT 250 FOR IP)

## 2023-02-11 PROCEDURE — 6370000000 HC RX 637 (ALT 250 FOR IP): Performed by: SURGERY

## 2023-02-11 PROCEDURE — 94640 AIRWAY INHALATION TREATMENT: CPT

## 2023-02-11 PROCEDURE — 2709999900 HC NON-CHARGEABLE SUPPLY: Performed by: SURGERY

## 2023-02-11 PROCEDURE — 6360000002 HC RX W HCPCS

## 2023-02-11 PROCEDURE — 99223 1ST HOSP IP/OBS HIGH 75: CPT | Performed by: INTERNAL MEDICINE

## 2023-02-11 PROCEDURE — 87186 SC STD MICRODIL/AGAR DIL: CPT

## 2023-02-11 PROCEDURE — 2580000003 HC RX 258: Performed by: SURGERY

## 2023-02-11 PROCEDURE — 2500000003 HC RX 250 WO HCPCS: Performed by: SURGERY

## 2023-02-11 PROCEDURE — 80048 BASIC METABOLIC PNL TOTAL CA: CPT

## 2023-02-11 PROCEDURE — 7100000000 HC PACU RECOVERY - FIRST 15 MIN: Performed by: SURGERY

## 2023-02-11 PROCEDURE — 87070 CULTURE OTHR SPECIMN AEROBIC: CPT

## 2023-02-11 PROCEDURE — 1200000000 HC SEMI PRIVATE

## 2023-02-11 PROCEDURE — 2580000003 HC RX 258: Performed by: STUDENT IN AN ORGANIZED HEALTH CARE EDUCATION/TRAINING PROGRAM

## 2023-02-11 PROCEDURE — 3700000001 HC ADD 15 MINUTES (ANESTHESIA): Performed by: SURGERY

## 2023-02-11 PROCEDURE — 82962 GLUCOSE BLOOD TEST: CPT

## 2023-02-11 PROCEDURE — 6360000002 HC RX W HCPCS: Performed by: INTERNAL MEDICINE

## 2023-02-11 PROCEDURE — 3700000000 HC ANESTHESIA ATTENDED CARE: Performed by: SURGERY

## 2023-02-11 PROCEDURE — 7100000001 HC PACU RECOVERY - ADDTL 15 MIN: Performed by: SURGERY

## 2023-02-11 PROCEDURE — 87077 CULTURE AEROBIC IDENTIFY: CPT

## 2023-02-11 PROCEDURE — 0Y6R0Z0 DETACHMENT AT RIGHT 2ND TOE, COMPLETE, OPEN APPROACH: ICD-10-PCS | Performed by: SURGERY

## 2023-02-11 PROCEDURE — 0Y6T0Z0 DETACHMENT AT RIGHT 3RD TOE, COMPLETE, OPEN APPROACH: ICD-10-PCS | Performed by: SURGERY

## 2023-02-11 PROCEDURE — 6360000002 HC RX W HCPCS: Performed by: STUDENT IN AN ORGANIZED HEALTH CARE EDUCATION/TRAINING PROGRAM

## 2023-02-11 PROCEDURE — 87205 SMEAR GRAM STAIN: CPT

## 2023-02-11 PROCEDURE — 3600000012 HC SURGERY LEVEL 2 ADDTL 15MIN: Performed by: SURGERY

## 2023-02-11 RX ORDER — HYDRALAZINE HYDROCHLORIDE 20 MG/ML
10 INJECTION INTRAMUSCULAR; INTRAVENOUS
Status: DISCONTINUED | OUTPATIENT
Start: 2023-02-11 | End: 2023-02-11 | Stop reason: HOSPADM

## 2023-02-11 RX ORDER — LIDOCAINE HYDROCHLORIDE 20 MG/ML
INJECTION, SOLUTION INTRAVENOUS PRN
Status: DISCONTINUED | OUTPATIENT
Start: 2023-02-11 | End: 2023-02-11 | Stop reason: SDUPTHER

## 2023-02-11 RX ORDER — ONDANSETRON 2 MG/ML
4 INJECTION INTRAMUSCULAR; INTRAVENOUS
Status: DISCONTINUED | OUTPATIENT
Start: 2023-02-11 | End: 2023-02-11 | Stop reason: HOSPADM

## 2023-02-11 RX ORDER — FENTANYL CITRATE 50 UG/ML
25 INJECTION, SOLUTION INTRAMUSCULAR; INTRAVENOUS EVERY 5 MIN PRN
Status: DISCONTINUED | OUTPATIENT
Start: 2023-02-11 | End: 2023-02-11 | Stop reason: HOSPADM

## 2023-02-11 RX ORDER — LABETALOL HYDROCHLORIDE 5 MG/ML
10 INJECTION, SOLUTION INTRAVENOUS
Status: DISCONTINUED | OUTPATIENT
Start: 2023-02-11 | End: 2023-02-11 | Stop reason: HOSPADM

## 2023-02-11 RX ORDER — MIDAZOLAM HYDROCHLORIDE 1 MG/ML
INJECTION INTRAMUSCULAR; INTRAVENOUS PRN
Status: DISCONTINUED | OUTPATIENT
Start: 2023-02-11 | End: 2023-02-11 | Stop reason: SDUPTHER

## 2023-02-11 RX ORDER — SODIUM CHLORIDE 0.9 % (FLUSH) 0.9 %
5-40 SYRINGE (ML) INJECTION EVERY 12 HOURS SCHEDULED
Status: DISCONTINUED | OUTPATIENT
Start: 2023-02-11 | End: 2023-02-11 | Stop reason: HOSPADM

## 2023-02-11 RX ORDER — IPRATROPIUM BROMIDE AND ALBUTEROL SULFATE 2.5; .5 MG/3ML; MG/3ML
1 SOLUTION RESPIRATORY (INHALATION) 4 TIMES DAILY
Status: DISCONTINUED | OUTPATIENT
Start: 2023-02-11 | End: 2023-02-11

## 2023-02-11 RX ORDER — PROPOFOL 10 MG/ML
INJECTION, EMULSION INTRAVENOUS PRN
Status: DISCONTINUED | OUTPATIENT
Start: 2023-02-11 | End: 2023-02-11 | Stop reason: SDUPTHER

## 2023-02-11 RX ORDER — ONDANSETRON 2 MG/ML
INJECTION INTRAMUSCULAR; INTRAVENOUS PRN
Status: DISCONTINUED | OUTPATIENT
Start: 2023-02-11 | End: 2023-02-11 | Stop reason: SDUPTHER

## 2023-02-11 RX ORDER — OXYCODONE HYDROCHLORIDE 5 MG/1
5 TABLET ORAL
Status: DISCONTINUED | OUTPATIENT
Start: 2023-02-11 | End: 2023-02-11

## 2023-02-11 RX ORDER — IPRATROPIUM BROMIDE AND ALBUTEROL SULFATE 2.5; .5 MG/3ML; MG/3ML
1 SOLUTION RESPIRATORY (INHALATION) ONCE
Status: COMPLETED | OUTPATIENT
Start: 2023-02-11 | End: 2023-02-11

## 2023-02-11 RX ORDER — BUPIVACAINE HYDROCHLORIDE 5 MG/ML
INJECTION, SOLUTION PERINEURAL
Status: COMPLETED | OUTPATIENT
Start: 2023-02-11 | End: 2023-02-11

## 2023-02-11 RX ORDER — MEPERIDINE HYDROCHLORIDE 25 MG/ML
12.5 INJECTION INTRAMUSCULAR; INTRAVENOUS; SUBCUTANEOUS EVERY 5 MIN PRN
Status: DISCONTINUED | OUTPATIENT
Start: 2023-02-11 | End: 2023-02-11 | Stop reason: HOSPADM

## 2023-02-11 RX ORDER — SODIUM CHLORIDE, SODIUM LACTATE, POTASSIUM CHLORIDE, CALCIUM CHLORIDE 600; 310; 30; 20 MG/100ML; MG/100ML; MG/100ML; MG/100ML
INJECTION, SOLUTION INTRAVENOUS CONTINUOUS
Status: DISCONTINUED | OUTPATIENT
Start: 2023-02-11 | End: 2023-02-11

## 2023-02-11 RX ORDER — SODIUM CHLORIDE 0.9 % (FLUSH) 0.9 %
5-40 SYRINGE (ML) INJECTION PRN
Status: DISCONTINUED | OUTPATIENT
Start: 2023-02-11 | End: 2023-02-11 | Stop reason: HOSPADM

## 2023-02-11 RX ORDER — FENTANYL CITRATE 50 UG/ML
INJECTION, SOLUTION INTRAMUSCULAR; INTRAVENOUS PRN
Status: DISCONTINUED | OUTPATIENT
Start: 2023-02-11 | End: 2023-02-11 | Stop reason: SDUPTHER

## 2023-02-11 RX ORDER — OXYCODONE HYDROCHLORIDE 5 MG/1
5 TABLET ORAL EVERY 4 HOURS PRN
Status: DISCONTINUED | OUTPATIENT
Start: 2023-02-11 | End: 2023-02-11

## 2023-02-11 RX ORDER — ENOXAPARIN SODIUM 100 MG/ML
30 INJECTION SUBCUTANEOUS 2 TIMES DAILY
Status: DISCONTINUED | OUTPATIENT
Start: 2023-02-11 | End: 2023-02-13 | Stop reason: HOSPADM

## 2023-02-11 RX ORDER — OXYCODONE HYDROCHLORIDE AND ACETAMINOPHEN 5; 325 MG/1; MG/1
1 TABLET ORAL EVERY 6 HOURS PRN
Status: DISCONTINUED | OUTPATIENT
Start: 2023-02-11 | End: 2023-02-13 | Stop reason: HOSPADM

## 2023-02-11 RX ORDER — IPRATROPIUM BROMIDE AND ALBUTEROL SULFATE 2.5; .5 MG/3ML; MG/3ML
SOLUTION RESPIRATORY (INHALATION)
Status: COMPLETED
Start: 2023-02-11 | End: 2023-02-11

## 2023-02-11 RX ORDER — DIPHENHYDRAMINE HYDROCHLORIDE 50 MG/ML
12.5 INJECTION INTRAMUSCULAR; INTRAVENOUS
Status: ACTIVE | OUTPATIENT
Start: 2023-02-11 | End: 2023-02-12

## 2023-02-11 RX ORDER — OXYCODONE HYDROCHLORIDE AND ACETAMINOPHEN 5; 325 MG/1; MG/1
2 TABLET ORAL EVERY 6 HOURS PRN
Status: DISCONTINUED | OUTPATIENT
Start: 2023-02-11 | End: 2023-02-13 | Stop reason: HOSPADM

## 2023-02-11 RX ORDER — SODIUM CHLORIDE, SODIUM LACTATE, POTASSIUM CHLORIDE, CALCIUM CHLORIDE 600; 310; 30; 20 MG/100ML; MG/100ML; MG/100ML; MG/100ML
INJECTION, SOLUTION INTRAVENOUS CONTINUOUS PRN
Status: DISCONTINUED | OUTPATIENT
Start: 2023-02-11 | End: 2023-02-11 | Stop reason: SDUPTHER

## 2023-02-11 RX ORDER — IPRATROPIUM BROMIDE AND ALBUTEROL SULFATE 2.5; .5 MG/3ML; MG/3ML
1 SOLUTION RESPIRATORY (INHALATION)
Status: DISCONTINUED | OUTPATIENT
Start: 2023-02-11 | End: 2023-02-11

## 2023-02-11 RX ORDER — BUDESONIDE 0.5 MG/2ML
0.5 INHALANT ORAL 2 TIMES DAILY
Status: DISCONTINUED | OUTPATIENT
Start: 2023-02-11 | End: 2023-02-13 | Stop reason: HOSPADM

## 2023-02-11 RX ORDER — DEXAMETHASONE SODIUM PHOSPHATE 4 MG/ML
INJECTION, SOLUTION INTRA-ARTICULAR; INTRALESIONAL; INTRAMUSCULAR; INTRAVENOUS; SOFT TISSUE PRN
Status: DISCONTINUED | OUTPATIENT
Start: 2023-02-11 | End: 2023-02-11 | Stop reason: SDUPTHER

## 2023-02-11 RX ORDER — DROPERIDOL 2.5 MG/ML
0.62 INJECTION, SOLUTION INTRAMUSCULAR; INTRAVENOUS EVERY 10 MIN PRN
Status: DISCONTINUED | OUTPATIENT
Start: 2023-02-11 | End: 2023-02-13 | Stop reason: HOSPADM

## 2023-02-11 RX ADMIN — INSULIN LISPRO 15 UNITS: 100 INJECTION, SOLUTION INTRAVENOUS; SUBCUTANEOUS at 17:39

## 2023-02-11 RX ADMIN — OXYCODONE AND ACETAMINOPHEN 2 TABLET: 5; 325 TABLET ORAL at 19:19

## 2023-02-11 RX ADMIN — FENTANYL CITRATE 50 MCG: 50 INJECTION, SOLUTION INTRAMUSCULAR; INTRAVENOUS at 10:29

## 2023-02-11 RX ADMIN — LIDOCAINE HYDROCHLORIDE 100 MG: 20 INJECTION, SOLUTION INTRAVENOUS at 10:31

## 2023-02-11 RX ADMIN — SODIUM CHLORIDE, SODIUM LACTATE, POTASSIUM CHLORIDE, CALCIUM CHLORIDE: 600; 310; 30; 20 INJECTION, SOLUTION INTRAVENOUS at 10:25

## 2023-02-11 RX ADMIN — ALLOPURINOL 300 MG: 300 TABLET ORAL at 15:17

## 2023-02-11 RX ADMIN — BUDESONIDE 500 MCG: 0.5 SUSPENSION RESPIRATORY (INHALATION) at 20:44

## 2023-02-11 RX ADMIN — ALBUTEROL SULFATE 2 PUFF: 90 AEROSOL, METERED RESPIRATORY (INHALATION) at 04:54

## 2023-02-11 RX ADMIN — MOXIFLOXACIN HYDROCHLORIDE 1 DROP: 5 SOLUTION/ DROPS OPHTHALMIC at 22:19

## 2023-02-11 RX ADMIN — GUAIFENESIN 600 MG: 600 TABLET, EXTENDED RELEASE ORAL at 15:17

## 2023-02-11 RX ADMIN — INSULIN LISPRO 6 UNITS: 100 INJECTION, SOLUTION INTRAVENOUS; SUBCUTANEOUS at 21:26

## 2023-02-11 RX ADMIN — PREGABALIN 100 MG: 100 CAPSULE ORAL at 15:17

## 2023-02-11 RX ADMIN — IPRATROPIUM BROMIDE 2 PUFF: 17 AEROSOL, METERED RESPIRATORY (INHALATION) at 20:41

## 2023-02-11 RX ADMIN — DEXAMETHASONE SODIUM PHOSPHATE 4 MG: 4 INJECTION, SOLUTION INTRAMUSCULAR; INTRAVENOUS at 10:37

## 2023-02-11 RX ADMIN — PREDNISOLONE ACETATE 1 DROP: 10 SUSPENSION/ DROPS OPHTHALMIC at 17:37

## 2023-02-11 RX ADMIN — MIDAZOLAM 2 MG: 1 INJECTION INTRAMUSCULAR; INTRAVENOUS at 10:25

## 2023-02-11 RX ADMIN — LISINOPRIL AND HYDROCHLOROTHIAZIDE 1 TABLET: 12.5; 2 TABLET ORAL at 15:17

## 2023-02-11 RX ADMIN — GUAIFENESIN AND DEXTROMETHORPHAN 5 ML: 100; 10 SYRUP ORAL at 04:25

## 2023-02-11 RX ADMIN — IPRATROPIUM BROMIDE 2 PUFF: 17 AEROSOL, METERED RESPIRATORY (INHALATION) at 08:16

## 2023-02-11 RX ADMIN — MOXIFLOXACIN HYDROCHLORIDE 1 DROP: 5 SOLUTION/ DROPS OPHTHALMIC at 17:38

## 2023-02-11 RX ADMIN — PREDNISOLONE ACETATE 1 DROP: 10 SUSPENSION/ DROPS OPHTHALMIC at 15:19

## 2023-02-11 RX ADMIN — CEFEPIME HYDROCHLORIDE 2000 MG: 2 INJECTION, POWDER, FOR SOLUTION INTRAMUSCULAR; INTRAVENOUS at 04:19

## 2023-02-11 RX ADMIN — PREGABALIN 100 MG: 100 CAPSULE ORAL at 21:07

## 2023-02-11 RX ADMIN — CEFEPIME HYDROCHLORIDE 2000 MG: 2 INJECTION, POWDER, FOR SOLUTION INTRAMUSCULAR; INTRAVENOUS at 17:45

## 2023-02-11 RX ADMIN — EZETIMIBE 10 MG: 10 TABLET ORAL at 21:07

## 2023-02-11 RX ADMIN — IPRATROPIUM BROMIDE AND ALBUTEROL SULFATE 1 AMPULE: .5; 2.5 SOLUTION RESPIRATORY (INHALATION) at 10:15

## 2023-02-11 RX ADMIN — ONDANSETRON 4 MG: 2 INJECTION INTRAMUSCULAR; INTRAVENOUS at 11:00

## 2023-02-11 RX ADMIN — BRIMONIDINE TARTRATE 1 DROP: 2 SOLUTION OPHTHALMIC at 17:49

## 2023-02-11 RX ADMIN — SODIUM CHLORIDE 5 ML/HR: 9 INJECTION, SOLUTION INTRAVENOUS at 17:44

## 2023-02-11 RX ADMIN — POLYMYXIN B SULFATE AND TRIMETHOPRIM 1 DROP: 10000; 1 SOLUTION OPHTHALMIC at 22:22

## 2023-02-11 RX ADMIN — INSULIN LISPRO 8 UNITS: 100 INJECTION, SOLUTION INTRAVENOUS; SUBCUTANEOUS at 17:39

## 2023-02-11 RX ADMIN — Medication 5 MG: at 21:07

## 2023-02-11 RX ADMIN — IPRATROPIUM BROMIDE AND ALBUTEROL SULFATE 1 AMPULE: 2.5; .5 SOLUTION RESPIRATORY (INHALATION) at 10:15

## 2023-02-11 RX ADMIN — OXYCODONE HYDROCHLORIDE 5 MG: 5 TABLET ORAL at 13:24

## 2023-02-11 RX ADMIN — BUDESONIDE AND FORMOTEROL FUMARATE DIHYDRATE 2 PUFF: 160; 4.5 AEROSOL RESPIRATORY (INHALATION) at 20:42

## 2023-02-11 RX ADMIN — INSULIN GLARGINE 40 UNITS: 100 INJECTION, SOLUTION SUBCUTANEOUS at 21:27

## 2023-02-11 RX ADMIN — SUCRALFATE 1 G: 1 TABLET ORAL at 15:18

## 2023-02-11 RX ADMIN — Medication 1 LOZENGE: at 05:00

## 2023-02-11 RX ADMIN — ATORVASTATIN CALCIUM 10 MG: 10 TABLET, FILM COATED ORAL at 15:17

## 2023-02-11 RX ADMIN — SUCRALFATE 1 G: 1 TABLET ORAL at 21:07

## 2023-02-11 RX ADMIN — GUAIFENESIN 600 MG: 600 TABLET, EXTENDED RELEASE ORAL at 21:08

## 2023-02-11 RX ADMIN — VANCOMYCIN HYDROCHLORIDE 1250 MG: 1.25 INJECTION, POWDER, LYOPHILIZED, FOR SOLUTION INTRAVENOUS at 08:44

## 2023-02-11 RX ADMIN — POLYMYXIN B SULFATE AND TRIMETHOPRIM 1 DROP: 10000; 1 SOLUTION OPHTHALMIC at 17:38

## 2023-02-11 RX ADMIN — FENTANYL CITRATE 50 MCG: 50 INJECTION, SOLUTION INTRAMUSCULAR; INTRAVENOUS at 10:56

## 2023-02-11 RX ADMIN — SODIUM CHLORIDE, PRESERVATIVE FREE 10 ML: 5 INJECTION INTRAVENOUS at 21:31

## 2023-02-11 RX ADMIN — PROPOFOL 250 MG: 10 INJECTION, EMULSION INTRAVENOUS at 10:31

## 2023-02-11 RX ADMIN — TIMOLOL MALEATE 1 DROP: 5 SOLUTION OPHTHALMIC at 22:19

## 2023-02-11 RX ADMIN — SUCRALFATE 1 G: 1 TABLET ORAL at 17:37

## 2023-02-11 RX ADMIN — BUDESONIDE AND FORMOTEROL FUMARATE DIHYDRATE 2 PUFF: 160; 4.5 AEROSOL RESPIRATORY (INHALATION) at 08:18

## 2023-02-11 RX ADMIN — PREDNISOLONE ACETATE 1 DROP: 10 SUSPENSION/ DROPS OPHTHALMIC at 22:20

## 2023-02-11 RX ADMIN — POLYMYXIN B SULFATE AND TRIMETHOPRIM 1 DROP: 10000; 1 SOLUTION OPHTHALMIC at 15:19

## 2023-02-11 RX ADMIN — MOXIFLOXACIN HYDROCHLORIDE 1 DROP: 5 SOLUTION/ DROPS OPHTHALMIC at 08:46

## 2023-02-11 RX ADMIN — TIZANIDINE 4 MG: 4 TABLET ORAL at 21:07

## 2023-02-11 RX ADMIN — DORZOLAMIDE HYDROCHLORIDE 1 DROP: 20 SOLUTION/ DROPS OPHTHALMIC at 22:18

## 2023-02-11 RX ADMIN — ALBUTEROL SULFATE 2 PUFF: 90 AEROSOL, METERED RESPIRATORY (INHALATION) at 16:20

## 2023-02-11 RX ADMIN — SODIUM CHLORIDE, PRESERVATIVE FREE 10 ML: 5 INJECTION INTRAVENOUS at 08:36

## 2023-02-11 RX ADMIN — HYDROMORPHONE HYDROCHLORIDE 0.5 MG: 1 INJECTION, SOLUTION INTRAMUSCULAR; INTRAVENOUS; SUBCUTANEOUS at 08:19

## 2023-02-11 RX ADMIN — HYDROMORPHONE HYDROCHLORIDE 0.5 MG: 1 INJECTION, SOLUTION INTRAMUSCULAR; INTRAVENOUS; SUBCUTANEOUS at 04:07

## 2023-02-11 RX ADMIN — CLONIDINE HYDROCHLORIDE 0.2 MG: 0.2 TABLET ORAL at 15:17

## 2023-02-11 RX ADMIN — AMLODIPINE BESYLATE 10 MG: 10 TABLET ORAL at 15:17

## 2023-02-11 RX ADMIN — VANCOMYCIN HYDROCHLORIDE 1250 MG: 1.25 INJECTION, POWDER, LYOPHILIZED, FOR SOLUTION INTRAVENOUS at 21:21

## 2023-02-11 RX ADMIN — PREDNISOLONE ACETATE 1 DROP: 10 SUSPENSION/ DROPS OPHTHALMIC at 08:36

## 2023-02-11 RX ADMIN — TIMOLOL MALEATE 1 DROP: 5 SOLUTION OPHTHALMIC at 08:37

## 2023-02-11 RX ADMIN — MOXIFLOXACIN HYDROCHLORIDE 1 DROP: 5 SOLUTION/ DROPS OPHTHALMIC at 15:19

## 2023-02-11 RX ADMIN — BRIMONIDINE TARTRATE 1 DROP: 2 SOLUTION OPHTHALMIC at 08:36

## 2023-02-11 RX ADMIN — POLYMYXIN B SULFATE AND TRIMETHOPRIM 1 DROP: 10000; 1 SOLUTION OPHTHALMIC at 08:36

## 2023-02-11 RX ADMIN — ALBUTEROL SULFATE 2 PUFF: 90 AEROSOL, METERED RESPIRATORY (INHALATION) at 20:40

## 2023-02-11 RX ADMIN — IPRATROPIUM BROMIDE 2 PUFF: 17 AEROSOL, METERED RESPIRATORY (INHALATION) at 16:20

## 2023-02-11 RX ADMIN — DORZOLAMIDE HYDROCHLORIDE 1 DROP: 20 SOLUTION/ DROPS OPHTHALMIC at 08:37

## 2023-02-11 ASSESSMENT — PAIN DESCRIPTION - DESCRIPTORS
DESCRIPTORS: ACHING
DESCRIPTORS: ACHING

## 2023-02-11 ASSESSMENT — PAIN SCALES - WONG BAKER
WONGBAKER_NUMERICALRESPONSE: 6
WONGBAKER_NUMERICALRESPONSE: 6
WONGBAKER_NUMERICALRESPONSE: 2
WONGBAKER_NUMERICALRESPONSE: 6
WONGBAKER_NUMERICALRESPONSE: 6

## 2023-02-11 ASSESSMENT — PAIN DESCRIPTION - LOCATION
LOCATION: FOOT;LEG
LOCATION: TOE (COMMENT WHICH ONE)
LOCATION: TOE (COMMENT WHICH ONE)

## 2023-02-11 ASSESSMENT — PAIN SCALES - GENERAL
PAINLEVEL_OUTOF10: 8
PAINLEVEL_OUTOF10: 2
PAINLEVEL_OUTOF10: 8
PAINLEVEL_OUTOF10: 10

## 2023-02-11 ASSESSMENT — PAIN DESCRIPTION - ORIENTATION
ORIENTATION: RIGHT

## 2023-02-11 NOTE — CONSULTS
Pulmonary Consult Note      Reason for Consult: Pt Rhinovirus positive, going to OR tomorrow. Has chest congestion. Asking for steroids. Requesting Physician: Tico Bragg MD    Subjective:   CHIEF COMPLAINT : worsening swelling of the right lower extremity    Patient Active Problem List    Diagnosis Date Noted    Rhinovirus 02/10/2023     Priority: Medium    Open wound of toe 07/26/2022     Priority: Medium    Cellulitis 07/21/2022     Priority: Medium    Diabetic foot ulcer with osteomyelitis ([address] 07/02/2022     Priority: Medium    Appendicitis 04/28/2022     Priority: Medium    Shortness of breath 03/20/2021    Asthma exacerbation 03/20/2021    Diabetic foot infection ([address] 07/26/2019    Osteomyelitis of second toe of right foot ([address] 12/21/2018    Acute osteomyelitis of toe, right ([address] 07/31/2017    UTI (urinary tract infection) due to Enterococcus 05/13/2017    Alcohol abuse 05/09/2017    Sprain of left ankle 05/16/2016    Closed nondisplaced fracture of fifth metatarsal bone of left foot 05/16/2016        HPI:                The patient is a 44 y.o. male with significant past medical history of type 2 diabetes, chronic diabetic foot infection , Obesity, WILMA, Cig smoker  presents with complaints of Right LE swelling. He had a X foot and found to have suspected Osteomyelitis. He is on Abx. He has NIDDM on  Insulin. He had no fever, no chills,no recent injury to the right foot. He is hemodynamically stable. He is positive for Rhinovirus. I was called to assess for the need of steroids.      Past Medical History:      Diagnosis Date    Asthma     Blind left eye     Diabetes mellitus ([address]     GERD (gastroesophageal reflux disease)     Hypertension     Retinal detachment 2012    left      Past Surgical History:        Procedure Laterality Date    CORNEAL TRANSPLANT Bilateral     EYE SURGERY      left eye removed    EYE SURGERY Right     FRACTURE SURGERY      foot left    LAPAROSCOPIC APPENDECTOMY N/A 4/28/2022    APPENDECTOMY LAPAROSCOPIC performed by Abby Montana MD at [facility] Right     MANDIBLE SURGERY Bilateral     TOE AMPUTATION Right 07/25/2017    TOE AMPUTATION Right 7/25/2022    TOE AMPUTATION, RAY AMPUTATION OF RIGHT SECOND TOE performed by Jj Chakraborty MD at [facility] OR     Current Medications:     sodium chloride flush  5-40 mL IntraVENous 2 times per day    pregabalin  100 mg Oral BID    amLODIPine  10 mg Oral Daily    brimonidine  1 drop Right Eye BID    trimethoprim-polymyxin b  1 drop Ophthalmic 4x Daily    moxifloxacin  1 drop Right Eye 4x Daily    cloNIDine  0.2 mg Oral Daily    lisinopril-hydroCHLOROthiazide  1 tablet Oral Daily    melatonin  5 mg Oral Nightly    sucralfate  1 g Oral 4x Daily    tiZANidine  4 mg Oral Nightly    prednisoLONE acetate  1 drop Right Eye 4x Daily    albuterol sulfate HFA  2 puff Inhalation 4x daily    And    ipratropium  2 puff Inhalation 4x daily    dorzolamide  1 drop Right Eye BID    And    timolol  1 drop Right Eye BID    guaiFENesin  600 mg Oral BID    insulin lispro  15 Units SubCUTAneous TID WC    insulin glargine  40 Units SubCUTAneous Nightly    insulin lispro  0-8 Units SubCUTAneous TID WC    insulin lispro  0-8 Units SubCUTAneous 2 times per day    alogliptin  25 mg Oral Daily    sodium chloride flush  5-40 mL IntraVENous 2 times per day    [Held by provider] enoxaparin  30 mg SubCUTAneous BID    cefepime  2,000 mg IntraVENous Q12H    allopurinol  300 mg Oral Daily    atorvastatin  10 mg Oral Daily    ezetimibe  10 mg Oral Nightly    pantoprazole  40 mg Oral QAM AC    budesonide-formoterol  2 puff Inhalation BID    vancomycin  1,250 mg IntraVENous Q12H     Allergies:    Social History:    TOBACCO:   reports that he has been smoking cigarettes. He has a 2.50 pack-year smoking history. He has never used smokeless tobacco.  ETOH:   reports current alcohol use.   Patient currently lives independently    Family History:       Problem Relation Age of Onset    Diabetes Mother     Asthma Mother     High Blood Pressure Mother     Stroke Mother     Heart Disease Mother     Diabetes Father     Asthma Father     High Blood Pressure Father        REVIEW OF SYSTEMS:    CONSTITUTIONAL:  negative for fevers, chills, diaphoresis, activity change, appetite change, fatigue, night sweats and unexpected weight change. EYES:  negative for blurred vision, eye discharge, visual disturbance and icterus  HEENT:  negative for hearing loss, tinnitus, ear drainage, sinus pressure, nasal congestion, epistaxis and snoring  RESPIRATORY:  See HPI  CARDIOVASCULAR:  negative for chest pain, palpitations, exertional chest pressure/discomfort, edema, syncope  GASTROINTESTINAL:  negative for nausea, vomiting, diarrhea, constipation, blood in stool and abdominal pain  GENITOURINARY:  negative for frequency, dysuria, urinary incontinence, decreased urine volume, and hematuria  HEMATOLOGIC/LYMPHATIC:  negative for easy bruising, bleeding and lymphadenopathy  ALLERGIC/IMMUNOLOGIC:  negative for recurrent infections, angioedema, anaphylaxis and drug reactions  ENDOCRINE:  negative for weight changes and diabetic symptoms including polyuria, polydipsia and polyphagia  MUSCULOSKELETAL:  negative for  pain, joint swelling, decreased range of motion and muscle weakness  NEUROLOGICAL:  negative for headaches, slurred speech, unilateral weakness  PSYCHIATRIC/BEHAVIORAL: negative for hallucinations, behavioral problems, confusion and agitation.      Objective:   PHYSICAL EXAM:      VITALS:  BP (!) 123/90   Pulse 89   Temp 98.1 °F (36.7 °C) (Temporal)   Resp 16   Wt (!) 300 lb 9.6 oz (136.4 kg)   SpO2 94%   BMI 37.57 kg/m²   24HR INTAKE/OUTPUT:    Intake/Output Summary (Last 24 hours) at 2/11/2023 1234  Last data filed at 2/11/2023 1201  Gross per 24 hour   Intake 685 ml   Output 3910 ml   Net -3225 ml     CURRENT PULSE OXIMETRY:  SpO2: 94 %  24HR PULSE OXIMETRY RANGE:  SpO2  Avg: 96.7 %  Min: 94 %  Max: 100 %    CONSTITUTIONAL:  awake, alert, cooperative, no apparent distress, and appears stated age  NECK:  Supple, symmetrical, trachea midline, no adenopathy, thyroid symmetric, not enlarged and no tenderness, skin normal  LUNGS: Decreased air entry bilateral bases  CARDIOVASCULAR:  normal S1 and S2, no edema and no JVD  ABDOMEN:  normal bowel sounds, non-distended and no masses palpated, and no tenderness to palpation. No hepatospleenomegaly  LYMPHADENOPATHY:  no axillary or supraclavicular adenopathy. No cervical adnenopathy  PSYCHIATRIC: Oriented to person place and time. No obvious depression or anxiety. MUSCULOSKELETAL: No obvious misalignment or effusion of the joints. No clubbing, cyanosis of the digits. SKIN:  normal skin color, texture, turgor and no redness, warmth, or swelling.  No palpable nodules    DATA:    Old records have been reviewed  CBC with Differential:    Lab Results   Component Value Date/Time    WBC 8.0 02/10/2023 11:43 AM    RBC 4.00 02/10/2023 11:43 AM    HGB 12.8 02/10/2023 11:43 AM    HCT 36.4 02/10/2023 11:43 AM     02/10/2023 11:43 AM    MCV 91.0 02/10/2023 11:43 AM    MCH 32.0 02/10/2023 11:43 AM    MCHC 35.2 02/10/2023 11:43 AM    RDW 11.6 02/10/2023 11:43 AM    SEGSPCT 79.2 02/10/2023 11:43 AM    LYMPHOPCT 12.6 02/10/2023 11:43 AM    MONOPCT 6.4 02/10/2023 11:43 AM    EOSPCT 2.6 05/04/2011 07:20 PM    BASOPCT 0.4 02/10/2023 11:43 AM    MONOSABS 0.5 02/10/2023 11:43 AM    LYMPHSABS 1.0 02/10/2023 11:43 AM    EOSABS 0.1 02/10/2023 11:43 AM    BASOSABS 0.0 02/10/2023 11:43 AM    DIFFTYPE AUTOMATED DIFFERENTIAL 02/10/2023 11:43 AM     BMP:    Lab Results   Component Value Date/Time     02/10/2023 11:43 AM    K 4.2 02/10/2023 11:43 AM     02/10/2023 11:43 AM    CO2 22 02/10/2023 11:43 AM    BUN 10 02/10/2023 11:43 AM    CREATININE 0.7 02/10/2023 11:43 AM    CALCIUM 9.1 02/10/2023 11:43 AM    GFRAA >60 09/20/2022 05:43 AM    LABGLOM >60 02/10/2023 11:43 AM    GLUCOSE 261 02/10/2023 11:43 AM     Hepatic Function Panel:    Lab Results   Component Value Date/Time    ALKPHOS 107 02/09/2023 12:00 PM    ALT 34 02/09/2023 12:00 PM    AST 72 02/09/2023 12:00 PM    PROT 7.6 02/09/2023 12:00 PM    PROT 6.8 04/16/2011 01:19 PM    BILITOT 0.4 02/09/2023 12:00 PM    BILIDIR 0.3 05/12/2017 08:05 AM    IBILI 0.7 05/12/2017 08:05 AM     ABG:    Lab Results   Component Value Date/Time    XEJ3CER 23.7 05/09/2017 08:00 AM    FJA8XLB 29.0 05/09/2017 08:00 AM    PO2ART 168 05/09/2017 08:00 AM       Cultures:   Pending      Radiology Review:    No acute cardiopulmonary disease. Persistent but improved soft tissue defect medial aspect of the great toe   compared to 11/19/2022, new soft tissue defect/ulcer distal tip 3rd digit. No radiographic evidence of osteomyelitis. Triple phase bone scan or   contrast-enhanced MRI of the foot suggested if clinical concern of   radiographically occult osteomyelitis. Nondisplaced intra-articular avulsion fracture base of the distal phalanx 3rd   toe/distal interphalangeal joint of the 3rd toe not present on prior study of   11/19/2022. Correlation with clinical findings of acute avulsion fracture   suggested. Diffuse soft tissue swelling of the forefoot consistent with contusion,   edema, cellulitis. Neuropathic arthropathy changes of the midfoot, hallux valgus deformity 1st   metatarsal-phalangeal joint unchanged compared to 11/19/2022. .             Assessment/Plan       Patient Active Problem List    Diagnosis Date Noted    Rhinovirus 02/10/2023     Priority: Medium    Open wound of toe 07/26/2022     Priority: Medium    Cellulitis 07/21/2022     Priority: Medium    Diabetic foot ulcer with osteomyelitis ([facility] [address] 07/02/2022     Priority: Medium    Appendicitis 04/28/2022     Priority: Medium    Shortness of breath 03/20/2021    Asthma exacerbation 03/20/2021    Diabetic foot infection ([facility] 07/26/2019 Osteomyelitis of second toe of right foot ([facility] [facility] 12/21/2018    Acute osteomyelitis of toe, right ([facility] 07/31/2017    UTI (urinary tract infection) due to Enterococcus 05/13/2017    Alcohol abuse 05/09/2017    Sprain of left ankle 05/16/2016    Closed nondisplaced fracture of fifth metatarsal bone of left foot 05/16/2016       Group B Strep Osteomyelitis  Rhinovirus URI  Obesity  WILMA  Diabetic Right 3rd toes Osteomyelitis s/p amputation  NIDDM  HTN  Cig smoker      PLAN  1. Abx  2. F/u C&S  3. Inhalers  4. Pulmicort  5. BIPAP 18/10 qhs and prn  6. Keep sats > 92%  7. Insulin  8. DVT and GI Prophylaxis  9. ICS  10. C/w present management  11. Will hold off on Steroids as it may affect infection and blood sugar unless needed for the worsening hypoxia/ resp failure  12. Pain control  13. Wound care  14.  C/w present management    Electronically signed by Brenda Staton MD on 2/11/2023 at 12:34 PM

## 2023-02-11 NOTE — ANESTHESIA POSTPROCEDURE EVALUATION
Department of Anesthesiology  Postprocedure Note    Patient: Junior Isidro  MRN: [id number]  YOB: 1985  Date of evaluation: 2/11/2023      Procedure Summary     Date: 02/11/23 Room / Location: [address]    Anesthesia Start: 7866 Anesthesia Stop: 1779    Procedure: THIRD TOE AMPUTATION AND REVISION OF SECOND TOE AMPUTATION (Right: Toes) Diagnosis:       Infection      (Infection [B99.9])    Surgeons: Hermelindo Sarah MD Responsible Provider: Ricky García MD    Anesthesia Type: MAC ASA Status: 3          Anesthesia Type: No value filed.     Nadja Phase I: Nadja Score: 9    Nadja Phase II:        Anesthesia Post Evaluation    Patient location during evaluation: PACU  Patient participation: complete - patient participated  Level of consciousness: awake and alert  Pain score: 0  Airway patency: patent  Nausea & Vomiting: no nausea and no vomiting  Complications: no  Cardiovascular status: blood pressure returned to baseline  Respiratory status: acceptable  Hydration status: euvolemic

## 2023-02-11 NOTE — PROGRESS NOTES
Patient seen and examined this am. I have discussed with the patient the risks and benefits of surgery including but not limited to risk of bleeding, risk of infection, risk of injury to foot and surrounding tissue and the possibility of failure of treatment. I discussed with the patient that they may need future procedures or surgery in the future. The patient's questions were answered and He is agreeable to proceeding with surgery. Consent form was signed and surgery will be this am.  I did luci his right leg with an ink marker. The consent is for amputation of the third toe on the right foot (middle toe) as he only has 3 toes on that foot, the great toe, third and 4th toes.

## 2023-02-11 NOTE — PROGRESS NOTES
1141 - transferred from OR on bed, monitor applied, alarms on and verified, bedside handoff provided by Guero Ortiz RN and Hank Price CRNA  6480 - patient turned, repositioned, and linens changed  1219 - phase one care complete awaiting transfer to inpatient unit

## 2023-02-11 NOTE — BRIEF OP NOTE
Brief Postoperative Note      Patient: Yoan Flores  YOB: 1985  MRN: [id number]    Date of Procedure: 2/11/2023    Pre-Op Diagnosis: third toe osteomyelitis, gangrene right foot    Post-Op Diagnosis: Same       Procedure(s):  THIRD TOE AMPUTATION AND REVISION OF SECOND TOE AMPUTATION  Prevena wound vac applied, 13cm    Surgeon(s):  Yolande Lockwodo MD    Anesthesia: General with 20ml of 0.5% marcaine plain    Estimated Blood Loss (mL): less than 50     Complications: None    Specimens:   ID Type Source Tests Collected by Time Destination   1 : RIGHT THIRD TOE Specimen Toe CULTURE, SURGICAL Yolande Lockwood MD 2/11/2023 1053        Findings: exposed bone of third toe, large redundant skin and subcutaneous area at previous second toe amputation site    Electronically signed by Yolande Lockwood MD on 2/11/2023 at 11:40 AM  Dictated #[id number]

## 2023-02-11 NOTE — PROGRESS NOTES
Progress Note( Dr. Drummond)  2/11/2023  Subjective:   Admit Date: 2/9/2023  PCP: Geri Almonte MD    Admitted For :Right foot infection uncontrolled diabetes mellitus    Consulted For: Better control glucose    Interval History:  Scheduled to have surgery done today possibly debridement of the wound    Denies any chest pains,   Denies SOB .   Denies nausea or vomiting.  Now n.p.o.  No new bowel or bladder symptoms.       Intake/Output Summary (Last 24 hours) at 2/11/2023 1442  Last data filed at 2/11/2023 1201  Gross per 24 hour   Intake 685 ml   Output 3460 ml   Net -2775 ml       DATA    CBC:   Recent Labs     02/09/23  1200 02/10/23  1143   WBC 7.3 8.0   HGB 12.7* 12.8*    222    CMP:  Recent Labs     02/09/23  1200 02/10/23  1143   * 135   K 4.1 4.2   CL 93* 100   CO2 21 22   BUN 8 10   CREATININE 0.7* 0.7*   CALCIUM 9.1 9.1   PROT 7.6  --    LABALBU 4.0  --    BILITOT 0.4  --    ALKPHOS 107  --    AST 72*  --    ALT 34  --      Lipids:   Lab Results   Component Value Date/Time    CHOL 142 04/16/2011 01:19 PM    HDL 41 04/16/2011 01:19 PM    TRIG 268 04/16/2011 01:19 PM     Glucose:  Recent Labs     02/11/23  0210 02/11/23  0834 02/11/23  1144   POCGLU 226* 207* 200*     [id number]:  Lab Results   Component Value Date/Time    LABA1C 9.3 02/10/2023 09:44 PM     High Sensitivity TSH:   Lab Results   Component Value Date/Time    TSHHS 2.685 04/16/2011 01:19 PM     Free T3: No results found for: FT3  Free T4:No results found for: T4FREE    XR CHEST PORTABLE   Final Result   No acute cardiopulmonary disease.         XR FOOT RIGHT (MIN 3 VIEWS)   Final Result   Persistent but improved soft tissue defect medial aspect of the great toe   compared to 11/19/2022, new soft tissue defect/ulcer distal tip 3rd digit.   No radiographic evidence of osteomyelitis.  Triple phase bone scan or   contrast-enhanced MRI of the foot suggested if clinical concern of   radiographically occult osteomyelitis.     Nondisplaced intra-articular avulsion fracture base of the distal phalanx 3rd   toe/distal interphalangeal joint of the 3rd toe not present on prior study of   11/19/2022. Correlation with clinical findings of acute avulsion fracture   suggested. Diffuse soft tissue swelling of the forefoot consistent with contusion,   edema, cellulitis. Neuropathic arthropathy changes of the midfoot, hallux valgus deformity 1st   metatarsal-phalangeal joint unchanged compared to 11/19/2022. Peter Cruz               Scheduled Medicines   Medications:    enoxaparin  30 mg SubCUTAneous BID    ipratropium-albuterol  1 ampule Inhalation 4x daily    budesonide  0.5 mg Nebulization BID    pregabalin  100 mg Oral BID    amLODIPine  10 mg Oral Daily    brimonidine  1 drop Right Eye BID    trimethoprim-polymyxin b  1 drop Ophthalmic 4x Daily    moxifloxacin  1 drop Right Eye 4x Daily    cloNIDine  0.2 mg Oral Daily    lisinopril-hydroCHLOROthiazide  1 tablet Oral Daily    melatonin  5 mg Oral Nightly    sucralfate  1 g Oral 4x Daily    tiZANidine  4 mg Oral Nightly    prednisoLONE acetate  1 drop Right Eye 4x Daily    albuterol sulfate HFA  2 puff Inhalation 4x daily    And    ipratropium  2 puff Inhalation 4x daily    dorzolamide  1 drop Right Eye BID    And    timolol  1 drop Right Eye BID    guaiFENesin  600 mg Oral BID    insulin lispro  15 Units SubCUTAneous TID WC    insulin glargine  40 Units SubCUTAneous Nightly    insulin lispro  0-8 Units SubCUTAneous TID WC    insulin lispro  0-8 Units SubCUTAneous 2 times per day    alogliptin  25 mg Oral Daily    sodium chloride flush  5-40 mL IntraVENous 2 times per day    cefepime  2,000 mg IntraVENous Q12H    allopurinol  300 mg Oral Daily    atorvastatin  10 mg Oral Daily    ezetimibe  10 mg Oral Nightly    pantoprazole  40 mg Oral QAM AC    budesonide-formoterol  2 puff Inhalation BID    vancomycin  1,250 mg IntraVENous Q12H      Infusions:    sodium chloride      dextrose Objective:   Vitals: BP (!) 123/90   Pulse 89   Temp 98.1 °F (36.7 °C) (Temporal)   Resp 16   Wt (!) 300 lb 9.6 oz (136.4 kg)   SpO2 94%   BMI 37.57 kg/m²   General appearance: alert and cooperative with exam  Neck: no JVD or bruit  Thyroid : Normal lobes   Lungs: Has Vesicular Breath sounds   Heart:  regular rate and rhythm  Abdomen: soft, non-tender; bowel sounds normal; no masses,  no organomegaly  Musculoskeletal: Normal  Extremities: extremities normal, ,  right foot swollen has cellulitis of toes of the right foot amputation of the right second toe. Neurologic:  Awake, alert, oriented to name, place and time. Cranial nerves II-XII are grossly intact. Motor is  intact. Sensory neuropathy,  and gait is abnormal.    Assessment:     Patient Active Problem List:     Sprain of left ankle     Closed nondisplaced fracture of fifth metatarsal bone of left foot     Alcohol abuse     UTI (urinary tract infection) due to Enterococcus     Acute osteomyelitis of toe, right (HCC)     Osteomyelitis of second toe of right foot (HCC)     Diabetic foot infection (HCC)     Shortness of breath     Asthma exacerbation     Appendicitis     Diabetic foot ulcer with osteomyelitis ([facility] [address]     Cellulitis     Open wound of toe     Rhinovirus      Plan:     Reviewed POC blood glucose . Labs and X ray results   Reviewed Current Medicines   On meal/ Correction bolus Humalog/ Basal Lantus Insulin regime   Monitor Blood glucose frequently   Modified  the dose of Insulin/ other medicines as needed   Scheduled to have surgery on the right foot later today  Will follow     .      W[facility]ley [facility] MD Inderjit, MD

## 2023-02-11 NOTE — PROGRESS NOTES
[facility]  consulted by Dr. Jose Luis Menendez for monitoring and adjustment. Indication for treatment: Vancomycin indication: SSTI with risk factors   Goal trough: Trough Goal: 10-15 mcg/mL  AUC/SHELLEY: 400-600    Risk Factors for MRSA Identified:   Purulent and/or complicated SSTI    Pertinent Laboratory Values:   Temp Readings from Last 3 Encounters:   02/11/23 98.1 °F (36.7 °C) (Temporal)   12/30/22 98 °F (36.7 °C) (Oral)   11/23/22 97.5 °F (36.4 °C)     Recent Labs     02/09/23  1200 02/10/23  1143   WBC 7.3 8.0   LACTATE 1.6  --        Recent Labs     02/09/23  1200 02/10/23  1143   BUN 8 10   CREATININE 0.7* 0.7*       Estimated Creatinine Clearance: 213 mL/min (A) (based on SCr of 0.7 mg/dL (L)). Intake/Output Summary (Last 24 hours) at 2/11/2023 1245  Last data filed at 2/11/2023 1201  Gross per 24 hour   Intake 685 ml   Output 3910 ml   Net -3225 ml         Pertinent Cultures:   Date    Source    Results  2/09   Foot    Beta strep    Vancomycin level:   TROUGH:  No results for input(s): VANCOTROUGH in the last 72 hours. RANDOM:  No results for input(s): VANCORANDOM in the last 72 hours. Assessment:  HPI: Diabetic foot infection, Hx of MRSA in great toe. Debridement of foot in surgery today. Pt to OR this AM for amputation. Will need to follow post-op for source control. SCR remains at baseline, UOP good  Day(s) of therapy: 3 of 7  Vanco level:  2/11 - random @06:00 orderd, but not collected  Reordered level for 2/12 @ 0600    Plan:  Renal trends remain at baseline. Continue with vancomycin 1250mg ivpb q12h for a predicted trough of 15.1 at steady state. Check the vanco level tomorrow am.  Follow final cultures results for possible de-escalation. Pharmacy will continue to monitor patient and adjust therapy as indicated    Eamon 3 2/12 @0600    Thank you for the consult.   Yeimi Collier, [facility]  2/11/2023 12:45 PM

## 2023-02-12 LAB
GLUCOSE BLD-MCNC: 269 MG/DL (ref 70–99)
GLUCOSE BLD-MCNC: 281 MG/DL (ref 70–99)
GLUCOSE BLD-MCNC: 286 MG/DL (ref 70–99)
GLUCOSE BLD-MCNC: 336 MG/DL (ref 70–99)
GLUCOSE BLD-MCNC: 359 MG/DL (ref 70–99)

## 2023-02-12 PROCEDURE — 6370000000 HC RX 637 (ALT 250 FOR IP): Performed by: SURGERY

## 2023-02-12 PROCEDURE — 94640 AIRWAY INHALATION TREATMENT: CPT

## 2023-02-12 PROCEDURE — 82962 GLUCOSE BLOOD TEST: CPT

## 2023-02-12 PROCEDURE — 99232 SBSQ HOSP IP/OBS MODERATE 35: CPT | Performed by: INTERNAL MEDICINE

## 2023-02-12 PROCEDURE — 1200000000 HC SEMI PRIVATE

## 2023-02-12 PROCEDURE — 2580000003 HC RX 258: Performed by: SURGERY

## 2023-02-12 PROCEDURE — 6360000002 HC RX W HCPCS: Performed by: SURGERY

## 2023-02-12 PROCEDURE — 6370000000 HC RX 637 (ALT 250 FOR IP): Performed by: INTERNAL MEDICINE

## 2023-02-12 PROCEDURE — 94761 N-INVAS EAR/PLS OXIMETRY MLT: CPT

## 2023-02-12 RX ORDER — INSULIN LISPRO 100 [IU]/ML
20 INJECTION, SOLUTION INTRAVENOUS; SUBCUTANEOUS
Status: DISCONTINUED | OUTPATIENT
Start: 2023-02-12 | End: 2023-02-13 | Stop reason: HOSPADM

## 2023-02-12 RX ORDER — ALOGLIPTIN 12.5 MG/1
25 TABLET, FILM COATED ORAL DAILY
Status: DISCONTINUED | OUTPATIENT
Start: 2023-02-12 | End: 2023-02-12

## 2023-02-12 RX ORDER — GUAIFENESIN/DEXTROMETHORPHAN 100-10MG/5
10 SYRUP ORAL ONCE
Status: COMPLETED | OUTPATIENT
Start: 2023-02-12 | End: 2023-02-12

## 2023-02-12 RX ORDER — ALBUTEROL SULFATE 90 UG/1
2 AEROSOL, METERED RESPIRATORY (INHALATION) 3 TIMES DAILY
Status: DISCONTINUED | OUTPATIENT
Start: 2023-02-12 | End: 2023-02-13 | Stop reason: HOSPADM

## 2023-02-12 RX ORDER — OXYCODONE HYDROCHLORIDE AND ACETAMINOPHEN 5; 325 MG/1; MG/1
1 TABLET ORAL EVERY 6 HOURS PRN
Qty: 28 TABLET | Refills: 0 | Status: SHIPPED | OUTPATIENT
Start: 2023-02-12 | End: 2023-02-12 | Stop reason: HOSPADM

## 2023-02-12 RX ORDER — DOXYCYCLINE HYCLATE 100 MG
100 TABLET ORAL EVERY 12 HOURS SCHEDULED
Status: DISCONTINUED | OUTPATIENT
Start: 2023-02-12 | End: 2023-02-13 | Stop reason: HOSPADM

## 2023-02-12 RX ORDER — INSULIN GLARGINE 100 [IU]/ML
50 INJECTION, SOLUTION SUBCUTANEOUS NIGHTLY
Status: DISCONTINUED | OUTPATIENT
Start: 2023-02-12 | End: 2023-02-13 | Stop reason: HOSPADM

## 2023-02-12 RX ADMIN — INSULIN LISPRO 20 UNITS: 100 INJECTION, SOLUTION INTRAVENOUS; SUBCUTANEOUS at 09:06

## 2023-02-12 RX ADMIN — GUAIFENESIN AND DEXTROMETHORPHAN 10 ML: 100; 10 SYRUP ORAL at 13:00

## 2023-02-12 RX ADMIN — CEFEPIME HYDROCHLORIDE 2000 MG: 2 INJECTION, POWDER, FOR SOLUTION INTRAMUSCULAR; INTRAVENOUS at 04:18

## 2023-02-12 RX ADMIN — ALOGLIPTIN 25 MG: 12.5 TABLET, FILM COATED ORAL at 07:58

## 2023-02-12 RX ADMIN — OXYCODONE AND ACETAMINOPHEN 2 TABLET: 5; 325 TABLET ORAL at 01:22

## 2023-02-12 RX ADMIN — INSULIN LISPRO 6 UNITS: 100 INJECTION, SOLUTION INTRAVENOUS; SUBCUTANEOUS at 01:34

## 2023-02-12 RX ADMIN — ALBUTEROL SULFATE 2 PUFF: 90 AEROSOL, METERED RESPIRATORY (INHALATION) at 21:03

## 2023-02-12 RX ADMIN — BRIMONIDINE TARTRATE 1 DROP: 2 SOLUTION OPHTHALMIC at 17:41

## 2023-02-12 RX ADMIN — ALBUTEROL SULFATE 2 PUFF: 90 AEROSOL, METERED RESPIRATORY (INHALATION) at 15:48

## 2023-02-12 RX ADMIN — MOXIFLOXACIN HYDROCHLORIDE 1 DROP: 5 SOLUTION/ DROPS OPHTHALMIC at 07:59

## 2023-02-12 RX ADMIN — BRIMONIDINE TARTRATE 1 DROP: 2 SOLUTION OPHTHALMIC at 07:59

## 2023-02-12 RX ADMIN — PREDNISOLONE ACETATE 1 DROP: 10 SUSPENSION/ DROPS OPHTHALMIC at 13:02

## 2023-02-12 RX ADMIN — PREGABALIN 100 MG: 100 CAPSULE ORAL at 07:58

## 2023-02-12 RX ADMIN — OXYCODONE AND ACETAMINOPHEN 2 TABLET: 5; 325 TABLET ORAL at 19:36

## 2023-02-12 RX ADMIN — ATORVASTATIN CALCIUM 10 MG: 10 TABLET, FILM COATED ORAL at 07:58

## 2023-02-12 RX ADMIN — INSULIN HUMAN 15 UNITS: 100 INJECTION, SUSPENSION SUBCUTANEOUS at 09:07

## 2023-02-12 RX ADMIN — ENOXAPARIN SODIUM 30 MG: 100 INJECTION SUBCUTANEOUS at 08:00

## 2023-02-12 RX ADMIN — PANTOPRAZOLE SODIUM 40 MG: 40 TABLET, DELAYED RELEASE ORAL at 06:08

## 2023-02-12 RX ADMIN — INSULIN LISPRO 4 UNITS: 100 INJECTION, SOLUTION INTRAVENOUS; SUBCUTANEOUS at 09:07

## 2023-02-12 RX ADMIN — EZETIMIBE 10 MG: 10 TABLET ORAL at 21:29

## 2023-02-12 RX ADMIN — CLONIDINE HYDROCHLORIDE 0.2 MG: 0.2 TABLET ORAL at 07:58

## 2023-02-12 RX ADMIN — TIMOLOL MALEATE 1 DROP: 5 SOLUTION OPHTHALMIC at 21:45

## 2023-02-12 RX ADMIN — POLYMYXIN B SULFATE AND TRIMETHOPRIM 1 DROP: 10000; 1 SOLUTION OPHTHALMIC at 21:46

## 2023-02-12 RX ADMIN — VANCOMYCIN HYDROCHLORIDE 1250 MG: 1.25 INJECTION, POWDER, LYOPHILIZED, FOR SOLUTION INTRAVENOUS at 08:10

## 2023-02-12 RX ADMIN — PREGABALIN 100 MG: 100 CAPSULE ORAL at 21:28

## 2023-02-12 RX ADMIN — MOXIFLOXACIN HYDROCHLORIDE 1 DROP: 5 SOLUTION/ DROPS OPHTHALMIC at 17:41

## 2023-02-12 RX ADMIN — SODIUM CHLORIDE, PRESERVATIVE FREE 10 ML: 5 INJECTION INTRAVENOUS at 21:30

## 2023-02-12 RX ADMIN — OXYCODONE AND ACETAMINOPHEN 2 TABLET: 5; 325 TABLET ORAL at 07:22

## 2023-02-12 RX ADMIN — SUCRALFATE 1 G: 1 TABLET ORAL at 07:58

## 2023-02-12 RX ADMIN — DOXYCYCLINE HYCLATE 100 MG: 100 TABLET, COATED ORAL at 21:28

## 2023-02-12 RX ADMIN — INSULIN LISPRO 20 UNITS: 100 INJECTION, SOLUTION INTRAVENOUS; SUBCUTANEOUS at 13:02

## 2023-02-12 RX ADMIN — INSULIN GLARGINE 50 UNITS: 100 INJECTION, SOLUTION SUBCUTANEOUS at 21:34

## 2023-02-12 RX ADMIN — OXYCODONE AND ACETAMINOPHEN 2 TABLET: 5; 325 TABLET ORAL at 13:00

## 2023-02-12 RX ADMIN — GUAIFENESIN 600 MG: 600 TABLET, EXTENDED RELEASE ORAL at 07:58

## 2023-02-12 RX ADMIN — DORZOLAMIDE HYDROCHLORIDE 1 DROP: 20 SOLUTION/ DROPS OPHTHALMIC at 07:59

## 2023-02-12 RX ADMIN — POLYMYXIN B SULFATE AND TRIMETHOPRIM 1 DROP: 10000; 1 SOLUTION OPHTHALMIC at 07:59

## 2023-02-12 RX ADMIN — SODIUM CHLORIDE, PRESERVATIVE FREE 10 ML: 5 INJECTION INTRAVENOUS at 07:58

## 2023-02-12 RX ADMIN — ALBUTEROL SULFATE 2 PUFF: 90 AEROSOL, METERED RESPIRATORY (INHALATION) at 06:11

## 2023-02-12 RX ADMIN — PREDNISOLONE ACETATE 1 DROP: 10 SUSPENSION/ DROPS OPHTHALMIC at 17:41

## 2023-02-12 RX ADMIN — MOXIFLOXACIN HYDROCHLORIDE 1 DROP: 5 SOLUTION/ DROPS OPHTHALMIC at 21:45

## 2023-02-12 RX ADMIN — SUCRALFATE 1 G: 1 TABLET ORAL at 17:40

## 2023-02-12 RX ADMIN — MOXIFLOXACIN HYDROCHLORIDE 1 DROP: 5 SOLUTION/ DROPS OPHTHALMIC at 13:01

## 2023-02-12 RX ADMIN — AMLODIPINE BESYLATE 10 MG: 10 TABLET ORAL at 07:58

## 2023-02-12 RX ADMIN — LISINOPRIL AND HYDROCHLOROTHIAZIDE 1 TABLET: 12.5; 2 TABLET ORAL at 07:58

## 2023-02-12 RX ADMIN — ALBUTEROL SULFATE 2 PUFF: 90 AEROSOL, METERED RESPIRATORY (INHALATION) at 09:00

## 2023-02-12 RX ADMIN — DORZOLAMIDE HYDROCHLORIDE 1 DROP: 20 SOLUTION/ DROPS OPHTHALMIC at 21:45

## 2023-02-12 RX ADMIN — PREDNISOLONE ACETATE 1 DROP: 10 SUSPENSION/ DROPS OPHTHALMIC at 07:59

## 2023-02-12 RX ADMIN — Medication 5 MG: at 21:29

## 2023-02-12 RX ADMIN — SUCRALFATE 1 G: 1 TABLET ORAL at 21:29

## 2023-02-12 RX ADMIN — INSULIN LISPRO 4 UNITS: 100 INJECTION, SOLUTION INTRAVENOUS; SUBCUTANEOUS at 21:33

## 2023-02-12 RX ADMIN — INSULIN LISPRO 20 UNITS: 100 INJECTION, SOLUTION INTRAVENOUS; SUBCUTANEOUS at 17:42

## 2023-02-12 RX ADMIN — TIZANIDINE 4 MG: 4 TABLET ORAL at 21:28

## 2023-02-12 RX ADMIN — CEFEPIME HYDROCHLORIDE 2000 MG: 2 INJECTION, POWDER, FOR SOLUTION INTRAMUSCULAR; INTRAVENOUS at 17:47

## 2023-02-12 RX ADMIN — POLYMYXIN B SULFATE AND TRIMETHOPRIM 1 DROP: 10000; 1 SOLUTION OPHTHALMIC at 13:01

## 2023-02-12 RX ADMIN — INSULIN LISPRO 4 UNITS: 100 INJECTION, SOLUTION INTRAVENOUS; SUBCUTANEOUS at 13:03

## 2023-02-12 RX ADMIN — GUAIFENESIN 600 MG: 600 TABLET, EXTENDED RELEASE ORAL at 21:29

## 2023-02-12 RX ADMIN — INSULIN LISPRO 8 UNITS: 100 INJECTION, SOLUTION INTRAVENOUS; SUBCUTANEOUS at 17:42

## 2023-02-12 RX ADMIN — SUCRALFATE 1 G: 1 TABLET ORAL at 13:00

## 2023-02-12 RX ADMIN — PREDNISOLONE ACETATE 1 DROP: 10 SUSPENSION/ DROPS OPHTHALMIC at 21:46

## 2023-02-12 RX ADMIN — POLYMYXIN B SULFATE AND TRIMETHOPRIM 1 DROP: 10000; 1 SOLUTION OPHTHALMIC at 17:41

## 2023-02-12 RX ADMIN — IPRATROPIUM BROMIDE 2 PUFF: 17 AEROSOL, METERED RESPIRATORY (INHALATION) at 09:00

## 2023-02-12 RX ADMIN — TIMOLOL MALEATE 1 DROP: 5 SOLUTION OPHTHALMIC at 07:59

## 2023-02-12 RX ADMIN — ALLOPURINOL 300 MG: 300 TABLET ORAL at 07:58

## 2023-02-12 ASSESSMENT — PAIN SCALES - WONG BAKER: WONGBAKER_NUMERICALRESPONSE: 2

## 2023-02-12 ASSESSMENT — PAIN SCALES - GENERAL
PAINLEVEL_OUTOF10: 7
PAINLEVEL_OUTOF10: 8
PAINLEVEL_OUTOF10: 8
PAINLEVEL_OUTOF10: 9
PAINLEVEL_OUTOF10: 7
PAINLEVEL_OUTOF10: 2

## 2023-02-12 ASSESSMENT — PAIN DESCRIPTION - PAIN TYPE: TYPE: CHRONIC PAIN

## 2023-02-12 ASSESSMENT — PAIN DESCRIPTION - LOCATION
LOCATION: LEG
LOCATION: LEG;FOOT
LOCATION: TOE (COMMENT WHICH ONE)
LOCATION: LEG
LOCATION: FOOT;LEG

## 2023-02-12 ASSESSMENT — PAIN - FUNCTIONAL ASSESSMENT
PAIN_FUNCTIONAL_ASSESSMENT: PREVENTS OR INTERFERES SOME ACTIVE ACTIVITIES AND ADLS
PAIN_FUNCTIONAL_ASSESSMENT: ACTIVITIES ARE NOT PREVENTED
PAIN_FUNCTIONAL_ASSESSMENT: ACTIVITIES ARE NOT PREVENTED

## 2023-02-12 ASSESSMENT — PAIN DESCRIPTION - ORIENTATION
ORIENTATION: LEFT
ORIENTATION: LEFT
ORIENTATION: RIGHT
ORIENTATION: LEFT
ORIENTATION: LEFT

## 2023-02-12 ASSESSMENT — PAIN DESCRIPTION - DESCRIPTORS
DESCRIPTORS: ACHING

## 2023-02-12 NOTE — RT PROTOCOL NOTE
RT Inhaler-Nebulizer Bronchodilator Protocol Note    There is a bronchodilator order in the chart from a provider indicating to follow the RT Bronchodilator Protocol and there is an Initiate RT Inhaler-Nebulizer Bronchodilator Protocol order as well (see protocol at bottom of note). CXR Findings:  XR CHEST PORTABLE    Result Date: 2/10/2023  No acute cardiopulmonary disease. The findings from the last RT Protocol Assessment were as follows:   History Pulmonary Disease: Chronic pulmonary disease  Respiratory Pattern: Regular pattern and RR 12-20 bpm  Breath Sounds: Intermittent or unilateral wheezes  Cough: Strong, productive  Indication for Bronchodilator Therapy: On home bronchodilators  Bronchodilator Assessment Score: 7    Aerosolized bronchodilator medication orders have been revised according to the RT Inhaler-Nebulizer Bronchodilator Protocol below. Respiratory Therapist to perform RT Therapy Protocol Assessment initially then follow the protocol. Repeat RT Therapy Protocol Assessment PRN for score 0-3 or on second treatment, BID, and PRN for scores above 3. No Indications - adjust the frequency to every 6 hours PRN wheezing or bronchospasm, if no treatments needed after 48 hours then discontinue using Per Protocol order mode. If indication present, adjust the RT bronchodilator orders based on the Bronchodilator Assessment Score as indicated below. Use Inhaler orders unless patient has one or more of the following: on home nebulizer, not able to hold breath for 10 seconds, is not alert and oriented, cannot activate and use MDI correctly, or respiratory rate 25 breaths per minute or more, then use the equivalent nebulizer order(s) with same Frequency and PRN reasons based on the score. If a patient is on this medication at home then do not decrease Frequency below that used at home.     0-3 - enter or revise RT bronchodilator order(s) to equivalent RT Bronchodilator order with Frequency of every 4 hours PRN for wheezing or increased work of breathing using Per Protocol order mode. 4-6 - enter or revise RT Bronchodilator order(s) to two equivalent RT bronchodilator orders with one order with BID Frequency and one order with Frequency of every 4 hours PRN wheezing or increased work of breathing using Per Protocol order mode. 7-10 - enter or revise RT Bronchodilator order(s) to two equivalent RT bronchodilator orders with one order with TID Frequency and one order with Frequency of every 4 hours PRN wheezing or increased work of breathing using Per Protocol order mode. 11-13 - enter or revise RT Bronchodilator order(s) to one equivalent RT bronchodilator order with QID Frequency and an Albuterol order with Frequency of every 4 hours PRN wheezing or increased work of breathing using Per Protocol order mode. Greater than 13 - enter or revise RT Bronchodilator order(s) to one equivalent RT bronchodilator order with every 4 hours Frequency and an Albuterol order with Frequency of every 2 hours PRN wheezing or increased work of breathing using Per Protocol order mode. RT to enter RT Home Evaluation for COPD & MDI Assessment order using Per Protocol order mode.     Electronically signed by Merlene Mason RCP on 2/12/2023 at 8:19 AM

## 2023-02-12 NOTE — PROGRESS NOTES
Pulmonary and Critical Care  Progress Note      VITALS:  /74   Pulse 81   Temp 97.6 °F (36.4 °C) (Oral)   Resp 27   Wt (!) 300 lb 9.6 oz (136.4 kg)   SpO2 97%   BMI 37.57 kg/m²     Subjective:   CHIEF COMPLAINT :Right 3rd toe swelling     HPI:                The patient is a 44 y.o. male is sitting in the bed he is not in acute resp distress    Objective:   PHYSICAL EXAM:    LUNGS:Decreased air entry bilateral bases  Abd-soft, BS+,NT  Ext- RLE swelling  CVS-s1s2, no murmurs      DATA:    CBC:  Recent Labs     02/10/23  1143   WBC 8.0   RBC 4.00*   HGB 12.8*   HCT 36.4*      MCV 91.0   MCH 32.0*   MCHC 35.2   RDW 11.6*   SEGSPCT 79.2*      BMP:  Recent Labs     02/10/23  1143      K 4.2      CO2 22   BUN 10   CREATININE 0.7*   CALCIUM 9.1   GLUCOSE 261*      ABG:  No results for input(s): PH, PO2ART, YVC3GCW, HCO3, BEART, O2SAT in the last 72 hours.   BNP  Lab Results   Component Value Date    BNP <2 12/06/2010      D-Dimer:  Lab Results   Component Value Date    DDIMER <200 03/20/2021      Radiology: None      Assessment/Plan     Patient Active Problem List    Diagnosis Date Noted    Rhinovirus 02/10/2023     Priority: Medium    Open wound of toe 07/26/2022     Priority: Medium    Cellulitis 07/21/2022     Priority: Medium    Diabetic foot ulcer with osteomyelitis ([address] 07/02/2022     Priority: Medium    Appendicitis 04/28/2022     Priority: Medium    Shortness of breath 03/20/2021    Asthma exacerbation 03/20/2021    Diabetic foot infection ([address] 07/26/2019    Osteomyelitis of second toe of right foot ([address] 12/21/2018    Acute osteomyelitis of toe, right ([address] 07/31/2017    UTI (urinary tract infection) due to Enterococcus 05/13/2017    Alcohol abuse 05/09/2017    Sprain of left ankle 05/16/2016    Closed nondisplaced fracture of fifth metatarsal bone of left foot 05/16/2016     Group B Strep Osteomyelitis  Rhinovirus URI  Obesity  WILMA  Diabetic Right 3rd toes Osteomyelitis s/p amputation  NIDDM  HTN  Cig smoker     Abx  F/u C&S  Inhalers  Keep sats > 92%  BIPAP qhs and prn  ICS  OOB  Insulin  Pain control  C/w present management    Electronically signed by Citlali Finley MD on 2/12/2023 at 12:46 PM
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
## 2023-02-12 NOTE — PROGRESS NOTES
Notified from patient's nurse that the pharmacy called about his percocet prescription for outpatient care. The patient has a current/filled prescription for norco and has 12 days left on that prescription. I called [facility] pharmacy at [phone] and cancelled the percocet prescription I wrote today.

## 2023-02-12 NOTE — OP NOTE
[address] [place] [address], [address] [place] [address]                                OPERATIVE REPORT    PATIENT NAME: Dalila Cowan                       :        1985  MED REC NO:   [id number]                          ROOM:       [id number]  ACCOUNT NO:   [de-identified]                           ADMIT DATE: 2023  PROVIDER:     Rosaleen Shone, MD    DATE OF PROCEDURE:  2023    PREOPERATIVE DIAGNOSES:  Third toe right foot with osteomyelitis and  gangrene. POSTOPERATIVE DIAGNOSES:  Third toe right foot with osteomyelitis and  gangrene. OPERATION PERFORMED:  1. Third toe amputation of the right foot. 2.  Revision of second toe amputation. 3.  Application of Prevena wound VAC 13 cm. SURGEON:  Rosaleen Shone, MD    ANESTHESIA:  General endotracheal anesthesia with 20 mL with 0.5%  Marcaine plain. DRAINS/LINES:  Included the wound VAC. COMPLICATIONS:  None. ESTIMATED BLOOD LOSS:  Less than 50 mL. FINDINGS:  The patient had exposed bone of the third toe on his right  foot with gangrenous changes of the skin, subcutaneous tissue and  muscle. He also had a large area of redundant skin and subcutaneous  tissue at the area of his previous second toe amputation site. PERTINENT HISTORY:  This is a 28-year-old gentleman who is diabetic. Who has had a previous second and fifth toe amputation. He has had an  ongoing sore on the lateral aspect of his great toe, but presented to  the emergency department when he had drainage of the middle toe on his  foot, which was the third toe. He was found to have gangrenous changes. He was admitted to the hospital, and started on IV antibiotics. I was  consulted to see him. On exam, he did have skin, subcutaneous tissue  and muscle was completely gangrenous and his distal phalange was  exposed. I discussed with the patient the recommendation for amputation  and he did want to proceed. DESCRIPTION OF PROCEDURE:  The patient was seen in his hospital room. His right leg was marked with an ink marker. He was then brought into  the operating suite. He remained on his hospital bed. General  endotracheal anesthesia was provided per Anesthesiology as the patient  was diagnosed with rhinovirus this admission. His right foot and toes  were prepped and draped in a sterile fashion. An elliptical incision  was made around the base of the third toe. The incision was carried  through the skin and subcutaneous tissue and the toe was amputated  sharply and then disarticulated at the metatarsal joint. The specimen  was passed off the field. The patient had a previous second toe  amputation and he had a large amount of skin and subcutaneous tissue in  this area that I excised today to facilitate both wound closure and also  functionality of his foot. I completely excised the skin and  subcutaneous tissue that was overlying his previous second toe  amputation and passed this off the field as well. Hemostasis was  achieved using Bovie electrocautery. The digital arteries of the third  toe were ligated with Vicryl sutures. The wound was then copiously  irrigated with Betadine and sterile saline solution. I then instilled  20 mL of 0.5% Marcaine plain around the wound bed. I then  reapproximated the deep tissue with 0 Vicryl suture in interrupted  stitches, reapproximated the deep dermal tissue with 0 Vicryl sutures in  interrupted stitches and closed the skin edges with 2-0 Prolene suture  in interrupted stitches. There was approximately a dime-sized opening  where the skin could not completely re-approximate on the most lateral  aspect toward the left toe. While in the operating room, I also trimmed  the nails of his great toe and his remaining fourth toe. He has had a  superficial ulceration on the lateral great toe and this was debrided as  well through the skin with a scalpel.   I removed the thick callus  surrounding this wound to make this more level, applied antibiotic  ointment to that wound bed. Next, I did apply a Prevena wound VAC 13 cm  on the incision. I am carrying this on the anterior foot and the dorsum of  the foot and a Kerlix bandage was placed around his foot. The patient  was extubated and transferred to postanesthesia care unit in stable  condition with plans for return to his hospital room. He is being  maintained on IV antibiotics and also treated for his acute rhinovirus.         Hamida Elmore MD    D: 02/11/2023 12:07:03       T: 02/11/2023 12:10:29     [email or web address]  Job#: [id number]     Doc#: [id number]    CC:  MD Rita Juárez MD

## 2023-02-12 NOTE — DISCHARGE INSTRUCTIONS
- please schedule an appointment to see your PCP  - please schedule an appointment to see endocrinologist  - please go to lab in one week for blood work  - please take medications as prescribed  - please monitor your blood glucose    Discharge Instructions   Dr Priyanka Doan of [address]  [address]   Taina Retana, [address] [facility]  [phone]      Some pain medicine can cause constipation. To avoid this problem:   Drink plenty of fluids. Eat foods high in fiber , such as:   Whole grain cereals and breads   Fruits and vegetables   Legumes (eg, beans, lentils)    Physical Activity    Heel touch weight bearing only on the right foot, wear the cast show every time you ambulate. Keep the dressing dry  Elevate your right leg when not up walking    In addition:    Do not drive  You may use stairs  You may go for a ride in the car under 2 hours  You may shower but keep the right foot dressing dry (cover with garbage bag, etc)    Medications     Take your pain medications as instructed. Resume your home medications as instructed. You may use a laxative of choice if constipated. Do not take any tylenol (acetaminophen) unless specifically instructed by Dr. Isaac Perrin. Lifestyle Changes   If you smoke, it is recommended that you quit .  Smoking can cause chronic cough, cancer, and decreased/slower wound healing     Follow-up   Call the office [phone] to make a follow-up appointment for Thursday or Friday of this week, then make a second appointment for the week of the February 27       Call Dr Isaac Perrin' Office [phone]  If Any of the Following Occurs:   Pain that worsens   Drainage or odor at your incisions  Signs of infection, including fever and chills (temperature over 101.5)  Nausea and/or vomiting that you can't control with the medications you were given   Pain that you can't control with the medications you've been given   Pain, burning, urgency or frequency of urination, or persistent bleeding in the urine   Excessive tenderness or swelling   Changes in bowel function   Dizziness or lightheadedness   Rash or hives     Call 911 or go to the emergency room immediately if any of the following occurs:   Cough, shortness of breath, or chest pain   Rapid, irregular heartbeat; chest pain     If you think you have an emergency,  CALL 911  .

## 2023-02-12 NOTE — PROGRESS NOTES
Progress Note( Dr. Susan Hsieh)  2/12/2023  Subjective:   Admit Date: 2/9/2023  PCP: Nisha Veronica MD    Admitted For :Right foot infection uncontrolled diabetes mellitus    Consulted For: Better control glucose    Interval History: Patient had right foot surgery noted below  THIRD TOE AMPUTATION AND REVISION OF SECOND TOE AMPUTATION  Prevena wound vac applied, 13cm       Denies any chest pains,   Denies SOB . Denies nausea or vomiting. Now n.p.o. No new bowel or bladder symptoms. Intake/Output Summary (Last 24 hours) at 2/12/2023 1004  Last data filed at 2/12/2023 0152  Gross per 24 hour   Intake 1651.04 ml   Output 3710 ml   Net -2058.96 ml         DATA    CBC:   Recent Labs     02/09/23  1200 02/10/23  1143   WBC 7.3 8.0   HGB 12.7* 12.8*    222      CMP:  Recent Labs     02/09/23  1200 02/10/23  1143   * 135   K 4.1 4.2   CL 93* 100   CO2 21 22   BUN 8 10   CREATININE 0.7* 0.7*   CALCIUM 9.1 9.1   PROT 7.6  --    LABALBU 4.0  --    BILITOT 0.4  --    ALKPHOS 107  --    AST 72*  --    ALT 34  --        Lipids:   Lab Results   Component Value Date/Time    CHOL 142 04/16/2011 01:19 PM    HDL 41 04/16/2011 01:19 PM    TRIG 268 04/16/2011 01:19 PM     Glucose:  Recent Labs     02/11/23  2104 02/12/23  0126 02/12/23  0815   POCGLU 311* 336* 281*       BumgmvpsajS3B:  Lab Results   Component Value Date/Time    LABA1C 9.3 02/10/2023 09:44 PM     High Sensitivity TSH:   Lab Results   Component Value Date/Time    [facility] 2.685 04/16/2011 01:19 PM     Free T3: No results found for: FT3  Free T4:No results found for: T4FREE    XR CHEST PORTABLE   Final Result   No acute cardiopulmonary disease. XR FOOT RIGHT (MIN 3 VIEWS)   Final Result   Persistent but improved soft tissue defect medial aspect of the great toe   compared to 11/19/2022, new soft tissue defect/ulcer distal tip 3rd digit. No radiographic evidence of osteomyelitis.   Triple phase bone scan or   contrast-enhanced MRI of the foot suggested if clinical concern of   radiographically occult osteomyelitis. Nondisplaced intra-articular avulsion fracture base of the distal phalanx 3rd   toe/distal interphalangeal joint of the 3rd toe not present on prior study of   11/19/2022. Correlation with clinical findings of acute avulsion fracture   suggested. Diffuse soft tissue swelling of the forefoot consistent with contusion,   edema, cellulitis. Neuropathic arthropathy changes of the midfoot, hallux valgus deformity 1st   metatarsal-phalangeal joint unchanged compared to 11/19/2022. Nena Huynh               Scheduled Medicines   Medications:    insulin glargine  50 Units SubCUTAneous Nightly    insulin lispro  20 Units SubCUTAneous TID WC    albuterol sulfate HFA  2 puff Inhalation TID    And    ipratropium  2 puff Inhalation TID    enoxaparin  30 mg SubCUTAneous BID    budesonide  0.5 mg Nebulization BID    pregabalin  100 mg Oral BID    amLODIPine  10 mg Oral Daily    brimonidine  1 drop Right Eye BID    trimethoprim-polymyxin b  1 drop Ophthalmic 4x Daily    moxifloxacin  1 drop Right Eye 4x Daily    cloNIDine  0.2 mg Oral Daily    lisinopril-hydroCHLOROthiazide  1 tablet Oral Daily    melatonin  5 mg Oral Nightly    sucralfate  1 g Oral 4x Daily    tiZANidine  4 mg Oral Nightly    prednisoLONE acetate  1 drop Right Eye 4x Daily    dorzolamide  1 drop Right Eye BID    And    timolol  1 drop Right Eye BID    guaiFENesin  600 mg Oral BID    insulin lispro  0-8 Units SubCUTAneous TID     insulin lispro  0-8 Units SubCUTAneous 2 times per day    alogliptin  25 mg Oral Daily    sodium chloride flush  5-40 mL IntraVENous 2 times per day    cefepime  2,000 mg IntraVENous Q12H    allopurinol  300 mg Oral Daily    atorvastatin  10 mg Oral Daily    ezetimibe  10 mg Oral Nightly    pantoprazole  40 mg Oral QAM AC    vancomycin  1,250 mg IntraVENous Q12H      Infusions:    sodium chloride 5 mL/hr (02/12/23 0804)    dextrose           Objective: Vitals: /74   Pulse 81   Temp 97.6 °F (36.4 °C) (Oral)   Resp 27   Wt (!) 300 lb 9.6 oz (136.4 kg)   SpO2 97%   BMI 37.57 kg/m²   General appearance: alert and cooperative with exam  Neck: no JVD or bruit  Thyroid : Normal lobes   Lungs: Has Vesicular Breath sounds   Heart:  regular rate and rhythm  Abdomen: soft, non-tender; bowel sounds normal; no masses,  no organomegaly  Musculoskeletal: Normal  Extremities: extremities normal, ,    THIRD TOE AMPUTATION AND REVISION OF SECOND TOE AMPUTATION  Prevena wound vac applied, 13cm     Neurologic:  Awake, alert, oriented to name, place and time. Cranial nerves II-XII are grossly intact. Motor is  intact. Sensory neuropathy,  and gait is abnormal.    Assessment:     Patient Active Problem List:     Sprain of left ankle     Closed nondisplaced fracture of fifth metatarsal bone of left foot     Alcohol abuse     UTI (urinary tract infection) due to Enterococcus     Acute osteomyelitis of toe, right (HCC)     Osteomyelitis of second toe of right foot (HCC)     Diabetic foot infection (HCC)     Shortness of breath     Asthma exacerbation     Appendicitis     Diabetic foot ulcer with osteomyelitis ([facility] [address]     Cellulitis     Open wound of toe     Rhinovirus      Plan:     Reviewed POC blood glucose . Labs and X ray results   Reviewed Current Medicines   On meal/ Correction bolus Humalog/ Basal Lantus Insulin regime   Monitor Blood glucose frequently   Modified  the dose of Insulin/ other medicines as needed   Scheduled to have surgery on the right foot later today  Will follow     .      Barron Baldwin MD, MD

## 2023-02-12 NOTE — PROGRESS NOTES
Patient seen and examined. No complaints    PE:  Vitals:    02/12/23 0130 02/12/23 0152 02/12/23 0722 02/12/23 0901   BP: 123/74      Pulse: 77   81   Resp: 28 20 25 27   Temp: 97.6 °F (36.4 °C)      TempSrc: Oral      SpO2: 97%      Weight:         Right foot: dressing intact, Prevena wound vac intact, remaining 2 toes are warm    A/P:  -continue IV antibiotics today, can transition to PO antibiotics tomorrow and DC home tomorrow.   -follow-up in our office this week for a dressing change.  -I discussed with the patient the surgery he had and the limitations in the post-op period, heel walking, wear the cast show, etc.  -will order [facility] as he has not had this previously.     Orders written for [facility], follow-up in our office and prescriptions for santyl and percocet at discharge

## 2023-02-13 VITALS
OXYGEN SATURATION: 97 % | BODY MASS INDEX: 37.57 KG/M2 | HEART RATE: 87 BPM | RESPIRATION RATE: 29 BRPM | DIASTOLIC BLOOD PRESSURE: 97 MMHG | SYSTOLIC BLOOD PRESSURE: 139 MMHG | TEMPERATURE: 98.1 F | WEIGHT: 300.6 LBS

## 2023-02-13 PROBLEM — H54.7 VISION IMPAIRMENT: Status: ACTIVE | Noted: 2023-02-13

## 2023-02-13 LAB
ANION GAP SERPL CALCULATED.3IONS-SCNC: 11 MMOL/L (ref 4–16)
BASOPHILS ABSOLUTE: 0 K/CU MM
BASOPHILS RELATIVE PERCENT: 0.3 % (ref 0–1)
BUN SERPL-MCNC: 18 MG/DL (ref 6–23)
CALCIUM SERPL-MCNC: 8.8 MG/DL (ref 8.3–10.6)
CHLORIDE BLD-SCNC: 100 MMOL/L (ref 99–110)
CO2: 25 MMOL/L (ref 21–32)
CREAT SERPL-MCNC: 0.8 MG/DL (ref 0.9–1.3)
DIFFERENTIAL TYPE: ABNORMAL
EOSINOPHILS ABSOLUTE: 0.1 K/CU MM
EOSINOPHILS RELATIVE PERCENT: 2.3 % (ref 0–3)
GFR SERPL CREATININE-BSD FRML MDRD: >60 ML/MIN/1.73M2
GLUCOSE BLD-MCNC: 224 MG/DL (ref 70–99)
GLUCOSE BLD-MCNC: 241 MG/DL (ref 70–99)
GLUCOSE BLD-MCNC: 247 MG/DL (ref 70–99)
GLUCOSE SERPL-MCNC: 228 MG/DL (ref 70–99)
HCT VFR BLD CALC: 34.2 % (ref 42–52)
HEMOGLOBIN: 11.9 GM/DL (ref 13.5–18)
IMMATURE NEUTROPHIL %: 0.5 % (ref 0–0.43)
LYMPHOCYTES ABSOLUTE: 1.6 K/CU MM
LYMPHOCYTES RELATIVE PERCENT: 26.3 % (ref 24–44)
MCH RBC QN AUTO: 31.7 PG (ref 27–31)
MCHC RBC AUTO-ENTMCNC: 34.8 % (ref 32–36)
MCV RBC AUTO: 91.2 FL (ref 78–100)
MONOCYTES ABSOLUTE: 0.6 K/CU MM
MONOCYTES RELATIVE PERCENT: 9.2 % (ref 0–4)
NUCLEATED RBC %: 0 %
PDW BLD-RTO: 11.5 % (ref 11.7–14.9)
PLATELET # BLD: 239 K/CU MM (ref 140–440)
PMV BLD AUTO: 8.8 FL (ref 7.5–11.1)
POTASSIUM SERPL-SCNC: 3.8 MMOL/L (ref 3.5–5.1)
RBC # BLD: 3.75 M/CU MM (ref 4.6–6.2)
SEGMENTED NEUTROPHILS ABSOLUTE COUNT: 3.8 K/CU MM
SEGMENTED NEUTROPHILS RELATIVE PERCENT: 61.4 % (ref 36–66)
SODIUM BLD-SCNC: 136 MMOL/L (ref 135–145)
TOTAL IMMATURE NEUTOROPHIL: 0.03 K/CU MM
TOTAL NUCLEATED RBC: 0 K/CU MM
WBC # BLD: 6.2 K/CU MM (ref 4–10.5)

## 2023-02-13 PROCEDURE — 6370000000 HC RX 637 (ALT 250 FOR IP): Performed by: SURGERY

## 2023-02-13 PROCEDURE — 6370000000 HC RX 637 (ALT 250 FOR IP): Performed by: STUDENT IN AN ORGANIZED HEALTH CARE EDUCATION/TRAINING PROGRAM

## 2023-02-13 PROCEDURE — 85025 COMPLETE CBC W/AUTO DIFF WBC: CPT

## 2023-02-13 PROCEDURE — 94761 N-INVAS EAR/PLS OXIMETRY MLT: CPT

## 2023-02-13 PROCEDURE — 82962 GLUCOSE BLOOD TEST: CPT

## 2023-02-13 PROCEDURE — 94640 AIRWAY INHALATION TREATMENT: CPT

## 2023-02-13 PROCEDURE — 36415 COLL VENOUS BLD VENIPUNCTURE: CPT

## 2023-02-13 PROCEDURE — 6370000000 HC RX 637 (ALT 250 FOR IP): Performed by: INTERNAL MEDICINE

## 2023-02-13 PROCEDURE — 2580000003 HC RX 258: Performed by: SURGERY

## 2023-02-13 PROCEDURE — 80048 BASIC METABOLIC PNL TOTAL CA: CPT

## 2023-02-13 PROCEDURE — 6360000002 HC RX W HCPCS: Performed by: SURGERY

## 2023-02-13 RX ORDER — INSULIN LISPRO 100 [IU]/ML
20 INJECTION, SOLUTION INTRAVENOUS; SUBCUTANEOUS
Qty: 20 ML | Refills: 0 | Status: SHIPPED | OUTPATIENT
Start: 2023-02-13 | End: 2023-03-15

## 2023-02-13 RX ORDER — DOXYCYCLINE HYCLATE 100 MG
100 TABLET ORAL EVERY 12 HOURS SCHEDULED
Qty: 12 TABLET | Refills: 0 | Status: SHIPPED | OUTPATIENT
Start: 2023-02-13 | End: 2023-02-13 | Stop reason: SDUPTHER

## 2023-02-13 RX ORDER — DOXYCYCLINE HYCLATE 100 MG
100 TABLET ORAL EVERY 12 HOURS SCHEDULED
Qty: 20 TABLET | Refills: 0 | Status: SHIPPED | OUTPATIENT
Start: 2023-02-13 | End: 2023-02-23

## 2023-02-13 RX ORDER — CEPHALEXIN 500 MG/1
500 CAPSULE ORAL 4 TIMES DAILY
Qty: 40 CAPSULE | Refills: 0 | Status: SHIPPED | OUTPATIENT
Start: 2023-02-13 | End: 2023-02-23

## 2023-02-13 RX ORDER — ALOGLIPTIN 25 MG/1
25 TABLET, FILM COATED ORAL DAILY
Qty: 60 TABLET | Refills: 0 | Status: SHIPPED | OUTPATIENT
Start: 2023-02-14 | End: 2023-03-16

## 2023-02-13 RX ORDER — INSULIN LISPRO 100 [IU]/ML
0-16 INJECTION, SOLUTION INTRAVENOUS; SUBCUTANEOUS
Status: DISCONTINUED | OUTPATIENT
Start: 2023-02-13 | End: 2023-02-13 | Stop reason: HOSPADM

## 2023-02-13 RX ORDER — CEPHALEXIN 250 MG/1
500 CAPSULE ORAL EVERY 6 HOURS SCHEDULED
Status: DISCONTINUED | OUTPATIENT
Start: 2023-02-13 | End: 2023-02-13 | Stop reason: HOSPADM

## 2023-02-13 RX ORDER — GUAIFENESIN 600 MG/1
600 TABLET, EXTENDED RELEASE ORAL 2 TIMES DAILY
Qty: 14 TABLET | Refills: 0 | Status: SHIPPED | OUTPATIENT
Start: 2023-02-13 | End: 2023-02-20

## 2023-02-13 RX ADMIN — INSULIN LISPRO 20 UNITS: 100 INJECTION, SOLUTION INTRAVENOUS; SUBCUTANEOUS at 12:38

## 2023-02-13 RX ADMIN — POLYMYXIN B SULFATE AND TRIMETHOPRIM 1 DROP: 10000; 1 SOLUTION OPHTHALMIC at 08:54

## 2023-02-13 RX ADMIN — CLONIDINE HYDROCHLORIDE 0.2 MG: 0.2 TABLET ORAL at 08:55

## 2023-02-13 RX ADMIN — CEPHALEXIN 500 MG: 250 CAPSULE ORAL at 12:38

## 2023-02-13 RX ADMIN — ALBUTEROL SULFATE 2 PUFF: 90 AEROSOL, METERED RESPIRATORY (INHALATION) at 14:16

## 2023-02-13 RX ADMIN — DORZOLAMIDE HYDROCHLORIDE 1 DROP: 20 SOLUTION/ DROPS OPHTHALMIC at 08:54

## 2023-02-13 RX ADMIN — OXYCODONE AND ACETAMINOPHEN 2 TABLET: 5; 325 TABLET ORAL at 08:56

## 2023-02-13 RX ADMIN — SUCRALFATE 1 G: 1 TABLET ORAL at 12:38

## 2023-02-13 RX ADMIN — OXYCODONE AND ACETAMINOPHEN 2 TABLET: 5; 325 TABLET ORAL at 02:12

## 2023-02-13 RX ADMIN — PREDNISOLONE ACETATE 1 DROP: 10 SUSPENSION/ DROPS OPHTHALMIC at 08:54

## 2023-02-13 RX ADMIN — SODIUM CHLORIDE, PRESERVATIVE FREE 10 ML: 5 INJECTION INTRAVENOUS at 08:58

## 2023-02-13 RX ADMIN — CEFEPIME HYDROCHLORIDE 2000 MG: 2 INJECTION, POWDER, FOR SOLUTION INTRAMUSCULAR; INTRAVENOUS at 03:52

## 2023-02-13 RX ADMIN — ALLOPURINOL 300 MG: 300 TABLET ORAL at 08:56

## 2023-02-13 RX ADMIN — INSULIN LISPRO 4 UNITS: 100 INJECTION, SOLUTION INTRAVENOUS; SUBCUTANEOUS at 12:38

## 2023-02-13 RX ADMIN — PANTOPRAZOLE SODIUM 40 MG: 40 TABLET, DELAYED RELEASE ORAL at 06:03

## 2023-02-13 RX ADMIN — BRIMONIDINE TARTRATE 1 DROP: 2 SOLUTION OPHTHALMIC at 08:54

## 2023-02-13 RX ADMIN — GUAIFENESIN 600 MG: 600 TABLET, EXTENDED RELEASE ORAL at 08:56

## 2023-02-13 RX ADMIN — MOXIFLOXACIN HYDROCHLORIDE 1 DROP: 5 SOLUTION/ DROPS OPHTHALMIC at 08:55

## 2023-02-13 RX ADMIN — LISINOPRIL AND HYDROCHLOROTHIAZIDE 1 TABLET: 12.5; 2 TABLET ORAL at 08:56

## 2023-02-13 RX ADMIN — ATORVASTATIN CALCIUM 10 MG: 10 TABLET, FILM COATED ORAL at 08:56

## 2023-02-13 RX ADMIN — INSULIN LISPRO 2 UNITS: 100 INJECTION, SOLUTION INTRAVENOUS; SUBCUTANEOUS at 02:13

## 2023-02-13 RX ADMIN — INSULIN LISPRO 4 UNITS: 100 INJECTION, SOLUTION INTRAVENOUS; SUBCUTANEOUS at 08:58

## 2023-02-13 RX ADMIN — AMLODIPINE BESYLATE 10 MG: 10 TABLET ORAL at 08:55

## 2023-02-13 RX ADMIN — ALOGLIPTIN 25 MG: 12.5 TABLET, FILM COATED ORAL at 08:55

## 2023-02-13 RX ADMIN — SUCRALFATE 1 G: 1 TABLET ORAL at 08:56

## 2023-02-13 RX ADMIN — ALBUTEROL SULFATE 2 PUFF: 90 AEROSOL, METERED RESPIRATORY (INHALATION) at 08:01

## 2023-02-13 RX ADMIN — INSULIN LISPRO 20 UNITS: 100 INJECTION, SOLUTION INTRAVENOUS; SUBCUTANEOUS at 08:58

## 2023-02-13 RX ADMIN — PREGABALIN 100 MG: 100 CAPSULE ORAL at 08:56

## 2023-02-13 RX ADMIN — TIMOLOL MALEATE 1 DROP: 5 SOLUTION OPHTHALMIC at 08:55

## 2023-02-13 RX ADMIN — DOXYCYCLINE HYCLATE 100 MG: 100 TABLET, COATED ORAL at 08:56

## 2023-02-13 ASSESSMENT — PAIN SCALES - GENERAL
PAINLEVEL_OUTOF10: 7
PAINLEVEL_OUTOF10: 8

## 2023-02-13 ASSESSMENT — PAIN DESCRIPTION - DESCRIPTORS
DESCRIPTORS: ACHING
DESCRIPTORS: ACHING

## 2023-02-13 ASSESSMENT — PAIN DESCRIPTION - LOCATION
LOCATION: LEG;FOOT
LOCATION: LEG;FOOT

## 2023-02-13 ASSESSMENT — PAIN DESCRIPTION - PAIN TYPE: TYPE: CHRONIC PAIN

## 2023-02-13 ASSESSMENT — PAIN DESCRIPTION - FREQUENCY
FREQUENCY: CONTINUOUS
FREQUENCY: CONTINUOUS

## 2023-02-13 ASSESSMENT — PAIN DESCRIPTION - ONSET
ONSET: ON-GOING
ONSET: ON-GOING

## 2023-02-13 ASSESSMENT — PAIN SCALES - WONG BAKER
WONGBAKER_NUMERICALRESPONSE: 2
WONGBAKER_NUMERICALRESPONSE: 2

## 2023-02-13 ASSESSMENT — PAIN DESCRIPTION - ORIENTATION
ORIENTATION: LEFT
ORIENTATION: LEFT

## 2023-02-13 NOTE — CARE COORDINATION
Pt on discharge. CM call to [facility], spoke to Albin Romano.   CM faxed Pt information,  order to [phone]
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
## 2023-02-13 NOTE — PROGRESS NOTES
[address]  consulted by Dr. Tani Donnelly for monitoring and adjustment. Indication for treatment: Vancomycin indication: SSTI with risk factors   Goal trough: Trough Goal: 10-15 mcg/mL  AUC/SHELLEY: 400-600    Risk Factors for MRSA Identified:   Documented isolation of MRSA within 1 year , Purulent and/or complicated SSTI    Pertinent Laboratory Values:   Temp Readings from Last 3 Encounters:   02/13/23 97.9 °F (36.6 °C) (Oral)   12/30/22 98 °F (36.7 °C) (Oral)   11/23/22 97.5 °F (36.4 °C)     Recent Labs     02/10/23  1143 02/13/23  0404   WBC 8.0 6.2     Recent Labs     02/10/23  1143 02/13/23  0404   BUN 10 18   CREATININE 0.7* 0.8*     Estimated Creatinine Clearance: 186 mL/min (A) (based on SCr of 0.8 mg/dL (L)). Intake/Output Summary (Last 24 hours) at 2/13/2023 0550  Last data filed at 2/12/2023 7455  Gross per 24 hour   Intake 555.35 ml   Output --   Net 555.35 ml       Pertinent Cultures:   Date    Source    Results  02/09   Wound (Aerobic)  Strep agalactiae (light growth), MRSA (light growth)  02/10   RESP PCR   Rhinovirus  02/11   Surgical (Toe)   Strep agalacticae    Assessment:  HPI: Diabetic foot infection, Hx of MRSA in great toe. Debridement of foot in surgery   SCr = 0.8, BUN = 18, and no output data  Day(s) of therapy: 1 (Vancomycin therapy stopped morning of 02/12, Re-started morning of 02/13)  Vancomycin concentration:   02/14 - To be collected    Plan:  Vancomycin 1,250 mg IV Q 12 Hours  Predicted AUC: 459; Predicted Trough: 15  Pharmacy will continue to monitor patient and adjust therapy as indicated    VANCOMYCIN CONCENTRATION SCHEDULED FOR 02/14/2023 @ 6 AM    Thank you for the consult.   Elham Crisostomo, [facility]  2/13/2023 5:50 AM

## 2023-02-13 NOTE — PROGRESS NOTES
Outpatient Pharmacy Progress Note for Meds-to-Beds    Total number of Prescriptions Filled:7   The following medications were dispensed to the patient during the discharge process:  Doxycycline  Cephalexin  Mucinex  Humalog insulin  Insulin needles  Alcohol prep wipes  Januvia (changed from alogliptin based on insurance formulary)    Additional Documentation:  Patient picked-up the medication(s) in the OP Pharmacy  The prescription(s) for Novolin N was not filled due to insufficient stock; we will transfer this prescription to another pharmacy per patient's request. Transferred to [facility]      Thank you for letting us serve your patients.   [facility]    [address], [address] [facility]    Phone: [phone]    Fax: [phone]

## 2023-02-13 NOTE — PROGRESS NOTES
LATE ENTRY    Contacted by Mehdi Farmer PharmD from pharmacy yesterday at 2:50pm. She was concerned about keeping vancomycin IVPB since the patient has not had vancomycin levels checked. The orders had been written but hospital staffing of phlebotomists did not allow for these blood draws. Since the patient was planning on discharge 2/13 she asked if we could start a PO medication. I reviewed the patient's cultures (group B strep, proteus and MRSA) and cancelled the Vancomycin and started doxycycline. He will continue on maxipime until discharge and be switched to keflex at d/c as well. Patient's current wound vac is an incisional wound vac that will stay in place until he is seen in the office later this week. Cindy [facility] orders have been written for great toe wound care and then subsequent incisional wound care after the [facility] is removed.

## 2023-02-14 LAB
CULTURE: ABNORMAL
Lab: ABNORMAL
SPECIMEN: ABNORMAL

## 2023-02-14 NOTE — PROGRESS NOTES
Progress Note( Dr. Savannah Love)  2/13/2023  Subjective:   Admit Date: 2/9/2023  PCP: Gurdeep Charles MD    Admitted For :Right foot infection uncontrolled diabetes mellitus    Consulted For: Better control glucose    Interval History: Patient had right foot surgery noted below  THIRD TOE AMPUTATION AND REVISION OF SECOND TOE AMPUTATION  Prevena wound vac applied, 13cm       Denies any chest pains,   Denies SOB . Denies nausea or vomiting. Now n.p.o. No new bowel or bladder symptoms. No intake or output data in the 24 hours ending 02/13/23 2235      DATA    CBC:   Recent Labs     02/13/23  0404   WBC 6.2   HGB 11.9*         CMP:  Recent Labs     02/13/23  0404      K 3.8      CO2 25   BUN 18   CREATININE 0.8*   CALCIUM 8.8       Lipids:   Lab Results   Component Value Date/Time    CHOL 142 04/16/2011 01:19 PM    HDL 41 04/16/2011 01:19 PM    TRIG 268 04/16/2011 01:19 PM     Glucose:  Recent Labs     02/13/23  0201 02/13/23  0843 02/13/23  1231   POCGLU 241* 224* 247*       MxiwnioibeU9O:  Lab Results   Component Value Date/Time    LABA1C 9.3 02/10/2023 09:44 PM     High Sensitivity TSH:   Lab Results   Component Value Date/Time    [facility] 2.685 04/16/2011 01:19 PM     Free T3: No results found for: FT3  Free T4:No results found for: T4FREE    XR CHEST PORTABLE   Final Result   No acute cardiopulmonary disease. XR FOOT RIGHT (MIN 3 VIEWS)   Final Result   Persistent but improved soft tissue defect medial aspect of the great toe   compared to 11/19/2022, new soft tissue defect/ulcer distal tip 3rd digit. No radiographic evidence of osteomyelitis. Triple phase bone scan or   contrast-enhanced MRI of the foot suggested if clinical concern of   radiographically occult osteomyelitis. Nondisplaced intra-articular avulsion fracture base of the distal phalanx 3rd   toe/distal interphalangeal joint of the 3rd toe not present on prior study of   11/19/2022.   Correlation with clinical findings of acute avulsion fracture   suggested. Diffuse soft tissue swelling of the forefoot consistent with contusion,   edema, cellulitis. Neuropathic arthropathy changes of the midfoot, hallux valgus deformity 1st   metatarsal-phalangeal joint unchanged compared to 11/19/2022. Kasi Sandoval Scheduled Medicines   Medications:   REM     Infusions:   REM        Objective:   Vitals: BP (!) 139/97   Pulse 87   Temp 98.1 °F (36.7 °C) (Oral)   Resp 29   Wt (!) 300 lb 9.6 oz (136.4 kg)   SpO2 97%   BMI 37.57 kg/m²   General appearance: alert and cooperative with exam  Neck: no JVD or bruit  Thyroid : Normal lobes   Lungs: Has Vesicular Breath sounds   Heart:  regular rate and rhythm  Abdomen: soft, non-tender; bowel sounds normal; no masses,  no organomegaly  Musculoskeletal: Normal  Extremities: extremities normal, ,    THIRD TOE AMPUTATION AND REVISION OF SECOND TOE AMPUTATION  Prevena wound vac applied, 13cm     Neurologic:  Awake, alert, oriented to name, place and time. Cranial nerves II-XII are grossly intact. Motor is  intact. Sensory neuropathy,  and gait is abnormal.    Assessment:     Patient Active Problem List:     Sprain of left ankle     Closed nondisplaced fracture of fifth metatarsal bone of left foot     Alcohol abuse     UTI (urinary tract infection) due to Enterococcus     Acute osteomyelitis of toe, right (HCC)     Osteomyelitis of second toe of right foot (HCC)     Diabetic foot infection (HCC)     Shortness of breath     Asthma exacerbation     Appendicitis     Diabetic foot ulcer with osteomyelitis ([facility] [address]     Cellulitis     Open wound of toe     Rhinovirus      Plan:     Reviewed POC blood glucose .  Labs and X ray results   Reviewed Current Medicines   On meal/ Correction bolus Humalog/ Basal Lantus Insulin regime   Monitor Blood glucose frequently   Modified  the dose of Insulin/ other medicines as needed   Scheduled to have surgery on the right foot later today  Will follow Gordo Perry MD, MD

## 2023-02-15 NOTE — DISCHARGE SUMMARY
Discharge Summary    Name:  Alessio Steele /Age/Sex: 1985  (44 y.o. male)   MRN & CSN:  [id number] & [id number] Admission Date/Time: 2023 11:13 AM   Attending:  No att. providers found Discharging Physician: José Doherty MD     Hospital Course:   Alessio Steele is a 44 y.o.  male  who presents with Diabetic foot infection ([address]    PHILL Cassidy is a 44 y.o. male with pmh of type 2 diabetes, chronic diabetic foot infection who presents with worsening swelling of the right lower extremity. Patient reports he has a chronic wound on his right foot which has recently gotten worse with discharge and erythema. In addition his leg started to appear more swollen which made him to the ED. Denies any fevers, chills at home. Reports his sugars are relatively well controlled at home with numbers around the 150s to 160s range on his home medications. He reports he had worsening pain of his right foot requiring several over-the-counter medications as well as occasional Norco.  Reports he follows up with surgery for his wound care and was recently admitted to the hospital for IV antibiotics. \"       The following problems have been addressed during this hospitalization. Right diabetic foot infection  No sign of sepsis on admission. Started on broad-spectrum antibiotics with Vanco and cefepime. Received IVF  -Per surgery there is exposed bone at the third toe in the right foot. Pt underwent amputation the toe. - wound culture grew Strep Group B light growth + MRSA light growth   - discussed w surgery; abx transitioned to Keflex + doxycycline on discharge total 10 days    Type 2 diabetes mellitus  On home metformin glipizide and Trulicity. Endo consulted inpatient. - discharged on alogliptin, home Lantus, home NPH decreased to 32Q AM, home Trulicity, Lispro 69H tid, . Started on Lantus 20 minutes nightly. Start on lispro 5 units made premeals with medium dose sliding scale.        Rhinovirus positive on 2/10-on room air. Patient was seen and examined at bedside on day of discharge. This note can be used as the progress note for today's visit. States pain is under control. Pt cleared for discharge by general surgery. Denies lightheadedness, dizziness, fever, night sweats, chills, chest pain, cough, dyspnea, palpitations, abd pain, nausea, vomiting, diarrhea, dysuria. Physical Exam  Vitals:   Vitals:    02/13/23 1416   BP:    Pulse: 87   Resp: 29   Temp:    SpO2: 97%       General: NAD  Eyes: EOMI  ENT: neck supple  Cardiovascular: Regular rate. Respiratory: Clear to auscultation  Gastrointestinal: Soft, non tender  Genitourinary: no suprapubic tenderness  Musculoskeletal: No edema  Skin: warm, dry, wound vac right foot  Neuro: Alert. Psych: Mood appropriate. The patient expressed appropriate understanding of and agreement with the discharge recommendations, medications, and plan.      Consults this admission:  IP CONSULT TO GENERAL SURGERY  IP CONSULT TO GENERAL SURGERY  IP CONSULT TO ENDOCRINOLOGY  IP CONSULT TO PULMONOLOGY  IP CONSULT TO HOME CARE NEEDS      Discharge Instruction:   Handoff to PCP:   - monitor glucose    Follow up appointments: general surgery, endocrinology, pulm    Primary care physician: Leanne Forde MD      Diet:  diabetic diet   Activity: activity as tolerated  Disposition: Discharged to:   []Home, [x]C, []SNF, []Acute Rehab, []Hospice   Condition on discharge: Stable    Discharge Medications:        Medication List        START taking these medications      alogliptin 25 MG Tabs tablet  Commonly known as: NESINA  Take 1 tablet by mouth daily     cephALEXin 500 MG capsule  Commonly known as: KEFLEX  Take 1 capsule by mouth 4 times daily for 10 days     collagenase 250 UNIT/GM ointment  Commonly known as: Santyl  Apply topically daily to the right great toe wound     doxycycline hyclate 100 MG tablet  Commonly known as: VIBRA-TABS  Take 1 tablet by mouth every 12 hours for 10 days     guaiFENesin 600 MG extended release tablet  Commonly known as: MUCINEX  Take 1 tablet by mouth 2 times daily for 14 doses     insulin lispro 100 UNIT/ML Soln injection vial  Commonly known as: HUMALOG  Inject 20 Units into the skin 3 times daily (with meals)     insulin  UNIT/ML injection vial  Commonly known as: HumuLIN N;NovoLIN N  Inject 15 Units into the skin every morning  Replaces: HumuLIN N KwikPen 100 UNIT/ML injection pen            CONTINUE taking these medications      acetaminophen 325 MG tablet  Commonly known as: Aminofen  Take 2 tablets by mouth every 6 hours as needed for Pain     albuterol sulfate  (90 Base) MCG/ACT inhaler  Commonly known as: PROVENTIL;VENTOLIN;PROAIR  Inhale 2 puffs into the lungs every 6 hours as needed for Wheezing     albuterol-ipratropium  MCG/ACT Aers inhaler  Commonly known as: Combivent Respimat  Inhale 1 puff into the lungs every 6 hours     allopurinol 300 MG tablet  Commonly known as: ZYLOPRIM     amLODIPine 10 MG tablet  Commonly known as: NORVASC     atorvastatin 10 MG tablet  Commonly known as: LIPITOR     brimonidine 0.2 % ophthalmic solution  Commonly known as: ALPHAGAN     cloNIDine 0.2 MG tablet  Commonly known as: CATAPRES     dorzolamide-timolol 22.3-6.8 MG/ML Soln     ezetimibe 10 MG tablet  Commonly known as: ZETIA     Lantus SoloStar 100 UNIT/ML injection pen  Generic drug: insulin glargine  Inject 50 Units into the skin nightly     lisinopril-hydroCHLOROthiazide 20-12.5 MG per tablet  Commonly known as: PRINZIDE;ZESTORETIC     melatonin 5 MG Tabs tablet     moxifloxacin 0.5 % ophthalmic solution  Commonly known as: VIGAMOX  Place 1 drop into the right eye 4 times daily     pantoprazole 40 MG tablet  Commonly known as: PROTONIX  Take 1 tablet by mouth every morning (before breakfast)     prednisoLONE acetate 1 % ophthalmic suspension  Commonly known as: PRED FORTE     pregabalin 100 MG capsule  Commonly known as: LYRICA sucralfate 1 GM tablet  Commonly known as: Carafate  Take 1 tablet by mouth 4 times daily     Symbicort 160-4.5 MCG/ACT Aero  Generic drug: budesonide-formoterol     TechLite Pen Needles 31G X 5 MM Misc  Generic drug: Insulin Pen Needle     tiZANidine 4 MG tablet  Commonly known as: ZANAFLEX     trimethoprim-polymyxin b 96163-4.1 UNIT/ML-% ophthalmic solution  Commonly known as: POLYTRIM     Trulicity 1.5 OQ/9.8DE SC injection  Generic drug: dulaglutide  inject 0.5 milliliters ( 1 AND 1/2 milligrams ) subcutaneously ev. ..  (REFER TO PRESCRIPTION NOTES).      UNABLE TO FIND            STOP taking these medications      glipiZIDE 5 MG tablet  Commonly known as: GLUCOTROL     HumuLIN N KwikPen 100 UNIT/ML injection pen  Generic drug: insulin NPH  Replaced by: insulin  UNIT/ML injection vial     metFORMIN 1000 MG tablet  Commonly known as: GLUCOPHAGE            ASK your doctor about these medications      nicotine 14 MG/24HR  Commonly known as: NICODERM CQ  Place 1 patch onto the skin daily               Where to Get Your Medications        These medications were sent to [address] [place] [address], [address] [facility] [address] [id number]      Phone: [phone]   alogliptin 25 MG Tabs tablet  cephALEXin 500 MG capsule  doxycycline hyclate 100 MG tablet  guaiFENesin 600 MG extended release tablet  insulin lispro 100 UNIT/ML Soln injection vial  insulin  UNIT/ML injection vial       These medications were sent to [address] [place] [address] [facility] [phone]  [address] [place] [id number]      Phone: [phone]   collagenase 250 UNIT/GM ointment         Objective Findings at Discharge:       BMP/CBC  Recent Labs     02/13/23  0404      K 3.8      CO2 25   BUN 18   CREATININE 0.8*   WBC 6.2   HCT 34.2*    IMAGING:      Additional Information: Patient seen and examined day of discharge.  For more information regarding patient's care please contact Romero [facility] records [phone]    Discharge Time of 35 minutes    Electronically signed by Zaida Vazquez MD on 2/15/2023 at 3:10 PM

## 2023-02-16 LAB
CULTURE: ABNORMAL
Lab: ABNORMAL
SPECIMEN: ABNORMAL

## 2023-02-20 ENCOUNTER — HOSPITAL ENCOUNTER (OUTPATIENT)
Dept: WOUND CARE | Age: 38
Discharge: HOME | End: 2023-02-20
Payer: COMMERCIAL

## 2023-02-20 VITALS
DIASTOLIC BLOOD PRESSURE: 87 MMHG | HEART RATE: 90 BPM | SYSTOLIC BLOOD PRESSURE: 134 MMHG | TEMPERATURE: 96.7 F | RESPIRATION RATE: 20 BRPM

## 2023-02-20 DIAGNOSIS — E66.01 CLASS 2 SEVERE OBESITY DUE TO EXCESS CALORIES WITH SERIOUS COMORBIDITY AND BODY MASS INDEX (BMI) OF 37.0 TO 37.9 IN ADULT (HCC): ICD-10-CM

## 2023-02-20 DIAGNOSIS — T81.89XA NONHEALING SURGICAL WOUND, INITIAL ENCOUNTER: ICD-10-CM

## 2023-02-20 DIAGNOSIS — S98.131A AMPUTATION OF TOE OF RIGHT FOOT (HCC): ICD-10-CM

## 2023-02-20 DIAGNOSIS — L97.512 RIGHT FOOT ULCER, WITH FAT LAYER EXPOSED (HCC): ICD-10-CM

## 2023-02-20 DIAGNOSIS — E11.9 DIABETES MELLITUS TYPE 2, INSULIN DEPENDENT (HCC): ICD-10-CM

## 2023-02-20 DIAGNOSIS — Z79.4 DIABETES MELLITUS TYPE 2, INSULIN DEPENDENT (HCC): ICD-10-CM

## 2023-02-20 DIAGNOSIS — G89.18 ACUTE POST-OPERATIVE PAIN: ICD-10-CM

## 2023-02-20 PROBLEM — E66.9 CLASS 2 OBESITY IN ADULT: Status: ACTIVE | Noted: 2023-02-20

## 2023-02-20 PROBLEM — E66.9 OBESITY: Status: ACTIVE | Noted: 2023-02-20

## 2023-02-20 PROBLEM — E66.812 CLASS 2 OBESITY IN ADULT: Status: ACTIVE | Noted: 2023-02-20

## 2023-02-20 PROBLEM — S98.139A AMPUTATION OF ONE OR MORE TOES (HCC): Status: ACTIVE | Noted: 2023-02-20

## 2023-02-20 PROCEDURE — 11719 TRIM NAIL(S) ANY NUMBER: CPT

## 2023-02-20 PROCEDURE — 99213 OFFICE O/P EST LOW 20 MIN: CPT

## 2023-02-20 PROCEDURE — 11042 DBRDMT SUBQ TIS 1ST 20SQCM/<: CPT | Performed by: NURSE PRACTITIONER

## 2023-02-20 PROCEDURE — 99203 OFFICE O/P NEW LOW 30 MIN: CPT | Performed by: NURSE PRACTITIONER

## 2023-02-20 PROCEDURE — 11042 DBRDMT SUBQ TIS 1ST 20SQCM/<: CPT

## 2023-02-20 RX ORDER — OXYCODONE HYDROCHLORIDE AND ACETAMINOPHEN 5; 325 MG/1; MG/1
1 TABLET ORAL EVERY 6 HOURS PRN
Qty: 28 TABLET | Refills: 0 | Status: SHIPPED | OUTPATIENT
Start: 2023-02-20 | End: 2023-02-27

## 2023-02-20 RX ORDER — CEPHALEXIN 500 MG/1
500 CAPSULE ORAL 4 TIMES DAILY
Qty: 40 CAPSULE | Refills: 0 | Status: SHIPPED | OUTPATIENT
Start: 2023-02-20 | End: 2023-03-02

## 2023-02-20 RX ORDER — DOXYCYCLINE HYCLATE 100 MG
100 TABLET ORAL EVERY 12 HOURS SCHEDULED
Qty: 20 TABLET | Refills: 0 | Status: SHIPPED | OUTPATIENT
Start: 2023-02-20 | End: 2023-03-02

## 2023-02-20 ASSESSMENT — PAIN DESCRIPTION - LOCATION: LOCATION: TOE (COMMENT WHICH ONE)

## 2023-02-20 ASSESSMENT — PAIN DESCRIPTION - PAIN TYPE: TYPE: CHRONIC PAIN

## 2023-02-20 ASSESSMENT — PAIN DESCRIPTION - ORIENTATION: ORIENTATION: RIGHT

## 2023-02-20 ASSESSMENT — PAIN DESCRIPTION - DESCRIPTORS: DESCRIPTORS: THROBBING

## 2023-02-20 ASSESSMENT — PAIN SCALES - GENERAL: PAINLEVEL_OUTOF10: 9

## 2023-02-20 ASSESSMENT — PAIN - FUNCTIONAL ASSESSMENT: PAIN_FUNCTIONAL_ASSESSMENT: PREVENTS OR INTERFERES SOME ACTIVE ACTIVITIES AND ADLS

## 2023-02-20 ASSESSMENT — PAIN DESCRIPTION - ONSET: ONSET: ON-GOING

## 2023-02-20 ASSESSMENT — PAIN DESCRIPTION - FREQUENCY: FREQUENCY: CONTINUOUS

## 2023-02-20 NOTE — DISCHARGE INSTRUCTIONS
PHYSICIAN ORDERS AND DISCHARGE INSTRUCTIONS    NOTE: Upon discharge from the [address], you will receive a patient experience survey. We would be grateful if you would take the time to fill this survey out. Wound care order history:     DIRK's   Right       Left    Date:   Cultures:     Grafts:     Antibiotics:        Continuing wound care orders and information:                Residence:                Continue home health care with:    Your wound-care supplies will be provided by: Wound cleansing:     Do not scrub or use excessive force. Wash hands with soap and water before and after dressing changes. Prior to applying a clean dressing, cleanse wound with normal saline, wound cleanser, or mild soap and water. Ask the physician or nurse before getting the wound(s) wet in a shower. General Wound management:   Keep weight off wounds and reposition every 2 hours. Avoid standing for long periods of time. Apply wraps/stockings in AM and remove at bedtime. If swelling is present, elevate legs to the level of the heart or above for 30 minutes 4-5 times a day and/or when sitting. When taking antibiotics take entire prescription as ordered by physician do not stop taking until medicine is all gone. Orders for this week: 2/20/23    FAX ORDERS TO [facility] Rx:    Moisturize dry intact skin of lower extremities. Right Great Toe Wound - Wash with soap and water, pat dry. Apply Anasept and Endoform cut to fit wound, secure with steri strips. Cover with super absorber. Wrap with Coban 2 Lite. Leave in place until nurse visit on Thursday. Be sure to keep dry. Right 2-4 Toe Amp site - Wash with soap and water, pat dry. Paint incision and surrounding wound bed/maceration with betadine and stimulen. Cover with ca alginate and super absorber (ABD or Sorbex). Wrap with Coban 2 Lite. Leave in place until nurse visit on Thursday. Be sure to keep dry. Wear surgical shoe.     Paint toenails with Betadine today in clinic 2/20/23, due to trimming nails. Nurse visit on Thursday to check and reapply dressings. Follow up with Alison Fatima CNP in 1 week(s) in the wound care center. Call [phone] for any questions or concerns.   Date__________   Time____________

## 2023-02-20 NOTE — PROGRESS NOTES
Multilayer Compression Wrap   (Not Unna) Below the Knee    NAME:  Shanae Dela Cruz  YOB: 1985  MEDICAL RECORD NUMBER:  [id number]  DATE:  2/20/2023    Multilayer compression wrap: Removed old Multilayer wrap if indicated and wash leg with mild soap/water. Applied moisturizing agent to dry skin as needed. Applied primary and secondary dressing as ordered. Applied multilayered dressing below the knee to right lower leg. Instructed patient/caregiver not to remove dressing and to keep it clean and dry. Instructed patient/caregiver on complications to report to provider, such as pain, numbness in toes, heavy drainage, and slippage of dressing. Instructed patient on purpose of compression dressing and on activity and exercise recommendations.       Electronically signed by Anatoly Virgen LPN on 9/06/4868 at 11:18 AM

## 2023-02-21 PROBLEM — G89.18 ACUTE POST-OPERATIVE PAIN: Status: ACTIVE | Noted: 2023-02-21

## 2023-02-21 RX ORDER — BETAMETHASONE DIPROPIONATE 0.05 %
OINTMENT (GRAM) TOPICAL ONCE
OUTPATIENT
Start: 2023-02-21 | End: 2023-02-21

## 2023-02-21 RX ORDER — CLOBETASOL PROPIONATE 0.5 MG/G
OINTMENT TOPICAL ONCE
OUTPATIENT
Start: 2023-02-21 | End: 2023-02-21

## 2023-02-21 RX ORDER — BACITRACIN, NEOMYCIN, POLYMYXIN B 400; 3.5; 5 [USP'U]/G; MG/G; [USP'U]/G
OINTMENT TOPICAL ONCE
OUTPATIENT
Start: 2023-02-21 | End: 2023-02-21

## 2023-02-21 RX ORDER — GINSENG 100 MG
CAPSULE ORAL ONCE
OUTPATIENT
Start: 2023-02-21 | End: 2023-02-21

## 2023-02-21 RX ORDER — LIDOCAINE HYDROCHLORIDE 40 MG/ML
SOLUTION TOPICAL ONCE
OUTPATIENT
Start: 2023-02-21 | End: 2023-02-21

## 2023-02-21 RX ORDER — LIDOCAINE 40 MG/G
CREAM TOPICAL ONCE
OUTPATIENT
Start: 2023-02-21 | End: 2023-02-21

## 2023-02-21 RX ORDER — BACITRACIN ZINC AND POLYMYXIN B SULFATE 500; 1000 [USP'U]/G; [USP'U]/G
OINTMENT TOPICAL ONCE
OUTPATIENT
Start: 2023-02-21 | End: 2023-02-21

## 2023-02-21 RX ORDER — LIDOCAINE 50 MG/G
OINTMENT TOPICAL ONCE
OUTPATIENT
Start: 2023-02-21 | End: 2023-02-21

## 2023-02-21 RX ORDER — GENTAMICIN SULFATE 1 MG/G
OINTMENT TOPICAL ONCE
OUTPATIENT
Start: 2023-02-21 | End: 2023-02-21

## 2023-02-21 NOTE — PROGRESS NOTES
[address] [facility] Initial Visit      Nikkie Pop  AGE: 44 y.o. GENDER: male  : 1985  EPISODE DATE:  2023   Referred by:Roxanne Oconnor MD  [facility] [facility] [address]  [facility],  [address] [place] [address]     Subjective:     CHIEF COMPLAINT  WOUND WOUND RIGHT FOOT  Chief Complaint   Patient presents with    Wound Check        HISTORY of PRESENT ILLNESS      Nikkie Pop is a 44 y.o. male who presents to the [address] [place] [address] for an initial visit for evaluation and treatment of Acute non-healing surgical  ulcer(s) of  right foot. The condition is of moderate severity. The ulcer has been present since 23 post third toe amputation and revision of the second toe per Dr. Marylin Carvalho 23. The underlying cause is thought to be nonhealing surgical complicated by diabetes. The patients care to date has included hospitalization -23 with toe amputation sent home with [facility] and Home Health referral with [place], received IV antibiotics in hospital, home on Keflex and Doxycycline through 23. The patient has significant underlying medical conditions as below. Solitario Flores MD.    Wound Pain Timing/Severity: waxing and waning, moderate  Quality of pain: dull, aching, tender  Severity of pain:  4 / 10   Modifying Factors: diabetes, poor glucose control, chronic pressure, shear force, obesity, smoking, and non-adherence  Associated Signs/Symptoms: edema, erythema, drainage, and pain    DIRK: as indicated base on wound location and assessment    Wound infection: wound culture will be obtained as needed for symptoms of infection     Arterial evaluation: if indicated based on wound, location, symptoms and healing    Venous Evaluation: if indicated based on wound, location, symptoms and healing    Diabetes: On an insulin regimen.  Follows with Dr. Steven Partida  Hemoglobin A1C   Date Value Ref Range Status   02/10/2023 9.3 (H) 4.2 - 6.3 % Final        Diabetes education provided:  Diabetes pathoetiology, difference between type 1 and type 2 diabetes, and progressive nature of Type 2 DM. Metabolic syndrome: association of diabetes with dyslipidemia, HTN and obesity. Diabetic Neuropathy: signs and therapy. Foot care: advised to wash feet daily, pat dry and apply lotion at night, avoiding between toes. Need to look at feet daily and report to a physician any signs of inflammation or skin damage. Discussed diabetes shoes and socks. Diabetic management related to wound healing    Smoking: Yes. Patient counseled in length on smoking cessation including benefits of quitting and health risks of continuing to smoke including but not limited to lung cancer, COPD, risk of coronary artery disease. Patient verbalized understanding today, is not ready to quit. Anticoagulant/Antiplatelet therapy: No  Immunosuppression: No  Obesity: yes    Patient educated on the 6 essential components necessary for wound healing: Circulation, Debridements, Proper Dressings and Topical Wound Products, Infection Control, Edema Control and Offloading. Patient educated on those factors that negatively effect or impact wound healing: smoking, obesity, uncontrolled diabetes, anticoagulant and immunosuppressive regimens, inadequate nutrition, untreated arterial and venous disease if applicable and measures to manage edema. Nutritional status: well nourished. Discussed need for increased protein and calories for wound healing and good sources of protein (just over 7 grams for every 20 pounds of body weight). Animal-based foods high in protein (meat, poultry, fish, eggs, and dairy foods). Plant based foods high in protein (tofu, lentils, beans, chickpeas, nuts, quinoa and terry seeds.      Lab Results   Component Value Date     02/13/2023    K 3.8 02/13/2023     02/13/2023    CO2 25 02/13/2023    BUN 18 02/13/2023    CREATININE 0.8 (L) 02/13/2023    GLUCOSE 228 (H) 02/13/2023    CALCIUM 8.8 02/13/2023    PROT 7.6 02/09/2023    LABALBU 4.0 02/09/2023    BILITOT 0.4 02/09/2023    ALKPHOS 107 02/09/2023    AST 72 (H) 02/09/2023    ALT 34 02/09/2023    LABGLOM >60 02/13/2023    GFRAA >60 09/20/2022     Off Loading  Offloading or minimizing or removing weight placed on an area with poor circulation such as diabetic wounds or pressure. This can be achieved with crutches, wheel chair, knee walker etc. Minimizing pressure through partial weight bearing (minimizing the amount of  pressure applied and or the amount of time on the area of pressure) or maintaining a non-weight bearing status can be used to promote and often can be essential for thee wound to heal. Off loading may also need to be achieved for non-weight bearing wounds such as pressure ulcers to the torso. Turning and changing positions frequently, at least every two hours. Use of pressure cushion if sitting up in chair. Skin Care  Keep skin clean and well moisturized , moisturize routinely with ointments for heavier moisturizer needs for extremely dry skin or cracks such as A&D ointment and lotions for a light moisturizer such as CeraVe or Eucerin. If incontinent change incontinence garments as soon as soiled and keeping skin clean and use barrier cream to protect the skin. Reduce Salt and Sodium  Choose low- or reduced- sodium, or no-salt-added versions of foods and condiments when available. Buy fresh, plain frozen, or canned with no-added-salt vegetables. Use fresh poultry, fish and lean meat, rather than canned, smoked or processed types. Choose ready-to-eat breakfast cereals that are lower in sodium. Limit cured foods (such as huang and ham), foods packed in brine (such as pickled foods) and condiments (such as MSG, mustard, horseradish, and catsup). Limit even lower sodium versions of soy sauce and teriyaki sauce-treat these condiments just like salt).  In cooking and at the table, flavor foods with herbs, spices, lemon, lime, vinegar or salt-free seasoning blends. Start by cutting salt in half. Cook rice, pasta and hot cereals without salt. Cut back on instant or flavored rice, pasta and cereal mixes, which usually have added salt. Choose convenience foods that are lower in sodium. Limit frozen dinners, packaged mixes, canned soups and dressings. Rinse canned foods, such as tuna, to remove some sodium. Choose fruits or vegetables instead of salty snack foods. Edema Management   Whenever resting, raise your legs up. Try to keep the swollen area higher than the level of your heart. Take breaks from standing or sitting in one position. Walk around to increase the blood flow in your lower legs. Move your feet and ankles often while you stand, or tighten and relax your leg muscles. Wear support stockings. Put them on in the morning, before swelling gets worse. Eat a balanced diet. Lose weight if you need to. Limit the amount of salt (sodium) in your diet. Salt holds fluid in the body and may increase swelling. Apply compression stocking(s) every morning as soon as you get up. Remove at bedtime unless instructed to wear day and night. Hand wash and line dry to prevent loss of elasticity. Replace every 3-4 months to ensure proper fit. Weight Management   Will need to ultimately change overall eating behaviors to have success with weight loss. Encouraged to weigh daily and work towards a goal of 1-2 pounds of weight loss weekly. Encouraged to journal all food intake, Drip In pal is a useful tool to help keep track of food intake and caloric value. Keep calorie level at approximately 4680-1020. Protein intake is to be a minimum of 60 grams per day (unless otherwise directed). Water drinking was encouraged with a goal of 64oz-128oz daily. Beverages to be calorie free except for milk. Every other beverage should be water, avoid soda. Continue to increase level of physical activity. Refer to weight management as indicated and requested by patient.           PAST MEDICAL HISTORY        Diagnosis Date    Asthma     Blind left eye     Diabetes mellitus ([address]     GERD (gastroesophageal reflux disease)     Hypertension     Retinal detachment 2012    left       PAST SURGICAL HISTORY    Past Surgical History:   Procedure Laterality Date    CORNEAL TRANSPLANT Bilateral     EYE SURGERY      left eye removed    EYE SURGERY Right     FRACTURE SURGERY      foot left    LAPAROSCOPIC APPENDECTOMY N/A 4/28/2022    APPENDECTOMY LAPAROSCOPIC performed by Pita Raymond MD at [facility] Right     MANDIBLE SURGERY Bilateral     TOE AMPUTATION Right 07/25/2017    TOE AMPUTATION Right 7/25/2022    TOE AMPUTATION, RAY AMPUTATION OF RIGHT SECOND TOE performed by Ariana Shi MD at [address] Right 2/11/2023    THIRD TOE AMPUTATION AND REVISION OF SECOND TOE AMPUTATION performed by Dutch Galo MD at [facility]    Family History   Problem Relation Age of Onset    Diabetes Mother     Asthma Mother     High Blood Pressure Mother     Stroke Mother     Heart Disease Mother     Diabetes Father     Asthma Father     High Blood Pressure Father        SOCIAL HISTORY    Social History     Tobacco Use    Smoking status: Every Day     Packs/day: 0.50     Years: 5.00     Pack years: 2.50     Types: Cigarettes    Smokeless tobacco: Never    Tobacco comments:     EDUCATED SMOKING & DM CAN SLOWS DOWN BLOOD FLOW   Vaping Use    Vaping Use: Never used   Substance Use Topics    Alcohol use: Yes     Comment: occasionally    Drug use: Not Currently     Types: Marijuana Jenet Kane)       ALLERGIES    Allergies   Allergen Reactions    Ciprofloxacin Shortness Of Breath    Glucosamine Anaphylaxis    Shellfish-Derived Products Anaphylaxis       MEDICATIONS    Current Outpatient Medications on File Prior to Encounter   Medication Sig Dispense Refill    alogliptin (NESINA) 25 MG TABS tablet Take 1 tablet by mouth daily 60 tablet 0    insulin lispro (HUMALOG) 100 UNIT/ML SOLN injection vial Inject 20 Units into the skin 3 times daily (with meals) 20 mL 0    insulin NPH (HUMULIN N;NOVOLIN N) 100 UNIT/ML injection vial Inject 15 Units into the skin every morning 5 mL 2    collagenase (SANTYL) 250 UNIT/GM ointment Apply topically daily to the right great toe wound 30 g 2    moxifloxacin (VIGAMOX) 0.5 % ophthalmic solution Place 1 drop into the right eye 4 times daily 3 mL 0    pantoprazole (PROTONIX) 40 MG tablet Take 1 tablet by mouth every morning (before breakfast) 30 tablet 3    nicotine (NICODERM CQ) 14 MG/24HR Place 1 patch onto the skin daily (Patient not taking: Reported on 2/10/2023) 30 patch 3    SYMBICORT 160-4.5 MCG/ACT AERO Inhale 2 puffs into the lungs in the morning and at bedtime      TRULICITY 1.5 BF/1.3QQ SOPN inject 0.5 milliliters ( 1 AND 1/2 milligrams ) subcutaneously ev. ..  (REFER TO PRESCRIPTION NOTES). 3 pen 0    LANTUS SOLOSTAR 100 UNIT/ML injection pen Inject 50 Units into the skin nightly 2 pen 0    tiZANidine (ZANAFLEX) 4 MG tablet Take 4 mg by mouth nightly as needed      cloNIDine (CATAPRES) 0.2 MG tablet Take 0.2 mg by mouth in the morning.       trimethoprim-polymyxin b (POLYTRIM) 84758-9.1 UNIT/ML-% ophthalmic solution Apply 1 drop to eye 4 times daily      UNABLE TO FIND Place 1 drop into the right eye 4 times daily 07/21/22 Patient on Vancomycin Preserved Eye soln 25 mg/ml      ezetimibe (ZETIA) 10 MG tablet take 1 tablet by mouth once daily      TECHLITE PEN NEEDLES 31G X 5 MM MISC use 1 PEN NEEDLE to inject MEDICATION subcutaneously two to three times a day      melatonin 5 MG TABS tablet take 1 tablet by mouth every evening      pregabalin (LYRICA) 100 MG capsule take 1 capsule by mouth twice a day      sucralfate (CARAFATE) 1 GM tablet Take 1 tablet by mouth 4 times daily 120 tablet 0    albuterol sulfate  (90 Base) MCG/ACT inhaler Inhale 2 puffs into the lungs every 6 hours as needed for Wheezing 1 Inhaler 5 albuterol-ipratropium (COMBIVENT RESPIMAT)  MCG/ACT AERS inhaler Inhale 1 puff into the lungs every 6 hours 1 Inhaler 5    acetaminophen (AMINOFEN) 325 MG tablet Take 2 tablets by mouth every 6 hours as needed for Pain 20 tablet 0    brimonidine (ALPHAGAN) 0.2 % ophthalmic solution Place 1 drop into the right eye 2 times daily      dorzolamide-timolol 22.3-6.8 MG/ML SOLN Place 1 drop into the right eye 2 times daily      prednisoLONE acetate (PRED FORTE) 1 % ophthalmic suspension Place 1 drop into the right eye 4 times daily      allopurinol (ZYLOPRIM) 300 MG tablet Take 300 mg by mouth daily       atorvastatin (LIPITOR) 10 MG tablet Take 10 mg by mouth daily       lisinopril-hydrochlorothiazide (PRINZIDE;ZESTORETIC) 20-12.5 MG per tablet Take 1 tablet by mouth daily       amLODIPine (NORVASC) 10 MG tablet Take 10 mg by mouth daily       No current facility-administered medications on file prior to encounter.        PROBLEM LIST    Patient Active Problem List   Diagnosis    Sprain of left ankle    Closed nondisplaced fracture of fifth metatarsal bone of left foot    Alcohol abuse    UTI (urinary tract infection) due to Enterococcus    Acute osteomyelitis of toe, right (HCC)    Osteomyelitis of second toe of right foot (HCC)    Diabetic foot infection (HCC)    Shortness of breath    Asthma exacerbation    Appendicitis    Diabetic foot ulcer with osteomyelitis ([address]    Cellulitis    Open wound of toe    Rhinovirus    Vision impairment    WD-Nonhealing surgical wound, initial encounter    WD-Amputation of one or more toes ([address]    WD-Right foot ulcer, with fat layer exposed ([address]    WD-Diabetes mellitus type 2, insulin dependent ([address]    WD-Obesity    WD-Class 2 obesity in adult       REVIEW OF SYSTEMS    Constitutional: negative for anorexia, chills, fatigue, fevers, malaise, and sweats  Respiratory: negative for cough, hemoptysis, pleurisy/chest pain, sputum, and stridor  Cardiovascular: negative for chest pain, chest pressure/discomfort, exertional chest pressure/discomfort, near-syncope, orthopnea, palpitations, paroxysmal nocturnal dyspnea, syncope, and tachypnea  Integument/breast: positive for skin lesion(s)     Objective:      /87   Pulse 90   Temp (!) 96.7 °F (35.9 °C) (Temporal)   Resp 20     BP Readings from Last 3 Encounters:   02/20/23 134/87   02/13/23 (!) 139/97   12/30/22 131/76        PHYSICAL EXAM  General Appearance:   Alert, cooperative, no distress, obese   Head:   Normocephalic, without obvious abnormality, atraumatic    Eyes:   PERRL, conjunctiva/corneas clear    Ears:   Normal external appearance, hearing grossly intact    Nose:  Nares normal, mucosa normal, no drainage    Throat:  Lips, mucosa, and tongue normal    Neck:  Supple, trachea midline    Respiratory:   Equal chest rise, respirations unlabored, no wheezing   Cardiovascular:   Regular rate and rhythm    Abdomen, GI:   Soft, non-tender, no rebound or guarding    Musculoskeletal:  Atraumatic, no cyanosis or edema    Neurologic:  Nonfocal, strength & sensation grossly intact   Psychiatric: Oriented x3, grossly appropriate judgement and insight      Dermatologic exam: Visual inspection of the periwound reveals the skin to be moist and edematous  Wound exam: see wound description below in procedure note      Assessment:       Elizabeth Abdullahi  appears to have a non-healing wound of the right foot. The etiology of the wound is felt to be non-healing surgical. There are multiple complicating factors including diabetes, poor glucose control, chronic pressure, shear force, obesity, smoking, and non-adherence. A comprehensive wound management program would be helpful to heal this wound. Assessments completed include fall risk and nutritional, functional,and psychological status. At this time appropriate care would include: periodic debridement and wound care as below.      Problem List Items Addressed This Visit          Endocrine    WD-Diabetes mellitus type 2, insulin dependent ([facility]       Other    WD-Nonhealing surgical wound, initial encounter    WD-Amputation of one or more toes ([facility]    WD-Right foot ulcer, with fat layer exposed ([facility]    WD-Class 2 obesity in adult     Other Visit Diagnoses       Acute post-operative pain        Relevant Medications    oxyCODONE-acetaminophen (PERCOCET) 5-325 MG per tablet            Procedure Note    Indications:  Based on my examination of this patient's wound(s) today, sharp excision into necrotic subcutaneous tissue is required to promote healing and evaluate the extent of previous healing. Performed by: KAYLI Becerra CNP    Consent obtained: Yes    Time out taken:  Yes    Pain Control: Anesthetic  Anesthetic: 4% Lidocaine Liquid Topical        Debridement:Excisional Debridement    Using curette the wound(s) was/were sharply debrided down through and including the removal of subcutaneous tissue.         Devitalized Tissue Debrided:  fibrin, biofilm, slough, and exudate    Pre Debridement Measurements:  Are located in the Wound Documentation Flow Sheet    All active wounds listed below with today's date are evaluated  Wound(s)    debrided this date include # : 1 & 2     Post  Debridement Measurements:  Wound 02/20/23 Toe (Comment  which one) Anterior;Right #2 RT 2ND-4TH TOE AMP SITE (Active)   Wound Image   02/20/23 0939   Wound Etiology Non-Healing Surgical 02/20/23 1032   Dressing Status New dressing applied 02/20/23 1032   Wound Cleansed Wound cleanser 02/20/23 0939   Offloading for Diabetic Foot Ulcers Offloading ordered;Post op shoe 02/20/23 1032   Wound Length (cm) 1.5 cm 02/20/23 0939   Wound Width (cm) 2.5 cm 02/20/23 0939   Wound Depth (cm) 0.1 cm 02/20/23 0939   Wound Surface Area (cm^2) 3.75 cm^2 02/20/23 0939   Wound Volume (cm^3) 0.375 cm^3 02/20/23 0939   Post-Procedure Length (cm) 1.5 cm 02/20/23 1005   Post-Procedure Width (cm) 2.5 cm 02/20/23 1005   Post-Procedure Depth (cm) 0.1 cm 02/20/23 1005   Post-Procedure Surface Area (cm^2) 3.75 cm^2 02/20/23 1005   Post-Procedure Volume (cm^3) 0.375 cm^3 02/20/23 1005   Distance Tunneling (cm) 0 cm 02/20/23 0939   Tunneling Position ___ O'Clock 0 02/20/23 0939   Undermining Starts ___ O'Clock 0 02/20/23 0939   Undermining Ends___ O'Clock 00 02/20/23 0939   Undermining Maxium Distance (cm) 0 02/20/23 0939   Wound Assessment Pink/red 02/20/23 0939   Drainage Amount Moderate 02/20/23 0939   Drainage Description Serosanguinous 02/20/23 0939   Odor Malodorous/putrid 02/20/23 0939   Sandy-wound Assessment Maceration 02/20/23 0939   Margins Defined edges 02/20/23 0939   Wound Thickness Description not for Pressure Injury Full thickness 02/20/23 0939   Number of days: 1       Wound 02/20/23 Toe (Comment  which one) Anterior;Right #1 RT GREAT TOE (Active)   Wound Image   02/20/23 0939   Wound Etiology Diabetic 02/20/23 1032   Dressing Status New dressing applied 02/20/23 1032   Wound Cleansed Wound cleanser 02/20/23 0939   Offloading for Diabetic Foot Ulcers Offloading ordered;Post op shoe 02/20/23 1032   Wound Length (cm) 0.7 cm 02/20/23 0939   Wound Width (cm) 0.9 cm 02/20/23 0939   Wound Depth (cm) 0.1 cm 02/20/23 0939   Wound Surface Area (cm^2) 0.63 cm^2 02/20/23 0939   Wound Volume (cm^3) 0.063 cm^3 02/20/23 0939   Post-Procedure Length (cm) 0.7 cm 02/20/23 1005   Post-Procedure Width (cm) 0.9 cm 02/20/23 1005   Post-Procedure Depth (cm) 0.1 cm 02/20/23 1005   Post-Procedure Surface Area (cm^2) 0.63 cm^2 02/20/23 1005   Post-Procedure Volume (cm^3) 0.063 cm^3 02/20/23 1005   Distance Tunneling (cm) 0 cm 02/20/23 0939   Tunneling Position ___ O'Clock 0 02/20/23 0939   Undermining Starts ___ O'Clock 0 02/20/23 0939   Undermining Ends___ O'Clock 0 02/20/23 0939   Undermining Maxium Distance (cm) 0 02/20/23 0939   Wound Assessment Pink/red;Slough 02/20/23 0939   Drainage Amount None 02/20/23 0939   Drainage Description Yellow 02/20/23 0939   Odor None 02/20/23 0939   Sandy-wound Assessment Fragile 02/20/23 0939   Margins Defined edges 02/20/23 0939   Wound Thickness Description not for Pressure Injury Full thickness 02/20/23 0939   Number of days: 1     Percent of Wound(s) Debrided: approximately 100%    Total  Area  Debrided:  4.38 sq cm     Bleeding:  Minimal    Hemostasis Achieved:  by pressure    Procedural Pain:  0  / 10     Post Procedural Pain:  0 / 10     Response to treatment:  Well tolerated by patient. Status of wound: The wound was macerated today, debrided wounds, regimen as below, follow up in one week. The patients records were reviewed and discussed. Time was given for questions . All questions were answered to the patients satisfaction. A total time of 30 minutes was spent with at least 50% related to face to face counseling and coordination of care. Plan:     Discharge instructions:    Discharge Instructions         PHYSICIAN ORDERS AND DISCHARGE INSTRUCTIONS    NOTE: Upon discharge from the [address], you will receive a patient experience survey. We would be grateful if you would take the time to fill this survey out. Wound care order history:     DIRK's   Right       Left    Date:   Cultures:     Grafts:     Antibiotics:        Continuing wound care orders and information:                Residence:                Continue home health care with:    Your wound-care supplies will be provided by: Wound cleansing:     Do not scrub or use excessive force. Wash hands with soap and water before and after dressing changes. Prior to applying a clean dressing, cleanse wound with normal saline, wound cleanser, or mild soap and water. Ask the physician or nurse before getting the wound(s) wet in a shower. General Wound management:   Keep weight off wounds and reposition every 2 hours. Avoid standing for long periods of time. Apply wraps/stockings in AM and remove at bedtime.   If swelling is present, elevate legs to the level of the heart or above for 30 minutes 4-5 times a day and/or when sitting. When taking antibiotics take entire prescription as ordered by physician do not stop taking until medicine is all gone. Orders for this week: 2/20/23    FAX ORDERS TO [facility] Rx:    Moisturize dry intact skin of lower extremities. Right Great Toe Wound - Wash with soap and water, pat dry. Apply Anasept and Endoform cut to fit wound, secure with steri strips. Cover with super absorber. Wrap with Coban 2 Lite. Leave in place until nurse visit on Thursday. Be sure to keep dry. Right 2-4 Toe Amp site - Wash with soap and water, pat dry. Paint incision and surrounding wound bed/maceration with betadine and stimulen. Cover with ca alginate and super absorber (ABD or Sorbex). Wrap with Coban 2 Lite. Leave in place until nurse visit on Thursday. Be sure to keep dry. Wear surgical shoe. Paint toenails with Betadine today in clinic 2/20/23, due to trimming nails. Nurse visit on Thursday to check and reapply dressings. Follow up with Tammy Mathews CNP in 1 week(s) in the wound care center. Call [phone] for any questions or concerns.   Date__________   Time____________          Treatment Note Wound 02/20/23 Toe (Comment  which one) Anterior;Right #2 RT 2ND-4TH TOE AMP SITE-Dressing/Treatment:  (paint with betadine stimulen,ca ag,abd,coban2 lite)  Wound 02/20/23 Toe (Comment  which one) Anterior;Right #1 RT GREAT TOE-Dressing/Treatment:  (anasept,endoform,steristrips,abd,coban2 lite)    Written Patient Dismissal Instructions Given            Electronically signed by KAYLI Mcmanus CNP on 2/21/2023 at 1:31 PM

## 2023-02-23 ENCOUNTER — HOSPITAL ENCOUNTER (OUTPATIENT)
Dept: WOUND CARE | Age: 38
Discharge: HOME | End: 2023-02-23

## 2023-02-27 ENCOUNTER — HOSPITAL ENCOUNTER (OUTPATIENT)
Dept: WOUND CARE | Age: 38
Discharge: HOME | End: 2023-02-27
Payer: COMMERCIAL

## 2023-02-27 VITALS
RESPIRATION RATE: 18 BRPM | DIASTOLIC BLOOD PRESSURE: 87 MMHG | HEART RATE: 107 BPM | SYSTOLIC BLOOD PRESSURE: 155 MMHG | TEMPERATURE: 97.3 F

## 2023-02-27 DIAGNOSIS — T81.89XA NONHEALING SURGICAL WOUND, INITIAL ENCOUNTER: Primary | ICD-10-CM

## 2023-02-27 DIAGNOSIS — E66.01 CLASS 2 SEVERE OBESITY DUE TO EXCESS CALORIES WITH SERIOUS COMORBIDITY AND BODY MASS INDEX (BMI) OF 37.0 TO 37.9 IN ADULT (HCC): ICD-10-CM

## 2023-02-27 DIAGNOSIS — L97.512 RIGHT FOOT ULCER, WITH FAT LAYER EXPOSED (HCC): ICD-10-CM

## 2023-02-27 DIAGNOSIS — S98.131A AMPUTATION OF TOE OF RIGHT FOOT (HCC): ICD-10-CM

## 2023-02-27 DIAGNOSIS — Z79.4 DIABETES MELLITUS TYPE 2, INSULIN DEPENDENT (HCC): ICD-10-CM

## 2023-02-27 DIAGNOSIS — E11.9 DIABETES MELLITUS TYPE 2, INSULIN DEPENDENT (HCC): ICD-10-CM

## 2023-02-27 PROCEDURE — 11042 DBRDMT SUBQ TIS 1ST 20SQCM/<: CPT | Performed by: NURSE PRACTITIONER

## 2023-02-27 PROCEDURE — 11042 DBRDMT SUBQ TIS 1ST 20SQCM/<: CPT

## 2023-02-27 RX ORDER — GENTAMICIN SULFATE 1 MG/G
OINTMENT TOPICAL ONCE
OUTPATIENT
Start: 2023-02-27 | End: 2023-02-27

## 2023-02-27 RX ORDER — BETAMETHASONE DIPROPIONATE 0.05 %
OINTMENT (GRAM) TOPICAL ONCE
OUTPATIENT
Start: 2023-02-27 | End: 2023-02-27

## 2023-02-27 RX ORDER — LIDOCAINE 50 MG/G
OINTMENT TOPICAL ONCE
OUTPATIENT
Start: 2023-02-27 | End: 2023-02-27

## 2023-02-27 RX ORDER — BACITRACIN ZINC AND POLYMYXIN B SULFATE 500; 1000 [USP'U]/G; [USP'U]/G
OINTMENT TOPICAL ONCE
OUTPATIENT
Start: 2023-02-27 | End: 2023-02-27

## 2023-02-27 RX ORDER — LIDOCAINE HYDROCHLORIDE 40 MG/ML
SOLUTION TOPICAL ONCE
OUTPATIENT
Start: 2023-02-27 | End: 2023-02-27

## 2023-02-27 RX ORDER — GINSENG 100 MG
CAPSULE ORAL ONCE
OUTPATIENT
Start: 2023-02-27 | End: 2023-02-27

## 2023-02-27 RX ORDER — LIDOCAINE 40 MG/G
CREAM TOPICAL ONCE
OUTPATIENT
Start: 2023-02-27 | End: 2023-02-27

## 2023-02-27 RX ORDER — CLOBETASOL PROPIONATE 0.5 MG/G
OINTMENT TOPICAL ONCE
OUTPATIENT
Start: 2023-02-27 | End: 2023-02-27

## 2023-02-27 RX ORDER — BACITRACIN, NEOMYCIN, POLYMYXIN B 400; 3.5; 5 [USP'U]/G; MG/G; [USP'U]/G
OINTMENT TOPICAL ONCE
OUTPATIENT
Start: 2023-02-27 | End: 2023-02-27

## 2023-02-27 ASSESSMENT — PAIN DESCRIPTION - PAIN TYPE: TYPE: ACUTE PAIN

## 2023-02-27 ASSESSMENT — PAIN DESCRIPTION - DESCRIPTORS: DESCRIPTORS: THROBBING

## 2023-02-27 ASSESSMENT — PAIN DESCRIPTION - FREQUENCY: FREQUENCY: CONTINUOUS

## 2023-02-27 ASSESSMENT — PAIN DESCRIPTION - LOCATION: LOCATION: FOOT

## 2023-02-27 ASSESSMENT — PAIN DESCRIPTION - ORIENTATION: ORIENTATION: RIGHT

## 2023-02-27 ASSESSMENT — PAIN DESCRIPTION - ONSET: ONSET: ON-GOING

## 2023-02-27 ASSESSMENT — PAIN - FUNCTIONAL ASSESSMENT: PAIN_FUNCTIONAL_ASSESSMENT: PREVENTS OR INTERFERES SOME ACTIVE ACTIVITIES AND ADLS

## 2023-02-27 ASSESSMENT — PAIN SCALES - GENERAL: PAINLEVEL_OUTOF10: 7

## 2023-02-27 NOTE — PROGRESS NOTES
Wound Care Center progress Visit      Melissa Estevez  AGE: 44 y.o. GENDER: male  : 1985  EPISODE DATE:  2023   Referred by:Roxanne Canas MD  [address] [facility]. [address]  [facility],  [address] [place] [address]     Subjective:     CHIEF COMPLAINT  WOUND WOUND RIGHT FOOT  Chief Complaint   Patient presents with    Wound Check        HISTORY of PRESENT ILLNESS      Melissa Estevez is a 44 y.o. male who presents to the [address] [place] [address] for evaluation and treatment of Acute non-healing surgical  ulcer(s) of  right foot. The condition is of moderate severity. The ulcer has been present since 23 post third toe amputation and revision of the second toe per Dr. Yenny Cannon 23. The underlying cause is thought to be nonhealing surgical complicated by diabetes. The patients care to date has included hospitalization -23 with toe amputation sent home with [facility] and Home Health referral with [facility], received IV antibiotics in hospital, home on Keflex and Doxycycline through 23. The patient has significant underlying medical conditions as below. Felicia De La Rosa MD.    Wound Pain Timing/Severity: waxing and waning, moderate  Quality of pain: dull, aching, tender  Severity of pain:  4 / 10   Modifying Factors: diabetes, poor glucose control, chronic pressure, shear force, obesity, smoking, and non-adherence  Associated Signs/Symptoms: edema, erythema, drainage, and pain    DIRK: as indicated base on wound location and assessment    Wound infection: wound culture will be obtained as needed for symptoms of infection     Arterial evaluation: if indicated based on wound, location, symptoms and healing    Venous Evaluation: if indicated based on wound, location, symptoms and healing    Diabetes: On an insulin regimen.  Follows with Dr. Jenkins Courser  Hemoglobin A1C   Date Value Ref Range Status   02/10/2023 9.3 (H) 4.2 - 6.3 % Final        Diabetes education provided:  Diabetes pathoetiology, difference between type 1 and type 2 diabetes, and progressive nature of Type 2 DM. Metabolic syndrome: association of diabetes with dyslipidemia, HTN and obesity. Diabetic Neuropathy: signs and therapy. Foot care: advised to wash feet daily, pat dry and apply lotion at night, avoiding between toes. Need to look at feet daily and report to a physician any signs of inflammation or skin damage. Discussed diabetes shoes and socks. Diabetic management related to wound healing    Smoking: Yes. Patient counseled in length on smoking cessation including benefits of quitting and health risks of continuing to smoke including but not limited to lung cancer, COPD, risk of coronary artery disease. Patient verbalized understanding today, is not ready to quit. Anticoagulant/Antiplatelet therapy: No  Immunosuppression: No  Obesity: yes    Patient educated on the 6 essential components necessary for wound healing: Circulation, Debridements, Proper Dressings and Topical Wound Products, Infection Control, Edema Control and Offloading. Patient educated on those factors that negatively effect or impact wound healing: smoking, obesity, uncontrolled diabetes, anticoagulant and immunosuppressive regimens, inadequate nutrition, untreated arterial and venous disease if applicable and measures to manage edema. Nutritional status: well nourished. Discussed need for increased protein and calories for wound healing and good sources of protein (just over 7 grams for every 20 pounds of body weight). Animal-based foods high in protein (meat, poultry, fish, eggs, and dairy foods). Plant based foods high in protein (tofu, lentils, beans, chickpeas, nuts, quinoa and terry seeds.      Lab Results   Component Value Date     02/13/2023    K 3.8 02/13/2023     02/13/2023    CO2 25 02/13/2023    BUN 18 02/13/2023    CREATININE 0.8 (L) 02/13/2023    GLUCOSE 228 (H) 02/13/2023    CALCIUM 8.8 02/13/2023    PROT 7.6 02/09/2023    LABALBU 4.0 02/09/2023    BILITOT 0.4 02/09/2023    ALKPHOS 107 02/09/2023    AST 72 (H) 02/09/2023    ALT 34 02/09/2023    LABGLOM >60 02/13/2023    GFRAA >60 09/20/2022     Off Loading  Offloading or minimizing or removing weight placed on an area with poor circulation such as diabetic wounds or pressure. This can be achieved with crutches, wheel chair, knee walker etc. Minimizing pressure through partial weight bearing (minimizing the amount of  pressure applied and or the amount of time on the area of pressure) or maintaining a non-weight bearing status can be used to promote and often can be essential for thee wound to heal. Off loading may also need to be achieved for non-weight bearing wounds such as pressure ulcers to the torso. Turning and changing positions frequently, at least every two hours. Use of pressure cushion if sitting up in chair. Skin Care  Keep skin clean and well moisturized , moisturize routinely with ointments for heavier moisturizer needs for extremely dry skin or cracks such as A&D ointment and lotions for a light moisturizer such as CeraVe or Eucerin. If incontinent change incontinence garments as soon as soiled and keeping skin clean and use barrier cream to protect the skin. Reduce Salt and Sodium  Choose low- or reduced- sodium, or no-salt-added versions of foods and condiments when available. Buy fresh, plain frozen, or canned with no-added-salt vegetables. Use fresh poultry, fish and lean meat, rather than canned, smoked or processed types. Choose ready-to-eat breakfast cereals that are lower in sodium. Limit cured foods (such as huang and ham), foods packed in brine (such as pickled foods) and condiments (such as MSG, mustard, horseradish, and catsup). Limit even lower sodium versions of soy sauce and teriyaki sauce-treat these condiments just like salt). In cooking and at the table, flavor foods with herbs, spices, lemon, lime, vinegar or salt-free seasoning blends.  Start by cutting salt in half. Cook rice, pasta and hot cereals without salt. Cut back on instant or flavored rice, pasta and cereal mixes, which usually have added salt. Choose convenience foods that are lower in sodium. Limit frozen dinners, packaged mixes, canned soups and dressings. Rinse canned foods, such as tuna, to remove some sodium. Choose fruits or vegetables instead of salty snack foods. Edema Management   Whenever resting, raise your legs up. Try to keep the swollen area higher than the level of your heart. Take breaks from standing or sitting in one position. Walk around to increase the blood flow in your lower legs. Move your feet and ankles often while you stand, or tighten and relax your leg muscles. Wear support stockings. Put them on in the morning, before swelling gets worse. Eat a balanced diet. Lose weight if you need to. Limit the amount of salt (sodium) in your diet. Salt holds fluid in the body and may increase swelling. Apply compression stocking(s) every morning as soon as you get up. Remove at bedtime unless instructed to wear day and night. Hand wash and line dry to prevent loss of elasticity. Replace every 3-4 months to ensure proper fit. Weight Management   Will need to ultimately change overall eating behaviors to have success with weight loss. Encouraged to weigh daily and work towards a goal of 1-2 pounds of weight loss weekly. Encouraged to journal all food intake, Evisors pal is a useful tool to help keep track of food intake and caloric value. Keep calorie level at approximately 3914-2440. Protein intake is to be a minimum of 60 grams per day (unless otherwise directed). Water drinking was encouraged with a goal of 64oz-128oz daily. Beverages to be calorie free except for milk. Every other beverage should be water, avoid soda. Continue to increase level of physical activity. Refer to weight management as indicated and requested by patient.           PAST MEDICAL HISTORY Diagnosis Date    Asthma     Blind left eye     Diabetes mellitus ([address]     GERD (gastroesophageal reflux disease)     Hypertension     Retinal detachment 2012    left       PAST SURGICAL HISTORY    Past Surgical History:   Procedure Laterality Date    CORNEAL TRANSPLANT Bilateral     EYE SURGERY      left eye removed    EYE SURGERY Right     FRACTURE SURGERY      foot left    LAPAROSCOPIC APPENDECTOMY N/A 4/28/2022    APPENDECTOMY LAPAROSCOPIC performed by Jose Martinez MD at [address] Right     MANDIBLE SURGERY Bilateral     TOE AMPUTATION Right 07/25/2017    TOE AMPUTATION Right 7/25/2022    TOE AMPUTATION, RAY AMPUTATION OF RIGHT SECOND TOE performed by Nancy Lara MD at [address] Right 2/11/2023    THIRD TOE AMPUTATION AND REVISION OF SECOND TOE AMPUTATION performed by Hermelindo Sarah MD at [address] [facility]    Family History   Problem Relation Age of Onset    Diabetes Mother     Asthma Mother     High Blood Pressure Mother     Stroke Mother     Heart Disease Mother     Diabetes Father     Asthma Father     High Blood Pressure Father        SOCIAL HISTORY    Social History     Tobacco Use    Smoking status: Every Day     Packs/day: 0.50     Years: 5.00     Pack years: 2.50     Types: Cigarettes    Smokeless tobacco: Never    Tobacco comments:     EDUCATED SMOKING & DM CAN SLOWS DOWN BLOOD FLOW   Vaping Use    Vaping Use: Never used   Substance Use Topics    Alcohol use: Yes     Comment: occasionally    Drug use: Not Currently     Types: Marijuana Lorretta Freud)       ALLERGIES    Allergies   Allergen Reactions    Ciprofloxacin Shortness Of Breath    Glucosamine Anaphylaxis    Shellfish-Derived Products Anaphylaxis       MEDICATIONS    Current Outpatient Medications on File Prior to Encounter   Medication Sig Dispense Refill    cephALEXin (KEFLEX) 500 MG capsule Take 1 capsule by mouth 4 times daily for 10 days 40 capsule 0    doxycycline hyclate (VIBRA-TABS) 100 MG tablet Take 1 tablet by mouth every 12 hours for 10 days 20 tablet 0    oxyCODONE-acetaminophen (PERCOCET) 5-325 MG per tablet Take 1 tablet by mouth every 6 hours as needed for Pain for up to 7 days. Max Daily Amount: 4 tablets 28 tablet 0    alogliptin (NESINA) 25 MG TABS tablet Take 1 tablet by mouth daily 60 tablet 0    insulin lispro (HUMALOG) 100 UNIT/ML SOLN injection vial Inject 20 Units into the skin 3 times daily (with meals) 20 mL 0    insulin NPH (HUMULIN N;NOVOLIN N) 100 UNIT/ML injection vial Inject 15 Units into the skin every morning 5 mL 2    moxifloxacin (VIGAMOX) 0.5 % ophthalmic solution Place 1 drop into the right eye 4 times daily 3 mL 0    pantoprazole (PROTONIX) 40 MG tablet Take 1 tablet by mouth every morning (before breakfast) 30 tablet 3    nicotine (NICODERM CQ) 14 MG/24HR Place 1 patch onto the skin daily (Patient not taking: Reported on 2/10/2023) 30 patch 3    SYMBICORT 160-4.5 MCG/ACT AERO Inhale 2 puffs into the lungs in the morning and at bedtime      TRULICITY 1.5 JR/0.4QT SOPN inject 0.5 milliliters ( 1 AND 1/2 milligrams ) subcutaneously ev. ..  (REFER TO PRESCRIPTION NOTES). 3 pen 0    LANTUS SOLOSTAR 100 UNIT/ML injection pen Inject 50 Units into the skin nightly 2 pen 0    tiZANidine (ZANAFLEX) 4 MG tablet Take 4 mg by mouth nightly as needed      cloNIDine (CATAPRES) 0.2 MG tablet Take 0.2 mg by mouth in the morning.       trimethoprim-polymyxin b (POLYTRIM) 25849-2.1 UNIT/ML-% ophthalmic solution Apply 1 drop to eye 4 times daily      UNABLE TO FIND Place 1 drop into the right eye 4 times daily 07/21/22 Patient on Vancomycin Preserved Eye soln 25 mg/ml      ezetimibe (ZETIA) 10 MG tablet take 1 tablet by mouth once daily      TECHLITE PEN NEEDLES 31G X 5 MM MISC use 1 PEN NEEDLE to inject MEDICATION subcutaneously two to three times a day      melatonin 5 MG TABS tablet take 1 tablet by mouth every evening      pregabalin (LYRICA) 100 MG capsule take 1 capsule by mouth twice a day      sucralfate (CARAFATE) 1 GM tablet Take 1 tablet by mouth 4 times daily 120 tablet 0    albuterol sulfate  (90 Base) MCG/ACT inhaler Inhale 2 puffs into the lungs every 6 hours as needed for Wheezing 1 Inhaler 5    albuterol-ipratropium (COMBIVENT RESPIMAT)  MCG/ACT AERS inhaler Inhale 1 puff into the lungs every 6 hours 1 Inhaler 5    acetaminophen (AMINOFEN) 325 MG tablet Take 2 tablets by mouth every 6 hours as needed for Pain 20 tablet 0    brimonidine (ALPHAGAN) 0.2 % ophthalmic solution Place 1 drop into the right eye 2 times daily      dorzolamide-timolol 22.3-6.8 MG/ML SOLN Place 1 drop into the right eye 2 times daily      prednisoLONE acetate (PRED FORTE) 1 % ophthalmic suspension Place 1 drop into the right eye 4 times daily      allopurinol (ZYLOPRIM) 300 MG tablet Take 300 mg by mouth daily       atorvastatin (LIPITOR) 10 MG tablet Take 10 mg by mouth daily       lisinopril-hydrochlorothiazide (PRINZIDE;ZESTORETIC) 20-12.5 MG per tablet Take 1 tablet by mouth daily       amLODIPine (NORVASC) 10 MG tablet Take 10 mg by mouth daily       No current facility-administered medications on file prior to encounter.        PROBLEM LIST    Patient Active Problem List   Diagnosis    Sprain of left ankle    Closed nondisplaced fracture of fifth metatarsal bone of left foot    Alcohol abuse    UTI (urinary tract infection) due to Enterococcus    Acute osteomyelitis of toe, right (HCC)    Osteomyelitis of second toe of right foot (HCC)    Diabetic foot infection (HCC)    Shortness of breath    Asthma exacerbation    Appendicitis    Diabetic foot ulcer with osteomyelitis ([address]    Cellulitis    Open wound of toe    Rhinovirus    Vision impairment    WD-Nonhealing surgical wound, initial encounter    WD-Amputation of one or more toes ([address]    WD-Right foot ulcer, with fat layer exposed ([address]    WD-Diabetes mellitus type 2, insulin dependent ([address]    WD-Obesity    WD-Class 2 obesity in adult Acute post-operative pain       REVIEW OF SYSTEMS    Constitutional: negative for anorexia, chills, fatigue, fevers, malaise, and sweats  Respiratory: negative for cough, hemoptysis, pleurisy/chest pain, sputum, and stridor  Cardiovascular: negative for chest pain, chest pressure/discomfort, exertional chest pressure/discomfort, near-syncope, orthopnea, palpitations, paroxysmal nocturnal dyspnea, syncope, and tachypnea  Integument/breast: positive for skin lesion(s)     Objective:      BP (!) 155/87   Pulse (!) 107   Temp 97.3 °F (36.3 °C) (Temporal)   Resp 18     BP Readings from Last 3 Encounters:   02/27/23 (!) 155/87   02/20/23 134/87   02/13/23 (!) 139/97        PHYSICAL EXAM  General Appearance:   Alert, cooperative, no distress, obese   Head:   Normocephalic, without obvious abnormality, atraumatic    Eyes:   PERRL, conjunctiva/corneas clear    Ears:   Normal external appearance, hearing grossly intact    Nose:  Nares normal, mucosa normal, no drainage    Throat:  Lips, mucosa, and tongue normal    Neck:  Supple, trachea midline    Respiratory:   Equal chest rise, respirations unlabored, no wheezing   Cardiovascular:   Regular rate and rhythm    Abdomen, GI:   Soft, non-tender, no rebound or guarding    Musculoskeletal:  Atraumatic, no cyanosis or edema    Neurologic:  Nonfocal, strength & sensation grossly intact   Psychiatric: Oriented x3, grossly appropriate judgement and insight      Dermatologic exam: Visual inspection of the periwound reveals the skin to be moist and edematous  Wound exam: see wound description below in procedure note      Assessment:       Rani Del Toro  appears to have a non-healing wound of the right foot. The etiology of the wound is felt to be non-healing surgical. There are multiple complicating factors including diabetes, poor glucose control, chronic pressure, shear force, obesity, smoking, and non-adherence.   A comprehensive wound management program would be helpful to heal this wound. Assessments completed include fall risk and nutritional, functional,and psychological status. At this time appropriate care would include: periodic debridement and wound care as below. Problem List Items Addressed This Visit          Endocrine    WD-Diabetes mellitus type 2, insulin dependent ([facility]    Relevant Orders    Initiate Outpatient Wound Care Protocol       Other    WD-Nonhealing surgical wound, initial encounter - Primary    Relevant Orders    Initiate Outpatient Wound Care Protocol    WD-Amputation of one or more toes ([facility]    Relevant Orders    Initiate Outpatient Wound Care Protocol    WD-Right foot ulcer, with fat layer exposed ([facility]    Relevant Orders    Initiate Outpatient Wound Care Protocol    WD-Obesity    Relevant Orders    Initiate Outpatient Wound Care Protocol       Procedure Note    Indications:  Based on my examination of this patient's wound(s) today, sharp excision into necrotic subcutaneous tissue is required to promote healing and evaluate the extent of previous healing. Performed by: KAYLI Garcia CNP    Consent obtained: Yes    Time out taken:  Yes    Pain Control: Anesthetic  Anesthetic: 4% Lidocaine Liquid Topical        Debridement:Excisional Debridement    Using curette the wound(s) was/were sharply debrided down through and including the removal of subcutaneous tissue.         Devitalized Tissue Debrided:  fibrin, biofilm, slough, and exudate    Pre Debridement Measurements:  Are located in the Wound Documentation Flow Sheet    All active wounds listed below with today's date are evaluated  Wound(s)    debrided this date include # : 1 & 2     Post  Debridement Measurements:  Wound 02/20/23 Anterior;Right #2 RT 2ND-4TH TOE AMP SITE (Active)   Wound Image   02/20/23 0939   Wound Etiology Non-Healing Surgical 02/27/23 0914   Dressing Status New dressing applied 02/27/23 0938   Wound Cleansed Soap and water 02/27/23 0914   Offloading for Diabetic Foot Ulcers Post op shoe 02/27/23 0938   Wound Length (cm) 5 cm 02/27/23 0914   Wound Width (cm) 3.5 cm 02/27/23 0914   Wound Depth (cm) 0.7 cm 02/27/23 0914   Wound Surface Area (cm^2) 17.5 cm^2 02/27/23 0914   Change in Wound Size % (l*w) -366.67 02/27/23 0914   Wound Volume (cm^3) 12.25 cm^3 02/27/23 0914   Wound Healing % -3167 02/27/23 0914   Post-Procedure Length (cm) 5 cm 02/27/23 0931   Post-Procedure Width (cm) 3.5 cm 02/27/23 0931   Post-Procedure Depth (cm) 0.7 cm 02/27/23 0931   Post-Procedure Surface Area (cm^2) 17.5 cm^2 02/27/23 0931   Post-Procedure Volume (cm^3) 12.25 cm^3 02/27/23 0931   Distance Tunneling (cm) 0 cm 02/27/23 0914   Tunneling Position ___ O'Clock 0 02/27/23 0914   Undermining Starts ___ O'Clock 0 02/27/23 0914   Undermining Ends___ O'Clock 0 02/27/23 0914   Undermining Maxium Distance (cm) 0 02/27/23 0914   Wound Assessment Pink/red;Slough; Devitalized tissue 02/27/23 0914   Drainage Amount Moderate 02/27/23 0914   Drainage Description Serosanguinous 02/27/23 0914   Odor Moderate 02/27/23 0914   Sandy-wound Assessment Maceration;Fragile 02/27/23 0914   Margins Defined edges 02/27/23 0914   Wound Thickness Description not for Pressure Injury Full thickness 02/27/23 0914   Number of days: 7       Wound 02/20/23 Anterior;Right #1 RT GREAT TOE (Active)   Wound Image   02/20/23 0939   Wound Etiology Diabetic 02/27/23 0914   Dressing Status New dressing applied 02/27/23 0938   Wound Cleansed Soap and water 02/27/23 0914   Offloading for Diabetic Foot Ulcers Offloading ordered;Post op shoe 02/27/23 0938   Wound Length (cm) 1.1 cm 02/27/23 0914   Wound Width (cm) 1 cm 02/27/23 0914   Wound Depth (cm) 0.2 cm 02/27/23 0914   Wound Surface Area (cm^2) 1.1 cm^2 02/27/23 0914   Change in Wound Size % (l*w) -74.6 02/27/23 0914   Wound Volume (cm^3) 0.22 cm^3 02/27/23 0914   Wound Healing % -249 02/27/23 0914   Post-Procedure Length (cm) 1.1 cm 02/27/23 0931   Post-Procedure Width (cm) 1 cm 02/27/23 0931 Post-Procedure Depth (cm) 0.2 cm 02/27/23 0931   Post-Procedure Surface Area (cm^2) 1.1 cm^2 02/27/23 0931   Post-Procedure Volume (cm^3) 0.22 cm^3 02/27/23 0931   Distance Tunneling (cm) 0 cm 02/27/23 0914   Tunneling Position ___ O'Clock 0 02/27/23 0914   Undermining Starts ___ O'Clock 0 02/27/23 0914   Undermining Ends___ O'Clock 0 02/27/23 0914   Undermining Maxium Distance (cm) 0 02/27/23 0914   Wound Assessment Pink/red 02/27/23 0914   Drainage Amount None 02/27/23 0914   Drainage Description Yellow 02/27/23 0914   Odor None 02/27/23 0914   Sandy-wound Assessment Maceration 02/27/23 0914   Margins Defined edges 02/27/23 0914   Wound Thickness Description not for Pressure Injury Full thickness 02/27/23 0914   Number of days: 7     Percent of Wound(s) Debrided: approximately 100%    Total  Area  Debrided: 18.6 sq cm     Bleeding:  Minimal    Hemostasis Achieved:  by pressure    Procedural Pain:  0  / 10     Post Procedural Pain:  0 / 10     Response to treatment:  Well tolerated by patient. Status of wound: The wound remains macerated today, the wound has almost totally dehisced. Multiple suture not doing any good or closing anything  was remove. The  wounds were debrided today,  regimen as below, follow up in one week. Home Health in place. The patients records were reviewed and discussed. Time was given for questions . All questions were answered to the patients satisfaction. A total time of 30 minutes was spent with at least 50% related to face to face counseling and coordination of care. Plan:     Discharge instructions:    Discharge Instructions         PHYSICIAN ORDERS AND DISCHARGE INSTRUCTIONS     NOTE: Upon discharge from the [address], you will receive a patient experience survey. We would be grateful if you would take the time to fill this survey out.      Wound care order history:                 DIRK's   Right       Left               Date:              Cultures:                Grafts: Antibiotics:           Continuing wound care orders and information:                 Residence:                Continue home health care with:               Your wound-care supplies will be provided by: Wound cleansing:               Do not scrub or use excessive force. Wash hands with soap and water before and after dressing changes. Prior to applying a clean dressing, cleanse wound with normal saline, wound cleanser, or mild soap and water. Ask the physician or nurse before getting the wound(s) wet in a shower. General Wound management:              Keep weight off wounds and reposition every 2 hours. Avoid standing for long periods of time. Apply wraps/stockings in AM and remove at bedtime. If swelling is present, elevate legs to the level of the heart or above for 30 minutes 4-5 times a day and/or when sitting. When taking antibiotics take entire prescription as ordered by physician do not stop taking until medicine is all gone. Orders for this week: 2/27/23     FAX ORDERS TO [facility] Rx:     Moisturize dry intact skin of lower extremities. Right Great Toe and Right 2-4 Toe Amp site - Wash with soap and water, pat dry. Paint incision and surrounding wound bed/maceration with betadine and cover with Karen, ca alginate and super absorber (ABD or Sorbex). Wrap with Coban 2 Lite. Home Care to change on Tuesday and Saturday     Wear surgical shoe. Follow up with Jeny Becerra CNP Thursday (3/2/23)in the wound care center. Call [phone] for any questions or concerns.   Date__________   Time____________           Treatment Note Wound 02/20/23 Anterior;Right #2 RT 2ND-4TH TOE AMP SITE-Dressing/Treatment:  (betadine karen ca alg sorbex coban 2 lite.)  Wound 02/20/23 Anterior;Right #1 RT GREAT TOE-Dressing/Treatment:  (betadine peyman ca alg sorbex coban 2 lite.)    Written Patient Dismissal Instructions Given            Electronically signed by KAYLI Angulo CNP on 2/27/2023 at 12:27 PM

## 2023-02-27 NOTE — PROGRESS NOTES
Multilayer Compression Wrap   (Not Unna) Below the Knee    NAME:  Jose Miguel Borges  YOB: 1985  MEDICAL RECORD NUMBER:  [id number]  DATE:  2/27/2023    Multilayer compression wrap: Removed old Multilayer wrap if indicated and wash leg with mild soap/water. Applied moisturizing agent to dry skin as needed. Applied primary and secondary dressing as ordered. Applied multilayered dressing below the knee to left lower leg. Instructed patient/caregiver not to remove dressing and to keep it clean and dry. Instructed patient/caregiver on complications to report to provider, such as pain, numbness in toes, heavy drainage, and slippage of dressing. Instructed patient on purpose of compression dressing and on activity and exercise recommendations.       Electronically signed by Elaine Cabrera LPN on 0/42/4256 at 7:01 AM

## 2023-02-27 NOTE — DISCHARGE INSTRUCTIONS
PHYSICIAN ORDERS AND DISCHARGE INSTRUCTIONS     NOTE: Upon discharge from the Wound Center, you will receive a patient experience survey. We would be grateful if you would take the time to fill this survey out.     Wound care order history:                 DIRK's   Right       Left               Date:              Cultures:                Grafts:                Antibiotics:           Continuing wound care orders and information:                 Residence:                Continue home health care with:               Your wound-care supplies will be provided by:      Wound cleansing:               Do not scrub or use excessive force.              Wash hands with soap and water before and after dressing changes.  Prior to applying a clean dressing, cleanse wound with normal saline, wound cleanser, or mild soap and water.               Ask the physician or nurse before getting the wound(s) wet in a shower.     General Wound management:              Keep weight off wounds and reposition every 2 hours.              Avoid standing for long periods of time.              Apply wraps/stockings in AM and remove at bedtime.  If swelling is present, elevate legs to the level of the heart or above for 30 minutes 4-5 times a day and/or when sitting.                          When taking antibiotics take entire prescription as ordered by physician do not stop taking until medicine is all gone.                                                           Orders for this week: 2/27/23     FAX ORDERS TO [facility]     Rx:     Moisturize dry intact skin of lower extremities.        Right Great Toe and Right 2-4 Toe Amp site - Wash with soap and water, pat dry. Paint incision and surrounding wound bed/maceration with betadine and cover with Karen, ca alginate and super absorber (ABD or Sorbex). Wrap with Coban 2 Lite. Home Care to change on Tuesday and Saturday     Wear surgical shoe.           Follow up with Nidhi Cummings CNP Thursday  (3/2/23)in the wound care center. Call [phone] for any questions or concerns.   Date__________   Time____________

## 2023-03-02 ENCOUNTER — HOSPITAL ENCOUNTER (OUTPATIENT)
Dept: WOUND CARE | Age: 38
Discharge: HOME | End: 2023-03-02

## 2023-03-02 NOTE — DISCHARGE INSTRUCTIONS
PHYSICIAN ORDERS AND DISCHARGE INSTRUCTIONS     NOTE: Upon discharge from the [address], you will receive a patient experience survey. We would be grateful if you would take the time to fill this survey out. Wound care order history:                 DIRK's   Right       Left               Date:              Cultures:                Grafts:                Antibiotics:           Continuing wound care orders and information:                 Residence:                Continue home health care with:               Your wound-care supplies will be provided by: Wound cleansing:               Do not scrub or use excessive force. Wash hands with soap and water before and after dressing changes. Prior to applying a clean dressing, cleanse wound with normal saline, wound cleanser, or mild soap and water. Ask the physician or nurse before getting the wound(s) wet in a shower. General Wound management:              Keep weight off wounds and reposition every 2 hours. Avoid standing for long periods of time. Apply wraps/stockings in AM and remove at bedtime. If swelling is present, elevate legs to the level of the heart or above for 30 minutes 4-5 times a day and/or when sitting. When taking antibiotics take entire prescription as ordered by physician do not stop taking until medicine is all gone. Orders for this week: 3/2/23     FAX ORDERS TO [facility] Rx:     Moisturize dry intact skin of lower extremities. Right Great Toe and Right 2-4 Toe Amp site - Wash with soap and water, pat dry. Paint incision and surrounding wound bed/maceration with betadine and cover with Karen, ca alginate and super absorber (ABD or Sorbex). Wrap with Coban 2 Lite. Home Care to change on Tuesday and Saturday     Wear surgical shoe.             Follow up with Jeny Becerra CNP Thursday (3/2/23)in the wound care center. Call [phone] for any questions or concerns.   Date__________   Time____________

## 2023-03-06 ENCOUNTER — HOSPITAL ENCOUNTER (OUTPATIENT)
Dept: WOUND CARE | Age: 38
Discharge: HOME | End: 2023-03-06
Payer: COMMERCIAL

## 2023-03-06 VITALS
TEMPERATURE: 97.5 F | SYSTOLIC BLOOD PRESSURE: 151 MMHG | RESPIRATION RATE: 18 BRPM | DIASTOLIC BLOOD PRESSURE: 96 MMHG | HEART RATE: 70 BPM

## 2023-03-06 DIAGNOSIS — S98.131A AMPUTATION OF TOE OF RIGHT FOOT (HCC): ICD-10-CM

## 2023-03-06 DIAGNOSIS — Z79.4 DIABETES MELLITUS TYPE 2, INSULIN DEPENDENT (HCC): ICD-10-CM

## 2023-03-06 DIAGNOSIS — E11.9 DIABETES MELLITUS TYPE 2, INSULIN DEPENDENT (HCC): ICD-10-CM

## 2023-03-06 DIAGNOSIS — E66.01 CLASS 2 SEVERE OBESITY DUE TO EXCESS CALORIES WITH SERIOUS COMORBIDITY AND BODY MASS INDEX (BMI) OF 37.0 TO 37.9 IN ADULT (HCC): ICD-10-CM

## 2023-03-06 DIAGNOSIS — T81.89XA NONHEALING SURGICAL WOUND, INITIAL ENCOUNTER: Primary | ICD-10-CM

## 2023-03-06 DIAGNOSIS — L97.512 RIGHT FOOT ULCER, WITH FAT LAYER EXPOSED (HCC): ICD-10-CM

## 2023-03-06 PROCEDURE — 11042 DBRDMT SUBQ TIS 1ST 20SQCM/<: CPT | Performed by: NURSE PRACTITIONER

## 2023-03-06 PROCEDURE — 11042 DBRDMT SUBQ TIS 1ST 20SQCM/<: CPT

## 2023-03-06 RX ORDER — LIDOCAINE 40 MG/G
CREAM TOPICAL ONCE
Status: CANCELLED | OUTPATIENT
Start: 2023-03-06 | End: 2023-03-06

## 2023-03-06 RX ORDER — LIDOCAINE 50 MG/G
OINTMENT TOPICAL ONCE
Status: CANCELLED | OUTPATIENT
Start: 2023-03-06 | End: 2023-03-06

## 2023-03-06 RX ORDER — CLOBETASOL PROPIONATE 0.5 MG/G
OINTMENT TOPICAL ONCE
Status: CANCELLED | OUTPATIENT
Start: 2023-03-06 | End: 2023-03-06

## 2023-03-06 RX ORDER — BACITRACIN ZINC AND POLYMYXIN B SULFATE 500; 1000 [USP'U]/G; [USP'U]/G
OINTMENT TOPICAL ONCE
Status: CANCELLED | OUTPATIENT
Start: 2023-03-06 | End: 2023-03-06

## 2023-03-06 RX ORDER — BACITRACIN, NEOMYCIN, POLYMYXIN B 400; 3.5; 5 [USP'U]/G; MG/G; [USP'U]/G
OINTMENT TOPICAL ONCE
Status: CANCELLED | OUTPATIENT
Start: 2023-03-06 | End: 2023-03-06

## 2023-03-06 RX ORDER — GENTAMICIN SULFATE 1 MG/G
OINTMENT TOPICAL ONCE
Status: CANCELLED | OUTPATIENT
Start: 2023-03-06 | End: 2023-03-06

## 2023-03-06 RX ORDER — GINSENG 100 MG
CAPSULE ORAL ONCE
Status: CANCELLED | OUTPATIENT
Start: 2023-03-06 | End: 2023-03-06

## 2023-03-06 RX ORDER — DOXYCYCLINE HYCLATE 100 MG
100 TABLET ORAL EVERY 12 HOURS SCHEDULED
Qty: 20 TABLET | Refills: 0 | Status: SHIPPED | OUTPATIENT
Start: 2023-03-06 | End: 2023-03-16

## 2023-03-06 RX ORDER — CEPHALEXIN 500 MG/1
500 CAPSULE ORAL 4 TIMES DAILY
Qty: 40 CAPSULE | Refills: 0 | Status: SHIPPED | OUTPATIENT
Start: 2023-03-06 | End: 2023-03-16

## 2023-03-06 RX ORDER — LIDOCAINE HYDROCHLORIDE 40 MG/ML
SOLUTION TOPICAL ONCE
Status: CANCELLED | OUTPATIENT
Start: 2023-03-06 | End: 2023-03-06

## 2023-03-06 RX ORDER — BETAMETHASONE DIPROPIONATE 0.05 %
OINTMENT (GRAM) TOPICAL ONCE
Status: CANCELLED | OUTPATIENT
Start: 2023-03-06 | End: 2023-03-06

## 2023-03-06 ASSESSMENT — PAIN DESCRIPTION - PAIN TYPE: TYPE: ACUTE PAIN

## 2023-03-06 ASSESSMENT — PAIN - FUNCTIONAL ASSESSMENT: PAIN_FUNCTIONAL_ASSESSMENT: PREVENTS OR INTERFERES SOME ACTIVE ACTIVITIES AND ADLS

## 2023-03-06 ASSESSMENT — PAIN DESCRIPTION - LOCATION: LOCATION: FOOT

## 2023-03-06 ASSESSMENT — PAIN DESCRIPTION - ONSET: ONSET: ON-GOING

## 2023-03-06 ASSESSMENT — PAIN SCALES - GENERAL: PAINLEVEL_OUTOF10: 9

## 2023-03-06 ASSESSMENT — PAIN DESCRIPTION - ORIENTATION: ORIENTATION: RIGHT

## 2023-03-06 ASSESSMENT — PAIN DESCRIPTION - FREQUENCY: FREQUENCY: INTERMITTENT

## 2023-03-06 ASSESSMENT — PAIN DESCRIPTION - DESCRIPTORS: DESCRIPTORS: ACHING;THROBBING

## 2023-03-06 NOTE — DISCHARGE INSTRUCTIONS
PHYSICIAN ORDERS AND DISCHARGE INSTRUCTIONS     NOTE: Upon discharge from the [address], you will receive a patient experience survey. We would be grateful if you would take the time to fill this survey out. Wound care order history:                 DIRK's   Right       Left               Date:              Cultures:                Grafts:                Antibiotics:           Continuing wound care orders and information:                 Residence:                Continue home health care with:               Your wound-care supplies will be provided by: Wound cleansing:               Do not scrub or use excessive force. Wash hands with soap and water before and after dressing changes. Prior to applying a clean dressing, cleanse wound with normal saline, wound cleanser, or mild soap and water. Ask the physician or nurse before getting the wound(s) wet in a shower. General Wound management:              Keep weight off wounds and reposition every 2 hours. Avoid standing for long periods of time. Apply wraps/stockings in AM and remove at bedtime. If swelling is present, elevate legs to the level of the heart or above for 30 minutes 4-5 times a day and/or when sitting. When taking antibiotics take entire prescription as ordered by physician do not stop taking until medicine is all gone. Orders for this week: 3/6/23     FAX ORDERS TO [facility] Rx:     Moisturize dry intact skin of lower extremities. Right Great Toe and Right 2-4 Toe Amp site - Wash with soap and water, pat dry. Paint incision and surrounding wound bed/maceration with betadine. Apply Santyl, Anasept and Stimulen to wound bed. Cover with ca alginate (use entire sheet folded between toes/over wound) and super absorber (ABD or Sorbex). Wrap with Coban 2 Lite.  Home Care to change on Tuesday and Saturday. Wear surgical shoe. Nurse visit on Thursday! Follow up with Leticia Hopkins CNP in 1 week in the wound care center. Call [phone] for any questions or concerns.   Date__________   Time____________

## 2023-03-06 NOTE — PROGRESS NOTES
NAME:  García Barnhart  YOB: 1985  MEDICAL RECORD NUMBER:  [id number]  DATE:  3/6/2023      Nonselective enzymatic debridement performed with Santyl per physician order to wound(s) of the Right Foot amp site. Patient tolerated the procedure well. Multilayer Compression Wrap   (Not Unna) Below the Knee    Multilayer compression wrap: Removed old Multilayer wrap if indicated and wash leg with mild soap/water. Applied moisturizing agent to dry skin as needed. Applied primary and secondary dressing as ordered. Applied multilayered dressing below the knee to right lower leg. Instructed patient/caregiver not to remove dressing and to keep it clean and dry. Instructed patient/caregiver on complications to report to provider, such as pain, numbness in toes, heavy drainage, and slippage of dressing. Instructed patient on purpose of compression dressing and on activity and exercise recommendations.       Electronically signed by Joleen Posada LPN on 6/2/3961 at 79:10 AM

## 2023-03-07 NOTE — PROGRESS NOTES
Wound Care Center progress Visit      Tacho Hairston  AGE: 44 y.o. GENDER: male  : 1985  EPISODE DATE:  3/6/2023   Referred by:MD Kailyn Hoffman. [address] John Paul Oconnell  Allyn Walter. [address]  [facility],  [address]     Subjective:     CHIEF COMPLAINT  WOUND WOUND RIGHT FOOT  Chief Complaint   Patient presents with    Wound Check        HISTORY of PRESENT ILLNESS      Tacho Hairston is a 44 y.o. male who presents to the [address] [place] [address] for evaluation and treatment of Acute non-healing surgical  ulcer(s) of  right foot. The condition is of moderate severity. The ulcer has been present since 23 post third toe amputation and revision of the second toe per Dr. Judi Keita 23. The underlying cause is thought to be nonhealing surgical complicated by diabetes. The patients care to date has included hospitalization -23 with toe amputation sent home with [facility] and Home Health referral with [place], received IV antibiotics in hospital, home on Keflex and Doxycycline through 23. The patient has significant underlying medical conditions as below. Madalyn Solorio MD.    Wound Pain Timing/Severity: waxing and waning, moderate  Quality of pain: dull, aching, tender  Severity of pain:  4 / 10   Modifying Factors: diabetes, poor glucose control, chronic pressure, shear force, obesity, smoking, and non-adherence  Associated Signs/Symptoms: edema, erythema, drainage, and pain    DIRK: as indicated base on wound location and assessment    Wound infection: wound culture will be obtained as needed for symptoms of infection     Arterial evaluation: if indicated based on wound, location, symptoms and healing    Venous Evaluation: if indicated based on wound, location, symptoms and healing    Diabetes: On an insulin regimen.  Follows with Dr. Angelique Pal  Hemoglobin A1C   Date Value Ref Range Status   02/10/2023 9.3 (H) 4.2 - 6.3 % Final        Diabetes education provided:  Diabetes pathoetiology, difference between type 1 and type 2 diabetes, and progressive nature of Type 2 DM. Metabolic syndrome: association of diabetes with dyslipidemia, HTN and obesity. Diabetic Neuropathy: signs and therapy. Foot care: advised to wash feet daily, pat dry and apply lotion at night, avoiding between toes. Need to look at feet daily and report to a physician any signs of inflammation or skin damage. Discussed diabetes shoes and socks. Diabetic management related to wound healing    Smoking: Yes. Patient counseled in length on smoking cessation including benefits of quitting and health risks of continuing to smoke including but not limited to lung cancer, COPD, risk of coronary artery disease. Patient verbalized understanding today, is not ready to quit. Anticoagulant/Antiplatelet therapy: No  Immunosuppression: No  Obesity: yes    Patient educated on the 6 essential components necessary for wound healing: Circulation, Debridements, Proper Dressings and Topical Wound Products, Infection Control, Edema Control and Offloading. Patient educated on those factors that negatively effect or impact wound healing: smoking, obesity, uncontrolled diabetes, anticoagulant and immunosuppressive regimens, inadequate nutrition, untreated arterial and venous disease if applicable and measures to manage edema. Nutritional status: well nourished. Discussed need for increased protein and calories for wound healing and good sources of protein (just over 7 grams for every 20 pounds of body weight). Animal-based foods high in protein (meat, poultry, fish, eggs, and dairy foods). Plant based foods high in protein (tofu, lentils, beans, chickpeas, nuts, quinoa and terry seeds.      Lab Results   Component Value Date     02/13/2023    K 3.8 02/13/2023     02/13/2023    CO2 25 02/13/2023    BUN 18 02/13/2023    CREATININE 0.8 (L) 02/13/2023    GLUCOSE 228 (H) 02/13/2023    CALCIUM 8.8 02/13/2023    PROT 7.6 02/09/2023    LABALBU 4.0 02/09/2023    BILITOT 0.4 02/09/2023    ALKPHOS 107 02/09/2023    AST 72 (H) 02/09/2023    ALT 34 02/09/2023    LABGLOM >60 02/13/2023    GFRAA >60 09/20/2022     Off Loading  Offloading or minimizing or removing weight placed on an area with poor circulation such as diabetic wounds or pressure. This can be achieved with crutches, wheel chair, knee walker etc. Minimizing pressure through partial weight bearing (minimizing the amount of  pressure applied and or the amount of time on the area of pressure) or maintaining a non-weight bearing status can be used to promote and often can be essential for thee wound to heal. Off loading may also need to be achieved for non-weight bearing wounds such as pressure ulcers to the torso. Turning and changing positions frequently, at least every two hours. Use of pressure cushion if sitting up in chair. Skin Care  Keep skin clean and well moisturized , moisturize routinely with ointments for heavier moisturizer needs for extremely dry skin or cracks such as A&D ointment and lotions for a light moisturizer such as CeraVe or Eucerin. If incontinent change incontinence garments as soon as soiled and keeping skin clean and use barrier cream to protect the skin. Reduce Salt and Sodium  Choose low- or reduced- sodium, or no-salt-added versions of foods and condiments when available. Buy fresh, plain frozen, or canned with no-added-salt vegetables. Use fresh poultry, fish and lean meat, rather than canned, smoked or processed types. Choose ready-to-eat breakfast cereals that are lower in sodium. Limit cured foods (such as huang and ham), foods packed in brine (such as pickled foods) and condiments (such as MSG, mustard, horseradish, and catsup). Limit even lower sodium versions of soy sauce and teriyaki sauce-treat these condiments just like salt). In cooking and at the table, flavor foods with herbs, spices, lemon, lime, vinegar or salt-free seasoning blends.  Start by cutting salt in half. Cook rice, pasta and hot cereals without salt. Cut back on instant or flavored rice, pasta and cereal mixes, which usually have added salt. Choose convenience foods that are lower in sodium. Limit frozen dinners, packaged mixes, canned soups and dressings. Rinse canned foods, such as tuna, to remove some sodium. Choose fruits or vegetables instead of salty snack foods. Edema Management   Whenever resting, raise your legs up. Try to keep the swollen area higher than the level of your heart. Take breaks from standing or sitting in one position. Walk around to increase the blood flow in your lower legs. Move your feet and ankles often while you stand, or tighten and relax your leg muscles. Wear support stockings. Put them on in the morning, before swelling gets worse. Eat a balanced diet. Lose weight if you need to. Limit the amount of salt (sodium) in your diet. Salt holds fluid in the body and may increase swelling. Apply compression stocking(s) every morning as soon as you get up. Remove at bedtime unless instructed to wear day and night. Hand wash and line dry to prevent loss of elasticity. Replace every 3-4 months to ensure proper fit. Weight Management   Will need to ultimately change overall eating behaviors to have success with weight loss. Encouraged to weigh daily and work towards a goal of 1-2 pounds of weight loss weekly. Encouraged to journal all food intake, [employer] pal is a useful tool to help keep track of food intake and caloric value. Keep calorie level at approximately 0223-7647. Protein intake is to be a minimum of 60 grams per day (unless otherwise directed). Water drinking was encouraged with a goal of 64oz-128oz daily. Beverages to be calorie free except for milk. Every other beverage should be water, avoid soda. Continue to increase level of physical activity. Refer to weight management as indicated and requested by patient.           PAST MEDICAL HISTORY Diagnosis Date    Asthma     Blind left eye     Diabetes mellitus ([address]     GERD (gastroesophageal reflux disease)     Hypertension     Retinal detachment 2012    left       PAST SURGICAL HISTORY    Past Surgical History:   Procedure Laterality Date    CORNEAL TRANSPLANT Bilateral     EYE SURGERY      left eye removed    EYE SURGERY Right     FRACTURE SURGERY      foot left    LAPAROSCOPIC APPENDECTOMY N/A 4/28/2022    APPENDECTOMY LAPAROSCOPIC performed by Monica Peter MD at [address] Right     MANDIBLE SURGERY Bilateral     TOE AMPUTATION Right 07/25/2017    TOE AMPUTATION Right 7/25/2022    TOE AMPUTATION, RAY AMPUTATION OF RIGHT SECOND TOE performed by Rigo Becerra MD at [address] Right 2/11/2023    THIRD TOE AMPUTATION AND REVISION OF SECOND TOE AMPUTATION performed by Austyn Subramanian MD at [address]    Family History   Problem Relation Age of Onset    Diabetes Mother     Asthma Mother     High Blood Pressure Mother     Stroke Mother     Heart Disease Mother     Diabetes Father     Asthma Father     High Blood Pressure Father        SOCIAL HISTORY    Social History     Tobacco Use    Smoking status: Every Day     Packs/day: 0.50     Years: 5.00     Pack years: 2.50     Types: Cigarettes    Smokeless tobacco: Never    Tobacco comments:     EDUCATED SMOKING & DM CAN SLOWS DOWN BLOOD FLOW   Vaping Use    Vaping Use: Never used   Substance Use Topics    Alcohol use: Yes     Comment: occasionally    Drug use: Not Currently     Types: Marijuana Maurita Handsome)       ALLERGIES    Allergies   Allergen Reactions    Ciprofloxacin Shortness Of Breath    Glucosamine Anaphylaxis    Shellfish-Derived Products Anaphylaxis       MEDICATIONS    Current Outpatient Medications on File Prior to Encounter   Medication Sig Dispense Refill    alogliptin (NESINA) 25 MG TABS tablet Take 1 tablet by mouth daily 60 tablet 0    insulin lispro (HUMALOG) 100 UNIT/ML SOLN injection vial Inject 20 Units into the skin 3 times daily (with meals) 20 mL 0    insulin NPH (HUMULIN N;NOVOLIN N) 100 UNIT/ML injection vial Inject 15 Units into the skin every morning 5 mL 2    moxifloxacin (VIGAMOX) 0.5 % ophthalmic solution Place 1 drop into the right eye 4 times daily 3 mL 0    pantoprazole (PROTONIX) 40 MG tablet Take 1 tablet by mouth every morning (before breakfast) 30 tablet 3    nicotine (NICODERM CQ) 14 MG/24HR Place 1 patch onto the skin daily (Patient not taking: No sig reported) 30 patch 3    SYMBICORT 160-4.5 MCG/ACT AERO Inhale 2 puffs into the lungs in the morning and at bedtime      TRULICITY 1.5 GH/1.3WL SOPN inject 0.5 milliliters ( 1 AND 1/2 milligrams ) subcutaneously ev. ..  (REFER TO PRESCRIPTION NOTES). (Patient not taking: Reported on 3/6/2023) 3 pen 0    LANTUS SOLOSTAR 100 UNIT/ML injection pen Inject 50 Units into the skin nightly 2 pen 0    tiZANidine (ZANAFLEX) 4 MG tablet Take 4 mg by mouth nightly as needed      cloNIDine (CATAPRES) 0.2 MG tablet Take 0.2 mg by mouth in the morning.       trimethoprim-polymyxin b (POLYTRIM) 00270-4.1 UNIT/ML-% ophthalmic solution Apply 1 drop to eye 4 times daily      UNABLE TO FIND Place 1 drop into the right eye 4 times daily 07/21/22 Patient on Vancomycin Preserved Eye soln 25 mg/ml      ezetimibe (ZETIA) 10 MG tablet take 1 tablet by mouth once daily      TECHLITE PEN NEEDLES 31G X 5 MM MISC use 1 PEN NEEDLE to inject MEDICATION subcutaneously two to three times a day      melatonin 5 MG TABS tablet take 1 tablet by mouth every evening      pregabalin (LYRICA) 100 MG capsule take 1 capsule by mouth twice a day      sucralfate (CARAFATE) 1 GM tablet Take 1 tablet by mouth 4 times daily 120 tablet 0    albuterol sulfate  (90 Base) MCG/ACT inhaler Inhale 2 puffs into the lungs every 6 hours as needed for Wheezing 1 Inhaler 5    albuterol-ipratropium (COMBIVENT RESPIMAT)  MCG/ACT AERS inhaler Inhale 1 puff into the lungs every 6 hours 1 Inhaler 5    acetaminophen (AMINOFEN) 325 MG tablet Take 2 tablets by mouth every 6 hours as needed for Pain 20 tablet 0    brimonidine (ALPHAGAN) 0.2 % ophthalmic solution Place 1 drop into the right eye 2 times daily      dorzolamide-timolol 22.3-6.8 MG/ML SOLN Place 1 drop into the right eye 2 times daily      prednisoLONE acetate (PRED FORTE) 1 % ophthalmic suspension Place 1 drop into the right eye 4 times daily      allopurinol (ZYLOPRIM) 300 MG tablet Take 300 mg by mouth daily       atorvastatin (LIPITOR) 10 MG tablet Take 10 mg by mouth daily       lisinopril-hydrochlorothiazide (PRINZIDE;ZESTORETIC) 20-12.5 MG per tablet Take 1 tablet by mouth daily       amLODIPine (NORVASC) 10 MG tablet Take 10 mg by mouth daily       No current facility-administered medications on file prior to encounter.        PROBLEM LIST    Patient Active Problem List   Diagnosis    Sprain of left ankle    Closed nondisplaced fracture of fifth metatarsal bone of left foot    Alcohol abuse    UTI (urinary tract infection) due to Enterococcus    Acute osteomyelitis of toe, right (HCC)    Osteomyelitis of second toe of right foot (HCC)    Diabetic foot infection (HCC)    Shortness of breath    Asthma exacerbation    Appendicitis    Diabetic foot ulcer with osteomyelitis ([address]    Cellulitis    Open wound of toe    Rhinovirus    Vision impairment    WD-Nonhealing surgical wound, initial encounter    WD-Amputation of one or more toes ([address]    WD-Right foot ulcer, with fat layer exposed ([address]    WD-Diabetes mellitus type 2, insulin dependent ([address]    WD-Obesity    WD-Class 2 obesity in adult    Acute post-operative pain       REVIEW OF SYSTEMS    Constitutional: negative for anorexia, chills, fatigue, fevers, malaise, and sweats  Respiratory: negative for cough, hemoptysis, pleurisy/chest pain, sputum, and stridor  Cardiovascular: negative for chest pain, chest pressure/discomfort, exertional chest pressure/discomfort, near-syncope, orthopnea, palpitations, paroxysmal nocturnal dyspnea, syncope, and tachypnea  Integument/breast: positive for skin lesion(s)     Objective:      BP (!) 151/96   Pulse 70   Temp 97.5 °F (36.4 °C) (Temporal)   Resp 18     BP Readings from Last 3 Encounters:   03/06/23 (!) 151/96   02/27/23 (!) 155/87   02/20/23 134/87        PHYSICAL EXAM  General Appearance:   Alert, cooperative, no distress, obese   Head:   Normocephalic, without obvious abnormality, atraumatic    Eyes:   PERRL, conjunctiva/corneas clear    Ears:   Normal external appearance, hearing grossly intact    Nose:  Nares normal, mucosa normal, no drainage    Throat:  Lips, mucosa, and tongue normal    Neck:  Supple, trachea midline    Respiratory:   Equal chest rise, respirations unlabored, no wheezing   Cardiovascular:   Regular rate and rhythm    Abdomen, GI:   Soft, non-tender, no rebound or guarding    Musculoskeletal:  Atraumatic, no cyanosis or edema    Neurologic:  Nonfocal, strength & sensation grossly intact   Psychiatric: Oriented x3, grossly appropriate judgement and insight      Dermatologic exam: Visual inspection of the periwound reveals the skin to be moist and edematous  Wound exam: see wound description below in procedure note      Assessment:       Ellis Weston  appears to have a non-healing wound of the right foot. The etiology of the wound is felt to be non-healing surgical. There are multiple complicating factors including diabetes, poor glucose control, chronic pressure, shear force, obesity, smoking, and non-adherence. A comprehensive wound management program would be helpful to heal this wound. Assessments completed include fall risk and nutritional, functional,and psychological status. At this time appropriate care would include: periodic debridement and wound care as below.      Problem List Items Addressed This Visit          Endocrine    WD-Diabetes mellitus type 2, insulin dependent ([address]       Other    WD-Nonhealing surgical wound, initial encounter - Primary    WD-Amputation of one or more toes ([address]    WD-Right foot ulcer, with fat layer exposed ([address]    WD-Obesity       Procedure Note    Indications:  Based on my examination of this patient's wound(s) today, sharp excision into necrotic subcutaneous tissue is required to promote healing and evaluate the extent of previous healing. Performed by: Rossie Bumpers, APRN - CNP    Consent obtained: Yes    Time out taken:  Yes    Pain Control: Anesthetic  Anesthetic: 4% Lidocaine Liquid Topical        Debridement:Excisional Debridement    Using curette the wound(s) was/were sharply debrided down through and including the removal of subcutaneous tissue. Devitalized Tissue Debrided:  fibrin, biofilm, slough, and exudate    Pre Debridement Measurements:  Are located in the Wound Documentation Flow Sheet    All active wounds listed below with today's date are evaluated  Wound(s)    debrided this date include # : 1 & 2     Post  Debridement Measurements:  Wound 02/20/23 Anterior;Right #2 RT 2ND-4TH TOE AMP SITE (Active)   Wound Image   03/06/23 0909   Wound Etiology Non-Healing Surgical 03/06/23 0909   Dressing Status New dressing applied 03/06/23 1007   Wound Cleansed Soap and water; Wound cleanser 03/06/23 0909   Offloading for Diabetic Foot Ulcers Post op shoe 03/06/23 1007   Wound Length (cm) 5.2 cm 03/06/23 0909   Wound Width (cm) 3.5 cm 03/06/23 0909   Wound Depth (cm) 0.5 cm 03/06/23 0909   Wound Surface Area (cm^2) 18.2 cm^2 03/06/23 0909   Change in Wound Size % (l*w) -385.33 03/06/23 0909   Wound Volume (cm^3) 9.1 cm^3 03/06/23 0909   Wound Healing % -2327 03/06/23 0909   Post-Procedure Length (cm) 5.2 cm 03/06/23 0939   Post-Procedure Width (cm) 3.5 cm 03/06/23 0939   Post-Procedure Depth (cm) 0.5 cm 03/06/23 0939   Post-Procedure Surface Area (cm^2) 18.2 cm^2 03/06/23 0939   Post-Procedure Volume (cm^3) 9.1 cm^3 03/06/23 0939   Distance Tunneling (cm) 0 cm 03/06/23 2289   Tunneling Position ___ O'Clock 0 03/06/23 0909   Undermining Starts ___ O'Clock 0 03/06/23 0909   Undermining Ends___ O'Clock 0 03/06/23 0909   Undermining Maxium Distance (cm) 0 03/06/23 0909   Wound Assessment [place]/red;Slough 03/06/23 0909   Drainage Amount Large 03/06/23 0909   Drainage Description Brown;Serosanguinous 03/06/23 0909   Odor Malodorous/putrid 03/06/23 0909   Sandy-wound Assessment Maceration; Hyperkeratosis (callous); Edematous 03/06/23 0909   Margins Defined edges 03/06/23 0909   Wound Thickness Description not for Pressure Injury Full thickness 03/06/23 0909   Number of days: 14       Wound 02/20/23 Anterior;Right #1 RT GREAT TOE (Active)   Wound Image   03/06/23 0909   Wound Etiology Diabetic 03/06/23 0909   Dressing Status New dressing applied 03/06/23 1007   Wound Cleansed Soap and water; Wound cleanser 03/06/23 0909   Offloading for Diabetic Foot Ulcers Post op shoe;Offloading ordered 03/06/23 1007   Wound Length (cm) 0.5 cm 03/06/23 0909   Wound Width (cm) 0.3 cm 03/06/23 0909   Wound Depth (cm) 0.1 cm 03/06/23 0909   Wound Surface Area (cm^2) 0.15 cm^2 03/06/23 0909   Change in Wound Size % (l*w) 76.19 03/06/23 0909   Wound Volume (cm^3) 0.015 cm^3 03/06/23 0909   Wound Healing % 76 03/06/23 0909   Post-Procedure Length (cm) 0.5 cm 03/06/23 0939   Post-Procedure Width (cm) 0.3 cm 03/06/23 0939   Post-Procedure Depth (cm) 0.1 cm 03/06/23 0939   Post-Procedure Surface Area (cm^2) 0.15 cm^2 03/06/23 0939   Post-Procedure Volume (cm^3) 0.015 cm^3 03/06/23 0939   Distance Tunneling (cm) 0 cm 03/06/23 0909   Tunneling Position ___ O'Clock 0 03/06/23 0909   Undermining Starts ___ O'Clock 0 03/06/23 0909   Undermining Ends___ O'Clock 0 03/06/23 0909   Undermining [place] Distance (cm) 0 03/06/23 0909   Wound Assessment [place]/red;Slough 03/06/23 0909   Drainage Amount Large 03/06/23 0909   Drainage Description Brown;Serosanguinous 03/06/23 0909   Odor Malodorous/putrid 03/06/23 0909   Sandy-wound Assessment Hyperkeratosis (callous); Edematous 03/06/23 0909   Margins Defined edges 03/06/23 0909   Wound Thickness Description not for Pressure Injury Full thickness 03/06/23 0909   Number of days: 14     Percent of Wound(s) Debrided: approximately 100%    Total  Area  Debrided: 18.35 sq cm     Bleeding:  Minimal    Hemostasis Achieved:  by pressure    Procedural Pain:  0  / 10     Post Procedural Pain:  0 / 10     Response to treatment:  Well tolerated by patient. Status of wound: The wound has declined, the patient has missed several wound clinic visits and Home Health visits. The wound remains macerated related to a lack of routine wound care from missed visits. The wound has almost completely dehisced. Multiple suture not securing the incision any long removed. The  wounds were debrided today,  regimen as below, follow up Thursday, renewed  Keflex ans Doxycycline. Dr. Joao Varela made aware of changes in the wound. The patients records were reviewed and discussed. Time was given for questions . All questions were answered to the patients satisfaction. A total time of 20 minutes was spent with at least 50% related to face to face counseling and coordination of care. Plan:     Discharge instructions:    Discharge Instructions         PHYSICIAN ORDERS AND DISCHARGE INSTRUCTIONS     NOTE: Upon discharge from the [address], you will receive a patient experience survey. We would be grateful if you would take the time to fill this survey out. Wound care order history:                 DIRK's   Right       Left               Date:              Cultures:                Grafts:                Antibiotics:           Continuing wound care orders and information:                 Residence:                Continue home health care with:               Your wound-care supplies will be provided by: Wound cleansing:               Do not scrub or use excessive force.               Wash hands with soap and water before and after dressing changes. Prior to applying a clean dressing, cleanse wound with normal saline, wound cleanser, or mild soap and water. Ask the physician or nurse before getting the wound(s) wet in a shower. General Wound management:              Keep weight off wounds and reposition every 2 hours. Avoid standing for long periods of time. Apply wraps/stockings in AM and remove at bedtime. If swelling is present, elevate legs to the level of the heart or above for 30 minutes 4-5 times a day and/or when sitting. When taking antibiotics take entire prescription as ordered by physician do not stop taking until medicine is all gone. Orders for this week: 3/6/23     FAX ORDERS TO [facility] Rx:     Moisturize dry intact skin of lower extremities. Right Great Toe and Right 2-4 Toe Amp site - Wash with soap and water, pat dry. Paint incision and surrounding wound bed/maceration with betadine. Apply Santyl, Anasept and Stimulen to wound bed. Cover with ca alginate (use entire sheet folded between toes/over wound) and super absorber (ABD or Sorbex). Wrap with Coban 2 Lite. Home Care to change on Tuesday and Saturday. Wear surgical shoe. Nurse visit on Thursday! Follow up with Colby Guadalupe CNP in 1 week in the wound care center. Call [phone] for any questions or concerns.   Date__________   Time____________        Treatment Note Wound 02/20/23 Anterior;Right #2 RT 2ND-4TH TOE AMP SITE-Dressing/Treatment:  (Betadine, Santyl, Anasept, Stimulen, Calcium Alginate, ABD, Coban 2 Lite)  Wound 02/20/23 Anterior;Right #1 RT GREAT TOE-Dressing/Treatment:  (Betadine, Santyl, Anasept, Stimulen, Calcium Alginate, ABD, Coban 2 Lite)    Written Patient Dismissal Instructions Given            Electronically signed by KAYLI Mckee CNP on 3/7/2023 at 7:56 AM

## 2023-03-09 ENCOUNTER — HOSPITAL ENCOUNTER (OUTPATIENT)
Dept: WOUND CARE | Age: 38
Discharge: HOME | End: 2023-03-09
Payer: COMMERCIAL

## 2023-03-09 VITALS
DIASTOLIC BLOOD PRESSURE: 92 MMHG | HEART RATE: 102 BPM | TEMPERATURE: 98 F | SYSTOLIC BLOOD PRESSURE: 145 MMHG | RESPIRATION RATE: 18 BRPM

## 2023-03-09 DIAGNOSIS — E11.9 DIABETES MELLITUS TYPE 2, INSULIN DEPENDENT (HCC): ICD-10-CM

## 2023-03-09 DIAGNOSIS — Z79.4 DIABETES MELLITUS TYPE 2, INSULIN DEPENDENT (HCC): ICD-10-CM

## 2023-03-09 DIAGNOSIS — S98.131A AMPUTATION OF TOE OF RIGHT FOOT (HCC): ICD-10-CM

## 2023-03-09 DIAGNOSIS — E66.01 CLASS 2 SEVERE OBESITY DUE TO EXCESS CALORIES WITH SERIOUS COMORBIDITY AND BODY MASS INDEX (BMI) OF 37.0 TO 37.9 IN ADULT (HCC): ICD-10-CM

## 2023-03-09 DIAGNOSIS — G89.18 ACUTE POST-OPERATIVE PAIN: ICD-10-CM

## 2023-03-09 DIAGNOSIS — T81.89XA NONHEALING SURGICAL WOUND, INITIAL ENCOUNTER: Primary | ICD-10-CM

## 2023-03-09 DIAGNOSIS — L97.512 RIGHT FOOT ULCER, WITH FAT LAYER EXPOSED (HCC): ICD-10-CM

## 2023-03-09 PROCEDURE — 11042 DBRDMT SUBQ TIS 1ST 20SQCM/<: CPT

## 2023-03-09 RX ORDER — LIDOCAINE 50 MG/G
OINTMENT TOPICAL ONCE
OUTPATIENT
Start: 2023-03-09 | End: 2023-03-09

## 2023-03-09 RX ORDER — CLOBETASOL PROPIONATE 0.5 MG/G
OINTMENT TOPICAL ONCE
OUTPATIENT
Start: 2023-03-09 | End: 2023-03-09

## 2023-03-09 RX ORDER — BACITRACIN ZINC AND POLYMYXIN B SULFATE 500; 1000 [USP'U]/G; [USP'U]/G
OINTMENT TOPICAL ONCE
OUTPATIENT
Start: 2023-03-09 | End: 2023-03-09

## 2023-03-09 RX ORDER — LIDOCAINE 40 MG/G
CREAM TOPICAL ONCE
OUTPATIENT
Start: 2023-03-09 | End: 2023-03-09

## 2023-03-09 RX ORDER — GENTAMICIN SULFATE 1 MG/G
OINTMENT TOPICAL ONCE
OUTPATIENT
Start: 2023-03-09 | End: 2023-03-09

## 2023-03-09 RX ORDER — GINSENG 100 MG
CAPSULE ORAL ONCE
OUTPATIENT
Start: 2023-03-09 | End: 2023-03-09

## 2023-03-09 RX ORDER — BACITRACIN, NEOMYCIN, POLYMYXIN B 400; 3.5; 5 [USP'U]/G; MG/G; [USP'U]/G
OINTMENT TOPICAL ONCE
OUTPATIENT
Start: 2023-03-09 | End: 2023-03-09

## 2023-03-09 RX ORDER — BETAMETHASONE DIPROPIONATE 0.05 %
OINTMENT (GRAM) TOPICAL ONCE
OUTPATIENT
Start: 2023-03-09 | End: 2023-03-09

## 2023-03-09 RX ORDER — LIDOCAINE HYDROCHLORIDE 40 MG/ML
SOLUTION TOPICAL ONCE
OUTPATIENT
Start: 2023-03-09 | End: 2023-03-09

## 2023-03-09 ASSESSMENT — PAIN SCALES - GENERAL: PAINLEVEL_OUTOF10: 0

## 2023-03-09 NOTE — DISCHARGE INSTRUCTIONS
PHYSICIAN ORDERS AND DISCHARGE INSTRUCTIONS     NOTE: Upon discharge from the [address], you will receive a patient experience survey. We would be grateful if you would take the time to fill this survey out. Wound care order history:                 DIRK's   Right       Left               Date:              Cultures:                Grafts:                Antibiotics:           Continuing wound care orders and information:                 Residence:                Continue home health care with:               Your wound-care supplies will be provided by: Wound cleansing:               Do not scrub or use excessive force. Wash hands with soap and water before and after dressing changes. Prior to applying a clean dressing, cleanse wound with normal saline, wound cleanser, or mild soap and water. Ask the physician or nurse before getting the wound(s) wet in a shower. General Wound management:              Keep weight off wounds and reposition every 2 hours. Avoid standing for long periods of time. Apply wraps/stockings in AM and remove at bedtime. If swelling is present, elevate legs to the level of the heart or above for 30 minutes 4-5 times a day and/or when sitting. When taking antibiotics take entire prescription as ordered by physician do not stop taking until medicine is all gone. Orders for this week: 3/9/23     FAX ORDERS TO [facility] Rx:     Moisturize dry intact skin of lower extremities. Right Great Toe and Right 2-4 Toe Amp site - Wash with soap and water, pat dry. Paint incision and surrounding wound bed/maceration with betadine. Apply Santyl, Anasept and Stimulen to wound bed. Cover with ca alginate (use entire sheet folded between toes/over wound) and super absorber (ABD or Sorbex). Wrap with Coban 2 Lite. Wear surgical shoe.      Schedule provider visit  wound care visit on Mondays & Thursday! Follow up with Miriam Johnson CNP in 1 week in the wound care center. Call [phone] for any questions or concerns.   Date__________   Time____________

## 2023-03-09 NOTE — PROGRESS NOTES
Multilayer Compression Wrap   (Not Unna) Below the Knee    NAME:  Justin Novak  YOB: 1985  MEDICAL RECORD NUMBER:  [id number]  DATE:  3/9/2023    Multilayer compression wrap: Removed old Multilayer wrap if indicated and wash leg with mild soap/water. Applied moisturizing agent to dry skin as needed. Applied primary and secondary dressing as ordered. Applied multilayered dressing below the knee to right lower leg. Instructed patient/caregiver not to remove dressing and to keep it clean and dry. Instructed patient/caregiver on complications to report to provider, such as pain, numbness in toes, heavy drainage, and slippage of dressing. Instructed patient on purpose of compression dressing and on activity and exercise recommendations.       Electronically signed by Fariha Byrd LPN on 1/2/8521 at 3:63 AM

## 2023-03-10 NOTE — PROGRESS NOTES
Wound Care Center progress Visit      Carlie Sinclair  AGE: 44 y.o. GENDER: male  : 1985  EPISODE DATE:  3/9/2023   Referred by:Geri Hicks MD  [address] [address]     Subjective:     CHIEF COMPLAINT  WOUND WOUND RIGHT FOOT  Chief Complaint   Patient presents with    Wound Check        HISTORY of PRESENT ILLNESS      Carlie Sinclair is a 44 y.o. male who presents to the [address] [place] [address] for evaluation and treatment of Acute non-healing surgical  ulcer(s) of  right foot. The condition is of moderate severity. The ulcer has been present since 23 post third toe amputation and revision of the second toe per Dr. Sima Mills 23. The underlying cause is thought to be nonhealing surgical complicated by diabetes. The patients care to date has included hospitalization -23 with toe amputation sent home with Pervena vac and Home Health referral with [place], received IV antibiotics in hospital, home on Keflex and Doxycycline through 23. The patient has significant underlying medical conditions as below. Hal Quan MD.    Wound Pain Timing/Severity: waxing and waning, moderate  Quality of pain: dull, aching, tender  Severity of pain:  4 / 10   Modifying Factors: diabetes, poor glucose control, chronic pressure, shear force, obesity, smoking, and non-adherence  Associated Signs/Symptoms: edema, erythema, drainage, and pain    DIRK: as indicated base on wound location and assessment    Wound infection: wound culture will be obtained as needed for symptoms of infection     Arterial evaluation: if indicated based on wound, location, symptoms and healing    Venous Evaluation: if indicated based on wound, location, symptoms and healing    Diabetes: On an insulin regimen.  Follows with Dr. Emerson Hutchinson  Hemoglobin A1C   Date Value Ref Range Status   02/10/2023 9.3 (H) 4.2 - 6.3 % Final        Diabetes education provided:  Diabetes pathoetiology, difference between type 1 and type 2 diabetes, and progressive nature of Type 2 DM. Metabolic syndrome: association of diabetes with dyslipidemia, HTN and obesity. Diabetic Neuropathy: signs and therapy. Foot care: advised to wash feet daily, pat dry and apply lotion at night, avoiding between toes. Need to look at feet daily and report to a physician any signs of inflammation or skin damage. Discussed diabetes shoes and socks. Diabetic management related to wound healing    Smoking: Yes. Patient counseled in length on smoking cessation including benefits of quitting and health risks of continuing to smoke including but not limited to lung cancer, COPD, risk of coronary artery disease. Patient verbalized understanding today, is not ready to quit. Anticoagulant/Antiplatelet therapy: No  Immunosuppression: No  Obesity: yes    Patient educated on the 6 essential components necessary for wound healing: Circulation, Debridements, Proper Dressings and Topical Wound Products, Infection Control, Edema Control and Offloading. Patient educated on those factors that negatively effect or impact wound healing: smoking, obesity, uncontrolled diabetes, anticoagulant and immunosuppressive regimens, inadequate nutrition, untreated arterial and venous disease if applicable and measures to manage edema. Nutritional status: well nourished. Discussed need for increased protein and calories for wound healing and good sources of protein (just over 7 grams for every 20 pounds of body weight). Animal-based foods high in protein (meat, poultry, fish, eggs, and dairy foods). Plant based foods high in protein (tofu, lentils, beans, chickpeas, nuts, quinoa and terry seeds.      Lab Results   Component Value Date     02/13/2023    K 3.8 02/13/2023     02/13/2023    CO2 25 02/13/2023    BUN 18 02/13/2023    CREATININE 0.8 (L) 02/13/2023    GLUCOSE 228 (H) 02/13/2023    CALCIUM 8.8 02/13/2023    PROT 7.6 02/09/2023    LABALBU 4.0 02/09/2023 BILITOT 0.4 02/09/2023    ALKPHOS 107 02/09/2023    AST 72 (H) 02/09/2023    ALT 34 02/09/2023    LABGLOM >60 02/13/2023    GFRAA >60 09/20/2022     Off Loading  Offloading or minimizing or removing weight placed on an area with poor circulation such as diabetic wounds or pressure. This can be achieved with crutches, wheel chair, knee walker etc. Minimizing pressure through partial weight bearing (minimizing the amount of  pressure applied and or the amount of time on the area of pressure) or maintaining a non-weight bearing status can be used to promote and often can be essential for thee wound to heal. Off loading may also need to be achieved for non-weight bearing wounds such as pressure ulcers to the torso. Turning and changing positions frequently, at least every two hours. Use of pressure cushion if sitting up in chair. Skin Care  Keep skin clean and well moisturized , moisturize routinely with ointments for heavier moisturizer needs for extremely dry skin or cracks such as A&D ointment and lotions for a light moisturizer such as CeraVe or Eucerin. If incontinent change incontinence garments as soon as soiled and keeping skin clean and use barrier cream to protect the skin. Reduce Salt and Sodium  Choose low- or reduced- sodium, or no-salt-added versions of foods and condiments when available. Buy fresh, plain frozen, or canned with no-added-salt vegetables. Use fresh poultry, fish and lean meat, rather than canned, smoked or processed types. Choose ready-to-eat breakfast cereals that are lower in sodium. Limit cured foods (such as huang and ham), foods packed in brine (such as pickled foods) and condiments (such as MSG, mustard, horseradish, and catsup). Limit even lower sodium versions of soy sauce and teriyaki sauce-treat these condiments just like salt). In cooking and at the table, flavor foods with herbs, spices, lemon, lime, vinegar or salt-free seasoning blends. Start by cutting salt in half. Cook rice, pasta and hot cereals without salt. Cut back on instant or flavored rice, pasta and cereal mixes, which usually have added salt. Choose convenience foods that are lower in sodium. Limit frozen dinners, packaged mixes, canned soups and dressings. Rinse canned foods, such as tuna, to remove some sodium. Choose fruits or vegetables instead of salty snack foods. Edema Management   Whenever resting, raise your legs up. Try to keep the swollen area higher than the level of your heart. Take breaks from standing or sitting in one position. Walk around to increase the blood flow in your lower legs. Move your feet and ankles often while you stand, or tighten and relax your leg muscles. Wear support stockings. Put them on in the morning, before swelling gets worse. Eat a balanced diet. Lose weight if you need to. Limit the amount of salt (sodium) in your diet. Salt holds fluid in the body and may increase swelling. Apply compression stocking(s) every morning as soon as you get up. Remove at bedtime unless instructed to wear day and night. Hand wash and line dry to prevent loss of elasticity. Replace every 3-4 months to ensure proper fit. Weight Management   Will need to ultimately change overall eating behaviors to have success with weight loss. Encouraged to weigh daily and work towards a goal of 1-2 pounds of weight loss weekly. Encouraged to journal all food intake, Intalio pal is a useful tool to help keep track of food intake and caloric value. Keep calorie level at approximately 5170-9520. Protein intake is to be a minimum of 60 grams per day (unless otherwise directed). Water drinking was encouraged with a goal of 64oz-128oz daily. Beverages to be calorie free except for milk. Every other beverage should be water, avoid soda. Continue to increase level of physical activity. Refer to weight management as indicated and requested by patient.           PAST MEDICAL HISTORY        Diagnosis Date Asthma     Blind left eye     Diabetes mellitus ([address]     GERD (gastroesophageal reflux disease)     Hypertension     Retinal detachment 2012    left       PAST SURGICAL HISTORY    Past Surgical History:   Procedure Laterality Date    CORNEAL TRANSPLANT Bilateral     EYE SURGERY      left eye removed    EYE SURGERY Right     FRACTURE SURGERY      foot left    LAPAROSCOPIC APPENDECTOMY N/A 4/28/2022    APPENDECTOMY LAPAROSCOPIC performed by Letty Borden MD at [facility] Right     MANDIBLE SURGERY Bilateral     TOE AMPUTATION Right 07/25/2017    TOE AMPUTATION Right 7/25/2022    TOE AMPUTATION, RAY AMPUTATION OF RIGHT SECOND TOE performed by Chantal Powell MD at [address] Right 2/11/2023    THIRD TOE AMPUTATION AND REVISION OF SECOND TOE AMPUTATION performed by Lefty Cerda MD at [facility]    Family History   Problem Relation Age of Onset    Diabetes Mother     Asthma Mother     High Blood Pressure Mother     Stroke Mother     Heart Disease Mother     Diabetes Father     Asthma Father     High Blood Pressure Father        SOCIAL HISTORY    Social History     Tobacco Use    Smoking status: Every Day     Packs/day: 0.50     Years: 5.00     Pack years: 2.50     Types: Cigarettes    Smokeless tobacco: Never    Tobacco comments:     EDUCATED SMOKING & DM CAN SLOWS DOWN BLOOD FLOW   Vaping Use    Vaping Use: Never used   Substance Use Topics    Alcohol use: Yes     Comment: occasionally    Drug use: Not Currently     Types: Marijuana [place] Lashawn)       ALLERGIES    Allergies   Allergen Reactions    Ciprofloxacin Shortness Of Breath    Glucosamine Anaphylaxis    Shellfish-Derived Products Anaphylaxis       MEDICATIONS    Current Outpatient Medications on File Prior to Encounter   Medication Sig Dispense Refill    cephALEXin (KEFLEX) 500 MG capsule Take 1 capsule by mouth 4 times daily for 10 days 40 capsule 0    doxycycline hyclate (VIBRA-TABS) 100 MG tablet Take 1 tablet by mouth every 12 hours for 10 days 20 tablet 0    alogliptin (NESINA) 25 MG TABS tablet Take 1 tablet by mouth daily 60 tablet 0    insulin lispro (HUMALOG) 100 UNIT/ML SOLN injection vial Inject 20 Units into the skin 3 times daily (with meals) 20 mL 0    insulin NPH (HUMULIN N;NOVOLIN N) 100 UNIT/ML injection vial Inject 15 Units into the skin every morning 5 mL 2    moxifloxacin (VIGAMOX) 0.5 % ophthalmic solution Place 1 drop into the right eye 4 times daily 3 mL 0    pantoprazole (PROTONIX) 40 MG tablet Take 1 tablet by mouth every morning (before breakfast) 30 tablet 3    nicotine (NICODERM CQ) 14 MG/24HR Place 1 patch onto the skin daily (Patient not taking: No sig reported) 30 patch 3    SYMBICORT 160-4.5 MCG/ACT AERO Inhale 2 puffs into the lungs in the morning and at bedtime      TRULICITY 1.5 MG/0.5ML SOPN inject 0.5 milliliters ( 1 AND 1/2 milligrams ) subcutaneously ev...  (REFER TO PRESCRIPTION NOTES). (Patient not taking: Reported on 3/6/2023) 3 pen 0    LANTUS SOLOSTAR 100 UNIT/ML injection pen Inject 50 Units into the skin nightly 2 pen 0    tiZANidine (ZANAFLEX) 4 MG tablet Take 4 mg by mouth nightly as needed      cloNIDine (CATAPRES) 0.2 MG tablet Take 0.2 mg by mouth in the morning.      trimethoprim-polymyxin b (POLYTRIM) 84430-4.1 UNIT/ML-% ophthalmic solution Apply 1 drop to eye 4 times daily      UNABLE TO FIND Place 1 drop into the right eye 4 times daily 07/21/22 Patient on Vancomycin Preserved Eye soln 25 mg/ml      ezetimibe (ZETIA) 10 MG tablet take 1 tablet by mouth once daily      TECHLITE PEN NEEDLES 31G X 5 MM MISC use 1 PEN NEEDLE to inject MEDICATION subcutaneously two to three times a day      melatonin 5 MG TABS tablet take 1 tablet by mouth every evening      pregabalin (LYRICA) 100 MG capsule take 1 capsule by mouth twice a day      sucralfate (CARAFATE) 1 GM tablet Take 1 tablet by mouth 4 times daily 120 tablet 0    albuterol sulfate  (90 Base) MCG/ACT inhaler  Inhale 2 puffs into the lungs every 6 hours as needed for Wheezing 1 Inhaler 5    albuterol-ipratropium (COMBIVENT RESPIMAT)  MCG/ACT AERS inhaler Inhale 1 puff into the lungs every 6 hours 1 Inhaler 5    acetaminophen (AMINOFEN) 325 MG tablet Take 2 tablets by mouth every 6 hours as needed for Pain 20 tablet 0    brimonidine (ALPHAGAN) 0.2 % ophthalmic solution Place 1 drop into the right eye 2 times daily      dorzolamide-timolol 22.3-6.8 MG/ML SOLN Place 1 drop into the right eye 2 times daily      prednisoLONE acetate (PRED FORTE) 1 % ophthalmic suspension Place 1 drop into the right eye 4 times daily      allopurinol (ZYLOPRIM) 300 MG tablet Take 300 mg by mouth daily       atorvastatin (LIPITOR) 10 MG tablet Take 10 mg by mouth daily       lisinopril-hydrochlorothiazide (PRINZIDE;ZESTORETIC) 20-12.5 MG per tablet Take 1 tablet by mouth daily       amLODIPine (NORVASC) 10 MG tablet Take 10 mg by mouth daily       No current facility-administered medications on file prior to encounter.        PROBLEM LIST    Patient Active Problem List   Diagnosis    Sprain of left ankle    Closed nondisplaced fracture of fifth metatarsal bone of left foot    Alcohol abuse    UTI (urinary tract infection) due to Enterococcus    Acute osteomyelitis of toe, right (HCC)    Osteomyelitis of second toe of right foot (HCC)    Diabetic foot infection (HCC)    Shortness of breath    Asthma exacerbation    Appendicitis    Diabetic foot ulcer with osteomyelitis ([address]    Cellulitis    Open wound of toe    Rhinovirus    Vision impairment    WD-Nonhealing surgical wound, initial encounter    WD-Amputation of one or more toes ([address]    WD-Right foot ulcer, with fat layer exposed ([address]    WD-Diabetes mellitus type 2, insulin dependent ([address]    WD-Obesity    WD-Class 2 obesity in adult    Acute post-operative pain       REVIEW OF SYSTEMS    Constitutional: negative for anorexia, chills, fatigue, fevers, malaise, and sweats  Respiratory: negative for cough, hemoptysis, pleurisy/chest pain, sputum, and stridor  Cardiovascular: negative for chest pain, chest pressure/discomfort, exertional chest pressure/discomfort, near-syncope, orthopnea, palpitations, paroxysmal nocturnal dyspnea, syncope, and tachypnea  Integument/breast: positive for skin lesion(s)     Objective:      BP (!) 145/92   Pulse (!) 102   Temp 98 °F (36.7 °C) (Temporal)   Resp 18     BP Readings from Last 3 Encounters:   03/09/23 (!) 145/92   03/06/23 (!) 151/96   02/27/23 (!) 155/87        PHYSICAL EXAM  General Appearance:   Alert, cooperative, no distress, obese   Head:   Normocephalic, without obvious abnormality, atraumatic    Eyes:   PERRL, conjunctiva/corneas clear    Ears:   Normal external appearance, hearing grossly intact    Nose:  Nares normal, mucosa normal, no drainage    Throat:  Lips, mucosa, and tongue normal    Neck:  Supple, trachea midline    Respiratory:   Equal chest rise, respirations unlabored, no wheezing   Cardiovascular:   Regular rate and rhythm    Abdomen, GI:   Soft, non-tender, no rebound or guarding    Musculoskeletal:  Atraumatic, no cyanosis or edema    Neurologic:  Nonfocal, strength & sensation grossly intact   Psychiatric: Oriented x3, grossly appropriate judgement and insight      Dermatologic exam: Visual inspection of the periwound reveals the skin to be moist and edematous  Wound exam: see wound description below in procedure note      Assessment:       Samson Reilly  appears to have a non-healing wound of the right foot. The etiology of the wound is felt to be non-healing surgical. There are multiple complicating factors including diabetes, poor glucose control, chronic pressure, shear force, obesity, smoking, and non-adherence. A comprehensive wound management program would be helpful to heal this wound. Assessments completed include fall risk and nutritional, functional,and psychological status.   At this time appropriate care would include: periodic debridement and wound care as below. Problem List Items Addressed This Visit          Endocrine    WD-Diabetes mellitus type 2, insulin dependent ([address]       Other    WD-Nonhealing surgical wound, initial encounter - Primary    WD-Amputation of one or more toes ([address]    WD-Right foot ulcer, with fat layer exposed ([facility]    WD-Obesity    Acute post-operative pain       Procedure Note    Indications:  Based on my examination of this patient's wound(s) today, sharp excision into necrotic subcutaneous tissue is required to promote healing and evaluate the extent of previous healing. Performed by: KAYLI Fisher CNP    Consent obtained: Yes    Time out taken:  Yes    Pain Control: Anesthetic  Anesthetic: 4% Lidocaine Liquid Topical        Debridement:Excisional Debridement    Using curette the wound(s) was/were sharply debrided down through and including the removal of subcutaneous tissue. Devitalized Tissue Debrided:  fibrin, biofilm, slough, and exudate    Pre Debridement Measurements:  Are located in the Wound Documentation Flow Sheet    All active wounds listed below with today's date are evaluated  Wound(s)    debrided this date include # : 1 & 2     Post  Debridement Measurements:  Wound 02/20/23 Anterior;Right #2 RT 2ND-4TH TOE AMP SITE (Active)   Wound Image   03/06/23 0909   Wound Etiology Non-Healing Surgical 03/06/23 0909   Dressing Status New dressing applied 03/06/23 1007   Wound Cleansed Soap and water; Wound cleanser 03/06/23 0909   Offloading for Diabetic Foot Ulcers Post op shoe 03/06/23 1007   Wound Length (cm) 5.2 cm 03/06/23 0909   Wound Width (cm) 3.5 cm 03/06/23 0909   Wound Depth (cm) 0.5 cm 03/06/23 0909   Wound Surface Area (cm^2) 18.2 cm^2 03/06/23 0909   Change in Wound Size % (l*w) -385.33 03/06/23 0909   Wound Volume (cm^3) 9.1 cm^3 03/06/23 0909   Wound Healing % -2327 03/06/23 0909   Post-Procedure Length (cm) 5.2 cm 03/06/23 0939   Post-Procedure Width (cm) 3.5 cm 03/06/23 0939   Post-Procedure Depth (cm) 0.5 cm 03/06/23 0939   Post-Procedure Surface Area (cm^2) 18.2 cm^2 03/06/23 0939   Post-Procedure Volume (cm^3) 9.1 cm^3 03/06/23 0939   Distance Tunneling (cm) 0 cm 03/06/23 0909   Tunneling Position ___ O'Clock 0 03/06/23 0909   Undermining Starts ___ O'Clock 0 03/06/23 0909   Undermining Ends___ O'Clock 0 03/06/23 0909   Undermining Maxium Distance (cm) 0 03/06/23 0909   Wound Assessment Pump Back/red;Slough 03/06/23 0909   Drainage Amount Large 03/06/23 0909   Drainage Description Brown;Serosanguinous 03/06/23 0909   Odor Malodorous/putrid 03/06/23 0909   Sandy-wound Assessment Maceration; Hyperkeratosis (callous); Edematous 03/06/23 0909   Margins Defined edges 03/06/23 0909   Wound Thickness Description not for Pressure Injury Full thickness 03/06/23 0909   Number of days: 14       Wound 02/20/23 Anterior;Right #1 RT GREAT TOE (Active)   Wound Image   03/06/23 0909   Wound Etiology Diabetic 03/06/23 0909   Dressing Status New dressing applied 03/06/23 1007   Wound Cleansed Soap and water; Wound cleanser 03/06/23 0909   Offloading for Diabetic Foot Ulcers Post op shoe;Offloading ordered 03/06/23 1007   Wound Length (cm) 0.5 cm 03/06/23 0909   Wound Width (cm) 0.3 cm 03/06/23 0909   Wound Depth (cm) 0.1 cm 03/06/23 0909   Wound Surface Area (cm^2) 0.15 cm^2 03/06/23 0909   Change in Wound Size % (l*w) 76.19 03/06/23 0909   Wound Volume (cm^3) 0.015 cm^3 03/06/23 0909   Wound Healing % 76 03/06/23 0909   Post-Procedure Length (cm) 0.5 cm 03/06/23 0939   Post-Procedure Width (cm) 0.3 cm 03/06/23 0939   Post-Procedure Depth (cm) 0.1 cm 03/06/23 0939   Post-Procedure Surface Area (cm^2) 0.15 cm^2 03/06/23 0939   Post-Procedure Volume (cm^3) 0.015 cm^3 03/06/23 0939   Distance Tunneling (cm) 0 cm 03/06/23 0909   Tunneling Position ___ O'Clock 0 03/06/23 0909   Undermining Starts ___ O'Clock 0 03/06/23 0909   Undermining Ends___ O'Clock 0 03/06/23 0909   Undermining Maxium Distance (cm) 0 03/06/23 0909   Wound Assessment Juncal/red;Slough 03/06/23 0909   Drainage Amount Large 03/06/23 0909   Drainage Description Brown;Serosanguinous 03/06/23 0909   Odor Malodorous/putrid 03/06/23 0909   Sandy-wound Assessment Hyperkeratosis (callous); Edematous 03/06/23 0909   Margins Defined edges 03/06/23 0909   Wound Thickness Description not for Pressure Injury Full thickness 03/06/23 0909   Number of days: 14     Percent of Wound(s) Debrided: approximately 100%    Total  Area  Debrided: 18.35 sq cm     Bleeding:  Minimal    Hemostasis Achieved:  by pressure    Procedural Pain:  0  / 10     Post Procedural Pain:  0 / 10     Response to treatment:  Well tolerated by patient. Status of wound: The wound has declined, the patient has missed several wound clinic visits and Home Health visits. The wound remains macerated related to a lack of routine wound care from missed visits. The wound has almost completely dehisced. Multiple suture not securing the incision any long removed. The  wounds were debrided today,  regimen as below, follow up Thursday, renewed  Keflex ans Doxycycline. Dr. Pilar Young made aware of changes in the wound. The patients records were reviewed and discussed. Time was given for questions . All questions were answered to the patients satisfaction. A total time of 20 minutes was spent with at least 50% related to face to face counseling and coordination of care. Plan:     Discharge instructions:    Discharge Instructions         PHYSICIAN ORDERS AND DISCHARGE INSTRUCTIONS     NOTE: Upon discharge from the [address], you will receive a patient experience survey. We would be grateful if you would take the time to fill this survey out.      Wound care order history:                 DIRK's   Right       Left               Date:              Cultures:                Grafts:                Antibiotics:           Continuing wound care orders and information: Residence:                Continue home health care with:               Your wound-care supplies will be provided by: Wound cleansing:               Do not scrub or use excessive force. Wash hands with soap and water before and after dressing changes. Prior to applying a clean dressing, cleanse wound with normal saline, wound cleanser, or mild soap and water. Ask the physician or nurse before getting the wound(s) wet in a shower. General Wound management:              Keep weight off wounds and reposition every 2 hours. Avoid standing for long periods of time. Apply wraps/stockings in AM and remove at bedtime. If swelling is present, elevate legs to the level of the heart or above for 30 minutes 4-5 times a day and/or when sitting. When taking antibiotics take entire prescription as ordered by physician do not stop taking until medicine is all gone. Orders for this week: 3/9/23     FAX ORDERS TO [facility] Rx:     Moisturize dry intact skin of lower extremities. Right Great Toe and Right 2-4 Toe Amp site - Wash with soap and water, pat dry. Paint incision and surrounding wound bed/maceration with betadine. Apply Santyl, Anasept and Stimulen to wound bed. Cover with ca alginate (use entire sheet folded between toes/over wound) and super absorber (ABD or Sorbex). Wrap with Coban 2 Lite. Wear surgical shoe. Schedule provider visit  wound care visit on Mondays & Thursday! Follow up with Jose Villarreal CNP in 1 week in the wound care center. Call [phone] for any questions or concerns.   Date__________   Time____________        Treatment Note Wound 02/20/23 Anterior;Right #2 RT 2ND-4TH TOE AMP SITE-Dressing/Treatment:  (betadine santyl anasept stimulen ca alg sorbex coban 2 lite.)  Wound 02/20/23 Anterior;Right #1 RT GREAT TOE-Dressing/Treatment: (betadine santyl anasept stimulen ca alg sorbex coban 2 lite.)    Written Patient Dismissal Instructions Given            Electronically signed by KAYLI Martin CNP on 3/10/2023 at 7:43 AM

## 2023-03-13 ENCOUNTER — HOSPITAL ENCOUNTER (OUTPATIENT)
Dept: WOUND CARE | Age: 38
Discharge: HOME | End: 2023-03-13
Payer: COMMERCIAL

## 2023-03-13 VITALS
DIASTOLIC BLOOD PRESSURE: 84 MMHG | HEART RATE: 106 BPM | RESPIRATION RATE: 18 BRPM | TEMPERATURE: 96 F | SYSTOLIC BLOOD PRESSURE: 137 MMHG

## 2023-03-13 DIAGNOSIS — S98.131A AMPUTATION OF TOE OF RIGHT FOOT (HCC): ICD-10-CM

## 2023-03-13 DIAGNOSIS — Z79.4 DIABETES MELLITUS TYPE 2, INSULIN DEPENDENT (HCC): ICD-10-CM

## 2023-03-13 DIAGNOSIS — T81.89XA NONHEALING SURGICAL WOUND, INITIAL ENCOUNTER: Primary | ICD-10-CM

## 2023-03-13 DIAGNOSIS — L97.512 RIGHT FOOT ULCER, WITH FAT LAYER EXPOSED (HCC): ICD-10-CM

## 2023-03-13 DIAGNOSIS — E66.01 CLASS 2 SEVERE OBESITY DUE TO EXCESS CALORIES WITH SERIOUS COMORBIDITY AND BODY MASS INDEX (BMI) OF 37.0 TO 37.9 IN ADULT (HCC): ICD-10-CM

## 2023-03-13 DIAGNOSIS — E11.9 DIABETES MELLITUS TYPE 2, INSULIN DEPENDENT (HCC): ICD-10-CM

## 2023-03-13 PROCEDURE — 11045 DBRDMT SUBQ TISS EACH ADDL: CPT

## 2023-03-13 PROCEDURE — 11042 DBRDMT SUBQ TIS 1ST 20SQCM/<: CPT | Performed by: NURSE PRACTITIONER

## 2023-03-13 PROCEDURE — 11045 DBRDMT SUBQ TISS EACH ADDL: CPT | Performed by: NURSE PRACTITIONER

## 2023-03-13 PROCEDURE — 11042 DBRDMT SUBQ TIS 1ST 20SQCM/<: CPT

## 2023-03-13 RX ORDER — GENTAMICIN SULFATE 1 MG/G
OINTMENT TOPICAL ONCE
OUTPATIENT
Start: 2023-03-13 | End: 2023-03-13

## 2023-03-13 RX ORDER — BACITRACIN ZINC AND POLYMYXIN B SULFATE 500; 1000 [USP'U]/G; [USP'U]/G
OINTMENT TOPICAL ONCE
OUTPATIENT
Start: 2023-03-13 | End: 2023-03-13

## 2023-03-13 RX ORDER — BACITRACIN, NEOMYCIN, POLYMYXIN B 400; 3.5; 5 [USP'U]/G; MG/G; [USP'U]/G
OINTMENT TOPICAL ONCE
OUTPATIENT
Start: 2023-03-13 | End: 2023-03-13

## 2023-03-13 RX ORDER — LIDOCAINE 40 MG/G
CREAM TOPICAL ONCE
OUTPATIENT
Start: 2023-03-13 | End: 2023-03-13

## 2023-03-13 RX ORDER — LIDOCAINE HYDROCHLORIDE 40 MG/ML
SOLUTION TOPICAL ONCE
OUTPATIENT
Start: 2023-03-13 | End: 2023-03-13

## 2023-03-13 RX ORDER — CLOBETASOL PROPIONATE 0.5 MG/G
OINTMENT TOPICAL ONCE
OUTPATIENT
Start: 2023-03-13 | End: 2023-03-13

## 2023-03-13 RX ORDER — GINSENG 100 MG
CAPSULE ORAL ONCE
OUTPATIENT
Start: 2023-03-13 | End: 2023-03-13

## 2023-03-13 RX ORDER — LIDOCAINE 50 MG/G
OINTMENT TOPICAL ONCE
OUTPATIENT
Start: 2023-03-13 | End: 2023-03-13

## 2023-03-13 RX ORDER — BETAMETHASONE DIPROPIONATE 0.05 %
OINTMENT (GRAM) TOPICAL ONCE
OUTPATIENT
Start: 2023-03-13 | End: 2023-03-13

## 2023-03-13 ASSESSMENT — PAIN DESCRIPTION - ORIENTATION: ORIENTATION: RIGHT

## 2023-03-13 ASSESSMENT — PAIN DESCRIPTION - PAIN TYPE: TYPE: ACUTE PAIN

## 2023-03-13 ASSESSMENT — PAIN - FUNCTIONAL ASSESSMENT: PAIN_FUNCTIONAL_ASSESSMENT: PREVENTS OR INTERFERES SOME ACTIVE ACTIVITIES AND ADLS

## 2023-03-13 ASSESSMENT — PAIN SCALES - GENERAL: PAINLEVEL_OUTOF10: 9

## 2023-03-13 ASSESSMENT — PAIN DESCRIPTION - LOCATION: LOCATION: FOOT

## 2023-03-13 ASSESSMENT — PAIN DESCRIPTION - ONSET: ONSET: ON-GOING

## 2023-03-13 ASSESSMENT — PAIN DESCRIPTION - DESCRIPTORS: DESCRIPTORS: ACHING;THROBBING

## 2023-03-13 ASSESSMENT — PAIN DESCRIPTION - FREQUENCY: FREQUENCY: INTERMITTENT

## 2023-03-13 NOTE — PROGRESS NOTES
Multilayer Compression Wrap   (Not Unna) Below the Knee    NAME:  Liana Matthews  YOB: 1985  MEDICAL RECORD NUMBER:  [id number]  DATE:  3/13/2023    Multilayer compression wrap: Removed old Multilayer wrap if indicated and wash leg with mild soap/water. Applied moisturizing agent to dry skin as needed. Applied primary and secondary dressing as ordered. Applied multilayered dressing below the knee to right lower leg. Instructed patient/caregiver not to remove dressing and to keep it clean and dry. Instructed patient/caregiver on complications to report to provider, such as pain, numbness in toes, heavy drainage, and slippage of dressing. Instructed patient on purpose of compression dressing and on activity and exercise recommendations.       Electronically signed by Lissette Gregory LPN on 8/51/1529 at 59:18 AM

## 2023-03-13 NOTE — DISCHARGE INSTRUCTIONS
PHYSICIAN ORDERS AND DISCHARGE INSTRUCTIONS     NOTE: Upon discharge from the [address], you will receive a patient experience survey. We would be grateful if you would take the time to fill this survey out. Wound care order history:                 DIRK's   Right       Left               Date:              Cultures:                Grafts:                Antibiotics:           Continuing wound care orders and information:                 Residence:                Continue home health care with:               Your wound-care supplies will be provided by: Wound cleansing:               Do not scrub or use excessive force. Wash hands with soap and water before and after dressing changes. Prior to applying a clean dressing, cleanse wound with normal saline, wound cleanser, or mild soap and water. Ask the physician or nurse before getting the wound(s) wet in a shower. General Wound management:              Keep weight off wounds and reposition every 2 hours. Avoid standing for long periods of time. Apply wraps/stockings in AM and remove at bedtime. If swelling is present, elevate legs to the level of the heart or above for 30 minutes 4-5 times a day and/or when sitting. When taking antibiotics take entire prescription as ordered by physician do not stop taking until medicine is all gone. Orders for this week: 3/13/23     FAX ORDERS TO [facility] Rx:     Moisturize dry intact skin of lower extremities. Right Great Toe and Right 2-4 Toe Amp site - Wash with soap and water, pat dry. Dakins applied to wound [facility]) [place] incision and surrounding wound bed/maceration with betadine. Apply Iodosorb and Stimulen to wound bed. Hold in place with two super absorber (ABD or Sorbex). Wrap with Coban 2 Lite. Wear surgical shoe.      Schedule provider visit  wound care visit on Mondays & Thursday! Follow up with Antoine Guillen CNP in 1 week in the wound care center. Call [phone] for any questions or concerns.   Date__________   Time____________

## 2023-03-15 NOTE — PROGRESS NOTES
Wound Care Center progress Visit      Elizabeth Abdullahi  AGE: 44 y.o. GENDER: male  : 1985  EPISODE DATE:  3/13/2023   Referred by:Roxanne Georges MD  [facility]  [address]  [place],  [address] [place] [address]     Subjective:     CHIEF COMPLAINT  WOUND WOUND RIGHT FOOT  Chief Complaint   Patient presents with    Wound Check        HISTORY of PRESENT ILLNESS      Elizabeth Abdullahi is a 44 y.o. male who presents to the [address] [place] [address] for evaluation and treatment of Acute non-healing surgical  ulcer(s) of  right foot. The condition is of moderate severity. The ulcer has been present since 23 post third toe amputation and revision of the second toe per Dr. Davidson Elizabeth 23. The underlying cause is thought to be nonhealing surgical complicated by diabetes. The patients care to date has included hospitalization -23 [facility] referral with [facility], received IV antibiotics in hospital, home on Keflex and Doxycycline through 23. The patient has significant underlying medical conditions as below. Dulce Cunha MD.    Wound Pain Timing/Severity: waxing and waning, moderate  Quality of pain: dull, aching, tender  Severity of pain:  4 / 10   Modifying Factors: diabetes, poor glucose control, chronic pressure, shear force, obesity, smoking, and non-adherence  Associated Signs/Symptoms: edema, erythema, drainage, and pain    DIRK: as indicated base on wound location and assessment    Wound infection: wound culture will be obtained as needed for symptoms of infection     Arterial evaluation: if indicated based on wound, location, symptoms and healing    Venous Evaluation: if indicated based on wound, location, symptoms and healing    Diabetes: On an insulin regimen.  Follows with Dr. Gabino Martinez  Hemoglobin A1C   Date Value Ref Range Status   02/10/2023 9.3 (H) 4.2 - 6.3 % Final        Diabetes education provided:  Diabetes pathoetiology, difference between type 1 and type 2 diabetes, and progressive nature of Type 2 DM. Metabolic syndrome: association of diabetes with dyslipidemia, HTN and obesity. Diabetic Neuropathy: signs and therapy. Foot care: advised to wash feet daily, pat dry and apply lotion at night, avoiding between toes. Need to look at feet daily and report to a physician any signs of inflammation or skin damage. Discussed diabetes shoes and socks. Diabetic management related to wound healing    Smoking: Yes. Patient counseled in length on smoking cessation including benefits of quitting and health risks of continuing to smoke including but not limited to lung cancer, COPD, risk of coronary artery disease. Patient verbalized understanding today, is not ready to quit. Anticoagulant/Antiplatelet therapy: No  Immunosuppression: No  Obesity: yes    Patient educated on the 6 essential components necessary for wound healing: Circulation, Debridements, Proper Dressings and Topical Wound Products, Infection Control, Edema Control and Offloading. Patient educated on those factors that negatively effect or impact wound healing: smoking, obesity, uncontrolled diabetes, anticoagulant and immunosuppressive regimens, inadequate nutrition, untreated arterial and venous disease if applicable and measures to manage edema. Nutritional status: well nourished. Discussed need for increased protein and calories for wound healing and good sources of protein (just over 7 grams for every 20 pounds of body weight). Animal-based foods high in protein (meat, poultry, fish, eggs, and dairy foods). Plant based foods high in protein (tofu, lentils, beans, chickpeas, nuts, quinoa and terry seeds.      Lab Results   Component Value Date     02/13/2023    K 3.8 02/13/2023     02/13/2023    CO2 25 02/13/2023    BUN 18 02/13/2023    CREATININE 0.8 (L) 02/13/2023    GLUCOSE 228 (H) 02/13/2023    CALCIUM 8.8 02/13/2023    PROT 7.6 02/09/2023    LABALBU 4.0 02/09/2023    BILITOT 0.4 02/09/2023    ALKPHOS 107 02/09/2023    AST 72 (H) 02/09/2023    ALT 34 02/09/2023    LABGLOM >60 02/13/2023    GFRAA >60 09/20/2022     Off Loading  Offloading or minimizing or removing weight placed on an area with poor circulation such as diabetic wounds or pressure. This can be achieved with crutches, wheel chair, knee walker etc. Minimizing pressure through partial weight bearing (minimizing the amount of  pressure applied and or the amount of time on the area of pressure) or maintaining a non-weight bearing status can be used to promote and often can be essential for thee wound to heal. Off loading may also need to be achieved for non-weight bearing wounds such as pressure ulcers to the torso. Turning and changing positions frequently, at least every two hours. Use of pressure cushion if sitting up in chair. Skin Care  Keep skin clean and well moisturized , moisturize routinely with ointments for heavier moisturizer needs for extremely dry skin or cracks such as A&D ointment and lotions for a light moisturizer such as CeraVe or Eucerin. If incontinent change incontinence garments as soon as soiled and keeping skin clean and use barrier cream to protect the skin. Reduce Salt and Sodium  Choose low- or reduced- sodium, or no-salt-added versions of foods and condiments when available. Buy fresh, plain frozen, or canned with no-added-salt vegetables. Use fresh poultry, fish and lean meat, rather than canned, smoked or processed types. Choose ready-to-eat breakfast cereals that are lower in sodium. Limit cured foods (such as huang and ham), foods packed in brine (such as pickled foods) and condiments (such as MSG, mustard, horseradish, and catsup). Limit even lower sodium versions of soy sauce and teriyaki sauce-treat these condiments just like salt). In cooking and at the table, flavor foods with herbs, spices, lemon, lime, vinegar or salt-free seasoning blends.  Start by cutting salt in half. Cook rice, pasta and hot cereals without salt. Cut back on instant or flavored rice, pasta and cereal mixes, which usually have added salt. Choose convenience foods that are lower in sodium. Limit frozen dinners, packaged mixes, canned soups and dressings. Rinse canned foods, such as tuna, to remove some sodium. Choose fruits or vegetables instead of salty snack foods. Edema Management   Whenever resting, raise your legs up. Try to keep the swollen area higher than the level of your heart. Take breaks from standing or sitting in one position. Walk around to increase the blood flow in your lower legs. Move your feet and ankles often while you stand, or tighten and relax your leg muscles. Wear support stockings. Put them on in the morning, before swelling gets worse. Eat a balanced diet. Lose weight if you need to. Limit the amount of salt (sodium) in your diet. Salt holds fluid in the body and may increase swelling. Apply compression stocking(s) every morning as soon as you get up. Remove at bedtime unless instructed to wear day and night. Hand wash and line dry to prevent loss of elasticity. Replace every 3-4 months to ensure proper fit. Weight Management   Will need to ultimately change overall eating behaviors to have success with weight loss. Encouraged to weigh daily and work towards a goal of 1-2 pounds of weight loss weekly. Encouraged to journal all food intake, [employer] pal is a useful tool to help keep track of food intake and caloric value. Keep calorie level at approximately 3313-8689. Protein intake is to be a minimum of 60 grams per day (unless otherwise directed). Water drinking was encouraged with a goal of 64oz-128oz daily. Beverages to be calorie free except for milk. Every other beverage should be water, avoid soda. Continue to increase level of physical activity. Refer to weight management as indicated and requested by patient.           PAST MEDICAL HISTORY Diagnosis Date    Asthma     Blind left eye     Diabetes mellitus ([address]     GERD (gastroesophageal reflux disease)     Hypertension     Retinal detachment 2012    left       PAST SURGICAL HISTORY    Past Surgical History:   Procedure Laterality Date    CORNEAL TRANSPLANT Bilateral     EYE SURGERY      left eye removed    EYE SURGERY Right     FRACTURE SURGERY      foot left    LAPAROSCOPIC APPENDECTOMY N/A 4/28/2022    APPENDECTOMY LAPAROSCOPIC performed by Frank Skaggs MD at [address] [facility] Right     MANDIBLE SURGERY Bilateral     TOE AMPUTATION Right 07/25/2017    TOE AMPUTATION Right 7/25/2022    TOE AMPUTATION, RAY AMPUTATION OF RIGHT SECOND TOE performed by Won Granados MD at [address] Right 2/11/2023    THIRD TOE AMPUTATION AND REVISION OF SECOND TOE AMPUTATION performed by Brenda Valladares MD at [facility]    Family History   Problem Relation Age of Onset    Diabetes Mother     Asthma Mother     High Blood Pressure Mother     Stroke Mother     Heart Disease Mother     Diabetes Father     Asthma Father     High Blood Pressure Father        SOCIAL HISTORY    Social History     Tobacco Use    Smoking status: Every Day     Packs/day: 0.50     Years: 5.00     Pack years: 2.50     Types: Cigarettes    Smokeless tobacco: Never    Tobacco comments:     EDUCATED SMOKING & DM CAN SLOWS DOWN BLOOD FLOW   Vaping Use    Vaping Use: Never used   Substance Use Topics    Alcohol use: Yes     Comment: occasionally    Drug use: Not Currently     Types: Marijuana [place])       ALLERGIES    Allergies   Allergen Reactions    Ciprofloxacin Shortness Of Breath    Glucosamine Anaphylaxis    Shellfish-Derived Products Anaphylaxis       MEDICATIONS    Current Outpatient Medications on File Prior to Encounter   Medication Sig Dispense Refill    cephALEXin (KEFLEX) 500 MG capsule Take 1 capsule by mouth 4 times daily for 10 days 40 capsule 0    doxycycline hyclate (VIBRA-TABS) 100 MG tablet Take 1 tablet by mouth every 12 hours for 10 days 20 tablet 0    alogliptin (NESINA) 25 MG TABS tablet Take 1 tablet by mouth daily 60 tablet 0    insulin lispro (HUMALOG) 100 UNIT/ML SOLN injection vial Inject 20 Units into the skin 3 times daily (with meals) 20 mL 0    insulin NPH (HUMULIN N;NOVOLIN N) 100 UNIT/ML injection vial Inject 15 Units into the skin every morning 5 mL 2    moxifloxacin (VIGAMOX) 0.5 % ophthalmic solution Place 1 drop into the right eye 4 times daily 3 mL 0    pantoprazole (PROTONIX) 40 MG tablet Take 1 tablet by mouth every morning (before breakfast) 30 tablet 3    nicotine (NICODERM CQ) 14 MG/24HR Place 1 patch onto the skin daily (Patient not taking: No sig reported) 30 patch 3    SYMBICORT 160-4.5 MCG/ACT AERO Inhale 2 puffs into the lungs in the morning and at bedtime      TRULICITY 1.5 DN/8.4OQ SOPN inject 0.5 milliliters ( 1 AND 1/2 milligrams ) subcutaneously ev. ..  (REFER TO PRESCRIPTION NOTES). (Patient not taking: Reported on 3/6/2023) 3 pen 0    LANTUS SOLOSTAR 100 UNIT/ML injection pen Inject 50 Units into the skin nightly 2 pen 0    tiZANidine (ZANAFLEX) 4 MG tablet Take 4 mg by mouth nightly as needed      cloNIDine (CATAPRES) 0.2 MG tablet Take 0.2 mg by mouth in the morning.       trimethoprim-polymyxin b (POLYTRIM) 61121-9.1 UNIT/ML-% ophthalmic solution Apply 1 drop to eye 4 times daily      UNABLE TO FIND Place 1 drop into the right eye 4 times daily 07/21/22 Patient on Vancomycin Preserved Eye soln 25 mg/ml      ezetimibe (ZETIA) 10 MG tablet take 1 tablet by mouth once daily      TECHLITE PEN NEEDLES 31G X 5 MM MISC use 1 PEN NEEDLE to inject MEDICATION subcutaneously two to three times a day      melatonin 5 MG TABS tablet take 1 tablet by mouth every evening      pregabalin (LYRICA) 100 MG capsule take 1 capsule by mouth twice a day      sucralfate (CARAFATE) 1 GM tablet Take 1 tablet by mouth 4 times daily 120 tablet 0    albuterol sulfate  (90 Base) MCG/ACT inhaler Inhale 2 puffs into the lungs every 6 hours as needed for Wheezing 1 Inhaler 5    albuterol-ipratropium (COMBIVENT RESPIMAT)  MCG/ACT AERS inhaler Inhale 1 puff into the lungs every 6 hours 1 Inhaler 5    acetaminophen (AMINOFEN) 325 MG tablet Take 2 tablets by mouth every 6 hours as needed for Pain 20 tablet 0    brimonidine (ALPHAGAN) 0.2 % ophthalmic solution Place 1 drop into the right eye 2 times daily      dorzolamide-timolol 22.3-6.8 MG/ML SOLN Place 1 drop into the right eye 2 times daily      prednisoLONE acetate (PRED FORTE) 1 % ophthalmic suspension Place 1 drop into the right eye 4 times daily      allopurinol (ZYLOPRIM) 300 MG tablet Take 300 mg by mouth daily       atorvastatin (LIPITOR) 10 MG tablet Take 10 mg by mouth daily       lisinopril-hydrochlorothiazide (PRINZIDE;ZESTORETIC) 20-12.5 MG per tablet Take 1 tablet by mouth daily       amLODIPine (NORVASC) 10 MG tablet Take 10 mg by mouth daily       No current facility-administered medications on file prior to encounter.        PROBLEM LIST    Patient Active Problem List   Diagnosis    Sprain of left ankle    Closed nondisplaced fracture of fifth metatarsal bone of left foot    Alcohol abuse    UTI (urinary tract infection) due to Enterococcus    Acute osteomyelitis of toe, right (HCC)    Osteomyelitis of second toe of right foot (HCC)    Diabetic foot infection (HCC)    Shortness of breath    Asthma exacerbation    Appendicitis    Diabetic foot ulcer with osteomyelitis ([address]    Cellulitis    Open wound of toe    Rhinovirus    Vision impairment    WD-Nonhealing surgical wound, initial encounter    WD-Amputation of one or more toes ([address]    WD-Right foot ulcer, with fat layer exposed ([address]    WD-Diabetes mellitus type 2, insulin dependent ([address]    WD-Obesity    WD-Class 2 obesity in adult    Acute post-operative pain       REVIEW OF SYSTEMS    Constitutional: negative for anorexia, chills, fatigue, fevers, malaise, and sweats  Respiratory: negative for cough, hemoptysis, pleurisy/chest pain, sputum, and stridor  Cardiovascular: negative for chest pain, chest pressure/discomfort, exertional chest pressure/discomfort, near-syncope, orthopnea, palpitations, paroxysmal nocturnal dyspnea, syncope, and tachypnea  Integument/breast: positive for skin lesion(s)     Objective:      /84   Pulse (!) 106   Temp (!) 96 °F (35.6 °C) (Temporal)   Resp 18     BP Readings from Last 3 Encounters:   03/13/23 137/84   03/09/23 (!) 145/92   03/06/23 (!) 151/96        PHYSICAL EXAM  General Appearance:   Alert, cooperative, no distress, obese   Head:   Normocephalic, without obvious abnormality, atraumatic    Eyes:   PERRL, conjunctiva/corneas clear    Ears:   Normal external appearance, hearing grossly intact    Nose:  Nares normal, mucosa normal, no drainage    Throat:  Lips, mucosa, and tongue normal    Neck:  Supple, trachea midline    Respiratory:   Equal chest rise, respirations unlabored, no wheezing   Cardiovascular:   Regular rate and rhythm    Abdomen, GI:   Soft, non-tender, no rebound or guarding    Musculoskeletal:  Atraumatic, no cyanosis or edema    Neurologic:  Nonfocal, strength & sensation grossly intact   Psychiatric: Oriented x3, grossly appropriate judgement and insight      Dermatologic exam: Visual inspection of the periwound reveals the skin to be moist and edematous  Wound exam: see wound description below in procedure note      Assessment:       Alexx Back  appears to have a non-healing wound of the right foot. The etiology of the wound is felt to be non-healing surgical. There are multiple complicating factors including diabetes, poor glucose control, chronic pressure, shear force, obesity, smoking, and non-adherence. A comprehensive wound management program would be helpful to heal this wound. Assessments completed include fall risk and nutritional, functional,and psychological status.   At this time appropriate care would include: periodic debridement and wound care as below. Problem List Items Addressed This Visit          Endocrine    WD-Diabetes mellitus type 2, insulin dependent ([facility]       Other    WD-Nonhealing surgical wound, initial encounter - Primary    WD-Amputation of one or more toes ([address]    WD-Right foot ulcer, with fat layer exposed ([facility]    WD-Obesity       Procedure Note    Indications:  Based on my examination of this patient's wound(s) today, sharp excision into necrotic subcutaneous tissue is required to promote healing and evaluate the extent of previous healing. Performed by: KAYLI Mars - CNP    Consent obtained: Yes    Time out taken:  Yes    Pain Control: Anesthetic  Anesthetic: 4% Lidocaine Liquid Topical        Debridement:Excisional Debridement    Using curette the wound(s) was/were sharply debrided down through and including the removal of subcutaneous tissue. Devitalized Tissue Debrided:  fibrin, biofilm, slough, and exudate    Pre Debridement Measurements:  Are located in the Wound Documentation Flow Sheet    All active wounds listed below with today's date are evaluated  Wound(s)    debrided this date include # : 1 & 2     Post  Debridement Measurements:  Wound 02/20/23 Anterior;Right #2 RT 2ND-4TH TOE AMP SITE (Active)   Wound Image   03/06/23 0909   Wound Etiology Non-Healing Surgical 03/13/23 1005   Dressing Status New dressing applied 03/13/23 1005   Wound Cleansed Soap and water; Wound cleanser 03/13/23 0928   Offloading for Diabetic Foot Ulcers Post op shoe 03/13/23 1005   Wound Length (cm) 6 cm 03/13/23 0928   Wound Width (cm) 5.5 cm 03/13/23 0928   Wound Depth (cm) 0.5 cm 03/13/23 0928   Wound Surface Area (cm^2) 33 cm^2 03/13/23 0928   Change in Wound Size % (l*w) -780 03/13/23 0928   Wound Volume (cm^3) 16.5 cm^3 03/13/23 0928   Wound Healing % -4300 03/13/23 0928   Post-Procedure Length (cm) 6 cm 03/13/23 0944   Post-Procedure Width (cm) 5.5 cm 03/13/23 5087   Post-Procedure Depth (cm) 0.5 cm 03/13/23 0944   Post-Procedure Surface Area (cm^2) 33 cm^2 03/13/23 0944   Post-Procedure Volume (cm^3) 16.5 cm^3 03/13/23 0944   Distance Tunneling (cm) 0 cm 03/13/23 0928   Tunneling Position ___ O'Clock 0 03/13/23 0928   Undermining Starts ___ O'Clock 0 03/13/23 0928   Undermining Ends___ O'Clock 0 03/13/23 0928   Undermining Maxium Distance (cm) 0 03/13/23 0928   Wound Assessment [place]/red;Slough 03/13/23 0928   Drainage Amount Large 03/13/23 0928   Drainage Description Brown;Serosanguinous 03/13/23 0928   Odor Malodorous/putrid 03/13/23 0928   Sandy-wound Assessment Maceration; Hyperkeratosis (callous); Edematous 03/13/23 0928   Margins Defined edges 03/13/23 0928   Wound Thickness Description not for Pressure Injury Full thickness 03/13/23 0928   Number of days: 23       Wound 02/20/23 Anterior;Right #1 RT GREAT TOE (Active)   Wound Image   03/06/23 0909   Wound Etiology Diabetic 03/13/23 1005   Dressing Status New dressing applied 03/13/23 1005   Wound Cleansed Soap and water; Wound cleanser 03/13/23 0928   Offloading for Diabetic Foot Ulcers Post op shoe;Offloading ordered 03/13/23 1005   Wound Length (cm) 0.6 cm 03/13/23 0928   Wound Width (cm) 0.7 cm 03/13/23 0928   Wound Depth (cm) 0.1 cm 03/13/23 0928   Wound Surface Area (cm^2) 0.42 cm^2 03/13/23 0928   Change in Wound Size % (l*w) 33.33 03/13/23 0928   Wound Volume (cm^3) 0.042 cm^3 03/13/23 0928   Wound Healing % 33 03/13/23 0928   Post-Procedure Length (cm) 0.6 cm 03/13/23 0944   Post-Procedure Width (cm) 0.7 cm 03/13/23 0944   Post-Procedure Depth (cm) 0.1 cm 03/13/23 0944   Post-Procedure Surface Area (cm^2) 0.42 cm^2 03/13/23 0944   Post-Procedure Volume (cm^3) 0.042 cm^3 03/13/23 0944   Distance Tunneling (cm) 0 cm 03/13/23 0928   Tunneling Position ___ O'Clock 0 03/13/23 0928   Undermining Starts ___ O'Clock 0 03/13/23 0928   Undermining Ends___ O'Clock 0 03/13/23 0928   Undermining Max[place] Distance (cm) 0 03/13/23 0928   Wound Assessment [place]/red;Slough 03/13/23 0928   Drainage Amount Large 03/13/23 0928   Drainage Description Brown;Serosanguinous 03/13/23 0928   Odor Malodorous/putrid 03/13/23 0928   Sandy-wound Assessment Hyperkeratosis (callous); Edematous 03/13/23 0928   Margins Defined edges 03/13/23 0928   Wound Thickness Description not for Pressure Injury Full thickness 03/13/23 0928   Number of days: 23       Percent of Wound(s) Debrided: approximately 100%    Total  Area  Debrided: 33.42 sq cm     Bleeding:  Minimal    Hemostasis Achieved:  by pressure    Procedural Pain:  0  / 10     Post Procedural Pain:  0 / 10     Response to treatment:  Well tolerated by patient. Status of wound: The wound has declined, the patient has missed several wound clinic visits and Home Health visits. The wound remains macerated related to a lack of routine wound care from missed visits. The wound has almost completely dehisced. Multiple suture not securing the incision any long removed. The  wounds were debrided today,  regimen as below, follow up Thursday, renewed  Keflex ans Doxycycline. Dr. Flavio Stokes made aware of changes in the wound. The patients records were reviewed and discussed. Time was given for questions . All questions were answered to the patients satisfaction. A total time of 20 minutes was spent with at least 50% related to face to face counseling and coordination of care. Plan:     Discharge instructions:    Discharge Instructions         PHYSICIAN ORDERS AND DISCHARGE INSTRUCTIONS     NOTE: Upon discharge from the [address], you will receive a patient experience survey. We would be grateful if you would take the time to fill this survey out.      Wound care order history:                 DIRK's   Right       Left               Date:              Cultures:                Grafts:                Antibiotics:           Continuing wound care orders and information:                 Residence: Continue home health care with:               Your wound-care supplies will be provided by: Wound cleansing:               Do not scrub or use excessive force. Wash hands with soap and water before and after dressing changes. Prior to applying a clean dressing, cleanse wound with normal saline, wound cleanser, or mild soap and water. Ask the physician or nurse before getting the wound(s) wet in a shower. General Wound management:              Keep weight off wounds and reposition every 2 hours. Avoid standing for long periods of time. Apply wraps/stockings in AM and remove at bedtime. If swelling is present, elevate legs to the level of the heart or above for 30 minutes 4-5 times a day and/or when sitting. When taking antibiotics take entire prescription as ordered by physician do not stop taking until medicine is all gone. Orders for this week: 3/13/23     FAX ORDERS TO [facility] Rx:     Moisturize dry intact skin of lower extremities. Right Great Toe and Right 2-4 Toe Amp site - Wash with soap and water, pat dry. Dakins applied to wound [place]) [place] incision and surrounding wound bed/maceration with betadine. Apply Iodosorb and Stimulen to wound bed. Hold in place with two super absorber (ABD or Sorbex). Wrap with Coban 2 Lite. Wear surgical shoe. Schedule provider visit  wound care visit on Mondays & Thursday! Follow up with Bertha Graham CNP in 1 week in the wound care center. Call [phone] for any questions or concerns.   Date__________   Time____________        Treatment Note Wound 02/20/23 Anterior;Right #2 RT 2ND-4TH TOE AMP SITE-Dressing/Treatment:  (dakins applied per provider,paint inscion with betadine,iodosorb,stimulen,2 sorbex,coabn2 lite)  Wound 02/20/23 Anterior;Right #1 RT GREAT TOE-Dressing/Treatment:  (dakins applied per provider,paint inscion with betadine,iodosorb,stimulen,2 sorbex,coabn2 lite)    Written Patient Dismissal Instructions Given            Electronically signed by KAYLI Mejia CNP on 3/15/2023 at 7:51 PM

## 2023-03-16 ENCOUNTER — HOSPITAL ENCOUNTER (OUTPATIENT)
Dept: WOUND CARE | Age: 38
Discharge: HOME | End: 2023-03-16
Payer: COMMERCIAL

## 2023-03-16 VITALS
HEART RATE: 101 BPM | SYSTOLIC BLOOD PRESSURE: 136 MMHG | DIASTOLIC BLOOD PRESSURE: 82 MMHG | RESPIRATION RATE: 18 BRPM | TEMPERATURE: 96.7 F

## 2023-03-16 DIAGNOSIS — E66.01 CLASS 2 SEVERE OBESITY DUE TO EXCESS CALORIES WITH SERIOUS COMORBIDITY AND BODY MASS INDEX (BMI) OF 37.0 TO 37.9 IN ADULT (HCC): ICD-10-CM

## 2023-03-16 DIAGNOSIS — R52 PAIN ASSOCIATED WITH WOUND: ICD-10-CM

## 2023-03-16 DIAGNOSIS — E11.9 DIABETES MELLITUS TYPE 2, INSULIN DEPENDENT (HCC): ICD-10-CM

## 2023-03-16 DIAGNOSIS — Z79.4 DIABETES MELLITUS TYPE 2, INSULIN DEPENDENT (HCC): ICD-10-CM

## 2023-03-16 DIAGNOSIS — T14.8XXA PAIN ASSOCIATED WITH WOUND: ICD-10-CM

## 2023-03-16 DIAGNOSIS — T81.89XA NONHEALING SURGICAL WOUND, INITIAL ENCOUNTER: Primary | ICD-10-CM

## 2023-03-16 DIAGNOSIS — L97.512 RIGHT FOOT ULCER, WITH FAT LAYER EXPOSED (HCC): ICD-10-CM

## 2023-03-16 DIAGNOSIS — S98.131A AMPUTATION OF TOE OF RIGHT FOOT (HCC): ICD-10-CM

## 2023-03-16 PROCEDURE — 11042 DBRDMT SUBQ TIS 1ST 20SQCM/<: CPT

## 2023-03-16 PROCEDURE — 11045 DBRDMT SUBQ TISS EACH ADDL: CPT

## 2023-03-16 RX ORDER — LIDOCAINE 40 MG/G
CREAM TOPICAL ONCE
OUTPATIENT
Start: 2023-03-16 | End: 2023-03-16

## 2023-03-16 RX ORDER — LIDOCAINE HYDROCHLORIDE 40 MG/ML
SOLUTION TOPICAL ONCE
OUTPATIENT
Start: 2023-03-16 | End: 2023-03-16

## 2023-03-16 RX ORDER — LIDOCAINE 50 MG/G
OINTMENT TOPICAL ONCE
OUTPATIENT
Start: 2023-03-16 | End: 2023-03-16

## 2023-03-16 RX ORDER — CLOBETASOL PROPIONATE 0.5 MG/G
OINTMENT TOPICAL ONCE
OUTPATIENT
Start: 2023-03-16 | End: 2023-03-16

## 2023-03-16 RX ORDER — BACITRACIN ZINC AND POLYMYXIN B SULFATE 500; 1000 [USP'U]/G; [USP'U]/G
OINTMENT TOPICAL ONCE
OUTPATIENT
Start: 2023-03-16 | End: 2023-03-16

## 2023-03-16 RX ORDER — GENTAMICIN SULFATE 1 MG/G
OINTMENT TOPICAL ONCE
OUTPATIENT
Start: 2023-03-16 | End: 2023-03-16

## 2023-03-16 RX ORDER — BACITRACIN, NEOMYCIN, POLYMYXIN B 400; 3.5; 5 [USP'U]/G; MG/G; [USP'U]/G
OINTMENT TOPICAL ONCE
OUTPATIENT
Start: 2023-03-16 | End: 2023-03-16

## 2023-03-16 RX ORDER — BETAMETHASONE DIPROPIONATE 0.05 %
OINTMENT (GRAM) TOPICAL ONCE
OUTPATIENT
Start: 2023-03-16 | End: 2023-03-16

## 2023-03-16 RX ORDER — GINSENG 100 MG
CAPSULE ORAL ONCE
OUTPATIENT
Start: 2023-03-16 | End: 2023-03-16

## 2023-03-16 ASSESSMENT — PAIN - FUNCTIONAL ASSESSMENT: PAIN_FUNCTIONAL_ASSESSMENT: PREVENTS OR INTERFERES SOME ACTIVE ACTIVITIES AND ADLS

## 2023-03-16 ASSESSMENT — PAIN SCALES - GENERAL: PAINLEVEL_OUTOF10: 10

## 2023-03-16 ASSESSMENT — PAIN DESCRIPTION - FREQUENCY: FREQUENCY: CONTINUOUS

## 2023-03-16 ASSESSMENT — PAIN DESCRIPTION - LOCATION: LOCATION: FOOT

## 2023-03-16 ASSESSMENT — PAIN DESCRIPTION - ORIENTATION: ORIENTATION: RIGHT

## 2023-03-16 ASSESSMENT — PAIN DESCRIPTION - ONSET: ONSET: ON-GOING

## 2023-03-16 ASSESSMENT — PAIN DESCRIPTION - PAIN TYPE: TYPE: ACUTE PAIN

## 2023-03-16 ASSESSMENT — PAIN DESCRIPTION - DESCRIPTORS: DESCRIPTORS: ACHING;THROBBING

## 2023-03-16 NOTE — DISCHARGE INSTRUCTIONS
PHYSICIAN ORDERS AND DISCHARGE INSTRUCTIONS     NOTE: Upon discharge from the [address], you will receive a patient experience survey. We would be grateful if you would take the time to fill this survey out. Wound care order history:                 DIRK's   Right       Left               Date:              Cultures:                Grafts:                Antibiotics:           Continuing wound care orders and information:                 Residence:                Continue home health care with:               Your wound-care supplies will be provided by: Wound cleansing:               Do not scrub or use excessive force. Wash hands with soap and water before and after dressing changes. Prior to applying a clean dressing, cleanse wound with normal saline, wound cleanser, or mild soap and water. Ask the physician or nurse before getting the wound(s) wet in a shower. General Wound management:              Keep weight off wounds and reposition every 2 hours. Avoid standing for long periods of time. Apply wraps/stockings in AM and remove at bedtime. If swelling is present, elevate legs to the level of the heart or above for 30 minutes 4-5 times a day and/or when sitting. When taking antibiotics take entire prescription as ordered by physician do not stop taking until medicine is all gone. Orders for this week: 3/16/23     FAX ORDERS TO [facility] Rx:     Moisturize dry intact skin of lower extremities. Right Great Toe and Right 2-4 Toe Amp site - Wash with soap and water, pat dry. Dakins applied to wound Joretta Child) [place] incision and surrounding wound bed/maceration with betadine. Apply Iodosorb and Stimulen to wound bed. Hold in place with two super absorbers (ABD or Sorbex). Wrap with Coban 2 Lite. Wear surgical shoe.      Schedule provider visit wound care visit on Mondays & Thursday! Follow up with Lorenzo Jenkins CNP in 1 week in the wound care center. Call [phone] for any questions or concerns.   Date__________   Time____________

## 2023-03-16 NOTE — PROGRESS NOTES
Multilayer Compression Wrap   (Not Unna) Below the Knee    NAME:  Teddy Fuentes  YOB: 1985  MEDICAL RECORD NUMBER:  [id number]  DATE:  3/16/2023    Multilayer compression wrap: Removed old Multilayer wrap if indicated and wash leg with mild soap/water. Applied moisturizing agent to dry skin as needed. Applied primary and secondary dressing as ordered. Applied multilayered dressing below the knee to right lower leg. Applied multilayered dressing below the knee to left lower leg. Instructed patient/caregiver not to remove dressing and to keep it clean and dry. Instructed patient/caregiver on complications to report to provider, such as pain, numbness in toes, heavy drainage, and slippage of dressing. Instructed patient on purpose of compression dressing and on activity and exercise recommendations.       Electronically signed by Jason Lopez RN on 3/16/2023 at 10:15 AM

## 2023-03-17 RX ORDER — OXYCODONE HYDROCHLORIDE AND ACETAMINOPHEN 5; 325 MG/1; MG/1
1 TABLET ORAL EVERY 8 HOURS PRN
Qty: 21 TABLET | Refills: 0 | Status: SHIPPED | OUTPATIENT
Start: 2023-03-17 | End: 2023-03-24

## 2023-03-20 ENCOUNTER — HOSPITAL ENCOUNTER (OUTPATIENT)
Dept: WOUND CARE | Age: 38
Discharge: HOME | End: 2023-03-20
Payer: COMMERCIAL

## 2023-03-20 VITALS
RESPIRATION RATE: 18 BRPM | DIASTOLIC BLOOD PRESSURE: 83 MMHG | SYSTOLIC BLOOD PRESSURE: 149 MMHG | TEMPERATURE: 98.1 F | HEART RATE: 115 BPM

## 2023-03-20 DIAGNOSIS — E11.9 DIABETES MELLITUS TYPE 2, INSULIN DEPENDENT (HCC): ICD-10-CM

## 2023-03-20 DIAGNOSIS — Z79.4 DIABETES MELLITUS TYPE 2, INSULIN DEPENDENT (HCC): ICD-10-CM

## 2023-03-20 DIAGNOSIS — L97.512 RIGHT FOOT ULCER, WITH FAT LAYER EXPOSED (HCC): ICD-10-CM

## 2023-03-20 DIAGNOSIS — T81.89XA NONHEALING SURGICAL WOUND, INITIAL ENCOUNTER: Primary | ICD-10-CM

## 2023-03-20 DIAGNOSIS — S98.131A AMPUTATION OF TOE OF RIGHT FOOT (HCC): ICD-10-CM

## 2023-03-20 DIAGNOSIS — E66.01 CLASS 2 SEVERE OBESITY DUE TO EXCESS CALORIES WITH SERIOUS COMORBIDITY AND BODY MASS INDEX (BMI) OF 37.0 TO 37.9 IN ADULT (HCC): ICD-10-CM

## 2023-03-20 PROCEDURE — 11042 DBRDMT SUBQ TIS 1ST 20SQCM/<: CPT | Performed by: NURSE PRACTITIONER

## 2023-03-20 PROCEDURE — 11042 DBRDMT SUBQ TIS 1ST 20SQCM/<: CPT

## 2023-03-20 RX ORDER — GENTAMICIN SULFATE 1 MG/G
OINTMENT TOPICAL ONCE
Status: CANCELLED | OUTPATIENT
Start: 2023-03-20 | End: 2023-03-20

## 2023-03-20 RX ORDER — BACITRACIN, NEOMYCIN, POLYMYXIN B 400; 3.5; 5 [USP'U]/G; MG/G; [USP'U]/G
OINTMENT TOPICAL ONCE
Status: CANCELLED | OUTPATIENT
Start: 2023-03-20 | End: 2023-03-20

## 2023-03-20 RX ORDER — LIDOCAINE 50 MG/G
OINTMENT TOPICAL ONCE
Status: CANCELLED | OUTPATIENT
Start: 2023-03-20 | End: 2023-03-20

## 2023-03-20 RX ORDER — BETAMETHASONE DIPROPIONATE 0.05 %
OINTMENT (GRAM) TOPICAL ONCE
Status: CANCELLED | OUTPATIENT
Start: 2023-03-20 | End: 2023-03-20

## 2023-03-20 RX ORDER — CLOBETASOL PROPIONATE 0.5 MG/G
OINTMENT TOPICAL ONCE
Status: CANCELLED | OUTPATIENT
Start: 2023-03-20 | End: 2023-03-20

## 2023-03-20 RX ORDER — GINSENG 100 MG
CAPSULE ORAL ONCE
Status: CANCELLED | OUTPATIENT
Start: 2023-03-20 | End: 2023-03-20

## 2023-03-20 RX ORDER — LIDOCAINE 40 MG/G
CREAM TOPICAL ONCE
Status: CANCELLED | OUTPATIENT
Start: 2023-03-20 | End: 2023-03-20

## 2023-03-20 RX ORDER — LIDOCAINE HYDROCHLORIDE 40 MG/ML
SOLUTION TOPICAL ONCE
Status: CANCELLED | OUTPATIENT
Start: 2023-03-20 | End: 2023-03-20

## 2023-03-20 RX ORDER — BACITRACIN ZINC AND POLYMYXIN B SULFATE 500; 1000 [USP'U]/G; [USP'U]/G
OINTMENT TOPICAL ONCE
Status: CANCELLED | OUTPATIENT
Start: 2023-03-20 | End: 2023-03-20

## 2023-03-20 ASSESSMENT — PAIN DESCRIPTION - PAIN TYPE: TYPE: ACUTE PAIN

## 2023-03-20 ASSESSMENT — PAIN DESCRIPTION - DESCRIPTORS: DESCRIPTORS: ACHING

## 2023-03-20 ASSESSMENT — PAIN DESCRIPTION - LOCATION: LOCATION: FOOT

## 2023-03-20 ASSESSMENT — PAIN DESCRIPTION - ORIENTATION: ORIENTATION: RIGHT

## 2023-03-20 ASSESSMENT — PAIN - FUNCTIONAL ASSESSMENT: PAIN_FUNCTIONAL_ASSESSMENT: PREVENTS OR INTERFERES SOME ACTIVE ACTIVITIES AND ADLS

## 2023-03-20 ASSESSMENT — PAIN DESCRIPTION - FREQUENCY: FREQUENCY: INTERMITTENT

## 2023-03-20 ASSESSMENT — PAIN SCALES - GENERAL: PAINLEVEL_OUTOF10: 5

## 2023-03-20 ASSESSMENT — PAIN DESCRIPTION - ONSET: ONSET: ON-GOING

## 2023-03-20 NOTE — PROGRESS NOTES
Multilayer Compression Wrap   (Not Unna) Below the Knee    NAME:  Iza Reza  YOB: 1985  MEDICAL RECORD NUMBER:  [id number]  DATE:  3/20/2023    Multilayer compression wrap: Removed old Multilayer wrap if indicated and wash leg with mild soap/water. Applied moisturizing agent to dry skin as needed. Applied primary and secondary dressing as ordered. Applied multilayered dressing below the knee to right lower leg. Instructed patient/caregiver not to remove dressing and to keep it clean and dry. Instructed patient/caregiver on complications to report to provider, such as pain, numbness in toes, heavy drainage, and slippage of dressing. Instructed patient on purpose of compression dressing and on activity and exercise recommendations.       Electronically signed by Luci López LPN on 9/99/6896 at 04:33 AM
excision into necrotic subcutaneous tissue is required to promote healing and evaluate the extent of previous healing. Performed by: KAYLI Crespo CNP    Consent obtained: Yes    Time out taken:  Yes    Pain Control: 4% Lidocaine Liquid Topical        Debridement:Excisional Debridement    Using curette, scissors, and forceps the wound(s) was/were sharply debrided down through and including the removal of subcutaneous tissue. Devitalized Tissue Debrided:  fibrin, biofilm, slough, exudate, and callus    Pre Debridement Measurements:  Are located in the Wound Documentation Flow Sheet    All active wounds listed below with today's date are evaluated  Wound(s)    debrided this date include # : 1 and 2     Post  Debridement Measurements:  Wound 11/20/22 Toe (Comment  which one) Anterior;Right (Active)   Number of days: 120       Wound 02/20/23 Anterior;Right #2 RT 2ND-4TH TOE AMP SITE (Active)   Wound Image   03/06/23 0909   Wound Etiology Non-Healing Surgical 03/20/23 1009   Dressing Status New dressing applied 03/20/23 1009   Wound Cleansed Soap and water; Wound cleanser 03/20/23 0933   Offloading for Diabetic Foot Ulcers Post op shoe 03/20/23 1009   Wound Length (cm) 5 cm 03/20/23 0933   Wound Width (cm) 3.5 cm 03/20/23 0933   Wound Depth (cm) 0.8 cm 03/20/23 0933   Wound Surface Area (cm^2) 17.5 cm^2 03/20/23 0933   Change in Wound Size % (l*w) -366.67 03/20/23 0933   Wound Volume (cm^3) 14 cm^3 03/20/23 0933   Wound Healing % -3633 03/20/23 0933   Post-Procedure Length (cm) 5 cm 03/20/23 1006   Post-Procedure Width (cm) 3.5 cm 03/20/23 1006   Post-Procedure Depth (cm) 0.8 cm 03/20/23 1006   Post-Procedure Surface Area (cm^2) 17.5 cm^2 03/20/23 1006   Post-Procedure Volume (cm^3) 14 cm^3 03/20/23 1006   Distance Tunneling (cm) 0 cm 03/20/23 0933   Tunneling Position ___ O'Clock 0 03/20/23 0933   Undermining Starts ___ O'Clock 0 03/20/23 0933   Undermining Ends___ O'Clock 0 03/20/23 0933

## 2023-03-20 NOTE — DISCHARGE INSTRUCTIONS
Mondays & Thursdays! Follow up with Shashank Delatorre CNP in 1 week in the wound care center. Call [phone] for any questions or concerns.   Date__________   Time____________

## 2023-03-23 ENCOUNTER — HOSPITAL ENCOUNTER (OUTPATIENT)
Dept: WOUND CARE | Age: 38
Discharge: HOME | End: 2023-03-23
Payer: COMMERCIAL

## 2023-03-23 VITALS
TEMPERATURE: 98.4 F | RESPIRATION RATE: 20 BRPM | DIASTOLIC BLOOD PRESSURE: 75 MMHG | HEART RATE: 112 BPM | SYSTOLIC BLOOD PRESSURE: 115 MMHG

## 2023-03-23 DIAGNOSIS — E11.9 DIABETES MELLITUS TYPE 2, INSULIN DEPENDENT (HCC): ICD-10-CM

## 2023-03-23 DIAGNOSIS — T81.89XA NONHEALING SURGICAL WOUND, INITIAL ENCOUNTER: Primary | ICD-10-CM

## 2023-03-23 DIAGNOSIS — L97.512 RIGHT FOOT ULCER, WITH FAT LAYER EXPOSED (HCC): ICD-10-CM

## 2023-03-23 DIAGNOSIS — E66.01 CLASS 2 SEVERE OBESITY DUE TO EXCESS CALORIES WITH SERIOUS COMORBIDITY AND BODY MASS INDEX (BMI) OF 37.0 TO 37.9 IN ADULT (HCC): ICD-10-CM

## 2023-03-23 DIAGNOSIS — S98.131A AMPUTATION OF TOE OF RIGHT FOOT (HCC): ICD-10-CM

## 2023-03-23 DIAGNOSIS — Z79.4 DIABETES MELLITUS TYPE 2, INSULIN DEPENDENT (HCC): ICD-10-CM

## 2023-03-23 PROCEDURE — 11042 DBRDMT SUBQ TIS 1ST 20SQCM/<: CPT

## 2023-03-23 RX ORDER — GINSENG 100 MG
CAPSULE ORAL ONCE
OUTPATIENT
Start: 2023-03-23 | End: 2023-03-23

## 2023-03-23 RX ORDER — BETAMETHASONE DIPROPIONATE 0.05 %
OINTMENT (GRAM) TOPICAL ONCE
OUTPATIENT
Start: 2023-03-23 | End: 2023-03-23

## 2023-03-23 RX ORDER — GENTAMICIN SULFATE 1 MG/G
OINTMENT TOPICAL ONCE
OUTPATIENT
Start: 2023-03-23 | End: 2023-03-23

## 2023-03-23 RX ORDER — LIDOCAINE 40 MG/G
CREAM TOPICAL ONCE
OUTPATIENT
Start: 2023-03-23 | End: 2023-03-23

## 2023-03-23 RX ORDER — LIDOCAINE HYDROCHLORIDE 40 MG/ML
SOLUTION TOPICAL ONCE
OUTPATIENT
Start: 2023-03-23 | End: 2023-03-23

## 2023-03-23 RX ORDER — BACITRACIN ZINC AND POLYMYXIN B SULFATE 500; 1000 [USP'U]/G; [USP'U]/G
OINTMENT TOPICAL ONCE
OUTPATIENT
Start: 2023-03-23 | End: 2023-03-23

## 2023-03-23 RX ORDER — BACITRACIN, NEOMYCIN, POLYMYXIN B 400; 3.5; 5 [USP'U]/G; MG/G; [USP'U]/G
OINTMENT TOPICAL ONCE
OUTPATIENT
Start: 2023-03-23 | End: 2023-03-23

## 2023-03-23 RX ORDER — CLOBETASOL PROPIONATE 0.5 MG/G
OINTMENT TOPICAL ONCE
OUTPATIENT
Start: 2023-03-23 | End: 2023-03-23

## 2023-03-23 RX ORDER — LIDOCAINE 50 MG/G
OINTMENT TOPICAL ONCE
OUTPATIENT
Start: 2023-03-23 | End: 2023-03-23

## 2023-03-23 NOTE — DISCHARGE INSTRUCTIONS
PHYSICIAN ORDERS AND DISCHARGE INSTRUCTIONS     NOTE: Upon discharge from the [address], you will receive a patient experience survey. We would be grateful if you would take the time to fill this survey out. Wound care order history:                 DIRK's   Right       Left               Date:              Cultures:                Grafts:                Antibiotics:           Continuing wound care orders and information:                 Residence:                Continue home health care with:               Your wound-care supplies will be provided by: Wound cleansing:               Do not scrub or use excessive force. Wash hands with soap and water before and after dressing changes. Prior to applying a clean dressing, cleanse wound with normal saline, wound cleanser, or mild soap and water. Ask the physician or nurse before getting the wound(s) wet in a shower. General Wound management:              Keep weight off wounds and reposition every 2 hours. Avoid standing for long periods of time. Apply wraps/stockings in AM and remove at bedtime. If swelling is present, elevate legs to the level of the heart or above for 30 minutes 4-5 times a day and/or when sitting. When taking antibiotics take entire prescription as ordered by physician do not stop taking until medicine is all gone. Orders for this week: 3/23/23     Rx:     Moisturize dry intact skin of lower extremities. Right Great Toe and Right 2-4 Toe Amp site - Wash with soap and water, pat dry. Paint incision and surrounding wound bed/maceration with betadine. Apply Iodosorb paste and Stimulen to wound bed. Cover with two super absorbers (ABD or Sorbex). Wrap with Coban 2 Lite. Leave in place between wound clinic appointments. Gave new post op shoe on 3/20/23.         Schedule provider visit wound

## 2023-03-24 NOTE — PROGRESS NOTES
Multilayer Compression Wrap   (Not Unna) Below the Knee    NAME:  Deneen Benito  YOB: 1985  MEDICAL RECORD NUMBER:  [id number]  DATE:  3/23/2023    Multilayer compression wrap: Removed old Multilayer wrap if indicated and wash leg with mild soap/water. Applied moisturizing agent to dry skin as needed. Applied primary and secondary dressing as ordered. Applied multilayered dressing below the knee to right lower leg. Instructed patient/caregiver not to remove dressing and to keep it clean and dry. Instructed patient/caregiver on complications to report to provider, such as pain, numbness in toes, heavy drainage, and slippage of dressing. Instructed patient on purpose of compression dressing and on activity and exercise recommendations.       Electronically signed by Adirenne Walters LPN on 7/92/7018 at 35:00 AM
BILITOT 0.4 02/09/2023    ALKPHOS 107 02/09/2023    AST 72 (H) 02/09/2023    ALT 34 02/09/2023    LABGLOM >60 02/13/2023    GFRAA >60 09/20/2022     Off Loading  Offloading or minimizing or removing weight placed on an area with poor circulation such as diabetic wounds or pressure. This can be achieved with crutches, wheel chair, knee walker etc. Minimizing pressure through partial weight bearing (minimizing the amount of  pressure applied and or the amount of time on the area of pressure) or maintaining a non-weight bearing status can be used to promote and often can be essential for thee wound to heal. Off loading may also need to be achieved for non-weight bearing wounds such as pressure ulcers to the torso. Turning and changing positions frequently, at least every two hours. Use of pressure cushion if sitting up in chair. Skin Care  Keep skin clean and well moisturized , moisturize routinely with ointments for heavier moisturizer needs for extremely dry skin or cracks such as A&D ointment and lotions for a light moisturizer such as CeraVe or Eucerin. If incontinent change incontinence garments as soon as soiled and keeping skin clean and use barrier cream to protect the skin. Reduce Salt and Sodium  Choose low- or reduced- sodium, or no-salt-added versions of foods and condiments when available. Buy fresh, plain frozen, or canned with no-added-salt vegetables. Use fresh poultry, fish and lean meat, rather than canned, smoked or processed types. Choose ready-to-eat breakfast cereals that are lower in sodium. Limit cured foods (such as huang and ham), foods packed in brine (such as pickled foods) and condiments (such as MSG, mustard, horseradish, and catsup). Limit even lower sodium versions of soy sauce and teriyaki sauce-treat these condiments just like salt). In cooking and at the table, flavor foods with herbs, spices, lemon, lime, vinegar or salt-free seasoning blends. Start by cutting salt in half.

## 2023-03-28 ENCOUNTER — HOSPITAL ENCOUNTER (OUTPATIENT)
Dept: WOUND CARE | Age: 38
Discharge: HOME | End: 2023-03-28
Payer: COMMERCIAL

## 2023-03-28 VITALS — HEART RATE: 97 BPM | TEMPERATURE: 97.5 F | DIASTOLIC BLOOD PRESSURE: 89 MMHG | SYSTOLIC BLOOD PRESSURE: 147 MMHG

## 2023-03-28 DIAGNOSIS — E66.09 CLASS 2 OBESITY DUE TO EXCESS CALORIES WITH BODY MASS INDEX (BMI) OF 37.0 TO 37.9 IN ADULT, UNSPECIFIED WHETHER SERIOUS COMORBIDITY PRESENT: ICD-10-CM

## 2023-03-28 DIAGNOSIS — L97.514 RIGHT FOOT ULCER, WITH NECROSIS OF BONE (HCC): ICD-10-CM

## 2023-03-28 DIAGNOSIS — Z79.4 DIABETES MELLITUS TYPE 2, INSULIN DEPENDENT (HCC): ICD-10-CM

## 2023-03-28 DIAGNOSIS — S98.131A AMPUTATION OF TOE OF RIGHT FOOT (HCC): ICD-10-CM

## 2023-03-28 DIAGNOSIS — E11.9 DIABETES MELLITUS TYPE 2, INSULIN DEPENDENT (HCC): ICD-10-CM

## 2023-03-28 DIAGNOSIS — T81.89XA NONHEALING SURGICAL WOUND, INITIAL ENCOUNTER: Primary | ICD-10-CM

## 2023-03-28 PROCEDURE — 11044 DBRDMT BONE 1ST 20 SQ CM/<: CPT

## 2023-03-28 PROCEDURE — 11042 DBRDMT SUBQ TIS 1ST 20SQCM/<: CPT | Performed by: NURSE PRACTITIONER

## 2023-03-28 PROCEDURE — 11047 DBRDMT BONE EACH ADDL: CPT

## 2023-03-28 ASSESSMENT — PAIN SCALES - GENERAL
PAINLEVEL_OUTOF10: 5
PAINLEVEL_OUTOF10: 5

## 2023-03-28 ASSESSMENT — PAIN DESCRIPTION - DESCRIPTORS
DESCRIPTORS: ACHING
DESCRIPTORS: ACHING

## 2023-03-28 ASSESSMENT — PAIN DESCRIPTION - LOCATION
LOCATION: FOOT
LOCATION: FOOT

## 2023-03-28 ASSESSMENT — PAIN DESCRIPTION - PAIN TYPE
TYPE: CHRONIC PAIN
TYPE: CHRONIC PAIN

## 2023-03-28 ASSESSMENT — PAIN DESCRIPTION - ORIENTATION
ORIENTATION: DISTAL
ORIENTATION: RIGHT;DISTAL

## 2023-03-28 ASSESSMENT — PAIN DESCRIPTION - FREQUENCY
FREQUENCY: CONTINUOUS
FREQUENCY: CONTINUOUS

## 2023-03-28 NOTE — PROGRESS NOTES
salt in half. Cook rice, pasta and hot cereals without salt. Cut back on instant or flavored rice, pasta and cereal mixes, which usually have added salt. Choose convenience foods that are lower in sodium. Limit frozen dinners, packaged mixes, canned soups and dressings. Rinse canned foods, such as tuna, to remove some sodium. Choose fruits or vegetables instead of salty snack foods. Edema Management   Whenever resting, raise your legs up. Try to keep the swollen area higher than the level of your heart. Take breaks from standing or sitting in one position. Walk around to increase the blood flow in your lower legs. Move your feet and ankles often while you stand, or tighten and relax your leg muscles. Wear support stockings. Put them on in the morning, before swelling gets worse. Eat a balanced diet. Lose weight if you need to. Limit the amount of salt (sodium) in your diet. Salt holds fluid in the body and may increase swelling. Apply compression stocking(s) every morning as soon as you get up. Remove at bedtime unless instructed to wear day and night. Hand wash and line dry to prevent loss of elasticity. Replace every 3-4 months to ensure proper fit. Weight Management   Will need to ultimately change overall eating behaviors to have success with weight loss. Encouraged to weigh daily and work towards a goal of 1-2 pounds of weight loss weekly. Encouraged to journal all food intake, Inofile pal is a useful tool to help keep track of food intake and caloric value. Keep calorie level at approximately 1585-6889. Protein intake is to be a minimum of 60 grams per day (unless otherwise directed). Water drinking was encouraged with a goal of 64oz-128oz daily. Beverages to be calorie free except for milk. Every other beverage should be water, avoid soda. Continue to increase level of physical activity. Refer to weight management as indicated and requested by patient.           PAST MEDICAL HISTORY

## 2023-03-28 NOTE — DISCHARGE INSTRUCTIONS
kit, and canister to wound clinic when vac is delivered. WOUND VAC THERAPY: Right 2-4 Toe Amp site (when vac available)    DUODERM TO PERIWOUND FOR PROTECTION. APPLY BLACK FOAM TO WOUND. APPLY WHITE FOAM TO EXPOSED BONE. SECURE VAC DRESSING WITH DRAPE. SET WOUND VAC  CONTINUOUS SUCTION. CANISTER CHANGE WEEKLY OR ACCORDING TO VOLUME OF DRAINAGE. WOUND VAC DRESSING TO BE CHANGED MON AND THUR. (Bring vac supplies to wound clinic appointment). Gave new post op shoe on 3/20/23. Schedule provider visit wound care visit on Mondays & Thursdays! Follow up with Chance Grimes CNP in 1 week in the wound care center. Call [phone] for any questions or concerns.   Date__________   Time___________

## 2023-03-30 ENCOUNTER — HOSPITAL ENCOUNTER (OUTPATIENT)
Dept: WOUND CARE | Age: 38
Discharge: HOME | End: 2023-03-30
Payer: COMMERCIAL

## 2023-03-30 VITALS
HEART RATE: 111 BPM | TEMPERATURE: 97 F | SYSTOLIC BLOOD PRESSURE: 161 MMHG | RESPIRATION RATE: 20 BRPM | DIASTOLIC BLOOD PRESSURE: 78 MMHG

## 2023-03-30 DIAGNOSIS — Z79.4 DIABETES MELLITUS TYPE 2, INSULIN DEPENDENT (HCC): ICD-10-CM

## 2023-03-30 DIAGNOSIS — S98.131A AMPUTATION OF TOE OF RIGHT FOOT (HCC): ICD-10-CM

## 2023-03-30 DIAGNOSIS — L97.512 RIGHT FOOT ULCER, WITH FAT LAYER EXPOSED (HCC): ICD-10-CM

## 2023-03-30 DIAGNOSIS — T81.89XA NONHEALING SURGICAL WOUND, INITIAL ENCOUNTER: Primary | ICD-10-CM

## 2023-03-30 DIAGNOSIS — E66.01 CLASS 2 SEVERE OBESITY DUE TO EXCESS CALORIES WITH SERIOUS COMORBIDITY AND BODY MASS INDEX (BMI) OF 37.0 TO 37.9 IN ADULT (HCC): ICD-10-CM

## 2023-03-30 DIAGNOSIS — E11.9 DIABETES MELLITUS TYPE 2, INSULIN DEPENDENT (HCC): ICD-10-CM

## 2023-03-30 PROCEDURE — 11042 DBRDMT SUBQ TIS 1ST 20SQCM/<: CPT

## 2023-03-30 PROCEDURE — 97605 NEG PRS WND THER DME<=50SQCM: CPT

## 2023-03-30 RX ORDER — CLOBETASOL PROPIONATE 0.5 MG/G
OINTMENT TOPICAL ONCE
OUTPATIENT
Start: 2023-03-30 | End: 2023-03-30

## 2023-03-30 RX ORDER — BACITRACIN ZINC AND POLYMYXIN B SULFATE 500; 1000 [USP'U]/G; [USP'U]/G
OINTMENT TOPICAL ONCE
OUTPATIENT
Start: 2023-03-30 | End: 2023-03-30

## 2023-03-30 RX ORDER — BETAMETHASONE DIPROPIONATE 0.05 %
OINTMENT (GRAM) TOPICAL ONCE
OUTPATIENT
Start: 2023-03-30 | End: 2023-03-30

## 2023-03-30 RX ORDER — BACITRACIN, NEOMYCIN, POLYMYXIN B 400; 3.5; 5 [USP'U]/G; MG/G; [USP'U]/G
OINTMENT TOPICAL ONCE
OUTPATIENT
Start: 2023-03-30 | End: 2023-03-30

## 2023-03-30 RX ORDER — LIDOCAINE HYDROCHLORIDE 40 MG/ML
SOLUTION TOPICAL ONCE
OUTPATIENT
Start: 2023-03-30 | End: 2023-03-30

## 2023-03-30 RX ORDER — LIDOCAINE 40 MG/G
CREAM TOPICAL ONCE
OUTPATIENT
Start: 2023-03-30 | End: 2023-03-30

## 2023-03-30 RX ORDER — GENTAMICIN SULFATE 1 MG/G
OINTMENT TOPICAL ONCE
OUTPATIENT
Start: 2023-03-30 | End: 2023-03-30

## 2023-03-30 RX ORDER — GINSENG 100 MG
CAPSULE ORAL ONCE
OUTPATIENT
Start: 2023-03-30 | End: 2023-03-30

## 2023-03-30 RX ORDER — LIDOCAINE 50 MG/G
OINTMENT TOPICAL ONCE
OUTPATIENT
Start: 2023-03-30 | End: 2023-03-30

## 2023-03-30 ASSESSMENT — PAIN SCALES - GENERAL: PAINLEVEL_OUTOF10: 5

## 2023-03-30 ASSESSMENT — PAIN DESCRIPTION - ONSET: ONSET: ON-GOING

## 2023-03-30 ASSESSMENT — PAIN DESCRIPTION - DESCRIPTORS: DESCRIPTORS: ACHING

## 2023-03-30 ASSESSMENT — PAIN DESCRIPTION - ORIENTATION: ORIENTATION: RIGHT

## 2023-03-30 ASSESSMENT — PAIN DESCRIPTION - LOCATION: LOCATION: FOOT

## 2023-03-30 ASSESSMENT — PAIN - FUNCTIONAL ASSESSMENT: PAIN_FUNCTIONAL_ASSESSMENT: ACTIVITIES ARE NOT PREVENTED

## 2023-03-30 ASSESSMENT — PAIN DESCRIPTION - FREQUENCY: FREQUENCY: CONTINUOUS

## 2023-03-30 ASSESSMENT — PAIN DESCRIPTION - PAIN TYPE: TYPE: CHRONIC PAIN

## 2023-03-30 NOTE — DISCHARGE INSTRUCTIONS
PROTECTION. APPLY SORBACT TO EXPOSED BONE IN BASE OF WOUND (BE SURE THIS GETS REMOVED AT DRESSING CHANGES) APPLY STIMULEN AND BLACK FOAM TO WOUND. Dianne Guardado SECURE VAC DRESSING WITH DRAPE. SET WOUND VAC  CONTINUOUS SUCTION. CANISTER CHANGE WEEKLY OR ACCORDING TO VOLUME OF DRAINAGE. WOUND VAC DRESSING TO BE CHANGED MON in Clinic, Wednesday and Friday with Home Health. Vac supplies currently at the clinic because patient did not have way to get them to his house. Gave new post op shoe on 3/20/23. Schedule provider visit wound care visit on Mondays  Follow up with Karly Finley CNP in 1 week in the wound care center. Call [phone] for any questions or concerns.   Date__________   Time___________

## 2023-04-03 ENCOUNTER — HOSPITAL ENCOUNTER (OUTPATIENT)
Dept: WOUND CARE | Age: 38
Discharge: HOME | End: 2023-04-03
Payer: COMMERCIAL

## 2023-04-03 VITALS
HEART RATE: 103 BPM | DIASTOLIC BLOOD PRESSURE: 80 MMHG | RESPIRATION RATE: 19 BRPM | SYSTOLIC BLOOD PRESSURE: 133 MMHG | TEMPERATURE: 97.6 F

## 2023-04-03 DIAGNOSIS — T81.89XA NONHEALING SURGICAL WOUND, INITIAL ENCOUNTER: Primary | ICD-10-CM

## 2023-04-03 DIAGNOSIS — S98.131A AMPUTATION OF TOE OF RIGHT FOOT (HCC): ICD-10-CM

## 2023-04-03 DIAGNOSIS — L97.512 RIGHT FOOT ULCER, WITH FAT LAYER EXPOSED (HCC): ICD-10-CM

## 2023-04-03 DIAGNOSIS — Z79.4 DIABETES MELLITUS TYPE 2, INSULIN DEPENDENT (HCC): ICD-10-CM

## 2023-04-03 DIAGNOSIS — E66.01 CLASS 2 SEVERE OBESITY DUE TO EXCESS CALORIES WITH SERIOUS COMORBIDITY AND BODY MASS INDEX (BMI) OF 37.0 TO 37.9 IN ADULT (HCC): ICD-10-CM

## 2023-04-03 DIAGNOSIS — E11.9 DIABETES MELLITUS TYPE 2, INSULIN DEPENDENT (HCC): ICD-10-CM

## 2023-04-03 PROCEDURE — 97605 NEG PRS WND THER DME<=50SQCM: CPT

## 2023-04-03 PROCEDURE — 11042 DBRDMT SUBQ TIS 1ST 20SQCM/<: CPT

## 2023-04-03 PROCEDURE — 11042 DBRDMT SUBQ TIS 1ST 20SQCM/<: CPT | Performed by: NURSE PRACTITIONER

## 2023-04-03 RX ORDER — BACITRACIN, NEOMYCIN, POLYMYXIN B 400; 3.5; 5 [USP'U]/G; MG/G; [USP'U]/G
OINTMENT TOPICAL ONCE
Status: CANCELLED | OUTPATIENT
Start: 2023-04-03 | End: 2023-04-03

## 2023-04-03 RX ORDER — GINSENG 100 MG
CAPSULE ORAL ONCE
Status: CANCELLED | OUTPATIENT
Start: 2023-04-03 | End: 2023-04-03

## 2023-04-03 RX ORDER — LIDOCAINE 50 MG/G
OINTMENT TOPICAL ONCE
Status: CANCELLED | OUTPATIENT
Start: 2023-04-03 | End: 2023-04-03

## 2023-04-03 RX ORDER — CLOBETASOL PROPIONATE 0.5 MG/G
OINTMENT TOPICAL ONCE
Status: CANCELLED | OUTPATIENT
Start: 2023-04-03 | End: 2023-04-03

## 2023-04-03 RX ORDER — BETAMETHASONE DIPROPIONATE 0.05 %
OINTMENT (GRAM) TOPICAL ONCE
Status: CANCELLED | OUTPATIENT
Start: 2023-04-03 | End: 2023-04-03

## 2023-04-03 RX ORDER — LIDOCAINE HYDROCHLORIDE 40 MG/ML
SOLUTION TOPICAL ONCE
Status: CANCELLED | OUTPATIENT
Start: 2023-04-03 | End: 2023-04-03

## 2023-04-03 RX ORDER — BACITRACIN ZINC AND POLYMYXIN B SULFATE 500; 1000 [USP'U]/G; [USP'U]/G
OINTMENT TOPICAL ONCE
Status: CANCELLED | OUTPATIENT
Start: 2023-04-03 | End: 2023-04-03

## 2023-04-03 RX ORDER — LIDOCAINE 40 MG/G
CREAM TOPICAL ONCE
Status: CANCELLED | OUTPATIENT
Start: 2023-04-03 | End: 2023-04-03

## 2023-04-03 RX ORDER — GENTAMICIN SULFATE 1 MG/G
OINTMENT TOPICAL ONCE
Status: CANCELLED | OUTPATIENT
Start: 2023-04-03 | End: 2023-04-03

## 2023-04-03 ASSESSMENT — PAIN - FUNCTIONAL ASSESSMENT: PAIN_FUNCTIONAL_ASSESSMENT: PREVENTS OR INTERFERES SOME ACTIVE ACTIVITIES AND ADLS

## 2023-04-03 ASSESSMENT — PAIN SCALES - GENERAL: PAINLEVEL_OUTOF10: 4

## 2023-04-03 ASSESSMENT — PAIN DESCRIPTION - DESCRIPTORS: DESCRIPTORS: TENDER;BURNING

## 2023-04-03 ASSESSMENT — PAIN DESCRIPTION - LOCATION: LOCATION: FOOT

## 2023-04-03 ASSESSMENT — PAIN DESCRIPTION - PAIN TYPE: TYPE: CHRONIC PAIN

## 2023-04-03 ASSESSMENT — PAIN DESCRIPTION - ORIENTATION: ORIENTATION: RIGHT

## 2023-04-03 ASSESSMENT — PAIN DESCRIPTION - ONSET: ONSET: ON-GOING

## 2023-04-03 ASSESSMENT — PAIN DESCRIPTION - FREQUENCY: FREQUENCY: CONTINUOUS

## 2023-04-03 NOTE — PROGRESS NOTES
Chasity Tobin LPN on 7/1/3224 at 13:96 AM
02/09/2023    BILITOT 0.4 02/09/2023    ALKPHOS 107 02/09/2023    AST 72 (H) 02/09/2023    ALT 34 02/09/2023    LABGLOM >60 02/13/2023    GFRAA >60 09/20/2022     Off Loading  Offloading or minimizing or removing weight placed on an area with poor circulation such as diabetic wounds or pressure. This can be achieved with crutches, wheel chair, knee walker etc. Minimizing pressure through partial weight bearing (minimizing the amount of  pressure applied and or the amount of time on the area of pressure) or maintaining a non-weight bearing status can be used to promote and often can be essential for thee wound to heal. Off loading may also need to be achieved for non-weight bearing wounds such as pressure ulcers to the torso. Turning and changing positions frequently, at least every two hours. Use of pressure cushion if sitting up in chair. Skin Care  Keep skin clean and well moisturized , moisturize routinely with ointments for heavier moisturizer needs for extremely dry skin or cracks such as A&D ointment and lotions for a light moisturizer such as CeraVe or Eucerin. If incontinent change incontinence garments as soon as soiled and keeping skin clean and use barrier cream to protect the skin. Reduce Salt and Sodium  Choose low- or reduced- sodium, or no-salt-added versions of foods and condiments when available. Buy fresh, plain frozen, or canned with no-added-salt vegetables. Use fresh poultry, fish and lean meat, rather than canned, smoked or processed types. Choose ready-to-eat breakfast cereals that are lower in sodium. Limit cured foods (such as huang and ham), foods packed in brine (such as pickled foods) and condiments (such as MSG, mustard, horseradish, and catsup). Limit even lower sodium versions of soy sauce and teriyaki sauce-treat these condiments just like salt). In cooking and at the table, flavor foods with herbs, spices, lemon, lime, vinegar or salt-free seasoning blends.  Start by cutting

## 2023-04-03 NOTE — DISCHARGE INSTRUCTIONS
PHYSICIAN ORDERS AND DISCHARGE INSTRUCTIONS     NOTE: Upon discharge from the [address], you will receive a patient experience survey. We would be grateful if you would take the time to fill this survey out. Wound care order history:                 DIRK's   Right       Left               Date:              Cultures:                Grafts:                Antibiotics:           Continuing wound care orders and information:                 Residence:                Continue home health care with:               Your wound-care supplies will be provided by: Wound cleansing:               Do not scrub or use excessive force. Wash hands with soap and water before and after dressing changes. Prior to applying a clean dressing, cleanse wound with normal saline, wound cleanser, or mild soap and water. Ask the physician or nurse before getting the wound(s) wet in a shower. General Wound management:              Keep weight off wounds and reposition every 2 hours. Avoid standing for long periods of time. Apply wraps/stockings in AM and remove at bedtime. If swelling is present, elevate legs to the level of the heart or above for 30 minutes 4-5 times a day and/or when sitting. When taking antibiotics take entire prescription as ordered by physician do not stop taking until medicine is all gone. Orders for this week: 4/3/23     Rx:     Moisturize dry intact skin of lower extremities. Right Great Toe - Healed 4/3/23. Wound vac ordered 3/28/23. WOUND VAC THERAPY: Right 2-4 Toe Amp site     DUODERM TO PERIWOUND FOR PROTECTION. APPLY SORBACT TO EXPOSED BONE IN BASE OF WOUND (BE SURE THIS GETS REMOVED AT DRESSING CHANGES) APPLY STIMULEN AND BLACK FOAM TO WOUND. SECURE VAC DRESSING WITH DRAPE. SET WOUND VAC  CONTINUOUS SUCTION.  CANISTER CHANGE WEEKLY OR

## 2023-04-05 ENCOUNTER — HOSPITAL ENCOUNTER (OUTPATIENT)
Dept: WOUND CARE | Age: 38
Discharge: HOME | End: 2023-04-05
Payer: COMMERCIAL

## 2023-04-05 VITALS — HEART RATE: 111 BPM | DIASTOLIC BLOOD PRESSURE: 70 MMHG | RESPIRATION RATE: 20 BRPM | SYSTOLIC BLOOD PRESSURE: 140 MMHG

## 2023-04-05 DIAGNOSIS — S98.131A AMPUTATION OF TOE OF RIGHT FOOT (HCC): ICD-10-CM

## 2023-04-05 DIAGNOSIS — L97.512 RIGHT FOOT ULCER, WITH FAT LAYER EXPOSED (HCC): ICD-10-CM

## 2023-04-05 DIAGNOSIS — T81.89XA NONHEALING SURGICAL WOUND, INITIAL ENCOUNTER: Primary | ICD-10-CM

## 2023-04-05 DIAGNOSIS — Z79.4 DIABETES MELLITUS TYPE 2, INSULIN DEPENDENT (HCC): ICD-10-CM

## 2023-04-05 DIAGNOSIS — E66.01 CLASS 2 SEVERE OBESITY DUE TO EXCESS CALORIES WITH SERIOUS COMORBIDITY AND BODY MASS INDEX (BMI) OF 37.0 TO 37.9 IN ADULT (HCC): ICD-10-CM

## 2023-04-05 DIAGNOSIS — E11.9 DIABETES MELLITUS TYPE 2, INSULIN DEPENDENT (HCC): ICD-10-CM

## 2023-04-05 PROCEDURE — 11042 DBRDMT SUBQ TIS 1ST 20SQCM/<: CPT

## 2023-04-05 PROCEDURE — 97605 NEG PRS WND THER DME<=50SQCM: CPT

## 2023-04-05 RX ORDER — BETAMETHASONE DIPROPIONATE 0.05 %
OINTMENT (GRAM) TOPICAL ONCE
OUTPATIENT
Start: 2023-04-05 | End: 2023-04-05

## 2023-04-05 RX ORDER — LIDOCAINE 50 MG/G
OINTMENT TOPICAL ONCE
OUTPATIENT
Start: 2023-04-05 | End: 2023-04-05

## 2023-04-05 RX ORDER — BACITRACIN ZINC AND POLYMYXIN B SULFATE 500; 1000 [USP'U]/G; [USP'U]/G
OINTMENT TOPICAL ONCE
OUTPATIENT
Start: 2023-04-05 | End: 2023-04-05

## 2023-04-05 RX ORDER — GINSENG 100 MG
CAPSULE ORAL ONCE
OUTPATIENT
Start: 2023-04-05 | End: 2023-04-05

## 2023-04-05 RX ORDER — LIDOCAINE 40 MG/G
CREAM TOPICAL ONCE
OUTPATIENT
Start: 2023-04-05 | End: 2023-04-05

## 2023-04-05 RX ORDER — GENTAMICIN SULFATE 1 MG/G
OINTMENT TOPICAL ONCE
OUTPATIENT
Start: 2023-04-05 | End: 2023-04-05

## 2023-04-05 RX ORDER — LIDOCAINE HYDROCHLORIDE 40 MG/ML
SOLUTION TOPICAL ONCE
OUTPATIENT
Start: 2023-04-05 | End: 2023-04-05

## 2023-04-05 RX ORDER — CLOBETASOL PROPIONATE 0.5 MG/G
OINTMENT TOPICAL ONCE
OUTPATIENT
Start: 2023-04-05 | End: 2023-04-05

## 2023-04-05 RX ORDER — BACITRACIN, NEOMYCIN, POLYMYXIN B 400; 3.5; 5 [USP'U]/G; MG/G; [USP'U]/G
OINTMENT TOPICAL ONCE
OUTPATIENT
Start: 2023-04-05 | End: 2023-04-05

## 2023-04-05 ASSESSMENT — PAIN DESCRIPTION - LOCATION: LOCATION: FOOT

## 2023-04-05 ASSESSMENT — PAIN DESCRIPTION - FREQUENCY: FREQUENCY: INTERMITTENT

## 2023-04-05 ASSESSMENT — PAIN DESCRIPTION - ONSET: ONSET: ON-GOING

## 2023-04-05 ASSESSMENT — PAIN DESCRIPTION - PAIN TYPE: TYPE: CHRONIC PAIN

## 2023-04-05 ASSESSMENT — PAIN - FUNCTIONAL ASSESSMENT: PAIN_FUNCTIONAL_ASSESSMENT: PREVENTS OR INTERFERES SOME ACTIVE ACTIVITIES AND ADLS

## 2023-04-05 ASSESSMENT — PAIN DESCRIPTION - ORIENTATION: ORIENTATION: RIGHT

## 2023-04-05 ASSESSMENT — PAIN SCALES - GENERAL: PAINLEVEL_OUTOF10: 5

## 2023-04-05 ASSESSMENT — PAIN DESCRIPTION - DESCRIPTORS: DESCRIPTORS: BURNING;TENDER

## 2023-04-05 NOTE — DISCHARGE INSTRUCTIONS
125 CONTINUOUS SUCTION. CANISTER CHANGE WEEKLY OR ACCORDING TO VOLUME OF DRAINAGE. Wrap with Coban 2 Lite (do not compress tubing against skin)     WOUND VAC DRESSING TO BE CHANGED MONDAYS, Wednesdays and Fridays in clinic. Gave new post op shoe on 3/20/23. Schedule provider visit wound care visit on Mondays  Nurse visit on Wednesdays and Fridays. Follow up with Brigid Doshi CNP in 1 week in the wound care center. Call [phone] for any questions or concerns.   Date__________   Time___________

## 2023-04-06 NOTE — PROGRESS NOTES
Multilayer Compression Wrap   (Not Unna) Below the Knee    NAME:  Pavel Pradhan  YOB: 1985  MEDICAL RECORD NUMBER:  [id number]  DATE:  4/5/2023    Multilayer compression wrap: Removed old Multilayer wrap if indicated and wash leg with mild soap/water. Applied moisturizing agent to dry skin as needed. Applied primary and secondary dressing as ordered. Applied multilayered dressing below the knee to right lower leg. Instructed patient/caregiver not to remove dressing and to keep it clean and dry. Instructed patient/caregiver on complications to report to provider, such as pain, numbness in toes, heavy drainage, and slippage of dressing. Instructed patient on purpose of compression dressing and on activity and exercise recommendations.       Electronically signed by Bertha Reardon LPN on 5/5/2952 at 6:88 AM
Negative Pressure Wound Therapy    NAME:  Kelby Farooq  YOB: 1985  MEDICAL RECORD NUMBER:  [id number]  DATE:  4/5/2023    Applied Negative Pressure to right 2-4 toe amp site wound(s)/ulcer(s). [x] Applied skin barrier prep to fer-wound. [x] Cut strips of plastic drape to picture frame wound so that fer-wound is     covered with the drape. [x] If bridging dressing to less prominent site, cover any intact skin that will come in contact with the Negative Pressure Therapy sponge, gauze or channel drain with plastic drape. The sponge should never touch intact skin. [x] Cut sponge, gauze or channel drain to size which will fit into the wound/ulcer bed without being forced. [x] Be sure the sponge is large enough to hold the entire round plastic flange which is attached to the tubing. Never allow flange to be larger than the sponge or it will produce suction damaging intact skin. Total number of individual pieces of foam used within the wound bed: 1    [x] If bridging the dressing away from the primary site, be sure the bridge leads to a piece of sponge large enough to hold the entire flange without allowing any of the flange to overlap onto intact skin. [x] Covered sponge, gauze or channel drain with plastic drape. [x] Cut a hole in this plastic drape directly over the sponge the same size as the plastic drain tubing. [x] Removed plastic liner from flange and apply it directly over the hole you cut. [x] Removed the plastic cover from the flange. [x] Attached the tubing to the wound/ulcer Negative Pressure Therapy and turn it on to be sure a vacuum is created and that there are no leaks. [x] If air leaks occur, use plastic drape to patch them. [x] Secured Negative Pressure Therapy dressing with ace wrap loosely if located on an extremity. Maintain tubing outside of ace wrap. Tubing must not exert pressure on intact skin.     Applied per  Guidelines      Electronically
obesity in adult    Acute post-operative pain    WD-Right foot ulcer, with necrosis of bone (HCC)       REVIEW OF SYSTEMS    Constitutional: negative for anorexia, chills, fatigue, fevers, malaise, and sweats  Respiratory: negative for cough, hemoptysis, pleurisy/chest pain, sputum, and stridor  Cardiovascular: negative for chest pain, chest pressure/discomfort, exertional chest pressure/discomfort, near-syncope, orthopnea, palpitations, paroxysmal nocturnal dyspnea, syncope, and tachypnea  Integument/breast: positive for skin lesion(s)     Objective:      BP (!) 140/70   Pulse (!) 111   Resp 20     BP Readings from Last 3 Encounters:   04/05/23 (!) 140/70   04/03/23 133/80   03/30/23 (!) 161/78        PHYSICAL EXAM  General Appearance:   Alert, cooperative, no distress, obese   Head:   Normocephalic, without obvious abnormality, atraumatic    Eyes:   PERRL, conjunctiva/corneas clear    Ears:   Normal external appearance, hearing grossly intact    Nose:  Nares normal, mucosa normal, no drainage    Throat:  Lips, mucosa, and tongue normal    Neck:  Supple, trachea midline    Respiratory:   Equal chest rise, respirations unlabored, no wheezing   Cardiovascular:   Regular rate and rhythm    Abdomen, GI:   Soft, non-tender, no rebound or guarding    Musculoskeletal:  Atraumatic, no cyanosis or edema    Neurologic:  Nonfocal, strength & sensation grossly intact   Psychiatric: Oriented x3, grossly appropriate judgement and insight      Dermatologic exam: Visual inspection of the periwound reveals the skin to be moist and edematous  Wound exam: see wound description below in procedure note      Assessment:       Angi Collins  appears to have a non-healing wound of the right foot. The etiology of the wound is felt to be non-healing surgical. There are multiple complicating factors including diabetes, poor glucose control, chronic pressure, shear force, obesity, smoking, and non-adherence.   A comprehensive wound management

## 2023-04-07 ENCOUNTER — HOSPITAL ENCOUNTER (OUTPATIENT)
Dept: WOUND CARE | Age: 38
Discharge: HOME | End: 2023-04-07
Payer: COMMERCIAL

## 2023-04-07 VITALS
DIASTOLIC BLOOD PRESSURE: 77 MMHG | SYSTOLIC BLOOD PRESSURE: 122 MMHG | RESPIRATION RATE: 18 BRPM | HEART RATE: 102 BPM | TEMPERATURE: 97 F

## 2023-04-07 PROCEDURE — 29581 APPL MULTLAYER CMPRN SYS LEG: CPT

## 2023-04-07 PROCEDURE — 97605 NEG PRS WND THER DME<=50SQCM: CPT

## 2023-04-07 ASSESSMENT — PAIN SCALES - GENERAL: PAINLEVEL_OUTOF10: 5

## 2023-04-07 ASSESSMENT — PAIN DESCRIPTION - LOCATION: LOCATION: FOOT

## 2023-04-07 ASSESSMENT — PAIN - FUNCTIONAL ASSESSMENT: PAIN_FUNCTIONAL_ASSESSMENT: ACTIVITIES ARE NOT PREVENTED

## 2023-04-07 ASSESSMENT — PAIN DESCRIPTION - PAIN TYPE: TYPE: ACUTE PAIN

## 2023-04-07 ASSESSMENT — PAIN DESCRIPTION - ORIENTATION: ORIENTATION: RIGHT

## 2023-04-07 ASSESSMENT — PAIN DESCRIPTION - DESCRIPTORS: DESCRIPTORS: ACHING

## 2023-04-07 ASSESSMENT — PAIN DESCRIPTION - ONSET: ONSET: ON-GOING

## 2023-04-07 ASSESSMENT — PAIN DESCRIPTION - FREQUENCY: FREQUENCY: INTERMITTENT

## 2023-04-07 NOTE — PROGRESS NOTES
Lyndia Frankel, LPN on 0/2/5781 at 65:46 AM Multilayer Compression Wrap   (Not Edinson Ranks) Below the Knee    NAME:  Liana Matthews  YOB: 1985  MEDICAL RECORD NUMBER:  [id number]  DATE:  4/7/2023    Multilayer compression wrap: Removed old Multilayer wrap if indicated and wash leg with mild soap/water. Applied moisturizing agent to dry skin as needed. Applied primary and secondary dressing as ordered. Applied multilayered dressing below the knee to right lower leg. Instructed patient/caregiver not to remove dressing and to keep it clean and dry. Instructed patient/caregiver on complications to report to provider, such as pain, numbness in toes, heavy drainage, and slippage of dressing. Instructed patient on purpose of compression dressing and on activity and exercise recommendations.       Electronically signed by Lyndia Frankel, LPN on 3/8/0748 at 64:23 AM

## 2023-04-17 ENCOUNTER — HOSPITAL ENCOUNTER (OUTPATIENT)
Dept: WOUND CARE | Age: 38
Discharge: HOME | End: 2023-04-17
Payer: COMMERCIAL

## 2023-04-17 VITALS
DIASTOLIC BLOOD PRESSURE: 85 MMHG | TEMPERATURE: 97.9 F | SYSTOLIC BLOOD PRESSURE: 146 MMHG | RESPIRATION RATE: 18 BRPM | HEART RATE: 97 BPM

## 2023-04-17 DIAGNOSIS — L97.512 RIGHT FOOT ULCER, WITH FAT LAYER EXPOSED (HCC): ICD-10-CM

## 2023-04-17 DIAGNOSIS — E11.9 DIABETES MELLITUS TYPE 2, INSULIN DEPENDENT (HCC): ICD-10-CM

## 2023-04-17 DIAGNOSIS — T81.89XA NONHEALING SURGICAL WOUND, INITIAL ENCOUNTER: Primary | ICD-10-CM

## 2023-04-17 DIAGNOSIS — E66.01 CLASS 2 SEVERE OBESITY DUE TO EXCESS CALORIES WITH SERIOUS COMORBIDITY AND BODY MASS INDEX (BMI) OF 37.0 TO 37.9 IN ADULT (HCC): ICD-10-CM

## 2023-04-17 DIAGNOSIS — S98.131A AMPUTATION OF TOE OF RIGHT FOOT (HCC): ICD-10-CM

## 2023-04-17 DIAGNOSIS — Z79.4 DIABETES MELLITUS TYPE 2, INSULIN DEPENDENT (HCC): ICD-10-CM

## 2023-04-17 PROCEDURE — 97605 NEG PRS WND THER DME<=50SQCM: CPT

## 2023-04-17 PROCEDURE — 11042 DBRDMT SUBQ TIS 1ST 20SQCM/<: CPT | Performed by: NURSE PRACTITIONER

## 2023-04-17 PROCEDURE — 11042 DBRDMT SUBQ TIS 1ST 20SQCM/<: CPT

## 2023-04-17 RX ORDER — GENTAMICIN SULFATE 1 MG/G
OINTMENT TOPICAL ONCE
OUTPATIENT
Start: 2023-04-17 | End: 2023-04-17

## 2023-04-17 RX ORDER — CLOBETASOL PROPIONATE 0.5 MG/G
OINTMENT TOPICAL ONCE
OUTPATIENT
Start: 2023-04-17 | End: 2023-04-17

## 2023-04-17 RX ORDER — BACITRACIN ZINC AND POLYMYXIN B SULFATE 500; 1000 [USP'U]/G; [USP'U]/G
OINTMENT TOPICAL ONCE
OUTPATIENT
Start: 2023-04-17 | End: 2023-04-17

## 2023-04-17 RX ORDER — BACITRACIN, NEOMYCIN, POLYMYXIN B 400; 3.5; 5 [USP'U]/G; MG/G; [USP'U]/G
OINTMENT TOPICAL ONCE
OUTPATIENT
Start: 2023-04-17 | End: 2023-04-17

## 2023-04-17 RX ORDER — GINSENG 100 MG
CAPSULE ORAL ONCE
OUTPATIENT
Start: 2023-04-17 | End: 2023-04-17

## 2023-04-17 RX ORDER — LIDOCAINE 50 MG/G
OINTMENT TOPICAL ONCE
OUTPATIENT
Start: 2023-04-17 | End: 2023-04-17

## 2023-04-17 RX ORDER — DOXYCYCLINE HYCLATE 100 MG
100 TABLET ORAL EVERY 12 HOURS SCHEDULED
Qty: 60 TABLET | Refills: 0 | Status: SHIPPED | OUTPATIENT
Start: 2023-04-17 | End: 2023-05-17

## 2023-04-17 RX ORDER — BETAMETHASONE DIPROPIONATE 0.05 %
OINTMENT (GRAM) TOPICAL ONCE
OUTPATIENT
Start: 2023-04-17 | End: 2023-04-17

## 2023-04-17 RX ORDER — LIDOCAINE HYDROCHLORIDE 40 MG/ML
SOLUTION TOPICAL ONCE
OUTPATIENT
Start: 2023-04-17 | End: 2023-04-17

## 2023-04-17 RX ORDER — CEPHALEXIN 500 MG/1
500 CAPSULE ORAL 4 TIMES DAILY
Qty: 120 CAPSULE | Refills: 0 | Status: SHIPPED | OUTPATIENT
Start: 2023-04-17 | End: 2023-05-17

## 2023-04-17 RX ORDER — LIDOCAINE 40 MG/G
CREAM TOPICAL ONCE
OUTPATIENT
Start: 2023-04-17 | End: 2023-04-17

## 2023-04-17 ASSESSMENT — PAIN DESCRIPTION - LOCATION: LOCATION: LEG

## 2023-04-17 ASSESSMENT — PAIN DESCRIPTION - ORIENTATION: ORIENTATION: RIGHT

## 2023-04-17 ASSESSMENT — PAIN DESCRIPTION - DESCRIPTORS: DESCRIPTORS: ACHING

## 2023-04-17 ASSESSMENT — PAIN DESCRIPTION - FREQUENCY: FREQUENCY: INTERMITTENT

## 2023-04-17 ASSESSMENT — PAIN SCALES - GENERAL: PAINLEVEL_OUTOF10: 6

## 2023-04-17 NOTE — PROGRESS NOTES
Multilayer Compression Wrap   (Not Unna) Below the Knee    NAME:  Royal Lin  YOB: 1985  MEDICAL RECORD NUMBER:  [id number]  DATE:  4/17/2023    Multilayer compression wrap: Removed old Multilayer wrap if indicated and wash leg with mild soap/water. Applied moisturizing agent to dry skin as needed. Applied primary and secondary dressing as ordered. Applied multilayered dressing below the knee to right lower leg. Instructed patient/caregiver not to remove dressing and to keep it clean and dry. Instructed patient/caregiver on complications to report to provider, such as pain, numbness in toes, heavy drainage, and slippage of dressing. Instructed patient on purpose of compression dressing and on activity and exercise recommendations. Negative Pressure Wound Therapy    NAME:  Royal Lin  YOB: 1985  MEDICAL RECORD NUMBER:  [id number]  DATE:  4/17/2023    Applied Negative Pressure to right toe amp site   [x] Applied skin barrier prep to fer-wound. [x] Cut strips of plastic drape to picture frame wound so that fer-wound is     covered with the drape. [x] If bridging dressing to less prominent site, cover any intact skin that will come in contact with the Negative Pressure Therapy sponge, gauze or channel drain with plastic drape. The sponge should never touch intact skin. [x] Cut sponge, gauze or channel drain to size which will fit into the wound/ulcer bed without being forced. [x] Be sure the sponge is large enough to hold the entire round plastic flange which is attached to the tubing. Never allow flange to be larger than the sponge or it will produce suction damaging intact skin. Total number of individual pieces of foam used within the wound bed: 1    [x] If bridging the dressing away from the primary site, be sure the bridge leads to a piece of sponge large enough to hold the entire flange without allowing any of the flange to overlap onto intact skin.    [x]
[facility])    Diabetic foot infection ([facility] [address]    Shortness of breath    Asthma exacerbation    Appendicitis    Diabetic foot ulcer with osteomyelitis ([facility] [address]    Cellulitis    Open wound of toe    Rhinovirus    Vision impairment    WD-Nonhealing surgical wound, initial encounter    WD-Amputation of one or more toes ([facility] [address]    WD-Right foot ulcer, with fat layer exposed ([facility] [address]    WD-Diabetes mellitus type 2, insulin dependent ([facility] [address]    WD-Obesity    WD-Class 2 obesity in adult    Acute post-operative pain    WD-Right foot ulcer, with necrosis of bone ([facility])       REVIEW OF SYSTEMS    Constitutional: negative for anorexia, chills, fatigue, fevers, malaise, and sweats  Respiratory: negative for cough, hemoptysis, pleurisy/chest pain, sputum, and stridor  Cardiovascular: negative for chest pain, chest pressure/discomfort, exertional chest pressure/discomfort, near-syncope, orthopnea, palpitations, paroxysmal nocturnal dyspnea, syncope, and tachypnea  Integument/breast: positive for skin lesion(s)     Objective:      BP (!) 146/85   Pulse 97   Temp 97.9 °F (36.6 °C) (Temporal)   Resp 18     BP Readings from Last 3 Encounters:   04/17/23 (!) 146/85   04/12/23 (!) 150/90   04/10/23 (!) 190/106        PHYSICAL EXAM  General Appearance:   Alert, cooperative, no distress, obese   Head:   Normocephalic, without obvious abnormality, atraumatic    Eyes:   PERRL, conjunctiva/corneas clear    Ears:   Normal external appearance, hearing grossly intact    Nose:  Nares normal, mucosa normal, no drainage    Throat:  Lips, mucosa, and tongue normal    Neck:  Supple, trachea midline    Respiratory:   Equal chest rise, respirations unlabored, no wheezing   Cardiovascular:   Regular rate and rhythm    Abdomen, GI:   Soft, non-tender, no rebound or guarding    Musculoskeletal:  Atraumatic, no cyanosis or edema    Neurologic:  Nonfocal, strength & sensation grossly intact   Psychiatric: Oriented x3, grossly appropriate judgement and insight

## 2023-04-17 NOTE — DISCHARGE INSTRUCTIONS
PHYSICIAN ORDERS AND DISCHARGE INSTRUCTIONS     NOTE: Upon discharge from the [address], you will receive a patient experience survey. We would be grateful if you would take the time to fill this survey out. Wound care order history:                 DIRK's   Right       Left               Date:              Cultures:                Grafts:                Antibiotics:           Continuing wound care orders and information:                 Residence:                Continue home health care with:               Your wound-care supplies will be provided by: Wound cleansing:               Do not scrub or use excessive force. Wash hands with soap and water before and after dressing changes. Prior to applying a clean dressing, cleanse wound with normal saline, wound cleanser, or mild soap and water. Ask the physician or nurse before getting the wound(s) wet in a shower. General Wound management:              Keep weight off wounds and reposition every 2 hours. Avoid standing for long periods of time. Apply wraps/stockings in AM and remove at bedtime. If swelling is present, elevate legs to the level of the heart or above for 30 minutes 4-5 times a day and/or when sitting. When taking antibiotics take entire prescription as ordered by physician do not stop taking until medicine is all gone. Orders for this week: 4/17/23     Rx:     Moisturize dry intact skin of lower extremities. Wound vac ordered 3/28/23. Right Great Toe - Wash with soap and water, pat dry. Apply Anasept, stimulen, and silicone border to wound bed. Change Monday, Wednesday and Friday with vac changes. WOUND VAC THERAPY: Right 2-4 Toe Amp site     Ioplex to macerated skin. Cover with DUODERM TO PERIWOUND FOR PROTECTION. APPLY STIMULEN AND BLACK FOAM TO WOUND BED.  SECURE VAC DRESSING WITH

## 2023-04-19 ENCOUNTER — HOSPITAL ENCOUNTER (OUTPATIENT)
Dept: WOUND CARE | Age: 38
Discharge: HOME | End: 2023-04-19
Payer: COMMERCIAL

## 2023-04-19 VITALS
HEART RATE: 106 BPM | TEMPERATURE: 95 F | DIASTOLIC BLOOD PRESSURE: 61 MMHG | SYSTOLIC BLOOD PRESSURE: 87 MMHG | RESPIRATION RATE: 18 BRPM

## 2023-04-19 DIAGNOSIS — Z79.4 DIABETES MELLITUS TYPE 2, INSULIN DEPENDENT (HCC): ICD-10-CM

## 2023-04-19 DIAGNOSIS — L97.512 RIGHT FOOT ULCER, WITH FAT LAYER EXPOSED (HCC): ICD-10-CM

## 2023-04-19 DIAGNOSIS — T81.89XA NONHEALING SURGICAL WOUND, INITIAL ENCOUNTER: ICD-10-CM

## 2023-04-19 DIAGNOSIS — E11.9 DIABETES MELLITUS TYPE 2, INSULIN DEPENDENT (HCC): ICD-10-CM

## 2023-04-19 DIAGNOSIS — S98.131A AMPUTATION OF TOE OF RIGHT FOOT (HCC): ICD-10-CM

## 2023-04-19 DIAGNOSIS — L97.514 RIGHT FOOT ULCER, WITH NECROSIS OF BONE (HCC): Primary | ICD-10-CM

## 2023-04-19 PROCEDURE — 97605 NEG PRS WND THER DME<=50SQCM: CPT

## 2023-04-19 ASSESSMENT — PAIN DESCRIPTION - ONSET: ONSET: ON-GOING

## 2023-04-19 ASSESSMENT — PAIN SCALES - GENERAL: PAINLEVEL_OUTOF10: 6

## 2023-04-19 ASSESSMENT — PAIN DESCRIPTION - DESCRIPTORS: DESCRIPTORS: ACHING

## 2023-04-19 ASSESSMENT — PAIN DESCRIPTION - LOCATION: LOCATION: LEG

## 2023-04-19 ASSESSMENT — PAIN DESCRIPTION - ORIENTATION: ORIENTATION: RIGHT

## 2023-04-19 ASSESSMENT — PAIN - FUNCTIONAL ASSESSMENT: PAIN_FUNCTIONAL_ASSESSMENT: ACTIVITIES ARE NOT PREVENTED

## 2023-04-19 ASSESSMENT — PAIN DESCRIPTION - PAIN TYPE: TYPE: ACUTE PAIN

## 2023-04-19 ASSESSMENT — PAIN DESCRIPTION - FREQUENCY: FREQUENCY: INTERMITTENT

## 2023-04-19 NOTE — PROGRESS NOTES
Large piece of bone removed from patient wound  Did not change dressing orders at this time, but did send sample off to pathology

## 2023-04-19 NOTE — PROGRESS NOTES
Negative Pressure Wound Therapy    NAME:  Meaghan Garcia  YOB: 1985  MEDICAL RECORD NUMBER:  [id number]  DATE:  4/19/2023    Applied Negative Pressure to right foot wound(s)/ulcer(s). [x] Applied skin barrier prep to fer-wound. [x] Cut strips of plastic drape to picture frame wound so that fer-wound is     covered with the drape. [x] If bridging dressing to less prominent site, cover any intact skin that will come in contact with the Negative Pressure Therapy sponge, gauze or channel drain with plastic drape. The sponge should never touch intact skin. [x] Cut sponge, gauze or channel drain to size which will fit into the wound/ulcer bed without being forced. [x] Be sure the sponge is large enough to hold the entire round plastic flange which is attached to the tubing. Never allow flange to be larger than the sponge or it will produce suction damaging intact skin. Total number of individual pieces of foam used within the wound bed: 1    [x] If bridging the dressing away from the primary site, be sure the bridge leads to a piece of sponge large enough to hold the entire flange without allowing any of the flange to overlap onto intact skin. [x] Covered sponge, gauze or channel drain with plastic drape. [x] Cut a hole in this plastic drape directly over the sponge the same size as the plastic drain tubing. [x] Removed plastic liner from flange and apply it directly over the hole you cut. [x] Removed the plastic cover from the flange. [x] Attached the tubing to the wound/ulcer Negative Pressure Therapy and turn it on to be sure a vacuum is created and that there are no leaks. [x] If air leaks occur, use plastic drape to patch them. [x] Secured Negative Pressure Therapy dressing with ace wrap loosely if located on an extremity. Maintain tubing outside of ace wrap. Tubing must not exert pressure on intact skin.     Applied per  Guidelines      Electronically signed by

## 2023-04-21 ENCOUNTER — HOSPITAL ENCOUNTER (OUTPATIENT)
Dept: WOUND CARE | Age: 38
Discharge: HOME | End: 2023-04-21
Payer: COMMERCIAL

## 2023-04-21 VITALS
DIASTOLIC BLOOD PRESSURE: 78 MMHG | RESPIRATION RATE: 16 BRPM | SYSTOLIC BLOOD PRESSURE: 156 MMHG | TEMPERATURE: 96 F | HEART RATE: 101 BPM

## 2023-04-21 PROCEDURE — 29581 APPL MULTLAYER CMPRN SYS LEG: CPT

## 2023-04-21 PROCEDURE — 97605 NEG PRS WND THER DME<=50SQCM: CPT

## 2023-04-21 ASSESSMENT — PAIN - FUNCTIONAL ASSESSMENT: PAIN_FUNCTIONAL_ASSESSMENT: PREVENTS OR INTERFERES SOME ACTIVE ACTIVITIES AND ADLS

## 2023-04-21 ASSESSMENT — PAIN DESCRIPTION - DESCRIPTORS: DESCRIPTORS: SORE

## 2023-04-21 ASSESSMENT — PAIN DESCRIPTION - LOCATION: LOCATION: FOOT

## 2023-04-21 ASSESSMENT — PAIN DESCRIPTION - ONSET: ONSET: ON-GOING

## 2023-04-21 ASSESSMENT — PAIN DESCRIPTION - FREQUENCY: FREQUENCY: INTERMITTENT

## 2023-04-21 ASSESSMENT — PAIN DESCRIPTION - PAIN TYPE: TYPE: ACUTE PAIN

## 2023-04-21 ASSESSMENT — PAIN SCALES - GENERAL: PAINLEVEL_OUTOF10: 6

## 2023-04-21 ASSESSMENT — PAIN DESCRIPTION - ORIENTATION: ORIENTATION: RIGHT

## 2023-04-21 NOTE — PROGRESS NOTES
Multilayer Compression Wrap   (Not Unna) Below the Knee    NAME:  Luis Stevens  YOB: 1985  MEDICAL RECORD NUMBER:  [id number]  DATE:  4/21/2023    Multilayer compression wrap: Removed old Multilayer wrap if indicated and wash leg with mild soap/water. Applied moisturizing agent to dry skin as needed. Applied primary and secondary dressing as ordered. Applied multilayered dressing below the knee to right lower leg. Instructed patient/caregiver not to remove dressing and to keep it clean and dry. Instructed patient/caregiver on complications to report to provider, such as pain, numbness in toes, heavy drainage, and slippage of dressing. Instructed patient on purpose of compression dressing and on activity and exercise recommendations.       Electronically signed by Osvaldo Larry RN on 4/21/2023 at 10:12 AM

## 2023-04-21 NOTE — PROGRESS NOTES
Negative Pressure Wound Therapy    NAME:  Kristina Kwong  YOB: 1985  MEDICAL RECORD NUMBER:  [id number]  DATE:  4/21/2023    Applied Negative Pressure to Right Foot Toes amp site wound(s)/ulcer(s). [x] Applied skin barrier prep to fer-wound. [x] Cut strips of plastic drape to picture frame wound so that fer-wound is     covered with the drape. [x] If bridging dressing to less prominent site, cover any intact skin that will come in contact with the Negative Pressure Therapy sponge, gauze or channel drain with plastic drape. The sponge should never touch intact skin. [x] Cut sponge, gauze or channel drain to size which will fit into the wound/ulcer bed without being forced. [x] Be sure the sponge is large enough to hold the entire round plastic flange which is attached to the tubing. Never allow flange to be larger than the sponge or it will produce suction damaging intact skin. Total number of individual pieces of foam used within the wound bed: 1    [x] If bridging the dressing away from the primary site, be sure the bridge leads to a piece of sponge large enough to hold the entire flange without allowing any of the flange to overlap onto intact skin. [x] Covered sponge, gauze or channel drain with plastic drape. [x] Cut a hole in this plastic drape directly over the sponge the same size as the plastic drain tubing. [x] Removed plastic liner from flange and apply it directly over the hole you cut. [x] Removed the plastic cover from the flange. [x] Attached the tubing to the wound/ulcer Negative Pressure Therapy and turn it on to be sure a vacuum is created and that there are no leaks. [x] If air leaks occur, use plastic drape to patch them. [x] Secured Negative Pressure Therapy dressing with ace wrap loosely if located on an extremity. Maintain tubing outside of ace wrap. Tubing must not exert pressure on intact skin.     Applied per  Guidelines      Electronically

## 2023-04-24 ENCOUNTER — HOSPITAL ENCOUNTER (OUTPATIENT)
Dept: WOUND CARE | Age: 38
Discharge: HOME | End: 2023-04-24
Payer: COMMERCIAL

## 2023-04-24 DIAGNOSIS — E11.9 DIABETES MELLITUS TYPE 2, INSULIN DEPENDENT (HCC): ICD-10-CM

## 2023-04-24 DIAGNOSIS — E66.01 CLASS 2 SEVERE OBESITY DUE TO EXCESS CALORIES WITH SERIOUS COMORBIDITY AND BODY MASS INDEX (BMI) OF 37.0 TO 37.9 IN ADULT (HCC): ICD-10-CM

## 2023-04-24 DIAGNOSIS — L97.512 RIGHT FOOT ULCER, WITH FAT LAYER EXPOSED (HCC): ICD-10-CM

## 2023-04-24 DIAGNOSIS — S98.131A AMPUTATION OF TOE OF RIGHT FOOT (HCC): Primary | ICD-10-CM

## 2023-04-24 DIAGNOSIS — T81.89XA NONHEALING SURGICAL WOUND, INITIAL ENCOUNTER: ICD-10-CM

## 2023-04-24 DIAGNOSIS — Z79.4 DIABETES MELLITUS TYPE 2, INSULIN DEPENDENT (HCC): ICD-10-CM

## 2023-04-24 DIAGNOSIS — L97.514 RIGHT FOOT ULCER, WITH NECROSIS OF BONE (HCC): ICD-10-CM

## 2023-04-24 PROCEDURE — 97605 NEG PRS WND THER DME<=50SQCM: CPT

## 2023-04-24 PROCEDURE — 11042 DBRDMT SUBQ TIS 1ST 20SQCM/<: CPT | Performed by: NURSE PRACTITIONER

## 2023-04-24 PROCEDURE — 11042 DBRDMT SUBQ TIS 1ST 20SQCM/<: CPT

## 2023-04-24 RX ORDER — BETAMETHASONE DIPROPIONATE 0.05 %
OINTMENT (GRAM) TOPICAL ONCE
OUTPATIENT
Start: 2023-04-24 | End: 2023-04-24

## 2023-04-24 RX ORDER — GINSENG 100 MG
CAPSULE ORAL ONCE
OUTPATIENT
Start: 2023-04-24 | End: 2023-04-24

## 2023-04-24 RX ORDER — BACITRACIN ZINC AND POLYMYXIN B SULFATE 500; 1000 [USP'U]/G; [USP'U]/G
OINTMENT TOPICAL ONCE
OUTPATIENT
Start: 2023-04-24 | End: 2023-04-24

## 2023-04-24 RX ORDER — BACITRACIN, NEOMYCIN, POLYMYXIN B 400; 3.5; 5 [USP'U]/G; MG/G; [USP'U]/G
OINTMENT TOPICAL ONCE
OUTPATIENT
Start: 2023-04-24 | End: 2023-04-24

## 2023-04-24 RX ORDER — LIDOCAINE 40 MG/G
CREAM TOPICAL ONCE
OUTPATIENT
Start: 2023-04-24 | End: 2023-04-24

## 2023-04-24 RX ORDER — LIDOCAINE 50 MG/G
OINTMENT TOPICAL ONCE
OUTPATIENT
Start: 2023-04-24 | End: 2023-04-24

## 2023-04-24 RX ORDER — CLOBETASOL PROPIONATE 0.5 MG/G
OINTMENT TOPICAL ONCE
OUTPATIENT
Start: 2023-04-24 | End: 2023-04-24

## 2023-04-24 RX ORDER — LIDOCAINE HYDROCHLORIDE 40 MG/ML
SOLUTION TOPICAL ONCE
OUTPATIENT
Start: 2023-04-24 | End: 2023-04-24

## 2023-04-24 RX ORDER — GENTAMICIN SULFATE 1 MG/G
OINTMENT TOPICAL ONCE
OUTPATIENT
Start: 2023-04-24 | End: 2023-04-24

## 2023-04-24 ASSESSMENT — PAIN DESCRIPTION - DESCRIPTORS: DESCRIPTORS: ACHING

## 2023-04-24 ASSESSMENT — PAIN SCALES - GENERAL: PAINLEVEL_OUTOF10: 7

## 2023-04-24 ASSESSMENT — PAIN DESCRIPTION - ORIENTATION: ORIENTATION: RIGHT

## 2023-04-24 ASSESSMENT — PAIN DESCRIPTION - ONSET: ONSET: ON-GOING

## 2023-04-24 ASSESSMENT — PAIN DESCRIPTION - LOCATION: LOCATION: FOOT

## 2023-04-24 ASSESSMENT — PAIN DESCRIPTION - FREQUENCY: FREQUENCY: INTERMITTENT

## 2023-04-24 ASSESSMENT — PAIN DESCRIPTION - PAIN TYPE: TYPE: ACUTE PAIN

## 2023-04-24 ASSESSMENT — PAIN - FUNCTIONAL ASSESSMENT: PAIN_FUNCTIONAL_ASSESSMENT: PREVENTS OR INTERFERES SOME ACTIVE ACTIVITIES AND ADLS

## 2023-04-24 NOTE — PROGRESS NOTES
Negative Pressure Wound Therapy    NAME:  Mabel Pickens  YOB: 1985  MEDICAL RECORD NUMBER:  [id number]  DATE:  4/24/2023    Applied Negative Pressure to Negative Pressure Wound Therapy    NAME:  Mabel Pickens  YOB: 1985  MEDICAL RECORD NUMBER:  [id number]  DATE:  4/24/2023    Applied Negative Pressure to Right toe amputation site wound(s)/ulcer(s). [x] Applied skin barrier prep to fer-wound. [x] Cut strips of plastic drape to picture frame wound so that fer-wound is covered with the drape. [x] If bridging dressing to less prominent site, cover any intact skin that will come in contact with the Negative Pressure Therapy sponge, gauze or channel drain with plastic drape. The sponge should never touch intact skin. [x] Cut sponge, gauze or channel drain to size which will fit into the wound/ulcer bed without being forced. [x] Be sure the sponge is large enough to hold the entire round plastic flange which is attached to the tubing. Never allow flange to be larger than the sponge or it will produce suction damaging intact skin. Total number of individual pieces of foam used within the wound bed: 1    [x] If bridging the dressing away from the primary site, be sure the bridge leads to a piece of sponge large enough to hold the entire flange without allowing any of the flange to overlap onto intact skin. [x] Covered sponge, gauze or channel drain with plastic drape. [x] Cut a hole in this plastic drape directly over the sponge the same size as the plastic drain tubing. [x] Removed plastic liner from flange and apply it directly over the hole you cut. [x] Removed the plastic cover from the flange. [x] Attached the tubing to the wound/ulcer Negative Pressure Therapy and turn it on to be sure a vacuum is created and that there are no leaks. [x] If air leaks occur, use plastic drape to patch them.    [x] Secured Negative Pressure Therapy dressing with ace wrap loosely if

## 2023-04-24 NOTE — DISCHARGE INSTRUCTIONS
PHYSICIAN ORDERS AND DISCHARGE INSTRUCTIONS     NOTE: Upon discharge from the [address], you will receive a patient experience survey. We would be grateful if you would take the time to fill this survey out. Wound care order history:                 DIRK's   Right       Left               Date:              Cultures:                Grafts:                Antibiotics:           Continuing wound care orders and information:                 Residence:                Continue home health care with:               Your wound-care supplies will be provided by: Wound cleansing:               Do not scrub or use excessive force. Wash hands with soap and water before and after dressing changes. Prior to applying a clean dressing, cleanse wound with normal saline, wound cleanser, or mild soap and water. Ask the physician or nurse before getting the wound(s) wet in a shower. General Wound management:              Keep weight off wounds and reposition every 2 hours. Avoid standing for long periods of time. Apply wraps/stockings in AM and remove at bedtime. If swelling is present, elevate legs to the level of the heart or above for 30 minutes 4-5 times a day and/or when sitting. When taking antibiotics take entire prescription as ordered by physician do not stop taking until medicine is all gone. Orders for this week: 4/24/23     Rx:     Moisturize dry intact skin of lower extremities. Wound vac ordered 3/28/23. Right Great Toe - Healed 4/24/23        WOUND VAC THERAPY: Right 2-4 Toe Amp site     Ioplex to macerated skin. Cover with DUODERM TO PERIWOUND FOR PROTECTION. APPLY STIMULEN AND BLACK FOAM TO WOUND BED. SECURE VAC DRESSING WITH DRAPE. SET WOUND VAC  CONTINUOUS SUCTION. CANISTER CHANGE WEEKLY OR ACCORDING TO VOLUME OF DRAINAGE.   Wrap with Coban 2 Lite

## 2023-04-24 NOTE — PROGRESS NOTES
Wound Care Center Progress Note With Procedure    Alexx Muñoz  AGE: 44 y.o. GENDER: male  : 1985  EPISODE DATE:  2023     Subjective:     Chief Complaint   Patient presents with    Wound Check     Rt foot         HISTORY of PRESENT ILLNESS      Alexx Muñoz is a 44 y.o. male who presents to the [address] [place] [address] for evaluation and treatment of Acute non-healing surgical  ulcer(s) of  right foot. The condition is of moderate severity. The ulcer has been present since 23 post third toe amputation and revision of the second toe per Dr. Jacey Abrams 23. The underlying cause is thought to be nonhealing surgical complicated by diabetes. The patients care to date has included hospitalization -23 [facility] with [place], received IV antibiotics in hospital, home on Keflex and Doxycycline through 23. The patient has significant underlying medical conditions as below. Iesha Dhillon MD.    Last week this provider was called to bedside during an RN visit because a large piece of bone came loose from the wound bed and was sitting on the tray. We sent this off to pathology and it was confirmed bone with osteo.    Looking much better since then and this was likely an impediment to his healing      Wound Pain Timing/Severity: waxing and waning, moderate  Quality of pain: dull, aching, tender  Severity of pain:  4 / 10   Modifying Factors: diabetes, poor glucose control, chronic pressure, shear force, obesity, smoking, and non-adherence  Associated Signs/Symptoms: edema, erythema, drainage, and pain        PAST MEDICAL HISTORY        Diagnosis Date    Asthma     Blind left eye     Diabetes mellitus ([address]     GERD (gastroesophageal reflux disease)     Hypertension     Retinal detachment 2012    left       PAST SURGICAL HISTORY    Past Surgical History:   Procedure Laterality Date    CORNEAL TRANSPLANT Bilateral     EYE SURGERY

## 2023-04-24 NOTE — PROGRESS NOTES
Multilayer Compression Wrap   (Not Unna) Below the Knee    NAME:  Aleshia Graham  YOB: 1985  MEDICAL RECORD NUMBER:  [id number]  DATE:  4/24/2023    Multilayer compression wrap: Removed old Multilayer wrap if indicated and wash leg with mild soap/water. Applied moisturizing agent to dry skin as needed. Applied primary and secondary dressing as ordered. Applied multilayered dressing below the knee to right lower leg. Instructed patient/caregiver not to remove dressing and to keep it clean and dry. Instructed patient/caregiver on complications to report to provider, such as pain, numbness in toes, heavy drainage, and slippage of dressing. Instructed patient on purpose of compression dressing and on activity and exercise recommendations.       Electronically signed by Dayna Griffith RN on 4/24/2023 at 12:07 PM

## 2023-04-28 ENCOUNTER — HOSPITAL ENCOUNTER (OUTPATIENT)
Dept: WOUND CARE | Age: 38
Discharge: HOME | End: 2023-04-28
Payer: COMMERCIAL

## 2023-04-28 VITALS
DIASTOLIC BLOOD PRESSURE: 72 MMHG | TEMPERATURE: 97.6 F | RESPIRATION RATE: 17 BRPM | SYSTOLIC BLOOD PRESSURE: 148 MMHG | HEART RATE: 90 BPM

## 2023-04-28 PROCEDURE — 97606 NEG PRS WND THER DME>50 SQCM: CPT

## 2023-04-28 ASSESSMENT — PAIN DESCRIPTION - FREQUENCY: FREQUENCY: INTERMITTENT

## 2023-04-28 ASSESSMENT — PAIN - FUNCTIONAL ASSESSMENT: PAIN_FUNCTIONAL_ASSESSMENT: PREVENTS OR INTERFERES SOME ACTIVE ACTIVITIES AND ADLS

## 2023-04-28 ASSESSMENT — PAIN DESCRIPTION - ONSET: ONSET: ON-GOING

## 2023-04-28 ASSESSMENT — PAIN DESCRIPTION - PAIN TYPE: TYPE: ACUTE PAIN

## 2023-04-28 ASSESSMENT — PAIN DESCRIPTION - ORIENTATION: ORIENTATION: RIGHT

## 2023-04-28 ASSESSMENT — PAIN DESCRIPTION - DESCRIPTORS: DESCRIPTORS: BURNING

## 2023-04-28 ASSESSMENT — PAIN DESCRIPTION - LOCATION: LOCATION: FOOT

## 2023-05-01 ENCOUNTER — HOSPITAL ENCOUNTER (OUTPATIENT)
Dept: WOUND CARE | Age: 38
Discharge: HOME | End: 2023-05-01
Payer: COMMERCIAL

## 2023-05-01 VITALS
TEMPERATURE: 97 F | SYSTOLIC BLOOD PRESSURE: 168 MMHG | RESPIRATION RATE: 18 BRPM | HEART RATE: 81 BPM | DIASTOLIC BLOOD PRESSURE: 85 MMHG

## 2023-05-01 DIAGNOSIS — Z79.4 DIABETES MELLITUS TYPE 2, INSULIN DEPENDENT (HCC): ICD-10-CM

## 2023-05-01 DIAGNOSIS — L97.512 RIGHT FOOT ULCER, WITH FAT LAYER EXPOSED (HCC): ICD-10-CM

## 2023-05-01 DIAGNOSIS — T81.89XA NONHEALING SURGICAL WOUND, INITIAL ENCOUNTER: ICD-10-CM

## 2023-05-01 DIAGNOSIS — E11.9 DIABETES MELLITUS TYPE 2, INSULIN DEPENDENT (HCC): ICD-10-CM

## 2023-05-01 DIAGNOSIS — T81.89XA NONHEALING SURGICAL WOUND, INITIAL ENCOUNTER: Primary | ICD-10-CM

## 2023-05-01 DIAGNOSIS — E66.01 CLASS 2 SEVERE OBESITY DUE TO EXCESS CALORIES WITH SERIOUS COMORBIDITY AND BODY MASS INDEX (BMI) OF 37.0 TO 37.9 IN ADULT (HCC): ICD-10-CM

## 2023-05-01 DIAGNOSIS — S98.131A AMPUTATION OF TOE OF RIGHT FOOT (HCC): ICD-10-CM

## 2023-05-01 PROCEDURE — 97605 NEG PRS WND THER DME<=50SQCM: CPT

## 2023-05-01 PROCEDURE — 11042 DBRDMT SUBQ TIS 1ST 20SQCM/<: CPT | Performed by: NURSE PRACTITIONER

## 2023-05-01 PROCEDURE — 11042 DBRDMT SUBQ TIS 1ST 20SQCM/<: CPT

## 2023-05-01 RX ORDER — CLOBETASOL PROPIONATE 0.5 MG/G
OINTMENT TOPICAL ONCE
OUTPATIENT
Start: 2023-05-01 | End: 2023-05-01

## 2023-05-01 RX ORDER — GINSENG 100 MG
CAPSULE ORAL ONCE
OUTPATIENT
Start: 2023-05-01 | End: 2023-05-01

## 2023-05-01 RX ORDER — BACITRACIN ZINC AND POLYMYXIN B SULFATE 500; 1000 [USP'U]/G; [USP'U]/G
OINTMENT TOPICAL ONCE
OUTPATIENT
Start: 2023-05-01 | End: 2023-05-01

## 2023-05-01 RX ORDER — GENTAMICIN SULFATE 1 MG/G
OINTMENT TOPICAL ONCE
OUTPATIENT
Start: 2023-05-01 | End: 2023-05-01

## 2023-05-01 RX ORDER — LIDOCAINE HYDROCHLORIDE 40 MG/ML
SOLUTION TOPICAL ONCE
OUTPATIENT
Start: 2023-05-01 | End: 2023-05-01

## 2023-05-01 RX ORDER — LIDOCAINE 50 MG/G
OINTMENT TOPICAL ONCE
OUTPATIENT
Start: 2023-05-01 | End: 2023-05-01

## 2023-05-01 RX ORDER — BACITRACIN, NEOMYCIN, POLYMYXIN B 400; 3.5; 5 [USP'U]/G; MG/G; [USP'U]/G
OINTMENT TOPICAL ONCE
OUTPATIENT
Start: 2023-05-01 | End: 2023-05-01

## 2023-05-01 RX ORDER — LIDOCAINE 40 MG/G
CREAM TOPICAL ONCE
OUTPATIENT
Start: 2023-05-01 | End: 2023-05-01

## 2023-05-01 RX ORDER — BETAMETHASONE DIPROPIONATE 0.05 %
OINTMENT (GRAM) TOPICAL ONCE
OUTPATIENT
Start: 2023-05-01 | End: 2023-05-01

## 2023-05-01 ASSESSMENT — PAIN DESCRIPTION - ORIENTATION: ORIENTATION: RIGHT

## 2023-05-01 ASSESSMENT — PAIN DESCRIPTION - LOCATION: LOCATION: FOOT

## 2023-05-01 ASSESSMENT — PAIN DESCRIPTION - DESCRIPTORS: DESCRIPTORS: ACHING

## 2023-05-01 ASSESSMENT — PAIN SCALES - GENERAL: PAINLEVEL_OUTOF10: 5

## 2023-05-01 NOTE — DISCHARGE INSTRUCTIONS
PHYSICIAN ORDERS AND DISCHARGE INSTRUCTIONS     NOTE: Upon discharge from the [address], you will receive a patient experience survey. We would be grateful if you would take the time to fill this survey out. Wound care order history:                 DIRK's   Right       Left               Date:              Cultures:                Grafts:                Antibiotics:           Continuing wound care orders and information:                 Residence:                Continue home health care with:               Your wound-care supplies will be provided by: Wound cleansing:               Do not scrub or use excessive force. Wash hands with soap and water before and after dressing changes. Prior to applying a clean dressing, cleanse wound with normal saline, wound cleanser, or mild soap and water. Ask the physician or nurse before getting the wound(s) wet in a shower. General Wound management:              Keep weight off wounds and reposition every 2 hours. Avoid standing for long periods of time. Apply wraps/stockings in AM and remove at bedtime. If swelling is present, elevate legs to the level of the heart or above for 30 minutes 4-5 times a day and/or when sitting. When taking antibiotics take entire prescription as ordered by physician do not stop taking until medicine is all gone. Orders for this week: 5/1/23     Rx:     Moisturize dry intact skin of lower extremities. Wound vac ordered 3/28/23. Right Great Toe - Healed 4/24/23        WOUND VAC THERAPY: Right 2-4 Toe Amp site     Ioplex to macerated skin. Cover with DUODERM TO PERIWOUND FOR PROTECTION. APPLY STIMULEN AND BLACK FOAM TO WOUND BED. SECURE VAC DRESSING WITH DRAPE. SET WOUND VAC  CONTINUOUS SUCTION. CANISTER CHANGE WEEKLY OR ACCORDING TO VOLUME OF DRAINAGE.   Wrap with Coban 2 Lite (do

## 2023-05-01 NOTE — PROGRESS NOTES
Wound Care Center progress Visit      Monik Whitmore  AGE: 44 y.o. GENDER: male  : 1985  EPISODE DATE:  2023   Referred by:Roxanne Diamond MD  [place] [facility]  [facility],  [address] [place] [address] Drive     Subjective:     CHIEF COMPLAINT  WOUND WOUND RIGHT FOOT  Chief Complaint   Patient presents with    Wound Check        HISTORY of PRESENT ILLNESS      Monik Whitmore is a 44 y.o. male who presents to the [address] [place] [address] for evaluation and treatment of Acute non-healing surgical  ulcer(s) of  right foot. The condition is of moderate severity. The ulcer has been present since 23 post third toe amputation and revision of the second toe per Dr. Venita Mccracken 23. The underlying cause is thought to be nonhealing surgical complicated by diabetes. The patients care to date has included hospitalization -23 with [facility] [place], received IV antibiotics in hospital, home on Keflex and Doxycycline through 23. The patient has significant underlying medical conditions as below. Lawyer Angélica MD.    Wound Pain Timing/Severity: waxing and waning, moderate  Quality of pain: dull, aching, tender  Severity of pain:  4 / 10   Modifying Factors: diabetes, poor glucose control, chronic pressure, shear force, obesity, smoking, and non-adherence  Associated Signs/Symptoms: edema, erythema, drainage, and pain    DIRK: as indicated base on wound location and assessment    Wound infection: wound culture will be obtained as needed for symptoms of infection     Arterial evaluation: if indicated based on wound, location, symptoms and healing    Venous Evaluation: if indicated based on wound, location, symptoms and healing    Diabetes: On an insulin regimen.  Follows with Dr. Belinda Gonzalez  Hemoglobin A1C   Date Value Ref Range Status   02/10/2023 9.3 (H) 4.2 - 6.3 % Final        Diabetes education provided:  Diabetes pathoetiology, difference

## 2023-05-01 NOTE — PROGRESS NOTES
Negative Pressure Wound Therapy    NAME:  Roly Costello  YOB: 1985  MEDICAL RECORD NUMBER:  [id number]  DATE:  5/1/2023    Applied Negative Pressure to Right foot amputation wound(s)/ulcer(s). [x] Applied skin barrier prep to fer-wound. [x] Cut strips of plastic drape to picture frame wound so that fer-wound is     covered with the drape. [x] If bridging dressing to less prominent site, cover any intact skin that will come in contact with the Negative Pressure Therapy sponge, gauze or channel drain with plastic drape. The sponge should never touch intact skin. [x] Cut sponge, gauze or channel drain to size which will fit into the wound/ulcer bed without being forced. [x] Be sure the sponge is large enough to hold the entire round plastic flange which is attached to the tubing. Never allow flange to be larger than the sponge or it will produce suction damaging intact skin. Total number of individual pieces of foam used within the wound bed: 1    [x] If bridging the dressing away from the primary site, be sure the bridge leads to a piece of sponge large enough to hold the entire flange without allowing any of the flange to overlap onto intact skin. [x] Covered sponge, gauze or channel drain with plastic drape. [x] Cut a hole in this plastic drape directly over the sponge the same size as the plastic drain tubing. [x] Removed plastic liner from flange and apply it directly over the hole you cut. Removed the plastic cover from the flange. [x][] Attached the tubing to the wound/ulcer Negative Pressure Therapy and turn it on to be sure a vacuum is created and that there are no leaks. [x] If air leaks occur, use plastic drape to patch them. [x] Secured Negative Pressure Therapy dressing with ace wrap loosely if located on an extremity. Maintain tubing outside of ace wrap. Tubing must not exert pressure on intact skin.     Applied per  Guidelines      Electronically

## 2023-05-01 NOTE — PROGRESS NOTES
Multilayer Compression Wrap   (Not Unna) Below the Knee    NAME:  Darylene Pitt  YOB: 1985  MEDICAL RECORD NUMBER:  [id number]  DATE:  5/1/2023    Multilayer compression wrap: Removed old Multilayer wrap if indicated and wash leg with mild soap/water. Applied moisturizing agent to dry skin as needed. Applied primary and secondary dressing as ordered. Applied multilayered dressing below the knee to right lower leg. Instructed patient/caregiver not to remove dressing and to keep it clean and dry. Instructed patient/caregiver on complications to report to provider, such as pain, numbness in toes, heavy drainage, and slippage of dressing. Instructed patient on purpose of compression dressing and on activity and exercise recommendations.       Electronically signed by Eldon Park RN on 5/1/2023 at 11:53 AM

## 2023-05-05 ENCOUNTER — HOSPITAL ENCOUNTER (OUTPATIENT)
Dept: WOUND CARE | Age: 38
Discharge: HOME | End: 2023-05-05
Payer: COMMERCIAL

## 2023-05-05 VITALS
SYSTOLIC BLOOD PRESSURE: 157 MMHG | DIASTOLIC BLOOD PRESSURE: 87 MMHG | TEMPERATURE: 97 F | HEART RATE: 93 BPM | RESPIRATION RATE: 18 BRPM

## 2023-05-05 PROCEDURE — 29581 APPL MULTLAYER CMPRN SYS LEG: CPT

## 2023-05-05 PROCEDURE — 97605 NEG PRS WND THER DME<=50SQCM: CPT

## 2023-05-05 ASSESSMENT — PAIN DESCRIPTION - LOCATION: LOCATION: FOOT

## 2023-05-05 ASSESSMENT — PAIN DESCRIPTION - ORIENTATION: ORIENTATION: RIGHT

## 2023-05-05 ASSESSMENT — PAIN SCALES - GENERAL: PAINLEVEL_OUTOF10: 5

## 2023-05-05 ASSESSMENT — PAIN DESCRIPTION - PAIN TYPE: TYPE: ACUTE PAIN

## 2023-05-05 ASSESSMENT — PAIN DESCRIPTION - FREQUENCY: FREQUENCY: CONTINUOUS

## 2023-05-05 ASSESSMENT — PAIN DESCRIPTION - ONSET: ONSET: ON-GOING

## 2023-05-05 ASSESSMENT — PAIN DESCRIPTION - DESCRIPTORS: DESCRIPTORS: ACHING

## 2023-05-05 ASSESSMENT — PAIN - FUNCTIONAL ASSESSMENT: PAIN_FUNCTIONAL_ASSESSMENT: PREVENTS OR INTERFERES SOME ACTIVE ACTIVITIES AND ADLS

## 2023-05-05 NOTE — PROGRESS NOTES
Negative Pressure Wound Therapy    NAME:  Conception Curl  YOB: 1985  MEDICAL RECORD NUMBER:  [id number]  DATE:  5/5/2023    Applied Negative Pressure to Right 2nd-3rd toe amp site wound(s)/ulcer(s). [x] Applied skin barrier prep to fer-wound. [x] Cut strips of plastic drape to picture frame wound so that fer-wound is     covered with the drape. [x] If bridging dressing to less prominent site, cover any intact skin that will come in contact with the Negative Pressure Therapy sponge, gauze or channel drain with plastic drape. The sponge should never touch intact skin. [x] Cut sponge, gauze or channel drain to size which will fit into the wound/ulcer bed without being forced. [x] Be sure the sponge is large enough to hold the entire round plastic flange which is attached to the tubing. Never allow flange to be larger than the sponge or it will produce suction damaging intact skin. Total number of individual pieces of foam used within the wound bed: 1    [x] If bridging the dressing away from the primary site, be sure the bridge leads to a piece of sponge large enough to hold the entire flange without allowing any of the flange to overlap onto intact skin. [x] Covered sponge, gauze or channel drain with plastic drape. [x] Cut a hole in this plastic drape directly over the sponge the same size as the plastic drain tubing. [x] Removed plastic liner from flange and apply it directly over the hole you cut. [x] Removed the plastic cover from the flange. [x] Attached the tubing to the wound/ulcer Negative Pressure Therapy and turn it on to be sure a vacuum is created and that there are no leaks. [x] If air leaks occur, use plastic drape to patch them. [x] Secured Negative Pressure Therapy dressing with ace wrap loosely if located on an extremity. Maintain tubing outside of ace wrap. Tubing must not exert pressure on intact skin.     Applied per  Guidelines      Electronically

## 2023-05-08 ENCOUNTER — HOSPITAL ENCOUNTER (OUTPATIENT)
Dept: WOUND CARE | Age: 38
Discharge: HOME | End: 2023-05-08
Payer: COMMERCIAL

## 2023-05-08 VITALS
DIASTOLIC BLOOD PRESSURE: 76 MMHG | RESPIRATION RATE: 18 BRPM | HEART RATE: 94 BPM | TEMPERATURE: 98.6 F | SYSTOLIC BLOOD PRESSURE: 145 MMHG

## 2023-05-08 DIAGNOSIS — E11.9 DIABETES MELLITUS TYPE 2, INSULIN DEPENDENT (HCC): ICD-10-CM

## 2023-05-08 DIAGNOSIS — E66.01 CLASS 2 SEVERE OBESITY DUE TO EXCESS CALORIES WITH SERIOUS COMORBIDITY AND BODY MASS INDEX (BMI) OF 37.0 TO 37.9 IN ADULT (HCC): ICD-10-CM

## 2023-05-08 DIAGNOSIS — L97.512 RIGHT FOOT ULCER, WITH FAT LAYER EXPOSED (HCC): ICD-10-CM

## 2023-05-08 DIAGNOSIS — S98.131A AMPUTATION OF TOE OF RIGHT FOOT (HCC): ICD-10-CM

## 2023-05-08 DIAGNOSIS — Z79.4 DIABETES MELLITUS TYPE 2, INSULIN DEPENDENT (HCC): ICD-10-CM

## 2023-05-08 DIAGNOSIS — T81.89XA NONHEALING SURGICAL WOUND, INITIAL ENCOUNTER: Primary | ICD-10-CM

## 2023-05-08 PROCEDURE — 11042 DBRDMT SUBQ TIS 1ST 20SQCM/<: CPT | Performed by: NURSE PRACTITIONER

## 2023-05-08 PROCEDURE — 11042 DBRDMT SUBQ TIS 1ST 20SQCM/<: CPT

## 2023-05-08 PROCEDURE — 97605 NEG PRS WND THER DME<=50SQCM: CPT

## 2023-05-08 RX ORDER — LIDOCAINE 50 MG/G
OINTMENT TOPICAL ONCE
OUTPATIENT
Start: 2023-05-08 | End: 2023-05-08

## 2023-05-08 RX ORDER — GINSENG 100 MG
CAPSULE ORAL ONCE
OUTPATIENT
Start: 2023-05-08 | End: 2023-05-08

## 2023-05-08 RX ORDER — BETAMETHASONE DIPROPIONATE 0.05 %
OINTMENT (GRAM) TOPICAL ONCE
OUTPATIENT
Start: 2023-05-08 | End: 2023-05-08

## 2023-05-08 RX ORDER — LIDOCAINE 40 MG/G
CREAM TOPICAL ONCE
OUTPATIENT
Start: 2023-05-08 | End: 2023-05-08

## 2023-05-08 RX ORDER — CLOBETASOL PROPIONATE 0.5 MG/G
OINTMENT TOPICAL ONCE
OUTPATIENT
Start: 2023-05-08 | End: 2023-05-08

## 2023-05-08 RX ORDER — BACITRACIN ZINC AND POLYMYXIN B SULFATE 500; 1000 [USP'U]/G; [USP'U]/G
OINTMENT TOPICAL ONCE
OUTPATIENT
Start: 2023-05-08 | End: 2023-05-08

## 2023-05-08 RX ORDER — LIDOCAINE HYDROCHLORIDE 40 MG/ML
SOLUTION TOPICAL ONCE
OUTPATIENT
Start: 2023-05-08 | End: 2023-05-08

## 2023-05-08 RX ORDER — GENTAMICIN SULFATE 1 MG/G
OINTMENT TOPICAL ONCE
OUTPATIENT
Start: 2023-05-08 | End: 2023-05-08

## 2023-05-08 RX ORDER — BACITRACIN, NEOMYCIN, POLYMYXIN B 400; 3.5; 5 [USP'U]/G; MG/G; [USP'U]/G
OINTMENT TOPICAL ONCE
OUTPATIENT
Start: 2023-05-08 | End: 2023-05-08

## 2023-05-08 ASSESSMENT — PAIN DESCRIPTION - DESCRIPTORS: DESCRIPTORS: ACHING

## 2023-05-08 ASSESSMENT — PAIN - FUNCTIONAL ASSESSMENT: PAIN_FUNCTIONAL_ASSESSMENT: PREVENTS OR INTERFERES SOME ACTIVE ACTIVITIES AND ADLS

## 2023-05-08 ASSESSMENT — PAIN SCALES - GENERAL: PAINLEVEL_OUTOF10: 6

## 2023-05-08 ASSESSMENT — PAIN DESCRIPTION - ORIENTATION: ORIENTATION: RIGHT

## 2023-05-08 ASSESSMENT — PAIN DESCRIPTION - LOCATION: LOCATION: FOOT

## 2023-05-08 NOTE — PROGRESS NOTES
Negative Pressure Wound Therapy    NAME:  Tami Carrion  YOB: 1985  MEDICAL RECORD NUMBER:  [id number]  DATE:  5/8/2023    Applied Negative Pressure to amp site. [x] Applied skin barrier prep to fer-wound. [x] Cut strips of plastic drape to picture frame wound so that fer-wound is     covered with the drape. [x] If bridging dressing to less prominent site, cover any intact skin that will come in contact with the Negative Pressure Therapy sponge, gauze or channel drain with plastic drape. The sponge should never touch intact skin. [x] Cut sponge, gauze or channel drain to size which will fit into the wound/ulcer bed without being forced. [x] Be sure the sponge is large enough to hold the entire round plastic flange which is attached to the tubing. Never allow flange to be larger than the sponge or it will produce suction damaging intact skin. Total number of individual pieces of foam used within the wound bed: 1    [x] If bridging the dressing away from the primary site, be sure the bridge leads to a piece of sponge large enough to hold the entire flange without allowing any of the flange to overlap onto intact skin. [x] Covered sponge, gauze or channel drain with plastic drape. [x] Cut a hole in this plastic drape directly over the sponge the same size as the plastic drain tubing. [x] Removed plastic liner from flange and apply it directly over the hole you cut. [x] Removed the plastic cover from the flange. [x] Attached the tubing to the wound/ulcer Negative Pressure Therapy and turn it on to be sure a vacuum is created and that there are no leaks. [x] If air leaks occur, use plastic drape to patch them. [x] Secured Negative Pressure Therapy dressing with ace wrap loosely if located on an extremity. Maintain tubing outside of ace wrap. Tubing must not exert pressure on intact skin.     Applied per  Guidelines      Electronically signed by Yanique Eckert LPN

## 2023-05-08 NOTE — PROGRESS NOTES
Wound Care Center progress Visit      Soumya Smith  AGE: 44 y.o. GENDER: male  : 1985  EPISODE DATE:  2023   Referred by:Roxanne Higginbotham MD  [place] [facility]. [address]  [facility],  [address] [place] [address]     Subjective:     CHIEF COMPLAINT  WOUND WOUND RIGHT FOOT  Chief Complaint   Patient presents with    Wound Check        HISTORY of PRESENT ILLNESS      Soumya Smith is a 44 y.o. male who presents to the [address] [place] [address] for evaluation and treatment of Acute non-healing surgical  ulcer(s) of  right foot. The condition is of moderate severity. The ulcer has been present since 23 post third toe amputation and revision of the second toe per Dr. Marina Kelley 23. The underlying cause is thought to be nonhealing surgical complicated by diabetes. The patients care to date has included hospitalization -23 with toe amputation sent home with [facility] and Home Health referral with [place], received IV antibiotics in hospital, home on Keflex and Doxycycline through 23. The patient has significant underlying medical conditions as below. Leticia Connors MD.    Wound Pain Timing/Severity: waxing and waning, moderate  Quality of pain: dull, aching, tender  Severity of pain:  4 / 10   Modifying Factors: diabetes, poor glucose control, chronic pressure, shear force, obesity, smoking, and non-adherence  Associated Signs/Symptoms: edema, erythema, drainage, and pain    DIRK: as indicated base on wound location and assessment    Wound infection: wound culture will be obtained as needed for symptoms of infection     Arterial evaluation: if indicated based on wound, location, symptoms and healing    Venous Evaluation: if indicated based on wound, location, symptoms and healing    Diabetes: On an insulin regimen.  Follows with Dr. Colette Bonner  Hemoglobin A1C   Date Value Ref Range Status   02/10/2023 9.3 (H) 4.2 - 6.3 % Final        Diabetes education provided:  Diabetes pathoetiology, difference

## 2023-05-08 NOTE — DISCHARGE INSTRUCTIONS
PHYSICIAN ORDERS AND DISCHARGE INSTRUCTIONS     NOTE: Upon discharge from the [address], you will receive a patient experience survey. We would be grateful if you would take the time to fill this survey out. Wound care order history:                 DIRK's   Right       Left               Date:              Cultures:                Grafts:                Antibiotics:           Continuing wound care orders and information:                 Residence:                Continue home health care with:               Your wound-care supplies will be provided by: Wound cleansing:               Do not scrub or use excessive force. Wash hands with soap and water before and after dressing changes. Prior to applying a clean dressing, cleanse wound with normal saline, wound cleanser, or mild soap and water. Ask the physician or nurse before getting the wound(s) wet in a shower. General Wound management:              Keep weight off wounds and reposition every 2 hours. Avoid standing for long periods of time. Apply wraps/stockings in AM and remove at bedtime. If swelling is present, elevate legs to the level of the heart or above for 30 minutes 4-5 times a day and/or when sitting. When taking antibiotics take entire prescription as ordered by physician do not stop taking until medicine is all gone. Orders for this week: 5/8/23     Rx:     Moisturize dry intact skin of lower extremities. Wound vac ordered 3/28/23. Right Great Toe - Healed 4/24/23        WOUND VAC THERAPY: Right 2-4 Toe Amp site     Ioplex to macerated skin. Cover with DUODERM TO PERIWOUND FOR PROTECTION. APPLY STIMULEN AND BLACK FOAM TO WOUND BED. SECURE VAC DRESSING WITH DRAPE. SET WOUND VAC  CONTINUOUS SUCTION. CANISTER CHANGE WEEKLY OR ACCORDING TO VOLUME OF DRAINAGE.   Wrap with Coban 2 Lite (do

## 2023-05-12 ENCOUNTER — HOSPITAL ENCOUNTER (OUTPATIENT)
Dept: WOUND CARE | Age: 38
Discharge: HOME | End: 2023-05-12
Payer: COMMERCIAL

## 2023-05-12 VITALS
TEMPERATURE: 96.8 F | DIASTOLIC BLOOD PRESSURE: 79 MMHG | HEART RATE: 94 BPM | RESPIRATION RATE: 18 BRPM | SYSTOLIC BLOOD PRESSURE: 161 MMHG

## 2023-05-12 PROCEDURE — 97605 NEG PRS WND THER DME<=50SQCM: CPT

## 2023-05-12 PROCEDURE — 29581 APPL MULTLAYER CMPRN SYS LEG: CPT

## 2023-05-12 ASSESSMENT — PAIN DESCRIPTION - DESCRIPTORS: DESCRIPTORS: ACHING

## 2023-05-12 ASSESSMENT — PAIN DESCRIPTION - ONSET: ONSET: ON-GOING

## 2023-05-12 ASSESSMENT — PAIN DESCRIPTION - PAIN TYPE: TYPE: CHRONIC PAIN

## 2023-05-12 ASSESSMENT — PAIN DESCRIPTION - LOCATION: LOCATION: FOOT

## 2023-05-12 ASSESSMENT — PAIN DESCRIPTION - FREQUENCY: FREQUENCY: INTERMITTENT

## 2023-05-12 ASSESSMENT — PAIN DESCRIPTION - ORIENTATION: ORIENTATION: RIGHT

## 2023-05-12 ASSESSMENT — PAIN - FUNCTIONAL ASSESSMENT: PAIN_FUNCTIONAL_ASSESSMENT: PREVENTS OR INTERFERES SOME ACTIVE ACTIVITIES AND ADLS

## 2023-05-12 ASSESSMENT — PAIN SCALES - GENERAL: PAINLEVEL_OUTOF10: 5

## 2023-05-12 NOTE — PROGRESS NOTES
Negative Pressure Wound Therapy    NAME:  Keyshawn Becker  YOB: 1985  MEDICAL RECORD NUMBER:  [id number]  DATE:  5/12/2023    Applied Negative Pressure to right foot  wound(s)/ulcer(s). [x] Applied skin barrier prep to fer-wound. [x] Cut strips of plastic drape to picture frame wound so that fer-wound is     covered with the drape. [x] If bridging dressing to less prominent site, cover any intact skin that will come in contact with the Negative Pressure Therapy sponge, gauze or channel drain with plastic drape. The sponge should never touch intact skin. [x] Cut sponge, gauze or channel drain to size which will fit into the wound/ulcer bed without being forced. [x] Be sure the sponge is large enough to hold the entire round plastic flange which is attached to the tubing. Never allow flange to be larger than the sponge or it will produce suction damaging intact skin. Total number of individual pieces of foam used within the wound bed: 1    [x] If bridging the dressing away from the primary site, be sure the bridge leads to a piece of sponge large enough to hold the entire flange without allowing any of the flange to overlap onto intact skin. [x] Covered sponge, gauze or channel drain with plastic drape. [x] Cut a hole in this plastic drape directly over the sponge the same size as the plastic drain tubing. [x] Removed plastic liner from flange and apply it directly over the hole you cut. [x] Removed the plastic cover from the flange. [x] Attached the tubing to the wound/ulcer Negative Pressure Therapy and turn it on to be sure a vacuum is created and that there are no leaks. [x] If air leaks occur, use plastic drape to patch them. [x] Secured Negative Pressure Therapy dressing with ace wrap loosely if located on an extremity. Maintain tubing outside of ace wrap. Tubing must not exert pressure on intact skin.     Applied per  Guidelines      Electronically signed by

## 2023-05-12 NOTE — PROGRESS NOTES
Multilayer Compression Wrap   (Not Unna) Below the Knee    NAME:  Morro Pierce  YOB: 1985  MEDICAL RECORD NUMBER:  [id number]  DATE:  5/12/2023    Multilayer compression wrap: Removed old Multilayer wrap if indicated and wash leg with mild soap/water. Applied moisturizing agent to dry skin as needed. Applied primary and secondary dressing as ordered. Applied multilayered dressing below the knee to right lower leg. Instructed patient/caregiver not to remove dressing and to keep it clean and dry. Instructed patient/caregiver on complications to report to provider, such as pain, numbness in toes, heavy drainage, and slippage of dressing. Instructed patient on purpose of compression dressing and on activity and exercise recommendations.       Electronically signed by Tra Farrell LPN on 8/28/2905 at 7:21 PM

## 2023-05-15 ENCOUNTER — HOSPITAL ENCOUNTER (OUTPATIENT)
Dept: WOUND CARE | Age: 38
Discharge: HOME | End: 2023-05-15
Payer: COMMERCIAL

## 2023-05-15 VITALS
HEART RATE: 71 BPM | DIASTOLIC BLOOD PRESSURE: 86 MMHG | RESPIRATION RATE: 18 BRPM | TEMPERATURE: 97.1 F | SYSTOLIC BLOOD PRESSURE: 149 MMHG

## 2023-05-15 DIAGNOSIS — Z79.4 DIABETES MELLITUS TYPE 2, INSULIN DEPENDENT (HCC): ICD-10-CM

## 2023-05-15 DIAGNOSIS — E11.9 DIABETES MELLITUS TYPE 2, INSULIN DEPENDENT (HCC): ICD-10-CM

## 2023-05-15 DIAGNOSIS — L97.512 RIGHT FOOT ULCER, WITH FAT LAYER EXPOSED (HCC): ICD-10-CM

## 2023-05-15 DIAGNOSIS — E66.01 CLASS 2 SEVERE OBESITY DUE TO EXCESS CALORIES WITH SERIOUS COMORBIDITY AND BODY MASS INDEX (BMI) OF 37.0 TO 37.9 IN ADULT (HCC): ICD-10-CM

## 2023-05-15 DIAGNOSIS — T81.89XA NONHEALING SURGICAL WOUND, INITIAL ENCOUNTER: Primary | ICD-10-CM

## 2023-05-15 DIAGNOSIS — S98.131A AMPUTATION OF TOE OF RIGHT FOOT (HCC): ICD-10-CM

## 2023-05-15 PROCEDURE — 11042 DBRDMT SUBQ TIS 1ST 20SQCM/<: CPT | Performed by: NURSE PRACTITIONER

## 2023-05-15 PROCEDURE — 97605 NEG PRS WND THER DME<=50SQCM: CPT

## 2023-05-15 PROCEDURE — 11042 DBRDMT SUBQ TIS 1ST 20SQCM/<: CPT

## 2023-05-15 RX ORDER — LIDOCAINE HYDROCHLORIDE 40 MG/ML
SOLUTION TOPICAL ONCE
OUTPATIENT
Start: 2023-05-15 | End: 2023-05-15

## 2023-05-15 RX ORDER — CLOBETASOL PROPIONATE 0.5 MG/G
OINTMENT TOPICAL ONCE
OUTPATIENT
Start: 2023-05-15 | End: 2023-05-15

## 2023-05-15 RX ORDER — BACITRACIN ZINC AND POLYMYXIN B SULFATE 500; 1000 [USP'U]/G; [USP'U]/G
OINTMENT TOPICAL ONCE
OUTPATIENT
Start: 2023-05-15 | End: 2023-05-15

## 2023-05-15 RX ORDER — GENTAMICIN SULFATE 1 MG/G
OINTMENT TOPICAL ONCE
OUTPATIENT
Start: 2023-05-15 | End: 2023-05-15

## 2023-05-15 RX ORDER — BETAMETHASONE DIPROPIONATE 0.05 %
OINTMENT (GRAM) TOPICAL ONCE
OUTPATIENT
Start: 2023-05-15 | End: 2023-05-15

## 2023-05-15 RX ORDER — GINSENG 100 MG
CAPSULE ORAL ONCE
OUTPATIENT
Start: 2023-05-15 | End: 2023-05-15

## 2023-05-15 RX ORDER — LIDOCAINE 50 MG/G
OINTMENT TOPICAL ONCE
OUTPATIENT
Start: 2023-05-15 | End: 2023-05-15

## 2023-05-15 RX ORDER — LIDOCAINE 40 MG/G
CREAM TOPICAL ONCE
OUTPATIENT
Start: 2023-05-15 | End: 2023-05-15

## 2023-05-15 RX ORDER — BACITRACIN, NEOMYCIN, POLYMYXIN B 400; 3.5; 5 [USP'U]/G; MG/G; [USP'U]/G
OINTMENT TOPICAL ONCE
OUTPATIENT
Start: 2023-05-15 | End: 2023-05-15

## 2023-05-15 ASSESSMENT — PAIN DESCRIPTION - PAIN TYPE: TYPE: CHRONIC PAIN

## 2023-05-15 ASSESSMENT — PAIN DESCRIPTION - FREQUENCY: FREQUENCY: INTERMITTENT

## 2023-05-15 ASSESSMENT — PAIN DESCRIPTION - ORIENTATION: ORIENTATION: RIGHT

## 2023-05-15 ASSESSMENT — PAIN DESCRIPTION - ONSET: ONSET: ON-GOING

## 2023-05-15 ASSESSMENT — PAIN DESCRIPTION - DESCRIPTORS: DESCRIPTORS: DISCOMFORT

## 2023-05-15 ASSESSMENT — PAIN DESCRIPTION - LOCATION: LOCATION: FOOT

## 2023-05-15 NOTE — DISCHARGE INSTRUCTIONS
PHYSICIAN ORDERS AND DISCHARGE INSTRUCTIONS     NOTE: Upon discharge from the [address], you will receive a patient experience survey. We would be grateful if you would take the time to fill this survey out. Wound care order history:                 DIRK's   Right       Left               Date:              Cultures:                Grafts:                Antibiotics:           Continuing wound care orders and information:                 Residence:                Continue home health care with:               Your wound-care supplies will be provided by: Wound cleansing:               Do not scrub or use excessive force. Wash hands with soap and water before and after dressing changes. Prior to applying a clean dressing, cleanse wound with normal saline, wound cleanser, or mild soap and water. Ask the physician or nurse before getting the wound(s) wet in a shower. General Wound management:              Keep weight off wounds and reposition every 2 hours. Avoid standing for long periods of time. Apply wraps/stockings in AM and remove at bedtime. If swelling is present, elevate legs to the level of the heart or above for 30 minutes 4-5 times a day and/or when sitting. When taking antibiotics take entire prescription as ordered by physician do not stop taking until medicine is all gone. Orders for this week: 5/15/23     Rx:     Moisturize dry intact skin of lower extremities. Wound vac ordered 3/28/23. WOUND VAC THERAPY: Right 2-4 Toe Amp site     Ioplex to macerated skin. Cover with DUODERM TO PERIWOUND FOR PROTECTION. APPLY STIMULEN AND BLACK FOAM TO WOUND BED. SECURE VAC DRESSING WITH DRAPE. SET WOUND VAC  CONTINUOUS SUCTION. CANISTER CHANGE WEEKLY OR ACCORDING TO VOLUME OF DRAINAGE.   Wrap with Coban 2 Lite (do not compress tubing against skin)

## 2023-05-15 NOTE — PROGRESS NOTES
Negative Pressure Wound Therapy    NAME:  Mabel Pickens  YOB: 1985  MEDICAL RECORD NUMBER:  [id number]  DATE:  5/15/2023    Applied Negative Pressure to right foot  wound(s)/ulcer(s). [x] Applied skin barrier prep to fer-wound. [x] Cut strips of plastic drape to picture frame wound so that fer-wound is     covered with the drape. [x] If bridging dressing to less prominent site, cover any intact skin that will come in contact with the Negative Pressure Therapy sponge, gauze or channel drain with plastic drape. The sponge should never touch intact skin. [x] Cut sponge, gauze or channel drain to size which will fit into the wound/ulcer bed without being forced. [x] Be sure the sponge is large enough to hold the entire round plastic flange which is attached to the tubing. Never allow flange to be larger than the sponge or it will produce suction damaging intact skin. Total number of individual pieces of foam used within the wound bed: 1    [x] If bridging the dressing away from the primary site, be sure the bridge leads to a piece of sponge large enough to hold the entire flange without allowing any of the flange to overlap onto intact skin. [x] Covered sponge, gauze or channel drain with plastic drape. [x] Cut a hole in this plastic drape directly over the sponge the same size as the plastic drain tubing. [x] Removed plastic liner from flange and apply it directly over the hole you cut. [x] Removed the plastic cover from the flange. [x] Attached the tubing to the wound/ulcer Negative Pressure Therapy and turn it on to be sure a vacuum is created and that there are no leaks. [x] If air leaks occur, use plastic drape to patch them. [x] Secured Negative Pressure Therapy dressing with ace wrap loosely if located on an extremity. Maintain tubing outside of ace wrap. Tubing must not exert pressure on intact skin.     Applied per  Guidelines      Electronically signed by
31G X 5 MM MISC use 1 PEN NEEDLE to inject MEDICATION subcutaneously two to three times a day      melatonin 5 MG TABS tablet take 1 tablet by mouth every evening      pregabalin (LYRICA) 100 MG capsule take 1 capsule by mouth twice a day      sucralfate (CARAFATE) 1 GM tablet Take 1 tablet by mouth 4 times daily 120 tablet 0    albuterol sulfate  (90 Base) MCG/ACT inhaler Inhale 2 puffs into the lungs every 6 hours as needed for Wheezing 1 Inhaler 5    albuterol-ipratropium (COMBIVENT RESPIMAT)  MCG/ACT AERS inhaler Inhale 1 puff into the lungs every 6 hours 1 Inhaler 5    acetaminophen (AMINOFEN) 325 MG tablet Take 2 tablets by mouth every 6 hours as needed for Pain 20 tablet 0    brimonidine (ALPHAGAN) 0.2 % ophthalmic solution Place 1 drop into the right eye in the morning and 1 drop in the evening. dorzolamide-timolol 22.3-6.8 MG/ML SOLN Place 1 drop into the right eye in the morning and 1 drop in the evening. prednisoLONE acetate (PRED FORTE) 1 % ophthalmic suspension Place 1 drop into the right eye 4 times daily      allopurinol (ZYLOPRIM) 300 MG tablet Take 1 tablet by mouth daily      atorvastatin (LIPITOR) 10 MG tablet Take 1 tablet by mouth daily      lisinopril-hydrochlorothiazide (PRINZIDE;ZESTORETIC) 20-12.5 MG per tablet Take 1 tablet by mouth daily      amLODIPine (NORVASC) 10 MG tablet Take 1 tablet by mouth daily       No current facility-administered medications on file prior to encounter.        PROBLEM LIST    Patient Active Problem List   Diagnosis    Sprain of left ankle    Closed nondisplaced fracture of fifth metatarsal bone of left foot    Alcohol abuse    UTI (urinary tract infection) due to Enterococcus    Acute osteomyelitis of toe, right (HCC)    Osteomyelitis of second toe of right foot (HCC)    Diabetic foot infection (HCC)    Shortness of breath    Asthma exacerbation    Appendicitis    Diabetic foot ulcer with osteomyelitis ([facility])    Cellulitis    Open wound of

## 2023-05-19 ENCOUNTER — HOSPITAL ENCOUNTER (OUTPATIENT)
Dept: WOUND CARE | Age: 38
Discharge: HOME | End: 2023-05-19
Payer: COMMERCIAL

## 2023-05-19 VITALS
HEART RATE: 114 BPM | RESPIRATION RATE: 18 BRPM | TEMPERATURE: 96.1 F | DIASTOLIC BLOOD PRESSURE: 71 MMHG | SYSTOLIC BLOOD PRESSURE: 122 MMHG

## 2023-05-19 PROCEDURE — 29581 APPL MULTLAYER CMPRN SYS LEG: CPT

## 2023-05-19 PROCEDURE — 97606 NEG PRS WND THER DME>50 SQCM: CPT

## 2023-05-19 ASSESSMENT — PAIN SCALES - GENERAL: PAINLEVEL_OUTOF10: 3

## 2023-05-19 ASSESSMENT — PAIN - FUNCTIONAL ASSESSMENT: PAIN_FUNCTIONAL_ASSESSMENT: PREVENTS OR INTERFERES SOME ACTIVE ACTIVITIES AND ADLS

## 2023-05-19 ASSESSMENT — PAIN DESCRIPTION - LOCATION: LOCATION: FOOT

## 2023-05-19 ASSESSMENT — PAIN DESCRIPTION - ORIENTATION: ORIENTATION: RIGHT

## 2023-05-19 ASSESSMENT — PAIN DESCRIPTION - DESCRIPTORS: DESCRIPTORS: DISCOMFORT

## 2023-05-19 NOTE — PROGRESS NOTES
Multilayer Compression Wrap   (Not Unna) Below the Knee    NAME:  Rogerio Barry  YOB: 1985  MEDICAL RECORD NUMBER:  [id number]  DATE:  5/19/2023    Multilayer compression wrap: Removed old Multilayer wrap if indicated and wash leg with mild soap/water. Applied moisturizing agent to dry skin as needed. Applied primary and secondary dressing as ordered. Applied multilayered dressing below the knee to right lower leg. Instructed patient/caregiver not to remove dressing and to keep it clean and dry. Instructed patient/caregiver on complications to report to provider, such as pain, numbness in toes, heavy drainage, and slippage of dressing. Instructed patient on purpose of compression dressing and on activity and exercise recommendations. Electronically signed by Sasha Nicholas LPN on 1/00/7657 at 95:37 AMNegative Pressure Wound Therapy    NAME:  Rogerio Barry  YOB: 1985  MEDICAL RECORD NUMBER:  [id number]  DATE:  5/19/2023    Applied Negative Pressure to right foot  wound(s)/ulcer(s). [x] Applied skin barrier prep to fer-wound. [x] Cut strips of plastic drape to picture frame wound so that fer-wound is     covered with the drape. [x] If bridging dressing to less prominent site, cover any intact skin that will come in contact with the Negative Pressure Therapy sponge, gauze or channel drain with plastic drape. The sponge should never touch intact skin. [x] Cut sponge, gauze or channel drain to size which will fit into the wound/ulcer bed without being forced. [x] Be sure the sponge is large enough to hold the entire round plastic flange which is attached to the tubing. Never allow flange to be larger than the sponge or it will produce suction damaging intact skin.   Total number of individual pieces of foam used within the wound bed: 1    [x] If bridging the dressing away from the primary site, be sure the bridge leads to a piece of sponge large enough to hold the

## 2023-05-22 ENCOUNTER — HOSPITAL ENCOUNTER (OUTPATIENT)
Dept: WOUND CARE | Age: 38
Discharge: HOME | End: 2023-05-22
Payer: COMMERCIAL

## 2023-05-22 VITALS
DIASTOLIC BLOOD PRESSURE: 97 MMHG | SYSTOLIC BLOOD PRESSURE: 166 MMHG | TEMPERATURE: 98 F | RESPIRATION RATE: 20 BRPM | HEART RATE: 91 BPM

## 2023-05-22 DIAGNOSIS — T81.89XA NONHEALING SURGICAL WOUND, INITIAL ENCOUNTER: Primary | ICD-10-CM

## 2023-05-22 DIAGNOSIS — Z79.4 DIABETES MELLITUS TYPE 2, INSULIN DEPENDENT (HCC): ICD-10-CM

## 2023-05-22 DIAGNOSIS — E11.9 DIABETES MELLITUS TYPE 2, INSULIN DEPENDENT (HCC): ICD-10-CM

## 2023-05-22 DIAGNOSIS — S98.131A AMPUTATION OF TOE OF RIGHT FOOT (HCC): ICD-10-CM

## 2023-05-22 DIAGNOSIS — E66.01 CLASS 2 SEVERE OBESITY DUE TO EXCESS CALORIES WITH SERIOUS COMORBIDITY AND BODY MASS INDEX (BMI) OF 37.0 TO 37.9 IN ADULT (HCC): ICD-10-CM

## 2023-05-22 DIAGNOSIS — L97.512 RIGHT FOOT ULCER, WITH FAT LAYER EXPOSED (HCC): ICD-10-CM

## 2023-05-22 PROCEDURE — 11042 DBRDMT SUBQ TIS 1ST 20SQCM/<: CPT

## 2023-05-22 PROCEDURE — 11719 TRIM NAIL(S) ANY NUMBER: CPT

## 2023-05-22 PROCEDURE — 11042 DBRDMT SUBQ TIS 1ST 20SQCM/<: CPT | Performed by: NURSE PRACTITIONER

## 2023-05-22 RX ORDER — BACITRACIN, NEOMYCIN, POLYMYXIN B 400; 3.5; 5 [USP'U]/G; MG/G; [USP'U]/G
OINTMENT TOPICAL ONCE
OUTPATIENT
Start: 2023-05-22 | End: 2023-05-22

## 2023-05-22 RX ORDER — GENTAMICIN SULFATE 1 MG/G
OINTMENT TOPICAL ONCE
OUTPATIENT
Start: 2023-05-22 | End: 2023-05-22

## 2023-05-22 RX ORDER — GINSENG 100 MG
CAPSULE ORAL ONCE
OUTPATIENT
Start: 2023-05-22 | End: 2023-05-22

## 2023-05-22 RX ORDER — LIDOCAINE 40 MG/G
CREAM TOPICAL ONCE
OUTPATIENT
Start: 2023-05-22 | End: 2023-05-22

## 2023-05-22 RX ORDER — CLOBETASOL PROPIONATE 0.5 MG/G
OINTMENT TOPICAL ONCE
OUTPATIENT
Start: 2023-05-22 | End: 2023-05-22

## 2023-05-22 RX ORDER — BETAMETHASONE DIPROPIONATE 0.05 %
OINTMENT (GRAM) TOPICAL ONCE
OUTPATIENT
Start: 2023-05-22 | End: 2023-05-22

## 2023-05-22 RX ORDER — BACITRACIN ZINC AND POLYMYXIN B SULFATE 500; 1000 [USP'U]/G; [USP'U]/G
OINTMENT TOPICAL ONCE
OUTPATIENT
Start: 2023-05-22 | End: 2023-05-22

## 2023-05-22 RX ORDER — LIDOCAINE HYDROCHLORIDE 40 MG/ML
SOLUTION TOPICAL ONCE
OUTPATIENT
Start: 2023-05-22 | End: 2023-05-22

## 2023-05-22 RX ORDER — LIDOCAINE 50 MG/G
OINTMENT TOPICAL ONCE
OUTPATIENT
Start: 2023-05-22 | End: 2023-05-22

## 2023-05-22 ASSESSMENT — PAIN DESCRIPTION - DESCRIPTORS: DESCRIPTORS: ACHING

## 2023-05-22 ASSESSMENT — PAIN DESCRIPTION - ORIENTATION: ORIENTATION: RIGHT

## 2023-05-22 ASSESSMENT — PAIN DESCRIPTION - LOCATION: LOCATION: FOOT

## 2023-05-22 ASSESSMENT — PAIN DESCRIPTION - FREQUENCY: FREQUENCY: INTERMITTENT

## 2023-05-22 ASSESSMENT — PAIN DESCRIPTION - ONSET: ONSET: ON-GOING

## 2023-05-22 ASSESSMENT — PAIN SCALES - GENERAL: PAINLEVEL_OUTOF10: 6

## 2023-05-22 ASSESSMENT — PAIN - FUNCTIONAL ASSESSMENT: PAIN_FUNCTIONAL_ASSESSMENT: PREVENTS OR INTERFERES SOME ACTIVE ACTIVITIES AND ADLS

## 2023-05-22 NOTE — DISCHARGE INSTRUCTIONS
PHYSICIAN ORDERS AND DISCHARGE INSTRUCTIONS     NOTE: Upon discharge from the [address], you will receive a patient experience survey. We would be grateful if you would take the time to fill this survey out. Wound care order history:                 DIRK's   Right       Left               Date:              Cultures:                Grafts:                Antibiotics:           Continuing wound care orders and information:                 Residence:                Continue home health care with:               Your wound-care supplies will be provided by: Wound cleansing:               Do not scrub or use excessive force. Wash hands with soap and water before and after dressing changes. Prior to applying a clean dressing, cleanse wound with normal saline, wound cleanser, or mild soap and water. Ask the physician or nurse before getting the wound(s) wet in a shower. General Wound management:              Keep weight off wounds and reposition every 2 hours. Avoid standing for long periods of time. Apply wraps/stockings in AM and remove at bedtime. If swelling is present, elevate legs to the level of the heart or above for 30 minutes 4-5 times a day and/or when sitting. When taking antibiotics take entire prescription as ordered by physician do not stop taking until medicine is all gone. Orders for this week: 5/22/23   Discontinued wound vac   Rx:     Moisturize dry intact skin of lower extremities. Right Toe Amputation Site: Karen to wound bed, bolster with tape, cover with silver alginate x 2 and a super absorber, wrap with Coban 2 lite         Give Post op shoe on 05/22/2023     Schedule provider visit wound care visit on Mondays  Nurse visit on Wednesdays and Fridays. Follow up with Wan Diaz CNP in 2 week in the wound care center.   Call [phone] for

## 2023-05-22 NOTE — PROGRESS NOTES
Multilayer Compression Wrap   (Not Unna) Below the Knee    NAME:  Leodan Avila  YOB: 1985  MEDICAL RECORD NUMBER:  [id number]  DATE:  5/22/2023    Multilayer compression wrap: Removed old Multilayer wrap if indicated and wash leg with mild soap/water. Applied moisturizing agent to dry skin as needed. Applied primary and secondary dressing as ordered. Applied multilayered dressing below the knee to right lower leg. Instructed patient/caregiver not to remove dressing and to keep it clean and dry. Instructed patient/caregiver on complications to report to provider, such as pain, numbness in toes, heavy drainage, and slippage of dressing. Instructed patient on purpose of compression dressing and on activity and exercise recommendations.       Electronically signed by Fredi Amezcua LPN on 5/30/5119 at 2:95 PM

## 2023-05-23 NOTE — PROGRESS NOTES
Wound Care Center progress Visit      Bernardo Resendiz  AGE: 44 y.o. GENDER: male  : 1985  EPISODE DATE:  2023   Referred by:Roxanne Cedeño MD  [place] [facility]. [address]  [place],  [address] [place] [address]     Subjective:     CHIEF COMPLAINT  WOUND WOUND RIGHT FOOT  Chief Complaint   Patient presents with    Wound Check        HISTORY of PRESENT ILLNESS      Bernardo Resendiz is a 44 y.o. male who presents to the [address] [place] [address] for evaluation and treatment of Acute non-healing surgical  ulcer(s) of  right foot. The condition is of moderate severity. The ulcer has been present since 23 post third toe amputation and revision of the second toe per Dr. Viola Cleveland 23. The underlying cause is thought to be nonhealing surgical complicated by diabetes. The patients care to date has included hospitalization -23 with toe amputation sent home with Pervena vac and Home Health referral with [facility], received IV antibiotics in hospital, home on Keflex and Doxycycline through 23. The patient has significant underlying medical conditions as below. Waqas Nelson MD.    Wound Pain Timing/Severity: waxing and waning, moderate  Quality of pain: dull, aching, tender  Severity of pain:  2 / 10   Modifying Factors: diabetes, poor glucose control, chronic pressure, shear force, obesity, smoking, and non-adherence  Associated Signs/Symptoms: edema, erythema, drainage, and pain    DIRK: as indicated base on wound location and assessment    Wound infection: wound culture will be obtained as needed for symptoms of infection     Arterial evaluation: if indicated based on wound, location, symptoms and healing    Venous Evaluation: if indicated based on wound, location, symptoms and healing    Diabetes: On an insulin regimen.  Follows with Dr. Martin Vazquez  Hemoglobin A1C   Date Value Ref Range Status   02/10/2023 9.3 (H) 4.2 - 6.3 % Final        Diabetes education provided:  Diabetes pathoetiology, difference

## 2023-05-24 ENCOUNTER — HOSPITAL ENCOUNTER (OUTPATIENT)
Dept: WOUND CARE | Age: 38
Discharge: HOME | End: 2023-05-24
Payer: COMMERCIAL

## 2023-05-24 VITALS
SYSTOLIC BLOOD PRESSURE: 145 MMHG | RESPIRATION RATE: 18 BRPM | HEART RATE: 85 BPM | TEMPERATURE: 97.2 F | DIASTOLIC BLOOD PRESSURE: 83 MMHG

## 2023-05-24 PROCEDURE — 29581 APPL MULTLAYER CMPRN SYS LEG: CPT

## 2023-05-24 ASSESSMENT — PAIN SCALES - GENERAL: PAINLEVEL_OUTOF10: 5

## 2023-05-24 ASSESSMENT — PAIN DESCRIPTION - ORIENTATION: ORIENTATION: RIGHT

## 2023-05-24 ASSESSMENT — PAIN DESCRIPTION - FREQUENCY: FREQUENCY: INTERMITTENT

## 2023-05-24 ASSESSMENT — PAIN DESCRIPTION - PAIN TYPE: TYPE: CHRONIC PAIN

## 2023-05-24 ASSESSMENT — PAIN DESCRIPTION - DESCRIPTORS: DESCRIPTORS: ACHING

## 2023-05-24 ASSESSMENT — PAIN DESCRIPTION - ONSET: ONSET: ON-GOING

## 2023-05-24 ASSESSMENT — PAIN - FUNCTIONAL ASSESSMENT: PAIN_FUNCTIONAL_ASSESSMENT: PREVENTS OR INTERFERES SOME ACTIVE ACTIVITIES AND ADLS

## 2023-05-24 ASSESSMENT — PAIN DESCRIPTION - LOCATION: LOCATION: FOOT

## 2023-05-24 NOTE — PROGRESS NOTES
Multilayer Compression Wrap   (Not Unna) Below the Knee    NAME:  Jose Miguel Borges  YOB: 1985  MEDICAL RECORD NUMBER:  [id number]  DATE:  5/24/2023    Multilayer compression wrap: Removed old Multilayer wrap if indicated and wash leg with mild soap/water. Applied moisturizing agent to dry skin as needed. Applied primary and secondary dressing as ordered. Applied multilayered dressing below the knee to right lower leg. Instructed patient/caregiver not to remove dressing and to keep it clean and dry. Instructed patient/caregiver on complications to report to provider, such as pain, numbness in toes, heavy drainage, and slippage of dressing. Instructed patient on purpose of compression dressing and on activity and exercise recommendations. Treatment to Right Foot/Toe amp site completed as per order. Patient tolerated procedure well.       Electronically signed by Dari Munroe LPN on 0/99/7779 at 4:49 PM

## 2023-05-31 ENCOUNTER — HOSPITAL ENCOUNTER (OUTPATIENT)
Dept: WOUND CARE | Age: 38
Discharge: HOME | End: 2023-05-31
Payer: COMMERCIAL

## 2023-05-31 VITALS — RESPIRATION RATE: 20 BRPM | DIASTOLIC BLOOD PRESSURE: 84 MMHG | HEART RATE: 85 BPM | SYSTOLIC BLOOD PRESSURE: 149 MMHG

## 2023-05-31 PROCEDURE — 28825 PARTIAL AMPUTATION OF TOE: CPT

## 2023-05-31 PROCEDURE — 29581 APPL MULTLAYER CMPRN SYS LEG: CPT

## 2023-06-05 ENCOUNTER — HOSPITAL ENCOUNTER (OUTPATIENT)
Dept: WOUND CARE | Age: 38
Discharge: HOME | End: 2023-06-05
Payer: COMMERCIAL

## 2023-06-05 VITALS
TEMPERATURE: 97.2 F | SYSTOLIC BLOOD PRESSURE: 141 MMHG | DIASTOLIC BLOOD PRESSURE: 75 MMHG | RESPIRATION RATE: 18 BRPM | HEART RATE: 85 BPM

## 2023-06-05 DIAGNOSIS — Z79.4 DIABETES MELLITUS TYPE 2, INSULIN DEPENDENT (HCC): ICD-10-CM

## 2023-06-05 DIAGNOSIS — E11.9 DIABETES MELLITUS TYPE 2, INSULIN DEPENDENT (HCC): ICD-10-CM

## 2023-06-05 DIAGNOSIS — E66.01 CLASS 2 SEVERE OBESITY DUE TO EXCESS CALORIES WITH SERIOUS COMORBIDITY AND BODY MASS INDEX (BMI) OF 37.0 TO 37.9 IN ADULT (HCC): ICD-10-CM

## 2023-06-05 DIAGNOSIS — T81.89XA NONHEALING SURGICAL WOUND, INITIAL ENCOUNTER: Primary | ICD-10-CM

## 2023-06-05 DIAGNOSIS — L97.512 RIGHT FOOT ULCER, WITH FAT LAYER EXPOSED (HCC): ICD-10-CM

## 2023-06-05 DIAGNOSIS — S98.131A AMPUTATION OF TOE OF RIGHT FOOT (HCC): ICD-10-CM

## 2023-06-05 PROCEDURE — 11042 DBRDMT SUBQ TIS 1ST 20SQCM/<: CPT | Performed by: NURSE PRACTITIONER

## 2023-06-05 PROCEDURE — 11042 DBRDMT SUBQ TIS 1ST 20SQCM/<: CPT

## 2023-06-05 RX ORDER — GENTAMICIN SULFATE 1 MG/G
OINTMENT TOPICAL ONCE
OUTPATIENT
Start: 2023-06-05 | End: 2023-06-05

## 2023-06-05 RX ORDER — LIDOCAINE 50 MG/G
OINTMENT TOPICAL ONCE
OUTPATIENT
Start: 2023-06-05 | End: 2023-06-05

## 2023-06-05 RX ORDER — BETAMETHASONE DIPROPIONATE 0.05 %
OINTMENT (GRAM) TOPICAL ONCE
OUTPATIENT
Start: 2023-06-05 | End: 2023-06-05

## 2023-06-05 RX ORDER — GINSENG 100 MG
CAPSULE ORAL ONCE
OUTPATIENT
Start: 2023-06-05 | End: 2023-06-05

## 2023-06-05 RX ORDER — CLOBETASOL PROPIONATE 0.5 MG/G
OINTMENT TOPICAL ONCE
OUTPATIENT
Start: 2023-06-05 | End: 2023-06-05

## 2023-06-05 RX ORDER — LIDOCAINE 40 MG/G
CREAM TOPICAL ONCE
OUTPATIENT
Start: 2023-06-05 | End: 2023-06-05

## 2023-06-05 RX ORDER — GLIPIZIDE 10 MG/1
10 TABLET ORAL DAILY
COMMUNITY
Start: 2023-04-17

## 2023-06-05 RX ORDER — IBUPROFEN 200 MG
TABLET ORAL ONCE
OUTPATIENT
Start: 2023-06-05 | End: 2023-06-05

## 2023-06-05 RX ORDER — LIDOCAINE HYDROCHLORIDE 40 MG/ML
SOLUTION TOPICAL ONCE
OUTPATIENT
Start: 2023-06-05 | End: 2023-06-05

## 2023-06-05 RX ORDER — BACITRACIN ZINC AND POLYMYXIN B SULFATE 500; 1000 [USP'U]/G; [USP'U]/G
OINTMENT TOPICAL ONCE
OUTPATIENT
Start: 2023-06-05 | End: 2023-06-05

## 2023-06-05 NOTE — PROGRESS NOTES
Multilayer Compression Wrap   (Not Unna) Below the Knee    NAME:  Marianna Lares  YOB: 1985  MEDICAL RECORD NUMBER:  [id number]  DATE:  6/5/2023    Multilayer compression wrap: Removed old Multilayer wrap if indicated and wash leg with mild soap/water. Applied moisturizing agent to dry skin as needed. Applied primary and secondary dressing as ordered. Applied multilayered dressing below the knee to right lower leg. Instructed patient/caregiver not to remove dressing and to keep it clean and dry. Instructed patient/caregiver on complications to report to provider, such as pain, numbness in toes, heavy drainage, and slippage of dressing. Instructed patient on purpose of compression dressing and on activity and exercise recommendations.       Electronically signed by Moncho Palmer RN on 6/5/2023 at 11:37 AM
Wound 11/20/22 Toe (Comment  which one) Anterior;Right (Active)   Number of days: 197       Wound 02/20/23 Anterior;Right #2 RT 2ND-4TH TOE AMP SITE (Active)   Wound Image   05/24/23 1439   Wound Etiology Non-Healing Surgical 06/05/23 1121   Dressing Status New dressing applied 05/24/23 1439   Wound Cleansed Soap and water; Wound cleanser 06/05/23 1121   Dressing/Treatment Negative pressure wound therapy 04/24/23 1155   Offloading for Diabetic Foot Ulcers Post op shoe 06/05/23 1121   Wound Length (cm) 0.6 cm 06/05/23 1121   Wound Width (cm) 0.2 cm 06/05/23 1121   Wound Depth (cm) 0.2 cm 06/05/23 1121   Wound Surface Area (cm^2) 0.12 cm^2 06/05/23 1121   Change in Wound Size % (l*w) 96.8 06/05/23 1121   Wound Volume (cm^3) 0.024 cm^3 06/05/23 1121   Wound Healing % 94 06/05/23 1121   Post-Procedure Length (cm) 0.6 cm 05/22/23 1624   Post-Procedure Width (cm) 0.5 cm 05/22/23 1624   Post-Procedure Depth (cm) 0.2 cm 05/22/23 1624   Post-Procedure Surface Area (cm^2) 0.3 cm^2 05/22/23 1624   Post-Procedure Volume (cm^3) 0.06 cm^3 05/22/23 1624   Distance Tunneling (cm) 0 cm 06/05/23 1121   Tunneling Position ___ O'Clock 0 06/05/23 1121   Undermining Starts ___ O'Clock 0 06/05/23 1121   Undermining Ends___ O'Clock 0 06/05/23 1121   Undermining Maxium Distance (cm) 0 06/05/23 1121   Wound Assessment Pale granulation tissue 06/05/23 1121   Drainage Amount Moderate 06/05/23 1121   Drainage Description Yellow 06/05/23 1121   Odor Moderate 06/05/23 1121   Sandy-wound Assessment Maceration 06/05/23 1121   Margins Defined edges 06/05/23 1121   Wound Thickness Description not for Pressure Injury Full thickness 06/05/23 1121   Number of days: 105       Percent of Wound(s) Debrided: approximately 100%    Total  Area  Debrided:  0.12 sq cm     Bleeding:  Minimal    Hemostasis Achieved:  by pressure    Procedural Pain:  0  / 10     Post Procedural Pain:  0 / 10     Response to treatment:  Well tolerated by patient.      Status of

## 2023-06-19 ENCOUNTER — HOSPITAL ENCOUNTER (OUTPATIENT)
Dept: WOUND CARE | Age: 38
Discharge: HOME | End: 2023-06-19
Payer: COMMERCIAL

## 2023-06-19 VITALS
HEART RATE: 86 BPM | SYSTOLIC BLOOD PRESSURE: 169 MMHG | TEMPERATURE: 97.1 F | RESPIRATION RATE: 18 BRPM | DIASTOLIC BLOOD PRESSURE: 88 MMHG

## 2023-06-19 DIAGNOSIS — S98.131A AMPUTATION OF TOE OF RIGHT FOOT (HCC): ICD-10-CM

## 2023-06-19 DIAGNOSIS — E11.9 DIABETES MELLITUS TYPE 2, INSULIN DEPENDENT (HCC): ICD-10-CM

## 2023-06-19 DIAGNOSIS — Z79.4 DIABETES MELLITUS TYPE 2, INSULIN DEPENDENT (HCC): ICD-10-CM

## 2023-06-19 DIAGNOSIS — E66.01 CLASS 2 SEVERE OBESITY DUE TO EXCESS CALORIES WITH SERIOUS COMORBIDITY AND BODY MASS INDEX (BMI) OF 37.0 TO 37.9 IN ADULT (HCC): ICD-10-CM

## 2023-06-19 DIAGNOSIS — L97.512 RIGHT FOOT ULCER, WITH FAT LAYER EXPOSED (HCC): ICD-10-CM

## 2023-06-19 DIAGNOSIS — T81.89XA NONHEALING SURGICAL WOUND, INITIAL ENCOUNTER: Primary | ICD-10-CM

## 2023-06-19 PROCEDURE — 11042 DBRDMT SUBQ TIS 1ST 20SQCM/<: CPT | Performed by: NURSE PRACTITIONER

## 2023-06-19 PROCEDURE — 11042 DBRDMT SUBQ TIS 1ST 20SQCM/<: CPT

## 2023-06-19 RX ORDER — BACITRACIN ZINC AND POLYMYXIN B SULFATE 500; 1000 [USP'U]/G; [USP'U]/G
OINTMENT TOPICAL ONCE
OUTPATIENT
Start: 2023-06-19 | End: 2023-06-19

## 2023-06-19 RX ORDER — GINSENG 100 MG
CAPSULE ORAL ONCE
OUTPATIENT
Start: 2023-06-19 | End: 2023-06-19

## 2023-06-19 RX ORDER — LIDOCAINE HYDROCHLORIDE 40 MG/ML
SOLUTION TOPICAL ONCE
OUTPATIENT
Start: 2023-06-19 | End: 2023-06-19

## 2023-06-19 RX ORDER — CLOBETASOL PROPIONATE 0.5 MG/G
OINTMENT TOPICAL ONCE
OUTPATIENT
Start: 2023-06-19 | End: 2023-06-19

## 2023-06-19 RX ORDER — LIDOCAINE 40 MG/G
CREAM TOPICAL ONCE
OUTPATIENT
Start: 2023-06-19 | End: 2023-06-19

## 2023-06-19 RX ORDER — BETAMETHASONE DIPROPIONATE 0.05 %
OINTMENT (GRAM) TOPICAL ONCE
OUTPATIENT
Start: 2023-06-19 | End: 2023-06-19

## 2023-06-19 RX ORDER — LIDOCAINE 50 MG/G
OINTMENT TOPICAL ONCE
OUTPATIENT
Start: 2023-06-19 | End: 2023-06-19

## 2023-06-19 RX ORDER — IBUPROFEN 200 MG
TABLET ORAL ONCE
OUTPATIENT
Start: 2023-06-19 | End: 2023-06-19

## 2023-06-19 RX ORDER — GENTAMICIN SULFATE 1 MG/G
OINTMENT TOPICAL ONCE
OUTPATIENT
Start: 2023-06-19 | End: 2023-06-19

## 2023-06-19 ASSESSMENT — PAIN DESCRIPTION - ONSET: ONSET: ON-GOING

## 2023-06-19 ASSESSMENT — PAIN DESCRIPTION - DESCRIPTORS: DESCRIPTORS: ACHING

## 2023-06-19 ASSESSMENT — PAIN SCALES - GENERAL: PAINLEVEL_OUTOF10: 5

## 2023-06-19 ASSESSMENT — PAIN - FUNCTIONAL ASSESSMENT: PAIN_FUNCTIONAL_ASSESSMENT: ACTIVITIES ARE NOT PREVENTED

## 2023-06-19 ASSESSMENT — PAIN DESCRIPTION - LOCATION: LOCATION: FOOT

## 2023-06-19 ASSESSMENT — PAIN DESCRIPTION - ORIENTATION: ORIENTATION: RIGHT

## 2023-06-19 ASSESSMENT — PAIN DESCRIPTION - PAIN TYPE: TYPE: CHRONIC PAIN

## 2023-06-19 ASSESSMENT — PAIN DESCRIPTION - FREQUENCY: FREQUENCY: INTERMITTENT

## 2023-06-19 NOTE — PLAN OF CARE
Problem: Wound:  Intervention: Assess ankle, calf, or foot circumference blilaterally  Note: See flowsheet  Intervention: Assess pain status  Note: See flowsheet  Intervention: Assess wound size, appearance and drainage  Note: See flowsheet  Intervention: Assess pedal pulses bilaterally if patient has a foot or leg ulcer  Note: See flowsheet  Intervention: Doppler if unable to palpate pedal pulse  Note: See flowsheet

## 2023-06-19 NOTE — PROGRESS NOTES
Multilayer Compression Wrap   (Not Unna) Below the Knee    NAME:  James Coelho  YOB: 1985  MEDICAL RECORD NUMBER:  [id number]  DATE:  6/19/2023    Multilayer compression wrap: Removed old Multilayer wrap if indicated and wash leg with mild soap/water. Applied moisturizing agent to dry skin as needed. Applied primary and secondary dressing as ordered. Applied multilayered dressing below the knee to right lower leg. Instructed patient/caregiver not to remove dressing and to keep it clean and dry. Instructed patient/caregiver on complications to report to provider, such as pain, numbness in toes, heavy drainage, and slippage of dressing. Instructed patient on purpose of compression dressing and on activity and exercise recommendations. Patient tolerated treatment well.       Electronically signed by Kurtis Green RN on 6/19/2023 at 11:35 AM
Moisturize dry intact skin of lower extremities. Right Toe Amputation Site: Wash with soap and water, pat dry. Paint wound with betadine. Apply Peyman to wound bed. Cover with silver alginate and a super absorber. Wrap with Coban 2 lite. Leave in place between clinic appointments. Give Post op shoe on 05/22/2023        Nurse visit on Thursdays. Follow up with Ryan Patel CNP in 1 week in the wound care center. Call [phone] for any questions or concerns.   Date__________   Time___________        Treatment Note Wound 02/20/23 Anterior;Right #2 RT 2ND-4TH TOE AMP SITE-Dressing/Treatment: Betadine swabs/povidone iodine, Silver dressing, Alginate with Ag, Coban/self-adherent bandages (betadine, peyman, silver alginate, sorbex, coban 2 lite.)    Written Patient Dismissal Instructions Given            Electronically signed by KAYLI Styles CNP on 6/19/2023 at 12:20 PM

## 2023-06-19 NOTE — DISCHARGE INSTRUCTIONS
PHYSICIAN ORDERS AND DISCHARGE INSTRUCTIONS     NOTE: Upon discharge from the [address], you will receive a patient experience survey. We would be grateful if you would take the time to fill this survey out. Wound care order history:                 DIRK's   Right       Left               Date:              Cultures:                Grafts:                Antibiotics:           Continuing wound care orders and information:                 Residence:                Continue home health care with:               Your wound-care supplies will be provided by: Wound cleansing:               Do not scrub or use excessive force. Wash hands with soap and water before and after dressing changes. Prior to applying a clean dressing, cleanse wound with normal saline, wound cleanser, or mild soap and water. Ask the physician or nurse before getting the wound(s) wet in a shower. General Wound management:              Keep weight off wounds and reposition every 2 hours. Avoid standing for long periods of time. Apply wraps/stockings in AM and remove at bedtime. If swelling is present, elevate legs to the level of the heart or above for 30 minutes 4-5 times a day and/or when sitting. When taking antibiotics take entire prescription as ordered by physician do not stop taking until medicine is all gone. Orders for this week: 6/19/23      Discontinued wound vac      Rx:     Moisturize dry intact skin of lower extremities. Right Toe Amputation Site: Wash with soap and water, pat dry. Paint wound with betadine. Apply Karen to wound bed. Cover with silver alginate and a super absorber. Wrap with Coban 2 lite. Leave in place between clinic appointments. Give Post op shoe on 05/22/2023        Nurse visit on Thursdays.   Follow up with Vinay Terrell CNP in 1 week in the wound

## 2023-06-22 ENCOUNTER — HOSPITAL ENCOUNTER (OUTPATIENT)
Dept: WOUND CARE | Age: 38
Discharge: HOME | End: 2023-06-22
Payer: COMMERCIAL

## 2023-06-22 VITALS — SYSTOLIC BLOOD PRESSURE: 161 MMHG | HEART RATE: 80 BPM | TEMPERATURE: 98.3 F | DIASTOLIC BLOOD PRESSURE: 109 MMHG

## 2023-06-22 PROCEDURE — 29581 APPL MULTLAYER CMPRN SYS LEG: CPT

## 2023-06-22 NOTE — PROGRESS NOTES
Multilayer Compression Wrap   (Not Unna) Below the Knee    NAME:  Sho Mccarty. YOB: 1985  MEDICAL RECORD NUMBER:  [id number]  DATE:  6/22/2023    Multilayer compression wrap: Removed old Multilayer wrap if indicated and wash leg with mild soap/water. Applied moisturizing agent to dry skin as needed. Applied primary and secondary dressing as ordered. Applied multilayered dressing below the knee to right lower leg. Instructed patient/caregiver not to remove dressing and to keep it clean and dry. Instructed patient/caregiver on complications to report to provider, such as pain, numbness in toes, heavy drainage, and slippage of dressing. Instructed patient on purpose of compression dressing and on activity and exercise recommendations.       Electronically signed by Jayden Lerma RN on 6/22/2023 at 11:47 AM

## 2023-06-26 ENCOUNTER — HOSPITAL ENCOUNTER (OUTPATIENT)
Dept: WOUND CARE | Age: 38
Discharge: HOME | End: 2023-06-26
Payer: COMMERCIAL

## 2023-06-26 VITALS
DIASTOLIC BLOOD PRESSURE: 77 MMHG | SYSTOLIC BLOOD PRESSURE: 115 MMHG | HEART RATE: 77 BPM | RESPIRATION RATE: 16 BRPM | TEMPERATURE: 98.5 F

## 2023-06-26 DIAGNOSIS — L97.522 DIABETIC ULCER OF TOE OF LEFT FOOT ASSOCIATED WITH TYPE 2 DIABETES MELLITUS, WITH FAT LAYER EXPOSED (HCC): ICD-10-CM

## 2023-06-26 DIAGNOSIS — S98.131A AMPUTATION OF TOE OF RIGHT FOOT (HCC): ICD-10-CM

## 2023-06-26 DIAGNOSIS — Z79.4 DIABETES MELLITUS TYPE 2, INSULIN DEPENDENT (HCC): ICD-10-CM

## 2023-06-26 DIAGNOSIS — E11.9 DIABETES MELLITUS TYPE 2, INSULIN DEPENDENT (HCC): ICD-10-CM

## 2023-06-26 DIAGNOSIS — L97.512 RIGHT FOOT ULCER, WITH FAT LAYER EXPOSED (HCC): ICD-10-CM

## 2023-06-26 DIAGNOSIS — E11.621 DIABETIC ULCER OF TOE OF LEFT FOOT ASSOCIATED WITH TYPE 2 DIABETES MELLITUS, WITH FAT LAYER EXPOSED (HCC): ICD-10-CM

## 2023-06-26 DIAGNOSIS — T81.89XA NONHEALING SURGICAL WOUND, INITIAL ENCOUNTER: Primary | ICD-10-CM

## 2023-06-26 DIAGNOSIS — E66.01 CLASS 2 SEVERE OBESITY DUE TO EXCESS CALORIES WITH SERIOUS COMORBIDITY AND BODY MASS INDEX (BMI) OF 37.0 TO 37.9 IN ADULT (HCC): ICD-10-CM

## 2023-06-26 PROCEDURE — 99213 OFFICE O/P EST LOW 20 MIN: CPT | Performed by: NURSE PRACTITIONER

## 2023-06-26 PROCEDURE — 11042 DBRDMT SUBQ TIS 1ST 20SQCM/<: CPT

## 2023-06-26 PROCEDURE — 11042 DBRDMT SUBQ TIS 1ST 20SQCM/<: CPT | Performed by: NURSE PRACTITIONER

## 2023-06-26 RX ORDER — BETAMETHASONE DIPROPIONATE 0.05 %
OINTMENT (GRAM) TOPICAL ONCE
OUTPATIENT
Start: 2023-06-26 | End: 2023-06-26

## 2023-06-26 RX ORDER — CLOBETASOL PROPIONATE 0.5 MG/G
OINTMENT TOPICAL ONCE
OUTPATIENT
Start: 2023-06-26 | End: 2023-06-26

## 2023-06-26 RX ORDER — LIDOCAINE HYDROCHLORIDE 40 MG/ML
SOLUTION TOPICAL ONCE
OUTPATIENT
Start: 2023-06-26 | End: 2023-06-26

## 2023-06-26 RX ORDER — BACITRACIN ZINC AND POLYMYXIN B SULFATE 500; 1000 [USP'U]/G; [USP'U]/G
OINTMENT TOPICAL ONCE
OUTPATIENT
Start: 2023-06-26 | End: 2023-06-26

## 2023-06-26 RX ORDER — GENTAMICIN SULFATE 1 MG/G
OINTMENT TOPICAL ONCE
OUTPATIENT
Start: 2023-06-26 | End: 2023-06-26

## 2023-06-26 RX ORDER — IBUPROFEN 200 MG
TABLET ORAL ONCE
OUTPATIENT
Start: 2023-06-26 | End: 2023-06-26

## 2023-06-26 RX ORDER — LIDOCAINE 40 MG/G
CREAM TOPICAL ONCE
OUTPATIENT
Start: 2023-06-26 | End: 2023-06-26

## 2023-06-26 RX ORDER — GINSENG 100 MG
CAPSULE ORAL ONCE
OUTPATIENT
Start: 2023-06-26 | End: 2023-06-26

## 2023-06-26 RX ORDER — LIDOCAINE 50 MG/G
OINTMENT TOPICAL ONCE
OUTPATIENT
Start: 2023-06-26 | End: 2023-06-26

## 2023-06-26 ASSESSMENT — PAIN SCALES - GENERAL: PAINLEVEL_OUTOF10: 0

## 2023-06-26 ASSESSMENT — PAIN DESCRIPTION - ORIENTATION: ORIENTATION: RIGHT

## 2023-06-26 ASSESSMENT — PAIN DESCRIPTION - LOCATION: LOCATION: FOOT

## 2023-06-26 ASSESSMENT — PAIN DESCRIPTION - PAIN TYPE: TYPE: CHRONIC PAIN

## 2023-06-26 ASSESSMENT — PAIN DESCRIPTION - ONSET: ONSET: ON-GOING

## 2023-06-26 ASSESSMENT — PAIN - FUNCTIONAL ASSESSMENT: PAIN_FUNCTIONAL_ASSESSMENT: ACTIVITIES ARE NOT PREVENTED

## 2023-06-26 ASSESSMENT — PAIN DESCRIPTION - FREQUENCY: FREQUENCY: INTERMITTENT

## 2023-07-03 ENCOUNTER — HOSPITAL ENCOUNTER (OUTPATIENT)
Dept: WOUND CARE | Age: 38
Discharge: HOME | End: 2023-07-03
Payer: COMMERCIAL

## 2023-07-03 VITALS
RESPIRATION RATE: 18 BRPM | HEART RATE: 106 BPM | SYSTOLIC BLOOD PRESSURE: 148 MMHG | DIASTOLIC BLOOD PRESSURE: 91 MMHG | TEMPERATURE: 98.1 F

## 2023-07-03 DIAGNOSIS — E66.01 CLASS 2 SEVERE OBESITY DUE TO EXCESS CALORIES WITH SERIOUS COMORBIDITY AND BODY MASS INDEX (BMI) OF 37.0 TO 37.9 IN ADULT (HCC): ICD-10-CM

## 2023-07-03 DIAGNOSIS — S98.131A AMPUTATION OF TOE OF RIGHT FOOT (HCC): ICD-10-CM

## 2023-07-03 DIAGNOSIS — E11.9 DIABETES MELLITUS TYPE 2, INSULIN DEPENDENT (HCC): ICD-10-CM

## 2023-07-03 DIAGNOSIS — T81.89XA NONHEALING SURGICAL WOUND, INITIAL ENCOUNTER: Primary | ICD-10-CM

## 2023-07-03 DIAGNOSIS — L97.512 RIGHT FOOT ULCER, WITH FAT LAYER EXPOSED (HCC): ICD-10-CM

## 2023-07-03 DIAGNOSIS — Z79.4 DIABETES MELLITUS TYPE 2, INSULIN DEPENDENT (HCC): ICD-10-CM

## 2023-07-03 PROCEDURE — 11042 DBRDMT SUBQ TIS 1ST 20SQCM/<: CPT

## 2023-07-03 PROCEDURE — 11042 DBRDMT SUBQ TIS 1ST 20SQCM/<: CPT | Performed by: NURSE PRACTITIONER

## 2023-07-03 RX ORDER — BACITRACIN ZINC AND POLYMYXIN B SULFATE 500; 1000 [USP'U]/G; [USP'U]/G
OINTMENT TOPICAL ONCE
OUTPATIENT
Start: 2023-07-03 | End: 2023-07-03

## 2023-07-03 RX ORDER — LIDOCAINE HYDROCHLORIDE 40 MG/ML
SOLUTION TOPICAL ONCE
OUTPATIENT
Start: 2023-07-03 | End: 2023-07-03

## 2023-07-03 RX ORDER — CLOBETASOL PROPIONATE 0.5 MG/G
OINTMENT TOPICAL ONCE
OUTPATIENT
Start: 2023-07-03 | End: 2023-07-03

## 2023-07-03 RX ORDER — LIDOCAINE 40 MG/G
CREAM TOPICAL ONCE
OUTPATIENT
Start: 2023-07-03 | End: 2023-07-03

## 2023-07-03 RX ORDER — IBUPROFEN 200 MG
TABLET ORAL ONCE
OUTPATIENT
Start: 2023-07-03 | End: 2023-07-03

## 2023-07-03 RX ORDER — BETAMETHASONE DIPROPIONATE 0.05 %
OINTMENT (GRAM) TOPICAL ONCE
OUTPATIENT
Start: 2023-07-03 | End: 2023-07-03

## 2023-07-03 RX ORDER — GINSENG 100 MG
CAPSULE ORAL ONCE
OUTPATIENT
Start: 2023-07-03 | End: 2023-07-03

## 2023-07-03 RX ORDER — LIDOCAINE 50 MG/G
OINTMENT TOPICAL ONCE
OUTPATIENT
Start: 2023-07-03 | End: 2023-07-03

## 2023-07-03 RX ORDER — GENTAMICIN SULFATE 1 MG/G
OINTMENT TOPICAL ONCE
OUTPATIENT
Start: 2023-07-03 | End: 2023-07-03

## 2023-07-03 ASSESSMENT — PAIN DESCRIPTION - ONSET: ONSET: ON-GOING

## 2023-07-03 ASSESSMENT — PAIN DESCRIPTION - ORIENTATION: ORIENTATION: RIGHT

## 2023-07-03 ASSESSMENT — PAIN DESCRIPTION - DESCRIPTORS: DESCRIPTORS: TENDER

## 2023-07-03 ASSESSMENT — PAIN SCALES - GENERAL: PAINLEVEL_OUTOF10: 0

## 2023-07-03 ASSESSMENT — PAIN - FUNCTIONAL ASSESSMENT: PAIN_FUNCTIONAL_ASSESSMENT: ACTIVITIES ARE NOT PREVENTED

## 2023-07-03 ASSESSMENT — PAIN DESCRIPTION - PAIN TYPE: TYPE: CHRONIC PAIN

## 2023-07-03 ASSESSMENT — PAIN DESCRIPTION - FREQUENCY: FREQUENCY: INTERMITTENT

## 2023-07-03 ASSESSMENT — PAIN DESCRIPTION - LOCATION: LOCATION: FOOT

## 2023-07-03 NOTE — DISCHARGE INSTRUCTIONS
Coban 2 Lite (enclose toe). Leave in place between clinic appointments. Give Post op shoe on 05/22/2023        Nurse visit on Thursdays. Follow up with Andrew Dumont CNP in 1 week in the wound care center. Call [phone] for any questions or concerns.   Date__________   Time___________

## 2023-07-11 ENCOUNTER — HOSPITAL ENCOUNTER (OUTPATIENT)
Dept: WOUND CARE | Age: 38
Discharge: HOME | End: 2023-07-11
Payer: COMMERCIAL

## 2023-07-11 VITALS
SYSTOLIC BLOOD PRESSURE: 149 MMHG | RESPIRATION RATE: 18 BRPM | DIASTOLIC BLOOD PRESSURE: 93 MMHG | TEMPERATURE: 97.7 F | HEART RATE: 79 BPM

## 2023-07-11 DIAGNOSIS — T81.89XA NONHEALING SURGICAL WOUND, INITIAL ENCOUNTER: Primary | ICD-10-CM

## 2023-07-11 DIAGNOSIS — E11.9 DIABETES MELLITUS TYPE 2, INSULIN DEPENDENT (HCC): ICD-10-CM

## 2023-07-11 DIAGNOSIS — Z79.4 DIABETES MELLITUS TYPE 2, INSULIN DEPENDENT (HCC): ICD-10-CM

## 2023-07-11 DIAGNOSIS — L97.512 RIGHT FOOT ULCER, WITH FAT LAYER EXPOSED (HCC): ICD-10-CM

## 2023-07-11 DIAGNOSIS — S98.131A AMPUTATION OF TOE OF RIGHT FOOT (HCC): ICD-10-CM

## 2023-07-11 DIAGNOSIS — E66.01 CLASS 2 SEVERE OBESITY DUE TO EXCESS CALORIES WITH SERIOUS COMORBIDITY AND BODY MASS INDEX (BMI) OF 37.0 TO 37.9 IN ADULT (HCC): ICD-10-CM

## 2023-07-11 PROCEDURE — 97597 DBRDMT OPN WND 1ST 20 CM/<: CPT

## 2023-07-11 RX ORDER — CLOBETASOL PROPIONATE 0.5 MG/G
OINTMENT TOPICAL ONCE
OUTPATIENT
Start: 2023-07-11 | End: 2023-07-11

## 2023-07-11 RX ORDER — BACITRACIN ZINC AND POLYMYXIN B SULFATE 500; 1000 [USP'U]/G; [USP'U]/G
OINTMENT TOPICAL ONCE
OUTPATIENT
Start: 2023-07-11 | End: 2023-07-11

## 2023-07-11 RX ORDER — LIDOCAINE 40 MG/G
CREAM TOPICAL ONCE
OUTPATIENT
Start: 2023-07-11 | End: 2023-07-11

## 2023-07-11 RX ORDER — BETAMETHASONE DIPROPIONATE 0.05 %
OINTMENT (GRAM) TOPICAL ONCE
OUTPATIENT
Start: 2023-07-11 | End: 2023-07-11

## 2023-07-11 RX ORDER — LIDOCAINE HYDROCHLORIDE 40 MG/ML
SOLUTION TOPICAL ONCE
OUTPATIENT
Start: 2023-07-11 | End: 2023-07-11

## 2023-07-11 RX ORDER — GENTAMICIN SULFATE 1 MG/G
OINTMENT TOPICAL ONCE
OUTPATIENT
Start: 2023-07-11 | End: 2023-07-11

## 2023-07-11 RX ORDER — GINSENG 100 MG
CAPSULE ORAL ONCE
OUTPATIENT
Start: 2023-07-11 | End: 2023-07-11

## 2023-07-11 RX ORDER — LIDOCAINE 50 MG/G
OINTMENT TOPICAL ONCE
OUTPATIENT
Start: 2023-07-11 | End: 2023-07-11

## 2023-07-11 RX ORDER — IBUPROFEN 200 MG
TABLET ORAL ONCE
OUTPATIENT
Start: 2023-07-11 | End: 2023-07-11

## 2023-07-11 ASSESSMENT — PAIN - FUNCTIONAL ASSESSMENT: PAIN_FUNCTIONAL_ASSESSMENT: ACTIVITIES ARE NOT PREVENTED

## 2023-07-11 ASSESSMENT — PAIN DESCRIPTION - ONSET: ONSET: ON-GOING

## 2023-07-11 ASSESSMENT — PAIN DESCRIPTION - DESCRIPTORS: DESCRIPTORS: TENDER

## 2023-07-11 ASSESSMENT — PAIN DESCRIPTION - FREQUENCY: FREQUENCY: INTERMITTENT

## 2023-07-11 ASSESSMENT — PAIN DESCRIPTION - PAIN TYPE: TYPE: CHRONIC PAIN

## 2023-07-11 ASSESSMENT — PAIN DESCRIPTION - ORIENTATION: ORIENTATION: RIGHT

## 2023-07-11 ASSESSMENT — PAIN SCALES - GENERAL: PAINLEVEL_OUTOF10: 0

## 2023-07-11 NOTE — PROGRESS NOTES
Multilayer Compression Wrap   (Not Unna) Below the Knee    NAME:  Mercedez Reed. YOB: 1985  MEDICAL RECORD NUMBER:  [id number]  DATE:  7/11/2023        A&D OINTMENT & ABD PER ORDER    Multilayer compression wrap: Removed old Multilayer wrap if indicated and wash leg with mild soap/water. Applied moisturizing agent to dry skin as needed. Applied primary and secondary dressing as ordered. Applied multilayered dressing below the knee to right lower leg. Instructed patient/caregiver not to remove dressing and to keep it clean and dry. Instructed patient/caregiver on complications to report to provider, such as pain, numbness in toes, heavy drainage, and slippage of dressing. Instructed patient on purpose of compression dressing and on activity and exercise recommendations.       Electronically signed by Jared Seals RN on 7/11/2023 at 11:47 AM
to have a non-healing wound of the right foot. The etiology of the wound is felt to be non-healing surgical. There are multiple complicating factors including diabetes, poor glucose control, chronic pressure, shear force, obesity, smoking, and non-adherence. A comprehensive wound management program would be helpful to heal this wound. Assessments completed include fall risk and nutritional, functional,and psychological status. At this time appropriate care would include: periodic debridement and wound care as below. Problem List Items Addressed This Visit          Endocrine    WD-Diabetes mellitus type 2, insulin dependent ([address])    Relevant Orders    Initiate Outpatient Wound Care Protocol       Other    WD-Nonhealing surgical wound, initial encounter - Primary    Relevant Orders    Initiate Outpatient Wound Care Protocol    WD-Amputation of one or more toes ([address])    Relevant Orders    Initiate Outpatient Wound Care Protocol    WD-Right foot ulcer, with fat layer exposed ([address])    Relevant Orders    Initiate Outpatient Wound Care Protocol    WD-Obesity    Relevant Orders    Initiate Outpatient Wound Care Protocol       Procedure Note    Indications:  Based on my examination of this patient's wound(s) today, sharp excision into devitalized tissue is required to promote healing and evaluate the extent of previous healing. Performed by: KAYLI Walton CNP    Consent obtained: Yes    Time out taken:  Yes    Pain Control: Anesthetic  Anesthetic: 4% Lidocaine Liquid Topical        Debridement:Excisional Debridement    Using curette the wound(s) was/were sharply debrided down through and including the removal of devitalized tissue.         Devitalized Tissue Debrided:  fibrin, biofilm, slough, and exudate    All active wounds listed below with today's date are evaluated    Wound(s)    debrided this date include # : right foot    Total  Area  Debrided: 0.02  sq cm     Bleeding:  Minimal    Hemostasis

## 2023-07-11 NOTE — PLAN OF CARE
Problem: Pain  Goal: Verbalizes/displays adequate comfort level or baseline comfort level  7/11/2023 1146 by Jarde Seals RN  Outcome: Completed  7/11/2023 1145 by Jared Seals RN  Outcome: Progressing

## 2023-07-17 ENCOUNTER — HOSPITAL ENCOUNTER (OUTPATIENT)
Dept: WOUND CARE | Age: 38
Discharge: HOME | End: 2023-07-17
Payer: COMMERCIAL

## 2023-07-17 VITALS
RESPIRATION RATE: 18 BRPM | DIASTOLIC BLOOD PRESSURE: 76 MMHG | TEMPERATURE: 97.4 F | SYSTOLIC BLOOD PRESSURE: 127 MMHG | HEART RATE: 80 BPM

## 2023-07-17 DIAGNOSIS — T81.89XA NONHEALING SURGICAL WOUND, INITIAL ENCOUNTER: Primary | ICD-10-CM

## 2023-07-17 DIAGNOSIS — S98.131A AMPUTATION OF TOE OF RIGHT FOOT (HCC): ICD-10-CM

## 2023-07-17 DIAGNOSIS — Z79.4 DIABETES MELLITUS TYPE 2, INSULIN DEPENDENT (HCC): ICD-10-CM

## 2023-07-17 DIAGNOSIS — E66.01 CLASS 2 SEVERE OBESITY DUE TO EXCESS CALORIES WITH SERIOUS COMORBIDITY AND BODY MASS INDEX (BMI) OF 37.0 TO 37.9 IN ADULT (HCC): ICD-10-CM

## 2023-07-17 DIAGNOSIS — L97.512 RIGHT FOOT ULCER, WITH FAT LAYER EXPOSED (HCC): ICD-10-CM

## 2023-07-17 DIAGNOSIS — E11.9 DIABETES MELLITUS TYPE 2, INSULIN DEPENDENT (HCC): ICD-10-CM

## 2023-07-17 PROCEDURE — 97597 DBRDMT OPN WND 1ST 20 CM/<: CPT | Performed by: NURSE PRACTITIONER

## 2023-07-17 PROCEDURE — 97597 DBRDMT OPN WND 1ST 20 CM/<: CPT

## 2023-07-17 RX ORDER — LIDOCAINE 50 MG/G
OINTMENT TOPICAL ONCE
OUTPATIENT
Start: 2023-07-17 | End: 2023-07-17

## 2023-07-17 RX ORDER — GINSENG 100 MG
CAPSULE ORAL ONCE
OUTPATIENT
Start: 2023-07-17 | End: 2023-07-17

## 2023-07-17 RX ORDER — BETAMETHASONE DIPROPIONATE 0.05 %
OINTMENT (GRAM) TOPICAL ONCE
OUTPATIENT
Start: 2023-07-17 | End: 2023-07-17

## 2023-07-17 RX ORDER — GENTAMICIN SULFATE 1 MG/G
OINTMENT TOPICAL ONCE
OUTPATIENT
Start: 2023-07-17 | End: 2023-07-17

## 2023-07-17 RX ORDER — LIDOCAINE HYDROCHLORIDE 40 MG/ML
SOLUTION TOPICAL ONCE
OUTPATIENT
Start: 2023-07-17 | End: 2023-07-17

## 2023-07-17 RX ORDER — LIDOCAINE 40 MG/G
CREAM TOPICAL ONCE
OUTPATIENT
Start: 2023-07-17 | End: 2023-07-17

## 2023-07-17 RX ORDER — IBUPROFEN 200 MG
TABLET ORAL ONCE
OUTPATIENT
Start: 2023-07-17 | End: 2023-07-17

## 2023-07-17 RX ORDER — BACITRACIN ZINC AND POLYMYXIN B SULFATE 500; 1000 [USP'U]/G; [USP'U]/G
OINTMENT TOPICAL ONCE
OUTPATIENT
Start: 2023-07-17 | End: 2023-07-17

## 2023-07-17 RX ORDER — CLOBETASOL PROPIONATE 0.5 MG/G
OINTMENT TOPICAL ONCE
OUTPATIENT
Start: 2023-07-17 | End: 2023-07-17

## 2023-07-17 ASSESSMENT — PAIN SCALES - GENERAL: PAINLEVEL_OUTOF10: 0

## 2023-07-17 NOTE — PLAN OF CARE
Problem: Chronic Conditions and Co-morbidities  Goal: Patient's chronic conditions and co-morbidity symptoms are monitored and maintained or improved  Outcome: Progressing Reassessment and VS completed. See flowsheet. Pt resting comfortably in bed with eyes closed. Wakes up occasionally to scratch his nose and reposition himself, then falls back asleep. Hydralazine given per MD order for HTN. Will reassess. SCD's applied to BLE. Call light in reach.

## 2023-07-17 NOTE — DISCHARGE INSTRUCTIONS
PHYSICIAN ORDERS AND DISCHARGE INSTRUCTIONS     NOTE: Upon discharge from the  [facility], you will receive a patient experience survey. We would be grateful if you would take the time to fill this survey out. Wound care order history:                 DIRK's   Right       Left               Date:              Cultures:                Grafts:                Antibiotics:           Continuing wound care orders and information:                 Residence:                Continue home health care with:               Your wound-care supplies will be provided by: Wound cleansing:               Do not scrub or use excessive force. Wash hands with soap and water before and after dressing changes. Prior to applying a clean dressing, cleanse wound with normal saline, wound cleanser, or mild soap and water. Ask the physician or nurse before getting the wound(s) wet in a shower. General Wound management:              Keep weight off wounds and reposition every 2 hours. Avoid standing for long periods of time. Apply wraps/stockings in AM and remove at bedtime. If swelling is present, elevate legs to the level of the heart or above for 30 minutes 4-5 times a day and/or when sitting. When taking antibiotics take entire prescription as ordered by physician do not stop taking until medicine is all gone. Orders for this week: 7/17/23     Discontinued wound vac      Rx:     Moisturize dry intact skin of lower extremities. Right Toe Amputation Site: Healed 7/17/23. Moisturize with A&D. May wear sock and shoe. Give Post op shoe on 05/22/2023        Follow up with Kendra Diaz CNP on Thursday in the wound care center. Call [phone] for any questions or concerns.   Date__________   Time__________

## 2023-07-17 NOTE — PROGRESS NOTES
Wound Care Center progress Visit      Mona Judd. AGE: 44 y.o. GENDER: male  : 1985  EPISODE DATE:  2023   Referred by:Roxanne Walls MD  [id number] [facility] [address]  [address]  [facility]     Subjective:     CHIEF COMPLAINT  WOUND WOUND RIGHT FOOT  Chief Complaint   Patient presents with    Wound Check        HISTORY of PRESENT ILLNESS      Mona Judd. is a 44 y.o. male who presents to the [address] [place] [address] for evaluation and treatment of Acute non-healing surgical  ulcer(s) of  right foot. The condition is of moderate severity. The ulcer has been present since 23 post third toe amputation and revision of the second toe per Dr. Kojo Chavez 23. The underlying cause is thought to be nonhealing surgical complicated by diabetes. The patients care to date has included hospitalization -23 with [facility] and [facility] Health referral with [place], received IV antibiotics in hospital, home on Keflex and Doxycycline through 23. The patient has significant underlying medical conditions as below. Abe Cruz MD last seen 23 and scheduled to return 23. Wound Pain Timing/Severity: waxing and waning, moderate  Quality of pain: dull, aching, tender  Severity of pain:  2 / 10   Modifying Factors: diabetes, poor glucose control, chronic pressure, shear force, obesity, smoking, and non-adherence  Associated Signs/Symptoms: edema, erythema, drainage, and pain    DIRK: as indicated base on wound location and assessment    Wound infection: wound culture will be obtained as needed for symptoms of infection     Arterial evaluation: if indicated based on wound, location, symptoms and healing    Venous Evaluation: if indicated based on wound, location, symptoms and healing    Diabetes: On an insulin regimen.  Follows with Dr. Chuckie Olszewski  Hemoglobin A1C   Date Value Ref Range Status   02/10/2023 9.3 (H) 4.2 - 6.3 % Final        Diabetes

## 2023-07-20 ENCOUNTER — HOSPITAL ENCOUNTER (OUTPATIENT)
Dept: WOUND CARE | Age: 38
Discharge: HOME | End: 2023-07-20

## 2023-07-20 NOTE — DISCHARGE INSTRUCTIONS
PHYSICIAN ORDERS AND DISCHARGE INSTRUCTIONS     NOTE: Upon discharge from the  Medical [facility], you will receive a patient experience survey. We would be grateful if you would take the time to fill this survey out. Wound care order history:                 DIRK's   Right       Left               Date:              Cultures:                Grafts:                Antibiotics:           Continuing wound care orders and information:                 Residence:                Continue home health care with:               Your wound-care supplies will be provided by: Wound cleansing:               Do not scrub or use excessive force. Wash hands with soap and water before and after dressing changes. Prior to applying a clean dressing, cleanse wound with normal saline, wound cleanser, or mild soap and water. Ask the physician or nurse before getting the wound(s) wet in a shower. General Wound management:              Keep weight off wounds and reposition every 2 hours. Avoid standing for long periods of time. Apply wraps/stockings in AM and remove at bedtime. If swelling is present, elevate legs to the level of the heart or above for 30 minutes 4-5 times a day and/or when sitting. When taking antibiotics take entire prescription as ordered by physician do not stop taking until medicine is all gone. Orders for this week: 7/20/23     Discontinued wound vac      Rx:     Moisturize dry intact skin of lower extremities. Right Toe Amputation Site: Healed 7/17/23. Moisturize with A&D. May wear sock and shoe. Give Post op shoe on 05/22/2023        Follow up with Justin Alfaro CNP on Thursday in the wound care center. Call [phone] for any questions or concerns.   Date__________   Time__________

## 2023-10-12 ENCOUNTER — HOSPITAL ENCOUNTER (OUTPATIENT)
Dept: WOUND CARE | Age: 38
Discharge: HOME | End: 2023-10-12

## 2023-10-14 ENCOUNTER — HOSPITAL ENCOUNTER (INPATIENT)
Age: 38
LOS: 5 days | Discharge: HOME | DRG: 710 | End: 2023-10-20
Attending: EMERGENCY MEDICINE | Admitting: STUDENT IN AN ORGANIZED HEALTH CARE EDUCATION/TRAINING PROGRAM
Payer: COMMERCIAL

## 2023-10-14 ENCOUNTER — APPOINTMENT (OUTPATIENT)
Dept: GENERAL RADIOLOGY | Age: 38
DRG: 710 | End: 2023-10-14
Payer: COMMERCIAL

## 2023-10-14 DIAGNOSIS — I96 GANGRENE OF TOE OF RIGHT FOOT (HCC): ICD-10-CM

## 2023-10-14 DIAGNOSIS — I96 GANGRENE OF TOE (HCC): Primary | ICD-10-CM

## 2023-10-14 LAB
ALBUMIN SERPL-MCNC: 3 GM/DL (ref 3.4–5)
ALP BLD-CCNC: 108 IU/L (ref 40–128)
ALT SERPL-CCNC: 42 U/L (ref 10–40)
ANION GAP SERPL CALCULATED.3IONS-SCNC: 14 MMOL/L (ref 4–16)
AST SERPL-CCNC: 27 IU/L (ref 15–37)
BASOPHILS ABSOLUTE: 0 K/CU MM
BASOPHILS RELATIVE PERCENT: 0.4 % (ref 0–1)
BILIRUB SERPL-MCNC: 0.3 MG/DL (ref 0–1)
BUN SERPL-MCNC: 5 MG/DL (ref 6–23)
CALCIUM SERPL-MCNC: 8.6 MG/DL (ref 8.3–10.6)
CHLORIDE BLD-SCNC: 95 MMOL/L (ref 99–110)
CO2: 21 MMOL/L (ref 21–32)
CREAT SERPL-MCNC: 0.7 MG/DL (ref 0.9–1.3)
DIFFERENTIAL TYPE: ABNORMAL
EOSINOPHILS ABSOLUTE: 0.1 K/CU MM
EOSINOPHILS RELATIVE PERCENT: 0.9 % (ref 0–3)
GFR SERPL CREATININE-BSD FRML MDRD: >60 ML/MIN/1.73M2
GLUCOSE SERPL-MCNC: 325 MG/DL (ref 70–99)
HCT VFR BLD CALC: 32.4 % (ref 42–52)
HEMOGLOBIN: 11.2 GM/DL (ref 13.5–18)
IMMATURE NEUTROPHIL %: 0.6 % (ref 0–0.43)
LYMPHOCYTES ABSOLUTE: 1.2 K/CU MM
LYMPHOCYTES RELATIVE PERCENT: 11.9 % (ref 24–44)
MCH RBC QN AUTO: 32 PG (ref 27–31)
MCHC RBC AUTO-ENTMCNC: 34.6 % (ref 32–36)
MCV RBC AUTO: 92.6 FL (ref 78–100)
MONOCYTES ABSOLUTE: 0.9 K/CU MM
MONOCYTES RELATIVE PERCENT: 8.8 % (ref 0–4)
NUCLEATED RBC %: 0 %
PDW BLD-RTO: 11.8 % (ref 11.7–14.9)
PLATELET # BLD: 385 K/CU MM (ref 140–440)
PMV BLD AUTO: 8.8 FL (ref 7.5–11.1)
POTASSIUM SERPL-SCNC: 4.1 MMOL/L (ref 3.5–5.1)
RBC # BLD: 3.5 M/CU MM (ref 4.6–6.2)
SEGMENTED NEUTROPHILS ABSOLUTE COUNT: 8 K/CU MM
SEGMENTED NEUTROPHILS RELATIVE PERCENT: 77.4 % (ref 36–66)
SODIUM BLD-SCNC: 130 MMOL/L (ref 135–145)
TOTAL IMMATURE NEUTOROPHIL: 0.06 K/CU MM
TOTAL NUCLEATED RBC: 0 K/CU MM
TOTAL PROTEIN: 7.4 GM/DL (ref 6.4–8.2)
WBC # BLD: 10.4 K/CU MM (ref 4–10.5)

## 2023-10-14 PROCEDURE — 87040 BLOOD CULTURE FOR BACTERIA: CPT

## 2023-10-14 PROCEDURE — 96366 THER/PROPH/DIAG IV INF ADDON: CPT

## 2023-10-14 PROCEDURE — 96365 THER/PROPH/DIAG IV INF INIT: CPT

## 2023-10-14 PROCEDURE — 96375 TX/PRO/DX INJ NEW DRUG ADDON: CPT

## 2023-10-14 PROCEDURE — 96368 THER/DIAG CONCURRENT INF: CPT

## 2023-10-14 PROCEDURE — 99285 EMERGENCY DEPT VISIT HI MDM: CPT

## 2023-10-14 PROCEDURE — 73630 X-RAY EXAM OF FOOT: CPT

## 2023-10-14 PROCEDURE — 93005 ELECTROCARDIOGRAM TRACING: CPT | Performed by: EMERGENCY MEDICINE

## 2023-10-14 PROCEDURE — 0QBQ0ZZ EXCISION OF RIGHT TOE PHALANX, OPEN APPROACH: ICD-10-PCS | Performed by: STUDENT IN AN ORGANIZED HEALTH CARE EDUCATION/TRAINING PROGRAM

## 2023-10-14 PROCEDURE — 2580000003 HC RX 258: Performed by: EMERGENCY MEDICINE

## 2023-10-14 PROCEDURE — 85025 COMPLETE CBC W/AUTO DIFF WBC: CPT

## 2023-10-14 PROCEDURE — 80053 COMPREHEN METABOLIC PANEL: CPT

## 2023-10-14 PROCEDURE — 6360000002 HC RX W HCPCS: Performed by: EMERGENCY MEDICINE

## 2023-10-14 RX ORDER — SODIUM CHLORIDE, SODIUM LACTATE, POTASSIUM CHLORIDE, AND CALCIUM CHLORIDE .6; .31; .03; .02 G/100ML; G/100ML; G/100ML; G/100ML
1000 INJECTION, SOLUTION INTRAVENOUS ONCE
Status: COMPLETED | OUTPATIENT
Start: 2023-10-14 | End: 2023-10-15

## 2023-10-14 RX ORDER — MORPHINE SULFATE 4 MG/ML
4 INJECTION, SOLUTION INTRAMUSCULAR; INTRAVENOUS
Status: COMPLETED | OUTPATIENT
Start: 2023-10-14 | End: 2023-10-14

## 2023-10-14 RX ORDER — KETOROLAC TROMETHAMINE 15 MG/ML
15 INJECTION, SOLUTION INTRAMUSCULAR; INTRAVENOUS
Status: COMPLETED | OUTPATIENT
Start: 2023-10-14 | End: 2023-10-14

## 2023-10-14 RX ORDER — ONDANSETRON 2 MG/ML
4 INJECTION INTRAMUSCULAR; INTRAVENOUS EVERY 6 HOURS PRN
Status: DISCONTINUED | OUTPATIENT
Start: 2023-10-14 | End: 2023-10-15 | Stop reason: SDUPTHER

## 2023-10-14 RX ADMIN — SODIUM CHLORIDE, POTASSIUM CHLORIDE, SODIUM LACTATE AND CALCIUM CHLORIDE 1000 ML: 600; 310; 30; 20 INJECTION, SOLUTION INTRAVENOUS at 23:45

## 2023-10-14 RX ADMIN — KETOROLAC TROMETHAMINE 15 MG: 15 INJECTION, SOLUTION INTRAMUSCULAR; INTRAVENOUS at 22:45

## 2023-10-14 RX ADMIN — MORPHINE SULFATE 4 MG: 4 INJECTION, SOLUTION INTRAMUSCULAR; INTRAVENOUS at 22:45

## 2023-10-14 RX ADMIN — VANCOMYCIN HYDROCHLORIDE 2000 MG: 500 INJECTION, POWDER, LYOPHILIZED, FOR SOLUTION INTRAVENOUS at 23:55

## 2023-10-14 RX ADMIN — ONDANSETRON 4 MG: 2 INJECTION INTRAMUSCULAR; INTRAVENOUS at 22:45

## 2023-10-14 RX ADMIN — PIPERACILLIN AND TAZOBACTAM 4500 MG: 4; .5 INJECTION, POWDER, FOR SOLUTION INTRAVENOUS at 23:00

## 2023-10-14 ASSESSMENT — PAIN DESCRIPTION - ORIENTATION
ORIENTATION: RIGHT
ORIENTATION: RIGHT;LEFT

## 2023-10-14 ASSESSMENT — PAIN SCALES - GENERAL
PAINLEVEL_OUTOF10: 10
PAINLEVEL_OUTOF10: 9

## 2023-10-14 ASSESSMENT — PAIN DESCRIPTION - LOCATION
LOCATION: FOOT
LOCATION: ANKLE

## 2023-10-14 ASSESSMENT — PAIN DESCRIPTION - DESCRIPTORS: DESCRIPTORS: BURNING

## 2023-10-15 PROBLEM — I96 GANGRENE OF TOE OF RIGHT FOOT (HCC): Status: ACTIVE | Noted: 2023-10-15

## 2023-10-15 LAB
ALBUMIN SERPL-MCNC: 2.8 GM/DL (ref 3.4–5)
ALP BLD-CCNC: 96 IU/L (ref 40–129)
ALT SERPL-CCNC: 38 U/L (ref 10–40)
AMPHETAMINES: NEGATIVE
ANION GAP SERPL CALCULATED.3IONS-SCNC: 11 MMOL/L (ref 4–16)
AST SERPL-CCNC: 19 IU/L (ref 15–37)
BACTERIA: NEGATIVE /HPF
BARBITURATE SCREEN URINE: NEGATIVE
BASOPHILS ABSOLUTE: 0 K/CU MM
BASOPHILS RELATIVE PERCENT: 0.5 % (ref 0–1)
BENZODIAZEPINE SCREEN, URINE: NEGATIVE
BILIRUB SERPL-MCNC: 0.4 MG/DL (ref 0–1)
BILIRUBIN URINE: NEGATIVE MG/DL
BLOOD, URINE: ABNORMAL
BUN SERPL-MCNC: 6 MG/DL (ref 6–23)
CALCIUM SERPL-MCNC: 7.9 MG/DL (ref 8.3–10.6)
CANNABINOID SCREEN URINE: NEGATIVE
CHLORIDE BLD-SCNC: 99 MMOL/L (ref 99–110)
CLARITY: CLEAR
CO2: 23 MMOL/L (ref 21–32)
COCAINE METABOLITE: ABNORMAL
COLOR: YELLOW
CREAT SERPL-MCNC: 0.7 MG/DL (ref 0.9–1.3)
CRP SERPL HS-MCNC: 145.5 MG/L
DIFFERENTIAL TYPE: ABNORMAL
EKG ATRIAL RATE: 111 BPM
EKG DIAGNOSIS: NORMAL
EKG P AXIS: 44 DEGREES
EKG P-R INTERVAL: 172 MS
EKG Q-T INTERVAL: 350 MS
EKG QRS DURATION: 88 MS
EKG QTC CALCULATION (BAZETT): 476 MS
EKG R AXIS: 9 DEGREES
EKG T AXIS: 30 DEGREES
EKG VENTRICULAR RATE: 111 BPM
EOSINOPHILS ABSOLUTE: 0.1 K/CU MM
EOSINOPHILS RELATIVE PERCENT: 1.5 % (ref 0–3)
ERYTHROCYTE SEDIMENTATION RATE: 68 MM/HR (ref 0–15)
ESTIMATED AVERAGE GLUCOSE: 192 MG/DL
FENTANYL URINE: NEGATIVE
GFR SERPL CREATININE-BSD FRML MDRD: >60 ML/MIN/1.73M2
GLUCOSE BLD-MCNC: 226 MG/DL (ref 70–99)
GLUCOSE BLD-MCNC: 227 MG/DL (ref 70–99)
GLUCOSE BLD-MCNC: 232 MG/DL (ref 70–99)
GLUCOSE BLD-MCNC: 245 MG/DL (ref 70–99)
GLUCOSE BLD-MCNC: 261 MG/DL (ref 70–99)
GLUCOSE BLD-MCNC: 274 MG/DL (ref 70–99)
GLUCOSE SERPL-MCNC: 220 MG/DL (ref 70–99)
GLUCOSE, URINE: >1000 MG/DL
HBA1C MFR BLD: 8.3 % (ref 4.2–6.3)
HCT VFR BLD CALC: 30.4 % (ref 42–52)
HEMOGLOBIN: 10.3 GM/DL (ref 13.5–18)
IMMATURE NEUTROPHIL %: 0.5 % (ref 0–0.43)
INR BLD: 1.3 INDEX
KETONES, URINE: ABNORMAL MG/DL
LEUKOCYTE ESTERASE, URINE: NEGATIVE
LYMPHOCYTES ABSOLUTE: 1 K/CU MM
LYMPHOCYTES RELATIVE PERCENT: 12.3 % (ref 24–44)
MCH RBC QN AUTO: 31.7 PG (ref 27–31)
MCHC RBC AUTO-ENTMCNC: 33.9 % (ref 32–36)
MCV RBC AUTO: 93.5 FL (ref 78–100)
MONOCYTES ABSOLUTE: 1 K/CU MM
MONOCYTES RELATIVE PERCENT: 11.8 % (ref 0–4)
NITRITE URINE, QUANTITATIVE: NEGATIVE
NUCLEATED RBC %: 0 %
OPIATES, URINE: ABNORMAL
OXYCODONE: NEGATIVE
PDW BLD-RTO: 11.7 % (ref 11.7–14.9)
PH, URINE: 5.5 (ref 5–8)
PLATELET # BLD: 304 K/CU MM (ref 140–440)
PMV BLD AUTO: 8.5 FL (ref 7.5–11.1)
POTASSIUM SERPL-SCNC: 4.4 MMOL/L (ref 3.5–5.1)
PROTEIN UA: NEGATIVE MG/DL
PROTHROMBIN TIME: 16 SECONDS (ref 11.7–14.5)
RBC # BLD: 3.25 M/CU MM (ref 4.6–6.2)
RBC URINE: <1 /HPF (ref 0–3)
SEGMENTED NEUTROPHILS ABSOLUTE COUNT: 5.9 K/CU MM
SEGMENTED NEUTROPHILS RELATIVE PERCENT: 73.4 % (ref 36–66)
SODIUM BLD-SCNC: 133 MMOL/L (ref 135–145)
SPECIFIC GRAVITY UA: <1.005 (ref 1–1.03)
SQUAMOUS EPITHELIAL: <1 /HPF
TOTAL IMMATURE NEUTOROPHIL: 0.04 K/CU MM
TOTAL NUCLEATED RBC: 0 K/CU MM
TOTAL PROTEIN: 6.1 GM/DL (ref 6.4–8.2)
TRICHOMONAS: NORMAL /HPF
UROBILINOGEN, URINE: 0.2 MG/DL (ref 0.2–1)
WBC # BLD: 8.1 K/CU MM (ref 4–10.5)
WBC UA: <1 /HPF (ref 0–2)

## 2023-10-15 PROCEDURE — 80307 DRUG TEST PRSMV CHEM ANLYZR: CPT

## 2023-10-15 PROCEDURE — 6370000000 HC RX 637 (ALT 250 FOR IP): Performed by: STUDENT IN AN ORGANIZED HEALTH CARE EDUCATION/TRAINING PROGRAM

## 2023-10-15 PROCEDURE — 6360000002 HC RX W HCPCS: Performed by: STUDENT IN AN ORGANIZED HEALTH CARE EDUCATION/TRAINING PROGRAM

## 2023-10-15 PROCEDURE — 86140 C-REACTIVE PROTEIN: CPT

## 2023-10-15 PROCEDURE — 36415 COLL VENOUS BLD VENIPUNCTURE: CPT

## 2023-10-15 PROCEDURE — 87070 CULTURE OTHR SPECIMN AEROBIC: CPT

## 2023-10-15 PROCEDURE — 87075 CULTR BACTERIA EXCEPT BLOOD: CPT

## 2023-10-15 PROCEDURE — 81001 URINALYSIS AUTO W/SCOPE: CPT

## 2023-10-15 PROCEDURE — 87205 SMEAR GRAM STAIN: CPT

## 2023-10-15 PROCEDURE — 85610 PROTHROMBIN TIME: CPT

## 2023-10-15 PROCEDURE — 80053 COMPREHEN METABOLIC PANEL: CPT

## 2023-10-15 PROCEDURE — 2580000003 HC RX 258: Performed by: EMERGENCY MEDICINE

## 2023-10-15 PROCEDURE — 85025 COMPLETE CBC W/AUTO DIFF WBC: CPT

## 2023-10-15 PROCEDURE — 87186 SC STD MICRODIL/AGAR DIL: CPT

## 2023-10-15 PROCEDURE — 87077 CULTURE AEROBIC IDENTIFY: CPT

## 2023-10-15 PROCEDURE — 94640 AIRWAY INHALATION TREATMENT: CPT

## 2023-10-15 PROCEDURE — 83036 HEMOGLOBIN GLYCOSYLATED A1C: CPT

## 2023-10-15 PROCEDURE — 82962 GLUCOSE BLOOD TEST: CPT

## 2023-10-15 PROCEDURE — 1200000000 HC SEMI PRIVATE

## 2023-10-15 PROCEDURE — 2580000003 HC RX 258: Performed by: STUDENT IN AN ORGANIZED HEALTH CARE EDUCATION/TRAINING PROGRAM

## 2023-10-15 PROCEDURE — 85652 RBC SED RATE AUTOMATED: CPT

## 2023-10-15 PROCEDURE — 93010 ELECTROCARDIOGRAM REPORT: CPT | Performed by: INTERNAL MEDICINE

## 2023-10-15 PROCEDURE — 6360000002 HC RX W HCPCS

## 2023-10-15 PROCEDURE — 87076 CULTURE ANAEROBE IDENT EACH: CPT

## 2023-10-15 RX ORDER — INSULIN LISPRO 100 [IU]/ML
10 INJECTION, SOLUTION INTRAVENOUS; SUBCUTANEOUS
Status: DISCONTINUED | OUTPATIENT
Start: 2023-10-15 | End: 2023-10-20 | Stop reason: HOSPADM

## 2023-10-15 RX ORDER — SODIUM CHLORIDE 9 MG/ML
INJECTION, SOLUTION INTRAVENOUS PRN
Status: DISCONTINUED | OUTPATIENT
Start: 2023-10-15 | End: 2023-10-20 | Stop reason: HOSPADM

## 2023-10-15 RX ORDER — BUDESONIDE AND FORMOTEROL FUMARATE DIHYDRATE 160; 4.5 UG/1; UG/1
2 AEROSOL RESPIRATORY (INHALATION) 2 TIMES DAILY
Status: DISCONTINUED | OUTPATIENT
Start: 2023-10-15 | End: 2023-10-20 | Stop reason: HOSPADM

## 2023-10-15 RX ORDER — GLUCAGON 1 MG/ML
1 KIT INJECTION PRN
Status: DISCONTINUED | OUTPATIENT
Start: 2023-10-15 | End: 2023-10-20 | Stop reason: HOSPADM

## 2023-10-15 RX ORDER — ATORVASTATIN CALCIUM 10 MG/1
10 TABLET, FILM COATED ORAL DAILY
Status: DISCONTINUED | OUTPATIENT
Start: 2023-10-15 | End: 2023-10-20 | Stop reason: HOSPADM

## 2023-10-15 RX ORDER — INSULIN GLARGINE 100 [IU]/ML
30 INJECTION, SOLUTION SUBCUTANEOUS NIGHTLY
Status: DISCONTINUED | OUTPATIENT
Start: 2023-10-15 | End: 2023-10-20 | Stop reason: HOSPADM

## 2023-10-15 RX ORDER — ENOXAPARIN SODIUM 100 MG/ML
30 INJECTION SUBCUTANEOUS 2 TIMES DAILY
Status: DISCONTINUED | OUTPATIENT
Start: 2023-10-15 | End: 2023-10-16

## 2023-10-15 RX ORDER — ONDANSETRON 4 MG/1
4 TABLET, ORALLY DISINTEGRATING ORAL EVERY 8 HOURS PRN
Status: DISCONTINUED | OUTPATIENT
Start: 2023-10-15 | End: 2023-10-20 | Stop reason: HOSPADM

## 2023-10-15 RX ORDER — ALBUTEROL SULFATE 2.5 MG/.5ML
2.5 SOLUTION RESPIRATORY (INHALATION) EVERY 6 HOURS PRN
Status: DISCONTINUED | OUTPATIENT
Start: 2023-10-15 | End: 2023-10-20 | Stop reason: HOSPADM

## 2023-10-15 RX ORDER — SODIUM CHLORIDE 0.9 % (FLUSH) 0.9 %
5-40 SYRINGE (ML) INJECTION EVERY 12 HOURS SCHEDULED
Status: DISCONTINUED | OUTPATIENT
Start: 2023-10-15 | End: 2023-10-20 | Stop reason: HOSPADM

## 2023-10-15 RX ORDER — ACETAMINOPHEN 650 MG/1
650 SUPPOSITORY RECTAL EVERY 6 HOURS PRN
Status: DISCONTINUED | OUTPATIENT
Start: 2023-10-15 | End: 2023-10-20 | Stop reason: HOSPADM

## 2023-10-15 RX ORDER — INSULIN LISPRO 100 [IU]/ML
0-4 INJECTION, SOLUTION INTRAVENOUS; SUBCUTANEOUS
Status: DISCONTINUED | OUTPATIENT
Start: 2023-10-15 | End: 2023-10-20 | Stop reason: HOSPADM

## 2023-10-15 RX ORDER — LISINOPRIL AND HYDROCHLOROTHIAZIDE 20; 12.5 MG/1; MG/1
1 TABLET ORAL DAILY
Status: DISCONTINUED | OUTPATIENT
Start: 2023-10-15 | End: 2023-10-20 | Stop reason: HOSPADM

## 2023-10-15 RX ORDER — POTASSIUM CHLORIDE 20 MEQ/1
40 TABLET, EXTENDED RELEASE ORAL PRN
Status: DISCONTINUED | OUTPATIENT
Start: 2023-10-15 | End: 2023-10-20 | Stop reason: HOSPADM

## 2023-10-15 RX ORDER — ACETAMINOPHEN 325 MG/1
650 TABLET ORAL EVERY 6 HOURS PRN
Status: DISCONTINUED | OUTPATIENT
Start: 2023-10-15 | End: 2023-10-20 | Stop reason: HOSPADM

## 2023-10-15 RX ORDER — DEXTROSE MONOHYDRATE 100 MG/ML
INJECTION, SOLUTION INTRAVENOUS CONTINUOUS PRN
Status: DISCONTINUED | OUTPATIENT
Start: 2023-10-15 | End: 2023-10-20 | Stop reason: HOSPADM

## 2023-10-15 RX ORDER — AMLODIPINE BESYLATE 10 MG/1
10 TABLET ORAL DAILY
Status: DISCONTINUED | OUTPATIENT
Start: 2023-10-15 | End: 2023-10-20 | Stop reason: HOSPADM

## 2023-10-15 RX ORDER — ALBUTEROL SULFATE 2.5 MG/3ML
SOLUTION RESPIRATORY (INHALATION)
Status: COMPLETED
Start: 2023-10-15 | End: 2023-10-15

## 2023-10-15 RX ORDER — MAGNESIUM SULFATE IN WATER 40 MG/ML
2000 INJECTION, SOLUTION INTRAVENOUS PRN
Status: DISCONTINUED | OUTPATIENT
Start: 2023-10-15 | End: 2023-10-20 | Stop reason: HOSPADM

## 2023-10-15 RX ORDER — ALBUTEROL SULFATE 90 UG/1
1 AEROSOL, METERED RESPIRATORY (INHALATION) EVERY 6 HOURS
Status: DISCONTINUED | OUTPATIENT
Start: 2023-10-15 | End: 2023-10-20 | Stop reason: HOSPADM

## 2023-10-15 RX ORDER — EZETIMIBE 10 MG/1
10 TABLET ORAL DAILY
Status: DISCONTINUED | OUTPATIENT
Start: 2023-10-15 | End: 2023-10-20 | Stop reason: HOSPADM

## 2023-10-15 RX ORDER — POTASSIUM CHLORIDE 7.45 MG/ML
10 INJECTION INTRAVENOUS PRN
Status: DISCONTINUED | OUTPATIENT
Start: 2023-10-15 | End: 2023-10-20 | Stop reason: HOSPADM

## 2023-10-15 RX ORDER — SODIUM CHLORIDE 0.9 % (FLUSH) 0.9 %
5-40 SYRINGE (ML) INJECTION PRN
Status: DISCONTINUED | OUTPATIENT
Start: 2023-10-15 | End: 2023-10-20 | Stop reason: HOSPADM

## 2023-10-15 RX ORDER — POLYETHYLENE GLYCOL 3350 17 G/17G
17 POWDER, FOR SOLUTION ORAL DAILY PRN
Status: DISCONTINUED | OUTPATIENT
Start: 2023-10-15 | End: 2023-10-20 | Stop reason: HOSPADM

## 2023-10-15 RX ORDER — ALLOPURINOL 100 MG/1
300 TABLET ORAL DAILY
Status: DISCONTINUED | OUTPATIENT
Start: 2023-10-15 | End: 2023-10-17

## 2023-10-15 RX ORDER — CLONIDINE HYDROCHLORIDE 0.2 MG/1
0.2 TABLET ORAL DAILY
Status: DISCONTINUED | OUTPATIENT
Start: 2023-10-15 | End: 2023-10-20 | Stop reason: HOSPADM

## 2023-10-15 RX ORDER — ONDANSETRON 2 MG/ML
4 INJECTION INTRAMUSCULAR; INTRAVENOUS EVERY 6 HOURS PRN
Status: DISCONTINUED | OUTPATIENT
Start: 2023-10-15 | End: 2023-10-20 | Stop reason: HOSPADM

## 2023-10-15 RX ORDER — INSULIN LISPRO 100 [IU]/ML
0-4 INJECTION, SOLUTION INTRAVENOUS; SUBCUTANEOUS NIGHTLY
Status: DISCONTINUED | OUTPATIENT
Start: 2023-10-15 | End: 2023-10-20 | Stop reason: HOSPADM

## 2023-10-15 RX ORDER — PANTOPRAZOLE SODIUM 40 MG/1
40 TABLET, DELAYED RELEASE ORAL
Status: DISCONTINUED | OUTPATIENT
Start: 2023-10-15 | End: 2023-10-20 | Stop reason: HOSPADM

## 2023-10-15 RX ADMIN — ALBUTEROL SULFATE 2.5 MG: 2.5 SOLUTION RESPIRATORY (INHALATION) at 11:34

## 2023-10-15 RX ADMIN — HYDROMORPHONE HYDROCHLORIDE 0.5 MG: 1 INJECTION, SOLUTION INTRAMUSCULAR; INTRAVENOUS; SUBCUTANEOUS at 20:07

## 2023-10-15 RX ADMIN — CEFEPIME 2000 MG: 2 INJECTION, POWDER, FOR SOLUTION INTRAVENOUS at 21:00

## 2023-10-15 RX ADMIN — EZETIMIBE 10 MG: 10 TABLET ORAL at 11:14

## 2023-10-15 RX ADMIN — HYDROMORPHONE HYDROCHLORIDE 0.5 MG: 1 INJECTION, SOLUTION INTRAMUSCULAR; INTRAVENOUS; SUBCUTANEOUS at 06:59

## 2023-10-15 RX ADMIN — ATORVASTATIN CALCIUM 10 MG: 10 TABLET, FILM COATED ORAL at 11:15

## 2023-10-15 RX ADMIN — AMLODIPINE BESYLATE 10 MG: 10 TABLET ORAL at 11:27

## 2023-10-15 RX ADMIN — ALBUTEROL SULFATE 2.5 MG: 2.5 SOLUTION RESPIRATORY (INHALATION) at 11:29

## 2023-10-15 RX ADMIN — INSULIN GLARGINE 30 UNITS: 100 INJECTION, SOLUTION SUBCUTANEOUS at 21:30

## 2023-10-15 RX ADMIN — CLONIDINE HYDROCHLORIDE 0.2 MG: 0.2 TABLET ORAL at 11:14

## 2023-10-15 RX ADMIN — CEFEPIME 2000 MG: 2 INJECTION, POWDER, FOR SOLUTION INTRAVENOUS at 11:29

## 2023-10-15 RX ADMIN — INSULIN LISPRO 10 UNITS: 100 INJECTION, SOLUTION INTRAVENOUS; SUBCUTANEOUS at 16:48

## 2023-10-15 RX ADMIN — INSULIN LISPRO 2 UNITS: 100 INJECTION, SOLUTION INTRAVENOUS; SUBCUTANEOUS at 12:14

## 2023-10-15 RX ADMIN — VANCOMYCIN HYDROCHLORIDE 1250 MG: 1.25 INJECTION, POWDER, LYOPHILIZED, FOR SOLUTION INTRAVENOUS at 13:25

## 2023-10-15 RX ADMIN — IPRATROPIUM BROMIDE 1 PUFF: 17 AEROSOL, METERED RESPIRATORY (INHALATION) at 20:29

## 2023-10-15 RX ADMIN — SODIUM CHLORIDE, PRESERVATIVE FREE 10 ML: 5 INJECTION INTRAVENOUS at 21:35

## 2023-10-15 RX ADMIN — SODIUM CHLORIDE, PRESERVATIVE FREE 10 ML: 5 INJECTION INTRAVENOUS at 20:08

## 2023-10-15 RX ADMIN — SODIUM CHLORIDE, POTASSIUM CHLORIDE, SODIUM LACTATE AND CALCIUM CHLORIDE 1000 ML: 600; 310; 30; 20 INJECTION, SOLUTION INTRAVENOUS at 01:57

## 2023-10-15 RX ADMIN — ALBUTEROL SULFATE 1 PUFF: 90 AEROSOL, METERED RESPIRATORY (INHALATION) at 20:28

## 2023-10-15 RX ADMIN — INSULIN LISPRO 1 UNITS: 100 INJECTION, SOLUTION INTRAVENOUS; SUBCUTANEOUS at 16:49

## 2023-10-15 RX ADMIN — SODIUM CHLORIDE, PRESERVATIVE FREE 10 ML: 5 INJECTION INTRAVENOUS at 11:17

## 2023-10-15 RX ADMIN — SODIUM CHLORIDE: 9 INJECTION, SOLUTION INTRAVENOUS at 11:28

## 2023-10-15 RX ADMIN — INSULIN LISPRO 10 UNITS: 100 INJECTION, SOLUTION INTRAVENOUS; SUBCUTANEOUS at 12:14

## 2023-10-15 RX ADMIN — HYDROMORPHONE HYDROCHLORIDE 0.5 MG: 1 INJECTION, SOLUTION INTRAMUSCULAR; INTRAVENOUS; SUBCUTANEOUS at 15:38

## 2023-10-15 RX ADMIN — BUDESONIDE AND FORMOTEROL FUMARATE DIHYDRATE 2 PUFF: 160; 4.5 AEROSOL RESPIRATORY (INHALATION) at 20:30

## 2023-10-15 RX ADMIN — HYDROMORPHONE HYDROCHLORIDE 0.5 MG: 1 INJECTION, SOLUTION INTRAMUSCULAR; INTRAVENOUS; SUBCUTANEOUS at 11:19

## 2023-10-15 RX ADMIN — ALLOPURINOL 300 MG: 100 TABLET ORAL at 11:15

## 2023-10-15 RX ADMIN — HYDROMORPHONE HYDROCHLORIDE 0.5 MG: 1 INJECTION, SOLUTION INTRAMUSCULAR; INTRAVENOUS; SUBCUTANEOUS at 03:13

## 2023-10-15 RX ADMIN — INSULIN LISPRO 1 UNITS: 100 INJECTION, SOLUTION INTRAVENOUS; SUBCUTANEOUS at 08:41

## 2023-10-15 ASSESSMENT — PAIN - FUNCTIONAL ASSESSMENT
PAIN_FUNCTIONAL_ASSESSMENT: PREVENTS OR INTERFERES SOME ACTIVE ACTIVITIES AND ADLS
PAIN_FUNCTIONAL_ASSESSMENT: ACTIVITIES ARE NOT PREVENTED

## 2023-10-15 ASSESSMENT — PAIN DESCRIPTION - ORIENTATION
ORIENTATION: RIGHT
ORIENTATION: RIGHT
ORIENTATION: LEFT;RIGHT
ORIENTATION: RIGHT
ORIENTATION: RIGHT;LEFT
ORIENTATION: RIGHT

## 2023-10-15 ASSESSMENT — PAIN DESCRIPTION - DESCRIPTORS
DESCRIPTORS: ACHING
DESCRIPTORS: THROBBING
DESCRIPTORS: STABBING;SHARP
DESCRIPTORS: ACHING

## 2023-10-15 ASSESSMENT — PAIN DESCRIPTION - LOCATION
LOCATION: FOOT
LOCATION: TOE (COMMENT WHICH ONE);ANKLE
LOCATION: FOOT
LOCATION: LEG;ANKLE
LOCATION: FOOT
LOCATION: TOE (COMMENT WHICH ONE)

## 2023-10-15 ASSESSMENT — PAIN SCALES - GENERAL
PAINLEVEL_OUTOF10: 9
PAINLEVEL_OUTOF10: 7
PAINLEVEL_OUTOF10: 10
PAINLEVEL_OUTOF10: 7
PAINLEVEL_OUTOF10: 10
PAINLEVEL_OUTOF10: 10
PAINLEVEL_OUTOF10: 8

## 2023-10-15 ASSESSMENT — PAIN DESCRIPTION - PAIN TYPE: TYPE: CHRONIC PAIN

## 2023-10-15 ASSESSMENT — PAIN SCALES - WONG BAKER: WONGBAKER_NUMERICALRESPONSE: 6

## 2023-10-15 NOTE — ED PROVIDER NOTES
CC: Toe pain  Seen in room 31    HPI: This is a 78-year-old male with a past medical history of diabetic foot ulcer with osteomyelitis, who comes in for evaluation of toe pain. He states that he has had wounds in the past, he had been following with the wound clinic, however in the past couple of days, the pain has gotten worse. He states that his toe was turning black, and this prompted him to come in. No fevers or chills. No new injuries. Past medical and surgical history: reviewed  Social: denied smoking, drinking, drugs  Allergies: reviewed    Physical exam:  General: awake, alert. No acute distress. Skin: No rashes noted. HENT: NC/AT. EOMI, PERRL. Mucous membranes moist.   Neck: Trachea midline. Chest: Symmetrical rise and fall of the chest. Normal work of breathing. Cardio: Heart is tachycardic. Abdomen: Soft, nontender, nondistended. Back: Active ROM. Extremities: Pulses present. The right lower extremity has what appears to be gangrene of toes 1 through 4. No crepitance is palpated. Neuro: A/O x 3. No gross focal motor deficits noted. --------  Medical decision making    Differential diagnosis includes but not limited to: indirect and direct pathophysiologic etiologies vascular, inflammatory, infectious, neoplastic, degenerative, deficiency, drugs, idiopathic, intoxication, iatrogenic, congenital, autoimmune, allergic, anatomic, trauma, endocrine, environmental, and metabolic. EKG interpretation by me: Sinus tachycardia, rate 111. SD interval, QRS duration and QTc are appropriate. There are no ST changes concerning for acute ischemia. There is a lot of baseline wander. The overall morphology is grossly similar to his previous EKGs save for the wander. Records reviewed: Does appear he is followed by the wound care clinic. Calls/consults: Case discussed with hospitalist for admission.     ED course: Patient seen and evaluated by me for toe pain in the setting of prior

## 2023-10-15 NOTE — CONSULTS
Anaphylaxis    Shellfish-Derived Products Anaphylaxis    Shellfish Allergy        Family History   Problem Relation Age of Onset    Diabetes Mother     Asthma Mother     High Blood Pressure Mother     Stroke Mother     Heart Disease Mother     Diabetes Father     Asthma Father     High Blood Pressure Father        Social History     Socioeconomic History    Marital status: Single     Spouse name: Not on file    Number of children: Not on file    Years of education: Not on file    Highest education level: Not on file   Occupational History    Not on file   Tobacco Use    Smoking status: Every Day     Packs/day: 0.25     Years: 5.00     Additional pack years: 0.00     Total pack years: 1.25     Types: Cigarettes    Smokeless tobacco: Never    Tobacco comments:     VOICES ONLY SMOKING ABOUT HALF PACK A WEEK AND IS DOWN TO ONLY A FEW A DAY. 5-1-23    Vaping Use    Vaping Use: Never used   Substance and Sexual Activity    Alcohol use: Yes     Comment: occasionally    Drug use: Not Currently     Types: Marijuana Sam Macadamia)    Sexual activity: Not on file   Other Topics Concern    Not on file   Social History Narrative    Not on file     Social Determinants of Health     Financial Resource Strain: Not on file   Food Insecurity: Not on file   Transportation Needs: Not on file   Physical Activity: Not on file   Stress: Not on file   Social Connections: Not on file   Intimate Partner Violence: Not on file   Housing Stability: Not on file       ROS: Non-contributory    Physical Exam:  Vitals:    10/15/23 0659   BP:    Pulse:    Resp: 22   Temp:    SpO2:    Patient is awake and alert pleasant has severe retinopathy    Chest: Breath sounds were clear and equal with no rales, wheezes, or rhonchi. Respiratory effort was normal with no retractions or use of accessory muscles. Cardiovascular: Heart sounds were normal with a regular rate and rhythm. There were no murmurs or gallops. Abdomen:   Bowel sounds were normal.  The abdomen was soft and non distended. There was no tenderness, guarding, rebound, or rigidity. There was no masses, hepatosplenomegaly, or hernias. Examination of the left foot does reveal slight swelling of the foot but no open wounds ulcerations or signs of infection. The right foot revealed a foul-smelling drainage from the base of the right fifth toe. I removed some of the callus and there was a full-thickness necrosis of the soft tissue extending down to the interphalangeal joint with protruding bone from the wound. At the bedside I did use scissors to amputate the soft tissue and the toe and did obtain a bone cutter to remove the protruding bone in the proximal phalanx. There is still some unhealthy necrotic tissue deep within the wound which requires formal operative debridement. Labs    CBC:   Lab Results   Component Value Date/Time    WBC 8.1 10/15/2023 05:29 AM    RBC 3.25 10/15/2023 05:29 AM    HGB 10.3 10/15/2023 05:29 AM    HCT 30.4 10/15/2023 05:29 AM    MCV 93.5 10/15/2023 05:29 AM    MCH 31.7 10/15/2023 05:29 AM    MCHC 33.9 10/15/2023 05:29 AM    RDW 11.7 10/15/2023 05:29 AM     10/15/2023 05:29 AM    MPV 8.5 10/15/2023 05:29 AM         Assessment/Plan:  Osteomyelitis of the right fifth toe with surrounding soft tissue infection  Diabetes mellitus  Continue broad-spectrum antibiotic therapy  We will go to surgery tomorrow under IV sedation to do further debridement to healthy bone down to the head of the metatarsal and to debride any nonviable soft tissue. The patient will then follow-up with the wound clinic which is right across in his apartment.   Plan of care discussed with the patient need for surgery discussed with the patient    Fransisco Dumont MD   10/15/2023 at 9:31 AM

## 2023-10-15 NOTE — PROGRESS NOTES
Patient admitted earlier this morning by my partner. History and physical reviewed. Agree with history and physical.  Continue with plan of care. Continue current measures for now. Patient seen and examined at bedside. Feels better. Pt had right toe amputation at bedside. Plan to take him to OR tomorrow. Will change abx to cefepime given prior culture grew Proteus.     Katie Delacruz MD

## 2023-10-15 NOTE — PROGRESS NOTES
4 Eyes Skin Assessment     NAME:  Salud Ling. YOB: 1985  MEDICAL RECORD NUMBER:  [id number]    The patient is being assessed for  Admission    I agree that at least one RN has performed a thorough Head to Toe Skin Assessment on the patient. ALL assessment sites listed below have been assessed. Areas assessed by both nurses:    Head, Face, Ears, Shoulders, Back, Chest, Arms, Elbows, Hands, Sacrum. Buttock, Coccyx, Ischium, Legs. Feet and Heels, and Under Medical Devices         Does the Patient have a Wound? Yes wound(s) were present on assessment.  LDA wound assessment was Initiated and completed by RN       Christopher Prevention initiated by RN: No  Wound Care Orders initiated by RN: Yes    Pressure Injury (Stage 3,4, Unstageable, DTI, NWPT, and Complex wounds) if present, place Wound referral order by RN under : Yes    New Ostomies, if present place, Ostomy referral order under : No     Nurse 1 eSignature: Electronically signed by Martir Caldwell RN on 10/15/23 at 6:05 AM EDT    **SHARE this note so that the co-signing nurse can place an eSignature**    Nurse 2 eSignature: Electronically signed by Clair Lundberg LPN on 22/70/44 at 7:40 AM EDT

## 2023-10-16 ENCOUNTER — APPOINTMENT (OUTPATIENT)
Dept: GENERAL RADIOLOGY | Age: 38
DRG: 710 | End: 2023-10-16
Payer: COMMERCIAL

## 2023-10-16 ENCOUNTER — ANESTHESIA (OUTPATIENT)
Dept: OPERATING ROOM | Age: 38
DRG: 710 | End: 2023-10-16
Payer: COMMERCIAL

## 2023-10-16 ENCOUNTER — ANESTHESIA EVENT (OUTPATIENT)
Dept: OPERATING ROOM | Age: 38
DRG: 710 | End: 2023-10-16
Payer: COMMERCIAL

## 2023-10-16 LAB
ALBUMIN SERPL-MCNC: 2.8 GM/DL (ref 3.4–5)
ALP BLD-CCNC: 124 IU/L (ref 40–128)
ALT SERPL-CCNC: 50 U/L (ref 10–40)
ANION GAP SERPL CALCULATED.3IONS-SCNC: 11 MMOL/L (ref 4–16)
AST SERPL-CCNC: 45 IU/L (ref 15–37)
BASOPHILS ABSOLUTE: 0 K/CU MM
BASOPHILS RELATIVE PERCENT: 0.3 % (ref 0–1)
BILIRUB SERPL-MCNC: 0.2 MG/DL (ref 0–1)
BUN SERPL-MCNC: 10 MG/DL (ref 6–23)
CALCIUM SERPL-MCNC: 8.5 MG/DL (ref 8.3–10.6)
CHLORIDE BLD-SCNC: 96 MMOL/L (ref 99–110)
CO2: 23 MMOL/L (ref 21–32)
CREAT SERPL-MCNC: 0.6 MG/DL (ref 0.9–1.3)
DIFFERENTIAL TYPE: ABNORMAL
EOSINOPHILS ABSOLUTE: 0.1 K/CU MM
EOSINOPHILS RELATIVE PERCENT: 2 % (ref 0–3)
GFR SERPL CREATININE-BSD FRML MDRD: >60 ML/MIN/1.73M2
GLUCOSE BLD-MCNC: 183 MG/DL (ref 70–99)
GLUCOSE BLD-MCNC: 198 MG/DL (ref 70–99)
GLUCOSE BLD-MCNC: 235 MG/DL (ref 70–99)
GLUCOSE BLD-MCNC: 235 MG/DL (ref 70–99)
GLUCOSE BLD-MCNC: 292 MG/DL (ref 70–99)
GLUCOSE SERPL-MCNC: 276 MG/DL (ref 70–99)
HCT VFR BLD CALC: 31.2 % (ref 42–52)
HEMOGLOBIN: 10.3 GM/DL (ref 13.5–18)
IMMATURE NEUTROPHIL %: 0.6 % (ref 0–0.43)
LYMPHOCYTES ABSOLUTE: 1 K/CU MM
LYMPHOCYTES RELATIVE PERCENT: 15.2 % (ref 24–44)
MCH RBC QN AUTO: 31.7 PG (ref 27–31)
MCHC RBC AUTO-ENTMCNC: 33 % (ref 32–36)
MCV RBC AUTO: 96 FL (ref 78–100)
MONOCYTES ABSOLUTE: 0.7 K/CU MM
MONOCYTES RELATIVE PERCENT: 10.8 % (ref 0–4)
NUCLEATED RBC %: 0 %
PDW BLD-RTO: 11.7 % (ref 11.7–14.9)
PLATELET # BLD: 330 K/CU MM (ref 140–440)
PMV BLD AUTO: 8.7 FL (ref 7.5–11.1)
POTASSIUM SERPL-SCNC: 4.4 MMOL/L (ref 3.5–5.1)
RBC # BLD: 3.25 M/CU MM (ref 4.6–6.2)
SEGMENTED NEUTROPHILS ABSOLUTE COUNT: 4.5 K/CU MM
SEGMENTED NEUTROPHILS RELATIVE PERCENT: 71.1 % (ref 36–66)
SODIUM BLD-SCNC: 130 MMOL/L (ref 135–145)
TOTAL IMMATURE NEUTOROPHIL: 0.04 K/CU MM
TOTAL NUCLEATED RBC: 0 K/CU MM
TOTAL PROTEIN: 6 GM/DL (ref 6.4–8.2)
URATE SERPL-MCNC: 3.8 MG/DL (ref 3.5–7.2)
WBC # BLD: 6.4 K/CU MM (ref 4–10.5)

## 2023-10-16 PROCEDURE — 6360000002 HC RX W HCPCS: Performed by: NURSE ANESTHETIST, CERTIFIED REGISTERED

## 2023-10-16 PROCEDURE — 6360000002 HC RX W HCPCS: Performed by: STUDENT IN AN ORGANIZED HEALTH CARE EDUCATION/TRAINING PROGRAM

## 2023-10-16 PROCEDURE — 2580000003 HC RX 258: Performed by: SURGERY

## 2023-10-16 PROCEDURE — 3700000000 HC ANESTHESIA ATTENDED CARE: Performed by: SURGERY

## 2023-10-16 PROCEDURE — 2500000003 HC RX 250 WO HCPCS: Performed by: SURGERY

## 2023-10-16 PROCEDURE — 6370000000 HC RX 637 (ALT 250 FOR IP): Performed by: SURGERY

## 2023-10-16 PROCEDURE — 3600000012 HC SURGERY LEVEL 2 ADDTL 15MIN: Performed by: SURGERY

## 2023-10-16 PROCEDURE — 6360000002 HC RX W HCPCS: Performed by: SURGERY

## 2023-10-16 PROCEDURE — 1200000000 HC SEMI PRIVATE

## 2023-10-16 PROCEDURE — 2580000003 HC RX 258: Performed by: STUDENT IN AN ORGANIZED HEALTH CARE EDUCATION/TRAINING PROGRAM

## 2023-10-16 PROCEDURE — 2709999900 HC NON-CHARGEABLE SUPPLY: Performed by: SURGERY

## 2023-10-16 PROCEDURE — 3700000001 HC ADD 15 MINUTES (ANESTHESIA): Performed by: SURGERY

## 2023-10-16 PROCEDURE — 80053 COMPREHEN METABOLIC PANEL: CPT

## 2023-10-16 PROCEDURE — 94664 DEMO&/EVAL PT USE INHALER: CPT

## 2023-10-16 PROCEDURE — 6370000000 HC RX 637 (ALT 250 FOR IP): Performed by: STUDENT IN AN ORGANIZED HEALTH CARE EDUCATION/TRAINING PROGRAM

## 2023-10-16 PROCEDURE — 94761 N-INVAS EAR/PLS OXIMETRY MLT: CPT

## 2023-10-16 PROCEDURE — 82962 GLUCOSE BLOOD TEST: CPT

## 2023-10-16 PROCEDURE — 94640 AIRWAY INHALATION TREATMENT: CPT

## 2023-10-16 PROCEDURE — 3600000002 HC SURGERY LEVEL 2 BASE: Performed by: SURGERY

## 2023-10-16 PROCEDURE — 84550 ASSAY OF BLOOD/URIC ACID: CPT

## 2023-10-16 PROCEDURE — 73620 X-RAY EXAM OF FOOT: CPT

## 2023-10-16 PROCEDURE — 0QBQ0ZZ EXCISION OF RIGHT TOE PHALANX, OPEN APPROACH: ICD-10-PCS | Performed by: SURGERY

## 2023-10-16 PROCEDURE — 85025 COMPLETE CBC W/AUTO DIFF WBC: CPT

## 2023-10-16 PROCEDURE — 6370000000 HC RX 637 (ALT 250 FOR IP): Performed by: NURSE ANESTHETIST, CERTIFIED REGISTERED

## 2023-10-16 PROCEDURE — 36415 COLL VENOUS BLD VENIPUNCTURE: CPT

## 2023-10-16 RX ORDER — MOXIFLOXACIN 5 MG/ML
1 SOLUTION/ DROPS OPHTHALMIC 4 TIMES DAILY
Status: DISCONTINUED | OUTPATIENT
Start: 2023-10-16 | End: 2023-10-20 | Stop reason: HOSPADM

## 2023-10-16 RX ORDER — LANOLIN ALCOHOL/MO/W.PET/CERES
3 CREAM (GRAM) TOPICAL NIGHTLY PRN
Status: DISCONTINUED | OUTPATIENT
Start: 2023-10-16 | End: 2023-10-20 | Stop reason: HOSPADM

## 2023-10-16 RX ORDER — ENOXAPARIN SODIUM 100 MG/ML
30 INJECTION SUBCUTANEOUS 2 TIMES DAILY
Status: DISCONTINUED | OUTPATIENT
Start: 2023-10-17 | End: 2023-10-20 | Stop reason: HOSPADM

## 2023-10-16 RX ORDER — COLCHICINE 0.6 MG/1
0.6 TABLET ORAL DAILY
Status: DISCONTINUED | OUTPATIENT
Start: 2023-10-17 | End: 2023-10-16

## 2023-10-16 RX ORDER — COLCHICINE 0.6 MG/1
1.2 TABLET ORAL ONCE
Status: COMPLETED | OUTPATIENT
Start: 2023-10-16 | End: 2023-10-16

## 2023-10-16 RX ORDER — ALBUTEROL SULFATE 90 UG/1
AEROSOL, METERED RESPIRATORY (INHALATION) PRN
Status: DISCONTINUED | OUTPATIENT
Start: 2023-10-16 | End: 2023-10-16 | Stop reason: SDUPTHER

## 2023-10-16 RX ORDER — DORZOLAMIDE HYDROCHLORIDE AND TIMOLOL MALEATE 20; 5 MG/ML; MG/ML
1 SOLUTION/ DROPS OPHTHALMIC 2 TIMES DAILY
Status: DISCONTINUED | OUTPATIENT
Start: 2023-10-16 | End: 2023-10-20 | Stop reason: HOSPADM

## 2023-10-16 RX ORDER — PROPOFOL 10 MG/ML
INJECTION, EMULSION INTRAVENOUS PRN
Status: DISCONTINUED | OUTPATIENT
Start: 2023-10-16 | End: 2023-10-16 | Stop reason: SDUPTHER

## 2023-10-16 RX ORDER — POLYVINYL ALCOHOL 14 MG/ML
1 SOLUTION/ DROPS OPHTHALMIC PRN
Status: DISCONTINUED | OUTPATIENT
Start: 2023-10-16 | End: 2023-10-20 | Stop reason: HOSPADM

## 2023-10-16 RX ORDER — TRAZODONE HYDROCHLORIDE 50 MG/1
50 TABLET ORAL NIGHTLY
Status: DISCONTINUED | OUTPATIENT
Start: 2023-10-16 | End: 2023-10-20 | Stop reason: HOSPADM

## 2023-10-16 RX ORDER — OXYCODONE HYDROCHLORIDE AND ACETAMINOPHEN 5; 325 MG/1; MG/1
1 TABLET ORAL EVERY 6 HOURS PRN
Status: DISCONTINUED | OUTPATIENT
Start: 2023-10-16 | End: 2023-10-19

## 2023-10-16 RX ORDER — LIDOCAINE HYDROCHLORIDE 20 MG/ML
INJECTION, SOLUTION INTRAVENOUS PRN
Status: DISCONTINUED | OUTPATIENT
Start: 2023-10-16 | End: 2023-10-16 | Stop reason: SDUPTHER

## 2023-10-16 RX ORDER — SODIUM CHLORIDE, SODIUM LACTATE, POTASSIUM CHLORIDE, CALCIUM CHLORIDE 600; 310; 30; 20 MG/100ML; MG/100ML; MG/100ML; MG/100ML
INJECTION, SOLUTION INTRAVENOUS CONTINUOUS PRN
Status: DISCONTINUED | OUTPATIENT
Start: 2023-10-16 | End: 2023-10-16 | Stop reason: SDUPTHER

## 2023-10-16 RX ORDER — LIDOCAINE HYDROCHLORIDE 10 MG/ML
INJECTION, SOLUTION EPIDURAL; INFILTRATION; INTRACAUDAL; PERINEURAL
Status: COMPLETED | OUTPATIENT
Start: 2023-10-16 | End: 2023-10-16

## 2023-10-16 RX ORDER — PREGABALIN 100 MG/1
100 CAPSULE ORAL 2 TIMES DAILY
Status: DISCONTINUED | OUTPATIENT
Start: 2023-10-16 | End: 2023-10-20 | Stop reason: HOSPADM

## 2023-10-16 RX ORDER — BRIMONIDINE TARTRATE 2 MG/ML
1 SOLUTION/ DROPS OPHTHALMIC 2 TIMES DAILY
Status: DISCONTINUED | OUTPATIENT
Start: 2023-10-16 | End: 2023-10-20 | Stop reason: HOSPADM

## 2023-10-16 RX ORDER — POLYMYXIN B SULFATE AND TRIMETHOPRIM 1; 10000 MG/ML; [USP'U]/ML
1 SOLUTION OPHTHALMIC 4 TIMES DAILY
Status: DISCONTINUED | OUTPATIENT
Start: 2023-10-16 | End: 2023-10-20 | Stop reason: HOSPADM

## 2023-10-16 RX ORDER — PREDNISOLONE ACETATE 10 MG/ML
1 SUSPENSION/ DROPS OPHTHALMIC 4 TIMES DAILY
Status: DISCONTINUED | OUTPATIENT
Start: 2023-10-16 | End: 2023-10-20 | Stop reason: HOSPADM

## 2023-10-16 RX ADMIN — TRAZODONE HYDROCHLORIDE 50 MG: 50 TABLET ORAL at 21:17

## 2023-10-16 RX ADMIN — VANCOMYCIN HYDROCHLORIDE 1250 MG: 1.25 INJECTION, POWDER, LYOPHILIZED, FOR SOLUTION INTRAVENOUS at 00:07

## 2023-10-16 RX ADMIN — ALBUTEROL SULFATE 1 PUFF: 90 AEROSOL, METERED RESPIRATORY (INHALATION) at 07:39

## 2023-10-16 RX ADMIN — ALBUTEROL SULFATE 1 PUFF: 90 AEROSOL, METERED RESPIRATORY (INHALATION) at 19:41

## 2023-10-16 RX ADMIN — SODIUM CHLORIDE, SODIUM LACTATE, POTASSIUM CHLORIDE, CALCIUM CHLORIDE: 600; 310; 30; 20 INJECTION, SOLUTION INTRAVENOUS at 13:44

## 2023-10-16 RX ADMIN — ALBUTEROL SULFATE 2 PUFF: 90 AEROSOL, METERED RESPIRATORY (INHALATION) at 13:48

## 2023-10-16 RX ADMIN — INSULIN LISPRO 1 UNITS: 100 INJECTION, SOLUTION INTRAVENOUS; SUBCUTANEOUS at 08:26

## 2023-10-16 RX ADMIN — COLCHICINE 1.2 MG: 0.6 TABLET ORAL at 10:55

## 2023-10-16 RX ADMIN — IPRATROPIUM BROMIDE 1 PUFF: 17 AEROSOL, METERED RESPIRATORY (INHALATION) at 07:41

## 2023-10-16 RX ADMIN — ALLOPURINOL 300 MG: 100 TABLET ORAL at 08:25

## 2023-10-16 RX ADMIN — PREDNISOLONE ACETATE 1 DROP: 10 SUSPENSION/ DROPS OPHTHALMIC at 22:25

## 2023-10-16 RX ADMIN — POLYMYXIN B SULFATE AND TRIMETHOPRIM SULFATE 1 DROP: 10000; 1 SOLUTION/ DROPS OPHTHALMIC at 22:25

## 2023-10-16 RX ADMIN — AMLODIPINE BESYLATE 10 MG: 10 TABLET ORAL at 08:25

## 2023-10-16 RX ADMIN — HYDROMORPHONE HYDROCHLORIDE 0.5 MG: 1 INJECTION, SOLUTION INTRAMUSCULAR; INTRAVENOUS; SUBCUTANEOUS at 21:09

## 2023-10-16 RX ADMIN — IPRATROPIUM BROMIDE 1 PUFF: 17 AEROSOL, METERED RESPIRATORY (INHALATION) at 19:41

## 2023-10-16 RX ADMIN — BUDESONIDE AND FORMOTEROL FUMARATE DIHYDRATE 2 PUFF: 160; 4.5 AEROSOL RESPIRATORY (INHALATION) at 07:43

## 2023-10-16 RX ADMIN — ALBUTEROL SULFATE 1 PUFF: 90 AEROSOL, METERED RESPIRATORY (INHALATION) at 03:02

## 2023-10-16 RX ADMIN — OXYCODONE AND ACETAMINOPHEN 1 TABLET: 5; 325 TABLET ORAL at 18:26

## 2023-10-16 RX ADMIN — SODIUM CHLORIDE, PRESERVATIVE FREE 10 ML: 5 INJECTION INTRAVENOUS at 00:13

## 2023-10-16 RX ADMIN — PROPOFOL 650 MG: 10 INJECTION, EMULSION INTRAVENOUS at 13:52

## 2023-10-16 RX ADMIN — MOXIFLOXACIN 1 DROP: 5 SOLUTION/ DROPS OPHTHALMIC at 22:25

## 2023-10-16 RX ADMIN — CEFEPIME 2000 MG: 2 INJECTION, POWDER, FOR SOLUTION INTRAVENOUS at 22:18

## 2023-10-16 RX ADMIN — OXYCODONE AND ACETAMINOPHEN 1 TABLET: 5; 325 TABLET ORAL at 10:51

## 2023-10-16 RX ADMIN — INSULIN GLARGINE 30 UNITS: 100 INJECTION, SOLUTION SUBCUTANEOUS at 21:18

## 2023-10-16 RX ADMIN — INSULIN LISPRO 10 UNITS: 100 INJECTION, SOLUTION INTRAVENOUS; SUBCUTANEOUS at 17:21

## 2023-10-16 RX ADMIN — INSULIN LISPRO 1 UNITS: 100 INJECTION, SOLUTION INTRAVENOUS; SUBCUTANEOUS at 17:20

## 2023-10-16 RX ADMIN — IPRATROPIUM BROMIDE 1 PUFF: 17 AEROSOL, METERED RESPIRATORY (INHALATION) at 03:02

## 2023-10-16 RX ADMIN — SODIUM CHLORIDE: 9 INJECTION, SOLUTION INTRAVENOUS at 08:33

## 2023-10-16 RX ADMIN — ATORVASTATIN CALCIUM 10 MG: 10 TABLET, FILM COATED ORAL at 08:25

## 2023-10-16 RX ADMIN — PREGABALIN 100 MG: 100 CAPSULE ORAL at 21:17

## 2023-10-16 RX ADMIN — DORZOLAMIDE HYDROCHLORIDE AND TIMOLOL MALEATE 1 DROP: 20; 5 SOLUTION/ DROPS OPHTHALMIC at 22:11

## 2023-10-16 RX ADMIN — CEFEPIME 2000 MG: 2 INJECTION, POWDER, FOR SOLUTION INTRAVENOUS at 08:34

## 2023-10-16 RX ADMIN — EZETIMIBE 10 MG: 10 TABLET ORAL at 08:25

## 2023-10-16 RX ADMIN — LIDOCAINE HYDROCHLORIDE 100 MG: 20 INJECTION, SOLUTION INTRAVENOUS at 13:52

## 2023-10-16 RX ADMIN — HYDROMORPHONE HYDROCHLORIDE 0.5 MG: 1 INJECTION, SOLUTION INTRAMUSCULAR; INTRAVENOUS; SUBCUTANEOUS at 12:01

## 2023-10-16 RX ADMIN — SODIUM CHLORIDE, PRESERVATIVE FREE 10 ML: 5 INJECTION INTRAVENOUS at 21:10

## 2023-10-16 RX ADMIN — BRIMONIDINE TARTRATE 1 DROP: 2 SOLUTION OPHTHALMIC at 22:10

## 2023-10-16 RX ADMIN — SODIUM CHLORIDE, PRESERVATIVE FREE 10 ML: 5 INJECTION INTRAVENOUS at 08:25

## 2023-10-16 RX ADMIN — HYDROMORPHONE HYDROCHLORIDE 0.5 MG: 1 INJECTION, SOLUTION INTRAMUSCULAR; INTRAVENOUS; SUBCUTANEOUS at 00:12

## 2023-10-16 RX ADMIN — PANTOPRAZOLE SODIUM 40 MG: 40 TABLET, DELAYED RELEASE ORAL at 06:52

## 2023-10-16 RX ADMIN — BUDESONIDE AND FORMOTEROL FUMARATE DIHYDRATE 2 PUFF: 160; 4.5 AEROSOL RESPIRATORY (INHALATION) at 19:41

## 2023-10-16 RX ADMIN — HYDROMORPHONE HYDROCHLORIDE 0.5 MG: 1 INJECTION, SOLUTION INTRAMUSCULAR; INTRAVENOUS; SUBCUTANEOUS at 05:33

## 2023-10-16 RX ADMIN — SODIUM CHLORIDE: 9 INJECTION, SOLUTION INTRAVENOUS at 22:16

## 2023-10-16 RX ADMIN — VANCOMYCIN HYDROCHLORIDE 1250 MG: 1.25 INJECTION, POWDER, LYOPHILIZED, FOR SOLUTION INTRAVENOUS at 12:07

## 2023-10-16 ASSESSMENT — PAIN SCALES - GENERAL
PAINLEVEL_OUTOF10: 8
PAINLEVEL_OUTOF10: 9
PAINLEVEL_OUTOF10: 9
PAINLEVEL_OUTOF10: 4
PAINLEVEL_OUTOF10: 8
PAINLEVEL_OUTOF10: 10
PAINLEVEL_OUTOF10: 9
PAINLEVEL_OUTOF10: 9
PAINLEVEL_OUTOF10: 4
PAINLEVEL_OUTOF10: 8

## 2023-10-16 ASSESSMENT — PAIN SCALES - WONG BAKER
WONGBAKER_NUMERICALRESPONSE: 2
WONGBAKER_NUMERICALRESPONSE: 4
WONGBAKER_NUMERICALRESPONSE: 2
WONGBAKER_NUMERICALRESPONSE: 4
WONGBAKER_NUMERICALRESPONSE: 2
WONGBAKER_NUMERICALRESPONSE: 4

## 2023-10-16 ASSESSMENT — ENCOUNTER SYMPTOMS: SHORTNESS OF BREATH: 1

## 2023-10-16 ASSESSMENT — PAIN DESCRIPTION - LOCATION
LOCATION: FOOT
LOCATION: TOE (COMMENT WHICH ONE)
LOCATION: TOE (COMMENT WHICH ONE)
LOCATION: ANKLE
LOCATION: ANKLE;FOOT
LOCATION: FOOT
LOCATION: ANKLE;FOOT

## 2023-10-16 ASSESSMENT — PAIN - FUNCTIONAL ASSESSMENT
PAIN_FUNCTIONAL_ASSESSMENT: ACTIVITIES ARE NOT PREVENTED
PAIN_FUNCTIONAL_ASSESSMENT: PREVENTS OR INTERFERES SOME ACTIVE ACTIVITIES AND ADLS
PAIN_FUNCTIONAL_ASSESSMENT: PREVENTS OR INTERFERES SOME ACTIVE ACTIVITIES AND ADLS
PAIN_FUNCTIONAL_ASSESSMENT: 0-10

## 2023-10-16 ASSESSMENT — PAIN DESCRIPTION - ORIENTATION
ORIENTATION: RIGHT;LEFT
ORIENTATION: RIGHT
ORIENTATION: RIGHT
ORIENTATION: LEFT
ORIENTATION: RIGHT

## 2023-10-16 ASSESSMENT — PAIN DESCRIPTION - PAIN TYPE: TYPE: CHRONIC PAIN;SURGICAL PAIN

## 2023-10-16 ASSESSMENT — PAIN DESCRIPTION - DESCRIPTORS
DESCRIPTORS: ACHING;BURNING
DESCRIPTORS: ACHING
DESCRIPTORS: ACHING;BURNING
DESCRIPTORS: SHARP
DESCRIPTORS: STABBING
DESCRIPTORS: ACHING
DESCRIPTORS: ACHING

## 2023-10-16 ASSESSMENT — PAIN DESCRIPTION - FREQUENCY: FREQUENCY: CONTINUOUS

## 2023-10-16 ASSESSMENT — PAIN DESCRIPTION - ONSET: ONSET: ON-GOING

## 2023-10-16 NOTE — PROGRESS NOTES
Physician Progress Note      Jonathon Danielson  CSN #:                  [id number]  :                       1985  ADMIT DATE:       10/14/2023 8:44 PM  [facility] DATE:  RESPONDING  PROVIDER #:        Bianca Morrison MD          QUERY TEXT:    Internal Medicine,    Pt admitted with gangrene of rt foot s/p amputation of 2nd-4th digit . Patient   noted to have PMH diabetic ulcers requiring amputation and had recent   amputation revision. Please document in progress notes and discharge summary   if any of the following are the suspected cause of the gangrene and   cellulitis:      The medical record reflects the following:  Risk Factors: DM, cocaine abuse  Clinical Indicators: recent revision of rt 2nd-4th digit, 5th toe   osteomyelitis documented by Surgeon, hyperglycemia  Treatment: labs, imaging, Surgical consult, IV atb, supportive care    Thank you,  Gabriel Rene RN CDS  [phone]  Options provided:  -- Rt foot gangrene and cellulitis associated with diabetes  -- Rt foot gangrene and cellulitis related to post op complication from   amputation revision  -- Rt foot gangrene and cellulitis are unrelated to diabetes or the surgical   procedure  -- Other - I will add my own diagnosis  -- Disagree - Not applicable / Not valid  -- Disagree - Clinically unable to determine / Unknown  -- Refer to Clinical Documentation Reviewer    PROVIDER RESPONSE TEXT:    This patient has Rt foot gangrene and cellulitis associated with diabetes. Query created by: Gabriel Rene on 10/16/2023 12:51 PM      QUERY TEXT:    Internal Medicine,    Pt admitted with rt foot gangrene and cellulitis, noted to have elevated HR,   RR and CRP. If possible, please document in the progress notes and discharge   summary if you are evaluating and /or treating any of the following:     The medical record reflects the following:  Risk Factors: gangrene  Clinical Indicators: HR>100, RR over 20 up to 33 and unverified in the 40's,

## 2023-10-16 NOTE — ANESTHESIA POSTPROCEDURE EVALUATION
Department of Anesthesiology  Postprocedure Note    Patient: Amina Aldridge MRN: [id number]  YOB: 1985  Date of evaluation: 10/16/2023      Procedure Summary     Date: 10/16/23 Room / Location: [address] [place] [address]    Anesthesia Start: 5652 Anesthesia Stop: 5602    Procedure: FOOT DEBRIDEMENT INCISION AND DRAINAGE RIGHT (Right) Diagnosis:       Gangrene ([address])      (Gangrene ([address]) Mary Anne Wilson)    Surgeons: Fransisco Dumont MD Responsible Provider: Antione Russell MD    Anesthesia Type: general, MAC ASA Status: 3 - Emergent          Anesthesia Type: No value filed.     Nadja Phase I:      Nadja Phase II:        Anesthesia Post Evaluation    Patient location during evaluation: bedside  Patient participation: complete - patient participated  Level of consciousness: awake and alert  Pain score: 0  Airway patency: patent  Nausea & Vomiting: no vomiting and no nausea  Complications: no  Cardiovascular status: blood pressure returned to baseline and hemodynamically stable  Respiratory status: acceptable, room air, spontaneous ventilation and nonlabored ventilation  Hydration status: stable  Pain management: adequate

## 2023-10-16 NOTE — CARE COORDINATION
10/16/23 1023   Service Assessment   Patient Orientation Alert and Oriented   Cognition Alert   History Provided By Medical Record; Patient   Primary Caregiver Self   Support Systems Spouse/Significant Other;Children   Patient's Healthcare Decision Maker is: Named in Nelli E David Singh   PCP Verified by CM Yes   Last Visit to PCP Within last 6 months   Prior Functional Level Independent in ADLs/IADLs   Current Functional Level Assistance with the following:;Mobility   Can patient return to prior living arrangement Unknown at present   Ability to make needs known: Good   Family able to assist with home care needs: Yes   Would you like for me to discuss the discharge plan with any other family members/significant others, and if so, who? No   Financial Resources David Soup None     From home with family. OR today. Will need therapy after OR. +UDS. CM following for DC needs and therapy recs. No C at this time.

## 2023-10-16 NOTE — OP NOTE
[address]                                OPERATIVE REPORT    PATIENT NAME: Asiya Nuñez                       :        1985  MED REC NO:   [id number]                          ROOM:       12  ACCOUNT NO:   [de-identified]                           ADMIT DATE: 10/14/2023  PROVIDER:     Krystal Rodriguez MD    DATE OF PROCEDURE:  10/16/2023    PREOPERATIVE DIAGNOSES:  Gangrene of the right fifth toe with  osteomyelitis of the distal phalanx. POSTOPERATIVE DIAGNOSES:  Gangrene of the right fifth toe with  osteomyelitis of the distal phalanx. OPERATION PERFORMED:  Debridement of skin and subcutaneous tissue and  bone down to the head of the metatarsal, fifth digit. OPERATING SURGEON:  Krystal Rodriguez MD    ANESTHESIA:  Monitored anesthesia care. ESTIMATED BLOOD LOSS:  5 mL. COMPLICATIONS:  None. DETAIL OF THE PROCEDURE:  The patient was brought to the operating room  where IV sedation and preoperative antibiotics had been administered. The right foot was prepped and draped in a normal sterile fashion. Yesterday at the bedside, the patient had full-thickness necrosis of  soft tissue with exposed bone of the distal phalanx and at the bedside,  I had removed the distal phalanx and the head of the proximal phalanx. I then used a 15-blade to excise skin, subcutaneous tissue, tendon, and  muscle and then used a bone rongeur to remove the entire phalanx down to  the head of metatarsal.  The cartilaginous head of the metatarsal was  excised using a bone rongeur back to healthy, normal, non-infected bone. The wound was irrigated and packed open with saline-soaked sponges. Sterile dressing applied. No complications. It should be noted that 10  mL of lidocaine and Marcaine was used to anesthetize the wound prior to  the surgery, which was tolerated well.         Krystal Rodriguez MD    D:

## 2023-10-16 NOTE — PROGRESS NOTES
Records do not show that David Mcfarland has had a Pulmonary Function Test (PFT). PFTs are required for confirmation of diagnosis and GOLD grading used for pharmacological and nonpharmacological therapy recommendations. Please schedule David Mcfarland for PFTs once stable.
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
## 2023-10-16 NOTE — PROGRESS NOTES
Noted wound nurse consult but pt to go to OR today. Will plan on evaluating tomorrow if consult still needed.

## 2023-10-17 ENCOUNTER — APPOINTMENT (OUTPATIENT)
Dept: CT IMAGING | Age: 38
DRG: 710 | End: 2023-10-17
Payer: COMMERCIAL

## 2023-10-17 LAB
ALBUMIN SERPL-MCNC: 2.8 GM/DL (ref 3.4–5)
ALP BLD-CCNC: 135 IU/L (ref 40–128)
ALT SERPL-CCNC: 60 U/L (ref 10–40)
ANION GAP SERPL CALCULATED.3IONS-SCNC: 11 MMOL/L (ref 4–16)
AST SERPL-CCNC: 54 IU/L (ref 15–37)
BASOPHILS ABSOLUTE: 0 K/CU MM
BASOPHILS RELATIVE PERCENT: 0.5 % (ref 0–1)
BILIRUB SERPL-MCNC: 0.2 MG/DL (ref 0–1)
BUN SERPL-MCNC: 9 MG/DL (ref 6–23)
CALCIUM SERPL-MCNC: 8.6 MG/DL (ref 8.3–10.6)
CHLORIDE BLD-SCNC: 99 MMOL/L (ref 99–110)
CO2: 24 MMOL/L (ref 21–32)
CREAT SERPL-MCNC: 0.5 MG/DL (ref 0.9–1.3)
DIFFERENTIAL TYPE: ABNORMAL
DOSE AMOUNT: NORMAL
DOSE TIME: NORMAL
EOSINOPHILS ABSOLUTE: 0.2 K/CU MM
EOSINOPHILS RELATIVE PERCENT: 3.1 % (ref 0–3)
GFR SERPL CREATININE-BSD FRML MDRD: >60 ML/MIN/1.73M2
GLUCOSE BLD-MCNC: 219 MG/DL (ref 70–99)
GLUCOSE BLD-MCNC: 230 MG/DL (ref 70–99)
GLUCOSE BLD-MCNC: 244 MG/DL (ref 70–99)
GLUCOSE BLD-MCNC: 287 MG/DL (ref 70–99)
GLUCOSE SERPL-MCNC: 233 MG/DL (ref 70–99)
HCT VFR BLD CALC: 30.4 % (ref 42–52)
HEMOGLOBIN: 10.3 GM/DL (ref 13.5–18)
IMMATURE NEUTROPHIL %: 0.6 % (ref 0–0.43)
LYMPHOCYTES ABSOLUTE: 1 K/CU MM
LYMPHOCYTES RELATIVE PERCENT: 14.6 % (ref 24–44)
MCH RBC QN AUTO: 31.9 PG (ref 27–31)
MCHC RBC AUTO-ENTMCNC: 33.9 % (ref 32–36)
MCV RBC AUTO: 94.1 FL (ref 78–100)
MONOCYTES ABSOLUTE: 0.6 K/CU MM
MONOCYTES RELATIVE PERCENT: 8.6 % (ref 0–4)
NUCLEATED RBC %: 0 %
PDW BLD-RTO: 11.7 % (ref 11.7–14.9)
PLATELET # BLD: 347 K/CU MM (ref 140–440)
PMV BLD AUTO: 8.6 FL (ref 7.5–11.1)
POTASSIUM SERPL-SCNC: 4.2 MMOL/L (ref 3.5–5.1)
RBC # BLD: 3.23 M/CU MM (ref 4.6–6.2)
SEGMENTED NEUTROPHILS ABSOLUTE COUNT: 4.7 K/CU MM
SEGMENTED NEUTROPHILS RELATIVE PERCENT: 72.6 % (ref 36–66)
SODIUM BLD-SCNC: 134 MMOL/L (ref 135–145)
TOTAL IMMATURE NEUTOROPHIL: 0.04 K/CU MM
TOTAL NUCLEATED RBC: 0 K/CU MM
TOTAL PROTEIN: 6.2 GM/DL (ref 6.4–8.2)
VANCOMYCIN RANDOM: 7.6 UG/ML
WBC # BLD: 6.5 K/CU MM (ref 4–10.5)

## 2023-10-17 PROCEDURE — 2580000003 HC RX 258: Performed by: SURGERY

## 2023-10-17 PROCEDURE — 6360000002 HC RX W HCPCS: Performed by: SURGERY

## 2023-10-17 PROCEDURE — 70450 CT HEAD/BRAIN W/O DYE: CPT

## 2023-10-17 PROCEDURE — 6370000000 HC RX 637 (ALT 250 FOR IP): Performed by: SURGERY

## 2023-10-17 PROCEDURE — 99211 OFF/OP EST MAY X REQ PHY/QHP: CPT

## 2023-10-17 PROCEDURE — 2580000003 HC RX 258: Performed by: STUDENT IN AN ORGANIZED HEALTH CARE EDUCATION/TRAINING PROGRAM

## 2023-10-17 PROCEDURE — 6370000000 HC RX 637 (ALT 250 FOR IP): Performed by: INTERNAL MEDICINE

## 2023-10-17 PROCEDURE — 6370000000 HC RX 637 (ALT 250 FOR IP): Performed by: STUDENT IN AN ORGANIZED HEALTH CARE EDUCATION/TRAINING PROGRAM

## 2023-10-17 PROCEDURE — 82962 GLUCOSE BLOOD TEST: CPT

## 2023-10-17 PROCEDURE — 36415 COLL VENOUS BLD VENIPUNCTURE: CPT

## 2023-10-17 PROCEDURE — 80053 COMPREHEN METABOLIC PANEL: CPT

## 2023-10-17 PROCEDURE — 94640 AIRWAY INHALATION TREATMENT: CPT

## 2023-10-17 PROCEDURE — 1200000000 HC SEMI PRIVATE

## 2023-10-17 PROCEDURE — 94761 N-INVAS EAR/PLS OXIMETRY MLT: CPT

## 2023-10-17 PROCEDURE — 85025 COMPLETE CBC W/AUTO DIFF WBC: CPT

## 2023-10-17 PROCEDURE — 6360000002 HC RX W HCPCS: Performed by: STUDENT IN AN ORGANIZED HEALTH CARE EDUCATION/TRAINING PROGRAM

## 2023-10-17 PROCEDURE — 80202 ASSAY OF VANCOMYCIN: CPT

## 2023-10-17 RX ORDER — ALLOPURINOL 100 MG/1
200 TABLET ORAL 2 TIMES DAILY
Status: DISCONTINUED | OUTPATIENT
Start: 2023-10-17 | End: 2023-10-20 | Stop reason: HOSPADM

## 2023-10-17 RX ORDER — COLCHICINE 0.6 MG/1
0.6 TABLET ORAL 2 TIMES DAILY
Status: DISCONTINUED | OUTPATIENT
Start: 2023-10-17 | End: 2023-10-20 | Stop reason: HOSPADM

## 2023-10-17 RX ADMIN — POLYMYXIN B SULFATE AND TRIMETHOPRIM SULFATE 1 DROP: 10000; 1 SOLUTION/ DROPS OPHTHALMIC at 21:57

## 2023-10-17 RX ADMIN — ALBUTEROL SULFATE 1 PUFF: 90 AEROSOL, METERED RESPIRATORY (INHALATION) at 08:20

## 2023-10-17 RX ADMIN — POLYMYXIN B SULFATE AND TRIMETHOPRIM SULFATE 1 DROP: 10000; 1 SOLUTION/ DROPS OPHTHALMIC at 17:41

## 2023-10-17 RX ADMIN — HYDROMORPHONE HYDROCHLORIDE 0.5 MG: 1 INJECTION, SOLUTION INTRAMUSCULAR; INTRAVENOUS; SUBCUTANEOUS at 13:16

## 2023-10-17 RX ADMIN — CEFEPIME 2000 MG: 2 INJECTION, POWDER, FOR SOLUTION INTRAVENOUS at 21:26

## 2023-10-17 RX ADMIN — PREDNISOLONE ACETATE 1 DROP: 10 SUSPENSION/ DROPS OPHTHALMIC at 13:12

## 2023-10-17 RX ADMIN — HYDROMORPHONE HYDROCHLORIDE 0.5 MG: 1 INJECTION, SOLUTION INTRAMUSCULAR; INTRAVENOUS; SUBCUTANEOUS at 04:44

## 2023-10-17 RX ADMIN — INSULIN LISPRO 10 UNITS: 100 INJECTION, SOLUTION INTRAVENOUS; SUBCUTANEOUS at 08:33

## 2023-10-17 RX ADMIN — OXYCODONE AND ACETAMINOPHEN 1 TABLET: 5; 325 TABLET ORAL at 16:21

## 2023-10-17 RX ADMIN — INSULIN LISPRO 10 UNITS: 100 INJECTION, SOLUTION INTRAVENOUS; SUBCUTANEOUS at 13:12

## 2023-10-17 RX ADMIN — POLYMYXIN B SULFATE AND TRIMETHOPRIM SULFATE 1 DROP: 10000; 1 SOLUTION/ DROPS OPHTHALMIC at 09:42

## 2023-10-17 RX ADMIN — IPRATROPIUM BROMIDE 1 PUFF: 17 AEROSOL, METERED RESPIRATORY (INHALATION) at 08:20

## 2023-10-17 RX ADMIN — PANTOPRAZOLE SODIUM 40 MG: 40 TABLET, DELAYED RELEASE ORAL at 06:39

## 2023-10-17 RX ADMIN — IPRATROPIUM BROMIDE 1 PUFF: 17 AEROSOL, METERED RESPIRATORY (INHALATION) at 19:59

## 2023-10-17 RX ADMIN — MOXIFLOXACIN 1 DROP: 5 SOLUTION/ DROPS OPHTHALMIC at 10:00

## 2023-10-17 RX ADMIN — COLCHICINE 0.6 MG: 0.6 TABLET ORAL at 21:24

## 2023-10-17 RX ADMIN — MOXIFLOXACIN 1 DROP: 5 SOLUTION/ DROPS OPHTHALMIC at 17:46

## 2023-10-17 RX ADMIN — COLCHICINE 0.6 MG: 0.6 TABLET ORAL at 13:14

## 2023-10-17 RX ADMIN — PREDNISOLONE ACETATE 1 DROP: 10 SUSPENSION/ DROPS OPHTHALMIC at 17:38

## 2023-10-17 RX ADMIN — PREGABALIN 100 MG: 100 CAPSULE ORAL at 21:24

## 2023-10-17 RX ADMIN — VANCOMYCIN HYDROCHLORIDE 1250 MG: 1.25 INJECTION, POWDER, LYOPHILIZED, FOR SOLUTION INTRAVENOUS at 00:30

## 2023-10-17 RX ADMIN — TRAZODONE HYDROCHLORIDE 50 MG: 50 TABLET ORAL at 21:24

## 2023-10-17 RX ADMIN — PREDNISOLONE ACETATE 1 DROP: 10 SUSPENSION/ DROPS OPHTHALMIC at 21:52

## 2023-10-17 RX ADMIN — INSULIN LISPRO 1 UNITS: 100 INJECTION, SOLUTION INTRAVENOUS; SUBCUTANEOUS at 08:33

## 2023-10-17 RX ADMIN — BRIMONIDINE TARTRATE 1 DROP: 2 SOLUTION OPHTHALMIC at 21:54

## 2023-10-17 RX ADMIN — POLYMYXIN B SULFATE AND TRIMETHOPRIM SULFATE 1 DROP: 10000; 1 SOLUTION/ DROPS OPHTHALMIC at 13:25

## 2023-10-17 RX ADMIN — PREDNISOLONE ACETATE 1 DROP: 10 SUSPENSION/ DROPS OPHTHALMIC at 09:32

## 2023-10-17 RX ADMIN — DORZOLAMIDE HYDROCHLORIDE AND TIMOLOL MALEATE 1 DROP: 20; 5 SOLUTION/ DROPS OPHTHALMIC at 09:37

## 2023-10-17 RX ADMIN — INSULIN LISPRO 2 UNITS: 100 INJECTION, SOLUTION INTRAVENOUS; SUBCUTANEOUS at 13:13

## 2023-10-17 RX ADMIN — INSULIN GLARGINE 30 UNITS: 100 INJECTION, SOLUTION SUBCUTANEOUS at 21:35

## 2023-10-17 RX ADMIN — PREGABALIN 100 MG: 100 CAPSULE ORAL at 09:22

## 2023-10-17 RX ADMIN — OXYCODONE AND ACETAMINOPHEN 1 TABLET: 5; 325 TABLET ORAL at 00:43

## 2023-10-17 RX ADMIN — IPRATROPIUM BROMIDE 1 PUFF: 17 AEROSOL, METERED RESPIRATORY (INHALATION) at 15:18

## 2023-10-17 RX ADMIN — ALBUTEROL SULFATE 1 PUFF: 90 AEROSOL, METERED RESPIRATORY (INHALATION) at 15:18

## 2023-10-17 RX ADMIN — MOXIFLOXACIN 1 DROP: 5 SOLUTION/ DROPS OPHTHALMIC at 13:18

## 2023-10-17 RX ADMIN — IPRATROPIUM BROMIDE 1 PUFF: 17 AEROSOL, METERED RESPIRATORY (INHALATION) at 04:58

## 2023-10-17 RX ADMIN — INSULIN LISPRO 1 UNITS: 100 INJECTION, SOLUTION INTRAVENOUS; SUBCUTANEOUS at 17:39

## 2023-10-17 RX ADMIN — SODIUM CHLORIDE, PRESERVATIVE FREE 10 ML: 5 INJECTION INTRAVENOUS at 21:26

## 2023-10-17 RX ADMIN — INSULIN LISPRO 10 UNITS: 100 INJECTION, SOLUTION INTRAVENOUS; SUBCUTANEOUS at 17:38

## 2023-10-17 RX ADMIN — ALBUTEROL SULFATE 1 PUFF: 90 AEROSOL, METERED RESPIRATORY (INHALATION) at 19:58

## 2023-10-17 RX ADMIN — MOXIFLOXACIN 1 DROP: 5 SOLUTION/ DROPS OPHTHALMIC at 21:41

## 2023-10-17 RX ADMIN — HYDROMORPHONE HYDROCHLORIDE 0.5 MG: 1 INJECTION, SOLUTION INTRAMUSCULAR; INTRAVENOUS; SUBCUTANEOUS at 21:42

## 2023-10-17 RX ADMIN — DORZOLAMIDE HYDROCHLORIDE AND TIMOLOL MALEATE 1 DROP: 20; 5 SOLUTION/ DROPS OPHTHALMIC at 21:46

## 2023-10-17 RX ADMIN — AMLODIPINE BESYLATE 10 MG: 10 TABLET ORAL at 09:22

## 2023-10-17 RX ADMIN — VANCOMYCIN HYDROCHLORIDE 1500 MG: 1.5 INJECTION, POWDER, LYOPHILIZED, FOR SOLUTION INTRAVENOUS at 13:28

## 2023-10-17 RX ADMIN — ALBUTEROL SULFATE 1 PUFF: 90 AEROSOL, METERED RESPIRATORY (INHALATION) at 04:58

## 2023-10-17 RX ADMIN — ALLOPURINOL 200 MG: 100 TABLET ORAL at 21:24

## 2023-10-17 RX ADMIN — HYDROMORPHONE HYDROCHLORIDE 0.5 MG: 1 INJECTION, SOLUTION INTRAMUSCULAR; INTRAVENOUS; SUBCUTANEOUS at 17:40

## 2023-10-17 RX ADMIN — CEFEPIME 2000 MG: 2 INJECTION, POWDER, FOR SOLUTION INTRAVENOUS at 09:19

## 2023-10-17 RX ADMIN — EZETIMIBE 10 MG: 10 TABLET ORAL at 09:21

## 2023-10-17 RX ADMIN — BUDESONIDE AND FORMOTEROL FUMARATE DIHYDRATE 2 PUFF: 160; 4.5 AEROSOL RESPIRATORY (INHALATION) at 20:00

## 2023-10-17 RX ADMIN — HYDROMORPHONE HYDROCHLORIDE 0.5 MG: 1 INJECTION, SOLUTION INTRAMUSCULAR; INTRAVENOUS; SUBCUTANEOUS at 09:09

## 2023-10-17 RX ADMIN — ATORVASTATIN CALCIUM 10 MG: 10 TABLET, FILM COATED ORAL at 09:21

## 2023-10-17 RX ADMIN — SODIUM CHLORIDE, PRESERVATIVE FREE 10 ML: 5 INJECTION INTRAVENOUS at 09:23

## 2023-10-17 RX ADMIN — BRIMONIDINE TARTRATE 1 DROP: 2 SOLUTION OPHTHALMIC at 09:48

## 2023-10-17 RX ADMIN — ALLOPURINOL 300 MG: 100 TABLET ORAL at 09:21

## 2023-10-17 RX ADMIN — CLONIDINE HYDROCHLORIDE 0.2 MG: 0.2 TABLET ORAL at 09:21

## 2023-10-17 RX ADMIN — BUDESONIDE AND FORMOTEROL FUMARATE DIHYDRATE 2 PUFF: 160; 4.5 AEROSOL RESPIRATORY (INHALATION) at 08:20

## 2023-10-17 ASSESSMENT — PAIN SCALES - GENERAL
PAINLEVEL_OUTOF10: 8
PAINLEVEL_OUTOF10: 9
PAINLEVEL_OUTOF10: 8
PAINLEVEL_OUTOF10: 7
PAINLEVEL_OUTOF10: 8
PAINLEVEL_OUTOF10: 0
PAINLEVEL_OUTOF10: 8

## 2023-10-17 ASSESSMENT — PAIN DESCRIPTION - ONSET
ONSET: ON-GOING

## 2023-10-17 ASSESSMENT — PAIN - FUNCTIONAL ASSESSMENT
PAIN_FUNCTIONAL_ASSESSMENT: PREVENTS OR INTERFERES SOME ACTIVE ACTIVITIES AND ADLS

## 2023-10-17 ASSESSMENT — PAIN DESCRIPTION - PAIN TYPE
TYPE: CHRONIC PAIN
TYPE: SURGICAL PAIN;CHRONIC PAIN
TYPE: CHRONIC PAIN;SURGICAL PAIN

## 2023-10-17 ASSESSMENT — PAIN SCALES - WONG BAKER
WONGBAKER_NUMERICALRESPONSE: 4
WONGBAKER_NUMERICALRESPONSE: 2
WONGBAKER_NUMERICALRESPONSE: 4
WONGBAKER_NUMERICALRESPONSE: 4
WONGBAKER_NUMERICALRESPONSE: 2

## 2023-10-17 ASSESSMENT — ENCOUNTER SYMPTOMS
DIARRHEA: 0
SORE THROAT: 0
ABDOMINAL PAIN: 0
EYE ITCHING: 0
VOICE CHANGE: 0
BACK PAIN: 0
VOMITING: 0
EYE PAIN: 0
SHORTNESS OF BREATH: 0
COUGH: 0
WHEEZING: 0
CHEST TIGHTNESS: 0
SINUS PAIN: 0
EYE REDNESS: 0
SINUS PRESSURE: 0
COLOR CHANGE: 1
CONSTIPATION: 0
NAUSEA: 0

## 2023-10-17 ASSESSMENT — PAIN DESCRIPTION - DESCRIPTORS
DESCRIPTORS: ACHING
DESCRIPTORS: ACHING
DESCRIPTORS: ACHING;BURNING
DESCRIPTORS: ACHING
DESCRIPTORS: ACHING;BURNING

## 2023-10-17 ASSESSMENT — PAIN DESCRIPTION - LOCATION
LOCATION: FOOT
LOCATION: FOOT
LOCATION: ANKLE
LOCATION: FOOT
LOCATION: FOOT
LOCATION: ANKLE
LOCATION: ANKLE

## 2023-10-17 ASSESSMENT — PAIN DESCRIPTION - ORIENTATION
ORIENTATION: RIGHT;LEFT
ORIENTATION: LEFT
ORIENTATION: LEFT
ORIENTATION: RIGHT;LEFT
ORIENTATION: RIGHT
ORIENTATION: RIGHT;LEFT
ORIENTATION: RIGHT;LEFT

## 2023-10-17 ASSESSMENT — PAIN DESCRIPTION - FREQUENCY
FREQUENCY: INTERMITTENT
FREQUENCY: CONTINUOUS
FREQUENCY: CONTINUOUS

## 2023-10-17 NOTE — CARE COORDINATION
Chart reviewed and patient discussed in IDR. Needs ID final recs and therapy evals. Placement may be difficult due to +UDS. Cannot go home with line in place. Will most likely need infusion clinic at DC if dc'd on IVAB. CM following.

## 2023-10-17 NOTE — CONSULTS
10/14/2023   Chief Complaint   Patient presents with    Wound Check     Right foot, Left side of head, Left foot        History of Present Illness:                             Harrison Joy is a 44 y.o. male currently admitted for gangrene of the right foot. Patient is a poorly controlled diabetic. Also has a history of cocaine abuse, gout and several other medical issues. Patient underwent a right foot debridement yesterday which is 1 of several he has had prior. No new injuries or complaints. There were findings found at the midfoot on x-ray that was concerning for osteoarthritis and orthopedics was therefore consulted. He also has left foot pain at this time. This is my first time seen the patient. No advanced imaging to either foot. This is my first time seeing the patient. The pain's location is mainly at the toes and midfoot bilaterally. he describes the symptoms as aching, sharp and stabbing but intermittent. Symptoms improve with rest. Symptoms worsen with weightbearing, deep palpation  Patient states he   having sensation of instability, decreased ROM    Treatment to date has been medication, debridements. Patient denies prior injury to either foot or ankle, admits to numbness, tingling, denies fever, chills. Is affecting ADLs. Pain is 6/10 at it's worst.    Outside reports reviewed: Current imaging, hospital notes as well as recent brief op note reviewed. Patient's medications, allergies, past medical, surgical, social and family histories were reviewed and updated as appropriate. Medical History  Patient's medications, allergies, past medical, surgical, social and family histories were reviewed and updated as appropriate.     Past Medical History:   Diagnosis Date    Asthma     Blind left eye     Diabetes mellitus ([address])     GERD (gastroesophageal reflux disease)     Hypertension     Retinal detachment 2012    left     Past Surgical History:   Procedure Laterality Date one equivalent RT bronchodilator order with QID Frequency and an Albuterol order with Frequency of every 4 hours PRN wheezing or increased work of breathing using Per Protocol order mode. Greater than 13 - enter or revise RT Bronchodilator order(s) to one equivalent RT bronchodilator order with every 4 hours Frequency and an Albuterol order with Frequency of every 2 hours PRN wheezing or increased work of breathing using Per Protocol order mode. For patients with COPD, RT to enter RT Home Evaluation for COPD & MDI Assessment order using Per Protocol order mode. Standing Status:   Standing     Number of Occurrences:   7    POCT glucose     If blood sugar is below 110 at bedtime, then check blood glucose at 0200. Standing Status:   Standing     Number of Occurrences:   28    POCT Glucose     Repeat blood glucose 15 minutes following intervention. If blood glucose is LESS THAN 70 mg/dL, repeat treatment and recheck blood glucose in 15 minutes x 2. If blood glucose remains LESS THAN 70 mg/dL, call ordering provider for further instruction. If patient experienced a hypoglycemic event in last 24 hours, obtain blood glucose at 0200.      Standing Status:   Standing     Number of Occurrences:   30294    POCT Glucose     Standing Status:   Standing     Number of Occurrences:   1    POCT Glucose     Standing Status:   Standing     Number of Occurrences:   1    POCT Glucose     Standing Status:   Standing     Number of Occurrences:   1    POCT Glucose     Standing Status:   Standing     Number of Occurrences:   1    POCT Glucose     Standing Status:   Standing     Number of Occurrences:   1    POCT Glucose     Standing Status:   Standing     Number of Occurrences:   1    POCT Glucose     Standing Status:   Standing     Number of Occurrences:   1    POCT Glucose     Standing Status:   Standing     Number of Occurrences:   1    POCT Glucose     Standing Status:   Standing     Number of Occurrences:   1

## 2023-10-17 NOTE — PROGRESS NOTES
Patient is doing fine following the fifth toe amputation. I spoke to wound care he just needs wet-to-dry dressings once a day at this point in time and the patient wishes to follow-up at the wound care clinic. I will ask him to see my patient Dr. Neda Pearl next Wednesday for secondary wound closure. Patient continues to be followed up for him known swelling of the left ankle. I will be signing out of town Thursday please call me prior to that if needed. I discussed plan of care with the patient.

## 2023-10-18 LAB
ANION GAP SERPL CALCULATED.3IONS-SCNC: 10 MMOL/L (ref 4–16)
BASOPHILS ABSOLUTE: 0 K/CU MM
BASOPHILS RELATIVE PERCENT: 0.6 % (ref 0–1)
BUN SERPL-MCNC: 7 MG/DL (ref 6–23)
CALCIUM SERPL-MCNC: 8.7 MG/DL (ref 8.3–10.6)
CHLORIDE BLD-SCNC: 98 MMOL/L (ref 99–110)
CO2: 25 MMOL/L (ref 21–32)
CREAT SERPL-MCNC: 0.6 MG/DL (ref 0.9–1.3)
CRP SERPL HS-MCNC: 84.5 MG/L
DIFFERENTIAL TYPE: ABNORMAL
EOSINOPHILS ABSOLUTE: 0.2 K/CU MM
EOSINOPHILS RELATIVE PERCENT: 3.6 % (ref 0–3)
GFR SERPL CREATININE-BSD FRML MDRD: >60 ML/MIN/1.73M2
GLUCOSE BLD-MCNC: 154 MG/DL (ref 70–99)
GLUCOSE BLD-MCNC: 217 MG/DL (ref 70–99)
GLUCOSE BLD-MCNC: 240 MG/DL (ref 70–99)
GLUCOSE BLD-MCNC: 338 MG/DL (ref 70–99)
GLUCOSE SERPL-MCNC: 261 MG/DL (ref 70–99)
HCT VFR BLD CALC: 32.7 % (ref 42–52)
HEMOGLOBIN: 11.1 GM/DL (ref 13.5–18)
IMMATURE NEUTROPHIL %: 0.8 % (ref 0–0.43)
LYMPHOCYTES ABSOLUTE: 0.9 K/CU MM
LYMPHOCYTES RELATIVE PERCENT: 13.9 % (ref 24–44)
MAGNESIUM: 1.7 MG/DL (ref 1.8–2.4)
MCH RBC QN AUTO: 31.9 PG (ref 27–31)
MCHC RBC AUTO-ENTMCNC: 33.9 % (ref 32–36)
MCV RBC AUTO: 94 FL (ref 78–100)
MONOCYTES ABSOLUTE: 0.5 K/CU MM
MONOCYTES RELATIVE PERCENT: 7.6 % (ref 0–4)
NUCLEATED RBC %: 0 %
PDW BLD-RTO: 11.7 % (ref 11.7–14.9)
PHOSPHORUS: 3.9 MG/DL (ref 2.5–4.9)
PLATELET # BLD: 353 K/CU MM (ref 140–440)
PMV BLD AUTO: 8.4 FL (ref 7.5–11.1)
POTASSIUM SERPL-SCNC: 4.2 MMOL/L (ref 3.5–5.1)
PROCALCITONIN SERPL-MCNC: 0.07 NG/ML
RBC # BLD: 3.48 M/CU MM (ref 4.6–6.2)
SEGMENTED NEUTROPHILS ABSOLUTE COUNT: 4.6 K/CU MM
SEGMENTED NEUTROPHILS RELATIVE PERCENT: 73.5 % (ref 36–66)
SODIUM BLD-SCNC: 133 MMOL/L (ref 135–145)
TOTAL IMMATURE NEUTOROPHIL: 0.05 K/CU MM
TOTAL NUCLEATED RBC: 0 K/CU MM
URATE SERPL-MCNC: 3.7 MG/DL (ref 3.5–7.2)
WBC # BLD: 6.3 K/CU MM (ref 4–10.5)

## 2023-10-18 PROCEDURE — 80048 BASIC METABOLIC PNL TOTAL CA: CPT

## 2023-10-18 PROCEDURE — 1200000000 HC SEMI PRIVATE

## 2023-10-18 PROCEDURE — 76937 US GUIDE VASCULAR ACCESS: CPT

## 2023-10-18 PROCEDURE — 6370000000 HC RX 637 (ALT 250 FOR IP): Performed by: INTERNAL MEDICINE

## 2023-10-18 PROCEDURE — 6370000000 HC RX 637 (ALT 250 FOR IP): Performed by: SURGERY

## 2023-10-18 PROCEDURE — 6360000002 HC RX W HCPCS: Performed by: SURGERY

## 2023-10-18 PROCEDURE — 99255 IP/OBS CONSLTJ NEW/EST HI 80: CPT | Performed by: INTERNAL MEDICINE

## 2023-10-18 PROCEDURE — 84100 ASSAY OF PHOSPHORUS: CPT

## 2023-10-18 PROCEDURE — 85025 COMPLETE CBC W/AUTO DIFF WBC: CPT

## 2023-10-18 PROCEDURE — 86140 C-REACTIVE PROTEIN: CPT

## 2023-10-18 PROCEDURE — 82962 GLUCOSE BLOOD TEST: CPT

## 2023-10-18 PROCEDURE — 94640 AIRWAY INHALATION TREATMENT: CPT

## 2023-10-18 PROCEDURE — 97163 PT EVAL HIGH COMPLEX 45 MIN: CPT

## 2023-10-18 PROCEDURE — 2580000003 HC RX 258: Performed by: STUDENT IN AN ORGANIZED HEALTH CARE EDUCATION/TRAINING PROGRAM

## 2023-10-18 PROCEDURE — 83735 ASSAY OF MAGNESIUM: CPT

## 2023-10-18 PROCEDURE — 94761 N-INVAS EAR/PLS OXIMETRY MLT: CPT

## 2023-10-18 PROCEDURE — 84145 PROCALCITONIN (PCT): CPT

## 2023-10-18 PROCEDURE — 6360000002 HC RX W HCPCS: Performed by: STUDENT IN AN ORGANIZED HEALTH CARE EDUCATION/TRAINING PROGRAM

## 2023-10-18 PROCEDURE — APPSS60 APP SPLIT SHARED TIME 46-60 MINUTES: Performed by: NURSE PRACTITIONER

## 2023-10-18 PROCEDURE — 84550 ASSAY OF BLOOD/URIC ACID: CPT

## 2023-10-18 PROCEDURE — 2580000003 HC RX 258: Performed by: SURGERY

## 2023-10-18 PROCEDURE — 6370000000 HC RX 637 (ALT 250 FOR IP): Performed by: STUDENT IN AN ORGANIZED HEALTH CARE EDUCATION/TRAINING PROGRAM

## 2023-10-18 PROCEDURE — 36415 COLL VENOUS BLD VENIPUNCTURE: CPT

## 2023-10-18 RX ADMIN — BRIMONIDINE TARTRATE 1 DROP: 2 SOLUTION OPHTHALMIC at 09:10

## 2023-10-18 RX ADMIN — PREDNISOLONE ACETATE 1 DROP: 10 SUSPENSION/ DROPS OPHTHALMIC at 16:38

## 2023-10-18 RX ADMIN — MOXIFLOXACIN 1 DROP: 5 SOLUTION/ DROPS OPHTHALMIC at 16:38

## 2023-10-18 RX ADMIN — IPRATROPIUM BROMIDE 1 PUFF: 17 AEROSOL, METERED RESPIRATORY (INHALATION) at 11:17

## 2023-10-18 RX ADMIN — PANTOPRAZOLE SODIUM 40 MG: 40 TABLET, DELAYED RELEASE ORAL at 06:28

## 2023-10-18 RX ADMIN — ALBUTEROL SULFATE 1 PUFF: 90 AEROSOL, METERED RESPIRATORY (INHALATION) at 11:17

## 2023-10-18 RX ADMIN — INSULIN GLARGINE 30 UNITS: 100 INJECTION, SOLUTION SUBCUTANEOUS at 21:41

## 2023-10-18 RX ADMIN — ATORVASTATIN CALCIUM 10 MG: 10 TABLET, FILM COATED ORAL at 09:11

## 2023-10-18 RX ADMIN — POLYMYXIN B SULFATE AND TRIMETHOPRIM SULFATE 1 DROP: 10000; 1 SOLUTION/ DROPS OPHTHALMIC at 16:38

## 2023-10-18 RX ADMIN — ENOXAPARIN SODIUM 30 MG: 100 INJECTION SUBCUTANEOUS at 21:56

## 2023-10-18 RX ADMIN — AMLODIPINE BESYLATE 10 MG: 10 TABLET ORAL at 09:11

## 2023-10-18 RX ADMIN — COLCHICINE 0.6 MG: 0.6 TABLET ORAL at 09:11

## 2023-10-18 RX ADMIN — MOXIFLOXACIN 1 DROP: 5 SOLUTION/ DROPS OPHTHALMIC at 13:24

## 2023-10-18 RX ADMIN — IPRATROPIUM BROMIDE 1 PUFF: 17 AEROSOL, METERED RESPIRATORY (INHALATION) at 20:50

## 2023-10-18 RX ADMIN — PREGABALIN 100 MG: 100 CAPSULE ORAL at 21:35

## 2023-10-18 RX ADMIN — BRIMONIDINE TARTRATE 1 DROP: 2 SOLUTION OPHTHALMIC at 16:34

## 2023-10-18 RX ADMIN — INSULIN LISPRO 1 UNITS: 100 INJECTION, SOLUTION INTRAVENOUS; SUBCUTANEOUS at 09:12

## 2023-10-18 RX ADMIN — POLYMYXIN B SULFATE AND TRIMETHOPRIM SULFATE 1 DROP: 10000; 1 SOLUTION/ DROPS OPHTHALMIC at 13:23

## 2023-10-18 RX ADMIN — HYDROMORPHONE HYDROCHLORIDE 0.5 MG: 1 INJECTION, SOLUTION INTRAMUSCULAR; INTRAVENOUS; SUBCUTANEOUS at 16:38

## 2023-10-18 RX ADMIN — SODIUM CHLORIDE, PRESERVATIVE FREE 10 ML: 5 INJECTION INTRAVENOUS at 21:54

## 2023-10-18 RX ADMIN — VANCOMYCIN HYDROCHLORIDE 1500 MG: 1.5 INJECTION, POWDER, LYOPHILIZED, FOR SOLUTION INTRAVENOUS at 11:59

## 2023-10-18 RX ADMIN — CEFEPIME 2000 MG: 2 INJECTION, POWDER, FOR SOLUTION INTRAVENOUS at 09:08

## 2023-10-18 RX ADMIN — BUDESONIDE AND FORMOTEROL FUMARATE DIHYDRATE 2 PUFF: 160; 4.5 AEROSOL RESPIRATORY (INHALATION) at 20:51

## 2023-10-18 RX ADMIN — HYDROMORPHONE HYDROCHLORIDE 0.5 MG: 1 INJECTION, SOLUTION INTRAMUSCULAR; INTRAVENOUS; SUBCUTANEOUS at 07:38

## 2023-10-18 RX ADMIN — INSULIN LISPRO 4 UNITS: 100 INJECTION, SOLUTION INTRAVENOUS; SUBCUTANEOUS at 21:41

## 2023-10-18 RX ADMIN — OXYCODONE AND ACETAMINOPHEN 1 TABLET: 5; 325 TABLET ORAL at 18:31

## 2023-10-18 RX ADMIN — ALBUTEROL SULFATE 1 PUFF: 90 AEROSOL, METERED RESPIRATORY (INHALATION) at 20:49

## 2023-10-18 RX ADMIN — EZETIMIBE 10 MG: 10 TABLET ORAL at 09:11

## 2023-10-18 RX ADMIN — INSULIN LISPRO 10 UNITS: 100 INJECTION, SOLUTION INTRAVENOUS; SUBCUTANEOUS at 09:13

## 2023-10-18 RX ADMIN — HYDROMORPHONE HYDROCHLORIDE 0.5 MG: 1 INJECTION, SOLUTION INTRAMUSCULAR; INTRAVENOUS; SUBCUTANEOUS at 03:35

## 2023-10-18 RX ADMIN — DORZOLAMIDE HYDROCHLORIDE AND TIMOLOL MALEATE 1 DROP: 20; 5 SOLUTION/ DROPS OPHTHALMIC at 16:35

## 2023-10-18 RX ADMIN — INSULIN LISPRO 1 UNITS: 100 INJECTION, SOLUTION INTRAVENOUS; SUBCUTANEOUS at 16:51

## 2023-10-18 RX ADMIN — DORZOLAMIDE HYDROCHLORIDE AND TIMOLOL MALEATE 1 DROP: 20; 5 SOLUTION/ DROPS OPHTHALMIC at 09:10

## 2023-10-18 RX ADMIN — PREDNISOLONE ACETATE 1 DROP: 10 SUSPENSION/ DROPS OPHTHALMIC at 09:09

## 2023-10-18 RX ADMIN — CEFEPIME 2000 MG: 2 INJECTION, POWDER, FOR SOLUTION INTRAVENOUS at 21:47

## 2023-10-18 RX ADMIN — VANCOMYCIN HYDROCHLORIDE 1500 MG: 1.5 INJECTION, POWDER, LYOPHILIZED, FOR SOLUTION INTRAVENOUS at 01:45

## 2023-10-18 RX ADMIN — PREDNISOLONE ACETATE 1 DROP: 10 SUSPENSION/ DROPS OPHTHALMIC at 13:24

## 2023-10-18 RX ADMIN — HYDROMORPHONE HYDROCHLORIDE 0.5 MG: 1 INJECTION, SOLUTION INTRAMUSCULAR; INTRAVENOUS; SUBCUTANEOUS at 11:53

## 2023-10-18 RX ADMIN — SODIUM CHLORIDE, PRESERVATIVE FREE 10 ML: 5 INJECTION INTRAVENOUS at 07:40

## 2023-10-18 RX ADMIN — INSULIN LISPRO 10 UNITS: 100 INJECTION, SOLUTION INTRAVENOUS; SUBCUTANEOUS at 11:59

## 2023-10-18 RX ADMIN — POLYMYXIN B SULFATE AND TRIMETHOPRIM SULFATE 1 DROP: 10000; 1 SOLUTION/ DROPS OPHTHALMIC at 09:09

## 2023-10-18 RX ADMIN — TRAZODONE HYDROCHLORIDE 50 MG: 50 TABLET ORAL at 21:35

## 2023-10-18 RX ADMIN — ALLOPURINOL 200 MG: 100 TABLET ORAL at 21:35

## 2023-10-18 RX ADMIN — HYDROMORPHONE HYDROCHLORIDE 0.5 MG: 1 INJECTION, SOLUTION INTRAMUSCULAR; INTRAVENOUS; SUBCUTANEOUS at 21:37

## 2023-10-18 RX ADMIN — MOXIFLOXACIN 1 DROP: 5 SOLUTION/ DROPS OPHTHALMIC at 09:10

## 2023-10-18 RX ADMIN — INSULIN LISPRO 10 UNITS: 100 INJECTION, SOLUTION INTRAVENOUS; SUBCUTANEOUS at 16:51

## 2023-10-18 RX ADMIN — IPRATROPIUM BROMIDE 1 PUFF: 17 AEROSOL, METERED RESPIRATORY (INHALATION) at 01:22

## 2023-10-18 RX ADMIN — PREGABALIN 100 MG: 100 CAPSULE ORAL at 09:11

## 2023-10-18 RX ADMIN — ALBUTEROL SULFATE 1 PUFF: 90 AEROSOL, METERED RESPIRATORY (INHALATION) at 01:21

## 2023-10-18 RX ADMIN — BUDESONIDE AND FORMOTEROL FUMARATE DIHYDRATE 2 PUFF: 160; 4.5 AEROSOL RESPIRATORY (INHALATION) at 11:18

## 2023-10-18 RX ADMIN — ALLOPURINOL 200 MG: 100 TABLET ORAL at 09:10

## 2023-10-18 RX ADMIN — ENOXAPARIN SODIUM 30 MG: 100 INJECTION SUBCUTANEOUS at 09:11

## 2023-10-18 RX ADMIN — OXYCODONE AND ACETAMINOPHEN 1 TABLET: 5; 325 TABLET ORAL at 09:11

## 2023-10-18 RX ADMIN — CLONIDINE HYDROCHLORIDE 0.2 MG: 0.2 TABLET ORAL at 09:10

## 2023-10-18 RX ADMIN — COLCHICINE 0.6 MG: 0.6 TABLET ORAL at 21:35

## 2023-10-18 ASSESSMENT — PAIN SCALES - GENERAL
PAINLEVEL_OUTOF10: 8
PAINLEVEL_OUTOF10: 9
PAINLEVEL_OUTOF10: 7
PAINLEVEL_OUTOF10: 8
PAINLEVEL_OUTOF10: 8
PAINLEVEL_OUTOF10: 9
PAINLEVEL_OUTOF10: 1
PAINLEVEL_OUTOF10: 2
PAINLEVEL_OUTOF10: 8
PAINLEVEL_OUTOF10: 10

## 2023-10-18 ASSESSMENT — ENCOUNTER SYMPTOMS
VOMITING: 0
NAUSEA: 0
SORE THROAT: 0
EYE PAIN: 0
EYE ITCHING: 0
SINUS PRESSURE: 0
SHORTNESS OF BREATH: 0
CONSTIPATION: 0
COUGH: 0
COLOR CHANGE: 1
DIARRHEA: 0
WHEEZING: 0
ABDOMINAL PAIN: 0
EYE REDNESS: 0
SINUS PAIN: 0
BACK PAIN: 0
CHEST TIGHTNESS: 0

## 2023-10-18 ASSESSMENT — PAIN DESCRIPTION - DESCRIPTORS
DESCRIPTORS: ACHING

## 2023-10-18 ASSESSMENT — PAIN DESCRIPTION - LOCATION
LOCATION: ANKLE
LOCATION: FOOT;ANKLE
LOCATION: ANKLE

## 2023-10-18 ASSESSMENT — PAIN SCALES - WONG BAKER
WONGBAKER_NUMERICALRESPONSE: 2

## 2023-10-18 ASSESSMENT — PAIN - FUNCTIONAL ASSESSMENT
PAIN_FUNCTIONAL_ASSESSMENT: ACTIVITIES ARE NOT PREVENTED
PAIN_FUNCTIONAL_ASSESSMENT: PREVENTS OR INTERFERES SOME ACTIVE ACTIVITIES AND ADLS

## 2023-10-18 ASSESSMENT — PAIN DESCRIPTION - ONSET
ONSET: ON-GOING
ONSET: ON-GOING

## 2023-10-18 ASSESSMENT — PAIN DESCRIPTION - FREQUENCY
FREQUENCY: CONTINUOUS
FREQUENCY: CONTINUOUS

## 2023-10-18 ASSESSMENT — PAIN DESCRIPTION - ORIENTATION
ORIENTATION: RIGHT;LEFT
ORIENTATION: LEFT;RIGHT
ORIENTATION: RIGHT;LEFT
ORIENTATION: LEFT;RIGHT

## 2023-10-18 ASSESSMENT — PAIN DESCRIPTION - PAIN TYPE
TYPE: CHRONIC PAIN
TYPE: CHRONIC PAIN

## 2023-10-18 NOTE — CONSULTS
Infectious Disease Consult Note  10/18/2023   Patient Name: Ceci Vang. : 1985   Impression  Gangrene of Right 5th Toe s/p Amputation 10/16/2023:  Presumed Residual OM Left Foot:  Right Midfoot Osteoarthritis:  Allergy to ABX: Cipro (SOB)  Past h/o MRSA and MDR Proteus mirabilis   CrCl 251  Afebrile, no leukocytosis, .5, Pct and trend CRP Pending  Hypoalbuminemia  10/14-BC 0/2 NGTD  Past 2022: Right great toe cultures: MRSA, Beta Strep Group B  Past: 2023: Right 3rd toe surgical culture: BHS Group B, Proteus mirabilis  Past: 2023: S/p right foot toe amputation. Pathology: surgical margins viable and acute OM is noted at surgical margin. 10/14-XR Foot Right: No radiographic evidence of osteomyelitis or other acute osseous abnormality. Diffuse soft tissue swelling. 10/14-XR Foot Left: Complete or near complete central navicular fracture or stress fracture with   involvement of the talonavicular articulation. Mature ossifications along the dorsum of the talus and navicular may reflect prior trauma. Consider CT imaging for further evaluation. Dorsal soft tissue swelling. 10/15-Foot wound culture: Proteus mirabilis, Beta Strep Group B  10/16-S/p per Dr. Ave Guardado: debridement of skin and subcutaneous tissue and bone down to the head of the metatarsal, fifth digit. DX: gangrene of the right fifth toe with OM of the distal phalanx. Cultures: Proteus mirabilis (pan sensitive), Beta Strep Group B  10/17-CT Head WO Contrast: No acute intracranial abnormality. Dr. Nina Arms, ortho, eval right midfoot osteoarthritis, rec no further surgery at this time, rec follow-up with podiatry or foot and ankle specialist as needed.   IDDM/ Neuropathy:  10/15-HbAIC 8.3  Hyponatremia:  Class III Obesity:  BMI: 38.43  Legally Blind:  Cocaine Use/ Alcohol and Tobacco Abuse:  UDS revealed cocaine and opiates- 10/15  Multi-morbidity: per PMHx: asthma, retinal detachment of the left eye and legally blind, by Talon Donaldson.  KAYLI Titus - CNP on 10/18/2023 at 1:47 PM
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
## 2023-10-18 NOTE — CARE COORDINATION
Chart reviewed and patient discussed in IDR. Needs ID final recs. May need IVAB. +UDS. May be difficult placement. Needs therapy recs. CM following.

## 2023-10-18 NOTE — CONSULTS
[facility] PHYSICAL THERAPY EVALUATION  Piter Nguyen., 1985, 1108/1108-A, 10/18/2023    Assessment:  Patient admitted for gangrene of toe of R foot with unstable/unpredictable medical features (please see attending notes) and stable/uncomplicated rehabilitative features. Presents with independent mobility skills today, asserts he does not have ongoing physical therapy needs. Clinical decision making:  high complexity:  hx 3+ personal factors/comorbidities, exam tests and measures for 3+ elements of body/structures/functions/activity limitation/participation restriction, unstable/unpredictable presentation. Discharge recommendations:  Support home independently. Anticipate medical needs will influence d/c planning. Plan/Goals:  Discharge from acute care PT service -- no needs. Mobility homework for patient and nurse:    change position frequently, out of bed for meals, and accompany to bathroom for voiding. Patient requires independent for mobility with nursing. History  [place]:  The encounter diagnosis was Gangrene of toe ([address]). Patient  has a past medical history of Asthma, Blind left eye, Diabetes mellitus ([address]), GERD (gastroesophageal reflux disease), Hypertension, and Retinal detachment. Patient  has a past surgical history that includes Corneal transplant (Bilateral); Mandible surgery (Right); eye surgery; Eye surgery (Right); fracture surgery; Mandible surgery (Bilateral); Toe amputation (Right, 07/25/2017); laparoscopic appendectomy (N/A, 4/28/2022); Toe amputation (Right, 7/25/2022); Toe amputation (Right, 2/11/2023); and Foot Debridement (Right, 10/16/2023). Therapy Hx and additional comorbidities:  see above. Restrictions:  WBAT RLE through heel, avoid toe WB RLE. Communication with other providers:  cleared for tx and discussed with nursing staff, patient Zulema Ledezma recently. Subjective:  Patient states:  not very interested in moving about. 1425  Timed treatment minutes:  0  Total treatment time:  10    Electronically signed by:   Laban Severance, ROXANA  10/18/2023, 2:24 PM

## 2023-10-18 NOTE — CONSULTS
Consult completed. Procedure/rationale explained to pt & consent obtained. Nexiva 20g 1.75\" PIV initiated using UltraSound-guided technique without difficulty/complications. Removed PIV. Pt tolerated well and no other c/o or needs noted or reported. Consult IV/PICC team if patient's needs change.

## 2023-10-19 PROBLEM — I96 GANGRENE OF TOE (HCC): Status: ACTIVE | Noted: 2023-10-19

## 2023-10-19 LAB
ANION GAP SERPL CALCULATED.3IONS-SCNC: 9 MMOL/L (ref 4–16)
BASOPHILS ABSOLUTE: 0 K/CU MM
BASOPHILS RELATIVE PERCENT: 0.6 % (ref 0–1)
BUN SERPL-MCNC: 7 MG/DL (ref 6–23)
CALCIUM SERPL-MCNC: 8.7 MG/DL (ref 8.3–10.6)
CHLORIDE BLD-SCNC: 102 MMOL/L (ref 99–110)
CO2: 27 MMOL/L (ref 21–32)
CREAT SERPL-MCNC: 0.6 MG/DL (ref 0.9–1.3)
CRP SERPL HS-MCNC: 64.2 MG/L
CULTURE: NORMAL
CULTURE: NORMAL
DIFFERENTIAL TYPE: ABNORMAL
EOSINOPHILS ABSOLUTE: 0.2 K/CU MM
EOSINOPHILS RELATIVE PERCENT: 3.4 % (ref 0–3)
GFR SERPL CREATININE-BSD FRML MDRD: >60 ML/MIN/1.73M2
GLUCOSE BLD-MCNC: 189 MG/DL (ref 70–99)
GLUCOSE BLD-MCNC: 217 MG/DL (ref 70–99)
GLUCOSE BLD-MCNC: 245 MG/DL (ref 70–99)
GLUCOSE BLD-MCNC: 285 MG/DL (ref 70–99)
GLUCOSE BLD-MCNC: 326 MG/DL (ref 70–99)
GLUCOSE SERPL-MCNC: 205 MG/DL (ref 70–99)
HCT VFR BLD CALC: 31.6 % (ref 42–52)
HEMOGLOBIN: 10.5 GM/DL (ref 13.5–18)
IMMATURE NEUTROPHIL %: 0.8 % (ref 0–0.43)
LYMPHOCYTES ABSOLUTE: 1.2 K/CU MM
LYMPHOCYTES RELATIVE PERCENT: 18.7 % (ref 24–44)
Lab: NORMAL
Lab: NORMAL
MAGNESIUM: 1.9 MG/DL (ref 1.8–2.4)
MCH RBC QN AUTO: 31.2 PG (ref 27–31)
MCHC RBC AUTO-ENTMCNC: 33.2 % (ref 32–36)
MCV RBC AUTO: 93.8 FL (ref 78–100)
MONOCYTES ABSOLUTE: 0.6 K/CU MM
MONOCYTES RELATIVE PERCENT: 8.9 % (ref 0–4)
NUCLEATED RBC %: 0 %
PDW BLD-RTO: 11.6 % (ref 11.7–14.9)
PHOSPHORUS: 3.9 MG/DL (ref 2.5–4.9)
PLATELET # BLD: 363 K/CU MM (ref 140–440)
PMV BLD AUTO: 8.3 FL (ref 7.5–11.1)
POTASSIUM SERPL-SCNC: 4.2 MMOL/L (ref 3.5–5.1)
PROCALCITONIN SERPL-MCNC: 0.06 NG/ML
RBC # BLD: 3.37 M/CU MM (ref 4.6–6.2)
SEGMENTED NEUTROPHILS ABSOLUTE COUNT: 4.2 K/CU MM
SEGMENTED NEUTROPHILS RELATIVE PERCENT: 67.6 % (ref 36–66)
SODIUM BLD-SCNC: 138 MMOL/L (ref 135–145)
SPECIMEN: NORMAL
SPECIMEN: NORMAL
TOTAL IMMATURE NEUTOROPHIL: 0.05 K/CU MM
TOTAL NUCLEATED RBC: 0 K/CU MM
WBC # BLD: 6.2 K/CU MM (ref 4–10.5)

## 2023-10-19 PROCEDURE — 80048 BASIC METABOLIC PNL TOTAL CA: CPT

## 2023-10-19 PROCEDURE — 6360000002 HC RX W HCPCS: Performed by: STUDENT IN AN ORGANIZED HEALTH CARE EDUCATION/TRAINING PROGRAM

## 2023-10-19 PROCEDURE — 94761 N-INVAS EAR/PLS OXIMETRY MLT: CPT

## 2023-10-19 PROCEDURE — 6360000002 HC RX W HCPCS: Performed by: SURGERY

## 2023-10-19 PROCEDURE — 82962 GLUCOSE BLOOD TEST: CPT

## 2023-10-19 PROCEDURE — 6370000000 HC RX 637 (ALT 250 FOR IP): Performed by: INTERNAL MEDICINE

## 2023-10-19 PROCEDURE — 86140 C-REACTIVE PROTEIN: CPT

## 2023-10-19 PROCEDURE — 83735 ASSAY OF MAGNESIUM: CPT

## 2023-10-19 PROCEDURE — 6370000000 HC RX 637 (ALT 250 FOR IP): Performed by: STUDENT IN AN ORGANIZED HEALTH CARE EDUCATION/TRAINING PROGRAM

## 2023-10-19 PROCEDURE — 2580000003 HC RX 258: Performed by: SURGERY

## 2023-10-19 PROCEDURE — 36415 COLL VENOUS BLD VENIPUNCTURE: CPT

## 2023-10-19 PROCEDURE — 6370000000 HC RX 637 (ALT 250 FOR IP): Performed by: SURGERY

## 2023-10-19 PROCEDURE — 94640 AIRWAY INHALATION TREATMENT: CPT

## 2023-10-19 PROCEDURE — 6370000000 HC RX 637 (ALT 250 FOR IP): Performed by: NURSE PRACTITIONER

## 2023-10-19 PROCEDURE — 2580000003 HC RX 258: Performed by: STUDENT IN AN ORGANIZED HEALTH CARE EDUCATION/TRAINING PROGRAM

## 2023-10-19 PROCEDURE — 84145 PROCALCITONIN (PCT): CPT

## 2023-10-19 PROCEDURE — 1200000000 HC SEMI PRIVATE

## 2023-10-19 PROCEDURE — 85025 COMPLETE CBC W/AUTO DIFF WBC: CPT

## 2023-10-19 PROCEDURE — 84100 ASSAY OF PHOSPHORUS: CPT

## 2023-10-19 RX ORDER — OXYCODONE HYDROCHLORIDE AND ACETAMINOPHEN 5; 325 MG/1; MG/1
1 TABLET ORAL EVERY 6 HOURS PRN
Status: DISCONTINUED | OUTPATIENT
Start: 2023-10-19 | End: 2023-10-20 | Stop reason: HOSPADM

## 2023-10-19 RX ORDER — AMOXICILLIN AND CLAVULANATE POTASSIUM 875; 125 MG/1; MG/1
1 TABLET, FILM COATED ORAL EVERY 12 HOURS SCHEDULED
Status: DISCONTINUED | OUTPATIENT
Start: 2023-10-19 | End: 2023-10-20 | Stop reason: HOSPADM

## 2023-10-19 RX ORDER — OXYCODONE HYDROCHLORIDE AND ACETAMINOPHEN 5; 325 MG/1; MG/1
2 TABLET ORAL EVERY 6 HOURS PRN
Status: DISCONTINUED | OUTPATIENT
Start: 2023-10-19 | End: 2023-10-20 | Stop reason: HOSPADM

## 2023-10-19 RX ADMIN — IPRATROPIUM BROMIDE 1 PUFF: 17 AEROSOL, METERED RESPIRATORY (INHALATION) at 14:01

## 2023-10-19 RX ADMIN — INSULIN LISPRO 10 UNITS: 100 INJECTION, SOLUTION INTRAVENOUS; SUBCUTANEOUS at 12:47

## 2023-10-19 RX ADMIN — HYDROMORPHONE HYDROCHLORIDE 0.5 MG: 1 INJECTION, SOLUTION INTRAMUSCULAR; INTRAVENOUS; SUBCUTANEOUS at 05:47

## 2023-10-19 RX ADMIN — CLONIDINE HYDROCHLORIDE 0.2 MG: 0.2 TABLET ORAL at 08:38

## 2023-10-19 RX ADMIN — PREGABALIN 100 MG: 100 CAPSULE ORAL at 08:38

## 2023-10-19 RX ADMIN — HYDROMORPHONE HYDROCHLORIDE 0.5 MG: 1 INJECTION, SOLUTION INTRAMUSCULAR; INTRAVENOUS; SUBCUTANEOUS at 01:51

## 2023-10-19 RX ADMIN — OXYCODONE AND ACETAMINOPHEN 2 TABLET: 5; 325 TABLET ORAL at 20:31

## 2023-10-19 RX ADMIN — ATORVASTATIN CALCIUM 10 MG: 10 TABLET, FILM COATED ORAL at 08:37

## 2023-10-19 RX ADMIN — ALBUTEROL SULFATE 1 PUFF: 90 AEROSOL, METERED RESPIRATORY (INHALATION) at 01:12

## 2023-10-19 RX ADMIN — OXYCODONE AND ACETAMINOPHEN 2 TABLET: 5; 325 TABLET ORAL at 13:31

## 2023-10-19 RX ADMIN — PREGABALIN 100 MG: 100 CAPSULE ORAL at 20:31

## 2023-10-19 RX ADMIN — DORZOLAMIDE HYDROCHLORIDE AND TIMOLOL MALEATE 1 DROP: 20; 5 SOLUTION/ DROPS OPHTHALMIC at 08:44

## 2023-10-19 RX ADMIN — IPRATROPIUM BROMIDE 1 PUFF: 17 AEROSOL, METERED RESPIRATORY (INHALATION) at 01:13

## 2023-10-19 RX ADMIN — IPRATROPIUM BROMIDE 1 PUFF: 17 AEROSOL, METERED RESPIRATORY (INHALATION) at 07:24

## 2023-10-19 RX ADMIN — BRIMONIDINE TARTRATE 1 DROP: 2 SOLUTION OPHTHALMIC at 08:43

## 2023-10-19 RX ADMIN — AMOXICILLIN AND CLAVULANATE POTASSIUM 1 TABLET: 875; 125 TABLET, FILM COATED ORAL at 20:31

## 2023-10-19 RX ADMIN — BUDESONIDE AND FORMOTEROL FUMARATE DIHYDRATE 2 PUFF: 160; 4.5 AEROSOL RESPIRATORY (INHALATION) at 07:26

## 2023-10-19 RX ADMIN — SODIUM CHLORIDE: 9 INJECTION, SOLUTION INTRAVENOUS at 12:51

## 2023-10-19 RX ADMIN — INSULIN LISPRO 2 UNITS: 100 INJECTION, SOLUTION INTRAVENOUS; SUBCUTANEOUS at 12:47

## 2023-10-19 RX ADMIN — INSULIN LISPRO 1 UNITS: 100 INJECTION, SOLUTION INTRAVENOUS; SUBCUTANEOUS at 17:46

## 2023-10-19 RX ADMIN — OXYCODONE AND ACETAMINOPHEN 2 TABLET: 5; 325 TABLET ORAL at 07:35

## 2023-10-19 RX ADMIN — POLYMYXIN B SULFATE AND TRIMETHOPRIM SULFATE 1 DROP: 10000; 1 SOLUTION/ DROPS OPHTHALMIC at 08:44

## 2023-10-19 RX ADMIN — ALLOPURINOL 200 MG: 100 TABLET ORAL at 08:38

## 2023-10-19 RX ADMIN — VANCOMYCIN HYDROCHLORIDE 1500 MG: 1.5 INJECTION, POWDER, LYOPHILIZED, FOR SOLUTION INTRAVENOUS at 00:45

## 2023-10-19 RX ADMIN — ALLOPURINOL 200 MG: 100 TABLET ORAL at 20:47

## 2023-10-19 RX ADMIN — POLYMYXIN B SULFATE AND TRIMETHOPRIM SULFATE 1 DROP: 10000; 1 SOLUTION/ DROPS OPHTHALMIC at 13:30

## 2023-10-19 RX ADMIN — ALBUTEROL SULFATE 1 PUFF: 90 AEROSOL, METERED RESPIRATORY (INHALATION) at 20:11

## 2023-10-19 RX ADMIN — COLCHICINE 0.6 MG: 0.6 TABLET ORAL at 08:37

## 2023-10-19 RX ADMIN — MOXIFLOXACIN 1 DROP: 5 SOLUTION/ DROPS OPHTHALMIC at 17:48

## 2023-10-19 RX ADMIN — INSULIN LISPRO 10 UNITS: 100 INJECTION, SOLUTION INTRAVENOUS; SUBCUTANEOUS at 17:47

## 2023-10-19 RX ADMIN — PREDNISOLONE ACETATE 1 DROP: 10 SUSPENSION/ DROPS OPHTHALMIC at 13:30

## 2023-10-19 RX ADMIN — DORZOLAMIDE HYDROCHLORIDE AND TIMOLOL MALEATE 1 DROP: 20; 5 SOLUTION/ DROPS OPHTHALMIC at 16:26

## 2023-10-19 RX ADMIN — ALBUTEROL SULFATE 1 PUFF: 90 AEROSOL, METERED RESPIRATORY (INHALATION) at 13:59

## 2023-10-19 RX ADMIN — PANTOPRAZOLE SODIUM 40 MG: 40 TABLET, DELAYED RELEASE ORAL at 06:39

## 2023-10-19 RX ADMIN — MOXIFLOXACIN 1 DROP: 5 SOLUTION/ DROPS OPHTHALMIC at 08:44

## 2023-10-19 RX ADMIN — ENOXAPARIN SODIUM 30 MG: 100 INJECTION SUBCUTANEOUS at 08:40

## 2023-10-19 RX ADMIN — CEFEPIME 2000 MG: 2 INJECTION, POWDER, FOR SOLUTION INTRAVENOUS at 09:03

## 2023-10-19 RX ADMIN — TRAZODONE HYDROCHLORIDE 50 MG: 50 TABLET ORAL at 20:31

## 2023-10-19 RX ADMIN — MOXIFLOXACIN 1 DROP: 5 SOLUTION/ DROPS OPHTHALMIC at 13:30

## 2023-10-19 RX ADMIN — IPRATROPIUM BROMIDE 1 PUFF: 17 AEROSOL, METERED RESPIRATORY (INHALATION) at 20:11

## 2023-10-19 RX ADMIN — INSULIN LISPRO 10 UNITS: 100 INJECTION, SOLUTION INTRAVENOUS; SUBCUTANEOUS at 08:38

## 2023-10-19 RX ADMIN — ALBUTEROL SULFATE 1 PUFF: 90 AEROSOL, METERED RESPIRATORY (INHALATION) at 07:22

## 2023-10-19 RX ADMIN — COLCHICINE 0.6 MG: 0.6 TABLET ORAL at 20:31

## 2023-10-19 RX ADMIN — AMLODIPINE BESYLATE 10 MG: 10 TABLET ORAL at 08:38

## 2023-10-19 RX ADMIN — INSULIN GLARGINE 30 UNITS: 100 INJECTION, SOLUTION SUBCUTANEOUS at 21:03

## 2023-10-19 RX ADMIN — SODIUM CHLORIDE: 9 INJECTION, SOLUTION INTRAVENOUS at 09:01

## 2023-10-19 RX ADMIN — PREDNISOLONE ACETATE 1 DROP: 10 SUSPENSION/ DROPS OPHTHALMIC at 08:43

## 2023-10-19 RX ADMIN — BRIMONIDINE TARTRATE 1 DROP: 2 SOLUTION OPHTHALMIC at 16:26

## 2023-10-19 RX ADMIN — PREDNISOLONE ACETATE 1 DROP: 10 SUSPENSION/ DROPS OPHTHALMIC at 17:48

## 2023-10-19 RX ADMIN — POLYMYXIN B SULFATE AND TRIMETHOPRIM SULFATE 1 DROP: 10000; 1 SOLUTION/ DROPS OPHTHALMIC at 17:48

## 2023-10-19 RX ADMIN — SODIUM CHLORIDE, PRESERVATIVE FREE 10 ML: 5 INJECTION INTRAVENOUS at 08:41

## 2023-10-19 RX ADMIN — VANCOMYCIN HYDROCHLORIDE 1500 MG: 1.5 INJECTION, POWDER, LYOPHILIZED, FOR SOLUTION INTRAVENOUS at 12:53

## 2023-10-19 RX ADMIN — EZETIMIBE 10 MG: 10 TABLET ORAL at 08:38

## 2023-10-19 ASSESSMENT — PAIN SCALES - WONG BAKER
WONGBAKER_NUMERICALRESPONSE: 2
WONGBAKER_NUMERICALRESPONSE: 2
WONGBAKER_NUMERICALRESPONSE: 4
WONGBAKER_NUMERICALRESPONSE: 2
WONGBAKER_NUMERICALRESPONSE: 0
WONGBAKER_NUMERICALRESPONSE: 2
WONGBAKER_NUMERICALRESPONSE: 2
WONGBAKER_NUMERICALRESPONSE: 0

## 2023-10-19 ASSESSMENT — PAIN DESCRIPTION - ORIENTATION
ORIENTATION: RIGHT;LEFT
ORIENTATION: LEFT;RIGHT
ORIENTATION: RIGHT
ORIENTATION: RIGHT;LEFT
ORIENTATION: RIGHT;LEFT
ORIENTATION: RIGHT
ORIENTATION: RIGHT

## 2023-10-19 ASSESSMENT — PAIN SCALES - GENERAL
PAINLEVEL_OUTOF10: 3
PAINLEVEL_OUTOF10: 8
PAINLEVEL_OUTOF10: 0
PAINLEVEL_OUTOF10: 8
PAINLEVEL_OUTOF10: 8
PAINLEVEL_OUTOF10: 2
PAINLEVEL_OUTOF10: 3
PAINLEVEL_OUTOF10: 6
PAINLEVEL_OUTOF10: 3
PAINLEVEL_OUTOF10: 2
PAINLEVEL_OUTOF10: 2
PAINLEVEL_OUTOF10: 0

## 2023-10-19 ASSESSMENT — PAIN DESCRIPTION - LOCATION
LOCATION: FOOT
LOCATION: FOOT;ANKLE
LOCATION: ANKLE;FOOT
LOCATION: ANKLE
LOCATION: FOOT

## 2023-10-19 ASSESSMENT — PAIN - FUNCTIONAL ASSESSMENT
PAIN_FUNCTIONAL_ASSESSMENT: ACTIVITIES ARE NOT PREVENTED

## 2023-10-19 ASSESSMENT — PAIN DESCRIPTION - DESCRIPTORS
DESCRIPTORS: ACHING
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
## 2023-10-19 NOTE — PROGRESS NOTES
Patient continues on antibiotics and wound care for his right foot. I spoke to my partner Dr. Jessica Castellon about this patient. Secondary wound closure will be necessary in approximately a week. This can be done at either the wound clinic or my office. The patient claims the wound clinic is much more convenient for him. I am out of town till Monday Dr. Jessica Castellon is covering.

## 2023-10-19 NOTE — CARE COORDINATION
Pt is agreeable to [facility].. Wants referral to [place]. Referral made.

## 2023-10-20 VITALS
RESPIRATION RATE: 18 BRPM | SYSTOLIC BLOOD PRESSURE: 163 MMHG | OXYGEN SATURATION: 97 % | WEIGHT: 308 LBS | BODY MASS INDEX: 38.3 KG/M2 | TEMPERATURE: 98.3 F | HEIGHT: 75 IN | DIASTOLIC BLOOD PRESSURE: 93 MMHG | HEART RATE: 80 BPM

## 2023-10-20 LAB
ANION GAP SERPL CALCULATED.3IONS-SCNC: 9 MMOL/L (ref 4–16)
BASOPHILS ABSOLUTE: 0 K/CU MM
BASOPHILS RELATIVE PERCENT: 0.4 % (ref 0–1)
BUN SERPL-MCNC: 7 MG/DL (ref 6–23)
CALCIUM SERPL-MCNC: 8.7 MG/DL (ref 8.3–10.6)
CHLORIDE BLD-SCNC: 100 MMOL/L (ref 99–110)
CO2: 27 MMOL/L (ref 21–32)
CREAT SERPL-MCNC: 0.7 MG/DL (ref 0.9–1.3)
CULTURE: ABNORMAL
DIFFERENTIAL TYPE: ABNORMAL
EOSINOPHILS ABSOLUTE: 0.2 K/CU MM
EOSINOPHILS RELATIVE PERCENT: 2.5 % (ref 0–3)
GFR SERPL CREATININE-BSD FRML MDRD: >60 ML/MIN/1.73M2
GLUCOSE BLD-MCNC: 233 MG/DL (ref 70–99)
GLUCOSE BLD-MCNC: 245 MG/DL (ref 70–99)
GLUCOSE SERPL-MCNC: 200 MG/DL (ref 70–99)
GRAM SMEAR: ABNORMAL
HCT VFR BLD CALC: 32.9 % (ref 42–52)
HEMOGLOBIN: 11 GM/DL (ref 13.5–18)
IMMATURE NEUTROPHIL %: 0.8 % (ref 0–0.43)
LYMPHOCYTES ABSOLUTE: 1.3 K/CU MM
LYMPHOCYTES RELATIVE PERCENT: 18 % (ref 24–44)
Lab: ABNORMAL
MAGNESIUM: 1.8 MG/DL (ref 1.8–2.4)
MCH RBC QN AUTO: 31.6 PG (ref 27–31)
MCHC RBC AUTO-ENTMCNC: 33.4 % (ref 32–36)
MCV RBC AUTO: 94.5 FL (ref 78–100)
MONOCYTES ABSOLUTE: 0.5 K/CU MM
MONOCYTES RELATIVE PERCENT: 7.3 % (ref 0–4)
NUCLEATED RBC %: 0 %
PDW BLD-RTO: 11.7 % (ref 11.7–14.9)
PHOSPHORUS: 4 MG/DL (ref 2.5–4.9)
PLATELET # BLD: 389 K/CU MM (ref 140–440)
PMV BLD AUTO: 8.4 FL (ref 7.5–11.1)
POTASSIUM SERPL-SCNC: 4.2 MMOL/L (ref 3.5–5.1)
RBC # BLD: 3.48 M/CU MM (ref 4.6–6.2)
SEGMENTED NEUTROPHILS ABSOLUTE COUNT: 5.2 K/CU MM
SEGMENTED NEUTROPHILS RELATIVE PERCENT: 71 % (ref 36–66)
SODIUM BLD-SCNC: 136 MMOL/L (ref 135–145)
SPECIMEN: ABNORMAL
TOTAL IMMATURE NEUTOROPHIL: 0.06 K/CU MM
TOTAL NUCLEATED RBC: 0 K/CU MM
WBC # BLD: 7.3 K/CU MM (ref 4–10.5)

## 2023-10-20 PROCEDURE — 6370000000 HC RX 637 (ALT 250 FOR IP): Performed by: SURGERY

## 2023-10-20 PROCEDURE — 86140 C-REACTIVE PROTEIN: CPT

## 2023-10-20 PROCEDURE — 2580000003 HC RX 258: Performed by: SURGERY

## 2023-10-20 PROCEDURE — 85025 COMPLETE CBC W/AUTO DIFF WBC: CPT

## 2023-10-20 PROCEDURE — 94640 AIRWAY INHALATION TREATMENT: CPT

## 2023-10-20 PROCEDURE — 6370000000 HC RX 637 (ALT 250 FOR IP): Performed by: INTERNAL MEDICINE

## 2023-10-20 PROCEDURE — 36415 COLL VENOUS BLD VENIPUNCTURE: CPT

## 2023-10-20 PROCEDURE — 84100 ASSAY OF PHOSPHORUS: CPT

## 2023-10-20 PROCEDURE — 6370000000 HC RX 637 (ALT 250 FOR IP): Performed by: NURSE PRACTITIONER

## 2023-10-20 PROCEDURE — 83735 ASSAY OF MAGNESIUM: CPT

## 2023-10-20 PROCEDURE — 6370000000 HC RX 637 (ALT 250 FOR IP): Performed by: STUDENT IN AN ORGANIZED HEALTH CARE EDUCATION/TRAINING PROGRAM

## 2023-10-20 PROCEDURE — 80048 BASIC METABOLIC PNL TOTAL CA: CPT

## 2023-10-20 PROCEDURE — 82962 GLUCOSE BLOOD TEST: CPT

## 2023-10-20 PROCEDURE — 94761 N-INVAS EAR/PLS OXIMETRY MLT: CPT

## 2023-10-20 PROCEDURE — 6360000002 HC RX W HCPCS: Performed by: SURGERY

## 2023-10-20 RX ORDER — OXYCODONE HYDROCHLORIDE AND ACETAMINOPHEN 5; 325 MG/1; MG/1
2 TABLET ORAL EVERY 6 HOURS PRN
Qty: 24 TABLET | Refills: 0 | Status: SHIPPED | OUTPATIENT
Start: 2023-10-20 | End: 2023-10-23

## 2023-10-20 RX ORDER — LISINOPRIL 20 MG/1
20 TABLET ORAL DAILY
Qty: 30 TABLET | Refills: 1 | Status: ON HOLD | OUTPATIENT
Start: 2023-10-20

## 2023-10-20 RX ORDER — INSULIN LISPRO 100 [IU]/ML
20 INJECTION, SOLUTION INTRAVENOUS; SUBCUTANEOUS
Qty: 20 ML | Refills: 1 | Status: SHIPPED | OUTPATIENT
Start: 2023-10-20 | End: 2023-10-20 | Stop reason: SDUPTHER

## 2023-10-20 RX ORDER — AMOXICILLIN AND CLAVULANATE POTASSIUM 875; 125 MG/1; MG/1
1 TABLET, FILM COATED ORAL EVERY 12 HOURS SCHEDULED
Qty: 76 TABLET | Refills: 0 | Status: ON HOLD | OUTPATIENT
Start: 2023-10-20 | End: 2023-11-27

## 2023-10-20 RX ORDER — INSULIN GLARGINE 100 [IU]/ML
30 INJECTION, SOLUTION SUBCUTANEOUS NIGHTLY
Qty: 10 ML | Refills: 1 | Status: ON HOLD | OUTPATIENT
Start: 2023-10-20

## 2023-10-20 RX ORDER — INSULIN LISPRO 100 [IU]/ML
10 INJECTION, SOLUTION INTRAVENOUS; SUBCUTANEOUS
Qty: 20 ML | Refills: 1 | Status: ON HOLD | OUTPATIENT
Start: 2023-10-20

## 2023-10-20 RX ORDER — COLCHICINE 0.6 MG/1
0.6 TABLET ORAL 2 TIMES DAILY
Qty: 3 TABLET | Refills: 0 | Status: ON HOLD | OUTPATIENT
Start: 2023-10-20 | End: 2023-10-22

## 2023-10-20 RX ADMIN — EZETIMIBE 10 MG: 10 TABLET ORAL at 08:16

## 2023-10-20 RX ADMIN — AMOXICILLIN AND CLAVULANATE POTASSIUM 1 TABLET: 875; 125 TABLET, FILM COATED ORAL at 08:15

## 2023-10-20 RX ADMIN — SODIUM CHLORIDE, PRESERVATIVE FREE 10 ML: 5 INJECTION INTRAVENOUS at 00:31

## 2023-10-20 RX ADMIN — MOXIFLOXACIN 1 DROP: 5 SOLUTION/ DROPS OPHTHALMIC at 00:32

## 2023-10-20 RX ADMIN — INSULIN LISPRO 10 UNITS: 100 INJECTION, SOLUTION INTRAVENOUS; SUBCUTANEOUS at 08:17

## 2023-10-20 RX ADMIN — ENOXAPARIN SODIUM 30 MG: 100 INJECTION SUBCUTANEOUS at 08:16

## 2023-10-20 RX ADMIN — BRIMONIDINE TARTRATE 1 DROP: 2 SOLUTION OPHTHALMIC at 08:21

## 2023-10-20 RX ADMIN — IPRATROPIUM BROMIDE 1 PUFF: 17 AEROSOL, METERED RESPIRATORY (INHALATION) at 02:26

## 2023-10-20 RX ADMIN — PREGABALIN 100 MG: 100 CAPSULE ORAL at 08:16

## 2023-10-20 RX ADMIN — POLYMYXIN B SULFATE AND TRIMETHOPRIM SULFATE 1 DROP: 10000; 1 SOLUTION/ DROPS OPHTHALMIC at 08:20

## 2023-10-20 RX ADMIN — AMLODIPINE BESYLATE 10 MG: 10 TABLET ORAL at 08:16

## 2023-10-20 RX ADMIN — PANTOPRAZOLE SODIUM 40 MG: 40 TABLET, DELAYED RELEASE ORAL at 08:16

## 2023-10-20 RX ADMIN — IPRATROPIUM BROMIDE 1 PUFF: 17 AEROSOL, METERED RESPIRATORY (INHALATION) at 07:42

## 2023-10-20 RX ADMIN — SODIUM CHLORIDE, PRESERVATIVE FREE 10 ML: 5 INJECTION INTRAVENOUS at 08:23

## 2023-10-20 RX ADMIN — MOXIFLOXACIN 1 DROP: 5 SOLUTION/ DROPS OPHTHALMIC at 08:20

## 2023-10-20 RX ADMIN — CLONIDINE HYDROCHLORIDE 0.2 MG: 0.2 TABLET ORAL at 08:16

## 2023-10-20 RX ADMIN — DORZOLAMIDE HYDROCHLORIDE AND TIMOLOL MALEATE 1 DROP: 20; 5 SOLUTION/ DROPS OPHTHALMIC at 08:21

## 2023-10-20 RX ADMIN — COLCHICINE 0.6 MG: 0.6 TABLET ORAL at 08:15

## 2023-10-20 RX ADMIN — BUDESONIDE AND FORMOTEROL FUMARATE DIHYDRATE 2 PUFF: 160; 4.5 AEROSOL RESPIRATORY (INHALATION) at 07:41

## 2023-10-20 RX ADMIN — ALBUTEROL SULFATE 1 PUFF: 90 AEROSOL, METERED RESPIRATORY (INHALATION) at 02:26

## 2023-10-20 RX ADMIN — PREDNISOLONE ACETATE 1 DROP: 10 SUSPENSION/ DROPS OPHTHALMIC at 00:31

## 2023-10-20 RX ADMIN — INSULIN LISPRO 1 UNITS: 100 INJECTION, SOLUTION INTRAVENOUS; SUBCUTANEOUS at 08:17

## 2023-10-20 RX ADMIN — OXYCODONE AND ACETAMINOPHEN 2 TABLET: 5; 325 TABLET ORAL at 08:20

## 2023-10-20 RX ADMIN — POLYMYXIN B SULFATE AND TRIMETHOPRIM SULFATE 1 DROP: 10000; 1 SOLUTION/ DROPS OPHTHALMIC at 00:32

## 2023-10-20 RX ADMIN — PREDNISOLONE ACETATE 1 DROP: 10 SUSPENSION/ DROPS OPHTHALMIC at 08:20

## 2023-10-20 RX ADMIN — ALLOPURINOL 200 MG: 100 TABLET ORAL at 08:15

## 2023-10-20 RX ADMIN — ALBUTEROL SULFATE 1 PUFF: 90 AEROSOL, METERED RESPIRATORY (INHALATION) at 07:42

## 2023-10-20 RX ADMIN — ATORVASTATIN CALCIUM 10 MG: 10 TABLET, FILM COATED ORAL at 08:15

## 2023-10-20 ASSESSMENT — PAIN DESCRIPTION - DESCRIPTORS: DESCRIPTORS: ACHING;DISCOMFORT;SORE

## 2023-10-20 ASSESSMENT — PAIN SCALES - WONG BAKER: WONGBAKER_NUMERICALRESPONSE: 8

## 2023-10-20 ASSESSMENT — PAIN - FUNCTIONAL ASSESSMENT: PAIN_FUNCTIONAL_ASSESSMENT: ACTIVITIES ARE NOT PREVENTED

## 2023-10-20 ASSESSMENT — PAIN DESCRIPTION - PAIN TYPE: TYPE: SURGICAL PAIN

## 2023-10-20 ASSESSMENT — PAIN DESCRIPTION - LOCATION: LOCATION: FOOT

## 2023-10-20 ASSESSMENT — PAIN SCALES - GENERAL: PAINLEVEL_OUTOF10: 8

## 2023-10-20 ASSESSMENT — PAIN DESCRIPTION - ORIENTATION: ORIENTATION: RIGHT

## 2023-10-20 NOTE — PLAN OF CARE
Problem: Discharge Planning  Goal: Discharge to home or other facility with appropriate resources  Outcome: Progressing     Problem: Risk for Elopement  Goal: Patient will not exit the unit/facility without proper excort  Outcome: Progressing  Flowsheets  Taken 10/20/2023 0813 by Darya Duenas LPN  Nursing Interventions for Elopement Risk:   Assist with personal care needs such as toileting, eating, dressing, as needed to reduce the risk of wandering   Collaborate with family members/caregivers to mitigate the elopement risk  Taken 10/20/2023 0244 by Erminio Dance, RN  Nursing Interventions for Elopement Risk:   Assist with personal care needs such as toileting, eating, dressing, as needed to reduce the risk of wandering   Collaborate with family members/caregivers to mitigate the elopement risk  Taken 10/19/2023 2106 by Erminio Dance, RN  Nursing Interventions for Elopement Risk:   Assist with personal care needs such as toileting, eating, dressing, as needed to reduce the risk of wandering   Collaborate with family members/caregivers to mitigate the elopement risk     Problem: Pain  Goal: Verbalizes/displays adequate comfort level or baseline comfort level  Outcome: Progressing     Problem: Safety - Adult  Goal: Free from fall injury  Outcome: Progressing     Problem: ABCDS Injury Assessment  Goal: Absence of physical injury  Outcome: Progressing     Problem: Chronic Conditions and Co-morbidities  Goal: Patient's chronic conditions and co-morbidity symptoms are monitored and maintained or improved  Outcome: Progressing

## 2023-10-20 NOTE — DISCHARGE INSTR - COC
Continuity of Care Form    Patient Name: Sander Hinojosa    :  1985  MRN:  [id number]    Admit date:  10/14/2023  Discharge date:  ***    Code Status Order: Full Code   Advance Directives:   Advance Care Flowsheet Documentation       Date/Time Healthcare Directive Type of Healthcare Directive Copy in [address] Agent's Name Healthcare Agent's Phone Number    10/16/23 1341 No, patient does not have an advance directive for healthcare treatment -- -- -- -- --    10/16/23 0821 No, patient does not have an advance directive for healthcare treatment -- -- -- -- --            Admitting Physician:  Debbie Marshall MD  PCP: Lloyd Mendez MD    Discharging Nurse: [facility] Unit/Room#: 1108/1108-A  Discharging Unit Phone Number: ***    Emergency Contact:   Extended Emergency Contact Information  Primary Emergency Contact: Tiana Tadeo  Address: [address] [place] [address] Phone: [phone]  Mobile Phone: [phone]  Relation: Parent    Past Surgical History:  Past Surgical History:   Procedure Laterality Date    CORNEAL TRANSPLANT Bilateral     EYE SURGERY      left eye removed    EYE SURGERY Right     FOOT DEBRIDEMENT Right 10/16/2023    FOOT DEBRIDEMENT INCISION AND DRAINAGE RIGHT performed by Mikie Cervantes MD at [address] [place] [address] 28      foot left    LAPAROSCOPIC APPENDECTOMY N/A 2022    APPENDECTOMY LAPAROSCOPIC performed by Audrey Khan MD at [address] [facility] Right     MANDIBLE SURGERY Bilateral     TOE AMPUTATION Right 2017    TOE AMPUTATION Right 2022    TOE AMPUTATION, RAY AMPUTATION OF RIGHT SECOND TOE performed by Mikie Cervantes MD at [facility] [address] Right 2023    THIRD TOE AMPUTATION AND REVISION OF SECOND TOE AMPUTATION performed by Abbey Cox MD at [address] [place] [address]       Immunization History:   Immunization History   Administered Date(s) Appropriate Level of Care:13137} for {GREATER/LESS:744192448} 30 days.      Update Admission H&P: {CHP DME Changes in LQOWE:758461117}    PHYSICIAN SIGNATURE:  {Esignature:[id number]}
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
## 2023-10-20 NOTE — DISCHARGE INSTRUCTIONS
Please finish the course of antibiotics as prescribed. Do not miss any doses of antibiotics. Please keep your wound care appointment.

## 2023-10-20 NOTE — PROGRESS NOTES
Outpatient Pharmacy Progress Note for Meds-to-Beds    Total number of Prescriptions Filled: 6  The following medications were dispensed to the patient during the discharge process:  Lisinopril  Amoxicillin-clavulanate  Lantus insulin pens  Humalog Kwikpen  Pen needles  Alcohol swabs    Additional Documentation:  Patient picked-up the medication(s) in the [address] [place] [address] Drive too soon to fill per insurance  Patient declined colchicine      Thank you for letting us serve your patients.   [address]    [address] [place] [address], [address] [place] [address]    Phone: [phone]    Fax: [phone]

## 2023-10-20 NOTE — PROGRESS NOTES
10/20/23 0955   Encounter Summary   Encounter Overview/Reason  Initial Encounter   Service Provided For: Patient   Referral/Consult From: [address] Parent   Last Encounter  10/20/23  (PT coping)   Complexity of Encounter Low   Begin Time 0940   End Time  0950   Total Time Calculated 10 min   Assessment/Intervention/Outcome   Assessment Calm   Intervention Sustaining Presence/Ministry of presence   Outcome Coping   Plan and Referrals   Plan/Referrals Continue Support (comment)  (support as needed)

## 2023-10-21 NOTE — PROGRESS NOTES
Physician Progress Note      Rita Claire  CSN #:                  [id number]  :                       1985  ADMIT DATE:       10/14/2023 8:44 PM  [facility] DATE:        10/20/2023 12:47 PM  RESPONDING  PROVIDER #:        Emily Sellers MD          QUERY TEXT:    Patient admitted with sepsis. Per Op note dated 10/14/23 documentation of   debridement to the bone. To accurately reflect the procedure performed please   document if debridement was excisional or nonexcisional:    The medical record reflects the following:  Risk Factors: osteomyelitis, DM  Clinical Indicators: Per OP NOTE by Dr. Yamila Mendez, 10/14/23, \"Debridement of   skin and subcutaneous tissue and bone down to the head of the metatarsal,   fifth digit. \"  Treatment: debridement, IV ABX, wound care    Thank you,  Verena Shah RN  Options provided:  -- Nonexcisional debridement of bone  -- Excisional debridement of bone  -- Other - I will add my own diagnosis  -- Disagree - Not applicable / Not valid  -- Disagree - Clinically unable to determine / Unknown  -- Refer to Clinical Documentation Reviewer    PROVIDER RESPONSE TEXT:    Excisional debridement of bone of right fifth toe was performed during   procedure on 10/14/23.     Query created by: Alla Egan on 10/20/2023 11:39 AM      Electronically signed by:  Emily Sellers MD 10/21/2023 10:15 AM

## 2023-10-22 LAB
CULTURE: ABNORMAL
Lab: ABNORMAL
SPECIMEN: ABNORMAL

## 2023-10-23 LAB — CRP SERPL HS-MCNC: 43.7 MG/L

## 2023-10-26 ENCOUNTER — HOSPITAL ENCOUNTER (OUTPATIENT)
Dept: WOUND CARE | Age: 38
Discharge: HOME | End: 2023-10-26

## 2023-10-26 NOTE — PATIENT INSTRUCTIONS
PHYSICIAN ORDERS AND DISCHARGE INSTRUCTIONS    Wound cleansing:     Do not scrub or use excessive force. Wash hands with soap and water before and after dressing changes. Prior to applying a clean dressing, cleanse wound with normal saline,               wound cleanser, or mild soap and water. Ask the physician or nurse before getting the wound(s) wet in a shower    Daily Wound management:   Keep weight off wounds and reposition every 2 hours. Avoid standing for long periods of time. Apply wraps/stockings in AM and remove at bedtime. If swelling is present, elevate legs to the level of the heart or above for              30 minutes 4-5 times a day and/or when sitting. When taking antibiotics take entire prescription as ordered by              physician do not stop taking until medicine is all gone. Compression Wrap Education   When slippage occurs, the wrap should be removed immediately and rewrapped or a different wrap should be applied. If removal is necessary, cover wound with clean bandage and contact the wound care clinic. Monitor for impaired circulation including pale, cool or numb extremities distal to the wrap or garment. If pain, numbness, tingling, discolouration or swelling of the toes occurs, the compression wrap or stocking must be removed immediately  Report to provider, such as pain, numbness in toes, heavy drainage, and slippage of dressing. Keep compression wraps clean and dry. May use cast cover to take a shower or take sponge baths. Do not stick objects inside wraps to scratch. This may create more wounds. Speak to the provider about any issues or questions you may have about your compression wraps.    If supplies are not available please DO NOT remove wraps until next visit to [address] Notes:  Compression:   Offloading:   Imaging:   Cultures:             DIRK:  Wound Care Supplies:   HHC:   Rx:          Orders for this week:

## 2023-10-27 ENCOUNTER — APPOINTMENT (OUTPATIENT)
Dept: GENERAL RADIOLOGY | Age: 38
DRG: 720 | End: 2023-10-27
Payer: COMMERCIAL

## 2023-10-27 ENCOUNTER — APPOINTMENT (OUTPATIENT)
Dept: ULTRASOUND IMAGING | Age: 38
DRG: 720 | End: 2023-10-27
Payer: COMMERCIAL

## 2023-10-27 ENCOUNTER — HOSPITAL ENCOUNTER (INPATIENT)
Age: 38
LOS: 6 days | Discharge: HOME HEALTH | DRG: 720 | End: 2023-11-02
Attending: INTERNAL MEDICINE | Admitting: INTERNAL MEDICINE
Payer: COMMERCIAL

## 2023-10-27 DIAGNOSIS — E87.20 LACTIC ACIDOSIS: Primary | ICD-10-CM

## 2023-10-27 DIAGNOSIS — I96 GANGRENE OF RIGHT FOOT (HCC): ICD-10-CM

## 2023-10-27 DIAGNOSIS — E11.621 DIABETIC ULCER OF TOE OF LEFT FOOT ASSOCIATED WITH TYPE 2 DIABETES MELLITUS, WITH FAT LAYER EXPOSED (HCC): ICD-10-CM

## 2023-10-27 DIAGNOSIS — I96 GANGRENE OF TOE OF RIGHT FOOT (HCC): ICD-10-CM

## 2023-10-27 DIAGNOSIS — L97.522 DIABETIC ULCER OF TOE OF LEFT FOOT ASSOCIATED WITH TYPE 2 DIABETES MELLITUS, WITH FAT LAYER EXPOSED (HCC): ICD-10-CM

## 2023-10-27 DIAGNOSIS — M14.60 CHARCOT'S ARTHROPATHY: ICD-10-CM

## 2023-10-27 DIAGNOSIS — L97.514 RIGHT FOOT ULCER, WITH NECROSIS OF BONE (HCC): ICD-10-CM

## 2023-10-27 PROBLEM — R65.20 SEVERE SEPSIS (HCC): Status: ACTIVE | Noted: 2023-10-27

## 2023-10-27 PROBLEM — A41.9 SEVERE SEPSIS (HCC): Status: ACTIVE | Noted: 2023-10-27

## 2023-10-27 LAB
ALBUMIN SERPL-MCNC: 3.3 GM/DL (ref 3.4–5)
ALP BLD-CCNC: 116 IU/L (ref 40–129)
ALT SERPL-CCNC: 29 U/L (ref 10–40)
ANION GAP SERPL CALCULATED.3IONS-SCNC: 17 MMOL/L (ref 4–16)
AST SERPL-CCNC: 23 IU/L (ref 15–37)
BASOPHILS ABSOLUTE: 0 K/CU MM
BASOPHILS RELATIVE PERCENT: 0.3 % (ref 0–1)
BILIRUB SERPL-MCNC: 0.4 MG/DL (ref 0–1)
BUN SERPL-MCNC: 11 MG/DL (ref 6–23)
CALCIUM SERPL-MCNC: 9 MG/DL (ref 8.3–10.6)
CHLORIDE BLD-SCNC: 98 MMOL/L (ref 99–110)
CO2: 18 MMOL/L (ref 21–32)
CREAT SERPL-MCNC: 0.7 MG/DL (ref 0.9–1.3)
DIFFERENTIAL TYPE: ABNORMAL
EOSINOPHILS ABSOLUTE: 0.1 K/CU MM
EOSINOPHILS RELATIVE PERCENT: 1.2 % (ref 0–3)
GFR SERPL CREATININE-BSD FRML MDRD: >60 ML/MIN/1.73M2
GLUCOSE BLD-MCNC: 223 MG/DL (ref 70–99)
GLUCOSE BLD-MCNC: 251 MG/DL (ref 70–99)
GLUCOSE BLD-MCNC: 287 MG/DL (ref 70–99)
GLUCOSE SERPL-MCNC: 185 MG/DL (ref 70–99)
HCT VFR BLD CALC: 33.6 % (ref 42–52)
HEMOGLOBIN: 12 GM/DL (ref 13.5–18)
HIGH SENSITIVE C-REACTIVE PROTEIN: 104.9 MG/L (ref 0–5)
IMMATURE NEUTROPHIL %: 0.3 % (ref 0–0.43)
INFLUENZA A ANTIGEN: DETECTED
INFLUENZA B ANTIGEN: NOT DETECTED
INR BLD: 1.2 INDEX
LACTATE: 1.4 MMOL/L (ref 0.5–1.9)
LACTATE: 2.8 MMOL/L (ref 0.5–1.9)
LIPASE: 12 IU/L (ref 13–60)
LYMPHOCYTES ABSOLUTE: 1.2 K/CU MM
LYMPHOCYTES RELATIVE PERCENT: 17.9 % (ref 24–44)
MCH RBC QN AUTO: 31.7 PG (ref 27–31)
MCHC RBC AUTO-ENTMCNC: 35.7 % (ref 32–36)
MCV RBC AUTO: 88.9 FL (ref 78–100)
MONOCYTES ABSOLUTE: 0.4 K/CU MM
MONOCYTES RELATIVE PERCENT: 5.5 % (ref 0–4)
NUCLEATED RBC %: 0 %
PDW BLD-RTO: 11.9 % (ref 11.7–14.9)
PLATELET # BLD: 450 K/CU MM (ref 140–440)
PMV BLD AUTO: 8.2 FL (ref 7.5–11.1)
POTASSIUM SERPL-SCNC: 4.6 MMOL/L (ref 3.5–5.1)
PROTHROMBIN TIME: 15 SECONDS (ref 11.7–14.5)
RBC # BLD: 3.78 M/CU MM (ref 4.6–6.2)
SARS-COV-2 RDRP RESP QL NAA+PROBE: NOT DETECTED
SEGMENTED NEUTROPHILS ABSOLUTE COUNT: 4.9 K/CU MM
SEGMENTED NEUTROPHILS RELATIVE PERCENT: 74.8 % (ref 36–66)
SODIUM BLD-SCNC: 133 MMOL/L (ref 135–145)
SOURCE: NORMAL
TOTAL IMMATURE NEUTOROPHIL: 0.02 K/CU MM
TOTAL NUCLEATED RBC: 0 K/CU MM
TOTAL PROTEIN: 7.5 GM/DL (ref 6.4–8.2)
WBC # BLD: 6.5 K/CU MM (ref 4–10.5)

## 2023-10-27 PROCEDURE — 36415 COLL VENOUS BLD VENIPUNCTURE: CPT

## 2023-10-27 PROCEDURE — 71045 X-RAY EXAM CHEST 1 VIEW: CPT

## 2023-10-27 PROCEDURE — 93971 EXTREMITY STUDY: CPT

## 2023-10-27 PROCEDURE — 85025 COMPLETE CBC W/AUTO DIFF WBC: CPT

## 2023-10-27 PROCEDURE — 96374 THER/PROPH/DIAG INJ IV PUSH: CPT

## 2023-10-27 PROCEDURE — 82962 GLUCOSE BLOOD TEST: CPT

## 2023-10-27 PROCEDURE — 94640 AIRWAY INHALATION TREATMENT: CPT

## 2023-10-27 PROCEDURE — 87635 SARS-COV-2 COVID-19 AMP PRB: CPT

## 2023-10-27 PROCEDURE — 83690 ASSAY OF LIPASE: CPT

## 2023-10-27 PROCEDURE — 87040 BLOOD CULTURE FOR BACTERIA: CPT

## 2023-10-27 PROCEDURE — 6370000000 HC RX 637 (ALT 250 FOR IP): Performed by: INTERNAL MEDICINE

## 2023-10-27 PROCEDURE — 73630 X-RAY EXAM OF FOOT: CPT

## 2023-10-27 PROCEDURE — 81003 URINALYSIS AUTO W/O SCOPE: CPT

## 2023-10-27 PROCEDURE — 87075 CULTR BACTERIA EXCEPT BLOOD: CPT

## 2023-10-27 PROCEDURE — 87077 CULTURE AEROBIC IDENTIFY: CPT

## 2023-10-27 PROCEDURE — 99285 EMERGENCY DEPT VISIT HI MDM: CPT

## 2023-10-27 PROCEDURE — 83735 ASSAY OF MAGNESIUM: CPT

## 2023-10-27 PROCEDURE — 84100 ASSAY OF PHOSPHORUS: CPT

## 2023-10-27 PROCEDURE — 6360000002 HC RX W HCPCS: Performed by: INTERNAL MEDICINE

## 2023-10-27 PROCEDURE — 80053 COMPREHEN METABOLIC PANEL: CPT

## 2023-10-27 PROCEDURE — 87502 INFLUENZA DNA AMP PROBE: CPT

## 2023-10-27 PROCEDURE — 83605 ASSAY OF LACTIC ACID: CPT

## 2023-10-27 PROCEDURE — 2580000003 HC RX 258: Performed by: INTERNAL MEDICINE

## 2023-10-27 PROCEDURE — 96375 TX/PRO/DX INJ NEW DRUG ADDON: CPT

## 2023-10-27 PROCEDURE — 1200000000 HC SEMI PRIVATE

## 2023-10-27 PROCEDURE — 6360000002 HC RX W HCPCS: Performed by: PHYSICIAN ASSISTANT

## 2023-10-27 PROCEDURE — 85610 PROTHROMBIN TIME: CPT

## 2023-10-27 PROCEDURE — 93005 ELECTROCARDIOGRAM TRACING: CPT | Performed by: INTERNAL MEDICINE

## 2023-10-27 PROCEDURE — 87081 CULTURE SCREEN ONLY: CPT

## 2023-10-27 PROCEDURE — 87205 SMEAR GRAM STAIN: CPT

## 2023-10-27 PROCEDURE — 80048 BASIC METABOLIC PNL TOTAL CA: CPT

## 2023-10-27 PROCEDURE — 87641 MR-STAPH DNA AMP PROBE: CPT

## 2023-10-27 PROCEDURE — 2580000003 HC RX 258: Performed by: PHYSICIAN ASSISTANT

## 2023-10-27 PROCEDURE — 94761 N-INVAS EAR/PLS OXIMETRY MLT: CPT

## 2023-10-27 PROCEDURE — 80307 DRUG TEST PRSMV CHEM ANLYZR: CPT

## 2023-10-27 PROCEDURE — 0202U NFCT DS 22 TRGT SARS-COV-2: CPT

## 2023-10-27 PROCEDURE — 86141 C-REACTIVE PROTEIN HS: CPT

## 2023-10-27 RX ORDER — KETOROLAC TROMETHAMINE 15 MG/ML
15 INJECTION, SOLUTION INTRAMUSCULAR; INTRAVENOUS ONCE
Status: COMPLETED | OUTPATIENT
Start: 2023-10-27 | End: 2023-10-27

## 2023-10-27 RX ORDER — OMEPRAZOLE 40 MG/1
40 CAPSULE, DELAYED RELEASE ORAL DAILY
COMMUNITY
Start: 2023-08-07

## 2023-10-27 RX ORDER — LANOLIN ALCOHOL/MO/W.PET/CERES
3 CREAM (GRAM) TOPICAL NIGHTLY PRN
Status: DISCONTINUED | OUTPATIENT
Start: 2023-10-27 | End: 2023-11-02 | Stop reason: HOSPADM

## 2023-10-27 RX ORDER — 0.9 % SODIUM CHLORIDE 0.9 %
1000 INTRAVENOUS SOLUTION INTRAVENOUS ONCE
Status: COMPLETED | OUTPATIENT
Start: 2023-10-27 | End: 2023-10-27

## 2023-10-27 RX ORDER — POLYETHYLENE GLYCOL 3350 17 G/17G
17 POWDER, FOR SOLUTION ORAL DAILY PRN
Status: DISCONTINUED | OUTPATIENT
Start: 2023-10-27 | End: 2023-11-02 | Stop reason: HOSPADM

## 2023-10-27 RX ORDER — GLUCAGON 1 MG/ML
1 KIT INJECTION PRN
Status: DISCONTINUED | OUTPATIENT
Start: 2023-10-27 | End: 2023-11-02 | Stop reason: HOSPADM

## 2023-10-27 RX ORDER — AMLODIPINE BESYLATE 10 MG/1
10 TABLET ORAL DAILY
Status: DISCONTINUED | OUTPATIENT
Start: 2023-10-27 | End: 2023-11-02 | Stop reason: HOSPADM

## 2023-10-27 RX ORDER — CLONIDINE HYDROCHLORIDE 0.2 MG/1
0.2 TABLET ORAL DAILY
Status: DISCONTINUED | OUTPATIENT
Start: 2023-10-27 | End: 2023-11-02 | Stop reason: HOSPADM

## 2023-10-27 RX ORDER — PREGABALIN 100 MG/1
100 CAPSULE ORAL 2 TIMES DAILY
Status: DISCONTINUED | OUTPATIENT
Start: 2023-10-27 | End: 2023-11-02 | Stop reason: HOSPADM

## 2023-10-27 RX ORDER — FENTANYL CITRATE 50 UG/ML
50 INJECTION, SOLUTION INTRAMUSCULAR; INTRAVENOUS ONCE
Status: DISCONTINUED | OUTPATIENT
Start: 2023-10-27 | End: 2023-10-27

## 2023-10-27 RX ORDER — OXYCODONE HYDROCHLORIDE AND ACETAMINOPHEN 5; 325 MG/1; MG/1
1 TABLET ORAL EVERY 8 HOURS PRN
COMMUNITY

## 2023-10-27 RX ORDER — SODIUM CHLORIDE 9 MG/ML
INJECTION, SOLUTION INTRAVENOUS PRN
Status: DISCONTINUED | OUTPATIENT
Start: 2023-10-27 | End: 2023-11-02 | Stop reason: HOSPADM

## 2023-10-27 RX ORDER — ALBUTEROL SULFATE 90 UG/1
2 AEROSOL, METERED RESPIRATORY (INHALATION)
Status: DISCONTINUED | OUTPATIENT
Start: 2023-10-27 | End: 2023-11-02 | Stop reason: HOSPADM

## 2023-10-27 RX ORDER — ONDANSETRON 4 MG/1
4 TABLET, ORALLY DISINTEGRATING ORAL EVERY 8 HOURS PRN
Status: DISCONTINUED | OUTPATIENT
Start: 2023-10-27 | End: 2023-11-02 | Stop reason: HOSPADM

## 2023-10-27 RX ORDER — EZETIMIBE 10 MG/1
10 TABLET ORAL DAILY
Status: DISCONTINUED | OUTPATIENT
Start: 2023-10-27 | End: 2023-11-02 | Stop reason: HOSPADM

## 2023-10-27 RX ORDER — ALLOPURINOL 300 MG/1
300 TABLET ORAL DAILY
Status: DISCONTINUED | OUTPATIENT
Start: 2023-10-27 | End: 2023-11-02 | Stop reason: HOSPADM

## 2023-10-27 RX ORDER — ATORVASTATIN CALCIUM 10 MG/1
10 TABLET, FILM COATED ORAL DAILY
Status: DISCONTINUED | OUTPATIENT
Start: 2023-10-27 | End: 2023-11-02 | Stop reason: HOSPADM

## 2023-10-27 RX ORDER — ONDANSETRON 2 MG/ML
4 INJECTION INTRAMUSCULAR; INTRAVENOUS EVERY 6 HOURS PRN
Status: DISCONTINUED | OUTPATIENT
Start: 2023-10-27 | End: 2023-10-27

## 2023-10-27 RX ORDER — ALBUTEROL SULFATE 90 UG/1
2 AEROSOL, METERED RESPIRATORY (INHALATION) EVERY 6 HOURS PRN
Status: DISCONTINUED | OUTPATIENT
Start: 2023-10-27 | End: 2023-11-02 | Stop reason: HOSPADM

## 2023-10-27 RX ORDER — PANTOPRAZOLE SODIUM 40 MG/1
40 TABLET, DELAYED RELEASE ORAL
Status: DISCONTINUED | OUTPATIENT
Start: 2023-10-28 | End: 2023-11-02 | Stop reason: HOSPADM

## 2023-10-27 RX ORDER — SODIUM CHLORIDE 0.9 % (FLUSH) 0.9 %
5-40 SYRINGE (ML) INJECTION PRN
Status: DISCONTINUED | OUTPATIENT
Start: 2023-10-27 | End: 2023-11-02 | Stop reason: HOSPADM

## 2023-10-27 RX ORDER — ACETAMINOPHEN 650 MG/1
650 SUPPOSITORY RECTAL EVERY 6 HOURS PRN
Status: DISCONTINUED | OUTPATIENT
Start: 2023-10-27 | End: 2023-11-02 | Stop reason: HOSPADM

## 2023-10-27 RX ORDER — OSELTAMIVIR PHOSPHATE 75 MG/1
75 CAPSULE ORAL 2 TIMES DAILY
Status: COMPLETED | OUTPATIENT
Start: 2023-10-27 | End: 2023-11-01

## 2023-10-27 RX ORDER — FENTANYL CITRATE 50 UG/ML
25 INJECTION, SOLUTION INTRAMUSCULAR; INTRAVENOUS ONCE
Status: COMPLETED | OUTPATIENT
Start: 2023-10-27 | End: 2023-10-27

## 2023-10-27 RX ORDER — SODIUM CHLORIDE 0.9 % (FLUSH) 0.9 %
5-40 SYRINGE (ML) INJECTION EVERY 12 HOURS SCHEDULED
Status: DISCONTINUED | OUTPATIENT
Start: 2023-10-27 | End: 2023-11-02 | Stop reason: HOSPADM

## 2023-10-27 RX ORDER — ONDANSETRON 2 MG/ML
4 INJECTION INTRAMUSCULAR; INTRAVENOUS EVERY 6 HOURS PRN
Status: DISCONTINUED | OUTPATIENT
Start: 2023-10-27 | End: 2023-11-02 | Stop reason: HOSPADM

## 2023-10-27 RX ORDER — INSULIN LISPRO 100 [IU]/ML
0-4 INJECTION, SOLUTION INTRAVENOUS; SUBCUTANEOUS EVERY 4 HOURS
Status: DISCONTINUED | OUTPATIENT
Start: 2023-10-27 | End: 2023-11-02 | Stop reason: HOSPADM

## 2023-10-27 RX ORDER — FLUTICASONE PROPIONATE 50 MCG
2 SPRAY, SUSPENSION (ML) NASAL DAILY
Status: DISCONTINUED | OUTPATIENT
Start: 2023-10-27 | End: 2023-11-02 | Stop reason: HOSPADM

## 2023-10-27 RX ORDER — INSULIN GLARGINE 100 [IU]/ML
15 INJECTION, SOLUTION SUBCUTANEOUS NIGHTLY
Status: DISCONTINUED | OUTPATIENT
Start: 2023-10-27 | End: 2023-11-02 | Stop reason: HOSPADM

## 2023-10-27 RX ORDER — ACETAMINOPHEN 325 MG/1
650 TABLET ORAL EVERY 6 HOURS PRN
Status: DISCONTINUED | OUTPATIENT
Start: 2023-10-27 | End: 2023-11-02 | Stop reason: HOSPADM

## 2023-10-27 RX ORDER — DEXTROSE MONOHYDRATE 100 MG/ML
INJECTION, SOLUTION INTRAVENOUS CONTINUOUS PRN
Status: DISCONTINUED | OUTPATIENT
Start: 2023-10-27 | End: 2023-11-02 | Stop reason: HOSPADM

## 2023-10-27 RX ADMIN — ALLOPURINOL 300 MG: 300 TABLET ORAL at 22:40

## 2023-10-27 RX ADMIN — ONDANSETRON 4 MG: 2 INJECTION INTRAMUSCULAR; INTRAVENOUS at 11:12

## 2023-10-27 RX ADMIN — PREGABALIN 100 MG: 100 CAPSULE ORAL at 22:40

## 2023-10-27 RX ADMIN — INSULIN LISPRO 2 UNITS: 100 INJECTION, SOLUTION INTRAVENOUS; SUBCUTANEOUS at 18:55

## 2023-10-27 RX ADMIN — HYDROMORPHONE HYDROCHLORIDE 0.5 MG: 1 INJECTION, SOLUTION INTRAMUSCULAR; INTRAVENOUS; SUBCUTANEOUS at 12:34

## 2023-10-27 RX ADMIN — HYDROMORPHONE HYDROCHLORIDE 1 MG: 1 INJECTION, SOLUTION INTRAMUSCULAR; INTRAVENOUS; SUBCUTANEOUS at 16:49

## 2023-10-27 RX ADMIN — SODIUM CHLORIDE, PRESERVATIVE FREE 10 ML: 5 INJECTION INTRAVENOUS at 20:36

## 2023-10-27 RX ADMIN — AMLODIPINE BESYLATE 10 MG: 10 TABLET ORAL at 22:40

## 2023-10-27 RX ADMIN — FENTANYL CITRATE 25 MCG: 50 INJECTION, SOLUTION INTRAMUSCULAR; INTRAVENOUS at 11:12

## 2023-10-27 RX ADMIN — EZETIMIBE 10 MG: 10 TABLET ORAL at 22:40

## 2023-10-27 RX ADMIN — VANCOMYCIN HYDROCHLORIDE 2000 MG: 5 INJECTION, POWDER, LYOPHILIZED, FOR SOLUTION INTRAVENOUS at 16:58

## 2023-10-27 RX ADMIN — CLONIDINE HYDROCHLORIDE 0.2 MG: 0.2 TABLET ORAL at 22:40

## 2023-10-27 RX ADMIN — SODIUM CHLORIDE 1000 ML: 9 INJECTION, SOLUTION INTRAVENOUS at 11:11

## 2023-10-27 RX ADMIN — INSULIN GLARGINE 15 UNITS: 100 INJECTION, SOLUTION SUBCUTANEOUS at 22:38

## 2023-10-27 RX ADMIN — CEFEPIME 2000 MG: 2 INJECTION, POWDER, FOR SOLUTION INTRAVENOUS at 23:08

## 2023-10-27 RX ADMIN — KETOROLAC TROMETHAMINE 15 MG: 15 INJECTION, SOLUTION INTRAMUSCULAR; INTRAVENOUS at 12:38

## 2023-10-27 RX ADMIN — INSULIN LISPRO 2 UNITS: 100 INJECTION, SOLUTION INTRAVENOUS; SUBCUTANEOUS at 22:37

## 2023-10-27 RX ADMIN — SODIUM CHLORIDE 1000 ML: 9 INJECTION, SOLUTION INTRAVENOUS at 17:01

## 2023-10-27 RX ADMIN — ALBUTEROL SULFATE 2 PUFF: 90 AEROSOL, METERED RESPIRATORY (INHALATION) at 21:29

## 2023-10-27 RX ADMIN — CEFEPIME 2000 MG: 2 INJECTION, POWDER, FOR SOLUTION INTRAVENOUS at 12:39

## 2023-10-27 RX ADMIN — OSELTAMIVIR PHOSPHATE 75 MG: 75 CAPSULE ORAL at 22:40

## 2023-10-27 RX ADMIN — ATORVASTATIN CALCIUM 10 MG: 10 TABLET, FILM COATED ORAL at 22:40

## 2023-10-27 RX ADMIN — IPRATROPIUM BROMIDE 2 PUFF: 17 AEROSOL, METERED RESPIRATORY (INHALATION) at 21:29

## 2023-10-27 RX ADMIN — HYDROMORPHONE HYDROCHLORIDE 1 MG: 1 INJECTION, SOLUTION INTRAMUSCULAR; INTRAVENOUS; SUBCUTANEOUS at 20:36

## 2023-10-27 ASSESSMENT — PAIN DESCRIPTION - ORIENTATION
ORIENTATION: LEFT

## 2023-10-27 ASSESSMENT — PAIN SCALES - GENERAL
PAINLEVEL_OUTOF10: 10
PAINLEVEL_OUTOF10: 9
PAINLEVEL_OUTOF10: 10

## 2023-10-27 ASSESSMENT — PAIN DESCRIPTION - DESCRIPTORS: DESCRIPTORS: BURNING

## 2023-10-27 ASSESSMENT — ENCOUNTER SYMPTOMS
SHORTNESS OF BREATH: 0
NAUSEA: 0
WHEEZING: 0
EYE ITCHING: 0
EYE PAIN: 0
CONSTIPATION: 0
COUGH: 0
EYE REDNESS: 0
BACK PAIN: 0
CHEST TIGHTNESS: 0
SINUS PRESSURE: 0
SORE THROAT: 0
ABDOMINAL PAIN: 0
DIARRHEA: 0
SINUS PAIN: 0
VOMITING: 0
COLOR CHANGE: 1

## 2023-10-27 ASSESSMENT — PAIN DESCRIPTION - FREQUENCY: FREQUENCY: CONTINUOUS

## 2023-10-27 ASSESSMENT — PAIN DESCRIPTION - LOCATION
LOCATION: LEG;ANKLE
LOCATION: LEG
LOCATION: ANKLE;LEG
LOCATION: LEG

## 2023-10-27 ASSESSMENT — PAIN DESCRIPTION - ONSET: ONSET: ON-GOING

## 2023-10-27 ASSESSMENT — PAIN DESCRIPTION - PAIN TYPE: TYPE: ACUTE PAIN

## 2023-10-27 ASSESSMENT — PAIN - FUNCTIONAL ASSESSMENT: PAIN_FUNCTIONAL_ASSESSMENT: 0-10

## 2023-10-27 ASSESSMENT — LIFESTYLE VARIABLES
HOW OFTEN DO YOU HAVE A DRINK CONTAINING ALCOHOL: NEVER
HOW MANY STANDARD DRINKS CONTAINING ALCOHOL DO YOU HAVE ON A TYPICAL DAY: PATIENT DOES NOT DRINK

## 2023-10-27 NOTE — ED NOTES
[address]rera  10/27/23 1847
Medication History  [facility]    Patient Name: Bijan Shields. 1985     Medication history has been completed by: Crystal Watkins CPhT    Source(s) of information: patient and insurance claims     Primary Care Physician: Kenton Monzon MD     Pharmacy:     Allergies as of 10/27/2023 - Fully Reviewed 10/27/2023   Allergen Reaction Noted    Ciprofloxacin Shortness Of Breath 02/28/2017    Glucosamine Anaphylaxis 03/24/2012    Shellfish-derived products Anaphylaxis 03/24/2012    Shellfish allergy  03/28/2023        Prior to Admission medications    Medication Sig Start Date End Date Taking? Authorizing Provider   oxyCODONE-acetaminophen (PERCOCET) 5-325 MG per tablet Take 1 tablet by mouth every 8 hours as needed for Pain.  Max Daily Amount: 3 tablets   Yes Chandana Damon MD   omeprazole (PRILOSEC) 40 MG delayed release capsule Take 1 capsule by mouth daily 8/7/23   Chandana Damon MD   colchicine (COLCRYS) 0.6 MG tablet Take 1 tablet by mouth 2 times daily for 3 doses 10/20/23 10/22/23  Chato Hopkins MD   amoxicillin-clavulanate (AUGMENTIN) 875-125 MG per tablet Take 1 tablet by mouth every 12 hours for 76 doses 10/20/23 11/27/23  Chato Hopkins MD   lisinopril (PRINIVIL;ZESTRIL) 20 MG tablet Take 1 tablet by mouth daily 10/20/23   Chato Hopkins MD   insulin glargine (LANTUS) 100 UNIT/ML injection vial Inject 30 Units into the skin nightly 10/20/23   Chato Hopkins MD   insulin lispro (HUMALOG) 100 UNIT/ML SOLN injection vial Inject 20 Units into the skin 3 times daily (with meals) 10/20/23   Chato Hopkins MD   glipiZIDE (GLUCOTROL) 10 MG tablet Take 2 tablets by mouth daily 4/17/23   Chandana Damon MD   metFORMIN (GLUCOPHAGE) 1000 MG tablet Take 1 tablet by mouth 2 times daily (with meals) 5/7/23   Chandana Damon MD   fluticasone (FLONASE) 50 MCG/ACT nasal spray 2 sprays by Each Nostril route daily  Patient not taking: Reported on 10/27/2023
Patient given a box lunch per primary RN Dexter Calderon. Patient blood sugar 223 but there are no insulin orders, will follow up with primary RN.       Artemio Brownlee RN  10/27/23 6654
further questions, please call Sending RN at [facility]    Electronically signed by: Electronically signed by Dwayne Russo RN on 10/27/2023 at 12:47 PM       Dwayne Russo RN  10/27/23 0356

## 2023-10-27 NOTE — ED PROVIDER NOTES
department for lower extremity pain as well as cough and chest pain that has been ongoing over the past week. Had a recent debridement of gangrene performed here at the hospital by Dr. Flory Gee. Denies any fevers at home but does have a heart rate of 110 with 99.9 temperature is stable blood pressure no tachypnea. Patient does not have a significant leukocytosis. Renal function panel remarkable for elevated glucose as well as hyponatremia anion gap as well as a lactic acidosis. Patient received 2 L of fluid with the initial lactate going from 2.8-1.4. Received initial antibiotics after blood cultures were obtained of cefepime as well as vancomycin. Dr. Comfort Ramos who was holding call for Dr. Flory Gee took the call report of Dr. Herbert Pal will see patient in the hospital.  Patient was admitted for likely continued infection now with lactic acidosis either secondary to cocaine abuse or his infection not quite meeting SIRS criteria at this time as he is not febrile does not have a significant leukocytosis tachypnea leukopenia. Lactic acidosis has improved with fluids. Antibiotics have been started. Surgical consultation was placed who will see the patient later on the admission. I did add a chest x-ray after the fact despite him having a normal oxygen saturation and clear lung sounds I do want ensure that the pneumonia has not missed in this patient as he is a postoperative patient.   Discussed case with internal medicine medicine admitting team who accepted patient for admission and asked that UDS to be ordered as well         History from : Patient    Limitations to history : None    Patient was given the following medications:  Medications   ondansetron (ZOFRAN) injection 4 mg (4 mg IntraVENous Given 10/27/23 1112)   sodium chloride 0.9 % bolus 1,000 mL (has no administration in time range)   vancomycin (VANCOCIN) 2,000 mg in sodium chloride 0.9 % 500 mL IVPB (has no administration in time range)   fentaNYL

## 2023-10-27 NOTE — CARE COORDINATION
Pt noted for possible readmission. Pt was admitted 10/14-10/20/23 for sepsis. Pt had a recent amputation  10/15/23 on right foot. Pt is from home with spouse, has insurance and PCP. Pt was d/c with [facility]. Pt returns for cough, post-op check and leg swelling. Pt is being admitted at this time for further treatment.

## 2023-10-28 ENCOUNTER — APPOINTMENT (OUTPATIENT)
Dept: CT IMAGING | Age: 38
DRG: 720 | End: 2023-10-28
Payer: COMMERCIAL

## 2023-10-28 LAB
AMPHETAMINES: NEGATIVE
ANION GAP SERPL CALCULATED.3IONS-SCNC: 9 MMOL/L (ref 4–16)
B PARAP IS1001 DNA NPH QL NAA+NON-PROBE: NOT DETECTED
B PERT.PT PRMT NPH QL NAA+NON-PROBE: NOT DETECTED
BARBITURATE SCREEN URINE: NEGATIVE
BASOPHILS ABSOLUTE: 0 K/CU MM (ref 0–0.1)
BASOPHILS RELATIVE PERCENT: 0.9 % (ref 0–1)
BENZODIAZEPINE SCREEN, URINE: NEGATIVE
BILIRUBIN URINE: NEGATIVE MG/DL
BLOOD, URINE: NEGATIVE
BUN SERPL-MCNC: 9 MG/DL (ref 6–23)
C PNEUM DNA NPH QL NAA+NON-PROBE: NOT DETECTED
CALCIUM SERPL-MCNC: 8.1 MG/DL (ref 8.3–10.6)
CANNABINOID SCREEN URINE: NEGATIVE
CHLORIDE BLD-SCNC: 102 MMOL/L (ref 99–110)
CLARITY: CLEAR
CO2: 23 MMOL/L (ref 21–32)
COCAINE METABOLITE: ABNORMAL
COLOR: YELLOW
COMMENT UA: ABNORMAL
CREAT SERPL-MCNC: 0.6 MG/DL (ref 0.9–1.3)
DIFFERENTIAL TYPE: ABNORMAL
EOSINOPHILS ABSOLUTE: 0.2 K/CU MM (ref 0–0.6)
EOSINOPHILS RELATIVE PERCENT: 3.5 % (ref 0–7)
FENTANYL URINE: NEGATIVE
FLUAV H1 2009 PAN RNA NPH NAA+NON-PROBE: NOT DETECTED
FLUAV H1 RNA NPH QL NAA+NON-PROBE: NOT DETECTED
FLUAV H3 RNA NPH QL NAA+NON-PROBE: NOT DETECTED
FLUAV RNA NPH QL NAA+NON-PROBE: NOT DETECTED
FLUBV RNA NPH QL NAA+NON-PROBE: NOT DETECTED
GFR SERPL CREATININE-BSD FRML MDRD: >60 ML/MIN/1.73M2
GLUCOSE BLD-MCNC: 213 MG/DL (ref 70–99)
GLUCOSE BLD-MCNC: 258 MG/DL (ref 70–99)
GLUCOSE BLD-MCNC: 264 MG/DL (ref 70–99)
GLUCOSE BLD-MCNC: 267 MG/DL (ref 70–99)
GLUCOSE BLD-MCNC: 273 MG/DL (ref 70–99)
GLUCOSE BLD-MCNC: 307 MG/DL (ref 70–99)
GLUCOSE SERPL-MCNC: 229 MG/DL (ref 74–106)
GLUCOSE, URINE: >1000 MG/DL
HADV DNA NPH QL NAA+NON-PROBE: NOT DETECTED
HCOV 229E RNA NPH QL NAA+NON-PROBE: NOT DETECTED
HCOV HKU1 RNA NPH QL NAA+NON-PROBE: NOT DETECTED
HCOV NL63 RNA NPH QL NAA+NON-PROBE: NOT DETECTED
HCOV OC43 RNA NPH QL NAA+NON-PROBE: NOT DETECTED
HCT VFR BLD CALC: 29.6 % (ref 42–52)
HEMOGLOBIN: 9.9 GM/DL (ref 14–18)
HMPV RNA NPH QL NAA+NON-PROBE: NOT DETECTED
HPIV1 RNA NPH QL NAA+NON-PROBE: NOT DETECTED
HPIV2 RNA NPH QL NAA+NON-PROBE: NOT DETECTED
HPIV3 RNA NPH QL NAA+NON-PROBE: NOT DETECTED
HPIV4 RNA NPH QL NAA+NON-PROBE: NOT DETECTED
IMMATURE NEUTROPHIL %: 0.2 % (ref 0–0.43)
KETONES, URINE: NEGATIVE MG/DL
LEUKOCYTE ESTERASE, URINE: NEGATIVE
LYMPHOCYTES ABSOLUTE: 0.8 K/CU MM (ref 0.6–4.3)
LYMPHOCYTES RELATIVE PERCENT: 18 % (ref 16–42)
M PNEUMO DNA NPH QL NAA+NON-PROBE: NOT DETECTED
MAGNESIUM: 1.7 MG/DL (ref 1.7–2.4)
MCH RBC QN AUTO: 30.9 PG (ref 27–31)
MCHC RBC AUTO-ENTMCNC: 33.4 % (ref 32–36)
MCV RBC AUTO: 92.5 FL (ref 80–100)
MONOCYTES ABSOLUTE: 0.4 K/CU MM (ref 0.2–1.1)
MONOCYTES RELATIVE PERCENT: 8.7 % (ref 4–12)
NITRITE URINE, QUANTITATIVE: NEGATIVE
NUCLEATED RBC %: 0 %
OPIATES, URINE: NEGATIVE
OXYCODONE: NEGATIVE
PDW BLD-RTO: 11.8 % (ref 11.7–14.9)
PH, URINE: 5.5 (ref 5–8)
PHOSPHORUS: 4.2 MG/DL (ref 2.7–4.5)
PLATELET # BLD: 332 K/CU MM (ref 150–400)
PMV BLD AUTO: 8.2 FL (ref 7.5–11.1)
POTASSIUM SERPL-SCNC: 4.5 MMOL/L (ref 3.5–5.1)
PROTEIN UA: NEGATIVE MG/DL
RBC # BLD: 3.2 M/CU MM (ref 4.6–6.2)
RSV RNA NPH QL NAA+NON-PROBE: NOT DETECTED
RV+EV RNA NPH QL NAA+NON-PROBE: NOT DETECTED
SARS-COV-2 RNA NPH QL NAA+NON-PROBE: NOT DETECTED
SEGMENTED NEUTROPHILS ABSOLUTE COUNT: 3.2 K/CU MM (ref 1.5–7)
SEGMENTED NEUTROPHILS RELATIVE PERCENT: 68.7 % (ref 44–76)
SODIUM BLD-SCNC: 134 MMOL/L (ref 136–145)
SPECIFIC GRAVITY UA: 1.01 (ref 1–1.03)
TOTAL IMMATURE NEUTOROPHIL: 0.01 K/CU MM
TOTAL NUCLEATED RBC: 0 K/CU MM
UROBILINOGEN, URINE: 0.2 MG/DL (ref 0.2–1)
WBC # BLD: 4.6 K/CU MM (ref 4.8–10.8)

## 2023-10-28 PROCEDURE — 6370000000 HC RX 637 (ALT 250 FOR IP): Performed by: INTERNAL MEDICINE

## 2023-10-28 PROCEDURE — 82962 GLUCOSE BLOOD TEST: CPT

## 2023-10-28 PROCEDURE — 87070 CULTURE OTHR SPECIMN AEROBIC: CPT

## 2023-10-28 PROCEDURE — 6370000000 HC RX 637 (ALT 250 FOR IP): Performed by: NURSE PRACTITIONER

## 2023-10-28 PROCEDURE — 87077 CULTURE AEROBIC IDENTIFY: CPT

## 2023-10-28 PROCEDURE — 73701 CT LOWER EXTREMITY W/DYE: CPT

## 2023-10-28 PROCEDURE — 87147 CULTURE TYPE IMMUNOLOGIC: CPT

## 2023-10-28 PROCEDURE — 87186 SC STD MICRODIL/AGAR DIL: CPT

## 2023-10-28 PROCEDURE — 94761 N-INVAS EAR/PLS OXIMETRY MLT: CPT

## 2023-10-28 PROCEDURE — 94640 AIRWAY INHALATION TREATMENT: CPT

## 2023-10-28 PROCEDURE — 94664 DEMO&/EVAL PT USE INHALER: CPT

## 2023-10-28 PROCEDURE — 1200000000 HC SEMI PRIVATE

## 2023-10-28 PROCEDURE — 6360000002 HC RX W HCPCS: Performed by: INTERNAL MEDICINE

## 2023-10-28 PROCEDURE — 87075 CULTR BACTERIA EXCEPT BLOOD: CPT

## 2023-10-28 PROCEDURE — 6360000004 HC RX CONTRAST MEDICATION: Performed by: FAMILY MEDICINE

## 2023-10-28 PROCEDURE — 2580000003 HC RX 258: Performed by: INTERNAL MEDICINE

## 2023-10-28 RX ORDER — BUDESONIDE AND FORMOTEROL FUMARATE DIHYDRATE 160; 4.5 UG/1; UG/1
2 AEROSOL RESPIRATORY (INHALATION)
Status: DISCONTINUED | OUTPATIENT
Start: 2023-10-28 | End: 2023-11-02 | Stop reason: HOSPADM

## 2023-10-28 RX ORDER — BENZONATATE 100 MG/1
100 CAPSULE ORAL 3 TIMES DAILY
Status: DISCONTINUED | OUTPATIENT
Start: 2023-10-28 | End: 2023-11-02 | Stop reason: HOSPADM

## 2023-10-28 RX ORDER — DORZOLAMIDE HYDROCHLORIDE AND TIMOLOL MALEATE 20; 5 MG/ML; MG/ML
1 SOLUTION/ DROPS OPHTHALMIC 2 TIMES DAILY
Status: DISCONTINUED | OUTPATIENT
Start: 2023-10-28 | End: 2023-11-02 | Stop reason: HOSPADM

## 2023-10-28 RX ORDER — BRIMONIDINE TARTRATE 2 MG/ML
1 SOLUTION/ DROPS OPHTHALMIC 2 TIMES DAILY
Status: DISCONTINUED | OUTPATIENT
Start: 2023-10-28 | End: 2023-11-02 | Stop reason: HOSPADM

## 2023-10-28 RX ORDER — MOXIFLOXACIN 5 MG/ML
1 SOLUTION/ DROPS OPHTHALMIC 4 TIMES DAILY
Status: DISCONTINUED | OUTPATIENT
Start: 2023-10-28 | End: 2023-11-02 | Stop reason: HOSPADM

## 2023-10-28 RX ORDER — CALCIUM CARBONATE 500 MG/1
500 TABLET, CHEWABLE ORAL 3 TIMES DAILY PRN
Status: DISCONTINUED | OUTPATIENT
Start: 2023-10-28 | End: 2023-11-02 | Stop reason: HOSPADM

## 2023-10-28 RX ORDER — PREDNISOLONE ACETATE 10 MG/ML
1 SUSPENSION/ DROPS OPHTHALMIC 4 TIMES DAILY
Status: DISCONTINUED | OUTPATIENT
Start: 2023-10-28 | End: 2023-11-02 | Stop reason: HOSPADM

## 2023-10-28 RX ADMIN — HYDROMORPHONE HYDROCHLORIDE 1 MG: 1 INJECTION, SOLUTION INTRAMUSCULAR; INTRAVENOUS; SUBCUTANEOUS at 04:28

## 2023-10-28 RX ADMIN — PREGABALIN 100 MG: 100 CAPSULE ORAL at 09:11

## 2023-10-28 RX ADMIN — SODIUM CHLORIDE, PRESERVATIVE FREE 10 ML: 5 INJECTION INTRAVENOUS at 09:16

## 2023-10-28 RX ADMIN — BUDESONIDE AND FORMOTEROL FUMARATE DIHYDRATE 2 PUFF: 160; 4.5 AEROSOL RESPIRATORY (INHALATION) at 08:47

## 2023-10-28 RX ADMIN — HYDROMORPHONE HYDROCHLORIDE 1 MG: 1 INJECTION, SOLUTION INTRAMUSCULAR; INTRAVENOUS; SUBCUTANEOUS at 14:33

## 2023-10-28 RX ADMIN — INSULIN LISPRO 2 UNITS: 100 INJECTION, SOLUTION INTRAVENOUS; SUBCUTANEOUS at 04:45

## 2023-10-28 RX ADMIN — INSULIN LISPRO 1 UNITS: 100 INJECTION, SOLUTION INTRAVENOUS; SUBCUTANEOUS at 00:10

## 2023-10-28 RX ADMIN — OSELTAMIVIR PHOSPHATE 75 MG: 75 CAPSULE ORAL at 09:15

## 2023-10-28 RX ADMIN — IPRATROPIUM BROMIDE 2 PUFF: 17 AEROSOL, METERED RESPIRATORY (INHALATION) at 20:57

## 2023-10-28 RX ADMIN — OSELTAMIVIR PHOSPHATE 75 MG: 75 CAPSULE ORAL at 21:15

## 2023-10-28 RX ADMIN — IPRATROPIUM BROMIDE 2 PUFF: 17 AEROSOL, METERED RESPIRATORY (INHALATION) at 11:49

## 2023-10-28 RX ADMIN — BENZONATATE 100 MG: 100 CAPSULE ORAL at 14:33

## 2023-10-28 RX ADMIN — INSULIN LISPRO 2 UNITS: 100 INJECTION, SOLUTION INTRAVENOUS; SUBCUTANEOUS at 14:33

## 2023-10-28 RX ADMIN — ALLOPURINOL 300 MG: 300 TABLET ORAL at 09:11

## 2023-10-28 RX ADMIN — IPRATROPIUM BROMIDE 2 PUFF: 17 AEROSOL, METERED RESPIRATORY (INHALATION) at 08:45

## 2023-10-28 RX ADMIN — CLONIDINE HYDROCHLORIDE 0.2 MG: 0.2 TABLET ORAL at 09:12

## 2023-10-28 RX ADMIN — VANCOMYCIN HYDROCHLORIDE 1500 MG: 1.5 INJECTION, POWDER, LYOPHILIZED, FOR SOLUTION INTRAVENOUS at 22:49

## 2023-10-28 RX ADMIN — BUDESONIDE AND FORMOTEROL FUMARATE DIHYDRATE 2 PUFF: 160; 4.5 AEROSOL RESPIRATORY (INHALATION) at 20:57

## 2023-10-28 RX ADMIN — INSULIN LISPRO 2 UNITS: 100 INJECTION, SOLUTION INTRAVENOUS; SUBCUTANEOUS at 09:12

## 2023-10-28 RX ADMIN — IPRATROPIUM BROMIDE 2 PUFF: 17 AEROSOL, METERED RESPIRATORY (INHALATION) at 15:57

## 2023-10-28 RX ADMIN — PANTOPRAZOLE SODIUM 40 MG: 40 TABLET, DELAYED RELEASE ORAL at 09:10

## 2023-10-28 RX ADMIN — MELATONIN TAB 3 MG 3 MG: 3 TAB at 00:07

## 2023-10-28 RX ADMIN — INSULIN LISPRO 4 UNITS: 100 INJECTION, SOLUTION INTRAVENOUS; SUBCUTANEOUS at 21:16

## 2023-10-28 RX ADMIN — CEFEPIME 2000 MG: 2 INJECTION, POWDER, FOR SOLUTION INTRAVENOUS at 04:36

## 2023-10-28 RX ADMIN — ALBUTEROL SULFATE 2 PUFF: 90 AEROSOL, METERED RESPIRATORY (INHALATION) at 03:48

## 2023-10-28 RX ADMIN — ALBUTEROL SULFATE 2 PUFF: 90 AEROSOL, METERED RESPIRATORY (INHALATION) at 08:43

## 2023-10-28 RX ADMIN — CEFEPIME 2000 MG: 2 INJECTION, POWDER, FOR SOLUTION INTRAVENOUS at 21:16

## 2023-10-28 RX ADMIN — IPRATROPIUM BROMIDE 2 PUFF: 17 AEROSOL, METERED RESPIRATORY (INHALATION) at 03:48

## 2023-10-28 RX ADMIN — IOPAMIDOL 80 ML: 755 INJECTION, SOLUTION INTRAVENOUS at 15:28

## 2023-10-28 RX ADMIN — ALBUTEROL SULFATE 2 PUFF: 90 AEROSOL, METERED RESPIRATORY (INHALATION) at 15:55

## 2023-10-28 RX ADMIN — PREGABALIN 100 MG: 100 CAPSULE ORAL at 21:15

## 2023-10-28 RX ADMIN — FLUTICASONE PROPIONATE 2 SPRAY: 50 SPRAY, METERED NASAL at 09:15

## 2023-10-28 RX ADMIN — ALBUTEROL SULFATE 2 PUFF: 90 AEROSOL, METERED RESPIRATORY (INHALATION) at 20:56

## 2023-10-28 RX ADMIN — SODIUM CHLORIDE, PRESERVATIVE FREE 10 ML: 5 INJECTION INTRAVENOUS at 00:40

## 2023-10-28 RX ADMIN — HYDROMORPHONE HYDROCHLORIDE 1 MG: 1 INJECTION, SOLUTION INTRAMUSCULAR; INTRAVENOUS; SUBCUTANEOUS at 00:40

## 2023-10-28 RX ADMIN — ATORVASTATIN CALCIUM 10 MG: 10 TABLET, FILM COATED ORAL at 09:11

## 2023-10-28 RX ADMIN — HYDROMORPHONE HYDROCHLORIDE 1 MG: 1 INJECTION, SOLUTION INTRAMUSCULAR; INTRAVENOUS; SUBCUTANEOUS at 22:46

## 2023-10-28 RX ADMIN — SODIUM CHLORIDE, PRESERVATIVE FREE 10 ML: 5 INJECTION INTRAVENOUS at 04:28

## 2023-10-28 RX ADMIN — CALCIUM CARBONATE 500 MG: 500 TABLET, CHEWABLE ORAL at 18:27

## 2023-10-28 RX ADMIN — BENZONATATE 100 MG: 100 CAPSULE ORAL at 11:49

## 2023-10-28 RX ADMIN — EZETIMIBE 10 MG: 10 TABLET ORAL at 09:10

## 2023-10-28 RX ADMIN — HYDROMORPHONE HYDROCHLORIDE 1 MG: 1 INJECTION, SOLUTION INTRAMUSCULAR; INTRAVENOUS; SUBCUTANEOUS at 09:10

## 2023-10-28 RX ADMIN — HYDROMORPHONE HYDROCHLORIDE 1 MG: 1 INJECTION, SOLUTION INTRAMUSCULAR; INTRAVENOUS; SUBCUTANEOUS at 18:26

## 2023-10-28 RX ADMIN — INSULIN GLARGINE 15 UNITS: 100 INJECTION, SOLUTION SUBCUTANEOUS at 21:15

## 2023-10-28 RX ADMIN — SODIUM CHLORIDE, PRESERVATIVE FREE 10 ML: 5 INJECTION INTRAVENOUS at 21:15

## 2023-10-28 RX ADMIN — INSULIN LISPRO 2 UNITS: 100 INJECTION, SOLUTION INTRAVENOUS; SUBCUTANEOUS at 18:26

## 2023-10-28 RX ADMIN — VANCOMYCIN HYDROCHLORIDE 1500 MG: 1.5 INJECTION, POWDER, LYOPHILIZED, FOR SOLUTION INTRAVENOUS at 09:36

## 2023-10-28 RX ADMIN — BENZONATATE 100 MG: 100 CAPSULE ORAL at 21:15

## 2023-10-28 RX ADMIN — ALBUTEROL SULFATE 2 PUFF: 90 AEROSOL, METERED RESPIRATORY (INHALATION) at 11:47

## 2023-10-28 RX ADMIN — CEFEPIME 2000 MG: 2 INJECTION, POWDER, FOR SOLUTION INTRAVENOUS at 11:56

## 2023-10-28 ASSESSMENT — PAIN DESCRIPTION - DESCRIPTORS
DESCRIPTORS: ACHING
DESCRIPTORS: BURNING
DESCRIPTORS: ACHING

## 2023-10-28 ASSESSMENT — PAIN SCALES - GENERAL
PAINLEVEL_OUTOF10: 9
PAINLEVEL_OUTOF10: 8

## 2023-10-28 ASSESSMENT — PAIN - FUNCTIONAL ASSESSMENT: PAIN_FUNCTIONAL_ASSESSMENT: PREVENTS OR INTERFERES SOME ACTIVE ACTIVITIES AND ADLS

## 2023-10-28 ASSESSMENT — PAIN DESCRIPTION - PAIN TYPE
TYPE: ACUTE PAIN

## 2023-10-28 ASSESSMENT — PAIN DESCRIPTION - ORIENTATION
ORIENTATION: RIGHT;LEFT
ORIENTATION: LEFT

## 2023-10-28 ASSESSMENT — PAIN DESCRIPTION - LOCATION
LOCATION: LEG
LOCATION: LEG
LOCATION: FOOT

## 2023-10-28 ASSESSMENT — PAIN DESCRIPTION - FREQUENCY
FREQUENCY: CONTINUOUS
FREQUENCY: CONTINUOUS

## 2023-10-28 ASSESSMENT — PAIN DESCRIPTION - ONSET
ONSET: ON-GOING
ONSET: PROGRESSIVE

## 2023-10-29 LAB
ANION GAP SERPL CALCULATED.3IONS-SCNC: 8 MMOL/L (ref 4–16)
BASOPHILS ABSOLUTE: 0 K/CU MM
BASOPHILS RELATIVE PERCENT: 0.4 % (ref 0–1)
BUN SERPL-MCNC: 7 MG/DL (ref 6–23)
CALCIUM SERPL-MCNC: 8.8 MG/DL (ref 8.3–10.6)
CHLORIDE BLD-SCNC: 100 MMOL/L (ref 99–110)
CO2: 25 MMOL/L (ref 21–32)
CREAT SERPL-MCNC: 0.6 MG/DL (ref 0.9–1.3)
DIFFERENTIAL TYPE: ABNORMAL
DOSE AMOUNT: NORMAL
DOSE TIME: NORMAL
EKG ATRIAL RATE: 106 BPM
EKG DIAGNOSIS: NORMAL
EKG P AXIS: 65 DEGREES
EKG P-R INTERVAL: 184 MS
EKG Q-T INTERVAL: 366 MS
EKG QRS DURATION: 90 MS
EKG QTC CALCULATION (BAZETT): 486 MS
EKG R AXIS: -3 DEGREES
EKG T AXIS: 26 DEGREES
EKG VENTRICULAR RATE: 106 BPM
EOSINOPHILS ABSOLUTE: 0.1 K/CU MM
EOSINOPHILS RELATIVE PERCENT: 2.7 % (ref 0–3)
GFR SERPL CREATININE-BSD FRML MDRD: >60 ML/MIN/1.73M2
GLUCOSE BLD-MCNC: 233 MG/DL (ref 70–99)
GLUCOSE BLD-MCNC: 244 MG/DL (ref 70–99)
GLUCOSE BLD-MCNC: 261 MG/DL (ref 70–99)
GLUCOSE BLD-MCNC: 274 MG/DL (ref 70–99)
GLUCOSE BLD-MCNC: 289 MG/DL (ref 70–99)
GLUCOSE SERPL-MCNC: 218 MG/DL (ref 70–99)
HCT VFR BLD CALC: 30.5 % (ref 42–52)
HEMOGLOBIN: 10.1 GM/DL (ref 13.5–18)
IMMATURE NEUTROPHIL %: 0.4 % (ref 0–0.43)
LYMPHOCYTES ABSOLUTE: 1 K/CU MM
LYMPHOCYTES RELATIVE PERCENT: 19.2 % (ref 24–44)
MAGNESIUM: 1.7 MG/DL (ref 1.8–2.4)
MCH RBC QN AUTO: 30.9 PG (ref 27–31)
MCHC RBC AUTO-ENTMCNC: 33.1 % (ref 32–36)
MCV RBC AUTO: 93.3 FL (ref 78–100)
MONOCYTES ABSOLUTE: 0.3 K/CU MM
MONOCYTES RELATIVE PERCENT: 5.5 % (ref 0–4)
MRSA, DNA, NASAL: ABNORMAL
NUCLEATED RBC %: 0 %
PDW BLD-RTO: 11.7 % (ref 11.7–14.9)
PHOSPHORUS: 4.1 MG/DL (ref 2.5–4.9)
PLATELET # BLD: 358 K/CU MM (ref 140–440)
PMV BLD AUTO: 8.4 FL (ref 7.5–11.1)
POTASSIUM SERPL-SCNC: 4.3 MMOL/L (ref 3.5–5.1)
RBC # BLD: 3.27 M/CU MM (ref 4.6–6.2)
SEGMENTED NEUTROPHILS ABSOLUTE COUNT: 3.8 K/CU MM
SEGMENTED NEUTROPHILS RELATIVE PERCENT: 71.8 % (ref 36–66)
SODIUM BLD-SCNC: 133 MMOL/L (ref 135–145)
SPECIMEN DESCRIPTION: ABNORMAL
TOTAL IMMATURE NEUTOROPHIL: 0.02 K/CU MM
TOTAL NUCLEATED RBC: 0 K/CU MM
VANCOMYCIN RANDOM: 9.7 UG/ML
WBC # BLD: 5.3 K/CU MM (ref 4–10.5)

## 2023-10-29 PROCEDURE — 85025 COMPLETE CBC W/AUTO DIFF WBC: CPT

## 2023-10-29 PROCEDURE — 6360000002 HC RX W HCPCS: Performed by: INTERNAL MEDICINE

## 2023-10-29 PROCEDURE — 84100 ASSAY OF PHOSPHORUS: CPT

## 2023-10-29 PROCEDURE — 1200000000 HC SEMI PRIVATE

## 2023-10-29 PROCEDURE — 6370000000 HC RX 637 (ALT 250 FOR IP): Performed by: INTERNAL MEDICINE

## 2023-10-29 PROCEDURE — 76937 US GUIDE VASCULAR ACCESS: CPT

## 2023-10-29 PROCEDURE — 80048 BASIC METABOLIC PNL TOTAL CA: CPT

## 2023-10-29 PROCEDURE — 82962 GLUCOSE BLOOD TEST: CPT

## 2023-10-29 PROCEDURE — 94761 N-INVAS EAR/PLS OXIMETRY MLT: CPT

## 2023-10-29 PROCEDURE — 82565 ASSAY OF CREATININE: CPT

## 2023-10-29 PROCEDURE — 2580000003 HC RX 258: Performed by: INTERNAL MEDICINE

## 2023-10-29 PROCEDURE — 83735 ASSAY OF MAGNESIUM: CPT

## 2023-10-29 PROCEDURE — 6370000000 HC RX 637 (ALT 250 FOR IP): Performed by: NURSE PRACTITIONER

## 2023-10-29 PROCEDURE — 80202 ASSAY OF VANCOMYCIN: CPT

## 2023-10-29 PROCEDURE — 36415 COLL VENOUS BLD VENIPUNCTURE: CPT

## 2023-10-29 PROCEDURE — 94640 AIRWAY INHALATION TREATMENT: CPT

## 2023-10-29 PROCEDURE — 93010 ELECTROCARDIOGRAM REPORT: CPT | Performed by: INTERNAL MEDICINE

## 2023-10-29 RX ORDER — MAGNESIUM SULFATE IN WATER 40 MG/ML
2000 INJECTION, SOLUTION INTRAVENOUS ONCE
Status: COMPLETED | OUTPATIENT
Start: 2023-10-29 | End: 2023-10-29

## 2023-10-29 RX ORDER — HYDROCODONE BITARTRATE AND ACETAMINOPHEN 5; 325 MG/1; MG/1
1 TABLET ORAL EVERY 6 HOURS PRN
Status: DISCONTINUED | OUTPATIENT
Start: 2023-10-29 | End: 2023-10-30

## 2023-10-29 RX ADMIN — MOXIFLOXACIN HYDROCHLORIDE 1 DROP: 5 SOLUTION/ DROPS OPHTHALMIC at 08:49

## 2023-10-29 RX ADMIN — CEFEPIME 2000 MG: 2 INJECTION, POWDER, FOR SOLUTION INTRAVENOUS at 21:00

## 2023-10-29 RX ADMIN — HYDROMORPHONE HYDROCHLORIDE 1 MG: 1 INJECTION, SOLUTION INTRAMUSCULAR; INTRAVENOUS; SUBCUTANEOUS at 12:34

## 2023-10-29 RX ADMIN — PREDNISOLONE ACETATE 1 DROP: 10 SUSPENSION/ DROPS OPHTHALMIC at 12:33

## 2023-10-29 RX ADMIN — PREDNISOLONE ACETATE 1 DROP: 10 SUSPENSION/ DROPS OPHTHALMIC at 08:49

## 2023-10-29 RX ADMIN — MOXIFLOXACIN HYDROCHLORIDE 1 DROP: 5 SOLUTION/ DROPS OPHTHALMIC at 12:33

## 2023-10-29 RX ADMIN — HYDROMORPHONE HYDROCHLORIDE 1 MG: 1 INJECTION, SOLUTION INTRAMUSCULAR; INTRAVENOUS; SUBCUTANEOUS at 20:54

## 2023-10-29 RX ADMIN — VANCOMYCIN HYDROCHLORIDE 1500 MG: 1.5 INJECTION, POWDER, LYOPHILIZED, FOR SOLUTION INTRAVENOUS at 21:33

## 2023-10-29 RX ADMIN — SODIUM CHLORIDE, PRESERVATIVE FREE 10 ML: 5 INJECTION INTRAVENOUS at 21:09

## 2023-10-29 RX ADMIN — IPRATROPIUM BROMIDE 2 PUFF: 17 AEROSOL, METERED RESPIRATORY (INHALATION) at 08:30

## 2023-10-29 RX ADMIN — MAGNESIUM SULFATE HEPTAHYDRATE 2000 MG: 40 INJECTION, SOLUTION INTRAVENOUS at 13:06

## 2023-10-29 RX ADMIN — BENZONATATE 100 MG: 100 CAPSULE ORAL at 08:48

## 2023-10-29 RX ADMIN — ATORVASTATIN CALCIUM 10 MG: 10 TABLET, FILM COATED ORAL at 08:48

## 2023-10-29 RX ADMIN — CEFEPIME 2000 MG: 2 INJECTION, POWDER, FOR SOLUTION INTRAVENOUS at 12:37

## 2023-10-29 RX ADMIN — INSULIN LISPRO 2 UNITS: 100 INJECTION, SOLUTION INTRAVENOUS; SUBCUTANEOUS at 12:33

## 2023-10-29 RX ADMIN — HYDROCODONE BITARTRATE AND ACETAMINOPHEN 1 TABLET: 5; 325 TABLET ORAL at 11:44

## 2023-10-29 RX ADMIN — BENZONATATE 100 MG: 100 CAPSULE ORAL at 15:11

## 2023-10-29 RX ADMIN — CEFEPIME 2000 MG: 2 INJECTION, POWDER, FOR SOLUTION INTRAVENOUS at 03:52

## 2023-10-29 RX ADMIN — INSULIN LISPRO 1 UNITS: 100 INJECTION, SOLUTION INTRAVENOUS; SUBCUTANEOUS at 17:50

## 2023-10-29 RX ADMIN — ALBUTEROL SULFATE 2 PUFF: 90 AEROSOL, METERED RESPIRATORY (INHALATION) at 08:28

## 2023-10-29 RX ADMIN — PANTOPRAZOLE SODIUM 40 MG: 40 TABLET, DELAYED RELEASE ORAL at 08:48

## 2023-10-29 RX ADMIN — IPRATROPIUM BROMIDE 2 PUFF: 17 AEROSOL, METERED RESPIRATORY (INHALATION) at 21:51

## 2023-10-29 RX ADMIN — BRIMONIDINE TARTRATE 1 DROP: 2 SOLUTION OPHTHALMIC at 08:49

## 2023-10-29 RX ADMIN — ALBUTEROL SULFATE 2 PUFF: 90 AEROSOL, METERED RESPIRATORY (INHALATION) at 16:16

## 2023-10-29 RX ADMIN — ALBUTEROL SULFATE 2 PUFF: 90 AEROSOL, METERED RESPIRATORY (INHALATION) at 21:51

## 2023-10-29 RX ADMIN — FLUTICASONE PROPIONATE 2 SPRAY: 50 SPRAY, METERED NASAL at 08:48

## 2023-10-29 RX ADMIN — IPRATROPIUM BROMIDE 2 PUFF: 17 AEROSOL, METERED RESPIRATORY (INHALATION) at 16:18

## 2023-10-29 RX ADMIN — DORZOLAMIDE HYDROCHLORIDE AND TIMOLOL MALEATE 1 DROP: 20; 5 SOLUTION/ DROPS OPHTHALMIC at 08:49

## 2023-10-29 RX ADMIN — DORZOLAMIDE HYDROCHLORIDE AND TIMOLOL MALEATE 1 DROP: 20; 5 SOLUTION/ DROPS OPHTHALMIC at 16:34

## 2023-10-29 RX ADMIN — IPRATROPIUM BROMIDE 2 PUFF: 17 AEROSOL, METERED RESPIRATORY (INHALATION) at 11:10

## 2023-10-29 RX ADMIN — EZETIMIBE 10 MG: 10 TABLET ORAL at 08:48

## 2023-10-29 RX ADMIN — VANCOMYCIN HYDROCHLORIDE 1500 MG: 1.5 INJECTION, POWDER, LYOPHILIZED, FOR SOLUTION INTRAVENOUS at 08:50

## 2023-10-29 RX ADMIN — HYDROMORPHONE HYDROCHLORIDE 1 MG: 1 INJECTION, SOLUTION INTRAMUSCULAR; INTRAVENOUS; SUBCUTANEOUS at 08:24

## 2023-10-29 RX ADMIN — PREDNISOLONE ACETATE 1 DROP: 10 SUSPENSION/ DROPS OPHTHALMIC at 16:34

## 2023-10-29 RX ADMIN — SODIUM CHLORIDE, PRESERVATIVE FREE 10 ML: 5 INJECTION INTRAVENOUS at 08:49

## 2023-10-29 RX ADMIN — INSULIN GLARGINE 15 UNITS: 100 INJECTION, SOLUTION SUBCUTANEOUS at 21:04

## 2023-10-29 RX ADMIN — HYDROMORPHONE HYDROCHLORIDE 1 MG: 1 INJECTION, SOLUTION INTRAMUSCULAR; INTRAVENOUS; SUBCUTANEOUS at 03:48

## 2023-10-29 RX ADMIN — CLONIDINE HYDROCHLORIDE 0.2 MG: 0.2 TABLET ORAL at 08:48

## 2023-10-29 RX ADMIN — ALBUTEROL SULFATE 2 PUFF: 90 AEROSOL, METERED RESPIRATORY (INHALATION) at 11:10

## 2023-10-29 RX ADMIN — INSULIN LISPRO 2 UNITS: 100 INJECTION, SOLUTION INTRAVENOUS; SUBCUTANEOUS at 08:48

## 2023-10-29 RX ADMIN — PREGABALIN 100 MG: 100 CAPSULE ORAL at 08:48

## 2023-10-29 RX ADMIN — MELATONIN TAB 3 MG 3 MG: 3 TAB at 21:35

## 2023-10-29 RX ADMIN — BENZONATATE 100 MG: 100 CAPSULE ORAL at 20:55

## 2023-10-29 RX ADMIN — BUDESONIDE AND FORMOTEROL FUMARATE DIHYDRATE 2 PUFF: 160; 4.5 AEROSOL RESPIRATORY (INHALATION) at 08:32

## 2023-10-29 RX ADMIN — ALLOPURINOL 300 MG: 300 TABLET ORAL at 08:48

## 2023-10-29 RX ADMIN — BUDESONIDE AND FORMOTEROL FUMARATE DIHYDRATE 2 PUFF: 160; 4.5 AEROSOL RESPIRATORY (INHALATION) at 21:52

## 2023-10-29 RX ADMIN — HYDROMORPHONE HYDROCHLORIDE 1 MG: 1 INJECTION, SOLUTION INTRAMUSCULAR; INTRAVENOUS; SUBCUTANEOUS at 16:34

## 2023-10-29 RX ADMIN — BRIMONIDINE TARTRATE 1 DROP: 2 SOLUTION OPHTHALMIC at 16:34

## 2023-10-29 RX ADMIN — HYDROCODONE BITARTRATE AND ACETAMINOPHEN 1 TABLET: 5; 325 TABLET ORAL at 17:51

## 2023-10-29 RX ADMIN — MOXIFLOXACIN HYDROCHLORIDE 1 DROP: 5 SOLUTION/ DROPS OPHTHALMIC at 16:34

## 2023-10-29 RX ADMIN — OSELTAMIVIR PHOSPHATE 75 MG: 75 CAPSULE ORAL at 08:48

## 2023-10-29 RX ADMIN — AMLODIPINE BESYLATE 10 MG: 10 TABLET ORAL at 08:48

## 2023-10-29 RX ADMIN — PREGABALIN 100 MG: 100 CAPSULE ORAL at 20:55

## 2023-10-29 ASSESSMENT — PAIN DESCRIPTION - DESCRIPTORS
DESCRIPTORS: ACHING
DESCRIPTORS: ACHING
DESCRIPTORS: GNAWING;SQUEEZING
DESCRIPTORS: ACHING
DESCRIPTORS: ACHING;DISCOMFORT;DULL;GNAWING
DESCRIPTORS: BURNING

## 2023-10-29 ASSESSMENT — PAIN SCALES - WONG BAKER
WONGBAKER_NUMERICALRESPONSE: 0

## 2023-10-29 ASSESSMENT — PAIN DESCRIPTION - DIRECTION
RADIATING_TOWARDS: FOOT

## 2023-10-29 ASSESSMENT — PAIN - FUNCTIONAL ASSESSMENT
PAIN_FUNCTIONAL_ASSESSMENT: ACTIVITIES ARE NOT PREVENTED

## 2023-10-29 ASSESSMENT — PAIN DESCRIPTION - LOCATION
LOCATION: GENERALIZED
LOCATION: LEG
LOCATION: BACK
LOCATION: LEG
LOCATION: FOOT

## 2023-10-29 ASSESSMENT — PAIN SCALES - GENERAL
PAINLEVEL_OUTOF10: 8
PAINLEVEL_OUTOF10: 9
PAINLEVEL_OUTOF10: 8
PAINLEVEL_OUTOF10: 10
PAINLEVEL_OUTOF10: 9
PAINLEVEL_OUTOF10: 9
PAINLEVEL_OUTOF10: 10

## 2023-10-29 ASSESSMENT — PAIN DESCRIPTION - PAIN TYPE
TYPE: ACUTE PAIN

## 2023-10-29 ASSESSMENT — PAIN DESCRIPTION - ORIENTATION
ORIENTATION: RIGHT
ORIENTATION: LEFT
ORIENTATION: RIGHT
ORIENTATION: LEFT

## 2023-10-29 ASSESSMENT — PAIN DESCRIPTION - FREQUENCY
FREQUENCY: CONTINUOUS

## 2023-10-29 ASSESSMENT — PAIN DESCRIPTION - ONSET
ONSET: ON-GOING
ONSET: PROGRESSIVE

## 2023-10-30 PROBLEM — M14.60 CHARCOT'S ARTHROPATHY: Status: ACTIVE | Noted: 2023-10-30

## 2023-10-30 PROBLEM — E87.20 LACTIC ACIDOSIS: Status: ACTIVE | Noted: 2023-10-30

## 2023-10-30 PROBLEM — A49.02 MRSA INFECTION: Status: ACTIVE | Noted: 2023-10-30

## 2023-10-30 LAB
ANION GAP SERPL CALCULATED.3IONS-SCNC: 8 MMOL/L (ref 4–16)
BASOPHILS ABSOLUTE: 0 K/CU MM
BASOPHILS RELATIVE PERCENT: 0.2 % (ref 0–1)
BUN SERPL-MCNC: 7 MG/DL (ref 6–23)
CALCIUM SERPL-MCNC: 8.5 MG/DL (ref 8.3–10.6)
CHLORIDE BLD-SCNC: 102 MMOL/L (ref 99–110)
CO2: 25 MMOL/L (ref 21–32)
CREAT SERPL-MCNC: 0.5 MG/DL (ref 0.9–1.3)
DIFFERENTIAL TYPE: ABNORMAL
EOSINOPHILS ABSOLUTE: 0.1 K/CU MM
EOSINOPHILS RELATIVE PERCENT: 2.7 % (ref 0–3)
GFR SERPL CREATININE-BSD FRML MDRD: >60 ML/MIN/1.73M2
GLUCOSE BLD-MCNC: 199 MG/DL (ref 70–99)
GLUCOSE BLD-MCNC: 223 MG/DL (ref 70–99)
GLUCOSE BLD-MCNC: 226 MG/DL (ref 70–99)
GLUCOSE BLD-MCNC: 285 MG/DL (ref 70–99)
GLUCOSE BLD-MCNC: 304 MG/DL (ref 70–99)
GLUCOSE SERPL-MCNC: 192 MG/DL (ref 70–99)
HCT VFR BLD CALC: 30.5 % (ref 42–52)
HEMOGLOBIN: 10.3 GM/DL (ref 13.5–18)
IMMATURE NEUTROPHIL %: 0.4 % (ref 0–0.43)
LYMPHOCYTES ABSOLUTE: 1.1 K/CU MM
LYMPHOCYTES RELATIVE PERCENT: 23 % (ref 24–44)
MAGNESIUM: 1.6 MG/DL (ref 1.8–2.4)
MCH RBC QN AUTO: 30.7 PG (ref 27–31)
MCHC RBC AUTO-ENTMCNC: 33.8 % (ref 32–36)
MCV RBC AUTO: 91 FL (ref 78–100)
MONOCYTES ABSOLUTE: 0.3 K/CU MM
MONOCYTES RELATIVE PERCENT: 5.5 % (ref 0–4)
NUCLEATED RBC %: 0 %
PDW BLD-RTO: 11.7 % (ref 11.7–14.9)
PHOSPHORUS: 3.9 MG/DL (ref 2.5–4.9)
PLATELET # BLD: 353 K/CU MM (ref 140–440)
PMV BLD AUTO: 8.3 FL (ref 7.5–11.1)
POTASSIUM SERPL-SCNC: 4.3 MMOL/L (ref 3.5–5.1)
RBC # BLD: 3.35 M/CU MM (ref 4.6–6.2)
SEGMENTED NEUTROPHILS ABSOLUTE COUNT: 3.2 K/CU MM
SEGMENTED NEUTROPHILS RELATIVE PERCENT: 68.2 % (ref 36–66)
SODIUM BLD-SCNC: 135 MMOL/L (ref 135–145)
TOTAL IMMATURE NEUTOROPHIL: 0.02 K/CU MM
TOTAL NUCLEATED RBC: 0 K/CU MM
WBC # BLD: 4.7 K/CU MM (ref 4–10.5)

## 2023-10-30 PROCEDURE — 6370000000 HC RX 637 (ALT 250 FOR IP): Performed by: INTERNAL MEDICINE

## 2023-10-30 PROCEDURE — 94640 AIRWAY INHALATION TREATMENT: CPT

## 2023-10-30 PROCEDURE — 80048 BASIC METABOLIC PNL TOTAL CA: CPT

## 2023-10-30 PROCEDURE — 6370000000 HC RX 637 (ALT 250 FOR IP): Performed by: NURSE PRACTITIONER

## 2023-10-30 PROCEDURE — APPSS60 APP SPLIT SHARED TIME 46-60 MINUTES: Performed by: NURSE PRACTITIONER

## 2023-10-30 PROCEDURE — 82962 GLUCOSE BLOOD TEST: CPT

## 2023-10-30 PROCEDURE — 1200000000 HC SEMI PRIVATE

## 2023-10-30 PROCEDURE — 82565 ASSAY OF CREATININE: CPT

## 2023-10-30 PROCEDURE — 84100 ASSAY OF PHOSPHORUS: CPT

## 2023-10-30 PROCEDURE — 36415 COLL VENOUS BLD VENIPUNCTURE: CPT

## 2023-10-30 PROCEDURE — 6360000002 HC RX W HCPCS: Performed by: INTERNAL MEDICINE

## 2023-10-30 PROCEDURE — 94761 N-INVAS EAR/PLS OXIMETRY MLT: CPT

## 2023-10-30 PROCEDURE — 83735 ASSAY OF MAGNESIUM: CPT

## 2023-10-30 PROCEDURE — 76937 US GUIDE VASCULAR ACCESS: CPT

## 2023-10-30 PROCEDURE — 85025 COMPLETE CBC W/AUTO DIFF WBC: CPT

## 2023-10-30 PROCEDURE — 2580000003 HC RX 258: Performed by: INTERNAL MEDICINE

## 2023-10-30 RX ORDER — HYDROMORPHONE HYDROCHLORIDE 2 MG/1
2 TABLET ORAL EVERY 6 HOURS PRN
Status: DISCONTINUED | OUTPATIENT
Start: 2023-10-30 | End: 2023-11-02 | Stop reason: HOSPADM

## 2023-10-30 RX ORDER — MAGNESIUM SULFATE IN WATER 40 MG/ML
2000 INJECTION, SOLUTION INTRAVENOUS ONCE
Status: COMPLETED | OUTPATIENT
Start: 2023-10-30 | End: 2023-10-30

## 2023-10-30 RX ORDER — SULFAMETHOXAZOLE AND TRIMETHOPRIM 800; 160 MG/1; MG/1
1 TABLET ORAL EVERY 12 HOURS SCHEDULED
Status: DISCONTINUED | OUTPATIENT
Start: 2023-10-30 | End: 2023-11-02 | Stop reason: HOSPADM

## 2023-10-30 RX ORDER — CHLORHEXIDINE GLUCONATE 40 MG/ML
SOLUTION TOPICAL DAILY
Status: DISCONTINUED | OUTPATIENT
Start: 2023-10-30 | End: 2023-11-02 | Stop reason: HOSPADM

## 2023-10-30 RX ORDER — AMOXICILLIN AND CLAVULANATE POTASSIUM 875; 125 MG/1; MG/1
1 TABLET, FILM COATED ORAL EVERY 12 HOURS SCHEDULED
Status: DISCONTINUED | OUTPATIENT
Start: 2023-10-30 | End: 2023-11-02 | Stop reason: HOSPADM

## 2023-10-30 RX ADMIN — AMLODIPINE BESYLATE 10 MG: 10 TABLET ORAL at 08:53

## 2023-10-30 RX ADMIN — ALBUTEROL SULFATE 2 PUFF: 90 AEROSOL, METERED RESPIRATORY (INHALATION) at 15:44

## 2023-10-30 RX ADMIN — PREDNISOLONE ACETATE 1 DROP: 10 SUSPENSION/ DROPS OPHTHALMIC at 08:52

## 2023-10-30 RX ADMIN — INSULIN LISPRO 3 UNITS: 100 INJECTION, SOLUTION INTRAVENOUS; SUBCUTANEOUS at 16:55

## 2023-10-30 RX ADMIN — SODIUM CHLORIDE, PRESERVATIVE FREE 10 ML: 5 INJECTION INTRAVENOUS at 08:52

## 2023-10-30 RX ADMIN — BUDESONIDE AND FORMOTEROL FUMARATE DIHYDRATE 2 PUFF: 160; 4.5 AEROSOL RESPIRATORY (INHALATION) at 11:20

## 2023-10-30 RX ADMIN — Medication: at 10:36

## 2023-10-30 RX ADMIN — OSELTAMIVIR PHOSPHATE 75 MG: 75 CAPSULE ORAL at 08:53

## 2023-10-30 RX ADMIN — IPRATROPIUM BROMIDE 2 PUFF: 17 AEROSOL, METERED RESPIRATORY (INHALATION) at 11:22

## 2023-10-30 RX ADMIN — SULFAMETHOXAZOLE AND TRIMETHOPRIM 1 TABLET: 800; 160 TABLET ORAL at 21:33

## 2023-10-30 RX ADMIN — MOXIFLOXACIN HYDROCHLORIDE 1 DROP: 5 SOLUTION/ DROPS OPHTHALMIC at 16:55

## 2023-10-30 RX ADMIN — EZETIMIBE 10 MG: 10 TABLET ORAL at 08:53

## 2023-10-30 RX ADMIN — ATORVASTATIN CALCIUM 10 MG: 10 TABLET, FILM COATED ORAL at 08:53

## 2023-10-30 RX ADMIN — DORZOLAMIDE HYDROCHLORIDE AND TIMOLOL MALEATE 1 DROP: 20; 5 SOLUTION/ DROPS OPHTHALMIC at 16:55

## 2023-10-30 RX ADMIN — OSELTAMIVIR PHOSPHATE 75 MG: 75 CAPSULE ORAL at 00:30

## 2023-10-30 RX ADMIN — BRIMONIDINE TARTRATE 1 DROP: 2 SOLUTION OPHTHALMIC at 16:55

## 2023-10-30 RX ADMIN — IPRATROPIUM BROMIDE 2 PUFF: 17 AEROSOL, METERED RESPIRATORY (INHALATION) at 15:45

## 2023-10-30 RX ADMIN — PREDNISOLONE ACETATE 1 DROP: 10 SUSPENSION/ DROPS OPHTHALMIC at 16:55

## 2023-10-30 RX ADMIN — CEFEPIME 2000 MG: 2 INJECTION, POWDER, FOR SOLUTION INTRAVENOUS at 05:09

## 2023-10-30 RX ADMIN — BUDESONIDE AND FORMOTEROL FUMARATE DIHYDRATE 2 PUFF: 160; 4.5 AEROSOL RESPIRATORY (INHALATION) at 22:45

## 2023-10-30 RX ADMIN — BENZONATATE 100 MG: 100 CAPSULE ORAL at 08:53

## 2023-10-30 RX ADMIN — HYDROMORPHONE HYDROCHLORIDE 1 MG: 1 INJECTION, SOLUTION INTRAMUSCULAR; INTRAVENOUS; SUBCUTANEOUS at 16:56

## 2023-10-30 RX ADMIN — INSULIN GLARGINE 15 UNITS: 100 INJECTION, SOLUTION SUBCUTANEOUS at 21:46

## 2023-10-30 RX ADMIN — CHLORHEXIDINE GLUCONATE 118 ML: 4 SOLUTION TOPICAL at 10:37

## 2023-10-30 RX ADMIN — HYDROMORPHONE HYDROCHLORIDE 1 MG: 1 INJECTION, SOLUTION INTRAMUSCULAR; INTRAVENOUS; SUBCUTANEOUS at 05:07

## 2023-10-30 RX ADMIN — ALBUTEROL SULFATE 2 PUFF: 90 AEROSOL, METERED RESPIRATORY (INHALATION) at 22:44

## 2023-10-30 RX ADMIN — HYDROMORPHONE HYDROCHLORIDE 2 MG: 2 TABLET ORAL at 10:36

## 2023-10-30 RX ADMIN — BRIMONIDINE TARTRATE 1 DROP: 2 SOLUTION OPHTHALMIC at 08:52

## 2023-10-30 RX ADMIN — HYDROMORPHONE HYDROCHLORIDE 1 MG: 1 INJECTION, SOLUTION INTRAMUSCULAR; INTRAVENOUS; SUBCUTANEOUS at 21:30

## 2023-10-30 RX ADMIN — HYDROMORPHONE HYDROCHLORIDE 2 MG: 2 TABLET ORAL at 18:57

## 2023-10-30 RX ADMIN — INSULIN LISPRO 2 UNITS: 100 INJECTION, SOLUTION INTRAVENOUS; SUBCUTANEOUS at 21:57

## 2023-10-30 RX ADMIN — HYDROMORPHONE HYDROCHLORIDE 1 MG: 1 INJECTION, SOLUTION INTRAMUSCULAR; INTRAVENOUS; SUBCUTANEOUS at 00:30

## 2023-10-30 RX ADMIN — MELATONIN TAB 3 MG 3 MG: 3 TAB at 21:57

## 2023-10-30 RX ADMIN — INSULIN LISPRO 1 UNITS: 100 INJECTION, SOLUTION INTRAVENOUS; SUBCUTANEOUS at 13:02

## 2023-10-30 RX ADMIN — BENZONATATE 100 MG: 100 CAPSULE ORAL at 21:33

## 2023-10-30 RX ADMIN — MOXIFLOXACIN HYDROCHLORIDE 1 DROP: 5 SOLUTION/ DROPS OPHTHALMIC at 08:52

## 2023-10-30 RX ADMIN — ALLOPURINOL 300 MG: 300 TABLET ORAL at 08:53

## 2023-10-30 RX ADMIN — ALBUTEROL SULFATE 2 PUFF: 90 AEROSOL, METERED RESPIRATORY (INHALATION) at 11:21

## 2023-10-30 RX ADMIN — PREDNISOLONE ACETATE 1 DROP: 10 SUSPENSION/ DROPS OPHTHALMIC at 13:02

## 2023-10-30 RX ADMIN — PANTOPRAZOLE SODIUM 40 MG: 40 TABLET, DELAYED RELEASE ORAL at 05:07

## 2023-10-30 RX ADMIN — PREGABALIN 100 MG: 100 CAPSULE ORAL at 08:53

## 2023-10-30 RX ADMIN — OSELTAMIVIR PHOSPHATE 75 MG: 75 CAPSULE ORAL at 21:57

## 2023-10-30 RX ADMIN — DORZOLAMIDE HYDROCHLORIDE AND TIMOLOL MALEATE 1 DROP: 20; 5 SOLUTION/ DROPS OPHTHALMIC at 08:52

## 2023-10-30 RX ADMIN — BENZONATATE 100 MG: 100 CAPSULE ORAL at 16:55

## 2023-10-30 RX ADMIN — MOXIFLOXACIN HYDROCHLORIDE 1 DROP: 5 SOLUTION/ DROPS OPHTHALMIC at 13:02

## 2023-10-30 RX ADMIN — FLUTICASONE PROPIONATE 2 SPRAY: 50 SPRAY, METERED NASAL at 08:54

## 2023-10-30 RX ADMIN — HYDROMORPHONE HYDROCHLORIDE 1 MG: 1 INJECTION, SOLUTION INTRAMUSCULAR; INTRAVENOUS; SUBCUTANEOUS at 09:00

## 2023-10-30 RX ADMIN — Medication: at 21:33

## 2023-10-30 RX ADMIN — Medication: at 13:02

## 2023-10-30 RX ADMIN — INSULIN LISPRO 1 UNITS: 100 INJECTION, SOLUTION INTRAVENOUS; SUBCUTANEOUS at 08:53

## 2023-10-30 RX ADMIN — MAGNESIUM SULFATE HEPTAHYDRATE 2000 MG: 40 INJECTION, SOLUTION INTRAVENOUS at 10:36

## 2023-10-30 RX ADMIN — HYDROMORPHONE HYDROCHLORIDE 1 MG: 1 INJECTION, SOLUTION INTRAMUSCULAR; INTRAVENOUS; SUBCUTANEOUS at 13:02

## 2023-10-30 RX ADMIN — AMOXICILLIN AND CLAVULANATE POTASSIUM 1 TABLET: 875; 125 TABLET, FILM COATED ORAL at 21:33

## 2023-10-30 RX ADMIN — VANCOMYCIN HYDROCHLORIDE 1500 MG: 1.5 INJECTION, POWDER, LYOPHILIZED, FOR SOLUTION INTRAVENOUS at 08:57

## 2023-10-30 RX ADMIN — CLONIDINE HYDROCHLORIDE 0.2 MG: 0.2 TABLET ORAL at 08:53

## 2023-10-30 RX ADMIN — IPRATROPIUM BROMIDE 2 PUFF: 17 AEROSOL, METERED RESPIRATORY (INHALATION) at 22:44

## 2023-10-30 RX ADMIN — SODIUM CHLORIDE, PRESERVATIVE FREE 10 ML: 5 INJECTION INTRAVENOUS at 21:31

## 2023-10-30 RX ADMIN — PREGABALIN 100 MG: 100 CAPSULE ORAL at 21:33

## 2023-10-30 ASSESSMENT — PAIN DESCRIPTION - ORIENTATION
ORIENTATION: LEFT
ORIENTATION: RIGHT;LEFT
ORIENTATION: LEFT
ORIENTATION: LEFT

## 2023-10-30 ASSESSMENT — PAIN DESCRIPTION - LOCATION
LOCATION: LEG
LOCATION: FOOT
LOCATION: LEG
LOCATION: FOOT

## 2023-10-30 ASSESSMENT — PAIN - FUNCTIONAL ASSESSMENT
PAIN_FUNCTIONAL_ASSESSMENT: ACTIVITIES ARE NOT PREVENTED

## 2023-10-30 ASSESSMENT — PAIN SCALES - GENERAL
PAINLEVEL_OUTOF10: 9
PAINLEVEL_OUTOF10: 10
PAINLEVEL_OUTOF10: 6
PAINLEVEL_OUTOF10: 9
PAINLEVEL_OUTOF10: 10
PAINLEVEL_OUTOF10: 8

## 2023-10-30 ASSESSMENT — PAIN DESCRIPTION - DESCRIPTORS
DESCRIPTORS: ACHING

## 2023-10-30 ASSESSMENT — PAIN SCALES - WONG BAKER
WONGBAKER_NUMERICALRESPONSE: 0

## 2023-10-30 ASSESSMENT — PAIN DESCRIPTION - FREQUENCY
FREQUENCY: CONTINUOUS

## 2023-10-30 ASSESSMENT — PAIN DESCRIPTION - DIRECTION
RADIATING_TOWARDS: FOOT

## 2023-10-30 ASSESSMENT — PAIN DESCRIPTION - PAIN TYPE
TYPE: ACUTE PAIN

## 2023-10-30 ASSESSMENT — PAIN DESCRIPTION - ONSET
ONSET: ON-GOING

## 2023-10-30 ASSESSMENT — ENCOUNTER SYMPTOMS
BACK PAIN: 0
ABDOMINAL PAIN: 0
COLOR CHANGE: 0
CHEST TIGHTNESS: 0

## 2023-10-30 NOTE — CARE COORDINATION
10/30/23 1632   Service Assessment   Patient Orientation Alert and Oriented   Cognition Alert   History Provided By Patient   Primary [address] Family Members   Patient's Healthcare Decision Maker is: Legal Next of [facility]   PCP Verified by CM Yes   Prior Functional Level Assistance with the following:;Mobility   Current Functional Level Assistance with the following:;Mobility   Can patient return to prior living arrangement Yes   Ability to make needs known: Good   Family able to assist with home care needs: Yes   Would you like for me to discuss the discharge plan with any other family members/significant others, and if so, who? No   Financial Resources  Resources ECF/Home Care     CM in to see Pt to initiate discharge planning. Pt from home. Pt was active with [facility], however states care was never started. Pt wishes to have a different home care company, no preference. PS to Dr. Jennefer Heimlich to update, home care order requested.

## 2023-10-31 ENCOUNTER — APPOINTMENT (OUTPATIENT)
Dept: NUCLEAR MEDICINE | Age: 38
DRG: 720 | End: 2023-10-31
Attending: INTERNAL MEDICINE
Payer: COMMERCIAL

## 2023-10-31 LAB
CREAT SERPL-MCNC: 0.5 MG/DL (ref 0.9–1.3)
CULTURE: ABNORMAL
CULTURE: ABNORMAL
GFR SERPL CREATININE-BSD FRML MDRD: >60 ML/MIN/1.73M2
GLUCOSE BLD-MCNC: 218 MG/DL (ref 70–99)
GLUCOSE BLD-MCNC: 226 MG/DL (ref 70–99)
GLUCOSE BLD-MCNC: 239 MG/DL (ref 70–99)
GLUCOSE BLD-MCNC: 265 MG/DL (ref 70–99)
GLUCOSE BLD-MCNC: 316 MG/DL (ref 70–99)
GLUCOSE BLD-MCNC: 324 MG/DL (ref 70–99)
Lab: ABNORMAL
SPECIMEN: ABNORMAL

## 2023-10-31 PROCEDURE — 36415 COLL VENOUS BLD VENIPUNCTURE: CPT

## 2023-10-31 PROCEDURE — 94640 AIRWAY INHALATION TREATMENT: CPT

## 2023-10-31 PROCEDURE — 1200000000 HC SEMI PRIVATE

## 2023-10-31 PROCEDURE — 6370000000 HC RX 637 (ALT 250 FOR IP): Performed by: NURSE PRACTITIONER

## 2023-10-31 PROCEDURE — 82962 GLUCOSE BLOOD TEST: CPT

## 2023-10-31 PROCEDURE — 78800 RP LOCLZJ TUM 1 AREA 1 D IMG: CPT

## 2023-10-31 PROCEDURE — 82565 ASSAY OF CREATININE: CPT

## 2023-10-31 PROCEDURE — 6370000000 HC RX 637 (ALT 250 FOR IP): Performed by: INTERNAL MEDICINE

## 2023-10-31 PROCEDURE — 76937 US GUIDE VASCULAR ACCESS: CPT

## 2023-10-31 PROCEDURE — 94761 N-INVAS EAR/PLS OXIMETRY MLT: CPT

## 2023-10-31 PROCEDURE — 2580000003 HC RX 258: Performed by: INTERNAL MEDICINE

## 2023-10-31 PROCEDURE — 99232 SBSQ HOSP IP/OBS MODERATE 35: CPT | Performed by: NURSE PRACTITIONER

## 2023-10-31 PROCEDURE — 6360000002 HC RX W HCPCS: Performed by: INTERNAL MEDICINE

## 2023-10-31 RX ORDER — ZOLPIDEM TARTRATE 5 MG/1
5 TABLET ORAL NIGHTLY
Status: DISCONTINUED | OUTPATIENT
Start: 2023-10-31 | End: 2023-11-02 | Stop reason: HOSPADM

## 2023-10-31 RX ADMIN — FLUTICASONE PROPIONATE 2 SPRAY: 50 SPRAY, METERED NASAL at 10:14

## 2023-10-31 RX ADMIN — INSULIN GLARGINE 15 UNITS: 100 INJECTION, SOLUTION SUBCUTANEOUS at 21:29

## 2023-10-31 RX ADMIN — OSELTAMIVIR PHOSPHATE 75 MG: 75 CAPSULE ORAL at 23:07

## 2023-10-31 RX ADMIN — PANTOPRAZOLE SODIUM 40 MG: 40 TABLET, DELAYED RELEASE ORAL at 07:59

## 2023-10-31 RX ADMIN — HYDROMORPHONE HYDROCHLORIDE 1 MG: 1 INJECTION, SOLUTION INTRAMUSCULAR; INTRAVENOUS; SUBCUTANEOUS at 17:03

## 2023-10-31 RX ADMIN — ALLOPURINOL 300 MG: 300 TABLET ORAL at 07:43

## 2023-10-31 RX ADMIN — MOXIFLOXACIN HYDROCHLORIDE 1 DROP: 5 SOLUTION/ DROPS OPHTHALMIC at 14:45

## 2023-10-31 RX ADMIN — EZETIMIBE 10 MG: 10 TABLET ORAL at 07:43

## 2023-10-31 RX ADMIN — BRIMONIDINE TARTRATE 1 DROP: 2 SOLUTION OPHTHALMIC at 17:01

## 2023-10-31 RX ADMIN — MOXIFLOXACIN HYDROCHLORIDE 1 DROP: 5 SOLUTION/ DROPS OPHTHALMIC at 21:23

## 2023-10-31 RX ADMIN — INSULIN LISPRO 1 UNITS: 100 INJECTION, SOLUTION INTRAVENOUS; SUBCUTANEOUS at 12:28

## 2023-10-31 RX ADMIN — PREDNISOLONE ACETATE 1 DROP: 10 SUSPENSION/ DROPS OPHTHALMIC at 17:03

## 2023-10-31 RX ADMIN — BUDESONIDE AND FORMOTEROL FUMARATE DIHYDRATE 2 PUFF: 160; 4.5 AEROSOL RESPIRATORY (INHALATION) at 08:23

## 2023-10-31 RX ADMIN — PREGABALIN 100 MG: 100 CAPSULE ORAL at 10:13

## 2023-10-31 RX ADMIN — PREDNISOLONE ACETATE 1 DROP: 10 SUSPENSION/ DROPS OPHTHALMIC at 14:46

## 2023-10-31 RX ADMIN — Medication 500 MICRO CURIE: at 14:40

## 2023-10-31 RX ADMIN — HYDROMORPHONE HYDROCHLORIDE 2 MG: 2 TABLET ORAL at 01:08

## 2023-10-31 RX ADMIN — MOXIFLOXACIN HYDROCHLORIDE 1 DROP: 5 SOLUTION/ DROPS OPHTHALMIC at 07:48

## 2023-10-31 RX ADMIN — PREGABALIN 100 MG: 100 CAPSULE ORAL at 21:28

## 2023-10-31 RX ADMIN — AMLODIPINE BESYLATE 10 MG: 10 TABLET ORAL at 07:44

## 2023-10-31 RX ADMIN — SODIUM CHLORIDE, PRESERVATIVE FREE 10 ML: 5 INJECTION INTRAVENOUS at 21:25

## 2023-10-31 RX ADMIN — HYDROMORPHONE HYDROCHLORIDE 1 MG: 1 INJECTION, SOLUTION INTRAMUSCULAR; INTRAVENOUS; SUBCUTANEOUS at 21:28

## 2023-10-31 RX ADMIN — AMOXICILLIN AND CLAVULANATE POTASSIUM 1 TABLET: 875; 125 TABLET, FILM COATED ORAL at 07:43

## 2023-10-31 RX ADMIN — HYDROMORPHONE HYDROCHLORIDE 2 MG: 2 TABLET ORAL at 18:31

## 2023-10-31 RX ADMIN — HYDROMORPHONE HYDROCHLORIDE 1 MG: 1 INJECTION, SOLUTION INTRAMUSCULAR; INTRAVENOUS; SUBCUTANEOUS at 12:28

## 2023-10-31 RX ADMIN — ALBUTEROL SULFATE 2 PUFF: 90 AEROSOL, METERED RESPIRATORY (INHALATION) at 12:26

## 2023-10-31 RX ADMIN — PREDNISOLONE ACETATE 1 DROP: 10 SUSPENSION/ DROPS OPHTHALMIC at 10:14

## 2023-10-31 RX ADMIN — AMOXICILLIN AND CLAVULANATE POTASSIUM 1 TABLET: 875; 125 TABLET, FILM COATED ORAL at 21:28

## 2023-10-31 RX ADMIN — INSULIN LISPRO 1 UNITS: 100 INJECTION, SOLUTION INTRAVENOUS; SUBCUTANEOUS at 07:00

## 2023-10-31 RX ADMIN — ATORVASTATIN CALCIUM 10 MG: 10 TABLET, FILM COATED ORAL at 07:43

## 2023-10-31 RX ADMIN — SULFAMETHOXAZOLE AND TRIMETHOPRIM 1 TABLET: 800; 160 TABLET ORAL at 21:28

## 2023-10-31 RX ADMIN — HYDROMORPHONE HYDROCHLORIDE 1 MG: 1 INJECTION, SOLUTION INTRAMUSCULAR; INTRAVENOUS; SUBCUTANEOUS at 03:22

## 2023-10-31 RX ADMIN — INSULIN LISPRO 1 UNITS: 100 INJECTION, SOLUTION INTRAVENOUS; SUBCUTANEOUS at 07:59

## 2023-10-31 RX ADMIN — BENZONATATE 100 MG: 100 CAPSULE ORAL at 21:28

## 2023-10-31 RX ADMIN — Medication: at 21:28

## 2023-10-31 RX ADMIN — DORZOLAMIDE HYDROCHLORIDE AND TIMOLOL MALEATE 1 DROP: 20; 5 SOLUTION/ DROPS OPHTHALMIC at 17:01

## 2023-10-31 RX ADMIN — PREDNISOLONE ACETATE 1 DROP: 10 SUSPENSION/ DROPS OPHTHALMIC at 21:24

## 2023-10-31 RX ADMIN — IPRATROPIUM BROMIDE 2 PUFF: 17 AEROSOL, METERED RESPIRATORY (INHALATION) at 12:27

## 2023-10-31 RX ADMIN — CLONIDINE HYDROCHLORIDE 0.2 MG: 0.2 TABLET ORAL at 07:43

## 2023-10-31 RX ADMIN — Medication: at 14:45

## 2023-10-31 RX ADMIN — BENZONATATE 100 MG: 100 CAPSULE ORAL at 07:46

## 2023-10-31 RX ADMIN — OSELTAMIVIR PHOSPHATE 75 MG: 75 CAPSULE ORAL at 07:44

## 2023-10-31 RX ADMIN — HYDROMORPHONE HYDROCHLORIDE 1 MG: 1 INJECTION, SOLUTION INTRAMUSCULAR; INTRAVENOUS; SUBCUTANEOUS at 07:47

## 2023-10-31 RX ADMIN — SULFAMETHOXAZOLE AND TRIMETHOPRIM 1 TABLET: 800; 160 TABLET ORAL at 10:13

## 2023-10-31 RX ADMIN — HYDROMORPHONE HYDROCHLORIDE 2 MG: 2 TABLET ORAL at 10:13

## 2023-10-31 RX ADMIN — DORZOLAMIDE HYDROCHLORIDE AND TIMOLOL MALEATE 1 DROP: 20; 5 SOLUTION/ DROPS OPHTHALMIC at 07:48

## 2023-10-31 RX ADMIN — IPRATROPIUM BROMIDE 2 PUFF: 17 AEROSOL, METERED RESPIRATORY (INHALATION) at 08:23

## 2023-10-31 RX ADMIN — INSULIN LISPRO 3 UNITS: 100 INJECTION, SOLUTION INTRAVENOUS; SUBCUTANEOUS at 01:17

## 2023-10-31 RX ADMIN — SODIUM CHLORIDE, PRESERVATIVE FREE 10 ML: 5 INJECTION INTRAVENOUS at 10:15

## 2023-10-31 RX ADMIN — MOXIFLOXACIN HYDROCHLORIDE 1 DROP: 5 SOLUTION/ DROPS OPHTHALMIC at 17:01

## 2023-10-31 RX ADMIN — Medication: at 10:13

## 2023-10-31 RX ADMIN — ZOLPIDEM TARTRATE 5 MG: 5 TABLET ORAL at 21:28

## 2023-10-31 RX ADMIN — INSULIN LISPRO 2 UNITS: 100 INJECTION, SOLUTION INTRAVENOUS; SUBCUTANEOUS at 17:01

## 2023-10-31 RX ADMIN — INSULIN LISPRO 3 UNITS: 100 INJECTION, SOLUTION INTRAVENOUS; SUBCUTANEOUS at 21:34

## 2023-10-31 RX ADMIN — CHLORHEXIDINE GLUCONATE 118 ML: 4 SOLUTION TOPICAL at 10:14

## 2023-10-31 RX ADMIN — BRIMONIDINE TARTRATE 1 DROP: 2 SOLUTION OPHTHALMIC at 07:47

## 2023-10-31 RX ADMIN — ALBUTEROL SULFATE 2 PUFF: 90 AEROSOL, METERED RESPIRATORY (INHALATION) at 08:22

## 2023-10-31 RX ADMIN — BENZONATATE 100 MG: 100 CAPSULE ORAL at 14:44

## 2023-10-31 ASSESSMENT — PAIN DESCRIPTION - PAIN TYPE
TYPE: ACUTE PAIN
TYPE: ACUTE PAIN

## 2023-10-31 ASSESSMENT — PAIN DESCRIPTION - LOCATION
LOCATION: FOOT;ANKLE
LOCATION: FOOT
LOCATION: ANKLE;FOOT
LOCATION: FOOT
LOCATION: ANKLE
LOCATION: FOOT

## 2023-10-31 ASSESSMENT — PAIN SCALES - GENERAL
PAINLEVEL_OUTOF10: 9
PAINLEVEL_OUTOF10: 10

## 2023-10-31 ASSESSMENT — PAIN DESCRIPTION - ORIENTATION
ORIENTATION: LEFT

## 2023-10-31 ASSESSMENT — PAIN DESCRIPTION - FREQUENCY
FREQUENCY: CONTINUOUS
FREQUENCY: CONTINUOUS

## 2023-10-31 ASSESSMENT — PAIN DESCRIPTION - DESCRIPTORS
DESCRIPTORS: ACHING;DISCOMFORT;SHARP
DESCRIPTORS: ACHING
DESCRIPTORS: ACHING;SHARP
DESCRIPTORS: ACHING
DESCRIPTORS: ACHING

## 2023-10-31 ASSESSMENT — PAIN DESCRIPTION - ONSET
ONSET: ON-GOING
ONSET: ON-GOING

## 2023-10-31 NOTE — CONSULTS
[address] [place] [address], [address] [place] [address]                                  CONSULTATION    PATIENT NAME: Asia Sharma                       :        1985  MED REC NO:   [id number]                          ROOM:       ED19  ACCOUNT NO:   [de-identified]                           ADMIT DATE: 10/27/2023  PROVIDER:     Castillo Stokes MD    CONSULT DATE:  10/27/2023    REASON FOR THE CONSULTATION:  Left lower extremity swelling with  cellulitis. CHIEF COMPLAINT AND HISTORY OF PRESENT ILLNESS:  The patient is a  76-year-old male with a history of insulin-dependent diabetes, cocaine  abuse, gout, hypertension, hyperlipidemia, GERD, asthma and obesity. I  am seeing him for my partner Dr. Daphne Peralta who is taking care of him from  a surgical standpoint in the past and he recently performed surgery on  him on the  of this month secondary to gangrene of the right fifth  toe with osteomyelitis of the distal phalanx. He did perform a  debridement of the skin and subcutaneous tissue all the way down to the  bone to the head of the metatarsal.  The patient then was supposed to be  following up at the [facility] because he lives  nearby, but he was not able to do so and he noted to have increased  swelling in his left lower extremity with erythematous changes. He then  came to the emergency room at [facility] for further evaluation and he did get  an ultrasound study which revealed there was no evidence of an  underlying DVT. He also presents with a cough and was found to be  positive for influenza type A. He does have a normal white blood cell  count of 6.5, normal lactic acid level of 1.4, blood glucose level was  185 along with normal liver function tests. Blood cultures were taken. He also had a chest x-ray which showed no acute cardiopulmonary process.   He also had a right foot x-ray which revealed ______ changes of
Consult completed. Nexiva 20g 1.75 inch peripheral IV initiated into left distal cephalic vein x 1 attempt with ultrasound guidance. Brisk blood return, flushes without resistance. Patient tolerated well. Please consult IV/PICC team if patient's needs change, questions, or concerns.
Consult completed. Procedure/rationale explained to pt & consent obtained. LUE PIV access site in Cephalic Vein assessed with slight redness, swelling, warmth, and tenderness noted surrounding PIV, which was placed yesterday - US visualization shows flush remains within vasculature /s exfiltration noted, but tissue is swollen and vein is being compressed surrounding IV catheter, therefore at risk for DVT. Pt states \"My IV's always go bad after a couple of days when I'm getting Vanco.\" Pt educated that he probably has a slight intolerance/allergy to Vancomycin and questions answered. No s/s of systemic symptoms/anaphylaxis noted. #20ga Nexiva extra-long, 1.75\" PIV initiated to LUE medial ventral forearm using UltraSound-guided technique without difficulty/complications. LUE distal Cephalic PIV removed. Pt tolerated well and no other c/o or needs noted or reported. RN notified and intolerance info added to Allergies panel in Epic; reaction is mild but pt would benefit from changing Abx regimen appropriately once Cultures are resolved.
IV Consult complete. Nexiva 20g 1.75\" PIV Inserted in Right Proximal  Forearm  x 1 attempt using sterile ultrasound guided technique. Brisk blood return, flushes easy. 40cc Blood drawn for nuc med scan. NM Tech at bedside to receive sample.
Infectious Disease Consult Note  10/30/2023   Patient Name: Salud Ling. : 1985   Impression  Charcot Arthropathy Left Foot:  Polymicrobial Right Foot DFI with presumed OM:  Influenza A:  GPC  in Blood Culture:  MRSA Screen per PCR Positive: Allergy to ciprofloxacin (SOB):  CrCl 301  Afebrile, no leukocytosis, .9  10/28-right foot culture: MRSA (SHELLEY to vanco: 2, SHELLEY to linezolid 4, SHELLEY to Bactrim 0.5/9.5)  10/27-BC 1/ GPC  10/27-MRSA by PCR: Positive  10/27-COVID 19 and Resp panel negative, Influenza A antigen positive  10/28-LLE venous doppler: Negative for DVT  Past 10/15/2023: s/p per Dr. Flory Gee: debridement of skin and subcutaneous tissue and bone down to the head of the 5th MT. DX: Gangrene of the right fifth toe with OM of the distal phalanx. Cultures: Proteus mirabilis, BHD Group B and anaerococcus spp.   10/27-XR Foot Right: Permeative changes of the 4th metatarsal head are concerning for osteomyelitis. Consider further evaluation with MRI. Interval 4th digit amputation. Diffuse soft tissue swelling. Multifocal superficial soft tissue ulcerations   of the forefoot. 10/28-CT foot, tibia, fibula left W contrast: 1. Circumferential subcutaneous edema throughout the leg and ankle extending   along the dorsum of the foot compatible with bland edema or cellulitis. No   abscess or soft tissue gas identified. 2. Age-indeterminate nondisplaced sagittally oriented fracture through the   central aspect of the navicular. Additional chronic appearing impaction of   the talar head with proximal migration of the navicular and multiple chronic   appearing osseous fragments dorsal to the talar head and navicular. 3. Age-indeterminate mild impaction of the proximal aspect of the cuboid. 4. No evidence of osteomyelitis. Dr. Flory Gee, general surgery, imp of no deep abscess of left leg, no surgical intervention needed at this time. Agree with ABX therapy, and wound care right foot.
10/28/23 0847   BP:    Pulse: 87   Resp:    Temp:    SpO2:        Chest: Breath sounds were clear and equal with no rales, wheezes, or rhonchi. Respiratory effort was normal with no retractions or use of accessory muscles. Cardiovascular: Heart sounds were normal with a regular rate and rhythm. There were no murmurs or gallops. Abdomen: Bowel sounds were normal.  The abdomen was soft and non distended. There was no tenderness, guarding, rebound, or rigidity. There was no masses, hepatosplenomegaly, or hernias. Examination of the patient's right foot reveals previous toe amputations and amputation site of the fifth toe was open to air without a dressing. There is mild exudate material on the wound bed but no obvious signs of cellulitis or severe infection I did give wound care instructions to the nurse. The left lower extremity shows some swelling and mild redness from the knee down to the foot. There are some tenderness upon palpation without crepitus or signs of severe soft tissue infection. There are no open wounds which are apparent. Labs    CBC:   Lab Results   Component Value Date/Time    WBC 6.5 10/27/2023 10:30 AM    RBC 3.78 10/27/2023 10:30 AM    HGB 12.0 10/27/2023 10:30 AM    HCT 33.6 10/27/2023 10:30 AM    MCV 88.9 10/27/2023 10:30 AM    MCH 31.7 10/27/2023 10:30 AM    MCHC 35.7 10/27/2023 10:30 AM    RDW 11.9 10/27/2023 10:30 AM     10/27/2023 10:30 AM    MPV 8.2 10/27/2023 10:30 AM         Assessment/Plan:  27-year-old male with multiple medical problems including severe diabetes vascular disease with severe swelling of left lower extremity undetermined etiology. Patient claims he did have an ultrasound to exclude deep vein thrombosis which I will review. I have ordered a CT scan of the left lower extremity and foot to exclude occult deep soft tissue abscess or any signs of osteomyelitis. IV wound care instructions right foot.   Continue antibiotics and I will follow
metatarsal head are concerning for osteomyelitis. Consider further evaluation with MRI. Interval 4th digit amputation. Diffuse soft tissue swelling. Multifocal superficial soft tissue ulcerations of the forefoot. VL DUP LOWER EXTREMITY VENOUS LEFT    Result Date: 10/27/2023  EXAMINATION: DUPLEX VENOUS ULTRASOUND OF THE LEFT LOWER EXTREMITY 10/27/2023 10:12 am TECHNIQUE: Duplex ultrasound using B-mode/gray scaled imaging and Doppler spectral analysis and color flow was obtained of the deep venous structures of the left extremity. COMPARISON: None. HISTORY: ORDERING SYSTEM PROVIDED HISTORY: eval for DVT left lower ext. TECHNOLOGIST PROVIDED HISTORY: Reason for exam:->eval for DVT left lower ext. FINDINGS: The visualized veins of the left lower extremity are patent and free of echogenic thrombus. The veins demonstrate good compressibility with normal color flow study and spectral analysis. Extensive subcutaneous edema. Prominent inguinal lymph nodes are nonspecific. No evidence of DVT in the left lower extremity. CBC:   Recent Labs     10/27/23  1030   WBC 6.5   HGB 12.0*   *     BMP:    Recent Labs     10/27/23  1030   *   K 4.6   CL 98*   CO2 18*   BUN 11   CREATININE 0.7*   GLUCOSE 185*     Hepatic:   Recent Labs     10/27/23  1030   AST 23   ALT 29   BILITOT 0.4   ALKPHOS 116     Lipids:   Lab Results   Component Value Date/Time    CHOL 142 04/16/2011 01:19 PM    HDL 41 04/16/2011 01:19 PM    TRIG 268 04/16/2011 01:19 PM     Hemoglobin A1C:   Lab Results   Component Value Date/Time    LABA1C 8.3 10/15/2023 05:29 AM     TSH: No results found for: \"TSH\"  Troponin:   Lab Results   Component Value Date/Time    TROPONINT <0.010 04/10/2023 04:33 AM    TROPONINT <0.010 04/28/2022 03:26 AM    TROPONINT <0.010 08/01/2021 02:37 PM     Lactic Acid: No results for input(s): \"LACTA\" in the last 72 hours. BNP: No results for input(s): \"PROBNP\" in the last 72 hours.   UA:  Lab Results   Component
10/28/2023  EXAMINATION: CT OF THE LEFT TIBIA AND FIBULA WITH CONTRAST; CT OF THE LEFT FOOT WITH CONTRAST 10/28/2023 3:11 pm; 10/28/2023 3:10 pm TECHNIQUE: CT of the left tibia and fibula was performed with the administration of intravenous contrast.  Multiplanar reformatted images are provided for review. Automated exposure control, iterative reconstruction, and/or weight based adjustment of the mA/kV was utilized to reduce the radiation dose to as low as reasonably achievable.; CT of the left foot was performed with the administration of intravenous contrast.  Multiplanar reformatted images are provided for review. Automated exposure control, iterative reconstruction, and/or weight based adjustment of the mA/kV was utilized to reduce the radiation dose to as low as reasonably achievable. COMPARISON: Left foot radiographs 10/16/2023. HISTORY ORDERING SYSTEM PROVIDED HISTORY: pain swelling 2 weeks TECHNOLOGIST PROVIDED HISTORY: Reason for exam:->pain swelling 2 weeks Reason for Exam: pain swelling 2 weeks FINDINGS: No knee effusion or popliteal cyst.  No significant degenerative changes of the knee. Mild tibiotalar degenerative changes. The subtalar joint is intact. Multiple chronic appearing osseous fragments dorsal to the talar head and navicular. Chronic appearing impaction of the talar head with proximal migration of the navicular. Age-indeterminate sagittally oriented nondisplaced fracture through the central aspect of the navicular. Age-indeterminate mild impaction of the proximal aspect of the cuboid. Mild midfoot degenerative changes. No Lisfranc interval widening. Chronic healed 5th metatarsal neck fracture. Mild 1st metatarsophalangeal degenerative changes. The interphalangeal joints are intact. No definite osseous erosion. Dorsal mid and forefoot subcutaneous edema. Circumferential subcutaneous edema throughout the leg and ankle. No soft tissue gas or drainable fluid collection identified.

## 2023-11-01 ENCOUNTER — APPOINTMENT (OUTPATIENT)
Dept: NUCLEAR MEDICINE | Age: 38
DRG: 720 | End: 2023-11-01
Payer: COMMERCIAL

## 2023-11-01 ENCOUNTER — APPOINTMENT (OUTPATIENT)
Dept: NUCLEAR MEDICINE | Age: 38
DRG: 720 | End: 2023-11-01
Attending: INTERNAL MEDICINE
Payer: COMMERCIAL

## 2023-11-01 LAB
CREAT SERPL-MCNC: 0.6 MG/DL (ref 0.9–1.3)
CULTURE: NORMAL
GFR SERPL CREATININE-BSD FRML MDRD: >60 ML/MIN/1.73M2
GLUCOSE BLD-MCNC: 192 MG/DL (ref 70–99)
GLUCOSE BLD-MCNC: 196 MG/DL (ref 70–99)
GLUCOSE BLD-MCNC: 234 MG/DL (ref 70–99)
GLUCOSE BLD-MCNC: 245 MG/DL (ref 70–99)
GLUCOSE BLD-MCNC: 281 MG/DL (ref 70–99)
GLUCOSE BLD-MCNC: 285 MG/DL (ref 70–99)
Lab: NORMAL
SPECIMEN: NORMAL

## 2023-11-01 PROCEDURE — 6360000002 HC RX W HCPCS: Performed by: INTERNAL MEDICINE

## 2023-11-01 PROCEDURE — 94640 AIRWAY INHALATION TREATMENT: CPT

## 2023-11-01 PROCEDURE — 1200000000 HC SEMI PRIVATE

## 2023-11-01 PROCEDURE — 6370000000 HC RX 637 (ALT 250 FOR IP): Performed by: NURSE PRACTITIONER

## 2023-11-01 PROCEDURE — 6370000000 HC RX 637 (ALT 250 FOR IP): Performed by: INTERNAL MEDICINE

## 2023-11-01 PROCEDURE — 94761 N-INVAS EAR/PLS OXIMETRY MLT: CPT

## 2023-11-01 PROCEDURE — 2580000003 HC RX 258: Performed by: INTERNAL MEDICINE

## 2023-11-01 PROCEDURE — 36415 COLL VENOUS BLD VENIPUNCTURE: CPT

## 2023-11-01 PROCEDURE — A9541 TC99M SULFUR COLLOID: HCPCS | Performed by: INTERNAL MEDICINE

## 2023-11-01 PROCEDURE — 99232 SBSQ HOSP IP/OBS MODERATE 35: CPT | Performed by: NURSE PRACTITIONER

## 2023-11-01 PROCEDURE — 3430000000 HC RX DIAGNOSTIC RADIOPHARMACEUTICAL: Performed by: INTERNAL MEDICINE

## 2023-11-01 PROCEDURE — 82962 GLUCOSE BLOOD TEST: CPT

## 2023-11-01 PROCEDURE — 78102 BONE MARROW IMAGING LTD: CPT

## 2023-11-01 PROCEDURE — 82565 ASSAY OF CREATININE: CPT

## 2023-11-01 RX ORDER — INSULIN LISPRO 100 [IU]/ML
5 INJECTION, SOLUTION INTRAVENOUS; SUBCUTANEOUS
Status: DISCONTINUED | OUTPATIENT
Start: 2023-11-01 | End: 2023-11-02 | Stop reason: HOSPADM

## 2023-11-01 RX ADMIN — IPRATROPIUM BROMIDE 2 PUFF: 17 AEROSOL, METERED RESPIRATORY (INHALATION) at 15:48

## 2023-11-01 RX ADMIN — DORZOLAMIDE HYDROCHLORIDE AND TIMOLOL MALEATE 1 DROP: 20; 5 SOLUTION/ DROPS OPHTHALMIC at 16:41

## 2023-11-01 RX ADMIN — SODIUM CHLORIDE, PRESERVATIVE FREE 10 ML: 5 INJECTION INTRAVENOUS at 09:38

## 2023-11-01 RX ADMIN — MELATONIN TAB 3 MG 3 MG: 3 TAB at 20:05

## 2023-11-01 RX ADMIN — INSULIN LISPRO 2 UNITS: 100 INJECTION, SOLUTION INTRAVENOUS; SUBCUTANEOUS at 12:13

## 2023-11-01 RX ADMIN — PREDNISOLONE ACETATE 1 DROP: 10 SUSPENSION/ DROPS OPHTHALMIC at 09:35

## 2023-11-01 RX ADMIN — ALLOPURINOL 300 MG: 300 TABLET ORAL at 09:38

## 2023-11-01 RX ADMIN — CLONIDINE HYDROCHLORIDE 0.2 MG: 0.2 TABLET ORAL at 09:38

## 2023-11-01 RX ADMIN — AMOXICILLIN AND CLAVULANATE POTASSIUM 1 TABLET: 875; 125 TABLET, FILM COATED ORAL at 20:05

## 2023-11-01 RX ADMIN — HYDROMORPHONE HYDROCHLORIDE 1 MG: 1 INJECTION, SOLUTION INTRAMUSCULAR; INTRAVENOUS; SUBCUTANEOUS at 06:01

## 2023-11-01 RX ADMIN — INSULIN GLARGINE 15 UNITS: 100 INJECTION, SOLUTION SUBCUTANEOUS at 20:05

## 2023-11-01 RX ADMIN — INSULIN LISPRO 5 UNITS: 100 INJECTION, SOLUTION INTRAVENOUS; SUBCUTANEOUS at 12:12

## 2023-11-01 RX ADMIN — MOXIFLOXACIN HYDROCHLORIDE 1 DROP: 5 SOLUTION/ DROPS OPHTHALMIC at 16:40

## 2023-11-01 RX ADMIN — BENZONATATE 100 MG: 100 CAPSULE ORAL at 09:39

## 2023-11-01 RX ADMIN — IPRATROPIUM BROMIDE 2 PUFF: 17 AEROSOL, METERED RESPIRATORY (INHALATION) at 07:04

## 2023-11-01 RX ADMIN — ALBUTEROL SULFATE 2 PUFF: 90 AEROSOL, METERED RESPIRATORY (INHALATION) at 15:46

## 2023-11-01 RX ADMIN — PREDNISOLONE ACETATE 1 DROP: 10 SUSPENSION/ DROPS OPHTHALMIC at 12:12

## 2023-11-01 RX ADMIN — PREDNISOLONE ACETATE 1 DROP: 10 SUSPENSION/ DROPS OPHTHALMIC at 16:40

## 2023-11-01 RX ADMIN — INSULIN LISPRO 2 UNITS: 100 INJECTION, SOLUTION INTRAVENOUS; SUBCUTANEOUS at 17:50

## 2023-11-01 RX ADMIN — AMLODIPINE BESYLATE 10 MG: 10 TABLET ORAL at 09:38

## 2023-11-01 RX ADMIN — CHLORHEXIDINE GLUCONATE 118 ML: 4 SOLUTION TOPICAL at 09:35

## 2023-11-01 RX ADMIN — ALBUTEROL SULFATE 2 PUFF: 90 AEROSOL, METERED RESPIRATORY (INHALATION) at 07:04

## 2023-11-01 RX ADMIN — ALBUTEROL SULFATE 2 PUFF: 90 AEROSOL, METERED RESPIRATORY (INHALATION) at 11:03

## 2023-11-01 RX ADMIN — Medication: at 09:37

## 2023-11-01 RX ADMIN — BUDESONIDE AND FORMOTEROL FUMARATE DIHYDRATE 2 PUFF: 160; 4.5 AEROSOL RESPIRATORY (INHALATION) at 19:59

## 2023-11-01 RX ADMIN — BENZONATATE 100 MG: 100 CAPSULE ORAL at 16:41

## 2023-11-01 RX ADMIN — ZOLPIDEM TARTRATE 5 MG: 5 TABLET ORAL at 20:06

## 2023-11-01 RX ADMIN — HYDROMORPHONE HYDROCHLORIDE 1 MG: 1 INJECTION, SOLUTION INTRAMUSCULAR; INTRAVENOUS; SUBCUTANEOUS at 17:51

## 2023-11-01 RX ADMIN — HYDROMORPHONE HYDROCHLORIDE 2 MG: 2 TABLET ORAL at 12:11

## 2023-11-01 RX ADMIN — INSULIN LISPRO 5 UNITS: 100 INJECTION, SOLUTION INTRAVENOUS; SUBCUTANEOUS at 17:51

## 2023-11-01 RX ADMIN — HYDROMORPHONE HYDROCHLORIDE 1 MG: 1 INJECTION, SOLUTION INTRAMUSCULAR; INTRAVENOUS; SUBCUTANEOUS at 22:12

## 2023-11-01 RX ADMIN — BRIMONIDINE TARTRATE 1 DROP: 2 SOLUTION OPHTHALMIC at 16:40

## 2023-11-01 RX ADMIN — HYDROMORPHONE HYDROCHLORIDE 1 MG: 1 INJECTION, SOLUTION INTRAMUSCULAR; INTRAVENOUS; SUBCUTANEOUS at 10:07

## 2023-11-01 RX ADMIN — MOXIFLOXACIN HYDROCHLORIDE 1 DROP: 5 SOLUTION/ DROPS OPHTHALMIC at 12:12

## 2023-11-01 RX ADMIN — OSELTAMIVIR PHOSPHATE 75 MG: 75 CAPSULE ORAL at 09:38

## 2023-11-01 RX ADMIN — PREGABALIN 100 MG: 100 CAPSULE ORAL at 09:38

## 2023-11-01 RX ADMIN — BUDESONIDE AND FORMOTEROL FUMARATE DIHYDRATE 2 PUFF: 160; 4.5 AEROSOL RESPIRATORY (INHALATION) at 07:04

## 2023-11-01 RX ADMIN — AMOXICILLIN AND CLAVULANATE POTASSIUM 1 TABLET: 875; 125 TABLET, FILM COATED ORAL at 09:38

## 2023-11-01 RX ADMIN — SULFAMETHOXAZOLE AND TRIMETHOPRIM 1 TABLET: 800; 160 TABLET ORAL at 20:06

## 2023-11-01 RX ADMIN — ATORVASTATIN CALCIUM 10 MG: 10 TABLET, FILM COATED ORAL at 09:38

## 2023-11-01 RX ADMIN — IPRATROPIUM BROMIDE 2 PUFF: 17 AEROSOL, METERED RESPIRATORY (INHALATION) at 11:04

## 2023-11-01 RX ADMIN — PREGABALIN 100 MG: 100 CAPSULE ORAL at 20:05

## 2023-11-01 RX ADMIN — PANTOPRAZOLE SODIUM 40 MG: 40 TABLET, DELAYED RELEASE ORAL at 06:01

## 2023-11-01 RX ADMIN — HYDROMORPHONE HYDROCHLORIDE 2 MG: 2 TABLET ORAL at 02:38

## 2023-11-01 RX ADMIN — FLUTICASONE PROPIONATE 2 SPRAY: 50 SPRAY, METERED NASAL at 09:35

## 2023-11-01 RX ADMIN — INSULIN LISPRO 1 UNITS: 100 INJECTION, SOLUTION INTRAVENOUS; SUBCUTANEOUS at 20:16

## 2023-11-01 RX ADMIN — Medication: at 20:05

## 2023-11-01 RX ADMIN — Medication: at 13:19

## 2023-11-01 RX ADMIN — INSULIN LISPRO 1 UNITS: 100 INJECTION, SOLUTION INTRAVENOUS; SUBCUTANEOUS at 02:38

## 2023-11-01 RX ADMIN — EZETIMIBE 10 MG: 10 TABLET ORAL at 09:38

## 2023-11-01 RX ADMIN — MOXIFLOXACIN HYDROCHLORIDE 1 DROP: 5 SOLUTION/ DROPS OPHTHALMIC at 09:35

## 2023-11-01 RX ADMIN — HYDROMORPHONE HYDROCHLORIDE 0.5 MG: 1 INJECTION, SOLUTION INTRAMUSCULAR; INTRAVENOUS; SUBCUTANEOUS at 13:49

## 2023-11-01 RX ADMIN — DORZOLAMIDE HYDROCHLORIDE AND TIMOLOL MALEATE 1 DROP: 20; 5 SOLUTION/ DROPS OPHTHALMIC at 09:35

## 2023-11-01 RX ADMIN — HYDROMORPHONE HYDROCHLORIDE 2 MG: 2 TABLET ORAL at 20:05

## 2023-11-01 RX ADMIN — BENZONATATE 100 MG: 100 CAPSULE ORAL at 20:06

## 2023-11-01 RX ADMIN — SODIUM CHLORIDE, PRESERVATIVE FREE 10 ML: 5 INJECTION INTRAVENOUS at 20:06

## 2023-11-01 RX ADMIN — BRIMONIDINE TARTRATE 1 DROP: 2 SOLUTION OPHTHALMIC at 09:35

## 2023-11-01 RX ADMIN — Medication 10.3 MILLICURIE: at 14:00

## 2023-11-01 RX ADMIN — SULFAMETHOXAZOLE AND TRIMETHOPRIM 1 TABLET: 800; 160 TABLET ORAL at 09:37

## 2023-11-01 RX ADMIN — IPRATROPIUM BROMIDE 2 PUFF: 17 AEROSOL, METERED RESPIRATORY (INHALATION) at 19:59

## 2023-11-01 ASSESSMENT — PAIN DESCRIPTION - ORIENTATION
ORIENTATION: LEFT

## 2023-11-01 ASSESSMENT — PAIN DESCRIPTION - LOCATION
LOCATION: LEG
LOCATION: ANKLE;FOOT
LOCATION: LEG
LOCATION: FOOT
LOCATION: FOOT
LOCATION: LEG
LOCATION: LEG

## 2023-11-01 ASSESSMENT — PAIN DESCRIPTION - ONSET
ONSET: ON-GOING

## 2023-11-01 ASSESSMENT — PAIN DESCRIPTION - PAIN TYPE
TYPE: ACUTE PAIN

## 2023-11-01 ASSESSMENT — PAIN DESCRIPTION - DIRECTION
RADIATING_TOWARDS: FOOT

## 2023-11-01 ASSESSMENT — PAIN SCALES - WONG BAKER
WONGBAKER_NUMERICALRESPONSE: 0
WONGBAKER_NUMERICALRESPONSE: 4
WONGBAKER_NUMERICALRESPONSE: 0

## 2023-11-01 ASSESSMENT — PAIN DESCRIPTION - DESCRIPTORS
DESCRIPTORS: ACHING
DESCRIPTORS: GNAWING
DESCRIPTORS: TENDER;SHOOTING
DESCRIPTORS: ACHING

## 2023-11-01 ASSESSMENT — PAIN DESCRIPTION - FREQUENCY
FREQUENCY: CONTINUOUS

## 2023-11-01 ASSESSMENT — PAIN SCALES - GENERAL
PAINLEVEL_OUTOF10: 8
PAINLEVEL_OUTOF10: 0
PAINLEVEL_OUTOF10: 9
PAINLEVEL_OUTOF10: 10
PAINLEVEL_OUTOF10: 5
PAINLEVEL_OUTOF10: 8
PAINLEVEL_OUTOF10: 9
PAINLEVEL_OUTOF10: 8
PAINLEVEL_OUTOF10: 8
PAINLEVEL_OUTOF10: 0

## 2023-11-01 ASSESSMENT — PAIN - FUNCTIONAL ASSESSMENT
PAIN_FUNCTIONAL_ASSESSMENT: ACTIVITIES ARE NOT PREVENTED

## 2023-11-02 VITALS
BODY MASS INDEX: 37.76 KG/M2 | HEIGHT: 75 IN | WEIGHT: 303.7 LBS | OXYGEN SATURATION: 100 % | RESPIRATION RATE: 16 BRPM | SYSTOLIC BLOOD PRESSURE: 131 MMHG | DIASTOLIC BLOOD PRESSURE: 92 MMHG | TEMPERATURE: 97.9 F | HEART RATE: 80 BPM

## 2023-11-02 PROBLEM — A41.9 SEVERE SEPSIS (HCC): Status: RESOLVED | Noted: 2023-10-27 | Resolved: 2023-11-02

## 2023-11-02 PROBLEM — E87.20 LACTIC ACIDOSIS: Status: RESOLVED | Noted: 2023-10-30 | Resolved: 2023-11-02

## 2023-11-02 PROBLEM — R65.20 SEVERE SEPSIS (HCC): Status: RESOLVED | Noted: 2023-10-27 | Resolved: 2023-11-02

## 2023-11-02 LAB
CREAT SERPL-MCNC: 0.6 MG/DL (ref 0.9–1.3)
CULTURE: ABNORMAL
CULTURE: ABNORMAL
GFR SERPL CREATININE-BSD FRML MDRD: >60 ML/MIN/1.73M2
GLUCOSE BLD-MCNC: 202 MG/DL (ref 70–99)
GLUCOSE BLD-MCNC: 208 MG/DL (ref 70–99)
GLUCOSE BLD-MCNC: 214 MG/DL (ref 70–99)
GLUCOSE BLD-MCNC: 230 MG/DL (ref 70–99)
Lab: ABNORMAL
SPECIMEN: ABNORMAL

## 2023-11-02 PROCEDURE — 94640 AIRWAY INHALATION TREATMENT: CPT

## 2023-11-02 PROCEDURE — 6370000000 HC RX 637 (ALT 250 FOR IP): Performed by: NURSE PRACTITIONER

## 2023-11-02 PROCEDURE — 6370000000 HC RX 637 (ALT 250 FOR IP): Performed by: INTERNAL MEDICINE

## 2023-11-02 PROCEDURE — 82962 GLUCOSE BLOOD TEST: CPT

## 2023-11-02 PROCEDURE — 6360000002 HC RX W HCPCS: Performed by: INTERNAL MEDICINE

## 2023-11-02 PROCEDURE — 2580000003 HC RX 258: Performed by: INTERNAL MEDICINE

## 2023-11-02 PROCEDURE — 36415 COLL VENOUS BLD VENIPUNCTURE: CPT

## 2023-11-02 PROCEDURE — 82565 ASSAY OF CREATININE: CPT

## 2023-11-02 PROCEDURE — 94761 N-INVAS EAR/PLS OXIMETRY MLT: CPT

## 2023-11-02 PROCEDURE — 99232 SBSQ HOSP IP/OBS MODERATE 35: CPT | Performed by: NURSE PRACTITIONER

## 2023-11-02 RX ORDER — HYDROMORPHONE HYDROCHLORIDE 2 MG/1
2 TABLET ORAL EVERY 6 HOURS PRN
Qty: 30 TABLET | Refills: 0 | Status: SHIPPED | OUTPATIENT
Start: 2023-11-02 | End: 2023-11-10

## 2023-11-02 RX ORDER — SULFAMETHOXAZOLE AND TRIMETHOPRIM 800; 160 MG/1; MG/1
1 TABLET ORAL EVERY 12 HOURS SCHEDULED
Qty: 50 TABLET | Refills: 0 | Status: SHIPPED | OUTPATIENT
Start: 2023-11-02 | End: 2023-11-27

## 2023-11-02 RX ORDER — CHLORHEXIDINE GLUCONATE 40 MG/ML
SOLUTION TOPICAL DAILY
Qty: 535 ML | Refills: 0 | Status: SHIPPED | OUTPATIENT
Start: 2023-11-03 | End: 2023-11-09

## 2023-11-02 RX ADMIN — AMOXICILLIN AND CLAVULANATE POTASSIUM 1 TABLET: 875; 125 TABLET, FILM COATED ORAL at 08:52

## 2023-11-02 RX ADMIN — Medication: at 08:53

## 2023-11-02 RX ADMIN — ALBUTEROL SULFATE 2 PUFF: 90 AEROSOL, METERED RESPIRATORY (INHALATION) at 08:39

## 2023-11-02 RX ADMIN — AMLODIPINE BESYLATE 10 MG: 10 TABLET ORAL at 08:53

## 2023-11-02 RX ADMIN — IPRATROPIUM BROMIDE 2 PUFF: 17 AEROSOL, METERED RESPIRATORY (INHALATION) at 11:24

## 2023-11-02 RX ADMIN — FLUTICASONE PROPIONATE 2 SPRAY: 50 SPRAY, METERED NASAL at 08:55

## 2023-11-02 RX ADMIN — PREGABALIN 100 MG: 100 CAPSULE ORAL at 08:52

## 2023-11-02 RX ADMIN — IPRATROPIUM BROMIDE 2 PUFF: 17 AEROSOL, METERED RESPIRATORY (INHALATION) at 08:40

## 2023-11-02 RX ADMIN — HYDROMORPHONE HYDROCHLORIDE 1 MG: 1 INJECTION, SOLUTION INTRAMUSCULAR; INTRAVENOUS; SUBCUTANEOUS at 14:08

## 2023-11-02 RX ADMIN — SULFAMETHOXAZOLE AND TRIMETHOPRIM 1 TABLET: 800; 160 TABLET ORAL at 08:53

## 2023-11-02 RX ADMIN — PREDNISOLONE ACETATE 1 DROP: 10 SUSPENSION/ DROPS OPHTHALMIC at 08:56

## 2023-11-02 RX ADMIN — HYDROMORPHONE HYDROCHLORIDE 1 MG: 1 INJECTION, SOLUTION INTRAMUSCULAR; INTRAVENOUS; SUBCUTANEOUS at 01:51

## 2023-11-02 RX ADMIN — INSULIN LISPRO 1 UNITS: 100 INJECTION, SOLUTION INTRAVENOUS; SUBCUTANEOUS at 13:22

## 2023-11-02 RX ADMIN — HYDROMORPHONE HYDROCHLORIDE 2 MG: 2 TABLET ORAL at 08:53

## 2023-11-02 RX ADMIN — MOXIFLOXACIN HYDROCHLORIDE 1 DROP: 5 SOLUTION/ DROPS OPHTHALMIC at 08:57

## 2023-11-02 RX ADMIN — CLONIDINE HYDROCHLORIDE 0.2 MG: 0.2 TABLET ORAL at 08:52

## 2023-11-02 RX ADMIN — SODIUM CHLORIDE, PRESERVATIVE FREE 10 ML: 5 INJECTION INTRAVENOUS at 08:57

## 2023-11-02 RX ADMIN — PANTOPRAZOLE SODIUM 40 MG: 40 TABLET, DELAYED RELEASE ORAL at 06:18

## 2023-11-02 RX ADMIN — INSULIN LISPRO 1 UNITS: 100 INJECTION, SOLUTION INTRAVENOUS; SUBCUTANEOUS at 06:23

## 2023-11-02 RX ADMIN — INSULIN LISPRO 1 UNITS: 100 INJECTION, SOLUTION INTRAVENOUS; SUBCUTANEOUS at 08:58

## 2023-11-02 RX ADMIN — CHLORHEXIDINE GLUCONATE 118 ML: 4 SOLUTION TOPICAL at 09:01

## 2023-11-02 RX ADMIN — MOXIFLOXACIN HYDROCHLORIDE 1 DROP: 5 SOLUTION/ DROPS OPHTHALMIC at 13:24

## 2023-11-02 RX ADMIN — INSULIN LISPRO 5 UNITS: 100 INJECTION, SOLUTION INTRAVENOUS; SUBCUTANEOUS at 13:24

## 2023-11-02 RX ADMIN — BENZONATATE 100 MG: 100 CAPSULE ORAL at 14:08

## 2023-11-02 RX ADMIN — ALLOPURINOL 300 MG: 300 TABLET ORAL at 08:52

## 2023-11-02 RX ADMIN — HYDROMORPHONE HYDROCHLORIDE 1 MG: 1 INJECTION, SOLUTION INTRAMUSCULAR; INTRAVENOUS; SUBCUTANEOUS at 06:18

## 2023-11-02 RX ADMIN — EZETIMIBE 10 MG: 10 TABLET ORAL at 08:53

## 2023-11-02 RX ADMIN — BRIMONIDINE TARTRATE 1 DROP: 2 SOLUTION OPHTHALMIC at 08:56

## 2023-11-02 RX ADMIN — DORZOLAMIDE HYDROCHLORIDE AND TIMOLOL MALEATE 1 DROP: 20; 5 SOLUTION/ DROPS OPHTHALMIC at 08:56

## 2023-11-02 RX ADMIN — ATORVASTATIN CALCIUM 10 MG: 10 TABLET, FILM COATED ORAL at 08:53

## 2023-11-02 RX ADMIN — PREDNISOLONE ACETATE 1 DROP: 10 SUSPENSION/ DROPS OPHTHALMIC at 13:24

## 2023-11-02 RX ADMIN — BUDESONIDE AND FORMOTEROL FUMARATE DIHYDRATE 2 PUFF: 160; 4.5 AEROSOL RESPIRATORY (INHALATION) at 08:40

## 2023-11-02 RX ADMIN — Medication: at 14:08

## 2023-11-02 RX ADMIN — INSULIN LISPRO 1 UNITS: 100 INJECTION, SOLUTION INTRAVENOUS; SUBCUTANEOUS at 01:59

## 2023-11-02 RX ADMIN — ALBUTEROL SULFATE 2 PUFF: 90 AEROSOL, METERED RESPIRATORY (INHALATION) at 11:24

## 2023-11-02 RX ADMIN — INSULIN LISPRO 5 UNITS: 100 INJECTION, SOLUTION INTRAVENOUS; SUBCUTANEOUS at 08:55

## 2023-11-02 RX ADMIN — HYDROMORPHONE HYDROCHLORIDE 1 MG: 1 INJECTION, SOLUTION INTRAMUSCULAR; INTRAVENOUS; SUBCUTANEOUS at 10:31

## 2023-11-02 RX ADMIN — BENZONATATE 100 MG: 100 CAPSULE ORAL at 08:52

## 2023-11-02 ASSESSMENT — PAIN SCALES - GENERAL
PAINLEVEL_OUTOF10: 8
PAINLEVEL_OUTOF10: 8
PAINLEVEL_OUTOF10: 9
PAINLEVEL_OUTOF10: 4
PAINLEVEL_OUTOF10: 8
PAINLEVEL_OUTOF10: 8
PAINLEVEL_OUTOF10: 9

## 2023-11-02 ASSESSMENT — PAIN SCALES - WONG BAKER
WONGBAKER_NUMERICALRESPONSE: 0
WONGBAKER_NUMERICALRESPONSE: 2
WONGBAKER_NUMERICALRESPONSE: 0

## 2023-11-02 ASSESSMENT — PAIN DESCRIPTION - ORIENTATION
ORIENTATION: LEFT

## 2023-11-02 ASSESSMENT — PAIN DESCRIPTION - DESCRIPTORS
DESCRIPTORS: SHOOTING;SHARP
DESCRIPTORS: ACHING
DESCRIPTORS: ACHING;THROBBING
DESCRIPTORS: ACHING

## 2023-11-02 ASSESSMENT — PAIN DESCRIPTION - LOCATION
LOCATION: ANKLE
LOCATION: FOOT
LOCATION: ANKLE
LOCATION: ANKLE
LOCATION: FOOT
LOCATION: ANKLE

## 2023-11-02 NOTE — PLAN OF CARE
Problem: Discharge Planning  Goal: Discharge to home or other facility with appropriate resources  10/28/2023 1310 by Jany Martinez RN  Outcome: Progressing  Flowsheets (Taken 10/28/2023 0800)  Discharge to home or other facility with appropriate resources: Identify barriers to discharge with patient and caregiver  10/28/2023 0028 by Patricia Torres RN  Outcome: Progressing     Problem: Pain  Goal: Verbalizes/displays adequate comfort level or baseline comfort level  10/28/2023 1310 by Jany Martinez RN  Outcome: Progressing  Flowsheets (Taken 10/28/2023 0800)  Verbalizes/displays adequate comfort level or baseline comfort level: Encourage patient to monitor pain and request assistance  10/28/2023 0028 by Patricia Torres RN  Outcome: Progressing     Problem: Skin/Tissue Integrity  Goal: Absence of new skin breakdown  Description: 1. Monitor for areas of redness and/or skin breakdown  2. Assess vascular access sites hourly  3. Every 4-6 hours minimum:  Change oxygen saturation probe site  4. Every 4-6 hours:  If on nasal continuous positive airway pressure, respiratory therapy assess nares and determine need for appliance change or resting period.   10/28/2023 1310 by Jany Martinez RN  Outcome: Progressing  10/28/2023 0028 by Patricia Torres RN  Outcome: Progressing     Problem: Safety - Adult  Goal: Free from fall injury  10/28/2023 1310 by Jany Martinez RN  Outcome: Progressing  10/28/2023 0028 by Patricia Torres RN  Outcome: Progressing
Problem: Discharge Planning  Goal: Discharge to home or other facility with appropriate resources  10/31/2023 0125 by Indira Desai RN  Outcome: Progressing  10/30/2023 1320 by Carol Ibarra RN  Outcome: Progressing     Problem: Pain  Goal: Verbalizes/displays adequate comfort level or baseline comfort level  10/31/2023 0125 by Indira Desai RN  Outcome: Progressing  10/30/2023 1320 by Carol Ibarra RN  Outcome: Progressing     Problem: Skin/Tissue Integrity  Goal: Absence of new skin breakdown  Description: 1. Monitor for areas of redness and/or skin breakdown  2. Assess vascular access sites hourly  3. Every 4-6 hours minimum:  Change oxygen saturation probe site  4. Every 4-6 hours:  If on nasal continuous positive airway pressure, respiratory therapy assess nares and determine need for appliance change or resting period.   10/31/2023 0125 by Indira Desai RN  Outcome: Progressing  10/30/2023 1320 by Carol Ibarra RN  Outcome: Progressing     Problem: Safety - Adult  Goal: Free from fall injury  10/31/2023 0125 by Indira Desai RN  Outcome: Progressing  10/30/2023 1320 by Carol Ibarra RN  Outcome: Progressing     Problem: Skin/Tissue Integrity - Adult  Goal: Incisions, wounds, or drain sites healing without S/S of infection  10/31/2023 0125 by Indira Desai RN  Outcome: Progressing  10/30/2023 1320 by Carol Ibarra RN  Outcome: Progressing     Problem: Musculoskeletal - Adult  Goal: Maintain proper alignment of affected body part  10/31/2023 0125 by Indira Desai RN  Outcome: Progressing  10/30/2023 1320 by Carol Ibarra RN  Outcome: Progressing  Goal: Return ADL status to a safe level of function  10/31/2023 0125 by Indira Desai RN  Outcome: Progressing  10/30/2023 1320 by Carol Ibarra RN  Outcome: Progressing     Problem: Gastrointestinal - Adult  Goal: Maintains adequate nutritional intake  10/31/2023 0125 by Indira Desai RN  Outcome: Progressing  10/30/2023 1320
Problem: Discharge Planning  Goal: Discharge to home or other facility with appropriate resources  11/1/2023 1114 by Miles Luna RN  Outcome: Progressing  11/1/2023 0210 by Aquilino Mendoza RN  Outcome: Progressing     Problem: Pain  Goal: Verbalizes/displays adequate comfort level or baseline comfort level  11/1/2023 1114 by Miles Luna RN  Outcome: Progressing  11/1/2023 0210 by Aquilino Mendoza RN  Outcome: Progressing     Problem: Skin/Tissue Integrity  Goal: Absence of new skin breakdown  Description: 1. Monitor for areas of redness and/or skin breakdown  2. Assess vascular access sites hourly  3. Every 4-6 hours minimum:  Change oxygen saturation probe site  4. Every 4-6 hours:  If on nasal continuous positive airway pressure, respiratory therapy assess nares and determine need for appliance change or resting period.   11/1/2023 1114 by Miles Luna RN  Outcome: Progressing  11/1/2023 0210 by Aquilino Mendoza RN  Outcome: Progressing     Problem: Safety - Adult  Goal: Free from fall injury  11/1/2023 1114 by Miles Luna RN  Outcome: Progressing  11/1/2023 0210 by Aquilino Mendoza RN  Outcome: Progressing     Problem: Skin/Tissue Integrity - Adult  Goal: Incisions, wounds, or drain sites healing without S/S of infection  Outcome: Progressing     Problem: Musculoskeletal - Adult  Goal: Maintain proper alignment of affected body part  Outcome: Progressing  Goal: Return ADL status to a safe level of function  Outcome: Progressing     Problem: Gastrointestinal - Adult  Goal: Maintains adequate nutritional intake  Outcome: Progressing     Problem: Infection - Adult  Goal: Absence of infection at discharge  Outcome: Progressing     Problem: Metabolic/Fluid and Electrolytes - Adult  Goal: Electrolytes maintained within normal limits  Outcome: Progressing  Goal: Hemodynamic stability and optimal renal function maintained  Outcome: Progressing  Goal: Glucose maintained within prescribed
Problem: Discharge Planning  Goal: Discharge to home or other facility with appropriate resources  Outcome: Adequate for Discharge     Problem: Pain  Goal: Verbalizes/displays adequate comfort level or baseline comfort level  Outcome: Adequate for Discharge     Problem: Skin/Tissue Integrity  Goal: Absence of new skin breakdown  Description: 1. Monitor for areas of redness and/or skin breakdown  2. Assess vascular access sites hourly  3. Every 4-6 hours minimum:  Change oxygen saturation probe site  4. Every 4-6 hours:  If on nasal continuous positive airway pressure, respiratory therapy assess nares and determine need for appliance change or resting period.   Outcome: Adequate for Discharge     Problem: Safety - Adult  Goal: Free from fall injury  Outcome: Adequate for Discharge     Problem: Skin/Tissue Integrity - Adult  Goal: Incisions, wounds, or drain sites healing without S/S of infection  Outcome: Adequate for Discharge     Problem: Musculoskeletal - Adult  Goal: Maintain proper alignment of affected body part  Outcome: Adequate for Discharge  Goal: Return ADL status to a safe level of function  Outcome: Adequate for Discharge     Problem: Gastrointestinal - Adult  Goal: Maintains adequate nutritional intake  Outcome: Adequate for Discharge     Problem: Infection - Adult  Goal: Absence of infection at discharge  Outcome: Adequate for Discharge     Problem: Metabolic/Fluid and Electrolytes - Adult  Goal: Electrolytes maintained within normal limits  Outcome: Adequate for Discharge  Goal: Hemodynamic stability and optimal renal function maintained  Outcome: Adequate for Discharge  Goal: Glucose maintained within prescribed range  Outcome: Adequate for Discharge     Problem: Chronic Conditions and Co-morbidities  Goal: Patient's chronic conditions and co-morbidity symptoms are monitored and maintained or improved  Outcome: Adequate for Discharge
Problem: Discharge Planning  Goal: Discharge to home or other facility with appropriate resources  Outcome: Progressing     Problem: Pain  Goal: Verbalizes/displays adequate comfort level or baseline comfort level  Outcome: Progressing     Problem: Skin/Tissue Integrity  Goal: Absence of new skin breakdown  Description: 1. Monitor for areas of redness and/or skin breakdown  2. Assess vascular access sites hourly  3. Every 4-6 hours minimum:  Change oxygen saturation probe site  4. Every 4-6 hours:  If on nasal continuous positive airway pressure, respiratory therapy assess nares and determine need for appliance change or resting period.   Outcome: Progressing     Problem: Safety - Adult  Goal: Free from fall injury  Outcome: Progressing
Problem: Discharge Planning  Goal: Discharge to home or other facility with appropriate resources  Outcome: Progressing     Problem: Pain  Goal: Verbalizes/displays adequate comfort level or baseline comfort level  Outcome: Progressing     Problem: Skin/Tissue Integrity  Goal: Absence of new skin breakdown  Description: 1. Monitor for areas of redness and/or skin breakdown  2. Assess vascular access sites hourly  3. Every 4-6 hours minimum:  Change oxygen saturation probe site  4. Every 4-6 hours:  If on nasal continuous positive airway pressure, respiratory therapy assess nares and determine need for appliance change or resting period.   Outcome: Progressing     Problem: Safety - Adult  Goal: Free from fall injury  Outcome: Progressing
Problem: Discharge Planning  Goal: Discharge to home or other facility with appropriate resources  Outcome: Progressing     Problem: Pain  Goal: Verbalizes/displays adequate comfort level or baseline comfort level  Outcome: Progressing     Problem: Skin/Tissue Integrity  Goal: Absence of new skin breakdown  Description: 1. Monitor for areas of redness and/or skin breakdown  2. Assess vascular access sites hourly  3. Every 4-6 hours minimum:  Change oxygen saturation probe site  4. Every 4-6 hours:  If on nasal continuous positive airway pressure, respiratory therapy assess nares and determine need for appliance change or resting period.   Outcome: Progressing     Problem: Safety - Adult  Goal: Free from fall injury  Outcome: Progressing     Problem: Skin/Tissue Integrity - Adult  Goal: Incisions, wounds, or drain sites healing without S/S of infection  Outcome: Progressing     Problem: Musculoskeletal - Adult  Goal: Maintain proper alignment of affected body part  Outcome: Progressing  Goal: Return ADL status to a safe level of function  Outcome: Progressing     Problem: Gastrointestinal - Adult  Goal: Maintains adequate nutritional intake  Outcome: Progressing     Problem: Infection - Adult  Goal: Absence of infection at discharge  Outcome: Progressing     Problem: Metabolic/Fluid and Electrolytes - Adult  Goal: Electrolytes maintained within normal limits  Outcome: Progressing  Goal: Hemodynamic stability and optimal renal function maintained  Outcome: Progressing  Goal: Glucose maintained within prescribed range  Outcome: Progressing
Problem: Discharge Planning  Goal: Discharge to home or other facility with appropriate resources  Outcome: Progressing     Problem: Pain  Goal: Verbalizes/displays adequate comfort level or baseline comfort level  Outcome: Progressing     Problem: Skin/Tissue Integrity  Goal: Absence of new skin breakdown  Description: 1. Monitor for areas of redness and/or skin breakdown  2. Assess vascular access sites hourly  3. Every 4-6 hours minimum:  Change oxygen saturation probe site  4. Every 4-6 hours:  If on nasal continuous positive airway pressure, respiratory therapy assess nares and determine need for appliance change or resting period.   Outcome: Progressing     Problem: Safety - Adult  Goal: Free from fall injury  Outcome: Progressing     Problem: Skin/Tissue Integrity - Adult  Goal: Incisions, wounds, or drain sites healing without S/S of infection  Outcome: Progressing     Problem: Musculoskeletal - Adult  Goal: Maintain proper alignment of affected body part  Outcome: Progressing  Goal: Return ADL status to a safe level of function  Outcome: Progressing     Problem: Gastrointestinal - Adult  Goal: Maintains adequate nutritional intake  Outcome: Progressing     Problem: Infection - Adult  Goal: Absence of infection at discharge  Outcome: Progressing     Problem: Metabolic/Fluid and Electrolytes - Adult  Goal: Electrolytes maintained within normal limits  Outcome: Progressing  Goal: Hemodynamic stability and optimal renal function maintained  Outcome: Progressing  Goal: Glucose maintained within prescribed range  Outcome: Progressing     Problem: Chronic Conditions and Co-morbidities  Goal: Patient's chronic conditions and co-morbidity symptoms are monitored and maintained or improved  Outcome: Progressing
Natalia Nuñez RN  Outcome: Progressing     Problem: Infection - Adult  Goal: Absence of infection at discharge  10/31/2023 1002 by Timbo Campbell RN  Outcome: Progressing  10/31/2023 0125 by Nataila Nuñez RN  Outcome: Progressing     Problem: Metabolic/Fluid and Electrolytes - Adult  Goal: Electrolytes maintained within normal limits  10/31/2023 1002 by Timbo Campbell RN  Outcome: Progressing  10/31/2023 0125 by Natalia Nuñez RN  Outcome: Progressing  Goal: Hemodynamic stability and optimal renal function maintained  10/31/2023 1002 by Timbo Campbell RN  Outcome: Progressing  10/31/2023 0125 by Natalia Nuñez RN  Outcome: Progressing  Goal: Glucose maintained within prescribed range  10/31/2023 1002 by Timbo Campbell RN  Outcome: Progressing  10/31/2023 0125 by Natalia Nuñez RN  Outcome: Progressing     Problem: Chronic Conditions and Co-morbidities  Goal: Patient's chronic conditions and co-morbidity symptoms are monitored and maintained or improved  10/31/2023 1002 by Timbo aCmpbell RN  Outcome: Progressing  10/31/2023 0125 by Natalia Nuñez RN  Outcome: Progressing

## 2023-11-02 NOTE — DISCHARGE SUMMARY
CONSULT TO IV TEAM  IP CONSULT TO IV TEAM  IP CONSULT TO IV TEAM  IP CONSULT TO HOME CARE NEEDS      Discharge Instruction:   Handoff to PCP:   -  Follow up appointments: PCP and ID  Primary care physician: -    Diet:  regular diet   Activity: activity as tolerated  Disposition: Discharged to:   [x]Home, [x]C, []SNF, []Acute Rehab, []Hospice   Condition on discharge: Stable    Discharge Medications:        Medication List        START taking these medications      chlorhexidine gluconate 4 % Soln external solution  Commonly known as: ANTISEPTIC SKIN CLEANSER  Apply topically daily for 6 doses  Start taking on: November 3, 2023     HYDROmorphone 2 MG tablet  Commonly known as: DILAUDID  Take 1 tablet by mouth every 6 hours as needed for Pain for up to 8 days. Max Daily Amount: 8 mg     mupirocin 2 % ointment  Commonly known as: BACTROBAN  Apply topically 2 times daily Apply topically 3 times daily. sulfamethoxazole-trimethoprim 800-160 MG per tablet  Commonly known as: BACTRIM DS;SEPTRA DS  Take 1 tablet by mouth every 12 hours for 50 doses            CHANGE how you take these medications      albuterol sulfate  (90 Base) MCG/ACT inhaler  Commonly known as: PROVENTIL;VENTOLIN;PROAIR  Inhale 2 puffs into the lungs every 6 hours as needed for Wheezing  What changed: Another medication with the same name was removed. Continue taking this medication, and follow the directions you see here.      insulin lispro 100 UNIT/ML Soln injection vial  Commonly known as: HUMALOG  Inject 10 Units into the skin 3 times daily (with meals)  What changed: how much to take            CONTINUE taking these medications      acetaminophen 325 MG tablet  Commonly known as: Aminofen  Take 2 tablets by mouth every 6 hours as needed for Pain     albuterol-ipratropium  MCG/ACT Aers inhaler  Commonly known as: Combivent Respimat  Inhale 1 puff into the lungs every 6 hours     allopurinol 300 MG tablet  Commonly known as:

## 2023-11-02 NOTE — CARE COORDINATION
CM in to see Pt to follow up on discharge planning. Plan remains home with [facility]. CM faxed home care order, Pt information to [facility] [phone]. CM call to [facility], spoke to Arnoldo Mariee, home care order received and they will resume care     Pt denies any needs at this time.

## 2023-11-05 ENCOUNTER — HOSPITAL ENCOUNTER (EMERGENCY)
Age: 38
Discharge: HOME | End: 2023-11-05
Attending: STUDENT IN AN ORGANIZED HEALTH CARE EDUCATION/TRAINING PROGRAM
Payer: COMMERCIAL

## 2023-11-05 VITALS
RESPIRATION RATE: 18 BRPM | SYSTOLIC BLOOD PRESSURE: 160 MMHG | TEMPERATURE: 98.1 F | HEART RATE: 98 BPM | DIASTOLIC BLOOD PRESSURE: 96 MMHG | OXYGEN SATURATION: 100 %

## 2023-11-05 DIAGNOSIS — M14.60 CHARCOT'S ARTHROPATHY: Primary | ICD-10-CM

## 2023-11-05 DIAGNOSIS — G90.529 COMPLEX REGIONAL PAIN SYNDROME TYPE 1 OF LOWER EXTREMITY, UNSPECIFIED LATERALITY: ICD-10-CM

## 2023-11-05 LAB
ALBUMIN SERPL-MCNC: 3.5 GM/DL (ref 3.4–5)
ALP BLD-CCNC: 119 IU/L (ref 40–129)
ALT SERPL-CCNC: 30 U/L (ref 10–40)
ANION GAP SERPL CALCULATED.3IONS-SCNC: 12 MMOL/L (ref 4–16)
AST SERPL-CCNC: 31 IU/L (ref 15–37)
BASOPHILS ABSOLUTE: 0 K/CU MM
BASOPHILS RELATIVE PERCENT: 0.3 % (ref 0–1)
BILIRUB SERPL-MCNC: 0.4 MG/DL (ref 0–1)
BUN SERPL-MCNC: 12 MG/DL (ref 6–23)
CALCIUM SERPL-MCNC: 8.9 MG/DL (ref 8.3–10.6)
CHLORIDE BLD-SCNC: 94 MMOL/L (ref 99–110)
CO2: 25 MMOL/L (ref 21–32)
CREAT SERPL-MCNC: 0.7 MG/DL (ref 0.9–1.3)
CRP SERPL HS-MCNC: 146.4 MG/L
DIFFERENTIAL TYPE: ABNORMAL
EOSINOPHILS ABSOLUTE: 0.1 K/CU MM
EOSINOPHILS RELATIVE PERCENT: 1.3 % (ref 0–3)
ERYTHROCYTE SEDIMENTATION RATE: 52 MM/HR (ref 0–15)
GFR SERPL CREATININE-BSD FRML MDRD: >60 ML/MIN/1.73M2
GLUCOSE SERPL-MCNC: 339 MG/DL (ref 70–99)
HCT VFR BLD CALC: 31.9 % (ref 42–52)
HEMOGLOBIN: 10.5 GM/DL (ref 13.5–18)
IMMATURE NEUTROPHIL %: 0.3 % (ref 0–0.43)
LACTIC ACID, SEPSIS: 1.3 MMOL/L (ref 0.4–2)
LACTIC ACID, SEPSIS: 1.8 MMOL/L (ref 0.4–2)
LYMPHOCYTES ABSOLUTE: 1.1 K/CU MM
LYMPHOCYTES RELATIVE PERCENT: 15.6 % (ref 24–44)
MCH RBC QN AUTO: 30.3 PG (ref 27–31)
MCHC RBC AUTO-ENTMCNC: 32.9 % (ref 32–36)
MCV RBC AUTO: 91.9 FL (ref 78–100)
MONOCYTES ABSOLUTE: 0.7 K/CU MM
MONOCYTES RELATIVE PERCENT: 9.8 % (ref 0–4)
NUCLEATED RBC %: 0 %
PDW BLD-RTO: 12.1 % (ref 11.7–14.9)
PLATELET # BLD: 338 K/CU MM (ref 140–440)
PMV BLD AUTO: 8.3 FL (ref 7.5–11.1)
POTASSIUM SERPL-SCNC: 4.7 MMOL/L (ref 3.5–5.1)
RBC # BLD: 3.47 M/CU MM (ref 4.6–6.2)
SEGMENTED NEUTROPHILS ABSOLUTE COUNT: 5 K/CU MM
SEGMENTED NEUTROPHILS RELATIVE PERCENT: 72.7 % (ref 36–66)
SODIUM BLD-SCNC: 131 MMOL/L (ref 135–145)
TOTAL IMMATURE NEUTOROPHIL: 0.02 K/CU MM
TOTAL NUCLEATED RBC: 0 K/CU MM
TOTAL PROTEIN: 8.3 GM/DL (ref 6.4–8.2)
WBC # BLD: 6.8 K/CU MM (ref 4–10.5)

## 2023-11-05 PROCEDURE — 6360000002 HC RX W HCPCS: Performed by: PHYSICIAN ASSISTANT

## 2023-11-05 PROCEDURE — 2580000003 HC RX 258: Performed by: PHYSICIAN ASSISTANT

## 2023-11-05 PROCEDURE — 85025 COMPLETE CBC W/AUTO DIFF WBC: CPT

## 2023-11-05 PROCEDURE — 80053 COMPREHEN METABOLIC PANEL: CPT

## 2023-11-05 PROCEDURE — 83605 ASSAY OF LACTIC ACID: CPT

## 2023-11-05 PROCEDURE — 96374 THER/PROPH/DIAG INJ IV PUSH: CPT

## 2023-11-05 PROCEDURE — 85652 RBC SED RATE AUTOMATED: CPT

## 2023-11-05 PROCEDURE — 99284 EMERGENCY DEPT VISIT MOD MDM: CPT

## 2023-11-05 PROCEDURE — 86140 C-REACTIVE PROTEIN: CPT

## 2023-11-05 PROCEDURE — 87040 BLOOD CULTURE FOR BACTERIA: CPT

## 2023-11-05 RX ORDER — 0.9 % SODIUM CHLORIDE 0.9 %
1000 INTRAVENOUS SOLUTION INTRAVENOUS ONCE
Status: COMPLETED | OUTPATIENT
Start: 2023-11-05 | End: 2023-11-05

## 2023-11-05 RX ADMIN — SODIUM CHLORIDE 1000 ML: 9 INJECTION, SOLUTION INTRAVENOUS at 12:17

## 2023-11-05 RX ADMIN — HYDROMORPHONE HYDROCHLORIDE 1 MG: 1 INJECTION, SOLUTION INTRAMUSCULAR; INTRAVENOUS; SUBCUTANEOUS at 12:17

## 2023-11-05 NOTE — ED NOTES
Patient transferred to . 26 from [address] [facility]. Report received from Chiquis Graham. Care assumed at this time.       Susan Alba RN  11/05/23 [id number]

## 2023-11-05 NOTE — CARE COORDINATION
CM consult per Denisa Jain for discharge planning,     CM visited Pt who is alert and oriented, introduced self and reason for visit Pt tell's this CM that he lives at home has [address] for therapy through [facility], states they are going to be doing therapy, states he has difficulty ambulating with foot also having issues with shoes for his left uninfected foot. Pt states he is active with [facility] wound [facility] clinic plan if for wound care three times a week, states he can get there it is across the street from where he lives. States he has been able to participate in either w/c clinic of [address] therapy due to being in hospital so much. Pt wants to return home at discharge and continue with [facility] therapy, and wants to go to o/p wound clinic ,states [facility] not able to take care of wound properly, states he almost lost his foot using [address] to manage wound care to foot, so he plans to go to clinic for his wound care. Pt has no other needs form  at this time. Update to Denisa Jain, MARILUZ pending results.  MAREN,RN/CM

## 2023-11-05 NOTE — ED PROVIDER NOTES
I independently examined and evaluated Angelito Sweeney. .  I personally saw the patient and performed a substantive portion of the visit including all aspects of the medical decision making. In brief their history revealed Patient with extensive history of wounds on his feet. Patient was discharged 3 days ago after having severe sepsis secondary to gangrene of the right foot, left lower extremity cellulitis, recent amputation on second fourth digits with I&D. He had severe work-up including CTs and nuclear imaging to rule out osteomyelitis. Patient was discharged on antibiotics and p.o. hydrocodone. Patient comes in here today due to bilateral lower feet pain that has been worsening since he left. He also reports some swelling and some drainage over the right foot. He denies fevers. Their focused exam revealed the patient has some mild swelling and erythema over bilateral feet, there is no discharge or bleeding appreciated, there are no open wounds. The patient appears age appropriate, appears well-hydrated, well-nourished. Speech is clear. Breathing is unlabored. Skin is dry. Mental status is normal. The patient moves all extremities and is without facial droop. ED course:   CC/HPI Summary, DDx, ED Course, and Reassessment:   Pt presents as above. Emergent conditions considered. Presentation prompted initial basic work-up that included a negative white count negative lactate decreased ESR, and mildly elevated CRP. Patient was given 1 mg of IV Dilaudid which improved his symptoms. Based on physical exam and pictures from previous admission there is no significant change on his feet, I do not have concern for worsening infection at the time, further imaging has been deferred.     ED Course as of 11/05/23 1305   Sun Nov 05, 2023   1303 T1DM, complex regional syndrome, recent amputation of right toe  Seen by ortho and ID  Released 2 days ago  Back with uncontrolled pain, more swollen foot  Not able

## 2023-11-05 NOTE — CARE COORDINATION
Patient possible readmission. Patient admitted 10/27/23 - 11/2/23 for Severe Sepsis 2/2 gangrene of right foot. Patient is from home with spouse, has insurance and PCP. Patient was discharged with [facility]. Patient presents to ED today with C/O Foot/Leg Pain. Patient asking to go to SNF. Patient states that he does not want to go to SNF. Patient discharged home and will follow up with [address] [place] [address]. No Acute Findings.

## 2023-11-05 NOTE — ED PROVIDER NOTES
EMERGENCY DEPARTMENT ENCOUNTER        Pt Name: Netta Byers MRN: [id number]  Birthdate 1985  Date of evaluation: 11/5/2023  Provider: ERNESTO Fraga  PCP: Lisa Hernandez MD    NELDA. I have evaluated this patient. Triage CHIEF COMPLAINT       Chief Complaint   Patient presents with    Foot Pain    Leg Pain     Bilateral leg/foot pain, reports been going on for awhile, states \"lilly been seen many times for it already\"         HISTORY OF PRESENT ILLNESS      Chief Complaint: Left leg, foot pain    Netta Byers is a 44 y.o.  male who presents back to the emergency department today after being recently discharged with continued pain in the bilateral lower extremity, left greater than right. Patient is a type I diabetic, was just recently admitted for his Charcot foot arthropathy, complex regional pain syndrome. Had extensive work-up, had rule out osteomyelitis, was seen by infectious disease, orthopedics, discharged with boot, wound care follow-up, is on oral antibiotics, oral Dilaudid for his pain, returning back with continued pain into the left foot area. He describes no real change in his symptoms, he states that his pain is not very well controlled, he locates it into the 4 foot into the base/plantar aspect of the foot. No open sores or wounds no loosening area erythema, been taking his medication as prescribed. Nursing Notes were all reviewed and agreed with or any disagreements were addressed in the HPI. REVIEW OF SYSTEMS     At least 10 systems reviewed and otherwise acutely negative except as in the [address] [facility] [address]      PAST MEDICAL HISTORY     Past Medical History:   Diagnosis Date    Asthma     Blind left eye     Diabetes mellitus ([address])     GERD (gastroesophageal reflux disease)     Hypertension     Retinal detachment 2012    left       SURGICAL HISTORY     Past Surgical History:   Procedure Laterality Date    CORNEAL TRANSPLANT Bilateral     EYE SURGERY      left eye

## 2023-11-07 ENCOUNTER — HOSPITAL ENCOUNTER (OUTPATIENT)
Dept: WOUND CARE | Age: 38
Discharge: HOME | End: 2023-11-07
Payer: COMMERCIAL

## 2023-11-07 VITALS
SYSTOLIC BLOOD PRESSURE: 124 MMHG | DIASTOLIC BLOOD PRESSURE: 84 MMHG | TEMPERATURE: 97 F | RESPIRATION RATE: 18 BRPM | HEART RATE: 102 BPM

## 2023-11-07 DIAGNOSIS — L97.512 RIGHT FOOT ULCER, WITH FAT LAYER EXPOSED (HCC): ICD-10-CM

## 2023-11-07 DIAGNOSIS — M14.60 CHARCOT'S ARTHROPATHY: ICD-10-CM

## 2023-11-07 DIAGNOSIS — Z79.4 DIABETES MELLITUS TYPE 2, INSULIN DEPENDENT (HCC): ICD-10-CM

## 2023-11-07 DIAGNOSIS — M25.375 INSTABILITY OF LEFT FOOT JOINT: ICD-10-CM

## 2023-11-07 DIAGNOSIS — E66.01 CLASS 2 SEVERE OBESITY DUE TO EXCESS CALORIES WITH SERIOUS COMORBIDITY AND BODY MASS INDEX (BMI) OF 37.0 TO 37.9 IN ADULT (HCC): ICD-10-CM

## 2023-11-07 DIAGNOSIS — M25.374 INSTABILITY OF JOINT OF TOE OF RIGHT FOOT: ICD-10-CM

## 2023-11-07 DIAGNOSIS — E11.9 DIABETES MELLITUS TYPE 2, INSULIN DEPENDENT (HCC): ICD-10-CM

## 2023-11-07 DIAGNOSIS — T81.89XA NONHEALING SURGICAL WOUND, INITIAL ENCOUNTER: Primary | ICD-10-CM

## 2023-11-07 DIAGNOSIS — S98.131A AMPUTATION OF TOE OF RIGHT FOOT (HCC): ICD-10-CM

## 2023-11-07 DIAGNOSIS — G62.9 NEUROPATHY: ICD-10-CM

## 2023-11-07 DIAGNOSIS — M14.679 CHARCOT ARTHROPATHY OF MIDFOOT: ICD-10-CM

## 2023-11-07 PROBLEM — L97.514 RIGHT FOOT ULCER, WITH NECROSIS OF BONE (HCC): Status: RESOLVED | Noted: 2023-03-28 | Resolved: 2023-11-07

## 2023-11-07 PROBLEM — E66.9 CLASS 2 OBESITY IN ADULT: Status: RESOLVED | Noted: 2023-02-20 | Resolved: 2023-11-07

## 2023-11-07 PROBLEM — E11.621 DIABETIC ULCER OF TOE OF LEFT FOOT ASSOCIATED WITH TYPE 2 DIABETES MELLITUS, WITH FAT LAYER EXPOSED (HCC): Status: RESOLVED | Noted: 2023-06-26 | Resolved: 2023-11-07

## 2023-11-07 PROBLEM — E66.812 CLASS 2 OBESITY IN ADULT: Status: RESOLVED | Noted: 2023-02-20 | Resolved: 2023-11-07

## 2023-11-07 PROBLEM — L97.522 DIABETIC ULCER OF TOE OF LEFT FOOT ASSOCIATED WITH TYPE 2 DIABETES MELLITUS, WITH FAT LAYER EXPOSED (HCC): Status: RESOLVED | Noted: 2023-06-26 | Resolved: 2023-11-07

## 2023-11-07 PROCEDURE — 11042 DBRDMT SUBQ TIS 1ST 20SQCM/<: CPT

## 2023-11-07 PROCEDURE — 99213 OFFICE O/P EST LOW 20 MIN: CPT

## 2023-11-07 PROCEDURE — 99213 OFFICE O/P EST LOW 20 MIN: CPT | Performed by: NURSE PRACTITIONER

## 2023-11-07 PROCEDURE — 11042 DBRDMT SUBQ TIS 1ST 20SQCM/<: CPT | Performed by: NURSE PRACTITIONER

## 2023-11-07 RX ORDER — GENTAMICIN SULFATE 1 MG/G
OINTMENT TOPICAL ONCE
OUTPATIENT
Start: 2023-11-07 | End: 2023-11-07

## 2023-11-07 RX ORDER — GENTAMICIN SULFATE 1 MG/G
OINTMENT TOPICAL ONCE
Status: CANCELLED | OUTPATIENT
Start: 2023-11-07 | End: 2023-11-07

## 2023-11-07 RX ORDER — BACITRACIN ZINC AND POLYMYXIN B SULFATE 500; 1000 [USP'U]/G; [USP'U]/G
OINTMENT TOPICAL ONCE
Status: CANCELLED | OUTPATIENT
Start: 2023-11-07 | End: 2023-11-07

## 2023-11-07 RX ORDER — IBUPROFEN 200 MG
TABLET ORAL ONCE
OUTPATIENT
Start: 2023-11-07 | End: 2023-11-07

## 2023-11-07 RX ORDER — SODIUM CHLOR/HYPOCHLOROUS ACID 0.033 %
SOLUTION, IRRIGATION IRRIGATION ONCE
OUTPATIENT
Start: 2023-11-07 | End: 2023-11-07

## 2023-11-07 RX ORDER — PREGABALIN 150 MG/1
150 CAPSULE ORAL 2 TIMES DAILY
Qty: 14 CAPSULE | Refills: 0 | Status: SHIPPED | OUTPATIENT
Start: 2023-11-07 | End: 2023-11-14

## 2023-11-07 RX ORDER — BETAMETHASONE DIPROPIONATE 0.05 %
OINTMENT (GRAM) TOPICAL ONCE
Status: CANCELLED | OUTPATIENT
Start: 2023-11-07 | End: 2023-11-07

## 2023-11-07 RX ORDER — TRIAMCINOLONE ACETONIDE 1 MG/G
OINTMENT TOPICAL ONCE
Status: CANCELLED | OUTPATIENT
Start: 2023-11-07 | End: 2023-11-07

## 2023-11-07 RX ORDER — CLOBETASOL PROPIONATE 0.5 MG/G
OINTMENT TOPICAL ONCE
Status: CANCELLED | OUTPATIENT
Start: 2023-11-07 | End: 2023-11-07

## 2023-11-07 RX ORDER — LIDOCAINE 50 MG/G
OINTMENT TOPICAL ONCE
Status: CANCELLED | OUTPATIENT
Start: 2023-11-07 | End: 2023-11-07

## 2023-11-07 RX ORDER — BETAMETHASONE DIPROPIONATE 0.5 MG/G
CREAM TOPICAL ONCE
OUTPATIENT
Start: 2023-11-07 | End: 2023-11-07

## 2023-11-07 RX ORDER — CLOBETASOL PROPIONATE 0.5 MG/G
OINTMENT TOPICAL ONCE
OUTPATIENT
Start: 2023-11-07 | End: 2023-11-07

## 2023-11-07 RX ORDER — LIDOCAINE 40 MG/G
CREAM TOPICAL ONCE
OUTPATIENT
Start: 2023-11-07 | End: 2023-11-07

## 2023-11-07 RX ORDER — LIDOCAINE 40 MG/G
CREAM TOPICAL ONCE
Status: CANCELLED | OUTPATIENT
Start: 2023-11-07 | End: 2023-11-07

## 2023-11-07 RX ORDER — GINSENG 100 MG
CAPSULE ORAL ONCE
OUTPATIENT
Start: 2023-11-07 | End: 2023-11-07

## 2023-11-07 RX ORDER — GINSENG 100 MG
CAPSULE ORAL ONCE
Status: CANCELLED | OUTPATIENT
Start: 2023-11-07 | End: 2023-11-07

## 2023-11-07 RX ORDER — BACITRACIN ZINC AND POLYMYXIN B SULFATE 500; 1000 [USP'U]/G; [USP'U]/G
OINTMENT TOPICAL ONCE
OUTPATIENT
Start: 2023-11-07 | End: 2023-11-07

## 2023-11-07 RX ORDER — IBUPROFEN 200 MG
TABLET ORAL ONCE
Status: CANCELLED | OUTPATIENT
Start: 2023-11-07 | End: 2023-11-07

## 2023-11-07 RX ORDER — LIDOCAINE HYDROCHLORIDE 40 MG/ML
SOLUTION TOPICAL ONCE
OUTPATIENT
Start: 2023-11-07 | End: 2023-11-07

## 2023-11-07 RX ORDER — LIDOCAINE HYDROCHLORIDE 40 MG/ML
SOLUTION TOPICAL ONCE
Status: CANCELLED | OUTPATIENT
Start: 2023-11-07 | End: 2023-11-07

## 2023-11-07 RX ORDER — LIDOCAINE 50 MG/G
OINTMENT TOPICAL ONCE
OUTPATIENT
Start: 2023-11-07 | End: 2023-11-07

## 2023-11-07 RX ORDER — TRIAMCINOLONE ACETONIDE 1 MG/G
OINTMENT TOPICAL ONCE
OUTPATIENT
Start: 2023-11-07 | End: 2023-11-07

## 2023-11-07 ASSESSMENT — PAIN DESCRIPTION - DESCRIPTORS: DESCRIPTORS: ACHING;SHOOTING;STABBING;THROBBING

## 2023-11-07 ASSESSMENT — PAIN - FUNCTIONAL ASSESSMENT: PAIN_FUNCTIONAL_ASSESSMENT: PREVENTS OR INTERFERES SOME ACTIVE ACTIVITIES AND ADLS

## 2023-11-07 ASSESSMENT — PAIN DESCRIPTION - PAIN TYPE: TYPE: CHRONIC PAIN;SURGICAL PAIN

## 2023-11-07 ASSESSMENT — PAIN DESCRIPTION - LOCATION: LOCATION: FOOT

## 2023-11-07 ASSESSMENT — PAIN DESCRIPTION - ONSET: ONSET: ON-GOING

## 2023-11-07 ASSESSMENT — PAIN DESCRIPTION - FREQUENCY: FREQUENCY: CONTINUOUS

## 2023-11-07 ASSESSMENT — PAIN DESCRIPTION - ORIENTATION: ORIENTATION: RIGHT;LEFT

## 2023-11-07 ASSESSMENT — PAIN SCALES - GENERAL: PAINLEVEL_OUTOF10: 10

## 2023-11-07 NOTE — PROGRESS NOTES
Multilayer Compression Wrap   (Not Unna) Below the Knee    NAME:  Debbie Banks. YOB: 1985  MEDICAL RECORD NUMBER:  [id number]  DATE:  11/7/2023    Multilayer compression wrap: Removed old Multilayer wrap if indicated and wash leg with mild soap/water. Applied moisturizing agent to dry skin as needed. Applied primary and secondary dressing as ordered. Applied multilayered dressing below the knee to right lower leg. Instructed patient/caregiver not to remove dressing and to keep it clean and dry. Instructed patient/caregiver on complications to report to provider, such as pain, numbness in toes, heavy drainage, and slippage of dressing. Instructed patient on purpose of compression dressing and on activity and exercise recommendations.       Electronically signed by Akiko Alcala LPN on 62/5/3042 at 6:96 AM
betadine)  Wound 10/17/23 Foot Right #1 5th toe amp site-Dressing/Treatment:  (a/d betadine stimulen bolster steristrips peyman silver ag sorbex coflex full 2 . enclose toes on right.  paint toenails betadine)    Written Patient Dismissal Instructions Given            Electronically signed by KAYLI Kelly CNP on 11/7/2023 at 12:46 PM

## 2023-11-07 NOTE — PATIENT INSTRUCTIONS
PHYSICIAN ORDERS AND DISCHARGE INSTRUCTIONS    Wound cleansing:     Do not scrub or use excessive force. Wash hands with soap and water before and after dressing changes. Prior to applying a clean dressing, cleanse wound with normal saline,               wound cleanser, or mild soap and water. Ask the physician or nurse before getting the wound(s) wet in a shower      Wound Care Notes:  Imaging:   Cultures:             DIRK:  Wound Care Supplies:   [facility]:   Rx:          Orders for this week:  2023    []  Paint toes with betadine    Rx: [facility] [employer] [place]       Right 5th toe amp site and Right Medial Great Toe Wounds - Wash with soap and water, pat dry. A&D to intact skin of lower legs and feet. Betadine and Stimulen to wound beds. Bolster 5th toe amp site closed with steri strips. Tuck Karen in crease where 4th toe was removed. Cover with silver alginate and small super absorber. Wrap BILATERAL LEGS with Coflex 2 full (enclose toes on right foot). Leave in place until Thursday nurse visit. Paint toenails with betadine today in clinic (23), due to trimming nails. Will attempt to order a Foot Defender for Right Foot if eligible and covered by insurance. Shoe size 13 mens      Dispense 30 day quantity when ordering supplies. []   Nurse Visit: 23  Follow Up Instructions: At the [address] [place] [address] in 1 week   Primary Wound Care Provider: Sivan Aguirre CNP   Call  for any questions or concerns.   Central [phone] for imaging and lab work

## 2023-11-10 LAB
CULTURE: NORMAL
CULTURE: NORMAL
Lab: NORMAL
Lab: NORMAL
SPECIMEN: NORMAL
SPECIMEN: NORMAL

## 2023-11-14 ENCOUNTER — HOSPITAL ENCOUNTER (OUTPATIENT)
Dept: WOUND CARE | Age: 38
Discharge: HOME | End: 2023-11-14
Payer: COMMERCIAL

## 2023-11-14 VITALS
RESPIRATION RATE: 18 BRPM | TEMPERATURE: 97.6 F | HEART RATE: 107 BPM | SYSTOLIC BLOOD PRESSURE: 140 MMHG | DIASTOLIC BLOOD PRESSURE: 63 MMHG

## 2023-11-14 DIAGNOSIS — M25.374 INSTABILITY OF JOINT OF TOE OF RIGHT FOOT: ICD-10-CM

## 2023-11-14 DIAGNOSIS — G62.9 NEUROPATHY: ICD-10-CM

## 2023-11-14 DIAGNOSIS — S98.131A AMPUTATION OF TOE OF RIGHT FOOT (HCC): ICD-10-CM

## 2023-11-14 DIAGNOSIS — L97.512 RIGHT FOOT ULCER, WITH FAT LAYER EXPOSED (HCC): ICD-10-CM

## 2023-11-14 DIAGNOSIS — M14.60 CHARCOT'S ARTHROPATHY: ICD-10-CM

## 2023-11-14 DIAGNOSIS — E11.9 DIABETES MELLITUS TYPE 2, INSULIN DEPENDENT (HCC): ICD-10-CM

## 2023-11-14 DIAGNOSIS — M25.375 INSTABILITY OF LEFT FOOT JOINT: ICD-10-CM

## 2023-11-14 DIAGNOSIS — Z79.4 DIABETES MELLITUS TYPE 2, INSULIN DEPENDENT (HCC): ICD-10-CM

## 2023-11-14 DIAGNOSIS — M14.679 CHARCOT ARTHROPATHY OF MIDFOOT: ICD-10-CM

## 2023-11-14 DIAGNOSIS — T81.89XA NONHEALING SURGICAL WOUND, INITIAL ENCOUNTER: Primary | ICD-10-CM

## 2023-11-14 PROCEDURE — 11055 PARING/CUTG B9 HYPRKER LES 1: CPT

## 2023-11-14 PROCEDURE — 11042 DBRDMT SUBQ TIS 1ST 20SQCM/<: CPT

## 2023-11-14 PROCEDURE — 11055 PARING/CUTG B9 HYPRKER LES 1: CPT | Performed by: NURSE PRACTITIONER

## 2023-11-14 PROCEDURE — 11042 DBRDMT SUBQ TIS 1ST 20SQCM/<: CPT | Performed by: NURSE PRACTITIONER

## 2023-11-14 PROCEDURE — 29581 APPL MULTLAYER CMPRN SYS LEG: CPT

## 2023-11-14 RX ORDER — CLOBETASOL PROPIONATE 0.5 MG/G
OINTMENT TOPICAL ONCE
OUTPATIENT
Start: 2023-11-14 | End: 2023-11-14

## 2023-11-14 RX ORDER — TRIAMCINOLONE ACETONIDE 1 MG/G
OINTMENT TOPICAL ONCE
OUTPATIENT
Start: 2023-11-14 | End: 2023-11-14

## 2023-11-14 RX ORDER — GINSENG 100 MG
CAPSULE ORAL ONCE
OUTPATIENT
Start: 2023-11-14 | End: 2023-11-14

## 2023-11-14 RX ORDER — IBUPROFEN 200 MG
TABLET ORAL ONCE
OUTPATIENT
Start: 2023-11-14 | End: 2023-11-14

## 2023-11-14 RX ORDER — SODIUM CHLOR/HYPOCHLOROUS ACID 0.033 %
SOLUTION, IRRIGATION IRRIGATION ONCE
OUTPATIENT
Start: 2023-11-14 | End: 2023-11-14

## 2023-11-14 RX ORDER — BACITRACIN ZINC AND POLYMYXIN B SULFATE 500; 1000 [USP'U]/G; [USP'U]/G
OINTMENT TOPICAL ONCE
OUTPATIENT
Start: 2023-11-14 | End: 2023-11-14

## 2023-11-14 RX ORDER — LIDOCAINE HYDROCHLORIDE 40 MG/ML
SOLUTION TOPICAL ONCE
OUTPATIENT
Start: 2023-11-14 | End: 2023-11-14

## 2023-11-14 RX ORDER — GENTAMICIN SULFATE 1 MG/G
OINTMENT TOPICAL ONCE
OUTPATIENT
Start: 2023-11-14 | End: 2023-11-14

## 2023-11-14 RX ORDER — LIDOCAINE 40 MG/G
CREAM TOPICAL ONCE
OUTPATIENT
Start: 2023-11-14 | End: 2023-11-14

## 2023-11-14 RX ORDER — BETAMETHASONE DIPROPIONATE 0.5 MG/G
CREAM TOPICAL ONCE
OUTPATIENT
Start: 2023-11-14 | End: 2023-11-14

## 2023-11-14 RX ORDER — LIDOCAINE 50 MG/G
OINTMENT TOPICAL ONCE
OUTPATIENT
Start: 2023-11-14 | End: 2023-11-14

## 2023-11-14 ASSESSMENT — PAIN SCALES - GENERAL: PAINLEVEL_OUTOF10: 9

## 2023-11-14 ASSESSMENT — PAIN DESCRIPTION - ONSET: ONSET: ON-GOING

## 2023-11-14 ASSESSMENT — PAIN DESCRIPTION - LOCATION: LOCATION: FOOT

## 2023-11-14 ASSESSMENT — PAIN DESCRIPTION - PAIN TYPE: TYPE: SURGICAL PAIN

## 2023-11-14 ASSESSMENT — PAIN DESCRIPTION - DESCRIPTORS: DESCRIPTORS: BURNING;STABBING;THROBBING

## 2023-11-14 ASSESSMENT — PAIN DESCRIPTION - FREQUENCY: FREQUENCY: CONTINUOUS

## 2023-11-14 ASSESSMENT — PAIN DESCRIPTION - ORIENTATION: ORIENTATION: RIGHT

## 2023-11-14 ASSESSMENT — PAIN - FUNCTIONAL ASSESSMENT: PAIN_FUNCTIONAL_ASSESSMENT: PREVENTS OR INTERFERES SOME ACTIVE ACTIVITIES AND ADLS

## 2023-11-14 NOTE — PATIENT INSTRUCTIONS
PHYSICIAN ORDERS AND DISCHARGE INSTRUCTIONS    Wound cleansing:     Do not scrub or use excessive force. Wash hands with soap and water before and after dressing changes. Prior to applying a clean dressing, cleanse wound with normal saline,               wound cleanser, or mild soap and water. Ask the physician or nurse before getting the wound(s) wet in a shower      Wound Care Notes:  Imaging:   Cultures:             DIRK:  Wound Care Supplies:   C:   Rx: [facility]  Will attempt to order a Foot Defender for Right Foot if eligible and covered by insurance. Shoe size 13 mens         Orders for this week:  2023    []  Paint toes with betadine     Right 5th toe amp site and Right Medial Great Toe Wounds - Wash with soap and water, pat dry. Betadine and Stimulen to wound beds. Bolster 5th toe amp site closed with steri strips. Tuck Karen in crease where 4th toe was removed. Cover with silver alginate and small super absorber. Wrap BILATERAL LEGS with Coflex 2 lites (enclose toes on right foot). Leave in place for 1 week   Dispense 30 day quantity when ordering supplies. []   Nurse Visit  Follow Up Instructions: At the [address] [place] [address] in 1 week   Primary Wound Care Provider: Ynes Hearn CNP   Call  for any questions or concerns.   Central [phone] for imaging and lab work

## 2023-11-14 NOTE — PROGRESS NOTES
Multilayer Compression Wrap   (Not Unna) Below the Knee    NAME:  Erinn Mccauley YOB: 1985  MEDICAL RECORD NUMBER:  [id number]  DATE:  11/14/2023    Multilayer compression wrap: Removed old Multilayer wrap if indicated and wash leg with mild soap/water. Applied moisturizing agent to dry skin as needed. Applied primary and secondary dressing as ordered. Applied multilayered dressing below the knee to right lower leg. Applied multilayered dressing below the knee to left lower leg. Instructed patient/caregiver not to remove dressing and to keep it clean and dry. Instructed patient/caregiver on complications to report to provider, such as pain, numbness in toes, heavy drainage, and slippage of dressing. Instructed patient on purpose of compression dressing and on activity and exercise recommendations.       Electronically signed by Karla Clement LPN on 03/95/5491 at 10:45 AM
lower legs. Move your feet and ankles often while you stand, or tighten and relax your leg muscles. Wear support stockings. Put them on in the morning, before swelling gets worse. Eat a balanced diet. Lose weight if you need to. Limit the amount of salt (sodium) in your diet. Salt holds fluid in the body and may increase swelling. Apply compression stocking(s) every morning as soon as you get up. Remove at bedtime unless instructed to wear day and night. Hand wash and line dry to prevent loss of elasticity. Replace every 3-4 months to ensure proper fit. Weight Management   Will need to ultimately change overall eating behaviors to have success with weight loss. Encouraged to weigh daily and work towards a goal of 1-2 pounds of weight loss weekly. Encouraged to journal all food intake, myfitness pal is a useful tool to help keep track of food intake and caloric value. Keep calorie level at approximately [id number]. Protein intake is to be a minimum of 60 grams per day (unless otherwise directed). Water drinking was encouraged with a goal of 64oz-128oz daily. Beverages to be calorie free except for milk. Every other beverage should be water, avoid soda. Continue to increase level of physical activity. Refer to weight management as indicated and requested by patient.           PAST MEDICAL HISTORY        Diagnosis Date    Asthma     Blind left eye     Diabetes mellitus ([address])     GERD (gastroesophageal reflux disease)     Hypertension     Retinal detachment 2012    left       PAST SURGICAL HISTORY    Past Surgical History:   Procedure Laterality Date    CORNEAL TRANSPLANT Bilateral     EYE SURGERY      left eye removed    EYE SURGERY Right     FOOT DEBRIDEMENT Right 10/16/2023    FOOT DEBRIDEMENT INCISION AND DRAINAGE RIGHT performed by Mikie Cervantes MD at [facility]      foot left    LAPAROSCOPIC APPENDECTOMY N/A 4/28/2022    APPENDECTOMY LAPAROSCOPIC performed by Audrey Khan MD at [address] [place] [address]

## 2023-12-05 ENCOUNTER — HOSPITAL ENCOUNTER (OUTPATIENT)
Dept: WOUND CARE | Age: 38
Discharge: HOME | End: 2023-12-05
Payer: COMMERCIAL

## 2023-12-05 VITALS — DIASTOLIC BLOOD PRESSURE: 87 MMHG | SYSTOLIC BLOOD PRESSURE: 141 MMHG | RESPIRATION RATE: 18 BRPM | HEART RATE: 101 BPM

## 2023-12-05 DIAGNOSIS — M25.374 INSTABILITY OF JOINT OF TOE OF RIGHT FOOT: ICD-10-CM

## 2023-12-05 DIAGNOSIS — M14.679 CHARCOT ARTHROPATHY OF MIDFOOT: ICD-10-CM

## 2023-12-05 DIAGNOSIS — T81.89XA NONHEALING SURGICAL WOUND, INITIAL ENCOUNTER: Primary | ICD-10-CM

## 2023-12-05 DIAGNOSIS — G62.9 NEUROPATHY: ICD-10-CM

## 2023-12-05 DIAGNOSIS — E11.9 DIABETES MELLITUS TYPE 2, INSULIN DEPENDENT (HCC): ICD-10-CM

## 2023-12-05 DIAGNOSIS — S98.131A AMPUTATION OF TOE OF RIGHT FOOT (HCC): ICD-10-CM

## 2023-12-05 DIAGNOSIS — Z79.4 DIABETES MELLITUS TYPE 2, INSULIN DEPENDENT (HCC): ICD-10-CM

## 2023-12-05 DIAGNOSIS — M14.60 CHARCOT'S ARTHROPATHY: ICD-10-CM

## 2023-12-05 DIAGNOSIS — L97.512 RIGHT FOOT ULCER, WITH FAT LAYER EXPOSED (HCC): ICD-10-CM

## 2023-12-05 DIAGNOSIS — M25.375 INSTABILITY OF LEFT FOOT JOINT: ICD-10-CM

## 2023-12-05 PROCEDURE — 11042 DBRDMT SUBQ TIS 1ST 20SQCM/<: CPT | Performed by: NURSE PRACTITIONER

## 2023-12-05 PROCEDURE — 11042 DBRDMT SUBQ TIS 1ST 20SQCM/<: CPT

## 2023-12-05 RX ORDER — SODIUM CHLOR/HYPOCHLOROUS ACID 0.033 %
SOLUTION, IRRIGATION IRRIGATION ONCE
OUTPATIENT
Start: 2023-12-05 | End: 2023-12-05

## 2023-12-05 RX ORDER — LIDOCAINE HYDROCHLORIDE 40 MG/ML
SOLUTION TOPICAL ONCE
OUTPATIENT
Start: 2023-12-05 | End: 2023-12-05

## 2023-12-05 RX ORDER — CLOBETASOL PROPIONATE 0.5 MG/G
OINTMENT TOPICAL ONCE
OUTPATIENT
Start: 2023-12-05 | End: 2023-12-05

## 2023-12-05 RX ORDER — BETAMETHASONE DIPROPIONATE 0.5 MG/G
CREAM TOPICAL ONCE
OUTPATIENT
Start: 2023-12-05 | End: 2023-12-05

## 2023-12-05 RX ORDER — BACITRACIN ZINC AND POLYMYXIN B SULFATE 500; 1000 [USP'U]/G; [USP'U]/G
OINTMENT TOPICAL ONCE
OUTPATIENT
Start: 2023-12-05 | End: 2023-12-05

## 2023-12-05 RX ORDER — GINSENG 100 MG
CAPSULE ORAL ONCE
OUTPATIENT
Start: 2023-12-05 | End: 2023-12-05

## 2023-12-05 RX ORDER — TRIAMCINOLONE ACETONIDE 1 MG/G
OINTMENT TOPICAL ONCE
OUTPATIENT
Start: 2023-12-05 | End: 2023-12-05

## 2023-12-05 RX ORDER — LIDOCAINE 50 MG/G
OINTMENT TOPICAL ONCE
OUTPATIENT
Start: 2023-12-05 | End: 2023-12-05

## 2023-12-05 RX ORDER — IBUPROFEN 200 MG
TABLET ORAL ONCE
OUTPATIENT
Start: 2023-12-05 | End: 2023-12-05

## 2023-12-05 RX ORDER — GENTAMICIN SULFATE 1 MG/G
OINTMENT TOPICAL ONCE
OUTPATIENT
Start: 2023-12-05 | End: 2023-12-05

## 2023-12-05 RX ORDER — LIDOCAINE 40 MG/G
CREAM TOPICAL ONCE
OUTPATIENT
Start: 2023-12-05 | End: 2023-12-05

## 2023-12-05 ASSESSMENT — PAIN DESCRIPTION - ONSET: ONSET: ON-GOING

## 2023-12-05 ASSESSMENT — PAIN DESCRIPTION - PAIN TYPE: TYPE: CHRONIC PAIN

## 2023-12-05 ASSESSMENT — PAIN - FUNCTIONAL ASSESSMENT: PAIN_FUNCTIONAL_ASSESSMENT: PREVENTS OR INTERFERES SOME ACTIVE ACTIVITIES AND ADLS

## 2023-12-05 ASSESSMENT — PAIN DESCRIPTION - LOCATION: LOCATION: FOOT

## 2023-12-05 ASSESSMENT — PAIN DESCRIPTION - ORIENTATION: ORIENTATION: RIGHT

## 2023-12-05 ASSESSMENT — PAIN SCALES - GENERAL: PAINLEVEL_OUTOF10: 0

## 2023-12-05 ASSESSMENT — PAIN DESCRIPTION - FREQUENCY: FREQUENCY: INTERMITTENT

## 2023-12-05 NOTE — PATIENT INSTRUCTIONS
PHYSICIAN ORDERS AND DISCHARGE INSTRUCTIONS    Wound cleansing:     Do not scrub or use excessive force. Wash hands with soap and water before and after dressing changes. Prior to applying a clean dressing, cleanse wound with normal saline,               wound cleanser, or mild soap and water. Ask the physician or nurse before getting the wound(s) wet in a shower      Wound Care Notes:  Imaging:   Cultures:             DIRK:  Wound Care Supplies:   C:   Rx: Eric [facility]  Will attempt to order a Foot Defender for Right Foot if eligible and covered by insurance. Shoe size 13 mens         Orders for this week:  2023    []  Paint toes with betadine     Right 5th toe amp site  - Wash with soap and water, pat dry. Apply gentac border to wound bed  Give patient socks please   Change 3 times per week     [facility] Dispense 30 day quantity when ordering supplies. []   Nurse Visit  Follow Up Instructions: At the [address] [place] [address] in 1 week   Primary Wound Care Provider: Sivan Aguirre CNP   Call  for any questions or concerns.   Central [phone] for imaging and lab work

## 2023-12-05 NOTE — PROGRESS NOTES
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 Performed by: KAYLI Meyers - CNP    Consent obtained: Yes    Time out taken:  Yes    Pain Control:  2% Lidocaine spray    Debridement:Excisional Debridement    Using curette the wound(s) was/were sharply debrided down through and including the removal of subcutaneous tissue. Devitalized Tissue Debrided:  fibrin, biofilm, slough, exudate, and callus    Pre Debridement Measurements:  Are located in the Wound Documentation Flow Sheet    All active wounds listed below with today's date are evaluated    Wound(s) debrided this date include # :1 & 2  Wound 10/17/23 Foot Right #1 5th toe amp site (Active)   Wound Image   12/05/23 0959   Wound Etiology Surgical 12/05/23 0959   Dressing Status New dressing applied;Clean;Dry; Intact 12/05/23 1029   Wound Cleansed Soap and water; Wound cleanser 12/05/23 0959   Offloading for Diabetic Foot Ulcers Post op shoe 12/05/23 0959   Wound Length (cm) 0 cm 12/05/23 0959   Wound Width (cm) 0 cm 12/05/23 0959   Wound Depth (cm) 0 cm 12/05/23 0959   Wound

## 2023-12-15 ENCOUNTER — TELEPHONE (OUTPATIENT)
Dept: WOUND CARE | Age: 38
End: 2023-12-15

## 2024-01-16 DIAGNOSIS — G62.9 NEUROPATHY: ICD-10-CM

## 2024-01-16 RX ORDER — PREGABALIN 150 MG/1
CAPSULE ORAL
Qty: 14 CAPSULE | OUTPATIENT
Start: 2024-01-16

## 2024-02-06 NOTE — PROGRESS NOTES
Multilayer Compression Wrap   (Not Unna) Below the Knee    NAME:  Ruby Benson  YOB: 1985  MEDICAL RECORD NUMBER:  [id number]  DATE:  3/30/2023    Multilayer compression wrap: Removed old Multilayer wrap if indicated and wash leg with mild soap/water. Applied moisturizing agent to dry skin as needed. Applied primary and secondary dressing as ordered. Applied multilayered dressing below the knee to right lower leg. Instructed patient/caregiver not to remove dressing and to keep it clean and dry. Instructed patient/caregiver on complications to report to provider, such as pain, numbness in toes, heavy drainage, and slippage of dressing. Instructed patient on purpose of compression dressing and on activity and exercise recommendations. Electronically signed by Eunice Rico RN on 3/30/2023 at 9:52 AM Negative Pressure    NAME:  Ruby Benson  YOB: 1985  MEDICAL RECORD NUMBER:  [id number]  DATE:  3/30/2023    Applied Negative Pressure to rt foot wound(s)/ulcer(s). [x] Applied skin barrier prep to fer-wound. [x] Cut strips of plastic drape to picture frame wound so that fer-wound is     covered with the drape. [x] If bridging dressing to less prominent site, cover any intact skin that will come in contact with the Negative Pressure Therapy sponge, gauze or channel drain with plastic drape. The sponge should never touch intact skin. [x] Cut sponge, gauze or channel drain to size which will fit into the wound/ulcer bed without being forced. [x] Be sure the sponge is large enough to hold the entire round plastic flange which is attached to the tubing. Never allow flange to be larger than the sponge or it will produce suction damaging intact skin.   Total number of individual pieces of foam used within the wound bed: 1    [x] If bridging the dressing away from the primary site, be sure the bridge leads to a piece of sponge large enough to hold the entire flange without
FAMILY HISTORY:  FHx: heart disease

## 2024-02-12 NOTE — BRIEF OP NOTE
Brief Postoperative Note      Patient: Anselmo Ware   YOB: 1985  MRN: [id number]    Date of Procedure: 10/16/2023    Pre-Op Diagnosis Codes:     * Gangrene ([address]) Steve Flood    Post-Op Diagnosis: Same       Procedure(s):  FOOT DEBRIDEMENT INCISION AND DRAINAGE RIGHT    Surgeon(s):  Julian Prado MD    Assistant:  * No surgical staff found *    Anesthesia: Monitor Anesthesia Care    Estimated Blood Loss (mL): Minimal    Complications: None    Specimens:   * No specimens in log *    Implants:  * No implants in log *      Drains: * No LDAs found *    Findings: see note      Electronically signed by Julian Prado MD on 10/16/2023 at 2:18 PM
20

## 2024-03-25 ENCOUNTER — HOSPITAL ENCOUNTER (OUTPATIENT)
Dept: WOUND CARE | Age: 39
Discharge: HOME | End: 2024-03-25
Payer: COMMERCIAL

## 2024-03-25 VITALS
SYSTOLIC BLOOD PRESSURE: 189 MMHG | RESPIRATION RATE: 16 BRPM | TEMPERATURE: 96.9 F | HEART RATE: 98 BPM | DIASTOLIC BLOOD PRESSURE: 97 MMHG

## 2024-03-25 DIAGNOSIS — L97.509 TYPE 2 DIABETES MELLITUS WITH FOOT ULCER, WITH LONG-TERM CURRENT USE OF INSULIN (HCC): ICD-10-CM

## 2024-03-25 DIAGNOSIS — L97.512 RIGHT FOOT ULCER, WITH FAT LAYER EXPOSED (HCC): Primary | ICD-10-CM

## 2024-03-25 DIAGNOSIS — E11.621 TYPE 2 DIABETES MELLITUS WITH FOOT ULCER, WITH LONG-TERM CURRENT USE OF INSULIN (HCC): ICD-10-CM

## 2024-03-25 DIAGNOSIS — M14.60 CHARCOT'S ARTHROPATHY: ICD-10-CM

## 2024-03-25 DIAGNOSIS — L97.912 TRAUMATIC ULCER OF RIGHT LOWER EXTREMITY WITH FAT LAYER EXPOSED (HCC): ICD-10-CM

## 2024-03-25 DIAGNOSIS — E66.01 CLASS 2 SEVERE OBESITY WITH SERIOUS COMORBIDITY AND BODY MASS INDEX (BMI) OF 37.0 TO 37.9 IN ADULT, UNSPECIFIED OBESITY TYPE (HCC): ICD-10-CM

## 2024-03-25 DIAGNOSIS — Z79.4 TYPE 2 DIABETES MELLITUS WITH FOOT ULCER, WITH LONG-TERM CURRENT USE OF INSULIN (HCC): ICD-10-CM

## 2024-03-25 DIAGNOSIS — L97.922 TRAUMATIC LEG ULCER, LEFT, WITH FAT LAYER EXPOSED (HCC): ICD-10-CM

## 2024-03-25 DIAGNOSIS — G62.9 NEUROPATHY: ICD-10-CM

## 2024-03-25 DIAGNOSIS — M25.375 INSTABILITY OF LEFT FOOT JOINT: ICD-10-CM

## 2024-03-25 DIAGNOSIS — S98.131A AMPUTATION OF TOE OF RIGHT FOOT (HCC): ICD-10-CM

## 2024-03-25 PROBLEM — E11.628 TYPE 2 DIABETES MELLITUS WITH SKIN COMPLICATION, WITH LONG-TERM CURRENT USE OF INSULIN (HCC): Status: ACTIVE | Noted: 2023-02-20

## 2024-03-25 PROBLEM — M25.374 INSTABILITY OF JOINT OF TOE OF RIGHT FOOT: Status: RESOLVED | Noted: 2023-11-07 | Resolved: 2024-03-25

## 2024-03-25 PROCEDURE — 11042 DBRDMT SUBQ TIS 1ST 20SQCM/<: CPT | Performed by: NURSE PRACTITIONER

## 2024-03-25 PROCEDURE — 11056 PARNG/CUTG B9 HYPRKR LES 2-4: CPT | Performed by: NURSE PRACTITIONER

## 2024-03-25 PROCEDURE — 11719 TRIM NAIL(S) ANY NUMBER: CPT | Performed by: NURSE PRACTITIONER

## 2024-03-25 PROCEDURE — 99213 OFFICE O/P EST LOW 20 MIN: CPT | Performed by: NURSE PRACTITIONER

## 2024-03-25 PROCEDURE — 87205 SMEAR GRAM STAIN: CPT

## 2024-03-25 PROCEDURE — 11042 DBRDMT SUBQ TIS 1ST 20SQCM/<: CPT

## 2024-03-25 PROCEDURE — 87075 CULTR BACTERIA EXCEPT BLOOD: CPT

## 2024-03-25 PROCEDURE — 87070 CULTURE OTHR SPECIMN AEROBIC: CPT

## 2024-03-25 PROCEDURE — 87186 SC STD MICRODIL/AGAR DIL: CPT

## 2024-03-25 PROCEDURE — 87077 CULTURE AEROBIC IDENTIFY: CPT

## 2024-03-25 PROCEDURE — 99213 OFFICE O/P EST LOW 20 MIN: CPT

## 2024-03-25 ASSESSMENT — PAIN DESCRIPTION - DESCRIPTORS: DESCRIPTORS: BURNING

## 2024-03-25 ASSESSMENT — PAIN DESCRIPTION - ORIENTATION: ORIENTATION: LEFT

## 2024-03-25 ASSESSMENT — PAIN DESCRIPTION - LOCATION: LOCATION: TOE (COMMENT WHICH ONE)

## 2024-03-25 ASSESSMENT — PAIN SCALES - GENERAL: PAINLEVEL_OUTOF10: 5

## 2024-03-25 ASSESSMENT — PAIN DESCRIPTION - ONSET: ONSET: ON-GOING

## 2024-03-25 ASSESSMENT — PAIN DESCRIPTION - PAIN TYPE: TYPE: ACUTE PAIN

## 2024-03-25 ASSESSMENT — PAIN DESCRIPTION - FREQUENCY: FREQUENCY: INTERMITTENT

## 2024-03-25 NOTE — PATIENT INSTRUCTIONS
PHYSICIAN ORDERS AND DISCHARGE INSTRUCTIONS     Wound cleansing:               Do not scrub or use excessive force.              Wash hands with soap and water before and after dressing changes.              Prior to applying a clean dressing, cleanse wound with normal saline,               wound cleanser, or mild soap and water.               Ask the physician or nurse before getting the wound(s) wet in a shower                      Wound Care Notes:  Rx: [facility]  Will attempt to order a Foot Defender for Right Foot if eligible and covered by insurance.  Shoe size 13 mens  Give new post op shoes for bilateral feet  3/25/24.                             Orders for this week:  3/25/2024     []  Paint toes with betadine         Right Great Toe & bilateral anterior lower legs - Wash with soap and water, pat dry.  Apply Gentamicin and Stimulen to sorbact and place to right great toe and bilateral anterior shins. Wrap right great toe with ca alginate over wound and where toe comes into contact with foot.  Ca alginate and Abd over wounds.  Wrap with Coflex lite. (Build up pink layer of coflex around ankles and top of wraps.)  Leave in place until next visit to wound care center        Dispense 30 day quantity when ordering supplies.  Plan:  Culture of Right Great Toe taken 3/25/24  Trey HOOVER faxed  3/26/24       []   Nurse Visit  Follow Up with Dr Davis tomorrow 3/26/24 in the wound clinic. 1 week: Tuesday   Primary Wound Care Provider: Nidhi Cummings CNP   Call  for any questions or concerns.  Central [phone] for imaging and lab work

## 2024-03-25 NOTE — WOUND CARE
.Multilayer Compression Wrap   (Not Unna) Below the Knee    NAME:  Azael Key Jr.  YOB: 1985  MEDICAL RECORD NUMBER:  [id number]  DATE:  3/25/2024    Multilayer compression wrap: Removed old Multilayer wrap if indicated and wash leg with mild soap/water.  Applied moisturizing agent to dry skin as needed.   Applied primary and secondary dressing as ordered.  Applied multilayered dressing below the knee to right lower leg.  Applied multilayered dressing below the knee to left lower leg.  Instructed patient/caregiver not to remove dressing and to keep it clean and dry.   Instructed patient/caregiver on complications to report to provider, such as pain, numbness in toes, heavy drainage, and slippage of dressing.  Instructed patient on purpose of compression dressing and on activity and exercise recommendations.      Electronically signed by Francesca Perez RN on 3/25/2024 at 4:41 PM

## 2024-03-26 PROBLEM — L97.912 TRAUMATIC ULCER OF RIGHT LOWER EXTREMITY WITH FAT LAYER EXPOSED (HCC): Status: ACTIVE | Noted: 2024-03-26

## 2024-03-26 PROBLEM — L97.922 TRAUMATIC LEG ULCER, LEFT, WITH FAT LAYER EXPOSED (HCC): Status: ACTIVE | Noted: 2024-03-26

## 2024-03-26 RX ORDER — SODIUM CHLOR/HYPOCHLOROUS ACID 0.033 %
SOLUTION, IRRIGATION IRRIGATION ONCE
OUTPATIENT
Start: 2024-03-26 | End: 2024-03-26

## 2024-03-26 RX ORDER — BACITRACIN ZINC AND POLYMYXIN B SULFATE 500; 1000 [USP'U]/G; [USP'U]/G
OINTMENT TOPICAL ONCE
OUTPATIENT
Start: 2024-03-26 | End: 2024-03-26

## 2024-03-26 RX ORDER — BETAMETHASONE DIPROPIONATE 0.5 MG/G
CREAM TOPICAL ONCE
OUTPATIENT
Start: 2024-03-26 | End: 2024-03-26

## 2024-03-26 RX ORDER — LIDOCAINE 50 MG/G
OINTMENT TOPICAL ONCE
OUTPATIENT
Start: 2024-03-26 | End: 2024-03-26

## 2024-03-26 RX ORDER — GENTAMICIN SULFATE 1 MG/G
OINTMENT TOPICAL ONCE
OUTPATIENT
Start: 2024-03-26 | End: 2024-03-26

## 2024-03-26 RX ORDER — BACITRACIN ZINC 500 [USP'U]/G
OINTMENT TOPICAL ONCE
OUTPATIENT
Start: 2024-03-26 | End: 2024-03-26

## 2024-03-26 RX ORDER — TRIAMCINOLONE ACETONIDE 1 MG/G
OINTMENT TOPICAL ONCE
OUTPATIENT
Start: 2024-03-26 | End: 2024-03-26

## 2024-03-26 RX ORDER — LIDOCAINE 40 MG/G
CREAM TOPICAL ONCE
OUTPATIENT
Start: 2024-03-26 | End: 2024-03-26

## 2024-03-26 RX ORDER — CLOBETASOL PROPIONATE 0.5 MG/G
OINTMENT TOPICAL ONCE
OUTPATIENT
Start: 2024-03-26 | End: 2024-03-26

## 2024-03-26 RX ORDER — IBUPROFEN 200 MG
TABLET ORAL ONCE
OUTPATIENT
Start: 2024-03-26 | End: 2024-03-26

## 2024-03-26 RX ORDER — LIDOCAINE HYDROCHLORIDE 40 MG/ML
SOLUTION TOPICAL ONCE
OUTPATIENT
Start: 2024-03-26 | End: 2024-03-26

## 2024-03-26 NOTE — PROGRESS NOTES
Nurse Visit  Follow Up with Dr Davis tomorrow 3/26/24 tomorrow in the wound clinic.  Primary Wound Care Provider: Nidhi Cummings CNP   Call  for any questions or concerns.  Central [phone] for imaging and lab work    Treatment Note Wound 24 Pretibial Left #1-Dressing/Treatment:  (gentamicin, stimulin, sorbact fermin alg, abd coflex lite)  Wound 24 Toe (Comment  which one) Right #2 Great toe-Dressing/Treatment:  (gentamicin stimulin sorbact fermin alg, abd, coflex lite)    Written Patient Dismissal Instructions Given            Electronically signed by KAYLI Reynolds CNP on 3/26/2024 at 3:28 AM

## 2024-03-31 LAB
CULTURE: ABNORMAL
Lab: ABNORMAL
SPECIMEN: ABNORMAL

## 2024-04-02 ENCOUNTER — HOSPITAL ENCOUNTER (OUTPATIENT)
Dept: WOUND CARE | Age: 39
Discharge: HOME | End: 2024-04-02

## 2024-04-02 NOTE — PATIENT INSTRUCTIONS
PHYSICIAN ORDERS AND DISCHARGE INSTRUCTIONS     Wound cleansing:               Do not scrub or use excessive force.              Wash hands with soap and water before and after dressing changes.              Prior to applying a clean dressing, cleanse wound with normal saline,               wound cleanser, or mild soap and water.               Ask the physician or nurse before getting the wound(s) wet in a shower                      Wound Care Notes:  [facility]  Will attempt to order a Foot Defender for Right Foot if eligible and covered by insurance.  Shoe size 13 mens  Give new post op shoes for bilateral feet  3/25/24.                             Orders for this week:  2024     []  Paint toes with betadine         Right Great Toe & bilateral anterior lower legs - Wash with soap and water, pat dry.  Apply Gentamicin and Stimulen to sorbact and place to right great toe and bilateral anterior shins. Wrap right great toe with ca alginate over wound and where toe comes into contact with foot.  Ca alginate and Abd over wounds.  Wrap with Coflex lite. (Build up pink layer of coflex around ankles and top of wraps.)  Leave in place until next visit to wound care center        Dispense 30 day quantity when ordering supplies.  Plan:  [employer] faxed  3/26/24. Covered.        []   Nurse Visit  Follow Up with Dr Davis in the wound clinic. 1 week: Tuesday   Primary Wound Care Provider: Nidhi Cummings CNP   Call  for any questions or concerns.  Central [phone] for imaging and lab work

## 2024-05-07 ENCOUNTER — HOSPITAL ENCOUNTER (OUTPATIENT)
Dept: WOUND CARE | Age: 39
Discharge: HOME | End: 2024-05-07
Payer: COMMERCIAL

## 2024-05-07 VITALS
RESPIRATION RATE: 16 BRPM | HEART RATE: 88 BPM | SYSTOLIC BLOOD PRESSURE: 163 MMHG | DIASTOLIC BLOOD PRESSURE: 88 MMHG | TEMPERATURE: 96.2 F

## 2024-05-07 DIAGNOSIS — L97.512 RIGHT FOOT ULCER, WITH FAT LAYER EXPOSED (HCC): ICD-10-CM

## 2024-05-07 DIAGNOSIS — Z79.4 TYPE 2 DIABETES MELLITUS WITH DIABETIC DERMATITIS, WITH LONG-TERM CURRENT USE OF INSULIN (HCC): ICD-10-CM

## 2024-05-07 DIAGNOSIS — L97.912 TRAUMATIC ULCER OF RIGHT LOWER EXTREMITY WITH FAT LAYER EXPOSED (HCC): ICD-10-CM

## 2024-05-07 DIAGNOSIS — M14.60 CHARCOT'S ARTHROPATHY: Primary | ICD-10-CM

## 2024-05-07 DIAGNOSIS — M14.679 CHARCOT ARTHROPATHY OF MIDFOOT: ICD-10-CM

## 2024-05-07 DIAGNOSIS — L97.922 TRAUMATIC LEG ULCER, LEFT, WITH FAT LAYER EXPOSED (HCC): ICD-10-CM

## 2024-05-07 DIAGNOSIS — E66.01 CLASS 2 SEVERE OBESITY WITH SERIOUS COMORBIDITY AND BODY MASS INDEX (BMI) OF 36.0 TO 36.9 IN ADULT, UNSPECIFIED OBESITY TYPE (HCC): ICD-10-CM

## 2024-05-07 DIAGNOSIS — E11.620 TYPE 2 DIABETES MELLITUS WITH DIABETIC DERMATITIS, WITH LONG-TERM CURRENT USE OF INSULIN (HCC): ICD-10-CM

## 2024-05-07 DIAGNOSIS — S98.131A AMPUTATION OF TOE OF RIGHT FOOT (HCC): ICD-10-CM

## 2024-05-07 PROCEDURE — 11042 DBRDMT SUBQ TIS 1ST 20SQCM/<: CPT

## 2024-05-07 PROCEDURE — 6370000000 HC RX 637 (ALT 250 FOR IP): Performed by: NURSE PRACTITIONER

## 2024-05-07 RX ORDER — IBUPROFEN 200 MG
TABLET ORAL ONCE
OUTPATIENT
Start: 2024-05-07 | End: 2024-05-07

## 2024-05-07 RX ORDER — LIDOCAINE HYDROCHLORIDE 40 MG/ML
SOLUTION TOPICAL ONCE
OUTPATIENT
Start: 2024-05-07 | End: 2024-05-07

## 2024-05-07 RX ORDER — LIDOCAINE 40 MG/G
CREAM TOPICAL ONCE
OUTPATIENT
Start: 2024-05-07 | End: 2024-05-07

## 2024-05-07 RX ORDER — SODIUM CHLOR/HYPOCHLOROUS ACID 0.033 %
SOLUTION, IRRIGATION IRRIGATION ONCE
OUTPATIENT
Start: 2024-05-07 | End: 2024-05-07

## 2024-05-07 RX ORDER — TRIAMCINOLONE ACETONIDE 1 MG/G
OINTMENT TOPICAL ONCE
OUTPATIENT
Start: 2024-05-07 | End: 2024-05-07

## 2024-05-07 RX ORDER — BACITRACIN ZINC 500 [USP'U]/G
OINTMENT TOPICAL ONCE
OUTPATIENT
Start: 2024-05-07 | End: 2024-05-07

## 2024-05-07 RX ORDER — GENTAMICIN SULFATE 1 MG/G
OINTMENT TOPICAL ONCE
Status: DISCONTINUED | OUTPATIENT
Start: 2024-05-07 | End: 2024-05-08 | Stop reason: HOSPADM

## 2024-05-07 RX ORDER — CLOBETASOL PROPIONATE 0.5 MG/G
OINTMENT TOPICAL ONCE
OUTPATIENT
Start: 2024-05-07 | End: 2024-05-07

## 2024-05-07 RX ORDER — BETAMETHASONE DIPROPIONATE 0.5 MG/G
CREAM TOPICAL ONCE
OUTPATIENT
Start: 2024-05-07 | End: 2024-05-07

## 2024-05-07 RX ORDER — GENTAMICIN SULFATE 1 MG/G
OINTMENT TOPICAL ONCE
OUTPATIENT
Start: 2024-05-07 | End: 2024-05-07

## 2024-05-07 RX ORDER — LIDOCAINE 50 MG/G
OINTMENT TOPICAL ONCE
OUTPATIENT
Start: 2024-05-07 | End: 2024-05-07

## 2024-05-07 RX ORDER — BACITRACIN ZINC AND POLYMYXIN B SULFATE 500; 1000 [USP'U]/G; [USP'U]/G
OINTMENT TOPICAL ONCE
OUTPATIENT
Start: 2024-05-07 | End: 2024-05-07

## 2024-05-07 ASSESSMENT — PAIN DESCRIPTION - DESCRIPTORS: DESCRIPTORS: ACHING;THROBBING

## 2024-05-07 ASSESSMENT — PAIN DESCRIPTION - ORIENTATION: ORIENTATION: LEFT

## 2024-05-07 ASSESSMENT — PAIN DESCRIPTION - ONSET: ONSET: ON-GOING

## 2024-05-07 ASSESSMENT — PAIN SCALES - GENERAL: PAINLEVEL_OUTOF10: 8

## 2024-05-07 ASSESSMENT — PAIN DESCRIPTION - PAIN TYPE: TYPE: ACUTE PAIN

## 2024-05-07 ASSESSMENT — PAIN DESCRIPTION - FREQUENCY: FREQUENCY: INTERMITTENT

## 2024-05-07 ASSESSMENT — PAIN DESCRIPTION - LOCATION: LOCATION: TOE (COMMENT WHICH ONE)

## 2024-05-07 NOTE — PATIENT INSTRUCTIONS
PHYSICIAN ORDERS AND DISCHARGE INSTRUCTIONS     Wound cleansing:               Do not scrub or use excessive force.              Wash hands with soap and water before and after dressing changes.              Prior to applying a clean dressing, cleanse wound with normal saline,               wound cleanser, or mild soap and water.               Ask the physician or nurse before getting the wound(s) wet in a shower                      Wound Care Notes:  Rx: [facility]  Will attempt to order a Foot Defender for Right Foot if eligible and covered by insurance.  Shoe size 13 mens  Give new post op shoes for bilateral feet  3/25/24.                             Orders for this week:  2024    Xray of Left Foot ordered 24    Right Great Toe & bilateral anterior lower legs - Wash with soap and water, pat dry.  Apply Gentamicin and Stimulen to sorbact and place to right great toe and bilateral anterior shins. Wrap right great toe with ca alginate over wound and where toe comes into contact with foot.  Ca alginate and Abd over wounds.  Wrap with Coflex lite. (Build up pink layer of coflex around ankles and top of wraps.)  Leave in place until next visit to wound care center        Dispense 30 day quantity when ordering supplies.  Plan:  [employer] faxed  3/26/24. Covered. Modality needs completed        []   Nurse Visit  Primary Wound Care Provider: Nidhi Cummings CNP: 1 week. Tuesday  Call  for any questions or concerns.  Central [phone] for imaging and lab work

## 2024-05-07 NOTE — PROGRESS NOTES
Wound Care Center Progress Note With Procedure    Azael Key Jr.  AGE: 38 y.o.   GENDER: male  : 1985  EPISODE DATE:  2024     Subjective:     Chief Complaint   Patient presents with    Wound Check     Bilateral lower legs         HISTORY of PRESENT ILLNESS      Azael Key Jr. is a 38 y.o. male who presents today for wound evaluation of area to the right great toe.  Present for the past several months.  History of multiple amputations on the right foot by Dr. Pak.  Possible Charcot foot deformity in the left foot and ankle.  He has had pain there for the past several months.  No recent x-rays.  He is a diabetic and his last A1c was around 8%.  Says it has been higher in the past.  Says that he has quit smoking since this past November.  No constitutional symptoms.        PAST MEDICAL HISTORY        Diagnosis Date    Asthma     Blind left eye     Diabetes mellitus (HCC)     GERD (gastroesophageal reflux disease)     Hypertension     Retinal detachment 2012    left       PAST SURGICAL HISTORY    Past Surgical History:   Procedure Laterality Date    CORNEAL TRANSPLANT Bilateral     EYE SURGERY      left eye removed    EYE SURGERY Right     FOOT DEBRIDEMENT Right 10/16/2023    FOOT DEBRIDEMENT INCISION AND DRAINAGE RIGHT performed by Abdon Yee MD at [facility] OR    FRACTURE SURGERY      foot left    LAPAROSCOPIC APPENDECTOMY N/A 2022    APPENDECTOMY LAPAROSCOPIC performed by Fortino Obrien MD at [facility] OR    MANDIBLE SURGERY Right     MANDIBLE SURGERY Bilateral     TOE AMPUTATION Right 2017    TOE AMPUTATION Right 2022    TOE AMPUTATION, RAY AMPUTATION OF RIGHT SECOND TOE performed by Abdon Yee MD at [facility] OR    TOE AMPUTATION Right 2023    THIRD TOE AMPUTATION AND REVISION OF SECOND TOE AMPUTATION performed by Roxanne Galo MD at [facility] OR       FAMILY HISTORY    Family History   Problem Relation Age of Onset    Diabetes Mother     Asthma Mother     High

## 2024-05-07 NOTE — WOUND CARE
Multilayer Compression Wrap   (Not Unna) Below the Knee    NAME:  Azael Key Jr.  YOB: 1985  MEDICAL RECORD NUMBER:  [id number]  DATE:  5/7/2024    Multilayer compression wrap: Removed old Multilayer wrap if indicated and wash leg with mild soap/water.  Applied moisturizing agent to dry skin as needed.   Applied primary and secondary dressing as ordered.  Applied multilayered dressing below the knee to right lower leg.  Applied multilayered dressing below the knee to left lower leg.  Instructed patient/caregiver not to remove dressing and to keep it clean and dry.   Instructed patient/caregiver on complications to report to provider, such as pain, numbness in toes, heavy drainage, and slippage of dressing.  Instructed patient on purpose of compression dressing and on activity and exercise recommendations.      Electronically signed by Xiomara Soler LPN on 5/7/2024 at 4:08 PM

## 2024-05-13 ENCOUNTER — HOSPITAL ENCOUNTER (OUTPATIENT)
Age: 39
Discharge: HOME | End: 2024-05-13
Payer: COMMERCIAL

## 2024-05-13 ENCOUNTER — HOSPITAL ENCOUNTER (OUTPATIENT)
Dept: GENERAL RADIOLOGY | Age: 39
Discharge: HOME | End: 2024-05-13
Payer: COMMERCIAL

## 2024-05-13 DIAGNOSIS — L97.512 RIGHT FOOT ULCER, WITH FAT LAYER EXPOSED (HCC): ICD-10-CM

## 2024-05-13 DIAGNOSIS — M14.679 CHARCOT ARTHROPATHY OF MIDFOOT: ICD-10-CM

## 2024-05-13 DIAGNOSIS — S98.131A AMPUTATION OF TOE OF RIGHT FOOT (HCC): ICD-10-CM

## 2024-05-13 PROCEDURE — 73630 X-RAY EXAM OF FOOT: CPT

## 2024-05-13 PROCEDURE — 73610 X-RAY EXAM OF ANKLE: CPT

## 2024-05-14 ENCOUNTER — HOSPITAL ENCOUNTER (OUTPATIENT)
Dept: WOUND CARE | Age: 39
Discharge: HOME | End: 2024-05-14
Payer: COMMERCIAL

## 2024-05-14 VITALS
RESPIRATION RATE: 18 BRPM | TEMPERATURE: 98 F | DIASTOLIC BLOOD PRESSURE: 84 MMHG | SYSTOLIC BLOOD PRESSURE: 161 MMHG | HEART RATE: 100 BPM

## 2024-05-14 DIAGNOSIS — M14.60 CHARCOT'S ARTHROPATHY: Primary | ICD-10-CM

## 2024-05-14 DIAGNOSIS — E66.01 CLASS 2 SEVERE OBESITY WITH SERIOUS COMORBIDITY AND BODY MASS INDEX (BMI) OF 36.0 TO 36.9 IN ADULT, UNSPECIFIED OBESITY TYPE (HCC): ICD-10-CM

## 2024-05-14 DIAGNOSIS — L97.922 TRAUMATIC LEG ULCER, LEFT, WITH FAT LAYER EXPOSED (HCC): ICD-10-CM

## 2024-05-14 DIAGNOSIS — L97.912 TRAUMATIC ULCER OF RIGHT LOWER EXTREMITY WITH FAT LAYER EXPOSED (HCC): ICD-10-CM

## 2024-05-14 DIAGNOSIS — Z79.4 TYPE 2 DIABETES MELLITUS WITH DIABETIC DERMATITIS, WITH LONG-TERM CURRENT USE OF INSULIN (HCC): ICD-10-CM

## 2024-05-14 DIAGNOSIS — E11.620 TYPE 2 DIABETES MELLITUS WITH DIABETIC DERMATITIS, WITH LONG-TERM CURRENT USE OF INSULIN (HCC): ICD-10-CM

## 2024-05-14 DIAGNOSIS — L97.512 RIGHT FOOT ULCER, WITH FAT LAYER EXPOSED (HCC): ICD-10-CM

## 2024-05-14 DIAGNOSIS — M14.679 CHARCOT ARTHROPATHY OF MIDFOOT: ICD-10-CM

## 2024-05-14 DIAGNOSIS — S98.131A AMPUTATION OF TOE OF RIGHT FOOT (HCC): ICD-10-CM

## 2024-05-14 PROCEDURE — 6370000000 HC RX 637 (ALT 250 FOR IP): Performed by: NURSE PRACTITIONER

## 2024-05-14 PROCEDURE — 99213 OFFICE O/P EST LOW 20 MIN: CPT

## 2024-05-14 RX ORDER — BETAMETHASONE DIPROPIONATE 0.5 MG/G
CREAM TOPICAL ONCE
OUTPATIENT
Start: 2024-05-14 | End: 2024-05-14

## 2024-05-14 RX ORDER — GENTAMICIN SULFATE 1 MG/G
OINTMENT TOPICAL ONCE
Status: COMPLETED | OUTPATIENT
Start: 2024-05-14 | End: 2024-05-14

## 2024-05-14 RX ORDER — LIDOCAINE 50 MG/G
OINTMENT TOPICAL ONCE
OUTPATIENT
Start: 2024-05-14 | End: 2024-05-14

## 2024-05-14 RX ORDER — BACITRACIN ZINC 500 [USP'U]/G
OINTMENT TOPICAL ONCE
OUTPATIENT
Start: 2024-05-14 | End: 2024-05-14

## 2024-05-14 RX ORDER — GENTAMICIN SULFATE 1 MG/G
OINTMENT TOPICAL ONCE
OUTPATIENT
Start: 2024-05-14 | End: 2024-05-14

## 2024-05-14 RX ORDER — TRIAMCINOLONE ACETONIDE 1 MG/G
OINTMENT TOPICAL ONCE
OUTPATIENT
Start: 2024-05-14 | End: 2024-05-14

## 2024-05-14 RX ORDER — CLOBETASOL PROPIONATE 0.5 MG/G
OINTMENT TOPICAL ONCE
OUTPATIENT
Start: 2024-05-14 | End: 2024-05-14

## 2024-05-14 RX ORDER — IBUPROFEN 200 MG
TABLET ORAL ONCE
OUTPATIENT
Start: 2024-05-14 | End: 2024-05-14

## 2024-05-14 RX ORDER — BACITRACIN ZINC AND POLYMYXIN B SULFATE 500; 1000 [USP'U]/G; [USP'U]/G
OINTMENT TOPICAL ONCE
OUTPATIENT
Start: 2024-05-14 | End: 2024-05-14

## 2024-05-14 RX ORDER — LIDOCAINE HYDROCHLORIDE 40 MG/ML
SOLUTION TOPICAL ONCE
OUTPATIENT
Start: 2024-05-14 | End: 2024-05-14

## 2024-05-14 RX ORDER — LIDOCAINE 40 MG/G
CREAM TOPICAL ONCE
OUTPATIENT
Start: 2024-05-14 | End: 2024-05-14

## 2024-05-14 RX ORDER — SODIUM CHLOR/HYPOCHLOROUS ACID 0.033 %
SOLUTION, IRRIGATION IRRIGATION ONCE
OUTPATIENT
Start: 2024-05-14 | End: 2024-05-14

## 2024-05-14 RX ADMIN — GENTAMICIN SULFATE: 1 OINTMENT TOPICAL at 15:43

## 2024-05-14 ASSESSMENT — PAIN DESCRIPTION - DESCRIPTORS: DESCRIPTORS: ACHING;THROBBING

## 2024-05-14 ASSESSMENT — PAIN DESCRIPTION - ORIENTATION: ORIENTATION: LEFT

## 2024-05-14 ASSESSMENT — PAIN DESCRIPTION - PAIN TYPE: TYPE: ACUTE PAIN

## 2024-05-14 ASSESSMENT — PAIN DESCRIPTION - ONSET: ONSET: ON-GOING

## 2024-05-14 ASSESSMENT — PAIN DESCRIPTION - FREQUENCY: FREQUENCY: INTERMITTENT

## 2024-05-14 ASSESSMENT — PAIN DESCRIPTION - LOCATION: LOCATION: TOE (COMMENT WHICH ONE)

## 2024-05-14 ASSESSMENT — PAIN SCALES - GENERAL: PAINLEVEL_OUTOF10: 7

## 2024-05-14 ASSESSMENT — PAIN - FUNCTIONAL ASSESSMENT: PAIN_FUNCTIONAL_ASSESSMENT: PREVENTS OR INTERFERES SOME ACTIVE ACTIVITIES AND ADLS

## 2024-05-14 NOTE — PROGRESS NOTES
Multilayer Compression Wrap   (Not Unna) Below the Knee    NAME:  Azael Key Jr.  YOB: 1985  MEDICAL RECORD NUMBER:  [id number]  DATE:  5/14/2024    Multilayer compression wrap: Removed old Multilayer wrap if indicated and wash leg with mild soap/water.  Applied moisturizing agent to dry skin as needed.   Applied primary and secondary dressing as ordered.  Applied multilayered dressing below the knee to right lower leg.  Applied multilayered dressing below the knee to left lower leg.  Instructed patient/caregiver not to remove dressing and to keep it clean and dry.   Instructed patient/caregiver on complications to report to provider, such as pain, numbness in toes, heavy drainage, and slippage of dressing.  Instructed patient on purpose of compression dressing and on activity and exercise recommendations.      Electronically signed by Licha Montes LPN on 5/14/2024 at 3:42 PM  
Mother     High Blood Pressure Mother     Stroke Mother     Heart Disease Mother     Diabetes Father     Asthma Father     High Blood Pressure Father        SOCIAL HISTORY    Social History     Tobacco Use    Smoking status: Former     Current packs/day: 0.25     Average packs/day: 0.3 packs/day for 5.0 years (1.3 ttl pk-yrs)     Types: Cigarettes    Smokeless tobacco: Never    Tobacco comments:     VOICES ONLY SMOKING ABOUT HALF PACK A WEEK AND IS DOWN TO ONLY A FEW A DAY. 5-1-23      ATUC [facility] STATES HE QUIT SMOKING CIGARETTES 5 MTHS AGO   Vaping Use    Vaping Use: Never used   Substance Use Topics    Alcohol use: Yes     Comment: occasionally    Drug use: Not Currently     Types: Marijuana (Weed)       ALLERGIES    Allergies   Allergen Reactions    Ciprofloxacin Shortness Of Breath    Glucosamine Anaphylaxis    Shellfish-Derived Products Anaphylaxis    Shellfish Allergy     Vancomycin Swelling     Mild redness, swelling, warmth to touch, and tenderness surrounding IV access site s/p receiving Vanco x2d, with pt stating \"My IV's always go bad after a couple of days when I'm getting Vanco.\"       MEDICATIONS    Current Outpatient Medications on File Prior to Encounter   Medication Sig Dispense Refill    pregabalin (LYRICA) 150 MG capsule Take 1 capsule by mouth 2 times daily for 7 days. Max Daily Amount: 300 mg 14 capsule 0    mupirocin (BACTROBAN) 2 % ointment Apply topically 2 times daily Apply topically 3 times daily. 15 g 0    oxyCODONE-acetaminophen (PERCOCET) 5-325 MG per tablet Take 1 tablet by mouth every 8 hours as needed for Pain.      omeprazole (PRILOSEC) 40 MG delayed release capsule Take 1 capsule by mouth daily      lisinopril (PRINIVIL;ZESTRIL) 20 MG tablet Take 1 tablet by mouth daily 30 tablet 1    insulin glargine (LANTUS) 100 UNIT/ML injection vial Inject 30 Units into the skin nightly 10 mL 1    insulin lispro (HUMALOG) 100 UNIT/ML SOLN injection vial Inject 10 Units into the skin 3 times daily

## 2024-05-14 NOTE — PATIENT INSTRUCTIONS
PHYSICIAN ORDERS AND DISCHARGE INSTRUCTIONS     Wound cleansing:               Do not scrub or use excessive force.              Wash hands with soap and water before and after dressing changes.              Prior to applying a clean dressing, cleanse wound with normal saline,               wound cleanser, or mild soap and water.               Ask the physician or nurse before getting the wound(s) wet in a shower                      Wound Care Notes:  Rx: [facility]  Will attempt to order a Foot Defender for Right Foot if eligible and covered by insurance.  Shoe size 13 mens  Give new post op shoes for bilateral feet  3/25/24.                             Orders for this week:  2024    Xray of Left Foot ordered 24    Referral sent for follow up of charcot of left foot    Right Great Toe & Left lower leg - Wash with soap and water, pat dry.  Apply Gentamicin and Stimulen to sorbact and place to right great toe and left anterior shin. Wrap right great toe with ca alginate over wound and where toe comes into contact with foot.  Ca alginate and Abd over wounds.  Wrap with Coflex lite. (Build up pink layer of coflex around ankles and top of wraps.)  Leave in place until next visit to wound care center        Dispense 30 day quantity when ordering supplies.  Plan:  [employer] faxed  3/26/24. Covered. Modality needs completed        []   Nurse Visit  Primary Wound Care Provider: Nidhi Cummings CNP: 1 week. Tuesday  Call  for any questions or concerns.  Central [phone] for imaging and lab work

## 2024-05-18 ENCOUNTER — APPOINTMENT (OUTPATIENT)
Dept: CT IMAGING | Age: 39
End: 2024-05-18
Payer: COMMERCIAL

## 2024-05-18 ENCOUNTER — HOSPITAL ENCOUNTER (EMERGENCY)
Age: 39
Discharge: ANOTHER HOSPITAL | End: 2024-05-18
Attending: STUDENT IN AN ORGANIZED HEALTH CARE EDUCATION/TRAINING PROGRAM
Payer: COMMERCIAL

## 2024-05-18 VITALS
HEART RATE: 93 BPM | TEMPERATURE: 98 F | OXYGEN SATURATION: 98 % | RESPIRATION RATE: 27 BRPM | SYSTOLIC BLOOD PRESSURE: 172 MMHG | DIASTOLIC BLOOD PRESSURE: 92 MMHG

## 2024-05-18 DIAGNOSIS — S12.8XXA CLOSED FRACTURE OF THYROID CARTILAGE, INITIAL ENCOUNTER (HCC): Primary | ICD-10-CM

## 2024-05-18 DIAGNOSIS — S19.80XA BLUNT TRAUMA OF NECK, INITIAL ENCOUNTER: ICD-10-CM

## 2024-05-18 LAB
ANION GAP SERPL CALCULATED.3IONS-SCNC: 11 MMOL/L (ref 7–16)
BASOPHILS ABSOLUTE: 0 K/CU MM
BASOPHILS RELATIVE PERCENT: 0.7 % (ref 0–1)
BUN SERPL-MCNC: 10 MG/DL (ref 6–23)
CALCIUM SERPL-MCNC: 8.6 MG/DL (ref 8.3–10.6)
CHLORIDE BLD-SCNC: 105 MMOL/L (ref 99–110)
CO2: 20 MMOL/L (ref 21–32)
CREAT SERPL-MCNC: 0.7 MG/DL (ref 0.9–1.3)
DIFFERENTIAL TYPE: ABNORMAL
EOSINOPHILS ABSOLUTE: 0.1 K/CU MM
EOSINOPHILS RELATIVE PERCENT: 1.8 % (ref 0–3)
GFR, ESTIMATED: >90 ML/MIN/1.73M2
GLUCOSE SERPL-MCNC: 337 MG/DL (ref 70–99)
HCT VFR BLD CALC: 39.8 % (ref 42–52)
HEMOGLOBIN: 13.1 GM/DL (ref 13.5–18)
IMMATURE NEUTROPHIL %: 0.4 % (ref 0–0.43)
LYMPHOCYTES ABSOLUTE: 1.4 K/CU MM
LYMPHOCYTES RELATIVE PERCENT: 25.4 % (ref 24–44)
MCH RBC QN AUTO: 32 PG (ref 27–31)
MCHC RBC AUTO-ENTMCNC: 32.9 % (ref 32–36)
MCV RBC AUTO: 97.1 FL (ref 78–100)
MONOCYTES ABSOLUTE: 0.5 K/CU MM
MONOCYTES RELATIVE PERCENT: 8.1 % (ref 0–4)
NEUTROPHILS ABSOLUTE: 3.5 K/CU MM
NEUTROPHILS RELATIVE PERCENT: 63.6 % (ref 36–66)
NUCLEATED RBC %: 0 %
PDW BLD-RTO: 13.1 % (ref 11.7–14.9)
PLATELET # BLD: 238 K/CU MM (ref 140–440)
PMV BLD AUTO: 8.9 FL (ref 7.5–11.1)
POTASSIUM SERPL-SCNC: 4.4 MMOL/L (ref 3.5–5.1)
RBC # BLD: 4.1 M/CU MM (ref 4.6–6.2)
SODIUM BLD-SCNC: 136 MMOL/L (ref 135–145)
TOTAL IMMATURE NEUTOROPHIL: 0.02 K/CU MM
TOTAL NUCLEATED RBC: 0 K/CU MM
WBC # BLD: 5.6 K/CU MM (ref 4–10.5)

## 2024-05-18 PROCEDURE — 6360000004 HC RX CONTRAST MEDICATION: Performed by: STUDENT IN AN ORGANIZED HEALTH CARE EDUCATION/TRAINING PROGRAM

## 2024-05-18 PROCEDURE — 70498 CT ANGIOGRAPHY NECK: CPT

## 2024-05-18 PROCEDURE — 6360000002 HC RX W HCPCS: Performed by: PHYSICIAN ASSISTANT

## 2024-05-18 PROCEDURE — 96374 THER/PROPH/DIAG INJ IV PUSH: CPT

## 2024-05-18 PROCEDURE — 96376 TX/PRO/DX INJ SAME DRUG ADON: CPT

## 2024-05-18 PROCEDURE — 85025 COMPLETE CBC W/AUTO DIFF WBC: CPT

## 2024-05-18 PROCEDURE — 80048 BASIC METABOLIC PNL TOTAL CA: CPT

## 2024-05-18 PROCEDURE — 6360000002 HC RX W HCPCS: Performed by: STUDENT IN AN ORGANIZED HEALTH CARE EDUCATION/TRAINING PROGRAM

## 2024-05-18 PROCEDURE — 6370000000 HC RX 637 (ALT 250 FOR IP): Performed by: PHYSICIAN ASSISTANT

## 2024-05-18 PROCEDURE — 96375 TX/PRO/DX INJ NEW DRUG ADDON: CPT

## 2024-05-18 PROCEDURE — 99285 EMERGENCY DEPT VISIT HI MDM: CPT

## 2024-05-18 PROCEDURE — 94640 AIRWAY INHALATION TREATMENT: CPT

## 2024-05-18 RX ORDER — FENTANYL CITRATE 50 UG/ML
50 INJECTION, SOLUTION INTRAMUSCULAR; INTRAVENOUS ONCE
Status: COMPLETED | OUTPATIENT
Start: 2024-05-18 | End: 2024-05-18

## 2024-05-18 RX ORDER — SODIUM CHLORIDE 0.9 % (FLUSH) 0.9 %
5-40 SYRINGE (ML) INJECTION 2 TIMES DAILY
Status: DISCONTINUED | OUTPATIENT
Start: 2024-05-18 | End: 2024-05-18 | Stop reason: HOSPADM

## 2024-05-18 RX ORDER — FENTANYL CITRATE 50 UG/ML
25 INJECTION, SOLUTION INTRAMUSCULAR; INTRAVENOUS ONCE
Status: COMPLETED | OUTPATIENT
Start: 2024-05-18 | End: 2024-05-18

## 2024-05-18 RX ORDER — DEXAMETHASONE SODIUM PHOSPHATE 10 MG/ML
10 INJECTION, SOLUTION INTRAMUSCULAR; INTRAVENOUS EVERY 6 HOURS
Status: DISCONTINUED | OUTPATIENT
Start: 2024-05-18 | End: 2024-05-18 | Stop reason: HOSPADM

## 2024-05-18 RX ORDER — SODIUM CHLORIDE FOR INHALATION 0.9 %
3 VIAL, NEBULIZER (ML) INHALATION EVERY 4 HOURS PRN
Status: DISCONTINUED | OUTPATIENT
Start: 2024-05-18 | End: 2024-05-18 | Stop reason: HOSPADM

## 2024-05-18 RX ADMIN — RACEPINEPHRINE HYDROCHLORIDE 0.5 ML: 11.25 SOLUTION RESPIRATORY (INHALATION) at 01:17

## 2024-05-18 RX ADMIN — IOPAMIDOL 75 ML: 755 INJECTION, SOLUTION INTRAVENOUS at 01:39

## 2024-05-18 RX ADMIN — FENTANYL CITRATE 50 MCG: 50 INJECTION INTRAMUSCULAR; INTRAVENOUS at 03:44

## 2024-05-18 RX ADMIN — FENTANYL CITRATE 25 MCG: 50 INJECTION, SOLUTION INTRAMUSCULAR; INTRAVENOUS at 01:48

## 2024-05-18 RX ADMIN — DEXAMETHASONE SODIUM PHOSPHATE 10 MG: 10 INJECTION INTRAMUSCULAR; INTRAVENOUS at 01:47

## 2024-05-18 ASSESSMENT — PAIN SCALES - GENERAL
PAINLEVEL_OUTOF10: 9
PAINLEVEL_OUTOF10: 3
PAINLEVEL_OUTOF10: 8

## 2024-05-18 ASSESSMENT — PAIN DESCRIPTION - LOCATION
LOCATION: THROAT
LOCATION: THROAT

## 2024-05-18 ASSESSMENT — PAIN - FUNCTIONAL ASSESSMENT
PAIN_FUNCTIONAL_ASSESSMENT: 0-10
PAIN_FUNCTIONAL_ASSESSMENT: 0-10
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
## 2024-05-21 ENCOUNTER — HOSPITAL ENCOUNTER (OUTPATIENT)
Dept: WOUND CARE | Age: 39
Discharge: HOME | End: 2024-05-21
Payer: COMMERCIAL

## 2024-05-21 VITALS
TEMPERATURE: 97.6 F | SYSTOLIC BLOOD PRESSURE: 156 MMHG | HEART RATE: 99 BPM | DIASTOLIC BLOOD PRESSURE: 85 MMHG | RESPIRATION RATE: 18 BRPM

## 2024-05-21 DIAGNOSIS — Z79.4 TYPE 2 DIABETES MELLITUS WITH DIABETIC DERMATITIS, WITH LONG-TERM CURRENT USE OF INSULIN (HCC): ICD-10-CM

## 2024-05-21 DIAGNOSIS — S98.131A AMPUTATION OF TOE OF RIGHT FOOT (HCC): ICD-10-CM

## 2024-05-21 DIAGNOSIS — E11.620 TYPE 2 DIABETES MELLITUS WITH DIABETIC DERMATITIS, WITH LONG-TERM CURRENT USE OF INSULIN (HCC): ICD-10-CM

## 2024-05-21 DIAGNOSIS — L97.512 RIGHT FOOT ULCER, WITH FAT LAYER EXPOSED (HCC): ICD-10-CM

## 2024-05-21 DIAGNOSIS — L97.922 TRAUMATIC LEG ULCER, LEFT, WITH FAT LAYER EXPOSED (HCC): ICD-10-CM

## 2024-05-21 DIAGNOSIS — M14.60 CHARCOT'S ARTHROPATHY: Primary | ICD-10-CM

## 2024-05-21 DIAGNOSIS — E66.01 CLASS 2 SEVERE OBESITY WITH SERIOUS COMORBIDITY AND BODY MASS INDEX (BMI) OF 36.0 TO 36.9 IN ADULT, UNSPECIFIED OBESITY TYPE (HCC): ICD-10-CM

## 2024-05-21 DIAGNOSIS — L97.912 TRAUMATIC ULCER OF RIGHT LOWER EXTREMITY WITH FAT LAYER EXPOSED (HCC): ICD-10-CM

## 2024-05-21 PROCEDURE — 6370000000 HC RX 637 (ALT 250 FOR IP): Performed by: NURSE PRACTITIONER

## 2024-05-21 PROCEDURE — 11042 DBRDMT SUBQ TIS 1ST 20SQCM/<: CPT

## 2024-05-21 PROCEDURE — 29581 APPL MULTLAYER CMPRN SYS LEG: CPT

## 2024-05-21 RX ORDER — GENTAMICIN SULFATE 1 MG/G
OINTMENT TOPICAL ONCE
OUTPATIENT
Start: 2024-05-21 | End: 2024-05-21

## 2024-05-21 RX ORDER — LIDOCAINE 50 MG/G
OINTMENT TOPICAL ONCE
OUTPATIENT
Start: 2024-05-21 | End: 2024-05-21

## 2024-05-21 RX ORDER — BACITRACIN ZINC 500 [USP'U]/G
OINTMENT TOPICAL ONCE
OUTPATIENT
Start: 2024-05-21 | End: 2024-05-21

## 2024-05-21 RX ORDER — CLOBETASOL PROPIONATE 0.5 MG/G
OINTMENT TOPICAL ONCE
OUTPATIENT
Start: 2024-05-21 | End: 2024-05-21

## 2024-05-21 RX ORDER — TRIAMCINOLONE ACETONIDE 1 MG/G
OINTMENT TOPICAL ONCE
OUTPATIENT
Start: 2024-05-21 | End: 2024-05-21

## 2024-05-21 RX ORDER — LIDOCAINE 40 MG/G
CREAM TOPICAL ONCE
OUTPATIENT
Start: 2024-05-21 | End: 2024-05-21

## 2024-05-21 RX ORDER — SODIUM CHLOR/HYPOCHLOROUS ACID 0.033 %
SOLUTION, IRRIGATION IRRIGATION ONCE
OUTPATIENT
Start: 2024-05-21 | End: 2024-05-21

## 2024-05-21 RX ORDER — IBUPROFEN 200 MG
TABLET ORAL ONCE
OUTPATIENT
Start: 2024-05-21 | End: 2024-05-21

## 2024-05-21 RX ORDER — LIDOCAINE HYDROCHLORIDE 40 MG/ML
SOLUTION TOPICAL ONCE
OUTPATIENT
Start: 2024-05-21 | End: 2024-05-21

## 2024-05-21 RX ORDER — GENTAMICIN SULFATE 1 MG/G
OINTMENT TOPICAL ONCE
Status: COMPLETED | OUTPATIENT
Start: 2024-05-21 | End: 2024-05-21

## 2024-05-21 RX ORDER — BETAMETHASONE DIPROPIONATE 0.5 MG/G
CREAM TOPICAL ONCE
OUTPATIENT
Start: 2024-05-21 | End: 2024-05-21

## 2024-05-21 RX ORDER — BACITRACIN ZINC AND POLYMYXIN B SULFATE 500; 1000 [USP'U]/G; [USP'U]/G
OINTMENT TOPICAL ONCE
OUTPATIENT
Start: 2024-05-21 | End: 2024-05-21

## 2024-05-21 RX ADMIN — GENTAMICIN SULFATE: 1 OINTMENT TOPICAL at 15:35

## 2024-05-21 NOTE — PROGRESS NOTES
Wound Care Center Progress Note With Procedure    Azael Key Jr.  AGE: 38 y.o.   GENDER: male  : 1985  EPISODE DATE:  2024     Subjective:     Chief Complaint   Patient presents with    Wound Check     Right foot          HISTORY of PRESENT ILLNESS      Azael Key Jr. is a 38 y.o. male who presents today for wound evaluation of area to the right great toe.  No changes from previous.  Still waiting to hear from orthopedic office about left foot Charcot reconstruction surgical consultation    History of multiple amputations on the right foot by Dr. Pak.  Possible Charcot foot deformity in the left foot and ankle.  He has had pain there for the past several months.  He is a diabetic and his last A1c was around 8%.  Says it has been higher in the past.  Says that he has quit smoking since this past November.  No constitutional symptoms.      PAST MEDICAL HISTORY        Diagnosis Date    Asthma     Blind left eye     Diabetes mellitus (HCC)     GERD (gastroesophageal reflux disease)     Hypertension     Retinal detachment 2012    left       PAST SURGICAL HISTORY    Past Surgical History:   Procedure Laterality Date    CORNEAL TRANSPLANT Bilateral     EYE SURGERY      left eye removed    EYE SURGERY Right     FOOT DEBRIDEMENT Right 10/16/2023    FOOT DEBRIDEMENT INCISION AND DRAINAGE RIGHT performed by Abdon Yee MD at [facility] OR    FRACTURE SURGERY      foot left    LAPAROSCOPIC APPENDECTOMY N/A 2022    APPENDECTOMY LAPAROSCOPIC performed by Fortino Obrien MD at [facility] OR    MANDIBLE SURGERY Right     MANDIBLE SURGERY Bilateral     TOE AMPUTATION Right 2017    TOE AMPUTATION Right 2022    TOE AMPUTATION, RAY AMPUTATION OF RIGHT SECOND TOE performed by Abdon Yee MD at [facility] OR    TOE AMPUTATION Right 2023    THIRD TOE AMPUTATION AND REVISION OF SECOND TOE AMPUTATION performed by Roxanne Galo MD at [facility] OR       FAMILY HISTORY    Family History   Problem

## 2024-05-21 NOTE — PROGRESS NOTES
Multilayer Compression Wrap   (Not Unna) Below the Knee    NAME:  Azael Key Jr.  YOB: 1985  MEDICAL RECORD NUMBER:  [id number]  DATE:  5/21/2024    Multilayer compression wrap: Removed old Multilayer wrap if indicated and wash leg with mild soap/water.  Applied moisturizing agent to dry skin as needed.   Applied primary and secondary dressing as ordered.  Applied multilayered dressing below the knee to right lower leg.  Applied multilayered dressing below the knee to left lower leg.  Instructed patient/caregiver not to remove dressing and to keep it clean and dry.   Instructed patient/caregiver on complications to report to provider, such as pain, numbness in toes, heavy drainage, and slippage of dressing.  Instructed patient on purpose of compression dressing and on activity and exercise recommendations.      Electronically signed by Rudolph Hall RN on 5/21/2024 at 3:36 PM

## 2024-05-21 NOTE — PATIENT INSTRUCTIONS
PHYSICIAN ORDERS AND DISCHARGE INSTRUCTIONS     Wound cleansing:               Do not scrub or use excessive force.              Wash hands with soap and water before and after dressing changes.              Prior to applying a clean dressing, cleanse wound with normal saline,               wound cleanser, or mild soap and water.               Ask the physician or nurse before getting the wound(s) wet in a shower                      Wound Care Notes:  Rx: [facility]  Will attempt to order a Foot Defender for Right Foot if eligible and covered by insurance.  Shoe size 13 mens  Give new post op shoes for bilateral feet  3/25/24.                             Orders for this week:  2024    Xray of Left Foot ordered 24    Referral sent for follow up of charcot of left foot    Right Great Toe & Left lower leg - Wash with soap and water, pat dry.  Apply Gentamicin and Stimulen to sorbact and place to right great toe and left anterior shin. Wrap right great toe with ca alginate over wound and where toe comes into contact with foot.  Ca alginate and Abd over wounds.  Wrap with Coflex lite. (Build up pink layer of coflex around ankles and top of wraps.)  Leave in place until next visit to wound care center        Dispense 30 day quantity when ordering supplies.  Plan:  [employer] faxed  3/26/24. Covered. Modality needs completed        []   Nurse Visit  Primary Wound Care Provider: Nidhi Cummings CNP: 2 week. Tuesday  Call  for any questions or concerns.  Central [phone] for imaging and lab work

## 2024-05-28 ENCOUNTER — HOSPITAL ENCOUNTER (EMERGENCY)
Age: 39
Discharge: HOME | End: 2024-05-28
Attending: STUDENT IN AN ORGANIZED HEALTH CARE EDUCATION/TRAINING PROGRAM
Payer: COMMERCIAL

## 2024-05-28 ENCOUNTER — APPOINTMENT (OUTPATIENT)
Dept: GENERAL RADIOLOGY | Age: 39
End: 2024-05-28
Payer: COMMERCIAL

## 2024-05-28 ENCOUNTER — APPOINTMENT (OUTPATIENT)
Dept: CT IMAGING | Age: 39
End: 2024-05-28
Attending: STUDENT IN AN ORGANIZED HEALTH CARE EDUCATION/TRAINING PROGRAM
Payer: COMMERCIAL

## 2024-05-28 VITALS
BODY MASS INDEX: 37.5 KG/M2 | SYSTOLIC BLOOD PRESSURE: 162 MMHG | WEIGHT: 300 LBS | OXYGEN SATURATION: 97 % | TEMPERATURE: 97.9 F | DIASTOLIC BLOOD PRESSURE: 80 MMHG | RESPIRATION RATE: 20 BRPM | HEART RATE: 86 BPM

## 2024-05-28 DIAGNOSIS — S12.8XXA CLOSED FRACTURE OF THYROID CARTILAGE, INITIAL ENCOUNTER (HCC): ICD-10-CM

## 2024-05-28 DIAGNOSIS — E11.65 TYPE 2 DIABETES MELLITUS WITH HYPERGLYCEMIA, WITH LONG-TERM CURRENT USE OF INSULIN (HCC): ICD-10-CM

## 2024-05-28 DIAGNOSIS — J18.9 PNEUMONIA OF BOTH LUNGS DUE TO INFECTIOUS ORGANISM, UNSPECIFIED PART OF LUNG: Primary | ICD-10-CM

## 2024-05-28 DIAGNOSIS — Z79.4 TYPE 2 DIABETES MELLITUS WITH HYPERGLYCEMIA, WITH LONG-TERM CURRENT USE OF INSULIN (HCC): ICD-10-CM

## 2024-05-28 LAB
ALBUMIN SERPL-MCNC: 3.9 GM/DL (ref 3.4–5)
ALP BLD-CCNC: 154 IU/L (ref 40–128)
ALT SERPL-CCNC: 18 U/L (ref 10–40)
ANION GAP SERPL CALCULATED.3IONS-SCNC: 12 MMOL/L (ref 7–16)
AST SERPL-CCNC: 21 IU/L (ref 15–37)
BASOPHILS ABSOLUTE: 0 K/CU MM
BASOPHILS RELATIVE PERCENT: 0.4 % (ref 0–1)
BILIRUB SERPL-MCNC: 0.9 MG/DL (ref 0–1)
BUN SERPL-MCNC: 7 MG/DL (ref 6–23)
CALCIUM SERPL-MCNC: 8.9 MG/DL (ref 8.3–10.6)
CHLORIDE BLD-SCNC: 99 MMOL/L (ref 99–110)
CO2: 23 MMOL/L (ref 21–32)
CREAT SERPL-MCNC: 0.6 MG/DL (ref 0.9–1.3)
D-DIMER QUANTITATIVE: 1.75 UG/ML (FEU)
DIFFERENTIAL TYPE: ABNORMAL
EKG ATRIAL RATE: 99 BPM
EKG DIAGNOSIS: NORMAL
EKG P AXIS: 38 DEGREES
EKG P-R INTERVAL: 162 MS
EKG Q-T INTERVAL: 362 MS
EKG QRS DURATION: 82 MS
EKG QTC CALCULATION (BAZETT): 464 MS
EKG R AXIS: -27 DEGREES
EKG T AXIS: 55 DEGREES
EKG VENTRICULAR RATE: 99 BPM
EOSINOPHILS ABSOLUTE: 0.1 K/CU MM
EOSINOPHILS RELATIVE PERCENT: 2 % (ref 0–3)
GFR, ESTIMATED: >90 ML/MIN/1.73M2
GLUCOSE SERPL-MCNC: 215 MG/DL (ref 70–99)
HCT VFR BLD CALC: 41.1 % (ref 42–52)
HEMOGLOBIN: 14 GM/DL (ref 13.5–18)
IMMATURE NEUTROPHIL %: 0.2 % (ref 0–0.43)
LYMPHOCYTES ABSOLUTE: 1 K/CU MM
LYMPHOCYTES RELATIVE PERCENT: 20.7 % (ref 24–44)
MCH RBC QN AUTO: 31.3 PG (ref 27–31)
MCHC RBC AUTO-ENTMCNC: 34.1 % (ref 32–36)
MCV RBC AUTO: 91.7 FL (ref 78–100)
MONOCYTES ABSOLUTE: 0.5 K/CU MM
MONOCYTES RELATIVE PERCENT: 10.5 % (ref 0–4)
NEUTROPHILS ABSOLUTE: 3 K/CU MM
NEUTROPHILS RELATIVE PERCENT: 66.2 % (ref 36–66)
NUCLEATED RBC %: 0 %
PDW BLD-RTO: 12.4 % (ref 11.7–14.9)
PLATELET # BLD: 245 K/CU MM (ref 140–440)
PMV BLD AUTO: 8.8 FL (ref 7.5–11.1)
POTASSIUM SERPL-SCNC: 4.1 MMOL/L (ref 3.5–5.1)
RBC # BLD: 4.48 M/CU MM (ref 4.6–6.2)
SODIUM BLD-SCNC: 134 MMOL/L (ref 135–145)
TOTAL IMMATURE NEUTOROPHIL: 0.01 K/CU MM
TOTAL NUCLEATED RBC: 0 K/CU MM
TOTAL PROTEIN: 8.2 GM/DL (ref 6.4–8.2)
TROPONIN, HIGH SENSITIVITY: 18 NG/L (ref 0–22)
TROPONIN, HIGH SENSITIVITY: 19 NG/L (ref 0–22)
WBC # BLD: 4.6 K/CU MM (ref 4–10.5)

## 2024-05-28 PROCEDURE — 85379 FIBRIN DEGRADATION QUANT: CPT

## 2024-05-28 PROCEDURE — 80053 COMPREHEN METABOLIC PANEL: CPT

## 2024-05-28 PROCEDURE — 6370000000 HC RX 637 (ALT 250 FOR IP): Performed by: NURSE PRACTITIONER

## 2024-05-28 PROCEDURE — 6360000004 HC RX CONTRAST MEDICATION: Performed by: STUDENT IN AN ORGANIZED HEALTH CARE EDUCATION/TRAINING PROGRAM

## 2024-05-28 PROCEDURE — 85025 COMPLETE CBC W/AUTO DIFF WBC: CPT

## 2024-05-28 PROCEDURE — 84484 ASSAY OF TROPONIN QUANT: CPT

## 2024-05-28 PROCEDURE — 93005 ELECTROCARDIOGRAM TRACING: CPT | Performed by: STUDENT IN AN ORGANIZED HEALTH CARE EDUCATION/TRAINING PROGRAM

## 2024-05-28 PROCEDURE — 71045 X-RAY EXAM CHEST 1 VIEW: CPT

## 2024-05-28 PROCEDURE — 93010 ELECTROCARDIOGRAM REPORT: CPT | Performed by: INTERNAL MEDICINE

## 2024-05-28 PROCEDURE — 99285 EMERGENCY DEPT VISIT HI MDM: CPT

## 2024-05-28 PROCEDURE — 71275 CT ANGIOGRAPHY CHEST: CPT

## 2024-05-28 RX ORDER — ALBUTEROL SULFATE 90 UG/1
2 AEROSOL, METERED RESPIRATORY (INHALATION) 4 TIMES DAILY PRN
Qty: 18 G | Refills: 0 | Status: SHIPPED | OUTPATIENT
Start: 2024-05-28 | End: 2024-05-28

## 2024-05-28 RX ORDER — PREGABALIN 100 MG/1
100 CAPSULE ORAL 2 TIMES DAILY
COMMUNITY
Start: 2024-05-14

## 2024-05-28 RX ORDER — AZITHROMYCIN 250 MG/1
TABLET, FILM COATED ORAL
Qty: 6 TABLET | Refills: 0 | Status: SHIPPED | OUTPATIENT
Start: 2024-05-28 | End: 2024-05-28

## 2024-05-28 RX ORDER — ALBUTEROL SULFATE 90 UG/1
2 AEROSOL, METERED RESPIRATORY (INHALATION) 4 TIMES DAILY PRN
Qty: 18 G | Refills: 0 | Status: SHIPPED | OUTPATIENT
Start: 2024-05-28

## 2024-05-28 RX ORDER — CEFDINIR 300 MG/1
300 CAPSULE ORAL ONCE
Status: COMPLETED | OUTPATIENT
Start: 2024-05-28 | End: 2024-05-28

## 2024-05-28 RX ORDER — CEFDINIR 300 MG/1
300 CAPSULE ORAL 2 TIMES DAILY
Qty: 20 CAPSULE | Refills: 0 | Status: SHIPPED | OUTPATIENT
Start: 2024-05-28 | End: 2024-06-07

## 2024-05-28 RX ORDER — CEFDINIR 300 MG/1
300 CAPSULE ORAL 2 TIMES DAILY
Qty: 20 CAPSULE | Refills: 0 | Status: SHIPPED | OUTPATIENT
Start: 2024-05-28 | End: 2024-05-28

## 2024-05-28 RX ORDER — METFORMIN HYDROCHLORIDE 500 MG/1
1000 TABLET, EXTENDED RELEASE ORAL ONCE
Status: COMPLETED | OUTPATIENT
Start: 2024-05-28 | End: 2024-05-28

## 2024-05-28 RX ORDER — AZITHROMYCIN 250 MG/1
TABLET, FILM COATED ORAL
Qty: 6 TABLET | Refills: 0 | Status: SHIPPED | OUTPATIENT
Start: 2024-05-28 | End: 2024-06-07

## 2024-05-28 RX ORDER — MORPHINE SULFATE 4 MG/ML
4 INJECTION, SOLUTION INTRAMUSCULAR; INTRAVENOUS EVERY 30 MIN PRN
Status: DISCONTINUED | OUTPATIENT
Start: 2024-05-28 | End: 2024-05-28 | Stop reason: HOSPADM

## 2024-05-28 RX ORDER — LISINOPRIL AND HYDROCHLOROTHIAZIDE 20; 12.5 MG/1; MG/1
1 TABLET ORAL DAILY
COMMUNITY
Start: 2024-03-14

## 2024-05-28 RX ORDER — HYDROCODONE BITARTRATE AND ACETAMINOPHEN 5; 325 MG/1; MG/1
1 TABLET ORAL EVERY 6 HOURS PRN
Status: DISCONTINUED | OUTPATIENT
Start: 2024-05-28 | End: 2024-05-28 | Stop reason: HOSPADM

## 2024-05-28 RX ORDER — GLIPIZIDE 10 MG/1
10 TABLET ORAL NIGHTLY
COMMUNITY

## 2024-05-28 RX ORDER — METFORMIN HYDROCHLORIDE 500 MG/1
1000 TABLET, EXTENDED RELEASE ORAL
Status: DISCONTINUED | OUTPATIENT
Start: 2024-05-29 | End: 2024-05-28

## 2024-05-28 RX ADMIN — CEFDINIR 300 MG: 300 CAPSULE ORAL at 17:07

## 2024-05-28 RX ADMIN — METFORMIN HYDROCHLORIDE 1000 MG: 500 TABLET, EXTENDED RELEASE ORAL at 17:07

## 2024-05-28 RX ADMIN — HYDROCODONE BITARTRATE AND ACETAMINOPHEN 1 TABLET: 5; 325 TABLET ORAL at 16:50

## 2024-05-28 RX ADMIN — IOPAMIDOL 80 ML: 755 INJECTION, SOLUTION INTRAVENOUS at 14:50

## 2024-05-28 ASSESSMENT — ENCOUNTER SYMPTOMS
COUGH: 1
ABDOMINAL PAIN: 0
CHEST TIGHTNESS: 1
ROS SKIN COMMENTS: DIAPHORETIC
DIARRHEA: 0
NAUSEA: 0
SHORTNESS OF BREATH: 1
SORE THROAT: 1

## 2024-05-28 ASSESSMENT — PAIN DESCRIPTION - PAIN TYPE: TYPE: CHRONIC PAIN

## 2024-05-28 ASSESSMENT — HEART SCORE
ECG: NORMAL
ECG: NORMAL

## 2024-05-28 ASSESSMENT — PAIN SCALES - GENERAL
PAINLEVEL_OUTOF10: 8
PAINLEVEL_OUTOF10: 8
PAINLEVEL_OUTOF10: 0

## 2024-05-28 ASSESSMENT — PAIN DESCRIPTION - DESCRIPTORS: DESCRIPTORS: ACHING

## 2024-05-28 ASSESSMENT — PAIN - FUNCTIONAL ASSESSMENT
PAIN_FUNCTIONAL_ASSESSMENT: NONE - DENIES PAIN
PAIN_FUNCTIONAL_ASSESSMENT: NONE - DENIES PAIN
PAIN_FUNCTIONAL_ASSESSMENT: 0-10

## 2024-05-28 ASSESSMENT — PAIN DESCRIPTION - ORIENTATION
ORIENTATION: RIGHT
ORIENTATION: RIGHT

## 2024-05-28 ASSESSMENT — PAIN DESCRIPTION - LOCATION
LOCATION: CHEST
LOCATION: THROAT
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
## 2024-05-28 NOTE — ED NOTES
Pt requesting something to help with his chest pain and sore throat. Emely OROZCO aware and placing orders.   
Pt returned from ct  
suspension Place 1 drop into the right eye 4 times daily    Chandana Damon MD   allopurinol (ZYLOPRIM) 300 MG tablet Take 1 tablet by mouth daily    Chandana Damon MD   atorvastatin (LIPITOR) 10 MG tablet Take 1 tablet by mouth daily    Chandana Damon MD   amLODIPine (NORVASC) 10 MG tablet Take 1 tablet by mouth daily 11/25/16   Chandana Damon MD     Medications added or changed (ex. new medication, dosage change, interval change, formulation change):  Pregabalin dosage change from 150 mg to 100 mg   Lantus dosage change from 30 units to 45 units at HS per patient  Humalog dosage change from 10 units to 20 units with meals per patient  Lisinopril changed to lisinopril/hctz    Comments:  Medication list reviewed with patient and insurance claims verified.  Claims indicating if patient taking medications as prescribed he should be out of all therapies. Patient reports he is not out and is taking routinely.  Insulin updated per information received.  Patient receives he has 5 different eye drop therapies 4 x each for his right eye. Listed per medication list. Not all have active prescriptions, last fill dates for all medications noted.  Patient reports he has taken no medications prior to ER visit.    To my knowledge the above medication history is accurate as of 5/28/2024 2:01 PM.   Nida Prado CPhT   5/28/2024 2:01 PM

## 2024-06-18 ENCOUNTER — HOSPITAL ENCOUNTER (OUTPATIENT)
Dept: WOUND CARE | Age: 39
Discharge: HOME | End: 2024-06-18
Payer: COMMERCIAL

## 2024-06-18 VITALS
RESPIRATION RATE: 18 BRPM | SYSTOLIC BLOOD PRESSURE: 160 MMHG | HEART RATE: 80 BPM | TEMPERATURE: 97.3 F | DIASTOLIC BLOOD PRESSURE: 76 MMHG

## 2024-06-18 DIAGNOSIS — Z79.4 TYPE 2 DIABETES MELLITUS WITH DIABETIC DERMATITIS, WITH LONG-TERM CURRENT USE OF INSULIN (HCC): ICD-10-CM

## 2024-06-18 DIAGNOSIS — L97.922 TRAUMATIC LEG ULCER, LEFT, WITH FAT LAYER EXPOSED (HCC): ICD-10-CM

## 2024-06-18 DIAGNOSIS — L97.512 RIGHT FOOT ULCER, WITH FAT LAYER EXPOSED (HCC): ICD-10-CM

## 2024-06-18 DIAGNOSIS — E66.01 CLASS 2 SEVERE OBESITY WITH SERIOUS COMORBIDITY AND BODY MASS INDEX (BMI) OF 36.0 TO 36.9 IN ADULT, UNSPECIFIED OBESITY TYPE (HCC): ICD-10-CM

## 2024-06-18 DIAGNOSIS — E11.620 TYPE 2 DIABETES MELLITUS WITH DIABETIC DERMATITIS, WITH LONG-TERM CURRENT USE OF INSULIN (HCC): ICD-10-CM

## 2024-06-18 DIAGNOSIS — L97.912 TRAUMATIC ULCER OF RIGHT LOWER EXTREMITY WITH FAT LAYER EXPOSED (HCC): ICD-10-CM

## 2024-06-18 DIAGNOSIS — M14.60 CHARCOT'S ARTHROPATHY: Primary | ICD-10-CM

## 2024-06-18 DIAGNOSIS — S98.131A AMPUTATION OF TOE OF RIGHT FOOT (HCC): ICD-10-CM

## 2024-06-18 DIAGNOSIS — M14.679 CHARCOT ARTHROPATHY OF MIDFOOT: ICD-10-CM

## 2024-06-18 PROCEDURE — 11042 DBRDMT SUBQ TIS 1ST 20SQCM/<: CPT

## 2024-06-18 RX ORDER — TRIAMCINOLONE ACETONIDE 1 MG/G
OINTMENT TOPICAL ONCE
OUTPATIENT
Start: 2024-06-18 | End: 2024-06-18

## 2024-06-18 RX ORDER — BACITRACIN ZINC AND POLYMYXIN B SULFATE 500; 1000 [USP'U]/G; [USP'U]/G
OINTMENT TOPICAL ONCE
OUTPATIENT
Start: 2024-06-18 | End: 2024-06-18

## 2024-06-18 RX ORDER — CLOBETASOL PROPIONATE 0.5 MG/G
OINTMENT TOPICAL ONCE
OUTPATIENT
Start: 2024-06-18 | End: 2024-06-18

## 2024-06-18 RX ORDER — BACITRACIN ZINC 500 [USP'U]/G
OINTMENT TOPICAL ONCE
OUTPATIENT
Start: 2024-06-18 | End: 2024-06-18

## 2024-06-18 RX ORDER — LIDOCAINE 50 MG/G
OINTMENT TOPICAL ONCE
OUTPATIENT
Start: 2024-06-18 | End: 2024-06-18

## 2024-06-18 RX ORDER — LIDOCAINE 40 MG/G
CREAM TOPICAL ONCE
OUTPATIENT
Start: 2024-06-18 | End: 2024-06-18

## 2024-06-18 RX ORDER — LIDOCAINE HYDROCHLORIDE 40 MG/ML
SOLUTION TOPICAL ONCE
OUTPATIENT
Start: 2024-06-18 | End: 2024-06-18

## 2024-06-18 RX ORDER — SODIUM CHLOR/HYPOCHLOROUS ACID 0.033 %
SOLUTION, IRRIGATION IRRIGATION ONCE
OUTPATIENT
Start: 2024-06-18 | End: 2024-06-18

## 2024-06-18 RX ORDER — IBUPROFEN 200 MG
TABLET ORAL ONCE
OUTPATIENT
Start: 2024-06-18 | End: 2024-06-18

## 2024-06-18 RX ORDER — BETAMETHASONE DIPROPIONATE 0.5 MG/G
CREAM TOPICAL ONCE
OUTPATIENT
Start: 2024-06-18 | End: 2024-06-18

## 2024-06-18 RX ORDER — GENTAMICIN SULFATE 1 MG/G
OINTMENT TOPICAL ONCE
OUTPATIENT
Start: 2024-06-18 | End: 2024-06-18

## 2024-06-18 NOTE — PATIENT INSTRUCTIONS
PHYSICIAN ORDERS AND DISCHARGE INSTRUCTIONS     Wound cleansing:               Do not scrub or use excessive force.              Wash hands with soap and water before and after dressing changes.              Prior to applying a clean dressing, cleanse wound with normal saline,               wound cleanser, or mild soap and water.               Ask the physician or nurse before getting the wound(s) wet in a shower                      Wound Care Notes:  [facility]  Will attempt to order a Foot Defender for Right Foot if eligible and covered by insurance.  Shoe size 13 mens  Give new post op shoes for bilateral feet  3/25/24.                             Orders for this week:  2024    Right Great Toe & Left lower leg - Wash with soap and water, pat dry.  Apply Gentamicin and Stimulen to sorbact and place to right great toe and left anterior shin.   Wrap right great toe with ca alginate over wound and where toe comes into contact with foot.  Ca alginate and Abd over wounds.  Wrap with Coflex lite. (Build up pink layer of coflex around ankles and top of wraps.)  Leave in place until next visit to wound care center        Dispense 30 day quantity when ordering supplies.  Plan:  [employer] faxed  3/26/24. Covered. Modality needs completed. Referral sent for follow up of charcot of left foot       []   Nurse Visit  Primary Wound Care Provider: Nidhi Cummings CNP: 2 week. Tuesday  Call  for any questions or concerns.  Central [phone] for imaging and lab work

## 2024-06-18 NOTE — PROGRESS NOTES
Age of Onset    Diabetes Mother     Asthma Mother     High Blood Pressure Mother     Stroke Mother     Heart Disease Mother     Diabetes Father     Asthma Father     High Blood Pressure Father        SOCIAL HISTORY    Social History     Tobacco Use    Smoking status: Former     Current packs/day: 0.25     Average packs/day: 0.3 packs/day for 5.0 years (1.3 ttl pk-yrs)     Types: Cigarettes    Smokeless tobacco: Never    Tobacco comments:     VOICES ONLY SMOKING ABOUT HALF PACK A WEEK AND IS DOWN TO ONLY A FEW A DAY. 5-1-23      [facility]   Vaping Use    Vaping Use: Never used   Substance Use Topics    Alcohol use: Yes     Comment: occasionally    Drug use: Not Currently     Types: Marijuana (Weed)       ALLERGIES    Allergies   Allergen Reactions    Ciprofloxacin Shortness Of Breath    Glucosamine Anaphylaxis    Shellfish-Derived Products Anaphylaxis    Shellfish Allergy     Vancomycin Swelling     Mild redness, swelling, warmth to touch, and tenderness surrounding IV access site s/p receiving Vanco x2d, with pt stating \"My IV's always go bad after a couple of days when I'm getting Vanco.\"       MEDICATIONS    Current Outpatient Medications on File Prior to Encounter   Medication Sig Dispense Refill    glipiZIDE (GLUCOTROL) 10 MG tablet Take 1 tablet by mouth nightly      lisinopril-hydroCHLOROthiazide (PRINZIDE;ZESTORETIC) 20-12.5 MG per tablet Take 1 tablet by mouth daily      pregabalin (LYRICA) 100 MG capsule Take 1 capsule by mouth 2 times daily.      albuterol sulfate HFA (VENTOLIN HFA) 108 (90 Base) MCG/ACT inhaler Inhale 2 puffs into the lungs 4 times daily as needed for Wheezing 18 g 0    mupirocin (BACTROBAN) 2 % ointment Apply topically 2 times daily Apply topically 3 times daily. (Patient not taking: Reported on 5/28/2024) 15 g 0    oxyCODONE-acetaminophen (PERCOCET) 5-325 MG per tablet Take 1 tablet by mouth 2 times daily. 05/28/24 Prescription noted for 1

## 2024-07-17 ENCOUNTER — TELEPHONE (OUTPATIENT)
Dept: WOUND CARE | Age: 39
End: 2024-07-17

## 2024-07-17 ENCOUNTER — HOSPITAL ENCOUNTER (EMERGENCY)
Age: 39
Discharge: HOME | DRG: 720 | End: 2024-07-17
Attending: EMERGENCY MEDICINE
Payer: COMMERCIAL

## 2024-07-17 VITALS
OXYGEN SATURATION: 96 % | TEMPERATURE: 99.2 F | WEIGHT: 291 LBS | DIASTOLIC BLOOD PRESSURE: 77 MMHG | BODY MASS INDEX: 36.18 KG/M2 | HEART RATE: 101 BPM | HEIGHT: 75 IN | SYSTOLIC BLOOD PRESSURE: 140 MMHG | RESPIRATION RATE: 26 BRPM

## 2024-07-17 DIAGNOSIS — M14.672 CHARCOT'S JOINT, LEFT ANKLE AND FOOT: ICD-10-CM

## 2024-07-17 DIAGNOSIS — L97.519 ULCER OF RIGHT GREAT TOE DUE TO DIABETES MELLITUS (HCC): ICD-10-CM

## 2024-07-17 DIAGNOSIS — F10.920 ACUTE ALCOHOLIC INTOXICATION WITHOUT COMPLICATION (HCC): ICD-10-CM

## 2024-07-17 DIAGNOSIS — M79.672 BILATERAL FOOT PAIN: Primary | ICD-10-CM

## 2024-07-17 DIAGNOSIS — E11.621 ULCER OF RIGHT GREAT TOE DUE TO DIABETES MELLITUS (HCC): ICD-10-CM

## 2024-07-17 DIAGNOSIS — M79.671 BILATERAL FOOT PAIN: Primary | ICD-10-CM

## 2024-07-17 LAB
ALBUMIN SERPL-MCNC: 3.4 GM/DL (ref 3.4–5)
ALCOHOL SCREEN SERUM: 0.19 %WT/VOL
ALP BLD-CCNC: 139 IU/L (ref 40–128)
ALT SERPL-CCNC: 20 U/L (ref 10–40)
ANION GAP SERPL CALCULATED.3IONS-SCNC: 18 MMOL/L (ref 7–16)
AST SERPL-CCNC: 30 IU/L (ref 15–37)
BASOPHILS ABSOLUTE: 0 K/CU MM
BASOPHILS RELATIVE PERCENT: 0.4 % (ref 0–1)
BILIRUB SERPL-MCNC: 0.2 MG/DL (ref 0–1)
BUN SERPL-MCNC: 6 MG/DL (ref 6–23)
CALCIUM SERPL-MCNC: 8.5 MG/DL (ref 8.3–10.6)
CHLORIDE BLD-SCNC: 104 MMOL/L (ref 99–110)
CO2: 16 MMOL/L (ref 21–32)
CREAT SERPL-MCNC: 0.7 MG/DL (ref 0.9–1.3)
DIFFERENTIAL TYPE: ABNORMAL
EOSINOPHILS ABSOLUTE: 0.1 K/CU MM
EOSINOPHILS RELATIVE PERCENT: 1.1 % (ref 0–3)
GFR, ESTIMATED: >90 ML/MIN/1.73M2
GLUCOSE SERPL-MCNC: 253 MG/DL (ref 70–99)
HCT VFR BLD CALC: 31.8 % (ref 42–52)
HEMOGLOBIN: 11.1 GM/DL (ref 13.5–18)
IMMATURE NEUTROPHIL %: 0.4 % (ref 0–0.43)
LACTATE: 2.1 MMOL/L (ref 0.5–1.9)
LACTATE: 2.5 MMOL/L (ref 0.5–1.9)
LYMPHOCYTES ABSOLUTE: 0.9 K/CU MM
LYMPHOCYTES RELATIVE PERCENT: 10.5 % (ref 24–44)
MCH RBC QN AUTO: 31.8 PG (ref 27–31)
MCHC RBC AUTO-ENTMCNC: 34.9 % (ref 32–36)
MCV RBC AUTO: 91.1 FL (ref 78–100)
MONOCYTES ABSOLUTE: 0.8 K/CU MM
MONOCYTES RELATIVE PERCENT: 9.6 % (ref 0–4)
NEUTROPHILS ABSOLUTE: 6.6 K/CU MM
NEUTROPHILS RELATIVE PERCENT: 78 % (ref 36–66)
NUCLEATED RBC %: 0 %
PDW BLD-RTO: 13.5 % (ref 11.7–14.9)
PLATELET # BLD: 246 K/CU MM (ref 140–440)
PMV BLD AUTO: 8.5 FL (ref 7.5–11.1)
POTASSIUM SERPL-SCNC: 3.7 MMOL/L (ref 3.5–5.1)
RBC # BLD: 3.49 M/CU MM (ref 4.6–6.2)
SODIUM BLD-SCNC: 138 MMOL/L (ref 135–145)
TOTAL IMMATURE NEUTOROPHIL: 0.03 K/CU MM
TOTAL NUCLEATED RBC: 0 K/CU MM
TOTAL PROTEIN: 7.9 GM/DL (ref 6.4–8.2)
WBC # BLD: 8.5 K/CU MM (ref 4–10.5)

## 2024-07-17 PROCEDURE — 83605 ASSAY OF LACTIC ACID: CPT

## 2024-07-17 PROCEDURE — 80053 COMPREHEN METABOLIC PANEL: CPT

## 2024-07-17 PROCEDURE — 85025 COMPLETE CBC W/AUTO DIFF WBC: CPT

## 2024-07-17 PROCEDURE — 2580000003 HC RX 258: Performed by: PHYSICIAN ASSISTANT

## 2024-07-17 PROCEDURE — G0480 DRUG TEST DEF 1-7 CLASSES: HCPCS

## 2024-07-17 PROCEDURE — 99284 EMERGENCY DEPT VISIT MOD MDM: CPT

## 2024-07-17 RX ORDER — MORPHINE SULFATE 4 MG/ML
4 INJECTION, SOLUTION INTRAMUSCULAR; INTRAVENOUS ONCE
Status: DISCONTINUED | OUTPATIENT
Start: 2024-07-17 | End: 2024-07-17 | Stop reason: HOSPADM

## 2024-07-17 RX ORDER — 0.9 % SODIUM CHLORIDE 0.9 %
1000 INTRAVENOUS SOLUTION INTRAVENOUS ONCE
Status: COMPLETED | OUTPATIENT
Start: 2024-07-17 | End: 2024-07-17

## 2024-07-17 RX ADMIN — SODIUM CHLORIDE 1000 ML: 9 INJECTION, SOLUTION INTRAVENOUS at 01:33

## 2024-07-17 ASSESSMENT — PAIN - FUNCTIONAL ASSESSMENT
PAIN_FUNCTIONAL_ASSESSMENT: PREVENTS OR INTERFERES SOME ACTIVE ACTIVITIES AND ADLS
PAIN_FUNCTIONAL_ASSESSMENT: PREVENTS OR INTERFERES SOME ACTIVE ACTIVITIES AND ADLS
PAIN_FUNCTIONAL_ASSESSMENT: 0-10
PAIN_FUNCTIONAL_ASSESSMENT: PREVENTS OR INTERFERES SOME ACTIVE ACTIVITIES AND ADLS

## 2024-07-17 ASSESSMENT — PAIN DESCRIPTION - LOCATION
LOCATION: FOOT;LEG
LOCATION: LEG;FOOT
LOCATION: FOOT;LEG

## 2024-07-17 ASSESSMENT — PAIN DESCRIPTION - ORIENTATION
ORIENTATION: RIGHT;LEFT

## 2024-07-17 ASSESSMENT — PAIN DESCRIPTION - PAIN TYPE
TYPE: CHRONIC PAIN

## 2024-07-17 ASSESSMENT — PAIN DESCRIPTION - DESCRIPTORS
DESCRIPTORS: ACHING;THROBBING
DESCRIPTORS: ACHING;THROBBING
DESCRIPTORS: ACHING;DULL;THROBBING

## 2024-07-17 ASSESSMENT — PAIN DESCRIPTION - FREQUENCY
FREQUENCY: CONTINUOUS

## 2024-07-17 ASSESSMENT — PAIN SCALES - GENERAL
PAINLEVEL_OUTOF10: 10
PAINLEVEL_OUTOF10: 6
PAINLEVEL_OUTOF10: 6
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
## 2024-07-17 NOTE — TELEPHONE ENCOUNTER
Called patient to follow up on status of wounds for  follow up appointment ; Left message  to return call to the [facility].

## 2024-07-19 ENCOUNTER — APPOINTMENT (OUTPATIENT)
Dept: GENERAL RADIOLOGY | Age: 39
DRG: 720 | End: 2024-07-19
Attending: STUDENT IN AN ORGANIZED HEALTH CARE EDUCATION/TRAINING PROGRAM
Payer: COMMERCIAL

## 2024-07-19 ENCOUNTER — APPOINTMENT (OUTPATIENT)
Dept: CT IMAGING | Age: 39
DRG: 720 | End: 2024-07-19
Payer: COMMERCIAL

## 2024-07-19 ENCOUNTER — HOSPITAL ENCOUNTER (INPATIENT)
Age: 39
LOS: 7 days | Discharge: HOME | DRG: 720 | End: 2024-07-26
Attending: STUDENT IN AN ORGANIZED HEALTH CARE EDUCATION/TRAINING PROGRAM | Admitting: STUDENT IN AN ORGANIZED HEALTH CARE EDUCATION/TRAINING PROGRAM
Payer: COMMERCIAL

## 2024-07-19 DIAGNOSIS — I33.0 ACUTE BACTERIAL ENDOCARDITIS: ICD-10-CM

## 2024-07-19 DIAGNOSIS — R78.81 STAPHYLOCOCCUS AUREUS BACTEREMIA: Primary | ICD-10-CM

## 2024-07-19 DIAGNOSIS — M86.172 OTHER ACUTE OSTEOMYELITIS OF LEFT FOOT (HCC): ICD-10-CM

## 2024-07-19 DIAGNOSIS — B95.61 STAPHYLOCOCCUS AUREUS BACTEREMIA: Primary | ICD-10-CM

## 2024-07-19 DIAGNOSIS — L03.116 CELLULITIS OF LEFT LOWER EXTREMITY: ICD-10-CM

## 2024-07-19 PROBLEM — L08.9 LEFT FOOT INFECTION: Status: ACTIVE | Noted: 2024-07-19

## 2024-07-19 LAB
ALBUMIN SERPL-MCNC: 3 GM/DL (ref 3.4–5)
ALCOHOL SCREEN SERUM: <0.01 %WT/VOL
ALP BLD-CCNC: 106 IU/L (ref 40–129)
ALT SERPL-CCNC: 16 U/L (ref 10–40)
AMPHETAMINES: ABNORMAL
ANION GAP SERPL CALCULATED.3IONS-SCNC: 16 MMOL/L (ref 7–16)
AST SERPL-CCNC: 18 IU/L (ref 15–37)
BACTERIA: NEGATIVE /HPF
BARBITURATE SCREEN URINE: NEGATIVE
BASOPHILS ABSOLUTE: 0 K/CU MM
BASOPHILS RELATIVE PERCENT: 0.3 % (ref 0–1)
BENZODIAZEPINE SCREEN, URINE: NEGATIVE
BILIRUB SERPL-MCNC: 1 MG/DL (ref 0–1)
BILIRUBIN, URINE: ABNORMAL MG/DL
BLOOD, URINE: ABNORMAL
BUN SERPL-MCNC: 9 MG/DL (ref 6–23)
CALCIUM SERPL-MCNC: 9 MG/DL (ref 8.3–10.6)
CANNABINOID SCREEN URINE: NEGATIVE
CHLORIDE BLD-SCNC: 94 MMOL/L (ref 99–110)
CLARITY, UA: CLEAR
CO2: 20 MMOL/L (ref 21–32)
COCAINE METABOLITE: ABNORMAL
COLOR, UA: YELLOW
CREAT SERPL-MCNC: 0.8 MG/DL (ref 0.9–1.3)
DIFFERENTIAL TYPE: ABNORMAL
EOSINOPHILS ABSOLUTE: 0 K/CU MM
EOSINOPHILS RELATIVE PERCENT: 0.1 % (ref 0–3)
FENTANYL URINE: NEGATIVE
GFR, ESTIMATED: >90 ML/MIN/1.73M2
GLUCOSE SERPL-MCNC: 192 MG/DL (ref 70–99)
GLUCOSE URINE: >1000 MG/DL
HCT VFR BLD CALC: 33.3 % (ref 42–52)
HEMOGLOBIN: 11.9 GM/DL (ref 13.5–18)
IMMATURE NEUTROPHIL %: 0.6 % (ref 0–0.43)
KETONES, URINE: >80 MG/DL
LACTIC ACID, SEPSIS: 1.5 MMOL/L (ref 0.4–2)
LACTIC ACID, SEPSIS: 2.9 MMOL/L (ref 0.4–2)
LEUKOCYTE ESTERASE, URINE: NEGATIVE
LYMPHOCYTES ABSOLUTE: 0.9 K/CU MM
LYMPHOCYTES RELATIVE PERCENT: 9.1 % (ref 24–44)
MCH RBC QN AUTO: 32 PG (ref 27–31)
MCHC RBC AUTO-ENTMCNC: 35.7 % (ref 32–36)
MCV RBC AUTO: 89.5 FL (ref 78–100)
MONOCYTES ABSOLUTE: 1 K/CU MM
MONOCYTES RELATIVE PERCENT: 10 % (ref 0–4)
MUCUS: ABNORMAL HPF
NEUTROPHILS ABSOLUTE: 7.7 K/CU MM
NEUTROPHILS RELATIVE PERCENT: 79.9 % (ref 36–66)
NITRITE URINE, QUANTITATIVE: NEGATIVE
NUCLEATED RBC %: 0 %
OPIATES, URINE: NEGATIVE
OXYCODONE: NEGATIVE
PDW BLD-RTO: 12.7 % (ref 11.7–14.9)
PH, URINE: 6 (ref 5–8)
PLATELET # BLD: 290 K/CU MM (ref 140–440)
PMV BLD AUTO: 8.8 FL (ref 7.5–11.1)
POTASSIUM SERPL-SCNC: 3.8 MMOL/L (ref 3.5–5.1)
PROTEIN UA: 100 MG/DL
RBC # BLD: 3.72 M/CU MM (ref 4.6–6.2)
RBC URINE: <1 /HPF (ref 0–3)
SED RATE, AUTOMATED: 55 MM/HR (ref 0–15)
SODIUM BLD-SCNC: 130 MMOL/L (ref 135–145)
SPECIFIC GRAVITY UA: 1.02 (ref 1–1.03)
SQUAMOUS EPITHELIAL: <1 /HPF
TOTAL IMMATURE NEUTOROPHIL: 0.06 K/CU MM
TOTAL NUCLEATED RBC: 0 K/CU MM
TOTAL PROTEIN: 6.5 GM/DL (ref 6.4–8.2)
TRICHOMONAS: ABNORMAL /HPF
UROBILINOGEN, URINE: 2 MG/DL (ref 0.2–1)
WBC # BLD: 9.6 K/CU MM (ref 4–10.5)
WBC UA: <1 /HPF (ref 0–2)

## 2024-07-19 PROCEDURE — 6360000002 HC RX W HCPCS: Performed by: STUDENT IN AN ORGANIZED HEALTH CARE EDUCATION/TRAINING PROGRAM

## 2024-07-19 PROCEDURE — 93005 ELECTROCARDIOGRAM TRACING: CPT | Performed by: STUDENT IN AN ORGANIZED HEALTH CARE EDUCATION/TRAINING PROGRAM

## 2024-07-19 PROCEDURE — 73701 CT LOWER EXTREMITY W/DYE: CPT

## 2024-07-19 PROCEDURE — 86140 C-REACTIVE PROTEIN: CPT

## 2024-07-19 PROCEDURE — 87186 SC STD MICRODIL/AGAR DIL: CPT

## 2024-07-19 PROCEDURE — 80053 COMPREHEN METABOLIC PANEL: CPT

## 2024-07-19 PROCEDURE — 85379 FIBRIN DEGRADATION QUANT: CPT

## 2024-07-19 PROCEDURE — 87040 BLOOD CULTURE FOR BACTERIA: CPT

## 2024-07-19 PROCEDURE — 85652 RBC SED RATE AUTOMATED: CPT

## 2024-07-19 PROCEDURE — 80307 DRUG TEST PRSMV CHEM ANLYZR: CPT

## 2024-07-19 PROCEDURE — 99285 EMERGENCY DEPT VISIT HI MDM: CPT

## 2024-07-19 PROCEDURE — 81001 URINALYSIS AUTO W/SCOPE: CPT

## 2024-07-19 PROCEDURE — 96375 TX/PRO/DX INJ NEW DRUG ADDON: CPT

## 2024-07-19 PROCEDURE — 2140000000 HC CCU INTERMEDIATE R&B

## 2024-07-19 PROCEDURE — G0480 DRUG TEST DEF 1-7 CLASSES: HCPCS

## 2024-07-19 PROCEDURE — 83605 ASSAY OF LACTIC ACID: CPT

## 2024-07-19 PROCEDURE — 96365 THER/PROPH/DIAG IV INF INIT: CPT

## 2024-07-19 PROCEDURE — 87150 DNA/RNA AMPLIFIED PROBE: CPT

## 2024-07-19 PROCEDURE — 2580000003 HC RX 258: Performed by: STUDENT IN AN ORGANIZED HEALTH CARE EDUCATION/TRAINING PROGRAM

## 2024-07-19 PROCEDURE — 84145 PROCALCITONIN (PCT): CPT

## 2024-07-19 PROCEDURE — 85025 COMPLETE CBC W/AUTO DIFF WBC: CPT

## 2024-07-19 PROCEDURE — 71045 X-RAY EXAM CHEST 1 VIEW: CPT

## 2024-07-19 PROCEDURE — 6360000004 HC RX CONTRAST MEDICATION: Performed by: STUDENT IN AN ORGANIZED HEALTH CARE EDUCATION/TRAINING PROGRAM

## 2024-07-19 RX ORDER — AMLODIPINE BESYLATE 10 MG/1
10 TABLET ORAL DAILY
Status: DISCONTINUED | OUTPATIENT
Start: 2024-07-20 | End: 2024-07-20

## 2024-07-19 RX ORDER — HYDROMORPHONE HYDROCHLORIDE 1 MG/ML
0.5 INJECTION, SOLUTION INTRAMUSCULAR; INTRAVENOUS; SUBCUTANEOUS EVERY 4 HOURS PRN
Status: COMPLETED | OUTPATIENT
Start: 2024-07-19 | End: 2024-07-20

## 2024-07-19 RX ORDER — HYDROMORPHONE HYDROCHLORIDE 1 MG/ML
1 INJECTION, SOLUTION INTRAMUSCULAR; INTRAVENOUS; SUBCUTANEOUS ONCE
Status: COMPLETED | OUTPATIENT
Start: 2024-07-19 | End: 2024-07-19

## 2024-07-19 RX ORDER — GLUCAGON 1 MG/ML
1 KIT INJECTION PRN
Status: DISCONTINUED | OUTPATIENT
Start: 2024-07-19 | End: 2024-07-26 | Stop reason: HOSPADM

## 2024-07-19 RX ORDER — DEXTROSE MONOHYDRATE 100 MG/ML
INJECTION, SOLUTION INTRAVENOUS CONTINUOUS PRN
Status: DISCONTINUED | OUTPATIENT
Start: 2024-07-19 | End: 2024-07-26 | Stop reason: HOSPADM

## 2024-07-19 RX ORDER — SODIUM CHLORIDE 0.9 % (FLUSH) 0.9 %
5-40 SYRINGE (ML) INJECTION 2 TIMES DAILY
Status: DISCONTINUED | OUTPATIENT
Start: 2024-07-19 | End: 2024-07-26 | Stop reason: HOSPADM

## 2024-07-19 RX ORDER — INSULIN LISPRO 100 [IU]/ML
15 INJECTION, SOLUTION INTRAVENOUS; SUBCUTANEOUS
Status: DISCONTINUED | OUTPATIENT
Start: 2024-07-20 | End: 2024-07-22

## 2024-07-19 RX ORDER — ATORVASTATIN CALCIUM 10 MG/1
20 TABLET, FILM COATED ORAL NIGHTLY
Status: DISCONTINUED | OUTPATIENT
Start: 2024-07-20 | End: 2024-07-26 | Stop reason: HOSPADM

## 2024-07-19 RX ORDER — MORPHINE SULFATE 4 MG/ML
4 INJECTION, SOLUTION INTRAMUSCULAR; INTRAVENOUS ONCE
Status: COMPLETED | OUTPATIENT
Start: 2024-07-19 | End: 2024-07-19

## 2024-07-19 RX ORDER — PANTOPRAZOLE SODIUM 40 MG/1
40 TABLET, DELAYED RELEASE ORAL
Status: DISCONTINUED | OUTPATIENT
Start: 2024-07-20 | End: 2024-07-26 | Stop reason: HOSPADM

## 2024-07-19 RX ORDER — NICOTINE 21 MG/24HR
1 PATCH, TRANSDERMAL 24 HOURS TRANSDERMAL DAILY PRN
Status: DISCONTINUED | OUTPATIENT
Start: 2024-07-19 | End: 2024-07-26 | Stop reason: HOSPADM

## 2024-07-19 RX ORDER — ALLOPURINOL 300 MG/1
300 TABLET ORAL DAILY
Status: DISCONTINUED | OUTPATIENT
Start: 2024-07-20 | End: 2024-07-26 | Stop reason: HOSPADM

## 2024-07-19 RX ORDER — ONDANSETRON 2 MG/ML
4 INJECTION INTRAMUSCULAR; INTRAVENOUS EVERY 6 HOURS PRN
Status: DISCONTINUED | OUTPATIENT
Start: 2024-07-19 | End: 2024-07-26 | Stop reason: HOSPADM

## 2024-07-19 RX ORDER — PREDNISOLONE ACETATE 10 MG/ML
1 SUSPENSION/ DROPS OPHTHALMIC 4 TIMES DAILY
Status: DISCONTINUED | OUTPATIENT
Start: 2024-07-20 | End: 2024-07-26 | Stop reason: HOSPADM

## 2024-07-19 RX ORDER — BUDESONIDE AND FORMOTEROL FUMARATE DIHYDRATE 160; 4.5 UG/1; UG/1
2 AEROSOL RESPIRATORY (INHALATION) 2 TIMES DAILY
Status: DISCONTINUED | OUTPATIENT
Start: 2024-07-20 | End: 2024-07-26 | Stop reason: HOSPADM

## 2024-07-19 RX ORDER — ACETAMINOPHEN 650 MG/1
650 SUPPOSITORY RECTAL EVERY 6 HOURS PRN
Status: DISCONTINUED | OUTPATIENT
Start: 2024-07-19 | End: 2024-07-26 | Stop reason: HOSPADM

## 2024-07-19 RX ORDER — 0.9 % SODIUM CHLORIDE 0.9 %
1000 INTRAVENOUS SOLUTION INTRAVENOUS ONCE
Status: DISCONTINUED | OUTPATIENT
Start: 2024-07-19 | End: 2024-07-19

## 2024-07-19 RX ORDER — LANOLIN ALCOHOL/MO/W.PET/CERES
3 CREAM (GRAM) TOPICAL NIGHTLY PRN
Status: DISCONTINUED | OUTPATIENT
Start: 2024-07-19 | End: 2024-07-26 | Stop reason: HOSPADM

## 2024-07-19 RX ORDER — POLYETHYLENE GLYCOL 3350 17 G/17G
17 POWDER, FOR SOLUTION ORAL DAILY PRN
Status: DISCONTINUED | OUTPATIENT
Start: 2024-07-19 | End: 2024-07-26 | Stop reason: HOSPADM

## 2024-07-19 RX ORDER — PREGABALIN 100 MG/1
100 CAPSULE ORAL 2 TIMES DAILY
Status: DISCONTINUED | OUTPATIENT
Start: 2024-07-20 | End: 2024-07-26 | Stop reason: HOSPADM

## 2024-07-19 RX ORDER — EZETIMIBE 10 MG/1
10 TABLET ORAL DAILY
Status: DISCONTINUED | OUTPATIENT
Start: 2024-07-20 | End: 2024-07-26 | Stop reason: HOSPADM

## 2024-07-19 RX ORDER — ACETAMINOPHEN 325 MG/1
650 TABLET ORAL EVERY 6 HOURS PRN
Status: DISCONTINUED | OUTPATIENT
Start: 2024-07-19 | End: 2024-07-26 | Stop reason: HOSPADM

## 2024-07-19 RX ORDER — FLUTICASONE PROPIONATE 50 MCG
2 SPRAY, SUSPENSION (ML) NASAL DAILY
Status: DISCONTINUED | OUTPATIENT
Start: 2024-07-20 | End: 2024-07-26 | Stop reason: HOSPADM

## 2024-07-19 RX ORDER — SODIUM CHLORIDE 9 MG/ML
INJECTION, SOLUTION INTRAVENOUS PRN
Status: DISCONTINUED | OUTPATIENT
Start: 2024-07-19 | End: 2024-07-26 | Stop reason: HOSPADM

## 2024-07-19 RX ORDER — ONDANSETRON 4 MG/1
4 TABLET, ORALLY DISINTEGRATING ORAL EVERY 8 HOURS PRN
Status: DISCONTINUED | OUTPATIENT
Start: 2024-07-19 | End: 2024-07-26 | Stop reason: HOSPADM

## 2024-07-19 RX ORDER — POTASSIUM CHLORIDE 7.45 MG/ML
10 INJECTION INTRAVENOUS PRN
Status: DISCONTINUED | OUTPATIENT
Start: 2024-07-19 | End: 2024-07-26 | Stop reason: HOSPADM

## 2024-07-19 RX ORDER — LISINOPRIL AND HYDROCHLOROTHIAZIDE 20; 12.5 MG/1; MG/1
1 TABLET ORAL DAILY
Status: DISCONTINUED | OUTPATIENT
Start: 2024-07-20 | End: 2024-07-26 | Stop reason: HOSPADM

## 2024-07-19 RX ORDER — SODIUM CHLORIDE 0.9 % (FLUSH) 0.9 %
5-40 SYRINGE (ML) INJECTION EVERY 12 HOURS SCHEDULED
Status: DISCONTINUED | OUTPATIENT
Start: 2024-07-20 | End: 2024-07-26 | Stop reason: HOSPADM

## 2024-07-19 RX ORDER — HYDROMORPHONE HYDROCHLORIDE 1 MG/ML
1 INJECTION, SOLUTION INTRAMUSCULAR; INTRAVENOUS; SUBCUTANEOUS EVERY 4 HOURS PRN
Status: COMPLETED | OUTPATIENT
Start: 2024-07-19 | End: 2024-07-20

## 2024-07-19 RX ORDER — INSULIN GLARGINE 100 [IU]/ML
40 INJECTION, SOLUTION SUBCUTANEOUS NIGHTLY
Status: DISCONTINUED | OUTPATIENT
Start: 2024-07-20 | End: 2024-07-22

## 2024-07-19 RX ORDER — INSULIN LISPRO 100 [IU]/ML
0-4 INJECTION, SOLUTION INTRAVENOUS; SUBCUTANEOUS NIGHTLY
Status: DISCONTINUED | OUTPATIENT
Start: 2024-07-20 | End: 2024-07-26 | Stop reason: HOSPADM

## 2024-07-19 RX ORDER — SODIUM CHLORIDE, SODIUM LACTATE, POTASSIUM CHLORIDE, CALCIUM CHLORIDE 600; 310; 30; 20 MG/100ML; MG/100ML; MG/100ML; MG/100ML
INJECTION, SOLUTION INTRAVENOUS CONTINUOUS
Status: DISPENSED | OUTPATIENT
Start: 2024-07-20 | End: 2024-07-20

## 2024-07-19 RX ORDER — SODIUM CHLORIDE 0.9 % (FLUSH) 0.9 %
5-40 SYRINGE (ML) INJECTION PRN
Status: DISCONTINUED | OUTPATIENT
Start: 2024-07-19 | End: 2024-07-26 | Stop reason: HOSPADM

## 2024-07-19 RX ORDER — MAGNESIUM SULFATE IN WATER 40 MG/ML
2000 INJECTION, SOLUTION INTRAVENOUS PRN
Status: DISCONTINUED | OUTPATIENT
Start: 2024-07-19 | End: 2024-07-26 | Stop reason: HOSPADM

## 2024-07-19 RX ORDER — 0.9 % SODIUM CHLORIDE 0.9 %
30 INTRAVENOUS SOLUTION INTRAVENOUS ONCE
Status: COMPLETED | OUTPATIENT
Start: 2024-07-19 | End: 2024-07-19

## 2024-07-19 RX ORDER — BRIMONIDINE TARTRATE 2 MG/ML
1 SOLUTION/ DROPS OPHTHALMIC 2 TIMES DAILY
Status: DISCONTINUED | OUTPATIENT
Start: 2024-07-20 | End: 2024-07-26 | Stop reason: HOSPADM

## 2024-07-19 RX ORDER — MOXIFLOXACIN 5 MG/ML
1 SOLUTION/ DROPS OPHTHALMIC 4 TIMES DAILY
Status: DISCONTINUED | OUTPATIENT
Start: 2024-07-20 | End: 2024-07-26 | Stop reason: HOSPADM

## 2024-07-19 RX ORDER — ENOXAPARIN SODIUM 100 MG/ML
30 INJECTION SUBCUTANEOUS 2 TIMES DAILY
Status: DISCONTINUED | OUTPATIENT
Start: 2024-07-20 | End: 2024-07-26 | Stop reason: HOSPADM

## 2024-07-19 RX ORDER — INSULIN LISPRO 100 [IU]/ML
0-8 INJECTION, SOLUTION INTRAVENOUS; SUBCUTANEOUS
Status: DISCONTINUED | OUTPATIENT
Start: 2024-07-20 | End: 2024-07-26 | Stop reason: HOSPADM

## 2024-07-19 RX ADMIN — MORPHINE SULFATE 4 MG: 4 INJECTION, SOLUTION INTRAMUSCULAR; INTRAVENOUS at 20:18

## 2024-07-19 RX ADMIN — HYDROMORPHONE HYDROCHLORIDE 1 MG: 1 INJECTION, SOLUTION INTRAMUSCULAR; INTRAVENOUS; SUBCUTANEOUS at 21:21

## 2024-07-19 RX ADMIN — SODIUM CHLORIDE 4000 ML: 9 INJECTION, SOLUTION INTRAVENOUS at 20:25

## 2024-07-19 RX ADMIN — SODIUM CHLORIDE 2500 MG: 9 INJECTION, SOLUTION INTRAVENOUS at 22:17

## 2024-07-19 RX ADMIN — IOPAMIDOL 75 ML: 755 INJECTION, SOLUTION INTRAVENOUS at 22:51

## 2024-07-19 RX ADMIN — CEFEPIME 2000 MG: 2 INJECTION, POWDER, FOR SOLUTION INTRAVENOUS at 20:24

## 2024-07-19 ASSESSMENT — PAIN DESCRIPTION - LOCATION
LOCATION: ANKLE

## 2024-07-19 ASSESSMENT — LIFESTYLE VARIABLES
HOW OFTEN DO YOU HAVE A DRINK CONTAINING ALCOHOL: 2-4 TIMES A MONTH
HOW MANY STANDARD DRINKS CONTAINING ALCOHOL DO YOU HAVE ON A TYPICAL DAY: 1 OR 2

## 2024-07-19 ASSESSMENT — PAIN DESCRIPTION - ORIENTATION
ORIENTATION: LEFT
ORIENTATION: LEFT

## 2024-07-19 ASSESSMENT — PAIN SCALES - GENERAL
PAINLEVEL_OUTOF10: 6
PAINLEVEL_OUTOF10: 10

## 2024-07-19 ASSESSMENT — PAIN - FUNCTIONAL ASSESSMENT: PAIN_FUNCTIONAL_ASSESSMENT: 0-10

## 2024-07-19 NOTE — ED PROVIDER NOTES
EMERGENCY DEPARTMENT HISTORY AND PHYSICAL EXAM      Patient Name: Azael Key Jr.  MRN: [id number]  : 1985  Date of Evaluation: 2024  ED Provider:  Margie Brown DO    History of Presenting Illness     Chief Complaint:   Chief Complaint   Patient presents with    Ankle Pain    Blood Infection     Sepsis alert called by medics       HPI: Patient is a 38 y.o. male with past medical history of diabetes presenting to the emergency department with a chief complaint of bilateral foot pain.  Patient states he was seen and evaluated the emergency department 2 days ago for bilateral foot pain and ever since he has had worsening redness and swelling primarily on the left.  Patient denies any cough, chest pain, shortness of breath.  Patient reports subjective fevers and chills.  Patient reports a history of multiple toes being amputated due to diabetes complications.      Past History     Past Medical History:   Past Medical History:   Diagnosis Date    Asthma     Blind left eye     Diabetes mellitus (HCC)     GERD (gastroesophageal reflux disease)     Hypertension     Retinal detachment 2012    left     Surgical History:   Past Surgical History:   Procedure Laterality Date    CORNEAL TRANSPLANT Bilateral     EYE SURGERY      left eye removed    EYE SURGERY Right     FOOT DEBRIDEMENT Right 10/16/2023    FOOT DEBRIDEMENT INCISION AND DRAINAGE RIGHT performed by Abdon Yee MD at [facility] OR    FRACTURE SURGERY      foot left    LAPAROSCOPIC APPENDECTOMY N/A 2022    APPENDECTOMY LAPAROSCOPIC performed by Fortino Obrien MD at [facility] OR    MANDIBLE SURGERY Right     MANDIBLE SURGERY Bilateral     TOE AMPUTATION Right 2017    TOE AMPUTATION Right 2022    TOE AMPUTATION, RAY AMPUTATION OF RIGHT SECOND TOE performed by Abdon Yee MD at [facility] OR    TOE AMPUTATION Right 2023    THIRD TOE AMPUTATION AND REVISION OF SECOND TOE AMPUTATION performed by Roxanne Galo MD at [facility] OR

## 2024-07-19 NOTE — ED TRIAGE NOTES
Patient presents via EMS as a possible sepsis alert. Initial call for left ankle pain. Temp for medics was 100.5 temporal. Left lower extremity severely swollen.

## 2024-07-20 LAB
ALBUMIN SERPL-MCNC: 2.6 GM/DL (ref 3.4–5)
ALP BLD-CCNC: 83 IU/L (ref 40–128)
ALT SERPL-CCNC: 14 U/L (ref 10–40)
ANION GAP SERPL CALCULATED.3IONS-SCNC: 15 MMOL/L (ref 7–16)
AST SERPL-CCNC: 16 IU/L (ref 15–37)
BASOPHILS ABSOLUTE: 0 K/CU MM
BASOPHILS RELATIVE PERCENT: 0.2 % (ref 0–1)
BILIRUB SERPL-MCNC: 0.7 MG/DL (ref 0–1)
BUN SERPL-MCNC: 8 MG/DL (ref 6–23)
CALCIUM SERPL-MCNC: 8.1 MG/DL (ref 8.3–10.6)
CHLORIDE BLD-SCNC: 99 MMOL/L (ref 99–110)
CO2: 19 MMOL/L (ref 21–32)
CREAT SERPL-MCNC: 0.6 MG/DL (ref 0.9–1.3)
CRP SERPL HS-MCNC: 338.1 MG/L
DIFFERENTIAL TYPE: ABNORMAL
EKG ATRIAL RATE: 132 BPM
EKG DIAGNOSIS: NORMAL
EKG P AXIS: 53 DEGREES
EKG P-R INTERVAL: 134 MS
EKG Q-T INTERVAL: 310 MS
EKG QRS DURATION: 86 MS
EKG QTC CALCULATION (BAZETT): 459 MS
EKG R AXIS: -26 DEGREES
EKG T AXIS: 64 DEGREES
EKG VENTRICULAR RATE: 132 BPM
EOSINOPHILS ABSOLUTE: 0 K/CU MM
EOSINOPHILS RELATIVE PERCENT: 0.2 % (ref 0–3)
ESTIMATED AVERAGE GLUCOSE: 143 MG/DL
GFR, ESTIMATED: >90 ML/MIN/1.73M2
GLUCOSE BLD-MCNC: 120 MG/DL (ref 70–99)
GLUCOSE BLD-MCNC: 135 MG/DL (ref 70–99)
GLUCOSE BLD-MCNC: 199 MG/DL (ref 70–99)
GLUCOSE BLD-MCNC: 212 MG/DL (ref 70–99)
GLUCOSE BLD-MCNC: 280 MG/DL (ref 70–99)
GLUCOSE SERPL-MCNC: 166 MG/DL (ref 70–99)
HBA1C MFR BLD: 6.6 % (ref 4.2–6.3)
HCT VFR BLD CALC: 28.9 % (ref 42–52)
HEMOGLOBIN: 10 GM/DL (ref 13.5–18)
IMMATURE NEUTROPHIL %: 0.6 % (ref 0–0.43)
INR BLD: 1.5 INDEX
LYMPHOCYTES ABSOLUTE: 0.8 K/CU MM
LYMPHOCYTES RELATIVE PERCENT: 9.4 % (ref 24–44)
MCH RBC QN AUTO: 31.7 PG (ref 27–31)
MCHC RBC AUTO-ENTMCNC: 34.6 % (ref 32–36)
MCV RBC AUTO: 91.7 FL (ref 78–100)
MONOCYTES ABSOLUTE: 1.1 K/CU MM
MONOCYTES RELATIVE PERCENT: 12.6 % (ref 0–4)
NEUTROPHILS ABSOLUTE: 6.8 K/CU MM
NEUTROPHILS RELATIVE PERCENT: 77 % (ref 36–66)
NUCLEATED RBC %: 0 %
PDW BLD-RTO: 12.8 % (ref 11.7–14.9)
PLATELET # BLD: 253 K/CU MM (ref 140–440)
PMV BLD AUTO: 9 FL (ref 7.5–11.1)
POTASSIUM SERPL-SCNC: 3.8 MMOL/L (ref 3.5–5.1)
PROCALCITONIN SERPL-MCNC: 2.57 NG/ML
PROTHROMBIN TIME: 18.9 SECONDS (ref 11.7–14.5)
RBC # BLD: 3.15 M/CU MM (ref 4.6–6.2)
SODIUM BLD-SCNC: 133 MMOL/L (ref 135–145)
TOTAL IMMATURE NEUTOROPHIL: 0.05 K/CU MM
TOTAL NUCLEATED RBC: 0 K/CU MM
TOTAL PROTEIN: 5.9 GM/DL (ref 6.4–8.2)
URATE SERPL-MCNC: 4.9 MG/DL (ref 3.5–7.2)
WBC # BLD: 8.8 K/CU MM (ref 4–10.5)

## 2024-07-20 PROCEDURE — 84550 ASSAY OF BLOOD/URIC ACID: CPT

## 2024-07-20 PROCEDURE — 87070 CULTURE OTHR SPECIMN AEROBIC: CPT

## 2024-07-20 PROCEDURE — 87075 CULTR BACTERIA EXCEPT BLOOD: CPT

## 2024-07-20 PROCEDURE — 6370000000 HC RX 637 (ALT 250 FOR IP): Performed by: STUDENT IN AN ORGANIZED HEALTH CARE EDUCATION/TRAINING PROGRAM

## 2024-07-20 PROCEDURE — 6360000002 HC RX W HCPCS

## 2024-07-20 PROCEDURE — 94664 DEMO&/EVAL PT USE INHALER: CPT

## 2024-07-20 PROCEDURE — 2580000003 HC RX 258: Performed by: STUDENT IN AN ORGANIZED HEALTH CARE EDUCATION/TRAINING PROGRAM

## 2024-07-20 PROCEDURE — 2140000000 HC CCU INTERMEDIATE R&B

## 2024-07-20 PROCEDURE — 82962 GLUCOSE BLOOD TEST: CPT

## 2024-07-20 PROCEDURE — 6360000002 HC RX W HCPCS: Performed by: STUDENT IN AN ORGANIZED HEALTH CARE EDUCATION/TRAINING PROGRAM

## 2024-07-20 PROCEDURE — 87186 SC STD MICRODIL/AGAR DIL: CPT

## 2024-07-20 PROCEDURE — 36415 COLL VENOUS BLD VENIPUNCTURE: CPT

## 2024-07-20 PROCEDURE — 85610 PROTHROMBIN TIME: CPT

## 2024-07-20 PROCEDURE — 6370000000 HC RX 637 (ALT 250 FOR IP)

## 2024-07-20 PROCEDURE — 87077 CULTURE AEROBIC IDENTIFY: CPT

## 2024-07-20 PROCEDURE — 94640 AIRWAY INHALATION TREATMENT: CPT

## 2024-07-20 PROCEDURE — 80053 COMPREHEN METABOLIC PANEL: CPT

## 2024-07-20 PROCEDURE — 85025 COMPLETE CBC W/AUTO DIFF WBC: CPT

## 2024-07-20 PROCEDURE — 93010 ELECTROCARDIOGRAM REPORT: CPT | Performed by: INTERNAL MEDICINE

## 2024-07-20 PROCEDURE — 83036 HEMOGLOBIN GLYCOSYLATED A1C: CPT

## 2024-07-20 RX ORDER — ALBUTEROL SULFATE 90 UG/1
2 AEROSOL, METERED RESPIRATORY (INHALATION) EVERY 4 HOURS PRN
Status: DISCONTINUED | OUTPATIENT
Start: 2024-07-20 | End: 2024-07-26 | Stop reason: HOSPADM

## 2024-07-20 RX ORDER — TIMOLOL MALEATE 5 MG/ML
1 SOLUTION/ DROPS OPHTHALMIC 2 TIMES DAILY
Status: DISCONTINUED | OUTPATIENT
Start: 2024-07-20 | End: 2024-07-26 | Stop reason: HOSPADM

## 2024-07-20 RX ORDER — DORZOLAMIDE HCL 20 MG/ML
1 SOLUTION/ DROPS OPHTHALMIC 2 TIMES DAILY
Status: DISCONTINUED | OUTPATIENT
Start: 2024-07-20 | End: 2024-07-26 | Stop reason: HOSPADM

## 2024-07-20 RX ORDER — OXYCODONE HYDROCHLORIDE AND ACETAMINOPHEN 5; 325 MG/1; MG/1
1 TABLET ORAL EVERY 6 HOURS PRN
Status: DISCONTINUED | OUTPATIENT
Start: 2024-07-20 | End: 2024-07-21

## 2024-07-20 RX ORDER — OXYCODONE HYDROCHLORIDE AND ACETAMINOPHEN 5; 325 MG/1; MG/1
1 TABLET ORAL EVERY 6 HOURS PRN
Status: COMPLETED | OUTPATIENT
Start: 2024-07-20 | End: 2024-07-20

## 2024-07-20 RX ORDER — AMLODIPINE BESYLATE 10 MG/1
10 TABLET ORAL DAILY
Status: DISCONTINUED | OUTPATIENT
Start: 2024-07-20 | End: 2024-07-26 | Stop reason: HOSPADM

## 2024-07-20 RX ADMIN — BUDESONIDE AND FORMOTEROL FUMARATE DIHYDRATE 2 PUFF: 160; 4.5 AEROSOL RESPIRATORY (INHALATION) at 19:37

## 2024-07-20 RX ADMIN — ATORVASTATIN CALCIUM 20 MG: 10 TABLET, FILM COATED ORAL at 00:55

## 2024-07-20 RX ADMIN — PREDNISOLONE ACETATE 1 DROP: 10 SUSPENSION/ DROPS OPHTHALMIC at 21:17

## 2024-07-20 RX ADMIN — ATORVASTATIN CALCIUM 20 MG: 10 TABLET, FILM COATED ORAL at 21:14

## 2024-07-20 RX ADMIN — TIMOLOL MALEATE 1 DROP: 5 SOLUTION OPHTHALMIC at 21:16

## 2024-07-20 RX ADMIN — PREDNISOLONE ACETATE 1 DROP: 10 SUSPENSION/ DROPS OPHTHALMIC at 10:12

## 2024-07-20 RX ADMIN — OXYCODONE AND ACETAMINOPHEN 1 TABLET: 5; 325 TABLET ORAL at 12:58

## 2024-07-20 RX ADMIN — OXYCODONE HYDROCHLORIDE AND ACETAMINOPHEN 1 TABLET: 5; 325 TABLET ORAL at 00:55

## 2024-07-20 RX ADMIN — PREDNISOLONE ACETATE 1 DROP: 10 SUSPENSION/ DROPS OPHTHALMIC at 03:30

## 2024-07-20 RX ADMIN — BUDESONIDE AND FORMOTEROL FUMARATE DIHYDRATE 2 PUFF: 160; 4.5 AEROSOL RESPIRATORY (INHALATION) at 09:10

## 2024-07-20 RX ADMIN — ALLOPURINOL 300 MG: 300 TABLET ORAL at 08:15

## 2024-07-20 RX ADMIN — PREGABALIN 100 MG: 100 CAPSULE ORAL at 00:55

## 2024-07-20 RX ADMIN — VANCOMYCIN HYDROCHLORIDE 1500 MG: 1.5 INJECTION, POWDER, LYOPHILIZED, FOR SOLUTION INTRAVENOUS at 10:10

## 2024-07-20 RX ADMIN — PREDNISOLONE ACETATE 1 DROP: 10 SUSPENSION/ DROPS OPHTHALMIC at 15:43

## 2024-07-20 RX ADMIN — VANCOMYCIN HYDROCHLORIDE 1500 MG: 1.5 INJECTION, POWDER, LYOPHILIZED, FOR SOLUTION INTRAVENOUS at 21:28

## 2024-07-20 RX ADMIN — DORZOLAMIDE HYDROCHLORIDE 1 DROP: 20 SOLUTION/ DROPS OPHTHALMIC at 08:51

## 2024-07-20 RX ADMIN — PANTOPRAZOLE SODIUM 40 MG: 40 TABLET, DELAYED RELEASE ORAL at 06:42

## 2024-07-20 RX ADMIN — ENOXAPARIN SODIUM 30 MG: 100 INJECTION SUBCUTANEOUS at 08:15

## 2024-07-20 RX ADMIN — INSULIN GLARGINE 40 UNITS: 100 INJECTION, SOLUTION SUBCUTANEOUS at 21:15

## 2024-07-20 RX ADMIN — INSULIN GLARGINE 40 UNITS: 100 INJECTION, SOLUTION SUBCUTANEOUS at 00:55

## 2024-07-20 RX ADMIN — HYDROMORPHONE HYDROCHLORIDE 1 MG: 1 INJECTION, SOLUTION INTRAMUSCULAR; INTRAVENOUS; SUBCUTANEOUS at 04:09

## 2024-07-20 RX ADMIN — PREDNISOLONE ACETATE 1 DROP: 10 SUSPENSION/ DROPS OPHTHALMIC at 18:39

## 2024-07-20 RX ADMIN — OXYCODONE AND ACETAMINOPHEN 1 TABLET: 5; 325 TABLET ORAL at 21:15

## 2024-07-20 RX ADMIN — LISINOPRIL AND HYDROCHLOROTHIAZIDE 1 TABLET: 12.5; 2 TABLET ORAL at 08:15

## 2024-07-20 RX ADMIN — TIMOLOL MALEATE 1 DROP: 5 SOLUTION OPHTHALMIC at 08:51

## 2024-07-20 RX ADMIN — SODIUM CHLORIDE, POTASSIUM CHLORIDE, SODIUM LACTATE AND CALCIUM CHLORIDE: 600; 310; 30; 20 INJECTION, SOLUTION INTRAVENOUS at 01:00

## 2024-07-20 RX ADMIN — PREGABALIN 100 MG: 100 CAPSULE ORAL at 21:15

## 2024-07-20 RX ADMIN — INSULIN LISPRO 15 UNITS: 100 INJECTION, SOLUTION INTRAVENOUS; SUBCUTANEOUS at 18:36

## 2024-07-20 RX ADMIN — HYDROMORPHONE HYDROCHLORIDE 1 MG: 1 INJECTION, SOLUTION INTRAMUSCULAR; INTRAVENOUS; SUBCUTANEOUS at 00:15

## 2024-07-20 RX ADMIN — OXYCODONE HYDROCHLORIDE AND ACETAMINOPHEN 1 TABLET: 5; 325 TABLET ORAL at 06:42

## 2024-07-20 RX ADMIN — SODIUM CHLORIDE, PRESERVATIVE FREE 10 ML: 5 INJECTION INTRAVENOUS at 08:18

## 2024-07-20 RX ADMIN — BRIMONIDINE TARTRATE 1 DROP: 2 SOLUTION OPHTHALMIC at 18:36

## 2024-07-20 RX ADMIN — DORZOLAMIDE HYDROCHLORIDE 1 DROP: 20 SOLUTION/ DROPS OPHTHALMIC at 21:16

## 2024-07-20 RX ADMIN — BRIMONIDINE TARTRATE 1 DROP: 2 SOLUTION OPHTHALMIC at 08:51

## 2024-07-20 RX ADMIN — HYDROMORPHONE HYDROCHLORIDE 0.5 MG: 1 INJECTION, SOLUTION INTRAMUSCULAR; INTRAVENOUS; SUBCUTANEOUS at 22:06

## 2024-07-20 RX ADMIN — HYDROMORPHONE HYDROCHLORIDE 1 MG: 1 INJECTION, SOLUTION INTRAMUSCULAR; INTRAVENOUS; SUBCUTANEOUS at 08:16

## 2024-07-20 RX ADMIN — PREGABALIN 100 MG: 100 CAPSULE ORAL at 10:02

## 2024-07-20 RX ADMIN — EZETIMIBE 10 MG: 10 TABLET ORAL at 08:15

## 2024-07-20 RX ADMIN — INSULIN LISPRO 2 UNITS: 100 INJECTION, SOLUTION INTRAVENOUS; SUBCUTANEOUS at 18:36

## 2024-07-20 RX ADMIN — AMLODIPINE BESYLATE 10 MG: 10 TABLET ORAL at 06:42

## 2024-07-20 ASSESSMENT — PAIN DESCRIPTION - LOCATION
LOCATION: FOOT

## 2024-07-20 ASSESSMENT — PAIN SCALES - GENERAL
PAINLEVEL_OUTOF10: 8
PAINLEVEL_OUTOF10: 3
PAINLEVEL_OUTOF10: 9
PAINLEVEL_OUTOF10: 7
PAINLEVEL_OUTOF10: 5
PAINLEVEL_OUTOF10: 8
PAINLEVEL_OUTOF10: 10
PAINLEVEL_OUTOF10: 9

## 2024-07-20 ASSESSMENT — PAIN DESCRIPTION - ORIENTATION
ORIENTATION: LEFT

## 2024-07-20 ASSESSMENT — PAIN SCALES - WONG BAKER
WONGBAKER_NUMERICALRESPONSE: NO HURT
WONGBAKER_NUMERICALRESPONSE: NO HURT

## 2024-07-20 ASSESSMENT — PAIN DESCRIPTION - DESCRIPTORS: DESCRIPTORS: ACHING

## 2024-07-20 NOTE — PROGRESS NOTES
PHARMACY VANCOMYCIN MONITORING SERVICE  Pharmacy consulted by Dr. Mckeon for monitoring and adjustment.    Indication for treatment: Vancomycin indication: Other: Sepsis secondary to right and left foot cellulitis  Goal trough: Trough Goal: 10-15 mcg/mL  AUC/SHELLEY: <500    Risk Factors for MRSA Identified:   Purulent and/or complicated SSTI, Documented isolation of MRSA within 1 year     Pertinent Laboratory Values:   Temp Readings from Last 3 Encounters:   07/19/24 98.5 °F (36.9 °C) (Oral)   07/17/24 99.2 °F (37.3 °C) (Oral)   06/18/24 97.3 °F (36.3 °C) (Temporal)     Recent Labs     07/17/24  0315 07/19/24 1956   WBC  --  9.6   LACTATE 2.1*  --      Recent Labs     07/19/24 1956   BUN 9   CREATININE 0.8*     Estimated Creatinine Clearance: 183 mL/min (A) (based on SCr of 0.8 mg/dL (L)).  No intake or output data in the 24 hours ending 07/20/24 0200  Last Encounter Weight:  Wt Readings from Last 3 Encounters:   07/19/24 134.7 kg (297 lb)   07/17/24 132 kg (291 lb)   05/28/24 136.1 kg (300 lb)       Pertinent Cultures:   Date    Source    Results  7/19   Blood    In process    Vancomycin level:   TROUGH:  No results for input(s): \"VANCOTROUGH\" in the last 72 hours.  RANDOM:  No results for input(s): \"VANCORANDOM\" in the last 72 hours.    Assessment:  HPI: 38 year old male with a history of hypertension, uncontrolled diabetes mellitus type 2, chronic bilateral foot infection with Charcot foot, cocaine abuse, tobacco abuse, and alcohol abuse who presented to the ED with worsening swelling of both feet and foot pain. Patient was seen in the ED for a similar issue about three days ago.  SCr, BUN, and urine output: Scr = 0.8, BUN = 9, No I/O data  Day(s) of therapy: 1 of 7  Vancomycin concentration: To be collected    Plan:  Received Vancomycin 2500 mg x 1 in the ED  Will start vancomycin 1500 mg IV every 12 hours  Predicted trough of 12.0 and AUC: 427 at steady-state  Pharmacy will continue to monitor patient and

## 2024-07-20 NOTE — ED TRIAGE NOTES
ED TO INPATIENT SBAR HANDOFF    Patient Name: Azael Key Jr.   :  1985  38 y.o.   Preferred Name  Azael   Family/Caregiver Present no   Restraints no   C-SSRS: Risk of Suicide: No Risk  Sitter no   Sepsis Risk Score        Situation  Chief Complaint   Patient presents with    Ankle Pain    Blood Infection     Sepsis alert called by medics     Brief Description of Patient's Condition:   Patient presents via EMS as a possible sepsis alert. Initial call for left ankle pain. Temp for medics was 100.5 temporal. Left lower extremity severely swollen.                  Mental Status: oriented, alert, coherent, logical, thought processes intact, and able to concentrate and follow conversation  Arrived from: home    Imaging:   XR CHEST PORTABLE   Final Result   No acute cardiopulmonary abnormality.         Electronically signed by Erasmo Fine      CT FOOT LEFT W CONTRAST    (Results Pending)   CT FOOT RIGHT W CONTRAST    (Results Pending)   CT TIBIA FIBULA LEFT W CONTRAST    (Results Pending)     Abnormal labs:   Abnormal Labs Reviewed   CBC WITH AUTO DIFFERENTIAL - Abnormal; Notable for the following components:       Result Value    RBC 3.72 (*)     Hemoglobin 11.9 (*)     Hematocrit 33.3 (*)     MCH 32.0 (*)     Neutrophils % 79.9 (*)     Lymphocytes % 9.1 (*)     Monocytes % 10.0 (*)     Immature Neutrophil % 0.6 (*)     All other components within normal limits   COMPREHENSIVE METABOLIC PANEL - Abnormal; Notable for the following components:    Sodium 130 (*)     Chloride 94 (*)     CO2 20 (*)     Glucose 192 (*)     Creatinine 0.8 (*)     Albumin 3.0 (*)     All other components within normal limits   LACTATE, SEPSIS - Abnormal; Notable for the following components:    Lactic Acid, Sepsis 2.9 (*)     All other components within normal limits   URINALYSIS - Abnormal; Notable for the following components:    Glucose, Ur >1000 (*)     Bilirubin, Urine SMALL NUMBER OR AMOUNT OBSERVED (*)     Ketones, Urine >80 (*)

## 2024-07-20 NOTE — PROGRESS NOTES
V2.0    [facility] Progress Note      Name:  Azael Key Jr. /Age/Sex: 1985  (38 y.o. male)   MRN & CSN:  [id number] & [id number] Encounter Date/Time: 2024 2:35 PM EDT   Location:  [facility]5/3115-A PCP: Geri Almonte MD     Attending:Fay Pleitez MD       Hospital Day: 2    Assessment and Recommendations   Azael Key Jr. is a 38 y.o. male who presents with Left foot infection      Plan:     # Sepsis secondary to left and right foot cellulitis  # Hx of right foot gangrene s/p 2-4th digits amputation in 10/2023  - Endorsed worsening swelling, erythema and pain of left foot with fever of 100.5 °F over past 3 days. Denied any recent trauma or injuries. Has chronic bilateral feet infection with Charcot foot, followed by WC. Hx of recurrent MRSA.   - Purulent, afebrile, no leukocytosis, LA 2.9 with normal repeat.   - CT of left fibula/fibula reveals cellulitis with concern for chronic osteomyelitis.  CT of left foot reveals osteomyelitis of ankle and tarsal joints.  CT of right foot reveals osteomyelitis of second metatarsal head with associated soft tissue abscess formation and cellulitis.  - Started on IVF and Vanc/Cefepime  - General surgery and ID consulted  - Follow-up cultures and inflammatory markers.      # Uncontrolled T2DM with hyperglycemia, peripheral neuropathy and OS blindness  - Last A1c 8.3% in 10/2023, repeat 6.6   - Held Metformin and Glipizide.  - Decreased to Insulin Lantus 40 u qhs and Lispro 15 u TIDWM with MCSI. Continue Lyrica.     # Essential hypertension  - Continue Norvasc, Lisinopril and HCTZ.     # Tachycardia  - ECG with ST only.   - Likely secondary to sepsis. No hypoxemia.  - Monitor for now. Follow-up UDS.     # Hx of illicit substance use  - Denied any use of substances.  - Previous UDSs positive for cocaine.  - Follow-up repeat UDS. Monitor for sxs of withdrawal.      # Alcohol abuse  - Hx of drinking \"few beers\" daily. No hx of withdrawals or seizures. Last drink 3  probably reflecting sequelae of previous trauma or insult with the largest focus measuring 1.2 x 1.1 cm. There is no intramuscular hematoma noted. Extensive vascular calcifications are evident. There is no fracture or dislocation. There is no osseous destruction however there is periosteal reaction around the distal tibia and fibula incompletely imaged on this study. There are minimal degenerative changes of the visualized knee.     Cellulitis. No abscess identified. There is periosteal reaction around the distal tibia and fibula concerning for underlying chronic osteomyelitis. Contrast-enhanced MRI is suggested Electronically signed by Erasmo Fine    CT FOOT RIGHT W CONTRAST    Result Date: 7/20/2024  ELBERT CABRERA JR. EXAMINATION: CT FOOT RIGHT W CONTRAST COMPARISON:  None  HISTORY:  Purluent drainage TECHNIQUE: Axial images were obtained through the right foot post administration of IV contrast. Oral contrast was also administered. Coronal reconstruction images were obtained from the axial views. CT scan performed  using dose optimization techniques including the following automated exposure control; adjustment of mA and/or kV; use of iterative reconstruction technique.  Automatic exposure control was used to reduce radiation dose. Permanent radiation dose record is archived to PACS. FINDINGS: There is diffuse soft tissue swelling noted surrounding the digits most marked involving the first, second and third digits with thickening of the adjacent skin most marked along the dorsal aspect with areas of lucency adjacent to the second metatarsal head concerning for soft tissue abscess formation with subtle rim enhancement noted adjacent to the second metatarsal head. There are erosive changes noted involving the second metatarsal head consistent with osteomyelitis. Distal aspect of the third metatarsal and third digit are absent. The fourth and fifth digits are also absent. There are moderate degenerative changes

## 2024-07-20 NOTE — PROGRESS NOTES
4 Eyes Skin Assessment     NAME:  Azael Kye Jr.  YOB: 1985  MEDICAL RECORD NUMBER:  [id number]    The patient is being assessed for  Admission    I agree that at least one RN has performed a thorough Head to Toe Skin Assessment on the patient. ALL assessment sites listed below have been assessed.      Areas assessed by both nurses:    Head, Face, Ears, Shoulders, Back, Chest, Arms, Elbows, Hands, Sacrum. Buttock, Coccyx, Ischium, Legs. Feet and Heels, and Under Medical Devices         Does the Patient have a Wound? Yes wound(s) were present on assessment. LDA wound assessment was Initiated and completed by RN       Christopher Prevention initiated by RN: Yes  Wound Care Orders initiated by RN: Yes    Pressure Injury (Stage 3,4, Unstageable, DTI, NWPT, and Complex wounds) if present, place Wound referral order by RN under : No    New Ostomies, if present place, Ostomy referral order under : No     Nurse 1 eSignature: Electronically signed by Ritesh Angel RN on 7/20/24 at 4:36 AM EDT    **SHARE this note so that the co-signing nurse can place an eSignature**    Nurse 2 eSignature: Electronically signed by Martina Okeefe RN on 7/20/24 at 4:37 AM EDT

## 2024-07-20 NOTE — H&P
hours.  Lactic Acid: No results for input(s): \"LACTA\" in the last 72 hours.  UA:  Lab Results   Component Value Date/Time    NITRU NEGATIVE 07/19/2024 08:01 PM    COLORU YELLOW 07/19/2024 08:01 PM    PHUR 6.0 07/19/2024 08:01 PM    WBCUA <1 07/19/2024 08:01 PM    RBCUA <1 07/19/2024 08:01 PM    MUCUS RARE 07/19/2024 08:01 PM    TRICHOMONAS NONE SEEN 07/19/2024 08:01 PM    BACTERIA NEGATIVE 07/19/2024 08:01 PM    CLARITYU CLEAR 07/19/2024 08:01 PM    LEUKOCYTESUR NEGATIVE 07/19/2024 08:01 PM    UROBILINOGEN 2.0 07/19/2024 08:01 PM    BILIRUBINUR SMALL NUMBER OR AMOUNT OBSERVED 07/19/2024 08:01 PM    BLOODU SMALL NUMBER OR AMOUNT OBSERVED 07/19/2024 08:01 PM    GLUCOSEU >1000 07/19/2024 08:01 PM    KETUA >80 07/19/2024 08:01 PM     Urine Cultures: No results found for: \"LABURIN\"  Blood Cultures: No results found for: \"BC\"  No results found for: \"BLOODCULT2\"  Organism:   Lab Results   Component Value Date/Time    ORG MRSA 12/23/2018 01:00 PM    ORG BSGA 12/23/2018 01:00 PM       Radiology results:  XR CHEST PORTABLE   Final Result   No acute cardiopulmonary abnormality.         Electronically signed by Erasmo Fine      CT FOOT LEFT W CONTRAST    (Results Pending)   CT FOOT RIGHT W CONTRAST    (Results Pending)   CT TIBIA FIBULA LEFT W CONTRAST    (Results Pending)       Aguila Mckeon MD  07/19/24 10:50 PM

## 2024-07-21 LAB
GLUCOSE BLD-MCNC: 179 MG/DL (ref 70–99)
GLUCOSE BLD-MCNC: 222 MG/DL (ref 70–99)
GLUCOSE BLD-MCNC: 233 MG/DL (ref 70–99)
GLUCOSE BLD-MCNC: 239 MG/DL (ref 70–99)

## 2024-07-21 PROCEDURE — 05HC33Z INSERTION OF INFUSION DEVICE INTO LEFT BASILIC VEIN, PERCUTANEOUS APPROACH: ICD-10-PCS | Performed by: STUDENT IN AN ORGANIZED HEALTH CARE EDUCATION/TRAINING PROGRAM

## 2024-07-21 PROCEDURE — 82962 GLUCOSE BLOOD TEST: CPT

## 2024-07-21 PROCEDURE — 6370000000 HC RX 637 (ALT 250 FOR IP)

## 2024-07-21 PROCEDURE — 6370000000 HC RX 637 (ALT 250 FOR IP): Performed by: STUDENT IN AN ORGANIZED HEALTH CARE EDUCATION/TRAINING PROGRAM

## 2024-07-21 PROCEDURE — 94761 N-INVAS EAR/PLS OXIMETRY MLT: CPT

## 2024-07-21 PROCEDURE — 36410 VNPNXR 3YR/> PHY/QHP DX/THER: CPT

## 2024-07-21 PROCEDURE — 2140000000 HC CCU INTERMEDIATE R&B

## 2024-07-21 PROCEDURE — 94640 AIRWAY INHALATION TREATMENT: CPT

## 2024-07-21 PROCEDURE — 2580000003 HC RX 258: Performed by: STUDENT IN AN ORGANIZED HEALTH CARE EDUCATION/TRAINING PROGRAM

## 2024-07-21 PROCEDURE — 6360000002 HC RX W HCPCS: Performed by: STUDENT IN AN ORGANIZED HEALTH CARE EDUCATION/TRAINING PROGRAM

## 2024-07-21 PROCEDURE — 76937 US GUIDE VASCULAR ACCESS: CPT

## 2024-07-21 RX ORDER — TRAZODONE HYDROCHLORIDE 50 MG/1
50 TABLET ORAL ONCE
Status: COMPLETED | OUTPATIENT
Start: 2024-07-21 | End: 2024-07-21

## 2024-07-21 RX ORDER — OXYCODONE HYDROCHLORIDE AND ACETAMINOPHEN 5; 325 MG/1; MG/1
1 TABLET ORAL EVERY 4 HOURS PRN
Status: DISCONTINUED | OUTPATIENT
Start: 2024-07-21 | End: 2024-07-23

## 2024-07-21 RX ORDER — OXYCODONE HYDROCHLORIDE AND ACETAMINOPHEN 5; 325 MG/1; MG/1
2 TABLET ORAL EVERY 4 HOURS PRN
Status: DISCONTINUED | OUTPATIENT
Start: 2024-07-21 | End: 2024-07-23

## 2024-07-21 RX ADMIN — INSULIN LISPRO 2 UNITS: 100 INJECTION, SOLUTION INTRAVENOUS; SUBCUTANEOUS at 17:08

## 2024-07-21 RX ADMIN — INSULIN LISPRO 15 UNITS: 100 INJECTION, SOLUTION INTRAVENOUS; SUBCUTANEOUS at 12:23

## 2024-07-21 RX ADMIN — VANCOMYCIN HYDROCHLORIDE 1500 MG: 1.5 INJECTION, POWDER, LYOPHILIZED, FOR SOLUTION INTRAVENOUS at 20:56

## 2024-07-21 RX ADMIN — EZETIMIBE 10 MG: 10 TABLET ORAL at 08:55

## 2024-07-21 RX ADMIN — BUDESONIDE AND FORMOTEROL FUMARATE DIHYDRATE 2 PUFF: 160; 4.5 AEROSOL RESPIRATORY (INHALATION) at 20:50

## 2024-07-21 RX ADMIN — PREGABALIN 100 MG: 100 CAPSULE ORAL at 20:41

## 2024-07-21 RX ADMIN — TIMOLOL MALEATE 1 DROP: 5 SOLUTION OPHTHALMIC at 20:48

## 2024-07-21 RX ADMIN — TRAZODONE HYDROCHLORIDE 50 MG: 50 TABLET ORAL at 21:29

## 2024-07-21 RX ADMIN — OXYCODONE HYDROCHLORIDE AND ACETAMINOPHEN 2 TABLET: 5; 325 TABLET ORAL at 10:49

## 2024-07-21 RX ADMIN — INSULIN LISPRO 15 UNITS: 100 INJECTION, SOLUTION INTRAVENOUS; SUBCUTANEOUS at 17:08

## 2024-07-21 RX ADMIN — INSULIN LISPRO 15 UNITS: 100 INJECTION, SOLUTION INTRAVENOUS; SUBCUTANEOUS at 09:00

## 2024-07-21 RX ADMIN — ALBUTEROL SULFATE 2 PUFF: 90 AEROSOL, METERED RESPIRATORY (INHALATION) at 15:14

## 2024-07-21 RX ADMIN — ALLOPURINOL 300 MG: 300 TABLET ORAL at 08:55

## 2024-07-21 RX ADMIN — ALBUTEROL SULFATE 2 PUFF: 90 AEROSOL, METERED RESPIRATORY (INHALATION) at 20:50

## 2024-07-21 RX ADMIN — SODIUM CHLORIDE, PRESERVATIVE FREE 10 ML: 5 INJECTION INTRAVENOUS at 20:43

## 2024-07-21 RX ADMIN — PREDNISOLONE ACETATE 1 DROP: 10 SUSPENSION/ DROPS OPHTHALMIC at 20:45

## 2024-07-21 RX ADMIN — DORZOLAMIDE HYDROCHLORIDE 1 DROP: 20 SOLUTION/ DROPS OPHTHALMIC at 08:58

## 2024-07-21 RX ADMIN — TIMOLOL MALEATE 1 DROP: 5 SOLUTION OPHTHALMIC at 08:58

## 2024-07-21 RX ADMIN — ATORVASTATIN CALCIUM 20 MG: 10 TABLET, FILM COATED ORAL at 20:41

## 2024-07-21 RX ADMIN — PREDNISOLONE ACETATE 1 DROP: 10 SUSPENSION/ DROPS OPHTHALMIC at 12:23

## 2024-07-21 RX ADMIN — SODIUM CHLORIDE, PRESERVATIVE FREE 10 ML: 5 INJECTION INTRAVENOUS at 08:58

## 2024-07-21 RX ADMIN — BRIMONIDINE TARTRATE 1 DROP: 2 SOLUTION OPHTHALMIC at 08:58

## 2024-07-21 RX ADMIN — PANTOPRAZOLE SODIUM 40 MG: 40 TABLET, DELAYED RELEASE ORAL at 08:55

## 2024-07-21 RX ADMIN — BUDESONIDE AND FORMOTEROL FUMARATE DIHYDRATE 2 PUFF: 160; 4.5 AEROSOL RESPIRATORY (INHALATION) at 09:04

## 2024-07-21 RX ADMIN — AMLODIPINE BESYLATE 10 MG: 10 TABLET ORAL at 08:55

## 2024-07-21 RX ADMIN — SODIUM CHLORIDE, PRESERVATIVE FREE 10 ML: 5 INJECTION INTRAVENOUS at 20:42

## 2024-07-21 RX ADMIN — PREDNISOLONE ACETATE 1 DROP: 10 SUSPENSION/ DROPS OPHTHALMIC at 17:14

## 2024-07-21 RX ADMIN — SODIUM CHLORIDE, PRESERVATIVE FREE 10 ML: 5 INJECTION INTRAVENOUS at 09:01

## 2024-07-21 RX ADMIN — PREGABALIN 100 MG: 100 CAPSULE ORAL at 08:55

## 2024-07-21 RX ADMIN — OXYCODONE HYDROCHLORIDE AND ACETAMINOPHEN 2 TABLET: 5; 325 TABLET ORAL at 20:41

## 2024-07-21 RX ADMIN — ALBUTEROL SULFATE 2 PUFF: 90 AEROSOL, METERED RESPIRATORY (INHALATION) at 09:04

## 2024-07-21 RX ADMIN — DORZOLAMIDE HYDROCHLORIDE 1 DROP: 20 SOLUTION/ DROPS OPHTHALMIC at 20:48

## 2024-07-21 RX ADMIN — BRIMONIDINE TARTRATE 1 DROP: 2 SOLUTION OPHTHALMIC at 17:09

## 2024-07-21 RX ADMIN — OXYCODONE HYDROCHLORIDE AND ACETAMINOPHEN 2 TABLET: 5; 325 TABLET ORAL at 16:57

## 2024-07-21 RX ADMIN — VANCOMYCIN HYDROCHLORIDE 1500 MG: 1.5 INJECTION, POWDER, LYOPHILIZED, FOR SOLUTION INTRAVENOUS at 10:53

## 2024-07-21 RX ADMIN — FLUTICASONE PROPIONATE 2 SPRAY: 50 SPRAY, METERED NASAL at 08:59

## 2024-07-21 RX ADMIN — PREDNISOLONE ACETATE 1 DROP: 10 SUSPENSION/ DROPS OPHTHALMIC at 08:57

## 2024-07-21 RX ADMIN — INSULIN LISPRO 2 UNITS: 100 INJECTION, SOLUTION INTRAVENOUS; SUBCUTANEOUS at 08:59

## 2024-07-21 RX ADMIN — LISINOPRIL AND HYDROCHLOROTHIAZIDE 1 TABLET: 12.5; 2 TABLET ORAL at 08:55

## 2024-07-21 RX ADMIN — INSULIN GLARGINE 40 UNITS: 100 INJECTION, SOLUTION SUBCUTANEOUS at 20:42

## 2024-07-21 ASSESSMENT — PAIN DESCRIPTION - LOCATION
LOCATION: FOOT
LOCATION: ANKLE
LOCATION: FOOT

## 2024-07-21 ASSESSMENT — PAIN SCALES - GENERAL
PAINLEVEL_OUTOF10: 9
PAINLEVEL_OUTOF10: 9
PAINLEVEL_OUTOF10: 8
PAINLEVEL_OUTOF10: 8

## 2024-07-21 ASSESSMENT — PAIN DESCRIPTION - ORIENTATION
ORIENTATION: LEFT
ORIENTATION: RIGHT;LEFT
ORIENTATION: LEFT

## 2024-07-21 ASSESSMENT — PAIN SCALES - WONG BAKER
WONGBAKER_NUMERICALRESPONSE: NO HURT

## 2024-07-21 ASSESSMENT — PAIN - FUNCTIONAL ASSESSMENT: PAIN_FUNCTIONAL_ASSESSMENT: PREVENTS OR INTERFERES SOME ACTIVE ACTIVITIES AND ADLS

## 2024-07-21 ASSESSMENT — PAIN DESCRIPTION - DESCRIPTORS: DESCRIPTORS: ACHING;CRUSHING;DISCOMFORT;CRAMPING

## 2024-07-21 NOTE — CONSULTS
Consult completed.    Indication for line: Limited/no vessel suitable for conventional peripheral access  Medication/Anticipated Duration: 7  Ordering Provider: Dr. Pleitez    LINE STATUS: READY TO USE MIDLINE    Midline insertion education provided to patient, risks and benefits discussed and reviewed with patient. Patient verbalized understanding, questions answered. Pressure injectable BARD PowerGlide Pro™ 20g 10 cm single lumen midline inserted into LUE basilic vein x 1 attempt using sterile ultrasound guided technique without difficulty per protocol. Brisk blood return noted and flushes without resistance. Patient tolerated procedure well. Ultrasound photos of vein diameter provided below. Primary nurse Ritesh notified and aware.    Consult the Vascular Access Team for questions, concerns, or change in patient's condition.

## 2024-07-21 NOTE — PROGRESS NOTES
V2.0    [facility] [place]      Name:  Azael Key Jr. /Age/Sex: 1985  (38 y.o. male)   MRN & CSN:  [id number] & [id number] Encounter Date/Time: 2024 2:35 PM EDT   Location:  [facility]5/3115-A PCP: Geri Almonte MD     Attending:Fay Pleitez MD       Hospital Day: 3    Assessment and Recommendations   Azael Key Jr. is a 38 y.o. male who presents with Left foot infection      Plan:     # Sepsis secondary to left and right foot cellulitis and bacteremia  # Hx of right foot gangrene s/p 2-4th digits amputation in 10/2023  - Endorsed worsening swelling, erythema and pain of left foot with fever of 100.5 °F over past 3 days. Denied any recent trauma or injuries. Has chronic bilateral feet infection with Charcot foot, followed by WC. Hx of recurrent MRSA.   - Purulent, afebrile, no leukocytosis, LA 2.9 with normal repeat.   - CT of left fibula/fibula reveals cellulitis with concern for chronic osteomyelitis.  CT of left foot reveals osteomyelitis of ankle and tarsal joints.  CT of right foot reveals osteomyelitis of second metatarsal head with associated soft tissue abscess formation and cellulitis.  - Blood cultures positive in 3/4 bottles for GPC, follow further read  - Started on IVF and Vanc/Cefepime  - General surgery and ID consulted  - Follow-up cultures and inflammatory markers.      # Uncontrolled T2DM with hyperglycemia, peripheral neuropathy and OS blindness  - Last A1c 8.3% in 10/2023, repeat 6.6   - Held Metformin and Glipizide.  - Decreased to Insulin Lantus 40 u qhs and Lispro 15 u TIDWM with MCSI. Continue Lyrica.     # Essential hypertension  - Continue Norvasc, Lisinopril and HCTZ.     # Tachycardia  - ECG with ST only.   - Likely secondary to sepsis. No hypoxemia.  - Monitor for now. Follow-up UDS.     # Hx of illicit substance use  - Denied any use of substances.  - Previous UDSs positive for cocaine.  - Follow-up repeat UDS. Monitor for sxs of withdrawal.      # Alcohol

## 2024-07-21 NOTE — CONSULTS
History and Physical    Patient Name: Azael Key Jr.                              YOB: 1985  Exam Date: 7/19/2024    PCP:  Geri Almonte MD           Referring Physician:  Hospitalist Service    Chief Complaint:  cellulitis left foot/ankle    History of Present Illness:  The patient is a 38 y.o. male who was admitted for left lower extremity cellulitis. He states he has had swelling of his left foot/ankle for months but started having increased redness over the last week. He was admitted for cellulitis and noted to have bacteremia. I was consulted to see him. He has been seen in the wound clinic by Dr. Davis.     Past Medical History:   Diagnosis Date    Asthma     Blind left eye     Diabetes mellitus (HCC)     GERD (gastroesophageal reflux disease)     Hypertension     Retinal detachment 2012    left      Past Surgical History:   Procedure Laterality Date    CORNEAL TRANSPLANT Bilateral     EYE SURGERY      left eye removed    EYE SURGERY Right     FOOT DEBRIDEMENT Right 10/16/2023    FOOT DEBRIDEMENT INCISION AND DRAINAGE RIGHT performed by Abdon Yee MD at [facility] OR    FRACTURE SURGERY      foot left    LAPAROSCOPIC APPENDECTOMY N/A 4/28/2022    APPENDECTOMY LAPAROSCOPIC performed by Fortino Obrien MD at [facility] OR    MANDIBLE SURGERY Right     MANDIBLE SURGERY Bilateral     TOE AMPUTATION Right 07/25/2017    TOE AMPUTATION Right 7/25/2022    TOE AMPUTATION, RAY AMPUTATION OF RIGHT SECOND TOE performed by Abdon Yee MD at [facility] OR    TOE AMPUTATION Right 2/11/2023    THIRD TOE AMPUTATION AND REVISION OF SECOND TOE AMPUTATION performed by Roxanne Galo MD at [facility] OR      Prior to Admission medications    Medication Sig Start Date End Date Taking? Authorizing Provider   glipiZIDE (GLUCOTROL) 10 MG tablet Take 1 tablet by mouth nightly    Provider, MD Chandana   lisinopril-hydroCHLOROthiazide (PRINZIDE;ZESTORETIC) 20-12.5 MG per tablet Take 1 tablet by mouth daily

## 2024-07-22 ENCOUNTER — APPOINTMENT (OUTPATIENT)
Dept: NON INVASIVE DIAGNOSTICS | Age: 39
DRG: 720 | End: 2024-07-22
Payer: COMMERCIAL

## 2024-07-22 PROBLEM — M86.9 PYOGENIC INFLAMMATION OF BONE (HCC): Status: ACTIVE | Noted: 2024-07-22

## 2024-07-22 LAB
CREAT SERPL-MCNC: 0.7 MG/DL (ref 0.9–1.3)
DOSE AMOUNT: NORMAL
DOSE TIME: NORMAL
ECHO AO ROOT DIAM: 3.5 CM
ECHO AO ROOT INDEX: 1.35 CM/M2
ECHO AV AREA PEAK VELOCITY: 3.6 CM2
ECHO AV AREA VTI: 4 CM2
ECHO AV AREA/BSA PEAK VELOCITY: 1.4 CM2/M2
ECHO AV AREA/BSA VTI: 1.5 CM2/M2
ECHO AV MEAN GRADIENT: 8 MMHG
ECHO AV MEAN VELOCITY: 1.3 M/S
ECHO AV PEAK GRADIENT: 14 MMHG
ECHO AV PEAK VELOCITY: 1.9 M/S
ECHO AV VELOCITY RATIO: 0.84
ECHO AV VTI: 29.6 CM
ECHO BSA: 2.67 M2
ECHO EST RA PRESSURE: 8 MMHG
ECHO IVC PROX: 2.8 CM
ECHO LA AREA 4C: 20.2 CM2
ECHO LA DIAMETER INDEX: 1.42 CM/M2
ECHO LA DIAMETER: 3.7 CM
ECHO LA MAJOR AXIS: 7.1 CM
ECHO LA TO AORTIC ROOT RATIO: 1.06
ECHO LA VOL MOD A4C: 43 ML (ref 18–58)
ECHO LA VOLUME INDEX MOD A4C: 17 ML/M2 (ref 16–34)
ECHO LV E' LATERAL VELOCITY: 11 CM/S
ECHO LV E' SEPTAL VELOCITY: 11 CM/S
ECHO LV EDV A4C: 143 ML
ECHO LV EDV INDEX A4C: 55 ML/M2
ECHO LV EJECTION FRACTION A4C: 56 %
ECHO LV ESV A4C: 63 ML
ECHO LV ESV INDEX A4C: 24 ML/M2
ECHO LV FRACTIONAL SHORTENING: 35 % (ref 28–44)
ECHO LV INTERNAL DIMENSION DIASTOLE INDEX: 1.88 CM/M2
ECHO LV INTERNAL DIMENSION DIASTOLIC: 4.9 CM (ref 4.2–5.9)
ECHO LV INTERNAL DIMENSION SYSTOLIC INDEX: 1.23 CM/M2
ECHO LV INTERNAL DIMENSION SYSTOLIC: 3.2 CM
ECHO LV IVSD: 1.1 CM (ref 0.6–1)
ECHO LV MASS 2D: 200.5 G (ref 88–224)
ECHO LV MASS INDEX 2D: 77.1 G/M2 (ref 49–115)
ECHO LV POSTERIOR WALL DIASTOLIC: 1.1 CM (ref 0.6–1)
ECHO LV RELATIVE WALL THICKNESS RATIO: 0.45
ECHO LVOT AREA: 4.2 CM2
ECHO LVOT AV VTI INDEX: 0.96
ECHO LVOT DIAM: 2.3 CM
ECHO LVOT MEAN GRADIENT: 6 MMHG
ECHO LVOT PEAK GRADIENT: 10 MMHG
ECHO LVOT PEAK VELOCITY: 1.6 M/S
ECHO LVOT STROKE VOLUME INDEX: 45.2 ML/M2
ECHO LVOT SV: 117.5 ML
ECHO LVOT VTI: 28.3 CM
ECHO MV A VELOCITY: 1.26 M/S
ECHO MV E VELOCITY: 1.1 M/S
ECHO MV E/A RATIO: 0.87
ECHO MV E/E' LATERAL: 10
ECHO MV E/E' RATIO (AVERAGED): 10
ECHO MV E/E' SEPTAL: 10
ECHO RIGHT VENTRICULAR SYSTOLIC PRESSURE (RVSP): 30 MMHG
ECHO TV REGURGITANT MAX VELOCITY: 2.33 M/S
ECHO TV REGURGITANT PEAK GRADIENT: 22 MMHG
GFR, ESTIMATED: >90 ML/MIN/1.73M2
GLUCOSE BLD-MCNC: 203 MG/DL (ref 70–99)
GLUCOSE BLD-MCNC: 226 MG/DL (ref 70–99)
GLUCOSE BLD-MCNC: 278 MG/DL (ref 70–99)
VANCOMYCIN RANDOM: 10.2 UG/ML

## 2024-07-22 PROCEDURE — 94761 N-INVAS EAR/PLS OXIMETRY MLT: CPT

## 2024-07-22 PROCEDURE — 94640 AIRWAY INHALATION TREATMENT: CPT

## 2024-07-22 PROCEDURE — 6360000002 HC RX W HCPCS: Performed by: STUDENT IN AN ORGANIZED HEALTH CARE EDUCATION/TRAINING PROGRAM

## 2024-07-22 PROCEDURE — 93306 TTE W/DOPPLER COMPLETE: CPT

## 2024-07-22 PROCEDURE — 6370000000 HC RX 637 (ALT 250 FOR IP): Performed by: STUDENT IN AN ORGANIZED HEALTH CARE EDUCATION/TRAINING PROGRAM

## 2024-07-22 PROCEDURE — 36415 COLL VENOUS BLD VENIPUNCTURE: CPT

## 2024-07-22 PROCEDURE — 82962 GLUCOSE BLOOD TEST: CPT

## 2024-07-22 PROCEDURE — 87040 BLOOD CULTURE FOR BACTERIA: CPT

## 2024-07-22 PROCEDURE — 80202 ASSAY OF VANCOMYCIN: CPT

## 2024-07-22 PROCEDURE — 82565 ASSAY OF CREATININE: CPT

## 2024-07-22 PROCEDURE — 2140000000 HC CCU INTERMEDIATE R&B

## 2024-07-22 PROCEDURE — 99255 IP/OBS CONSLTJ NEW/EST HI 80: CPT | Performed by: NURSE PRACTITIONER

## 2024-07-22 PROCEDURE — 2580000003 HC RX 258: Performed by: STUDENT IN AN ORGANIZED HEALTH CARE EDUCATION/TRAINING PROGRAM

## 2024-07-22 PROCEDURE — 2580000003 HC RX 258: Performed by: INTERNAL MEDICINE

## 2024-07-22 PROCEDURE — 93306 TTE W/DOPPLER COMPLETE: CPT | Performed by: INTERNAL MEDICINE

## 2024-07-22 PROCEDURE — 6360000002 HC RX W HCPCS: Performed by: INTERNAL MEDICINE

## 2024-07-22 RX ORDER — INSULIN LISPRO 100 [IU]/ML
18 INJECTION, SOLUTION INTRAVENOUS; SUBCUTANEOUS
Status: DISCONTINUED | OUTPATIENT
Start: 2024-07-23 | End: 2024-07-26 | Stop reason: HOSPADM

## 2024-07-22 RX ORDER — INSULIN GLARGINE 100 [IU]/ML
45 INJECTION, SOLUTION SUBCUTANEOUS NIGHTLY
Status: DISCONTINUED | OUTPATIENT
Start: 2024-07-22 | End: 2024-07-26 | Stop reason: HOSPADM

## 2024-07-22 RX ORDER — TRAZODONE HYDROCHLORIDE 50 MG/1
50 TABLET ORAL NIGHTLY
Status: DISCONTINUED | OUTPATIENT
Start: 2024-07-22 | End: 2024-07-26 | Stop reason: HOSPADM

## 2024-07-22 RX ADMIN — OXYCODONE HYDROCHLORIDE AND ACETAMINOPHEN 2 TABLET: 5; 325 TABLET ORAL at 15:04

## 2024-07-22 RX ADMIN — PANTOPRAZOLE SODIUM 40 MG: 40 TABLET, DELAYED RELEASE ORAL at 05:48

## 2024-07-22 RX ADMIN — ALBUTEROL SULFATE 2 PUFF: 90 AEROSOL, METERED RESPIRATORY (INHALATION) at 20:23

## 2024-07-22 RX ADMIN — OXYCODONE HYDROCHLORIDE AND ACETAMINOPHEN 2 TABLET: 5; 325 TABLET ORAL at 01:14

## 2024-07-22 RX ADMIN — INSULIN LISPRO 2 UNITS: 100 INJECTION, SOLUTION INTRAVENOUS; SUBCUTANEOUS at 17:40

## 2024-07-22 RX ADMIN — DORZOLAMIDE HYDROCHLORIDE 1 DROP: 20 SOLUTION/ DROPS OPHTHALMIC at 09:17

## 2024-07-22 RX ADMIN — SODIUM CHLORIDE 1500 MG: 9 INJECTION, SOLUTION INTRAVENOUS at 17:35

## 2024-07-22 RX ADMIN — PREGABALIN 100 MG: 100 CAPSULE ORAL at 09:14

## 2024-07-22 RX ADMIN — OXYCODONE HYDROCHLORIDE AND ACETAMINOPHEN 2 TABLET: 5; 325 TABLET ORAL at 05:47

## 2024-07-22 RX ADMIN — TIMOLOL MALEATE 1 DROP: 5 SOLUTION OPHTHALMIC at 20:50

## 2024-07-22 RX ADMIN — ALLOPURINOL 300 MG: 300 TABLET ORAL at 09:14

## 2024-07-22 RX ADMIN — TIMOLOL MALEATE 1 DROP: 5 SOLUTION OPHTHALMIC at 09:17

## 2024-07-22 RX ADMIN — PREGABALIN 100 MG: 100 CAPSULE ORAL at 20:48

## 2024-07-22 RX ADMIN — INSULIN LISPRO 15 UNITS: 100 INJECTION, SOLUTION INTRAVENOUS; SUBCUTANEOUS at 12:42

## 2024-07-22 RX ADMIN — SODIUM CHLORIDE, PRESERVATIVE FREE 10 ML: 5 INJECTION INTRAVENOUS at 20:48

## 2024-07-22 RX ADMIN — FLUTICASONE PROPIONATE 2 SPRAY: 50 SPRAY, METERED NASAL at 09:16

## 2024-07-22 RX ADMIN — ALBUTEROL SULFATE 2 PUFF: 90 AEROSOL, METERED RESPIRATORY (INHALATION) at 07:05

## 2024-07-22 RX ADMIN — OXYCODONE HYDROCHLORIDE AND ACETAMINOPHEN 2 TABLET: 5; 325 TABLET ORAL at 19:11

## 2024-07-22 RX ADMIN — BUDESONIDE AND FORMOTEROL FUMARATE DIHYDRATE 2 PUFF: 160; 4.5 AEROSOL RESPIRATORY (INHALATION) at 07:03

## 2024-07-22 RX ADMIN — INSULIN LISPRO 2 UNITS: 100 INJECTION, SOLUTION INTRAVENOUS; SUBCUTANEOUS at 09:25

## 2024-07-22 RX ADMIN — SODIUM CHLORIDE, PRESERVATIVE FREE 10 ML: 5 INJECTION INTRAVENOUS at 09:25

## 2024-07-22 RX ADMIN — BUDESONIDE AND FORMOTEROL FUMARATE DIHYDRATE 2 PUFF: 160; 4.5 AEROSOL RESPIRATORY (INHALATION) at 20:24

## 2024-07-22 RX ADMIN — PREDNISOLONE ACETATE 1 DROP: 10 SUSPENSION/ DROPS OPHTHALMIC at 17:40

## 2024-07-22 RX ADMIN — PREDNISOLONE ACETATE 1 DROP: 10 SUSPENSION/ DROPS OPHTHALMIC at 09:17

## 2024-07-22 RX ADMIN — PREDNISOLONE ACETATE 1 DROP: 10 SUSPENSION/ DROPS OPHTHALMIC at 20:50

## 2024-07-22 RX ADMIN — PIPERACILLIN AND TAZOBACTAM 3375 MG: 3; .375 INJECTION, POWDER, LYOPHILIZED, FOR SOLUTION INTRAVENOUS at 23:23

## 2024-07-22 RX ADMIN — ACETAMINOPHEN 650 MG: 325 TABLET ORAL at 00:19

## 2024-07-22 RX ADMIN — ATORVASTATIN CALCIUM 20 MG: 10 TABLET, FILM COATED ORAL at 20:48

## 2024-07-22 RX ADMIN — INSULIN GLARGINE 45 UNITS: 100 INJECTION, SOLUTION SUBCUTANEOUS at 20:48

## 2024-07-22 RX ADMIN — VANCOMYCIN HYDROCHLORIDE 1500 MG: 1.5 INJECTION, POWDER, LYOPHILIZED, FOR SOLUTION INTRAVENOUS at 09:29

## 2024-07-22 RX ADMIN — BRIMONIDINE TARTRATE 1 DROP: 2 SOLUTION OPHTHALMIC at 17:41

## 2024-07-22 RX ADMIN — PREDNISOLONE ACETATE 1 DROP: 10 SUSPENSION/ DROPS OPHTHALMIC at 12:43

## 2024-07-22 RX ADMIN — INSULIN LISPRO 15 UNITS: 100 INJECTION, SOLUTION INTRAVENOUS; SUBCUTANEOUS at 09:26

## 2024-07-22 RX ADMIN — AMLODIPINE BESYLATE 10 MG: 10 TABLET ORAL at 09:14

## 2024-07-22 RX ADMIN — DORZOLAMIDE HYDROCHLORIDE 1 DROP: 20 SOLUTION/ DROPS OPHTHALMIC at 20:49

## 2024-07-22 RX ADMIN — LISINOPRIL AND HYDROCHLOROTHIAZIDE 1 TABLET: 12.5; 2 TABLET ORAL at 09:14

## 2024-07-22 RX ADMIN — SODIUM CHLORIDE, PRESERVATIVE FREE 10 ML: 5 INJECTION INTRAVENOUS at 09:00

## 2024-07-22 RX ADMIN — EZETIMIBE 10 MG: 10 TABLET ORAL at 09:14

## 2024-07-22 RX ADMIN — OXYCODONE HYDROCHLORIDE AND ACETAMINOPHEN 2 TABLET: 5; 325 TABLET ORAL at 10:26

## 2024-07-22 RX ADMIN — TRAZODONE HYDROCHLORIDE 50 MG: 50 TABLET ORAL at 20:48

## 2024-07-22 RX ADMIN — BRIMONIDINE TARTRATE 1 DROP: 2 SOLUTION OPHTHALMIC at 09:17

## 2024-07-22 ASSESSMENT — PAIN SCALES - GENERAL
PAINLEVEL_OUTOF10: 10
PAINLEVEL_OUTOF10: 9
PAINLEVEL_OUTOF10: 8
PAINLEVEL_OUTOF10: 9
PAINLEVEL_OUTOF10: 9
PAINLEVEL_OUTOF10: 7
PAINLEVEL_OUTOF10: 7

## 2024-07-22 ASSESSMENT — PAIN DESCRIPTION - DESCRIPTORS
DESCRIPTORS: ACHING;DISCOMFORT
DESCRIPTORS: ACHING

## 2024-07-22 ASSESSMENT — PAIN SCALES - WONG BAKER
WONGBAKER_NUMERICALRESPONSE: NO HURT

## 2024-07-22 ASSESSMENT — PAIN DESCRIPTION - LOCATION
LOCATION: ANKLE
LOCATION: FOOT
LOCATION: ANKLE
LOCATION: FOOT

## 2024-07-22 ASSESSMENT — PAIN DESCRIPTION - ORIENTATION
ORIENTATION: LEFT

## 2024-07-22 NOTE — PROGRESS NOTES
V2.0    [facility] [place] Progress Note      Name:  Azael Key Jr. /Age/Sex: 1985  (38 y.o. male)   MRN & CSN:  [id number] & [id number] Encounter Date/Time: 2024 2:35 PM EDT   Location:  [facility]5/3115-A PCP: Geri Almonte MD     Attending:Fay Pleitez MD       Hospital Day: 4    Assessment and Recommendations   Azael Key Jr. is a 38 y.o. male who presents with Left foot infection      Plan:     # Sepsis secondary to bacteremia and bilateral lower extremity osteomyelitis  # Hx of right foot gangrene s/p 2-4th digits amputation in 10/2023  - Endorsed worsening swelling, erythema and pain of left foot with fever of 100.5 °F over 3 days. Denied any recent trauma or injuries. Has chronic bilateral feet infection with Charcot foot, followed by WC and Dr. Davis outpatient. Hx of recurrent MRSA.   - Purulent, afebrile, no leukocytosis, LA 2.9 with normal repeat.   - CT of left fibula/fibula reveals cellulitis with concern for chronic osteomyelitis.  CT of left foot reveals osteomyelitis of ankle and tarsal joints.  CT of right foot reveals osteomyelitis of second metatarsal head with associated soft tissue abscess formation and cellulitis.  - Blood cultures positive in 3/4 bottles for GPC, follow further read  - Started on IVF and Vanc/Cefepime  - General surgery recommends continueing local wound care at this time  - ID consulted  - Follow-up cultures and inflammatory markers     # Uncontrolled T2DM with hyperglycemia, peripheral neuropathy and OS blindness  - Last A1c 8.3% in 10/2023, repeat 6.6   - Held Metformin and Glipizide.  - Continue basal/bolus/SSI regimen, titrate as required     # Essential hypertension  - Continue Norvasc, Lisinopril and HCTZ.     # Tachycardia  - ECG with ST only.   - Likely secondary to sepsis. No hypoxemia.  - Monitor for now. Follow-up UDS.     # Hx of illicit substance use  - Denied any use of substances. Ethanol level 0.19 on admission.  - Previous UDSs positive for cocaine.

## 2024-07-22 NOTE — CONSULTS
Smokeless tobacco: Never    Tobacco comments:     VOICES ONLY SMOKING ABOUT HALF PACK A WEEK AND IS DOWN TO ONLY A FEW A DAY. 5-1-23      [facility] MTH[facility] A[facility]   Vaping Use    Vaping Use: Never used   Substance Use Topics    Alcohol use: Yes     Comment: occasionally    Drug use: Not Currently     Types: Marijuana (Weed)       ALLERGIES    Allergies   Allergen Reactions    Ciprofloxacin Shortness Of Breath    Glucosamine Anaphylaxis    Shellfish-Derived Products Anaphylaxis    Shellfish Allergy     Vancomycin Swelling     Mild redness, swelling, warmth to touch, and tenderness surrounding IV access site s/p receiving Vanco x2d, with pt stating \"My IV's always go bad after a couple of days when I'm getting Vanco.\"       MEDICATIONS    No current facility-administered medications on file prior to encounter.     Current Outpatient Medications on File Prior to Encounter   Medication Sig Dispense Refill    glipiZIDE (GLUCOTROL) 10 MG tablet Take 1 tablet by mouth nightly      lisinopril-hydroCHLOROthiazide (PRINZIDE;ZESTORETIC) 20-12.5 MG per tablet Take 1 tablet by mouth daily      pregabalin (LYRICA) 100 MG capsule Take 1 capsule by mouth 2 times daily.      albuterol sulfate HFA (VENTOLIN HFA) 108 (90 Base) MCG/ACT inhaler Inhale 2 puffs into the lungs 4 times daily as needed for Wheezing 18 g 0    mupirocin (BACTROBAN) 2 % ointment Apply topically 2 times daily Apply topically 3 times daily. (Patient not taking: Reported on 5/28/2024) 15 g 0    oxyCODONE-acetaminophen (PERCOCET) 5-325 MG per tablet Take 1 tablet by mouth 2 times daily. 05/28/24 Prescription noted for 1 tablet daily as needed, patient reports taking it 2-3 x daily      omeprazole (PRILOSEC) 40 MG delayed release capsule Take 1 capsule by mouth daily      insulin glargine (LANTUS) 100 UNIT/ML injection vial Inject 30 Units into the skin nightly (Patient taking differently: Inject 45 Units into the skin nightly) 10 mL 1     1.905 m (6' 3\")   Wt 134.7 kg (297 lb)   SpO2 98%   BMI 37.12 kg/m²   Christopher Risk Score: Christopher Scale Score: 16    LABS    CBC:   Lab Results   Component Value Date/Time    WBC 8.8 07/20/2024 01:23 AM    RBC 3.15 07/20/2024 01:23 AM    HGB 10.0 07/20/2024 01:23 AM    HCT 28.9 07/20/2024 01:23 AM    MCV 91.7 07/20/2024 01:23 AM    MCH 31.7 07/20/2024 01:23 AM    MCHC 34.6 07/20/2024 01:23 AM    RDW 12.8 07/20/2024 01:23 AM     07/20/2024 01:23 AM    MPV 9.0 07/20/2024 01:23 AM     CMP:    Lab Results   Component Value Date/Time     07/20/2024 01:23 AM    K 3.8 07/20/2024 01:23 AM    CL 99 07/20/2024 01:23 AM    CO2 19 07/20/2024 01:23 AM    BUN 8 07/20/2024 01:23 AM    CREATININE 0.6 07/20/2024 01:23 AM    GFRAA >60 09/20/2022 05:43 AM    LABGLOM >90 07/20/2024 01:23 AM    LABGLOM >60 11/05/2023 10:27 AM    GLUCOSE 166 07/20/2024 01:23 AM    CALCIUM 8.1 07/20/2024 01:23 AM    BILITOT 0.7 07/20/2024 01:23 AM    ALKPHOS 83 07/20/2024 01:23 AM    AST 16 07/20/2024 01:23 AM    ALT 14 07/20/2024 01:23 AM     Albumin:  No results found for: \"LABALBU\"  PT/INR:    Lab Results   Component Value Date/Time    PROTIME 18.9 07/20/2024 01:23 AM    INR 1.5 07/20/2024 01:23 AM     HgBA1c:    Lab Results   Component Value Date/Time    LABA1C 6.6 07/20/2024 01:23 AM         Assessment:     Patient Active Problem List   Diagnosis    Sprain of left ankle    Closed nondisplaced fracture of fifth metatarsal bone of left foot    Alcohol abuse    UTI (urinary tract infection) due to Enterococcus    Acute osteomyelitis of toe, right (HCC)    Osteomyelitis of fifth toe of right foot (HCC)    Diabetic foot infection (HCC)    Shortness of breath    Asthma exacerbation    Appendicitis    Diabetic foot ulcer with osteomyelitis (HCC)    Cellulitis    Open wound of toe    Rhinovirus    Vision impairment    WD-Nonhealing surgical wound, initial encounter    WD-Amputation of one or more toes (HCC)    WD-Right foot ulcer, with fat

## 2024-07-22 NOTE — DISCHARGE INSTRUCTIONS
Substance Abuse Resources    [facility]   [phone] or [phone]   [address]  Intensive Outpatient and Outpatient treatment for both men & women    [facility]   [phone] [address] [facility]   Intensive outpatient and residential for men & Intensive outpatient for women     [facility]      [phone]                      [address] [facility]  Comprehensive Outpatient Addiction Treatment    Outpatient treatment for both men & women:   Call for insurance eligibility vs cost  [facility] [phone]  [facility] [employer] [phone]  [facility] [phone]  [facility] [phone]    Drug and Alcohol Treatment Facilities:   Call for insurance eligibility vs cost  [place] [phone]  [facility] [phone]  [facility] [phone]  [facility] [phone]  [facility] [phone]  [facility] [phone]  [facility] [phone]  [facility] [phone]  [facility] [phone]  [facility] [phone]  [facility] [phone]    Alcoholics Anonymous/ YVONNE/ALATEEN [phone] or [phone]  https://cogf.meetingfor.me/meetings or www.aacentral[place].org      Narcotics Anonymous [phone] or [phone]   http://sascna.org/ or www.nacentral[place].org  Cocaine Anonymous [phone] www.caohio.org   Marijuana Anonymous [phone] www.marijuana-anonymous.org     Mental Health Crisis Line: [phone]  [facility]: [phone]  [place] Quit Line: (smoking) https://[place].quitlogix.org 800-QUIT-NOW (030-051-8353)  24/7 crisis line for [facility]: [phone]  24-hour crisis text hotline: Text the word \"4hope\" to 329-238 for crisis support    Information & Referral for [facility]  Referral line to connect with available resources/services  [facility] [phone] or [facility] [phone]

## 2024-07-22 NOTE — CONSULTS
Infectious Disease Consult Note  2024   Patient Name: Azael Key Jr. : 1985   Impression  Staphylococcus Spp Bacteremia:  Bilateral Lower Leg Cellulitis:  OM Right 2nd MT Head/ Plantar with Charcot Foot:  OM Left Distal Tibia/ Fibula:   Allergy to ciprofloxacin (SOB):  Allergy to vancomycin (swelling):  Bacteremia likely secondary to bilateral lower extremity OM, awaiting spp of BC and wound cultures.   CrCl 245. T-max 100.6. no leukocytosis. Hyponatremia. Pct 2.57. . ESR 55.   -BC  Staph spp  -BC Pending  Past: 3/25/2024: right foot wound surgical culture: MSSA  -Right foot wound culture: Morganella morganii, E.faecalis (sensis pending)  -PCXR: No acute CP abnormality.  -CT Foot right w contrast: OM involving the second metatarsal head detailed above with associated soft tissue abscess formation and cellulitis.   -CT Tibia/ Fibula Left W Contrast: Cellulitis. No abscess identified. There is periosteal reaction   around the distal tibia and fibula concerning for underlying chronic   osteomyelitis. Contrast-enhanced MRI is suggested   -CT Left Foot W Contrast: Osteomyelitis of the ankle and tarsal joints detailed above   -Complete TTE: Pending  Dr. Galo, general surgery, recommends local wound care at this time, pt is a pt of Dr. Davis  Uncontrolled DMII with Polyneuropathy:  Alcohol, Cocaine and Tobacco Abuse:  -Blood tox screen positive for amphetamines, cocaine and alcohol <0.01  Multi-morbidity: per PMHx: DMII with polyneuropathy, obesity, OS blindness, right foot gangrene s/p amputation of 2-4th digits 10/2023, HTN, HLD, alcohol, cocaine and tobacco abuse, asthma, GERD  Plan:  Continue IV vancomycin per pharmacy dosing  Start IV Zosyn 3,375 mg q8h  Trend CRP, ordered  Await for spp of BC from   Repeat BC q48h until NGTD at 48h  Ordered repeat BC   Await sensi of left foot wound cultures  I called [facility], spp of Staph from  BC is  10/16/2023    FOOT DEBRIDEMENT INCISION AND DRAINAGE RIGHT performed by Abdon Yee MD at [facility] OR    FRACTURE SURGERY      foot left    LAPAROSCOPIC APPENDECTOMY N/A 4/28/2022    APPENDECTOMY LAPAROSCOPIC performed by Fortino Obrien MD at [facility] OR    MANDIBLE SURGERY Right     MANDIBLE SURGERY Bilateral     TOE AMPUTATION Right 07/25/2017    TOE AMPUTATION Right 7/25/2022    TOE AMPUTATION, RAY AMPUTATION OF RIGHT SECOND TOE performed by Abdon Yee MD at [facility] OR    TOE AMPUTATION Right 2/11/2023    THIRD TOE AMPUTATION AND REVISION OF SECOND TOE AMPUTATION performed by Roxanne Galo MD at [facility] OR      Family History   Problem Relation Age of Onset    Diabetes Mother     Asthma Mother     High Blood Pressure Mother     Stroke Mother     Heart Disease Mother     Diabetes Father     Asthma Father     High Blood Pressure Father       Infectious disease related family history - not contibutory.   SOCIAL HISTORY  Social History     Tobacco Use    Smoking status: Former     Current packs/day: 0.25     Average packs/day: 0.3 packs/day for 5.0 years (1.3 ttl pk-yrs)     Types: Cigarettes    Smokeless tobacco: Never    Tobacco comments:     VOICES ONLY SMOKING ABOUT HALF PACK A WEEK AND IS DOWN TO ONLY A FEW A DAY. 5-1-23      [facility] MTH[facility] A[facility]   Substance Use Topics    Alcohol use: Yes     Comment: occasionally      Born:[place]  Lives:[place] with girlfriend  Occupation:Disabled  No recent travel of significance.  No recent unusual exposures.  NO pets     ?  ALLERGIES  Allergies   Allergen Reactions    Ciprofloxacin Shortness Of Breath    Glucosamine Anaphylaxis    Shellfish-Derived Products Anaphylaxis    Shellfish Allergy     Vancomycin Swelling     Mild redness, swelling, warmth to touch, and tenderness surrounding IV access site s/p receiving Vanco x2d, with pt stating \"My IV's always go bad after a couple of days when I'm getting Vanco.\"

## 2024-07-22 NOTE — PROGRESS NOTES
PHARMACY VANCOMYCIN MONITORING SERVICE  Pharmacy consulted by Dr. Mckeon for monitoring and adjustment.    Indication for treatment: SSTI  Goal trough: 10-15 mcg/mL  AUC/SHELLEY: <500 mg/L*hr    Pertinent Laboratory Values:   Temp Readings from Last 3 Encounters:   07/22/24 99.6 °F (37.6 °C) (Oral)   07/17/24 99.2 °F (37.3 °C) (Oral)   06/18/24 97.3 °F (36.3 °C) (Temporal)     Recent Labs     07/19/24 1956 07/20/24  0123   WBC 9.6 8.8     Recent Labs     07/19/24 1956 07/20/24  0123   BUN 9 8   CREATININE 0.8* 0.6*     Estimated Creatinine Clearance: 245 mL/min (A) (based on SCr of 0.6 mg/dL (L)).    Intake/Output Summary (Last 24 hours) at 7/22/2024 1104  Last data filed at 7/22/2024 0605  Gross per 24 hour   Intake 987.74 ml   Output 2200 ml   Net -1212.26 ml       Pertinent Cultures:  Date    Source    Results  7/19   blood    4/4 +staph species    Vancomycin level:   TROUGH:  No results for input(s): \"VANCOTROUGH\" in the last 72 hours.  RANDOM:    Recent Labs     07/22/24  0126   VANCORANDOM 10.2       Assessment:  WBC and temperature: WBC = 8.8, Tmax 100.6  SCr, BUN, and urine output: Scr = 0.6 and BUN = 8 (7/20)  Day(s) of therapy: 4 of 7  Vancomycin concentration: therapeutic    Plan:  Vancomycin trough depicts therapeutic level but at low end of goal.  Will increase vancomycin dose to 1500 mg q8h  New regimen predicts AUC of 419 mg/L*hr and trough of 12.3 mg/L  Will obtain repeat serum creatinine today  Pharmacy will continue to monitor patient and adjust therapy as indicated    VANCOMYCIN CONCENTRATION SCHEDULED FOR 07/23 @ 1400    Thank you for the consult.  Devora Hudson RPH  7/22/2024 11:04 AM

## 2024-07-23 LAB
CRP SERPL HS-MCNC: 257.7 MG/L
CULTURE: ABNORMAL
DOSE AMOUNT: NORMAL
DOSE TIME: NORMAL
GLUCOSE BLD-MCNC: 171 MG/DL (ref 70–99)
GLUCOSE BLD-MCNC: 173 MG/DL (ref 70–99)
GLUCOSE BLD-MCNC: 244 MG/DL (ref 70–99)
GLUCOSE BLD-MCNC: 297 MG/DL (ref 70–99)
Lab: ABNORMAL
Lab: ABNORMAL
REASON FOR REJECTION: NORMAL
REJECTED TEST: NORMAL
SPECIMEN: ABNORMAL
SPECIMEN: ABNORMAL
VANCOMYCIN RANDOM: 15.5 UG/ML

## 2024-07-23 PROCEDURE — 94761 N-INVAS EAR/PLS OXIMETRY MLT: CPT

## 2024-07-23 PROCEDURE — 6370000000 HC RX 637 (ALT 250 FOR IP): Performed by: STUDENT IN AN ORGANIZED HEALTH CARE EDUCATION/TRAINING PROGRAM

## 2024-07-23 PROCEDURE — 2580000003 HC RX 258: Performed by: STUDENT IN AN ORGANIZED HEALTH CARE EDUCATION/TRAINING PROGRAM

## 2024-07-23 PROCEDURE — 86140 C-REACTIVE PROTEIN: CPT

## 2024-07-23 PROCEDURE — 94640 AIRWAY INHALATION TREATMENT: CPT

## 2024-07-23 PROCEDURE — 6360000002 HC RX W HCPCS: Performed by: STUDENT IN AN ORGANIZED HEALTH CARE EDUCATION/TRAINING PROGRAM

## 2024-07-23 PROCEDURE — 80202 ASSAY OF VANCOMYCIN: CPT

## 2024-07-23 PROCEDURE — 6370000000 HC RX 637 (ALT 250 FOR IP): Performed by: FAMILY MEDICINE

## 2024-07-23 PROCEDURE — 2580000003 HC RX 258: Performed by: INTERNAL MEDICINE

## 2024-07-23 PROCEDURE — 82962 GLUCOSE BLOOD TEST: CPT

## 2024-07-23 PROCEDURE — 6360000002 HC RX W HCPCS: Performed by: INTERNAL MEDICINE

## 2024-07-23 PROCEDURE — 36415 COLL VENOUS BLD VENIPUNCTURE: CPT

## 2024-07-23 PROCEDURE — 99233 SBSQ HOSP IP/OBS HIGH 50: CPT | Performed by: NURSE PRACTITIONER

## 2024-07-23 PROCEDURE — 2140000000 HC CCU INTERMEDIATE R&B

## 2024-07-23 RX ORDER — OXYCODONE HYDROCHLORIDE 10 MG/1
10 TABLET ORAL EVERY 4 HOURS PRN
Status: DISCONTINUED | OUTPATIENT
Start: 2024-07-23 | End: 2024-07-26 | Stop reason: HOSPADM

## 2024-07-23 RX ORDER — OXYCODONE HYDROCHLORIDE 5 MG/1
5 TABLET ORAL EVERY 4 HOURS PRN
Status: DISCONTINUED | OUTPATIENT
Start: 2024-07-23 | End: 2024-07-26 | Stop reason: HOSPADM

## 2024-07-23 RX ADMIN — AMLODIPINE BESYLATE 10 MG: 10 TABLET ORAL at 08:40

## 2024-07-23 RX ADMIN — INSULIN GLARGINE 45 UNITS: 100 INJECTION, SOLUTION SUBCUTANEOUS at 20:13

## 2024-07-23 RX ADMIN — BRIMONIDINE TARTRATE 1 DROP: 2 SOLUTION OPHTHALMIC at 08:42

## 2024-07-23 RX ADMIN — PREDNISOLONE ACETATE 1 DROP: 10 SUSPENSION/ DROPS OPHTHALMIC at 17:21

## 2024-07-23 RX ADMIN — ALLOPURINOL 300 MG: 300 TABLET ORAL at 08:40

## 2024-07-23 RX ADMIN — OXYCODONE HYDROCHLORIDE AND ACETAMINOPHEN 2 TABLET: 5; 325 TABLET ORAL at 15:07

## 2024-07-23 RX ADMIN — ALBUTEROL SULFATE 2 PUFF: 90 AEROSOL, METERED RESPIRATORY (INHALATION) at 18:03

## 2024-07-23 RX ADMIN — PREDNISOLONE ACETATE 1 DROP: 10 SUSPENSION/ DROPS OPHTHALMIC at 12:23

## 2024-07-23 RX ADMIN — ALBUTEROL SULFATE 2 PUFF: 90 AEROSOL, METERED RESPIRATORY (INHALATION) at 08:30

## 2024-07-23 RX ADMIN — ACETAMINOPHEN 650 MG: 325 TABLET ORAL at 09:01

## 2024-07-23 RX ADMIN — TIMOLOL MALEATE 1 DROP: 5 SOLUTION OPHTHALMIC at 08:42

## 2024-07-23 RX ADMIN — INSULIN LISPRO 18 UNITS: 100 INJECTION, SOLUTION INTRAVENOUS; SUBCUTANEOUS at 09:02

## 2024-07-23 RX ADMIN — SODIUM CHLORIDE, PRESERVATIVE FREE 10 ML: 5 INJECTION INTRAVENOUS at 20:15

## 2024-07-23 RX ADMIN — TRAZODONE HYDROCHLORIDE 50 MG: 50 TABLET ORAL at 20:14

## 2024-07-23 RX ADMIN — DORZOLAMIDE HYDROCHLORIDE 1 DROP: 20 SOLUTION/ DROPS OPHTHALMIC at 08:42

## 2024-07-23 RX ADMIN — LISINOPRIL AND HYDROCHLOROTHIAZIDE 1 TABLET: 12.5; 2 TABLET ORAL at 09:01

## 2024-07-23 RX ADMIN — SODIUM CHLORIDE 1500 MG: 9 INJECTION, SOLUTION INTRAVENOUS at 17:26

## 2024-07-23 RX ADMIN — SODIUM CHLORIDE, PRESERVATIVE FREE 10 ML: 5 INJECTION INTRAVENOUS at 20:19

## 2024-07-23 RX ADMIN — TIMOLOL MALEATE 1 DROP: 5 SOLUTION OPHTHALMIC at 20:18

## 2024-07-23 RX ADMIN — PREGABALIN 100 MG: 100 CAPSULE ORAL at 20:14

## 2024-07-23 RX ADMIN — BUDESONIDE AND FORMOTEROL FUMARATE DIHYDRATE 2 PUFF: 160; 4.5 AEROSOL RESPIRATORY (INHALATION) at 08:29

## 2024-07-23 RX ADMIN — INSULIN LISPRO 18 UNITS: 100 INJECTION, SOLUTION INTRAVENOUS; SUBCUTANEOUS at 12:23

## 2024-07-23 RX ADMIN — PIPERACILLIN AND TAZOBACTAM 3375 MG: 3; .375 INJECTION, POWDER, LYOPHILIZED, FOR SOLUTION INTRAVENOUS at 06:18

## 2024-07-23 RX ADMIN — OXYCODONE HYDROCHLORIDE AND ACETAMINOPHEN 2 TABLET: 5; 325 TABLET ORAL at 10:44

## 2024-07-23 RX ADMIN — OXYCODONE HYDROCHLORIDE AND ACETAMINOPHEN 2 TABLET: 5; 325 TABLET ORAL at 06:14

## 2024-07-23 RX ADMIN — PIPERACILLIN AND TAZOBACTAM 3375 MG: 3; .375 INJECTION, POWDER, LYOPHILIZED, FOR SOLUTION INTRAVENOUS at 22:15

## 2024-07-23 RX ADMIN — OXYCODONE HYDROCHLORIDE AND ACETAMINOPHEN 2 TABLET: 5; 325 TABLET ORAL at 00:13

## 2024-07-23 RX ADMIN — PREDNISOLONE ACETATE 1 DROP: 10 SUSPENSION/ DROPS OPHTHALMIC at 08:43

## 2024-07-23 RX ADMIN — OXYCODONE HYDROCHLORIDE 10 MG: 10 TABLET ORAL at 20:28

## 2024-07-23 RX ADMIN — INSULIN LISPRO 4 UNITS: 100 INJECTION, SOLUTION INTRAVENOUS; SUBCUTANEOUS at 17:15

## 2024-07-23 RX ADMIN — EZETIMIBE 10 MG: 10 TABLET ORAL at 08:40

## 2024-07-23 RX ADMIN — BRIMONIDINE TARTRATE 1 DROP: 2 SOLUTION OPHTHALMIC at 15:18

## 2024-07-23 RX ADMIN — PREDNISOLONE ACETATE 1 DROP: 10 SUSPENSION/ DROPS OPHTHALMIC at 20:18

## 2024-07-23 RX ADMIN — PREGABALIN 100 MG: 100 CAPSULE ORAL at 08:40

## 2024-07-23 RX ADMIN — ACETAMINOPHEN 650 MG: 325 TABLET ORAL at 17:28

## 2024-07-23 RX ADMIN — SODIUM CHLORIDE 1500 MG: 9 INJECTION, SOLUTION INTRAVENOUS at 04:06

## 2024-07-23 RX ADMIN — DORZOLAMIDE HYDROCHLORIDE 1 DROP: 20 SOLUTION/ DROPS OPHTHALMIC at 20:18

## 2024-07-23 RX ADMIN — BUDESONIDE AND FORMOTEROL FUMARATE DIHYDRATE 2 PUFF: 160; 4.5 AEROSOL RESPIRATORY (INHALATION) at 18:06

## 2024-07-23 RX ADMIN — PANTOPRAZOLE SODIUM 40 MG: 40 TABLET, DELAYED RELEASE ORAL at 06:15

## 2024-07-23 RX ADMIN — FLUTICASONE PROPIONATE 2 SPRAY: 50 SPRAY, METERED NASAL at 08:41

## 2024-07-23 RX ADMIN — PIPERACILLIN AND TAZOBACTAM 3375 MG: 3; .375 INJECTION, POWDER, LYOPHILIZED, FOR SOLUTION INTRAVENOUS at 15:17

## 2024-07-23 RX ADMIN — ATORVASTATIN CALCIUM 20 MG: 10 TABLET, FILM COATED ORAL at 20:14

## 2024-07-23 RX ADMIN — INSULIN LISPRO 18 UNITS: 100 INJECTION, SOLUTION INTRAVENOUS; SUBCUTANEOUS at 17:14

## 2024-07-23 ASSESSMENT — PAIN DESCRIPTION - DESCRIPTORS
DESCRIPTORS: ACHING
DESCRIPTORS: ACHING;DISCOMFORT
DESCRIPTORS: ACHING;DISCOMFORT

## 2024-07-23 ASSESSMENT — PAIN SCALES - GENERAL
PAINLEVEL_OUTOF10: 8
PAINLEVEL_OUTOF10: 10
PAINLEVEL_OUTOF10: 10
PAINLEVEL_OUTOF10: 4
PAINLEVEL_OUTOF10: 10
PAINLEVEL_OUTOF10: 8
PAINLEVEL_OUTOF10: 9
PAINLEVEL_OUTOF10: 10
PAINLEVEL_OUTOF10: 7
PAINLEVEL_OUTOF10: 9
PAINLEVEL_OUTOF10: 7
PAINLEVEL_OUTOF10: 10

## 2024-07-23 ASSESSMENT — PAIN - FUNCTIONAL ASSESSMENT
PAIN_FUNCTIONAL_ASSESSMENT: PREVENTS OR INTERFERES SOME ACTIVE ACTIVITIES AND ADLS

## 2024-07-23 ASSESSMENT — PAIN DESCRIPTION - ORIENTATION
ORIENTATION: LEFT;RIGHT
ORIENTATION: LEFT
ORIENTATION: RIGHT
ORIENTATION: RIGHT
ORIENTATION: LEFT

## 2024-07-23 ASSESSMENT — PAIN DESCRIPTION - LOCATION
LOCATION: ANKLE;LEG;TOE (COMMENT WHICH ONE)
LOCATION: ANKLE
LOCATION: ANKLE;LEG;TOE (COMMENT WHICH ONE)
LOCATION: ANKLE;LEG;TOE (COMMENT WHICH ONE)
LOCATION: ANKLE
LOCATION: ANKLE

## 2024-07-23 ASSESSMENT — PAIN DESCRIPTION - ONSET
ONSET: ON-GOING
ONSET: ON-GOING

## 2024-07-23 ASSESSMENT — PAIN SCALES - WONG BAKER
WONGBAKER_NUMERICALRESPONSE: NO HURT
WONGBAKER_NUMERICALRESPONSE: NO HURT
WONGBAKER_NUMERICALRESPONSE: HURTS LITTLE MORE
WONGBAKER_NUMERICALRESPONSE: NO HURT
WONGBAKER_NUMERICALRESPONSE: HURTS A LITTLE BIT
WONGBAKER_NUMERICALRESPONSE: HURTS A LITTLE BIT
WONGBAKER_NUMERICALRESPONSE: NO HURT
WONGBAKER_NUMERICALRESPONSE: HURTS A LITTLE BIT

## 2024-07-23 ASSESSMENT — PAIN DESCRIPTION - FREQUENCY
FREQUENCY: CONTINUOUS
FREQUENCY: CONTINUOUS

## 2024-07-23 ASSESSMENT — PAIN DESCRIPTION - PAIN TYPE
TYPE: ACUTE PAIN;CHRONIC PAIN
TYPE: ACUTE PAIN;CHRONIC PAIN

## 2024-07-23 NOTE — PROGRESS NOTES
V2.0    [facility] Progress Note      Name:  Azael Key Jr. /Age/Sex: 1985  (38 y.o. male)   MRN & CSN:  [id number] & [id number] Encounter Date/Time: 2024 2:35 PM EDT   Location:  [facility]5/3115-A PCP: Geri Almonte MD     Attending:Amy Prieto MD       Hospital Day: 5    Assessment and Recommendations   Azael Key Jr. is a 38 y.o. male who presents with Left foot infection      Plan:     # Sepsis secondary to bacteremia and bilateral lower extremity osteomyelitis  # Hx of right foot gangrene s/p 2-4th digits amputation in 10/2023  # possible endocarditis   - Endorsed worsening swelling, erythema and pain of left foot with fever of 100.5 °F over 3 days. Denied any recent trauma or injuries. Has chronic bilateral feet infection with Charcot foot, followed by [facility] and Dr. Davis outpatient. Hx of recurrent MRSA.   - Purulent, afebrile, no leukocytosis, LA 2.9 with normal repeat.   - CT of left fibula/fibula reveals cellulitis with concern for chronic osteomyelitis.  CT of left foot reveals osteomyelitis of ankle and tarsal joints.  CT of right foot reveals osteomyelitis of second metatarsal head with associated soft tissue abscess formation and cellulitis.  - Blood cultures positive in 3/4 bottles for GPC, follow further read  - Started on IVF and Vanc/Cefepime  - General surgery recommends continueing local wound care at this time  - ID consulted  - Follow-up cultures and inflammatory markers  -ANTWON done showing Lamblls vs mobile echodensity on AV. Chordae vs mobile echodensity on MV      # Uncontrolled T2DM with hyperglycemia, peripheral neuropathy and OS blindness  - Last A1c 8.3% in 10/2023, repeat 6.6   - Held Metformin and Glipizide.  - Continue basal/bolus/SSI regimen, titrate as required     # Essential hypertension  - Continue Norvasc, Lisinopril and HCTZ.     # Tachycardia  - ECG with ST only.   - Likely secondary to sepsis. No hypoxemia.  - Monitor for now. Follow-up UDS.     # Hx of illicit  hours.  UA:  Lab Results   Component Value Date/Time    NITRU NEGATIVE 07/19/2024 08:01 PM    COLORU YELLOW 07/19/2024 08:01 PM    PHUR 6.0 07/19/2024 08:01 PM    WBCUA <1 07/19/2024 08:01 PM    RBCUA <1 07/19/2024 08:01 PM    MUCUS RARE 07/19/2024 08:01 PM    TRICHOMONAS NONE SEEN 07/19/2024 08:01 PM    BACTERIA NEGATIVE 07/19/2024 08:01 PM    CLARITYU CLEAR 07/19/2024 08:01 PM    LEUKOCYTESUR NEGATIVE 07/19/2024 08:01 PM    UROBILINOGEN 2.0 07/19/2024 08:01 PM    BILIRUBINUR SMALL NUMBER OR AMOUNT OBSERVED 07/19/2024 08:01 PM    BLOODU SMALL NUMBER OR AMOUNT OBSERVED 07/19/2024 08:01 PM    GLUCOSEU >1000 07/19/2024 08:01 PM    KETUA >80 07/19/2024 08:01 PM     Urine Cultures: No results found for: \"LABURIN\"  Blood Cultures: No results found for: \"BC\"  No results found for: \"BLOODCULT2\"  Organism:   Lab Results   Component Value Date/Time    ORG MRSA 12/23/2018 01:00 PM    ORG BSGA 12/23/2018 01:00 PM         Electronically signed by Amy Prieto MD on 7/23/2024 at 2:59 PM

## 2024-07-23 NOTE — PROGRESS NOTES
PHARMACY VANCOMYCIN MONITORING SERVICE  Pharmacy consulted by Dr. Mckeon for monitoring and adjustment.    Indication for treatment: SSTI  Goal trough: 10-15 mcg/mL  AUC/SHELLEY: <500 mg/L*hr    Pertinent Laboratory Values:   Temp Readings from Last 3 Encounters:   07/23/24 99.8 °F (37.7 °C) (Oral)   07/17/24 99.2 °F (37.3 °C) (Oral)   06/18/24 97.3 °F (36.3 °C) (Temporal)     No results for input(s): \"WBC\", \"LACTATE\" in the last 72 hours.    Recent Labs     07/22/24  1439   CREATININE 0.7*       Estimated Creatinine Clearance: 205 mL/min (A) (based on SCr of 0.7 mg/dL (L)).    Intake/Output Summary (Last 24 hours) at 7/23/2024 1524  Last data filed at 7/23/2024 1448  Gross per 24 hour   Intake 896.24 ml   Output 2200 ml   Net -1303.76 ml     Pertinent Cultures:  Date    Source    Results  7/19   blood    4/4 +staph species  7/22   Blood    Negative     Vancomycin level:   TROUGH:  No results for input(s): \"VANCOTROUGH\" in the last 72 hours.  RANDOM:    Recent Labs     07/22/24  0126   VANCORANDOM 10.2         Assessment:  WBC and temperature: WBC = 8.8, Tmax 100.6  SCr, BUN, and urine output: Scr = 0.6 and BUN = 8 (7/20)  Day(s) of therapy: 5 of 7  Vancomycin concentration: therapeutic    Plan:  Continue vancomycin 1500mg q8h   Can likely be less aggressive with dosing as no MRSA present   Pharmacy will continue to monitor patient and adjust therapy as indicated    VANCOMYCIN CONCENTRATION SCHEDULED FOR 07/23 @ 1400    Thank you for the consult.  Tulio Pennington [facility]  7/23/2024 3:24 PM

## 2024-07-24 LAB
ANION GAP SERPL CALCULATED.3IONS-SCNC: 10 MMOL/L (ref 7–16)
BUN SERPL-MCNC: 7 MG/DL (ref 6–23)
CALCIUM SERPL-MCNC: 8.4 MG/DL (ref 8.3–10.6)
CHLORIDE BLD-SCNC: 96 MMOL/L (ref 99–110)
CO2: 26 MMOL/L (ref 21–32)
CREAT SERPL-MCNC: 0.7 MG/DL (ref 0.9–1.3)
CRP SERPL HS-MCNC: 258.3 MG/L
GFR, ESTIMATED: >90 ML/MIN/1.73M2
GLUCOSE BLD-MCNC: 165 MG/DL (ref 70–99)
GLUCOSE BLD-MCNC: 186 MG/DL (ref 70–99)
GLUCOSE BLD-MCNC: 251 MG/DL (ref 70–99)
GLUCOSE BLD-MCNC: 255 MG/DL (ref 70–99)
GLUCOSE SERPL-MCNC: 214 MG/DL (ref 70–99)
HCT VFR BLD CALC: 27.7 % (ref 42–52)
HEMOGLOBIN: 9.3 GM/DL (ref 13.5–18)
MCH RBC QN AUTO: 30.8 PG (ref 27–31)
MCHC RBC AUTO-ENTMCNC: 33.6 % (ref 32–36)
MCV RBC AUTO: 91.7 FL (ref 78–100)
PDW BLD-RTO: 13.2 % (ref 11.7–14.9)
PLATELET # BLD: 414 K/CU MM (ref 140–440)
PMV BLD AUTO: 8.8 FL (ref 7.5–11.1)
POTASSIUM SERPL-SCNC: 3.4 MMOL/L (ref 3.5–5.1)
RBC # BLD: 3.02 M/CU MM (ref 4.6–6.2)
SODIUM BLD-SCNC: 132 MMOL/L (ref 135–145)
WBC # BLD: 8.6 K/CU MM (ref 4–10.5)

## 2024-07-24 PROCEDURE — 2580000003 HC RX 258: Performed by: STUDENT IN AN ORGANIZED HEALTH CARE EDUCATION/TRAINING PROGRAM

## 2024-07-24 PROCEDURE — 6370000000 HC RX 637 (ALT 250 FOR IP): Performed by: STUDENT IN AN ORGANIZED HEALTH CARE EDUCATION/TRAINING PROGRAM

## 2024-07-24 PROCEDURE — 80048 BASIC METABOLIC PNL TOTAL CA: CPT

## 2024-07-24 PROCEDURE — 6360000002 HC RX W HCPCS: Performed by: INTERNAL MEDICINE

## 2024-07-24 PROCEDURE — 36415 COLL VENOUS BLD VENIPUNCTURE: CPT

## 2024-07-24 PROCEDURE — 86140 C-REACTIVE PROTEIN: CPT

## 2024-07-24 PROCEDURE — 2580000003 HC RX 258: Performed by: INTERNAL MEDICINE

## 2024-07-24 PROCEDURE — 85027 COMPLETE CBC AUTOMATED: CPT

## 2024-07-24 PROCEDURE — 6360000002 HC RX W HCPCS: Performed by: STUDENT IN AN ORGANIZED HEALTH CARE EDUCATION/TRAINING PROGRAM

## 2024-07-24 PROCEDURE — 82962 GLUCOSE BLOOD TEST: CPT

## 2024-07-24 PROCEDURE — 94640 AIRWAY INHALATION TREATMENT: CPT

## 2024-07-24 PROCEDURE — 2140000000 HC CCU INTERMEDIATE R&B

## 2024-07-24 PROCEDURE — 99233 SBSQ HOSP IP/OBS HIGH 50: CPT | Performed by: NURSE PRACTITIONER

## 2024-07-24 PROCEDURE — 6370000000 HC RX 637 (ALT 250 FOR IP): Performed by: FAMILY MEDICINE

## 2024-07-24 RX ADMIN — PREDNISOLONE ACETATE 1 DROP: 10 SUSPENSION/ DROPS OPHTHALMIC at 17:39

## 2024-07-24 RX ADMIN — EZETIMIBE 10 MG: 10 TABLET ORAL at 08:39

## 2024-07-24 RX ADMIN — PIPERACILLIN AND TAZOBACTAM 3375 MG: 3; .375 INJECTION, POWDER, LYOPHILIZED, FOR SOLUTION INTRAVENOUS at 15:00

## 2024-07-24 RX ADMIN — ALLOPURINOL 300 MG: 300 TABLET ORAL at 08:39

## 2024-07-24 RX ADMIN — TIMOLOL MALEATE 1 DROP: 5 SOLUTION OPHTHALMIC at 20:14

## 2024-07-24 RX ADMIN — PANTOPRAZOLE SODIUM 40 MG: 40 TABLET, DELAYED RELEASE ORAL at 06:14

## 2024-07-24 RX ADMIN — PREGABALIN 100 MG: 100 CAPSULE ORAL at 20:15

## 2024-07-24 RX ADMIN — DORZOLAMIDE HYDROCHLORIDE 1 DROP: 20 SOLUTION/ DROPS OPHTHALMIC at 08:40

## 2024-07-24 RX ADMIN — PIPERACILLIN AND TAZOBACTAM 3375 MG: 3; .375 INJECTION, POWDER, LYOPHILIZED, FOR SOLUTION INTRAVENOUS at 06:16

## 2024-07-24 RX ADMIN — BUDESONIDE AND FORMOTEROL FUMARATE DIHYDRATE 2 PUFF: 160; 4.5 AEROSOL RESPIRATORY (INHALATION) at 20:34

## 2024-07-24 RX ADMIN — INSULIN GLARGINE 45 UNITS: 100 INJECTION, SOLUTION SUBCUTANEOUS at 20:15

## 2024-07-24 RX ADMIN — OXYCODONE HYDROCHLORIDE 10 MG: 10 TABLET ORAL at 19:04

## 2024-07-24 RX ADMIN — PREDNISOLONE ACETATE 1 DROP: 10 SUSPENSION/ DROPS OPHTHALMIC at 08:40

## 2024-07-24 RX ADMIN — OXYCODONE HYDROCHLORIDE 10 MG: 10 TABLET ORAL at 06:13

## 2024-07-24 RX ADMIN — OXYCODONE HYDROCHLORIDE 10 MG: 10 TABLET ORAL at 10:30

## 2024-07-24 RX ADMIN — ACETAMINOPHEN 650 MG: 325 TABLET ORAL at 22:44

## 2024-07-24 RX ADMIN — FLUTICASONE PROPIONATE 2 SPRAY: 50 SPRAY, METERED NASAL at 08:40

## 2024-07-24 RX ADMIN — DORZOLAMIDE HYDROCHLORIDE 1 DROP: 20 SOLUTION/ DROPS OPHTHALMIC at 20:14

## 2024-07-24 RX ADMIN — SODIUM CHLORIDE, PRESERVATIVE FREE 10 ML: 5 INJECTION INTRAVENOUS at 20:17

## 2024-07-24 RX ADMIN — ALBUTEROL SULFATE 2 PUFF: 90 AEROSOL, METERED RESPIRATORY (INHALATION) at 08:12

## 2024-07-24 RX ADMIN — ACETAMINOPHEN 650 MG: 325 TABLET ORAL at 00:29

## 2024-07-24 RX ADMIN — ENOXAPARIN SODIUM 30 MG: 100 INJECTION SUBCUTANEOUS at 20:16

## 2024-07-24 RX ADMIN — BRIMONIDINE TARTRATE 1 DROP: 2 SOLUTION OPHTHALMIC at 17:46

## 2024-07-24 RX ADMIN — BUDESONIDE AND FORMOTEROL FUMARATE DIHYDRATE 2 PUFF: 160; 4.5 AEROSOL RESPIRATORY (INHALATION) at 08:13

## 2024-07-24 RX ADMIN — INSULIN LISPRO 4 UNITS: 100 INJECTION, SOLUTION INTRAVENOUS; SUBCUTANEOUS at 17:39

## 2024-07-24 RX ADMIN — PREGABALIN 100 MG: 100 CAPSULE ORAL at 08:39

## 2024-07-24 RX ADMIN — INSULIN LISPRO 18 UNITS: 100 INJECTION, SOLUTION INTRAVENOUS; SUBCUTANEOUS at 08:38

## 2024-07-24 RX ADMIN — PIPERACILLIN AND TAZOBACTAM 3375 MG: 3; .375 INJECTION, POWDER, LYOPHILIZED, FOR SOLUTION INTRAVENOUS at 22:50

## 2024-07-24 RX ADMIN — INSULIN LISPRO 18 UNITS: 100 INJECTION, SOLUTION INTRAVENOUS; SUBCUTANEOUS at 13:14

## 2024-07-24 RX ADMIN — ACETAMINOPHEN 650 MG: 325 TABLET ORAL at 14:55

## 2024-07-24 RX ADMIN — AMLODIPINE BESYLATE 10 MG: 10 TABLET ORAL at 08:39

## 2024-07-24 RX ADMIN — OXYCODONE HYDROCHLORIDE 10 MG: 10 TABLET ORAL at 14:52

## 2024-07-24 RX ADMIN — SODIUM CHLORIDE 1500 MG: 9 INJECTION, SOLUTION INTRAVENOUS at 03:33

## 2024-07-24 RX ADMIN — SODIUM CHLORIDE 1500 MG: 9 INJECTION, SOLUTION INTRAVENOUS at 08:57

## 2024-07-24 RX ADMIN — BRIMONIDINE TARTRATE 1 DROP: 2 SOLUTION OPHTHALMIC at 08:40

## 2024-07-24 RX ADMIN — INSULIN LISPRO 18 UNITS: 100 INJECTION, SOLUTION INTRAVENOUS; SUBCUTANEOUS at 17:39

## 2024-07-24 RX ADMIN — TIMOLOL MALEATE 1 DROP: 5 SOLUTION OPHTHALMIC at 08:40

## 2024-07-24 RX ADMIN — TRAZODONE HYDROCHLORIDE 50 MG: 50 TABLET ORAL at 20:17

## 2024-07-24 RX ADMIN — OXYCODONE HYDROCHLORIDE 10 MG: 10 TABLET ORAL at 00:31

## 2024-07-24 RX ADMIN — PREDNISOLONE ACETATE 1 DROP: 10 SUSPENSION/ DROPS OPHTHALMIC at 20:14

## 2024-07-24 RX ADMIN — OXYCODONE HYDROCHLORIDE 10 MG: 10 TABLET ORAL at 23:07

## 2024-07-24 RX ADMIN — LISINOPRIL AND HYDROCHLOROTHIAZIDE 1 TABLET: 12.5; 2 TABLET ORAL at 13:13

## 2024-07-24 RX ADMIN — ATORVASTATIN CALCIUM 20 MG: 10 TABLET, FILM COATED ORAL at 20:15

## 2024-07-24 RX ADMIN — PREDNISOLONE ACETATE 1 DROP: 10 SUSPENSION/ DROPS OPHTHALMIC at 13:18

## 2024-07-24 ASSESSMENT — PAIN - FUNCTIONAL ASSESSMENT
PAIN_FUNCTIONAL_ASSESSMENT: PREVENTS OR INTERFERES SOME ACTIVE ACTIVITIES AND ADLS
PAIN_FUNCTIONAL_ASSESSMENT: ACTIVITIES ARE NOT PREVENTED
PAIN_FUNCTIONAL_ASSESSMENT: PREVENTS OR INTERFERES SOME ACTIVE ACTIVITIES AND ADLS

## 2024-07-24 ASSESSMENT — PAIN DESCRIPTION - LOCATION
LOCATION: LEG
LOCATION: ANKLE
LOCATION: LEG
LOCATION: LEG;ANKLE

## 2024-07-24 ASSESSMENT — PAIN DESCRIPTION - ORIENTATION
ORIENTATION: LEFT
ORIENTATION: RIGHT;LEFT
ORIENTATION: LEFT
ORIENTATION: RIGHT
ORIENTATION: RIGHT;LEFT

## 2024-07-24 ASSESSMENT — PAIN DESCRIPTION - PAIN TYPE: TYPE: ACUTE PAIN;CHRONIC PAIN

## 2024-07-24 ASSESSMENT — PAIN DESCRIPTION - DESCRIPTORS
DESCRIPTORS: ACHING
DESCRIPTORS: ACHING;DISCOMFORT
DESCRIPTORS: ACHING
DESCRIPTORS: DISCOMFORT;ACHING
DESCRIPTORS: ACHING

## 2024-07-24 ASSESSMENT — PAIN SCALES - GENERAL
PAINLEVEL_OUTOF10: 8
PAINLEVEL_OUTOF10: 10
PAINLEVEL_OUTOF10: 7
PAINLEVEL_OUTOF10: 8
PAINLEVEL_OUTOF10: 10
PAINLEVEL_OUTOF10: 9

## 2024-07-24 ASSESSMENT — PAIN SCALES - WONG BAKER
WONGBAKER_NUMERICALRESPONSE: NO HURT
WONGBAKER_NUMERICALRESPONSE: HURTS A LITTLE BIT
WONGBAKER_NUMERICALRESPONSE: NO HURT
WONGBAKER_NUMERICALRESPONSE: HURTS A LITTLE BIT
WONGBAKER_NUMERICALRESPONSE: HURTS A LITTLE BIT

## 2024-07-24 ASSESSMENT — PAIN DESCRIPTION - ONSET: ONSET: ON-GOING

## 2024-07-24 ASSESSMENT — PAIN DESCRIPTION - FREQUENCY: FREQUENCY: CONTINUOUS

## 2024-07-24 NOTE — CARE COORDINATION
Met with pt to discuss LTAC & SNF with him in case he needs to d/c on an IV atb.  CM informed pt that the only SNF that may accept him would be [facility] d/t his drug use.  He states that he would only agree to go if he needs and IV atb when discharged.  Dr to notify CM if pt is going to be d/c'd on po or iv atb at d/c as soon as she knows.  TE

## 2024-07-24 NOTE — PROGRESS NOTES
osteomyelitis. Contrast-enhanced MRI is suggested     7/20/2024 CT Foot Left W Contrast:  IMPRESSION:Osteomyelitis of the ankle and tarsal joints detailed above     7/20/2024 CT Foot Right W Contrast:  IMPRESSION:1. Osteomyelitis involving the second metatarsal head detailed above with associated soft tissue abscess formation and cellulitis.     Labs:    Recent Results (from the past 24 hour(s))   POCT Glucose    Collection Time: 07/23/24 11:28 AM   Result Value Ref Range    POC Glucose 173 (H) 70 - 99 MG/DL   SPECIMEN REJECTION    Collection Time: 07/23/24  1:36 PM   Result Value Ref Range    Rejected Test VANRN     Reason for Rejection SPECIMEN QUANTITY NOT SUFFICIENT    POCT Glucose    Collection Time: 07/23/24  4:36 PM   Result Value Ref Range    POC Glucose 297 (H) 70 - 99 MG/DL   Vancomycin Level, Random    Collection Time: 07/23/24  5:39 PM   Result Value Ref Range    Vancomycin Rm 15.5 UG/ML    DOSE AMOUNT DOSE AMT. GIVEN - UNKNOWN     DOSE TIME DOSE TIME GIVEN - UNKNOWN    POCT Glucose    Collection Time: 07/23/24  8:02 PM   Result Value Ref Range    POC Glucose 244 (H) 70 - 99 MG/DL   C-Reactive Protein    Collection Time: 07/24/24  2:13 AM   Result Value Ref Range    CRP High Sensitivity 258.3 (H) <5.0 mg/L   POCT Glucose    Collection Time: 07/24/24  6:09 AM   Result Value Ref Range    POC Glucose 165 (H) 70 - 99 MG/DL     CULTURE results: Invalid input(s): \"BLOOD CULTURE\", \"URINE CULTURE\", \"SURGICAL CULTURE\"    Diagnosis:  Patient Active Problem List   Diagnosis    Sprain of left ankle    Closed nondisplaced fracture of fifth metatarsal bone of left foot    Alcohol abuse    UTI (urinary tract infection) due to Enterococcus    Acute osteomyelitis of toe, right (HCC)    Osteomyelitis of fifth toe of right foot (HCC)    Diabetic foot infection (HCC)    Shortness of breath    Asthma exacerbation    Appendicitis    Diabetic foot ulcer with osteomyelitis (HCC)    Cellulitis    Open wound of toe    Rhinovirus

## 2024-07-24 NOTE — PROGRESS NOTES
7/19/2024 11:04 PM HISTORY: Infection; possible osteo COMPARISON:  No comparisons available.  TECHNIQUE: CT scan of the left foot was performed with  contrast. Isovue 300, 92cc injected IV. One or more of the following dose reduction techniques were used: automated exposure control, adjustment of the mA and/or kV according to patient size, use of iterative reconstruction technique. FINDINGS: There is diffuse swelling noted of the soft tissues most marked along the dorsal aspect however there is no rim-enhancing subcutaneous fluid collection identified. There are destructive changes noted involving the tarsal bones with rim-enhancing fluid collections noted most marked along the dorsal aspect with fragmentation of the talus, calcaneus and the adjacent tarsal bones with involvement also of the ankle joint with multiple calcified loose bodies noted. There is periosteal reaction noted involving the distal fibula and tibia. There are also degenerative changes of the tarsal metatarsal articulations. There is a remote fracture of the fifth metatarsal.     Osteomyelitis of the ankle and tarsal joints detailed above Electronically signed by Erasmo Fine    CT TIBIA FIBULA LEFT W CONTRAST    Result Date: 7/20/2024  ELBERT CABRERA JR. EXAMINATION: CT TIBIA FIBULA LEFT W CONTRAST COMPARISON:  None  HISTORY:  SWELLING,PAIN, R/O OSTEOMYLITIS,INFECTION TECHNIQUE: Axial images were obtained through the left tibia and fibula post administration of IV contrast. Oral contrast was also administered. Coronal reconstruction images were obtained from the axial views. CT scan performed  using dose optimization techniques including the following automated exposure control; adjustment of mA and/or kV; use of iterative reconstruction technique.  Automatic exposure control was used to reduce radiation dose. Permanent radiation dose record is archived to PACS. FINDINGS: There is diffuse stranding noted within the subcutaneous tissues most bulky  <1 07/19/2024 08:01 PM    MUCUS RARE 07/19/2024 08:01 PM    TRICHOMONAS NONE SEEN 07/19/2024 08:01 PM    BACTERIA NEGATIVE 07/19/2024 08:01 PM    CLARITYU CLEAR 07/19/2024 08:01 PM    LEUKOCYTESUR NEGATIVE 07/19/2024 08:01 PM    UROBILINOGEN 2.0 07/19/2024 08:01 PM    BILIRUBINUR SMALL NUMBER OR AMOUNT OBSERVED 07/19/2024 08:01 PM    BLOODU SMALL NUMBER OR AMOUNT OBSERVED 07/19/2024 08:01 PM    GLUCOSEU >1000 07/19/2024 08:01 PM    KETUA >80 07/19/2024 08:01 PM     Urine Cultures: No results found for: \"LABURIN\"  Blood Cultures: No results found for: \"BC\"  No results found for: \"BLOODCULT2\"  Organism:   Lab Results   Component Value Date/Time    ORG MRSA 12/23/2018 01:00 PM    ORG BSGA 12/23/2018 01:00 PM         Electronically signed by Amy Prieto MD on 7/24/2024 at 2:25 PM

## 2024-07-25 ENCOUNTER — APPOINTMENT (OUTPATIENT)
Dept: NON INVASIVE DIAGNOSTICS | Age: 39
DRG: 720 | End: 2024-07-25
Attending: STUDENT IN AN ORGANIZED HEALTH CARE EDUCATION/TRAINING PROGRAM
Payer: COMMERCIAL

## 2024-07-25 PROBLEM — B95.61 STAPHYLOCOCCUS AUREUS BACTEREMIA: Status: ACTIVE | Noted: 2024-07-25

## 2024-07-25 PROBLEM — R78.81 STAPHYLOCOCCUS AUREUS BACTEREMIA: Status: ACTIVE | Noted: 2024-07-25

## 2024-07-25 LAB
CRP SERPL HS-MCNC: 227 MG/L
CULTURE: ABNORMAL
GLUCOSE BLD-MCNC: 218 MG/DL (ref 70–99)
GLUCOSE BLD-MCNC: 231 MG/DL (ref 70–99)
GLUCOSE BLD-MCNC: 244 MG/DL (ref 70–99)
GLUCOSE BLD-MCNC: 292 MG/DL (ref 70–99)
HCT VFR BLD CALC: 28.1 % (ref 42–52)
HEMOGLOBIN: 9.5 GM/DL (ref 13.5–18)
Lab: ABNORMAL
MCH RBC QN AUTO: 30.8 PG (ref 27–31)
MCHC RBC AUTO-ENTMCNC: 33.8 % (ref 32–36)
MCV RBC AUTO: 91.2 FL (ref 78–100)
PDW BLD-RTO: 13.2 % (ref 11.7–14.9)
PLATELET # BLD: 414 K/CU MM (ref 140–440)
PMV BLD AUTO: 8.7 FL (ref 7.5–11.1)
RBC # BLD: 3.08 M/CU MM (ref 4.6–6.2)
SPECIMEN: ABNORMAL
WBC # BLD: 8.3 K/CU MM (ref 4–10.5)

## 2024-07-25 PROCEDURE — 2580000003 HC RX 258: Performed by: INTERNAL MEDICINE

## 2024-07-25 PROCEDURE — 6360000002 HC RX W HCPCS: Performed by: INTERNAL MEDICINE

## 2024-07-25 PROCEDURE — 36415 COLL VENOUS BLD VENIPUNCTURE: CPT

## 2024-07-25 PROCEDURE — 6360000002 HC RX W HCPCS: Performed by: STUDENT IN AN ORGANIZED HEALTH CARE EDUCATION/TRAINING PROGRAM

## 2024-07-25 PROCEDURE — 94761 N-INVAS EAR/PLS OXIMETRY MLT: CPT

## 2024-07-25 PROCEDURE — 86140 C-REACTIVE PROTEIN: CPT

## 2024-07-25 PROCEDURE — 2140000000 HC CCU INTERMEDIATE R&B

## 2024-07-25 PROCEDURE — 85027 COMPLETE CBC AUTOMATED: CPT

## 2024-07-25 PROCEDURE — 6370000000 HC RX 637 (ALT 250 FOR IP): Performed by: STUDENT IN AN ORGANIZED HEALTH CARE EDUCATION/TRAINING PROGRAM

## 2024-07-25 PROCEDURE — 94640 AIRWAY INHALATION TREATMENT: CPT

## 2024-07-25 PROCEDURE — 82962 GLUCOSE BLOOD TEST: CPT

## 2024-07-25 PROCEDURE — 97162 PT EVAL MOD COMPLEX 30 MIN: CPT

## 2024-07-25 PROCEDURE — 99254 IP/OBS CNSLTJ NEW/EST MOD 60: CPT | Performed by: INTERNAL MEDICINE

## 2024-07-25 PROCEDURE — 6370000000 HC RX 637 (ALT 250 FOR IP): Performed by: FAMILY MEDICINE

## 2024-07-25 PROCEDURE — 2580000003 HC RX 258: Performed by: STUDENT IN AN ORGANIZED HEALTH CARE EDUCATION/TRAINING PROGRAM

## 2024-07-25 RX ORDER — OXYCODONE HYDROCHLORIDE 10 MG/1
10 TABLET ORAL ONCE
Status: COMPLETED | OUTPATIENT
Start: 2024-07-25 | End: 2024-07-25

## 2024-07-25 RX ADMIN — PREDNISOLONE ACETATE 1 DROP: 10 SUSPENSION/ DROPS OPHTHALMIC at 08:04

## 2024-07-25 RX ADMIN — DORZOLAMIDE HYDROCHLORIDE 1 DROP: 20 SOLUTION/ DROPS OPHTHALMIC at 08:04

## 2024-07-25 RX ADMIN — SODIUM CHLORIDE: 9 INJECTION, SOLUTION INTRAVENOUS at 06:17

## 2024-07-25 RX ADMIN — BUDESONIDE AND FORMOTEROL FUMARATE DIHYDRATE 2 PUFF: 160; 4.5 AEROSOL RESPIRATORY (INHALATION) at 12:46

## 2024-07-25 RX ADMIN — POTASSIUM CHLORIDE 10 MEQ: 7.46 INJECTION, SOLUTION INTRAVENOUS at 12:22

## 2024-07-25 RX ADMIN — ENOXAPARIN SODIUM 30 MG: 100 INJECTION SUBCUTANEOUS at 20:24

## 2024-07-25 RX ADMIN — INSULIN LISPRO 18 UNITS: 100 INJECTION, SOLUTION INTRAVENOUS; SUBCUTANEOUS at 08:21

## 2024-07-25 RX ADMIN — PREDNISOLONE ACETATE 1 DROP: 10 SUSPENSION/ DROPS OPHTHALMIC at 20:44

## 2024-07-25 RX ADMIN — PREGABALIN 100 MG: 100 CAPSULE ORAL at 08:03

## 2024-07-25 RX ADMIN — INSULIN LISPRO 2 UNITS: 100 INJECTION, SOLUTION INTRAVENOUS; SUBCUTANEOUS at 12:10

## 2024-07-25 RX ADMIN — OXYCODONE HYDROCHLORIDE 10 MG: 10 TABLET ORAL at 20:21

## 2024-07-25 RX ADMIN — Medication 1 LOZENGE: at 17:27

## 2024-07-25 RX ADMIN — Medication 1 LOZENGE: at 07:27

## 2024-07-25 RX ADMIN — ACETAMINOPHEN 650 MG: 325 TABLET ORAL at 06:14

## 2024-07-25 RX ADMIN — PREDNISOLONE ACETATE 1 DROP: 10 SUSPENSION/ DROPS OPHTHALMIC at 17:20

## 2024-07-25 RX ADMIN — OXYCODONE HYDROCHLORIDE 10 MG: 10 TABLET ORAL at 16:01

## 2024-07-25 RX ADMIN — BUDESONIDE AND FORMOTEROL FUMARATE DIHYDRATE 2 PUFF: 160; 4.5 AEROSOL RESPIRATORY (INHALATION) at 21:08

## 2024-07-25 RX ADMIN — PREGABALIN 100 MG: 100 CAPSULE ORAL at 20:24

## 2024-07-25 RX ADMIN — OXYCODONE HYDROCHLORIDE 10 MG: 10 TABLET ORAL at 12:08

## 2024-07-25 RX ADMIN — OXYCODONE HYDROCHLORIDE 10 MG: 10 TABLET ORAL at 08:03

## 2024-07-25 RX ADMIN — ALBUTEROL SULFATE 2 PUFF: 90 AEROSOL, METERED RESPIRATORY (INHALATION) at 21:09

## 2024-07-25 RX ADMIN — ACETAMINOPHEN 650 MG: 325 TABLET ORAL at 20:23

## 2024-07-25 RX ADMIN — BRIMONIDINE TARTRATE 1 DROP: 2 SOLUTION OPHTHALMIC at 17:21

## 2024-07-25 RX ADMIN — PIPERACILLIN AND TAZOBACTAM 3375 MG: 3; .375 INJECTION, POWDER, LYOPHILIZED, FOR SOLUTION INTRAVENOUS at 23:02

## 2024-07-25 RX ADMIN — LISINOPRIL AND HYDROCHLOROTHIAZIDE 1 TABLET: 12.5; 2 TABLET ORAL at 08:20

## 2024-07-25 RX ADMIN — DORZOLAMIDE HYDROCHLORIDE 1 DROP: 20 SOLUTION/ DROPS OPHTHALMIC at 20:44

## 2024-07-25 RX ADMIN — TRAZODONE HYDROCHLORIDE 50 MG: 50 TABLET ORAL at 20:23

## 2024-07-25 RX ADMIN — BRIMONIDINE TARTRATE 1 DROP: 2 SOLUTION OPHTHALMIC at 08:04

## 2024-07-25 RX ADMIN — INSULIN LISPRO 18 UNITS: 100 INJECTION, SOLUTION INTRAVENOUS; SUBCUTANEOUS at 17:28

## 2024-07-25 RX ADMIN — ALBUTEROL SULFATE 2 PUFF: 90 AEROSOL, METERED RESPIRATORY (INHALATION) at 12:47

## 2024-07-25 RX ADMIN — ALLOPURINOL 300 MG: 300 TABLET ORAL at 08:20

## 2024-07-25 RX ADMIN — POTASSIUM CHLORIDE 10 MEQ: 7.46 INJECTION, SOLUTION INTRAVENOUS at 17:27

## 2024-07-25 RX ADMIN — INSULIN GLARGINE 45 UNITS: 100 INJECTION, SOLUTION SUBCUTANEOUS at 20:45

## 2024-07-25 RX ADMIN — FLUTICASONE PROPIONATE 2 SPRAY: 50 SPRAY, METERED NASAL at 08:04

## 2024-07-25 RX ADMIN — TIMOLOL MALEATE 1 DROP: 5 SOLUTION OPHTHALMIC at 20:45

## 2024-07-25 RX ADMIN — INSULIN LISPRO 18 UNITS: 100 INJECTION, SOLUTION INTRAVENOUS; SUBCUTANEOUS at 12:10

## 2024-07-25 RX ADMIN — POTASSIUM CHLORIDE 10 MEQ: 7.46 INJECTION, SOLUTION INTRAVENOUS at 14:00

## 2024-07-25 RX ADMIN — PREDNISOLONE ACETATE 1 DROP: 10 SUSPENSION/ DROPS OPHTHALMIC at 12:11

## 2024-07-25 RX ADMIN — ATORVASTATIN CALCIUM 20 MG: 10 TABLET, FILM COATED ORAL at 20:21

## 2024-07-25 RX ADMIN — PIPERACILLIN AND TAZOBACTAM 3375 MG: 3; .375 INJECTION, POWDER, LYOPHILIZED, FOR SOLUTION INTRAVENOUS at 15:35

## 2024-07-25 RX ADMIN — INSULIN LISPRO 2 UNITS: 100 INJECTION, SOLUTION INTRAVENOUS; SUBCUTANEOUS at 08:21

## 2024-07-25 RX ADMIN — POTASSIUM CHLORIDE 10 MEQ: 7.46 INJECTION, SOLUTION INTRAVENOUS at 15:36

## 2024-07-25 RX ADMIN — SODIUM CHLORIDE, PRESERVATIVE FREE 10 ML: 5 INJECTION INTRAVENOUS at 20:24

## 2024-07-25 RX ADMIN — MELATONIN 3 MG: at 23:00

## 2024-07-25 RX ADMIN — EZETIMIBE 10 MG: 10 TABLET ORAL at 08:03

## 2024-07-25 RX ADMIN — TIMOLOL MALEATE 1 DROP: 5 SOLUTION OPHTHALMIC at 08:04

## 2024-07-25 RX ADMIN — ACETAMINOPHEN 650 MG: 325 TABLET ORAL at 12:20

## 2024-07-25 RX ADMIN — INSULIN LISPRO 2 UNITS: 100 INJECTION, SOLUTION INTRAVENOUS; SUBCUTANEOUS at 17:27

## 2024-07-25 RX ADMIN — PANTOPRAZOLE SODIUM 40 MG: 40 TABLET, DELAYED RELEASE ORAL at 06:14

## 2024-07-25 RX ADMIN — PIPERACILLIN AND TAZOBACTAM 3375 MG: 3; .375 INJECTION, POWDER, LYOPHILIZED, FOR SOLUTION INTRAVENOUS at 06:18

## 2024-07-25 RX ADMIN — AMLODIPINE BESYLATE 10 MG: 10 TABLET ORAL at 08:03

## 2024-07-25 RX ADMIN — OXYCODONE HYDROCHLORIDE 10 MG: 10 TABLET ORAL at 03:49

## 2024-07-25 RX ADMIN — OXYCODONE HYDROCHLORIDE 10 MG: 10 TABLET ORAL at 18:52

## 2024-07-25 ASSESSMENT — PAIN DESCRIPTION - ORIENTATION
ORIENTATION: RIGHT
ORIENTATION: RIGHT
ORIENTATION: LEFT
ORIENTATION: RIGHT
ORIENTATION: RIGHT
ORIENTATION: LEFT

## 2024-07-25 ASSESSMENT — PAIN SCALES - GENERAL
PAINLEVEL_OUTOF10: 6
PAINLEVEL_OUTOF10: 9
PAINLEVEL_OUTOF10: 8
PAINLEVEL_OUTOF10: 10
PAINLEVEL_OUTOF10: 5
PAINLEVEL_OUTOF10: 6
PAINLEVEL_OUTOF10: 8
PAINLEVEL_OUTOF10: 10
PAINLEVEL_OUTOF10: 7
PAINLEVEL_OUTOF10: 10
PAINLEVEL_OUTOF10: 7
PAINLEVEL_OUTOF10: 5
PAINLEVEL_OUTOF10: 10

## 2024-07-25 ASSESSMENT — PAIN DESCRIPTION - LOCATION
LOCATION: LEG
LOCATION: FOOT
LOCATION: FOOT;LEG
LOCATION: FOOT

## 2024-07-25 ASSESSMENT — PAIN - FUNCTIONAL ASSESSMENT
PAIN_FUNCTIONAL_ASSESSMENT: PREVENTS OR INTERFERES SOME ACTIVE ACTIVITIES AND ADLS
PAIN_FUNCTIONAL_ASSESSMENT: PREVENTS OR INTERFERES SOME ACTIVE ACTIVITIES AND ADLS
PAIN_FUNCTIONAL_ASSESSMENT: ACTIVITIES ARE NOT PREVENTED

## 2024-07-25 ASSESSMENT — PAIN SCALES - WONG BAKER: WONGBAKER_NUMERICALRESPONSE: NO HURT

## 2024-07-25 NOTE — DISCHARGE INSTR - COC
Continuity of Care Form    Patient Name: Azael Key Jr.   :  1985  MRN:  [id number]    Admit date:  2024  Discharge date:  ***    Code Status Order: Full Code   Advance Directives:     Admitting Physician:  Aguila Mckeon MD  PCP: Geri Almonte MD    Discharging Nurse: ***  Discharging Hospital Unit/Room#: 3115/3115-A  Discharging Unit Phone Number: ***    Emergency Contact:   Extended Emergency Contact Information  Primary Emergency Contact: Tiana Tadeo  Address: [address] [place] [address]  Home Phone: [phone]  Mobile Phone: [phone]  Relation: Parent  Secondary Emergency Contact: Rufina Tadeo  Home Phone: [phone]  Relation: Brother/Sister    Past Surgical History:  Past Surgical History:   Procedure Laterality Date    CORNEAL TRANSPLANT Bilateral     EYE SURGERY      left eye removed    EYE SURGERY Right     FOOT DEBRIDEMENT Right 10/16/2023    FOOT DEBRIDEMENT INCISION AND DRAINAGE RIGHT performed by Abdon Yee MD at [facility] OR    FRACTURE SURGERY      foot left    LAPAROSCOPIC APPENDECTOMY N/A 2022    APPENDECTOMY LAPAROSCOPIC performed by Fortino Obrien MD at [facility] OR    MANDIBLE SURGERY Right     MANDIBLE SURGERY Bilateral     TOE AMPUTATION Right 2017    TOE AMPUTATION Right 2022    TOE AMPUTATION, RAY AMPUTATION OF RIGHT SECOND TOE performed by Abdon Yee MD at [facility] OR    TOE AMPUTATION Right 2023    THIRD TOE AMPUTATION AND REVISION OF SECOND TOE AMPUTATION performed by Roxanne Galo MD at [facility] OR       Immunization History:   Immunization History   Administered Date(s) Administered    Influenza Virus Vaccine 10/19/2016    TDaP, ADACEL (age 10y-64y), BOOSTRIX (age 10y+), IM, 0.5mL 2019       Active Problems:  Patient Active Problem List   Diagnosis Code    Sprain of left ankle S93.402A    Closed nondisplaced fracture of fifth metatarsal bone of left foot S92.355A    Alcohol abuse  F10.10    UTI (urinary tract infection) due to Enterococcus N39.0, B95.2    Acute osteomyelitis of toe, right ([facility]) M86.171    Osteomyelitis of fifth toe of right foot ([facility]) M86.9    Diabetic foot infection ([facility]) E11.628, L08.9    Shortness of breath R06.02    Asthma exacerbation J45.901    Appendicitis K37    Diabetic foot ulcer with osteomyelitis ([facility]) E11.621, E11.69, L97.509, M86.9    Cellulitis L03.90    Open wound of toe S91.109A    Rhinovirus B34.8    Vision impairment H54.7    WD-Nonhealing surgical wound, initial encounter T81.89XA    WD-Amputation of one or more toes ([facility]) S98.139A    WD-Right foot ulcer, with fat layer exposed ([facility]) L97.512    Type 2 diabetes mellitus with skin complication, with long-term current use of insulin ([facility]) E11.628, Z79.4    WD-Obesity E66.9    Acute post-operative pain G89.18    Gangrene of toe of right foot ([facility]) I96    Gangrene of toe ([facility]) I96    Charcot's arthropathy M14.60    MRSA infection A49.02    Charcot arthropathy of midfoot M14.679    Instability of left foot joint M25.375    Neuropathy G62.9    Traumatic ulcer of right lower extremity with fat layer exposed ([facility]) L97.912    Traumatic leg ulcer, left, with fat layer exposed ([facility]) L97.922    Left foot infection L08.9    Pyogenic inflammation of bone ([facility]) M86.9    Staphylococcus aureus bacteremia R78.81, B95.61       Isolation/Infection:   Isolation            Contact          Patient Infection Status       Infection Onset Added Last Indicated Last Indicated By Review Planned Expiration Resolved Resolved By    MDRO (multi-drug resistant organism) 02/11/23 02/20/23 02/20/23 Moe Zaman RN        2/11/23: toe - Proteus mirabilis  7/20/24: foot - Morganella morganii & Proteus mirabilis    MRSA 12/30/22 01/02/23 10/28/23 Culture, Wound Aerobic Only        Resolved    Rhinovirus 02/10/23 02/10/23 02/10/23 Respiratory Panel, Molecular, with COVID-19 (Restricted: peds pts or suitable admitted adults)   02/20/23

## 2024-07-25 NOTE — CONSULTS
Glucose 214 (H) 70 - 99 MG/DL    BUN 7 6 - 23 MG/DL    Creatinine 0.7 (L) 0.9 - 1.3 MG/DL    Est, Glom Filt Rate >90 >60 mL/min/1.73m2    Calcium 8.4 8.3 - 10.6 MG/DL   CBC    Collection Time: 07/24/24  3:51 PM   Result Value Ref Range    WBC 8.6 4.0 - 10.5 K/CU MM    RBC 3.02 (L) 4.6 - 6.2 M/CU MM    Hemoglobin 9.3 (L) 13.5 - 18.0 GM/DL    Hematocrit 27.7 (L) 42 - 52 %    MCV 91.7 78 - 100 FL    MCH 30.8 27 - 31 PG    MCHC 33.6 32.0 - 36.0 %    RDW 13.2 11.7 - 14.9 %    Platelets 414 140 - 440 K/CU MM    MPV 8.8 7.5 - 11.1 FL   POCT Glucose    Collection Time: 07/24/24  4:03 PM   Result Value Ref Range    POC Glucose 251 (H) 70 - 99 MG/DL   POCT Glucose    Collection Time: 07/24/24  7:58 PM   Result Value Ref Range    POC Glucose 255 (H) 70 - 99 MG/DL   C-Reactive Protein    Collection Time: 07/25/24  1:10 AM   Result Value Ref Range    CRP High Sensitivity 227.0 (H) <5.0 mg/L   CBC    Collection Time: 07/25/24  1:10 AM   Result Value Ref Range    WBC 8.3 4.0 - 10.5 K/CU MM    RBC 3.08 (L) 4.6 - 6.2 M/CU MM    Hemoglobin 9.5 (L) 13.5 - 18.0 GM/DL    Hematocrit 28.1 (L) 42 - 52 %    MCV 91.2 78 - 100 FL    MCH 30.8 27 - 31 PG    MCHC 33.8 32.0 - 36.0 %    RDW 13.2 11.7 - 14.9 %    Platelets 414 140 - 440 K/CU MM    MPV 8.7 7.5 - 11.1 FL   POCT Glucose    Collection Time: 07/25/24  8:00 AM   Result Value Ref Range    POC Glucose 218 (H) 70 - 99 MG/DL       The ASCVD Risk score (Carolann DK, et al., 2019) failed to calculate for the following reasons:    The 2019 ASCVD risk score is only valid for ages 40 to 79     Assessment/Recommendations:     -Positive blood culture history of recurrent MRSA will proceed with transesophageal echocardiogram  -Hypertension on Norvasc lisinopril and hydrochlorothiazide  -Diabetes on metformin and glipizide as outpatient no presently on coverage  -History of drug abuse in the past history of cocaine  -History of alcohol abuse-  -bilateral lower extremity osteomyelitis suggest checking

## 2024-07-25 NOTE — PROGRESS NOTES
Physician Progress Note      PATIENT:               ELBERT CABRERA  CSN #:                  [id number]  :                       1985  ADMIT DATE:       2024 7:44 PM  DISCH DATE:  RESPONDING  PROVIDER #:        Amy Miranda MD          QUERY TEXT:    Patient admitted with right and left foot cellulitis. Noted documentation of   sepsis in H&P on 24. In order to support the diagnosis of sepsis, please   include additional clinical indicators in your documentation.  Or please   document if the diagnosis of sepsis has been ruled out after further study    The medical record reflects the following:  Risk Factors: Hx- DM2, asthma, GERD, HTN, retinal detachment  Clinical Indicators: VS- -98.5, 143, 45, 159/129 - 179/84....WBC 9.6, Lactate   2.9, Procal 2.57, Sed rate 55, .1.......CT Right foot-Osteomyelitis   involving the second metatarsal head detailed above with associated soft   tissue abscess formation and cellulitis. CT Left foot-  Osteomyelitis of the ankle and tarsal joints detailed above   CT Left   tib,fib-Cellulitis. No abscess identified....Per H&P on 24-Sepsis   secondary to left and right foot cellulitis  Treatment: Cefepime and Vanco IV, BC's    Thank You,  Pema Menjivar RN BSN CDS CRCR  [email or web address]  Options provided:  -- Sepsis due to right and left foot cellulitis present as evidenced by,   Please document evidence.  -- Sepsis was ruled out after study  -- Other - I will add my own diagnosis  -- Disagree - Not applicable / Not valid  -- Disagree - Clinically unable to determine / Unknown  -- Refer to Clinical Documentation Reviewer    PROVIDER RESPONSE TEXT:    Sepsis right and left foot cellulitis is present as evidenced by    Query created by: Pema Menjivar on 2024 9:01 AM      QUERY TEXT:    Pt admitted with right and left foot cellulitis. Pt noted to have DM 2. If   possible, please document in progress notes and discharge summary the

## 2024-07-25 NOTE — CONSULTS
[facility] ACUTE CARE PHYSICAL THERAPY EVALUATION  Azael Key Jr., 1985, 3115/3115-A, 7/25/2024    Assessment:  Patient admitted for left foot infection with hx RLE infection-amputation, with current osteomyelitis each LE (see ID note) with unstable/unpredictable medical features and evolving rehabilitative features.  Slightly impaired mobility compared with indep baseline, however, patient moves well overall today.  Patient performed well with physical therapy evaluation and does not wish ongoing acute care physical therapy service.  Prognosis: guarded.  If condition worsens and if LEs become less functional, patient might become appropriate for ongoing service.  Clinical decision making:  medium complexity:  hx 1-2 personal factors/comorbidities, exam tests and measures for 2-3 elements of body/structures/functions/activity limitation/participation restriction, evolving presentation    Discharge recommendations:  no PT service presently needed.    Recommend ownership of RW for support while walking -- impairments noted on each LE.       Plan:  Discharge from acute care physical therapy service.      Mobility homework for patient and nurse:    change position frequently, sit at edge of bed 4-6 times daily, out of bed for meals, use bedside commode for voiding, and accompany to bathroom for voiding.  Patient requires supervision for mobility with nursing.      History:  [place]:  The primary encounter diagnosis was Staphylococcus aureus bacteremia. Diagnoses of Cellulitis of left lower extremity, Other acute osteomyelitis of left foot (HCC), and Acute bacterial endocarditis were also pertinent to this visit.  Patient  has a past medical history of Asthma, Blind left eye, Diabetes mellitus (HCC), GERD (gastroesophageal reflux disease), Hypertension, and Retinal detachment.  Patient  has a past surgical history that includes Corneal transplant (Bilateral); Mandible surgery (Right); eye surgery; Eye

## 2024-07-25 NOTE — CARE COORDINATION
Dr gaitan CM that pt will need IV atb's at d/c.  Still awaiting final ID rec.  Pt agreeable for referral to be made to [facility].  Referral made to [facility].  TE

## 2024-07-25 NOTE — PLAN OF CARE
Problem: Discharge Planning  Goal: Discharge to home or other facility with appropriate resources  7/24/2024 2347 by Stella Cosme RN  Outcome: Progressing     Problem: Pain  Goal: Verbalizes/displays adequate comfort level or baseline comfort level  7/24/2024 2347 by Stella Cosme RN  Outcome: Progressing     Problem: Skin/Tissue Integrity  Goal: Absence of new skin breakdown  Description: 1.  Monitor for areas of redness and/or skin breakdown  2.  Assess vascular access sites hourly  3.  Every 4-6 hours minimum:  Change oxygen saturation probe site  4.  Every 4-6 hours:  If on nasal continuous positive airway pressure, respiratory therapy assess nares and determine need for appliance change or resting period.  7/24/2024 2347 by Stella Cosme RN  Outcome: Progressing     Problem: Safety - Adult  Goal: Free from fall injury  7/24/2024 2347 by Stella Cosme RN  Outcome: Progressing     Problem: Chronic Conditions and Co-morbidities  Goal: Patient's chronic conditions and co-morbidity symptoms are monitored and maintained or improved  7/24/2024 2347 by Stella Cosme RN  Outcome: Progressing     
  Problem: Discharge Planning  Goal: Discharge to home or other facility with appropriate resources  Outcome: Progressing     Problem: Pain  Goal: Verbalizes/displays adequate comfort level or baseline comfort level  Outcome: Progressing     Problem: Skin/Tissue Integrity  Goal: Absence of new skin breakdown  Description: 1.  Monitor for areas of redness and/or skin breakdown  2.  Assess vascular access sites hourly  3.  Every 4-6 hours minimum:  Change oxygen saturation probe site  4.  Every 4-6 hours:  If on nasal continuous positive airway pressure, respiratory therapy assess nares and determine need for appliance change or resting period.  Outcome: Progressing     Problem: Safety - Adult  Goal: Free from fall injury  Outcome: Progressing     Problem: Chronic Conditions and Co-morbidities  Goal: Patient's chronic conditions and co-morbidity symptoms are monitored and maintained or improved  Outcome: Progressing     
  Problem: Discharge Planning  Goal: Discharge to home or other facility with appropriate resources  Outcome: Progressing     Problem: Pain  Goal: Verbalizes/displays adequate comfort level or baseline comfort level  Outcome: Progressing     Problem: Skin/Tissue Integrity  Goal: Absence of new skin breakdown  Description: 1.  Monitor for areas of redness and/or skin breakdown  2.  Assess vascular access sites hourly  3.  Every 4-6 hours minimum:  Change oxygen saturation probe site  4.  Every 4-6 hours:  If on nasal continuous positive airway pressure, respiratory therapy assess nares and determine need for appliance change or resting period.  Outcome: Progressing     Problem: Safety - Adult  Goal: Free from fall injury  Outcome: Progressing     Problem: Chronic Conditions and Co-morbidities  Goal: Patient's chronic conditions and co-morbidity symptoms are monitored and maintained or improved  Outcome: Progressing     
  Problem: Pain  Goal: Verbalizes/displays adequate comfort level or baseline comfort level  Outcome: Not Progressing     Problem: Skin/Tissue Integrity  Goal: Absence of new skin breakdown    Problem: Pain  Goal: Verbalizes/displays adequate comfort level or baseline comfort level  Outcome: Not Progressing     Problem: Safety - Adult  Goal: Free from fall injury  Outcome: Progressing     Problem: Discharge Planning  Goal: Discharge to home or other facility with appropriate resources  Outcome: Progressing   Outcome: Not Progressing     
LASHAWN Dickens  Outcome: Progressing  7/24/2024 2347 by Stella Cosme RN  Outcome: Progressing     Problem: Safety - Adult  Goal: Free from fall injury  7/25/2024 1319 by Chrissie Jack RN  Outcome: Progressing  7/24/2024 2347 by Stella Cosme RN  Outcome: Progressing     Problem: Chronic Conditions and Co-morbidities  Goal: Patient's chronic conditions and co-morbidity symptoms are monitored and maintained or improved  7/25/2024 1319 by Chrissie Jack RN  Outcome: Progressing  Flowsheets  Taken 7/25/2024 1215  Care Plan - Patient's Chronic Conditions and Co-Morbidity Symptoms are Monitored and Maintained or Improved:   Monitor and assess patient's chronic conditions and comorbid symptoms for stability, deterioration, or improvement   Collaborate with multidisciplinary team to address chronic and comorbid conditions and prevent exacerbation or deterioration   Update acute care plan with appropriate goals if chronic or comorbid symptoms are exacerbated and prevent overall improvement and discharge  Taken 7/25/2024 0801  Care Plan - Patient's Chronic Conditions and Co-Morbidity Symptoms are Monitored and Maintained or Improved:   Monitor and assess patient's chronic conditions and comorbid symptoms for stability, deterioration, or improvement   Collaborate with multidisciplinary team to address chronic and comorbid conditions and prevent exacerbation or deterioration   Update acute care plan with appropriate goals if chronic or comorbid symptoms are exacerbated and prevent overall improvement and discharge  7/24/2024 2347 by Stella Cosme RN  Outcome: Progressing

## 2024-07-25 NOTE — PROGRESS NOTES
Infectious Disease Progress Note  2024   Patient Name: Azael Key Jr. : 1985   Impression  Staphylococcus simulans Bacteremia:  Bilateral Lower Leg Cellulitis:  OM Right 2nd MT Head/ Plantar with Charcot Foot:  OM Left Distal Tibia/ Fibula:   Allergy to ciprofloxacin (SOB):  Allergy to vancomycin (past swelling but tolerated this admission):  Bacteremia likely secondary to bilateral lower extremity OM, awaiting spp of BC and wound cultures. Concern for possible native MV and AV IE as complete TTE revealed suspicions.   CrCl 207. T-max 100.6. no leukocytosis. Hyponatremia. Pct 2.57.  on dwt. ESR 55.   -BC 4/4 Staph simulans (oxacillin sensitive)  -BC 0/2 NGTD  Past: 3/25/2024: right foot wound surgical culture: MSSA  -Right foot wound culture: Morganella morganii (sensi to cefepime, ceftriaxone, ciprofloxacin, ertapenem, levofloxacin, meropenem, Zosyn, tobramycin and moxifloxacin), E.faecalis (pan sensi) and Proteus mirabilis (sensi cefepime, ceftriaxone, cefuroxime, ciprofloxacin, ertapenem, levofloxacin, meropenem, Zosyn Augmentin, moxifloxacin)  -PCXR: No acute CP abnormality.  -CT Foot right w contrast: OM involving the second metatarsal head detailed above with associated soft tissue abscess formation and cellulitis.   -CT Tibia/ Fibula Left W Contrast: Cellulitis. No abscess identified. There is periosteal reaction   around the distal tibia and fibula concerning for underlying chronic   osteomyelitis. Contrast-enhanced MRI is suggested   -CT Left Foot W Contrast: Osteomyelitis of the ankle and tarsal joints detailed above   -Complete TTE: EF 55-60%, Lablls vs mobile echodensity on AV. Chordae vs mobile echodensity on anterior leaflet of MV.   -ANTWON: Pending  Dr. Galo, general surgery, recommends local wound care at this time, pt is a pt of Dr. Davis as an OP.  Uncontrolled DMII with Polyneuropathy:  Alcohol, Cocaine and Tobacco Abuse:  -Blood tox

## 2024-07-25 NOTE — PROGRESS NOTES
V2.0    [facility] [place]      Name:  Azael Key Jr. /Age/Sex: 1985  (38 y.o. male)   MRN & CSN:  [id number] & [id number] Encounter Date/Time: 2024 2:35 PM EDT   Location:  [facility]5/3115-A PCP: Geri Almonte MD     Attending:Amy Prieto MD       Hospital Day: 7    Assessment and Recommendations   Azael Key Jr. is a 38 y.o. male who presents with Left foot infection      Plan:     # Sepsis secondary to bacteremia and bilateral lower extremity osteomyelitis  # Hx of right foot gangrene s/p 2-4th digits amputation in 10/2023  # possible endocarditis   - Endorsed worsening swelling, erythema and pain of left foot with fever of 100.5 °F over 3 days. Denied any recent trauma or injuries. Has chronic bilateral feet infection with Charcot foot, followed by TAYO and Dr. Davis outpatient. Hx of recurrent MRSA.   - Purulent, afebrile, no leukocytosis, LA 2.9 with normal repeat.   - CT of left fibula/fibula reveals cellulitis with concern for chronic osteomyelitis.  CT of left foot reveals osteomyelitis of ankle and tarsal joints.  CT of right foot reveals osteomyelitis of second metatarsal head with associated soft tissue abscess formation and cellulitis.  - Blood cultures positive in 3/4 bottles for GPC, follow further read  - Started on IVF and Vanc/Cefepime  - General surgery recommends continueing local wound care at this time  - ID consulted  - Follow-up cultures and inflammatory markers  -TTE done showing Lamblls vs mobile echodensity on AV. Chordae vs mobile echodensity on MV   -awaiting ANTWON    # Uncontrolled T2DM with hyperglycemia, peripheral neuropathy and OS blindness  - Last A1c 8.3% in 10/2023, repeat 6.6   - Held Metformin and Glipizide.  - Continue basal/bolus/SSI regimen, titrate as required     # Essential hypertension  - Continue Norvasc, Lisinopril and HCTZ.     # Tachycardia  - ECG with ST only.   - Likely secondary to sepsis. No hypoxemia.  - Monitor for now. Follow-up UDS.     # Hx of  illicit substance use  - Denied any use of substances. Ethanol level 0.19 on admission.  - Previous UDSs positive for cocaine. Repeat UDS this visit positive for amphetamines and cocaine  - Monitor for sxs of withdrawal, none noted thus far.      # Alcohol abuse  - Hx of drinking \"few beers\" daily. No hx of withdrawals or seizures. Last drink 3 days prior.  - DALE ordered. Monitor for sxs of withdrawal.       # Class II obesity  - BMI 37.1.      Diet Diet NPO Exceptions are: Sips of Water with Meds  ADULT DIET; Regular; 5 carb choices (75 gm/meal)   DVT Prophylaxis [] Lovenox, []  Heparin, [] SCDs, [] Ambulation,  [] Eliquis, [] Xarelto  [] Coumadin   Code Status Full Code   Disposition From: Home  Expected Disposition: TBD  Estimated Date of Discharge: 3-4 days  Patient requires continued admission due to bacteremia/OM pending further culture results and ID recs    Surrogate Decision Maker/ POA       Personally reviewed Lab Studies and Imaging     Imaging that was interpreted personally includes as above    Drugs that require monitoring for toxicity include antibiotics and the method of monitoring was renal panel        Subjective:     Chief Complaint:     Patient seen at bedside, awaiting ANTWON, spoke with mother as well, continues to have pain in his foot    Review of Systems:      Pertinent positives and negatives discussed in HPI    Objective:     Intake/Output Summary (Last 24 hours) at 7/25/2024 0924  Last data filed at 7/25/2024 0801  Gross per 24 hour   Intake 830 ml   Output 1375 ml   Net -545 ml      Vitals:   Vitals:    07/25/24 0419 07/25/24 0614 07/25/24 0801 07/25/24 0803   BP:   (!) 124/90    Pulse:   (!) 103 (!) 103   Resp: 16  16 16   Temp:  100 °F (37.8 °C) 99.2 °F (37.3 °C)    TempSrc:   Oral    SpO2:    95%   Weight:       Height:             Physical Exam:      General: NAD  Eyes: EOMI  ENT: neck supple  Cardiovascular: Regular rate.  Respiratory: Clear to auscultation  Gastrointestinal: Soft, non

## 2024-07-26 ENCOUNTER — APPOINTMENT (OUTPATIENT)
Dept: NON INVASIVE DIAGNOSTICS | Age: 39
DRG: 720 | End: 2024-07-26
Attending: STUDENT IN AN ORGANIZED HEALTH CARE EDUCATION/TRAINING PROGRAM
Payer: COMMERCIAL

## 2024-07-26 VITALS
BODY MASS INDEX: 36.18 KG/M2 | HEIGHT: 75 IN | SYSTOLIC BLOOD PRESSURE: 121 MMHG | WEIGHT: 291 LBS | RESPIRATION RATE: 29 BRPM | HEART RATE: 88 BPM | TEMPERATURE: 98.3 F | OXYGEN SATURATION: 100 % | DIASTOLIC BLOOD PRESSURE: 70 MMHG

## 2024-07-26 LAB
ECHO BSA: 2.64 M2
GLUCOSE BLD-MCNC: 175 MG/DL (ref 70–99)
GLUCOSE BLD-MCNC: 239 MG/DL (ref 70–99)
GLUCOSE BLD-MCNC: 248 MG/DL (ref 70–99)
HCT VFR BLD CALC: 30.1 % (ref 42–52)
HEMOGLOBIN: 10 GM/DL (ref 13.5–18)
MCH RBC QN AUTO: 30.9 PG (ref 27–31)
MCHC RBC AUTO-ENTMCNC: 33.2 % (ref 32–36)
MCV RBC AUTO: 92.9 FL (ref 78–100)
PDW BLD-RTO: 13.2 % (ref 11.7–14.9)
PLATELET # BLD: 504 K/CU MM (ref 140–440)
PMV BLD AUTO: 8.6 FL (ref 7.5–11.1)
RBC # BLD: 3.24 M/CU MM (ref 4.6–6.2)
WBC # BLD: 9.3 K/CU MM (ref 4–10.5)

## 2024-07-26 PROCEDURE — 93312 ECHO TRANSESOPHAGEAL: CPT | Performed by: INTERNAL MEDICINE

## 2024-07-26 PROCEDURE — 93325 DOPPLER ECHO COLOR FLOW MAPG: CPT | Performed by: INTERNAL MEDICINE

## 2024-07-26 PROCEDURE — 85027 COMPLETE CBC AUTOMATED: CPT

## 2024-07-26 PROCEDURE — 6370000000 HC RX 637 (ALT 250 FOR IP): Performed by: STUDENT IN AN ORGANIZED HEALTH CARE EDUCATION/TRAINING PROGRAM

## 2024-07-26 PROCEDURE — 6370000000 HC RX 637 (ALT 250 FOR IP): Performed by: FAMILY MEDICINE

## 2024-07-26 PROCEDURE — 93312 ECHO TRANSESOPHAGEAL: CPT

## 2024-07-26 PROCEDURE — B24BZZ4 ULTRASONOGRAPHY OF HEART WITH AORTA, TRANSESOPHAGEAL: ICD-10-PCS | Performed by: STUDENT IN AN ORGANIZED HEALTH CARE EDUCATION/TRAINING PROGRAM

## 2024-07-26 PROCEDURE — 7100000011 HC PHASE II RECOVERY - ADDTL 15 MIN: Performed by: INTERNAL MEDICINE

## 2024-07-26 PROCEDURE — 2580000003 HC RX 258: Performed by: INTERNAL MEDICINE

## 2024-07-26 PROCEDURE — 6360000002 HC RX W HCPCS: Performed by: INTERNAL MEDICINE

## 2024-07-26 PROCEDURE — 94640 AIRWAY INHALATION TREATMENT: CPT

## 2024-07-26 PROCEDURE — 36415 COLL VENOUS BLD VENIPUNCTURE: CPT

## 2024-07-26 PROCEDURE — 6370000000 HC RX 637 (ALT 250 FOR IP): Performed by: NURSE PRACTITIONER

## 2024-07-26 PROCEDURE — 82962 GLUCOSE BLOOD TEST: CPT

## 2024-07-26 PROCEDURE — 6370000000 HC RX 637 (ALT 250 FOR IP): Performed by: INTERNAL MEDICINE

## 2024-07-26 PROCEDURE — 7100000010 HC PHASE II RECOVERY - FIRST 15 MIN: Performed by: INTERNAL MEDICINE

## 2024-07-26 PROCEDURE — 99232 SBSQ HOSP IP/OBS MODERATE 35: CPT | Performed by: INTERNAL MEDICINE

## 2024-07-26 PROCEDURE — 99152 MOD SED SAME PHYS/QHP 5/>YRS: CPT | Performed by: INTERNAL MEDICINE

## 2024-07-26 PROCEDURE — 93320 DOPPLER ECHO COMPLETE: CPT | Performed by: INTERNAL MEDICINE

## 2024-07-26 RX ORDER — POLYETHYLENE GLYCOL 3350 17 G/17G
17 POWDER, FOR SOLUTION ORAL DAILY PRN
Qty: 527 G | Refills: 1 | Status: SHIPPED | OUTPATIENT
Start: 2024-07-26 | End: 2024-09-26

## 2024-07-26 RX ORDER — AMOXICILLIN AND CLAVULANATE POTASSIUM 875; 125 MG/1; MG/1
1 TABLET, FILM COATED ORAL EVERY 12 HOURS SCHEDULED
Status: DISCONTINUED | OUTPATIENT
Start: 2024-07-26 | End: 2024-07-26 | Stop reason: HOSPADM

## 2024-07-26 RX ORDER — AMOXICILLIN AND CLAVULANATE POTASSIUM 875; 125 MG/1; MG/1
1 TABLET, FILM COATED ORAL EVERY 12 HOURS SCHEDULED
Qty: 60 TABLET | Refills: 1 | Status: SHIPPED | OUTPATIENT
Start: 2024-07-26 | End: 2024-09-02

## 2024-07-26 RX ORDER — INSULIN GLARGINE 100 [IU]/ML
45 INJECTION, SOLUTION SUBCUTANEOUS NIGHTLY
Qty: 10 ML | Refills: 0 | Status: SHIPPED | OUTPATIENT
Start: 2024-07-26

## 2024-07-26 RX ORDER — LEVOFLOXACIN 750 MG/1
750 TABLET, FILM COATED ORAL DAILY
Qty: 30 TABLET | Refills: 1 | Status: SHIPPED | OUTPATIENT
Start: 2024-07-27 | End: 2024-09-02

## 2024-07-26 RX ORDER — OXYCODONE HYDROCHLORIDE AND ACETAMINOPHEN 5; 325 MG/1; MG/1
1 TABLET ORAL 2 TIMES DAILY
Qty: 6 TABLET | Refills: 0 | Status: SHIPPED | OUTPATIENT
Start: 2024-07-26 | End: 2024-07-29

## 2024-07-26 RX ORDER — LEVOFLOXACIN 500 MG/1
750 TABLET, FILM COATED ORAL DAILY
Status: DISCONTINUED | OUTPATIENT
Start: 2024-07-26 | End: 2024-07-26 | Stop reason: HOSPADM

## 2024-07-26 RX ORDER — LIDOCAINE HYDROCHLORIDE 20 MG/ML
SOLUTION OROPHARYNGEAL PRN
Status: COMPLETED | OUTPATIENT
Start: 2024-07-26 | End: 2024-07-26

## 2024-07-26 RX ORDER — MIDAZOLAM HYDROCHLORIDE 1 MG/ML
INJECTION INTRAMUSCULAR; INTRAVENOUS PRN
Status: COMPLETED | OUTPATIENT
Start: 2024-07-26 | End: 2024-07-26

## 2024-07-26 RX ORDER — TRAZODONE HYDROCHLORIDE 50 MG/1
50 TABLET ORAL NIGHTLY
Qty: 30 TABLET | Refills: 0 | Status: SHIPPED | OUTPATIENT
Start: 2024-07-26 | End: 2024-08-25

## 2024-07-26 RX ORDER — INSULIN LISPRO 100 [IU]/ML
20 INJECTION, SOLUTION INTRAVENOUS; SUBCUTANEOUS
Qty: 10 ML | Refills: 0 | Status: SHIPPED | OUTPATIENT
Start: 2024-07-26

## 2024-07-26 RX ADMIN — FLUTICASONE PROPIONATE 2 SPRAY: 50 SPRAY, METERED NASAL at 10:27

## 2024-07-26 RX ADMIN — OXYCODONE HYDROCHLORIDE 10 MG: 10 TABLET ORAL at 14:23

## 2024-07-26 RX ADMIN — PREDNISOLONE ACETATE 1 DROP: 10 SUSPENSION/ DROPS OPHTHALMIC at 10:28

## 2024-07-26 RX ADMIN — PIPERACILLIN AND TAZOBACTAM 3375 MG: 3; .375 INJECTION, POWDER, LYOPHILIZED, FOR SOLUTION INTRAVENOUS at 06:20

## 2024-07-26 RX ADMIN — TIMOLOL MALEATE 1 DROP: 5 SOLUTION OPHTHALMIC at 10:27

## 2024-07-26 RX ADMIN — ALBUTEROL SULFATE 2 PUFF: 90 AEROSOL, METERED RESPIRATORY (INHALATION) at 08:58

## 2024-07-26 RX ADMIN — PREGABALIN 100 MG: 100 CAPSULE ORAL at 10:24

## 2024-07-26 RX ADMIN — MIDAZOLAM 1 MG: 1 INJECTION INTRAMUSCULAR; INTRAVENOUS at 09:34

## 2024-07-26 RX ADMIN — ACETAMINOPHEN 650 MG: 325 TABLET ORAL at 05:09

## 2024-07-26 RX ADMIN — MIDAZOLAM 1 MG: 1 INJECTION INTRAMUSCULAR; INTRAVENOUS at 09:39

## 2024-07-26 RX ADMIN — OXYCODONE HYDROCHLORIDE 10 MG: 10 TABLET ORAL at 00:15

## 2024-07-26 RX ADMIN — LIDOCAINE HYDROCHLORIDE 15 ML: 20 SOLUTION ORAL at 09:24

## 2024-07-26 RX ADMIN — BRIMONIDINE TARTRATE 1 DROP: 2 SOLUTION OPHTHALMIC at 10:29

## 2024-07-26 RX ADMIN — PREDNISOLONE ACETATE 1 DROP: 10 SUSPENSION/ DROPS OPHTHALMIC at 14:15

## 2024-07-26 RX ADMIN — INSULIN LISPRO 18 UNITS: 100 INJECTION, SOLUTION INTRAVENOUS; SUBCUTANEOUS at 11:43

## 2024-07-26 RX ADMIN — LEVOFLOXACIN 750 MG: 500 TABLET, FILM COATED ORAL at 13:29

## 2024-07-26 RX ADMIN — ALLOPURINOL 300 MG: 300 TABLET ORAL at 10:25

## 2024-07-26 RX ADMIN — MIDAZOLAM 2 MG: 1 INJECTION INTRAMUSCULAR; INTRAVENOUS at 09:27

## 2024-07-26 RX ADMIN — MIDAZOLAM 1 MG: 1 INJECTION INTRAMUSCULAR; INTRAVENOUS at 09:38

## 2024-07-26 RX ADMIN — INSULIN LISPRO 18 UNITS: 100 INJECTION, SOLUTION INTRAVENOUS; SUBCUTANEOUS at 14:22

## 2024-07-26 RX ADMIN — OXYCODONE HYDROCHLORIDE 10 MG: 10 TABLET ORAL at 05:09

## 2024-07-26 RX ADMIN — DORZOLAMIDE HYDROCHLORIDE 1 DROP: 20 SOLUTION/ DROPS OPHTHALMIC at 10:29

## 2024-07-26 RX ADMIN — PANTOPRAZOLE SODIUM 40 MG: 40 TABLET, DELAYED RELEASE ORAL at 06:21

## 2024-07-26 RX ADMIN — MIDAZOLAM 2 MG: 1 INJECTION INTRAMUSCULAR; INTRAVENOUS at 09:30

## 2024-07-26 RX ADMIN — LISINOPRIL AND HYDROCHLOROTHIAZIDE 1 TABLET: 12.5; 2 TABLET ORAL at 10:34

## 2024-07-26 RX ADMIN — AMLODIPINE BESYLATE 10 MG: 10 TABLET ORAL at 10:25

## 2024-07-26 RX ADMIN — INSULIN LISPRO 2 UNITS: 100 INJECTION, SOLUTION INTRAVENOUS; SUBCUTANEOUS at 14:23

## 2024-07-26 RX ADMIN — OXYCODONE HYDROCHLORIDE 10 MG: 10 TABLET ORAL at 10:24

## 2024-07-26 RX ADMIN — EZETIMIBE 10 MG: 10 TABLET ORAL at 10:25

## 2024-07-26 ASSESSMENT — PAIN SCALES - GENERAL
PAINLEVEL_OUTOF10: 8
PAINLEVEL_OUTOF10: 4
PAINLEVEL_OUTOF10: 4
PAINLEVEL_OUTOF10: 10

## 2024-07-26 ASSESSMENT — PAIN - FUNCTIONAL ASSESSMENT
PAIN_FUNCTIONAL_ASSESSMENT: PREVENTS OR INTERFERES SOME ACTIVE ACTIVITIES AND ADLS
PAIN_FUNCTIONAL_ASSESSMENT: ACTIVITIES ARE NOT PREVENTED
PAIN_FUNCTIONAL_ASSESSMENT: PREVENTS OR INTERFERES WITH MANY ACTIVE NOT PASSIVE ACTIVITIES

## 2024-07-26 ASSESSMENT — PAIN DESCRIPTION - PAIN TYPE: TYPE: ACUTE PAIN;CHRONIC PAIN

## 2024-07-26 ASSESSMENT — PAIN DESCRIPTION - LOCATION
LOCATION: FOOT
LOCATION: FOOT;LEG
LOCATION: LEG;FOOT
LOCATION: LEG

## 2024-07-26 ASSESSMENT — PAIN DESCRIPTION - DESCRIPTORS
DESCRIPTORS: CRAMPING;DISCOMFORT
DESCRIPTORS: ACHING

## 2024-07-26 ASSESSMENT — PAIN DESCRIPTION - ORIENTATION
ORIENTATION: LEFT
ORIENTATION: RIGHT;LEFT
ORIENTATION: LEFT
ORIENTATION: LEFT

## 2024-07-26 ASSESSMENT — PAIN DESCRIPTION - FREQUENCY: FREQUENCY: CONTINUOUS

## 2024-07-26 NOTE — CARE COORDINATION
SATYA called and left a  for Bella at the [facility] for SNF placement. LH  1500 pt does not want to go to [facility] any longer since he is not receiving IV antibiotics. Pt wants to return home. CM called [facility] and they do accept his insurance. SATYA faxed the information. SATYA messaged Anusha regarding W/c that was ordered. SATYA spoke with Feliz at the [facility] and informed that pt no longer wants to admit.   1520 CM informed per Anusha that she needs a new order and documentation because it was never put in.   SATYA sent a PS to Dr Conner HERNADEZ update pt on the order of his w/c and need for prior auth. SATYA was informed that pt does not have a phone and could be reached through his brother Paolo at [phone].SATYA updated Anusha with ilya MISHRA and and [facility] of contact information. SATYA informed per pt that he will have a boot to use when he gets home,

## 2024-07-26 NOTE — PROGRESS NOTES
Patient seen and examined. Overall feeling ok, still with left ankle pain.    PE:  Vitals:    07/26/24 0946 07/26/24 0954 07/26/24 0958 07/26/24 1002   BP: 130/71 132/78 130/71 121/70   Pulse: 85 90 87 88   Resp: (!) 31  27 29   Temp:       TempSrc:       SpO2: 100%  96% 100%   Weight:  132 kg (291 lb)     Height:  1.905 m (6' 3\")       Right foot: dressing and packing removed, wound cleansed with betadine swabs, the wound does go to bone and midfoot, several cm's of 1/4 inch iodoform packing was placed in the wound and dry dressing was placed. No opening or wound under great toe.    Left Foot/leg:  persistent redness and edema, no open wounds noted, no drainage      A/P:  Patient with Charcot joint and osteomyelitis of the ankle and tarsal joints of the left foot. The patient has been treated for years with compression. Currently on IV antibiotics. The only surgical option at this point would be amputation, BKA. Discussed with patient and he is not ready at this time, wants to continue with antibiotics.   Patient with osteomyelitis involving the second metatarsal head and concern for soft tissue abscess seen on CT of the right foot, his wound extends to the mid foot, probed today and no purulent drainage or cellulitis, packing placed into wound. Continue with IV antibiotics. I discussed with the patient continuing his wound care as an outpatient at the wound clinic. He see's Dr. Davis. As the midfoot is drained, he does not need any surgical debridement at this time.  This was all explained to the patient today, he stated an understanding.  Dr. Yee covering for me this weekend if needed.

## 2024-07-26 NOTE — PROGRESS NOTES
Comprehensive Nutrition Assessment    Type and Reason for Visit:  Initial (los 7 d)    Nutrition Recommendations/Plan:   Modify diet to carb Control 5 to meet his energy needs with taller stature  Begin low fermin high protein oral nutrition supplement bid, between meals as needed     Malnutrition Assessment:  Malnutrition Status:  Insufficient data (07/26/24 1350)    Context:  Acute Illness       Nutrition Assessment:    Pt admitted with left foot infection. H/O hypertension, diabetes, gastroesophageal reflux disease. He is NPO and off the unit for ANTWON during my room visit. Eating well on carb control diet prior to. He reports wt loss PTA. Mild 4% wt loss noted over the past 9 mos. Plan for SNF noted. Will add oral supplement to optimize intake and continue to follow as moderate nutrition risk.    Nutrition Related Findings:    Na 132, K 3.4, Cr 0.7, Glu 248, A1c 6.6   Wound Type: Diabetic Ulcer       Current Nutrition Intake & Therapies:    Average Meal Intake: %  Average Supplements Intake: None Ordered  ADULT DIET; Regular; 4 carb choices (60 gm/meal)    Anthropometric Measures:  Height: 190.5 cm (6' 3\")  Ideal Body Weight (IBW): 196 lbs (89 kg)    Admission Body Weight: 129 kg (284 lb 6.3 oz)  Current Body Weight: 132 kg (291 lb 0.1 oz),   IBW.    Current BMI (kg/m2): 36.4  Usual Body Weight: 137.8 kg (303 lb 11 oz) (10/31/23)  % Weight Change (Calculated): -4.2                    BMI Categories: Obese Class 2 (BMI 35.0 -39.9)    Estimated Daily Nutrient Needs:  Energy Requirements Based On: Kcal/kg  Weight Used for Energy Requirements: Current  Energy (kcal/day): 2640 (20 kcal/kg)  Weight Used for Protein Requirements: Ideal  Protein (g/day):  (1-1.2 g/kg IBW)  Method Used for Fluid Requirements: 1 ml/kcal  Fluid (ml/day): 2600    Nutrition Diagnosis:   Predicted inadequate energy intake related to increase demand for energy/nutrients as evidenced by wounds    Nutrition Interventions:   Food and/or

## 2024-07-26 NOTE — PROGRESS NOTES
CARDIOLOGY  NOTE        Name:  Azael Key Jr. /Age/Sex: 1985  (38 y.o. male)   MRN & CSN:  [id number] & [id number] Admission Date/Time: 2024  7:44 PM   [facility]:  [facility] PCP: Geri Almonte MD       Hospital Day: 8        PLAN FROM CARDIOLOGY FOR TODAY:   For ANTWON today      - cardiology consult is for: Positive blood culture    -  Interval history: Will be undergoing ANTWON today    ASSESSMENT/ PLAN:      Sepsis with bacteremia with positive blood cultures  Right foot gangrene  Possible endocarditis we will proceed with transesophageal echocardiogram transthoracic echo was suggestive of a possibility of either Lambl's excrescences or vegetation on mitral valve  Diabetes on insulin coverage  Hypertension on Norvasc lisinopril and hydrochlorothiazide  History of drug abuse in the past urine drug screen has been positive for cocaine  History of alcohol as well  Transesophageal echocardiogram done today did not show any evidence of endocarditis on this echo          Subjective:      Todays complain: Patient no complaints    HPI:  Azael is a 38 y.o.year old who and presents with had concerns including Ankle Pain and Blood Infection (Sepsis alert called by medics).  Chief Complaint   Patient presents with    Ankle Pain    Blood Infection     Sepsis alert called by medics           Objective: Temperature:  Current - Temp: 98.3 °F (36.8 °C); Max - Temp  Av.6 °F (37.6 °C)  Min: 98.3 °F (36.8 °C)  Max: 100.7 °F (38.2 °C)    Respiratory Rate : Resp  Av.8  Min: 12  Max: 34    Pulse Range: Pulse  Av  Min: 85  Max: 107    Blood Presuure Range:  Systolic (24hrs), Av , Min:113 , Max:163   ; Diastolic (24hrs), Av, Min:70, Max:93      Pulse ox Range: SpO2  Av.9 %  Min: 95 %  Max: 100 %    24hr I & O:    Intake/Output Summary (Last 24 hours) at 2024 1042  Last data filed at 2024 0620  Gross per 24 hour   Intake

## 2024-07-26 NOTE — PROGRESS NOTES
Infectious Disease Progress Note  2024   Patient Name: Azael Key Jr. : 1985   Impression  Staphylococcus simulans Bacteremia:  Bilateral Lower Leg Cellulitis:  OM Right 2nd MT Head/ Plantar with Charcot Foot:  OM Left Distal Tibia/ Fibula:   Allergy to ciprofloxacin (SOB):  Allergy to vancomycin (past swelling but tolerated this admission):  Bacteremia likely secondary to bilateral lower extremity OM. ANTWON negative for IE.   CrCl 207. T-max 100.6. no leukocytosis. Hyponatremia. Pct 2.57.  on dwt. ESR 55.   -BC 4/4 Staph simulans (oxacillin sensitive)  -BC 0/2 NGTD  Past: 3/25/2024: right foot wound surgical culture: MSSA  -Right foot wound culture: Morganella morganii (sensi to cefepime, ceftriaxone, ciprofloxacin, ertapenem, levofloxacin, meropenem, Zosyn, tobramycin and moxifloxacin), E.faecalis (pan sensi) and Proteus mirabilis (sensi cefepime, ceftriaxone, cefuroxime, ciprofloxacin, ertapenem, levofloxacin, meropenem, Zosyn Augmentin, moxifloxacin)  -PCXR: No acute CP abnormality.  -CT Foot right w contrast: OM involving the second metatarsal head detailed above with associated soft tissue abscess formation and cellulitis.   -CT Tibia/ Fibula Left W Contrast: Cellulitis. No abscess identified. There is periosteal reaction   around the distal tibia and fibula concerning for underlying chronic   osteomyelitis. Contrast-enhanced MRI is suggested   -CT Left Foot W Contrast: Osteomyelitis of the ankle and tarsal joints detailed above   -Complete TTE: EF 55-60%, Lablls vs mobile echodensity on AV. Chordae vs mobile echodensity on anterior leaflet of MV.   -ANTWON: No evidence of IE  Dr. Galo, general surgery, recommends local wound care at this time, pt is a pt of Dr. Davis as an OP.  Uncontrolled DMII with Polyneuropathy:  Alcohol, Cocaine and Tobacco Abuse:  -Blood tox screen positive for amphetamines, cocaine and alcohol <0.01  Multi-morbidity: per PMHx:

## 2024-07-26 NOTE — DISCHARGE SUMMARY
V2.0  Discharge Summary    Name:  Azael Key Jr. /Age/Sex: 1985 (38 y.o. male)   Admit Date: 2024  Discharge Date: 24    MRN & CSN:  [id number] & [id number] Encounter Date and Time 24 2:44 PM EDT    Attending:  Amy Prieto MD Discharging Provider: Amy Prieto MD       Hospital Course:     Brief HPI: Azael Key Jr. is a 38 y.o. male who presented with ***    Brief Problem Based Course:   ***      The patient expressed appropriate understanding of, and agreement with the discharge recommendations, medications, and plan.     Consults this admission:  PHARMACY TO DOSE VANCOMYCIN  PHARMACY TO DOSE VANCOMYCIN  IP CONSULT TO GENERAL SURGERY  IP CONSULT TO INFECTIOUS DISEASES  IP CONSULT TO VASCULAR ACCESS TEAM  IP CONSULT TO HOME CARE NEEDS    Discharge Diagnosis:   Left foot infection    ***    Discharge Instruction:   Follow up appointments: ***  Primary care physician: Geri Almonte MD within 1 week  Diet: {diet:16057}   Activity: {discharge activity:91595}  Disposition: Discharged to:   []Home, []C, []SNF, []Acute Rehab, []Hospice ***  Condition on discharge: Stable  Labs and Tests to be Followed up as an outpatient by PCP or Specialist: ***    Discharge Medications:        Medication List        START taking these medications      amoxicillin-clavulanate 875-125 MG per tablet  Commonly known as: AUGMENTIN  Take 1 tablet by mouth every 12 hours for 76 doses     levoFLOXacin 750 MG tablet  Commonly known as: LEVAQUIN  Take 1 tablet by mouth daily for 37 doses  Start taking on: 2024     polyethylene glycol 17 g packet  Commonly known as: GLYCOLAX  Take 1 packet by mouth daily as needed for Constipation     traZODone 50 MG tablet  Commonly known as: DESYREL  Take 1 tablet by mouth nightly            CONTINUE taking these medications      albuterol sulfate  (90 Base) MCG/ACT inhaler  Commonly known as: Ventolin HFA  Inhale 2 puffs into the lungs 4 times daily as needed for  Wheezing     allopurinol 300 MG tablet  Commonly known as: ZYLOPRIM     amLODIPine 10 MG tablet  Commonly known as: NORVASC     atorvastatin 10 MG tablet  Commonly known as: LIPITOR     brimonidine 0.2 % ophthalmic solution  Commonly known as: ALPHAGAN     ezetimibe 10 MG tablet  Commonly known as: ZETIA     fluticasone 50 MCG/ACT nasal spray  Commonly known as: FLONASE  2 sprays by Each Nostril route daily     insulin glargine 100 UNIT/ML injection vial  Commonly known as: LANTUS  Inject 45 Units into the skin nightly     insulin lispro 100 UNIT/ML Soln injection vial  Commonly known as: HUMALOG,ADMELOG  Inject 20 Units into the skin 3 times daily (with meals)     lisinopril-hydroCHLOROthiazide 20-12.5 MG per tablet  Commonly known as: PRINZIDE;ZESTORETIC     melatonin 5 MG Tabs tablet     moxifloxacin 0.5 % ophthalmic solution  Commonly known as: VIGAMOX  Place 1 drop into the right eye 4 times daily     omeprazole 40 MG delayed release capsule  Commonly known as: PRILOSEC     oxyCODONE-acetaminophen 5-325 MG per tablet  Commonly known as: PERCOCET  Take 1 tablet by mouth 2 times daily for 3 days. 05/28/24 Prescription noted for 1 tablet daily as needed, patient reports taking it 2-3 x daily Max Daily Amount: 2 tablets     prednisoLONE acetate 1 % ophthalmic suspension  Commonly known as: PRED FORTE     pregabalin 100 MG capsule  Commonly known as: LYRICA     Symbicort 160-4.5 MCG/ACT Aero  Generic drug: budesonide-formoterol     TechLite Pen Needles 31G X 5 MM Misc  Generic drug: Insulin Pen Needle            STOP taking these medications      acetaminophen 325 MG tablet  Commonly known as: Aminofen     albuterol-ipratropium  MCG/ACT Aers inhaler  Commonly known as: Combivent Respimat     dorzolamide-timolol 22.3-6.8 MG/ML Soln     glipiZIDE 10 MG tablet  Commonly known as: GLUCOTROL     metFORMIN 1000 MG tablet  Commonly known as: GLUCOPHAGE     mupirocin 2 % ointment  Commonly known as: BACTROBAN

## 2024-07-27 ENCOUNTER — APPOINTMENT (OUTPATIENT)
Dept: ULTRASOUND IMAGING | Age: 39
End: 2024-07-27
Payer: COMMERCIAL

## 2024-07-27 ENCOUNTER — HOSPITAL ENCOUNTER (EMERGENCY)
Age: 39
Discharge: HOME | End: 2024-07-27
Attending: EMERGENCY MEDICINE
Payer: COMMERCIAL

## 2024-07-27 VITALS
RESPIRATION RATE: 18 BRPM | SYSTOLIC BLOOD PRESSURE: 149 MMHG | OXYGEN SATURATION: 100 % | TEMPERATURE: 97.9 F | DIASTOLIC BLOOD PRESSURE: 78 MMHG | HEART RATE: 88 BPM

## 2024-07-27 DIAGNOSIS — M86.60 CHRONIC OSTEOMYELITIS (HCC): Primary | ICD-10-CM

## 2024-07-27 LAB
CULTURE: NORMAL
CULTURE: NORMAL
Lab: NORMAL
Lab: NORMAL
SPECIMEN: NORMAL
SPECIMEN: NORMAL

## 2024-07-27 PROCEDURE — 99284 EMERGENCY DEPT VISIT MOD MDM: CPT

## 2024-07-27 PROCEDURE — 93971 EXTREMITY STUDY: CPT

## 2024-07-27 PROCEDURE — 93926 LOWER EXTREMITY STUDY: CPT

## 2024-07-27 PROCEDURE — 6370000000 HC RX 637 (ALT 250 FOR IP): Performed by: EMERGENCY MEDICINE

## 2024-07-27 RX ORDER — IBUPROFEN 600 MG/1
600 TABLET ORAL
Status: COMPLETED | OUTPATIENT
Start: 2024-07-27 | End: 2024-07-27

## 2024-07-27 RX ORDER — LEVOFLOXACIN 500 MG/1
750 TABLET, FILM COATED ORAL ONCE
Status: COMPLETED | OUTPATIENT
Start: 2024-07-27 | End: 2024-07-27

## 2024-07-27 RX ORDER — PREGABALIN 50 MG/1
100 CAPSULE ORAL 2 TIMES DAILY
Status: DISCONTINUED | OUTPATIENT
Start: 2024-07-27 | End: 2024-07-27

## 2024-07-27 RX ORDER — OXYCODONE HYDROCHLORIDE AND ACETAMINOPHEN 5; 325 MG/1; MG/1
1 TABLET ORAL
Status: COMPLETED | OUTPATIENT
Start: 2024-07-27 | End: 2024-07-27

## 2024-07-27 RX ORDER — HYDROCODONE BITARTRATE AND ACETAMINOPHEN 5; 325 MG/1; MG/1
1 TABLET ORAL
Status: COMPLETED | OUTPATIENT
Start: 2024-07-27 | End: 2024-07-27

## 2024-07-27 RX ORDER — PREGABALIN 50 MG/1
100 CAPSULE ORAL ONCE
Status: COMPLETED | OUTPATIENT
Start: 2024-07-27 | End: 2024-07-27

## 2024-07-27 RX ORDER — PREGABALIN 100 MG/1
100 CAPSULE ORAL 2 TIMES DAILY
Qty: 6 CAPSULE | Refills: 0 | Status: SHIPPED | OUTPATIENT
Start: 2024-07-27 | End: 2024-07-30

## 2024-07-27 RX ADMIN — OXYCODONE HYDROCHLORIDE AND ACETAMINOPHEN 1 TABLET: 5; 325 TABLET ORAL at 03:54

## 2024-07-27 RX ADMIN — HYDROCODONE BITARTRATE AND ACETAMINOPHEN 1 TABLET: 5; 325 TABLET ORAL at 01:57

## 2024-07-27 RX ADMIN — LEVOFLOXACIN 750 MG: 500 TABLET, FILM COATED ORAL at 01:56

## 2024-07-27 RX ADMIN — IBUPROFEN 600 MG: 600 TABLET ORAL at 01:57

## 2024-07-27 RX ADMIN — PREGABALIN 100 MG: 50 CAPSULE ORAL at 03:54

## 2024-07-27 NOTE — ED TRIAGE NOTES
Pt released from hospital last PM with antibiotics & oxy, pt states meds are not helping. Pain 10/10 to left leg pain.

## 2024-07-27 NOTE — ED PROVIDER NOTES
Triage Chief Complaint:    Leg Pain (Left leg pain 10/10. Released from hospital yesterday about 5pm. Sent home with oxy & antibiotics. States are not helping)    PHILL Key Jr. is a 38 y.o. male that presents for evaluation of leg pain.  He feels as though the pain is intermittent still and when it is coming on it seems to be fairly severe.  He does have oxycodone at home.  He states that he has not been able to  his Lyrica yet.  Patient states that he can call his brother and mother for a ride if he needs to and that his neighbor has a phone that he can use.  He was able to use his brother's phone earlier tonight to call the squad.  He states that he was here last night was doing better wanted to go home.  He states they did talked about keeping him previously in a nursing home but then told him that if he is not getting IV antibiotics and it does not make sense to stay in the nursing home and so he left.  He states that he is looking for some pain relief.  He states he has not used any drugs since he went home but has had beer.  He states he has been clean from drugs for a while and that I could check his last drug screen to confirm.  He denies any recurrent trauma or injury.  He states that he has not had any recurrent fevers since he started the antibiotics and has not picked up his antibiotic today.    History from : Patient    Limitations to history : None    ROS:  10 systems reviewed and negative except as above.     Past Medical History:   Diagnosis Date    Asthma     Blind left eye     Diabetes mellitus (HCC)     GERD (gastroesophageal reflux disease)     Hypertension     Retinal detachment 2012    left     Past Surgical History:   Procedure Laterality Date    CORNEAL TRANSPLANT Bilateral     EYE SURGERY      left eye removed    EYE SURGERY Right     FOOT DEBRIDEMENT Right 10/16/2023    FOOT DEBRIDEMENT INCISION AND DRAINAGE RIGHT performed by bAdon Yee MD at [facility] OR    FRACTURE

## 2024-07-30 ENCOUNTER — HOSPITAL ENCOUNTER (OUTPATIENT)
Dept: WOUND CARE | Age: 39
Discharge: HOME | End: 2024-07-30
Payer: COMMERCIAL

## 2024-07-30 DIAGNOSIS — G89.4 CHRONIC PAIN SYNDROME: ICD-10-CM

## 2024-07-30 DIAGNOSIS — Z79.4 TYPE 2 DIABETES MELLITUS WITH DIABETIC DERMATITIS, WITH LONG-TERM CURRENT USE OF INSULIN (HCC): ICD-10-CM

## 2024-07-30 DIAGNOSIS — M14.60 CHARCOT'S ARTHROPATHY: Primary | ICD-10-CM

## 2024-07-30 DIAGNOSIS — L97.512 RIGHT FOOT ULCER, WITH FAT LAYER EXPOSED (HCC): ICD-10-CM

## 2024-07-30 DIAGNOSIS — L97.912 TRAUMATIC ULCER OF RIGHT LOWER EXTREMITY WITH FAT LAYER EXPOSED (HCC): ICD-10-CM

## 2024-07-30 DIAGNOSIS — E66.01 CLASS 2 SEVERE OBESITY WITH SERIOUS COMORBIDITY AND BODY MASS INDEX (BMI) OF 36.0 TO 36.9 IN ADULT, UNSPECIFIED OBESITY TYPE (HCC): ICD-10-CM

## 2024-07-30 DIAGNOSIS — L97.922 TRAUMATIC LEG ULCER, LEFT, WITH FAT LAYER EXPOSED (HCC): ICD-10-CM

## 2024-07-30 DIAGNOSIS — E11.620 TYPE 2 DIABETES MELLITUS WITH DIABETIC DERMATITIS, WITH LONG-TERM CURRENT USE OF INSULIN (HCC): ICD-10-CM

## 2024-07-30 DIAGNOSIS — S98.131A AMPUTATION OF TOE OF RIGHT FOOT (HCC): ICD-10-CM

## 2024-07-30 DIAGNOSIS — S91.109A OPEN WOUND OF TOE, INITIAL ENCOUNTER: ICD-10-CM

## 2024-07-30 PROCEDURE — 6370000000 HC RX 637 (ALT 250 FOR IP): Performed by: NURSE PRACTITIONER

## 2024-07-30 PROCEDURE — 11042 DBRDMT SUBQ TIS 1ST 20SQCM/<: CPT

## 2024-07-30 RX ORDER — LIDOCAINE HYDROCHLORIDE 40 MG/ML
SOLUTION TOPICAL ONCE
OUTPATIENT
Start: 2024-07-30 | End: 2024-07-30

## 2024-07-30 RX ORDER — GENTAMICIN SULFATE 1 MG/G
OINTMENT TOPICAL ONCE
Status: DISCONTINUED | OUTPATIENT
Start: 2024-07-30 | End: 2024-07-31 | Stop reason: HOSPADM

## 2024-07-30 RX ORDER — SODIUM CHLOR/HYPOCHLOROUS ACID 0.033 %
SOLUTION, IRRIGATION IRRIGATION ONCE
OUTPATIENT
Start: 2024-07-30 | End: 2024-07-30

## 2024-07-30 RX ORDER — BACITRACIN ZINC AND POLYMYXIN B SULFATE 500; 1000 [USP'U]/G; [USP'U]/G
OINTMENT TOPICAL ONCE
OUTPATIENT
Start: 2024-07-30 | End: 2024-07-30

## 2024-07-30 RX ORDER — BACITRACIN ZINC 500 [USP'U]/G
OINTMENT TOPICAL ONCE
OUTPATIENT
Start: 2024-07-30 | End: 2024-07-30

## 2024-07-30 RX ORDER — LIDOCAINE 40 MG/G
CREAM TOPICAL ONCE
OUTPATIENT
Start: 2024-07-30 | End: 2024-07-30

## 2024-07-30 RX ORDER — IBUPROFEN 200 MG
TABLET ORAL ONCE
OUTPATIENT
Start: 2024-07-30 | End: 2024-07-30

## 2024-07-30 RX ORDER — GENTAMICIN SULFATE 1 MG/G
OINTMENT TOPICAL ONCE
OUTPATIENT
Start: 2024-07-30 | End: 2024-07-30

## 2024-07-30 RX ORDER — TRIAMCINOLONE ACETONIDE 1 MG/G
OINTMENT TOPICAL ONCE
OUTPATIENT
Start: 2024-07-30 | End: 2024-07-30

## 2024-07-30 RX ORDER — BETAMETHASONE DIPROPIONATE 0.5 MG/G
CREAM TOPICAL ONCE
OUTPATIENT
Start: 2024-07-30 | End: 2024-07-30

## 2024-07-30 RX ORDER — LIDOCAINE 50 MG/G
OINTMENT TOPICAL ONCE
OUTPATIENT
Start: 2024-07-30 | End: 2024-07-30

## 2024-07-30 RX ORDER — CLOBETASOL PROPIONATE 0.5 MG/G
OINTMENT TOPICAL ONCE
OUTPATIENT
Start: 2024-07-30 | End: 2024-07-30

## 2024-07-30 NOTE — PATIENT INSTRUCTIONS
PHYSICIAN ORDERS AND DISCHARGE INSTRUCTIONS     Wound cleansing:               Do not scrub or use excessive force.              Wash hands with soap and water before and after dressing changes.              Prior to applying a clean dressing, cleanse wound with normal saline,               wound cleanser, or mild soap and water.               Ask the physician or nurse before getting the wound(s) wet in a shower                      Wound Care Notes:  Rx: [facility]  Will attempt to order a Foot Defender for Right Foot if eligible and covered by insurance.  Shoe size 13 mens  Give new post op shoes for bilateral feet  3/25/24.                             Orders for this week:  2024    Right Great Toe & Left lower leg - Wash with soap and water, pat dry.  Apply Gentamicin and Stimulen to sorbact and place to right great toe and left anterior shin.   Abd over wounds.  Wrap with kerlix ace   Leave in place until next visit to wound care center        Dispense 30 day quantity when ordering supplies.  Plan:  [employer] faxed  3/26/24. Covered. Modality needs completed. Referral to Pain management and [facility] wound clinic        []   Nurse Visit  Primary Wound Care Provider: Nidhi Cummings CNP: Referred to Wound Care at [facility]   Call  for any questions or concerns.  Central [phone] for imaging and lab work

## 2024-07-30 NOTE — PROGRESS NOTES
eligible and covered by insurance.  Shoe size 13 mens  Give new post op shoes for bilateral feet  3/25/24.                             Orders for this week:  2024    Right Great Toe & Left lower leg - Wash with soap and water, pat dry.  Apply Gentamicin and Stimulen to sorbact and place to right great toe and left anterior shin.   Abd over wounds.  Wrap with kerlix ace   Leave in place until next visit to wound care center        Dispense 30 day quantity when ordering supplies.  Plan:  [employer] faxed  3/26/24. Covered. Modality needs completed. Referral to Pain management and [facility] wound clinic        []   Nurse Visit  Primary Wound Care Provider: Nidhi Cummings CNP: Referred to Wound Care at [facility]   Call  for any questions or concerns.  Central [phone] for imaging and lab work    Treatment Note      EXAMINATION: CT FOOT LEFT W CONTRAST, 2024 11:04 PM     HISTORY: Infection; possible osteo     COMPARISON:  No comparisons available.       TECHNIQUE:   CT scan of the left foot was performed with  contrast.   Isovue 300, 92cc injected IV.  One or more of the following dose reduction techniques were used: automated  exposure control, adjustment of the mA and/or kV according to patient size, use  of iterative reconstruction technique.      FINDINGS:        There is diffuse swelling noted of the soft tissues most marked along the dorsal  aspect however there is no rim-enhancing subcutaneous fluid collection  identified. There are destructive changes noted involving the tarsal bones with  rim-enhancing fluid collections noted most marked along the dorsal aspect with  fragmentation of the talus, calcaneus and the adjacent tarsal bones with  involvement also of the ankle joint with multiple calcified loose bodies noted.  There is periosteal reaction noted involving the distal fibula and tibia. There  are also degenerative changes of the tarsal metatarsal articulations. There is

## 2024-07-30 NOTE — DISCHARGE INSTR - COC
Continuity of Care Form    Patient Name: Azael Key Jr.   :  1985  MRN:  [id number]    Admit date:  2024  Discharge date:  ***    Code Status Order: Prior   Advance Directives:     Admitting Physician:  No admitting provider for patient encounter.  PCP: Geri Almonte MD    Discharging Nurse: ***  Discharging Hospital Unit/Room#: No information available for this encounter.  Discharging Unit Phone Number: ***    Emergency Contact:   Extended Emergency Contact Information  Primary Emergency Contact: Tiana Tadeo  Address: [address] [place] [address]  Home Phone: [phone]  Mobile Phone: [phone]  Relation: Parent  Secondary Emergency Contact: Rufina Tadeo  Home Phone: [phone]  Relation: Brother/Sister    Past Surgical History:  Past Surgical History:   Procedure Laterality Date    CORNEAL TRANSPLANT Bilateral     EYE SURGERY      left eye removed    EYE SURGERY Right     FOOT DEBRIDEMENT Right 10/16/2023    FOOT DEBRIDEMENT INCISION AND DRAINAGE RIGHT performed by Abdon Yee MD at [facility] OR    FRACTURE SURGERY      foot left    LAPAROSCOPIC APPENDECTOMY N/A 2022    APPENDECTOMY LAPAROSCOPIC performed by Fortino Obrien MD at [facility] OR    MANDIBLE SURGERY Right     MANDIBLE SURGERY Bilateral     TOE AMPUTATION Right 2017    TOE AMPUTATION Right 2022    TOE AMPUTATION, RAY AMPUTATION OF RIGHT SECOND TOE performed by Abdon Yee MD at [facility] OR    TOE AMPUTATION Right 2023    THIRD TOE AMPUTATION AND REVISION OF SECOND TOE AMPUTATION performed by Roxanne Galo MD at [facility] OR       Immunization History:   Immunization History   Administered Date(s) Administered    Influenza Virus Vaccine 10/19/2016    TDaP, ADACEL (age 10y-64y), BOOSTRIX (age 10y+), IM, 0.5mL 2019       Active Problems:  Patient Active Problem List   Diagnosis Code    Sprain of left ankle S93.402A    Closed nondisplaced fracture of fifth

## 2024-08-21 ENCOUNTER — TELEPHONE (OUTPATIENT)
Dept: WOUND CARE | Age: 39
End: 2024-08-21

## 2024-09-05 ENCOUNTER — TELEPHONE (OUTPATIENT)
Dept: WOUND CARE | Age: 39
End: 2024-09-05

## 2024-09-05 NOTE — TELEPHONE ENCOUNTER
Telephone call to patient to follow up on status of wound; left message for patient to return call to the Wound Care Center to schedule an appointment.

## 2024-09-15 PROBLEM — Z09 POSTOP CHECK: Status: ACTIVE | Noted: 2024-09-15

## 2024-09-15 PROBLEM — Z89.512 S/P BKA (BELOW KNEE AMPUTATION), LEFT (HCC): Status: ACTIVE | Noted: 2024-09-15

## 2024-09-23 ENCOUNTER — HOSPITAL ENCOUNTER (OUTPATIENT)
Dept: WOUND CARE | Age: 39
Discharge: HOME | End: 2024-09-23
Attending: NURSE PRACTITIONER
Payer: COMMERCIAL

## 2024-09-23 VITALS — TEMPERATURE: 97.4 F

## 2024-09-23 DIAGNOSIS — Z79.4 TYPE 2 DIABETES MELLITUS WITH OTHER SKIN COMPLICATION, WITH LONG-TERM CURRENT USE OF INSULIN (HCC): ICD-10-CM

## 2024-09-23 DIAGNOSIS — Z89.431 STATUS POST TRANSMETATARSAL AMPUTATION OF FOOT, RIGHT (HCC): Primary | ICD-10-CM

## 2024-09-23 DIAGNOSIS — Z89.512 S/P BKA (BELOW KNEE AMPUTATION), LEFT (HCC): ICD-10-CM

## 2024-09-23 DIAGNOSIS — E11.628 TYPE 2 DIABETES MELLITUS WITH OTHER SKIN COMPLICATION, WITH LONG-TERM CURRENT USE OF INSULIN (HCC): ICD-10-CM

## 2024-09-23 DIAGNOSIS — Z78.9 PRESENCE OF SURGICAL INCISION: ICD-10-CM

## 2024-09-23 PROCEDURE — 11042 DBRDMT SUBQ TIS 1ST 20SQCM/<: CPT

## 2024-09-23 PROCEDURE — 99213 OFFICE O/P EST LOW 20 MIN: CPT

## 2024-09-23 PROCEDURE — 11042 DBRDMT SUBQ TIS 1ST 20SQCM/<: CPT | Performed by: NURSE PRACTITIONER

## 2024-09-23 ASSESSMENT — PAIN DESCRIPTION - ONSET: ONSET: ON-GOING

## 2024-09-23 ASSESSMENT — PAIN DESCRIPTION - PAIN TYPE: TYPE: CHRONIC PAIN

## 2024-09-23 ASSESSMENT — PAIN DESCRIPTION - FREQUENCY: FREQUENCY: INTERMITTENT

## 2024-09-23 ASSESSMENT — PAIN DESCRIPTION - LOCATION: LOCATION: FOOT

## 2024-09-23 ASSESSMENT — PAIN SCALES - GENERAL: PAINLEVEL_OUTOF10: 7

## 2024-09-23 ASSESSMENT — PAIN DESCRIPTION - DESCRIPTORS: DESCRIPTORS: THROBBING

## 2024-09-23 ASSESSMENT — PAIN DESCRIPTION - ORIENTATION: ORIENTATION: RIGHT

## 2024-09-24 PROBLEM — Z89.431 STATUS POST TRANSMETATARSAL AMPUTATION OF FOOT, RIGHT (HCC): Status: ACTIVE | Noted: 2024-09-24

## 2024-09-24 PROBLEM — S98.139A AMPUTATION OF ONE OR MORE TOES (HCC): Status: RESOLVED | Noted: 2023-02-20 | Resolved: 2024-09-24

## 2024-09-24 PROBLEM — L97.512 RIGHT FOOT ULCER, WITH FAT LAYER EXPOSED (HCC): Status: RESOLVED | Noted: 2023-02-20 | Resolved: 2024-09-24

## 2024-09-24 PROBLEM — Z78.9 PRESENCE OF SURGICAL INCISION: Status: ACTIVE | Noted: 2024-09-24

## 2024-09-24 PROBLEM — T81.89XA NONHEALING SURGICAL WOUND, INITIAL ENCOUNTER: Status: RESOLVED | Noted: 2023-02-20 | Resolved: 2024-09-24

## 2024-10-01 NOTE — PROGRESS NOTES
Problem: Adult Inpatient Plan of Care  Goal: Plan of Care Review  Outcome: Progressing  Goal: Patient-Specific Goal (Individualized)  Outcome: Progressing  Goal: Optimal Comfort and Wellbeing  Outcome: Progressing      I have written for a home care consult. Patient will follow up and see me in the office in approximately 7-10 days. Infected bone debrided completely. If no culture results available would discharge the patient home on oral antibiotics covering Staphylococcus. Infectious disease to make that recommendation. Once home health care and infectious disease recommendations have been obtained would discharge the patient home. Please call me for any questions.

## 2024-10-07 ENCOUNTER — HOSPITAL ENCOUNTER (OUTPATIENT)
Dept: WOUND CARE | Age: 39
Discharge: HOME | End: 2024-10-07
Attending: NURSE PRACTITIONER
Payer: COMMERCIAL

## 2024-10-07 VITALS
TEMPERATURE: 96.8 F | DIASTOLIC BLOOD PRESSURE: 67 MMHG | SYSTOLIC BLOOD PRESSURE: 114 MMHG | RESPIRATION RATE: 18 BRPM | HEART RATE: 89 BPM

## 2024-10-07 DIAGNOSIS — Z78.9 PRESENCE OF SURGICAL INCISION: Primary | ICD-10-CM

## 2024-10-07 DIAGNOSIS — Z89.431 STATUS POST TRANSMETATARSAL AMPUTATION OF FOOT, RIGHT (HCC): ICD-10-CM

## 2024-10-07 DIAGNOSIS — L97.512 ULCER OF RIGHT FOOT WITH FAT LAYER EXPOSED (HCC): ICD-10-CM

## 2024-10-07 PROCEDURE — 87070 CULTURE OTHR SPECIMN AEROBIC: CPT

## 2024-10-07 PROCEDURE — 87205 SMEAR GRAM STAIN: CPT

## 2024-10-07 PROCEDURE — 87077 CULTURE AEROBIC IDENTIFY: CPT

## 2024-10-07 PROCEDURE — 11042 DBRDMT SUBQ TIS 1ST 20SQCM/<: CPT

## 2024-10-07 PROCEDURE — 11042 DBRDMT SUBQ TIS 1ST 20SQCM/<: CPT | Performed by: SURGERY

## 2024-10-07 PROCEDURE — 87186 SC STD MICRODIL/AGAR DIL: CPT

## 2024-10-07 RX ORDER — SULFAMETHOXAZOLE/TRIMETHOPRIM 800-160 MG
1 TABLET ORAL 2 TIMES DAILY
Qty: 20 TABLET | Refills: 0 | Status: SHIPPED | OUTPATIENT
Start: 2024-10-07 | End: 2024-10-17

## 2024-10-07 ASSESSMENT — PAIN SCALES - GENERAL: PAINLEVEL_OUTOF10: 7

## 2024-10-07 ASSESSMENT — PAIN DESCRIPTION - DESCRIPTORS: DESCRIPTORS: THROBBING

## 2024-10-07 ASSESSMENT — PAIN DESCRIPTION - ORIENTATION: ORIENTATION: RIGHT

## 2024-10-07 ASSESSMENT — PAIN DESCRIPTION - LOCATION: LOCATION: FOOT

## 2024-10-07 ASSESSMENT — PAIN DESCRIPTION - ONSET: ONSET: ON-GOING

## 2024-10-07 ASSESSMENT — PAIN DESCRIPTION - FREQUENCY: FREQUENCY: INTERMITTENT

## 2024-10-07 NOTE — PROGRESS NOTES
Multilayer Compression Wrap   (Not Unna) Below the Knee    NAME:  Azael Key Jr.  YOB: 1985  MEDICAL RECORD NUMBER:  [id number]  DATE:  10/7/2024    Multilayer compression wrap: Removed old Multilayer wrap if indicated and wash leg with mild soap/water.  Applied moisturizing agent to dry skin as needed.   Applied primary and secondary dressing as ordered.  Applied multilayered dressing below the knee to right lower leg.  Instructed patient/caregiver not to remove dressing and to keep it clean and dry.   Instructed patient/caregiver on complications to report to provider, such as pain, numbness in toes, heavy drainage, and slippage of dressing.  Instructed patient on purpose of compression dressing and on activity and exercise recommendations.      Electronically signed by Ceci Chambers LPN on 10/7/2024 at 2:08 PM

## 2024-10-07 NOTE — PATIENT INSTRUCTIONS
PHYSICIAN ORDERS AND DISCHARGE INSTRUCTIONS    Wound cleansing:     Do not scrub or use excessive force.   Wash hands with soap and water before and after dressing changes.   Prior to applying a clean dressing, cleanse wound with normal saline,               wound cleanser, or mild soap and water.    Ask the physician or nurse before getting the wound(s) wet in a shower      Wound Care Notes:  revision I&D of hematoma and wound dehiscence at right foot amputation stump, 2024  Status post transmetatarsal amputation of the right foot 2024 by Dr Robert   Status post left BKA 2024          Orders for this week:  10/7/2024       Right Transmet site  :Wash with soap and water. Pat dry.   Bolster LATERAL INCISION with steristrips   Apply betadine and stimulen powder to wound bed   Cover with versatel and ABD   Wrap with coban 2 lites (enclose foot)   Change: Change in 1 week       Dispense 30 day quantity when ordering supplies.  Plan: Culture taken 10/7/24.       Follow Up Instructions: At the Wound Care Center in 1 week   Primary Wound Care Provider: Nidhi Cummings CNP   Call  for any questions or concerns.  Central [phone] for imaging and lab work

## 2024-10-07 NOTE — PROGRESS NOTES
Wound Care Center Progress Note With Procedure    Azael Key Jr.  AGE: 38 y.o.   GENDER: male  : 1985  EPISODE DATE:  10/7/2024     Subjective:     Chief Complaint   Patient presents with    Wound Check     RLE         HISTORY of PRESENT ILLNESS      Azael Key Jr. is a 38 y.o. male who presents today for wound evaluation of Acute on chronic diabetic and non-healing surgical ulcer(s) of the right foot TMA site. He reports TMA approximately 2 months ago.  The sutures are intact.  He reports more pain and swelling in the lateral portion of the foot. Minimal drainage. He reports just getting sutures out of his left BKA site in Dr. Alvarez's clinic.  Denies other complaints.     The underlying cause of the wound is DM, neuropathy.    Wound Pain Timing/Severity: mild  Quality of pain: aching  Severity of pain:  2  10   Modifying Factors: edema, diabetes, poor glucose control, and chronic pressure  Associated Signs/Symptoms: edema, drainage, numbness, and pain        PAST MEDICAL HISTORY        Diagnosis Date    Asthma     Blind left eye     Diabetes mellitus (HCC)     GERD (gastroesophageal reflux disease)     Hypertension     Retinal detachment     left       PAST SURGICAL HISTORY    Past Surgical History:   Procedure Laterality Date    CORNEAL TRANSPLANT Bilateral     EYE SURGERY      left eye removed    EYE SURGERY Right     FOOT DEBRIDEMENT Right 10/16/2023    FOOT DEBRIDEMENT INCISION AND DRAINAGE RIGHT performed by Abdon Yee MD at [facility] OR    FRACTURE SURGERY      foot left    LAPAROSCOPIC APPENDECTOMY N/A 2022    APPENDECTOMY LAPAROSCOPIC performed by Fortino Obrien MD at [facility] OR    MANDIBLE SURGERY Right     MANDIBLE SURGERY Bilateral     TOE AMPUTATION Right 2017    TOE AMPUTATION Right 2022    TOE AMPUTATION, RAY AMPUTATION OF RIGHT SECOND TOE performed by Abdon Yee MD at [facility] OR    TOE AMPUTATION Right 2023    THIRD TOE AMPUTATION AND REVISION OF

## 2024-10-11 LAB
MICROORGANISM SPEC CULT: ABNORMAL
MICROORGANISM/AGENT SPEC: ABNORMAL
SPECIMEN DESCRIPTION: ABNORMAL

## 2024-10-12 NOTE — PROGRESS NOTES
[facility]  consulted by Dr. Gwen Posadas for monitoring and adjustment. Indication for treatment: Vancomycin indication: SSTI with risk factors   Goal trough: Trough Goal: 10-15 mcg/mL  AUC/SHELLEY: <500    Risk Factors for MRSA Identified:   Hospitalization within the past 90 days, Received IV antibiotics within the past 90 days, Purulent and/or complicated SSTI    Pertinent Laboratory Values:   Temp Readings from Last 3 Encounters:   11/23/22 98.1 °F (36.7 °C) (Oral)   10/15/22 98.5 °F (36.9 °C) (Oral)   10/03/22 98.7 °F (37.1 °C)     Recent Labs     11/21/22  0159 11/22/22  0632   WBC 2.9* 5.9       Recent Labs     11/21/22  0159 11/22/22  0632   BUN 8 12   CREATININE 0.6* 0.7*       Estimated Creatinine Clearance: 215 mL/min (A) (based on SCr of 0.7 mg/dL (L)). Intake/Output Summary (Last 24 hours) at 11/23/2022 1106  Last data filed at 11/22/2022 1959  Gross per 24 hour   Intake --   Output 500 ml   Net -500 ml         Pertinent Cultures:   Date    Source    Results  11/19   Blood    No growth  11/21   Wound    In process    Vancomycin level:   TROUGH:  No results for input(s): VANCOTROUGH in the last 72 hours. RANDOM:    Recent Labs     11/22/22  0632   VANCORANDOM 7.1         Assessment:  HPI: 78-year-old male with longstanding severe diabetes resulting in diabetic foot problems toe amputations diabetic retinopathy was admitted with swelling and tenderness of the right foot  SCr, BUN, and urine output: SCR remains close to baseline, UOP ok  Day(s) of therapy: 3 of 7 (ID following, vanco to continue)   Vancomycin concentration:   11/22 - 7.1, 6½ hour level on 1500mg q12h (after 2 doses), predicted trough 15.8 at steady state    Plan:  Renal trends have close to baseline  Continue vancomycin 1500mg ivpb q12h with a predicted therapeutic trough at steady state.   Recheck the vanco level tomorrow am to monitor for accumulation 2/2 [facility]  Pharmacy will continue to monitor Pt to ED triage s/p fall at home. Here for evaluation of left index finger, which is notably deformed. Reports she was outside with her dog when it pulled her through some patio furniture, causing fall. Reports striking forehead, denies LOC. Pt is AxO 4/4 on arrival, RR regular and unlabored, ambulatory with steady gait.    patient and adjust therapy as indicated    VANCOMYCIN CONCENTRATION SCHEDULED FOR 11/24 @0600    Thank you for the consult. Edgar Floyd [facility]  11/23/2022 11:06 AM

## 2024-10-14 ENCOUNTER — HOSPITAL ENCOUNTER (OUTPATIENT)
Dept: WOUND CARE | Age: 39
Discharge: HOME | End: 2024-10-14
Attending: NURSE PRACTITIONER

## 2024-10-14 NOTE — PATIENT INSTRUCTIONS
PHYSICIAN ORDERS AND DISCHARGE INSTRUCTIONS    Wound cleansing:     Do not scrub or use excessive force.   Wash hands with soap and water before and after dressing changes.   Prior to applying a clean dressing, cleanse wound with normal saline,               wound cleanser, or mild soap and water.    Ask the physician or nurse before getting the wound(s) wet in a shower      Wound Care Notes:  revision I&D of hematoma and wound dehiscence at right foot amputation stump, 2024  Status post transmetatarsal amputation of the right foot 2024 by Dr Robert   Status post left BKA 2024          Orders for this week:  10/14/2024       Right Transmet site  :Wash with soap and water. Pat dry.   Bolster LATERAL INCISION with steristrips   Apply betadine and stimulen powder to wound bed   Cover with versatel and ABD   Wrap with coban 2 lites (enclose foot)   Change: Change in 1 week       Dispense 30 day quantity when ordering supplies.  Plan: Culture taken 10/7/24.       Follow Up Instructions: At the Wound Care Center in 1 week   Primary Wound Care Provider: Nidhi Cummings CNP   Call  for any questions or concerns.  Central [phone] for imaging and lab work

## 2024-10-15 PROBLEM — Z09 POSTOP CHECK: Status: RESOLVED | Noted: 2024-09-15 | Resolved: 2024-10-15

## 2024-10-21 ENCOUNTER — HOSPITAL ENCOUNTER (OUTPATIENT)
Dept: WOUND CARE | Age: 39
Discharge: HOME | End: 2024-10-21
Attending: NURSE PRACTITIONER
Payer: COMMERCIAL

## 2024-10-21 VITALS
TEMPERATURE: 98.7 F | HEART RATE: 85 BPM | SYSTOLIC BLOOD PRESSURE: 179 MMHG | DIASTOLIC BLOOD PRESSURE: 85 MMHG | RESPIRATION RATE: 18 BRPM

## 2024-10-21 DIAGNOSIS — E11.628 TYPE 2 DIABETES MELLITUS WITH OTHER SKIN COMPLICATION, WITH LONG-TERM CURRENT USE OF INSULIN (HCC): ICD-10-CM

## 2024-10-21 DIAGNOSIS — Z89.512 S/P BKA (BELOW KNEE AMPUTATION), LEFT (HCC): ICD-10-CM

## 2024-10-21 DIAGNOSIS — Z78.9 PRESENCE OF SURGICAL INCISION: ICD-10-CM

## 2024-10-21 DIAGNOSIS — L97.512 RIGHT FOOT ULCER, WITH FAT LAYER EXPOSED (HCC): ICD-10-CM

## 2024-10-21 DIAGNOSIS — G62.9 NEUROPATHY: ICD-10-CM

## 2024-10-21 DIAGNOSIS — Z79.4 TYPE 2 DIABETES MELLITUS WITH OTHER SKIN COMPLICATION, WITH LONG-TERM CURRENT USE OF INSULIN (HCC): ICD-10-CM

## 2024-10-21 DIAGNOSIS — Z89.431 STATUS POST TRANSMETATARSAL AMPUTATION OF FOOT, RIGHT (HCC): Primary | ICD-10-CM

## 2024-10-21 PROBLEM — L97.912 TRAUMATIC ULCER OF RIGHT LOWER EXTREMITY WITH FAT LAYER EXPOSED (HCC): Status: RESOLVED | Noted: 2024-03-26 | Resolved: 2024-10-21

## 2024-10-21 PROBLEM — L08.9 LEFT FOOT INFECTION: Status: RESOLVED | Noted: 2024-07-19 | Resolved: 2024-10-21

## 2024-10-21 PROBLEM — L97.922 TRAUMATIC LEG ULCER, LEFT, WITH FAT LAYER EXPOSED (HCC): Status: RESOLVED | Noted: 2024-03-26 | Resolved: 2024-10-21

## 2024-10-21 PROCEDURE — 11042 DBRDMT SUBQ TIS 1ST 20SQCM/<: CPT | Performed by: NURSE PRACTITIONER

## 2024-10-21 PROCEDURE — 11042 DBRDMT SUBQ TIS 1ST 20SQCM/<: CPT

## 2024-10-21 ASSESSMENT — PAIN DESCRIPTION - ONSET: ONSET: ON-GOING

## 2024-10-21 ASSESSMENT — PAIN DESCRIPTION - LOCATION: LOCATION: FOOT

## 2024-10-21 ASSESSMENT — PAIN DESCRIPTION - DESCRIPTORS: DESCRIPTORS: THROBBING

## 2024-10-21 ASSESSMENT — PAIN DESCRIPTION - PAIN TYPE: TYPE: ACUTE PAIN

## 2024-10-21 ASSESSMENT — PAIN SCALES - GENERAL: PAINLEVEL_OUTOF10: 8

## 2024-10-21 ASSESSMENT — PAIN DESCRIPTION - ORIENTATION: ORIENTATION: RIGHT

## 2024-10-21 ASSESSMENT — PAIN - FUNCTIONAL ASSESSMENT: PAIN_FUNCTIONAL_ASSESSMENT: PREVENTS OR INTERFERES SOME ACTIVE ACTIVITIES AND ADLS

## 2024-10-21 ASSESSMENT — PAIN DESCRIPTION - FREQUENCY: FREQUENCY: INTERMITTENT

## 2024-10-21 NOTE — PROGRESS NOTES
Multilayer Compression Wrap   (Not Unna) Below the Knee    NAME:  Azael Key Jr.  YOB: 1985  MEDICAL RECORD NUMBER:  [id number]  DATE:  10/21/2024    Multilayer compression wrap: Removed old Multilayer wrap if indicated and wash leg with mild soap/water.  Applied moisturizing agent to dry skin as needed.   Applied primary and secondary dressing as ordered.  Applied multilayered dressing below the knee to right lower leg.  Instructed patient/caregiver not to remove dressing and to keep it clean and dry.   Instructed patient/caregiver on complications to report to provider, such as pain, numbness in toes, heavy drainage, and slippage of dressing.  Instructed patient on purpose of compression dressing and on activity and exercise recommendations.      Electronically signed by KATELYNN DUARTE LPN on 10/21/2024 at 3:23 PM  
healing.    Performed by: KAYLI Reynolds CNP    Consent obtained: Yes    Time out taken:  Yes    Pain Control:  2% Lidocaine spray    Debridement:Excisional Debridement    Using curette the wound(s) was/were sharply debrided down through and including the removal of subcutaneous tissue.        Devitalized Tissue Debrided:  fibrin, biofilm, slough, necrotic/eschar, and exudate    Pre Debridement Measurements:  Are located in the Wound Documentation Flow Sheet    All active wounds listed below with today's date are evaluated  Wound(s) debrided this date include # : 1     Post  Debridement Measurements:  Wound 09/23/24 Foot Right #1 AMPSITE (Active)   Wound Image    10/07/24 1343   Wound Etiology Surgical 09/23/24 1431   Dressing Status New dressing applied;Clean;Dry 10/21/24 1521   Wound Cleansed Soap and water 10/21/24 1411   Dressing/Treatment Other (comment) 10/21/24 1521   Offloading for Diabetic Foot Ulcers Post op shoe;Other (comment) 10/21/24 1521   Wound Length (cm) 0.7 cm 10/21/24 1411   Wound Width (cm) 0.7 cm 10/21/24 1411   Wound Depth (cm) 0.1 cm 10/21/24 1411   Wound Surface Area (cm^2) 0.49 cm^2 10/21/24 1411   Change in Wound Size % (l*w) 34.67 10/21/24 1411   Wound Volume (cm^3) 0.049 cm^3 10/21/24 1411   Wound Healing % 35 10/21/24 1411   Post-Procedure Length (cm) 0.7 cm 10/21/24 1449   Post-Procedure Width (cm) 0.7 cm 10/21/24 1449   Post-Procedure Depth (cm) 0.1 cm 10/21/24 1449   Post-Procedure Surface Area (cm^2) 0.49 cm^2 10/21/24 1449   Post-Procedure Volume (cm^3) 0.049 cm^3 10/21/24 1449   Distance Tunneling (cm) 0 cm 10/21/24 1411   Tunneling Position ___ O'Clock 0 10/21/24 1411   Undermining Starts ___ O'Clock 0 10/21/24 1411   Undermining Ends___ O'Clock 0 10/21/24 1411   Undermining Maxium Distance (cm) 0 10/21/24 1411   Wound Assessment Pink/red;Slough 10/21/24 1411   Drainage Amount Moderate (25-50%) 10/21/24 1411   Drainage Description Serosanguinous 10/21/24 1411   Odor Mild

## 2024-10-21 NOTE — PATIENT INSTRUCTIONS
PHYSICIAN ORDERS AND DISCHARGE INSTRUCTIONS    Wound cleansing:     Do not scrub or use excessive force.   Wash hands with soap and water before and after dressing changes.   Prior to applying a clean dressing, cleanse wound with normal saline,               wound cleanser, or mild soap and water.    Ask the physician or nurse before getting the wound(s) wet in a shower      Wound Care Notes:  revision I&D of hematoma and wound dehiscence at right foot amputation stump, 2024  Status post transmetatarsal amputation of the right foot 2024 by Dr Robert   Status post left BKA 2024          Orders for this week:  10/21/2024       Right Transmet site  :Wash with soap and water. Pat dry.   Bolster LATERAL INCISION with steristrips   Apply betadine and stimulen powder to wound bed   Cover with versatel and ABD   Wrap with coban 2 lites (enclose foot)   Change: Change in 1 week       Dispense 30 day quantity when ordering supplies.  Plan:       Follow Up Instructions: At the [facility] in 1 week   Primary Wound Care Provider: Nidhi Cummings CNP   Call  for any questions or concerns.  Central [phone] for imaging and lab work

## 2024-10-28 ENCOUNTER — HOSPITAL ENCOUNTER (OUTPATIENT)
Dept: WOUND CARE | Age: 39
Discharge: HOME | End: 2024-10-28
Attending: NURSE PRACTITIONER

## 2024-10-28 NOTE — PATIENT INSTRUCTIONS
PHYSICIAN ORDERS AND DISCHARGE INSTRUCTIONS    Wound cleansing:     Do not scrub or use excessive force.   Wash hands with soap and water before and after dressing changes.   Prior to applying a clean dressing, cleanse wound with normal saline,               wound cleanser, or mild soap and water.    Ask the physician or nurse before getting the wound(s) wet in a shower      Wound Care Notes:  revision I&D of hematoma and wound dehiscence at right foot amputation stump, 2024  Status post transmetatarsal amputation of the right foot 2024 by Dr Robert   Status post left BKA 2024          Orders for this week:  10/28/2024       Right Transmet site  :Wash with soap and water. Pat dry.   Bolster LATERAL INCISION with steristrips   Apply betadine and stimulen powder to wound bed   Cover with versatel and ABD   Wrap with coban 2 lites (enclose foot)   Change: Change in 1 week       Dispense 30 day quantity when ordering supplies.  Plan:       Follow Up Instructions: At the [facility] in 1 week   Primary Wound Care Provider: Nidhi Cummings CNP   Call  for any questions or concerns.  Central [phone] for imaging and lab work

## 2024-11-18 ENCOUNTER — HOSPITAL ENCOUNTER (OUTPATIENT)
Dept: WOUND CARE | Age: 39
Discharge: HOME | End: 2024-11-18
Attending: NURSE PRACTITIONER
Payer: COMMERCIAL

## 2024-11-18 VITALS
TEMPERATURE: 96.5 F | DIASTOLIC BLOOD PRESSURE: 82 MMHG | RESPIRATION RATE: 18 BRPM | HEART RATE: 109 BPM | SYSTOLIC BLOOD PRESSURE: 141 MMHG

## 2024-11-18 DIAGNOSIS — E11.622 TYPE 2 DIABETES MELLITUS WITH OTHER SKIN ULCER, WITH LONG-TERM CURRENT USE OF INSULIN (HCC): ICD-10-CM

## 2024-11-18 DIAGNOSIS — Z89.512 S/P BKA (BELOW KNEE AMPUTATION), LEFT (HCC): ICD-10-CM

## 2024-11-18 DIAGNOSIS — Z79.4 TYPE 2 DIABETES MELLITUS WITH OTHER SKIN ULCER, WITH LONG-TERM CURRENT USE OF INSULIN (HCC): ICD-10-CM

## 2024-11-18 DIAGNOSIS — L97.512 RIGHT FOOT ULCER, WITH FAT LAYER EXPOSED (HCC): Primary | ICD-10-CM

## 2024-11-18 DIAGNOSIS — Z78.9 PRESENCE OF SURGICAL INCISION: ICD-10-CM

## 2024-11-18 DIAGNOSIS — Z89.431 STATUS POST TRANSMETATARSAL AMPUTATION OF FOOT, RIGHT (HCC): ICD-10-CM

## 2024-11-18 DIAGNOSIS — L84 PRE-ULCERATIVE CALLUSES: ICD-10-CM

## 2024-11-18 DIAGNOSIS — G62.9 NEUROPATHY: ICD-10-CM

## 2024-11-18 PROCEDURE — 11056 PARNG/CUTG B9 HYPRKR LES 2-4: CPT | Performed by: NURSE PRACTITIONER

## 2024-11-18 PROCEDURE — 97597 DBRDMT OPN WND 1ST 20 CM/<: CPT | Performed by: NURSE PRACTITIONER

## 2024-11-18 PROCEDURE — 97597 DBRDMT OPN WND 1ST 20 CM/<: CPT

## 2024-11-18 RX ORDER — LIDOCAINE 40 MG/G
CREAM TOPICAL ONCE
Status: CANCELLED | OUTPATIENT
Start: 2024-11-18 | End: 2024-11-18

## 2024-11-18 RX ORDER — SILVER SULFADIAZINE 10 MG/G
CREAM TOPICAL ONCE
Status: CANCELLED | OUTPATIENT
Start: 2024-11-18 | End: 2024-11-18

## 2024-11-18 RX ORDER — TRIAMCINOLONE ACETONIDE 1 MG/G
OINTMENT TOPICAL ONCE
Status: CANCELLED | OUTPATIENT
Start: 2024-11-18 | End: 2024-11-18

## 2024-11-18 RX ORDER — MUPIROCIN 20 MG/G
OINTMENT TOPICAL ONCE
Status: CANCELLED | OUTPATIENT
Start: 2024-11-18 | End: 2024-11-18

## 2024-11-18 RX ORDER — LIDOCAINE HYDROCHLORIDE 20 MG/ML
JELLY TOPICAL ONCE
Status: CANCELLED | OUTPATIENT
Start: 2024-11-18 | End: 2024-11-18

## 2024-11-18 RX ORDER — BETAMETHASONE DIPROPIONATE 0.5 MG/G
CREAM TOPICAL ONCE
Status: CANCELLED | OUTPATIENT
Start: 2024-11-18 | End: 2024-11-18

## 2024-11-18 RX ORDER — NEOMYCIN/BACITRACIN/POLYMYXINB 3.5-400-5K
OINTMENT (GRAM) TOPICAL ONCE
Status: CANCELLED | OUTPATIENT
Start: 2024-11-18 | End: 2024-11-18

## 2024-11-18 RX ORDER — CLOBETASOL PROPIONATE 0.5 MG/G
OINTMENT TOPICAL ONCE
Status: CANCELLED | OUTPATIENT
Start: 2024-11-18 | End: 2024-11-18

## 2024-11-18 RX ORDER — LIDOCAINE HYDROCHLORIDE 40 MG/ML
SOLUTION TOPICAL ONCE
Status: CANCELLED | OUTPATIENT
Start: 2024-11-18 | End: 2024-11-18

## 2024-11-18 RX ORDER — BACITRACIN ZINC AND POLYMYXIN B SULFATE 500; 1000 [USP'U]/G; [USP'U]/G
OINTMENT TOPICAL ONCE
Status: CANCELLED | OUTPATIENT
Start: 2024-11-18 | End: 2024-11-18

## 2024-11-18 RX ORDER — LIDOCAINE 50 MG/G
OINTMENT TOPICAL ONCE
Status: CANCELLED | OUTPATIENT
Start: 2024-11-18 | End: 2024-11-18

## 2024-11-18 RX ORDER — SODIUM CHLOR/HYPOCHLOROUS ACID 0.033 %
SOLUTION, IRRIGATION IRRIGATION ONCE
Status: CANCELLED | OUTPATIENT
Start: 2024-11-18 | End: 2024-11-18

## 2024-11-18 RX ORDER — GENTAMICIN SULFATE 1 MG/G
OINTMENT TOPICAL ONCE
Status: CANCELLED | OUTPATIENT
Start: 2024-11-18 | End: 2024-11-18

## 2024-11-18 RX ORDER — BACITRACIN ZINC 500 [USP'U]/G
OINTMENT TOPICAL ONCE
Status: CANCELLED | OUTPATIENT
Start: 2024-11-18 | End: 2024-11-18

## 2024-11-18 NOTE — PATIENT INSTRUCTIONS
PHYSICIAN ORDERS AND DISCHARGE INSTRUCTIONS    Wound cleansing:     Do not scrub or use excessive force.   Wash hands with soap and water before and after dressing changes.   Prior to applying a clean dressing, cleanse wound with normal saline,               wound cleanser, or mild soap and water.    Ask the physician or nurse before getting the wound(s) wet in a shower      Wound Care Notes:  revision I&D of hematoma and wound dehiscence at right foot amputation stump, 2024  Status post transmetatarsal amputation of the right foot 2024 by Dr Robert   Status post left BKA 2024          Orders for this week:  2024       Right Transmet site  :Wash with soap and water. Pat dry.   A+D and socks, give medium post op shoe       Dispense 30 day quantity when ordering supplies.  Plan:       Follow Up Instructions: At the [facility]: Discharged   Primary Wound Care Provider: Nidhi Cummings CNP   Call  for any questions or concerns.  Central [phone] for imaging and lab work

## 2024-11-19 PROBLEM — L84 PRE-ULCERATIVE CALLUSES: Status: ACTIVE | Noted: 2024-11-19

## 2024-11-19 NOTE — PROGRESS NOTES
to the talar head and  navicular.  Chronic appearing impaction of the talar head with proximal  migration of the navicular.  Age-indeterminate sagittally oriented  nondisplaced fracture through the central aspect of the navicular.  Age-indeterminate mild impaction of the proximal aspect of the cuboid.  Mild  midfoot degenerative changes.  No Lisfranc interval widening.  Chronic healed  5th metatarsal neck fracture.  Mild 1st metatarsophalangeal degenerative  changes.  The interphalangeal joints are intact.  No definite osseous erosion.     Dorsal mid and forefoot subcutaneous edema.  Circumferential subcutaneous  edema throughout the leg and ankle.  No soft tissue gas or drainable fluid  collection identified.  The visualized musculature is unremarkable.  Atherosclerotic vascular calcifications are seen.  The visualized tendons are  grossly intact.     IMPRESSION:  1. Circumferential subcutaneous edema throughout the leg and ankle extending  along the dorsum of the foot compatible with bland edema or cellulitis.  No  abscess or soft tissue gas identified.  2. Age-indeterminate nondisplaced sagittally oriented fracture through the  central aspect of the navicular.  Additional chronic appearing impaction of  the talar head with proximal migration of the navicular and multiple chronic  appearing osseous fragments dorsal to the talar head and navicular.  3. Age-indeterminate mild impaction of the proximal aspect of the cuboid.  4. No evidence of osteomyelitis.  No LMP for male patient.     Diabetes: On an oral and insulin regimen. Neuropathy on Lyrica  Hemoglobin A1C   Date Value Ref Range Status   07/20/2024 6.6 (H) 4.2 - 6.3 % Final      Diabetes education provided:  Diabetes pathoetiology, difference between type 1 and type 2 diabetes, and progressive nature of Type 2 DM.  Metabolic syndrome: association of diabetes with dyslipidemia, HTN and obesity.  Diabetic Neuropathy: signs and therapy.  Foot care: advised to

## 2024-11-25 ENCOUNTER — APPOINTMENT (OUTPATIENT)
Dept: CT IMAGING | Age: 39
End: 2024-11-25
Payer: COMMERCIAL

## 2024-11-25 ENCOUNTER — HOSPITAL ENCOUNTER (OUTPATIENT)
Age: 39
Setting detail: OBSERVATION
Discharge: HOME | End: 2024-11-26
Attending: STUDENT IN AN ORGANIZED HEALTH CARE EDUCATION/TRAINING PROGRAM | Admitting: STUDENT IN AN ORGANIZED HEALTH CARE EDUCATION/TRAINING PROGRAM
Payer: COMMERCIAL

## 2024-11-25 ENCOUNTER — APPOINTMENT (OUTPATIENT)
Dept: GENERAL RADIOLOGY | Age: 39
End: 2024-11-25
Payer: COMMERCIAL

## 2024-11-25 DIAGNOSIS — R73.9 HYPERGLYCEMIA: ICD-10-CM

## 2024-11-25 DIAGNOSIS — F14.10 COCAINE ABUSE (HCC): ICD-10-CM

## 2024-11-25 DIAGNOSIS — R79.89 ELEVATED TROPONIN: ICD-10-CM

## 2024-11-25 DIAGNOSIS — F15.10 AMPHETAMINE ABUSE (HCC): ICD-10-CM

## 2024-11-25 DIAGNOSIS — F10.10 ALCOHOL ABUSE: ICD-10-CM

## 2024-11-25 DIAGNOSIS — R07.9 CHEST PAIN, UNSPECIFIED TYPE: Primary | ICD-10-CM

## 2024-11-25 DIAGNOSIS — E87.20 LACTIC ACIDOSIS: ICD-10-CM

## 2024-11-25 PROBLEM — R07.89 ATYPICAL CHEST PAIN: Status: ACTIVE | Noted: 2024-11-25

## 2024-11-25 LAB
ALBUMIN SERPL-MCNC: 4.5 G/DL (ref 3.4–5)
ALBUMIN/GLOB SERPL: 1.6 {RATIO} (ref 1.1–2.2)
ALP SERPL-CCNC: 123 U/L (ref 40–129)
ALT SERPL-CCNC: 34 U/L (ref 10–40)
AMPHET UR QL SCN: POSITIVE
ANION GAP SERPL CALCULATED.3IONS-SCNC: 24 MMOL/L (ref 9–17)
ARTERIAL PATENCY WRIST A: ABNORMAL
AST SERPL-CCNC: 28 U/L (ref 15–37)
BARBITURATES UR QL SCN: NEGATIVE
BASOPHILS # BLD: 0.04 K/UL
BASOPHILS NFR BLD: 1 % (ref 0–1)
BENZODIAZ UR QL: NEGATIVE
BILIRUB SERPL-MCNC: 0.3 MG/DL (ref 0–1)
BILIRUB UR QL STRIP: NEGATIVE
BODY TEMPERATURE: 37
BUN SERPL-MCNC: 13 MG/DL (ref 7–20)
CALCIUM SERPL-MCNC: 9.5 MG/DL (ref 8.3–10.6)
CANNABINOIDS UR QL SCN: NEGATIVE
CHARACTER UR: ABNORMAL
CHLORIDE SERPL-SCNC: 94 MMOL/L (ref 99–110)
CLARITY UR: CLEAR
CO2 SERPL-SCNC: 17 MMOL/L (ref 21–32)
COCAINE UR QL SCN: POSITIVE
COHGB MFR BLD: 5.3 % (ref 0.5–1.5)
COLOR UR: YELLOW
CREAT SERPL-MCNC: 0.9 MG/DL (ref 0.9–1.3)
EOSINOPHIL # BLD: 0.01 K/UL
EOSINOPHILS RELATIVE PERCENT: 0 % (ref 0–3)
EPI CELLS #/AREA URNS HPF: <1 /HPF
ERYTHROCYTE [DISTWIDTH] IN BLOOD BY AUTOMATED COUNT: 13.3 % (ref 11.7–14.9)
ETHANOLAMINE SERPL-MCNC: 14 MG/DL (ref 0–0.08)
FENTANYL UR QL: NEGATIVE
GAS FLOW.O2 O2 DELIVERY SYS: ABNORMAL L/MIN
GFR, ESTIMATED: >90 ML/MIN/1.73M2
GLUCOSE BLD-MCNC: 290 MG/DL (ref 74–99)
GLUCOSE BLD-MCNC: 347 MG/DL (ref 74–99)
GLUCOSE SERPL-MCNC: 269 MG/DL (ref 74–99)
GLUCOSE UR STRIP-MCNC: >=1000 MG/DL
HCO3 VENOUS: 20.2 MMOL/L (ref 22–29)
HCT VFR BLD AUTO: 41.1 % (ref 42–52)
HGB BLD-MCNC: 14.3 G/DL (ref 13.5–18)
HGB UR QL STRIP.AUTO: ABNORMAL
IMM GRANULOCYTES # BLD AUTO: 0.02 K/UL
IMM GRANULOCYTES NFR BLD: 0 %
KETONES UR STRIP-MCNC: ABNORMAL MG/DL
LACTATE BLDV-SCNC: 3.4 MMOL/L (ref 0.4–2)
LACTATE BLDV-SCNC: 6.7 MMOL/L (ref 0.4–2)
LACTATE BLDV-SCNC: 7.8 MMOL/L (ref 0.4–2)
LEUKOCYTE ESTERASE UR QL STRIP: NEGATIVE
LIPASE SERPL-CCNC: 20 U/L (ref 13–60)
LYMPHOCYTES NFR BLD: 1.46 K/UL
LYMPHOCYTES RELATIVE PERCENT: 29 % (ref 24–44)
MCH RBC QN AUTO: 28.3 PG (ref 27–31)
MCHC RBC AUTO-ENTMCNC: 34.8 G/DL (ref 32–36)
MCV RBC AUTO: 81.4 FL (ref 78–100)
METHEMOGLOBIN: 0 % (ref 0.5–1.5)
MONOCYTES NFR BLD: 0.61 K/UL
MONOCYTES NFR BLD: 12 % (ref 0–4)
MUCOUS THREADS URNS QL MICRO: ABNORMAL
NEGATIVE BASE EXCESS, VEN: 4.9 MMOL/L (ref 0–3)
NEUTROPHILS NFR BLD: 58 % (ref 36–66)
NEUTS SEG NFR BLD: 2.9 K/UL
NITRITE UR QL STRIP: NEGATIVE
OPIATES UR QL SCN: NEGATIVE
OXYCODONE UR QL SCN: NEGATIVE
OXYHGB MFR BLD: 68.9 %
PCO2 VENOUS: 38.2 MM HG (ref 38–54)
PH UR STRIP: 5.5 [PH] (ref 5–8)
PH VENOUS: 7.34 (ref 7.32–7.43)
PLATELET # BLD AUTO: 254 K/UL (ref 140–440)
PMV BLD AUTO: 9.9 FL (ref 7.5–11.1)
PO2 VENOUS: 40.4 MM HG (ref 23–48)
POTASSIUM SERPL-SCNC: 4.2 MMOL/L (ref 3.5–5.1)
PROT SERPL-MCNC: 7.3 G/DL (ref 6.4–8.2)
PROT UR STRIP-MCNC: ABNORMAL MG/DL
RBC # BLD AUTO: 5.05 M/UL (ref 4.6–6.2)
RBC #/AREA URNS HPF: <1 /HPF (ref 0–2)
SODIUM SERPL-SCNC: 134 MMOL/L (ref 136–145)
SP GR UR STRIP: 1.02 (ref 1–1.03)
TEST INFORMATION: ABNORMAL
TRICHOMONAS #/AREA URNS HPF: ABNORMAL /[HPF]
TROPONIN I SERPL HS-MCNC: 32 NG/L (ref 0–22)
TROPONIN I SERPL HS-MCNC: 35 NG/L (ref 0–22)
UROBILINOGEN UR STRIP-ACNC: 0.2 EU/DL (ref 0–1)
WBC #/AREA URNS HPF: 1 /HPF (ref 0–5)
WBC OTHER # BLD: 5 K/UL (ref 4–10.5)

## 2024-11-25 PROCEDURE — 2580000003 HC RX 258: Performed by: STUDENT IN AN ORGANIZED HEALTH CARE EDUCATION/TRAINING PROGRAM

## 2024-11-25 PROCEDURE — 87040 BLOOD CULTURE FOR BACTERIA: CPT

## 2024-11-25 PROCEDURE — 93005 ELECTROCARDIOGRAM TRACING: CPT | Performed by: STUDENT IN AN ORGANIZED HEALTH CARE EDUCATION/TRAINING PROGRAM

## 2024-11-25 PROCEDURE — 1200000000 HC SEMI PRIVATE

## 2024-11-25 PROCEDURE — 96375 TX/PRO/DX INJ NEW DRUG ADDON: CPT

## 2024-11-25 PROCEDURE — 74177 CT ABD & PELVIS W/CONTRAST: CPT

## 2024-11-25 PROCEDURE — 96372 THER/PROPH/DIAG INJ SC/IM: CPT

## 2024-11-25 PROCEDURE — G0378 HOSPITAL OBSERVATION PER HR: HCPCS

## 2024-11-25 PROCEDURE — 82947 ASSAY GLUCOSE BLOOD QUANT: CPT

## 2024-11-25 PROCEDURE — 6360000002 HC RX W HCPCS

## 2024-11-25 PROCEDURE — 83605 ASSAY OF LACTIC ACID: CPT

## 2024-11-25 PROCEDURE — 80307 DRUG TEST PRSMV CHEM ANLYZR: CPT

## 2024-11-25 PROCEDURE — 85025 COMPLETE CBC W/AUTO DIFF WBC: CPT

## 2024-11-25 PROCEDURE — 96361 HYDRATE IV INFUSION ADD-ON: CPT

## 2024-11-25 PROCEDURE — 99285 EMERGENCY DEPT VISIT HI MDM: CPT

## 2024-11-25 PROCEDURE — 80053 COMPREHEN METABOLIC PANEL: CPT

## 2024-11-25 PROCEDURE — 83690 ASSAY OF LIPASE: CPT

## 2024-11-25 PROCEDURE — G0480 DRUG TEST DEF 1-7 CLASSES: HCPCS

## 2024-11-25 PROCEDURE — 6360000002 HC RX W HCPCS: Performed by: STUDENT IN AN ORGANIZED HEALTH CARE EDUCATION/TRAINING PROGRAM

## 2024-11-25 PROCEDURE — 96374 THER/PROPH/DIAG INJ IV PUSH: CPT

## 2024-11-25 PROCEDURE — 84484 ASSAY OF TROPONIN QUANT: CPT

## 2024-11-25 PROCEDURE — 6370000000 HC RX 637 (ALT 250 FOR IP): Performed by: STUDENT IN AN ORGANIZED HEALTH CARE EDUCATION/TRAINING PROGRAM

## 2024-11-25 PROCEDURE — 36415 COLL VENOUS BLD VENIPUNCTURE: CPT

## 2024-11-25 PROCEDURE — 6360000004 HC RX CONTRAST MEDICATION

## 2024-11-25 PROCEDURE — 94640 AIRWAY INHALATION TREATMENT: CPT

## 2024-11-25 PROCEDURE — 2580000003 HC RX 258: Performed by: PHYSICIAN ASSISTANT

## 2024-11-25 PROCEDURE — 71045 X-RAY EXAM CHEST 1 VIEW: CPT

## 2024-11-25 PROCEDURE — 2580000003 HC RX 258

## 2024-11-25 PROCEDURE — 82805 BLOOD GASES W/O2 SATURATION: CPT

## 2024-11-25 PROCEDURE — 81001 URINALYSIS AUTO W/SCOPE: CPT

## 2024-11-25 RX ORDER — DIPHENHYDRAMINE HYDROCHLORIDE 50 MG/ML
25 INJECTION INTRAMUSCULAR; INTRAVENOUS ONCE
Status: COMPLETED | OUTPATIENT
Start: 2024-11-25 | End: 2024-11-25

## 2024-11-25 RX ORDER — GLUCAGON 1 MG/ML
1 KIT INJECTION PRN
Status: DISCONTINUED | OUTPATIENT
Start: 2024-11-25 | End: 2024-11-26 | Stop reason: HOSPADM

## 2024-11-25 RX ORDER — INSULIN GLARGINE 100 [IU]/ML
30 INJECTION, SOLUTION SUBCUTANEOUS NIGHTLY
Status: DISCONTINUED | OUTPATIENT
Start: 2024-11-25 | End: 2024-11-26 | Stop reason: HOSPADM

## 2024-11-25 RX ORDER — ENOXAPARIN SODIUM 100 MG/ML
30 INJECTION SUBCUTANEOUS 2 TIMES DAILY
Status: DISCONTINUED | OUTPATIENT
Start: 2024-11-25 | End: 2024-11-26 | Stop reason: HOSPADM

## 2024-11-25 RX ORDER — MAGNESIUM SULFATE IN WATER 40 MG/ML
2000 INJECTION, SOLUTION INTRAVENOUS PRN
Status: DISCONTINUED | OUTPATIENT
Start: 2024-11-25 | End: 2024-11-26 | Stop reason: HOSPADM

## 2024-11-25 RX ORDER — SODIUM CHLORIDE, SODIUM LACTATE, POTASSIUM CHLORIDE, AND CALCIUM CHLORIDE .6; .31; .03; .02 G/100ML; G/100ML; G/100ML; G/100ML
30 INJECTION, SOLUTION INTRAVENOUS ONCE
Status: COMPLETED | OUTPATIENT
Start: 2024-11-25 | End: 2024-11-25

## 2024-11-25 RX ORDER — IOPAMIDOL 755 MG/ML
75 INJECTION, SOLUTION INTRAVASCULAR
Status: COMPLETED | OUTPATIENT
Start: 2024-11-25 | End: 2024-11-25

## 2024-11-25 RX ORDER — POLYETHYLENE GLYCOL 3350 17 G/17G
17 POWDER, FOR SOLUTION ORAL DAILY PRN
Status: DISCONTINUED | OUTPATIENT
Start: 2024-11-25 | End: 2024-11-26 | Stop reason: HOSPADM

## 2024-11-25 RX ORDER — SODIUM CHLORIDE 0.9 % (FLUSH) 0.9 %
5-40 SYRINGE (ML) INJECTION PRN
Status: DISCONTINUED | OUTPATIENT
Start: 2024-11-25 | End: 2024-11-26 | Stop reason: HOSPADM

## 2024-11-25 RX ORDER — PREGABALIN 100 MG/1
100 CAPSULE ORAL 2 TIMES DAILY
Status: DISCONTINUED | OUTPATIENT
Start: 2024-11-25 | End: 2024-11-26 | Stop reason: HOSPADM

## 2024-11-25 RX ORDER — SODIUM CHLORIDE 0.9 % (FLUSH) 0.9 %
5-40 SYRINGE (ML) INJECTION EVERY 12 HOURS SCHEDULED
Status: DISCONTINUED | OUTPATIENT
Start: 2024-11-25 | End: 2024-11-26 | Stop reason: HOSPADM

## 2024-11-25 RX ORDER — LISINOPRIL AND HYDROCHLOROTHIAZIDE 12.5; 2 MG/1; MG/1
1 TABLET ORAL DAILY
Status: DISCONTINUED | OUTPATIENT
Start: 2024-11-25 | End: 2024-11-26

## 2024-11-25 RX ORDER — INSULIN LISPRO 100 [IU]/ML
0-4 INJECTION, SOLUTION INTRAVENOUS; SUBCUTANEOUS EVERY 4 HOURS
Status: DISCONTINUED | OUTPATIENT
Start: 2024-11-25 | End: 2024-11-26 | Stop reason: HOSPADM

## 2024-11-25 RX ORDER — DEXTROSE MONOHYDRATE 100 MG/ML
INJECTION, SOLUTION INTRAVENOUS CONTINUOUS PRN
Status: DISCONTINUED | OUTPATIENT
Start: 2024-11-25 | End: 2024-11-26 | Stop reason: HOSPADM

## 2024-11-25 RX ORDER — INSULIN LISPRO 100 [IU]/ML
0-4 INJECTION, SOLUTION INTRAVENOUS; SUBCUTANEOUS
Status: DISCONTINUED | OUTPATIENT
Start: 2024-11-25 | End: 2024-11-26 | Stop reason: HOSPADM

## 2024-11-25 RX ORDER — SODIUM CHLORIDE, SODIUM LACTATE, POTASSIUM CHLORIDE, AND CALCIUM CHLORIDE .6; .31; .03; .02 G/100ML; G/100ML; G/100ML; G/100ML
1000 INJECTION, SOLUTION INTRAVENOUS ONCE
Status: COMPLETED | OUTPATIENT
Start: 2024-11-25 | End: 2024-11-25

## 2024-11-25 RX ORDER — ACETAMINOPHEN 650 MG/1
650 SUPPOSITORY RECTAL EVERY 6 HOURS PRN
Status: DISCONTINUED | OUTPATIENT
Start: 2024-11-25 | End: 2024-11-26 | Stop reason: HOSPADM

## 2024-11-25 RX ORDER — POTASSIUM CHLORIDE 7.45 MG/ML
10 INJECTION INTRAVENOUS PRN
Status: DISCONTINUED | OUTPATIENT
Start: 2024-11-25 | End: 2024-11-26 | Stop reason: HOSPADM

## 2024-11-25 RX ORDER — ATORVASTATIN CALCIUM 10 MG/1
10 TABLET, FILM COATED ORAL DAILY
Status: DISCONTINUED | OUTPATIENT
Start: 2024-11-25 | End: 2024-11-26 | Stop reason: HOSPADM

## 2024-11-25 RX ORDER — POTASSIUM CHLORIDE 1500 MG/1
40 TABLET, EXTENDED RELEASE ORAL PRN
Status: DISCONTINUED | OUTPATIENT
Start: 2024-11-25 | End: 2024-11-26 | Stop reason: HOSPADM

## 2024-11-25 RX ORDER — BUDESONIDE AND FORMOTEROL FUMARATE DIHYDRATE 160; 4.5 UG/1; UG/1
2 AEROSOL RESPIRATORY (INHALATION) 2 TIMES DAILY
Status: DISCONTINUED | OUTPATIENT
Start: 2024-11-25 | End: 2024-11-26 | Stop reason: HOSPADM

## 2024-11-25 RX ORDER — ONDANSETRON 4 MG/1
4 TABLET, ORALLY DISINTEGRATING ORAL EVERY 8 HOURS PRN
Status: DISCONTINUED | OUTPATIENT
Start: 2024-11-25 | End: 2024-11-26 | Stop reason: HOSPADM

## 2024-11-25 RX ORDER — SODIUM CHLORIDE 9 MG/ML
INJECTION, SOLUTION INTRAVENOUS PRN
Status: DISCONTINUED | OUTPATIENT
Start: 2024-11-25 | End: 2024-11-26 | Stop reason: HOSPADM

## 2024-11-25 RX ORDER — ONDANSETRON 2 MG/ML
4 INJECTION INTRAMUSCULAR; INTRAVENOUS EVERY 6 HOURS PRN
Status: DISCONTINUED | OUTPATIENT
Start: 2024-11-25 | End: 2024-11-26 | Stop reason: HOSPADM

## 2024-11-25 RX ORDER — KETOROLAC TROMETHAMINE 15 MG/ML
15 INJECTION, SOLUTION INTRAMUSCULAR; INTRAVENOUS ONCE
Status: COMPLETED | OUTPATIENT
Start: 2024-11-25 | End: 2024-11-25

## 2024-11-25 RX ORDER — EZETIMIBE 10 MG/1
10 TABLET ORAL DAILY
Status: DISCONTINUED | OUTPATIENT
Start: 2024-11-25 | End: 2024-11-26 | Stop reason: HOSPADM

## 2024-11-25 RX ORDER — METOCLOPRAMIDE HYDROCHLORIDE 5 MG/ML
10 INJECTION INTRAMUSCULAR; INTRAVENOUS ONCE
Status: COMPLETED | OUTPATIENT
Start: 2024-11-25 | End: 2024-11-25

## 2024-11-25 RX ORDER — AMLODIPINE BESYLATE 10 MG/1
10 TABLET ORAL DAILY
Status: DISCONTINUED | OUTPATIENT
Start: 2024-11-25 | End: 2024-11-26 | Stop reason: HOSPADM

## 2024-11-25 RX ORDER — ACETAMINOPHEN 325 MG/1
650 TABLET ORAL EVERY 6 HOURS PRN
Status: DISCONTINUED | OUTPATIENT
Start: 2024-11-25 | End: 2024-11-26 | Stop reason: HOSPADM

## 2024-11-25 RX ADMIN — IOPAMIDOL 75 ML: 755 INJECTION, SOLUTION INTRAVENOUS at 16:16

## 2024-11-25 RX ADMIN — INSULIN GLARGINE 30 UNITS: 100 INJECTION, SOLUTION SUBCUTANEOUS at 22:14

## 2024-11-25 RX ADMIN — INSULIN LISPRO 4 UNITS: 100 INJECTION, SOLUTION INTRAVENOUS; SUBCUTANEOUS at 22:14

## 2024-11-25 RX ADMIN — PREGABALIN 100 MG: 100 CAPSULE ORAL at 22:13

## 2024-11-25 RX ADMIN — ENOXAPARIN SODIUM 30 MG: 100 INJECTION SUBCUTANEOUS at 22:14

## 2024-11-25 RX ADMIN — KETOROLAC TROMETHAMINE 15 MG: 15 INJECTION, SOLUTION INTRAMUSCULAR; INTRAVENOUS at 13:09

## 2024-11-25 RX ADMIN — SODIUM CHLORIDE, POTASSIUM CHLORIDE, SODIUM LACTATE AND CALCIUM CHLORIDE 1000 ML: 600; 310; 30; 20 INJECTION, SOLUTION INTRAVENOUS at 18:30

## 2024-11-25 RX ADMIN — DIPHENHYDRAMINE HYDROCHLORIDE 25 MG: 50 INJECTION, SOLUTION INTRAMUSCULAR; INTRAVENOUS at 13:08

## 2024-11-25 RX ADMIN — ATORVASTATIN CALCIUM 10 MG: 10 TABLET, FILM COATED ORAL at 22:13

## 2024-11-25 RX ADMIN — AMLODIPINE BESYLATE 10 MG: 10 TABLET ORAL at 22:13

## 2024-11-25 RX ADMIN — SODIUM CHLORIDE, POTASSIUM CHLORIDE, SODIUM LACTATE AND CALCIUM CHLORIDE 2535 ML: 600; 310; 30; 20 INJECTION, SOLUTION INTRAVENOUS at 13:09

## 2024-11-25 RX ADMIN — EZETIMIBE 10 MG: 10 TABLET ORAL at 22:14

## 2024-11-25 RX ADMIN — SODIUM CHLORIDE, PRESERVATIVE FREE 10 ML: 5 INJECTION INTRAVENOUS at 22:14

## 2024-11-25 RX ADMIN — METOCLOPRAMIDE HYDROCHLORIDE 10 MG: 5 INJECTION, SOLUTION INTRAMUSCULAR; INTRAVENOUS at 13:08

## 2024-11-25 RX ADMIN — BUDESONIDE AND FORMOTEROL FUMARATE DIHYDRATE 2 PUFF: 160; 4.5 AEROSOL RESPIRATORY (INHALATION) at 20:00

## 2024-11-25 ASSESSMENT — PAIN DESCRIPTION - LOCATION
LOCATION: CHEST
LOCATION: CHEST

## 2024-11-25 ASSESSMENT — LIFESTYLE VARIABLES
HOW MANY STANDARD DRINKS CONTAINING ALCOHOL DO YOU HAVE ON A TYPICAL DAY: 1 OR 2
HOW OFTEN DO YOU HAVE A DRINK CONTAINING ALCOHOL: 2-4 TIMES A MONTH

## 2024-11-25 ASSESSMENT — PAIN SCALES - GENERAL
PAINLEVEL_OUTOF10: 8
PAINLEVEL_OUTOF10: 9

## 2024-11-25 ASSESSMENT — PAIN - FUNCTIONAL ASSESSMENT: PAIN_FUNCTIONAL_ASSESSMENT: 0-10

## 2024-11-25 NOTE — H&P
01:19 PM     Hemoglobin A1C:   Lab Results   Component Value Date/Time    LABA1C 6.6 07/20/2024 01:23 AM     TSH: No results found for: \"TSH\"  Troponin:   Lab Results   Component Value Date/Time    TROPONINT <0.010 04/10/2023 04:33 AM    TROPONINT <0.010 04/28/2022 03:26 AM    TROPONINT <0.010 08/01/2021 02:37 PM     BNP: No results for input(s): \"PROBNP\" in the last 72 hours.  Lactic Acid: No results for input(s): \"LACTA\" in the last 72 hours.  UA:  Lab Results   Component Value Date/Time    NITRU NEGATIVE 11/25/2024 02:26 PM    COLORU Yellow 11/25/2024 02:26 PM    PHUR 5.5 11/25/2024 02:26 PM    WBCUA 1 11/25/2024 02:26 PM    RBCUA <1 11/25/2024 02:26 PM    RBCUA <1 07/19/2024 08:01 PM    MUCUS RARE 11/25/2024 02:26 PM    TRICHOMONAS None seen 11/25/2024 02:26 PM    BACTERIA NEGATIVE 07/19/2024 08:01 PM    CLARITYU CLEAR 07/19/2024 08:01 PM    LEUKOCYTESUR NEGATIVE 11/25/2024 02:26 PM    UROBILINOGEN 0.2 11/25/2024 02:26 PM    BILIRUBINUR NEGATIVE 11/25/2024 02:26 PM    BLOODU SMALL NUMBER OR AMOUNT OBSERVED 07/19/2024 08:01 PM    GLUCOSEU >=1000 11/25/2024 02:26 PM    KETUA TRACE 11/25/2024 02:26 PM     Urine Cultures: No results found for: \"LABURIN\"  Blood Cultures: No results found for: \"BC\"  No results found for: \"BLOODCULT2\"  Organism:   Lab Results   Component Value Date/Time    ORG MRSA 12/23/2018 01:00 PM    ORG BSGA 12/23/2018 01:00 PM       Radiology results:  CT ABDOMEN PELVIS W IV CONTRAST Additional Contrast? None   Final Result      1. No acute intra-abdominopelvic abnormality.      2. Fatty filtration of liver.   3. Gynecomastia.   4. Coronary artery calcifications.   5. Partially healed right transverse process fractures.      Electronically signed by Epi Yang MD      XR CHEST PORTABLE   Final Result   No acute findings in the chest         Electronically signed by Romero Sheehan MD  11/25/24 6:38 PM

## 2024-11-25 NOTE — PROGRESS NOTES
Latest Reference Range & Units 11/25/24 13:06   pH, Braulio 7.320 - 7.430  7.342   pCO2, Braulio 38 - 54 mm Hg 38.2   pO2, Braulio 23 - 48 mm Hg 40.4   Bicarbonate, Venous 22 - 29 mmol/L 20.2 (L)   Negative Base Excess, Braulio 0 - 3 mmol/L -4.9 (H)   Pt Temp  37.0   O2 Device/Flow/%  ROOM AIR   (L): Data is abnormally low  (H): Data is abnormally high

## 2024-11-25 NOTE — ED PROVIDER NOTES
[facility] EMERGENCY DEPARTMENT  EMERGENCY DEPARTMENT ENCOUNTER        Pt Name: Azael Key Jr.  MRN: [id number]  Birthdate 1985  Date of evaluation: 11/25/2024  Provider: KAYLI Brito - CNP  PCP: Geri Almonte MD  Note Started: 11:54 AM EST 11/25/24      NELDA. I have evaluated this patient.        CHIEF COMPLAINT       No chief complaint on file.      HISTORY OF PRESENT ILLNESS: 1 or more Elements     History From: Patient    Limitations to history : None    Social Determinants Significantly Affecting Health : None    Chief Complaint: Chest pain headache abdominal pain    Azael Key Jr. is a 38 y.o. male history of type 2 diabetes, hypertension who presents to ED stating that ever since he had his surgery for his reconstruction of his RI last Tuesday he has had a cough with chest pain.  He also reports having abdominal pain.  He states on Saturday he called his PCP who gave him a Z-Brad that did not help.  He reports last night he did have some drinks he did not divulge how many.  He states he is just concerned about his headache at this time.  He states he may be dehydrated.  He denies any shortness of breath fever body aches or chills.  Denies any dysuria or hematuria.    Nursing Notes were all reviewed and agreed with or any disagreements were addressed in the HPI.    REVIEW OF SYSTEMS :      Review of Systems    Positives and Pertinent negatives as per HPI.     SURGICAL HISTORY     Past Surgical History:   Procedure Laterality Date    CORNEAL TRANSPLANT Bilateral     EYE SURGERY      left eye removed    EYE SURGERY Right     FOOT DEBRIDEMENT Right 10/16/2023    FOOT DEBRIDEMENT INCISION AND DRAINAGE RIGHT performed by Abdon Yee MD at [facility]    FRACTURE SURGERY      foot left    LAPAROSCOPIC APPENDECTOMY N/A 4/28/2022    APPENDECTOMY LAPAROSCOPIC performed by Fortino Obrien MD at [facility]    MANDIBLE SURGERY Right     MANDIBLE SURGERY

## 2024-11-26 VITALS
WEIGHT: 315 LBS | DIASTOLIC BLOOD PRESSURE: 89 MMHG | TEMPERATURE: 98.5 F | SYSTOLIC BLOOD PRESSURE: 151 MMHG | HEART RATE: 101 BPM | BODY MASS INDEX: 39.17 KG/M2 | RESPIRATION RATE: 18 BRPM | HEIGHT: 75 IN | OXYGEN SATURATION: 98 %

## 2024-11-26 LAB
ALBUMIN SERPL-MCNC: 3.8 G/DL (ref 3.4–5)
ALBUMIN/GLOB SERPL: 1.4 {RATIO} (ref 1.1–2.2)
ALP SERPL-CCNC: 116 U/L (ref 40–129)
ALT SERPL-CCNC: 24 U/L (ref 10–40)
ANION GAP SERPL CALCULATED.3IONS-SCNC: 12 MMOL/L (ref 9–17)
AST SERPL-CCNC: 31 U/L (ref 15–37)
B PARAP IS1001 DNA NPH QL NAA+NON-PROBE: NOT DETECTED
B PERT DNA SPEC QL NAA+PROBE: NOT DETECTED
BASOPHILS # BLD: 0.02 K/UL
BASOPHILS NFR BLD: 1 % (ref 0–1)
BILIRUB SERPL-MCNC: 0.3 MG/DL (ref 0–1)
BUN SERPL-MCNC: 14 MG/DL (ref 7–20)
C PNEUM DNA NPH QL NAA+NON-PROBE: NOT DETECTED
CALCIUM SERPL-MCNC: 8.6 MG/DL (ref 8.3–10.6)
CHLORIDE SERPL-SCNC: 98 MMOL/L (ref 99–110)
CO2 SERPL-SCNC: 25 MMOL/L (ref 21–32)
CREAT SERPL-MCNC: 0.9 MG/DL (ref 0.9–1.3)
EOSINOPHIL # BLD: 0.03 K/UL
EOSINOPHILS RELATIVE PERCENT: 1 % (ref 0–3)
ERYTHROCYTE [DISTWIDTH] IN BLOOD BY AUTOMATED COUNT: 13.2 % (ref 11.7–14.9)
FLUAV RNA NPH QL NAA+NON-PROBE: NOT DETECTED
FLUBV RNA NPH QL NAA+NON-PROBE: NOT DETECTED
GFR, ESTIMATED: >90 ML/MIN/1.73M2
GLUCOSE BLD-MCNC: 293 MG/DL (ref 74–99)
GLUCOSE BLD-MCNC: 344 MG/DL (ref 74–99)
GLUCOSE BLD-MCNC: 353 MG/DL (ref 74–99)
GLUCOSE BLD-MCNC: 359 MG/DL (ref 74–99)
GLUCOSE SERPL-MCNC: 366 MG/DL (ref 74–99)
HADV DNA NPH QL NAA+NON-PROBE: NOT DETECTED
HCOV 229E RNA NPH QL NAA+NON-PROBE: NOT DETECTED
HCOV HKU1 RNA NPH QL NAA+NON-PROBE: NOT DETECTED
HCOV NL63 RNA NPH QL NAA+NON-PROBE: NOT DETECTED
HCOV OC43 RNA NPH QL NAA+NON-PROBE: NOT DETECTED
HCT VFR BLD AUTO: 35.8 % (ref 42–52)
HGB BLD-MCNC: 12.3 G/DL (ref 13.5–18)
HMPV RNA NPH QL NAA+NON-PROBE: NOT DETECTED
HPIV1 RNA NPH QL NAA+NON-PROBE: NOT DETECTED
HPIV2 RNA NPH QL NAA+NON-PROBE: NOT DETECTED
HPIV3 RNA NPH QL NAA+NON-PROBE: NOT DETECTED
HPIV4 RNA NPH QL NAA+NON-PROBE: NOT DETECTED
IMM GRANULOCYTES # BLD AUTO: 0 K/UL
IMM GRANULOCYTES NFR BLD: 0 %
INR PPP: 1
LACTATE BLDV-SCNC: 2.2 MMOL/L (ref 0.4–2)
LYMPHOCYTES NFR BLD: 1.04 K/UL
LYMPHOCYTES RELATIVE PERCENT: 34 % (ref 24–44)
M PNEUMO DNA NPH QL NAA+NON-PROBE: NOT DETECTED
MCH RBC QN AUTO: 28.6 PG (ref 27–31)
MCHC RBC AUTO-ENTMCNC: 34.4 G/DL (ref 32–36)
MCV RBC AUTO: 83.3 FL (ref 78–100)
MONOCYTES NFR BLD: 0.35 K/UL
MONOCYTES NFR BLD: 12 % (ref 0–4)
NEUTROPHILS NFR BLD: 52 % (ref 36–66)
NEUTS SEG NFR BLD: 1.59 K/UL
PLATELET # BLD AUTO: 199 K/UL (ref 140–440)
PMV BLD AUTO: 9.7 FL (ref 7.5–11.1)
POTASSIUM SERPL-SCNC: 4 MMOL/L (ref 3.5–5.1)
PROT SERPL-MCNC: 6.6 G/DL (ref 6.4–8.2)
PROTHROMBIN TIME: 13.2 SEC (ref 11.7–14.5)
RBC # BLD AUTO: 4.3 M/UL (ref 4.6–6.2)
RSV RNA NPH QL NAA+NON-PROBE: NOT DETECTED
RV+EV RNA NPH QL NAA+NON-PROBE: NOT DETECTED
SARS-COV-2 RNA NPH QL NAA+NON-PROBE: DETECTED
SODIUM SERPL-SCNC: 135 MMOL/L (ref 136–145)
SPECIMEN DESCRIPTION: ABNORMAL
TROPONIN I SERPL HS-MCNC: 26 NG/L (ref 0–22)
WBC OTHER # BLD: 3 K/UL (ref 4–10.5)

## 2024-11-26 PROCEDURE — 6370000000 HC RX 637 (ALT 250 FOR IP): Performed by: PHYSICIAN ASSISTANT

## 2024-11-26 PROCEDURE — 2580000003 HC RX 258: Performed by: STUDENT IN AN ORGANIZED HEALTH CARE EDUCATION/TRAINING PROGRAM

## 2024-11-26 PROCEDURE — G0378 HOSPITAL OBSERVATION PER HR: HCPCS

## 2024-11-26 PROCEDURE — 80053 COMPREHEN METABOLIC PANEL: CPT

## 2024-11-26 PROCEDURE — 82947 ASSAY GLUCOSE BLOOD QUANT: CPT

## 2024-11-26 PROCEDURE — 94640 AIRWAY INHALATION TREATMENT: CPT

## 2024-11-26 PROCEDURE — 36415 COLL VENOUS BLD VENIPUNCTURE: CPT

## 2024-11-26 PROCEDURE — 85025 COMPLETE CBC W/AUTO DIFF WBC: CPT

## 2024-11-26 PROCEDURE — 84484 ASSAY OF TROPONIN QUANT: CPT

## 2024-11-26 PROCEDURE — 83605 ASSAY OF LACTIC ACID: CPT

## 2024-11-26 PROCEDURE — 6370000000 HC RX 637 (ALT 250 FOR IP): Performed by: STUDENT IN AN ORGANIZED HEALTH CARE EDUCATION/TRAINING PROGRAM

## 2024-11-26 PROCEDURE — 94761 N-INVAS EAR/PLS OXIMETRY MLT: CPT

## 2024-11-26 PROCEDURE — 85610 PROTHROMBIN TIME: CPT

## 2024-11-26 PROCEDURE — 0202U NFCT DS 22 TRGT SARS-COV-2: CPT

## 2024-11-26 RX ORDER — BENZONATATE 100 MG/1
100 CAPSULE ORAL 3 TIMES DAILY PRN
Qty: 30 CAPSULE | Refills: 0 | Status: SHIPPED | OUTPATIENT
Start: 2024-11-26 | End: 2024-12-06

## 2024-11-26 RX ORDER — GUAIFENESIN 600 MG/1
600 TABLET, EXTENDED RELEASE ORAL 2 TIMES DAILY
Qty: 14 TABLET | Refills: 0 | Status: SHIPPED | OUTPATIENT
Start: 2024-11-26 | End: 2024-12-03

## 2024-11-26 RX ORDER — ACETAMINOPHEN 500 MG
500 TABLET ORAL 4 TIMES DAILY PRN
Qty: 360 TABLET | Refills: 1 | Status: SHIPPED | OUTPATIENT
Start: 2024-11-26

## 2024-11-26 RX ORDER — HYDROCHLOROTHIAZIDE 12.5 MG/1
12.5 TABLET ORAL DAILY
Status: DISCONTINUED | OUTPATIENT
Start: 2024-11-26 | End: 2024-11-26 | Stop reason: HOSPADM

## 2024-11-26 RX ORDER — INSULIN LISPRO 100 [IU]/ML
10 INJECTION, SOLUTION INTRAVENOUS; SUBCUTANEOUS ONCE
Status: COMPLETED | OUTPATIENT
Start: 2024-11-26 | End: 2024-11-26

## 2024-11-26 RX ORDER — LISINOPRIL 20 MG/1
20 TABLET ORAL DAILY
Status: DISCONTINUED | OUTPATIENT
Start: 2024-11-26 | End: 2024-11-26 | Stop reason: HOSPADM

## 2024-11-26 RX ORDER — ALBUTEROL SULFATE 90 UG/1
2 INHALANT RESPIRATORY (INHALATION) EVERY 4 HOURS PRN
Status: DISCONTINUED | OUTPATIENT
Start: 2024-11-26 | End: 2024-11-26 | Stop reason: HOSPADM

## 2024-11-26 RX ADMIN — INSULIN LISPRO 2 UNITS: 100 INJECTION, SOLUTION INTRAVENOUS; SUBCUTANEOUS at 10:56

## 2024-11-26 RX ADMIN — ALBUTEROL SULFATE 2 PUFF: 90 AEROSOL, METERED RESPIRATORY (INHALATION) at 11:59

## 2024-11-26 RX ADMIN — ATORVASTATIN CALCIUM 10 MG: 10 TABLET, FILM COATED ORAL at 09:23

## 2024-11-26 RX ADMIN — INSULIN LISPRO 4 UNITS: 100 INJECTION, SOLUTION INTRAVENOUS; SUBCUTANEOUS at 06:27

## 2024-11-26 RX ADMIN — EZETIMIBE 10 MG: 10 TABLET ORAL at 09:23

## 2024-11-26 RX ADMIN — SODIUM CHLORIDE, PRESERVATIVE FREE 10 ML: 5 INJECTION INTRAVENOUS at 09:23

## 2024-11-26 RX ADMIN — LISINOPRIL 20 MG: 20 TABLET ORAL at 10:57

## 2024-11-26 RX ADMIN — HYDROCHLOROTHIAZIDE 12.5 MG: 12.5 TABLET ORAL at 10:57

## 2024-11-26 RX ADMIN — BUDESONIDE AND FORMOTEROL FUMARATE DIHYDRATE 2 PUFF: 160; 4.5 AEROSOL RESPIRATORY (INHALATION) at 07:41

## 2024-11-26 RX ADMIN — PREGABALIN 100 MG: 100 CAPSULE ORAL at 09:23

## 2024-11-26 RX ADMIN — INSULIN LISPRO 10 UNITS: 100 INJECTION, SOLUTION INTRAVENOUS; SUBCUTANEOUS at 13:23

## 2024-11-26 RX ADMIN — AMLODIPINE BESYLATE 10 MG: 10 TABLET ORAL at 09:23

## 2024-11-26 NOTE — ED NOTES
Placed nutrition order on pt at this time.  
Report given to LASHAWN Olson.  
administered: See MAR    NIH Score: NIH       Active LDA's:   Peripheral IV 11/25/24 Distal;Right Cephalic (Active)       Pertinent or High Risk Medications/Drips: no   If Yes, please provide details:       Blood Product Administration: no  If Yes, please provide details:     Recommendation  Incomplete orders N/A  Additional Comments: N/A  If any further questions, please call Sending RN at [phone]    Electronically signed by: Electronically signed by Annabelle Lawler RN on 11/25/2024 at 7:01 PM

## 2024-11-26 NOTE — DISCHARGE SUMMARY
V2.0  Discharge Summary    Name:  Azael Key Jr. /Age/Sex: 1985 (38 y.o. male)   Admit Date: 2024  Discharge Date: 24    MRN & CSN:  [id number] & [id number] Encounter Date and Time 24 12:54 PM EST    Attending:  Adalgisa Fonseca MD Discharging Provider: Shahana Valle PA-C       Hospital Course:     Brief HPI: Azael Key Jr. is a 38 y.o. male who presented with chest pain.     Problems addressed during this hospitalization:     Atypical chest pain   Elevated Trop  - likely 2/2 polysubstance abuse and COVID-19   -presents with right sided reproducible chest pain, EKG without ischemic changes and troponin elevated.  UDS + for cocaine and methamphetamine, and ethanol level 14; likely cause of chest pain and tachycardia. Troponin trending down on discharge.   -suspect  demand ischemia from cocaine and methamphetamine use. COVID-19 may be contributing to pain.  Chest pain resolved on discharge.   - cardio followed, cleared for discharge. Recommended outpatient stress test.   -Educated that drug abuse is contributing to symptoms and advised complete cessation.    COVID-19  -Reported 3 days of cough, congestion  -CXR with no acute process  -No leukocytosis or hypoxia  -Continue antitussives and as needed Tylenol on discharge.  Follow-up with PCP  -Return precautions discussed     Lactic acidosis, improved  -LA 7.9 on arrival, trended down to 2.2 with IVF fluids  -No evidence of infection or hypoperfusion at this time, suspect to be from polysubstance abuse      T2DM  Diabetic foot ulcers s/p left BKA and right TMA  - BS on admit >300  -Patient admitted to poor compliance with insulin  -Blood sugar improving on discharge, encouraged compliance with insulin and diabetic diet     Gout  -Continue allopurinol      HTN  -Continue amlodipine, lisinopril- HCTZ     HLD  -Continue statin      GERD  -Continue PPI     Asthma  -cough likely precipitated by drug abuse, however not in acute exacerbation.

## 2024-11-26 NOTE — DISCHARGE INSTRUCTIONS
While in the hospital, you were diagnosed with COVID-19. Because you are not requiring oxygen and are young, you should recover without difficulty at home. You have been prescribed over the counter cough medication and can take Tylenol/ibuprofen over the counter for your symptoms. Cardiology has recommended that you follow-up as an outpatient for a stress test.  Resume your home insulin regimen.  Avoid illicit substances and stop smoking.  Return to the emergency department if you develop severe shortness of breath, chest pain, confusion, lightheadedness/loss of consciousness.     Your CT scan showed that you have a fatty liver.  This can be improved with weight loss and controlling diabetes.  Discussed with your primary care doctor to monitor your liver enzymes.  Your CT scan also showed that you have old fractures in your lower back that are healing.  If you have continued back pain, discuss with your primary care doctor.       Follow-up with your primary care doctor in 5 to 7 days.

## 2024-11-26 NOTE — PROGRESS NOTES
AVS reviewed with patient follow up appts discussed and IV was removed. Patient is wheelchair bound and able to leave on his own. Social work working on ride for patient.

## 2024-11-26 NOTE — CONSULTS
CARDIOLOGY CONSULT NOTE   Reason for consultation:  Chest pain    Referring physician:  Phyllis Sheehan MD     Primary care physician: Geri Almonte MD      Dear   Thanks for the consult.    History of present illness:Azael is a 38 y.o.year old who  presents with  chest pain for last few years, happening daily, intermittent for 15 to 20 mins and aggravated with activity substernal also,reproducible with palpation, radiated to shoulder, 6/10, tender to touch,associated with shortness of breath, + sweating, nausea, did not get NTG in ED and subsided with antiinflammatory injection.    Chief Complaint   Patient presents with    Chest Pain    Headache     Blood pressure, cholesterol, blood glucose and weight are well controlled.    Past medical history:    has a past medical history of Asthma, Blind left eye, Diabetes mellitus (HCC), GERD (gastroesophageal reflux disease), Hypertension, and Retinal detachment.  Past surgical history:   has a past surgical history that includes Corneal transplant (Bilateral); Mandible surgery (Right); eye surgery; Eye surgery (Right); fracture surgery; Mandible surgery (Bilateral); Toe amputation (Right, 07/25/2017); laparoscopic appendectomy (N/A, 4/28/2022); Toe amputation (Right, 7/25/2022); Toe amputation (Right, 2/11/2023); and Foot Debridement (Right, 10/16/2023).  Social History:   reports that he has quit smoking. His smoking use included cigarettes. He has a 1.3 pack-year smoking history. He has never used smokeless tobacco. He reports current alcohol use. He reports that he does not currently use drugs after having used the following drugs: Marijuana (Weed).  Family history:   no family history of CAD, STROKE of DM    Allergies   Allergen Reactions    Ciprofloxacin Shortness Of Breath    Glucosamine Anaphylaxis    Shellfish-Derived Products Anaphylaxis    Shellfish Allergy     Vancomycin Swelling     Mild redness, swelling, warmth to touch, and tenderness surrounding IV

## 2024-11-26 NOTE — PROGRESS NOTES
Patient informed this nurse that he does not want to aware his tele leads on, aware of the risks of not wearing it. Chris VILLANUEVA notified

## 2024-11-26 NOTE — PROGRESS NOTES
Outpatient Pharmacy Progress Note for Meds-to-Beds    Total number of Prescriptions Filled: 3    Additional Documentation:  Medication(s) were delivered to the patient's room prior to discharge      Thank you for letting us serve your patients.  [address] [address]    Phone: [phone]    Fax: [phone]

## 2024-11-26 NOTE — CARE COORDINATION
Pt lives with his parents.  Pt has a wheelchair at home. RN charting stated pt is modified independent.  Pt has a PCP and has insurance to help with his healthcare cost.  No needs identified at this time.  Plan is to return home at discharge.

## 2024-11-26 NOTE — PROGRESS NOTES
4 Eyes Skin Assessment     NAME:  Azael Key Jr.  YOB: 1985  MEDICAL RECORD NUMBER:  [id number]    The patient is being assessed for  Admission    I agree that at least one RN has performed a thorough Head to Toe Skin Assessment on the patient. ALL assessment sites listed below have been assessed.      Areas assessed by both nurses:    Head, Face, Ears, Shoulders, Back, Chest, Arms, Elbows, Hands, Sacrum. Buttock, Coccyx, Ischium, Legs. Feet and Heels, Under Medical Devices , and Other          Does the Patient have a Wound? No noted wound(s) Patient has amputated right toes and left BKA       Christopher Prevention initiated by RN: No  Wound Care Orders initiated by RN: No    Pressure Injury (Stage 3,4, Unstageable, DTI, NWPT, and Complex wounds) if present, place Wound referral order by RN under : No    New Ostomies, if present place, Ostomy referral order under : No     Nurse 1 eSignature: Electronically signed by Seven Go RN on 11/26/24 at 12:16 AM EST    **SHARE this note so that the co-signing nurse can place an eSignature**    Nurse 2 eSignature: Electronically signed by Triny Yi RN on 11/26/24 at 12:19 AM EST

## 2024-11-28 LAB
EKG ATRIAL RATE: 118 BPM
EKG DIAGNOSIS: NORMAL
EKG P AXIS: 42 DEGREES
EKG P-R INTERVAL: 178 MS
EKG Q-T INTERVAL: 324 MS
EKG QRS DURATION: 76 MS
EKG QTC CALCULATION (BAZETT): 454 MS
EKG R AXIS: -12 DEGREES
EKG T AXIS: 50 DEGREES
EKG VENTRICULAR RATE: 118 BPM

## 2024-11-28 PROCEDURE — 93010 ELECTROCARDIOGRAM REPORT: CPT | Performed by: INTERNAL MEDICINE

## 2024-11-30 LAB
MICROORGANISM SPEC CULT: NORMAL
SERVICE CMNT-IMP: NORMAL
SPECIMEN DESCRIPTION: NORMAL

## 2025-05-02 ENCOUNTER — HOSPITAL ENCOUNTER (EMERGENCY)
Age: 40
Discharge: HOME | End: 2025-05-02
Attending: EMERGENCY MEDICINE
Payer: COMMERCIAL

## 2025-05-02 VITALS
SYSTOLIC BLOOD PRESSURE: 169 MMHG | RESPIRATION RATE: 18 BRPM | DIASTOLIC BLOOD PRESSURE: 107 MMHG | HEART RATE: 94 BPM | TEMPERATURE: 98 F | OXYGEN SATURATION: 99 %

## 2025-05-02 DIAGNOSIS — Z51.89 VISIT FOR WOUND CHECK: Primary | ICD-10-CM

## 2025-05-02 PROCEDURE — 6370000000 HC RX 637 (ALT 250 FOR IP): Performed by: EMERGENCY MEDICINE

## 2025-05-02 PROCEDURE — 6360000002 HC RX W HCPCS: Performed by: EMERGENCY MEDICINE

## 2025-05-02 PROCEDURE — 94640 AIRWAY INHALATION TREATMENT: CPT

## 2025-05-02 PROCEDURE — 99283 EMERGENCY DEPT VISIT LOW MDM: CPT

## 2025-05-02 RX ORDER — BACITRACIN ZINC AND POLYMYXIN B SULFATE 500; 1000 [USP'U]/G; [USP'U]/G
OINTMENT TOPICAL
Qty: 28.4 G | Refills: 1 | Status: SHIPPED | OUTPATIENT
Start: 2025-05-02 | End: 2025-05-09

## 2025-05-02 RX ORDER — HYDROCODONE BITARTRATE AND ACETAMINOPHEN 5; 325 MG/1; MG/1
1 TABLET ORAL ONCE
Status: COMPLETED | OUTPATIENT
Start: 2025-05-02 | End: 2025-05-02

## 2025-05-02 RX ORDER — DOXYCYCLINE HYCLATE 100 MG
100 TABLET ORAL 2 TIMES DAILY
Qty: 14 TABLET | Refills: 0 | Status: SHIPPED | OUTPATIENT
Start: 2025-05-02 | End: 2025-05-09

## 2025-05-02 RX ORDER — ALBUTEROL SULFATE 0.83 MG/ML
2.5 SOLUTION RESPIRATORY (INHALATION) ONCE
Status: COMPLETED | OUTPATIENT
Start: 2025-05-02 | End: 2025-05-02

## 2025-05-02 RX ORDER — DOXYCYCLINE HYCLATE 100 MG
100 TABLET ORAL ONCE
Status: COMPLETED | OUTPATIENT
Start: 2025-05-02 | End: 2025-05-02

## 2025-05-02 RX ORDER — GINSENG 100 MG
CAPSULE ORAL ONCE
Status: COMPLETED | OUTPATIENT
Start: 2025-05-02 | End: 2025-05-02

## 2025-05-02 RX ADMIN — BACITRACIN 1 EACH: 500 OINTMENT TOPICAL at 06:47

## 2025-05-02 RX ADMIN — DOXYCYCLINE HYCLATE 100 MG: 100 TABLET, COATED ORAL at 06:47

## 2025-05-02 RX ADMIN — ALBUTEROL SULFATE 2.5 MG: 2.5 SOLUTION RESPIRATORY (INHALATION) at 06:57

## 2025-05-02 RX ADMIN — HYDROCODONE BITARTRATE AND ACETAMINOPHEN 1 TABLET: 5; 325 TABLET ORAL at 06:47

## 2025-05-02 ASSESSMENT — PAIN DESCRIPTION - DESCRIPTORS
DESCRIPTORS: ACHING;DISCOMFORT
DESCRIPTORS: ACHING

## 2025-05-02 ASSESSMENT — PAIN DESCRIPTION - ORIENTATION
ORIENTATION: LOWER;LEFT
ORIENTATION: LEFT

## 2025-05-02 ASSESSMENT — PAIN - FUNCTIONAL ASSESSMENT
PAIN_FUNCTIONAL_ASSESSMENT: 0-10
PAIN_FUNCTIONAL_ASSESSMENT: 0-10

## 2025-05-02 ASSESSMENT — PAIN SCALES - GENERAL
PAINLEVEL_OUTOF10: 4
PAINLEVEL_OUTOF10: 10
PAINLEVEL_OUTOF10: 10

## 2025-05-02 ASSESSMENT — PAIN DESCRIPTION - LOCATION
LOCATION: LEG
LOCATION: LEG

## 2025-05-02 ASSESSMENT — PAIN DESCRIPTION - PAIN TYPE: TYPE: ACUTE PAIN

## 2025-05-02 NOTE — ED PROVIDER NOTES
Normal ROM.  Wound as above to left BKA.  Heart:  Strong and symmetric peripheral pulses.  Extremities are well perfused.   Abdomen:  Non-distended.  Respiratory:  Respirations nonlabored.     Neurological:  Alert and oriented times 3.  No focal neuro deficits.  Sensation intact to light touch to distal upper/lower extremities; 5/5 and symmetric  and dorsi/plantar flexion.                I have reviewed and interpreted all of the currently available lab results from this visit (if applicable):  No results found for this visit on 05/02/25.   Radiographs (if obtained):  [] The following radiograph was interpreted by myself in the absence of a radiologist:   [] Radiologist's Report Reviewed:  No orders to display         EKG (if obtained): (All EKG's are interpreted by myself in the absence of a cardiologist)    Chart review shows recent radiographs:  No results found.    MDM:  CC/HPI Summary, DDx, ED Course, and Reassessment:   Pt presents as above.  Emergent conditions considered.  Presentation prompted initial history and physical.  Presentation consistent with possibly early infected scabbed over wound to the left distal BKA stump.  There is mild amount of erythema possible developing infection as above.  I will place patient on doxycycline course for this and bacitracin.  Wound care and dressing applied here.  Patient is also requesting a breathing treatment which is provided.  Patient also treated symptomatically with [place] for pain.  Patient is discharged with plan to call his general surgery team for wound recheck next week.  Encouraged patient to take a break from his prosthetic until this is healed.    History from : Patient    Limitations to history : None    Patient was given the following medications:  Medications   bacitracin ointment (has no administration in time range)   doxycycline hyclate (VIBRA-TABS) tablet 100 mg (has no administration in time range)   HYDROcodone-acetaminophen (NORCO) 5-325 MG

## 2025-05-05 NOTE — TELEPHONE ENCOUNTER
I attempted to call patient to f/u, but there was no answer. I LM for patient to return my call.    This SN called , left message for patient to return call to the Wound Care Center to schedule follow up appointment for Wound Care.

## 2025-06-11 ENCOUNTER — APPOINTMENT (OUTPATIENT)
Dept: GENERAL RADIOLOGY | Age: 40
End: 2025-06-11
Payer: COMMERCIAL

## 2025-06-11 ENCOUNTER — APPOINTMENT (OUTPATIENT)
Dept: CT IMAGING | Age: 40
End: 2025-06-11
Payer: COMMERCIAL

## 2025-06-11 ENCOUNTER — HOSPITAL ENCOUNTER (EMERGENCY)
Age: 40
Discharge: HOME | End: 2025-06-11
Attending: EMERGENCY MEDICINE
Payer: COMMERCIAL

## 2025-06-11 VITALS
RESPIRATION RATE: 19 BRPM | BODY MASS INDEX: 37.3 KG/M2 | TEMPERATURE: 98.2 F | OXYGEN SATURATION: 100 % | SYSTOLIC BLOOD PRESSURE: 107 MMHG | HEART RATE: 93 BPM | HEIGHT: 75 IN | DIASTOLIC BLOOD PRESSURE: 86 MMHG | WEIGHT: 300 LBS

## 2025-06-11 DIAGNOSIS — R07.9 CHEST PAIN, UNSPECIFIED TYPE: Primary | ICD-10-CM

## 2025-06-11 DIAGNOSIS — R05.9 COUGH, UNSPECIFIED TYPE: ICD-10-CM

## 2025-06-11 LAB
ALBUMIN SERPL-MCNC: 4.4 G/DL (ref 3.4–5)
ALBUMIN/GLOB SERPL: 1.6 {RATIO} (ref 1.1–2.2)
ALP SERPL-CCNC: 102 U/L (ref 40–129)
ALT SERPL-CCNC: 31 U/L (ref 10–40)
ANION GAP SERPL CALCULATED.3IONS-SCNC: 20 MMOL/L (ref 9–17)
AST SERPL-CCNC: 51 U/L (ref 15–37)
BASOPHILS # BLD: 0.07 K/UL
BASOPHILS NFR BLD: 1 % (ref 0–1)
BILIRUB SERPL-MCNC: 0.7 MG/DL (ref 0–1)
BILIRUB UR QL STRIP: NEGATIVE
BNP SERPL-MCNC: <36 PG/ML (ref 0–125)
BUN SERPL-MCNC: 16 MG/DL (ref 7–20)
CALCIUM SERPL-MCNC: 9.6 MG/DL (ref 8.3–10.6)
CASTS #/AREA URNS LPF: NORMAL /LPF
CHLORIDE SERPL-SCNC: 93 MMOL/L (ref 99–110)
CLARITY UR: CLEAR
CO2 SERPL-SCNC: 19 MMOL/L (ref 21–32)
COLOR UR: YELLOW
CREAT SERPL-MCNC: 0.8 MG/DL (ref 0.9–1.3)
EOSINOPHIL # BLD: 0.07 K/UL
EOSINOPHILS RELATIVE PERCENT: 1 % (ref 0–3)
ERYTHROCYTE [DISTWIDTH] IN BLOOD BY AUTOMATED COUNT: 11.9 % (ref 11.7–14.9)
GFR, ESTIMATED: >90 ML/MIN/1.73M2
GLUCOSE SERPL-MCNC: 210 MG/DL (ref 74–99)
GLUCOSE UR STRIP-MCNC: >=1000 MG/DL
HCT VFR BLD AUTO: 42 % (ref 42–52)
HGB BLD-MCNC: 15 G/DL (ref 13.5–18)
HGB UR QL STRIP.AUTO: NEGATIVE
IMM GRANULOCYTES # BLD AUTO: 0.01 K/UL
IMM GRANULOCYTES NFR BLD: 0 %
KETONES UR STRIP-MCNC: 15 MG/DL
LACTATE BLDV-SCNC: 1.8 MMOL/L (ref 0.4–2)
LACTATE BLDV-SCNC: 2.4 MMOL/L (ref 0.4–2)
LACTATE BLDV-SCNC: 3.2 MMOL/L (ref 0.4–2)
LEUKOCYTE ESTERASE UR QL STRIP: NEGATIVE
LIPASE SERPL-CCNC: 18 U/L (ref 13–60)
LYMPHOCYTES NFR BLD: 2.02 K/UL
LYMPHOCYTES RELATIVE PERCENT: 28 % (ref 24–44)
MCH RBC QN AUTO: 30.7 PG (ref 27–31)
MCHC RBC AUTO-ENTMCNC: 35.7 G/DL (ref 32–36)
MCV RBC AUTO: 86.1 FL (ref 78–100)
MONOCYTES NFR BLD: 0.5 K/UL
MONOCYTES NFR BLD: 7 % (ref 0–5)
NEUTROPHILS NFR BLD: 63 % (ref 36–66)
NEUTS SEG NFR BLD: 4.47 K/UL
NITRITE UR QL STRIP: NEGATIVE
PH UR STRIP: 5.5 [PH] (ref 5–8)
PLATELET # BLD AUTO: 288 K/UL (ref 140–440)
PMV BLD AUTO: 9.2 FL (ref 7.5–11.1)
POTASSIUM SERPL-SCNC: 4.1 MMOL/L (ref 3.5–5.1)
PROT SERPL-MCNC: 7.1 G/DL (ref 6.4–8.2)
PROT UR STRIP-MCNC: ABNORMAL MG/DL
RBC # BLD AUTO: 4.88 M/UL (ref 4.6–6.2)
RBC #/AREA URNS HPF: 0 /HPF (ref 0–2)
SODIUM SERPL-SCNC: 132 MMOL/L (ref 136–145)
SP GR UR STRIP: 1.01 (ref 1–1.03)
TROPONIN I SERPL HS-MCNC: 31 NG/L (ref 0–22)
TROPONIN I SERPL HS-MCNC: 36 NG/L (ref 0–22)
UROBILINOGEN UR STRIP-ACNC: 0.2 EU/DL (ref 0–1)
WBC #/AREA URNS HPF: 1 /HPF (ref 0–5)
WBC OTHER # BLD: 7.1 K/UL (ref 4–10.5)

## 2025-06-11 PROCEDURE — 71275 CT ANGIOGRAPHY CHEST: CPT

## 2025-06-11 PROCEDURE — 80053 COMPREHEN METABOLIC PANEL: CPT

## 2025-06-11 PROCEDURE — 83605 ASSAY OF LACTIC ACID: CPT

## 2025-06-11 PROCEDURE — 83880 ASSAY OF NATRIURETIC PEPTIDE: CPT

## 2025-06-11 PROCEDURE — 96361 HYDRATE IV INFUSION ADD-ON: CPT

## 2025-06-11 PROCEDURE — 2500000003 HC RX 250 WO HCPCS: Performed by: EMERGENCY MEDICINE

## 2025-06-11 PROCEDURE — 85025 COMPLETE CBC W/AUTO DIFF WBC: CPT

## 2025-06-11 PROCEDURE — 2580000003 HC RX 258: Performed by: EMERGENCY MEDICINE

## 2025-06-11 PROCEDURE — 96376 TX/PRO/DX INJ SAME DRUG ADON: CPT

## 2025-06-11 PROCEDURE — 6360000002 HC RX W HCPCS: Performed by: EMERGENCY MEDICINE

## 2025-06-11 PROCEDURE — 6370000000 HC RX 637 (ALT 250 FOR IP): Performed by: EMERGENCY MEDICINE

## 2025-06-11 PROCEDURE — 71045 X-RAY EXAM CHEST 1 VIEW: CPT

## 2025-06-11 PROCEDURE — 84484 ASSAY OF TROPONIN QUANT: CPT

## 2025-06-11 PROCEDURE — 81001 URINALYSIS AUTO W/SCOPE: CPT

## 2025-06-11 PROCEDURE — 99285 EMERGENCY DEPT VISIT HI MDM: CPT

## 2025-06-11 PROCEDURE — 83690 ASSAY OF LIPASE: CPT

## 2025-06-11 PROCEDURE — 96374 THER/PROPH/DIAG INJ IV PUSH: CPT

## 2025-06-11 PROCEDURE — 93005 ELECTROCARDIOGRAM TRACING: CPT | Performed by: EMERGENCY MEDICINE

## 2025-06-11 PROCEDURE — 6360000004 HC RX CONTRAST MEDICATION: Performed by: EMERGENCY MEDICINE

## 2025-06-11 RX ORDER — BUPROPION HYDROCHLORIDE 150 MG/1
150 TABLET ORAL EVERY MORNING
COMMUNITY
Start: 2025-06-10

## 2025-06-11 RX ORDER — DULOXETIN HYDROCHLORIDE 30 MG/1
30 CAPSULE, DELAYED RELEASE ORAL DAILY
COMMUNITY
Start: 2025-06-10

## 2025-06-11 RX ORDER — ACETAMINOPHEN 500 MG
500 TABLET ORAL 4 TIMES DAILY PRN
Qty: 360 TABLET | Refills: 1 | Status: SHIPPED | OUTPATIENT
Start: 2025-06-11

## 2025-06-11 RX ORDER — ASPIRIN 81 MG/1
TABLET, CHEWABLE ORAL
Status: DISCONTINUED
Start: 2025-06-11 | End: 2025-06-11

## 2025-06-11 RX ORDER — MORPHINE SULFATE 4 MG/ML
4 INJECTION, SOLUTION INTRAMUSCULAR; INTRAVENOUS EVERY 30 MIN PRN
Refills: 0 | Status: DISCONTINUED | OUTPATIENT
Start: 2025-06-11 | End: 2025-06-11 | Stop reason: HOSPADM

## 2025-06-11 RX ORDER — CETIRIZINE HYDROCHLORIDE 10 MG/1
10 TABLET ORAL DAILY
COMMUNITY
Start: 2025-06-10

## 2025-06-11 RX ORDER — GLIPIZIDE 10 MG/1
10 TABLET ORAL NIGHTLY
COMMUNITY

## 2025-06-11 RX ORDER — 0.9 % SODIUM CHLORIDE 0.9 %
1000 INTRAVENOUS SOLUTION INTRAVENOUS ONCE
Status: COMPLETED | OUTPATIENT
Start: 2025-06-11 | End: 2025-06-11

## 2025-06-11 RX ORDER — ONDANSETRON 4 MG/1
4 TABLET, ORALLY DISINTEGRATING ORAL 3 TIMES DAILY PRN
Qty: 21 TABLET | Refills: 0 | Status: SHIPPED | OUTPATIENT
Start: 2025-06-11

## 2025-06-11 RX ORDER — FAMOTIDINE 40 MG/1
40 TABLET, FILM COATED ORAL NIGHTLY PRN
COMMUNITY
Start: 2025-06-10

## 2025-06-11 RX ORDER — NAPROXEN 500 MG/1
500 TABLET ORAL 2 TIMES DAILY
Qty: 60 TABLET | Refills: 0 | Status: SHIPPED | OUTPATIENT
Start: 2025-06-11

## 2025-06-11 RX ORDER — ONDANSETRON 2 MG/ML
4 INJECTION INTRAMUSCULAR; INTRAVENOUS EVERY 30 MIN PRN
Status: DISCONTINUED | OUTPATIENT
Start: 2025-06-11 | End: 2025-06-11 | Stop reason: HOSPADM

## 2025-06-11 RX ORDER — ASPIRIN 81 MG/1
81 TABLET, CHEWABLE ORAL DAILY
Qty: 30 TABLET | Refills: 0 | Status: SHIPPED | OUTPATIENT
Start: 2025-06-11

## 2025-06-11 RX ORDER — IPRATROPIUM BROMIDE AND ALBUTEROL 20; 100 UG/1; UG/1
1 SPRAY, METERED RESPIRATORY (INHALATION) EVERY 6 HOURS PRN
COMMUNITY

## 2025-06-11 RX ORDER — OXYCODONE AND ACETAMINOPHEN 5; 325 MG/1; MG/1
1 TABLET ORAL NIGHTLY
COMMUNITY
Start: 2025-06-10

## 2025-06-11 RX ORDER — BENZONATATE 100 MG/1
100 CAPSULE ORAL ONCE
Status: COMPLETED | OUTPATIENT
Start: 2025-06-11 | End: 2025-06-11

## 2025-06-11 RX ORDER — LIDOCAINE 50 MG/G
1 PATCH TOPICAL DAILY
COMMUNITY
Start: 2025-06-10

## 2025-06-11 RX ORDER — IOPAMIDOL 755 MG/ML
75 INJECTION, SOLUTION INTRAVASCULAR
Status: COMPLETED | OUTPATIENT
Start: 2025-06-11 | End: 2025-06-11

## 2025-06-11 RX ORDER — GLIPIZIDE 10 MG/1
20 TABLET ORAL EVERY MORNING
COMMUNITY
Start: 2025-06-10

## 2025-06-11 RX ORDER — GUAIFENESIN 200 MG/10ML
200 LIQUID ORAL ONCE
Status: COMPLETED | OUTPATIENT
Start: 2025-06-11 | End: 2025-06-11

## 2025-06-11 RX ADMIN — BENZONATATE 100 MG: 100 CAPSULE ORAL at 07:57

## 2025-06-11 RX ADMIN — MORPHINE SULFATE 4 MG: 4 INJECTION, SOLUTION INTRAMUSCULAR; INTRAVENOUS at 08:01

## 2025-06-11 RX ADMIN — IOPAMIDOL 75 ML: 755 INJECTION, SOLUTION INTRAVENOUS at 08:55

## 2025-06-11 RX ADMIN — SODIUM CHLORIDE 1000 ML: 0.9 INJECTION, SOLUTION INTRAVENOUS at 07:57

## 2025-06-11 RX ADMIN — MORPHINE SULFATE 4 MG: 4 INJECTION, SOLUTION INTRAMUSCULAR; INTRAVENOUS at 09:31

## 2025-06-11 RX ADMIN — MORPHINE SULFATE 4 MG: 4 INJECTION, SOLUTION INTRAMUSCULAR; INTRAVENOUS at 11:35

## 2025-06-11 RX ADMIN — SODIUM CHLORIDE 1000 ML: 0.9 INJECTION, SOLUTION INTRAVENOUS at 09:31

## 2025-06-11 RX ADMIN — GUAIFENESIN 200 MG: 200 SOLUTION ORAL at 07:57

## 2025-06-11 RX ADMIN — SODIUM CHLORIDE 1000 ML: 0.9 INJECTION, SOLUTION INTRAVENOUS at 10:38

## 2025-06-11 ASSESSMENT — PAIN DESCRIPTION - DESCRIPTORS: DESCRIPTORS: ACHING;DULL

## 2025-06-11 ASSESSMENT — ENCOUNTER SYMPTOMS
ALLERGIC/IMMUNOLOGIC NEGATIVE: 1
GASTROINTESTINAL NEGATIVE: 1
COUGH: 1
EYES NEGATIVE: 1

## 2025-06-11 ASSESSMENT — PAIN SCALES - GENERAL
PAINLEVEL_OUTOF10: 7
PAINLEVEL_OUTOF10: 6
PAINLEVEL_OUTOF10: 7
PAINLEVEL_OUTOF10: 8
PAINLEVEL_OUTOF10: 8

## 2025-06-11 ASSESSMENT — PAIN - FUNCTIONAL ASSESSMENT
PAIN_FUNCTIONAL_ASSESSMENT: 0-10

## 2025-06-11 ASSESSMENT — PAIN DESCRIPTION - LOCATION
LOCATION: HEAD
LOCATION: CHEST
LOCATION: CHEST

## 2025-06-11 ASSESSMENT — PAIN DESCRIPTION - ORIENTATION: ORIENTATION: MID

## 2025-06-11 NOTE — ED NOTES
ED TO INPATIENT SBAR HANDOFF    Patient Name: Azael Key Jr.   :  1985  39 y.o.   Preferred Name  Azael  Family/Caregiver Present no   Restraints no   C-SSRS: Risk of Suicide: No Risk  Sitter no   Sepsis Risk Score Sepsis V2 Risk Score: 4.1      Situation  Chief Complaint   Patient presents with    Chest Pain     Pt presents to the ed with complaints of chest pain. Pt has a hx of diabetes and hypertension. States that the pain is in the center of the chest and it is a dull pain. Also states that he has a new cough that has developed over the night.      Brief Description of Patient's Condition: Pt presents to the ed with complaints of chest pain that began this morning. Denies a cardiac hx. States he has a hx of DM. Also has a left Below the knee amputation. Is alert and oriented x4. States that he is legally blind.   Mental Status: oriented, alert, coherent, logical, thought processes intact, and able to concentrate and follow conversation  Arrived from: home    Imaging:   CTA PULMONARY W CONTRAST   Final Result      XR CHEST PORTABLE   Final Result        Abnormal labs:   Abnormal Labs Reviewed   CBC WITH AUTO DIFFERENTIAL - Abnormal; Notable for the following components:       Result Value    Monocytes % 7 (*)     All other components within normal limits   COMPREHENSIVE METABOLIC PANEL - Abnormal; Notable for the following components:    Sodium 132 (*)     Chloride 93 (*)     CO2 19 (*)     Anion Gap 20 (*)     Glucose 210 (*)     Creatinine 0.8 (*)     AST 51 (*)     All other components within normal limits   TROPONIN - Abnormal; Notable for the following components:    Troponin, High Sensitivity 36 (*)     All other components within normal limits   LACTIC ACID - Abnormal; Notable for the following components:    Lactic Acid 3.2 (*)     All other components within normal limits   URINALYSIS - Abnormal; Notable for the following components:    Glucose, Ur >=1000 (*)     Ketones, Urine 15 (*)     
MD Amy   insulin glargine (LANTUS) 100 UNIT/ML injection vial  Inject 45 Units into the skin daily 7/26/24  Yes Amy Prieto MD   insulin lispro (HUMALOG,ADMELOG) 100 UNIT/ML SOLN injection vial Inject 20 Units into the skin 3 times daily (with meals) 7/26/24  Yes Amy Prieto MD   lisinopril-hydroCHLOROthiazide (PRINZIDE;ZESTORETIC) 20-12.5 MG per tablet Take 1 tablet by mouth daily   06/11/25 Prescription noted for BID dosing patient reports taking 1 tablet once daily. 3/14/24  Yes Chandana Damno MD   omeprazole (PRILOSEC) 40 MG delayed release capsule Take 1 capsule by mouth daily 8/7/23  Yes Chandana Damon MD   SYMBICORT 160-4.5 MCG/ACT AERO Inhale 2 puffs into the lungs in the morning and at bedtime 9/2/22  Yes Chandana Damon MD   ezetimibe (ZETIA) 10 MG tablet take 1 tablet by mouth once daily 6/29/22  Yes Chandana Damon MD   brimonidine (ALPHAGAN) 0.2 % ophthalmic solution Place 1 drop into the right eye in the morning and 1 drop in the evening.   Yes ProviderChandana MD   prednisoLONE acetate (PRED FORTE) 1 % ophthalmic suspension Place 1 drop into the right eye 2 times daily   Yes Chandana Damon MD   allopurinol (ZYLOPRIM) 300 MG tablet Take 1 tablet by mouth daily   Yes Chandana Damon MD   amLODIPine (NORVASC) 10 MG tablet Take 1 tablet by mouth daily 11/25/16  Yes ProviderChandana MD   COMBIVENT RESPIMAT  MCG/ACT AERS inhaler Inhale 1 puff into the lungs every 6 hours as needed    ProviderChandana MD   acetaminophen (TYLENOL) 500 MG tablet  Take 2 tablets by mouth 4 times daily as needed for Pain or Fever 11/26/24   Shahana Valle PA-C   albuterol sulfate HFA (VENTOLIN HFA) 108 (90 Base) MCG/ACT inhaler Inhale 2 puffs into the lungs 4 times daily as needed for Wheezing 5/28/24   Margie Brown DO   fluticasone (FLONASE) 50 MCG/ACT nasal spray 2 sprays by Each Nostril route daily 4/10/23   Frederick Schulz MD TECHLITE PEN NEEDLES 31G X 5 MM MISC

## 2025-06-13 LAB
EKG ATRIAL RATE: 117 BPM
EKG DIAGNOSIS: NORMAL
EKG P AXIS: 55 DEGREES
EKG P-R INTERVAL: 162 MS
EKG Q-T INTERVAL: 348 MS
EKG QRS DURATION: 84 MS
EKG QTC CALCULATION (BAZETT): 485 MS
EKG R AXIS: -15 DEGREES
EKG T AXIS: 51 DEGREES
EKG VENTRICULAR RATE: 117 BPM

## 2025-06-13 PROCEDURE — 93010 ELECTROCARDIOGRAM REPORT: CPT | Performed by: INTERNAL MEDICINE

## 2025-07-09 ENCOUNTER — HOSPITAL ENCOUNTER (EMERGENCY)
Age: 40
Discharge: PSYCHIATRIC OR SUBSTANCE USE FACILITY | End: 2025-07-10
Attending: EMERGENCY MEDICINE
Payer: COMMERCIAL

## 2025-07-09 DIAGNOSIS — R94.31 PROLONGED Q-T INTERVAL ON ECG: ICD-10-CM

## 2025-07-09 DIAGNOSIS — T14.91XA SUICIDE ATTEMPT (HCC): Primary | ICD-10-CM

## 2025-07-09 LAB
ALBUMIN SERPL-MCNC: 4 G/DL (ref 3.4–5)
ALBUMIN/GLOB SERPL: 1.4 {RATIO} (ref 1.1–2.2)
ALP SERPL-CCNC: 92 U/L (ref 40–129)
ALT SERPL-CCNC: 37 U/L (ref 10–40)
AMPHET UR QL SCN: NEGATIVE
ANION GAP SERPL CALCULATED.3IONS-SCNC: 15 MMOL/L (ref 9–17)
APAP SERPL-MCNC: <5 UG/ML (ref 10–30)
AST SERPL-CCNC: 28 U/L (ref 15–37)
BARBITURATES UR QL SCN: NEGATIVE
BASOPHILS # BLD: 0.04 K/UL
BASOPHILS NFR BLD: 1 % (ref 0–1)
BENZODIAZ UR QL: NEGATIVE
BILIRUB SERPL-MCNC: 0.3 MG/DL (ref 0–1)
BILIRUB UR QL STRIP: NEGATIVE
BUN SERPL-MCNC: 11 MG/DL (ref 7–20)
CALCIUM SERPL-MCNC: 9.3 MG/DL (ref 8.3–10.6)
CANNABINOIDS UR QL SCN: NEGATIVE
CHLORIDE SERPL-SCNC: 103 MMOL/L (ref 99–110)
CLARITY UR: CLEAR
CO2 SERPL-SCNC: 21 MMOL/L (ref 21–32)
COCAINE UR QL SCN: POSITIVE
COLOR UR: YELLOW
COMMENT: ABNORMAL
CREAT SERPL-MCNC: 1 MG/DL (ref 0.9–1.3)
EOSINOPHIL # BLD: 0.07 K/UL
EOSINOPHILS RELATIVE PERCENT: 1 % (ref 0–3)
ERYTHROCYTE [DISTWIDTH] IN BLOOD BY AUTOMATED COUNT: 12.1 % (ref 11.7–14.9)
ETHANOLAMINE SERPL-MCNC: 194 MG/DL (ref 0–0.08)
FENTANYL UR QL: NEGATIVE
GFR, ESTIMATED: 87 ML/MIN/1.73M2
GLUCOSE SERPL-MCNC: 188 MG/DL (ref 74–99)
GLUCOSE UR STRIP-MCNC: >=1000 MG/DL
HCT VFR BLD AUTO: 40.1 % (ref 42–52)
HGB BLD-MCNC: 13.8 G/DL (ref 13.5–18)
HGB UR QL STRIP.AUTO: NEGATIVE
IMM GRANULOCYTES # BLD AUTO: 0.03 K/UL
IMM GRANULOCYTES NFR BLD: 1 %
KETONES UR STRIP-MCNC: NEGATIVE MG/DL
LEUKOCYTE ESTERASE UR QL STRIP: NEGATIVE
LYMPHOCYTES NFR BLD: 1.85 K/UL
LYMPHOCYTES RELATIVE PERCENT: 30 % (ref 24–44)
MCH RBC QN AUTO: 29.9 PG (ref 27–31)
MCHC RBC AUTO-ENTMCNC: 34.4 G/DL (ref 32–36)
MCV RBC AUTO: 87 FL (ref 78–100)
MONOCYTES NFR BLD: 0.39 K/UL
MONOCYTES NFR BLD: 6 % (ref 0–5)
NEUTROPHILS NFR BLD: 61 % (ref 36–66)
NEUTS SEG NFR BLD: 3.72 K/UL
NITRITE UR QL STRIP: NEGATIVE
OPIATES UR QL SCN: NEGATIVE
OXYCODONE UR QL SCN: NEGATIVE
PH UR STRIP: 6 [PH] (ref 5–8)
PLATELET # BLD AUTO: 282 K/UL (ref 140–440)
PMV BLD AUTO: 8.8 FL (ref 7.5–11.1)
POTASSIUM SERPL-SCNC: 3.5 MMOL/L (ref 3.5–5.1)
PROT SERPL-MCNC: 6.9 G/DL (ref 6.4–8.2)
PROT UR STRIP-MCNC: NEGATIVE MG/DL
RBC # BLD AUTO: 4.61 M/UL (ref 4.6–6.2)
SALICYLATES SERPL-MCNC: <0.5 MG/DL (ref 15–30)
SODIUM SERPL-SCNC: 138 MMOL/L (ref 136–145)
SP GR UR STRIP: <1.005 (ref 1–1.03)
TEST INFORMATION: ABNORMAL
UROBILINOGEN UR STRIP-ACNC: 0.2 EU/DL (ref 0–1)
WBC OTHER # BLD: 6.1 K/UL (ref 4–10.5)

## 2025-07-09 PROCEDURE — 99285 EMERGENCY DEPT VISIT HI MDM: CPT

## 2025-07-09 PROCEDURE — 96376 TX/PRO/DX INJ SAME DRUG ADON: CPT

## 2025-07-09 PROCEDURE — 80179 DRUG ASSAY SALICYLATE: CPT

## 2025-07-09 PROCEDURE — 80143 DRUG ASSAY ACETAMINOPHEN: CPT

## 2025-07-09 PROCEDURE — 93005 ELECTROCARDIOGRAM TRACING: CPT | Performed by: EMERGENCY MEDICINE

## 2025-07-09 PROCEDURE — 6360000002 HC RX W HCPCS: Performed by: PHYSICIAN ASSISTANT

## 2025-07-09 PROCEDURE — G0480 DRUG TEST DEF 1-7 CLASSES: HCPCS

## 2025-07-09 PROCEDURE — 85025 COMPLETE CBC W/AUTO DIFF WBC: CPT

## 2025-07-09 PROCEDURE — 96372 THER/PROPH/DIAG INJ SC/IM: CPT

## 2025-07-09 PROCEDURE — 96365 THER/PROPH/DIAG IV INF INIT: CPT

## 2025-07-09 PROCEDURE — 96366 THER/PROPH/DIAG IV INF ADDON: CPT

## 2025-07-09 PROCEDURE — 80053 COMPREHEN METABOLIC PANEL: CPT

## 2025-07-09 PROCEDURE — 96368 THER/DIAG CONCURRENT INF: CPT

## 2025-07-09 PROCEDURE — 2500000003 HC RX 250 WO HCPCS

## 2025-07-09 PROCEDURE — 80307 DRUG TEST PRSMV CHEM ANLYZR: CPT

## 2025-07-09 PROCEDURE — 81003 URINALYSIS AUTO W/O SCOPE: CPT

## 2025-07-09 PROCEDURE — 2580000003 HC RX 258: Performed by: PHYSICIAN ASSISTANT

## 2025-07-09 RX ORDER — ZIPRASIDONE MESYLATE 20 MG/ML
20 INJECTION, POWDER, LYOPHILIZED, FOR SOLUTION INTRAMUSCULAR ONCE
Status: COMPLETED | OUTPATIENT
Start: 2025-07-09 | End: 2025-07-09

## 2025-07-09 RX ORDER — DIPHENHYDRAMINE HYDROCHLORIDE 50 MG/ML
50 INJECTION, SOLUTION INTRAMUSCULAR; INTRAVENOUS ONCE
Status: DISCONTINUED | OUTPATIENT
Start: 2025-07-09 | End: 2025-07-09

## 2025-07-09 RX ORDER — 0.9 % SODIUM CHLORIDE 0.9 %
1000 INTRAVENOUS SOLUTION INTRAVENOUS ONCE
Status: COMPLETED | OUTPATIENT
Start: 2025-07-09 | End: 2025-07-09

## 2025-07-09 RX ORDER — POTASSIUM CHLORIDE 7.45 MG/ML
10 INJECTION INTRAVENOUS
Status: COMPLETED | OUTPATIENT
Start: 2025-07-09 | End: 2025-07-10

## 2025-07-09 RX ORDER — ACTIVATED CHARCOAL 208 MG/ML
50 SUSPENSION ORAL ONCE
Status: DISCONTINUED | OUTPATIENT
Start: 2025-07-09 | End: 2025-07-10 | Stop reason: HOSPADM

## 2025-07-09 RX ORDER — MIDAZOLAM HYDROCHLORIDE 5 MG/ML
5 INJECTION INTRAMUSCULAR; INTRAVENOUS ONCE
Status: DISCONTINUED | OUTPATIENT
Start: 2025-07-09 | End: 2025-07-09

## 2025-07-09 RX ORDER — HALOPERIDOL 5 MG/ML
5 INJECTION INTRAMUSCULAR ONCE
Status: DISCONTINUED | OUTPATIENT
Start: 2025-07-09 | End: 2025-07-09

## 2025-07-09 RX ORDER — MAGNESIUM SULFATE IN WATER 40 MG/ML
2000 INJECTION, SOLUTION INTRAVENOUS ONCE
Status: COMPLETED | OUTPATIENT
Start: 2025-07-09 | End: 2025-07-10

## 2025-07-09 RX ADMIN — SODIUM CHLORIDE 1000 ML: 9 INJECTION, SOLUTION INTRAVENOUS at 22:29

## 2025-07-09 RX ADMIN — WATER 10 ML: 1 INJECTION INTRAMUSCULAR; INTRAVENOUS; SUBCUTANEOUS at 19:59

## 2025-07-09 RX ADMIN — POTASSIUM CHLORIDE 10 MEQ: 7.46 INJECTION, SOLUTION INTRAVENOUS at 22:30

## 2025-07-09 RX ADMIN — MAGNESIUM SULFATE 2000 MG: 2 INJECTION INTRAVENOUS at 22:32

## 2025-07-09 RX ADMIN — ZIPRASIDONE MESYLATE 20 MG: 20 INJECTION, POWDER, LYOPHILIZED, FOR SOLUTION INTRAMUSCULAR at 19:55

## 2025-07-09 RX ADMIN — POTASSIUM CHLORIDE 10 MEQ: 7.46 INJECTION, SOLUTION INTRAVENOUS at 23:22

## 2025-07-09 ASSESSMENT — PAIN DESCRIPTION - LOCATION: LOCATION: BACK

## 2025-07-09 ASSESSMENT — PAIN SCALES - GENERAL: PAINLEVEL_OUTOF10: 9

## 2025-07-09 ASSESSMENT — PAIN DESCRIPTION - DESCRIPTORS: DESCRIPTORS: ACHING

## 2025-07-09 NOTE — ED PROVIDER NOTES
THIS IS MY NELDA SUPERVISORY AND SHARED VISIT NOTE    I independently examined and evaluated Azael Key .    In brief, patient is homeless, was in a verbal argument at the shelter, stood from his wheelchair (left BKA) and was told that if he fought he would get kicked out.  Instead, went and took what was left in his bottle of trazodone.  He thinks it was about 30 pills.  \"Let me die\", he said to this provider in the ED.  He is depressed that he lost his left lower leg and is blind in his left eye.  He has chronic back pain from a fall a year ago.    Focused exam revealed   Awake, depressed, suicidal, agitated, trying to stand up out of bed on his right leg, pulled out his IV.        CC/HPI Summary, DDx, ED Course, and Reassessment:   39 y.o. M who overdosed on pills, is homeless, has left BKA.  [place] slipped for SI.  Repeated attempts to verbally deescalate but he continued to be agitated and uncooperative.  Given geodon.    Poison control contacted for recommendations.  Will monitor in ED for medical clearance.  Plan to transfer to inpatient psych.        History from : Patient    Limitations to history : Intoxication    Patient was given the following medications:  Medications   charcoal (ACTIDOSE/SORBITOL) solution 50 g (50 g Oral Not Given 7/9/25 2021)   ziprasidone (GEODON) injection 20 mg (20 mg IntraMUSCular Given 7/9/25 1955)   sterile water injection (10 mLs  Given 7/9/25 1959)   sodium chloride 0.9 % bolus 1,000 mL (0 mLs IntraVENous Stopped 7/9/25 2329)   magnesium sulfate 2000 mg in 50 mL IVPB premix (0 mg IntraVENous Stopped 7/10/25 0236)   potassium chloride 10 mEq/100 mL IVPB (Peripheral Line) (0 mEq IntraVENous Stopped 7/10/25 0501)   ipratropium 0.5 mg-albuterol 2.5 mg (DUONEB) nebulizer solution 1 Dose (1 Dose Inhalation Given 7/10/25 0655)       EKG (if obtained): (All EKG's are interpreted by myself in the absence of a cardiologist)   I independently interpreted the EKG which showed   EKG

## 2025-07-09 NOTE — ED TRIAGE NOTES
Pt to the ED via EMS after taking 30 50mg tablets of trazadone. Per EMS, the bottle was empty but says it was filled on 6/10. Pt states that he does not take them regularly like he is supposed to. Pt states \"I'm going to leave, I don't like white people.\" Dr. Santos to bedside- pt states that he will not be drinking any charcoal and asks us to just \"let him die\"

## 2025-07-09 NOTE — ED NOTES
Contacted poison control at this time for pt. Per poison control obtain salicylate level, etoh level, urine drug screen and basic labs. Pt is to be on a 6 hour observation. Obtain bedside glucose. Give 1L NS for tachycardia. Observe long qtc on EKG of greater than 450. Do not administer charcoal due to pt being drowsy.   If needing to sedate pt do not give benadryl, poison control recommends benzos.   Poison control will call back in 2 hours for further direction and information.

## 2025-07-10 VITALS
WEIGHT: 286 LBS | SYSTOLIC BLOOD PRESSURE: 165 MMHG | DIASTOLIC BLOOD PRESSURE: 97 MMHG | RESPIRATION RATE: 15 BRPM | HEIGHT: 75 IN | HEART RATE: 87 BPM | OXYGEN SATURATION: 97 % | TEMPERATURE: 97.3 F | BODY MASS INDEX: 35.56 KG/M2

## 2025-07-10 PROBLEM — F41.9 ANXIETY: Status: ACTIVE | Noted: 2025-07-10

## 2025-07-10 PROBLEM — F32.A DEPRESSION: Status: ACTIVE | Noted: 2025-07-10

## 2025-07-10 LAB
ANION GAP SERPL CALCULATED.3IONS-SCNC: 11 MMOL/L (ref 9–17)
BUN SERPL-MCNC: 14 MG/DL (ref 7–20)
CALCIUM SERPL-MCNC: 8.8 MG/DL (ref 8.3–10.6)
CHLORIDE SERPL-SCNC: 106 MMOL/L (ref 99–110)
CO2 SERPL-SCNC: 20 MMOL/L (ref 21–32)
CREAT SERPL-MCNC: 1 MG/DL (ref 0.9–1.3)
EKG ATRIAL RATE: 105 BPM
EKG DIAGNOSIS: NORMAL
EKG P AXIS: 64 DEGREES
EKG P-R INTERVAL: 176 MS
EKG Q-T INTERVAL: 384 MS
EKG QRS DURATION: 86 MS
EKG QTC CALCULATION (BAZETT): 507 MS
EKG R AXIS: -9 DEGREES
EKG T AXIS: 52 DEGREES
EKG VENTRICULAR RATE: 105 BPM
GFR, ESTIMATED: 84 ML/MIN/1.73M2
GLUCOSE SERPL-MCNC: 168 MG/DL (ref 74–99)
POTASSIUM SERPL-SCNC: 4.5 MMOL/L (ref 3.5–5.1)
SODIUM SERPL-SCNC: 137 MMOL/L (ref 136–145)

## 2025-07-10 PROCEDURE — 2700000000 HC OXYGEN THERAPY PER DAY

## 2025-07-10 PROCEDURE — 94761 N-INVAS EAR/PLS OXIMETRY MLT: CPT

## 2025-07-10 PROCEDURE — 96376 TX/PRO/DX INJ SAME DRUG ADON: CPT

## 2025-07-10 PROCEDURE — 96367 TX/PROPH/DG ADDL SEQ IV INF: CPT

## 2025-07-10 PROCEDURE — 6370000000 HC RX 637 (ALT 250 FOR IP): Performed by: EMERGENCY MEDICINE

## 2025-07-10 PROCEDURE — 96366 THER/PROPH/DIAG IV INF ADDON: CPT

## 2025-07-10 PROCEDURE — 94640 AIRWAY INHALATION TREATMENT: CPT

## 2025-07-10 PROCEDURE — 93005 ELECTROCARDIOGRAM TRACING: CPT | Performed by: EMERGENCY MEDICINE

## 2025-07-10 PROCEDURE — 93010 ELECTROCARDIOGRAM REPORT: CPT | Performed by: INTERNAL MEDICINE

## 2025-07-10 PROCEDURE — 80048 BASIC METABOLIC PNL TOTAL CA: CPT

## 2025-07-10 PROCEDURE — 6360000002 HC RX W HCPCS: Performed by: PHYSICIAN ASSISTANT

## 2025-07-10 PROCEDURE — 90792 PSYCH DIAG EVAL W/MED SRVCS: CPT | Performed by: NURSE PRACTITIONER

## 2025-07-10 RX ORDER — IPRATROPIUM BROMIDE AND ALBUTEROL SULFATE 2.5; .5 MG/3ML; MG/3ML
1 SOLUTION RESPIRATORY (INHALATION) ONCE
Status: COMPLETED | OUTPATIENT
Start: 2025-07-10 | End: 2025-07-10

## 2025-07-10 RX ADMIN — POTASSIUM CHLORIDE 10 MEQ: 7.46 INJECTION, SOLUTION INTRAVENOUS at 00:30

## 2025-07-10 RX ADMIN — POTASSIUM CHLORIDE 10 MEQ: 7.46 INJECTION, SOLUTION INTRAVENOUS at 01:38

## 2025-07-10 RX ADMIN — POTASSIUM CHLORIDE 10 MEQ: 7.46 INJECTION, SOLUTION INTRAVENOUS at 02:46

## 2025-07-10 RX ADMIN — IPRATROPIUM BROMIDE AND ALBUTEROL SULFATE 1 DOSE: .5; 3 SOLUTION RESPIRATORY (INHALATION) at 06:55

## 2025-07-10 NOTE — ED PROVIDER NOTES
CARE RECEIVED FROM: ERNESTO Billy  I reviewed the [place] elements of the history, physical exam and initial treatment plan at the bedside.  ANCILLARY DATA:  I reviewed the images. Radiologist interpretation:   No orders to display     Labs Reviewed   CBC WITH AUTO DIFFERENTIAL - Abnormal; Notable for the following components:       Result Value    Hematocrit 40.1 (*)     Monocytes % 6 (*)     Immature Granulocytes % 1 (*)     All other components within normal limits   COMPREHENSIVE METABOLIC PANEL - Abnormal; Notable for the following components:    Glucose 188 (*)     All other components within normal limits   ETHANOL - Abnormal; Notable for the following components:    Ethanol Lvl 194 (*)     All other components within normal limits   SALICYLATE LEVEL - Abnormal; Notable for the following components:    Salicylate Lvl <0.5 (*)     All other components within normal limits   ACETAMINOPHEN LEVEL - Abnormal; Notable for the following components:    Acetaminophen Level <5 (*)     All other components within normal limits   URINALYSIS - Abnormal; Notable for the following components:    Glucose, Ur >=1000 (*)     Specific Gravity, UA <1.005 (*)     All other components within normal limits   URINE DRUG SCREEN - Abnormal; Notable for the following components:    Cocaine Metabolite, Urine POSITIVE (*)     All other components within normal limits   BASIC METABOLIC PANEL - Abnormal; Notable for the following components:    CO2 20 (*)     Glucose 168 (*)     All other components within normal limits     MEDICAL DECISION MAKING / PLAN:  This is a 39-year-old male that presented to the emergency department with intentional ingestion of trazodone.  Patient did this in context of wanting to harm himself.  He was noted to have some agitation here and did receive Geodon.  His laboratory studies here show potassium of 3.5 and his initial EKG showed prolonged QTc interval 507.  Patient received IV magnesium and repletion of  potassium.  His case was discussed with Poison Control Center who recommended patient have up to 6 hours of continued observation before medical clearance which would have been between midnight to 2 AM.      Patient medically cleared    At this time we will have patient evaluated by telepsych team with plans for inpatient psychiatric care.  Signed out to daytime physician.    FINAL IMPRESSION:  1. Suicide attempt (HCC)    2. Prolonged Q-T interval on ECG      ?  Electronically signed by: Royer Hines DO, 7/10/2025        Royer Hines DO  07/10/25 0544

## 2025-07-10 NOTE — ED NOTES
Poison control called for update. Recommend a repeat EKG after pt receives all 5 bags of potassium.

## 2025-07-10 NOTE — VIRTUAL HEALTH
Azael Key Jr.  [id number]  1985     EMERGENCY DEPARTMENT TELEPSYCHIATRY EVALUATION    07/10/25    Chief Complaint:  “I've never done anything like this before”  HPI: Patient is a 39 y.o.  male who presents for a suicide attempt. Per documentation, patient presented to the emergency department with intentional ingestion of trazodone. Patient did this in context of wanting to harm himself.. His laboratory studies here show potassium of 3.5 and his initial EKG showed prolonged QTc interval 507. Azael refused to drink charcoal and asks RN and MD to just \"let him die\".     Past Psychiatric History:  Previous Diagnoses/symptoms: Reports \"emotional disturbance as a child\", depression, anxiety, PTSD  Previous suicide attempts/self-harm: Denies outside of recent overdose  Inpatient psychiatric hospitalizations: Hospitalized as a child  Current outpatient psychiatric provider: Denies  Current therapist: States not in therapy  Previous psychiatric medication trials: Buspar  Current psychiatric medications: Cymbalta, Trazodone, Wellbutin  Family Psychiatric History: None on file    Substance Abuse History:  Tobacco: Less 1/2 pk/day  Alcohol: Occasionally  Marijuana: Denies  Stimulant: Cocaine - last use 3-4 days ago  Opiates: Denies  Benzodiazepine: Denies  Other illicit drug usage: Denies  History of substance/alcohol abuse treatment: Denies    Social History:  Education: H.S.  [facility] Situation[facility]  Marital/Committed relationship and parenting hx: Single; 5 children; 4 grandchildren  Occupation: [employer]  Legal History/Hx of Violence: Denies  Spiritual History: None on file  Psychological trauma, neglect, or abuse: Notes some trauma 2/2 amputation  Access to guns or other weapons: denies having access to firearms/dangerous weapons     Past Medical History:  Active Ambulatory Problems     Diagnosis Date Noted    Sprain of left ankle 05/16/2016    Closed nondisplaced fracture of fifth  address concerns. Obtained informed consent for treatment.  Medical records, labs, and diagnostic tests reviewed.   Re-consult for any new changes or concerns. Thank you for this consult.  Discussed recommendations with Dr. Mayfield via telephone call at time of consult completion.    TelePsych recommendations: Inpatient psychiatric admission    Legal hold: Initiate Involuntary Hold         Azael Key Jr., was evaluated through a synchronous (real-time) audio-video encounter. The patient (and/or guardian if applicable) is aware that this is a billable service, which includes applicable co-pays. This virtual visit was conducted with patient's (and/or legal guardian's) consent. Patient identification was verified, and a caregiver was present when appropriate.  The patient was located at Facility (Appt Department): [facility]  [address] [place] [address]  Loc: [phone]  The provider was located at Home (City/State): [place], [place]  Confirm you are appropriately licensed, registered, or certified to deliver care in the state where the patient is located as indicated above. If you are not or unsure, please re-schedule the visit: Yes, I confirm.   [place] Consult to Tele-Psych  Consult performed by: Kassidy Wilson APRN - CNP  Consult ordered by: Royer Hines DO  Reason for consult: Suicidal/Risk to Self      Total time spent on this encounter: Not billed by time    --KAYLI Vu CNP on 7/10/2025 at 8:02 AM    An electronic signature was used to authenticate this note.

## 2025-07-10 NOTE — CARE COORDINATION
Transfer Center Handoff for Behavioral Health Transfers      Patient's Current Location: [facility] EMERGENCY DEPARTMENT     Chief Complaint   Patient presents with    Drug Overdose     Pt states he took 30 50mg trazadones at 1800        Current or History of Violent Behavior: Yes    Currently in Restraints Now or During this Encounter: No  (Specify if Agitation or self harm is noted in ED?)  If yes, please describe behaviors requiring restraint:             Medical Clearance Documented and Verified in the Chart: Yes    Allergies   Allergen Reactions    Ciprofloxacin Shortness Of Breath    Glucosamine Anaphylaxis    Shellfish-Derived Products Anaphylaxis    Shellfish Allergy     Vancomycin Swelling     Mild redness, swelling, warmth to touch, and tenderness surrounding IV access site s/p receiving Vanco x2d, with pt stating \"My IV's always go bad after a couple of days when I'm getting Vanco.\"        Can Patient Tolerate Lying Flat: Yes    Able to Perform ADLs:  Yes  (Specify if able to ambulate or uses any mobility devices such as cane or walker)  Activity:    Level of Assistance:    Assistive Device:    Miscellaneous Devices:      LABS    CBC:   Lab Results   Component Value Date/Time    WBC 6.1 07/09/2025 07:29 PM    RBC 4.61 07/09/2025 07:29 PM    HGB 13.8 07/09/2025 07:29 PM    HCT 40.1 07/09/2025 07:29 PM    MCV 87.0 07/09/2025 07:29 PM    MCH 29.9 07/09/2025 07:29 PM    MCHC 34.4 07/09/2025 07:29 PM    RDW 12.1 07/09/2025 07:29 PM     07/09/2025 07:29 PM    MPV 8.8 07/09/2025 07:29 PM     CMP:   Lab Results   Component Value Date/Time     07/10/2025 04:45 AM    K 4.5 07/10/2025 04:45 AM     07/10/2025 04:45 AM    CO2 20 07/10/2025 04:45 AM    BUN 14 07/10/2025 04:45 AM    CREATININE 1.0 07/10/2025 04:45 AM    GFRAA >60 09/20/2022 05:43 AM    LABGLOM 84 07/10/2025 04:45 AM    LABGLOM >60 11/05/2023 10:27 AM    GLUCOSE 168 07/10/2025 04:45 AM    CALCIUM 8.8 07/10/2025 04:45 AM

## 2025-07-10 NOTE — ED PROVIDER NOTES
suboptimal opacification and streak artifact from dense contrast bolus in the SVC. No definite central embolus clearly identified. Segmental and subsegmental branches cannot be reliably evaluated Assessment of lung windows also degraded by motion but shows no dominant consolidation pleural effusion or pneumothorax. Central airway appears patent. Images in the upper abdomen show liver to be enlarged 24.6 cm. Assessment of bone windows unremarkable IMPRESSION: 1. Limited assessment for pulmonary embolus due to combination of streak and motion artifact as well as suboptimal opacification. Within these limitations, no definite central embolus in the main pulmonary arteries. Evaluation of the hilar level and the on is not possible. 2. Hepatomegaly  Dictated and Electronically Signed By: Cliff Patterson MD [facility] 6/11/2025 9:12        XR CHEST PORTABLE  Result Date: 6/11/2025  PROCEDURE: XR CHEST PORTABLE DATE OF EXAM:  6/11/2025 7:47 DEMOGRAPHICS: 39 years old Male INDICATION: CP/cough COMPARISON: 11/25/2024 FINDINGS:  Single AP view of the chest. Heart size is stable. No acute consolidation. No large effusion or pneumothorax. No acute osseous abnormality. IMPRESSION:  1.  No acute cardiopulmonary disease This dictation was created with voice recognition software.  While attempts have  been made to review the dictation as it is transcribed, on occasion the spoken word can be misinterpreted by the technology leading to omissions or inappropriate words, phrases or sentences.  Dictated and Electronically Signed By: Rob Lopez MD [facility] 6/11/2025 8:05          MDM:   CC/HPI Summary, DDx, ED Course, and Reassessment: ***           Problems Addressed:  No diagnosis found.      Interventions given this visit:   Medications   charcoal (ACTIDOSE/SORBITOL) solution 50 g (50 g Oral Not Given 7/9/25 2021)   ziprasidone (GEODON) injection 20 mg (20 mg IntraMUSCular Given 7/9/25

## 2025-07-10 NOTE — ED PROVIDER NOTES
eMERGENCY dEPARTMENT eNCOUnter    ATTENDING SIGN OUT NOTE    HPI/Physical Exam/Medical Decision Making  Time: 06:00 am  I have received sign out of Azael Key Jr.'s  Emergency Department care from Dr. Hines. We discussed the history, physical exam, completed/pending test results (if obtained) and current treatment plan. Please refer to his/her chart for further details.     In brief, the patient is a 39 y.o. male who presented to the ED for evaluation after he had an intentional ingestion of trazodone in an attempt to harm himself.  He admits to being suicidal.  Labs were significant for mild hypokalemia with a potassium of 3.5 and initial EKG showed prolonged QTc interval of 507.  The patient was treated with IV magnesium and potassium chloride.  Poison control recommended that the patient have up to 6 hours of continued observation before medical clearance. Repeat EKG shows a QT interval of 481 ms. He is reported to me is medically cleared.  He is awaiting [facility] for evaluation    13:15  [facility] by Dr. Daigle.  He is in stable condition awaiting transport.    Diagnostics:  EKG Interpretation  Interpreted by me  Compared to prior EKG from 7/9/2025  Rhythm: normal sinus   Rate: normal 86  Axis: normal  Ectopy: none  Conduction: normal, QTc is 481 ms  ST Segments: no acute change  T Waves: no acute change  Clinical Impression: normal sinus rhythm    Labs Reviewed   CBC WITH AUTO DIFFERENTIAL - Abnormal; Notable for the following components:       Result Value    Hematocrit 40.1 (*)     Monocytes % 6 (*)     Immature Granulocytes % 1 (*)     All other components within normal limits   COMPREHENSIVE METABOLIC PANEL - Abnormal; Notable for the following components:    Glucose 188 (*)     All other components within normal limits   ETHANOL - Abnormal; Notable for the following components:    Ethanol Lvl 194 (*)     All other components within normal

## 2025-07-11 LAB
EKG ATRIAL RATE: 86 BPM
EKG DIAGNOSIS: NORMAL
EKG P AXIS: 32 DEGREES
EKG P-R INTERVAL: 190 MS
EKG Q-T INTERVAL: 402 MS
EKG QRS DURATION: 84 MS
EKG QTC CALCULATION (BAZETT): 481 MS
EKG R AXIS: -15 DEGREES
EKG T AXIS: 40 DEGREES
EKG VENTRICULAR RATE: 86 BPM

## 2025-07-11 PROCEDURE — 93010 ELECTROCARDIOGRAM REPORT: CPT | Performed by: INTERNAL MEDICINE

## 2025-07-13 NOTE — H&P
History and Physical Exam    Patient:  Lis Colvin 44 y.o. male MRN: [id number]     Date of Service: 10/27/2023    Hospital Day: 1      Chief complaint: had concerns including Cough (Congestion ), Post-Op Check (Pt reports amputation on R foot. Odor noted during triage), and Leg Swelling (L leg swelling ). Assessment and Plan   Lis Colvin, a 44 y.o. male, with a history of IDDM, cocaine abuse, gout, hypertension, hyperlipidemia, GERD, asthma, class II obesity was admitted on 10/27/2023. They had concerns including Cough (Congestion ), Post-Op Check (Pt reports amputation on R foot. Odor noted during triage), and Leg Swelling (L leg swelling ). Assessment and Plan:  Severe Sepsis 2/2 gangrene of right foot, OM, LLE Cellulitis   LLE Cellulitis  Recent amputation of 2nd-4th digit (10/15/2023) with I+D (10/16/2023)  Recent Proteus and Strep Agalactiace wound infection   Lactic Acidosis   Presented with , RR 20, LA 2.8 with LLE swelling and Lt. Foot pain  Some purulent drainage per ER provider from Rt. Amputation site  Repeat lactate pending. Was on amoxicillin on discharge - plan was to end on 11/27/2023. Given cefepime 2gm IV X 1 and Vanc 1gm IV X 1 in the ER   Ordered 2L NaCl bolus in the ER  Continue Cefepime 2gm IV Q8H and Pharmacy to dose vanc   Consult ID   GS on board - possible OR today or tomorrow   NPO for now   Blood cultures and wound cultures     LLE Swelling 2/2 Likely Cellulitis, DVT ruled out  DVT US -ve  Red, warm and tender with swelling up to Knee. No obvious open wound noted.        Cough 2/2 Flu A   Cough since 10/24/2023  Start on PO Oseltamivir 75mg BID X 5 days     IDDM   Place on Lantus 15U HS and LDSS Q4H while NPO   Hold home lantus 30U, Humalog 10U TID and oral meds for now     H/O Cocaine abuse   Pending drug screen     HTN  -Continue home clonidine, amlodipine  Hold lisinopril     HLD  -Continue statin      GERD  -Continue PPI     Asthma  -Continue albuterol
Awake

## 2025-07-17 ENCOUNTER — HOSPITAL ENCOUNTER (INPATIENT)
Age: 40
LOS: 1 days | Discharge: PSYCHIATRIC OR SUBSTANCE USE FACILITY | End: 2025-07-19
Attending: EMERGENCY MEDICINE | Admitting: STUDENT IN AN ORGANIZED HEALTH CARE EDUCATION/TRAINING PROGRAM
Payer: COMMERCIAL

## 2025-07-17 DIAGNOSIS — R45.851 DEPRESSION WITH SUICIDAL IDEATION: Primary | ICD-10-CM

## 2025-07-17 DIAGNOSIS — N17.9 ACUTE RENAL FAILURE, UNSPECIFIED ACUTE RENAL FAILURE TYPE: ICD-10-CM

## 2025-07-17 DIAGNOSIS — I95.9 HYPOTENSION, UNSPECIFIED HYPOTENSION TYPE: ICD-10-CM

## 2025-07-17 DIAGNOSIS — F19.10 SUBSTANCE ABUSE (HCC): ICD-10-CM

## 2025-07-17 DIAGNOSIS — R79.89 TROPONIN LEVEL ELEVATED: ICD-10-CM

## 2025-07-17 DIAGNOSIS — R78.0 ELEVATED BLOOD ALCOHOL LEVEL, BLOOD ALCOHOL LEVEL NOT SPECIFIED: ICD-10-CM

## 2025-07-17 DIAGNOSIS — F32.A DEPRESSION WITH SUICIDAL IDEATION: Primary | ICD-10-CM

## 2025-07-17 PROCEDURE — 99285 EMERGENCY DEPT VISIT HI MDM: CPT

## 2025-07-17 PROCEDURE — 80307 DRUG TEST PRSMV CHEM ANLYZR: CPT

## 2025-07-17 PROCEDURE — 81003 URINALYSIS AUTO W/O SCOPE: CPT

## 2025-07-17 PROCEDURE — 93005 ELECTROCARDIOGRAM TRACING: CPT | Performed by: EMERGENCY MEDICINE

## 2025-07-17 RX ORDER — 0.9 % SODIUM CHLORIDE 0.9 %
1000 INTRAVENOUS SOLUTION INTRAVENOUS ONCE
Status: COMPLETED | OUTPATIENT
Start: 2025-07-17 | End: 2025-07-18

## 2025-07-17 ASSESSMENT — ENCOUNTER SYMPTOMS
GASTROINTESTINAL NEGATIVE: 1
RESPIRATORY NEGATIVE: 1
EYES NEGATIVE: 1
ALLERGIC/IMMUNOLOGIC NEGATIVE: 1

## 2025-07-18 ENCOUNTER — APPOINTMENT (OUTPATIENT)
Dept: GENERAL RADIOLOGY | Age: 40
End: 2025-07-18
Payer: COMMERCIAL

## 2025-07-18 PROBLEM — F14.10 COCAINE USE DISORDER (HCC): Status: ACTIVE | Noted: 2025-07-18

## 2025-07-18 PROBLEM — F15.13: Status: ACTIVE | Noted: 2025-07-18

## 2025-07-18 PROBLEM — F33.9 MAJOR DEPRESSIVE DISORDER, RECURRENT, UNSPECIFIED: Status: ACTIVE | Noted: 2025-07-18

## 2025-07-18 PROBLEM — R45.851 DEPRESSION WITH SUICIDAL IDEATION: Status: ACTIVE | Noted: 2025-07-10

## 2025-07-18 PROBLEM — F10.90 ALCOHOL USE, UNSPECIFIED, UNCOMPLICATED: Status: ACTIVE | Noted: 2025-07-18

## 2025-07-18 PROBLEM — N17.9 AKI (ACUTE KIDNEY INJURY): Status: ACTIVE | Noted: 2025-07-18

## 2025-07-18 LAB
ALBUMIN SERPL-MCNC: 3.5 G/DL (ref 3.4–5)
ALBUMIN SERPL-MCNC: 4.1 G/DL (ref 3.4–5)
ALBUMIN/GLOB SERPL: 1.5 {RATIO} (ref 1.1–2.2)
ALBUMIN/GLOB SERPL: 1.6 {RATIO} (ref 1.1–2.2)
ALP SERPL-CCNC: 83 U/L (ref 40–129)
ALP SERPL-CCNC: 94 U/L (ref 40–129)
ALT SERPL-CCNC: 24 U/L (ref 10–40)
ALT SERPL-CCNC: 32 U/L (ref 10–40)
AMPHET UR QL SCN: NEGATIVE
ANION GAP SERPL CALCULATED.3IONS-SCNC: 15 MMOL/L (ref 9–17)
ANION GAP SERPL CALCULATED.3IONS-SCNC: 19 MMOL/L (ref 9–17)
APAP SERPL-MCNC: <5 UG/ML (ref 10–30)
AST SERPL-CCNC: 27 U/L (ref 15–37)
AST SERPL-CCNC: 32 U/L (ref 15–37)
BARBITURATES UR QL SCN: NEGATIVE
BASOPHILS # BLD: 0.03 K/UL
BASOPHILS # BLD: 0.05 K/UL
BASOPHILS NFR BLD: 1 % (ref 0–1)
BASOPHILS NFR BLD: 1 % (ref 0–1)
BENZODIAZ UR QL: NEGATIVE
BILIRUB SERPL-MCNC: 0.3 MG/DL (ref 0–1)
BILIRUB SERPL-MCNC: 0.3 MG/DL (ref 0–1)
BILIRUB UR QL STRIP: NEGATIVE
BNP SERPL-MCNC: <36 PG/ML (ref 0–125)
BUN SERPL-MCNC: 17 MG/DL (ref 7–20)
BUN SERPL-MCNC: 17 MG/DL (ref 7–20)
CALCIUM SERPL-MCNC: 8.7 MG/DL (ref 8.3–10.6)
CALCIUM SERPL-MCNC: 9.3 MG/DL (ref 8.3–10.6)
CANNABINOIDS UR QL SCN: NEGATIVE
CHLORIDE SERPL-SCNC: 101 MMOL/L (ref 99–110)
CHLORIDE SERPL-SCNC: 93 MMOL/L (ref 99–110)
CHP ED QC CHECK: YES
CHP ED QC CHECK: YES
CK SERPL-CCNC: 1026 U/L (ref 26–192)
CLARITY UR: CLEAR
CO2 SERPL-SCNC: 17 MMOL/L (ref 21–32)
CO2 SERPL-SCNC: 20 MMOL/L (ref 21–32)
COCAINE UR QL SCN: POSITIVE
COLOR UR: YELLOW
COMMENT: ABNORMAL
CREAT SERPL-MCNC: 1.7 MG/DL (ref 0.9–1.3)
CREAT SERPL-MCNC: 2.4 MG/DL (ref 0.9–1.3)
EKG ATRIAL RATE: 126 BPM
EKG DIAGNOSIS: NORMAL
EKG P AXIS: 55 DEGREES
EKG P-R INTERVAL: 160 MS
EKG Q-T INTERVAL: 306 MS
EKG QRS DURATION: 94 MS
EKG QTC CALCULATION (BAZETT): 443 MS
EKG R AXIS: -67 DEGREES
EKG T AXIS: 54 DEGREES
EKG VENTRICULAR RATE: 126 BPM
EOSINOPHIL # BLD: 0.01 K/UL
EOSINOPHIL # BLD: 0.05 K/UL
EOSINOPHILS RELATIVE PERCENT: 0 % (ref 0–3)
EOSINOPHILS RELATIVE PERCENT: 1 % (ref 0–3)
ERYTHROCYTE [DISTWIDTH] IN BLOOD BY AUTOMATED COUNT: 11.6 % (ref 11.7–14.9)
ERYTHROCYTE [DISTWIDTH] IN BLOOD BY AUTOMATED COUNT: 11.7 % (ref 11.7–14.9)
ETHANOLAMINE SERPL-MCNC: 168 MG/DL (ref 0–0.08)
FENTANYL UR QL: NEGATIVE
GFR, ESTIMATED: 30 ML/MIN/1.73M2
GFR, ESTIMATED: 44 ML/MIN/1.73M2
GLUCOSE BLD-MCNC: 145 MG/DL (ref 74–99)
GLUCOSE BLD-MCNC: 151 MG/DL
GLUCOSE BLD-MCNC: 151 MG/DL
GLUCOSE BLD-MCNC: 166 MG/DL (ref 74–99)
GLUCOSE BLD-MCNC: 167 MG/DL (ref 74–99)
GLUCOSE BLD-MCNC: 180 MG/DL (ref 74–99)
GLUCOSE BLD-MCNC: 229 MG/DL (ref 74–99)
GLUCOSE SERPL-MCNC: 144 MG/DL (ref 74–99)
GLUCOSE SERPL-MCNC: 238 MG/DL (ref 74–99)
GLUCOSE UR STRIP-MCNC: >=1000 MG/DL
HCT VFR BLD AUTO: 39.7 % (ref 42–52)
HCT VFR BLD AUTO: 40.9 % (ref 42–52)
HGB BLD-MCNC: 13.4 G/DL (ref 13.5–18)
HGB BLD-MCNC: 14.3 G/DL (ref 13.5–18)
HGB UR QL STRIP.AUTO: NEGATIVE
IMM GRANULOCYTES # BLD AUTO: 0.02 K/UL
IMM GRANULOCYTES # BLD AUTO: 0.02 K/UL
IMM GRANULOCYTES NFR BLD: 0 %
IMM GRANULOCYTES NFR BLD: 0 %
INR PPP: 1.1
INR PPP: 1.1
KETONES UR STRIP-MCNC: NEGATIVE MG/DL
LACTATE BLDV-SCNC: 2.3 MMOL/L (ref 0.4–2)
LACTATE BLDV-SCNC: 2.6 MMOL/L (ref 0.4–2)
LACTATE BLDV-SCNC: 2.9 MMOL/L (ref 0.4–2)
LEUKOCYTE ESTERASE UR QL STRIP: NEGATIVE
LYMPHOCYTES NFR BLD: 2.08 K/UL
LYMPHOCYTES NFR BLD: 2.28 K/UL
LYMPHOCYTES RELATIVE PERCENT: 34 % (ref 24–44)
LYMPHOCYTES RELATIVE PERCENT: 38 % (ref 24–44)
MCH RBC QN AUTO: 30.1 PG (ref 27–31)
MCH RBC QN AUTO: 30.1 PG (ref 27–31)
MCHC RBC AUTO-ENTMCNC: 33.8 G/DL (ref 32–36)
MCHC RBC AUTO-ENTMCNC: 35 G/DL (ref 32–36)
MCV RBC AUTO: 86.1 FL (ref 78–100)
MCV RBC AUTO: 89.2 FL (ref 78–100)
MICROORGANISM SPEC CULT: NORMAL
MONOCYTES NFR BLD: 0.45 K/UL
MONOCYTES NFR BLD: 0.57 K/UL
MONOCYTES NFR BLD: 10 % (ref 0–5)
MONOCYTES NFR BLD: 7 % (ref 0–5)
NEUTROPHILS NFR BLD: 51 % (ref 36–66)
NEUTROPHILS NFR BLD: 57 % (ref 36–66)
NEUTS SEG NFR BLD: 3.03 K/UL
NEUTS SEG NFR BLD: 3.46 K/UL
NITRITE UR QL STRIP: NEGATIVE
OPIATES UR QL SCN: NEGATIVE
OXYCODONE UR QL SCN: NEGATIVE
PARTIAL THROMBOPLASTIN TIME: 26.1 SEC (ref 25.1–37.1)
PH UR STRIP: 5.5 [PH] (ref 5–8)
PLATELET # BLD AUTO: 220 K/UL (ref 140–440)
PLATELET # BLD AUTO: 314 K/UL (ref 140–440)
PMV BLD AUTO: 9.1 FL (ref 7.5–11.1)
PMV BLD AUTO: 9.4 FL (ref 7.5–11.1)
POTASSIUM SERPL-SCNC: 4.3 MMOL/L (ref 3.5–5.1)
POTASSIUM SERPL-SCNC: 4.7 MMOL/L (ref 3.5–5.1)
PROT SERPL-MCNC: 5.9 G/DL (ref 6.4–8.2)
PROT SERPL-MCNC: 6.6 G/DL (ref 6.4–8.2)
PROT UR STRIP-MCNC: NEGATIVE MG/DL
PROTHROMBIN TIME: 14.7 SEC (ref 11.7–14.5)
PROTHROMBIN TIME: 14.7 SEC (ref 11.7–14.5)
RBC # BLD AUTO: 4.45 M/UL (ref 4.6–6.2)
RBC # BLD AUTO: 4.75 M/UL (ref 4.6–6.2)
SALICYLATES SERPL-MCNC: <0.5 MG/DL (ref 15–30)
SERVICE CMNT-IMP: NORMAL
SODIUM SERPL-SCNC: 129 MMOL/L (ref 136–145)
SODIUM SERPL-SCNC: 136 MMOL/L (ref 136–145)
SP GR UR STRIP: <1.005 (ref 1–1.03)
SPECIMEN DESCRIPTION: NORMAL
TEST INFORMATION: ABNORMAL
TROPONIN I SERPL HS-MCNC: 100 NG/L (ref 0–22)
TROPONIN I SERPL HS-MCNC: 99 NG/L (ref 0–22)
TSH SERPL DL<=0.05 MIU/L-ACNC: 1.45 UIU/ML (ref 0.27–4.2)
UROBILINOGEN UR STRIP-ACNC: 0.2 EU/DL (ref 0–1)
WBC OTHER # BLD: 6 K/UL (ref 4–10.5)
WBC OTHER # BLD: 6.1 K/UL (ref 4–10.5)

## 2025-07-18 PROCEDURE — 6360000002 HC RX W HCPCS: Performed by: STUDENT IN AN ORGANIZED HEALTH CARE EDUCATION/TRAINING PROGRAM

## 2025-07-18 PROCEDURE — 82962 GLUCOSE BLOOD TEST: CPT

## 2025-07-18 PROCEDURE — 1200000000 HC SEMI PRIVATE

## 2025-07-18 PROCEDURE — 96361 HYDRATE IV INFUSION ADD-ON: CPT

## 2025-07-18 PROCEDURE — 83605 ASSAY OF LACTIC ACID: CPT

## 2025-07-18 PROCEDURE — 6370000000 HC RX 637 (ALT 250 FOR IP): Performed by: EMERGENCY MEDICINE

## 2025-07-18 PROCEDURE — 6370000000 HC RX 637 (ALT 250 FOR IP): Performed by: STUDENT IN AN ORGANIZED HEALTH CARE EDUCATION/TRAINING PROGRAM

## 2025-07-18 PROCEDURE — 94761 N-INVAS EAR/PLS OXIMETRY MLT: CPT

## 2025-07-18 PROCEDURE — 71045 X-RAY EXAM CHEST 1 VIEW: CPT

## 2025-07-18 PROCEDURE — 84484 ASSAY OF TROPONIN QUANT: CPT

## 2025-07-18 PROCEDURE — 2580000003 HC RX 258: Performed by: STUDENT IN AN ORGANIZED HEALTH CARE EDUCATION/TRAINING PROGRAM

## 2025-07-18 PROCEDURE — 94640 AIRWAY INHALATION TREATMENT: CPT

## 2025-07-18 PROCEDURE — 99223 1ST HOSP IP/OBS HIGH 75: CPT | Performed by: NURSE PRACTITIONER

## 2025-07-18 PROCEDURE — 80179 DRUG ASSAY SALICYLATE: CPT

## 2025-07-18 PROCEDURE — 93010 ELECTROCARDIOGRAM REPORT: CPT | Performed by: INTERNAL MEDICINE

## 2025-07-18 PROCEDURE — 87040 BLOOD CULTURE FOR BACTERIA: CPT

## 2025-07-18 PROCEDURE — 82550 ASSAY OF CK (CPK): CPT

## 2025-07-18 PROCEDURE — 85610 PROTHROMBIN TIME: CPT

## 2025-07-18 PROCEDURE — 85730 THROMBOPLASTIN TIME PARTIAL: CPT

## 2025-07-18 PROCEDURE — 80053 COMPREHEN METABOLIC PANEL: CPT

## 2025-07-18 PROCEDURE — 2500000003 HC RX 250 WO HCPCS: Performed by: STUDENT IN AN ORGANIZED HEALTH CARE EDUCATION/TRAINING PROGRAM

## 2025-07-18 PROCEDURE — 84443 ASSAY THYROID STIM HORMONE: CPT

## 2025-07-18 PROCEDURE — 96360 HYDRATION IV INFUSION INIT: CPT

## 2025-07-18 PROCEDURE — 2580000003 HC RX 258: Performed by: EMERGENCY MEDICINE

## 2025-07-18 PROCEDURE — 85025 COMPLETE CBC W/AUTO DIFF WBC: CPT

## 2025-07-18 PROCEDURE — 83880 ASSAY OF NATRIURETIC PEPTIDE: CPT

## 2025-07-18 PROCEDURE — 6360000002 HC RX W HCPCS: Performed by: EMERGENCY MEDICINE

## 2025-07-18 PROCEDURE — 96372 THER/PROPH/DIAG INJ SC/IM: CPT

## 2025-07-18 PROCEDURE — 80143 DRUG ASSAY ACETAMINOPHEN: CPT

## 2025-07-18 PROCEDURE — G0480 DRUG TEST DEF 1-7 CLASSES: HCPCS

## 2025-07-18 RX ORDER — ALBUTEROL SULFATE 90 UG/1
2 INHALANT RESPIRATORY (INHALATION) 4 TIMES DAILY PRN
Status: DISCONTINUED | OUTPATIENT
Start: 2025-07-18 | End: 2025-07-20 | Stop reason: HOSPADM

## 2025-07-18 RX ORDER — INSULIN GLARGINE 100 [IU]/ML
30 INJECTION, SOLUTION SUBCUTANEOUS NIGHTLY
Status: DISCONTINUED | OUTPATIENT
Start: 2025-07-18 | End: 2025-07-20 | Stop reason: HOSPADM

## 2025-07-18 RX ORDER — DIPHENHYDRAMINE HYDROCHLORIDE 50 MG/ML
50 INJECTION, SOLUTION INTRAMUSCULAR; INTRAVENOUS ONCE
Status: COMPLETED | OUTPATIENT
Start: 2025-07-18 | End: 2025-07-18

## 2025-07-18 RX ORDER — INSULIN LISPRO 100 [IU]/ML
0-4 INJECTION, SOLUTION INTRAVENOUS; SUBCUTANEOUS EVERY 4 HOURS
Status: DISCONTINUED | OUTPATIENT
Start: 2025-07-18 | End: 2025-07-18

## 2025-07-18 RX ORDER — SODIUM CHLORIDE 0.9 % (FLUSH) 0.9 %
5-40 SYRINGE (ML) INJECTION EVERY 12 HOURS SCHEDULED
Status: DISCONTINUED | OUTPATIENT
Start: 2025-07-18 | End: 2025-07-20 | Stop reason: HOSPADM

## 2025-07-18 RX ORDER — GLUCAGON 1 MG/ML
1 KIT INJECTION PRN
Status: DISCONTINUED | OUTPATIENT
Start: 2025-07-18 | End: 2025-07-20 | Stop reason: HOSPADM

## 2025-07-18 RX ORDER — ASPIRIN 81 MG/1
81 TABLET, CHEWABLE ORAL DAILY
Status: DISCONTINUED | OUTPATIENT
Start: 2025-07-18 | End: 2025-07-20 | Stop reason: HOSPADM

## 2025-07-18 RX ORDER — DEXTROSE MONOHYDRATE 100 MG/ML
INJECTION, SOLUTION INTRAVENOUS CONTINUOUS PRN
Status: DISCONTINUED | OUTPATIENT
Start: 2025-07-18 | End: 2025-07-20 | Stop reason: HOSPADM

## 2025-07-18 RX ORDER — 0.9 % SODIUM CHLORIDE 0.9 %
1000 INTRAVENOUS SOLUTION INTRAVENOUS ONCE
Status: COMPLETED | OUTPATIENT
Start: 2025-07-18 | End: 2025-07-18

## 2025-07-18 RX ORDER — DIPHENHYDRAMINE HYDROCHLORIDE 50 MG/ML
50 INJECTION, SOLUTION INTRAMUSCULAR; INTRAVENOUS ONCE
Status: DISCONTINUED | OUTPATIENT
Start: 2025-07-18 | End: 2025-07-18

## 2025-07-18 RX ORDER — LISINOPRIL AND HYDROCHLOROTHIAZIDE 12.5; 2 MG/1; MG/1
1 TABLET ORAL DAILY
Status: DISCONTINUED | OUTPATIENT
Start: 2025-07-18 | End: 2025-07-20 | Stop reason: HOSPADM

## 2025-07-18 RX ORDER — GLIPIZIDE 5 MG/1
10 TABLET ORAL NIGHTLY
Status: DISCONTINUED | OUTPATIENT
Start: 2025-07-18 | End: 2025-07-20 | Stop reason: HOSPADM

## 2025-07-18 RX ORDER — IPRATROPIUM BROMIDE AND ALBUTEROL SULFATE 2.5; .5 MG/3ML; MG/3ML
1 SOLUTION RESPIRATORY (INHALATION) EVERY 6 HOURS PRN
Status: DISCONTINUED | OUTPATIENT
Start: 2025-07-18 | End: 2025-07-20 | Stop reason: HOSPADM

## 2025-07-18 RX ORDER — ONDANSETRON 4 MG/1
4 TABLET, ORALLY DISINTEGRATING ORAL EVERY 8 HOURS PRN
Status: DISCONTINUED | OUTPATIENT
Start: 2025-07-18 | End: 2025-07-20 | Stop reason: HOSPADM

## 2025-07-18 RX ORDER — CETIRIZINE HYDROCHLORIDE 10 MG/1
10 TABLET ORAL DAILY
Status: DISCONTINUED | OUTPATIENT
Start: 2025-07-18 | End: 2025-07-20 | Stop reason: HOSPADM

## 2025-07-18 RX ORDER — AMLODIPINE BESYLATE 10 MG/1
10 TABLET ORAL DAILY
Status: DISCONTINUED | OUTPATIENT
Start: 2025-07-18 | End: 2025-07-20 | Stop reason: HOSPADM

## 2025-07-18 RX ORDER — BRIMONIDINE TARTRATE 2 MG/ML
1 SOLUTION/ DROPS OPHTHALMIC 2 TIMES DAILY
Status: DISCONTINUED | OUTPATIENT
Start: 2025-07-18 | End: 2025-07-20 | Stop reason: HOSPADM

## 2025-07-18 RX ORDER — ENOXAPARIN SODIUM 100 MG/ML
30 INJECTION SUBCUTANEOUS 2 TIMES DAILY
Status: DISCONTINUED | OUTPATIENT
Start: 2025-07-18 | End: 2025-07-20 | Stop reason: HOSPADM

## 2025-07-18 RX ORDER — POTASSIUM CHLORIDE 7.45 MG/ML
10 INJECTION INTRAVENOUS PRN
Status: DISCONTINUED | OUTPATIENT
Start: 2025-07-18 | End: 2025-07-20 | Stop reason: HOSPADM

## 2025-07-18 RX ORDER — ONDANSETRON 2 MG/ML
4 INJECTION INTRAMUSCULAR; INTRAVENOUS EVERY 6 HOURS PRN
Status: DISCONTINUED | OUTPATIENT
Start: 2025-07-18 | End: 2025-07-20 | Stop reason: HOSPADM

## 2025-07-18 RX ORDER — INSULIN LISPRO 100 [IU]/ML
10 INJECTION, SOLUTION INTRAVENOUS; SUBCUTANEOUS
Status: DISCONTINUED | OUTPATIENT
Start: 2025-07-18 | End: 2025-07-20 | Stop reason: HOSPADM

## 2025-07-18 RX ORDER — MAGNESIUM SULFATE IN WATER 40 MG/ML
2000 INJECTION, SOLUTION INTRAVENOUS PRN
Status: DISCONTINUED | OUTPATIENT
Start: 2025-07-18 | End: 2025-07-20 | Stop reason: HOSPADM

## 2025-07-18 RX ORDER — POTASSIUM CHLORIDE 1500 MG/1
40 TABLET, EXTENDED RELEASE ORAL PRN
Status: DISCONTINUED | OUTPATIENT
Start: 2025-07-18 | End: 2025-07-20 | Stop reason: HOSPADM

## 2025-07-18 RX ORDER — HALOPERIDOL 5 MG/ML
5 INJECTION INTRAMUSCULAR ONCE
Status: COMPLETED | OUTPATIENT
Start: 2025-07-18 | End: 2025-07-18

## 2025-07-18 RX ORDER — LORAZEPAM 1 MG/1
1 TABLET ORAL ONCE
Status: COMPLETED | OUTPATIENT
Start: 2025-07-18 | End: 2025-07-18

## 2025-07-18 RX ORDER — BUPROPION HYDROCHLORIDE 150 MG/1
150 TABLET ORAL EVERY MORNING
Status: DISCONTINUED | OUTPATIENT
Start: 2025-07-18 | End: 2025-07-20 | Stop reason: HOSPADM

## 2025-07-18 RX ORDER — INSULIN LISPRO 100 [IU]/ML
0-4 INJECTION, SOLUTION INTRAVENOUS; SUBCUTANEOUS
Status: DISCONTINUED | OUTPATIENT
Start: 2025-07-18 | End: 2025-07-20 | Stop reason: HOSPADM

## 2025-07-18 RX ORDER — SODIUM CHLORIDE, SODIUM LACTATE, POTASSIUM CHLORIDE, CALCIUM CHLORIDE 600; 310; 30; 20 MG/100ML; MG/100ML; MG/100ML; MG/100ML
INJECTION, SOLUTION INTRAVENOUS CONTINUOUS
Status: DISPENSED | OUTPATIENT
Start: 2025-07-18 | End: 2025-07-18

## 2025-07-18 RX ORDER — POLYETHYLENE GLYCOL 3350 17 G/17G
17 POWDER, FOR SOLUTION ORAL DAILY PRN
Status: DISCONTINUED | OUTPATIENT
Start: 2025-07-18 | End: 2025-07-20 | Stop reason: HOSPADM

## 2025-07-18 RX ORDER — EZETIMIBE 10 MG/1
10 TABLET ORAL DAILY
Status: DISCONTINUED | OUTPATIENT
Start: 2025-07-18 | End: 2025-07-20 | Stop reason: HOSPADM

## 2025-07-18 RX ORDER — ALLOPURINOL 100 MG/1
300 TABLET ORAL DAILY
Status: DISCONTINUED | OUTPATIENT
Start: 2025-07-18 | End: 2025-07-20 | Stop reason: HOSPADM

## 2025-07-18 RX ORDER — SODIUM CHLORIDE 0.9 % (FLUSH) 0.9 %
5-40 SYRINGE (ML) INJECTION PRN
Status: DISCONTINUED | OUTPATIENT
Start: 2025-07-18 | End: 2025-07-20 | Stop reason: HOSPADM

## 2025-07-18 RX ORDER — BUDESONIDE AND FORMOTEROL FUMARATE DIHYDRATE 160; 4.5 UG/1; UG/1
2 AEROSOL RESPIRATORY (INHALATION) 2 TIMES DAILY
Status: DISCONTINUED | OUTPATIENT
Start: 2025-07-18 | End: 2025-07-20 | Stop reason: HOSPADM

## 2025-07-18 RX ORDER — ACETAMINOPHEN 325 MG/1
650 TABLET ORAL EVERY 6 HOURS PRN
Status: DISCONTINUED | OUTPATIENT
Start: 2025-07-18 | End: 2025-07-20 | Stop reason: HOSPADM

## 2025-07-18 RX ORDER — SODIUM CHLORIDE 9 MG/ML
INJECTION, SOLUTION INTRAVENOUS PRN
Status: DISCONTINUED | OUTPATIENT
Start: 2025-07-18 | End: 2025-07-20 | Stop reason: HOSPADM

## 2025-07-18 RX ORDER — PANTOPRAZOLE SODIUM 40 MG/1
40 TABLET, DELAYED RELEASE ORAL
Status: DISCONTINUED | OUTPATIENT
Start: 2025-07-18 | End: 2025-07-20 | Stop reason: HOSPADM

## 2025-07-18 RX ORDER — FLUTICASONE PROPIONATE 50 MCG
2 SPRAY, SUSPENSION (ML) NASAL DAILY PRN
Status: DISCONTINUED | OUTPATIENT
Start: 2025-07-18 | End: 2025-07-20 | Stop reason: HOSPADM

## 2025-07-18 RX ORDER — DULOXETIN HYDROCHLORIDE 30 MG/1
30 CAPSULE, DELAYED RELEASE ORAL DAILY
Status: DISCONTINUED | OUTPATIENT
Start: 2025-07-18 | End: 2025-07-20 | Stop reason: HOSPADM

## 2025-07-18 RX ADMIN — LORAZEPAM 1 MG: 1 TABLET ORAL at 00:13

## 2025-07-18 RX ADMIN — INSULIN GLARGINE 30 UNITS: 100 INJECTION, SOLUTION SUBCUTANEOUS at 22:45

## 2025-07-18 RX ADMIN — SODIUM CHLORIDE, SODIUM LACTATE, POTASSIUM CHLORIDE, AND CALCIUM CHLORIDE: .6; .31; .03; .02 INJECTION, SOLUTION INTRAVENOUS at 05:23

## 2025-07-18 RX ADMIN — EZETIMIBE 10 MG: 10 TABLET ORAL at 08:56

## 2025-07-18 RX ADMIN — SODIUM CHLORIDE 1000 ML: 0.9 INJECTION, SOLUTION INTRAVENOUS at 01:38

## 2025-07-18 RX ADMIN — ASPIRIN 81 MG 81 MG: 81 TABLET ORAL at 08:57

## 2025-07-18 RX ADMIN — SODIUM CHLORIDE 1000 ML: 0.9 INJECTION, SOLUTION INTRAVENOUS at 03:32

## 2025-07-18 RX ADMIN — BUDESONIDE AND FORMOTEROL FUMARATE DIHYDRATE 2 PUFF: 160; 4.5 AEROSOL RESPIRATORY (INHALATION) at 20:24

## 2025-07-18 RX ADMIN — ALLOPURINOL 300 MG: 100 TABLET ORAL at 08:56

## 2025-07-18 RX ADMIN — BUDESONIDE AND FORMOTEROL FUMARATE DIHYDRATE 2 PUFF: 160; 4.5 AEROSOL RESPIRATORY (INHALATION) at 07:19

## 2025-07-18 RX ADMIN — SODIUM CHLORIDE, PRESERVATIVE FREE 20 ML: 5 INJECTION INTRAVENOUS at 22:44

## 2025-07-18 RX ADMIN — SODIUM CHLORIDE 1000 ML: 0.9 INJECTION, SOLUTION INTRAVENOUS at 03:31

## 2025-07-18 RX ADMIN — DIPHENHYDRAMINE HYDROCHLORIDE 50 MG: 50 INJECTION INTRAMUSCULAR; INTRAVENOUS at 01:35

## 2025-07-18 RX ADMIN — ENOXAPARIN SODIUM 30 MG: 100 INJECTION SUBCUTANEOUS at 08:56

## 2025-07-18 RX ADMIN — BUPROPION HYDROCHLORIDE 150 MG: 150 TABLET, EXTENDED RELEASE ORAL at 08:56

## 2025-07-18 RX ADMIN — SODIUM CHLORIDE 1000 ML: 0.9 INJECTION, SOLUTION INTRAVENOUS at 01:36

## 2025-07-18 RX ADMIN — CETIRIZINE HYDROCHLORIDE 10 MG: 10 TABLET, FILM COATED ORAL at 08:56

## 2025-07-18 RX ADMIN — HALOPERIDOL LACTATE 5 MG: 5 INJECTION, SOLUTION INTRAMUSCULAR at 00:12

## 2025-07-18 RX ADMIN — DULOXETINE 30 MG: 30 CAPSULE, DELAYED RELEASE ORAL at 08:56

## 2025-07-18 RX ADMIN — PANTOPRAZOLE SODIUM 40 MG: 40 TABLET, DELAYED RELEASE ORAL at 08:57

## 2025-07-18 RX ADMIN — GLIPIZIDE 10 MG: 5 TABLET ORAL at 22:44

## 2025-07-18 RX ADMIN — ENOXAPARIN SODIUM 30 MG: 100 INJECTION SUBCUTANEOUS at 22:44

## 2025-07-18 ASSESSMENT — PAIN - FUNCTIONAL ASSESSMENT
PAIN_FUNCTIONAL_ASSESSMENT: NONE - DENIES PAIN
PAIN_FUNCTIONAL_ASSESSMENT: 0-10

## 2025-07-18 ASSESSMENT — PAIN DESCRIPTION - LOCATION: LOCATION: GENERALIZED

## 2025-07-18 ASSESSMENT — PAIN SCALES - GENERAL: PAINLEVEL_OUTOF10: 5

## 2025-07-18 NOTE — CARE COORDINATION
Transfer Center Handoff for Behavioral Health Transfers      Patient's Current Location: [facility] ICU     Chief Complaint   Patient presents with    Mental Health Problem     Took crack and gummies.        Current or History of Violent Behavior: No    Currently in Restraints Now or During this Encounter: No  (Specify if Agitation or self harm is noted in ED?)  If yes, please describe behaviors requiring restraint:     Soft Restraint Bilateral Wrist       Medical Clearance Documented and Verified in the Chart: Yes    Allergies   Allergen Reactions    Ciprofloxacin Shortness Of Breath    Glucosamine Anaphylaxis    Shellfish-Derived Products Anaphylaxis    Shellfish Allergy     Vancomycin Swelling     Mild redness, swelling, warmth to touch, and tenderness surrounding IV access site s/p receiving Vanco x2d, with pt stating \"My IV's always go bad after a couple of days when I'm getting Vanco.\"        Can Patient Tolerate Lying Flat: Yes    Able to Perform ADLs:  Yes  (Specify if able to ambulate or uses any mobility devices such as cane or walker)  Activity:    Level of Assistance:    Assistive Device:    Miscellaneous Devices:      LABS    CBC:   Lab Results   Component Value Date/Time    WBC 6.0 07/18/2025 06:00 AM    RBC 4.45 07/18/2025 06:00 AM    HGB 13.4 07/18/2025 06:00 AM    HCT 39.7 07/18/2025 06:00 AM    MCV 89.2 07/18/2025 06:00 AM    MCH 30.1 07/18/2025 06:00 AM    MCHC 33.8 07/18/2025 06:00 AM    RDW 11.7 07/18/2025 06:00 AM     07/18/2025 06:00 AM    MPV 9.1 07/18/2025 06:00 AM     CMP:   Lab Results   Component Value Date/Time     07/18/2025 06:00 AM    K 4.3 07/18/2025 06:00 AM     07/18/2025 06:00 AM    CO2 20 07/18/2025 06:00 AM    BUN 17 07/18/2025 06:00 AM    CREATININE 1.7 07/18/2025 06:00 AM    GFRAA >60 09/20/2022 05:43 AM    LABGLOM 44 07/18/2025 06:00 AM    LABGLOM >60 11/05/2023 10:27 AM    GLUCOSE 144 07/18/2025 06:00 AM    CALCIUM 8.7 07/18/2025 06:00 AM    BILITOT 0.3

## 2025-07-18 NOTE — CARE COORDINATION
Psych eval pending. Should pt need inpt psych treatment please see below.....    When medically stable for Access Center Transfer please have...  1) Statement of medical stability in attending note for IP Psych  2) Pink slip (when placement/bed is located)  3) Covid Test within 24 hours if the pt has symptoms or signs of Covid during their stay    Alert CM staff when statement of medical stability and ADT20 order are placed so that CM can complete the transfer checklist to start access center transfer.     Pt states that he is currently living at [facility] and requests that CM contact them to let them know he is in hospital. Pt has no PCP listed but states that Dr Almonte orders his medications. Pt UDS+ for cocaine. Pt states that this drug use is occasional only. Pt has been to [facility] is the distant past for ETOH abuse. [facility] resources added to discharge instructions.     1110 Cm contacted [facility] and spoke with Beckie who advises that pt will be able to return to Shelter at discharge as long as he is able to care for self. Beckie requests that pt/hospital call shelter prior to pt discharge. Pt updated. Pt states that his w/c came in with him but it not in his room. Pt's w/c says Leonel on the back of it. Cm spoke with RN and requested that she verify that his chair is with his other belongings in security as he will need it when he is able to return to the homeless shelter.

## 2025-07-18 NOTE — PROGRESS NOTES
Spiritual Health History and Assessment/Progress Note  [facility]    Spiritual/Emotional Needs,  ,  ,      Name: Azael Key Jr. MRN: [id number]    Age: 39 y.o.     Sex: male   Language: English   Sikhism: None   BETH (acute kidney injury)     Date: 7/18/2025            Total Time Calculated: 6 min              Spiritual Assessment began in [facility] ICU        Referral/Consult From: Rounding   Encounter Overview/Reason: Spiritual/Emotional Needs  Service Provided For: Patient    Unique, Belief, Meaning:   Patient unable to assess at this time  Family/Friends No family/friends present      Importance and Influence:  Patient unable to assess at this time  Family/Friends No family/friends present    Community:  Patient feels well-supported. Support system includes: Parent/s and Children  Family/Friends No family/friends present    Assessment and Plan of Care:     Patient Interventions include: Facilitated expression of thoughts and feelings  Family/Friends Interventions include: No family/friends present    Patient Plan of Care: No spiritual needs identified for follow-up and Spiritual Care available upon further referral  Family/Friends Plan of Care: No family/friends present    Electronically signed by Chaplain Rose on 7/18/2025 at 12:36 PM

## 2025-07-18 NOTE — ED NOTES
The following labs were labeled with appropriate pt sticker and tubed to lab:     [] Blue     [] Lavender   [] on ice  [] Green/yellow  [x] Green/black [] on ice  [] Grey  [] on ice  [] Yellow  [] Red  [] Pink  [] Type/ Screen  [] ABG  [] VBG    [] COVID-19 swab    [] Rapid  [] PCR  [] Flu swab  [] Peds Viral Panel     [] Urine Sample  [] Fecal Sample  [] Pelvic Cultures  [] Blood Cultures  [] X 2  [] STREP Cultures  [] Wound Cultures

## 2025-07-18 NOTE — PROGRESS NOTES
V2.0    [facility] Progress Note      Name:  Azael Key Jr. /Age/Sex: 1985  (39 y.o. male)   MRN & CSN:  [id number] & [id number] Encounter Date/Time: 2025 7:28 AM EDT   Location:  -A PCP: No primary care provider on file.     Attending:Bimal Streeter MD       Hospital Day: 2    Assessment and Recommendations   Azael Key Jr. is a 39 y.o. male  who presents with BETH (acute kidney injury)      BETH  Rhabdomyolysis likely secondary to cocaine use  -Cr 2.4 on admit (baseline ~1.0)  -[id number]  -IVF  -Avoid nephrotoxin  -Monitor I/O  -Bladder scan      Hypotension  - Suspect to be from volume depletion, however given cocaine abuse, will send off blood cx to ruleout bacteremia. Monitor off abx as no source of infection identified. Continue wwth IVF     Lactic acidosis   -LA 2.9 on arrival, trended down to 2.6 with IVF  -Suspect to be from hypotension   -Repeat lactic acid 2.3     Suicidal ideations  - [place] slipped in ED, seen by tele psych and recommended inpatient psych admission. Continue with suicide precaution and involuntary hold pending psych consult.      T2DM  Diabetic foot ulcers s/p left BKA and right TMA  - BS on admit >300  - Home Meds: Lantus 40u qhs,  - Lantus 30 qhs with LCIS  - Hold PO meds   - -180     Polysubstance Abuse  - UDS + for cocaine. Monitor for withdrawals      Gout  -Continue allopurinol      HTN  -Hold amlodipine, lisinopril-HCTZ due to hypotension      HLD  -Continue statin      GERD  -Continue PPI     Asthma  -Continue albuterol nebs      Diet ADULT DIET; Regular; Safety Tray; Safety Tray (Disposables)   DVT Prophylaxis [] Lovenox, []  Heparin, [] SCDs, [] Ambulation,  [] Eliquis, [] Xarelto  [] Coumadin   Code Status Full Code   Disposition From: Home  Expected Disposition: TBD  Estimated Date of Discharge: TBD  Patient requires continued admission due to medical management   Surrogate Decision Maker/ POA       Personally reviewed Lab Studies and Imaging

## 2025-07-18 NOTE — ED NOTES
ED TO INPATIENT SBAR HANDOFF    Patient Name: Azael Key Jr.   :  1985  39 y.o.   Preferred Name  CONRADO  Family/Caregiver Present no   Restraints no   C-SSRS:    Sitter yes   Sepsis Risk Score Sepsis V2 Risk Score: 14.7    PLEASE NOTE--Encounter / Re-Admission Within 30 Days  This patient has had another encounter or admission within the last 30 days.      Readmission Risk Score: 9.8      Situation  Chief Complaint   Patient presents with    Mental Health Problem     Took crack and gummies.      Brief Description of Patient's Condition: non-ambulatory, uses wheel chair double lower leg amputee, combative, easily agitated  Mental Status: alert  Arrived from: home    Imaging:   XR CHEST PORTABLE   Final Result        Abnormal labs:   Abnormal Labs Reviewed   CBC WITH AUTO DIFFERENTIAL - Abnormal; Notable for the following components:       Result Value    Hematocrit 40.9 (*)     RDW 11.6 (*)     Monocytes % 7 (*)     All other components within normal limits   COMPREHENSIVE METABOLIC PANEL - Abnormal; Notable for the following components:    Sodium 129 (*)     Chloride 93 (*)     CO2 17 (*)     Anion Gap 19 (*)     Glucose 238 (*)     Creatinine 2.4 (*)     Est, Glom Filt Rate 30 (*)     All other components within normal limits   ETHANOL - Abnormal; Notable for the following components:    Ethanol Lvl 168 (*)     All other components within normal limits   SALICYLATE LEVEL - Abnormal; Notable for the following components:    Salicylate Lvl <0.5 (*)     All other components within normal limits   ACETAMINOPHEN LEVEL - Abnormal; Notable for the following components:    Acetaminophen Level <5 (*)     All other components within normal limits   URINALYSIS - Abnormal; Notable for the following components:    Glucose, Ur >=1000 (*)     Specific Gravity, UA <1.005 (*)     All other components within normal limits   URINE DRUG SCREEN - Abnormal; Notable for the following components:    Cocaine Metabolite, Urine

## 2025-07-18 NOTE — PROGRESS NOTES
4 Eyes Skin Assessment     NAME:  Azael Key Jr.  YOB: 1985  MEDICAL RECORD NUMBER:  [id number]    The patient is being assessed for  Admission    I agree that at least one RN has performed a thorough Head to Toe Skin Assessment on the patient. ALL assessment sites listed below have been assessed.      Areas assessed by both nurses:    Head, Face, Ears, Shoulders, Back, Chest, Arms, Elbows, Hands, Sacrum. Buttock, Coccyx, Ischium, Legs. Feet and Heels, and Under Medical Devices         Does the Patient have a Wound? Yes wound(s) were present on assessment. LDA wound assessment was Initiated and completed by RN       Christopher Prevention initiated by RN: Yes  Wound Care Orders initiated by RN: Yes    For hospital-acquired stage 1 & 2 and ALL Stage 3,4, Unstageable, DTI, NWPT, and Complex wounds: place order “IP Wound Care/Ostomy Nurse Eval and Treat” by RN under : NA    New Ostomies, if present place, Ostomy referral order under : No     Nurse 1 eSignature: Electronically signed by Cameron Paulino RN on 7/18/25 at 6:22 AM EDT    **SHARE this note so that the co-signing nurse can place an eSignature**    Nurse 2 eSignature: Electronically signed by Gina Strickland RN on 7/18/25 at 7:03 AM EDT

## 2025-07-18 NOTE — ED PROVIDER NOTES
[facility]      TRIAGE CHIEF COMPLAINT:   Mental Health Problem (Took crack and gummies. )      [place]:  Azael Key Jr. is a 39 y.o. male that presents with complaint of depression and suicidal ideation.  Patient presents by EMS for depression and suicide ideation.  When asked why he is suicidal patient states look at me.  He has a missing leg, states he is blind.  He did admit to using Gummies tonight and using cocaine and smoking a cigarette.  He denies any attempt to hurt himself today but is having thoughts he has tried to hurt himself before.  Patient initially on arrival is refusing to cooperate get into a gown, get IV blood drawn.  He is diaphoretic.  Denies any other questions or concerns just body aches and leg pain from missing leg.  Denies any other ingestions today..    REVIEW OF SYSTEMS:  At least 10 systems reviewed and otherwise acutely negative except as in the [place]ski.    Review of Systems   Constitutional:  Positive for diaphoresis and fatigue.   HENT: Negative.     Eyes: Negative.    Respiratory: Negative.     Cardiovascular: Negative.    Gastrointestinal: Negative.    Endocrine: Negative.    Genitourinary: Negative.    Musculoskeletal:  Positive for arthralgias and myalgias.   Skin: Negative.    Allergic/Immunologic: Negative.    Neurological: Negative.    Hematological: Negative.    Psychiatric/Behavioral:  Positive for dysphoric mood and suicidal ideas.    All other systems reviewed and are negative.      Past Medical History:   Diagnosis Date    Asthma     Blind left eye     Diabetes mellitus (HCC)     GERD (gastroesophageal reflux disease)     Hypertension     Retinal detachment 2012    left     Past Surgical History:   Procedure Laterality Date    CORNEAL TRANSPLANT Bilateral     EYE SURGERY      left eye removed    EYE SURGERY Right     FOOT DEBRIDEMENT Right 10/16/2023    FOOT DEBRIDEMENT INCISION AND DRAINAGE RIGHT performed by Abdon Yee MD at [facility] OR     into the right eye in the morning and 1 drop in the evening.      prednisoLONE acetate (PRED FORTE) 1 % ophthalmic suspension Place 1 drop into the right eye 2 times daily      allopurinol (ZYLOPRIM) 300 MG tablet Take 1 tablet by mouth daily      amLODIPine (NORVASC) 10 MG tablet Take 1 tablet by mouth daily         Nursing Notes Reviewed    VITAL SIGNS:  ED Triage Vitals   Encounter Vitals Group      BP       Systolic BP Percentile       Diastolic BP Percentile       Pulse       Resp       Temp       Temp src       SpO2       Weight       Height       Head Circumference       Peak Flow       Pain Score       Pain Loc       Pain Education       Exclude from Growth Chart        PHYSICAL EXAM:  Physical Exam  Vitals and nursing note reviewed.   Constitutional:       General: He is not in acute distress.     Appearance: He is well-developed and well-groomed. He is diaphoretic. He is not toxic-appearing.   HENT:      Head: Normocephalic and atraumatic.      Right Ear: External ear normal.      Left Ear: External ear normal.   Eyes:      Comments: Blind left eye   Neck:      Vascular: No JVD.      Trachea: Phonation normal.   Cardiovascular:      Rate and Rhythm: Regular rhythm. Tachycardia present.      Pulses: Normal pulses.      Heart sounds: Normal heart sounds.   Pulmonary:      Effort: Pulmonary effort is normal. No respiratory distress.      Breath sounds: Normal breath sounds. No stridor. No wheezing, rhonchi or rales.   Abdominal:      General: Bowel sounds are normal. There is no distension.      Palpations: Abdomen is soft. There is no mass.      Tenderness: There is no abdominal tenderness. There is no guarding or rebound. Negative signs include Hall's sign, Rovsing's sign and McBurney's sign.      Hernia: No hernia is present.   Musculoskeletal:         General: Tenderness present. No swelling, deformity or signs of injury.      Cervical back: Full passive range of motion without pain and normal range of

## 2025-07-18 NOTE — PROGRESS NOTES
Pt seen briefly in ICU. Sitter with pt. Wound nurse consulted for \"right amp may have DVT.\" Spoke with nurse and stated pt has no wounds. Left BKA and right transmetatarsal amputation sites healed. Updated Dr. Streeter that wound care consult not appropriate and order that stated \"may have DVT\". Re consult wound care if further needs arise.

## 2025-07-18 NOTE — H&P
History and Physical      Name:  Azael Key Jr. /Age/Sex: 1985  (39 y.o. male)   MRN & CSN:  [id number] & [id number] Encounter Date/Time: 2025 2:21 AM EDT   Location:  ED17/ED-17 PCP: No primary care provider on file.       Hospital Day: 2    Assessment and Plan:     Patient is a 39 y.o. male who presented with mental health concerns       BETH  -Cr 2.4 on admit (baseline ~1.0)  -Likely pre-renal 2/2 decreased PO intake, hypotension, and ACE-HCTZ use     -IVF  -Avoid nephrotoxin  -Monitor I/O  -Bladder scan     Hypotension  - Suspect to be from volume depletion, however given cocaine abuse, will send off blood cx to ruleout bacteremia. Monitor off abx as no source of infection identified. Continue wwth IVF    Lactic acidosis   -LA 2.9 on arrival, trended down to 2.6 with IVF  -Suspect to be from hypotension     -Repeat lactic acid pending     Suicidal ideations  - [place] slipped in ED, seen by tele psych and recommended inpatient psych admission. Continue with suicide precaution and involuntary hold pending psych consult.     T2DM  Diabetic foot ulcers s/p left BKA and right TMA  - BS on admit >300  - Home Meds: Lantus 40u qhs,    - Lantus 30 qhs with LCIS  - Hold PO meds   - -180    Polysubstance Abuse  - UDS + for cocaine. Monitor for withdrawals     Gout  -Continue allopurinol     HTN  -Hold amlodipine, lisinopril-HCTZ due to hypotension     HLD  -Continue statin     GERD  -Continue PPI    Asthma  -Continue albuterol nebs      Checklist:  Advanced directive: full  Diet: NPO  DVT ppx: Lovenox  Sugar: BG goal of 140-180 while inpatient    Disposition: admit to inpatient.  Estimated discharge: 5 day(s).  Current living situation: home.  Expected disposition: home.    Spoke with ED provider who recommended admission for the patient and I agree with that plan.  Personally reviewed lab studies and imaging.  EKG interpreted personally and results as stated above.  Imaging that was interpreted

## 2025-07-18 NOTE — CONSULTS
Initial Psychiatric History and Physical    Azael Key Jr.  [id number]  7/17/2025 07/18/25    ID: Patient is a 39 yrs y.o. male    CC:MH assessment    HPI: Patient is a 39 year old male with pmhx of  who presents to [facility] ED via EMS from [facility] for worsening depression, SI and w/d for several substances. Patient was seen by Telepsychiatry in the Ed and recommended  inpatient psychiatric admission followed by drug rehabilitation.. Patient was recently dc from [place], due to OD on trazodone, and has not been compliant with medications, not linked to MH and has been using cocaine and marijuana gummies . Patient was medically admitted due to BETH. Patient was agitated in the Ed requiring calming medications.  Psychiatry consulted by Dr Streeter due to \"seth.\"    Met with patient at bedside. Sitter present but left for privacy. He is alert and oriented x 4. He makes poor eye contact ( he is blind) and is restless moving all over his bed and appears agitated. Patient currently noting \"I am fine. I was intoxicated\" He does note \"I'm suffering from a little depression.\" Patient stressors include losing toes on right foot and Left BKA. Patient has been staying in a shelter. He visits with his mom, Keshia ([phone]) and was with her yesterday. Patient unable to stay with her due to subsidized housing. When asked if he is suicidal, doesn't answer yes or no, just comments, \"I am fine.\" He notes SA, \"Oh it wasn't a real suicide attempt.I didn't take that many trazodone and I am rx 300 mg\"  He denies auditory and visual hallucinations. He was recently dc from [place], psychiatric unit several days ago. He notes he has a follow up appointment on Monday but does not know the name of the provider. He says he has been taking his medications and appreciates the changes that were made at [place]. They had him take his Wellbutrin in the am and not at night and he was sleeping better. He has been using cocaine and declines outpt  Father     Asthma Father     High Blood Pressure Father         Allergies:  Allergies   Allergen Reactions    Ciprofloxacin Shortness Of Breath    Glucosamine Anaphylaxis    Shellfish-Derived Products Anaphylaxis    Shellfish Allergy     Vancomycin Swelling     Mild redness, swelling, warmth to touch, and tenderness surrounding IV access site s/p receiving Vanco x2d, with pt stating \"My IV's always go bad after a couple of days when I'm getting Vanco.\"        OBJECTIVE  Vital Signs:  Vitals:    07/18/25 0422   BP: 108/64   Pulse: 86   Resp: 16   Temp:    SpO2: 94%       Labs:  Recent Results (from the past 48 hours)   Urinalysis    Collection Time: 07/17/25 11:50 PM   Result Value Ref Range    Color, UA Yellow Yellow    Turbidity UA Clear Clear    Glucose, Ur >=1000 (A) NEGATIVE mg/dL    Bilirubin, Urine NEGATIVE NEGATIVE    Ketones, Urine NEGATIVE NEGATIVE mg/dL    Specific Gravity, UA <1.005 (L) 1.005 - 1.030    Urine Hgb NEGATIVE NEGATIVE    pH, Urine 5.5 5.0 - 8.0    Protein, UA NEGATIVE NEGATIVE mg/dL    Urobilinogen, Urine 0.2 0.0 - 1.0 EU/dL    Nitrite, Urine NEGATIVE NEGATIVE    Leukocyte Esterase, Urine NEGATIVE NEGATIVE    Comment       Microscopic exam not performed based on chemical results unless requested in original order.   Urine Drug Screen    Collection Time: 07/17/25 11:50 PM   Result Value Ref Range    Amphetamine Screen, Ur NEGATIVE NEGATIVE    Barbiturate Screen, Ur NEGATIVE NEGATIVE    Benzodiazepine Screen, Urine NEGATIVE NEGATIVE    Cocaine Metabolite, Urine POSITIVE (A) NEGATIVE    Opiates, Urine NEGATIVE NEGATIVE    Cannabinoid Scrn, Ur NEGATIVE NEGATIVE    Oxycodone Screen, Ur NEGATIVE NEGATIVE    Fentanyl, Ur NEGATIVE NEGATIVE    Test Information       This method is a screening test to detect only these drug classes as part of a medical workup. Confirmatory testing by another method should be ordered if clinically indicated.   EKG 12 Lead    Collection Time: 07/17/25 11:56 PM   Result

## 2025-07-18 NOTE — VIRTUAL HEALTH
Azael Key Jr.  [id number]  1985     EMERGENCY DEPARTMENT TELEPSYCHIATRY EVALUATION    07/18/25    Chief Complaint:  “Yes, I took crack today” (laughs and appropriately)    HPI: Patient is a 39 y.o.  male who has a past psychiatric history of patient, anxiety, PTSD and polysubstance use presented to the ED on 07/18/25 for worsening depression, suicidal ideation and management of withdrawal symptoms from polysubstance use      Per ED notes, patient came in with complaints of having mental health problem, took crack and gummies.     During assessment today, patient was cooperative, has periods of laughing inappropriately.  Azael reported using crack cocaine prior to coming to the emergency room.  He denies being compliant with medication.  He reports he has been able to control substance use and has been using daily prior to coming in.  He denies any suicidal plan or intent but reports suicidal ideation secondary to inability to manage anxiety, feelings of sadness and guilt as well as inability to stop self from using illicit substances.    During today's visit, patient requests inpatient psychiatric admission for management of mood symptoms and after stabilization, patient is requesting inpatient TASH treatment placement.  Patient was unable to tell me longest period of sobriety.  Patient denies having any support system.    Past Psychiatric History:  Previous Diagnoses/symptoms: Reports \"emotional disturbance as a child\", depression, anxiety, PTSD  Previous suicide attempts/self-harm: Denies outside of recent overdose  Inpatient psychiatric hospitalizations: Hospitalized as a child  Current outpatient psychiatric provider: Denies  Current therapist: States not in therapy  Previous psychiatric medication trials: Buspar, Cymbalta, Trazodone, Wellbutin  Current psychiatric medications: Reports noncompliance of medication, unable to tell me last time he took his medications  Family Psychiatric  elopement precautions   Please refer the patient to a psychiatrist and therapist for continued care and support after discharge  Medical co-morbidities: Management per medical providers, appreciate assistance.  Medication Recommendations: No medication recommendation at the time of assessment.  Defer to inpatient psychiatric clinical team for medication recommendations, adjustments and monitoring  Reviewed treatment plan with patient including risks, benefits, alternatives, and side effects of medications, and any/all black box warnings. Patient verbalized understanding.  Patient had an opportunity to ask questions and address concerns. Obtained informed consent for treatment.  Medical records, labs, and diagnostic tests reviewed.   Re-consult for any new changes or concerns. Thank you for this consult.    Relayed recommendations to Dr. Leonardo via [employer] message at time of consult completion.    TelePsych recommendations: Inpatient psychiatric admission  Discharge  Medical Floor    Legal hold: No involuntary hold  Initiate involuntary hold  Maintain involuntary hold  Rescinded and voluntary hold  Rescinded and voluntary hold    Telepsychiatry will sign off. Thank you for allowing us to participate in the care of this patient. Please send message or call via [employer] if anything more is required.     Electronically signed by KAYLI Valadez CNP on 7/18/2025 at 1:37 AM.    END OF NOTE  -------------------------                    Azael Key Jr., was evaluated through a synchronous (real-time) audio-video encounter. The patient (and/or guardian if applicable) is aware that this is a billable service, which includes applicable co-pays. This virtual visit was conducted with patient's (and/or legal guardian's) consent. Patient identification was verified, and a caregiver was present when appropriate.  The patient was located at Facility (Appt Department): [facility]

## 2025-07-18 NOTE — DISCHARGE INSTR - COC
Continuity of Care Form    Patient Name: Azael Key Jr.   :  1985  MRN:  [id number]    Admit date:  2025  Discharge date:  ***    Code Status Order: Prior   Advance Directives:     Admitting Physician:  No admitting provider for patient encounter.  PCP: No primary care provider on file.    Discharging Nurse: ***  Discharging Hospital Unit/Room#: ED17/ED-17  Discharging Unit Phone Number: ***    Emergency Contact:   Extended Emergency Contact Information  Primary Emergency Contact: Tiana Tadeo  Address: [address] [place] [address]  Home Phone: [phone]  Mobile Phone: [phone]  Relation: Parent  Secondary Emergency Contact: Rufina Tadeo  Home Phone: [phone]  Relation: Brother/Sister    Past Surgical History:  Past Surgical History:   Procedure Laterality Date    CORNEAL TRANSPLANT Bilateral     EYE SURGERY      left eye removed    EYE SURGERY Right     FOOT DEBRIDEMENT Right 10/16/2023    FOOT DEBRIDEMENT INCISION AND DRAINAGE RIGHT performed by Abdon Yee MD at [facility] OR    FRACTURE SURGERY      foot left    LAPAROSCOPIC APPENDECTOMY N/A 2022    APPENDECTOMY LAPAROSCOPIC performed by Fortino Obrien MD at [facility] OR    MANDIBLE SURGERY Right     MANDIBLE SURGERY Bilateral     TOE AMPUTATION Right 2017    TOE AMPUTATION Right 2022    TOE AMPUTATION, RAY AMPUTATION OF RIGHT SECOND TOE performed by Abdon Yee MD at [facility] OR    TOE AMPUTATION Right 2023    THIRD TOE AMPUTATION AND REVISION OF SECOND TOE AMPUTATION performed by Roxanne Galo MD at [facility] OR       Immunization History:   Immunization History   Administered Date(s) Administered    Influenza Virus Vaccine 10/19/2016    TDaP, ADACEL (age 10y-64y), BOOSTRIX (age 10y+), IM, 0.5mL 2019       Active Problems:  Patient Active Problem List   Diagnosis Code    Sprain of left ankle S93.402A    Closed nondisplaced fracture of fifth metatarsal bone of left  23 Moe Zaman, RN      23: toe - Proteus mirabilis  24: foot - Morganella morganii & Proteus mirabilis    MRSA 12/30/22 01/02/23 10/07/24 Culture, Wound      10/7/24: foot               Resolved       Infection Onset Added Last Indicated Last Indicated By Resolved Resolved By    COVID-19 24 Resp Viral Panel 12/10/24 history Infection     Rhinovirus 02/10/23 02/10/23 02/10/23 Respiratory Panel, Molecular, with COVID-19 (Restricted: peds pts or suitable admitted adults) 23 Infection     MRSA  17 Culture, Wound 21 Nelli Rome LPN    3/14/2020 wound; 19 wound; 18 wound; 17 wound                         Nurse Assessment:  Last Vital Signs: BP (!) 71/53   Pulse (!) 114   Temp 98.8 °F (37.1 °C) (Oral)   Resp 17   Ht 1.905 m (6' 3\")   Wt 129.7 kg (286 lb)   SpO2 96%   BMI 35.75 kg/m²     Last documented pain score (0-10 scale): Pain Level: 5  Last Weight:   Wt Readings from Last 1 Encounters:   25 129.7 kg (286 lb)     Mental Status:  {IP PT MENTAL STATUS:49969}    IV Access:  { MINNA IV ACCESS:077223142}    Nursing Mobility/ADLs:  Walking   {CHP DME ADLs:332573886}  Transfer  {CHP DME ADLs:754087972}  Bathing  {CHP DME ADLs:368855220}  Dressing  {CHP DME ADLs:784869050}  Toileting  {CHP DME ADLs:520949445}  Feeding  {CHP DME ADLs:398710085}  Med Admin  {CHP DME ADLs:161550549}  Med Delivery   { MINNA MED Delivery:455961177}    Wound Care Documentation and Therapy:  Wound 22 Toe (Comment  which one) Anterior;Right (Active)   Number of days: 970        Elimination:  Continence:   Bowel: {YES / NO:}  Bladder: {YES / NO:}  Urinary Catheter: {Urinary Catheter:527834421}   Colostomy/Ileostomy/Ileal Conduit: {YES / NO:}       Date of Last BM: ***    Intake/Output Summary (Last 24 hours) at 2025  Last data filed at 2025 0048  Gross per 24 hour   Intake --   Output 600 ml

## 2025-07-18 NOTE — PLAN OF CARE
Problem: Skin/Tissue Integrity  Goal: Skin integrity remains intact  Description: 1.  Monitor for areas of redness and/or skin breakdown  2.  Assess vascular access sites hourly  3.  Every 4-6 hours minimum:  Change oxygen saturation probe site  4.  Every 4-6 hours:  If on nasal continuous positive airway pressure, respiratory therapy assess nares and determine need for appliance change or resting period  Outcome: Progressing     Problem: Chronic Conditions and Co-morbidities  Goal: Patient's chronic conditions and co-morbidity symptoms are monitored and maintained or improved  Outcome: Progressing     Problem: Discharge Planning  Goal: Discharge to home or other facility with appropriate resources  Outcome: Progressing

## 2025-07-18 NOTE — ED NOTES
The following labs were labeled with appropriate pt sticker and tubed to lab:     [] Blue     [] Lavender   [] on ice  [] Green/yellow  [] Green/black [x] on ice  [x] Grey  [] on ice  [] Yellow  [] Red  [] Pink  [] Type/ Screen  [] ABG  [] VBG    [] COVID-19 swab    [] Rapid  [] PCR  [] Flu swab  [] Peds Viral Panel     [] Urine Sample  [] Fecal Sample  [] Pelvic Cultures  [] Blood Cultures  [] X 2  [] STREP Cultures  [] Wound Cultures

## 2025-07-19 VITALS
TEMPERATURE: 99.4 F | HEART RATE: 75 BPM | RESPIRATION RATE: 18 BRPM | DIASTOLIC BLOOD PRESSURE: 93 MMHG | BODY MASS INDEX: 35.56 KG/M2 | WEIGHT: 286 LBS | SYSTOLIC BLOOD PRESSURE: 143 MMHG | HEIGHT: 75 IN | OXYGEN SATURATION: 99 %

## 2025-07-19 LAB
ACB COMPLEX DNA BLD POS QL NAA+NON-PROBE: NOT DETECTED
ALBUMIN SERPL-MCNC: 3.8 G/DL (ref 3.4–5)
ALBUMIN/GLOB SERPL: 1.5 {RATIO} (ref 1.1–2.2)
ALP SERPL-CCNC: 88 U/L (ref 40–129)
ALT SERPL-CCNC: 25 U/L (ref 10–40)
ANION GAP SERPL CALCULATED.3IONS-SCNC: 9 MMOL/L (ref 9–17)
AST SERPL-CCNC: 45 U/L (ref 15–37)
B FRAGILIS DNA BLD POS QL NAA+NON-PROBE: NOT DETECTED
BASOPHILS # BLD: 0.04 K/UL
BASOPHILS NFR BLD: 1 % (ref 0–1)
BILIRUB SERPL-MCNC: 0.6 MG/DL (ref 0–1)
BUN SERPL-MCNC: 13 MG/DL (ref 7–20)
C ALBICANS DNA BLD POS QL NAA+NON-PROBE: NOT DETECTED
C AURIS DNA BLD POS QL NAA+NON-PROBE: NOT DETECTED
C GATTII+NEOFOR DNA BLD POS QL NAA+N-PRB: NOT DETECTED
C GLABRATA DNA BLD POS QL NAA+NON-PROBE: NOT DETECTED
C KRUSEI DNA BLD POS QL NAA+NON-PROBE: NOT DETECTED
C PARAP DNA BLD POS QL NAA+NON-PROBE: NOT DETECTED
C TROPICLS DNA BLD POS QL NAA+NON-PROBE: NOT DETECTED
CALCIUM SERPL-MCNC: 9 MG/DL (ref 8.3–10.6)
CHLORIDE SERPL-SCNC: 102 MMOL/L (ref 99–110)
CK SERPL-CCNC: 2137 U/L (ref 26–192)
CK SERPL-CCNC: 2386 U/L (ref 26–192)
CO2 SERPL-SCNC: 26 MMOL/L (ref 21–32)
CREAT SERPL-MCNC: 0.8 MG/DL (ref 0.9–1.3)
E CLOAC COMP DNA BLD POS NAA+NON-PROBE: NOT DETECTED
E COLI DNA BLD POS QL NAA+NON-PROBE: NOT DETECTED
E FAECALIS DNA BLD POS QL NAA+NON-PROBE: NOT DETECTED
E FAECIUM DNA BLD POS QL NAA+NON-PROBE: NOT DETECTED
ENTEROBACTERALES DNA BLD POS NAA+N-PRB: NOT DETECTED
EOSINOPHIL # BLD: 0.13 K/UL
EOSINOPHILS RELATIVE PERCENT: 4 % (ref 0–3)
ERYTHROCYTE [DISTWIDTH] IN BLOOD BY AUTOMATED COUNT: 11.7 % (ref 11.7–14.9)
GFR, ESTIMATED: >90 ML/MIN/1.73M2
GLUCOSE BLD-MCNC: 143 MG/DL (ref 74–99)
GLUCOSE BLD-MCNC: 146 MG/DL (ref 74–99)
GLUCOSE BLD-MCNC: 149 MG/DL (ref 74–99)
GLUCOSE BLD-MCNC: 181 MG/DL (ref 74–99)
GLUCOSE SERPL-MCNC: 104 MG/DL (ref 74–99)
GP B STREP DNA BLD POS QL NAA+NON-PROBE: NOT DETECTED
HAEM INFLU DNA BLD POS QL NAA+NON-PROBE: NOT DETECTED
HCT VFR BLD AUTO: 38.4 % (ref 42–52)
HGB BLD-MCNC: 13.2 G/DL (ref 13.5–18)
IMM GRANULOCYTES # BLD AUTO: 0.01 K/UL
IMM GRANULOCYTES NFR BLD: 0 %
K OXYTOCA DNA BLD POS QL NAA+NON-PROBE: NOT DETECTED
KLEBSIELLA SP DNA BLD POS QL NAA+NON-PRB: NOT DETECTED
KLEBSIELLA SP DNA BLD POS QL NAA+NON-PRB: NOT DETECTED
L MONOCYTOG DNA BLD POS QL NAA+NON-PROBE: NOT DETECTED
LACTATE BLDV-SCNC: 1.4 MMOL/L (ref 0.4–2)
LYMPHOCYTES NFR BLD: 1.5 K/UL
LYMPHOCYTES RELATIVE PERCENT: 44 % (ref 24–44)
MCH RBC QN AUTO: 30 PG (ref 27–31)
MCHC RBC AUTO-ENTMCNC: 34.4 G/DL (ref 32–36)
MCV RBC AUTO: 87.3 FL (ref 78–100)
MECA+MECC ISLT/SPM QL: NOT DETECTED
MICROORGANISM SPEC CULT: ABNORMAL
MICROORGANISM SPEC CULT: ABNORMAL
MICROORGANISM/AGENT SPEC: ABNORMAL
MONOCYTES NFR BLD: 0.32 K/UL
MONOCYTES NFR BLD: 9 % (ref 0–5)
N MEN DNA BLD POS QL NAA+NON-PROBE: NOT DETECTED
NEUTROPHILS NFR BLD: 42 % (ref 36–66)
NEUTS SEG NFR BLD: 1.45 K/UL
P AERUGINOSA DNA BLD POS NAA+NON-PROBE: NOT DETECTED
PLATELET # BLD AUTO: 207 K/UL (ref 140–440)
PMV BLD AUTO: 9.3 FL (ref 7.5–11.1)
POTASSIUM SERPL-SCNC: 4 MMOL/L (ref 3.5–5.1)
PROT SERPL-MCNC: 6.2 G/DL (ref 6.4–8.2)
PROTEUS SP DNA BLD POS QL NAA+NON-PROBE: NOT DETECTED
RBC # BLD AUTO: 4.4 M/UL (ref 4.6–6.2)
S AUREUS DNA BLD POS QL NAA+NON-PROBE: NOT DETECTED
S AUREUS+CONS DNA BLD POS NAA+NON-PROBE: DETECTED
S EPIDERMIDIS DNA BLD POS QL NAA+NON-PRB: DETECTED
S LUGDUNENSIS DNA BLD POS QL NAA+NON-PRB: NOT DETECTED
S MALTOPHILIA DNA BLD POS QL NAA+NON-PRB: NOT DETECTED
S MARCESCENS DNA BLD POS NAA+NON-PROBE: NOT DETECTED
S PNEUM DNA BLD POS QL NAA+NON-PROBE: NOT DETECTED
S PYO DNA BLD POS QL NAA+NON-PROBE: ABNORMAL
SALMONELLA DNA BLD POS QL NAA+NON-PROBE: NOT DETECTED
SERVICE CMNT-IMP: ABNORMAL
SODIUM SERPL-SCNC: 138 MMOL/L (ref 136–145)
SPECIMEN DESCRIPTION: ABNORMAL
STREPTOCOCCUS DNA BLD POS NAA+NON-PROBE: NOT DETECTED
WBC OTHER # BLD: 3.5 K/UL (ref 4–10.5)

## 2025-07-19 PROCEDURE — 6370000000 HC RX 637 (ALT 250 FOR IP): Performed by: STUDENT IN AN ORGANIZED HEALTH CARE EDUCATION/TRAINING PROGRAM

## 2025-07-19 PROCEDURE — 82550 ASSAY OF CK (CPK): CPT

## 2025-07-19 PROCEDURE — 85025 COMPLETE CBC W/AUTO DIFF WBC: CPT

## 2025-07-19 PROCEDURE — 2580000003 HC RX 258: Performed by: STUDENT IN AN ORGANIZED HEALTH CARE EDUCATION/TRAINING PROGRAM

## 2025-07-19 PROCEDURE — 83605 ASSAY OF LACTIC ACID: CPT

## 2025-07-19 PROCEDURE — 94640 AIRWAY INHALATION TREATMENT: CPT

## 2025-07-19 PROCEDURE — 6360000002 HC RX W HCPCS: Performed by: STUDENT IN AN ORGANIZED HEALTH CARE EDUCATION/TRAINING PROGRAM

## 2025-07-19 PROCEDURE — 94664 DEMO&/EVAL PT USE INHALER: CPT

## 2025-07-19 PROCEDURE — 6370000000 HC RX 637 (ALT 250 FOR IP)

## 2025-07-19 PROCEDURE — 36415 COLL VENOUS BLD VENIPUNCTURE: CPT

## 2025-07-19 PROCEDURE — 2500000003 HC RX 250 WO HCPCS: Performed by: STUDENT IN AN ORGANIZED HEALTH CARE EDUCATION/TRAINING PROGRAM

## 2025-07-19 PROCEDURE — 94761 N-INVAS EAR/PLS OXIMETRY MLT: CPT

## 2025-07-19 PROCEDURE — 82962 GLUCOSE BLOOD TEST: CPT

## 2025-07-19 PROCEDURE — 80053 COMPREHEN METABOLIC PANEL: CPT

## 2025-07-19 RX ORDER — METHOCARBAMOL 500 MG/1
750 TABLET, FILM COATED ORAL 3 TIMES DAILY
Status: DISCONTINUED | OUTPATIENT
Start: 2025-07-19 | End: 2025-07-20 | Stop reason: HOSPADM

## 2025-07-19 RX ORDER — PREGABALIN 100 MG/1
100 CAPSULE ORAL NIGHTLY
Status: DISCONTINUED | OUTPATIENT
Start: 2025-07-19 | End: 2025-07-20 | Stop reason: HOSPADM

## 2025-07-19 RX ORDER — SODIUM CHLORIDE 9 MG/ML
INJECTION, SOLUTION INTRAVENOUS CONTINUOUS
Status: DISCONTINUED | OUTPATIENT
Start: 2025-07-19 | End: 2025-07-20 | Stop reason: HOSPADM

## 2025-07-19 RX ORDER — OXYCODONE AND ACETAMINOPHEN 5; 325 MG/1; MG/1
1 TABLET ORAL NIGHTLY
Refills: 0 | Status: DISCONTINUED | OUTPATIENT
Start: 2025-07-19 | End: 2025-07-20 | Stop reason: HOSPADM

## 2025-07-19 RX ADMIN — ASPIRIN 81 MG 81 MG: 81 TABLET ORAL at 09:44

## 2025-07-19 RX ADMIN — BRIMONIDINE TARTRATE 1 DROP: 2 SOLUTION OPHTHALMIC at 10:35

## 2025-07-19 RX ADMIN — OXYCODONE AND ACETAMINOPHEN 1 TABLET: 5; 325 TABLET ORAL at 20:50

## 2025-07-19 RX ADMIN — INSULIN GLARGINE 30 UNITS: 100 INJECTION, SOLUTION SUBCUTANEOUS at 20:51

## 2025-07-19 RX ADMIN — PREGABALIN 100 MG: 100 CAPSULE ORAL at 20:50

## 2025-07-19 RX ADMIN — BUPROPION HYDROCHLORIDE 150 MG: 150 TABLET, EXTENDED RELEASE ORAL at 09:43

## 2025-07-19 RX ADMIN — BUDESONIDE AND FORMOTEROL FUMARATE DIHYDRATE 2 PUFF: 160; 4.5 AEROSOL RESPIRATORY (INHALATION) at 07:48

## 2025-07-19 RX ADMIN — SODIUM CHLORIDE, PRESERVATIVE FREE 10 ML: 5 INJECTION INTRAVENOUS at 09:44

## 2025-07-19 RX ADMIN — ALLOPURINOL 300 MG: 100 TABLET ORAL at 09:43

## 2025-07-19 RX ADMIN — INSULIN LISPRO 10 UNITS: 100 INJECTION, SOLUTION INTRAVENOUS; SUBCUTANEOUS at 18:29

## 2025-07-19 RX ADMIN — INSULIN LISPRO 1 UNITS: 100 INJECTION, SOLUTION INTRAVENOUS; SUBCUTANEOUS at 18:30

## 2025-07-19 RX ADMIN — SODIUM CHLORIDE, PRESERVATIVE FREE 10 ML: 5 INJECTION INTRAVENOUS at 20:56

## 2025-07-19 RX ADMIN — Medication 5 MG: at 19:28

## 2025-07-19 RX ADMIN — ENOXAPARIN SODIUM 30 MG: 100 INJECTION SUBCUTANEOUS at 20:51

## 2025-07-19 RX ADMIN — SODIUM CHLORIDE: 0.9 INJECTION, SOLUTION INTRAVENOUS at 14:34

## 2025-07-19 RX ADMIN — ENOXAPARIN SODIUM 30 MG: 100 INJECTION SUBCUTANEOUS at 09:44

## 2025-07-19 RX ADMIN — GLIPIZIDE 10 MG: 5 TABLET ORAL at 20:50

## 2025-07-19 RX ADMIN — INSULIN LISPRO 10 UNITS: 100 INJECTION, SOLUTION INTRAVENOUS; SUBCUTANEOUS at 14:29

## 2025-07-19 RX ADMIN — METHOCARBAMOL 750 MG: 500 TABLET ORAL at 09:43

## 2025-07-19 RX ADMIN — CETIRIZINE HYDROCHLORIDE 10 MG: 10 TABLET, FILM COATED ORAL at 09:43

## 2025-07-19 RX ADMIN — EZETIMIBE 10 MG: 10 TABLET ORAL at 09:43

## 2025-07-19 RX ADMIN — METHOCARBAMOL 750 MG: 500 TABLET ORAL at 20:50

## 2025-07-19 RX ADMIN — METHOCARBAMOL 750 MG: 500 TABLET ORAL at 14:29

## 2025-07-19 RX ADMIN — PANTOPRAZOLE SODIUM 40 MG: 40 TABLET, DELAYED RELEASE ORAL at 09:44

## 2025-07-19 RX ADMIN — INSULIN LISPRO 10 UNITS: 100 INJECTION, SOLUTION INTRAVENOUS; SUBCUTANEOUS at 09:42

## 2025-07-19 RX ADMIN — DULOXETINE 30 MG: 30 CAPSULE, DELAYED RELEASE ORAL at 09:43

## 2025-07-19 RX ADMIN — BUDESONIDE AND FORMOTEROL FUMARATE DIHYDRATE 2 PUFF: 160; 4.5 AEROSOL RESPIRATORY (INHALATION) at 19:34

## 2025-07-19 ASSESSMENT — PAIN SCALES - GENERAL
PAINLEVEL_OUTOF10: 9
PAINLEVEL_OUTOF10: 8

## 2025-07-19 ASSESSMENT — PAIN DESCRIPTION - DESCRIPTORS
DESCRIPTORS: ACHING
DESCRIPTORS: ACHING;DISCOMFORT

## 2025-07-19 ASSESSMENT — PAIN DESCRIPTION - LOCATION
LOCATION: BACK;NECK;GENERALIZED
LOCATION: GENERALIZED;BACK

## 2025-07-19 ASSESSMENT — PAIN - FUNCTIONAL ASSESSMENT: PAIN_FUNCTIONAL_ASSESSMENT: PREVENTS OR INTERFERES SOME ACTIVE ACTIVITIES AND ADLS

## 2025-07-19 ASSESSMENT — PAIN DESCRIPTION - PAIN TYPE
TYPE: CHRONIC PAIN
TYPE: ACUTE PAIN;CHRONIC PAIN

## 2025-07-19 ASSESSMENT — PAIN DESCRIPTION - ONSET: ONSET: ON-GOING

## 2025-07-19 ASSESSMENT — PAIN DESCRIPTION - FREQUENCY
FREQUENCY: CONTINUOUS
FREQUENCY: CONTINUOUS

## 2025-07-19 NOTE — PLAN OF CARE
Problem: Safety - Medical Restraint  Goal: Remains free of injury from restraints (Restraint for Interference with Medical Device)  Description: INTERVENTIONS:  1. Determine that other, less restrictive measures have been tried or would not be effective before applying the restraint  2. Evaluate the patient's condition at the time of restraint application  3. Inform patient/family regarding the reason for restraint  4. Q2H: Monitor safety, psychosocial status, comfort, nutrition and hydration  Outcome: Progressing     Problem: Skin/Tissue Integrity  Goal: Skin integrity remains intact  Description: 1.  Monitor for areas of redness and/or skin breakdown  2.  Assess vascular access sites hourly  3.  Every 4-6 hours minimum:  Change oxygen saturation probe site  4.  Every 4-6 hours:  If on nasal continuous positive airway pressure, respiratory therapy assess nares and determine need for appliance change or resting period  Outcome: Progressing     Problem: Chronic Conditions and Co-morbidities  Goal: Patient's chronic conditions and co-morbidity symptoms are monitored and maintained or improved  Outcome: Progressing     Problem: Discharge Planning  Goal: Discharge to home or other facility with appropriate resources  Outcome: Progressing     Problem: Pain  Goal: Verbalizes/displays adequate comfort level or baseline comfort level  Outcome: Progressing     Problem: Risk for Elopement  Goal: Patient will not exit the unit/facility without proper excort  Outcome: Progressing      0 = understands/communicates without difficulty

## 2025-07-19 NOTE — PROGRESS NOTES
V2.0    [facility] Progress Note      Name:  Azael Key Jr. /Age/Sex: 1985  (39 y.o. male)   MRN & CSN:  [id number] & [id number] Encounter Date/Time: 2025 7:28 AM EDT   Location:  -A PCP: No primary care provider on file.     Attending:Bimal Streeter MD       Hospital Day: 3    Assessment and Recommendations   Azael Key Jr. is a 39 y.o. male  who presents with BETH (acute kidney injury)      Patient medically stable to transfer to inpatient psych    BETH, resolved  Rhabdomyolysis likely secondary to cocaine use  -Cr 2.4 on admit (baseline ~1.0), trended down to 0.8  -[id number]  -Continue adequate hydration     Hypotension, resolved  - Suspect to be from volume depletion, however given cocaine abuse  - Blood cultures 1 out of 2 bottles positive for staph epi.  Likely contaminant     Lactic acidosis   -LA 2.9 on arrival, trended down to 2.6 with IVF  -Suspect to be from hypotension   -Repeat lactic acid 2.3     Suicidal ideations  - [place] slipped in ED, seen by tele psych and recommended inpatient psych admission.   - Patient medically stable to transfer to inpatient psych     T2DM  Diabetic foot ulcers s/p left BKA and right TMA  - BS on admit >300  - Home Meds: Lantus 40u qhs,  - Lantus 30 qhs with LCIS  - Hold PO meds   - -180     Polysubstance Abuse  - UDS + for cocaine. Monitor for withdrawals      Gout  -Continue allopurinol      HTN  -Hold amlodipine, lisinopril-HCTZ due to hypotension      HLD  -Continue statin      GERD  -Continue PPI     Asthma  -Continue albuterol nebs      Diet ADULT DIET; Regular; Safety Tray; Safety Tray (Disposables)   DVT Prophylaxis [] Lovenox, []  Heparin, [] SCDs, [] Ambulation,  [] Eliquis, [] Xarelto  [] Coumadin   Code Status Full Code   Disposition From: Home  Expected Disposition: TBD  Estimated Date of Discharge: TBD  Patient requires continued admission due to medical management   Surrogate Decision Maker/ POA       Personally reviewed Lab Studies

## 2025-07-19 NOTE — PROGRESS NOTES
0971- Pt request to speak with girlfriend Edilberto ([phone]) on phone at this time. RN at bedside and attempted to place call twice with no answer     1806 - RN contacted via access center by [facility] representative Lisa degrootquiring what the staph spp meant on pt blood culture results. Per Sherrell from [facility] microbiology department this is a preliminary result that denotes a staph species was detected and the staph epidermis would be the specific type of staph. Questions regarding pt mobility answered.     1819 - Pt asking about his wheelchair which he states he came in with. Hospital wheelchair only chair that has been in room today. Per CM note states wheelchair says \"[place] on the back.\" RN contacted security and confirmed it was in their possession    1845 - Pink slip faxed to [facility]

## 2025-07-20 NOTE — PROGRESS NOTES
Patient is for transfer to [facility]. Report given to Sana HARDIN. Security aware to release patient belongings upon discharge. [facility] came and discharge patient in good condition.

## 2025-07-20 NOTE — DISCHARGE SUMMARY
XR CHEST PORTABLE  Result Date: 7/18/2025  XR CHEST PORTABLE 7/18/2025 0:59 History: Tachy COMPARISON: 6/11/2025 The cardiac silhouette is normal in size. The trachea is midline. Mediastinal contours are normal. The lungs are clear. There is no effusion or pneumothorax. The bones show no significant abnormality. IMPRESSION: 1.  No acute cardiopulmonary abnormality.  Dictated and Electronically Signed By: [facility] 7/18/2025 1:10          CBC:   Recent Labs     07/18/25  0116 07/18/25  0600 07/19/25  0430   WBC 6.1 6.0 3.5*   HGB 14.3 13.4* 13.2*    220 207     BMP:    Recent Labs     07/18/25  0116 07/18/25  0336 07/18/25  0600 07/19/25  0430   *  --  136 138   K 4.7  --  4.3 4.0   CL 93*  --  101 102   CO2 17*  --  20* 26   BUN 17  --  17 13   CREATININE 2.4*  --  1.7* 0.8*   GLUCOSE 238* 151  151 144* 104*     Hepatic:   Recent Labs     07/18/25  0116 07/18/25  0600 07/19/25  0430   AST 27 32 45*   ALT 32 24 25   BILITOT 0.3 0.3 0.6   ALKPHOS 94 83 88     Lipids:   Lab Results   Component Value Date/Time    CHOL 142 04/16/2011 01:19 PM    HDL 41 04/16/2011 01:19 PM    TRIG 268 04/16/2011 01:19 PM     Hemoglobin A1C:   Lab Results   Component Value Date/Time    LABA1C 6.6 07/20/2024 01:23 AM     TSH:   Lab Results   Component Value Date/Time    TSH 1.45 07/18/2025 01:16 AM     Troponin:   Lab Results   Component Value Date/Time    TROPONINT <0.010 04/10/2023 04:33 AM    TROPONINT <0.010 04/28/2022 03:26 AM    TROPONINT <0.010 08/01/2021 02:37 PM     Lactic Acid:   Recent Labs     07/18/25  0116 07/19/25  1245   LACTA 2.9* 1.4     BNP:   Recent Labs     07/18/25 0116   PROBNP <36     UA:  Lab Results   Component Value Date/Time    NITRU NEGATIVE 07/17/2025 11:50 PM    COLORU Yellow 07/17/2025 11:50 PM    PHUR 5.5 07/17/2025 11:50 PM    WBCUA 1 06/11/2025 10:40 AM    RBCUA 0 06/11/2025 10:40 AM    RBCUA <1 07/19/2024 08:01 PM    MUCUS RARE 11/25/2024 02:26 PM

## 2025-07-21 LAB
ACB COMPLEX DNA BLD POS QL NAA+NON-PROBE: NOT DETECTED
B FRAGILIS DNA BLD POS QL NAA+NON-PROBE: NOT DETECTED
C ALBICANS DNA BLD POS QL NAA+NON-PROBE: NOT DETECTED
C AURIS DNA BLD POS QL NAA+NON-PROBE: NOT DETECTED
C GATTII+NEOFOR DNA BLD POS QL NAA+N-PRB: NOT DETECTED
C GLABRATA DNA BLD POS QL NAA+NON-PROBE: NOT DETECTED
C KRUSEI DNA BLD POS QL NAA+NON-PROBE: NOT DETECTED
C PARAP DNA BLD POS QL NAA+NON-PROBE: NOT DETECTED
C TROPICLS DNA BLD POS QL NAA+NON-PROBE: NOT DETECTED
E CLOAC COMP DNA BLD POS NAA+NON-PROBE: NOT DETECTED
E COLI DNA BLD POS QL NAA+NON-PROBE: NOT DETECTED
E FAECALIS DNA BLD POS QL NAA+NON-PROBE: NOT DETECTED
E FAECIUM DNA BLD POS QL NAA+NON-PROBE: NOT DETECTED
ENTEROBACTERALES DNA BLD POS NAA+N-PRB: NOT DETECTED
GP B STREP DNA BLD POS QL NAA+NON-PROBE: NOT DETECTED
HAEM INFLU DNA BLD POS QL NAA+NON-PROBE: NOT DETECTED
K OXYTOCA DNA BLD POS QL NAA+NON-PROBE: NOT DETECTED
KLEBSIELLA SP DNA BLD POS QL NAA+NON-PRB: NOT DETECTED
KLEBSIELLA SP DNA BLD POS QL NAA+NON-PRB: NOT DETECTED
L MONOCYTOG DNA BLD POS QL NAA+NON-PROBE: NOT DETECTED
MECA+MECC ISLT/SPM QL: NOT DETECTED
MICROORGANISM SPEC CULT: ABNORMAL
MICROORGANISM/AGENT SPEC: ABNORMAL
N MEN DNA BLD POS QL NAA+NON-PROBE: NOT DETECTED
P AERUGINOSA DNA BLD POS NAA+NON-PROBE: NOT DETECTED
PROTEUS SP DNA BLD POS QL NAA+NON-PROBE: NOT DETECTED
S AUREUS DNA BLD POS QL NAA+NON-PROBE: NOT DETECTED
S AUREUS+CONS DNA BLD POS NAA+NON-PROBE: DETECTED
S EPIDERMIDIS DNA BLD POS QL NAA+NON-PRB: DETECTED
S LUGDUNENSIS DNA BLD POS QL NAA+NON-PRB: NOT DETECTED
S MALTOPHILIA DNA BLD POS QL NAA+NON-PRB: NOT DETECTED
S MARCESCENS DNA BLD POS NAA+NON-PROBE: NOT DETECTED
S PNEUM DNA BLD POS QL NAA+NON-PROBE: NOT DETECTED
S PYO DNA BLD POS QL NAA+NON-PROBE: ABNORMAL
SALMONELLA DNA BLD POS QL NAA+NON-PROBE: NOT DETECTED
SERVICE CMNT-IMP: ABNORMAL
SPECIMEN DESCRIPTION: ABNORMAL
STREPTOCOCCUS DNA BLD POS NAA+NON-PROBE: NOT DETECTED

## 2025-07-23 LAB
MICROORGANISM SPEC CULT: NORMAL
SERVICE CMNT-IMP: NORMAL
SPECIMEN DESCRIPTION: NORMAL

## (undated) DEVICE — TOWEL,OR,DSP,ST,BLUE,STD,6/PK,12PK/CS: Brand: MEDLINE

## (undated) DEVICE — GLOVE SURG SZ 75 L12IN THK75MIL DK GRN LTX FREE

## (undated) DEVICE — SPONGE GZ W4XL8IN COT WVN 12 PLY

## (undated) DEVICE — GLOVE SURG SZ 65 CRM LTX FREE POLYISOPRENE POLYMER BEAD ANTI

## (undated) DEVICE — GLOVE ORANGE PI 8   MSG9080

## (undated) DEVICE — PACK,BASIC,SIRUS,V: Brand: MEDLINE

## (undated) DEVICE — SUTURE VCRL SZ 4-0 L27IN ABSRB UD L19MM FS-2 3/8 CIR REV J422H

## (undated) DEVICE — INTENDED FOR TISSUE SEPARATION, AND OTHER PROCEDURES THAT REQUIRE A SHARP SURGICAL BLADE TO PUNCTURE OR CUT.: Brand: BARD-PARKER ® STAINLESS STEEL BLADES

## (undated) DEVICE — PACK SURG LAP CHOLE

## (undated) DEVICE — PENCIL ES CRD L10FT HND SWCHING ROCK SWCH W/ EDGE COAT BLDE

## (undated) DEVICE — BANDAGE,GAUZE,BULKEE II,4.5"X4.1YD,STRL: Brand: MEDLINE

## (undated) DEVICE — YANKAUER,FLEXIBLE HANDLE,REGLR CAPACITY: Brand: MEDLINE INDUSTRIES, INC.

## (undated) DEVICE — SUTURE PROL SZ 2-0 L18IN NONABSORBABLE BLU FS L26MM 3/8 CIR 8685H

## (undated) DEVICE — GOWN,SIRUS,POLYRNF,BRTHSLV,XLN/XL,20/CS: Brand: MEDLINE

## (undated) DEVICE — SYRINGE MED 30ML STD CLR PLAS LUERLOCK TIP N CTRL DISP

## (undated) DEVICE — SYRINGE IRRIG 60ML SFT PLIABLE BLB EZ TO GRP 1 HND USE W/

## (undated) DEVICE — INSTRUMENT REPROC SEAL/DIVIDE LAP BLUNT TP LIGASURE NANO-COAT 5MMX37CM

## (undated) DEVICE — SHOE POSTOP XL MAN 13 UNIV FOAM TRICOT SEMI FLX SKID RESIST

## (undated) DEVICE — GAUZE,SPONGE,4"X4",16PLY,XRAY,STRL,LF: Brand: MEDLINE

## (undated) DEVICE — 3M™ STERI-STRIP™ COMPOUND BENZOIN TINCTURE 40 BAGS/CARTON 4 CARTONS/CASE C1544: Brand: 3M™ STERI-STRIP™

## (undated) DEVICE — MARKER SURG SKIN UTIL REGULAR/FINE 2 TIP W/ RUL AND 9 LBL

## (undated) DEVICE — NEEDLE HYPO 25GA L1.5IN BLU POLYPR HUB S STL REG BVL STR

## (undated) DEVICE — TRAY PREP DRY W/ PREM GLV 2 APPL 6 SPNG 2 UNDPD 1 OVERWRAP

## (undated) DEVICE — DRAPE,EXTREMITY,89X128,STERILE: Brand: MEDLINE

## (undated) DEVICE — SHEET,DRAPE,53X77,STERILE: Brand: MEDLINE

## (undated) DEVICE — Device

## (undated) DEVICE — NEEDLE HYPO 23GA L1.5IN TURQ POLYPR HUB S STL THN WALL IM

## (undated) DEVICE — SPONGE LAP W18XL18IN WHT COT 4 PLY FLD STRUNG RADPQ DISP ST

## (undated) DEVICE — TUBING, SUCTION, 9/32" X 10', STRAIGHT: Brand: MEDLINE

## (undated) DEVICE — GLOVE ORANGE PI 7 1/2   MSG9075

## (undated) DEVICE — SYRINGE 10ML HYPO W/O NDL W/ LUER SLP TP

## (undated) DEVICE — SYRINGE MED 20ML STD CLR PLAS LUERLOCK TIP N CTRL DISP

## (undated) DEVICE — SUTURE VCRL SZ 4-0 L18IN ABSRB UD L19MM PS-2 3/8 CIR PRIM J496H

## (undated) DEVICE — SUTURE SZ 0 27IN 5/8 CIR UR-6  TAPER PT VIOLET ABSRB VICRYL J603H

## (undated) DEVICE — COUNTER NDL 30 COUNT FOAM STRP SGL MAG

## (undated) DEVICE — GLOVE SURG SZ 6 THK91MIL LTX FREE SYN POLYISOPRENE ANTI

## (undated) DEVICE — SOLUTION IV IRRIG WATER 1000ML POUR BRL 2F7114

## (undated) DEVICE — NEEDLE HYPO 20GA L1.5IN YEL POLYPR HUB S STL REG BVL STR

## (undated) DEVICE — SYRINGE 20ML LL S/C 50

## (undated) DEVICE — SUTURE PROL SZ 0 L30IN NONABSORBABLE BLU L36MM CT-1 1/2 CIR 8424H

## (undated) DEVICE — CONTAINER,SPECIMEN,OR STERILE,4OZ: Brand: MEDLINE

## (undated) DEVICE — SUTURE NONABSORBABLE MONOFILAMENT 4-0 PS-2 18 IN BLU PROLENE 8682H

## (undated) DEVICE — SUTURE VCRL SZ 0 L18IN ABSRB UD L36MM CT-1 1/2 CIR J840D

## (undated) DEVICE — GLOVE SURG SZ 8 L12IN THK75MIL DK GRN LTX FREE

## (undated) DEVICE — GLOVE SURG SZ 7 CRM LTX FREE POLYISOPRENE POLYMER BEAD ANTI

## (undated) DEVICE — GLOVE SURG SZ 65 THK91MIL LTX FREE SYN POLYISOPRENE

## (undated) DEVICE — SHEET PT TRANSFER 80X40 IN LAT AIR NYL GRY COMFORT GLIDE

## (undated) DEVICE — RELOAD STPL L45MM H1.5-3.6MM REG TISS BLU GRIPPING SURF B

## (undated) DEVICE — GOWN,ECLIPSE,POLYRNF,BRTHSLV,L,30/CS: Brand: MEDLINE

## (undated) DEVICE — STAPLER INT L340MM 45MM STD 12 FIRING B FRM PWR + GRIPPING

## (undated) DEVICE — TUBING INSUFFLATOR HEAT HI FLO SET PNEUMOCLEAR

## (undated) DEVICE — PAD,ABDOMINAL,5"X9",ST,LF,25/BX: Brand: MEDLINE INDUSTRIES, INC.

## (undated) DEVICE — PREVENA PEEL & PLACE SYSTEM KIT- 13 CM: Brand: PREVENA™ PEEL & PLACE™

## (undated) DEVICE — PAD,NON-ADHERENT,3X8,STERILE,LF,1/PK: Brand: MEDLINE

## (undated) DEVICE — TROCAR: Brand: KII FIOS FIRST ENTRY

## (undated) DEVICE — GLOVE ORANGE PI 7   MSG9070

## (undated) DEVICE — TISSUE RETRIEVAL SYSTEM: Brand: INZII RETRIEVAL SYSTEM

## (undated) DEVICE — ADHESIVE SKIN CLSR 0.7ML TOP DERMBND ADV

## (undated) DEVICE — SYRINGE MED 10ML LUERLOCK TIP W/O SFTY DISP

## (undated) DEVICE — SUTURE PROL SZ 3-0 L18IN NONABSORBABLE BLU L24MM FS-1 3/8 8684G